# Patient Record
Sex: FEMALE | Race: WHITE | Employment: OTHER | URBAN - METROPOLITAN AREA
[De-identification: names, ages, dates, MRNs, and addresses within clinical notes are randomized per-mention and may not be internally consistent; named-entity substitution may affect disease eponyms.]

---

## 2017-01-20 ENCOUNTER — OFFICE VISIT (OUTPATIENT)
Dept: CARDIOLOGY CLINIC | Age: 82
End: 2017-01-20

## 2017-01-20 VITALS
WEIGHT: 134.7 LBS | SYSTOLIC BLOOD PRESSURE: 126 MMHG | DIASTOLIC BLOOD PRESSURE: 60 MMHG | HEIGHT: 63 IN | BODY MASS INDEX: 23.87 KG/M2 | HEART RATE: 52 BPM

## 2017-01-20 DIAGNOSIS — Z95.1 S/P CABG X 3: ICD-10-CM

## 2017-01-20 DIAGNOSIS — I73.9 PERIPHERAL VASCULAR DISEASE (HCC): ICD-10-CM

## 2017-01-20 DIAGNOSIS — I25.810 CORONARY ARTERY DISEASE INVOLVING CORONARY BYPASS GRAFT OF NATIVE HEART WITHOUT ANGINA PECTORIS: Primary | ICD-10-CM

## 2017-01-20 DIAGNOSIS — I34.1 MVP (MITRAL VALVE PROLAPSE): ICD-10-CM

## 2017-01-20 DIAGNOSIS — R79.89 ELEVATED TSH: ICD-10-CM

## 2017-01-20 DIAGNOSIS — I42.9 CARDIOMYOPATHY (HCC): ICD-10-CM

## 2017-01-20 DIAGNOSIS — I47.1 SUPRAVENTRICULAR TACHYCARDIA (HCC): ICD-10-CM

## 2017-01-20 DIAGNOSIS — G50.0 TIC DOULOUREUX: ICD-10-CM

## 2017-01-20 DIAGNOSIS — I10 ESSENTIAL HYPERTENSION: ICD-10-CM

## 2017-01-20 DIAGNOSIS — D63.8 ANEMIA DUE TO CHRONIC ILLNESS: ICD-10-CM

## 2017-01-20 DIAGNOSIS — E78.2 MIXED HYPERLIPIDEMIA: ICD-10-CM

## 2017-01-20 PROCEDURE — 1123F ACP DISCUSS/DSCN MKR DOCD: CPT | Performed by: INTERNAL MEDICINE

## 2017-01-20 PROCEDURE — 4040F PNEUMOC VAC/ADMIN/RCVD: CPT | Performed by: INTERNAL MEDICINE

## 2017-01-20 PROCEDURE — 1090F PRES/ABSN URINE INCON ASSESS: CPT | Performed by: INTERNAL MEDICINE

## 2017-01-20 PROCEDURE — G8484 FLU IMMUNIZE NO ADMIN: HCPCS | Performed by: INTERNAL MEDICINE

## 2017-01-20 PROCEDURE — 1036F TOBACCO NON-USER: CPT | Performed by: INTERNAL MEDICINE

## 2017-01-20 PROCEDURE — 99214 OFFICE O/P EST MOD 30 MIN: CPT | Performed by: INTERNAL MEDICINE

## 2017-01-20 PROCEDURE — G8598 ASA/ANTIPLAT THER USED: HCPCS | Performed by: INTERNAL MEDICINE

## 2017-01-20 PROCEDURE — G8399 PT W/DXA RESULTS DOCUMENT: HCPCS | Performed by: INTERNAL MEDICINE

## 2017-01-20 PROCEDURE — G8427 DOCREV CUR MEDS BY ELIG CLIN: HCPCS | Performed by: INTERNAL MEDICINE

## 2017-01-20 PROCEDURE — G8420 CALC BMI NORM PARAMETERS: HCPCS | Performed by: INTERNAL MEDICINE

## 2017-01-26 ENCOUNTER — PROCEDURE VISIT (OUTPATIENT)
Dept: CARDIOLOGY CLINIC | Age: 82
End: 2017-01-26

## 2017-01-26 ENCOUNTER — TELEPHONE (OUTPATIENT)
Dept: CARDIOLOGY CLINIC | Age: 82
End: 2017-01-26

## 2017-01-26 DIAGNOSIS — R09.89 BILATERAL CAROTID BRUITS: Primary | ICD-10-CM

## 2017-01-26 DIAGNOSIS — I38 VHD (VALVULAR HEART DISEASE): Primary | ICD-10-CM

## 2017-01-26 DIAGNOSIS — R93.89 ABNORMAL CAROTID ULTRASOUND: ICD-10-CM

## 2017-01-26 LAB
LV EF: 55 %
LVEF MODALITY: NORMAL

## 2017-01-26 PROCEDURE — 93880 EXTRACRANIAL BILAT STUDY: CPT | Performed by: INTERNAL MEDICINE

## 2017-01-26 PROCEDURE — 93306 TTE W/DOPPLER COMPLETE: CPT | Performed by: INTERNAL MEDICINE

## 2017-01-27 DIAGNOSIS — I25.10 CORONARY ARTERY DISEASE INVOLVING NATIVE CORONARY ARTERY OF NATIVE HEART WITHOUT ANGINA PECTORIS: ICD-10-CM

## 2017-01-27 DIAGNOSIS — I47.1 SUPRAVENTRICULAR TACHYCARDIA (HCC): ICD-10-CM

## 2017-01-27 DIAGNOSIS — R09.89 CAROTID BRUIT, UNSPECIFIED LATERALITY: Primary | ICD-10-CM

## 2017-01-27 DIAGNOSIS — I73.9 PERIPHERAL VASCULAR DISEASE (HCC): ICD-10-CM

## 2017-01-27 DIAGNOSIS — Z95.1 S/P CABG X 3: ICD-10-CM

## 2017-01-27 DIAGNOSIS — I25.810 CORONARY ARTERY DISEASE INVOLVING CORONARY BYPASS GRAFT OF NATIVE HEART WITHOUT ANGINA PECTORIS: ICD-10-CM

## 2017-02-02 PROBLEM — I65.23 BILATERAL CAROTID ARTERY STENOSIS: Status: ACTIVE | Noted: 2017-02-02

## 2017-02-02 PROBLEM — I77.1 SUBCLAVIAN ARTERIAL STENOSIS (HCC): Status: ACTIVE | Noted: 2017-02-02

## 2017-02-15 ENCOUNTER — TELEPHONE (OUTPATIENT)
Dept: CARDIOLOGY CLINIC | Age: 82
End: 2017-02-15

## 2017-02-27 ENCOUNTER — TELEPHONE (OUTPATIENT)
Dept: INTERNAL MEDICINE CLINIC | Age: 82
End: 2017-02-27

## 2017-02-27 ENCOUNTER — OFFICE VISIT (OUTPATIENT)
Dept: INTERNAL MEDICINE CLINIC | Age: 82
End: 2017-02-27

## 2017-02-27 VITALS
SYSTOLIC BLOOD PRESSURE: 130 MMHG | DIASTOLIC BLOOD PRESSURE: 56 MMHG | RESPIRATION RATE: 16 BRPM | WEIGHT: 138 LBS | BODY MASS INDEX: 24.45 KG/M2 | TEMPERATURE: 97.7 F | HEART RATE: 68 BPM

## 2017-02-27 DIAGNOSIS — R19.7 DIARRHEA, UNSPECIFIED TYPE: Primary | ICD-10-CM

## 2017-02-27 DIAGNOSIS — R10.31 RIGHT LOWER QUADRANT ABDOMINAL PAIN: ICD-10-CM

## 2017-02-27 PROCEDURE — 1036F TOBACCO NON-USER: CPT | Performed by: INTERNAL MEDICINE

## 2017-02-27 PROCEDURE — G8598 ASA/ANTIPLAT THER USED: HCPCS | Performed by: INTERNAL MEDICINE

## 2017-02-27 PROCEDURE — G8399 PT W/DXA RESULTS DOCUMENT: HCPCS | Performed by: INTERNAL MEDICINE

## 2017-02-27 PROCEDURE — 99215 OFFICE O/P EST HI 40 MIN: CPT | Performed by: INTERNAL MEDICINE

## 2017-02-27 PROCEDURE — G8484 FLU IMMUNIZE NO ADMIN: HCPCS | Performed by: INTERNAL MEDICINE

## 2017-02-27 PROCEDURE — G8427 DOCREV CUR MEDS BY ELIG CLIN: HCPCS | Performed by: INTERNAL MEDICINE

## 2017-02-27 PROCEDURE — 1090F PRES/ABSN URINE INCON ASSESS: CPT | Performed by: INTERNAL MEDICINE

## 2017-02-27 PROCEDURE — G8420 CALC BMI NORM PARAMETERS: HCPCS | Performed by: INTERNAL MEDICINE

## 2017-02-27 PROCEDURE — 4040F PNEUMOC VAC/ADMIN/RCVD: CPT | Performed by: INTERNAL MEDICINE

## 2017-02-27 PROCEDURE — 1123F ACP DISCUSS/DSCN MKR DOCD: CPT | Performed by: INTERNAL MEDICINE

## 2017-02-27 RX ORDER — METRONIDAZOLE 500 MG/1
500 TABLET ORAL 3 TIMES DAILY
Qty: 21 TABLET | Refills: 0 | Status: SHIPPED | OUTPATIENT
Start: 2017-02-27 | End: 2017-03-06

## 2017-02-27 RX ORDER — CIPROFLOXACIN 500 MG/1
500 TABLET, FILM COATED ORAL 2 TIMES DAILY
Qty: 20 TABLET | Refills: 0 | Status: SHIPPED | OUTPATIENT
Start: 2017-02-27 | End: 2017-03-07 | Stop reason: ALTCHOICE

## 2017-02-27 RX ORDER — MAGNESIUM HYDROXIDE/ALUMINUM HYDROXICE/SIMETHICONE 120; 1200; 1200 MG/30ML; MG/30ML; MG/30ML
5 SUSPENSION ORAL EVERY 6 HOURS PRN
COMMUNITY
End: 2017-02-27 | Stop reason: ALTCHOICE

## 2017-02-27 ASSESSMENT — ENCOUNTER SYMPTOMS
VOMITING: 0
ABDOMINAL PAIN: 1
SHORTNESS OF BREATH: 0
NAUSEA: 0
BLOOD IN STOOL: 0
EYE PAIN: 0
WHEEZING: 0
DIARRHEA: 1
DOUBLE VISION: 0
SPUTUM PRODUCTION: 0
COUGH: 0
HEARTBURN: 0
BACK PAIN: 1
SORE THROAT: 0
BLURRED VISION: 0

## 2017-02-28 ENCOUNTER — HOSPITAL ENCOUNTER (OUTPATIENT)
Dept: CT IMAGING | Age: 82
Discharge: HOME | End: 2017-02-28
Attending: INTERNAL MEDICINE | Admitting: INTERNAL MEDICINE

## 2017-02-28 ENCOUNTER — HOSPITAL ENCOUNTER (OUTPATIENT)
Dept: GENERAL RADIOLOGY | Age: 82
Discharge: HOME | End: 2017-02-28
Attending: INTERNAL MEDICINE | Admitting: INTERNAL MEDICINE

## 2017-02-28 DIAGNOSIS — R10.31 ABDOMINAL PAIN, RIGHT LOWER QUADRANT: ICD-10-CM

## 2017-02-28 DIAGNOSIS — R19.7 DIARRHEA: ICD-10-CM

## 2017-02-28 DIAGNOSIS — R10.31 RIGHT LOWER QUADRANT ABDOMINAL PAIN: ICD-10-CM

## 2017-02-28 DIAGNOSIS — R19.7 DIARRHEA, UNSPECIFIED TYPE: ICD-10-CM

## 2017-02-28 LAB
ALBUMIN SERPL-MCNC: 4.8 GM/DL (ref 3.4–5)
ALP BLD-CCNC: 40 IU/L (ref 40–128)
ALT SERPL-CCNC: 10 U/L (ref 10–40)
ANION GAP SERPL CALCULATED.3IONS-SCNC: 15 MMOL/L (ref 4–16)
AST SERPL-CCNC: 19 IU/L (ref 15–37)
BACTERIA: NEGATIVE /HPF
BASOPHILS ABSOLUTE: 0 K/CU MM
BASOPHILS RELATIVE PERCENT: 0.2 % (ref 0–1)
BILIRUB SERPL-MCNC: 0.4 MG/DL (ref 0–1)
BILIRUBIN URINE: NEGATIVE MG/DL
BLOOD, URINE: NEGATIVE
BUN BLDV-MCNC: 18 MG/DL (ref 6–23)
CALCIUM SERPL-MCNC: 9.7 MG/DL (ref 8.3–10.6)
CHLORIDE BLD-SCNC: 94 MMOL/L (ref 99–110)
CLARITY: CLEAR
CO2: 28 MMOL/L (ref 21–32)
COLOR: YELLOW
CREAT SERPL-MCNC: 1.1 MG/DL (ref 0.6–1.1)
DIFFERENTIAL TYPE: ABNORMAL
EOSINOPHILS ABSOLUTE: 0.1 K/CU MM
EOSINOPHILS RELATIVE PERCENT: 1.2 % (ref 0–3)
GFR AFRICAN AMERICAN: 58 ML/MIN/1.73M2
GFR NON-AFRICAN AMERICAN: 48 ML/MIN/1.73M2
GLUCOSE FASTING: 97 MG/DL (ref 70–99)
GLUCOSE, URINE: NEGATIVE MG/DL
HCT VFR BLD CALC: 40.7 % (ref 37–47)
HEMOGLOBIN: 13.1 GM/DL (ref 12.5–16)
IMMATURE NEUTROPHIL %: 0.4 % (ref 0–0.43)
KETONES, URINE: NEGATIVE MG/DL
LEUKOCYTE ESTERASE, URINE: ABNORMAL
LYMPHOCYTES ABSOLUTE: 2.1 K/CU MM
LYMPHOCYTES RELATIVE PERCENT: 40.6 % (ref 24–44)
MCH RBC QN AUTO: 33.4 PG (ref 27–31)
MCHC RBC AUTO-ENTMCNC: 32.2 % (ref 32–36)
MCV RBC AUTO: 103.8 FL (ref 78–100)
MONOCYTES ABSOLUTE: 0.5 K/CU MM
MONOCYTES RELATIVE PERCENT: 10.7 % (ref 0–4)
NITRITE URINE, QUANTITATIVE: NEGATIVE
NUCLEATED RBC %: 0 %
PDW BLD-RTO: 13.9 % (ref 11.7–14.9)
PH, URINE: 6 (ref 5–8)
PLATELET # BLD: 156 K/CU MM (ref 140–440)
PMV BLD AUTO: 10 FL (ref 7.5–11.1)
POTASSIUM SERPL-SCNC: 4.3 MMOL/L (ref 3.5–5.1)
PROTEIN UA: NEGATIVE MG/DL
RBC # BLD: 3.92 M/CU MM (ref 4.2–5.4)
RBC URINE: <1 /HPF (ref 0–6)
SEGMENTED NEUTROPHILS ABSOLUTE COUNT: 2.4 K/CU MM
SEGMENTED NEUTROPHILS RELATIVE PERCENT: 46.9 % (ref 36–66)
SODIUM BLD-SCNC: 137 MMOL/L (ref 135–145)
SPECIFIC GRAVITY UA: 1.01 (ref 1–1.03)
SQUAMOUS EPITHELIAL: <1 /HPF
TOTAL IMMATURE NEUTOROPHIL: 0.02 K/CU MM
TOTAL NUCLEATED RBC: 0 K/CU MM
TOTAL PROTEIN: 8.2 GM/DL (ref 6.4–8.2)
UROBILINOGEN, URINE: NORMAL EU/DL (ref 0.2–1)
WBC # BLD: 5.1 K/CU MM (ref 4–10.5)
WBC UA: <1 /HPF (ref 0–5)

## 2017-03-01 LAB
CULTURE: NORMAL
REPORT STATUS: NORMAL
REQUEST PROBLEM: NORMAL
SPECIMEN: NORMAL

## 2017-03-02 ENCOUNTER — TELEPHONE (OUTPATIENT)
Dept: INTERNAL MEDICINE CLINIC | Age: 82
End: 2017-03-02

## 2017-03-06 ENCOUNTER — TELEPHONE (OUTPATIENT)
Dept: INTERNAL MEDICINE CLINIC | Age: 82
End: 2017-03-06

## 2017-03-07 ENCOUNTER — OFFICE VISIT (OUTPATIENT)
Dept: INTERNAL MEDICINE CLINIC | Age: 82
End: 2017-03-07

## 2017-03-07 VITALS
BODY MASS INDEX: 23.91 KG/M2 | RESPIRATION RATE: 16 BRPM | DIASTOLIC BLOOD PRESSURE: 70 MMHG | WEIGHT: 135 LBS | HEART RATE: 80 BPM | SYSTOLIC BLOOD PRESSURE: 138 MMHG

## 2017-03-07 DIAGNOSIS — K57.32 DIVERTICULITIS OF LARGE INTESTINE WITHOUT PERFORATION OR ABSCESS WITHOUT BLEEDING: Primary | ICD-10-CM

## 2017-03-07 PROBLEM — K43.9 SPIGELIAN HERNIA: Status: ACTIVE | Noted: 2017-03-01

## 2017-03-07 PROCEDURE — 1123F ACP DISCUSS/DSCN MKR DOCD: CPT | Performed by: INTERNAL MEDICINE

## 2017-03-07 PROCEDURE — G8427 DOCREV CUR MEDS BY ELIG CLIN: HCPCS | Performed by: INTERNAL MEDICINE

## 2017-03-07 PROCEDURE — G8598 ASA/ANTIPLAT THER USED: HCPCS | Performed by: INTERNAL MEDICINE

## 2017-03-07 PROCEDURE — 99213 OFFICE O/P EST LOW 20 MIN: CPT | Performed by: INTERNAL MEDICINE

## 2017-03-07 PROCEDURE — G8420 CALC BMI NORM PARAMETERS: HCPCS | Performed by: INTERNAL MEDICINE

## 2017-03-07 PROCEDURE — G8484 FLU IMMUNIZE NO ADMIN: HCPCS | Performed by: INTERNAL MEDICINE

## 2017-03-07 PROCEDURE — 1090F PRES/ABSN URINE INCON ASSESS: CPT | Performed by: INTERNAL MEDICINE

## 2017-03-07 PROCEDURE — G8399 PT W/DXA RESULTS DOCUMENT: HCPCS | Performed by: INTERNAL MEDICINE

## 2017-03-07 PROCEDURE — 1036F TOBACCO NON-USER: CPT | Performed by: INTERNAL MEDICINE

## 2017-03-07 PROCEDURE — 4040F PNEUMOC VAC/ADMIN/RCVD: CPT | Performed by: INTERNAL MEDICINE

## 2017-03-20 RX ORDER — ATORVASTATIN CALCIUM 20 MG/1
20 TABLET, FILM COATED ORAL DAILY
Qty: 30 TABLET | Refills: 5 | Status: SHIPPED | OUTPATIENT
Start: 2017-03-20 | End: 2017-09-11 | Stop reason: SDUPTHER

## 2017-03-28 RX ORDER — CIPROFLOXACIN 250 MG/1
250 TABLET, FILM COATED ORAL 2 TIMES DAILY
Qty: 6 TABLET | Refills: 0 | Status: SHIPPED | OUTPATIENT
Start: 2017-03-28 | End: 2017-03-31

## 2017-04-03 ENCOUNTER — NURSE ONLY (OUTPATIENT)
Dept: INTERNAL MEDICINE CLINIC | Age: 82
End: 2017-04-03

## 2017-04-03 ENCOUNTER — TELEPHONE (OUTPATIENT)
Dept: INTERNAL MEDICINE CLINIC | Age: 82
End: 2017-04-03

## 2017-04-03 DIAGNOSIS — Z12.31 ENCOUNTER FOR SCREENING MAMMOGRAM FOR BREAST CANCER: ICD-10-CM

## 2017-04-03 DIAGNOSIS — M81.0 OSTEOPOROSIS: Primary | ICD-10-CM

## 2017-04-03 PROCEDURE — 96372 THER/PROPH/DIAG INJ SC/IM: CPT | Performed by: INTERNAL MEDICINE

## 2017-04-17 RX ORDER — NIACIN 500 MG
500 TABLET ORAL NIGHTLY
Qty: 30 TABLET | Refills: 6 | Status: SHIPPED | OUTPATIENT
Start: 2017-04-17 | End: 2017-11-10 | Stop reason: SDUPTHER

## 2017-04-30 RX ORDER — PANTOPRAZOLE SODIUM 40 MG/1
40 TABLET, DELAYED RELEASE ORAL DAILY
Qty: 90 TABLET | Refills: 1 | Status: SHIPPED | OUTPATIENT
Start: 2017-04-30 | End: 2017-11-01 | Stop reason: SDUPTHER

## 2017-05-30 RX ORDER — GABAPENTIN 300 MG/1
CAPSULE ORAL
Qty: 270 CAPSULE | Refills: 1 | Status: SHIPPED | OUTPATIENT
Start: 2017-05-30 | End: 2017-06-13 | Stop reason: SDUPTHER

## 2017-06-12 ENCOUNTER — TELEPHONE (OUTPATIENT)
Dept: INTERNAL MEDICINE CLINIC | Age: 82
End: 2017-06-12

## 2017-06-13 ENCOUNTER — OFFICE VISIT (OUTPATIENT)
Dept: INTERNAL MEDICINE CLINIC | Age: 82
End: 2017-06-13

## 2017-06-13 VITALS
RESPIRATION RATE: 16 BRPM | BODY MASS INDEX: 25.15 KG/M2 | DIASTOLIC BLOOD PRESSURE: 70 MMHG | WEIGHT: 142 LBS | HEART RATE: 84 BPM | SYSTOLIC BLOOD PRESSURE: 118 MMHG

## 2017-06-13 DIAGNOSIS — I25.10 CORONARY ARTERY DISEASE INVOLVING NATIVE CORONARY ARTERY OF NATIVE HEART WITHOUT ANGINA PECTORIS: ICD-10-CM

## 2017-06-13 DIAGNOSIS — M81.0 OSTEOPOROSIS: Primary | ICD-10-CM

## 2017-06-13 DIAGNOSIS — E78.2 MIXED HYPERLIPIDEMIA: ICD-10-CM

## 2017-06-13 PROCEDURE — 4040F PNEUMOC VAC/ADMIN/RCVD: CPT | Performed by: INTERNAL MEDICINE

## 2017-06-13 PROCEDURE — G8598 ASA/ANTIPLAT THER USED: HCPCS | Performed by: INTERNAL MEDICINE

## 2017-06-13 PROCEDURE — G8419 CALC BMI OUT NRM PARAM NOF/U: HCPCS | Performed by: INTERNAL MEDICINE

## 2017-06-13 PROCEDURE — 4005F PHARM THX FOR OP RXD: CPT | Performed by: INTERNAL MEDICINE

## 2017-06-13 PROCEDURE — 1036F TOBACCO NON-USER: CPT | Performed by: INTERNAL MEDICINE

## 2017-06-13 PROCEDURE — 1123F ACP DISCUSS/DSCN MKR DOCD: CPT | Performed by: INTERNAL MEDICINE

## 2017-06-13 PROCEDURE — G8427 DOCREV CUR MEDS BY ELIG CLIN: HCPCS | Performed by: INTERNAL MEDICINE

## 2017-06-13 PROCEDURE — 1090F PRES/ABSN URINE INCON ASSESS: CPT | Performed by: INTERNAL MEDICINE

## 2017-06-13 PROCEDURE — 99213 OFFICE O/P EST LOW 20 MIN: CPT | Performed by: INTERNAL MEDICINE

## 2017-06-13 PROCEDURE — G8399 PT W/DXA RESULTS DOCUMENT: HCPCS | Performed by: INTERNAL MEDICINE

## 2017-06-13 RX ORDER — FLUTICASONE PROPIONATE 50 MCG
1 SPRAY, SUSPENSION (ML) NASAL DAILY
Qty: 3 BOTTLE | Refills: 1 | Status: SHIPPED | OUTPATIENT
Start: 2017-06-13 | End: 2017-12-11 | Stop reason: SDUPTHER

## 2017-06-13 RX ORDER — AMLODIPINE BESYLATE 5 MG/1
5 TABLET ORAL DAILY
Qty: 90 TABLET | Refills: 1 | Status: SHIPPED | OUTPATIENT
Start: 2017-06-13 | End: 2017-12-11 | Stop reason: SDUPTHER

## 2017-06-13 RX ORDER — GABAPENTIN 300 MG/1
CAPSULE ORAL
Qty: 270 CAPSULE | Refills: 1 | Status: SHIPPED | OUTPATIENT
Start: 2017-06-13 | End: 2018-05-16 | Stop reason: SDUPTHER

## 2017-07-03 ENCOUNTER — TELEPHONE (OUTPATIENT)
Dept: INTERNAL MEDICINE CLINIC | Age: 82
End: 2017-07-03

## 2017-07-03 RX ORDER — DIVALPROEX SODIUM 250 MG/1
250 TABLET, DELAYED RELEASE ORAL
COMMUNITY
End: 2020-10-20

## 2017-07-14 ENCOUNTER — TELEPHONE (OUTPATIENT)
Dept: CARDIOLOGY CLINIC | Age: 82
End: 2017-07-14

## 2017-08-08 ENCOUNTER — OFFICE VISIT (OUTPATIENT)
Dept: CARDIOLOGY CLINIC | Age: 82
End: 2017-08-08

## 2017-08-08 VITALS
HEIGHT: 63 IN | HEART RATE: 72 BPM | DIASTOLIC BLOOD PRESSURE: 62 MMHG | SYSTOLIC BLOOD PRESSURE: 138 MMHG | WEIGHT: 141 LBS | BODY MASS INDEX: 24.98 KG/M2

## 2017-08-08 DIAGNOSIS — G31.84 MILD COGNITIVE IMPAIRMENT: ICD-10-CM

## 2017-08-08 DIAGNOSIS — I34.1 MVP (MITRAL VALVE PROLAPSE): ICD-10-CM

## 2017-08-08 DIAGNOSIS — I25.810 CORONARY ARTERY DISEASE INVOLVING CORONARY BYPASS GRAFT OF NATIVE HEART WITHOUT ANGINA PECTORIS: Primary | ICD-10-CM

## 2017-08-08 DIAGNOSIS — E78.2 MIXED HYPERLIPIDEMIA: ICD-10-CM

## 2017-08-08 DIAGNOSIS — I77.1 SUBCLAVIAN ARTERIAL STENOSIS (HCC): ICD-10-CM

## 2017-08-08 DIAGNOSIS — I73.9 PERIPHERAL VASCULAR DISEASE (HCC): ICD-10-CM

## 2017-08-08 DIAGNOSIS — Z95.1 S/P CABG X 3: ICD-10-CM

## 2017-08-08 DIAGNOSIS — I25.5 ISCHEMIC CARDIOMYOPATHY: ICD-10-CM

## 2017-08-08 DIAGNOSIS — I10 ESSENTIAL HYPERTENSION: ICD-10-CM

## 2017-08-08 DIAGNOSIS — G50.0 TIC DOULOUREUX: ICD-10-CM

## 2017-08-08 DIAGNOSIS — D63.8 ANEMIA DUE TO CHRONIC ILLNESS: ICD-10-CM

## 2017-08-08 DIAGNOSIS — I65.23 BILATERAL CAROTID ARTERY STENOSIS: ICD-10-CM

## 2017-08-08 DIAGNOSIS — I47.1 SUPRAVENTRICULAR TACHYCARDIA (HCC): ICD-10-CM

## 2017-08-08 PROCEDURE — 99214 OFFICE O/P EST MOD 30 MIN: CPT | Performed by: INTERNAL MEDICINE

## 2017-08-08 PROCEDURE — 4040F PNEUMOC VAC/ADMIN/RCVD: CPT | Performed by: INTERNAL MEDICINE

## 2017-08-08 PROCEDURE — G8598 ASA/ANTIPLAT THER USED: HCPCS | Performed by: INTERNAL MEDICINE

## 2017-08-08 PROCEDURE — G8399 PT W/DXA RESULTS DOCUMENT: HCPCS | Performed by: INTERNAL MEDICINE

## 2017-08-08 PROCEDURE — 1090F PRES/ABSN URINE INCON ASSESS: CPT | Performed by: INTERNAL MEDICINE

## 2017-08-08 PROCEDURE — 1123F ACP DISCUSS/DSCN MKR DOCD: CPT | Performed by: INTERNAL MEDICINE

## 2017-08-08 PROCEDURE — G8420 CALC BMI NORM PARAMETERS: HCPCS | Performed by: INTERNAL MEDICINE

## 2017-08-08 PROCEDURE — G8427 DOCREV CUR MEDS BY ELIG CLIN: HCPCS | Performed by: INTERNAL MEDICINE

## 2017-08-08 PROCEDURE — 1036F TOBACCO NON-USER: CPT | Performed by: INTERNAL MEDICINE

## 2017-08-22 ENCOUNTER — HOSPITAL ENCOUNTER (OUTPATIENT)
Dept: WOMENS IMAGING | Age: 82
Discharge: HOME | End: 2017-08-22
Attending: INTERNAL MEDICINE | Admitting: INTERNAL MEDICINE

## 2017-08-22 DIAGNOSIS — M81.0 OSTEOPOROSIS, UNSPECIFIED OSTEOPOROSIS TYPE, UNSPECIFIED PATHOLOGICAL FRACTURE PRESENCE: ICD-10-CM

## 2017-08-22 DIAGNOSIS — Z12.31 VISIT FOR SCREENING MAMMOGRAM: ICD-10-CM

## 2017-08-22 DIAGNOSIS — M81.0 AGE-RELATED OSTEOPOROSIS WITHOUT CURRENT PATHOLOGICAL FRACTURE: ICD-10-CM

## 2017-09-11 RX ORDER — ATORVASTATIN CALCIUM 20 MG/1
TABLET, FILM COATED ORAL
Qty: 30 TABLET | Refills: 5 | Status: SHIPPED | OUTPATIENT
Start: 2017-09-11 | End: 2018-03-01 | Stop reason: SDUPTHER

## 2017-10-09 ENCOUNTER — TELEPHONE (OUTPATIENT)
Dept: INTERNAL MEDICINE CLINIC | Age: 82
End: 2017-10-09

## 2017-10-09 ENCOUNTER — OFFICE VISIT (OUTPATIENT)
Dept: INTERNAL MEDICINE CLINIC | Age: 82
End: 2017-10-09

## 2017-10-09 VITALS
BODY MASS INDEX: 24.84 KG/M2 | DIASTOLIC BLOOD PRESSURE: 68 MMHG | HEART RATE: 78 BPM | WEIGHT: 140.2 LBS | RESPIRATION RATE: 16 BRPM | TEMPERATURE: 98.2 F | SYSTOLIC BLOOD PRESSURE: 132 MMHG

## 2017-10-09 DIAGNOSIS — F33.42 RECURRENT MAJOR DEPRESSIVE EPISODES, IN FULL REMISSION (HCC): ICD-10-CM

## 2017-10-09 DIAGNOSIS — J04.10 TRACHEITIS: Primary | ICD-10-CM

## 2017-10-09 PROCEDURE — 99212 OFFICE O/P EST SF 10 MIN: CPT | Performed by: INTERNAL MEDICINE

## 2017-10-09 PROCEDURE — G8427 DOCREV CUR MEDS BY ELIG CLIN: HCPCS | Performed by: INTERNAL MEDICINE

## 2017-10-09 PROCEDURE — G8598 ASA/ANTIPLAT THER USED: HCPCS | Performed by: INTERNAL MEDICINE

## 2017-10-09 PROCEDURE — 1036F TOBACCO NON-USER: CPT | Performed by: INTERNAL MEDICINE

## 2017-10-09 PROCEDURE — G8484 FLU IMMUNIZE NO ADMIN: HCPCS | Performed by: INTERNAL MEDICINE

## 2017-10-09 PROCEDURE — G8399 PT W/DXA RESULTS DOCUMENT: HCPCS | Performed by: INTERNAL MEDICINE

## 2017-10-09 PROCEDURE — 4040F PNEUMOC VAC/ADMIN/RCVD: CPT | Performed by: INTERNAL MEDICINE

## 2017-10-09 PROCEDURE — G8420 CALC BMI NORM PARAMETERS: HCPCS | Performed by: INTERNAL MEDICINE

## 2017-10-09 PROCEDURE — 1090F PRES/ABSN URINE INCON ASSESS: CPT | Performed by: INTERNAL MEDICINE

## 2017-10-09 PROCEDURE — 1123F ACP DISCUSS/DSCN MKR DOCD: CPT | Performed by: INTERNAL MEDICINE

## 2017-10-09 RX ORDER — AZITHROMYCIN 250 MG/1
TABLET, FILM COATED ORAL
Qty: 1 PACKET | Refills: 0 | Status: SHIPPED | OUTPATIENT
Start: 2017-10-09 | End: 2017-10-19

## 2017-10-09 NOTE — PROGRESS NOTES
Subjective:      Anne Mann is a [de-identified] y.o. female who presents today for follow up on her chronic medical conditions as noted below.     Patient Active Problem List:     Chronic depression     Hyperlipidemia     Tic douloureux     Anemia due to chronic illness     Colon cancer screening     Breast cancer screening     Osteoporosis     Supraventricular tachycardia (HCC)     Recurrent ventral incisional hernia     Mild cognitive impairment     CAD (coronary artery disease)     Elevated TSH     Peripheral vascular disease (HCC)     Sciatica of left side without back pain     Coronary artery disease involving coronary bypass graft of native heart without angina pectoris     S/P CABG x 3     Essential hypertension     MVP (mitral valve prolapse)     Cardiomyopathy ([address]     Bilateral carotid artery stenosis     Subclavian arterial stenosis (HCC)     Spigelian hernia     She complains of cough    It began 4 d ago with \"hacking\" cough, dry  Hoarseness  Didn't feel bad    Last night she awakened with night sweat   Like she had a fever    Still s/w hoarse and coughing ; not bringin up much  No sore throat  No wheeze    Has been seeing johan every 3 mo for depression and it is in complete remission woth good mood    Current Outpatient Prescriptions   Medication Sig Dispense Refill    azithromycin (ZITHROMAX) 250 MG tablet Take as directed over 5 days 1 packet 0    atorvastatin (LIPITOR) 20 MG tablet take 1 tablet by mouth once daily 30 tablet 5    divalproex (DEPAKOTE) 250 MG DR tablet Take 250 mg by mouth 2 tabs at 6 pm      amLODIPine (NORVASC) 5 MG tablet Take 1 tablet by mouth daily 90 tablet 1    gabapentin (NEURONTIN) 300 MG capsule take 1 capsule by mouth three times a day 270 capsule 1    fluticasone (FLONASE) 50 MCG/ACT nasal spray 1 spray by Nasal route daily 3 Bottle 1    pantoprazole (PROTONIX) 40 MG tablet Take 1 tablet by mouth daily 90 tablet 1    niacin (RA NO FLUSH NIACIN) 500 MG tablet Take 1 tablet 0.25     Years: 10.00     Quit date: 11/29/1985    Smokeless tobacco: Never Used      Comment: 7/8/2016    Alcohol use No        ROS: The patient has had no headache, sore throat, fever or chills, dyspnea, chest pain, nausea, vomiting or diarrhea, or edema. Objective:      /68   Pulse 78   Temp 98.2 °F (36.8 °C)   Resp 16   Wt 140 lb 3.2 oz (63.6 kg)   BMI 24.84 kg/m²    General: in no apparent distress   The patient's neck is free of nodes. Lungs are clear. Heart is normal in rate and regular in rhythm. Legs are free of edema. No rash or erythema. Assessment / Plan:      1. Tracheitis    2. Recurrent major depressive episodes, in full remission ([facility]            Plan   zpak  RTC 2 o  F/  Quality & Risk Score Accuracy - MEDICARE ADVANTAGE    Visit Dx:  Recurrent major depressive episodes, in full remission ([address]  Stable/controlled based upon review of recent symptoms. Continue current treatment plan and follow up at least yearly.   Additional documentation:  mood fine ; seeing johan every 3 mo; no psych med change; happy ; getting out and about; sleeping fine  Last edited 10/09/17 14:27 EDT by Leann Carlos MD

## 2017-10-25 ENCOUNTER — NURSE ONLY (OUTPATIENT)
Dept: INTERNAL MEDICINE CLINIC | Age: 82
End: 2017-10-25

## 2017-10-25 DIAGNOSIS — M81.0 OSTEOPOROSIS WITHOUT CURRENT PATHOLOGICAL FRACTURE, UNSPECIFIED OSTEOPOROSIS TYPE: Primary | ICD-10-CM

## 2017-10-25 PROCEDURE — 96372 THER/PROPH/DIAG INJ SC/IM: CPT | Performed by: INTERNAL MEDICINE

## 2017-11-01 RX ORDER — PANTOPRAZOLE SODIUM 40 MG/1
TABLET, DELAYED RELEASE ORAL
Qty: 90 TABLET | Refills: 1 | Status: SHIPPED | OUTPATIENT
Start: 2017-11-01 | End: 2017-11-02 | Stop reason: SDUPTHER

## 2017-11-02 RX ORDER — PANTOPRAZOLE SODIUM 40 MG/1
TABLET, DELAYED RELEASE ORAL
Qty: 90 TABLET | Refills: 1 | Status: SHIPPED | OUTPATIENT
Start: 2017-11-02 | End: 2018-02-12 | Stop reason: SDUPTHER

## 2017-11-10 RX ORDER — PSYLLIUM HUSK 3.4 G/7G
POWDER ORAL
Qty: 30 TABLET | Refills: 6 | Status: SHIPPED | OUTPATIENT
Start: 2017-11-10 | End: 2018-05-16 | Stop reason: SDUPTHER

## 2017-12-11 RX ORDER — FLUTICASONE PROPIONATE 50 MCG
SPRAY, SUSPENSION (ML) NASAL
Qty: 3 BOTTLE | Refills: 1 | Status: SHIPPED | OUTPATIENT
Start: 2017-12-11 | End: 2018-11-27 | Stop reason: SDUPTHER

## 2017-12-11 RX ORDER — AMLODIPINE BESYLATE 5 MG/1
TABLET ORAL
Qty: 90 TABLET | Refills: 1 | Status: SHIPPED | OUTPATIENT
Start: 2017-12-11 | End: 2018-05-16 | Stop reason: SDUPTHER

## 2018-01-04 ENCOUNTER — TELEPHONE (OUTPATIENT)
Dept: INTERNAL MEDICINE CLINIC | Age: 83
End: 2018-01-04

## 2018-01-05 ENCOUNTER — OFFICE VISIT (OUTPATIENT)
Dept: INTERNAL MEDICINE CLINIC | Age: 83
End: 2018-01-05

## 2018-01-05 VITALS
TEMPERATURE: 98.9 F | SYSTOLIC BLOOD PRESSURE: 122 MMHG | HEART RATE: 80 BPM | WEIGHT: 140 LBS | BODY MASS INDEX: 24.8 KG/M2 | RESPIRATION RATE: 16 BRPM | DIASTOLIC BLOOD PRESSURE: 70 MMHG

## 2018-01-05 DIAGNOSIS — R79.89 ELEVATED TSH: ICD-10-CM

## 2018-01-05 DIAGNOSIS — Z12.39 BREAST CANCER SCREENING: ICD-10-CM

## 2018-01-05 DIAGNOSIS — E78.2 MIXED HYPERLIPIDEMIA: ICD-10-CM

## 2018-01-05 DIAGNOSIS — J01.00 ACUTE NON-RECURRENT MAXILLARY SINUSITIS: Primary | ICD-10-CM

## 2018-01-05 DIAGNOSIS — I25.10 CORONARY ARTERY DISEASE INVOLVING NATIVE CORONARY ARTERY OF NATIVE HEART WITHOUT ANGINA PECTORIS: ICD-10-CM

## 2018-01-05 PROCEDURE — G8427 DOCREV CUR MEDS BY ELIG CLIN: HCPCS | Performed by: INTERNAL MEDICINE

## 2018-01-05 PROCEDURE — G8482 FLU IMMUNIZE ORDER/ADMIN: HCPCS | Performed by: INTERNAL MEDICINE

## 2018-01-05 PROCEDURE — 1090F PRES/ABSN URINE INCON ASSESS: CPT | Performed by: INTERNAL MEDICINE

## 2018-01-05 PROCEDURE — G8598 ASA/ANTIPLAT THER USED: HCPCS | Performed by: INTERNAL MEDICINE

## 2018-01-05 PROCEDURE — G8399 PT W/DXA RESULTS DOCUMENT: HCPCS | Performed by: INTERNAL MEDICINE

## 2018-01-05 PROCEDURE — G8420 CALC BMI NORM PARAMETERS: HCPCS | Performed by: INTERNAL MEDICINE

## 2018-01-05 PROCEDURE — 1123F ACP DISCUSS/DSCN MKR DOCD: CPT | Performed by: INTERNAL MEDICINE

## 2018-01-05 PROCEDURE — 1036F TOBACCO NON-USER: CPT | Performed by: INTERNAL MEDICINE

## 2018-01-05 PROCEDURE — 4040F PNEUMOC VAC/ADMIN/RCVD: CPT | Performed by: INTERNAL MEDICINE

## 2018-01-05 PROCEDURE — 99213 OFFICE O/P EST LOW 20 MIN: CPT | Performed by: INTERNAL MEDICINE

## 2018-01-05 RX ORDER — LEVOFLOXACIN 500 MG/1
500 TABLET, FILM COATED ORAL DAILY
Qty: 10 TABLET | Refills: 0 | Status: SHIPPED | OUTPATIENT
Start: 2018-01-05 | End: 2018-01-15

## 2018-01-18 ENCOUNTER — TELEPHONE (OUTPATIENT)
Dept: INTERNAL MEDICINE CLINIC | Age: 83
End: 2018-01-18

## 2018-01-18 RX ORDER — AZITHROMYCIN 250 MG/1
TABLET, FILM COATED ORAL
Qty: 1 PACKET | Refills: 0 | Status: SHIPPED | OUTPATIENT
Start: 2018-01-18 | End: 2018-01-28

## 2018-02-12 ENCOUNTER — TELEPHONE (OUTPATIENT)
Dept: INTERNAL MEDICINE CLINIC | Age: 83
End: 2018-02-12

## 2018-02-12 RX ORDER — PANTOPRAZOLE SODIUM 40 MG/1
TABLET, DELAYED RELEASE ORAL
Qty: 90 TABLET | Refills: 1 | Status: SHIPPED | OUTPATIENT
Start: 2018-02-12 | End: 2018-05-16 | Stop reason: SDUPTHER

## 2018-02-12 NOTE — TELEPHONE ENCOUNTER
Patient called stating that the pharmacy has been calling for protonix refill. Patient only has 3 tablets left and needs this medication. Can this be called in for 90 days. To [facility] in [facility].

## 2018-02-15 ENCOUNTER — OFFICE VISIT (OUTPATIENT)
Dept: CARDIOLOGY CLINIC | Age: 83
End: 2018-02-15

## 2018-02-15 VITALS
BODY MASS INDEX: 25.16 KG/M2 | SYSTOLIC BLOOD PRESSURE: 138 MMHG | DIASTOLIC BLOOD PRESSURE: 66 MMHG | HEART RATE: 83 BPM | WEIGHT: 142 LBS | HEIGHT: 63 IN

## 2018-02-15 DIAGNOSIS — I10 ESSENTIAL HYPERTENSION: ICD-10-CM

## 2018-02-15 DIAGNOSIS — I77.1 SUBCLAVIAN ARTERIAL STENOSIS (HCC): ICD-10-CM

## 2018-02-15 DIAGNOSIS — I34.1 MVP (MITRAL VALVE PROLAPSE): ICD-10-CM

## 2018-02-15 DIAGNOSIS — I47.1 SUPRAVENTRICULAR TACHYCARDIA (HCC): ICD-10-CM

## 2018-02-15 DIAGNOSIS — Z95.1 S/P CABG X 3: ICD-10-CM

## 2018-02-15 DIAGNOSIS — E78.2 MIXED HYPERLIPIDEMIA: ICD-10-CM

## 2018-02-15 DIAGNOSIS — I73.9 PERIPHERAL VASCULAR DISEASE (HCC): ICD-10-CM

## 2018-02-15 DIAGNOSIS — I25.810 CORONARY ARTERY DISEASE INVOLVING CORONARY BYPASS GRAFT OF NATIVE HEART WITHOUT ANGINA PECTORIS: Primary | ICD-10-CM

## 2018-02-15 DIAGNOSIS — I25.5 ISCHEMIC CARDIOMYOPATHY: ICD-10-CM

## 2018-02-15 DIAGNOSIS — G50.0 TIC DOULOUREUX: ICD-10-CM

## 2018-02-15 PROCEDURE — 99214 OFFICE O/P EST MOD 30 MIN: CPT | Performed by: INTERNAL MEDICINE

## 2018-02-15 PROCEDURE — 1090F PRES/ABSN URINE INCON ASSESS: CPT | Performed by: INTERNAL MEDICINE

## 2018-02-15 PROCEDURE — G8399 PT W/DXA RESULTS DOCUMENT: HCPCS | Performed by: INTERNAL MEDICINE

## 2018-02-15 PROCEDURE — G8598 ASA/ANTIPLAT THER USED: HCPCS | Performed by: INTERNAL MEDICINE

## 2018-02-15 PROCEDURE — G8482 FLU IMMUNIZE ORDER/ADMIN: HCPCS | Performed by: INTERNAL MEDICINE

## 2018-02-15 PROCEDURE — 1036F TOBACCO NON-USER: CPT | Performed by: INTERNAL MEDICINE

## 2018-02-15 PROCEDURE — G8419 CALC BMI OUT NRM PARAM NOF/U: HCPCS | Performed by: INTERNAL MEDICINE

## 2018-02-15 PROCEDURE — 4040F PNEUMOC VAC/ADMIN/RCVD: CPT | Performed by: INTERNAL MEDICINE

## 2018-02-15 PROCEDURE — 1123F ACP DISCUSS/DSCN MKR DOCD: CPT | Performed by: INTERNAL MEDICINE

## 2018-02-15 PROCEDURE — G8427 DOCREV CUR MEDS BY ELIG CLIN: HCPCS | Performed by: INTERNAL MEDICINE

## 2018-02-15 RX ORDER — FENOFIBRATE 145 MG/1
145 TABLET, COATED ORAL DAILY
Qty: 30 TABLET | Refills: 5 | Status: SHIPPED | OUTPATIENT
Start: 2018-02-15 | End: 2018-04-24 | Stop reason: ALTCHOICE

## 2018-02-15 NOTE — PROGRESS NOTES
Ayan Mathew is a 82 y.o. female who has    CHIEF COMPLAINT AS FOLLOWS:  CHEST PAIN: Patient denies any C/O chest pains at this time. SOB: No C/O SOB at this time. LEG EDEMA: C/O leg edema   PALPITATIONS: Denies any C/O Palpitations                               DIZZINESS: No C/O Dizziness                          SYNCOPE: None   OTHER: Fatigue                                    HPI: Patient is here for F/U on his CAD, HTN & Dyslipidemia problems. He does not have any complaints at this time.     Rowan Ackerman has the following history recorded in care path:  Patient Active Problem List    Diagnosis Date Noted    Spigelian hernia 03/01/2017     Priority: Low    Bilateral carotid artery stenosis 02/02/2017     Priority: Low    Subclavian arterial stenosis ([address] 02/02/2017     Priority: Low    Coronary artery disease involving coronary bypass graft of native heart without angina pectoris 01/20/2017     Priority: Low    S/P CABG x 3 01/20/2017     Priority: Low    Essential hypertension 01/20/2017     Priority: Low    MVP (mitral valve prolapse) 01/20/2017     Priority: Low    Cardiomyopathy ([address] 01/20/2017     Priority: Low    Sciatica of left side without back pain 08/17/2016     Priority: Low    Elevated TSH 02/01/2016     Priority: Low    Peripheral vascular disease ([address] 01/01/2016     Priority: Low    CAD (coronary artery disease) 05/23/2014     Priority: Low    Recurrent ventral incisional hernia      Priority: Low    Supraventricular tachycardia ([address]      Priority: Low    Osteoporosis 05/29/2012     Priority: Low    Mild cognitive impairment 02/01/2012     Priority: Low    Colon cancer screening 03/18/2011     Priority: Low    Breast cancer screening 03/18/2011     Priority: Low    Anemia due to chronic illness 10/29/2010     Priority: Low    Chronic depression      Priority: Low    venous thrombosis) ([facility] 1970    left leg    Elevated TSH 2/2016    5.02    Former smoker     History of Doppler ultrasound 01/27/2017    Carotid- Bilateral ICA stenosis of 50-69%, Mobile Plaque refer to Dr Kenton Salcedo Hyperlipidemia     Hypertension     Macular degeneration     right    Mild cognitive impairment 2/2012    MMSE 26/30    MVP (mitral valve prolapse)     trace on echo 2014    Osteoporosis 5/2012    Pancytopenia 5/2011    mild with ; Dr Nicole hawk 2010    Peptic ulcer disease     Peripheral vascular disease ([facility] [address] 1/2016    angio; dis below ankles; pletal    Prediabetes 2006    Recurrent ventral incisional hernia 2013    medial apex of GB scar    S/P CABG x 3 2003    3-vessel; Dr Amparo Tenorio Spigelian hernia 03/2017    right ant lateral wall; seen on CT abd    Supraventricular tachycardia ([facility] [address] 1998    Freq ventriular ectopy    Tic douloureux 2008    Dr. Darci Clements; Right side     Past Surgical History:   Procedure Laterality Date    CATARACT REMOVAL Right 2010    poor result ; blind right eye; [address] CORONARY ARTERY BYPASS GRAFT  8/2009    3 vessel    SHOULDER ARTHROSCOPY Right 2003    TUBAL LIGATION      URETER SURGERY      Right ureteral repair      As reviewed   Family History   Problem Relation Age of Onset    Cancer Sister 79     Breast cancer 1/2017     Social History   Substance Use Topics    Smoking status: Former Smoker     Packs/day: 0.25     Years: 10.00     Quit date: 11/29/1985    Smokeless tobacco: Never Used      Comment: 7/8/2016    Alcohol use No      Review of Systems:    Constitutional: Negative for diaphoresis and fatigue  Psychological:Negative for anxiety or depression  HENT: Negative for headaches, nasal congestion, sinus pain or vertigo  Eyes: Negative for visual disturbance.    Endocrine: Negative for polydipsia/polyuria  Respiratory: Negative for shortness of breath  Cardiovascular: Negative for chest pain, dyspnea on pectoris I25.810    S/P CABG x 3 Z95.1    Essential hypertension I10    MVP (mitral valve prolapse) I34.1    Cardiomyopathy (HCC) I42.9    Bilateral carotid artery stenosis I65.23    Subclavian arterial stenosis (HCC) I77.1    Spigelian hernia K43.9       Assessment & Plan:    CAD:Yes   clinically stable. Patient is on optimal medical regimen ( see medication list above )  -     CORONARY ARTERY DISEASE: Patient is currently  asymptomatic from CAD. - changes in  treatment:   no           - Testing ordered:  no  [facility] classification: 1  [place] RISK SCORE:  LOAN RISK SCORE:  HTN:well controlled on current medical regimen, see list above. - changes in  treatment:   no   CARDIOMYOPATHY:  known   CONGESTIVE HEART FAILURE: NO KNOWN HISTORY.     VHD: No significant VHD noted  DYSLIPIDEMIA: Patient's profile is at / near Goal.no                                HDL is low                                Tolerating current medical regimen wellyes,                              CAROTID ARTERY DISEASE:.yes,                No symptoms described pertaining to Carotid artery disease               Up to date on non invasive testing .yes,                Patient is on Guidelines recommended therapy. yes,   OTHER RELEVANT DIAGNOSIS:as noted in patient's active problem list:  TESTS ORDERED: Carotid US. All previously ordered tests reviewed. ARRHYTHMIAS:  known                                Patient has H/O SVT   MEDICATIONS: CPM + add fenofibrate & F/U LABS   Office f/u in six months. Primary/secondary prevention is the goal by aggressive risk modification, healthy and therapeutic life style changes for cardiovascular risk reduction. Various goals are discussed and multiple questions answered.

## 2018-02-15 NOTE — PATIENT INSTRUCTIONS
CAD:Yes   clinically stable. Patient is on optimal medical regimen ( see medication list above )  -     CORONARY ARTERY DISEASE: Patient is currently  asymptomatic from CAD. - changes in  treatment:   no           - Testing ordered:  no  [facility] classification: 1  [place] RISK SCORE:  LOAN RISK SCORE:  HTN:well controlled on current medical regimen, see list above. - changes in  treatment:   no   CARDIOMYOPATHY:  known   CONGESTIVE HEART FAILURE: NO KNOWN HISTORY.     VHD: No significant VHD noted  DYSLIPIDEMIA: Patient's profile is at / near Goal.no                                HDL is low                                Tolerating current medical regimen judies,                              CAROTID ARTERY DISEASE:.yes,                No symptoms described pertaining to Carotid artery disease               Up to date on non invasive testing .yes,                Patient is on Guidelines recommended therapy. yes,   OTHER RELEVANT DIAGNOSIS:as noted in patient's active problem list:  TESTS ORDERED: Carotid US. All previously ordered tests reviewed. ARRHYTHMIAS:  known                                Patient has H/O SVT   MEDICATIONS: CPM + add fenofibrate & F/U LABS   Office f/u in six months. Primary/secondary prevention is the goal by aggressive risk modification, healthy and therapeutic life style changes for cardiovascular risk reduction. Various goals are discussed and multiple questions answered.

## 2018-03-01 ENCOUNTER — TELEPHONE (OUTPATIENT)
Dept: CARDIOLOGY CLINIC | Age: 83
End: 2018-03-01

## 2018-03-01 ENCOUNTER — PROCEDURE VISIT (OUTPATIENT)
Dept: CARDIOLOGY CLINIC | Age: 83
End: 2018-03-01

## 2018-03-01 DIAGNOSIS — I47.1 SUPRAVENTRICULAR TACHYCARDIA (HCC): ICD-10-CM

## 2018-03-01 DIAGNOSIS — I73.9 PERIPHERAL VASCULAR DISEASE (HCC): ICD-10-CM

## 2018-03-01 DIAGNOSIS — G50.0 TIC DOULOUREUX: ICD-10-CM

## 2018-03-01 DIAGNOSIS — Z95.1 S/P CABG X 3: ICD-10-CM

## 2018-03-01 DIAGNOSIS — I25.5 ISCHEMIC CARDIOMYOPATHY: ICD-10-CM

## 2018-03-01 DIAGNOSIS — I77.1 SUBCLAVIAN ARTERIAL STENOSIS (HCC): Primary | ICD-10-CM

## 2018-03-01 DIAGNOSIS — E78.2 MIXED HYPERLIPIDEMIA: ICD-10-CM

## 2018-03-01 DIAGNOSIS — I34.1 MVP (MITRAL VALVE PROLAPSE): ICD-10-CM

## 2018-03-01 DIAGNOSIS — I10 ESSENTIAL HYPERTENSION: ICD-10-CM

## 2018-03-01 DIAGNOSIS — I25.810 CORONARY ARTERY DISEASE INVOLVING CORONARY BYPASS GRAFT OF NATIVE HEART WITHOUT ANGINA PECTORIS: ICD-10-CM

## 2018-03-01 PROCEDURE — 93880 EXTRACRANIAL BILAT STUDY: CPT | Performed by: INTERNAL MEDICINE

## 2018-03-01 RX ORDER — ATORVASTATIN CALCIUM 20 MG/1
TABLET, FILM COATED ORAL
Qty: 30 TABLET | Refills: 5 | Status: SHIPPED | OUTPATIENT
Start: 2018-03-01 | End: 2018-03-01 | Stop reason: SINTOL

## 2018-03-01 NOTE — TELEPHONE ENCOUNTER
Left message on voicemail to discontinue atorvastatin and start Crestor 10 mg.   Rx to [facility]

## 2018-03-02 ENCOUNTER — TELEPHONE (OUTPATIENT)
Dept: CARDIOLOGY CLINIC | Age: 83
End: 2018-03-02

## 2018-03-02 RX ORDER — ROSUVASTATIN CALCIUM 10 MG/1
10 TABLET, COATED ORAL DAILY
Qty: 30 TABLET | Refills: 6 | Status: SHIPPED | OUTPATIENT
Start: 2018-03-02 | End: 2018-08-28 | Stop reason: SDUPTHER

## 2018-03-02 NOTE — TELEPHONE ENCOUNTER
Patient called were to have called in a script for Crestor and she was to stop atorvastatin   She called [facility] they are telling her that Lipitor was called in , she is confused   Please call

## 2018-04-17 ENCOUNTER — OFFICE VISIT (OUTPATIENT)
Dept: INTERNAL MEDICINE CLINIC | Age: 83
End: 2018-04-17

## 2018-04-17 VITALS
DIASTOLIC BLOOD PRESSURE: 70 MMHG | HEART RATE: 60 BPM | RESPIRATION RATE: 16 BRPM | HEIGHT: 63 IN | SYSTOLIC BLOOD PRESSURE: 132 MMHG | WEIGHT: 138.2 LBS | BODY MASS INDEX: 24.49 KG/M2

## 2018-04-17 DIAGNOSIS — I10 ESSENTIAL HYPERTENSION: ICD-10-CM

## 2018-04-17 DIAGNOSIS — I25.5 ISCHEMIC CARDIOMYOPATHY: ICD-10-CM

## 2018-04-17 DIAGNOSIS — E78.2 MIXED HYPERLIPIDEMIA: ICD-10-CM

## 2018-04-17 DIAGNOSIS — R79.89 ELEVATED TSH: Primary | ICD-10-CM

## 2018-04-17 PROCEDURE — 99213 OFFICE O/P EST LOW 20 MIN: CPT | Performed by: INTERNAL MEDICINE

## 2018-04-17 PROCEDURE — G8427 DOCREV CUR MEDS BY ELIG CLIN: HCPCS | Performed by: INTERNAL MEDICINE

## 2018-04-17 PROCEDURE — 3288F FALL RISK ASSESSMENT DOCD: CPT | Performed by: INTERNAL MEDICINE

## 2018-04-17 PROCEDURE — 1123F ACP DISCUSS/DSCN MKR DOCD: CPT | Performed by: INTERNAL MEDICINE

## 2018-04-17 PROCEDURE — 1090F PRES/ABSN URINE INCON ASSESS: CPT | Performed by: INTERNAL MEDICINE

## 2018-04-17 PROCEDURE — 4040F PNEUMOC VAC/ADMIN/RCVD: CPT | Performed by: INTERNAL MEDICINE

## 2018-04-17 PROCEDURE — G8598 ASA/ANTIPLAT THER USED: HCPCS | Performed by: INTERNAL MEDICINE

## 2018-04-17 PROCEDURE — G8510 SCR DEP NEG, NO PLAN REQD: HCPCS | Performed by: INTERNAL MEDICINE

## 2018-04-17 PROCEDURE — G8399 PT W/DXA RESULTS DOCUMENT: HCPCS | Performed by: INTERNAL MEDICINE

## 2018-04-17 PROCEDURE — 1036F TOBACCO NON-USER: CPT | Performed by: INTERNAL MEDICINE

## 2018-04-17 PROCEDURE — G8420 CALC BMI NORM PARAMETERS: HCPCS | Performed by: INTERNAL MEDICINE

## 2018-04-17 ASSESSMENT — PATIENT HEALTH QUESTIONNAIRE - PHQ9
1. LITTLE INTEREST OR PLEASURE IN DOING THINGS: 0
2. FEELING DOWN, DEPRESSED OR HOPELESS: 0
SUM OF ALL RESPONSES TO PHQ9 QUESTIONS 1 & 2: 0
SUM OF ALL RESPONSES TO PHQ QUESTIONS 1-9: 0

## 2018-04-23 ENCOUNTER — TELEPHONE (OUTPATIENT)
Dept: INTERNAL MEDICINE CLINIC | Age: 83
End: 2018-04-23

## 2018-04-24 ENCOUNTER — OFFICE VISIT (OUTPATIENT)
Dept: CARDIOLOGY CLINIC | Age: 83
End: 2018-04-24

## 2018-04-24 ENCOUNTER — TELEPHONE (OUTPATIENT)
Dept: INTERNAL MEDICINE CLINIC | Age: 83
End: 2018-04-24

## 2018-04-24 VITALS
SYSTOLIC BLOOD PRESSURE: 122 MMHG | DIASTOLIC BLOOD PRESSURE: 74 MMHG | HEART RATE: 56 BPM | BODY MASS INDEX: 24.63 KG/M2 | WEIGHT: 139 LBS | HEIGHT: 63 IN

## 2018-04-24 DIAGNOSIS — R79.89 ELEVATED TSH: ICD-10-CM

## 2018-04-24 DIAGNOSIS — I25.810 CORONARY ARTERY DISEASE INVOLVING CORONARY BYPASS GRAFT OF NATIVE HEART WITHOUT ANGINA PECTORIS: ICD-10-CM

## 2018-04-24 DIAGNOSIS — I25.5 ISCHEMIC CARDIOMYOPATHY: ICD-10-CM

## 2018-04-24 DIAGNOSIS — I10 ESSENTIAL HYPERTENSION: ICD-10-CM

## 2018-04-24 DIAGNOSIS — I73.9 PERIPHERAL VASCULAR DISEASE (HCC): ICD-10-CM

## 2018-04-24 DIAGNOSIS — I47.1 SUPRAVENTRICULAR TACHYCARDIA (HCC): ICD-10-CM

## 2018-04-24 DIAGNOSIS — I25.10 CORONARY ARTERY DISEASE INVOLVING NATIVE CORONARY ARTERY OF NATIVE HEART WITHOUT ANGINA PECTORIS: Primary | ICD-10-CM

## 2018-04-24 DIAGNOSIS — E78.2 MIXED HYPERLIPIDEMIA: ICD-10-CM

## 2018-04-24 DIAGNOSIS — G50.0 TIC DOULOUREUX: ICD-10-CM

## 2018-04-24 DIAGNOSIS — I77.1 SUBCLAVIAN ARTERIAL STENOSIS (HCC): ICD-10-CM

## 2018-04-24 DIAGNOSIS — Z95.1 S/P CABG X 3: ICD-10-CM

## 2018-04-24 DIAGNOSIS — I34.1 MVP (MITRAL VALVE PROLAPSE): ICD-10-CM

## 2018-04-24 DIAGNOSIS — I65.23 BILATERAL CAROTID ARTERY STENOSIS: ICD-10-CM

## 2018-04-24 PROCEDURE — G8427 DOCREV CUR MEDS BY ELIG CLIN: HCPCS | Performed by: INTERNAL MEDICINE

## 2018-04-24 PROCEDURE — 1123F ACP DISCUSS/DSCN MKR DOCD: CPT | Performed by: INTERNAL MEDICINE

## 2018-04-24 PROCEDURE — 99214 OFFICE O/P EST MOD 30 MIN: CPT | Performed by: INTERNAL MEDICINE

## 2018-04-24 PROCEDURE — G8598 ASA/ANTIPLAT THER USED: HCPCS | Performed by: INTERNAL MEDICINE

## 2018-04-24 PROCEDURE — 1090F PRES/ABSN URINE INCON ASSESS: CPT | Performed by: INTERNAL MEDICINE

## 2018-04-24 PROCEDURE — 4040F PNEUMOC VAC/ADMIN/RCVD: CPT | Performed by: INTERNAL MEDICINE

## 2018-04-24 PROCEDURE — G8420 CALC BMI NORM PARAMETERS: HCPCS | Performed by: INTERNAL MEDICINE

## 2018-04-24 PROCEDURE — 1036F TOBACCO NON-USER: CPT | Performed by: INTERNAL MEDICINE

## 2018-04-24 PROCEDURE — G8399 PT W/DXA RESULTS DOCUMENT: HCPCS | Performed by: INTERNAL MEDICINE

## 2018-05-07 ENCOUNTER — TELEPHONE (OUTPATIENT)
Dept: INTERNAL MEDICINE CLINIC | Age: 83
End: 2018-05-07

## 2018-05-16 ENCOUNTER — OFFICE VISIT (OUTPATIENT)
Dept: INTERNAL MEDICINE CLINIC | Age: 83
End: 2018-05-16

## 2018-05-16 VITALS
RESPIRATION RATE: 16 BRPM | HEART RATE: 68 BPM | WEIGHT: 140.2 LBS | BODY MASS INDEX: 24.84 KG/M2 | DIASTOLIC BLOOD PRESSURE: 62 MMHG | SYSTOLIC BLOOD PRESSURE: 112 MMHG

## 2018-05-16 DIAGNOSIS — F33.42 MAJOR DEPRESSIVE DISORDER, RECURRENT, IN FULL REMISSION (HCC): ICD-10-CM

## 2018-05-16 DIAGNOSIS — M81.0 OSTEOPOROSIS WITHOUT CURRENT PATHOLOGICAL FRACTURE, UNSPECIFIED OSTEOPOROSIS TYPE: ICD-10-CM

## 2018-05-16 DIAGNOSIS — F43.9 STRESS AT HOME: ICD-10-CM

## 2018-05-16 DIAGNOSIS — I25.5 ISCHEMIC CARDIOMYOPATHY: ICD-10-CM

## 2018-05-16 DIAGNOSIS — I10 ESSENTIAL HYPERTENSION: Primary | ICD-10-CM

## 2018-05-16 PROCEDURE — 1123F ACP DISCUSS/DSCN MKR DOCD: CPT | Performed by: INTERNAL MEDICINE

## 2018-05-16 PROCEDURE — 4040F PNEUMOC VAC/ADMIN/RCVD: CPT | Performed by: INTERNAL MEDICINE

## 2018-05-16 PROCEDURE — G8420 CALC BMI NORM PARAMETERS: HCPCS | Performed by: INTERNAL MEDICINE

## 2018-05-16 PROCEDURE — 96372 THER/PROPH/DIAG INJ SC/IM: CPT | Performed by: INTERNAL MEDICINE

## 2018-05-16 PROCEDURE — 1090F PRES/ABSN URINE INCON ASSESS: CPT | Performed by: INTERNAL MEDICINE

## 2018-05-16 PROCEDURE — G8427 DOCREV CUR MEDS BY ELIG CLIN: HCPCS | Performed by: INTERNAL MEDICINE

## 2018-05-16 PROCEDURE — 93000 ELECTROCARDIOGRAM COMPLETE: CPT | Performed by: INTERNAL MEDICINE

## 2018-05-16 PROCEDURE — 99213 OFFICE O/P EST LOW 20 MIN: CPT | Performed by: INTERNAL MEDICINE

## 2018-05-16 PROCEDURE — G8598 ASA/ANTIPLAT THER USED: HCPCS | Performed by: INTERNAL MEDICINE

## 2018-05-16 PROCEDURE — G8399 PT W/DXA RESULTS DOCUMENT: HCPCS | Performed by: INTERNAL MEDICINE

## 2018-05-16 PROCEDURE — 1036F TOBACCO NON-USER: CPT | Performed by: INTERNAL MEDICINE

## 2018-05-16 RX ORDER — AMLODIPINE BESYLATE 5 MG/1
TABLET ORAL
Qty: 90 TABLET | Refills: 1 | Status: SHIPPED | OUTPATIENT
Start: 2018-05-16 | End: 2018-08-28 | Stop reason: SDUPTHER

## 2018-05-16 RX ORDER — GABAPENTIN 300 MG/1
CAPSULE ORAL
Qty: 270 CAPSULE | Refills: 0 | Status: SHIPPED | OUTPATIENT
Start: 2018-05-16 | End: 2018-08-28 | Stop reason: SDUPTHER

## 2018-05-16 RX ORDER — PANTOPRAZOLE SODIUM 40 MG/1
TABLET, DELAYED RELEASE ORAL
Qty: 90 TABLET | Refills: 1 | Status: SHIPPED | OUTPATIENT
Start: 2018-05-16 | End: 2019-05-07 | Stop reason: SDUPTHER

## 2018-05-16 RX ORDER — NIACIN 500 MG
TABLET ORAL
Qty: 90 TABLET | Refills: 1 | Status: SHIPPED | OUTPATIENT
Start: 2018-05-16 | End: 2018-08-28 | Stop reason: SDUPTHER

## 2018-08-25 ENCOUNTER — HOSPITAL ENCOUNTER (OUTPATIENT)
Dept: OTHER | Age: 83
Discharge: HOME | End: 2018-08-25

## 2018-08-27 LAB
CULTURE: NORMAL
Lab: NORMAL
REPORT STATUS: NORMAL
SPECIMEN: NORMAL

## 2018-08-28 ENCOUNTER — OFFICE VISIT (OUTPATIENT)
Dept: INTERNAL MEDICINE CLINIC | Age: 83
End: 2018-08-28

## 2018-08-28 VITALS
OXYGEN SATURATION: 96 % | WEIGHT: 136.4 LBS | RESPIRATION RATE: 16 BRPM | HEART RATE: 80 BPM | DIASTOLIC BLOOD PRESSURE: 60 MMHG | SYSTOLIC BLOOD PRESSURE: 126 MMHG | BODY MASS INDEX: 24.16 KG/M2

## 2018-08-28 DIAGNOSIS — M81.0 OSTEOPOROSIS, UNSPECIFIED OSTEOPOROSIS TYPE, UNSPECIFIED PATHOLOGICAL FRACTURE PRESENCE: Primary | ICD-10-CM

## 2018-08-28 DIAGNOSIS — I25.5 ISCHEMIC CARDIOMYOPATHY: ICD-10-CM

## 2018-08-28 DIAGNOSIS — E78.2 MIXED HYPERLIPIDEMIA: ICD-10-CM

## 2018-08-28 DIAGNOSIS — I10 ESSENTIAL HYPERTENSION: ICD-10-CM

## 2018-08-28 PROCEDURE — G8399 PT W/DXA RESULTS DOCUMENT: HCPCS | Performed by: INTERNAL MEDICINE

## 2018-08-28 PROCEDURE — G8420 CALC BMI NORM PARAMETERS: HCPCS | Performed by: INTERNAL MEDICINE

## 2018-08-28 PROCEDURE — 99213 OFFICE O/P EST LOW 20 MIN: CPT | Performed by: INTERNAL MEDICINE

## 2018-08-28 PROCEDURE — 1123F ACP DISCUSS/DSCN MKR DOCD: CPT | Performed by: INTERNAL MEDICINE

## 2018-08-28 PROCEDURE — 1036F TOBACCO NON-USER: CPT | Performed by: INTERNAL MEDICINE

## 2018-08-28 PROCEDURE — G8427 DOCREV CUR MEDS BY ELIG CLIN: HCPCS | Performed by: INTERNAL MEDICINE

## 2018-08-28 PROCEDURE — G8598 ASA/ANTIPLAT THER USED: HCPCS | Performed by: INTERNAL MEDICINE

## 2018-08-28 PROCEDURE — 4040F PNEUMOC VAC/ADMIN/RCVD: CPT | Performed by: INTERNAL MEDICINE

## 2018-08-28 PROCEDURE — 1090F PRES/ABSN URINE INCON ASSESS: CPT | Performed by: INTERNAL MEDICINE

## 2018-08-28 PROCEDURE — 1101F PT FALLS ASSESS-DOCD LE1/YR: CPT | Performed by: INTERNAL MEDICINE

## 2018-08-28 RX ORDER — GABAPENTIN 300 MG/1
CAPSULE ORAL
Qty: 270 CAPSULE | Refills: 0 | Status: SHIPPED | OUTPATIENT
Start: 2018-08-28 | End: 2018-12-20 | Stop reason: SDUPTHER

## 2018-08-28 RX ORDER — AMLODIPINE BESYLATE 5 MG/1
TABLET ORAL
Qty: 90 TABLET | Refills: 1 | Status: SHIPPED | OUTPATIENT
Start: 2018-08-28 | End: 2019-04-19 | Stop reason: SDUPTHER

## 2018-08-28 RX ORDER — ROSUVASTATIN CALCIUM 10 MG/1
10 TABLET, COATED ORAL DAILY
Qty: 90 TABLET | Refills: 1 | Status: SHIPPED | OUTPATIENT
Start: 2018-08-28 | End: 2019-02-23 | Stop reason: SDUPTHER

## 2018-08-28 RX ORDER — NIACIN 500 MG
TABLET ORAL
Qty: 90 TABLET | Refills: 1 | Status: SHIPPED | OUTPATIENT
Start: 2018-08-28 | End: 2019-02-26 | Stop reason: SDUPTHER

## 2018-10-18 ENCOUNTER — TELEPHONE (OUTPATIENT)
Dept: INTERNAL MEDICINE CLINIC | Age: 83
End: 2018-10-18

## 2018-10-18 RX ORDER — NITROFURANTOIN 25; 75 MG/1; MG/1
100 CAPSULE ORAL 2 TIMES DAILY
Qty: 10 CAPSULE | Refills: 0 | Status: SHIPPED | OUTPATIENT
Start: 2018-10-18 | End: 2018-10-23

## 2018-10-31 ENCOUNTER — OFFICE VISIT (OUTPATIENT)
Dept: CARDIOLOGY CLINIC | Age: 83
End: 2018-10-31
Payer: MEDICARE

## 2018-10-31 VITALS
HEART RATE: 60 BPM | DIASTOLIC BLOOD PRESSURE: 70 MMHG | WEIGHT: 138.2 LBS | BODY MASS INDEX: 24.49 KG/M2 | HEIGHT: 63 IN | SYSTOLIC BLOOD PRESSURE: 118 MMHG

## 2018-10-31 DIAGNOSIS — I10 ESSENTIAL HYPERTENSION: ICD-10-CM

## 2018-10-31 DIAGNOSIS — I25.10 CORONARY ARTERY DISEASE INVOLVING NATIVE CORONARY ARTERY OF NATIVE HEART WITHOUT ANGINA PECTORIS: Primary | ICD-10-CM

## 2018-10-31 DIAGNOSIS — E78.2 MIXED HYPERLIPIDEMIA: ICD-10-CM

## 2018-10-31 DIAGNOSIS — I65.23 BILATERAL CAROTID ARTERY STENOSIS: ICD-10-CM

## 2018-10-31 DIAGNOSIS — I25.5 ISCHEMIC CARDIOMYOPATHY: ICD-10-CM

## 2018-10-31 PROCEDURE — 99214 OFFICE O/P EST MOD 30 MIN: CPT | Performed by: NURSE PRACTITIONER

## 2018-10-31 NOTE — PROGRESS NOTES
current facility-administered medications for this visit. Allergies: Alendronate sodium; Boniva [ibandronate sodium]; Lisinopril; Neggram [nalidixic acid]; Pce [erythromycin]; Penicillins; Welchol [colesevelam hcl];  Carbamazepine; and Lovaza [omega-3-acid ethyl esters]  Past Medical History:   Diagnosis Date    Arthritis of shoulder region, right 9/2014    Lexis Myles Blind right eye     following right cataract surg    Chronic depression 2009    Dr. Lidia Tee    DVT (deep venous thrombosis) ([facility] [address] 1970    left leg    Former smoker     Hx of Doppler ultrasound 03/01/2018    Carotid-mild 0-49% bilateral carotid,50% stenosis right subclavian    Hyperlipidemia     Hypertension     Macular degeneration     right    Mild cognitive impairment 2/2012    MMSE 26/30    MVP (mitral valve prolapse)     trace on echo 2014    Osteoporosis 5/2012    Pancytopenia 5/2011    mild with ; Dr Rachel hawk 2010    Peptic ulcer disease     Peripheral vascular disease ([facility] [address] 1/2016    angio; dis below ankles; pletal    Prediabetes 2006    Recurrent ventral incisional hernia 2013    medial apex of GB scar    S/P CABG x 3 2003    3-vessel; Dr Amadou Hart 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 Bessie Muro; Right side     Past Surgical History:   Procedure Laterality Date    CATARACT REMOVAL Right 2010    poor result ; blind right eye; [facility] [address] CORONARY ARTERY BYPASS GRAFT  8/2009    3 vessel    SHOULDER ARTHROSCOPY Right 2003    TUBAL LIGATION      URETER SURGERY      Right ureteral repair     Family History   Problem Relation Age of Onset    Cancer Sister 79        Breast cancer 1/2017     Social History   Substance Use Topics    Smoking status: Former Smoker     Packs/day: 0.25     Years: 10.00     Quit date: 11/29/1985    Smokeless tobacco: Never Used are no gross focal neurologic abnormalities. Skin-  No rash: No echymosis   Affect- normal mood and pleasant     DATA:  No results found for: CKTOTAL, CKMB, CKMBINDEX, TROPONINI  BNP:  No results found for: BNP  PT/INR:  No results found for: PTINR  No results found for: LABA1C  Lab Results   Component Value Date    CHOL 128 04/20/2018    TRIG 236 (H) 04/20/2018    HDL 32 (L) 04/20/2018    LDLCALC 49 04/20/2018    LDLDIRECT 93 08/22/2016     Lab Results   Component Value Date    ALT 9 04/20/2018    AST 23 04/20/2018     TSH:    Lab Results   Component Value Date    TSH 4.270 04/20/2018         Assessment/ Plan:    Patient seen, interviewed and examined. Testing was reviewed. CAD    H/o CABG  Stable   Current medicatons include: asa Crestor   She is to continue with current medications     HTN    Controlled  Current medications include: Norvasc   She is to continue current medications   advised low salt diet     Hyperlipidemia  At or near goal Yes  HDL is slow at 32  Triglycerides ar high 236   Current medications include: Crestor   She is to continue current medications  Discussed low carb diet     Bilateral carotid stenosis   Also with right subclavian stenosis  Carotid doppler shows mobile echogenic plaque- unchanged from previous   Follow with Dr. Trell Solares   Yearly carotid doppler  Will have it done prior to next ov    Ischemic cardiomyopathy   EF is now 50-60%   Previus echo showed EF 45%     Patient is encouraged to exercise even a brisk walk for 30 minutes at least 3 to 4 times a week. Lifestyle and risk factor modificatons discussed. Various goals are discussed and questions answered. Continue current medications. Appropriate prescriptions are addressed. Questions answered and patient verbalizes understanding.    Call for any problems, questions, or concerns.   OV 6 mo with carotid doppler prior to visit

## 2018-11-07 ENCOUNTER — TELEPHONE (OUTPATIENT)
Dept: INTERNAL MEDICINE CLINIC | Age: 83
End: 2018-11-07

## 2018-11-16 RX ORDER — PSYLLIUM HUSK 3.4 G/7G
POWDER ORAL
Qty: 90 TABLET | Refills: 1 | Status: SHIPPED | OUTPATIENT
Start: 2018-11-16 | End: 2019-02-26 | Stop reason: DRUGHIGH

## 2018-11-27 RX ORDER — FLUTICASONE PROPIONATE 50 MCG
SPRAY, SUSPENSION (ML) NASAL
Qty: 48 G | Refills: 1 | Status: SHIPPED | OUTPATIENT
Start: 2018-11-27 | End: 2019-05-25 | Stop reason: SDUPTHER

## 2018-11-30 ENCOUNTER — NURSE ONLY (OUTPATIENT)
Dept: INTERNAL MEDICINE CLINIC | Age: 83
End: 2018-11-30
Payer: MEDICARE

## 2018-11-30 ENCOUNTER — HOSPITAL ENCOUNTER (OUTPATIENT)
Dept: WOMENS IMAGING | Age: 83
Discharge: HOME | End: 2018-11-30
Payer: MEDICARE

## 2018-11-30 VITALS
SYSTOLIC BLOOD PRESSURE: 134 MMHG | DIASTOLIC BLOOD PRESSURE: 60 MMHG | HEART RATE: 79 BPM | RESPIRATION RATE: 16 BRPM | OXYGEN SATURATION: 97 %

## 2018-11-30 DIAGNOSIS — Z12.31 SCREENING MAMMOGRAM, ENCOUNTER FOR: ICD-10-CM

## 2018-11-30 DIAGNOSIS — M81.0 OSTEOPOROSIS, UNSPECIFIED OSTEOPOROSIS TYPE, UNSPECIFIED PATHOLOGICAL FRACTURE PRESENCE: Primary | ICD-10-CM

## 2018-11-30 PROCEDURE — 77067 SCR MAMMO BI INCL CAD: CPT

## 2018-11-30 PROCEDURE — 96372 THER/PROPH/DIAG INJ SC/IM: CPT | Performed by: INTERNAL MEDICINE

## 2018-12-21 RX ORDER — GABAPENTIN 300 MG/1
CAPSULE ORAL
Qty: 270 CAPSULE | Refills: 1 | Status: SHIPPED | OUTPATIENT
Start: 2018-12-21 | End: 2019-06-14 | Stop reason: SDUPTHER

## 2019-02-19 ENCOUNTER — TELEPHONE (OUTPATIENT)
Dept: INTERNAL MEDICINE CLINIC | Age: 84
End: 2019-02-19

## 2019-02-25 RX ORDER — ROSUVASTATIN CALCIUM 10 MG/1
TABLET, COATED ORAL
Qty: 90 TABLET | Refills: 1 | Status: SHIPPED | OUTPATIENT
Start: 2019-02-25 | End: 2019-08-20 | Stop reason: SDUPTHER

## 2019-02-26 ENCOUNTER — OFFICE VISIT (OUTPATIENT)
Dept: INTERNAL MEDICINE CLINIC | Age: 84
End: 2019-02-26
Payer: MEDICARE

## 2019-02-26 VITALS
DIASTOLIC BLOOD PRESSURE: 64 MMHG | WEIGHT: 137.8 LBS | RESPIRATION RATE: 20 BRPM | BODY MASS INDEX: 24.41 KG/M2 | SYSTOLIC BLOOD PRESSURE: 128 MMHG | HEART RATE: 69 BPM | OXYGEN SATURATION: 96 %

## 2019-02-26 DIAGNOSIS — I25.5 ISCHEMIC CARDIOMYOPATHY: ICD-10-CM

## 2019-02-26 DIAGNOSIS — G31.84 MILD COGNITIVE IMPAIRMENT: ICD-10-CM

## 2019-02-26 DIAGNOSIS — R53.83 FATIGUE, UNSPECIFIED TYPE: ICD-10-CM

## 2019-02-26 DIAGNOSIS — I77.1 SUBCLAVIAN ARTERIAL STENOSIS (HCC): ICD-10-CM

## 2019-02-26 DIAGNOSIS — I10 ESSENTIAL HYPERTENSION: Primary | ICD-10-CM

## 2019-02-26 DIAGNOSIS — M81.0 OSTEOPOROSIS, UNSPECIFIED OSTEOPOROSIS TYPE, UNSPECIFIED PATHOLOGICAL FRACTURE PRESENCE: ICD-10-CM

## 2019-02-26 DIAGNOSIS — E78.2 MIXED HYPERLIPIDEMIA: ICD-10-CM

## 2019-02-26 DIAGNOSIS — Z95.1 S/P CABG X 3: ICD-10-CM

## 2019-02-26 DIAGNOSIS — I47.1 SUPRAVENTRICULAR TACHYCARDIA (HCC): ICD-10-CM

## 2019-02-26 DIAGNOSIS — E53.8 VITAMIN B12 DEFICIENCY: ICD-10-CM

## 2019-02-26 DIAGNOSIS — I73.9 PERIPHERAL VASCULAR DISEASE (HCC): ICD-10-CM

## 2019-02-26 DIAGNOSIS — F33.42 MAJOR DEPRESSIVE DISORDER, RECURRENT, IN FULL REMISSION (HCC): ICD-10-CM

## 2019-02-26 PROCEDURE — G8427 DOCREV CUR MEDS BY ELIG CLIN: HCPCS | Performed by: INTERNAL MEDICINE

## 2019-02-26 PROCEDURE — 4040F PNEUMOC VAC/ADMIN/RCVD: CPT | Performed by: INTERNAL MEDICINE

## 2019-02-26 PROCEDURE — G8598 ASA/ANTIPLAT THER USED: HCPCS | Performed by: INTERNAL MEDICINE

## 2019-02-26 PROCEDURE — 1036F TOBACCO NON-USER: CPT | Performed by: INTERNAL MEDICINE

## 2019-02-26 PROCEDURE — 99213 OFFICE O/P EST LOW 20 MIN: CPT | Performed by: INTERNAL MEDICINE

## 2019-02-26 PROCEDURE — 1123F ACP DISCUSS/DSCN MKR DOCD: CPT | Performed by: INTERNAL MEDICINE

## 2019-02-26 PROCEDURE — G8399 PT W/DXA RESULTS DOCUMENT: HCPCS | Performed by: INTERNAL MEDICINE

## 2019-02-26 PROCEDURE — 1090F PRES/ABSN URINE INCON ASSESS: CPT | Performed by: INTERNAL MEDICINE

## 2019-02-26 PROCEDURE — G8484 FLU IMMUNIZE NO ADMIN: HCPCS | Performed by: INTERNAL MEDICINE

## 2019-02-26 PROCEDURE — 1101F PT FALLS ASSESS-DOCD LE1/YR: CPT | Performed by: INTERNAL MEDICINE

## 2019-02-26 PROCEDURE — G8420 CALC BMI NORM PARAMETERS: HCPCS | Performed by: INTERNAL MEDICINE

## 2019-02-26 RX ORDER — ACETAMINOPHEN 500 MG
500 TABLET ORAL EVERY 6 HOURS PRN
COMMUNITY
End: 2019-02-26 | Stop reason: ALTCHOICE

## 2019-02-26 RX ORDER — LANOLIN ALCOHOL/MO/W.PET/CERES
500 CREAM (GRAM) TOPICAL NIGHTLY
COMMUNITY
End: 2020-01-02 | Stop reason: SDUPTHER

## 2019-03-14 ENCOUNTER — TELEPHONE (OUTPATIENT)
Dept: INTERNAL MEDICINE CLINIC | Age: 84
End: 2019-03-14

## 2019-03-26 RX ORDER — PSYLLIUM HUSK 3.4 G/7G
POWDER ORAL
Qty: 90 TABLET | Refills: 1 | Status: SHIPPED | OUTPATIENT
Start: 2019-03-26 | End: 2019-05-16 | Stop reason: SDUPTHER

## 2019-04-19 ENCOUNTER — TELEPHONE (OUTPATIENT)
Dept: INTERNAL MEDICINE CLINIC | Age: 84
End: 2019-04-19

## 2019-04-19 DIAGNOSIS — R35.0 URINARY FREQUENCY: Primary | ICD-10-CM

## 2019-04-19 DIAGNOSIS — M54.5 ACUTE LOW BACK PAIN, UNSPECIFIED BACK PAIN LATERALITY, WITH SCIATICA PRESENCE UNSPECIFIED: ICD-10-CM

## 2019-04-19 RX ORDER — AMLODIPINE BESYLATE 5 MG/1
TABLET ORAL
Qty: 90 TABLET | Refills: 1 | Status: SHIPPED | OUTPATIENT
Start: 2019-04-19 | End: 2019-10-09 | Stop reason: SDUPTHER

## 2019-04-19 RX ORDER — CIPROFLOXACIN 250 MG/1
250 TABLET, FILM COATED ORAL 2 TIMES DAILY
Qty: 6 TABLET | Refills: 0 | Status: SHIPPED | OUTPATIENT
Start: 2019-04-19 | End: 2019-04-22

## 2019-05-01 ENCOUNTER — PROCEDURE VISIT (OUTPATIENT)
Dept: CARDIOLOGY CLINIC | Age: 84
End: 2019-05-01
Payer: MEDICARE

## 2019-05-01 DIAGNOSIS — I65.23 BILATERAL CAROTID ARTERY STENOSIS: Primary | ICD-10-CM

## 2019-05-01 DIAGNOSIS — I77.1 SUBCLAVIAN ARTERIAL STENOSIS (HCC): ICD-10-CM

## 2019-05-01 PROCEDURE — 93880 EXTRACRANIAL BILAT STUDY: CPT | Performed by: INTERNAL MEDICINE

## 2019-05-06 ENCOUNTER — TELEPHONE (OUTPATIENT)
Dept: CARDIOLOGY CLINIC | Age: 84
End: 2019-05-06

## 2019-05-06 NOTE — TELEPHONE ENCOUNTER
Advised patient of carotid results. Patient voiced understanding. Mild (0-49%) disease of the Bilateral proximal Internal carotid artery.    Mobile, echogenic plaque seen in Right subclavian artery. Unchanged from    previous testing. 50-99% stenosis of right subclavian artery.    Normal vertebral flow bilaterally.

## 2019-05-07 RX ORDER — PANTOPRAZOLE SODIUM 40 MG/1
TABLET, DELAYED RELEASE ORAL
Qty: 90 TABLET | Refills: 1 | Status: SHIPPED | OUTPATIENT
Start: 2019-05-07 | End: 2019-11-07 | Stop reason: SDUPTHER

## 2019-05-16 ENCOUNTER — APPOINTMENT (OUTPATIENT)
Dept: CT IMAGING | Age: 84
End: 2019-05-16
Payer: MEDICARE

## 2019-05-16 ENCOUNTER — APPOINTMENT (OUTPATIENT)
Dept: GENERAL RADIOLOGY | Age: 84
End: 2019-05-16
Payer: MEDICARE

## 2019-05-16 ENCOUNTER — HOSPITAL ENCOUNTER (EMERGENCY)
Age: 84
Discharge: HOME | End: 2019-05-16
Attending: EMERGENCY MEDICINE
Payer: MEDICARE

## 2019-05-16 VITALS
TEMPERATURE: 98.3 F | HEART RATE: 94 BPM | SYSTOLIC BLOOD PRESSURE: 143 MMHG | WEIGHT: 133 LBS | BODY MASS INDEX: 23.57 KG/M2 | OXYGEN SATURATION: 94 % | RESPIRATION RATE: 18 BRPM | DIASTOLIC BLOOD PRESSURE: 92 MMHG | HEIGHT: 63 IN

## 2019-05-16 DIAGNOSIS — R11.2 NON-INTRACTABLE VOMITING WITH NAUSEA, UNSPECIFIED VOMITING TYPE: Primary | ICD-10-CM

## 2019-05-16 LAB
ALBUMIN SERPL-MCNC: 4.1 GM/DL (ref 3.4–5)
ALP BLD-CCNC: 36 IU/L (ref 40–128)
ALT SERPL-CCNC: 9 U/L (ref 10–40)
ANION GAP SERPL CALCULATED.3IONS-SCNC: 18 MMOL/L (ref 4–16)
AST SERPL-CCNC: 17 IU/L (ref 15–37)
BACTERIA: NEGATIVE /HPF
BASOPHILS ABSOLUTE: 0 K/CU MM
BASOPHILS RELATIVE PERCENT: 0.5 % (ref 0–1)
BILIRUB SERPL-MCNC: 0.5 MG/DL (ref 0–1)
BILIRUBIN URINE: NEGATIVE MG/DL
BLOOD, URINE: NEGATIVE
BUN BLDV-MCNC: 15 MG/DL (ref 6–23)
CALCIUM SERPL-MCNC: 9.2 MG/DL (ref 8.3–10.6)
CHLORIDE BLD-SCNC: 94 MMOL/L (ref 99–110)
CLARITY: CLEAR
CO2: 23 MMOL/L (ref 21–32)
COLOR: ABNORMAL
CREAT SERPL-MCNC: 0.8 MG/DL (ref 0.6–1.1)
DIFFERENTIAL TYPE: ABNORMAL
EOSINOPHILS ABSOLUTE: 0 K/CU MM
EOSINOPHILS RELATIVE PERCENT: 0 % (ref 0–3)
GFR AFRICAN AMERICAN: >60 ML/MIN/1.73M2
GFR NON-AFRICAN AMERICAN: >60 ML/MIN/1.73M2
GLUCOSE BLD-MCNC: 153 MG/DL (ref 70–99)
GLUCOSE, URINE: NEGATIVE MG/DL
HCT VFR BLD CALC: 38.1 % (ref 37–47)
HEMOGLOBIN: 12.2 GM/DL (ref 12.5–16)
IMMATURE NEUTROPHIL %: 0.7 % (ref 0–0.43)
KETONES, URINE: ABNORMAL MG/DL
LEUKOCYTE ESTERASE, URINE: NEGATIVE
LIPASE: 7 IU/L (ref 13–60)
LYMPHOCYTES ABSOLUTE: 1.1 K/CU MM
LYMPHOCYTES RELATIVE PERCENT: 25.1 % (ref 24–44)
MCH RBC QN AUTO: 33 PG (ref 27–31)
MCHC RBC AUTO-ENTMCNC: 32 % (ref 32–36)
MCV RBC AUTO: 103 FL (ref 78–100)
MONOCYTES ABSOLUTE: 0.5 K/CU MM
MONOCYTES RELATIVE PERCENT: 12.1 % (ref 0–4)
NITRITE URINE, QUANTITATIVE: NEGATIVE
NUCLEATED RBC %: 0 %
PDW BLD-RTO: 13.2 % (ref 11.7–14.9)
PH, URINE: 7 (ref 5–8)
PLATELET # BLD: 126 K/CU MM (ref 140–440)
PMV BLD AUTO: 9.8 FL (ref 7.5–11.1)
POTASSIUM SERPL-SCNC: 4 MMOL/L (ref 3.5–5.1)
PROTEIN UA: NEGATIVE MG/DL
RBC # BLD: 3.7 M/CU MM (ref 4.2–5.4)
RBC URINE: 1 /HPF (ref 0–6)
SEGMENTED NEUTROPHILS ABSOLUTE COUNT: 2.7 K/CU MM
SEGMENTED NEUTROPHILS RELATIVE PERCENT: 61.6 % (ref 36–66)
SODIUM BLD-SCNC: 135 MMOL/L (ref 135–145)
SPECIFIC GRAVITY UA: 1.01 (ref 1–1.03)
SQUAMOUS EPITHELIAL: <1 /HPF
TOTAL IMMATURE NEUTOROPHIL: 0.03 K/CU MM
TOTAL NUCLEATED RBC: 0 K/CU MM
TOTAL PROTEIN: 7.2 GM/DL (ref 6.4–8.2)
TRICHOMONAS: ABNORMAL /HPF
TROPONIN T: <0.01 NG/ML
TROPONIN T: <0.01 NG/ML
UROBILINOGEN, URINE: NORMAL MG/DL (ref 0.2–1)
WBC # BLD: 4.3 K/CU MM (ref 4–10.5)
WBC UA: 1 /HPF (ref 0–5)

## 2019-05-16 PROCEDURE — 96374 THER/PROPH/DIAG INJ IV PUSH: CPT

## 2019-05-16 PROCEDURE — 6360000002 HC RX W HCPCS: Performed by: EMERGENCY MEDICINE

## 2019-05-16 PROCEDURE — 85025 COMPLETE CBC W/AUTO DIFF WBC: CPT

## 2019-05-16 PROCEDURE — 84484 ASSAY OF TROPONIN QUANT: CPT

## 2019-05-16 PROCEDURE — 6360000004 HC RX CONTRAST MEDICATION: Performed by: EMERGENCY MEDICINE

## 2019-05-16 PROCEDURE — 70450 CT HEAD/BRAIN W/O DYE: CPT

## 2019-05-16 PROCEDURE — 99284 EMERGENCY DEPT VISIT MOD MDM: CPT

## 2019-05-16 PROCEDURE — 96375 TX/PRO/DX INJ NEW DRUG ADDON: CPT

## 2019-05-16 PROCEDURE — 83690 ASSAY OF LIPASE: CPT

## 2019-05-16 PROCEDURE — 74177 CT ABD & PELVIS W/CONTRAST: CPT

## 2019-05-16 PROCEDURE — 2580000003 HC RX 258: Performed by: EMERGENCY MEDICINE

## 2019-05-16 PROCEDURE — 93005 ELECTROCARDIOGRAM TRACING: CPT | Performed by: EMERGENCY MEDICINE

## 2019-05-16 PROCEDURE — 6370000000 HC RX 637 (ALT 250 FOR IP): Performed by: EMERGENCY MEDICINE

## 2019-05-16 PROCEDURE — 81001 URINALYSIS AUTO W/SCOPE: CPT

## 2019-05-16 PROCEDURE — 71045 X-RAY EXAM CHEST 1 VIEW: CPT

## 2019-05-16 PROCEDURE — 80053 COMPREHEN METABOLIC PANEL: CPT

## 2019-05-16 RX ORDER — ALBUTEROL SULFATE 90 UG/1
2 AEROSOL, METERED RESPIRATORY (INHALATION) 3 TIMES DAILY PRN
Refills: 0 | COMMUNITY
Start: 2019-05-15 | End: 2019-10-02

## 2019-05-16 RX ORDER — ONDANSETRON 2 MG/ML
4 INJECTION INTRAMUSCULAR; INTRAVENOUS EVERY 30 MIN PRN
Status: DISCONTINUED | OUTPATIENT
Start: 2019-05-16 | End: 2019-05-17 | Stop reason: HOSPADM

## 2019-05-16 RX ORDER — SODIUM CHLORIDE 0.9 % (FLUSH) 0.9 %
10 SYRINGE (ML) INJECTION 2 TIMES DAILY
Status: DISCONTINUED | OUTPATIENT
Start: 2019-05-16 | End: 2019-05-17 | Stop reason: HOSPADM

## 2019-05-16 RX ORDER — ONDANSETRON 2 MG/ML
4 INJECTION INTRAMUSCULAR; INTRAVENOUS ONCE
Status: COMPLETED | OUTPATIENT
Start: 2019-05-16 | End: 2019-05-16

## 2019-05-16 RX ORDER — 0.9 % SODIUM CHLORIDE 0.9 %
1000 INTRAVENOUS SOLUTION INTRAVENOUS ONCE
Status: COMPLETED | OUTPATIENT
Start: 2019-05-16 | End: 2019-05-16

## 2019-05-16 RX ORDER — METOCLOPRAMIDE HYDROCHLORIDE 5 MG/ML
10 INJECTION INTRAMUSCULAR; INTRAVENOUS ONCE
Status: COMPLETED | OUTPATIENT
Start: 2019-05-16 | End: 2019-05-16

## 2019-05-16 RX ORDER — DIPHENHYDRAMINE HYDROCHLORIDE 50 MG/ML
25 INJECTION INTRAMUSCULAR; INTRAVENOUS ONCE
Status: COMPLETED | OUTPATIENT
Start: 2019-05-16 | End: 2019-05-16

## 2019-05-16 RX ORDER — AZITHROMYCIN 250 MG/1
250 TABLET, FILM COATED ORAL DAILY
Refills: 0 | COMMUNITY
Start: 2019-05-15 | End: 2019-05-20 | Stop reason: ALTCHOICE

## 2019-05-16 RX ORDER — BENZONATATE 100 MG/1
200 CAPSULE ORAL ONCE
Status: COMPLETED | OUTPATIENT
Start: 2019-05-16 | End: 2019-05-16

## 2019-05-16 RX ORDER — ONDANSETRON 2 MG/ML
4 INJECTION INTRAMUSCULAR; INTRAVENOUS EVERY 30 MIN PRN
Status: DISCONTINUED | OUTPATIENT
Start: 2019-05-16 | End: 2019-05-16

## 2019-05-16 RX ADMIN — IOPAMIDOL 80 ML: 755 INJECTION, SOLUTION INTRAVENOUS at 19:26

## 2019-05-16 RX ADMIN — METOCLOPRAMIDE 10 MG: 5 INJECTION, SOLUTION INTRAMUSCULAR; INTRAVENOUS at 21:28

## 2019-05-16 RX ADMIN — BENZONATATE 200 MG: 100 CAPSULE ORAL at 18:07

## 2019-05-16 RX ADMIN — ONDANSETRON 4 MG: 2 INJECTION INTRAMUSCULAR; INTRAVENOUS at 18:48

## 2019-05-16 RX ADMIN — DIPHENHYDRAMINE HYDROCHLORIDE 25 MG: 50 INJECTION, SOLUTION INTRAMUSCULAR; INTRAVENOUS at 21:28

## 2019-05-16 RX ADMIN — Medication 10 ML: at 19:26

## 2019-05-16 RX ADMIN — ONDANSETRON 4 MG: 2 INJECTION INTRAMUSCULAR; INTRAVENOUS at 17:43

## 2019-05-16 RX ADMIN — SODIUM CHLORIDE 1000 ML: 9 INJECTION, SOLUTION INTRAVENOUS at 18:48

## 2019-05-16 NOTE — PROGRESS NOTES
Medication History  [facility]    Patient Name: Darylene Contes 1935     Medication history has been completed by: Melissa Guzman CPhT    Source(s) of information: patient, medications brought in from home and insurance claims    Primary Care Physician: Martha Dillon MD     Pharmacy: [facility]    Allergies as of 05/16/2019 - Review Complete 02/26/2019   Allergen Reaction Noted    Alendronate sodium Other (See Comments) 11/09/2012    Boniva [ibandronate sodium] Other (See Comments) 11/09/2012    Lisinopril Other (See Comments) 06/29/2010    Neggram [nalidixic acid]  10/26/2010    Pce [erythromycin]  10/26/2010    Penicillins  10/26/2010    Welchol [colesevelam hcl] Other (See Comments) 02/28/2014    Carbamazepine Nausea And Vomiting 10/26/2010    Lovaza [omega-3-acid ethyl esters] Nausea And Vomiting 02/28/2014        Prior to Admission medications    Medication Sig Start Date End Date Taking?  Authorizing Provider   azithromycin (ZITHROMAX) 250 MG tablet Take 250 mg by mouth daily 5/15/19  Yes Historical Provider, MD   albuterol sulfate  (90 Base) MCG/ACT inhaler Inhale 2 puffs into the lungs 3 times daily as needed 5/15/19  Yes Historical Provider, MD   pantoprazole (PROTONIX) 40 MG tablet take 1 tablet by mouth once daily 5/7/19  Yes Martha Dillon MD   amLODIPine (NORVASC) 5 MG tablet take 1 tablet by mouth once daily 4/19/19  Yes Martha Dillon MD   niacin 500 MG extended release capsule Take 500 mg by mouth nightly   Yes Historical Provider, MD   rosuvastatin (CRESTOR) 10 MG tablet take 1 tablet by mouth once daily 2/25/19  Yes Martha Dillon MD   gabapentin (NEURONTIN) 300 MG capsule take 1 capsule by mouth three times a day 12/21/18 12/21/19 Yes Martha Dillon MD   fluticasone [facility]) 50 MCG/ACT nasal spray instill 1 spray into each nostril once daily 11/27/18  Yes Martha Dillon MD   cyanocobalamin 1000 MCG tablet Take 1,000 mcg by mouth daily   Yes Historical

## 2019-05-17 LAB
EKG ATRIAL RATE: 92 BPM
EKG ATRIAL RATE: 94 BPM
EKG DIAGNOSIS: NORMAL
EKG DIAGNOSIS: NORMAL
EKG P AXIS: 40 DEGREES
EKG P AXIS: 59 DEGREES
EKG P-R INTERVAL: 170 MS
EKG P-R INTERVAL: 170 MS
EKG Q-T INTERVAL: 354 MS
EKG Q-T INTERVAL: 356 MS
EKG QRS DURATION: 96 MS
EKG QRS DURATION: 98 MS
EKG QTC CALCULATION (BAZETT): 440 MS
EKG QTC CALCULATION (BAZETT): 442 MS
EKG R AXIS: 3 DEGREES
EKG R AXIS: 5 DEGREES
EKG T AXIS: -19 DEGREES
EKG T AXIS: -7 DEGREES
EKG VENTRICULAR RATE: 92 BPM
EKG VENTRICULAR RATE: 94 BPM

## 2019-05-17 PROCEDURE — 93010 ELECTROCARDIOGRAM REPORT: CPT | Performed by: INTERNAL MEDICINE

## 2019-05-17 NOTE — ED NOTES
Dr. Clemens Goodpasture in room talking to pt and family at this time     Belén Aguirre, [facility]  05/16/19 [id number]

## 2019-05-20 ENCOUNTER — OFFICE VISIT (OUTPATIENT)
Dept: INTERNAL MEDICINE CLINIC | Age: 84
End: 2019-05-20
Payer: MEDICARE

## 2019-05-20 VITALS
DIASTOLIC BLOOD PRESSURE: 66 MMHG | WEIGHT: 137.2 LBS | BODY MASS INDEX: 24.3 KG/M2 | HEART RATE: 82 BPM | RESPIRATION RATE: 14 BRPM | TEMPERATURE: 97.1 F | SYSTOLIC BLOOD PRESSURE: 126 MMHG | OXYGEN SATURATION: 95 %

## 2019-05-20 DIAGNOSIS — I25.5 ISCHEMIC CARDIOMYOPATHY: ICD-10-CM

## 2019-05-20 DIAGNOSIS — R91.1 PULMONARY NODULE, RIGHT: Primary | ICD-10-CM

## 2019-05-20 DIAGNOSIS — J45.31 MILD PERSISTENT ASTHMATIC BRONCHITIS WITH ACUTE EXACERBATION: ICD-10-CM

## 2019-05-20 PROCEDURE — 1090F PRES/ABSN URINE INCON ASSESS: CPT | Performed by: INTERNAL MEDICINE

## 2019-05-20 PROCEDURE — G8399 PT W/DXA RESULTS DOCUMENT: HCPCS | Performed by: INTERNAL MEDICINE

## 2019-05-20 PROCEDURE — 4040F PNEUMOC VAC/ADMIN/RCVD: CPT | Performed by: INTERNAL MEDICINE

## 2019-05-20 PROCEDURE — G8427 DOCREV CUR MEDS BY ELIG CLIN: HCPCS | Performed by: INTERNAL MEDICINE

## 2019-05-20 PROCEDURE — G8420 CALC BMI NORM PARAMETERS: HCPCS | Performed by: INTERNAL MEDICINE

## 2019-05-20 PROCEDURE — 1123F ACP DISCUSS/DSCN MKR DOCD: CPT | Performed by: INTERNAL MEDICINE

## 2019-05-20 PROCEDURE — 99213 OFFICE O/P EST LOW 20 MIN: CPT | Performed by: INTERNAL MEDICINE

## 2019-05-20 PROCEDURE — 1036F TOBACCO NON-USER: CPT | Performed by: INTERNAL MEDICINE

## 2019-05-20 PROCEDURE — G8598 ASA/ANTIPLAT THER USED: HCPCS | Performed by: INTERNAL MEDICINE

## 2019-05-20 RX ORDER — LEVOFLOXACIN 500 MG/1
500 TABLET, FILM COATED ORAL DAILY
Qty: 7 TABLET | Refills: 0 | Status: SHIPPED | OUTPATIENT
Start: 2019-05-20 | End: 2019-05-27

## 2019-05-20 NOTE — PROGRESS NOTES
Subjective:      Ena Harris is a 82 y.o. female who presents today for follow up on her chronic medical conditions as noted below. Patient Active Problem List:     Chronic depression     Hyperlipidemia     Tic douloureux     Anemia due to chronic illness     Colon cancer screening     Osteoporosis     Supraventricular tachycardia (HCC)     Recurrent ventral incisional hernia     Mild cognitive impairment     CAD (coronary artery disease)     Elevated TSH     Peripheral vascular disease (HCC)     Sciatica of left side without back pain     Coronary artery disease involving coronary bypass graft of native heart without angina pectoris     S/P CABG x 3     Essential hypertension     MVP (mitral valve prolapse)     Cardiomyopathy ([address]     Bilateral carotid artery stenosis     Subclavian arterial stenosis ([address]     Spigelian hernia     She was lasst seen in Feb     In early May she had US carodit and subclavian     Impressions   Right Impression   The Right Proximal internal carotid artery exhibits 0-49% stenosis. The Right Proximal internal carotid artery exhibits mild calcific plaque . Normal vertebral flow. Mobile, echogenic plaque seen in Right subclavian artery. Unchanged from   previous testing. 50-99% stenosis of right subclavian artery. Left Impression   The Left Proximal internal carotid artery exhibits 0-49% stenosis. The Left Proximal internal carotid artery exhibits mild calcific plaque . Normal vertebral flow.    The left subclavian artery appears normal.         She went to ER on May 16 with Cough of one week duration assco with emesis  Had already started zpak, but had only taken one dose and stopped it wiwth N and V    CT abd and head and CXR done      CXR was abnormal with RUL nodule:  Impression   No acute cardiopulmonary disease.       A 1 cm right upper lobe pulmonary nodule is suspected.  Superimposed shadows   not entirely excluded.  When the patient is able, a follow-up PA and lateral examination of the chest is recommended.         Still has some cough and wheeze  Was given proventil, but hasn't been using it    Patient denies any exertional chest pain, dyspnea, palpitations, syncope, orthopnea, edema or paroxysmal nocturnal dyspnea. Current Outpatient Medications   Medication Sig Dispense Refill    levofloxacin (LEVAQUIN) 500 MG tablet Take 1 tablet by mouth daily for 7 days 7 tablet 0    albuterol sulfate  (90 Base) MCG/ACT inhaler Inhale 2 puffs into the lungs 3 times daily as needed  0    pantoprazole (PROTONIX) 40 MG tablet take 1 tablet by mouth once daily 90 tablet 1    amLODIPine (NORVASC) 5 MG tablet take 1 tablet by mouth once daily 90 tablet 1    niacin 500 MG extended release capsule Take 500 mg by mouth nightly      rosuvastatin (CRESTOR) 10 MG tablet take 1 tablet by mouth once daily 90 tablet 1    gabapentin (NEURONTIN) 300 MG capsule take 1 capsule by mouth three times a day 270 capsule 1    fluticasone (FLONASE) 50 MCG/ACT nasal spray instill 1 spray into each nostril once daily 48 g 1    cyanocobalamin 1000 MCG tablet Take 1,000 mcg by mouth daily      divalproex (DEPAKOTE) 250 MG DR tablet Take 500 mg by mouth Daily with supper       Multiple Vitamins-Minerals (OCUVITE-LUTEIN PO) Take by mouth      Cholecalciferol (VITAMIN D3) 68376 UNITS CAPS Take by mouth      epinastine (ELESTAT) 0.05 % SOLN Place 1 drop into both eyes daily       calcium-vitamin D (OSCAL 500/200 D-3) 500-200 MG-UNIT per tablet Take 1 tablet by mouth 2 times daily.  aspirin 81 MG EC tablet Take 81 mg by mouth daily.  denosumab (PROLIA) 60 MG/ML SOLN SC injection Inject 1 mL into the skin once for 1 dose 1 mL 0     No current facility-administered medications for this visit.           Past Medical History:   Diagnosis Date    Arthritis of shoulder region, right 9/2014    Meghan Hancock Blind right eye     following right cataract surg    Chronic depression 2009

## 2019-05-28 RX ORDER — FLUTICASONE PROPIONATE 50 MCG
SPRAY, SUSPENSION (ML) NASAL
Qty: 48 G | Refills: 1 | Status: SHIPPED | OUTPATIENT
Start: 2019-05-28 | End: 2020-06-29

## 2019-05-29 ENCOUNTER — OFFICE VISIT (OUTPATIENT)
Dept: FAMILY MEDICINE CLINIC | Age: 84
End: 2019-05-29
Payer: MEDICARE

## 2019-05-29 VITALS
DIASTOLIC BLOOD PRESSURE: 64 MMHG | WEIGHT: 136.8 LBS | HEART RATE: 85 BPM | OXYGEN SATURATION: 100 % | BODY MASS INDEX: 24.23 KG/M2 | TEMPERATURE: 98.3 F | SYSTOLIC BLOOD PRESSURE: 130 MMHG

## 2019-05-29 DIAGNOSIS — J40 BRONCHITIS: ICD-10-CM

## 2019-05-29 DIAGNOSIS — R06.02 SOB (SHORTNESS OF BREATH): Primary | ICD-10-CM

## 2019-05-29 PROCEDURE — G8399 PT W/DXA RESULTS DOCUMENT: HCPCS | Performed by: NURSE PRACTITIONER

## 2019-05-29 PROCEDURE — 1090F PRES/ABSN URINE INCON ASSESS: CPT | Performed by: NURSE PRACTITIONER

## 2019-05-29 PROCEDURE — 94640 AIRWAY INHALATION TREATMENT: CPT | Performed by: NURSE PRACTITIONER

## 2019-05-29 PROCEDURE — 4040F PNEUMOC VAC/ADMIN/RCVD: CPT | Performed by: NURSE PRACTITIONER

## 2019-05-29 PROCEDURE — G8598 ASA/ANTIPLAT THER USED: HCPCS | Performed by: NURSE PRACTITIONER

## 2019-05-29 PROCEDURE — G8420 CALC BMI NORM PARAMETERS: HCPCS | Performed by: NURSE PRACTITIONER

## 2019-05-29 PROCEDURE — 1123F ACP DISCUSS/DSCN MKR DOCD: CPT | Performed by: NURSE PRACTITIONER

## 2019-05-29 PROCEDURE — 1036F TOBACCO NON-USER: CPT | Performed by: NURSE PRACTITIONER

## 2019-05-29 PROCEDURE — 99213 OFFICE O/P EST LOW 20 MIN: CPT | Performed by: NURSE PRACTITIONER

## 2019-05-29 PROCEDURE — G8427 DOCREV CUR MEDS BY ELIG CLIN: HCPCS | Performed by: NURSE PRACTITIONER

## 2019-05-29 RX ORDER — IPRATROPIUM BROMIDE AND ALBUTEROL SULFATE 2.5; .5 MG/3ML; MG/3ML
1 SOLUTION RESPIRATORY (INHALATION) ONCE
Status: COMPLETED | OUTPATIENT
Start: 2019-05-29 | End: 2019-05-29

## 2019-05-29 RX ORDER — PREDNISONE 20 MG/1
TABLET ORAL
Qty: 18 TABLET | Refills: 0 | Status: SHIPPED | OUTPATIENT
Start: 2019-05-29 | End: 2019-06-03

## 2019-05-29 RX ORDER — LEVOFLOXACIN 500 MG/1
500 TABLET, FILM COATED ORAL DAILY
Qty: 3 TABLET | Refills: 0 | Status: SHIPPED | OUTPATIENT
Start: 2019-05-29 | End: 2019-06-01

## 2019-05-29 RX ADMIN — IPRATROPIUM BROMIDE AND ALBUTEROL SULFATE 1 AMPULE: 2.5; .5 SOLUTION RESPIRATORY (INHALATION) at 16:45

## 2019-05-29 NOTE — PROGRESS NOTES
Caprice Mcardle is a 82 y.o. female who presents today for hermedical conditions/complaints as noted below. Caprice Mcardle is c/o of Congestion (chest congestion - recently had bronchitis x 1 week)      Chief Complaint   Patient presents with    Congestion     chest congestion - recently had bronchitis x 1 week       HPI:     Caprice Mcardle is a 82 y.o. female who presents with chest congestion, heaviness in chest and weakness. Subjective:     Review of Systems   Constitutional: Positive for fatigue. Negative for chills and fever. HENT: Positive for rhinorrhea. Negative for sinus pressure, sinus pain, sore throat and tinnitus. Respiratory: Positive for cough, chest tightness, shortness of breath and wheezing. Cardiovascular: Negative for chest pain and palpitations. Musculoskeletal: Negative. Skin: Negative for rash. Neurological: Negative for dizziness, light-headedness and headaches. Objective:     Vitals:    05/29/19 1544   BP: 130/64   Site: Left Upper Arm   Position: Sitting   Cuff Size: Medium Adult   Pulse: 85   Temp: 98.3 °F (36.8 °C)   SpO2: 100%   Weight: 136 lb 12.8 oz (62.1 kg)       Physical Exam   Constitutional: She is oriented to person, place, and time. She appears well-developed and well-nourished. HENT:   Right Ear: Tympanic membrane normal.   Left Ear: Tympanic membrane normal.   Nose: Mucosal edema, rhinorrhea and sinus tenderness present. Neck: Normal range of motion. Cardiovascular: Normal rate, regular rhythm and normal heart sounds. Pulmonary/Chest: No respiratory distress. She has wheezes in the right upper field, the right middle field, the left upper field and the left middle field. Musculoskeletal: Normal range of motion. Neurological: She is alert and oriented to person, place, and time. Skin: Skin is warm and dry. There is pallor. Assessment & Plan: The following diagnoses and conditions are stable with no further orders unlessindicated:    1.  SOB (shortness of breath)    2. Bronchitis        Ada was seen today for congestion. Diagnoses and all orders for this visit:    SOB (shortness of breath)  -     ipratropium-albuterol (DUONEB) nebulizer solution 1 ampule    Bronchitis  - predniSONE (DELTASONE) 20 MG tablet; 3 PO daily x 3 days; then 2 PO daily x 3 days; then 1 PO daily x 3 days  -  levofloxacin (LEVAQUIN) 500 MG tablet; Take 1 tablet by mouth daily for 3 days    XR chest 5/16/19  Impression   No acute cardiopulmonary disease.       A 1 cm right upper lobe pulmonary nodule is suspected.  Superimposed shadows   not entirely excluded.  When the patient is able, a follow-up PA and lateral   examination of the chest is recommended.         -Dr. Armin Mancia has an order for future XR chest to revaluate in late June  - Encourage clear fluids without caffeine to ensure hydration.  - Smaller, more frequent meals    - Saline nasal spray, cool mist humidifier, prop head at night for congestion.   - May use spoonfuls of honey to coat throat. - Tylenol or ibuprofen as needed for fever, pain. - Counseled on signs of increased work of breathing.   - RTO if sxs increase or no improvement. Return if symptoms worsen or fail to improve. Patient should call the office immediately with new or ongoing signs or symptoms or worsening, or proceed to the emergency room. If you are onmedications which could impair your senses, you are at risk of weakness, falls, dizziness, or drowsiness. You should be careful during activities which could place you at risk of harm, such as climbing, using stairs,operating machinery, or driving vehicles. If you feel you cannot safely do these activities, you should request others to help you, or avoid the activities altogether. If you are drowsy for any other reason, you should usethe same precautions as listed above. Call if pattern of symptoms change or persists for an extended time.     JOSÉ Wilkerson

## 2019-05-29 NOTE — PATIENT INSTRUCTIONS
We are committed to providing you the best care possible. If you receive a survey after visiting one of our offices, please take time to share your experience concerning your physician office visit. These surveys are confidential and no health information about you is shared. We are eager to improve for you and we are counting on your feedback to help make that happen. Patient Education        Bronchitis: Care Instructions  Your Care Instructions    Bronchitis is inflammation of the bronchial tubes, which carry air to the lungs. The tubes swell and produce mucus, or phlegm. The mucus and inflamed bronchial tubes make you cough. You may have trouble breathing. Most cases of bronchitis are caused by viruses like those that cause colds. Antibiotics usually do not help and they may be harmful. Bronchitis usually develops rapidly and lasts about 2 to 3 weeks in otherwise healthy people. Follow-up care is a key part of your treatment and safety. Be sure to make and go to all appointments, and call your doctor if you are having problems. It's also a good idea to know your test results and keep a list of the medicines you take. How can you care for yourself at home? · Take all medicines exactly as prescribed. Call your doctor if you think you are having a problem with your medicine. · Get some extra rest.  · Take an over-the-counter pain medicine, such as acetaminophen (Tylenol), ibuprofen (Advil, Motrin), or naproxen (Aleve) to reduce fever and relieve body aches. Read and follow all instructions on the label. · Do not take two or more pain medicines at the same time unless the doctor told you to. Many pain medicines have acetaminophen, which is Tylenol. Too much acetaminophen (Tylenol) can be harmful. · Take an over-the-counter cough medicine that contains dextromethorphan to help quiet a dry, hacking cough so that you can sleep. Avoid cough medicines that have more than one active ingredient.  Read and follow all instructions on the label. · Breathe moist air from a humidifier, hot shower, or sink filled with hot water. The heat and moisture will thin mucus so you can cough it out. · Do not smoke. Smoking can make bronchitis worse. If you need help quitting, talk to your doctor about stop-smoking programs and medicines. These can increase your chances of quitting for good. When should you call for help? Call 911 anytime you think you may need emergency care. For example, call if:    · You have severe trouble breathing.    Call your doctor now or seek immediate medical care if:    · You have new or worse trouble breathing.     · You cough up dark brown or bloody mucus (sputum).     · You have a new or higher fever.     · You have a new rash.    Watch closely for changes in your health, and be sure to contact your doctor if:    · You cough more deeply or more often, especially if you notice more mucus or a change in the color of your mucus.     · You are not getting better as expected. Where can you learn more? Go to https://[employer].Play2Shop.com. org and sign in to your [employer] account. Enter H333 in the [employer] box to learn more about \"Bronchitis: Care Instructions. \"     If you do not have an account, please click on the \"Sign Up Now\" link. Current as of: September 5, 2018  Content Version: 12.0  © 8035-3333 Healthwise, Incorporated. Care instructions adapted under license by [facility] ([facility]). If you have questions about a medical condition or this instruction, always ask your healthcare professional. [place] [address] any warranty or liability for your use of this information.

## 2019-06-03 ENCOUNTER — OFFICE VISIT (OUTPATIENT)
Dept: INTERNAL MEDICINE CLINIC | Age: 84
End: 2019-06-03
Payer: MEDICARE

## 2019-06-03 ENCOUNTER — TELEPHONE (OUTPATIENT)
Dept: INTERNAL MEDICINE CLINIC | Age: 84
End: 2019-06-03

## 2019-06-03 VITALS
RESPIRATION RATE: 16 BRPM | BODY MASS INDEX: 24.3 KG/M2 | HEART RATE: 55 BPM | SYSTOLIC BLOOD PRESSURE: 134 MMHG | DIASTOLIC BLOOD PRESSURE: 60 MMHG | WEIGHT: 137.2 LBS | OXYGEN SATURATION: 95 %

## 2019-06-03 DIAGNOSIS — R53.83 FATIGUE, UNSPECIFIED TYPE: Primary | ICD-10-CM

## 2019-06-03 DIAGNOSIS — R45.0 JITTERY FEELING: ICD-10-CM

## 2019-06-03 DIAGNOSIS — R05.9 COUGH: ICD-10-CM

## 2019-06-03 PROCEDURE — G8598 ASA/ANTIPLAT THER USED: HCPCS | Performed by: INTERNAL MEDICINE

## 2019-06-03 PROCEDURE — 1123F ACP DISCUSS/DSCN MKR DOCD: CPT | Performed by: INTERNAL MEDICINE

## 2019-06-03 PROCEDURE — 99214 OFFICE O/P EST MOD 30 MIN: CPT | Performed by: INTERNAL MEDICINE

## 2019-06-03 PROCEDURE — G8420 CALC BMI NORM PARAMETERS: HCPCS | Performed by: INTERNAL MEDICINE

## 2019-06-03 PROCEDURE — G8399 PT W/DXA RESULTS DOCUMENT: HCPCS | Performed by: INTERNAL MEDICINE

## 2019-06-03 PROCEDURE — 4040F PNEUMOC VAC/ADMIN/RCVD: CPT | Performed by: INTERNAL MEDICINE

## 2019-06-03 PROCEDURE — G8427 DOCREV CUR MEDS BY ELIG CLIN: HCPCS | Performed by: INTERNAL MEDICINE

## 2019-06-03 PROCEDURE — 1090F PRES/ABSN URINE INCON ASSESS: CPT | Performed by: INTERNAL MEDICINE

## 2019-06-03 PROCEDURE — 1036F TOBACCO NON-USER: CPT | Performed by: INTERNAL MEDICINE

## 2019-06-03 RX ORDER — PREDNISONE 1 MG/1
TABLET ORAL
Refills: 0 | COMMUNITY
Start: 2019-05-29 | End: 2019-06-03 | Stop reason: DRUGHIGH

## 2019-06-03 RX ORDER — PREDNISONE 1 MG/1
TABLET ORAL
COMMUNITY
End: 2019-06-13 | Stop reason: ALTCHOICE

## 2019-06-03 NOTE — PROGRESS NOTES
Subjective:      Tierra Paul is a 82 y.o. female who presents today for follow up on her chronic medical conditions as noted below.     Patient Active Problem List:     Chronic depression     Hyperlipidemia     Tic douloureux     Anemia due to chronic illness     Colon cancer screening     Osteoporosis     Supraventricular tachycardia (HCC)     Recurrent ventral incisional hernia     Mild cognitive impairment     CAD (coronary artery disease)     Elevated TSH     Peripheral vascular disease (HCC)     Sciatica of left side without back pain     Coronary artery disease involving coronary bypass graft of native heart without angina pectoris     S/P CABG x 3     Essential hypertension     MVP (mitral valve prolapse)     Cardiomyopathy ([address]     Bilateral carotid artery stenosis     Subclavian arterial stenosis ([address]     Spigelian hernia     She was lasst seen May 20   She had been to ER on May 16 with cough  She had taken one dose of zpak and stopped due to N and V  One May 20 I gave her levaqin and proventil    She had had abn CXR with possible nodule and was ordered to repeat CXR in late June    She was seen in walkin clinic 5/29 for chest congestion  They added very high dose prednisione taper to levaquin 500 that she completed after 10d therapy  She took 60 mg for a couple days, then 40 mg yestersdday    She feels nervous and has notable tremor and feels jittery inside  Cough and dyspnea and wheeze gone  No fever, sore throat , swalloing problem, N, V , diarrhea or abd pain or  Urinary sx    Current Outpatient Medications   Medication Sig Dispense Refill    predniSONE (DELTASONE) 5 MG tablet Prednisone: 3 daily x 2 d, then 2 daily x 1 d , then 1 daily x 1 d , then stop prednisone      fluticasone (FLONASE) 50 MCG/ACT nasal spray instill 1 spray into each nostril once daily 48 g 1    albuterol sulfate  (90 Base) MCG/ACT inhaler Inhale 2 puffs into the lungs 3 times daily as needed  0    pantoprazole a normal mood and affect. Her behavior is normal.   Vitals reviewed. walking in clinic records and my prior records rev'd       Assessment / Plan:      1. Fatigue, unspecified type    2. Jittery feeling    3.  Cough            Plan   Cut prednisone now to 3 tabs, 2, then one over 4 d  Get labs   Get repeat CXR for nodule as ordereed last visit  RTC as scheduled in July  Orders Placed This Encounter   Procedures    CBC Auto Differential    Comprehensive Metabolic Panel

## 2019-06-04 ENCOUNTER — OFFICE VISIT (OUTPATIENT)
Dept: CARDIOLOGY CLINIC | Age: 84
End: 2019-06-04
Payer: MEDICARE

## 2019-06-04 VITALS
HEART RATE: 88 BPM | HEIGHT: 63 IN | WEIGHT: 137.8 LBS | SYSTOLIC BLOOD PRESSURE: 120 MMHG | BODY MASS INDEX: 24.41 KG/M2 | DIASTOLIC BLOOD PRESSURE: 60 MMHG

## 2019-06-04 DIAGNOSIS — I25.10 CORONARY ARTERY DISEASE INVOLVING NATIVE CORONARY ARTERY OF NATIVE HEART WITHOUT ANGINA PECTORIS: Primary | ICD-10-CM

## 2019-06-04 DIAGNOSIS — R53.83 FATIGUE, UNSPECIFIED TYPE: ICD-10-CM

## 2019-06-04 DIAGNOSIS — I25.5 ISCHEMIC CARDIOMYOPATHY: ICD-10-CM

## 2019-06-04 DIAGNOSIS — I10 ESSENTIAL HYPERTENSION: ICD-10-CM

## 2019-06-04 DIAGNOSIS — R45.0 JITTERY FEELING: ICD-10-CM

## 2019-06-04 DIAGNOSIS — I47.1 SUPRAVENTRICULAR TACHYCARDIA (HCC): ICD-10-CM

## 2019-06-04 DIAGNOSIS — R05.9 COUGH: ICD-10-CM

## 2019-06-04 DIAGNOSIS — E78.2 MIXED HYPERLIPIDEMIA: ICD-10-CM

## 2019-06-04 DIAGNOSIS — Z95.1 S/P CABG X 3: ICD-10-CM

## 2019-06-04 DIAGNOSIS — I77.1 SUBCLAVIAN ARTERIAL STENOSIS (HCC): ICD-10-CM

## 2019-06-04 PROCEDURE — 1090F PRES/ABSN URINE INCON ASSESS: CPT | Performed by: NURSE PRACTITIONER

## 2019-06-04 PROCEDURE — G8427 DOCREV CUR MEDS BY ELIG CLIN: HCPCS | Performed by: NURSE PRACTITIONER

## 2019-06-04 PROCEDURE — G8399 PT W/DXA RESULTS DOCUMENT: HCPCS | Performed by: NURSE PRACTITIONER

## 2019-06-04 PROCEDURE — 1036F TOBACCO NON-USER: CPT | Performed by: NURSE PRACTITIONER

## 2019-06-04 PROCEDURE — G8420 CALC BMI NORM PARAMETERS: HCPCS | Performed by: NURSE PRACTITIONER

## 2019-06-04 PROCEDURE — 99214 OFFICE O/P EST MOD 30 MIN: CPT | Performed by: NURSE PRACTITIONER

## 2019-06-04 PROCEDURE — G8598 ASA/ANTIPLAT THER USED: HCPCS | Performed by: NURSE PRACTITIONER

## 2019-06-04 PROCEDURE — 4040F PNEUMOC VAC/ADMIN/RCVD: CPT | Performed by: NURSE PRACTITIONER

## 2019-06-04 PROCEDURE — 1123F ACP DISCUSS/DSCN MKR DOCD: CPT | Performed by: NURSE PRACTITIONER

## 2019-06-04 ASSESSMENT — ENCOUNTER SYMPTOMS
SORE THROAT: 0
COUGH: 1
SINUS PAIN: 0
SHORTNESS OF BREATH: 1
WHEEZING: 1
SINUS PRESSURE: 0
CHEST TIGHTNESS: 1
RHINORRHEA: 1

## 2019-06-04 NOTE — PATIENT INSTRUCTIONS
Please hold on to these instructions the  will call you within 1-9 business days when we receive authorization from your insurance. Nuclear Stress Test    WHAT TO EXPECT:   ? You will need to confirm the test or it could be cancelled. ? This test will take approximately 2 hours: 1 hour in the AM &    1 hour in the PM. You will be given a time by the [facility] after the first  part is completed to come back. ? You will be given a medication, through an IV in the hand, this will safely simulate exercise. This IV is also needed to inject the radioactive isotope. ? You will receive an injection in the AM & PM before the pictures. ? Using a special camera, you will have one set of pictures of your heart taken in the AM and a set of pictures in the PM.     PREPARATION FOR TEST:  ? Eat a light breakfast such as juice and toast.  ? If you are DIABETIC: Eat a normal breakfast with NO CAFFEINE and take your insulin as normal.   ? AVOID ALL FOODS & DRINKS containing CAFFEINE 24 HOURS PRIOR TO THE TEST: Including coffee, Tea, Codie and other soft drinks even those labeled  caffeine free or decaffeinated.  ? HOLD THESE MEDICATIONS Persantine & Theophylline (Theodur)  24 hours prior & bring your inhaler with you. ?  The physician will specify if the following Beta blockers need to be held for 24 hours prior to test:

## 2019-06-04 NOTE — PROGRESS NOTES
[employer]  [facility] [address]  Phone: [phone]    Fax [phone]                  Sheba Daniel MD, Traci Villalobos MD, [address] Darian Underwood MD, MD Jose Gutierres MD Ellison Bowling, MD Chuck Buff, APRN               [facility] APRSHERRY Servin, APRSHERRY      6/4/2019    RE: Ema Coleman  (1935)                               TO:  Dr. Paz Bush MD  The primary cardiologist is Dr. Agapito Hanson    CC: CAD-HTN HLD- SVT- subclavian artery stenosis     HPI:    Thank you for involving me in taking care of your patient Ema Coleman. She is a 82y.o. year old female with a history of CAD-HTN HLD- SVT subclavian artery stenosis - and is being seen in the office today. She reports that is feeling fair. There is no chest pain or SOB. There are no reported palpitations or dizziness. There are no reported TIA or stroke like symptoms. She reports that she is unusually fatigued. She notes she has recently been treated for bronchitis. She is compliant with medications. She is active with walking for exercise.        Vitals:    06/04/19 1401   BP: 120/60   Pulse: 88       Current Outpatient Medications   Medication Sig Dispense Refill    predniSONE (DELTASONE) 5 MG tablet Prednisone: 3 daily x 2 d, then 2 daily x 1 d , then 1 daily x 1 d , then stop prednisone      fluticasone (FLONASE) 50 MCG/ACT nasal spray instill 1 spray into each nostril once daily 48 g 1    albuterol sulfate  (90 Base) MCG/ACT inhaler Inhale 2 puffs into the lungs 3 times daily as needed  0    pantoprazole (PROTONIX) 40 MG tablet take 1 tablet by mouth once daily 90 tablet 1    amLODIPine (NORVASC) 5 MG tablet take 1 tablet by mouth once daily 90 tablet 1    niacin 500 MG extended release capsule Take 500 mg by mouth nightly      denosumab (PROLIA) 60 MG/ML SOLN SC injection Inject 1 mL into the skin once for 1 dose 1 mL 0  rosuvastatin (CRESTOR) 10 MG tablet take 1 tablet by mouth once daily 90 tablet 1    gabapentin (NEURONTIN) 300 MG capsule take 1 capsule by mouth three times a day 270 capsule 1    cyanocobalamin 1000 MCG tablet Take 1,000 mcg by mouth daily      divalproex (DEPAKOTE) 250 MG DR tablet Take 500 mg by mouth Daily with supper       Multiple Vitamins-Minerals (OCUVITE-LUTEIN PO) Take by mouth      Cholecalciferol (VITAMIN D3) 60438 UNITS CAPS Take by mouth      epinastine (ELESTAT) 0.05 % SOLN Place 1 drop into both eyes daily       calcium-vitamin D (OSCAL 500/200 D-3) 500-200 MG-UNIT per tablet Take 1 tablet by mouth 2 times daily.  aspirin 81 MG EC tablet Take 81 mg by mouth daily. No current facility-administered medications for this visit. Allergies: Alendronate sodium; Boniva [ibandronate sodium]; Lisinopril; Neggram [nalidixic acid]; Pce [erythromycin]; Penicillins; Welchol [colesevelam hcl];  Carbamazepine; and Lovaza [omega-3-acid ethyl esters]  Past Medical History:   Diagnosis Date    Arthritis of shoulder region, right 9/2014    Monypardeep Bear Blind right eye     following right cataract surg    Chronic depression 2009    Dr. Cora Correa DVT (deep venous thrombosis) ([address] 1970    left leg    Former smoker     H/O Doppler carotid ultrasound 05/01/2019    Mild 0-49% disease of the bilateral proximal internal carotid artery, mobile, echogenic plaque in right sublavian artery that is unchanged from previous testing 50-99% stenosis of right subclavian artery, normal vertebral flow bilaterally    Hx of Doppler ultrasound 03/01/2018    Carotid-mild 0-49% bilateral carotid,50% stenosis right subclavian    Hyperlipidemia     Hypertension     Macular degeneration     right    Mild cognitive impairment 2/2012    MMSE 26/30    MVP (mitral valve prolapse)     trace on echo 2014    Osteoporosis 5/2012    Pancytopenia 5/2011    mild with ; Dr Marc hawk 2010    Peptic ulcer disease     Peripheral vascular disease ([facility] 2016    angio; dis below ankles; pletal    Prediabetes     Recurrent ventral incisional hernia     medial apex of GB scar    S/P CABG x 3     3-vessel; Dr Montoya Other Spigelian hernia 2017    right ant lateral wall; seen on CT abd    Supraventricular tachycardia ([facility]     Freq ventriular ectopy    Tic douloureux     Dr. Khalida Claire; Right side     Past Surgical History:   Procedure Laterality Date    CATARACT REMOVAL Right     poor result ; blind right eye; [address] CORONARY ARTERY BYPASS GRAFT  2009    3 vessel    SHOULDER ARTHROSCOPY Right     TUBAL LIGATION      URETER SURGERY      Right ureteral repair     Family History   Problem Relation Age of Onset    Cancer Sister 79        Breast cancer 2017     Social History     Tobacco Use    Smoking status: Former Smoker     Packs/day: 0.25     Years: 9.00     Pack years: 2.25     Types: Cigarettes     Start date:      Last attempt to quit: 1985     Years since quittin.5    Smokeless tobacco: Never Used    Tobacco comment: 2016   Substance Use Topics    Alcohol use: No     Alcohol/week: 0.0 oz        Review of Systems:   · Constitutional: No Fever,no unintentional weight Loss   · Eyes: No change in Vision:     · ENT: No Headaches, Hearing Loss or Vertigo. No tinnitus   · Cardiovascular: as per note above   · Respiratory: No cough or wheezing and as per note above.    · Gastrointestinal: no abdominal pain, no appetite loss, no blood in stools, constipation, or diarrhea, No heartburn  · Genitourinary: No dysuria, trouble voiding, or hematuria  · Musculoskeletal: back pain- No: myalgia No,  Arthralgia- No  · Integumentary: No rash or pruritis  · Neurological: No TIA or stroke symptoms  · Psychiatric: Anxiety- No: depression-  Yes   · Endocrine: No malaise, Yes fatigue - no temperature intolerance  · Hematologic/Lymphatic: No bleeding 05/16/2019    PROT 7.2 05/16/2019    PROT 7.5 01/02/2013    BILITOT 0.5 05/16/2019    BILIDIR 0.19 01/03/2012    IBILI 0.4 01/03/2012    LABALBU 4.1 05/16/2019     Magnesium:  No results found for: MG  PT/INR:    Lab Results   Component Value Date    PROTIME 11.0 01/25/2016    INR 0.96 01/25/2016     Lipids:    Lab Results   Component Value Date    TRIG 333 03/06/2019    HDL 31 03/06/2019    HDL 20 01/03/2012    LDLCALC 62 03/06/2019    LDLDIRECT 93 08/22/2016    LABVLDL 67 03/06/2019     No results found for: LABA1C  TSH:    Lab Results   Component Value Date    TSH 3.250 03/06/2019       Assessment/ Plan:    Patient seen, interviewed and examined. Testing was reviewed. CAD    H/o: CABG x 3   She now notes she is unusually fatigued-notes it is similar  to prior to CABG ? If anginal equivalent   She is to continue with current medications   Testing ordered:  yes  Last testing: NM stress test 2010: Will check inga     HTN    Controlled  She is to continue current medications   advised low salt diet   Testing ordered:  no   Last testing: Echo 2017:     Hyperlipidemia  At or near goal No   Triglycerides are high at 333  HDL is low at 31   She is to continue current medications        Ischemic Cardiomyopathy    01/26/2017 Echo     Normal left ventricle structure and function.   Ejection fraction is visually estimated at 50-60%.  Mild left atrial enlargement.   Grade I diastolic dysfunction.   Mild tricuspid regurgitation    SVT  No palpitations or dizziness. Stable      subclavian artery stenosis   stable  Carotid doppler 05/01/2019  Mild (0-49%) disease of the Bilateral proximal Internal carotid artery.    Mobile, echogenic plaque seen in Right subclavian artery. Unchanged from    previous testing. 50-99% stenosis of right subclavian artery.    Normal vertebral flow bilaterally. Lifestyle and risk factor modificatons discussed. Various goals are discussed and questions answered.  Continue current

## 2019-06-05 LAB
A/G RATIO: 1.6 (ref 1.1–2.2)
ALBUMIN SERPL-MCNC: 4.5 G/DL (ref 3.4–5)
ALP BLD-CCNC: 45 U/L (ref 40–129)
ALT SERPL-CCNC: 16 U/L (ref 10–40)
ANION GAP SERPL CALCULATED.3IONS-SCNC: 21 MMOL/L (ref 3–16)
AST SERPL-CCNC: 21 U/L (ref 15–37)
BASOPHILS ABSOLUTE: 0 K/UL (ref 0–0.2)
BASOPHILS RELATIVE PERCENT: 0 %
BILIRUB SERPL-MCNC: 0.5 MG/DL (ref 0–1)
BUN BLDV-MCNC: 25 MG/DL (ref 7–20)
CALCIUM SERPL-MCNC: 9.5 MG/DL (ref 8.3–10.6)
CHLORIDE BLD-SCNC: 92 MMOL/L (ref 99–110)
CO2: 23 MMOL/L (ref 21–32)
CREAT SERPL-MCNC: 1.2 MG/DL (ref 0.6–1.2)
EOSINOPHILS ABSOLUTE: 0 K/UL (ref 0–0.6)
EOSINOPHILS RELATIVE PERCENT: 0 %
GFR AFRICAN AMERICAN: 52
GFR NON-AFRICAN AMERICAN: 43
GLOBULIN: 2.8 G/DL
GLUCOSE BLD-MCNC: 167 MG/DL (ref 70–99)
HCT VFR BLD CALC: 38.6 % (ref 36–48)
HEMOGLOBIN: 13 G/DL (ref 12–16)
LYMPHOCYTES ABSOLUTE: 1.1 K/UL (ref 1–5.1)
LYMPHOCYTES RELATIVE PERCENT: 19 %
MACROCYTES: ABNORMAL
MCH RBC QN AUTO: 34.3 PG (ref 26–34)
MCHC RBC AUTO-ENTMCNC: 33.7 G/DL (ref 31–36)
MCV RBC AUTO: 101.7 FL (ref 80–100)
MONOCYTES ABSOLUTE: 0.4 K/UL (ref 0–1.3)
MONOCYTES RELATIVE PERCENT: 7 %
NEUTROPHILS ABSOLUTE: 4.4 K/UL (ref 1.7–7.7)
NEUTROPHILS RELATIVE PERCENT: 74 %
PDW BLD-RTO: 14.7 % (ref 12.4–15.4)
PLATELET # BLD: 168 K/UL (ref 135–450)
PLATELET SLIDE REVIEW: ADEQUATE
PMV BLD AUTO: 8.2 FL (ref 5–10.5)
POTASSIUM SERPL-SCNC: 4.2 MMOL/L (ref 3.5–5.1)
RBC # BLD: 3.79 M/UL (ref 4–5.2)
SLIDE REVIEW: ABNORMAL
SODIUM BLD-SCNC: 136 MMOL/L (ref 136–145)
TOTAL PROTEIN: 7.3 G/DL (ref 6.4–8.2)
WBC # BLD: 6 K/UL (ref 4–11)

## 2019-06-13 ENCOUNTER — HOSPITAL ENCOUNTER (OUTPATIENT)
Dept: INFUSION THERAPY | Age: 84
Setting detail: INFUSION SERIES
Discharge: HOME | End: 2019-06-13
Payer: MEDICARE

## 2019-06-13 VITALS
OXYGEN SATURATION: 97 % | DIASTOLIC BLOOD PRESSURE: 67 MMHG | RESPIRATION RATE: 16 BRPM | HEART RATE: 81 BPM | SYSTOLIC BLOOD PRESSURE: 123 MMHG

## 2019-06-13 PROCEDURE — 96372 THER/PROPH/DIAG INJ SC/IM: CPT

## 2019-06-13 PROCEDURE — 6360000002 HC RX W HCPCS: Performed by: INTERNAL MEDICINE

## 2019-06-13 PROCEDURE — 99211 OFF/OP EST MAY X REQ PHY/QHP: CPT

## 2019-06-13 RX ADMIN — DENOSUMAB 60 MG: 60 INJECTION SUBCUTANEOUS at 12:38

## 2019-06-13 NOTE — PROGRESS NOTES
Reviewed discharge instructions, voiced understanding, and copies of AVS given to take home. Pt tolerated Prolia injection well, with no s/s of an allergic reaction. Pt to private auto via ambulation per self.

## 2019-06-14 RX ORDER — GABAPENTIN 300 MG/1
CAPSULE ORAL
Qty: 270 CAPSULE | Refills: 1 | Status: SHIPPED | OUTPATIENT
Start: 2019-06-14 | End: 2019-07-10 | Stop reason: SDUPTHER

## 2019-06-17 ENCOUNTER — PROCEDURE VISIT (OUTPATIENT)
Dept: CARDIOLOGY CLINIC | Age: 84
End: 2019-06-17
Payer: MEDICARE

## 2019-06-17 DIAGNOSIS — I10 ESSENTIAL HYPERTENSION: ICD-10-CM

## 2019-06-17 DIAGNOSIS — Z95.1 S/P CABG X 3: ICD-10-CM

## 2019-06-17 DIAGNOSIS — R53.83 FATIGUE, UNSPECIFIED TYPE: Primary | ICD-10-CM

## 2019-06-17 DIAGNOSIS — I25.10 CORONARY ARTERY DISEASE INVOLVING NATIVE CORONARY ARTERY OF NATIVE HEART WITHOUT ANGINA PECTORIS: ICD-10-CM

## 2019-06-17 LAB
LV EF: 45 %
LVEF MODALITY: NORMAL

## 2019-06-17 PROCEDURE — 93016 CV STRESS TEST SUPVJ ONLY: CPT | Performed by: INTERNAL MEDICINE

## 2019-06-17 PROCEDURE — 93018 CV STRESS TEST I&R ONLY: CPT | Performed by: INTERNAL MEDICINE

## 2019-06-17 PROCEDURE — 93017 CV STRESS TEST TRACING ONLY: CPT | Performed by: INTERNAL MEDICINE

## 2019-06-17 PROCEDURE — A9500 TC99M SESTAMIBI: HCPCS | Performed by: INTERNAL MEDICINE

## 2019-06-17 PROCEDURE — 78452 HT MUSCLE IMAGE SPECT MULT: CPT | Performed by: INTERNAL MEDICINE

## 2019-06-19 ENCOUNTER — TELEPHONE (OUTPATIENT)
Dept: CARDIOLOGY CLINIC | Age: 84
End: 2019-06-19

## 2019-06-19 RX ORDER — GABAPENTIN 300 MG/1
CAPSULE ORAL
Qty: 270 CAPSULE | Refills: 1 | Status: SHIPPED | OUTPATIENT
Start: 2019-06-19 | End: 2019-12-20

## 2019-06-19 NOTE — TELEPHONE ENCOUNTER
Advised patient of stress test results. Patient voiced understanding. Normal perfusion study with normal distribution in all coronal, short, and    horizontal axis.    The observed defect is consistent with diaphragmatic attenuation.  LVEF calculated at 45 % is probably falsely low due to PVCs.    Supervising physician Dr. Alexa Aquino .        Recommendation    Recommendation: Routine follow-up.

## 2019-06-25 ENCOUNTER — HOSPITAL ENCOUNTER (OUTPATIENT)
Dept: GENERAL RADIOLOGY | Age: 84
Discharge: HOME | End: 2019-06-25
Payer: MEDICARE

## 2019-06-25 ENCOUNTER — HOSPITAL ENCOUNTER (OUTPATIENT)
Age: 84
Discharge: HOME | End: 2019-06-25
Payer: MEDICARE

## 2019-06-25 DIAGNOSIS — R91.1 PULMONARY NODULE, RIGHT: ICD-10-CM

## 2019-06-25 PROCEDURE — 71046 X-RAY EXAM CHEST 2 VIEWS: CPT

## 2019-07-10 ENCOUNTER — OFFICE VISIT (OUTPATIENT)
Dept: INTERNAL MEDICINE CLINIC | Age: 84
End: 2019-07-10
Payer: MEDICARE

## 2019-07-10 VITALS
BODY MASS INDEX: 24.41 KG/M2 | HEART RATE: 76 BPM | OXYGEN SATURATION: 99 % | SYSTOLIC BLOOD PRESSURE: 118 MMHG | DIASTOLIC BLOOD PRESSURE: 72 MMHG | WEIGHT: 137.8 LBS

## 2019-07-10 DIAGNOSIS — I10 ESSENTIAL HYPERTENSION: ICD-10-CM

## 2019-07-10 DIAGNOSIS — M81.0 OSTEOPOROSIS, UNSPECIFIED OSTEOPOROSIS TYPE, UNSPECIFIED PATHOLOGICAL FRACTURE PRESENCE: ICD-10-CM

## 2019-07-10 DIAGNOSIS — R13.10 DYSPHAGIA, UNSPECIFIED TYPE: Primary | ICD-10-CM

## 2019-07-10 DIAGNOSIS — I25.5 ISCHEMIC CARDIOMYOPATHY: ICD-10-CM

## 2019-07-10 DIAGNOSIS — R93.89 ABNORMAL CHEST X-RAY: ICD-10-CM

## 2019-07-10 DIAGNOSIS — F41.9 ANXIETY: ICD-10-CM

## 2019-07-10 PROCEDURE — G8427 DOCREV CUR MEDS BY ELIG CLIN: HCPCS | Performed by: INTERNAL MEDICINE

## 2019-07-10 PROCEDURE — 99214 OFFICE O/P EST MOD 30 MIN: CPT | Performed by: INTERNAL MEDICINE

## 2019-07-10 PROCEDURE — G8399 PT W/DXA RESULTS DOCUMENT: HCPCS | Performed by: INTERNAL MEDICINE

## 2019-07-10 PROCEDURE — 1090F PRES/ABSN URINE INCON ASSESS: CPT | Performed by: INTERNAL MEDICINE

## 2019-07-10 PROCEDURE — 4040F PNEUMOC VAC/ADMIN/RCVD: CPT | Performed by: INTERNAL MEDICINE

## 2019-07-10 PROCEDURE — 1036F TOBACCO NON-USER: CPT | Performed by: INTERNAL MEDICINE

## 2019-07-10 PROCEDURE — G8420 CALC BMI NORM PARAMETERS: HCPCS | Performed by: INTERNAL MEDICINE

## 2019-07-10 PROCEDURE — G8598 ASA/ANTIPLAT THER USED: HCPCS | Performed by: INTERNAL MEDICINE

## 2019-07-10 PROCEDURE — 1123F ACP DISCUSS/DSCN MKR DOCD: CPT | Performed by: INTERNAL MEDICINE

## 2019-07-10 NOTE — PROGRESS NOTES
Subjective:      Janessa Betancourt is a 82 y.o. female who presents today for follow up on her chronic medical conditions as noted below. Patient Active Problem List:     Chronic depression     Hyperlipidemia     Tic douloureux     Anemia due to chronic illness     Colon cancer screening     Osteoporosis     Supraventricular tachycardia (HCC)     Recurrent ventral incisional hernia     Mild cognitive impairment     CAD (coronary artery disease)     Elevated TSH     Peripheral vascular disease (HCC)     Sciatica of left side without back pain     Coronary artery disease involving coronary bypass graft of native heart without angina pectoris     S/P CABG x 3     Essential hypertension     MVP (mitral valve prolapse)     Cardiomyopathy ([address]     Bilateral carotid artery stenosis     Subclavian arterial stenosis ([address]     Spigelian hernia     She was last seen in early June with resp infection  There was RUL nodule seen and repeat f/u CXR was done in late June and it was resolve:     FINDINGS:   Status post coronary bypass.  Heart size and pulmonary vessels stable.  Right   upper lobe density seen on the prior study has resolved.  No new pulmonary   abnormality.  Costophrenic angles sharp           Impression   Interval resolution of nodular right lung density     She has seen NP at cardiology in June c/o fatigue like what she had prior to CABG  She ordered cardiolyte:  She had stress test that showed normal perfusion,but lower EF of 45%    EF by echo 2 yr ago 50-60%  Not an issue at this time  Patient denies any exertional chest pain, dyspnea, palpitations, syncope, orthopnea, edema or paroxysmal nocturnal dyspnea.          Summary    Normal perfusion study with normal distribution in all coronal, short, and    horizontal axis.    The observed defect is consistent with diaphragmatic attenuation.  LVEF calculated at 45 % is probably falsely low due to PVCs.    Supervising physician Dr. Luna December .      She c/o swalloing issue

## 2019-08-05 ENCOUNTER — OFFICE VISIT (OUTPATIENT)
Dept: INTERNAL MEDICINE CLINIC | Age: 84
End: 2019-08-05
Payer: MEDICARE

## 2019-08-05 VITALS
OXYGEN SATURATION: 98 % | RESPIRATION RATE: 14 BRPM | HEART RATE: 82 BPM | DIASTOLIC BLOOD PRESSURE: 64 MMHG | WEIGHT: 139.8 LBS | SYSTOLIC BLOOD PRESSURE: 124 MMHG | BODY MASS INDEX: 24.76 KG/M2

## 2019-08-05 DIAGNOSIS — M25.511 CHRONIC RIGHT SHOULDER PAIN: ICD-10-CM

## 2019-08-05 DIAGNOSIS — I25.5 ISCHEMIC CARDIOMYOPATHY: ICD-10-CM

## 2019-08-05 DIAGNOSIS — G89.29 CHRONIC RIGHT SHOULDER PAIN: ICD-10-CM

## 2019-08-05 DIAGNOSIS — N30.90 CYSTITIS: Primary | ICD-10-CM

## 2019-08-05 PROBLEM — I42.9 CARDIOMYOPATHY (HCC): Status: RESOLVED | Noted: 2017-01-20 | Resolved: 2019-08-05

## 2019-08-05 PROCEDURE — G8427 DOCREV CUR MEDS BY ELIG CLIN: HCPCS | Performed by: INTERNAL MEDICINE

## 2019-08-05 PROCEDURE — G8420 CALC BMI NORM PARAMETERS: HCPCS | Performed by: INTERNAL MEDICINE

## 2019-08-05 PROCEDURE — 81003 URINALYSIS AUTO W/O SCOPE: CPT | Performed by: INTERNAL MEDICINE

## 2019-08-05 PROCEDURE — G8399 PT W/DXA RESULTS DOCUMENT: HCPCS | Performed by: INTERNAL MEDICINE

## 2019-08-05 PROCEDURE — 1036F TOBACCO NON-USER: CPT | Performed by: INTERNAL MEDICINE

## 2019-08-05 PROCEDURE — 1123F ACP DISCUSS/DSCN MKR DOCD: CPT | Performed by: INTERNAL MEDICINE

## 2019-08-05 PROCEDURE — 4040F PNEUMOC VAC/ADMIN/RCVD: CPT | Performed by: INTERNAL MEDICINE

## 2019-08-05 PROCEDURE — 1090F PRES/ABSN URINE INCON ASSESS: CPT | Performed by: INTERNAL MEDICINE

## 2019-08-05 PROCEDURE — G8598 ASA/ANTIPLAT THER USED: HCPCS | Performed by: INTERNAL MEDICINE

## 2019-08-05 PROCEDURE — 99213 OFFICE O/P EST LOW 20 MIN: CPT | Performed by: INTERNAL MEDICINE

## 2019-08-05 RX ORDER — SULFAMETHOXAZOLE AND TRIMETHOPRIM 800; 160 MG/1; MG/1
1 TABLET ORAL 2 TIMES DAILY
Qty: 6 TABLET | Refills: 0 | Status: SHIPPED | OUTPATIENT
Start: 2019-08-05 | End: 2019-08-08

## 2019-08-05 NOTE — PROGRESS NOTES
Subjective:      Rosy Magana is a 82 y.o. female who presents today for follow up on her chronic medical conditions as noted below.     Patient Active Problem List:     Chronic depression     Hyperlipidemia     Tic douloureux     Anemia due to chronic illness     Colon cancer screening     Breast cancer screening     Osteoporosis     Supraventricular tachycardia (HCC)     Recurrent ventral incisional hernia     Mild cognitive impairment     CAD (coronary artery disease)     Elevated TSH     Peripheral vascular disease (HCC)     Sciatica of left side without back pain     Coronary artery disease involving coronary bypass graft of native heart without angina pectoris     S/P CABG x 3     Essential hypertension     MVP (mitral valve prolapse)     Cardiomyopathy ([address]     Bilateral carotid artery stenosis     Subclavian arterial stenosis (HCC)     Spigelian hernia     She was last seen July 10    She c/o UTI sx of 18 hr duration  Burning and freq  No hematuria or back pain or systemic sx  Will rx with bactirim and got specimen to sned    Has also long standing probkem with OA right shoulder and pain and inability to ellevate   Has crepitation  No injury   Limited int rotation    Current Outpatient Medications   Medication Sig Dispense Refill    sulfamethoxazole-trimethoprim (BACTRIM DS;SEPTRA DS) 800-160 MG per tablet Take 1 tablet by mouth 2 times daily for 3 days 6 tablet 0    gabapentin (NEURONTIN) 300 MG capsule take 1 capsule by mouth three times a day 270 capsule 1    fluticasone (FLONASE) 50 MCG/ACT nasal spray instill 1 spray into each nostril once daily 48 g 1    albuterol sulfate  (90 Base) MCG/ACT inhaler Inhale 2 puffs into the lungs 3 times daily as needed  0    pantoprazole (PROTONIX) 40 MG tablet take 1 tablet by mouth once daily 90 tablet 1    amLODIPine (NORVASC) 5 MG tablet take 1 tablet by mouth once daily 90 tablet 1    niacin 500 MG extended release capsule Take 500 mg by mouth nightly 3-vessel; Dr Ashley Garrison Spigelian hernia 2017    right ant lateral wall; seen on CT abd    Supraventricular tachycardia ([address]     Freq ventriular ectopy    Tic douloureux     Dr. Rayo Bustamante; Right side        Social History     Tobacco Use    Smoking status: Former Smoker     Packs/day: 0.25     Years: 9.00     Pack years: 2.25     Types: Cigarettes     Start date:      Last attempt to quit: 1985     Years since quittin.7    Smokeless tobacco: Never Used    Tobacco comment: 2016   Substance Use Topics    Alcohol use: No     Alcohol/week: 0.0 standard drinks        ROS: The patient has had no headache, sore throat, fever or chills, cough, dyspnea, chest pain, nausea, vomiting or diarrhea, or edema. Objective:      /64   Pulse 82   Resp 14   Wt 139 lb 12.8 oz (63.4 kg)   SpO2 98%   BMI 24.76 kg/m²      Physical Exam   Constitutional: She is oriented to person, place, and time. She appears well-developed and well-nourished. No distress. HENT:   Head: Normocephalic and atraumatic. Mouth/Throat: Oropharynx is clear and moist.   Eyes: Conjunctivae are normal. No scleral icterus. Neck: Neck supple. Cardiovascular: Normal rate and regular rhythm. No murmur heard. Pulmonary/Chest: Effort normal. No respiratory distress. She has no wheezes. She has no rales. Abdominal: Soft. She exhibits no distension. There is no tenderness. Musculoskeletal:   Limited ROM right shoulder as above   Neurological: She is alert and oriented to person, place, and time. Coordination normal.   Skin: Skin is warm and dry. Psychiatric: She has a normal mood and affect. Her behavior is normal.   Vitals reviewed. Assessment / Plan:      1. Cystitis    2.  Chronic right shoulder pain            Plan   Bactrim DS bid x 3 d  Urine specimen obtained  refre to [facility] for shoulder  RTC 3 mo   Orders Placed This Encounter   Procedures    Urinalysis Reflex to [address] -

## 2019-08-06 LAB
BACTERIA: ABNORMAL /HPF
BILIRUBIN URINE: NEGATIVE
BLOOD, URINE: ABNORMAL
CLARITY: ABNORMAL
COLOR: ABNORMAL
EPITHELIAL CELLS, UA: 0 /HPF (ref 0–5)
GLUCOSE URINE: NEGATIVE MG/DL
HYALINE CASTS: 2 /LPF (ref 0–8)
KETONES, URINE: ABNORMAL MG/DL
LEUKOCYTE ESTERASE, URINE: ABNORMAL
MICROSCOPIC EXAMINATION: YES
NITRITE, URINE: NEGATIVE
PH UA: 5 (ref 5–8)
PROTEIN UA: 30 MG/DL
RBC UA: 6 /HPF (ref 0–4)
SPECIFIC GRAVITY UA: 1.03 (ref 1–1.03)
URINE REFLEX TO CULTURE: YES
URINE TYPE: ABNORMAL
UROBILINOGEN, URINE: 0.2 E.U./DL
WBC UA: 394 /HPF (ref 0–5)
YEAST: PRESENT /HPF

## 2019-08-07 ENCOUNTER — TELEPHONE (OUTPATIENT)
Dept: INTERNAL MEDICINE CLINIC | Age: 84
End: 2019-08-07

## 2019-08-07 RX ORDER — NITROFURANTOIN 25; 75 MG/1; MG/1
100 CAPSULE ORAL 2 TIMES DAILY
Qty: 20 CAPSULE | Refills: 0 | Status: SHIPPED | OUTPATIENT
Start: 2019-08-07 | End: 2019-08-17

## 2019-08-07 NOTE — TELEPHONE ENCOUNTER
Pt called and stated that PCP put her on Bactrim on Monday and ever since then she is having pain in spine that she said she could cry and pharmacist advised her to call PCP to see if he can send something else for her.  Please advise

## 2019-08-08 LAB
ORGANISM: ABNORMAL
URINE CULTURE, ROUTINE: ABNORMAL
URINE CULTURE, ROUTINE: ABNORMAL

## 2019-08-20 RX ORDER — ROSUVASTATIN CALCIUM 10 MG/1
TABLET, COATED ORAL
Qty: 90 TABLET | Refills: 1 | Status: SHIPPED | OUTPATIENT
Start: 2019-08-20 | End: 2020-03-31

## 2019-10-02 ENCOUNTER — OFFICE VISIT (OUTPATIENT)
Dept: ORTHOPEDIC SURGERY | Age: 84
End: 2019-10-02
Payer: MEDICARE

## 2019-10-02 VITALS
HEIGHT: 63 IN | HEART RATE: 93 BPM | OXYGEN SATURATION: 98 % | BODY MASS INDEX: 24.27 KG/M2 | RESPIRATION RATE: 14 BRPM | WEIGHT: 137 LBS

## 2019-10-02 DIAGNOSIS — M19.011 GLENOHUMERAL ARTHRITIS, RIGHT: Primary | ICD-10-CM

## 2019-10-02 PROCEDURE — G8484 FLU IMMUNIZE NO ADMIN: HCPCS | Performed by: PHYSICIAN ASSISTANT

## 2019-10-02 PROCEDURE — G8427 DOCREV CUR MEDS BY ELIG CLIN: HCPCS | Performed by: PHYSICIAN ASSISTANT

## 2019-10-02 PROCEDURE — 99202 OFFICE O/P NEW SF 15 MIN: CPT | Performed by: PHYSICIAN ASSISTANT

## 2019-10-02 PROCEDURE — G8420 CALC BMI NORM PARAMETERS: HCPCS | Performed by: PHYSICIAN ASSISTANT

## 2019-10-02 PROCEDURE — 20610 DRAIN/INJ JOINT/BURSA W/O US: CPT | Performed by: PHYSICIAN ASSISTANT

## 2019-10-02 PROCEDURE — 1090F PRES/ABSN URINE INCON ASSESS: CPT | Performed by: PHYSICIAN ASSISTANT

## 2019-10-02 ASSESSMENT — ENCOUNTER SYMPTOMS
RESPIRATORY NEGATIVE: 1
GASTROINTESTINAL NEGATIVE: 1
EYES NEGATIVE: 1

## 2019-10-07 ENCOUNTER — TELEPHONE (OUTPATIENT)
Dept: INTERNAL MEDICINE CLINIC | Age: 84
End: 2019-10-07

## 2019-10-07 RX ORDER — CIPROFLOXACIN 250 MG/1
250 TABLET, FILM COATED ORAL 2 TIMES DAILY
Qty: 6 TABLET | Refills: 0 | Status: SHIPPED | OUTPATIENT
Start: 2019-10-07 | End: 2019-10-10

## 2019-10-09 RX ORDER — AMLODIPINE BESYLATE 5 MG/1
TABLET ORAL
Qty: 90 TABLET | Refills: 1 | Status: SHIPPED | OUTPATIENT
Start: 2019-10-09 | End: 2020-01-31 | Stop reason: SDUPTHER

## 2019-10-29 ENCOUNTER — HOSPITAL ENCOUNTER (OUTPATIENT)
Dept: PHYSICAL THERAPY | Age: 84
Setting detail: THERAPIES SERIES
Discharge: HOME | End: 2019-10-29
Payer: MEDICARE

## 2019-10-29 ENCOUNTER — OFFICE VISIT (OUTPATIENT)
Dept: INTERNAL MEDICINE CLINIC | Age: 84
End: 2019-10-29
Payer: MEDICARE

## 2019-10-29 VITALS
OXYGEN SATURATION: 93 % | WEIGHT: 141.4 LBS | HEART RATE: 80 BPM | DIASTOLIC BLOOD PRESSURE: 82 MMHG | BODY MASS INDEX: 25.05 KG/M2 | HEIGHT: 63 IN | SYSTOLIC BLOOD PRESSURE: 118 MMHG

## 2019-10-29 DIAGNOSIS — Z12.39 BREAST CANCER SCREENING: ICD-10-CM

## 2019-10-29 DIAGNOSIS — E78.2 MIXED HYPERLIPIDEMIA: ICD-10-CM

## 2019-10-29 DIAGNOSIS — M81.0 OSTEOPOROSIS, UNSPECIFIED OSTEOPOROSIS TYPE, UNSPECIFIED PATHOLOGICAL FRACTURE PRESENCE: Primary | ICD-10-CM

## 2019-10-29 DIAGNOSIS — R26.89 BALANCE DISORDER: ICD-10-CM

## 2019-10-29 DIAGNOSIS — I25.810 CORONARY ARTERY DISEASE INVOLVING CORONARY BYPASS GRAFT OF NATIVE HEART WITHOUT ANGINA PECTORIS: ICD-10-CM

## 2019-10-29 PROCEDURE — 97140 MANUAL THERAPY 1/> REGIONS: CPT | Performed by: PHYSICAL THERAPIST

## 2019-10-29 PROCEDURE — 97110 THERAPEUTIC EXERCISES: CPT | Performed by: PHYSICAL THERAPIST

## 2019-10-29 PROCEDURE — 1123F ACP DISCUSS/DSCN MKR DOCD: CPT | Performed by: INTERNAL MEDICINE

## 2019-10-29 PROCEDURE — 1036F TOBACCO NON-USER: CPT | Performed by: INTERNAL MEDICINE

## 2019-10-29 PROCEDURE — G8417 CALC BMI ABV UP PARAM F/U: HCPCS | Performed by: INTERNAL MEDICINE

## 2019-10-29 PROCEDURE — 99214 OFFICE O/P EST MOD 30 MIN: CPT | Performed by: INTERNAL MEDICINE

## 2019-10-29 PROCEDURE — 4040F PNEUMOC VAC/ADMIN/RCVD: CPT | Performed by: INTERNAL MEDICINE

## 2019-10-29 PROCEDURE — G8484 FLU IMMUNIZE NO ADMIN: HCPCS | Performed by: INTERNAL MEDICINE

## 2019-10-29 PROCEDURE — G8598 ASA/ANTIPLAT THER USED: HCPCS | Performed by: INTERNAL MEDICINE

## 2019-10-29 PROCEDURE — G8427 DOCREV CUR MEDS BY ELIG CLIN: HCPCS | Performed by: INTERNAL MEDICINE

## 2019-10-29 PROCEDURE — G8399 PT W/DXA RESULTS DOCUMENT: HCPCS | Performed by: INTERNAL MEDICINE

## 2019-10-29 PROCEDURE — 97161 PT EVAL LOW COMPLEX 20 MIN: CPT | Performed by: PHYSICAL THERAPIST

## 2019-10-29 PROCEDURE — 1090F PRES/ABSN URINE INCON ASSESS: CPT | Performed by: INTERNAL MEDICINE

## 2019-10-29 PROCEDURE — 97530 THERAPEUTIC ACTIVITIES: CPT | Performed by: PHYSICAL THERAPIST

## 2019-10-29 ASSESSMENT — PAIN SCALES - GENERAL: PAINLEVEL_OUTOF10: 5

## 2019-10-29 ASSESSMENT — PAIN DESCRIPTION - ORIENTATION: ORIENTATION: RIGHT

## 2019-10-29 ASSESSMENT — PAIN DESCRIPTION - DESCRIPTORS: DESCRIPTORS: CONSTANT

## 2019-10-29 ASSESSMENT — PAIN DESCRIPTION - PAIN TYPE: TYPE: CHRONIC PAIN

## 2019-10-29 ASSESSMENT — PAIN DESCRIPTION - PROGRESSION: CLINICAL_PROGRESSION: GRADUALLY IMPROVING

## 2019-10-29 ASSESSMENT — PAIN DESCRIPTION - LOCATION: LOCATION: SHOULDER

## 2019-10-29 ASSESSMENT — PAIN DESCRIPTION - FREQUENCY: FREQUENCY: CONTINUOUS

## 2019-10-29 ASSESSMENT — PAIN - FUNCTIONAL ASSESSMENT: PAIN_FUNCTIONAL_ASSESSMENT: PREVENTS OR INTERFERES WITH ALL ACTIVE AND SOME PASSIVE ACTIVITIES

## 2019-11-04 ENCOUNTER — HOSPITAL ENCOUNTER (OUTPATIENT)
Dept: PHYSICAL THERAPY | Age: 84
Setting detail: THERAPIES SERIES
Discharge: HOME | End: 2019-11-04
Payer: MEDICARE

## 2019-11-04 PROCEDURE — 97016 VASOPNEUMATIC DEVICE THERAPY: CPT

## 2019-11-04 PROCEDURE — 97140 MANUAL THERAPY 1/> REGIONS: CPT

## 2019-11-04 PROCEDURE — 97110 THERAPEUTIC EXERCISES: CPT

## 2019-11-07 ENCOUNTER — HOSPITAL ENCOUNTER (OUTPATIENT)
Dept: PHYSICAL THERAPY | Age: 84
Setting detail: THERAPIES SERIES
Discharge: HOME | End: 2019-11-07
Payer: MEDICARE

## 2019-11-07 PROCEDURE — 97140 MANUAL THERAPY 1/> REGIONS: CPT

## 2019-11-07 PROCEDURE — 97110 THERAPEUTIC EXERCISES: CPT

## 2019-11-07 PROCEDURE — 97016 VASOPNEUMATIC DEVICE THERAPY: CPT

## 2019-11-07 RX ORDER — PANTOPRAZOLE SODIUM 40 MG/1
TABLET, DELAYED RELEASE ORAL
Qty: 90 TABLET | Refills: 1 | Status: SHIPPED | OUTPATIENT
Start: 2019-11-07 | End: 2020-01-31 | Stop reason: SDUPTHER

## 2019-11-11 ENCOUNTER — HOSPITAL ENCOUNTER (OUTPATIENT)
Dept: PHYSICAL THERAPY | Age: 84
Setting detail: THERAPIES SERIES
Discharge: HOME | End: 2019-11-11
Payer: MEDICARE

## 2019-11-11 PROCEDURE — 97110 THERAPEUTIC EXERCISES: CPT

## 2019-11-11 PROCEDURE — 97140 MANUAL THERAPY 1/> REGIONS: CPT

## 2019-11-11 PROCEDURE — 97016 VASOPNEUMATIC DEVICE THERAPY: CPT

## 2019-11-14 ENCOUNTER — HOSPITAL ENCOUNTER (OUTPATIENT)
Dept: PHYSICAL THERAPY | Age: 84
Setting detail: THERAPIES SERIES
Discharge: HOME | End: 2019-11-14
Payer: MEDICARE

## 2019-11-14 LAB
ALBUMIN SERPL-MCNC: 4.5 G/DL
ALP BLD-CCNC: 34 U/L
ALT SERPL-CCNC: 8 U/L
ANION GAP SERPL CALCULATED.3IONS-SCNC: 1.7 MMOL/L
AST SERPL-CCNC: 22 U/L
BASOPHILS ABSOLUTE: 0 /ΜL
BASOPHILS RELATIVE PERCENT: 1 %
BILIRUB SERPL-MCNC: 0.4 MG/DL (ref 0.1–1.4)
BUN BLDV-MCNC: 15 MG/DL
CALCIUM SERPL-MCNC: 9.5 MG/DL
CHLORIDE BLD-SCNC: 101 MMOL/L
CHOLESTEROL, TOTAL: 177 MG/DL
CHOLESTEROL/HDL RATIO: ABNORMAL
CO2: 23 MMOL/L
CREAT SERPL-MCNC: 1.03 MG/DL
EOSINOPHILS ABSOLUTE: 0.1 /ΜL
EOSINOPHILS RELATIVE PERCENT: 3 %
GFR CALCULATED: 58
GLUCOSE BLD-MCNC: 114 MG/DL
HCT VFR BLD CALC: 37.8 % (ref 36–46)
HDLC SERPL-MCNC: 27 MG/DL (ref 35–70)
HEMOGLOBIN: 12.1 G/DL (ref 12–16)
LDL CHOLESTEROL CALCULATED: ABNORMAL
LYMPHOCYTES ABSOLUTE: 2 /ΜL
LYMPHOCYTES RELATIVE PERCENT: 45 %
MCH RBC QN AUTO: 32.5 PG
MCHC RBC AUTO-ENTMCNC: 32 G/DL
MCV RBC AUTO: 102 FL
MONOCYTES ABSOLUTE: 0.4 /ΜL
MONOCYTES RELATIVE PERCENT: 9 %
NEUTROPHILS ABSOLUTE: 1.8 /ΜL
NEUTROPHILS RELATIVE PERCENT: 42 %
PLATELET # BLD: 145 K/ΜL
PMV BLD AUTO: NORMAL FL
POTASSIUM SERPL-SCNC: 4.5 MMOL/L
RBC # BLD: 3.72 10^6/ΜL
SODIUM BLD-SCNC: 141 MMOL/L
TOTAL PROTEIN: 7.2
TRIGL SERPL-MCNC: 469 MG/DL
VLDLC SERPL CALC-MCNC: ABNORMAL MG/DL
WBC # BLD: 4.4 10^3/ML

## 2019-11-14 PROCEDURE — 97110 THERAPEUTIC EXERCISES: CPT

## 2019-11-18 ENCOUNTER — HOSPITAL ENCOUNTER (OUTPATIENT)
Dept: PHYSICAL THERAPY | Age: 84
Setting detail: THERAPIES SERIES
Discharge: HOME | End: 2019-11-18
Payer: MEDICARE

## 2019-11-18 PROCEDURE — 97110 THERAPEUTIC EXERCISES: CPT

## 2019-11-18 PROCEDURE — 97016 VASOPNEUMATIC DEVICE THERAPY: CPT

## 2019-11-18 PROCEDURE — 97140 MANUAL THERAPY 1/> REGIONS: CPT

## 2019-11-21 ENCOUNTER — HOSPITAL ENCOUNTER (OUTPATIENT)
Dept: PHYSICAL THERAPY | Age: 84
Setting detail: THERAPIES SERIES
Discharge: HOME | End: 2019-11-21
Payer: MEDICARE

## 2019-11-21 PROCEDURE — 97016 VASOPNEUMATIC DEVICE THERAPY: CPT

## 2019-11-21 PROCEDURE — 97140 MANUAL THERAPY 1/> REGIONS: CPT

## 2019-11-21 PROCEDURE — 97110 THERAPEUTIC EXERCISES: CPT

## 2019-11-25 ENCOUNTER — HOSPITAL ENCOUNTER (OUTPATIENT)
Dept: PHYSICAL THERAPY | Age: 84
Setting detail: THERAPIES SERIES
Discharge: HOME | End: 2019-11-25
Payer: MEDICARE

## 2019-11-25 PROCEDURE — 97110 THERAPEUTIC EXERCISES: CPT

## 2019-11-25 PROCEDURE — 97016 VASOPNEUMATIC DEVICE THERAPY: CPT

## 2019-11-25 PROCEDURE — 97140 MANUAL THERAPY 1/> REGIONS: CPT

## 2019-12-02 ENCOUNTER — HOSPITAL ENCOUNTER (OUTPATIENT)
Dept: PHYSICAL THERAPY | Age: 84
Setting detail: THERAPIES SERIES
Discharge: HOME | End: 2019-12-02
Payer: MEDICARE

## 2019-12-02 PROCEDURE — 97016 VASOPNEUMATIC DEVICE THERAPY: CPT

## 2019-12-02 PROCEDURE — 97110 THERAPEUTIC EXERCISES: CPT

## 2019-12-02 PROCEDURE — 97140 MANUAL THERAPY 1/> REGIONS: CPT

## 2019-12-03 ENCOUNTER — HOSPITAL ENCOUNTER (OUTPATIENT)
Dept: WOMENS IMAGING | Age: 84
Discharge: HOME | End: 2019-12-03
Payer: MEDICARE

## 2019-12-03 DIAGNOSIS — Z12.31 VISIT FOR SCREENING MAMMOGRAM: ICD-10-CM

## 2019-12-03 PROCEDURE — 77067 SCR MAMMO BI INCL CAD: CPT

## 2019-12-05 ENCOUNTER — HOSPITAL ENCOUNTER (OUTPATIENT)
Dept: PHYSICAL THERAPY | Age: 84
Setting detail: THERAPIES SERIES
Discharge: HOME | End: 2019-12-05
Payer: MEDICARE

## 2019-12-05 PROCEDURE — 97016 VASOPNEUMATIC DEVICE THERAPY: CPT

## 2019-12-05 PROCEDURE — 97110 THERAPEUTIC EXERCISES: CPT

## 2019-12-05 PROCEDURE — 97140 MANUAL THERAPY 1/> REGIONS: CPT

## 2019-12-16 ENCOUNTER — HOSPITAL ENCOUNTER (OUTPATIENT)
Dept: INFUSION THERAPY | Age: 84
Setting detail: INFUSION SERIES
Discharge: HOME | End: 2019-12-16
Payer: MEDICARE

## 2019-12-16 ENCOUNTER — HOSPITAL ENCOUNTER (OUTPATIENT)
Dept: PHYSICAL THERAPY | Age: 84
Setting detail: INFUSION SERIES
Discharge: HOME | End: 2019-12-16
Payer: MEDICARE

## 2019-12-16 VITALS
HEART RATE: 76 BPM | OXYGEN SATURATION: 98 % | RESPIRATION RATE: 14 BRPM | SYSTOLIC BLOOD PRESSURE: 133 MMHG | DIASTOLIC BLOOD PRESSURE: 67 MMHG | TEMPERATURE: 98 F

## 2019-12-16 PROCEDURE — 97110 THERAPEUTIC EXERCISES: CPT

## 2019-12-16 PROCEDURE — 97140 MANUAL THERAPY 1/> REGIONS: CPT

## 2019-12-16 PROCEDURE — 99211 OFF/OP EST MAY X REQ PHY/QHP: CPT

## 2019-12-16 PROCEDURE — 6360000002 HC RX W HCPCS: Performed by: INTERNAL MEDICINE

## 2019-12-16 PROCEDURE — 97016 VASOPNEUMATIC DEVICE THERAPY: CPT

## 2019-12-16 PROCEDURE — 96372 THER/PROPH/DIAG INJ SC/IM: CPT

## 2019-12-16 RX ADMIN — DENOSUMAB 60 MG: 60 INJECTION SUBCUTANEOUS at 13:13

## 2019-12-16 ASSESSMENT — PAIN DESCRIPTION - PAIN TYPE: TYPE: CHRONIC PAIN

## 2019-12-16 ASSESSMENT — PAIN DESCRIPTION - ORIENTATION: ORIENTATION: RIGHT

## 2019-12-16 ASSESSMENT — PAIN SCALES - GENERAL: PAINLEVEL_OUTOF10: 0

## 2019-12-16 ASSESSMENT — PAIN DESCRIPTION - LOCATION: LOCATION: SHOULDER

## 2019-12-16 NOTE — PROGRESS NOTES
Tolerated injection well. Reviewed discharge instructions, voiced understanding, and copies of AVS given to take home. Pt to exit via steady gait.     Orders Placed This Encounter   Medications    denosumab (PROLIA) SC injection 60 mg

## 2019-12-19 ENCOUNTER — HOSPITAL ENCOUNTER (OUTPATIENT)
Dept: PHYSICAL THERAPY | Age: 84
Setting detail: THERAPIES SERIES
Discharge: HOME | End: 2019-12-19
Payer: MEDICARE

## 2019-12-19 PROCEDURE — 97140 MANUAL THERAPY 1/> REGIONS: CPT

## 2019-12-19 PROCEDURE — 97016 VASOPNEUMATIC DEVICE THERAPY: CPT

## 2019-12-19 PROCEDURE — 97110 THERAPEUTIC EXERCISES: CPT

## 2019-12-20 RX ORDER — GABAPENTIN 300 MG/1
CAPSULE ORAL
Qty: 270 CAPSULE | Refills: 1 | Status: SHIPPED | OUTPATIENT
Start: 2019-12-20 | End: 2020-01-31 | Stop reason: SDUPTHER

## 2020-01-02 ENCOUNTER — HOSPITAL ENCOUNTER (OUTPATIENT)
Dept: PHYSICAL THERAPY | Age: 85
Setting detail: THERAPIES SERIES
Discharge: HOME | End: 2020-01-02
Payer: MEDICARE

## 2020-01-02 PROCEDURE — 97016 VASOPNEUMATIC DEVICE THERAPY: CPT

## 2020-01-02 PROCEDURE — 97112 NEUROMUSCULAR REEDUCATION: CPT

## 2020-01-02 PROCEDURE — 97110 THERAPEUTIC EXERCISES: CPT

## 2020-01-02 PROCEDURE — 97140 MANUAL THERAPY 1/> REGIONS: CPT

## 2020-01-02 RX ORDER — LANOLIN ALCOHOL/MO/W.PET/CERES
500 CREAM (GRAM) TOPICAL NIGHTLY
Qty: 90 CAPSULE | Refills: 1 | Status: SHIPPED | OUTPATIENT
Start: 2020-01-02 | End: 2020-01-17

## 2020-01-02 NOTE — FLOWSHEET NOTE
Outpatient Physical Therapy  [place]           [x] Phone: [phone]   Fax: [phone]  [place]           [] Phone: [phone]   Fax: [phone]        Physical Therapy Daily Treatment Note  Date:  2020    Patient Name:  Juliet Cueto    :  1935  MRN: [id number]  Restrictions/Precautions: FALL RISK  Diagnosis:   Diagnosis: GH arthritis R, R shoulder pain   Date of Injury/Surgery:    Treatment Diagnosis: Treatment Diagnosis: GH arthritis, R    Insurance/Certification information: PT Insurance Information: Medicare   Referring Physician:  Referring Practitioner: Josie Leo PA-C; followup none scheduled  Next Doctor Visit:    Plan of care signed (Y/N):    Outcome Measure: Quick DASH  Visit# / total visits:    POC ends 20  Pain level: 210   R shldr                    Goals:       Short term goals  Time Frame for Short term goals: 10 visits  Short term goal 1: Pt to be independent with HEP with knowledge and understanding of progression   Short term goal 2: Pt will demonstrate R shoulder active range of motion WFL in order to improve functional mobility  Short term goal 3: Pt will demonstrate GOOD postural control in sitting and standing in order to increase functional independence  Long term goals  Time Frame for Long term goals : 6 weeks  Long term goal 1: Pt goal: Pt to return to prior level of function without pain   Long term goal 2: Pt will demonstrate R shoulder strength 5/5 grossly in order to return to prior level of function without pain   Long term goal 3: Pt will report Quick DASH </=11/55    Summary of Evaluation Assessment: Pt is a 82year old female with R shoulder pain for years. Initial incident was while readjusting exercise equipment. 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 D(x) glenohumeral arthritis, R. PMH rotator cuff repair R, years ago. Recent injection in R shoulder on 10/2/19.  Pt is now able to sleep on R shoulder at night. Pt is R handed. 5/10 R shoulder pain currently, 8-9/10 at highest. Difficulty with reaching up/overhead, to side, reaching behind, carrying load. Relief with Tylenol. Pt now demonstrates decreased strength, ROM/flexibility, functional mobility, increased pain, functional independence. Pt to benefit from skilled PT in order to return to prior level of function without pain. Subjective:  Patient reports she is able to use her arm more. Any changes in Ambulatory Summary Sheet?   no        Objective:   PROM Flex 135°, °  ER 72°    Exercises:  Exercise/Equipment 12/5/19  #10 12/16/19  #11 12/19/19 #12 1/2/20  #13         Quick Dash   WARM UP         Arm bike 3/3 F/B 3/3 F/B 3/3 F/B 3/3 F/B          TABLE       Standing protraction/retraction       Backward shoulder circles 20x  20x X 10   Shoulder shrugs 20x  20x X 10   Scapular retraction       Wand flexion       Wand abduction  Sidelying ABD 10x2 w/slight assist Sidelying ABD 10x2 w/slight assist Sidelying ABD 10x2   Wand ER/IR  Sidelying ER 2x10 R Sidelying ER 2x10 R Sidelying ER 2x10 R   pulleys 30x B 30x B 30x B X 30   Supine punch 2# bar 2x10 B 2# bar 3x10   2# bar 3x10   2# bar 3x10    Supine FLEX AROM 2# bar 2x10 B 2# bar 2x10 2# bar 2x10 2# bar 2x10      manual therapy       STANDING       Doorway ER stretch       Corner stretch              Iso w/ pillow flex/ER/abd/ ext       Wall lsides                          PROPRIOCEPTION                     MANUAL    See below                 MODALITIES    See below                       Other Therapeutic Activities/Education:   Patient received education on their current pathology and how their condition effects them with their functional activities. Patient understood discussion and questions were answered. Patient understands their activity limitations and understands rational for treatment progression.  Pt educated that we can not direct activities related to other body

## 2020-01-06 ENCOUNTER — HOSPITAL ENCOUNTER (OUTPATIENT)
Dept: PHYSICAL THERAPY | Age: 85
Setting detail: THERAPIES SERIES
Discharge: HOME | End: 2020-01-06
Payer: MEDICARE

## 2020-01-06 PROCEDURE — 97110 THERAPEUTIC EXERCISES: CPT

## 2020-01-06 PROCEDURE — 97016 VASOPNEUMATIC DEVICE THERAPY: CPT

## 2020-01-06 PROCEDURE — 97140 MANUAL THERAPY 1/> REGIONS: CPT

## 2020-01-06 NOTE — FLOWSHEET NOTE
Outpatient Physical Therapy  [place]           [x] Phone: [phone]   Fax: [phone]  [facility]           [] Phone: [phone]   Fax: [phone]        Physical Therapy Daily Treatment Note  Date:  2020    Patient Name:  Xenia Cueto    :  1935  MRN: [id number]  Restrictions/Precautions: FALL RISK  Diagnosis:   Diagnosis: GH arthritis R, R shoulder pain   Date of Injury/Surgery:    Treatment Diagnosis: Treatment Diagnosis: GH arthritis, R    Insurance/Certification information: PT Insurance Information: Medicare   Referring Physician:  Referring Practitioner: Jonathan Huerta PA-C; followup none scheduled  Next Doctor Visit:    Plan of care signed (Y/N):    Outcome Measure: Quick DASH  Visit# / total visits:    POC ends 20  Pain level: 0/10   R shldr                    Goals:       Short term goals  Time Frame for Short term goals: 10 visits  Short term goal 1: Pt to be independent with HEP with knowledge and understanding of progression   Short term goal 2: Pt will demonstrate R shoulder active range of motion WFL in order to improve functional mobility  Short term goal 3: Pt will demonstrate GOOD postural control in sitting and standing in order to increase functional independence  Long term goals  Time Frame for Long term goals : 6 weeks  Long term goal 1: Pt goal: Pt to return to prior level of function without pain   Long term goal 2: Pt will demonstrate R shoulder strength 5/5 grossly in order to return to prior level of function without pain   Long term goal 3: Pt will report Quick DASH </=11/55    Summary of Evaluation Assessment: Pt is a 82year old female with R shoulder pain for years. Initial incident was while readjusting exercise equipment. 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 D(x) glenohumeral arthritis, R. PMH rotator cuff repair R, years ago. Recent injection in R shoulder on 10/2/19.  Pt is now able to sleep on R shoulder at night. Pt is R handed. 5/10 R shoulder pain currently, 8-9/10 at highest. Difficulty with reaching up/overhead, to side, reaching behind, carrying load. Relief with Tylenol. Pt now demonstrates decreased strength, ROM/flexibility, functional mobility, increased pain, functional independence. Pt to benefit from skilled PT in order to return to prior level of function without pain. Subjective:  Patient denies pain upon arrival and reports it as just stiffness in the shld. She reports she was cleaning over the weekend and noticed a catch in the shld and down into the bicep area. Any changes in Ambulatory Summary Sheet?   no        Objective:   PROM   ER 75°    Exercises:  Exercise/Equipment 12/19/19 #12 1/2/20  #13 1/6/2020  #14       Quick Dash    WARM UP        Arm bike 3/3 F/B 3/3 F/B 3/3 F/B         TABLE      Standing protraction/retraction      Backward shoulder circles 20x X 10 10x   Shoulder shrugs 20x X 10 10x   Scapular retraction      Wand flexion      Wand abduction Sidelying ABD 10x2 w/slight assist Sidelying ABD 10x2 Sidelying ABD 10x2   Wand ER/IR Sidelying ER 2x10 R Sidelying ER 2x10 R Sidelying ER 2x10 R   pulleys 30x B X 30 30x   Supine punch 2# bar 3x10   2# bar 3x10  2# bar 3x10   Supine FLEX AROM 2# bar 2x10 2# bar 2x10 2# bar 2x10      manual therapy      STANDING      Doorway ER stretch      Corner stretch            Iso w/ pillow flex/ER/abd/ ext      Wall lsides                       PROPRIOCEPTION                  MANUAL  See below See below               MODALITIES  See below See below                     Other Therapeutic Activities/Education:   Patient received education on their current pathology and how their condition effects them with their functional activities. Patient understood discussion and questions were answered. Patient understands their activity limitations and understands rational for treatment progression.  Pt educated that we can not direct activities related to other body structures or diagnosis as we only have orders/goals for shoulder. Did educate pt to hold on the written shoulder ex program that she brought in for review as it was band resistance training and pt has not progressed to this yet here. Extensive education for posture corrections and role for AROM with shoulder and pain reduction. Home Exercise Program:  10/29/19: Initiated HEP as noted in grid above and provided with written handout. Pt required correction for shoulder shrugs, was pushing herself off her bed vs just shoulder shrug. Required frequent cues for execution of program trying to reproduce from sheet. 11/14/19 isometric as above HO given      Manual Treatments:  Passive R shoulder flexion, abduction, ER to end range. Modalities:   VASO to R shld 15'      Communication with other providers:        Assessment:   Pain after exs and manual 8-9/10 and 0/10 after vaso      Plan for Next Session: Progress therapeutic exercises as tolerated. Continue manual to R shoulder all planes as tolerated.        Time In / Time Out:  1301/1401      Timed Code/Total Treatment Minutes:   60 15vaso 15man 30te     Next Progress Note due:        Plan of Care Interventions:  [x] Therapeutic Exercise  [] Modalities:  [x] Therapeutic Activity     [x] Ultrasound  [x] Estim  [x] Gait Training      [] Cervical Traction [] Lumbar Traction  [x] Neuromuscular Re-education    [x] Cold/hotpack [] Iontophoresis   [x] Instruction in HEP      [x] Vasopneumatic   [] Dry Needling    [x] Manual Therapy               [] Aquatic Therapy              Electronically signed by:  Letty Rubin PTA  1/6/2020, 1:01 PM

## 2020-01-09 ENCOUNTER — HOSPITAL ENCOUNTER (OUTPATIENT)
Dept: PHYSICAL THERAPY | Age: 85
Setting detail: THERAPIES SERIES
Discharge: HOME | End: 2020-01-09
Payer: MEDICARE

## 2020-01-09 PROCEDURE — 97110 THERAPEUTIC EXERCISES: CPT

## 2020-01-09 PROCEDURE — 97016 VASOPNEUMATIC DEVICE THERAPY: CPT

## 2020-01-09 PROCEDURE — 97140 MANUAL THERAPY 1/> REGIONS: CPT

## 2020-01-09 NOTE — FLOWSHEET NOTE
Patient understands their activity limitations and understands rational for treatment progression. Pt educated that we can not direct activities related to other body structures or diagnosis as we only have orders/goals for shoulder. Did educate pt to hold on the written shoulder ex program that she brought in for review as it was band resistance training and pt has not progressed to this yet here. Extensive education for posture corrections and role for AROM with shoulder and pain reduction. Home Exercise Program:  10/29/19: Initiated HEP as noted in grid above and provided with written handout. Pt required correction for shoulder shrugs, was pushing herself off her bed vs just shoulder shrug. Required frequent cues for execution of program trying to reproduce from sheet. 11/14/19 isometric as above HO given      Manual Treatments:  Passive R shoulder flexion, abduction, ER to end range. Modalities:   VASO to R shld 15'      Communication with other providers:        Assessment:   Pain after exs and manual 2/10 and 0/10 after vaso      Plan for Next Session: Progress therapeutic exercises as tolerated. Continue manual to R shoulder all planes as tolerated.        Time In / Time Out:  1345/1500      Timed Code/Total Treatment Minutes:   75 15man 15vaso 45te    Next Progress Note due:        Plan of Care Interventions:  [x] Therapeutic Exercise  [] Modalities:  [x] Therapeutic Activity     [x] Ultrasound  [x] Estim  [x] Gait Training      [] Cervical Traction [] Lumbar Traction  [x] Neuromuscular Re-education    [x] Cold/hotpack [] Iontophoresis   [x] Instruction in HEP      [x] Vasopneumatic   [] Dry Needling    [x] Manual Therapy               [] Aquatic Therapy              Electronically signed by:  Lenka Diez PTA  1/9/2020, 1:45 PM

## 2020-01-13 ENCOUNTER — HOSPITAL ENCOUNTER (OUTPATIENT)
Dept: PHYSICAL THERAPY | Age: 85
Discharge: HOME | End: 2020-01-13

## 2020-01-16 ENCOUNTER — HOSPITAL ENCOUNTER (OUTPATIENT)
Dept: PHYSICAL THERAPY | Age: 85
Setting detail: THERAPIES SERIES
Discharge: HOME | End: 2020-01-16
Payer: MEDICARE

## 2020-01-16 PROCEDURE — 97110 THERAPEUTIC EXERCISES: CPT

## 2020-01-16 PROCEDURE — 97016 VASOPNEUMATIC DEVICE THERAPY: CPT

## 2020-01-16 NOTE — FLOWSHEET NOTE
Outpatient Physical Therapy  [place]           [x] Phone: [phone]   Fax: [phone]  [place]           [] Phone: [phone]   Fax: [phone]        Physical Therapy Daily Treatment Note  Date:  2020    Patient Name:  Adia Dawson    :  1935  MRN: [id number]  Restrictions/Precautions: FALL RISK  Diagnosis:   Diagnosis: GH arthritis R, R shoulder pain   Date of Injury/Surgery:    Treatment Diagnosis: Treatment Diagnosis: GH arthritis, R    Insurance/Certification information: PT Insurance Information: Medicare   Referring Physician:  Referring Practitioner: Barb Ríos PA-C; followup none scheduled  Next Doctor Visit:    Plan of care signed (Y/N):    Outcome Measure: Quick DASH  Visit# / total visits:    POC ends 20  Pain level: 310   R shldr                    Goals:       Short term goals  Time Frame for Short term goals: 10 visits  Short term goal 1: Pt to be independent with HEP with knowledge and understanding of progression   Short term goal 2: Pt will demonstrate R shoulder active range of motion WFL in order to improve functional mobility  Short term goal 3: Pt will demonstrate GOOD postural control in sitting and standing in order to increase functional independence  Long term goals  Time Frame for Long term goals : 6 weeks  Long term goal 1: Pt goal: Pt to return to prior level of function without pain   Long term goal 2: Pt will demonstrate R shoulder strength 5/5 grossly in order to return to prior level of function without pain   Long term goal 3: Pt will report Quick DASH </=55    Summary of Evaluation Assessment: Pt is a 82year old female with R shoulder pain for years. Initial incident was while readjusting exercise equipment. 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 D(x) glenohumeral arthritis, R. PMH rotator cuff repair R, years ago. Recent injection in R shoulder on 10/2/19.  Pt is now able to sleep on R Other Therapeutic Activities/Education:   Patient received education on their current pathology and how their condition effects them with their functional activities. Patient understood discussion and questions were answered. Patient understands their activity limitations and understands rational for treatment progression. Pt educated that we can not direct activities related to other body structures or diagnosis as we only have orders/goals for shoulder. Did educate pt to hold on the written shoulder ex program that she brought in for review as it was band resistance training and pt has not progressed to this yet here. Extensive education for posture corrections and role for AROM with shoulder and pain reduction. Home Exercise Program:  10/29/19: Initiated HEP as noted in grid above and provided with written handout. Pt required correction for shoulder shrugs, was pushing herself off her bed vs just shoulder shrug. Required frequent cues for execution of program trying to reproduce from sheet. 11/14/19 isometric as above HO given      Manual Treatments:  Passive R shoulder flexion, abduction, ER to end range. Modalities:   VASO to R shld 15'      Communication with other providers:        Assessment:  Pt demonstrated GOOD tolerance to tx today and decreased pain after tx 0/10 and 0/10 after vaso      Plan for Next Session: Progress therapeutic exercises as tolerated. Continue manual to R shoulder all planes as tolerated.        Time In / Time Out:  1340/1435      Timed Code/Total Treatment Minutes:   55  15 vaso 40te    Next Progress Note due:        Plan of Care Interventions:  [x] Therapeutic Exercise  [] Modalities:  [x] Therapeutic Activity     [x] Ultrasound  [x] Estim  [x] Gait Training      [] Cervical Traction [] Lumbar Traction  [x] Neuromuscular Re-education    [x] Cold/hotpack [] Iontophoresis   [x] Instruction in HEP      [x] Vasopneumatic   [] Dry Needling    [x] Manual Therapy               [] Aquatic Therapy              Electronically signed by:  Argelia Lopez PTA  1/16/2020, 12:52 PM

## 2020-01-17 ENCOUNTER — OFFICE VISIT (OUTPATIENT)
Dept: CARDIOLOGY CLINIC | Age: 85
End: 2020-01-17
Payer: MEDICARE

## 2020-01-17 ENCOUNTER — TELEPHONE (OUTPATIENT)
Dept: INTERNAL MEDICINE CLINIC | Age: 85
End: 2020-01-17

## 2020-01-17 VITALS
WEIGHT: 139.8 LBS | BODY MASS INDEX: 24.77 KG/M2 | SYSTOLIC BLOOD PRESSURE: 134 MMHG | DIASTOLIC BLOOD PRESSURE: 62 MMHG | HEART RATE: 88 BPM | HEIGHT: 63 IN

## 2020-01-17 PROCEDURE — 99213 OFFICE O/P EST LOW 20 MIN: CPT | Performed by: INTERNAL MEDICINE

## 2020-01-17 PROCEDURE — G8484 FLU IMMUNIZE NO ADMIN: HCPCS | Performed by: INTERNAL MEDICINE

## 2020-01-17 PROCEDURE — 1123F ACP DISCUSS/DSCN MKR DOCD: CPT | Performed by: INTERNAL MEDICINE

## 2020-01-17 PROCEDURE — 4040F PNEUMOC VAC/ADMIN/RCVD: CPT | Performed by: INTERNAL MEDICINE

## 2020-01-17 PROCEDURE — 1036F TOBACCO NON-USER: CPT | Performed by: INTERNAL MEDICINE

## 2020-01-17 PROCEDURE — 1090F PRES/ABSN URINE INCON ASSESS: CPT | Performed by: INTERNAL MEDICINE

## 2020-01-17 PROCEDURE — G8399 PT W/DXA RESULTS DOCUMENT: HCPCS | Performed by: INTERNAL MEDICINE

## 2020-01-17 PROCEDURE — G8420 CALC BMI NORM PARAMETERS: HCPCS | Performed by: INTERNAL MEDICINE

## 2020-01-17 PROCEDURE — 93000 ELECTROCARDIOGRAM COMPLETE: CPT | Performed by: INTERNAL MEDICINE

## 2020-01-17 PROCEDURE — G8427 DOCREV CUR MEDS BY ELIG CLIN: HCPCS | Performed by: INTERNAL MEDICINE

## 2020-01-17 NOTE — TELEPHONE ENCOUNTER
Patient called in stating that she is having a flare up of her tic douloureux with jaw and head pain. She was wanting to know if she could double up her medication. Per Dr. Hue Kim instruction he stated that she was able to take 6 of the Neurontin or 2 3 times a day or 4 of the neurontin what she needs to help with the pain. She states that she didn't think she would need to take 2 three times a day but would increase 1 of her doses per day and see if that would help but would not go over 6 tabs.

## 2020-01-17 NOTE — LETTER
[facility]  [address] [address]  Phone: [phone]  Fax: [phone]    Ayla Esquivel MD        January 17, 2020     Daniela Cabrera MD  [place] [id number]    Patient: Macrina George  MR Number: [id number]  YOB: 1935  Date of Visit: 1/17/2020    Dear Dr. Daniela Cabrera:    Thank you for the request for consultation for Macrina George to me for the evaluation of CAD. Below are the relevant portions of my assessment and plan of care. If you have questions, please do not hesitate to call me. I look forward to following Ada along with you.     Sincerely,        Ayla Esquivel MD

## 2020-01-17 NOTE — LETTER
CLINICAL STAFF DOCUMENTATION    Heraclio Gooden  1935  [id number]    Have you had any Chest Pain - No    Have you had any Shortness of Breath - No    Have you had any dizziness - No    Have you had any palpitations - No     Do you have any edema - swelling in ankles    How long have they been having edema - Years  If Yes - Have they worn compression stockings No    Check Venous \"LEG PROBLEM Questionnaire\"    Do you have a surgery or procedure scheduled in the near future - No    Ask patient if [facility] if [facility] are not already [facility]    Check to see if we have an E-MAIL on file for the patient    Check medication list thoroughly!!!  BE SURE TO ASK PATIENT IF THEY NEED MEDICATION REFILLS

## 2020-01-17 NOTE — PROGRESS NOTES
venous thrombosis) ([address] 1970    left leg    Former smoker     H/O Doppler carotid ultrasound 05/01/2019    Mild 0-49% disease of the bilateral proximal internal carotid artery, mobile, echogenic plaque in right sublavian artery that is unchanged from previous testing 50-99% stenosis of right subclavian artery, normal vertebral flow bilaterally    Hx of cardiovascular stress test 06/17/2019    Normal perfusion study with normal distribution in all coronal, short, and horizontal axis. The observed defect is consistent with diaphragmatic attenuation. LVEF calculated at 45% is probably falsely low due to PVCs.  Hx of Doppler echocardiogram 02/10/2017    Normal left ventricle structure and function. EF 50-60%. Mild left atrial enlargement. Grade I diastolic dysfunction.  Mild TR.     Hx of Doppler ultrasound 03/01/2018    Carotid-mild 0-49% bilateral carotid,50% stenosis right subclavian    Hyperlipidemia     Hypertension     Macular degeneration     right    Mild cognitive impairment 2/2012    MMSE 26/30    MVP (mitral valve prolapse)     trace on echo 2014    Osteoporosis 5/2012    Pancytopenia 5/2011    mild with ; Dr Urvashi hawk 2010    Peptic ulcer disease     Peripheral vascular disease ([address] 1/2016    angio; dis below ankles; pletal    Prediabetes 2006    Recurrent ventral incisional hernia 2013    medial apex of GB scar    S/P CABG x 3 2003    3-vessel; Dr Mccollum [place] 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 Chito Pierson; Right side     Past Surgical History:   Procedure Laterality Date    CATARACT REMOVAL Right 2010    poor result ; blind right eye; [place]    CHOLECYSTECTOMY      CORONARY ARTERY BYPASS GRAFT  8/2009    3 vessel    SHOULDER ARTHROSCOPY Right 2003    TUBAL LIGATION      URETER SURGERY      Right ureteral repair      As reviewed   Family History murmur or gallop. Extremities - No cyanosis, clubbing, or significant edema. Pulmonary - No respiratory distress. No wheezes or rales. Abdomen - No masses, tenderness, or organomegaly. Musculoskeletal - No significant edema. No joint deformities. No muscle wasting. Neurologic - Cranial nerves II through XII are grossly intact. There were no gross focal neurologic abnormalities.     Lab Review   Lab Results   Component Value Date    TROPONINT <0.010 05/16/2019    TROPONINT <0.010 05/16/2019     BNP:  No results found for: BNP  PT/INR:    Lab Results   Component Value Date    INR 0.96 01/25/2016     No results found for: LABA1C  Lab Results   Component Value Date    WBC 4.4 11/13/2019    WBC 4.4 11/13/2019    HCT 37.8 11/13/2019    HCT 37.8 11/13/2019     (H) 11/13/2019     11/13/2019     (L) 11/13/2019     11/13/2019     Lab Results   Component Value Date    CHOL 177 11/13/2019    CHOL 177 11/13/2019    TRIG 469 (H) 11/13/2019    TRIG 469 11/13/2019    HDL 27 (L) 11/13/2019    HDL 27 (A) 11/13/2019    LDLCALC Comment 11/13/2019    LDLCALC 62 03/06/2019    LDLDIRECT 93 08/22/2016    LDLDIRECT 79 07/06/2015     Lab Results   Component Value Date    ALT 8 11/13/2019    ALT 8 11/13/2019    AST 22 11/13/2019    AST 22 11/13/2019     BMP:    Lab Results   Component Value Date     11/13/2019     11/13/2019    K 4.5 11/13/2019    K 4.5 11/13/2019     11/13/2019     11/13/2019    CO2 23 11/13/2019    CO2 23 11/13/2019    BUN 15 11/13/2019    BUN 15 11/13/2019    CREATININE 1.03 11/13/2019    CREATININE 1.03 11/13/2019     CMP:   Lab Results   Component Value Date     11/13/2019     11/13/2019    K 4.5 11/13/2019    K 4.5 11/13/2019     11/13/2019     11/13/2019    CO2 23 11/13/2019    CO2 23 11/13/2019    BUN 15 11/13/2019    BUN 15 11/13/2019    PROT 7.2 11/13/2019    PROT 7.3 06/04/2019    PROT 7.5 01/02/2013    PROT 7.4 10/13/2012 medical regimen ( see medication list above )  -     CORONARY ARTERY DISEASE: Patient is currently  asymptomatic from CAD. - changes in  treatment:   no           - Testing ordered:  no  [facility] classification: 1  [place] RISK SCORE:  LOAN RISK SCORE:  HTN:well controlled on current medical regimen, see list above. - changes in  treatment:   no   CARDIOMYOPATHY:  known   CONGESTIVE HEART FAILURE: NO KNOWN HISTORY.      VHD: No significant VHD noted  DYSLIPIDEMIA: Patient's profile is at / near Goal.no, Triglycerides are very high. HDL is low                                Tolerating current medical regimen wellyes,                                                            See most recent Lab values in Labs section above. CAROTID ARTERY DISEASE:.Right Subclavian stenosis  OTHER RELEVANT DIAGNOSIS:as noted in patient's active problem list:  TESTS ORDERED: Lipid profile                                       All previously ordered tests reviewed. ARRHYTHMIAS:  Known H/O SVT. No C/O   MEDICATIONS: CPM + add Vascepa to the regimen & D/C Niacin   Office f/u in six months. Primary/secondary prevention is the goal by aggressive risk modification, healthy and therapeutic life style changes for cardiovascular risk reduction. Various goals are discussed and multiple questions answered.

## 2020-01-17 NOTE — PATIENT INSTRUCTIONS
CAD:Yes   clinically stable. Patient is on optimal medical regimen ( see medication list above )  -     CORONARY ARTERY DISEASE: Patient is currently  asymptomatic from CAD. - changes in  treatment:   no           - Testing ordered:  no  [facility] classification: 1  [place] RISK SCORE:  LOAN RISK SCORE:  HTN:well controlled on current medical regimen, see list above. - changes in  treatment:   no   CARDIOMYOPATHY:  known   CONGESTIVE HEART FAILURE: NO KNOWN HISTORY.      VHD: No significant VHD noted  DYSLIPIDEMIA: Patient's profile is at / near Goal.no, Triglycerides are very high. HDL is low                                Tolerating current medical regimen wellyes,                                                            See most recent Lab values in Labs section above. CAROTID ARTERY DISEASE:.Right Subclavian stenosis  OTHER RELEVANT DIAGNOSIS:as noted in patient's active problem list:  TESTS ORDERED: Lipid profile                                       All previously ordered tests reviewed. ARRHYTHMIAS:  Known H/O SVT. No C/O   MEDICATIONS: CPM + add Vascepa to the regimen & D/C Niacin   Office f/u in six months. Primary/secondary prevention is the goal by aggressive risk modification, healthy and therapeutic life style changes for cardiovascular risk reduction. Various goals are discussed and multiple questions answered.

## 2020-01-20 ENCOUNTER — HOSPITAL ENCOUNTER (OUTPATIENT)
Dept: PHYSICAL THERAPY | Age: 85
Discharge: HOME | End: 2020-01-20

## 2020-01-27 ENCOUNTER — HOSPITAL ENCOUNTER (OUTPATIENT)
Dept: PHYSICAL THERAPY | Age: 85
Setting detail: THERAPIES SERIES
Discharge: HOME | End: 2020-01-27
Payer: MEDICARE

## 2020-01-27 PROCEDURE — 97110 THERAPEUTIC EXERCISES: CPT

## 2020-01-27 PROCEDURE — 97140 MANUAL THERAPY 1/> REGIONS: CPT

## 2020-01-27 PROCEDURE — 97016 VASOPNEUMATIC DEVICE THERAPY: CPT

## 2020-01-30 ENCOUNTER — HOSPITAL ENCOUNTER (OUTPATIENT)
Dept: PHYSICAL THERAPY | Age: 85
Setting detail: THERAPIES SERIES
Discharge: HOME | End: 2020-01-30
Payer: MEDICARE

## 2020-01-30 PROCEDURE — 97140 MANUAL THERAPY 1/> REGIONS: CPT

## 2020-01-30 PROCEDURE — 97110 THERAPEUTIC EXERCISES: CPT

## 2020-01-30 PROCEDURE — 97016 VASOPNEUMATIC DEVICE THERAPY: CPT

## 2020-01-30 NOTE — FLOWSHEET NOTE
Outpatient Physical Therapy  [place]           [x] Phone: [phone]   Fax: [phone]  [place]           [] Phone: [phone]   Fax: [phone]        Physical Therapy Daily Treatment Note  Date:  2020    Patient Name:  Srinivasan Stovall    :  1935  MRN: [id number]  Restrictions/Precautions: FALL RISK  Diagnosis:   Diagnosis: GH arthritis R, R shoulder pain   Date of Injury/Surgery:    Treatment Diagnosis: Treatment Diagnosis: GH arthritis, R    Insurance/Certification information: PT Insurance Information: Medicare   Referring Physician:  Referring Practitioner: Ana María Mcclendon PA-C; followup none scheduled  Next Doctor Visit:    Plan of care signed (Y/N):    Outcome Measure: Quick DASH  Visit# / total visits:    POC ends 20  Pain level: 1/10   R shldr                    Goals:       Short term goals  Time Frame for Short term goals: 10 visits  Short term goal 1: Pt to be independent with HEP with knowledge and understanding of progression   Short term goal 2: Pt will demonstrate R shoulder active range of motion WFL in order to improve functional mobility  Short term goal 3: Pt will demonstrate GOOD postural control in sitting and standing in order to increase functional independence  Long term goals  Time Frame for Long term goals : 6 weeks  Long term goal 1: Pt goal: Pt to return to prior level of function without pain   Long term goal 2: Pt will demonstrate R shoulder strength 5/5 grossly in order to return to prior level of function without pain   Long term goal 3: Pt will report Quick DASH </=11/55    Summary of Evaluation Assessment: Pt is a 82year old female with R shoulder pain for years. Initial incident was while readjusting exercise equipment. 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 D(x) glenohumeral arthritis, R. PMH rotator cuff repair R, years ago. Recent injection in R shoulder on 10/2/19.  Pt is now able to sleep on R shoulder at night. Pt is R handed. 5/10 R shoulder pain currently, 8-9/10 at highest. Difficulty with reaching up/overhead, to side, reaching behind, carrying load. Relief with Tylenol. Pt now demonstrates decreased strength, ROM/flexibility, functional mobility, increased pain, functional independence. Pt to benefit from skilled PT in order to return to prior level of function without pain. Subjective:  Patient states she feels the shldr has gotten as good as it is doing to be. Feels she has plateau. States she gets intermittent feelings of something popping/ sliding in the shldr.    Any changes in Ambulatory Summary Sheet? Icosapent Ethyl 0.5 g        Objective:  PROM ER 64*,  PROM flexion 130°    Exercises:  Exercise/Equipment 1/9/2020 #15 1/16/20 #16 1/27/2020 #17 1/30/2020 #18            WARM UP         Arm bike 3/3 F/B 3/3 f/b 3/3 F/B 3/3 F/B          TABLE       Standing protraction/retraction       Backward shoulder circles 20x 20 x 20x HEP   Shoulder shrugs 20x 20 x 20x HEP   Scapular retraction 20x3\" 20x3\" 20x3\" HEP   Sleeper stretch 3x15\" 3 x 15\" 3x15\" HEP   Wand abduction Sidelying ABD 10x2 Sidelying ABD 10x2 Sidelying ABD 10x2    Wand ER/IR Sidelying ER 2x10 R Sidelying ER 2x10 R Sidelying ER 2x10 R    pulleys 30x  30x X 30   Supine punch 2# bar 3x10 2# bar 3x10 2# bar 3x10 2# hand wt x 10   Supine FLEX AROM 2# bar 2x10 2# bar 2x10 2# bar 2x10       manual therapy       STANDING       Doorway ER stretch       Corner stretch              Iso w/ pillow flex/ER/abd/ ext   5x5\"     Wall lsides   W/roller 15x Flex W/roller x 20 Flex   Seated bicep curl    2 x 10 2#               PROPRIOCEPTION                     MANUAL See below   See below                 MODALITIES See below See below  See below                       Other Therapeutic Activities/Education:          Home Exercise Program:  10/29/19: Initiated HEP as noted in grid above and provided with written handout.   Pt required correction for shoulder shrugs, was pushing herself off her bed vs just shoulder shrug. Required frequent cues for execution of program trying to reproduce from sheet. 11/14/19 isometric as above HO given      Manual Treatments:  Passive R shoulder flexion, abduction, ER to end range. Joint mobs and light distraction. Modalities:   VASO to R shld 15'      Communication with other providers:        Assessment:  Pt attempted to do some shldr arcs and it was too painful. A lot of popping of the long head of the bicep. No reported pain after vaso      Plan for Next Session: Progress therapeutic exercises as tolerated. Continue manual to R shoulder all planes as tolerated.        Time In / Time Out:   155-145      Timed Code/Total Treatment Minutes:   20' TE, 15' manual, 15' vaso/ 48'    Next Progress Note due:        Plan of Care Interventions:  [x] Therapeutic Exercise  [] Modalities:  [x] Therapeutic Activity     [x] Ultrasound  [x] Estim  [x] Gait Training      [] Cervical Traction [] Lumbar Traction  [x] Neuromuscular Re-education    [x] Cold/hotpack [] Iontophoresis   [x] Instruction in HEP      [x] Vasopneumatic   [] Dry Needling    [x] Manual Therapy               [] Aquatic Therapy              Electronically signed by:  Travis Arguello PTA  1/30/2020, 1:56 PM

## 2020-01-31 ENCOUNTER — OFFICE VISIT (OUTPATIENT)
Dept: INTERNAL MEDICINE CLINIC | Age: 85
End: 2020-01-31
Payer: MEDICARE

## 2020-01-31 VITALS
DIASTOLIC BLOOD PRESSURE: 62 MMHG | BODY MASS INDEX: 24.41 KG/M2 | SYSTOLIC BLOOD PRESSURE: 138 MMHG | WEIGHT: 137.8 LBS | HEART RATE: 82 BPM | RESPIRATION RATE: 14 BRPM | OXYGEN SATURATION: 98 %

## 2020-01-31 PROCEDURE — 99214 OFFICE O/P EST MOD 30 MIN: CPT | Performed by: INTERNAL MEDICINE

## 2020-01-31 PROCEDURE — 1123F ACP DISCUSS/DSCN MKR DOCD: CPT | Performed by: INTERNAL MEDICINE

## 2020-01-31 PROCEDURE — G8427 DOCREV CUR MEDS BY ELIG CLIN: HCPCS | Performed by: INTERNAL MEDICINE

## 2020-01-31 PROCEDURE — 1036F TOBACCO NON-USER: CPT | Performed by: INTERNAL MEDICINE

## 2020-01-31 PROCEDURE — 1090F PRES/ABSN URINE INCON ASSESS: CPT | Performed by: INTERNAL MEDICINE

## 2020-01-31 PROCEDURE — 4040F PNEUMOC VAC/ADMIN/RCVD: CPT | Performed by: INTERNAL MEDICINE

## 2020-01-31 PROCEDURE — G8484 FLU IMMUNIZE NO ADMIN: HCPCS | Performed by: INTERNAL MEDICINE

## 2020-01-31 PROCEDURE — G8399 PT W/DXA RESULTS DOCUMENT: HCPCS | Performed by: INTERNAL MEDICINE

## 2020-01-31 PROCEDURE — G8420 CALC BMI NORM PARAMETERS: HCPCS | Performed by: INTERNAL MEDICINE

## 2020-01-31 RX ORDER — PANTOPRAZOLE SODIUM 40 MG/1
TABLET, DELAYED RELEASE ORAL
Qty: 90 TABLET | Refills: 1 | Status: SHIPPED | OUTPATIENT
Start: 2020-01-31 | End: 2020-05-04

## 2020-01-31 RX ORDER — AMLODIPINE BESYLATE 5 MG/1
TABLET ORAL
Qty: 90 TABLET | Refills: 1 | Status: SHIPPED | OUTPATIENT
Start: 2020-01-31 | End: 2020-08-25

## 2020-01-31 RX ORDER — GABAPENTIN 300 MG/1
CAPSULE ORAL
Qty: 180 CAPSULE | Refills: 2 | Status: SHIPPED | OUTPATIENT
Start: 2020-01-31 | End: 2020-02-06 | Stop reason: DRUGHIGH

## 2020-01-31 NOTE — PROGRESS NOTES
Subjective:      Macrina George is a 82 y.o. female who presents today for follow up on her chronic medical conditions as noted below. Patient Active Problem List:     Chronic depression     Hyperlipidemia     Tic douloureux     Anemia due to chronic illness     Colon cancer screening     Osteoporosis     Supraventricular tachycardia (HCC)     Recurrent ventral incisional hernia     Mild cognitive impairment     CAD (coronary artery disease)     Elevated TSH     Peripheral vascular disease (HCC)     Sciatica of left side without back pain     Coronary artery disease involving coronary bypass graft of native heart without angina pectoris     S/P CABG x 3     Essential hypertension     MVP (mitral valve prolapse)     Bilateral carotid artery stenosis     Subclavian arterial stenosis (HCC)     Spigelian hernia     Ischemic cardiomyopathy     Glenohumeral arthritis, right     She was last seen in Oct    For past weeek more facial pain, right side head  She called 1/17 with tic doloreax ; pain shooting in head   I told her to double neurontin to 600 mg tid  It helped a little  It's located right side head and temple and center eyeball  Typical lancinating pain    She saw Kelly Pugh for this in past  He tried only medications    Will refer to [facility] at [address]    Dr Collette Lewis dropped depakote dose  Emotionally fine    The patient is following low fat diet and taking statin without myalgia  Results for Eloy Parnell (MRN [id number]) as of 1/31/2020 13:44   Ref.  Range 1/23/2020 08:13   Cholesterol, Total Latest Ref Range: 100 - 199 mg/dL 150   HDL Cholesterol Latest Ref Range: >39 mg/dL 31 (L)   LDL Calculated Latest Ref Range: 0 - 99 mg/dL 44   Triglycerides Latest Ref Range: 0 - 149 mg/dL 376 (H)   VLDL Cholesterol Calculated Latest Ref Range: 5 - 40 mg/dL 75 (H)     She had prolia in Dec and is due in may      Current Outpatient Medications   Medication Sig Dispense Refill    pantoprazole (PROTONIX) 40 MG tablet take 1 tablet by mouth once daily 90 tablet 1    amLODIPine (NORVASC) 5 MG tablet take 1 tablet by mouth once daily 90 tablet 1    gabapentin (NEURONTIN) 300 MG capsule take 1 capsule by mouth three times a day 180 capsule 2    Icosapent Ethyl (VASCEPA) 0.5 g CAPS Take 500 mg by mouth 2 times daily 240 capsule 5    rosuvastatin (CRESTOR) 10 MG tablet take 1 tablet by mouth once daily 90 tablet 1    fluticasone (FLONASE) 50 MCG/ACT nasal spray instill 1 spray into each nostril once daily 48 g 1    cyanocobalamin 1000 MCG tablet Take 1,000 mcg by mouth daily      divalproex (DEPAKOTE) 250 MG DR tablet Take 250 mg by mouth Daily with supper       Multiple Vitamins-Minerals (OCUVITE-LUTEIN PO) Take by mouth      Cholecalciferol (VITAMIN D3) 16999 UNITS CAPS Take by mouth      epinastine (ELESTAT) 0.05 % SOLN Place 1 drop into both eyes daily       calcium-vitamin D (OSCAL 500/200 D-3) 500-200 MG-UNIT per tablet Take 1 tablet by mouth 2 times daily.  aspirin 81 MG EC tablet Take 81 mg by mouth daily.  denosumab (PROLIA) 60 MG/ML SOSY SC injection Inject 1 mL into the skin once for 1 dose 1 mL 0    denosumab (PROLIA) 60 MG/ML SOLN SC injection Inject 1 mL into the skin once for 1 dose 1 mL 0     No current facility-administered medications for this visit.         Past Medical History:   Diagnosis Date    Arthritis of shoulder region, right 9/2014    Gloriajean Mall Blind right eye     following right cataract surg    Chronic depression 2009    Dr. Genesis Perez    DVT (deep venous thrombosis) ([facility] [address] 1970    left leg    Former smoker     Hx of Doppler ultrasound 03/01/2018    Carotid-mild 0-49% bilateral carotid,50% stenosis right subclavian    Hyperlipidemia     Hypertension     Macular degeneration     right    Mild cognitive impairment 2/2012    MMSE 26/30    MVP (mitral valve prolapse)     trace on echo 2014    Osteoporosis 5/2012    Pancytopenia 5/2011    mild with ; Dr Marisol Kelly eval 2010    Peptic ulcer disease     Peripheral vascular disease [address] [address] [address] 2016    angio; dis below ankles; pletal    Prediabetes     Recurrent ventral incisional hernia     medial apex of GB scar    S/P CABG x 3     3-vessel; Dr Brunilda Reese Spigelian hernia 2017    right ant lateral wall; seen on CT abd    Supraventricular tachycardia [address] [address]     Freq ventriular ectopy    Tic douloureux     Dr. Juan Santos; Right side        Social History     Tobacco Use    Smoking status: Former Smoker     Packs/day: 0.25     Years: 9.00     Pack years: 2.25     Types: Cigarettes     Start date:      Last attempt to quit: 1985     Years since quittin.1    Smokeless tobacco: Never Used    Tobacco comment: 2016   Substance Use Topics    Alcohol use: No     Alcohol/week: 0.0 standard drinks        ROS: The patient has had no headache, sore throat, fever or chills, cough, dyspnea, chest pain, nausea, vomiting or diarrhea, or edema. Objective:      /62   Pulse 82   Resp 14   Wt 137 lb 12.8 oz (62.5 kg)   SpO2 98%   BMI 24.41 kg/m²      Physical Exam  Vitals signs reviewed. Constitutional:       General: She is not in acute distress. Appearance: She is well-developed. HENT:      Head: Normocephalic and atraumatic. Mouth/Throat:      Mouth: Mucous membranes are moist.      Pharynx: Oropharynx is clear. Eyes:      General: No scleral icterus. Conjunctiva/sclera: Conjunctivae normal.   Neck:      Musculoskeletal: Neck supple. No muscular tenderness. Cardiovascular:      Rate and Rhythm: Normal rate and regular rhythm. Heart sounds: No murmur. Pulmonary:      Effort: Pulmonary effort is normal. No respiratory distress. Breath sounds: No wheezing or rales. Abdominal:      General: There is no distension. Palpations: Abdomen is soft. Tenderness: There is no abdominal tenderness. Musculoskeletal: Normal range of motion. Right lower leg: No edema.       Left lower leg: No edema. Lymphadenopathy:      Cervical: No cervical adenopathy. Skin:     General: Skin is warm and dry. Findings: No rash. Neurological:      General: No focal deficit present. Mental Status: She is alert and oriented to person, place, and time. Coordination: Coordination normal.   Psychiatric:         Behavior: Behavior normal.       Cliff lipid on chart and rev'd     Assessment / Plan:      1. Trigeminal neuralgia of right side of face    2.  Osteoporosis, unspecified osteoporosis type, unspecified pathological fracture presence            Plan   prolia may  [place] [facility] [place]  RTC 3 mo  Orders Placed This Encounter   Procedures    External Referral To Neurosurgery

## 2020-02-04 ENCOUNTER — HOSPITAL ENCOUNTER (EMERGENCY)
Age: 85
Discharge: HOME | End: 2020-02-04
Attending: EMERGENCY MEDICINE
Payer: MEDICARE

## 2020-02-04 ENCOUNTER — APPOINTMENT (OUTPATIENT)
Dept: CT IMAGING | Age: 85
End: 2020-02-04
Payer: MEDICARE

## 2020-02-04 VITALS
HEIGHT: 63 IN | WEIGHT: 137 LBS | RESPIRATION RATE: 18 BRPM | OXYGEN SATURATION: 97 % | DIASTOLIC BLOOD PRESSURE: 66 MMHG | BODY MASS INDEX: 24.27 KG/M2 | SYSTOLIC BLOOD PRESSURE: 152 MMHG | HEART RATE: 92 BPM | TEMPERATURE: 98.1 F

## 2020-02-04 LAB
ALBUMIN SERPL-MCNC: 4.4 GM/DL (ref 3.4–5)
ALP BLD-CCNC: 38 IU/L (ref 40–129)
ALT SERPL-CCNC: 14 U/L (ref 10–40)
ANION GAP SERPL CALCULATED.3IONS-SCNC: 15 MMOL/L (ref 4–16)
AST SERPL-CCNC: 22 IU/L (ref 15–37)
BASOPHILS ABSOLUTE: 0 K/CU MM
BASOPHILS RELATIVE PERCENT: 0.4 % (ref 0–1)
BILIRUB SERPL-MCNC: 0.4 MG/DL (ref 0–1)
BUN BLDV-MCNC: 15 MG/DL (ref 6–23)
CALCIUM SERPL-MCNC: 9.9 MG/DL (ref 8.3–10.6)
CHLORIDE BLD-SCNC: 94 MMOL/L (ref 99–110)
CO2: 25 MMOL/L (ref 21–32)
CREAT SERPL-MCNC: 1 MG/DL (ref 0.6–1.1)
DIFFERENTIAL TYPE: ABNORMAL
EOSINOPHILS ABSOLUTE: 0.1 K/CU MM
EOSINOPHILS RELATIVE PERCENT: 1.5 % (ref 0–3)
ERYTHROCYTE SEDIMENTATION RATE: 15 MM/HR (ref 0–30)
GFR AFRICAN AMERICAN: >60 ML/MIN/1.73M2
GFR NON-AFRICAN AMERICAN: 53 ML/MIN/1.73M2
GLUCOSE BLD-MCNC: 161 MG/DL (ref 70–99)
HCT VFR BLD CALC: 39.2 % (ref 37–47)
HEMOGLOBIN: 12.8 GM/DL (ref 12.5–16)
IMMATURE NEUTROPHIL %: 0.4 % (ref 0–0.43)
LYMPHOCYTES ABSOLUTE: 1.8 K/CU MM
LYMPHOCYTES RELATIVE PERCENT: 36.5 % (ref 24–44)
MCH RBC QN AUTO: 33.2 PG (ref 27–31)
MCHC RBC AUTO-ENTMCNC: 32.7 % (ref 32–36)
MCV RBC AUTO: 101.8 FL (ref 78–100)
MONOCYTES ABSOLUTE: 0.4 K/CU MM
MONOCYTES RELATIVE PERCENT: 8.1 % (ref 0–4)
NUCLEATED RBC %: 0 %
PDW BLD-RTO: 13.5 % (ref 11.7–14.9)
PLATELET # BLD: 163 K/CU MM (ref 140–440)
PMV BLD AUTO: 9.6 FL (ref 7.5–11.1)
POTASSIUM SERPL-SCNC: 4.8 MMOL/L (ref 3.5–5.1)
RBC # BLD: 3.85 M/CU MM (ref 4.2–5.4)
SEGMENTED NEUTROPHILS ABSOLUTE COUNT: 2.5 K/CU MM
SEGMENTED NEUTROPHILS RELATIVE PERCENT: 53.1 % (ref 36–66)
SODIUM BLD-SCNC: 134 MMOL/L (ref 135–145)
TOTAL IMMATURE NEUTOROPHIL: 0.02 K/CU MM
TOTAL NUCLEATED RBC: 0 K/CU MM
TOTAL PROTEIN: 7.6 GM/DL (ref 6.4–8.2)
WBC # BLD: 4.8 K/CU MM (ref 4–10.5)

## 2020-02-04 PROCEDURE — 6360000002 HC RX W HCPCS: Performed by: EMERGENCY MEDICINE

## 2020-02-04 PROCEDURE — 70450 CT HEAD/BRAIN W/O DYE: CPT

## 2020-02-04 PROCEDURE — 80053 COMPREHEN METABOLIC PANEL: CPT

## 2020-02-04 PROCEDURE — 99284 EMERGENCY DEPT VISIT MOD MDM: CPT

## 2020-02-04 PROCEDURE — 85025 COMPLETE CBC W/AUTO DIFF WBC: CPT

## 2020-02-04 PROCEDURE — 96374 THER/PROPH/DIAG INJ IV PUSH: CPT

## 2020-02-04 PROCEDURE — 96375 TX/PRO/DX INJ NEW DRUG ADDON: CPT

## 2020-02-04 PROCEDURE — 85652 RBC SED RATE AUTOMATED: CPT

## 2020-02-04 PROCEDURE — 36415 COLL VENOUS BLD VENIPUNCTURE: CPT

## 2020-02-04 RX ORDER — KETOROLAC TROMETHAMINE 30 MG/ML
15 INJECTION, SOLUTION INTRAMUSCULAR; INTRAVENOUS ONCE
Status: COMPLETED | OUTPATIENT
Start: 2020-02-04 | End: 2020-02-04

## 2020-02-04 RX ORDER — DEXAMETHASONE SODIUM PHOSPHATE 10 MG/ML
10 INJECTION, SOLUTION INTRAMUSCULAR; INTRAVENOUS ONCE
Status: COMPLETED | OUTPATIENT
Start: 2020-02-04 | End: 2020-02-04

## 2020-02-04 RX ORDER — DIPHENHYDRAMINE HYDROCHLORIDE 50 MG/ML
25 INJECTION INTRAMUSCULAR; INTRAVENOUS ONCE
Status: COMPLETED | OUTPATIENT
Start: 2020-02-04 | End: 2020-02-04

## 2020-02-04 RX ORDER — METOCLOPRAMIDE HYDROCHLORIDE 5 MG/ML
10 INJECTION INTRAMUSCULAR; INTRAVENOUS ONCE
Status: COMPLETED | OUTPATIENT
Start: 2020-02-04 | End: 2020-02-04

## 2020-02-04 RX ORDER — GABAPENTIN 300 MG/1
300 CAPSULE ORAL 3 TIMES DAILY
Status: DISCONTINUED | OUTPATIENT
Start: 2020-02-04 | End: 2020-02-04

## 2020-02-04 RX ADMIN — DIPHENHYDRAMINE HYDROCHLORIDE 25 MG: 50 INJECTION, SOLUTION INTRAMUSCULAR; INTRAVENOUS at 18:02

## 2020-02-04 RX ADMIN — METOCLOPRAMIDE 10 MG: 5 INJECTION, SOLUTION INTRAMUSCULAR; INTRAVENOUS at 18:02

## 2020-02-04 RX ADMIN — KETOROLAC TROMETHAMINE 15 MG: 30 INJECTION, SOLUTION INTRAMUSCULAR at 18:02

## 2020-02-04 RX ADMIN — DEXAMETHASONE SODIUM PHOSPHATE 10 MG: 10 INJECTION INTRAMUSCULAR; INTRAVENOUS at 18:02

## 2020-02-04 ASSESSMENT — ENCOUNTER SYMPTOMS
CONSTIPATION: 0
ABDOMINAL PAIN: 0
COUGH: 0
VOMITING: 0
WHEEZING: 0
NAUSEA: 0
SHORTNESS OF BREATH: 0
DIARRHEA: 0
SORE THROAT: 0
RHINORRHEA: 0
SINUS PRESSURE: 0

## 2020-02-04 ASSESSMENT — PAIN DESCRIPTION - LOCATION: LOCATION: HEAD

## 2020-02-04 ASSESSMENT — PAIN SCALES - GENERAL
PAINLEVEL_OUTOF10: 5
PAINLEVEL_OUTOF10: 9

## 2020-02-04 ASSESSMENT — PAIN DESCRIPTION - PAIN TYPE: TYPE: ACUTE PAIN

## 2020-02-04 NOTE — PROGRESS NOTES
Medication History  [facility]    Patient Name: Paula Angelucci 1935     Medication history has been completed by: Naomi Maravilla CPhT    Source(s) of information: patient and insurance claims     Primary Care Physician: Brittany Quinonez MD     Pharmacy: [facility]    Allergies as of 02/04/2020 - Review Complete 02/04/2020   Allergen Reaction Noted    Alendronate sodium Other (See Comments) 11/09/2012    Boniva [ibandronate sodium] Other (See Comments) 11/09/2012    Lisinopril Other (See Comments) 06/29/2010    Neggram [nalidixic acid]  10/26/2010    Pce [erythromycin]  10/26/2010    Penicillins  10/26/2010    Welchol [colesevelam hcl] Other (See Comments) 02/28/2014    Carbamazepine Nausea And Vomiting 10/26/2010    Lovaza [omega-3-acid ethyl esters] Nausea And Vomiting 02/28/2014        Prior to Admission medications    Medication Sig Start Date End Date Taking?  Authorizing Provider   Phenylephrine-Acetaminophen (RA SINUS CONGESTION/PAIN DAY PO) Take 1 tablet by mouth daily as needed   Yes Historical Provider, MD   pantoprazole (PROTONIX) 40 MG tablet take 1 tablet by mouth once daily 1/31/20  Yes Brittany Quinonez MD   amLODIPine (NORVASC) 5 MG tablet take 1 tablet by mouth once daily 1/31/20  Yes Brittany Quinonez MD   gabapentin (NEURONTIN) 300 MG capsule take 1 capsule by mouth three times a day 1/31/20 4/30/20 Yes Brittany Quinonez MD   Icosapent Ethyl (VASCEPA) 0.5 g CAPS Take 500 mg by mouth 2 times daily 1/17/20  Yes Brenda Lay MD   rosuvastatin (CRESTOR) 10 MG tablet take 1 tablet by mouth once daily  Patient taking differently: Take 10 mg by mouth Daily with supper  8/20/19  Yes Katiana Moore MD   cyanocobalamin 1000 MCG tablet Take 1,000 mcg by mouth daily   Yes Historical Provider, MD   divalproex (DEPAKOTE) 250 MG DR tablet Take 250 mg by mouth Daily with supper    Yes Historical Provider, MD   Multiple Vitamins-Minerals (OCUVITE-LUTEIN PO) Take by mouth   Yes Historical Provider, MD   calcium-vitamin D (OSCAL 500/200 D-3) 500-200 MG-UNIT per tablet Take 1 tablet by mouth 2 times daily. Yes Historical Provider, MD   aspirin 81 MG EC tablet Take 81 mg by mouth daily. Yes Historical Provider, MD   fluticasone [facility]) 50 MCG/ACT nasal spray instill 1 spray into each nostril once daily 5/28/19   Janessa Fernández MD   denosumab (PROLIA) 60 MG/ML SOLN SC injection Inject 1 mL into the skin once for 1 dose 2/26/19 1/17/20  Janessa Fernández MD   epinastine (ELESTAT) 0.05 % SOLN Place 1 drop into both eyes daily  7/4/16   Historical Provider, MD     Medications added or changed (ex. new medication, dosage change, interval change, formulation change):  [facility] sinus congestion OTC (added)    Medications removed from list (include reason, ex. noncompliance, medication cost, therapy complete etc.):   Cholecalciferol 49605 units patient states she's no longer taking this    Comments:  Medication list reviewed with patient and insurance claims verified. Patient states she has taken first doses of medications today  Patient states she has been taking [facility] sinus pressure and congestion  tablets when her headaches get severe. States she has taken 2 today.     To my knowledge the above medication history is accurate as of 2/4/2020 6:19 PM.   Abimbola Roberts CPhT   2/4/2020 6:19 PM

## 2020-02-04 NOTE — ED PROVIDER NOTES
Emergency Department Encounter    Patient: Dallin Segovia  MRN: [id number]  : 1935  Date of Evaluation: 2020  ED Provider:  [id number] [facility]    Triage Chief Complaint:   Headache (started 2-3 weeks ago and has progressed; sent by her pcp; bhargav; states that she has been treated for this in the past (trigeminal neuralgia) and has an appointment at the end of February)    HPI:  Dallin Segovia is a 82 y.o. female with past medical history significant for trigeminal neuralgia presents with a headache. Patient states that headache started about 2 weeks ago, and has been intermittent since. It is waxing and waning in intensity. She went saw her PCP on , and she is concerned that he changed her Neurontin prescription, she typically takes 6 pills daily, and he decreased it to 3 pills daily. Patient states that since she has been taking 3 pills daily her headache has gotten worse. She states this is similar to previous headaches she has had with a \"flare\" of her trigeminal neuralgia. Patient has an appointment with a neurosurgeon on  for follow-up for her trigeminal neuralgia. Patient states that headache is currently a 6 out of 10, right-sided, starts behind her eye and wraps to the back of her head. This is typically how her headache is. Denies any vision changes with this. Denies F/C, CP, SOB, palpitations, N/V, abdominal pain, dysuria, diarrhea and constipatin. ROS:  Review of Systems   Constitutional: Negative for chills and fever. HENT: Negative for congestion, rhinorrhea, sinus pressure and sore throat. Eyes: Negative for visual disturbance. Respiratory: Negative for cough, shortness of breath and wheezing. Cardiovascular: Negative for chest pain and palpitations. Gastrointestinal: Negative for abdominal pain, constipation, diarrhea, nausea and vomiting. Genitourinary: Negative for dysuria, frequency and urgency.    Musculoskeletal: Negative for arthralgias and myalgias. Skin: Negative for rash. Neurological: Positive for headaches. Negative for dizziness and syncope. Psychiatric/Behavioral: Negative for agitation, behavioral problems and confusion. Past Medical History:   Diagnosis Date    Arthritis of shoulder region, right 9/2014    [facility]ire[facility] Bad Blind right eye     following right cataract surg    Chronic depression 2009    Dr. Cullen Lundberg    DVT (deep venous thrombosis) ([facility] [address] 1970    left leg    Former smoker     Hx of Doppler ultrasound 03/01/2018    Carotid-mild 0-49% bilateral carotid,50% stenosis right subclavian    Hyperlipidemia     Hypertension     Macular degeneration     right    Mild cognitive impairment 2/2012    MMSE 26/30    MVP (mitral valve prolapse)     trace on echo 2014    Osteoporosis 5/2012    Pancytopenia 5/2011    mild with ; Dr Urvashi hawk 2010    Peptic ulcer disease     Peripheral vascular disease ([facility] [address] 1/2016    angio; dis below ankles; pletal    Prediabetes 2006    Recurrent ventral incisional hernia 2013    medial apex of GB scar    S/P CABG x 3 2003    3-vessel; Dr Chun Galarza Spigelian hernia 03/2017    right ant lateral wall; seen on CT abd    Supraventricular tachycardia ([facility] [address] 1998    Freq ventriular ectopy    Tic douloureux 2008    Dr. Chito Pierson; Right side     Past Surgical History:   Procedure Laterality Date    CATARACT REMOVAL Right 2010    poor result ; blind right eye; [address] CORONARY ARTERY BYPASS GRAFT  8/2009    3 vessel    SHOULDER ARTHROSCOPY Right 2003    TUBAL LIGATION      URETER SURGERY      Right ureteral repair     Family History   Problem Relation Age of Onset    Cancer Sister 79        Breast cancer 1/2017     Social History     Socioeconomic History    Marital status:       Spouse name: Dionisio Marie Number of children: 2    Years of education: 15    Highest education level: Not on file   Occupational History    Occupation: retired   Social Needs 1 tablet by mouth once daily 90 tablet 1    fluticasone (FLONASE) 50 MCG/ACT nasal spray instill 1 spray into each nostril once daily 48 g 1    denosumab (PROLIA) 60 MG/ML SOLN SC injection Inject 1 mL into the skin once for 1 dose 1 mL 0    cyanocobalamin 1000 MCG tablet Take 1,000 mcg by mouth daily      divalproex (DEPAKOTE) 250 MG DR tablet Take 250 mg by mouth Daily with supper       Multiple Vitamins-Minerals (OCUVITE-LUTEIN PO) Take by mouth      Cholecalciferol (VITAMIN D3) 89119 UNITS CAPS Take by mouth      epinastine (ELESTAT) 0.05 % SOLN Place 1 drop into both eyes daily       calcium-vitamin D (OSCAL 500/200 D-3) 500-200 MG-UNIT per tablet Take 1 tablet by mouth 2 times daily.  aspirin 81 MG EC tablet Take 81 mg by mouth daily. Allergies   Allergen Reactions    Alendronate Sodium Other (See Comments)     GI upset    Boniva [Ibandronate Sodium] Other (See Comments)     Gi upset and declined egd; also to fosamax    Lisinopril Other (See Comments)     Severe hyperkalemia (6) with bun >56      Neggram [Nalidixic Acid]     Pce [Erythromycin]     Penicillins     Welchol [Colesevelam Hcl] Other (See Comments)     constipation    Carbamazepine Nausea And Vomiting    Lovaza [Omega-3-Acid Ethyl Esters] Nausea And Vomiting       Nursing Notes Reviewed    Physical Exam:  Triage VS:    ED Triage Vitals   Enc Vitals Group      BP 02/04/20 1540 (!) 174/89      Pulse 02/04/20 1540 110      Resp 02/04/20 1540 16      Temp 02/04/20 1543 98.1 °F (36.7 °C)      Temp Source 02/04/20 1543 Oral      SpO2 02/04/20 1540 98 %      Weight 02/04/20 1540 137 lb (62.1 kg)      Height 02/04/20 1540 5' 3\" (1.6 m)      Head Circumference --       Peak Flow --       Pain Score --       Pain Loc --       Pain Edu? --       Excl. in [address]? --      Physical Exam  Vitals signs and nursing note reviewed. Constitutional:       Appearance: Normal appearance. HENT:      Head: Normocephalic and atraumatic. K/CU MM    MPV 9.6 7.5 - 11.1 FL    Differential Type AUTOMATED DIFFERENTIAL     Segs Relative 53.1 36 - 66 %    Lymphocytes % 36.5 24 - 44 %    Monocytes % 8.1 (H) 0 - 4 %    Eosinophils % 1.5 0 - 3 %    Basophils % 0.4 0 - 1 %    Segs Absolute 2.5 K/CU MM    Lymphocytes Absolute 1.8 K/CU MM    Monocytes Absolute 0.4 K/CU MM    Eosinophils Absolute 0.1 K/CU MM    Basophils Absolute 0.0 K/CU MM    Nucleated RBC % 0.0 %    Total Nucleated RBC 0.0 K/CU MM    Total Immature Neutrophil 0.02 K/CU MM    Immature Neutrophil % 0.4 0 - 0.43 %   Comprehensive Metabolic Panel   Result Value Ref Range    Sodium 134 (L) 135 - 145 MMOL/L    Potassium 4.8 3.5 - 5.1 MMOL/L    Chloride 94 (L) 99 - 110 mMol/L    CO2 25 21 - 32 MMOL/L    BUN 15 6 - 23 MG/DL    CREATININE 1.0 0.6 - 1.1 MG/DL    Glucose 161 (H) 70 - 99 MG/DL    Calcium 9.9 8.3 - 10.6 MG/DL    Alb 4.4 3.4 - 5.0 GM/DL    Total Protein 7.6 6.4 - 8.2 GM/DL    Total Bilirubin 0.4 0.0 - 1.0 MG/DL    ALT 14 10 - 40 U/L    AST 22 15 - 37 IU/L    Alkaline Phosphatase 38 (L) 40 - 129 IU/L    GFR Non- 53 (L) >60 mL/min/1.73m2    GFR African American >60 >60 mL/min/1.73m2    Anion Gap 15 4 - 16   Sedimentation Rate   Result Value Ref Range    Sed Rate 15 0 - 30 MM/HR      Radiographs (if obtained):    [] Radiologist's Report Reviewed:  CT Head WO Contrast   Final Result   1. No acute intracranial abnormality. 2. Chronic small vessel ischemic disease. Chart review shows recent radiographs:  Ct Head Wo Contrast    Result Date: 2/4/2020  EXAMINATION: CT OF THE HEAD WITHOUT CONTRAST  2/4/2020 4:04 pm TECHNIQUE: CT of the head was performed without the administration of intravenous contrast. Dose modulation, iterative reconstruction, and/or weight based adjustment of the mA/kV was utilized to reduce the radiation dose to as low as reasonably achievable. COMPARISON: 05/16/2019.  HISTORY: ORDERING SYSTEM PROVIDED HISTORY: headache TECHNOLOGIST PROVIDED

## 2020-02-04 NOTE — ED PROVIDER NOTES
As physician assistant-in-triage, I performed a medical screening history and physical exam on this patient. HISTORY OF PRESENT ILLNESS  Isis Canchola is a 82 y.o. female with right-sided headache. Onset 2 to 3 weeks ago. Patient states she had had similar headache approximately 8 years ago and was treated for trigeminal neuralgia. Patient states this is been under control up until last month. Patient states she was scheduled to see a specialist however is not able to get in with them until later this month. Patient denies any vision changes, extremity weakness or numbness. Patient denies chest pain, shortness of breath, abdominal pain. PHYSICAL EXAM  BP (!) 174/89   Pulse 110   Temp 98.1 °F (36.7 °C) (Oral)   Resp 16   Ht 5' 3\" (1.6 m)   Wt 137 lb (62.1 kg)   SpO2 98%   BMI 24.27 kg/m²     On exam, the patient appears well-hydrated, well-nourished, and in no acute distress. Mucous membranes are moist. Speech is clear. Breathing is unlabored. Skin is dry. Mental status is normal. Moves all extremities, and is without facial droop. See future provider note for medical decision making and disposition. Comment: Please note this report has been produced using speech recognition software and may contain errors related to that system including errors in grammar, punctuation, and spelling, as well as words and phrases that may be inappropriate. If there are any questions or concerns please feel free to contact the dictating provider for clarification.         Liam Ervin PA-C  02/04/20 [id number]

## 2020-02-05 ENCOUNTER — TELEPHONE (OUTPATIENT)
Dept: INTERNAL MEDICINE CLINIC | Age: 85
End: 2020-02-05

## 2020-02-06 RX ORDER — GABAPENTIN 300 MG/1
CAPSULE ORAL
Qty: 180 CAPSULE | Refills: 5 | Status: SHIPPED | OUTPATIENT
Start: 2020-02-06 | End: 2020-02-11 | Stop reason: SDUPTHER

## 2020-02-11 ENCOUNTER — OFFICE VISIT (OUTPATIENT)
Dept: INTERNAL MEDICINE CLINIC | Age: 85
End: 2020-02-11
Payer: MEDICARE

## 2020-02-11 VITALS
SYSTOLIC BLOOD PRESSURE: 136 MMHG | WEIGHT: 135 LBS | DIASTOLIC BLOOD PRESSURE: 64 MMHG | HEART RATE: 80 BPM | RESPIRATION RATE: 15 BRPM | BODY MASS INDEX: 23.91 KG/M2

## 2020-02-11 PROCEDURE — G8399 PT W/DXA RESULTS DOCUMENT: HCPCS | Performed by: FAMILY MEDICINE

## 2020-02-11 PROCEDURE — 1090F PRES/ABSN URINE INCON ASSESS: CPT | Performed by: FAMILY MEDICINE

## 2020-02-11 PROCEDURE — G8420 CALC BMI NORM PARAMETERS: HCPCS | Performed by: FAMILY MEDICINE

## 2020-02-11 PROCEDURE — 99213 OFFICE O/P EST LOW 20 MIN: CPT | Performed by: FAMILY MEDICINE

## 2020-02-11 PROCEDURE — 1123F ACP DISCUSS/DSCN MKR DOCD: CPT | Performed by: FAMILY MEDICINE

## 2020-02-11 PROCEDURE — 4040F PNEUMOC VAC/ADMIN/RCVD: CPT | Performed by: FAMILY MEDICINE

## 2020-02-11 PROCEDURE — 1036F TOBACCO NON-USER: CPT | Performed by: FAMILY MEDICINE

## 2020-02-11 PROCEDURE — G8482 FLU IMMUNIZE ORDER/ADMIN: HCPCS | Performed by: FAMILY MEDICINE

## 2020-02-11 PROCEDURE — G8427 DOCREV CUR MEDS BY ELIG CLIN: HCPCS | Performed by: FAMILY MEDICINE

## 2020-02-11 RX ORDER — GABAPENTIN 300 MG/1
CAPSULE ORAL
Qty: 180 CAPSULE | Refills: 5 | Status: SHIPPED | OUTPATIENT
Start: 2020-02-11 | End: 2021-03-16 | Stop reason: SDUPTHER

## 2020-02-11 ASSESSMENT — ENCOUNTER SYMPTOMS
DIARRHEA: 0
COLOR CHANGE: 0
SHORTNESS OF BREATH: 0
CHEST TIGHTNESS: 0
ABDOMINAL PAIN: 0
SORE THROAT: 0
VOMITING: 0
CONSTIPATION: 0

## 2020-02-11 NOTE — PROGRESS NOTES
Subjective:      Chief Complaint   Patient presents with   Henry Payne Doctor     former Dr Nikky Hearn patient    ED Follow-up     headache-right side all the time for the past 2 months       HPI:  Linh Huang is a 82 y.o. female who presents today to establish care with new provider. Has history of (right sided) trigeminal neuralgia ever since having surgery on her R eye. Started having nerve pain about 5-6 years ago, a few years after her eye surgery. Was started on neurontin after initial presentation which seemed to resolve symptoms but has now been recurring for the past few months. Symptoms are worse than last time and neurontin has not been helping this time. Has an appointment with a specialist in [place] for this issue later this week. Is also following with Dr. Matheus Mccauley (since her son passed away in an MVA in 1987), who is prescribing depakote.           Past Medical History:   Diagnosis Date    Arthritis of shoulder region, right 9/2014    [address] Blind right eye     following right cataract surg    Chronic depression 2009    Dr. Matheus Mccauley    DVT (deep venous thrombosis) ([address] 1970    left leg    Former smoker     Hx of Doppler ultrasound 03/01/2018    Carotid-mild 0-49% bilateral carotid,50% stenosis right subclavian    Hyperlipidemia     Hypertension     Macular degeneration     right    Mild cognitive impairment 2/2012    MMSE 26/30    MVP (mitral valve prolapse)     trace on echo 2014    Osteoporosis 5/2012    Pancytopenia 5/2011    mild with ; Dr Anson hawk 2010    Peptic ulcer disease     Peripheral vascular disease ([address] 1/2016    angio; dis below ankles; pletal    Prediabetes 2006    Recurrent ventral incisional hernia 2013    medial apex of GB scar    S/P CABG x 3 2003    3-vessel; Dr Brunilda Reese 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 Ryan Chavira; Right side        Past Surgical History:   Procedure Laterality Date    CATARACT REMOVAL Right     poor result ; blind right eye; [place]    CHOLECYSTECTOMY      CORONARY ARTERY BYPASS GRAFT  2009    3 vessel    SHOULDER ARTHROSCOPY Right     TUBAL LIGATION      URETER SURGERY      Right ureteral repair       Social History     Tobacco Use    Smoking status: Former Smoker     Packs/day: 0.25     Years: 9.00     Pack years: 2.25     Types: Cigarettes     Start date:      Last attempt to quit: 1985     Years since quittin.2    Smokeless tobacco: Never Used    Tobacco comment: 2016   Substance Use Topics    Alcohol use: No     Alcohol/week: 0.0 standard drinks        Review of Systems   Constitutional: Negative for activity change, appetite change, chills, fever and unexpected weight change. HENT: Negative for congestion and sore throat. Respiratory: Negative for chest tightness and shortness of breath. Cardiovascular: Negative for chest pain and palpitations. Gastrointestinal: Negative for abdominal pain, constipation, diarrhea and vomiting. Genitourinary: Negative for dysuria. Skin: Negative for color change. Neurological: Positive for tremors and headaches. Negative for dizziness, seizures and light-headedness. Psychiatric/Behavioral: Negative for dysphoric mood. The patient is not nervous/anxious. Prior to Visit Medications    Medication Sig Taking? Authorizing Provider   gabapentin (NEURONTIN) 300 MG capsule 2 tablets 3 times daily for trigeminal neuralgia Yes Jose Banks MD   Phenylephrine-Acetaminophen (RA SINUS CONGESTION/PAIN DAY PO) Take 1 tablet by mouth daily as needed 2020 Patient states she takes this when her headaches are severe.  Yes Historical Provider, MD   pantoprazole (PROTONIX) 40 MG tablet take 1 tablet by mouth once daily Yes Jose Banks MD   amLODIPine (NORVASC) 5 MG tablet take 1 tablet by mouth once daily Yes Emerald Anders MD   Icosapent Ethyl (VASCEPA) 0.5 g CAPS Take 500 mg by mouth 2 times daily Yes Michelle Bolaños MD   rosuvastatin (CRESTOR) 10 MG tablet take 1 tablet by mouth once daily  Patient taking differently: Take 10 mg by mouth Daily with supper  Yes Francois Benites MD   fluticasone (FLONASE) 50 MCG/ACT nasal spray instill 1 spray into each nostril once daily Yes Emerald Anders MD   denosumab (PROLIA) 60 MG/ML SOLN SC injection Inject 1 mL into the skin once for 1 dose Yes Emerald Anders MD   cyanocobalamin 1000 MCG tablet Take 1,000 mcg by mouth daily Yes Historical Provider, MD   divalproex (DEPAKOTE) 250 MG DR tablet Take 250 mg by mouth Daily with supper  Yes Historical Provider, MD   Multiple Vitamins-Minerals (OCUVITE-LUTEIN PO) Take by mouth Yes Historical Provider, MD   epinastine (ELESTAT) 0.05 % SOLN Place 1 drop into both eyes daily  Yes Historical Provider, MD   calcium-vitamin D (OSCAL 500/200 D-3) 500-200 MG-UNIT per tablet Take 1 tablet by mouth 2 times daily. Yes Historical Provider, MD   aspirin 81 MG EC tablet Take 81 mg by mouth daily. Yes Historical Provider, MD          Objective:      /64 (Site: Left Upper Arm, Position: Sitting, Cuff Size: Large Adult)   Pulse 80   Resp 15   Wt 135 lb (61.2 kg)   BMI 23.91 kg/m²      Physical Exam  Vitals signs and nursing note reviewed. Constitutional:       General: She is not in acute distress. Appearance: Normal appearance. She is not ill-appearing or toxic-appearing. HENT:      Head: Normocephalic and atraumatic. Right Ear: External ear normal.      Left Ear: External ear normal.      Nose: Nose normal.      Mouth/Throat:      Pharynx: Oropharynx is clear. Eyes:      General: No scleral icterus. Right eye: No discharge. Left eye: No discharge. Extraocular Movements: Extraocular movements intact.       Conjunctiva/sclera: Conjunctivae normal.   Neck:      Musculoskeletal: Normal

## 2020-02-17 ENCOUNTER — TELEPHONE (OUTPATIENT)
Dept: INTERNAL MEDICINE CLINIC | Age: 85
End: 2020-02-17

## 2020-02-17 ENCOUNTER — OFFICE VISIT (OUTPATIENT)
Dept: INTERNAL MEDICINE CLINIC | Age: 85
End: 2020-02-17
Payer: MEDICARE

## 2020-02-17 VITALS — SYSTOLIC BLOOD PRESSURE: 130 MMHG | HEART RATE: 68 BPM | RESPIRATION RATE: 15 BRPM | DIASTOLIC BLOOD PRESSURE: 64 MMHG

## 2020-02-17 LAB
BACTERIA: ABNORMAL /HPF
BILIRUBIN URINE: NEGATIVE
BILIRUBIN, POC: ABNORMAL
BLOOD URINE, POC: ABNORMAL
BLOOD, URINE: NEGATIVE
CLARITY, POC: ABNORMAL
CLARITY: ABNORMAL
COLOR, POC: YELLOW
COLOR: ABNORMAL
EPITHELIAL CELLS, UA: 0 /HPF (ref 0–5)
GLUCOSE URINE, POC: ABNORMAL
GLUCOSE URINE: NEGATIVE MG/DL
HYALINE CASTS: 0 /LPF (ref 0–8)
KETONES, POC: ABNORMAL
KETONES, URINE: ABNORMAL MG/DL
LEUKOCYTE EST, POC: ABNORMAL
LEUKOCYTE ESTERASE, URINE: ABNORMAL
MICROSCOPIC EXAMINATION: YES
NITRITE, POC: ABNORMAL
NITRITE, URINE: POSITIVE
PH UA: 5.5 (ref 5–8)
PH, POC: 5.5
PROTEIN UA: ABNORMAL MG/DL
PROTEIN, POC: 30
RBC UA: 6 /HPF (ref 0–4)
SPECIFIC GRAVITY UA: 1.02 (ref 1–1.03)
SPECIFIC GRAVITY, POC: 1.02
URINE REFLEX TO CULTURE: YES
URINE TYPE: ABNORMAL
UROBILINOGEN, POC: 0.2
UROBILINOGEN, URINE: 0.2 E.U./DL
WBC UA: 181 /HPF (ref 0–5)

## 2020-02-17 PROCEDURE — 81002 URINALYSIS NONAUTO W/O SCOPE: CPT | Performed by: FAMILY MEDICINE

## 2020-02-17 PROCEDURE — 1123F ACP DISCUSS/DSCN MKR DOCD: CPT | Performed by: FAMILY MEDICINE

## 2020-02-17 PROCEDURE — 99213 OFFICE O/P EST LOW 20 MIN: CPT | Performed by: FAMILY MEDICINE

## 2020-02-17 PROCEDURE — 1090F PRES/ABSN URINE INCON ASSESS: CPT | Performed by: FAMILY MEDICINE

## 2020-02-17 PROCEDURE — 4040F PNEUMOC VAC/ADMIN/RCVD: CPT | Performed by: FAMILY MEDICINE

## 2020-02-17 PROCEDURE — 1036F TOBACCO NON-USER: CPT | Performed by: FAMILY MEDICINE

## 2020-02-17 PROCEDURE — G8399 PT W/DXA RESULTS DOCUMENT: HCPCS | Performed by: FAMILY MEDICINE

## 2020-02-17 PROCEDURE — G8420 CALC BMI NORM PARAMETERS: HCPCS | Performed by: FAMILY MEDICINE

## 2020-02-17 PROCEDURE — G8427 DOCREV CUR MEDS BY ELIG CLIN: HCPCS | Performed by: FAMILY MEDICINE

## 2020-02-17 PROCEDURE — G8482 FLU IMMUNIZE ORDER/ADMIN: HCPCS | Performed by: FAMILY MEDICINE

## 2020-02-17 RX ORDER — CIPROFLOXACIN 500 MG/1
500 TABLET, FILM COATED ORAL 2 TIMES DAILY
Qty: 14 TABLET | Refills: 0 | Status: SHIPPED | OUTPATIENT
Start: 2020-02-17 | End: 2020-02-24

## 2020-02-17 ASSESSMENT — ENCOUNTER SYMPTOMS
VOMITING: 0
BACK PAIN: 1
CONSTIPATION: 0
CHEST TIGHTNESS: 0
COLOR CHANGE: 0
DIARRHEA: 0
SHORTNESS OF BREATH: 0
SORE THROAT: 0

## 2020-02-17 NOTE — PROGRESS NOTES
Post-Op Assessment Note    CV Status:  Stable  Pain Score: 0    Pain management: adequate     Mental Status:  Alert and awake   Hydration Status:  Euvolemic   PONV Controlled:  Controlled   Airway Patency:  Patent   Post Op Vitals Reviewed: Yes      Staff: Anesthesiologist           BP      Temp      Pulse     Resp      SpO2 Years: 9.00     Pack years: 2.25     Types: Cigarettes     Start date: 14     Last attempt to quit: 1985     Years since quittin.2    Smokeless tobacco: Never Used    Tobacco comment: 2016   Substance Use Topics    Alcohol use: No     Alcohol/week: 0.0 standard drinks        Review of Systems   Constitutional: Negative for activity change, appetite change, chills, fever and unexpected weight change. HENT: Negative for congestion and sore throat. Respiratory: Negative for chest tightness and shortness of breath. Cardiovascular: Negative for chest pain and palpitations. Gastrointestinal: Negative for constipation, diarrhea and vomiting. Genitourinary: Positive for dysuria, flank pain, frequency and urgency. Negative for hematuria. Musculoskeletal: Positive for back pain. Skin: Negative for color change. Neurological: Negative for dizziness and light-headedness. Psychiatric/Behavioral: Negative for dysphoric mood. The patient is not nervous/anxious. Prior to Visit Medications    Medication Sig Taking? Authorizing Provider   gabapentin (NEURONTIN) 300 MG capsule 2 tablets 3 times daily for trigeminal neuralgia Yes Shagufta Cash MD   Phenylephrine-Acetaminophen (RA SINUS CONGESTION/PAIN DAY PO) Take 1 tablet by mouth daily as needed 2020 Patient states she takes this when her headaches are severe.  Yes Historical Provider, MD   pantoprazole (PROTONIX) 40 MG tablet take 1 tablet by mouth once daily Yes Sunny Villalta MD   amLODIPine (NORVASC) 5 MG tablet take 1 tablet by mouth once daily Yes Sunny Villalta MD   Icosapent Ethyl (VASCEPA) 0.5 g CAPS Take 500 mg by mouth 2 times daily Yes Shari Rosario MD   rosuvastatin (CRESTOR) 10 MG tablet take 1 tablet by mouth once daily  Patient taking differently: Take 10 mg by mouth Daily with supper  Yes Lesia Ayala MD   fluticasone (FLONASE) 50 MCG/ACT nasal spray instill 1 spray into each nostril once daily Musculoskeletal:         General: No deformity. Skin:     General: Skin is warm and dry. Findings: No rash. Neurological:      General: No focal deficit present. Mental Status: She is alert. Mental status is at baseline. Motor: No weakness. Psychiatric:         Mood and Affect: Mood normal.         Behavior: Behavior normal.            Assessment / Plan:      1. Urinary tract infection without hematuria, site unspecified  Likely pyelonephritis given CVA tenderness, but no constitutional symptoms yet. Will treat with 7 day course of ciprofloxacin (has taken in the past for similar symptoms and tolerated well). Will still obtain UA (was not able to provide sample in clinic) for culture/susceptibilities- instructed to bring back to clinic. Return to clinic if symptoms not improving in 48 hours, earlier for worsening symptoms. - ciprofloxacin (CIPRO) 500 MG tablet; Take 1 tablet by mouth 2 times daily for 7 days  Dispense: 14 tablet; Refill: 0          Patient voiced understanding and agreement with plan. All questions/concerns were addressed, risks/side effects of medications were reviewed. Return precautions and after visit summary were provided. Return if symptoms worsen or fail to improve. or earlier as needed.       Sydnee Dubin, MD

## 2020-02-18 ENCOUNTER — TELEPHONE (OUTPATIENT)
Dept: INTERNAL MEDICINE CLINIC | Age: 85
End: 2020-02-18

## 2020-02-18 RX ORDER — NITROFURANTOIN 25; 75 MG/1; MG/1
100 CAPSULE ORAL 2 TIMES DAILY
Qty: 10 CAPSULE | Refills: 0 | Status: SHIPPED | OUTPATIENT
Start: 2020-02-18 | End: 2020-02-23

## 2020-02-18 NOTE — TELEPHONE ENCOUNTER
Called and said ever since she started taking the medication you put her on yesterday she is having bad pains in her head. It has subsided but her next dose is due at 2:00, not sure if she should keep taking it.  Please call

## 2020-02-18 NOTE — TELEPHONE ENCOUNTER
Would advise trying another dose as the pain in her head may be due to her preexisting neuralgia (and may be happening coincidentally). She has already been on this antibiotic in the past and did well with it. If she gets the same symptoms after her next dose, she can call back and I can switch antibiotics for her. Thank you!

## 2020-02-18 NOTE — TELEPHONE ENCOUNTER
Spoke with patient. States that she started having the headache before starting the antibiotic but once she took the antibiotic it seemed to make it worse. Patient says she is afraid to to keep taking the medication and would like to switch. States urinary symptoms are improving. Will switch to macrobid as she has tolerated that well in the past. Discussed that she may still have neuralgia with this medication as well (since it may have been coincidence that she is having the symptoms). Contact clinic if urinary symptoms have not resolved after completing medication.

## 2020-02-18 NOTE — TELEPHONE ENCOUNTER
Spoke with patient, she argued about continuing medication. She stated that she is terrified and does not want to continue. She stated PCP should  and discuss her issues. I advised patient I would discuss with PCP and call her back.

## 2020-02-19 ENCOUNTER — TELEPHONE (OUTPATIENT)
Dept: CARDIOLOGY CLINIC | Age: 85
End: 2020-02-19

## 2020-02-19 LAB
ORGANISM: ABNORMAL
ORGANISM: ABNORMAL
URINE CULTURE, ROUTINE: ABNORMAL
URINE CULTURE, ROUTINE: ABNORMAL

## 2020-02-19 RX ORDER — ICOSAPENT ETHYL 1000 MG/1
1 CAPSULE ORAL 2 TIMES DAILY
Qty: 60 CAPSULE | Refills: 3 | Status: SHIPPED | OUTPATIENT
Start: 2020-02-19 | End: 2020-08-25

## 2020-02-19 NOTE — TELEPHONE ENCOUNTER
Reviewed lipid panel, per Dr Zaki Fair increase Vascepa to 1gm BID. Patient notified.  Will recheck lipid panel in one month

## 2020-02-21 ENCOUNTER — TELEPHONE (OUTPATIENT)
Dept: CARDIOLOGY CLINIC | Age: 85
End: 2020-02-21

## 2020-02-27 ENCOUNTER — HOSPITAL ENCOUNTER (OUTPATIENT)
Dept: MRI IMAGING | Age: 85
Discharge: HOME | End: 2020-02-27
Payer: MEDICARE

## 2020-02-27 PROCEDURE — 70553 MRI BRAIN STEM W/O & W/DYE: CPT

## 2020-02-27 PROCEDURE — 6360000004 HC RX CONTRAST MEDICATION: Performed by: NEUROLOGICAL SURGERY

## 2020-02-27 PROCEDURE — A9577 INJ MULTIHANCE: HCPCS | Performed by: NEUROLOGICAL SURGERY

## 2020-02-27 RX ADMIN — GADOBENATE DIMEGLUMINE 7 ML: 529 INJECTION, SOLUTION INTRAVENOUS at 10:57

## 2020-03-18 ENCOUNTER — OFFICE VISIT (OUTPATIENT)
Dept: FAMILY MEDICINE CLINIC | Age: 85
End: 2020-03-18
Payer: MEDICARE

## 2020-03-18 VITALS
HEART RATE: 67 BPM | BODY MASS INDEX: 24.63 KG/M2 | OXYGEN SATURATION: 98 % | HEIGHT: 63 IN | WEIGHT: 139 LBS | TEMPERATURE: 97.4 F | SYSTOLIC BLOOD PRESSURE: 126 MMHG | DIASTOLIC BLOOD PRESSURE: 60 MMHG

## 2020-03-18 LAB
BILIRUBIN, POC: ABNORMAL
BLOOD URINE, POC: ABNORMAL
CLARITY, POC: ABNORMAL
COLOR, POC: ABNORMAL
GLUCOSE URINE, POC: ABNORMAL
KETONES, POC: ABNORMAL
LEUKOCYTE EST, POC: ABNORMAL
NITRITE, POC: ABNORMAL
PH, POC: 5.5
PROTEIN, POC: ABNORMAL
SPECIFIC GRAVITY, POC: 1.02
UROBILINOGEN, POC: ABNORMAL

## 2020-03-18 PROCEDURE — G8427 DOCREV CUR MEDS BY ELIG CLIN: HCPCS | Performed by: NURSE PRACTITIONER

## 2020-03-18 PROCEDURE — 99213 OFFICE O/P EST LOW 20 MIN: CPT | Performed by: NURSE PRACTITIONER

## 2020-03-18 PROCEDURE — 4040F PNEUMOC VAC/ADMIN/RCVD: CPT | Performed by: NURSE PRACTITIONER

## 2020-03-18 PROCEDURE — G8399 PT W/DXA RESULTS DOCUMENT: HCPCS | Performed by: NURSE PRACTITIONER

## 2020-03-18 PROCEDURE — G8482 FLU IMMUNIZE ORDER/ADMIN: HCPCS | Performed by: NURSE PRACTITIONER

## 2020-03-18 PROCEDURE — 1036F TOBACCO NON-USER: CPT | Performed by: NURSE PRACTITIONER

## 2020-03-18 PROCEDURE — 1090F PRES/ABSN URINE INCON ASSESS: CPT | Performed by: NURSE PRACTITIONER

## 2020-03-18 PROCEDURE — 1123F ACP DISCUSS/DSCN MKR DOCD: CPT | Performed by: NURSE PRACTITIONER

## 2020-03-18 PROCEDURE — 81002 URINALYSIS NONAUTO W/O SCOPE: CPT | Performed by: NURSE PRACTITIONER

## 2020-03-18 PROCEDURE — G8420 CALC BMI NORM PARAMETERS: HCPCS | Performed by: NURSE PRACTITIONER

## 2020-03-18 RX ORDER — PHENAZOPYRIDINE HYDROCHLORIDE 100 MG/1
100 TABLET, FILM COATED ORAL 3 TIMES DAILY PRN
COMMUNITY
End: 2020-08-25

## 2020-03-18 RX ORDER — NITROFURANTOIN 25; 75 MG/1; MG/1
100 CAPSULE ORAL 2 TIMES DAILY
COMMUNITY
End: 2020-07-27

## 2020-03-18 RX ORDER — SULFAMETHOXAZOLE AND TRIMETHOPRIM 800; 160 MG/1; MG/1
1 TABLET ORAL 2 TIMES DAILY
Qty: 6 TABLET | Refills: 0 | Status: SHIPPED | OUTPATIENT
Start: 2020-03-18 | End: 2020-03-21

## 2020-03-18 ASSESSMENT — ENCOUNTER SYMPTOMS
RESPIRATORY NEGATIVE: 1
VOMITING: 0
BACK PAIN: 1
NAUSEA: 0
DIARRHEA: 0

## 2020-03-18 NOTE — PROGRESS NOTES
Margo Christensen   82 y.o.  female  [id number]      Chief Complaint   Patient presents with    Lower Back Pain     c/o lower back pain with urinary urgency and frequency onset Sunday        Subjective:  82 y. o.female is here for a follow up. She has the following chronic/acute medical problems:  Patient Active Problem List   Diagnosis    Chronic depression    Hyperlipidemia    Tic douloureux    Anemia due to chronic illness    Colon cancer screening    Osteoporosis    Supraventricular tachycardia ([address]    Recurrent ventral incisional hernia    Mild cognitive impairment    CAD (coronary artery disease)    Elevated TSH    Peripheral vascular disease (HCC)    Sciatica of left side without back pain    Coronary artery disease involving coronary bypass graft of native heart without angina pectoris    S/P CABG x 3    Essential hypertension    MVP (mitral valve prolapse)    Bilateral carotid artery stenosis    Subclavian arterial stenosis (HCC)    Spigelian hernia    Ischemic cardiomyopathy    Glenohumeral arthritis, right       Urinary Tract Infection    This is a new problem. The current episode started in the past 7 days (Sunday). The problem occurs every urination. The quality of the pain is described as burning. The pain is mild. There has been no fever. She is not sexually active. There is no history of pyelonephritis. Associated symptoms include flank pain, frequency and urgency. Pertinent negatives include no chills, discharge, hematuria, hesitancy, nausea, possible pregnancy, sweats or vomiting. Treatments tried: Patient was seen at Urgent Care on Sunday - started on Macrobid and Pyridium has had no improvement. The treatment provided no relief. Review of Systems   Constitutional: Negative for appetite change, chills, fatigue and fever. HENT: Negative. Respiratory: Negative. Cardiovascular: Negative for chest pain and palpitations.    Gastrointestinal: Negative for diarrhea, nausea and vomiting. Genitourinary: Positive for dysuria, flank pain, frequency and urgency. Negative for hematuria and hesitancy. Musculoskeletal: Positive for back pain. Skin: Negative for rash. Neurological: Negative for dizziness, light-headedness and headaches. Current Outpatient Medications   Medication Sig Dispense Refill    phenazopyridine (PYRIDIUM) 100 MG tablet Take 100 mg by mouth 3 times daily as needed for Pain      nitrofurantoin, macrocrystal-monohydrate, (MACROBID) 100 MG capsule Take 100 mg by mouth 2 times daily      sulfamethoxazole-trimethoprim (BACTRIM DS;SEPTRA DS) 800-160 MG per tablet Take 1 tablet by mouth 2 times daily for 3 days 6 tablet 0    Icosapent Ethyl (VASCEPA) 0.5 g CAPS Take 1 g by mouth 2 times daily 360 capsule 2    Icosapent Ethyl (VASCEPA) 1 g CAPS capsule Take 1 capsule by mouth 2 times daily 60 capsule 3    gabapentin (NEURONTIN) 300 MG capsule 2 tablets 3 times daily for trigeminal neuralgia 180 capsule 5    Phenylephrine-Acetaminophen (RA SINUS CONGESTION/PAIN DAY PO) Take 1 tablet by mouth daily as needed 02/04/2020 Patient states she takes this when her headaches are severe.       pantoprazole (PROTONIX) 40 MG tablet take 1 tablet by mouth once daily 90 tablet 1    amLODIPine (NORVASC) 5 MG tablet take 1 tablet by mouth once daily 90 tablet 1    rosuvastatin (CRESTOR) 10 MG tablet take 1 tablet by mouth once daily (Patient taking differently: Take 10 mg by mouth Daily with supper ) 90 tablet 1    fluticasone (FLONASE) 50 MCG/ACT nasal spray instill 1 spray into each nostril once daily 48 g 1    cyanocobalamin 1000 MCG tablet Take 1,000 mcg by mouth daily      divalproex (DEPAKOTE) 250 MG DR tablet Take 250 mg by mouth Daily with supper       Multiple Vitamins-Minerals (OCUVITE-LUTEIN PO) Take by mouth      epinastine (ELESTAT) 0.05 % SOLN Place 1 drop into both eyes daily       calcium-vitamin D (OSCAL 500/200 D-3) 500-200 MG-UNIT per tablet Take 1

## 2020-03-19 LAB — URINE CULTURE, ROUTINE: NORMAL

## 2020-03-31 RX ORDER — ROSUVASTATIN CALCIUM 10 MG/1
TABLET, COATED ORAL
Qty: 90 TABLET | Refills: 1 | Status: SHIPPED | OUTPATIENT
Start: 2020-03-31 | End: 2020-09-24

## 2020-05-04 RX ORDER — PANTOPRAZOLE SODIUM 40 MG/1
TABLET, DELAYED RELEASE ORAL
Qty: 90 TABLET | Refills: 1 | Status: SHIPPED | OUTPATIENT
Start: 2020-05-04 | End: 2020-07-23

## 2020-06-11 ENCOUNTER — TELEPHONE (OUTPATIENT)
Dept: INTERNAL MEDICINE CLINIC | Age: 85
End: 2020-06-11

## 2020-06-15 ENCOUNTER — TELEPHONE (OUTPATIENT)
Dept: INTERNAL MEDICINE CLINIC | Age: 85
End: 2020-06-15

## 2020-06-25 RX ORDER — SODIUM CHLORIDE 9 MG/ML
INJECTION, SOLUTION INTRAVENOUS CONTINUOUS
Status: CANCELLED | OUTPATIENT
Start: 2020-07-02

## 2020-06-25 RX ORDER — EPINEPHRINE 1 MG/ML
0.3 INJECTION, SOLUTION, CONCENTRATE INTRAVENOUS PRN
Status: CANCELLED | OUTPATIENT
Start: 2020-07-02

## 2020-06-25 RX ORDER — METHYLPREDNISOLONE SODIUM SUCCINATE 125 MG/2ML
125 INJECTION, POWDER, LYOPHILIZED, FOR SOLUTION INTRAMUSCULAR; INTRAVENOUS ONCE
Status: CANCELLED | OUTPATIENT
Start: 2020-07-02

## 2020-06-25 RX ORDER — DIPHENHYDRAMINE HYDROCHLORIDE 50 MG/ML
50 INJECTION INTRAMUSCULAR; INTRAVENOUS ONCE
Status: CANCELLED | OUTPATIENT
Start: 2020-07-02

## 2020-06-29 RX ORDER — FLUTICASONE PROPIONATE 50 MCG
SPRAY, SUSPENSION (ML) NASAL
Qty: 48 G | Refills: 1 | Status: SHIPPED | OUTPATIENT
Start: 2020-06-29 | End: 2021-01-26 | Stop reason: SDUPTHER

## 2020-07-03 ENCOUNTER — HOSPITAL ENCOUNTER (OUTPATIENT)
Dept: INFUSION THERAPY | Age: 85
Setting detail: INFUSION SERIES
Discharge: HOME | End: 2020-07-03
Payer: MEDICARE

## 2020-07-03 VITALS
RESPIRATION RATE: 16 BRPM | TEMPERATURE: 98.1 F | HEART RATE: 76 BPM | DIASTOLIC BLOOD PRESSURE: 65 MMHG | SYSTOLIC BLOOD PRESSURE: 133 MMHG

## 2020-07-03 PROCEDURE — 6360000002 HC RX W HCPCS: Performed by: FAMILY MEDICINE

## 2020-07-03 PROCEDURE — 99211 OFF/OP EST MAY X REQ PHY/QHP: CPT

## 2020-07-03 PROCEDURE — 96372 THER/PROPH/DIAG INJ SC/IM: CPT

## 2020-07-03 RX ADMIN — DENOSUMAB 60 MG: 60 INJECTION SUBCUTANEOUS at 11:20

## 2020-07-03 NOTE — PROGRESS NOTES
Pt taken to room 00 FOR PORLIA INJECTION. Pt oriented to room, call light, bed/chair controls, TV, pt voiced understanding. Plan of care explained to pt, pt voiced understanding.

## 2020-07-09 ENCOUNTER — VIRTUAL VISIT (OUTPATIENT)
Dept: CARDIOLOGY CLINIC | Age: 85
End: 2020-07-09
Payer: MEDICARE

## 2020-07-09 PROCEDURE — 99442 PR PHYS/QHP TELEPHONE EVALUATION 11-20 MIN: CPT | Performed by: NURSE PRACTITIONER

## 2020-07-09 RX ORDER — FUROSEMIDE 20 MG/1
20 TABLET ORAL DAILY
Qty: 30 TABLET | Refills: 3 | Status: SHIPPED | OUTPATIENT
Start: 2020-07-09 | End: 2020-07-27 | Stop reason: SINTOL

## 2020-07-09 ASSESSMENT — ENCOUNTER SYMPTOMS
ABDOMINAL DISTENTION: 0
COUGH: 0
ABDOMINAL PAIN: 0
PHOTOPHOBIA: 0
SINUS PRESSURE: 0
SHORTNESS OF BREATH: 0
APNEA: 0
EYE PAIN: 0
BLOOD IN STOOL: 0

## 2020-07-09 NOTE — PROGRESS NOTES
2020    TELEHEALTH EVALUATION -- Audio/Visual (During GVWNT-53 public health emergency)    This is a telephone visit     HPI:    Apryl Moran (:  1935) has requested an audio/video evaluation for the following concern(s):  CAD  HTN  Criss Andre states that she has been feeling well. She is slightly depressed over this COVID pandemic. She is denying any chest pain or shortness of breath. She denies any dizziness or palpitations. 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 Atul Mcnally. She recently had her gabapentin adjusted he was having difficulty with sleep. She does complain of some lower leg edema and swelling to her hands. States she is having a difficult time with her rings. Review of Systems   Constitutional: Negative for chills, diaphoresis and fatigue. HENT: Negative for congestion and sinus pressure. Eyes: Negative for photophobia and pain. Respiratory: Negative for apnea, cough and shortness of breath. Cardiovascular: Positive for leg swelling. Negative for chest pain and palpitations. Gastrointestinal: Negative for abdominal distention, abdominal pain and blood in stool. Genitourinary: Negative for dysuria, frequency and hematuria. Musculoskeletal: Positive for arthralgias. Neurological: Negative for dizziness, syncope and light-headedness. Prior to Visit Medications    Medication Sig Taking?  Authorizing Provider   furosemide (LASIX) 20 MG tablet Take 1 tablet by mouth daily Yes ABELARDO Dominguez - CNP   fluticasone (FLONASE) 50 MCG/ACT nasal spray instill 1 spray into each nostril once daily Yes Agatha Donahue MD   pantoprazole (PROTONIX) 40 MG tablet take 1 tablet by mouth once daily Yes Agatha Donahue MD   rosuvastatin (CRESTOR) 10 MG tablet take 1 tablet by mouth once daily Yes Agatha Donahue MD   phenazopyridine (PYRIDIUM) 100 MG tablet Take 100 mg by mouth 3 times daily as needed for Pain Yes Historical Provider, MD   nitrofurantoin, macrocrystal-monohydrate, (MACROBID) 100 MG capsule Take 100 mg by mouth 2 times daily Yes Historical Provider, MD   Icosapent Ethyl (VASCEPA) 1 g CAPS capsule Take 1 capsule by mouth 2 times daily  Patient taking differently: Take 2 capsules by mouth 2 times daily  Yes Ata Correa MD   gabapentin (NEURONTIN) 300 MG capsule 2 tablets 3 times daily for trigeminal neuralgia  Patient taking differently: 300 mg. 1tablets 2 times daily  Then 2 tablets at hs  for trigeminal neuralgia Yes Pritesh Freeman MD   Phenylephrine-Acetaminophen (RA SINUS CONGESTION/PAIN DAY PO) Take 1 tablet by mouth daily as needed 2020 Patient states she takes this when her headaches are severe. Yes Historical Provider, MD   amLODIPine (NORVASC) 5 MG tablet take 1 tablet by mouth once daily Yes Mary Rodrigues MD   denosumab (PROLIA) 60 MG/ML SOLN SC injection Inject 1 mL into the skin once for 1 dose Yes Mary Rodrigues MD   cyanocobalamin 1000 MCG tablet Take 1,000 mcg by mouth daily Yes Historical Provider, MD   divalproex (DEPAKOTE) 250 MG DR tablet Take 250 mg by mouth Daily with supper  Yes Historical Provider, MD   Multiple Vitamins-Minerals (OCUVITE-LUTEIN PO) Take by mouth Yes Historical Provider, MD   epinastine (ELESTAT) 0.05 % SOLN Place 1 drop into both eyes daily  Yes Historical Provider, MD   calcium-vitamin D (OSCAL 500/200 D-3) 500-200 MG-UNIT per tablet Take 1 tablet by mouth 2 times daily. Yes Historical Provider, MD   aspirin 81 MG EC tablet Take 81 mg by mouth daily.    Yes Historical Provider, MD   Icosapent Ethyl (VASCEPA) 0.5 g CAPS Take 1 g by mouth 2 times daily  Patient not taking: Reported on 2020  Ata Correa MD       Social History     Tobacco Use    Smoking status: Former Smoker     Packs/day: 0.25     Years: 9.00     Pack years: 2.25     Types: Cigarettes     Start date:      Last attempt to quit: 1985     Years since quittin.6    Smokeless tobacco: Never Used    Tobacco comment: 7/8/2016   Substance Use Topics    Alcohol use: No     Alcohol/week: 0.0 standard drinks    Drug use: No        Allergies   Allergen Reactions    Alendronate Sodium Other (See Comments)     GI upset    Boniva [Ibandronate Sodium] Other (See Comments)     Gi upset and declined egd; also to fosamax    Lisinopril Other (See Comments)     Severe hyperkalemia (6) with bun >56      Neggram [Nalidixic Acid]     Pce [Erythromycin]     Penicillins     Welchol [Colesevelam Hcl] Other (See Comments)     constipation    Carbamazepine Nausea And Vomiting    Lovaza [Omega-3-Acid Ethyl Esters] Nausea And Vomiting   ,   Past Medical History:   Diagnosis Date    Arthritis of shoulder region, right 9/2014    Jovana Jt Blind right eye     following right cataract surg    Chronic depression 2009    Dr. Leonardo Osei    DVT (deep venous thrombosis) ([address] 1970    left leg    Former smoker     Hx of Doppler ultrasound 03/01/2018    Carotid-mild 0-49% bilateral carotid,50% stenosis right subclavian    Hyperlipidemia     Hypertension     Macular degeneration     right    Mild cognitive impairment 2/2012    MMSE 26/30    MVP (mitral valve prolapse)     trace on echo 2014    Osteoporosis 5/2012    Pancytopenia 5/2011    mild with ; Dr Emerson hawk 2010    Peptic ulcer disease     Peripheral vascular disease ([address] 1/2016    angio; dis below ankles; pletal    Prediabetes 2006    Recurrent ventral incisional hernia 2013    medial apex of GB scar    S/P CABG x 3 2003    3-vessel; Dr Tyrese Magdaleno 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 Daniel Munoz; Right side   ,   Past Surgical History:   Procedure Laterality Date    CATARACT REMOVAL Right 2010    poor result ; blind right eye; [facility]    CHOLECYSTECTOMY      CORONARY ARTERY BYPASS GRAFT  8/2009 3 vessel    SHOULDER ARTHROSCOPY Right 2003    TUBAL LIGATION      URETER SURGERY      Right ureteral repair     Patient-Reported Vitals 7/9/2020   Patient-Reported Weight 139 lbs   Patient-Reported Height 5 3   Patient-Reported Systolic 295   Patient-Reported Diastolic 59   Patient-Reported Pulse 82               ASSESSMENT/PLAN:  1. Coronary artery disease involving native coronary artery of native heart without angina pectoris   clinically she is stable there is no chest pain or shortness of breath we will continue with present medications at this time-we will continue with Ativan and aspirin   2. Essential hypertension reported blood pressure is stable-we will continue with amlodipine   3. Edema-to lower legs and 2 fingers-we will start low-dose Lasix daily-lab slip to check BMP in 1 week   4. Hyperlipidemia-labs reviewed with patient she does have elevated triglycerides however they are improving she does have multiple allergies, at this time we will continue the Cipro and Crestor. Urged to monitor diet with low carbohydrates and exercise as able        An  electronic signature was used to authenticate this note. --ABELARDO Carranza - CNP on 7/9/2020 at 4:35 PM        I confirm that this visit was completed in a telehealth setting ,using synchronous audiovisual technology for real time patient interaction . The patient identity with name and date of birth was confirmed . This evaluation of patient was done by telehealth in the setting of [address] emergency , which precluded assurance of safe in person visit at the time of service. The patient consented to and accepts potential risks associated with telemedical evaluation and care was taken to assess hussein presence of any medical issues that would be more  appropriate for expedited in -person care.     Pursuant to the emergency declaration under the [facility]5 waiver authority and the Coronavirus Preparedness and Response Supplemental Appropriations Act, this Virtual  Visit was conducted, with patient's consent, to reduce the patient's risk of exposure to COVID-19 and provide continuity of care for an established patient. Services were provided through a video synchronous discussion virtually to substitute for in-person clinic visit. I Affirm this is a Patient Initiated Episode with an Established Patient who has not had a related appointment within my department in the past 7 days or scheduled within the next 24 hours.     Total Time: minutes: 11-20 minutes

## 2020-07-09 NOTE — LETTER
CLINICAL STAFF DOCUMENTATION      Kymberly Queen  1935  [id number]    Have you had any Chest Pain - No      Have you had any Shortness of Breath - No      Have you had any dizziness - No      Have you had any palpitations - No      Do you have any edema - swelling in ankles    If Yes - CHECK TO SEE IF THE EDEMA IS PITTING  How long have they been having edema - Months  If Yes - Have they worn compression stockings No        Do you have a surgery or procedure scheduled in the near future - No

## 2020-07-23 RX ORDER — PANTOPRAZOLE SODIUM 40 MG/1
TABLET, DELAYED RELEASE ORAL
Qty: 90 TABLET | Refills: 1 | Status: SHIPPED | OUTPATIENT
Start: 2020-07-23 | End: 2020-08-07 | Stop reason: SDUPTHER

## 2020-07-24 ENCOUNTER — TELEPHONE (OUTPATIENT)
Dept: CARDIOLOGY CLINIC | Age: 85
End: 2020-07-24

## 2020-07-24 NOTE — TELEPHONE ENCOUNTER
Patient called and reported that she   Is very light headed , having trouble walking   She thinks her blood pressure is low

## 2020-07-27 ENCOUNTER — OFFICE VISIT (OUTPATIENT)
Dept: INTERNAL MEDICINE CLINIC | Age: 85
End: 2020-07-27
Payer: MEDICARE

## 2020-07-27 VITALS
RESPIRATION RATE: 16 BRPM | TEMPERATURE: 97.3 F | DIASTOLIC BLOOD PRESSURE: 70 MMHG | SYSTOLIC BLOOD PRESSURE: 130 MMHG | OXYGEN SATURATION: 98 % | HEART RATE: 72 BPM | HEIGHT: 63 IN | WEIGHT: 137 LBS | BODY MASS INDEX: 24.27 KG/M2

## 2020-07-27 VITALS
DIASTOLIC BLOOD PRESSURE: 70 MMHG | HEART RATE: 72 BPM | HEIGHT: 63 IN | SYSTOLIC BLOOD PRESSURE: 130 MMHG | BODY MASS INDEX: 24.27 KG/M2 | TEMPERATURE: 97.3 F | WEIGHT: 137 LBS

## 2020-07-27 PROCEDURE — G8399 PT W/DXA RESULTS DOCUMENT: HCPCS | Performed by: FAMILY MEDICINE

## 2020-07-27 PROCEDURE — 1090F PRES/ABSN URINE INCON ASSESS: CPT | Performed by: FAMILY MEDICINE

## 2020-07-27 PROCEDURE — 99214 OFFICE O/P EST MOD 30 MIN: CPT | Performed by: FAMILY MEDICINE

## 2020-07-27 PROCEDURE — 4040F PNEUMOC VAC/ADMIN/RCVD: CPT | Performed by: FAMILY MEDICINE

## 2020-07-27 PROCEDURE — 1036F TOBACCO NON-USER: CPT | Performed by: FAMILY MEDICINE

## 2020-07-27 PROCEDURE — G8420 CALC BMI NORM PARAMETERS: HCPCS | Performed by: FAMILY MEDICINE

## 2020-07-27 PROCEDURE — G8428 CUR MEDS NOT DOCUMENT: HCPCS | Performed by: FAMILY MEDICINE

## 2020-07-27 PROCEDURE — 1123F ACP DISCUSS/DSCN MKR DOCD: CPT | Performed by: FAMILY MEDICINE

## 2020-07-27 PROCEDURE — G0438 PPPS, INITIAL VISIT: HCPCS | Performed by: FAMILY MEDICINE

## 2020-07-27 ASSESSMENT — ENCOUNTER SYMPTOMS
DIARRHEA: 0
CHEST TIGHTNESS: 0
CONSTIPATION: 0
VOMITING: 0
ABDOMINAL PAIN: 0
SORE THROAT: 0
SHORTNESS OF BREATH: 0
COLOR CHANGE: 0

## 2020-07-27 ASSESSMENT — PATIENT HEALTH QUESTIONNAIRE - PHQ9
SUM OF ALL RESPONSES TO PHQ QUESTIONS 1-9: 2
SUM OF ALL RESPONSES TO PHQ QUESTIONS 1-9: 2

## 2020-07-27 ASSESSMENT — LIFESTYLE VARIABLES: HOW OFTEN DO YOU HAVE A DRINK CONTAINING ALCOHOL: 0

## 2020-07-27 NOTE — PROGRESS NOTES
Subjective:      Chief Complaint   Patient presents with    3 Month Follow-Up       HPI:  Harvey Marrero is a 82 y.o. female who presents today for follow up chronic conditions as listed below. HTN:  Was started on lasix a couple weeks ago per cardiology for some mild swelling in her ankles. Started having lightheadedness when she would stand and was discontinued from medication (per cardiology), states symptoms have been improving since that time. Does have a BP cuff at home, but states she does not know how to use it. Otherwise feeling well today. Labs reviewed.        Past Medical History:   Diagnosis Date    Arthritis of shoulder region, right 9/2014    Marine Loser Blind right eye     following right cataract surg    Chronic depression 2009    Dr. Maddi Stratton    DVT (deep venous thrombosis) ([address] 1970    left leg    Former smoker     Hx of Doppler ultrasound 03/01/2018    Carotid-mild 0-49% bilateral carotid,50% stenosis right subclavian    Hyperlipidemia     Hypertension     Macular degeneration     right    Mild cognitive impairment 2/2012    MMSE 26/30    MVP (mitral valve prolapse)     trace on echo 2014    Osteoporosis 5/2012    Pancytopenia 5/2011    mild with ; Dr Lakesha hendricksal 2010    Peptic ulcer disease     Peripheral vascular disease ([address] 1/2016    angio; dis below ankles; pletal    Prediabetes 2006    Recurrent ventral incisional hernia 2013    medial apex of GB scar    S/P CABG x 3 2003    3-vessel; Dr Lanette Bauer 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 Yoselin Wang; Right side        Past Surgical History:   Procedure Laterality Date    CATARACT REMOVAL Right 2010    poor result ; blind right eye; [facility]    CHOLECYSTECTOMY      CORONARY ARTERY BYPASS GRAFT  8/2009    3 vessel    SHOULDER ARTHROSCOPY Right 2003    TUBAL LIGATION      URETER SURGERY mouth 2 times daily   Janette Ybarra MD   gabapentin (NEURONTIN) 300 MG capsule 2 tablets 3 times daily for trigeminal neuralgia  Patient taking differently: 300 mg. 1tablets 2 times daily  Then 2 tablets at hs  for trigeminal neuralgia  Miguel Navarro MD   Phenylephrine-Acetaminophen (RA SINUS CONGESTION/PAIN DAY PO) Take 1 tablet by mouth daily as needed 02/04/2020 Patient states she takes this when her headaches are severe. Historical Provider, MD   amLODIPine (NORVASC) 5 MG tablet take 1 tablet by mouth once daily  Stefanie Carrington MD   denosumab (PROLIA) 60 MG/ML SOLN SC injection Inject 1 mL into the skin once for 1 dose  Stefanie Carrington MD   cyanocobalamin 1000 MCG tablet Take 1,000 mcg by mouth daily  Historical Provider, MD   divalproex (DEPAKOTE) 250 MG DR tablet Take 250 mg by mouth Daily with supper   Historical Provider, MD   Multiple Vitamins-Minerals (OCUVITE-LUTEIN PO) Take by mouth  Historical Provider, MD   epinastine (ELESTAT) 0.05 % SOLN Place 1 drop into both eyes daily   Historical Provider, MD   calcium-vitamin D (OSCAL 500/200 D-3) 500-200 MG-UNIT per tablet Take 1 tablet by mouth 2 times daily. Historical Provider, MD   aspirin 81 MG EC tablet Take 81 mg by mouth daily. Historical Provider, MD          Objective:      /70   Pulse 72   Temp 97.3 °F (36.3 °C)   Ht 5' 3\" (1.6 m)   Wt 137 lb (62.1 kg)   BMI 24.27 kg/m²      Physical Exam  Vitals signs and nursing note reviewed. Constitutional:       General: She is not in acute distress. Appearance: Normal appearance. She is not ill-appearing or toxic-appearing. HENT:      Head: Normocephalic and atraumatic. Right Ear: External ear normal.      Left Ear: External ear normal.      Nose: Nose normal.      Mouth/Throat:      Pharynx: Oropharynx is clear. Eyes:      General: No scleral icterus. Right eye: No discharge. Left eye: No discharge.       Extraocular Movements: Extraocular movements intact. Conjunctiva/sclera: Conjunctivae normal.   Neck:      Musculoskeletal: Normal range of motion and neck supple. No neck rigidity. Cardiovascular:      Rate and Rhythm: Normal rate and regular rhythm. Heart sounds: Normal heart sounds. Pulmonary:      Effort: Pulmonary effort is normal.      Breath sounds: Normal breath sounds. No wheezing or rales. Musculoskeletal:         General: No deformity. Skin:     General: Skin is warm and dry. Findings: No rash. Neurological:      General: No focal deficit present. Mental Status: She is alert. Mental status is at baseline. Motor: No weakness. Psychiatric:         Mood and Affect: Mood normal.         Behavior: Behavior normal.            Assessment / Plan:      1. Essential hypertension  Stable, well controlled. If patient has any further issues with ankle swelling or hypotension, will consider discontinuing amlodipine (or switching to low dose beta blocker given history of CABG). Advised to bring in BP cuff to next appointment as patient states she does not know how to use it. Continue following with cardiology. - Comprehensive Metabolic Panel; Future  - CBC Auto Differential; Future    2. Mixed hyperlipidemia  Recently started on vascepa. Continue crestor. Patient voiced understanding and agreement with plan. All questions/concerns were addressed, risks/side effects of medications were reviewed. Return precautions and after visit summary were provided. Return in about 3 months (around 10/27/2020). or earlier as needed.       Tony Hernandez MD

## 2020-07-27 NOTE — PROGRESS NOTES
Medicare Annual Wellness Visit  Name: Shannon Scott Date: 2020   MRN: [id number] Sex: Female   Age: 82 y.o. Ethnicity: Non-/Non    : 1935 Race: Beltr[place] Shoemaker is here for Medicare AWV    Screenings for behavioral, psychosocial and functional/safety risks, and cognitive dysfunction are all negative except as indicated below. These results, as well as other patient data from the [address] [facility] [address] form, are documented in Flowsheets linked to this Encounter. Allergies   Allergen Reactions    Alendronate Sodium Other (See Comments)     GI upset    Boniva [Ibandronate Sodium] Other (See Comments)     Gi upset and declined egd; also to fosamax    Lisinopril Other (See Comments)     Severe hyperkalemia (6) with bun >56      Neggram [Nalidixic Acid]     Pce [Erythromycin]     Penicillins     Welchol [Colesevelam Hcl] Other (See Comments)     constipation    Carbamazepine Nausea And Vomiting    Lovaza [Omega-3-Acid Ethyl Esters] Nausea And Vomiting       Prior to Visit Medications    Medication Sig Taking?  Authorizing Provider   pantoprazole (PROTONIX) 40 MG tablet take 1 tablet by mouth once daily Yes Vivian Edouard MD   fluticasone (FLONASE) 50 MCG/ACT nasal spray instill 1 spray into each nostril once daily Yes Vivian Edouard MD   rosuvastatin (CRESTOR) 10 MG tablet take 1 tablet by mouth once daily Yes Vivian Edouard MD   phenazopyridine (PYRIDIUM) 100 MG tablet Take 100 mg by mouth 3 times daily as needed for Pain Yes Historical Provider, MD   Icosapent Ethyl (VASCEPA) 1 g CAPS capsule Take 1 capsule by mouth 2 times daily  Patient taking differently: Take 2 capsules by mouth 2 times daily  Yes Tiago Rosales MD   gabapentin (NEURONTIN) 300 MG capsule 2 tablets 3 times daily for trigeminal neuralgia  Patient taking differently: 300 mg. 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 Payton Bulls; Right side       Past Surgical History:   Procedure Laterality Date    CATARACT REMOVAL Right 2010    poor result ; blind right eye; [address] CORONARY ARTERY BYPASS GRAFT  8/2009    3 vessel    SHOULDER ARTHROSCOPY Right 2003    TUBAL LIGATION      URETER SURGERY      Right ureteral repair       Family History   Problem Relation Age of Onset    Cancer Sister 79        Breast cancer 1/2017       CareTeam (Including outside providers/suppliers regularly involved in providing care):   Patient Care Team:  Prudence Flores MD as PCP - General (Family Medicine)  Prudence Flores MD as PCP - [facility] Empaneled Provider  Jemal Ring MD as Consulting Physician (Cardiology)  Dimitrios Lopez MD as Consulting Physician (Cardiology)    Wt Readings from Last 3 Encounters:   07/27/20 137 lb (62.1 kg)   07/27/20 137 lb (62.1 kg)   07/27/20 137 lb 9.6 oz (62.4 kg)     Vitals:    07/27/20 1425   BP: 130/70   Pulse: 72   Resp: 16   Temp: 97.3 °F (36.3 °C)   TempSrc: Temporal   SpO2: 98%   Weight: 137 lb (62.1 kg)   Height: 5' 3\" (1.6 m)     Body mass index is 24.27 kg/m². Based upon direct observation of the patient, evaluation of cognition reveals recent and remote memory intact. Patient's complete Health Risk Assessment and screening values have been reviewed and are found in Flowsheets. The following problems were reviewed today and where indicated follow up appointments were made and/or referrals ordered. Positive Risk Factor Screenings with Interventions:     Fall Risk:  Timed Up and Go Test > 12 seconds?  (Complete if either Fall Risk answers are Yes): no  2 or more falls in past year?: no  Fall with injury in past year?: (!) yes  Fall Risk Interventions:    · Home safety tips provided    General Health:  General  In general, how would you say your health is?: Very Good  In the past 7 days, have you experienced any of the following? New or Increased Pain, New or Increased Fatigue, Loneliness, Social Isolation, Stress or Anger?: (!) Loneliness  Do you get the social and emotional support that you need?: (!) No  Do you have a Living Will?: Yes  General Health Risk Interventions:  · Loneliness: patient declines any further intervention for this issue  · Inadequate social/emotional support: patient declines any further intervention for this issue    Health Habits/Nutrition:  Health Habits/Nutrition  Do you exercise for at least 20 minutes 2-3 times per week?: (!) No  Have you lost any weight without trying in the past 3 months?: No  Do you eat fewer than 2 meals per day?: No  Have you seen a dentist within the past year?: Yes  Body mass index is 24.27 kg/m².   Health Habits/Nutrition Interventions:  · Inadequate physical activity:  educational materials provided to promote increased physical activity    Personalized Preventive Plan   Current Health Maintenance Status  Immunization History   Administered Date(s) Administered    Influenza Vaccine, unspecified formulation 10/22/2015, 10/18/2017    Influenza Virus Vaccine 10/18/2017, 12/20/2018, 09/10/2019    Influenza Whole 12/01/2006    Influenza, High Dose (Fluzone 65 yrs and older) 10/22/2015, 10/18/2016    Influenza, Triv, inactivated, subunit, adjuvanted, IM (Fluad 65 yrs and older) 11/09/2017, 12/20/2018, 09/10/2019    Pneumococcal Conjugate 13-valent (Ffpopcf89) 02/04/2016    Pneumococcal Polysaccharide ([id number]) 08/05/2009    Td, unspecified formulation 05/01/1999    Zoster Live (Zostavax) 03/05/2011        Health Maintenance   Topic Date Due    DTaP/Tdap/Td vaccine (1 - Tdap) 01/21/1954    Shingles Vaccine (2 of 3) 04/30/2011    Annual Wellness Visit (AWV)  05/29/2019    Flu vaccine (1) 09/01/2020    Potassium monitoring  02/04/2021    Creatinine monitoring  02/04/2021    Lipid screen  06/22/2021    DEXA (modify frequency per FRAX score)  Completed    Pneumococcal 65+ years Vaccine  Completed    Hepatitis A vaccine  Aged Out    Hepatitis B vaccine  Aged Out    Hib vaccine  Aged Out    Meningococcal (ACWY) vaccine  Aged Out     Recommendations for [facility] Due: see orders and patient instructions/AVS.  . Recommended screening schedule for the next 5-10 years is provided to the patient in written form: see Patient Instructions/AVS.    Gareth Vick LPN, 3/15/7393, performed the documented evaluation under the direct supervision of the attending physician.

## 2020-07-27 NOTE — PATIENT INSTRUCTIONS
Personalized Preventive Plan for Anika Prajapati - 7/27/2020  Medicare offers a range of preventive health benefits. Some of the tests and screenings are paid in full while other may be subject to a deductible, co-insurance, and/or copay. Some of these benefits include a comprehensive review of your medical history including lifestyle, illnesses that may run in your family, and various assessments and screenings as appropriate. After reviewing your medical record and screening and assessments performed today your provider may have ordered immunizations, labs, imaging, and/or referrals for you. A list of these orders (if applicable) as well as your Preventive Care list are included within your After Visit Summary for your review. Other Preventive Recommendations:    · A preventive eye exam performed by an eye specialist is recommended every 1-2 years to screen for glaucoma; cataracts, macular degeneration, and other eye disorders. · A preventive dental visit is recommended every 6 months. · Try to get at least 150 minutes of exercise per week or 10,000 steps per day on a pedometer . · Order or download the FREE \"Exercise & Physical Activity: Your Everyday Guide\" from [employer]. Call 7-948.572.5325 or search [employer] online. · You need 4366-7050 mg of calcium and 0182-9136 IU of vitamin D per day. It is possible to meet your calcium requirement with diet alone, but a vitamin D supplement is usually necessary to meet this goal.  · When exposed to the sun, use a sunscreen that protects against both UVA and UVB radiation with an SPF of 30 or greater. Reapply every 2 to 3 hours or after sweating, drying off with a towel, or swimming. · Always wear a seat belt when traveling in a car. Always wear a helmet when riding a bicycle or motorcycle. Heart-Healthy Diet   Sodium, Fat, and Cholesterol Controlled Diet       What Is a Heart Healthy Diet?    A heart-healthy diet seeds (unsalted, dry-roasted), low-sodium peanut butter Dried peas, beans, and lentils   Any smoked, cured, salted, or canned meat, fish, or poultry (including davis, chipped beef, cold cuts, hot dogs, sausages, sardines, and anchovies) Poultry skins Breaded and/or fried fish or meats Canned peas, beans, and lentils Salted nuts   Fats and Oils   Olive oil and canola oil Low-sodium, low-fat salad dressings and mayonnaise   Butter, margarine, coconut and palm oils, davis fat   Snacks, Sweets, and Condiments   Low-sodium or unsalted versions of broths, soups, soy sauce, and condiments Pepper, herbs, and spices; vinegar, lemon, or lime juice Low-fat frozen desserts (yogurt, sherbet, fruit bars) Sugar, cocoa powder, honey, syrup, jam, and preserves Low-fat, trans-fat free cookies, cakes, and pies Giorgio and animal crackers, fig bars, marlo snaps   High-fat desserts Broth, soups, gravies, and sauces, made from instant mixes or other high-sodium ingredients Salted snack foods Canned olives Meat tenderizers, seasoning salt, and most flavored vinegars   Beverages   Low-sodium carbonated beverages Tea and coffee in moderation Soy milk   Commercially softened water   Suggestions   Make whole grains, fruits, and vegetables the base of your diet. Choose heart-healthy fats such as canola, olive, and flaxseed oil, and foods high in heart-healthy fats, such as nuts, seeds, soybeans, tofu, and fish. Eat fish at least twice per week; the fish highest in omega-3 fatty acids and lowest in mercury include salmon, herring, mackerel, sardines, and canned chunk light tuna. If you eat fish less than twice per week or have high triglycerides, talk to your doctor about taking fish oil supplements. Read food labels.    For products low in fat and cholesterol, look for fat free, low-fat, cholesterol free, saturated fat free, and trans fat freeAlso scan the Nutrition Facts Label, which lists saturated fat, trans fat, and cholesterol amounts. For products low in sodium, look for sodium free, very low sodium, low sodium, no added salt, and unsalted   Skip the salt when cooking or at the table; if food needs more flavor, get creative and try out different herbs and spices. Garlic and onion also add substantial flavor to foods. Trim any visible fat off meat and poultry before cooking, and drain the fat off after jackson. Use cooking methods that require little or no added fat, such as grilling, boiling, baking, poaching, broiling, roasting, steaming, stir-frying, and sauting. Avoid fast food and convenience food. They tend to be high in saturated and trans fat and have a lot of added salt. Talk to a registered dietitian for individualized diet advice. Last Reviewed: March 2011 Vasquez Leo MS, MPH, RD   Updated: 3/29/2011   ·     Keep Your Memory Merlinedaisy Hanna       Many factors can affect your ability to remembera hectic lifestyle, aging, stress, chronic disease, and certain medicines. But, there are steps you can take to sharpen your mind and help preserve your memory. Challenge Your Brain   Regularly challenging your mind may help keeps it in top shape. Good mental exercises include:   Crossword puzzlesUse a dictionary if you need it; you will learn more that way. Brainteasers Try some! Crafts, such as wood working and sewing   Hobbies, such as gardening and building model airplanes   SocializingVisit old friends or join groups to meet new ones. Reading   Learning a new language   Taking a class, whether it be art history or gonzález chi   TravelingExperience the food, history, and culture of your destination   Learning to use a computer   Going to museums, the theater, or thought-provoking movies   Changing things in your daily life, such as reversing your pattern in the grocery store or brushing your teeth using your nondominant hand   Use Memory Aids   There is no need to remember every detail on your own.  These memory aids can activities that calm you down, and make it routine. Manage Chronic Conditions    Side effects of high blood pressure , diabetes, and heart disease can interfere with mental function. Many of the lifestyle steps discussed here can help manage these conditions. Strive to eat a healthy diet, exercise regularly, get stress under control, and follow your doctor's advice for your condition. Minimize Medications    Talk to your doctor about the medicines that you take. Some may be unnecessary. Also, healthy lifestyle habits may lower the need for certain drugs. Last Reviewed: April 2010 Lane Delarosa MD   Updated: 4/13/2010   ·     [address]       As we get older, changes in balance, gait, strength, vision, hearing, and cognition make even the most youthful senior more prone to accidents. Falls are one of the leading health risks for older people. This increased risk of falling is related to:   Aging process (eg, decreased muscle strength, slowed reflexes)   Higher incidence of chronic health problems (eg, arthritis, diabetes) that may limit mobility, agility or sensory awareness   Side effects of medicine (eg, dizziness, blurred vision)especially medicines like prescription pain medicines and drugs used to treat mental health conditions   Depending on the brittleness of your bones, the consequences of a fall can be serious and long lasting. Home Life   Research by the Association of Aging [facility]) shows that some home accidents among older adults can be prevented by making simple lifestyle changes and basic modifications and repairs to the home environment. Here are some lifestyle changes that experts recommend:   Have your hearing and vision checked regularly. Be sure to wear prescription glasses that are right for you. Speak to your doctor or pharmacist about the possible side effects of your medicines. A number of medicines can cause dizziness.    If you have problems with sleep, talk to your doctor. Limit your intake of alcohol. If necessary, use a cane or walker to help maintain your balance. Wear supportive, rubber-soled shoes, even at home. If you live in a region that gets wintry weather, you may want to put special cleats on your shoes to prevent you from slipping on the snow and ice. Exercise regularly to help maintain muscle tone, agility, and balance. Always hold the banister when going up or down stairs. Also, use  bars when getting in or out of the bath or shower, or using the toilet. To avoid dizziness, get up slowly from a lying down position. Sit up first, dangling your legs for a minute or two before rising to a standing position. Overall Home Safety Check   According to the Consumer Product Safety Commision's \"Older Consumer Home Safety Checklist,\" it is important to check for potential hazards in each room. And remember, proper lighting is an essential factor in home safety. If you cannot see clearly, you are more likely to fall. Important questions to ask yourself include:   Are lamp, electric, extension, and telephone cords placed out of the flow of traffic and maintained in good condition? Have frayed cords been replaced? Are all small rugs and runners slip resistant? If not, you can secure them to the floor with a special double-sided carpet tape. Are smoke detectors properly locatedone on every floor of your home and one outside of every sleeping area? Are they in good working order? Are batteries replaced at least once a year? Do you have a well-maintained carbon monoxide detector outside every sleeping are in your home? Does your furniture layout leave plenty of space to maneuver between and around chairs, tables, beds, and sofas? Are hallways, stairs and passages between rooms well lit? Can you reach a lamp without getting out of bed? Are floor surfaces well maintained?  Shag rugs, high-pile carpeting, tile floors, and polished wood floors can be transfer benches allow you to slide safely into the tub. Raised toilet seats and toilet safety rails are helpful for those with knee or hip problems. In the [facility]    Make sure you use a nightlight and that the area around your bed is clear of potential obstacles. Be careful with electric blankets and never go to sleep with a heating pad, which can cause serious burns even if on a low setting. Use fire-resistant mattress covers and pillows, and NEVER smoke in bed. Keep a phone next to the bed that is programmed to dial 911 at the push of a button. If you have a chronic condition, you may want to sign on with an automatic call-in service. Typically the system includes a small pendant that connects directly to an emergency medical voice-response system. You should also make arrangements to stay in contact with someonefriend, neighbor, family memberon a regular schedule. Fire Prevention   According to the [employer]. (Smoke Alarms for Every) [address] [place] [address], senior citizens are one of the two highest risk groups for death and serious injuries due to residential fires. When cooking, wear short-sleeved items, never a bulky long-sleeved robe. The [facility]'s Safety Checklist for Older Consumers emphasizes the importance of checking basements, garages, workshops and storage areas for fire hazards, such as volatile liquids, piles of old rags or clothing and overloaded circuits. Never smoke in bed or when lying down on a couch or recliner chair. Small portable electric or kerosene heaters are responsible for many home fires and should be used cautiously if at all. If you do use one, be sure to keep them away from flammable materials. In case of fire, make sure you have a pre-established emergency exit plan. Have a professional check your fireplace and other fuel-burning appliances yearly.     Helping Hands   Baby boomers entering the pierce years will continue to see the development of new products to help older adults live safely and independently in spite of age-related changes. Making Life More Livable  , by [employer], lists over 1,000 products for \"living well in the mature years,\" such as bathing and mobility aids, household security devices, ergonomically designed knives and peelers, and faucet valves and knobs for temperature control. Medical supply stores and organizations are good sources of information about products that improve your quality of life and insure your safety. Last Reviewed: November 2009 Govind Garcia MD   Updated: 3/7/2011     ·        Learning About Living [place]  What is a living will? A living will, also called a declaration, is a legal form. It tells your family and your doctor your wishes when you can't speak for yourself. It's used by the health professionals who will treat you as you near the end of your life or if you get seriously hurt or ill. If you put your wishes in writing, your loved ones and others will know what kind of care you want. They won't need to guess. This can ease your mind and be helpful to others. And you can change or cancel your living will at any time. A living will is not the same as an estate or property will. An estate will explains what you want to happen with your money and property after you die. How do you use it? A living will is used to describe the kinds of treatment or life support you want as you near the end of your life or if you get seriously hurt or ill. Keep these facts in mind about living [place]. Your living will is used only if you can't speak or make decisions for yourself. Most often, one or more doctors must certify that you can't speak or decide for yourself before your living will takes effect. If you get better and can speak for yourself again, you can accept or refuse any treatment. It doesn't matter what you said in your living will.   Some states may limit your right to refuse treatment in certain cases. For example, you may need to clearly state in your living will that you don't want artificial hydration and nutrition, such as being fed through a tube. Is a living will a legal document? A living will is a legal document. Each state has its own laws about living [place]. And a living will may be called something else in your state. Here are some things to know about living [place]. You don't need an  to complete a living will. But legal advice can be helpful if your state's laws are unclear. It can also help if your health history is complicated or your family can't agree on what should be in your living will. You can change your living will at any time. Some people find that their wishes about end-of-life care change as their health changes. If you make big changes to your living will, complete a new form. If you move to another state, make sure that your living will is legal in the state where you now live. In most cases, doctors will respect your wishes even if you have a form from a different state. You might use a universal form that has been approved by many states. This kind of form can sometimes be filled out and stored online. Your digital copy will then be available wherever you have a connection to the internet. The doctors and nurses who need to treat you can find it right away. Your state may offer an online registry. This is another place where you can store your living will online. It's a good idea to get your living will notarized. This means using a person called a  to watch two people sign, or witness, your living will. What should you know when you create a living will? Here are some questions to ask yourself as you make your living will:  Do you know enough about life support methods that might be used? If not, talk to your doctor so you know what might be done if you can't breathe on your own, your heart stops, or you can't swallow.   What things would

## 2020-08-10 RX ORDER — PANTOPRAZOLE SODIUM 40 MG/1
TABLET, DELAYED RELEASE ORAL
Qty: 90 TABLET | Refills: 1 | Status: SHIPPED | OUTPATIENT
Start: 2020-08-10 | End: 2020-10-27

## 2020-08-25 ENCOUNTER — OFFICE VISIT (OUTPATIENT)
Dept: CARDIOLOGY CLINIC | Age: 85
End: 2020-08-25
Payer: MEDICARE

## 2020-08-25 VITALS
DIASTOLIC BLOOD PRESSURE: 60 MMHG | SYSTOLIC BLOOD PRESSURE: 126 MMHG | HEART RATE: 72 BPM | WEIGHT: 137.4 LBS | BODY MASS INDEX: 24.34 KG/M2 | HEIGHT: 63 IN

## 2020-08-25 PROCEDURE — 99214 OFFICE O/P EST MOD 30 MIN: CPT | Performed by: INTERNAL MEDICINE

## 2020-08-25 PROCEDURE — 1036F TOBACCO NON-USER: CPT | Performed by: INTERNAL MEDICINE

## 2020-08-25 PROCEDURE — G8399 PT W/DXA RESULTS DOCUMENT: HCPCS | Performed by: INTERNAL MEDICINE

## 2020-08-25 PROCEDURE — 1090F PRES/ABSN URINE INCON ASSESS: CPT | Performed by: INTERNAL MEDICINE

## 2020-08-25 PROCEDURE — 1123F ACP DISCUSS/DSCN MKR DOCD: CPT | Performed by: INTERNAL MEDICINE

## 2020-08-25 PROCEDURE — 4040F PNEUMOC VAC/ADMIN/RCVD: CPT | Performed by: INTERNAL MEDICINE

## 2020-08-25 PROCEDURE — G8427 DOCREV CUR MEDS BY ELIG CLIN: HCPCS | Performed by: INTERNAL MEDICINE

## 2020-08-25 PROCEDURE — G8420 CALC BMI NORM PARAMETERS: HCPCS | Performed by: INTERNAL MEDICINE

## 2020-08-25 RX ORDER — ICOSAPENT ETHYL 1000 MG/1
2 CAPSULE ORAL 2 TIMES DAILY
COMMUNITY
End: 2021-01-28

## 2020-08-25 RX ORDER — AMLODIPINE BESYLATE 5 MG/1
TABLET ORAL
Qty: 90 TABLET | Refills: 1 | Status: SHIPPED | OUTPATIENT
Start: 2020-08-25 | End: 2020-12-03

## 2020-08-25 NOTE — PATIENT INSTRUCTIONS
CAD:Yes   clinically stable. Patient is on optimal medical regimen ( see medication list above )  -     CORONARY ARTERY DISEASE: Patient is currently  asymptomatic from CAD. - changes in  treatment:   no           - Testing ordered:  no  [facility] classification: 1  [place] RISK SCORE:  LOAN RISK SCORE:  HTN:well controlled on current medical regimen, see list above. - changes in  treatment:   no   CARDIOMYOPATHY:  known   CONGESTIVE HEART FAILURE: NO KNOWN HISTORY.      VHD: No significant VHD noted  DYSLIPIDEMIA: Patient's profile is at / near Goal.no, Triglycerides are very high. HDL is low                                Tolerating current medical regimen wellyes,                                                            See most recent Lab values in Labs section above. CAROTID ARTERY DISEASE:.Right Subclavian stenosis  OTHER RELEVANT DIAGNOSIS:as noted in patient's active problem list:  Fatigue: with STEPHENS  TESTS ORDERED:Echo                                       All previously ordered tests reviewed. ARRHYTHMIAS:  Known H/O SVT. No C/O   MEDICATIONS: CPM    Office f/u in six months if Echo is OK. Primary/secondary prevention is the goal by aggressive risk modification, healthy and therapeutic life style changes for cardiovascular risk reduction. Various goals are discussed and multiple questions answered.

## 2020-08-25 NOTE — PROGRESS NOTES
Shi Hernandez is a 82 y.o. female who has    CHIEF COMPLAINT AS FOLLOWS:  CHEST PAIN: Patient denies any C/O chest pains at this time. SOB:  C/O SOB with exertion but same as before. LEG EDEMA: No C/O.   PALPITATIONS: Denies any C/O Palpitations   DIZZINESS: No C/O Dizziness   SYNCOPE: None   OTHER: Fatigue                                    HPI: Patient is here for F/U on her CAD, HTN & Dyslipidemia problems. He does not have any new complaints at this time.     Nguyen Rees has the following history recorded in care path:  Patient Active Problem List    Diagnosis Date Noted    Spigelian hernia 03/01/2017     Priority: Low    Bilateral carotid artery stenosis 02/02/2017     Priority: Low    Subclavian arterial stenosis (HCC) 02/02/2017     Priority: Low    Coronary artery disease involving coronary bypass graft of native heart without angina pectoris 01/20/2017     Priority: Low    S/P CABG x 3 01/20/2017     Priority: Low    Essential hypertension 01/20/2017     Priority: Low    MVP (mitral valve prolapse) 01/20/2017     Priority: Low    Sciatica of left side without back pain 08/17/2016     Priority: Low    Elevated TSH 02/01/2016     Priority: Low    Peripheral vascular disease ([facility] [address] 01/01/2016     Priority: Low    CAD (coronary artery disease) 05/23/2014     Priority: Low    Recurrent ventral incisional hernia      Priority: Low    Supraventricular tachycardia ([facility] [address]      Priority: Low    Osteoporosis 05/29/2012     Priority: Low    Mild cognitive impairment 02/01/2012     Priority: Low    Colon cancer screening 03/18/2011     Priority: Low    Anemia due to chronic illness 10/29/2010     Priority: Low    Chronic depression      Priority: Low    Hyperlipidemia      Priority: Low    Tic douloureux      Priority: Low    Glenohumeral arthritis, right 10/02/2019    Ischemic cardiomyopathy 08/05/2019 Current Outpatient Medications   Medication Sig Dispense Refill    Icosapent Ethyl (VASCEPA) 1 g CAPS capsule Take 2 capsules by mouth 2 times daily      pantoprazole (PROTONIX) 40 MG tablet take 1 tablet by mouth once daily 90 tablet 1    fluticasone (FLONASE) 50 MCG/ACT nasal spray instill 1 spray into each nostril once daily 48 g 1    rosuvastatin (CRESTOR) 10 MG tablet take 1 tablet by mouth once daily 90 tablet 1    gabapentin (NEURONTIN) 300 MG capsule 2 tablets 3 times daily for trigeminal neuralgia (Patient taking differently: Take 300 mg by mouth 3 times daily. ) 180 capsule 5    Phenylephrine-Acetaminophen (RA SINUS CONGESTION/PAIN DAY PO) Take 1 tablet by mouth daily as needed 02/04/2020 Patient states she takes this when her headaches are severe.  amLODIPine (NORVASC) 5 MG tablet take 1 tablet by mouth once daily 90 tablet 1    denosumab (PROLIA) 60 MG/ML SOLN SC injection Inject 1 mL into the skin once for 1 dose 1 mL 0    cyanocobalamin 1000 MCG tablet Take 1,000 mcg by mouth daily      divalproex (DEPAKOTE) 250 MG DR tablet Take 250 mg by mouth Daily with supper       Multiple Vitamins-Minerals (OCUVITE-LUTEIN PO) Take by mouth      epinastine (ELESTAT) 0.05 % SOLN Place 1 drop into both eyes daily       calcium-vitamin D (OSCAL 500/200 D-3) 500-200 MG-UNIT per tablet Take 1 tablet by mouth 2 times daily.  aspirin 81 MG EC tablet Take 81 mg by mouth daily. No current facility-administered medications for this visit. Allergies: Alendronate sodium; Boniva [ibandronate sodium]; Lisinopril; Neggram [nalidixic acid]; Pce [erythromycin]; Penicillins; Welchol [colesevelam hcl];  Carbamazepine; and Lovaza [omega-3-acid ethyl esters]  Past Medical History:   Diagnosis Date    Arthritis of shoulder region, right 9/2014    Theodora Tee Blind right eye     following right cataract surg    Chronic depression 2009    Dr. Giuliana Adan    DVT (deep venous thrombosis) ([facility] [address] 1970 left leg    Former smoker     Hx of Doppler ultrasound 2018    Carotid-mild 0-49% bilateral carotid,50% stenosis right subclavian    Hyperlipidemia     Hypertension     Macular degeneration     right    Mild cognitive impairment 2012    MMSE 26/30    MVP (mitral valve prolapse)     trace on echo     Osteoporosis 2012    Pancytopenia 2011    mild with ; Dr Lakesha hawk     Peptic ulcer disease     Peripheral vascular disease ([address] 2016    angio; dis below ankles; pletal    Prediabetes     Recurrent ventral incisional hernia     medial apex of GB scar    S/P CABG x 3     3-vessel; Dr Lanette Bauer Spigelian hernia 2017    right ant lateral wall; seen on CT abd    Supraventricular tachycardia ([address]     Freq ventriular ectopy    Tic douloureux     Dr. Yoselin Wang; Right side     Past Surgical History:   Procedure Laterality Date    CATARACT REMOVAL Right     poor result ; blind right eye; [address] CORONARY ARTERY BYPASS GRAFT  2009    3 vessel    SHOULDER ARTHROSCOPY Right     TUBAL LIGATION      URETER SURGERY      Right ureteral repair      As reviewed   Family History   Problem Relation Age of Onset    Cancer Sister 79        Breast cancer 2017     Social History     Tobacco Use    Smoking status: Former Smoker     Packs/day: 0.25     Years: 9.00     Pack years: 2.25     Types: Cigarettes     Start date:      Last attempt to quit: 1985     Years since quittin.7    Smokeless tobacco: Never Used    Tobacco comment: 2016   Substance Use Topics    Alcohol use: No     Alcohol/week: 0.0 standard drinks      Review of Systems:    Constitutional: Negative for diaphoresis and fatigue  Psychological:Negative for anxiety or depression  HENT: Negative for headaches, nasal congestion, sinus pain or vertigo  Eyes: Negative for visual disturbance.    Endocrine: Negative for polydipsia/polyuria  Respiratory: Negative for shortness of breath  Cardiovascular: Negative for chest pain, dyspnea on exertion, claudication, edema, irregular heartbeat, murmur, palpitations or shortness of breath  Gastrointestinal: Negative for abdominal pain or heartburn  Genito-Urinary: Negative for urinary frequency/urgency  Musculoskeletal: Negative for muscle pain, muscular weakness, negative for pain in arm and leg or swelling in foot and leg  Neurological: Negative for dizziness, headaches, memory loss, numbness/tingling, visual changes, syncope  Dermatological: Negative for rash    Objective:  /60   Pulse 72   Ht 5' 3\" (1.6 m)   Wt 137 lb 6.4 oz (62.3 kg)   BMI 24.34 kg/m²   Wt Readings from Last 3 Encounters:   08/25/20 137 lb 6.4 oz (62.3 kg)   07/27/20 137 lb (62.1 kg)   07/27/20 137 lb (62.1 kg)     Body mass index is 24.34 kg/m². Patient-Reported Vitals 7/9/2020   Patient-Reported Weight 139 lbs   Patient-Reported Height 5 3   Patient-Reported Systolic 299   Patient-Reported Diastolic 59   Patient-Reported Pulse 82       Vitals:    08/25/20 1127   BP: 126/60   Pulse: 72   Weight: 137 lb 6.4 oz (62.3 kg)   Height: 5' 3\" (1.6 m)      GENERAL - Alert, oriented, pleasant, in no apparent distress. EYES: No jaundice, no conjunctival pallor. SKIN: It is warm & dry. No rashes. No Echhymosis    HEENT - No clinically significant abnormalities seen. Neck - Supple. No jugular venous distention noted. No carotid bruits. Cardiovascular - Normal S1 and S2 without obvious murmur or gallop. Extremities - No cyanosis, clubbing, or significant edema. Pulmonary - No respiratory distress. No wheezes or rales. Abdomen - No masses, tenderness, or organomegaly. Musculoskeletal - No significant edema. No joint deformities. No muscle wasting. Neurologic - Cranial nerves II through XII are grossly intact. There were no gross focal neurologic abnormalities.     Lab Review   Lab Results   Component Value Date    TROPONINT <0.010 05/16/2019    TROPONINT <0.010 05/16/2019     BNP:  No results found for: BNP  PT/INR:    Lab Results   Component Value Date    INR 0.96 01/25/2016     No results found for: LABA1C  Lab Results   Component Value Date    WBC 4.8 02/04/2020    WBC 4.4 11/13/2019    HCT 39.2 02/04/2020    HCT 37.8 11/13/2019    .8 (H) 02/04/2020     (H) 11/13/2019     02/04/2020     (L) 11/13/2019     Lab Results   Component Value Date    CHOL 129 06/22/2020    CHOL 150 01/23/2020    TRIG 273 (H) 06/22/2020    TRIG 376 (H) 01/23/2020    HDL 30 (L) 06/22/2020    HDL 31 (L) 01/23/2020    LDLCALC 44 06/22/2020    LDLCALC 44 01/23/2020    LDLDIRECT 93 08/22/2016    LDLDIRECT 79 07/06/2015     Lab Results   Component Value Date    ALT 14 02/04/2020    ALT 8 11/13/2019    AST 22 02/04/2020    AST 22 11/13/2019     BMP:    Lab Results   Component Value Date     02/04/2020     11/13/2019    K 4.8 02/04/2020    K 4.5 11/13/2019    CL 94 02/04/2020     11/13/2019    CO2 25 02/04/2020    CO2 23 11/13/2019    BUN 15 02/04/2020    BUN 15 11/13/2019    CREATININE 1.0 02/04/2020    CREATININE 1.03 11/13/2019     CMP:   Lab Results   Component Value Date     02/04/2020     11/13/2019    K 4.8 02/04/2020    K 4.5 11/13/2019    CL 94 02/04/2020     11/13/2019    CO2 25 02/04/2020    CO2 23 11/13/2019    BUN 15 02/04/2020    BUN 15 11/13/2019    PROT 7.6 02/04/2020    PROT 7.2 11/13/2019    PROT 7.5 01/02/2013    PROT 7.4 10/13/2012     TSH:    Lab Results   Component Value Date    TSH 3.250 03/06/2019    TSH 4.270 04/20/2018    TSHHS 5.080 02/03/2016       QUALITY MEASURES REVIEWED:  1.CAD:Patient is taking anti platelet agent:Yes  2. DYSLIPIDEMIA: Patient is on cholesterol lowering medication:Yes  3. Beta-Blocker therapy for CAD, if prior Myocardial Infarction:No  4. Atrial fibrillation & anticoagulation therapy No      Impression:    1.  Coronary artery disease involving native coronary artery of native heart without angina pectoris    2. Mixed hyperlipidemia    3. Supraventricular tachycardia ([address]    4. Peripheral vascular disease ([address]    5. Coronary artery disease involving coronary bypass graft of native heart without angina pectoris    6. S/P CABG x 3    7. Essential hypertension    8. Bilateral carotid artery stenosis    9. Subclavian arterial stenosis (HCC)    10. Ischemic cardiomyopathy       Patient Active Problem List   Diagnosis Code    Chronic depression F32.9    Hyperlipidemia E78.5    Tic douloureux G50.0    Anemia due to chronic illness D63.8    Colon cancer screening Z12.11    Osteoporosis M81.0    Supraventricular tachycardia (HCC) I47.1    Recurrent ventral incisional hernia K43.2    Mild cognitive impairment G31.84    CAD (coronary artery disease) I25.10    Elevated TSH R79.89    Peripheral vascular disease (HCC) I73.9    Sciatica of left side without back pain M54.32    Coronary artery disease involving coronary bypass graft of native heart without angina pectoris I25.810    S/P CABG x 3 Z95.1    Essential hypertension I10    MVP (mitral valve prolapse) I34.1    Bilateral carotid artery stenosis I65.23    Subclavian arterial stenosis (HCC) I77.1    Spigelian hernia K43.9    Ischemic cardiomyopathy I25.5    Glenohumeral arthritis, right M19.011       Assessment & Plan:       CAD:Yes   clinically stable. Patient is on optimal medical regimen ( see medication list above )  -     CORONARY ARTERY DISEASE: Patient is currently  asymptomatic from CAD. - changes in  treatment:   no           - Testing ordered:  no  [facility] classification: 1  [place] RISK SCORE:  LOAN RISK SCORE:  HTN:well controlled on current medical regimen, see list above.               - changes in  treatment:   no   CARDIOMYOPATHY:  known   CONGESTIVE HEART FAILURE: NO KNOWN HISTORY.      VHD: No significant VHD noted  DYSLIPIDEMIA: Patient's profile is at / near [facility],

## 2020-09-09 ENCOUNTER — HOSPITAL ENCOUNTER (OUTPATIENT)
Age: 85
Discharge: HOME | End: 2020-09-09
Payer: MEDICARE

## 2020-09-09 ENCOUNTER — OFFICE VISIT (OUTPATIENT)
Dept: FAMILY MEDICINE CLINIC | Age: 85
End: 2020-09-09
Payer: MEDICARE

## 2020-09-09 VITALS
WEIGHT: 135 LBS | SYSTOLIC BLOOD PRESSURE: 128 MMHG | BODY MASS INDEX: 23.92 KG/M2 | HEIGHT: 63 IN | DIASTOLIC BLOOD PRESSURE: 68 MMHG | HEART RATE: 86 BPM | OXYGEN SATURATION: 97 % | TEMPERATURE: 97.1 F

## 2020-09-09 LAB
ALBUMIN SERPL-MCNC: 4.6 GM/DL (ref 3.4–5)
ALP BLD-CCNC: 34 IU/L (ref 40–128)
ALT SERPL-CCNC: 10 U/L (ref 10–40)
ANION GAP SERPL CALCULATED.3IONS-SCNC: 13 MMOL/L (ref 4–16)
AST SERPL-CCNC: 19 IU/L (ref 15–37)
BASOPHILS ABSOLUTE: 0 K/CU MM
BASOPHILS RELATIVE PERCENT: 0.5 % (ref 0–1)
BILIRUB SERPL-MCNC: 0.4 MG/DL (ref 0–1)
BILIRUBIN, POC: NORMAL
BLOOD URINE, POC: NORMAL
BUN BLDV-MCNC: 17 MG/DL (ref 6–23)
CALCIUM SERPL-MCNC: 9.6 MG/DL (ref 8.3–10.6)
CHLORIDE BLD-SCNC: 99 MMOL/L (ref 99–110)
CLARITY, POC: CLEAR
CO2: 25 MMOL/L (ref 21–32)
COLOR, POC: NORMAL
CREAT SERPL-MCNC: 1 MG/DL (ref 0.6–1.1)
DIFFERENTIAL TYPE: ABNORMAL
EOSINOPHILS ABSOLUTE: 0.1 K/CU MM
EOSINOPHILS RELATIVE PERCENT: 2 % (ref 0–3)
GFR AFRICAN AMERICAN: >60 ML/MIN/1.73M2
GFR NON-AFRICAN AMERICAN: 53 ML/MIN/1.73M2
GLUCOSE BLD-MCNC: 87 MG/DL (ref 70–99)
GLUCOSE URINE, POC: NORMAL
HCT VFR BLD CALC: 37.9 % (ref 37–47)
HEMOGLOBIN: 12 GM/DL (ref 12.5–16)
IMMATURE NEUTROPHIL %: 0.4 % (ref 0–0.43)
KETONES, POC: NORMAL
LEUKOCYTE EST, POC: NORMAL
LYMPHOCYTES ABSOLUTE: 2.2 K/CU MM
LYMPHOCYTES RELATIVE PERCENT: 39 % (ref 24–44)
MCH RBC QN AUTO: 32.8 PG (ref 27–31)
MCHC RBC AUTO-ENTMCNC: 31.7 % (ref 32–36)
MCV RBC AUTO: 103.6 FL (ref 78–100)
MONOCYTES ABSOLUTE: 0.5 K/CU MM
MONOCYTES RELATIVE PERCENT: 8.3 % (ref 0–4)
NITRITE, POC: NORMAL
NUCLEATED RBC %: 0 %
PDW BLD-RTO: 13.7 % (ref 11.7–14.9)
PH, POC: 5.5
PLATELET # BLD: 143 K/CU MM (ref 140–440)
PMV BLD AUTO: 10 FL (ref 7.5–11.1)
POTASSIUM SERPL-SCNC: 4.4 MMOL/L (ref 3.5–5.1)
PROTEIN, POC: NORMAL
RBC # BLD: 3.66 M/CU MM (ref 4.2–5.4)
SEGMENTED NEUTROPHILS ABSOLUTE COUNT: 2.8 K/CU MM
SEGMENTED NEUTROPHILS RELATIVE PERCENT: 49.8 % (ref 36–66)
SODIUM BLD-SCNC: 137 MMOL/L (ref 135–145)
SPECIFIC GRAVITY, POC: 1.01
TOTAL IMMATURE NEUTOROPHIL: 0.02 K/CU MM
TOTAL NUCLEATED RBC: 0 K/CU MM
TOTAL PROTEIN: 7.1 GM/DL (ref 6.4–8.2)
UROBILINOGEN, POC: NORMAL
WBC # BLD: 5.6 K/CU MM (ref 4–10.5)

## 2020-09-09 PROCEDURE — 1123F ACP DISCUSS/DSCN MKR DOCD: CPT | Performed by: NURSE PRACTITIONER

## 2020-09-09 PROCEDURE — G8420 CALC BMI NORM PARAMETERS: HCPCS | Performed by: NURSE PRACTITIONER

## 2020-09-09 PROCEDURE — 81002 URINALYSIS NONAUTO W/O SCOPE: CPT | Performed by: NURSE PRACTITIONER

## 2020-09-09 PROCEDURE — 1090F PRES/ABSN URINE INCON ASSESS: CPT | Performed by: NURSE PRACTITIONER

## 2020-09-09 PROCEDURE — 4040F PNEUMOC VAC/ADMIN/RCVD: CPT | Performed by: NURSE PRACTITIONER

## 2020-09-09 PROCEDURE — 85025 COMPLETE CBC W/AUTO DIFF WBC: CPT

## 2020-09-09 PROCEDURE — 99213 OFFICE O/P EST LOW 20 MIN: CPT | Performed by: NURSE PRACTITIONER

## 2020-09-09 PROCEDURE — 80053 COMPREHEN METABOLIC PANEL: CPT

## 2020-09-09 PROCEDURE — G8399 PT W/DXA RESULTS DOCUMENT: HCPCS | Performed by: NURSE PRACTITIONER

## 2020-09-09 PROCEDURE — 1036F TOBACCO NON-USER: CPT | Performed by: NURSE PRACTITIONER

## 2020-09-09 PROCEDURE — 36415 COLL VENOUS BLD VENIPUNCTURE: CPT

## 2020-09-09 PROCEDURE — G8427 DOCREV CUR MEDS BY ELIG CLIN: HCPCS | Performed by: NURSE PRACTITIONER

## 2020-09-09 RX ORDER — SULFAMETHOXAZOLE AND TRIMETHOPRIM 800; 160 MG/1; MG/1
1 TABLET ORAL 2 TIMES DAILY
Qty: 6 TABLET | Refills: 0 | Status: SHIPPED | OUTPATIENT
Start: 2020-09-09 | End: 2020-09-12

## 2020-09-09 ASSESSMENT — ENCOUNTER SYMPTOMS
NAUSEA: 1
WHEEZING: 0
COUGH: 0
VOMITING: 0
CHEST TIGHTNESS: 0
SHORTNESS OF BREATH: 0

## 2020-09-09 NOTE — PROGRESS NOTES
Harvey Marrero   82 y.o.  female  [id number]      Chief Complaint   Patient presents with    Urinary Tract Infection     c/o urinary urgency/burning and lower back pain onset yesterday morning        Subjective:  82 y. o.female is here for a follow up. She has the following chronic/acute medical problems:  Patient Active Problem List   Diagnosis    Chronic depression    Hyperlipidemia    Tic douloureux    Anemia due to chronic illness    Colon cancer screening    Osteoporosis    Supraventricular tachycardia ([address]    Recurrent ventral incisional hernia    Mild cognitive impairment    CAD (coronary artery disease)    Elevated TSH    Peripheral vascular disease (HCC)    Sciatica of left side without back pain    Coronary artery disease involving coronary bypass graft of native heart without angina pectoris    S/P CABG x 3    Essential hypertension    MVP (mitral valve prolapse)    Bilateral carotid artery stenosis    Subclavian arterial stenosis (HCC)    Spigelian hernia    Ischemic cardiomyopathy    Glenohumeral arthritis, right       Urinary Tract Infection    This is a new problem. The current episode started yesterday. The problem occurs every urination. The problem has been unchanged. The quality of the pain is described as burning. The patient is experiencing no pain. There has been no fever. She is not sexually active. There is no history of pyelonephritis. Associated symptoms include frequency, nausea (thinks it is from medication) and urgency. Pertinent negatives include no chills, discharge, flank pain, hematuria, hesitancy, possible pregnancy, sweats or vomiting. Treatments tried: Macrobid for one day - burning gone. The treatment provided moderate relief. Review of Systems   Constitutional: Negative for appetite change, chills, fatigue and fever. HENT: Negative. Respiratory: Negative for cough, chest tightness, shortness of breath and wheezing.     Cardiovascular: Negative for chest pain and palpitations. Gastrointestinal: Positive for nausea (thinks it is from medication). Negative for vomiting. Genitourinary: Positive for dysuria, frequency and urgency. Negative for flank pain, hematuria and hesitancy. Skin: Negative for rash. Neurological: Negative for dizziness, light-headedness and headaches. Current Outpatient Medications   Medication Sig Dispense Refill    sulfamethoxazole-trimethoprim (BACTRIM DS;SEPTRA DS) 800-160 MG per tablet Take 1 tablet by mouth 2 times daily for 3 days 6 tablet 0    amLODIPine (NORVASC) 5 MG tablet take 1 tablet by mouth once daily 90 tablet 1    Icosapent Ethyl (VASCEPA) 1 g CAPS capsule Take 2 capsules by mouth 2 times daily      pantoprazole (PROTONIX) 40 MG tablet take 1 tablet by mouth once daily 90 tablet 1    fluticasone (FLONASE) 50 MCG/ACT nasal spray instill 1 spray into each nostril once daily 48 g 1    rosuvastatin (CRESTOR) 10 MG tablet take 1 tablet by mouth once daily 90 tablet 1    Phenylephrine-Acetaminophen (RA SINUS CONGESTION/PAIN DAY PO) Take 1 tablet by mouth daily as needed 02/04/2020 Patient states she takes this when her headaches are severe.  cyanocobalamin 1000 MCG tablet Take 1,000 mcg by mouth daily      divalproex (DEPAKOTE) 250 MG DR tablet Take 250 mg by mouth Daily with supper       Multiple Vitamins-Minerals (OCUVITE-LUTEIN PO) Take by mouth      epinastine (ELESTAT) 0.05 % SOLN Place 1 drop into both eyes daily       calcium-vitamin D (OSCAL 500/200 D-3) 500-200 MG-UNIT per tablet Take 1 tablet by mouth 2 times daily.  aspirin 81 MG EC tablet Take 81 mg by mouth daily.  gabapentin (NEURONTIN) 300 MG capsule 2 tablets 3 times daily for trigeminal neuralgia (Patient taking differently: Take 300 mg by mouth 3 times daily.  ) 180 capsule 5    denosumab (PROLIA) 60 MG/ML SOLN SC injection Inject 1 mL into the skin once for 1 dose 1 mL 0     No current facility-administered medications for this visit. Past medical, family,surgical history reviewed today. Objective:  /68   Pulse 86   Temp 97.1 °F (36.2 °C)   Ht 5' 3\" (1.6 m)   Wt 135 lb (61.2 kg)   SpO2 97%   BMI 23.91 kg/m²   BP Readings from Last 3 Encounters:   09/09/20 128/68   08/25/20 126/60   07/27/20 130/70     Wt Readings from Last 3 Encounters:   09/09/20 135 lb (61.2 kg)   08/25/20 137 lb 6.4 oz (62.3 kg)   07/27/20 137 lb (62.1 kg)         Physical Exam  Constitutional:       Appearance: Normal appearance. HENT:      Head: Normocephalic. Neck:      Musculoskeletal: Neck supple. Cardiovascular:      Rate and Rhythm: Normal rate and regular rhythm. Heart sounds: Normal heart sounds. Pulmonary:      Breath sounds: Normal breath sounds. Skin:     General: Skin is warm and dry. Neurological:      Mental Status: She is alert and oriented to person, place, and time.    Psychiatric:         Mood and Affect: Mood normal.         Behavior: Behavior normal.         Lab Results   Component Value Date    WBC 4.8 02/04/2020    HGB 12.8 02/04/2020    HCT 39.2 02/04/2020    .8 (H) 02/04/2020     02/04/2020     Lab Results   Component Value Date     (L) 02/04/2020    K 4.8 02/04/2020    CL 94 (L) 02/04/2020    CO2 25 02/04/2020    BUN 15 02/04/2020    CREATININE 1.0 02/04/2020    GLUCOSE 161 (H) 02/04/2020    CALCIUM 9.9 02/04/2020    PROT 7.6 02/04/2020    LABALBU 4.4 02/04/2020    BILITOT 0.4 02/04/2020    ALKPHOS 38 (L) 02/04/2020    AST 22 02/04/2020    ALT 14 02/04/2020    LABGLOM 53 (L) 02/04/2020    GFRAA >60 02/04/2020    AGRATIO 1.7 11/13/2019    GLOB 2.7 11/13/2019     Lab Results   Component Value Date    CHOL 129 06/22/2020    CHOL 150 01/23/2020    CHOL 177 11/13/2019     Lab Results   Component Value Date    TRIG 273 (H) 06/22/2020    TRIG 376 (H) 01/23/2020    TRIG 469 (H) 11/13/2019     Lab Results   Component Value Date    HDL 30 (L) 06/22/2020    HDL 31 (L) 01/23/2020    HDL 27 (L) 11/13/2019     Lab Results   Component Value Date    LDLCALC 44 06/22/2020    LDLCALC 44 01/23/2020    [address] Comment 11/13/2019     No results found for: LABA1C  Lab Results   Component Value Date    TSH 3.250 03/06/2019    TSHHS 5.080 (H) 02/03/2016     Results for orders placed or performed in visit on 09/09/20   POCT Urinalysis no Micro   Result Value Ref Range    Color, UA DANG     Clarity, UA CLEAR     Glucose, UA POC NEG     Bilirubin, UA NEG     Ketones, UA 15mg     Spec Grav, UA 1.015     Blood, UA POC NEG     pH, UA 5.5     Protein, UA POC NEG     Urobilinogen, UA 0.2 E. U.     Leukocytes, UA NEG     Nitrite, UA NEG        ASSESSMENT/PLAN:      1. Dysuria  Urinalysis normal. Since patient improved on one full day of Macrobid but causing nausea - will switch patient to Bactrim. D/C Macrobid. Patient advised to take antibiotics as prescribed, push fluids and may take OTC azo for urinary pain if needed. Follow up if no improvement or symptoms worsen. F/U immediately if you develop fevers, chills, nausea, vomiting, body aches, or flank pain. - sulfamethoxazole-trimethoprim (BACTRIM DS;SEPTRA DS) 800-160 MG per tablet; Take 1 tablet by mouth 2 times daily for 3 days  Dispense: 6 tablet; Refill: 0    2. UTI symptoms  - POCT Urinalysis no Micro  - Culture, Urine          There are no discontinued medications. Care discussed with patient. Questions answered. Patient verbalizes understanding and agrees with plan. After visit summary provided. Advised to call for any problems, questions, or concerns. Return if symptoms worsen or fail to improve.                                               Signed:  ABELARDO Lechuga CNP  09/09/20  3:22 PM

## 2020-09-10 ENCOUNTER — TELEPHONE (OUTPATIENT)
Dept: FAMILY MEDICINE CLINIC | Age: 85
End: 2020-09-10

## 2020-09-10 LAB — URINE CULTURE, ROUTINE: NORMAL

## 2020-09-10 RX ORDER — ONDANSETRON 8 MG/1
8 TABLET, ORALLY DISINTEGRATING ORAL EVERY 8 HOURS PRN
Qty: 10 TABLET | Refills: 0 | Status: SHIPPED | OUTPATIENT
Start: 2020-09-10 | End: 2020-09-16 | Stop reason: SDUPTHER

## 2020-09-10 NOTE — TELEPHONE ENCOUNTER
Pt seen 09/09/2020 called today requesting Zofran due to both ATBs causing her nausea.    Please review/advise

## 2020-09-15 ENCOUNTER — TELEPHONE (OUTPATIENT)
Dept: INTERNAL MEDICINE CLINIC | Age: 85
End: 2020-09-15

## 2020-09-15 NOTE — TELEPHONE ENCOUNTER
Patient left message on voicemail still having problems. went to the walk in clinic. The patient was put on Bactrim for possible UTI. Patient stopped the bactrim x 4 days had nausea. Patient talked to the pharmacist advised probiotic. Urine culture came back negative. Patient had a hernia surgery and put a wrap around thing around abdomen. Gallbladder taken out over 30 years ago  Kidney surgery in  The 70's     Symptoms; Severe stomach pain  Nausea, urinary frequency.

## 2020-09-16 ENCOUNTER — OFFICE VISIT (OUTPATIENT)
Dept: INTERNAL MEDICINE CLINIC | Age: 85
End: 2020-09-16
Payer: MEDICARE

## 2020-09-16 VITALS
TEMPERATURE: 97.2 F | OXYGEN SATURATION: 97 % | WEIGHT: 137.4 LBS | HEART RATE: 87 BPM | BODY MASS INDEX: 24.34 KG/M2 | DIASTOLIC BLOOD PRESSURE: 72 MMHG | SYSTOLIC BLOOD PRESSURE: 118 MMHG

## 2020-09-16 PROCEDURE — 1123F ACP DISCUSS/DSCN MKR DOCD: CPT | Performed by: FAMILY MEDICINE

## 2020-09-16 PROCEDURE — 4040F PNEUMOC VAC/ADMIN/RCVD: CPT | Performed by: FAMILY MEDICINE

## 2020-09-16 PROCEDURE — 96372 THER/PROPH/DIAG INJ SC/IM: CPT | Performed by: FAMILY MEDICINE

## 2020-09-16 PROCEDURE — G8427 DOCREV CUR MEDS BY ELIG CLIN: HCPCS | Performed by: FAMILY MEDICINE

## 2020-09-16 PROCEDURE — G8399 PT W/DXA RESULTS DOCUMENT: HCPCS | Performed by: FAMILY MEDICINE

## 2020-09-16 PROCEDURE — G8420 CALC BMI NORM PARAMETERS: HCPCS | Performed by: FAMILY MEDICINE

## 2020-09-16 PROCEDURE — 1090F PRES/ABSN URINE INCON ASSESS: CPT | Performed by: FAMILY MEDICINE

## 2020-09-16 PROCEDURE — 1036F TOBACCO NON-USER: CPT | Performed by: FAMILY MEDICINE

## 2020-09-16 PROCEDURE — 98926 OSTEOPATH MANJ 3-4 REGIONS: CPT | Performed by: FAMILY MEDICINE

## 2020-09-16 PROCEDURE — 99213 OFFICE O/P EST LOW 20 MIN: CPT | Performed by: FAMILY MEDICINE

## 2020-09-16 RX ORDER — CYANOCOBALAMIN 1000 UG/ML
1000 INJECTION INTRAMUSCULAR; SUBCUTANEOUS ONCE
Status: COMPLETED | OUTPATIENT
Start: 2020-09-16 | End: 2020-09-16

## 2020-09-16 RX ORDER — DICYCLOMINE HYDROCHLORIDE 10 MG/1
10 CAPSULE ORAL 3 TIMES DAILY PRN
Qty: 30 CAPSULE | Refills: 0 | Status: SHIPPED | OUTPATIENT
Start: 2020-09-16 | End: 2021-01-28

## 2020-09-16 RX ORDER — ONDANSETRON 4 MG/1
4 TABLET, ORALLY DISINTEGRATING ORAL EVERY 8 HOURS PRN
Qty: 15 TABLET | Refills: 1 | Status: SHIPPED | OUTPATIENT
Start: 2020-09-16 | End: 2020-12-15 | Stop reason: SDUPTHER

## 2020-09-16 RX ADMIN — CYANOCOBALAMIN 1000 MCG: 1000 INJECTION INTRAMUSCULAR; SUBCUTANEOUS at 11:58

## 2020-09-16 NOTE — PROGRESS NOTES
Subjective:      Chief Complaint: Abdominal pain    HPI:  Tommy King is a 82 y.o. female who presents today for below complaint:    Periumbilical pain: On Labor Day weekend, patient woke up with burning pain in lower pelvic area. She took leftover Macrobid at home and her symptoms got worsened. Then, she went to urgent care center where she was placed on Bactrim and her burning sensation resolved. Her urinalysis test on 9/9/2020 was negative for any bacterial growth. Afterward, patient developed periumbilical pain with nausea. She again went to urgent care center where she was given Zofran. Patient pain is located around the periumbilical area, continuous, nonradiating,. Patient denies having any fever chills or change in stool color or consistency. Also denies having any diarrhea or constipation. Patient has a history of cholecystectomy.       Patient Active Problem List   Diagnosis    Chronic depression    Hyperlipidemia    Tic douloureux    Anemia due to chronic illness    Colon cancer screening    Osteoporosis    Supraventricular tachycardia ([address]    Recurrent ventral incisional hernia    Mild cognitive impairment    CAD (coronary artery disease)    Elevated TSH    Peripheral vascular disease (HCC)    Sciatica of left side without back pain    Coronary artery disease involving coronary bypass graft of native heart without angina pectoris    S/P CABG x 3    Essential hypertension    MVP (mitral valve prolapse)    Bilateral carotid artery stenosis    Subclavian arterial stenosis (HCC)    Spigelian hernia    Ischemic cardiomyopathy    Glenohumeral arthritis, right    Normocytic anemia       Past Medical History:   Diagnosis Date    Arthritis of shoulder region, right 9/2014    Constantino Betancourt Blind right eye     following right cataract surg    Chronic depression 2009    Dr. Diaz Devlin    DVT (deep venous thrombosis) ([address] 1970    left leg    Former smoker     Hx of Doppler ultrasound 2018    Carotid-mild 0-49% bilateral carotid,50% stenosis right subclavian    Hyperlipidemia     Hypertension     Macular degeneration     right    Mild cognitive impairment 2012    MMSE 26/30    MVP (mitral valve prolapse)     trace on echo     Osteoporosis 2012    Pancytopenia 2011    mild with ; Dr Tonia hawk     Peptic ulcer disease     Peripheral vascular disease ([address] 2016    angio; dis below ankles; pletal    Prediabetes     Recurrent ventral incisional hernia     medial apex of GB scar    S/P CABG x 3     3-vessel; Dr Valerie Piper Spigelian hernia 2017    right ant lateral wall; seen on CT abd    Supraventricular tachycardia ([address]     Freq ventriular ectopy    Tic douloureux     Dr. Simpson Apgar; Right side        Social History     Tobacco Use    Smoking status: Former Smoker     Packs/day: 0.25     Years: 9.00     Pack years: 2.25     Types: Cigarettes     Start date:      Last attempt to quit: 1985     Years since quittin.8    Smokeless tobacco: Never Used    Tobacco comment: 2016   Substance Use Topics    Alcohol use: No     Alcohol/week: 0.0 standard drinks        Family History   Problem Relation Age of Onset    Cancer Sister 79        Breast cancer 2017       Review of Systems   Constitutional: Negative for appetite change, chills, fatigue, fever and unexpected weight change. HENT: Negative for congestion, ear pain, sinus pain, sore throat and trouble swallowing. Eyes: Negative for redness and itching. Respiratory: Negative for cough, chest tightness, shortness of breath and wheezing. Cardiovascular: Negative for chest pain and palpitations. Gastrointestinal: Positive for abdominal pain and nausea. Negative for abdominal distention, constipation, diarrhea and vomiting. Endocrine: Negative for polyuria. Genitourinary: Negative for difficulty urinating, dysuria, frequency and urgency.    Musculoskeletal: Positive for arthralgias. Negative for back pain and myalgias. Skin: Negative for rash. Neurological: Negative for dizziness and headaches. Psychiatric/Behavioral: Negative for behavioral problems, confusion and suicidal ideas. Objective:      /72   Pulse 87   Temp 97.2 °F (36.2 °C)   Wt 137 lb 6.4 oz (62.3 kg)   SpO2 97%   BMI 24.34 kg/m²      Physical Exam  Vitals signs reviewed. Constitutional:       Appearance: Normal appearance. She is well-developed. HENT:      Head: Normocephalic and atraumatic. Right Ear: External ear normal.      Left Ear: External ear normal.      Mouth/Throat:      Mouth: Mucous membranes are moist.      Pharynx: Oropharynx is clear. Eyes:      Extraocular Movements: Extraocular movements intact. Conjunctiva/sclera: Conjunctivae normal.      Pupils: Pupils are equal, round, and reactive to light. Neck:      Musculoskeletal: Normal range of motion and neck supple. Thyroid: No thyromegaly or thyroid tenderness. Vascular: No carotid bruit. Cardiovascular:      Rate and Rhythm: Normal rate and regular rhythm. Pulses: Normal pulses. Heart sounds: Normal heart sounds, S1 normal and S2 normal. No murmur. Pulmonary:      Effort: Pulmonary effort is normal.      Breath sounds: Normal breath sounds. Abdominal:      General: Bowel sounds are normal. There is no distension. Palpations: Abdomen is soft. Tenderness: There is no abdominal tenderness. There is no guarding. Hernia: No hernia is present. Comments: Soft, no hepatosplenomegaly  Mild periumbilical pain   Musculoskeletal: Normal range of motion. Right lower leg: No edema. Left lower leg: No edema. Comments: 5-5 muscular strength throughout and bilateral.   Lymphadenopathy:      Cervical: No cervical adenopathy. Skin:     General: Skin is warm and dry. Findings: No rash. Neurological:      General: No focal deficit present. region  - Peritoneal lift and OA decompression is used to normalize the parasympathetic and sympathetic activity. - Reassessment: Improved gut peristalses and cramping  - HI OSTEOPATHIC MANIP,3-4 BODY REGN    4. Somatic dysfunction of pelvis region  - Respiratory assisted muscle energy was applied to the lumbosacral region. Improvement in nutation and counter-nutation was noted afterward. - Myofacial to the lumbo-sacral area to decrease facial/macular tension.   - Need f/u OMT. Drink plenty of fluid and home stretching exercises. - Reassessment: Improved nutation and counter nutation of pelvis. - HI OSTEOPATHIC MANIP,3-4 BODY REGN    5. Normocytic anemia  - cyanocobalamin injection 1,000 mcg    6. At high risk for falls  - Patient advised medication techniques for risk for fall          Note:   Patient understand the assessment and agreed to the plan. Medication side effects was discussed during the encounter. Before discharging the patient, all questions and concern were addressed. After visit summary was provided. Advice to call clinic or me for any further questions or concern. Ray Becerra DO    On the basis of positive falls risk screening, assessment and plan is as follows: home safety tips provided. On the basis of positive falls risk screening, assessment and plan is as follows: home safety tips provided.

## 2020-09-17 PROBLEM — D64.9 NORMOCYTIC ANEMIA: Status: ACTIVE | Noted: 2020-09-17

## 2020-09-17 ASSESSMENT — ENCOUNTER SYMPTOMS
VOMITING: 0
SINUS PAIN: 0
CHEST TIGHTNESS: 0
BACK PAIN: 0
EYE ITCHING: 0
TROUBLE SWALLOWING: 0
ABDOMINAL PAIN: 1
SHORTNESS OF BREATH: 0
EYE REDNESS: 0
COUGH: 0
CONSTIPATION: 0
ABDOMINAL DISTENTION: 0
SORE THROAT: 0
DIARRHEA: 0
NAUSEA: 1
WHEEZING: 0

## 2020-09-18 ENCOUNTER — TELEPHONE (OUTPATIENT)
Dept: INTERNAL MEDICINE CLINIC | Age: 85
End: 2020-09-18

## 2020-09-18 RX ORDER — PREDNISONE 10 MG/1
10 TABLET ORAL DAILY
Qty: 10 TABLET | Refills: 0 | Status: SHIPPED | OUTPATIENT
Start: 2020-09-18 | End: 2020-09-28

## 2020-09-18 NOTE — TELEPHONE ENCOUNTER
Patient called in stating that her stomach is burning and she is very nauseated wants advice on what is the next step.

## 2020-09-21 ENCOUNTER — PROCEDURE VISIT (OUTPATIENT)
Dept: CARDIOLOGY CLINIC | Age: 85
End: 2020-09-21
Payer: MEDICARE

## 2020-09-21 ENCOUNTER — TELEPHONE (OUTPATIENT)
Dept: INTERNAL MEDICINE CLINIC | Age: 85
End: 2020-09-21

## 2020-09-21 ENCOUNTER — PATIENT MESSAGE (OUTPATIENT)
Dept: CARDIOLOGY CLINIC | Age: 85
End: 2020-09-21

## 2020-09-21 LAB
LV EF: 58 %
LVEF MODALITY: NORMAL

## 2020-09-21 PROCEDURE — 93306 TTE W/DOPPLER COMPLETE: CPT | Performed by: INTERNAL MEDICINE

## 2020-09-21 NOTE — TELEPHONE ENCOUNTER
TRA message sent to patient with normal results. Left ventricular function is normal, EF is estimated at 55-60%. Mild left ventricular hypertrophy. Normal diastolic filling pattern for age. No significant valvular disease noted. No evidence of pericardial effusion.

## 2020-09-22 ENCOUNTER — HOSPITAL ENCOUNTER (OUTPATIENT)
Dept: ULTRASOUND IMAGING | Age: 85
Discharge: HOME | End: 2020-09-22
Payer: MEDICARE

## 2020-09-22 PROCEDURE — 76700 US EXAM ABDOM COMPLETE: CPT

## 2020-09-22 PROCEDURE — 76705 ECHO EXAM OF ABDOMEN: CPT

## 2020-09-24 RX ORDER — ROSUVASTATIN CALCIUM 10 MG/1
TABLET, COATED ORAL
Qty: 90 TABLET | Refills: 1 | Status: SHIPPED | OUTPATIENT
Start: 2020-09-24 | End: 2021-03-29

## 2020-10-20 ENCOUNTER — TELEPHONE (OUTPATIENT)
Dept: INTERNAL MEDICINE CLINIC | Age: 85
End: 2020-10-20

## 2020-10-20 ENCOUNTER — OFFICE VISIT (OUTPATIENT)
Dept: INTERNAL MEDICINE CLINIC | Age: 85
End: 2020-10-20
Payer: MEDICARE

## 2020-10-20 VITALS
HEART RATE: 82 BPM | TEMPERATURE: 97.2 F | SYSTOLIC BLOOD PRESSURE: 138 MMHG | DIASTOLIC BLOOD PRESSURE: 72 MMHG | OXYGEN SATURATION: 96 % | BODY MASS INDEX: 24.98 KG/M2 | WEIGHT: 141 LBS

## 2020-10-20 PROCEDURE — 1090F PRES/ABSN URINE INCON ASSESS: CPT | Performed by: FAMILY MEDICINE

## 2020-10-20 PROCEDURE — G8427 DOCREV CUR MEDS BY ELIG CLIN: HCPCS | Performed by: FAMILY MEDICINE

## 2020-10-20 PROCEDURE — G8420 CALC BMI NORM PARAMETERS: HCPCS | Performed by: FAMILY MEDICINE

## 2020-10-20 PROCEDURE — G8399 PT W/DXA RESULTS DOCUMENT: HCPCS | Performed by: FAMILY MEDICINE

## 2020-10-20 PROCEDURE — 1123F ACP DISCUSS/DSCN MKR DOCD: CPT | Performed by: FAMILY MEDICINE

## 2020-10-20 PROCEDURE — 1036F TOBACCO NON-USER: CPT | Performed by: FAMILY MEDICINE

## 2020-10-20 PROCEDURE — 99214 OFFICE O/P EST MOD 30 MIN: CPT | Performed by: FAMILY MEDICINE

## 2020-10-20 PROCEDURE — G8484 FLU IMMUNIZE NO ADMIN: HCPCS | Performed by: FAMILY MEDICINE

## 2020-10-20 PROCEDURE — 4040F PNEUMOC VAC/ADMIN/RCVD: CPT | Performed by: FAMILY MEDICINE

## 2020-10-20 PROCEDURE — 96372 THER/PROPH/DIAG INJ SC/IM: CPT | Performed by: FAMILY MEDICINE

## 2020-10-20 RX ORDER — DIVALPROEX SODIUM 250 MG
1 TABLET, EXTENDED RELEASE 24 HR ORAL EVERY EVENING
COMMUNITY
Start: 2020-10-13 | End: 2021-01-26

## 2020-10-20 RX ORDER — CYANOCOBALAMIN 1000 UG/ML
1000 INJECTION INTRAMUSCULAR; SUBCUTANEOUS
Status: DISCONTINUED | OUTPATIENT
Start: 2020-10-20 | End: 2022-05-11 | Stop reason: HOSPADM

## 2020-10-20 RX ORDER — OXYBUTYNIN CHLORIDE 10 MG/1
10 TABLET, EXTENDED RELEASE ORAL DAILY
Qty: 30 TABLET | Refills: 0 | Status: SHIPPED | OUTPATIENT
Start: 2020-10-20 | End: 2020-11-16

## 2020-10-20 RX ADMIN — CYANOCOBALAMIN 1000 MCG: 1000 INJECTION INTRAMUSCULAR; SUBCUTANEOUS at 16:25

## 2020-10-20 NOTE — PROGRESS NOTES
Subjective:      Chief Complaint   Patient presents with    3 Month Follow-Up    Incontinence     urinary incontinence during the day, saw Dr. Belkis Fair.  2 years ago     Other     tested for antibotic that is not so hard on stomach for UTI infection    Lower Back Pain     cramp in left leg       HPI:  Salazar Chavira is a 82 y.o. female who presents today for follow up of chronic conditions as listed below. Was seen at walk in clinic last month for UTI and started on Bactrim. Had GI symptoms due to antibiotics but have now resolved. States she has had issues with urinary urgency/incontinence for 4-5 years. Has already seen urology and was told to try exercises but has not been tried on any medications. Otherwise no concerns today.          Past Medical History:   Diagnosis Date    Arthritis of shoulder region, right 9/2014    [place] Blind right eye     following right cataract surg    Chronic depression 2009    Dr. Alfredo Shaver DVT (deep venous thrombosis) ([address] 1970    left leg    Former smoker     History of echocardiogram 09/21/2020    EF 55-60%, mild left ventricular hypertrophy, normal diastolic filling pattern for age, no signficiant valvular disease, no pericardial effusion     Hx of Doppler ultrasound 03/01/2018    Carotid-mild 0-49% bilateral carotid,50% stenosis right subclavian    Hyperlipidemia     Hypertension     Macular degeneration     right    Mild cognitive impairment 2/2012    MMSE 26/30    MVP (mitral valve prolapse)     trace on echo 2014    Osteoporosis 5/2012    Pancytopenia 5/2011    mild with ; Dr Chris hawk 2010    Peptic ulcer disease     Peripheral vascular disease ([address] 1/2016    angio; dis below ankles; pletal    Prediabetes 2006    Recurrent ventral incisional hernia 2013    medial apex of GB scar    S/P CABG x 3 2003    3-vessel; Dr Gorge Gordillo 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 Luis Burk; Right side        Past Surgical History:   Procedure Laterality Date    CATARACT REMOVAL Right 2010    poor result ; blind right eye; [place]    CHOLECYSTECTOMY      CORONARY ARTERY BYPASS GRAFT  2009    3 vessel    SHOULDER ARTHROSCOPY Right     TUBAL LIGATION      URETER SURGERY      Right ureteral repair       Social History     Tobacco Use    Smoking status: Former Smoker     Packs/day: 0.25     Years: 9.00     Pack years: 2.25     Types: Cigarettes     Start date:      Last attempt to quit: 1985     Years since quittin.9    Smokeless tobacco: Never Used    Tobacco comment: 2016   Substance Use Topics    Alcohol use: No     Alcohol/week: 0.0 standard drinks        Review of Systems   Constitutional: Negative for activity change, appetite change, chills, fever and unexpected weight change. HENT: Negative for congestion and sore throat. Respiratory: Negative for chest tightness and shortness of breath. Cardiovascular: Negative for chest pain and palpitations. Gastrointestinal: Negative for abdominal pain, constipation, diarrhea and vomiting. Genitourinary: Negative for dysuria. Skin: Negative for color change. Neurological: Negative for dizziness and light-headedness. Psychiatric/Behavioral: Negative for dysphoric mood. The patient is not nervous/anxious. Prior to Visit Medications    Medication Sig Taking?  Authorizing Provider   DEPAKOTE  MG extended release tablet Take 1 tablet by mouth every evening Yes Historical Provider, MD   Probiotic Product (PROBIOTIC DAILY PO) Take 1 capsule by mouth daily Yes Historical Provider, MD   rosuvastatin (CRESTOR) 10 MG tablet take 1 tablet by mouth once daily Yes Karly Whalen MD   dicyclomine (BENTYL) 10 MG capsule Take 1 capsule by mouth 3 times daily as needed (Abdominal cramping) Yes Ray Earl DO   ondansetron (ZOFRAN-ODT) 4 MG disintegrating tablet Place 1 tablet under the tongue every 8 hours as needed for Nausea or Vomiting Yes Ray Carcamo DO   amLODIPine (NORVASC) 5 MG tablet take 1 tablet by mouth once daily Yes John Valentin MD   Icosapent Ethyl (VASCEPA) 1 g CAPS capsule Take 2 capsules by mouth 2 times daily Yes Historical Provider, MD   pantoprazole (PROTONIX) 40 MG tablet take 1 tablet by mouth once daily Yes John Valentin MD   fluticasone (FLONASE) 50 MCG/ACT nasal spray instill 1 spray into each nostril once daily Yes John Valentin MD   cyanocobalamin 1000 MCG tablet Take 1,000 mcg by mouth daily Yes Historical Provider, MD   Multiple Vitamins-Minerals (OCUVITE-LUTEIN PO) Take by mouth Yes Historical Provider, MD   epinastine (ELESTAT) 0.05 % SOLN Place 1 drop into both eyes daily  Yes Historical Provider, MD   calcium-vitamin D (OSCAL 500/200 D-3) 500-200 MG-UNIT per tablet Take 1 tablet by mouth 2 times daily. Yes Historical Provider, MD   aspirin 81 MG EC tablet Take 81 mg by mouth daily. Yes Historical Provider, MD   gabapentin (NEURONTIN) 300 MG capsule 2 tablets 3 times daily for trigeminal neuralgia  Patient taking differently: Take 300 mg by mouth 3 times daily. John Valentin MD   denosumab (PROLIA) 60 MG/ML SOLN SC injection Inject 1 mL into the skin once for 1 dose  Yolanda Burgos MD          Objective:      /72   Pulse 82   Temp 97.2 °F (36.2 °C) (Temporal)   Wt 141 lb (64 kg)   SpO2 96%   Breastfeeding No   BMI 24.98 kg/m²      Physical Exam  Vitals signs and nursing note reviewed. Constitutional:       General: She is not in acute distress. Appearance: Normal appearance. She is not ill-appearing or toxic-appearing. HENT:      Head: Normocephalic and atraumatic. Right Ear: External ear normal.      Left Ear: External ear normal.      Nose: Nose normal.      Mouth/Throat:      Pharynx: Oropharynx is clear.    Eyes:      General: No scleral icterus. Right eye: No discharge. Left eye: No discharge. Extraocular Movements: Extraocular movements intact. Conjunctiva/sclera: Conjunctivae normal.   Neck:      Musculoskeletal: Normal range of motion and neck supple. No neck rigidity. Cardiovascular:      Rate and Rhythm: Normal rate and regular rhythm. Heart sounds: Normal heart sounds. Pulmonary:      Effort: Pulmonary effort is normal.      Breath sounds: Normal breath sounds. No wheezing or rales. Musculoskeletal:         General: No deformity. Skin:     General: Skin is warm and dry. Findings: No rash. Neurological:      General: No focal deficit present. Mental Status: She is alert. Mental status is at baseline. Motor: No weakness. Psychiatric:         Mood and Affect: Mood normal.         Behavior: Behavior normal.            Assessment / Plan:      1. B12 deficiency  Continue IM supplementation. Will monitor labs. - cyanocobalamin injection 1,000 mcg    2. Overactive bladder  Reports she has already been evaluated by urology but would like to try medication. Will try ditropan, contact clinic if no improvement in symptoms. - oxybutynin (DITROPAN XL) 10 MG extended release tablet; Take 1 tablet by mouth daily  Dispense: 30 tablet; Refill: 0    3. Essential hypertension  Stable, well controlled. Continue current medications. Patient voiced understanding and agreement with plan. All questions/concerns were addressed, risks/side effects of medications were reviewed. Return precautions and after visit summary were provided. Return in about 3 months (around 1/20/2021). or earlier as needed.       Abiodun Carbajal MD

## 2020-10-20 NOTE — TELEPHONE ENCOUNTER
Can patient get monthly b12 injections scheduled?  verbal order patient ok to leave message on machine afternoons are better to schedule.

## 2020-10-21 PROBLEM — F33.42 MAJOR DEPRESSIVE DISORDER, RECURRENT, IN FULL REMISSION (HCC): Status: ACTIVE | Noted: 2020-10-21

## 2020-10-21 PROBLEM — J45.909 ASTHMATIC BRONCHITIS: Status: ACTIVE | Noted: 2020-10-21

## 2020-10-21 ASSESSMENT — ENCOUNTER SYMPTOMS
CONSTIPATION: 0
ABDOMINAL PAIN: 0
DIARRHEA: 0
VOMITING: 0
COLOR CHANGE: 0
SORE THROAT: 0
SHORTNESS OF BREATH: 0
CHEST TIGHTNESS: 0

## 2020-10-26 ENCOUNTER — OFFICE VISIT (OUTPATIENT)
Dept: CARDIOLOGY CLINIC | Age: 85
End: 2020-10-26
Payer: MEDICARE

## 2020-10-26 VITALS
SYSTOLIC BLOOD PRESSURE: 120 MMHG | HEIGHT: 63 IN | HEART RATE: 78 BPM | DIASTOLIC BLOOD PRESSURE: 70 MMHG | WEIGHT: 138 LBS | BODY MASS INDEX: 24.45 KG/M2

## 2020-10-26 PROCEDURE — 1036F TOBACCO NON-USER: CPT | Performed by: INTERNAL MEDICINE

## 2020-10-26 PROCEDURE — 1123F ACP DISCUSS/DSCN MKR DOCD: CPT | Performed by: INTERNAL MEDICINE

## 2020-10-26 PROCEDURE — G8420 CALC BMI NORM PARAMETERS: HCPCS | Performed by: INTERNAL MEDICINE

## 2020-10-26 PROCEDURE — 4040F PNEUMOC VAC/ADMIN/RCVD: CPT | Performed by: INTERNAL MEDICINE

## 2020-10-26 PROCEDURE — G8484 FLU IMMUNIZE NO ADMIN: HCPCS | Performed by: INTERNAL MEDICINE

## 2020-10-26 PROCEDURE — 1090F PRES/ABSN URINE INCON ASSESS: CPT | Performed by: INTERNAL MEDICINE

## 2020-10-26 PROCEDURE — 99214 OFFICE O/P EST MOD 30 MIN: CPT | Performed by: INTERNAL MEDICINE

## 2020-10-26 PROCEDURE — G8427 DOCREV CUR MEDS BY ELIG CLIN: HCPCS | Performed by: INTERNAL MEDICINE

## 2020-10-26 PROCEDURE — G8399 PT W/DXA RESULTS DOCUMENT: HCPCS | Performed by: INTERNAL MEDICINE

## 2020-10-26 NOTE — PROGRESS NOTES
OFFICE PROGRESS NOTES      Steven Vazquez is a 82 y.o. female who has    CHIEF COMPLAINT AS FOLLOWS:  CHEST PAIN: Patient denies any C/O chest pains at this time.      SOB:  C/O SOB with exertion but same as before.                 LEG EDEMA: No C/O.   PALPITATIONS: Denies any C/O Palpitations   DIZZINESS: No C/O Dizziness   SYNCOPE: None   OTHER: Fatigue                                    HPI: Patient is here for F/U on her CAD, HTN & Dyslipidemia problems. She does not have any complaints at this time.     Mirian Acosta has the following history recorded in care path:  Patient Active Problem List    Diagnosis Date Noted    Spigelian hernia 03/01/2017     Priority: Low    Bilateral carotid artery stenosis 02/02/2017     Priority: Low    Subclavian arterial stenosis (HCC) 02/02/2017     Priority: Low    Coronary artery disease involving coronary bypass graft of native heart without angina pectoris 01/20/2017     Priority: Low    S/P CABG x 3 01/20/2017     Priority: Low    Hypertension 01/20/2017     Priority: Low    MVP (mitral valve prolapse) 01/20/2017     Priority: Low    Sciatica of left side without back pain 08/17/2016     Priority: Low    Elevated TSH 02/01/2016     Priority: Low    Peripheral vascular disease ([facility] [address] 01/01/2016     Priority: Low    CAD (coronary artery disease) 05/23/2014     Priority: Low    Recurrent ventral incisional hernia      Priority: Low    Supraventricular tachycardia ([facility] [address]      Priority: Low    Osteoporosis 05/29/2012     Priority: Low    Mild cognitive impairment 02/01/2012     Priority: Low    Colon cancer screening 03/18/2011     Priority: Low    Anemia due to chronic illness 10/29/2010     Priority: Low    Chronic depression      Priority: Low    Hyperlipidemia      Priority: Low    Tic douloureux      Priority: Low    Major depressive disorder, recurrent, in full remission ([facility] [address] 10/21/2020    Asthmatic Rate Last Dose    cyanocobalamin injection 1,000 mcg  1,000 mcg Subcutaneous Q30 Days Ula Lanes, MD   1,000 mcg at 10/20/20 1625     Allergies: Alendronate sodium; Boniva [ibandronate sodium]; Colesevelam; Lisinopril; Neggram [nalidixic acid]; Pce [erythromycin]; Penicillins; Welchol [colesevelam hcl];  Carbamazepine; and Lovaza [omega-3-acid ethyl esters]  Past Medical History:   Diagnosis Date    Arthritis of shoulder region, right 9/2014    Clydene Keepers Blind right eye     following right cataract surg    Chronic depression 2009    Dr. Ryan Dunne DVT (deep venous thrombosis) ([address] 1970    left leg    Former smoker     History of echocardiogram 09/21/2020    EF 55-60%, mild left ventricular hypertrophy, normal diastolic filling pattern for age, no signficiant valvular disease, no pericardial effusion     Hx of Doppler ultrasound 03/01/2018    Carotid-mild 0-49% bilateral carotid,50% stenosis right subclavian    Hyperlipidemia     Hypertension     Macular degeneration     right    Mild cognitive impairment 2/2012    MMSE 26/30    MVP (mitral valve prolapse)     trace on echo 2014    Osteoporosis 5/2012    Pancytopenia 5/2011    mild with ; Dr Yaneth hawk 2010    Peptic ulcer disease     Peripheral vascular disease ([address] 1/2016    angio; dis below ankles; pletal    Prediabetes 2006    Recurrent ventral incisional hernia 2013    medial apex of GB scar    S/P CABG x 3 2003    3-vessel; Dr Mary Gruber 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 Darice Carrel; Right side     Past Surgical History:   Procedure Laterality Date    CATARACT REMOVAL Right 2010    poor result ; blind right eye; [address] GRAFT  8/2009    3 vessel    SHOULDER ARTHROSCOPY Right 2003    TUBAL LIGATION      URETER SURGERY      Right ureteral repair As reviewed   Family History   Problem Relation Age of Onset    Cancer Sister 79        Breast cancer 2017     Social History     Tobacco Use    Smoking status: Former Smoker     Packs/day: 0.25     Years: 9.00     Pack years: 2.25     Types: Cigarettes     Start date:      Last attempt to quit: 1985     Years since quittin.9    Smokeless tobacco: Never Used    Tobacco comment: 2016   Substance Use Topics    Alcohol use: No     Alcohol/week: 0.0 standard drinks      Review of Systems:    Constitutional: Negative for diaphoresis and fatigue  Psychological:Negative for anxiety or depression  HENT: Negative for headaches, nasal congestion, sinus pain or vertigo  Eyes: Negative for visual disturbance. Endocrine: Negative for polydipsia/polyuria  Respiratory: Negative for shortness of breath  Cardiovascular: Negative for chest pain, dyspnea on exertion, claudication, edema, irregular heartbeat, murmur, palpitations or shortness of breath  Gastrointestinal: Negative for abdominal pain or heartburn  Genito-Urinary: Negative for urinary frequency/urgency  Musculoskeletal: Negative for muscle pain, muscular weakness, negative for pain in arm and leg or swelling in foot and leg  Neurological: Negative for dizziness, headaches, memory loss, numbness/tingling, visual changes, syncope  Dermatological: Negative for rash    Objective:  /70   Pulse 78   Ht 5' 3\" (1.6 m)   Wt 138 lb (62.6 kg)   BMI 24.45 kg/m²   Wt Readings from Last 3 Encounters:   10/26/20 138 lb (62.6 kg)   10/20/20 141 lb (64 kg)   20 137 lb 6.4 oz (62.3 kg)     Body mass index is 24.45 kg/m².   Patient-Reported Vitals 2020   Patient-Reported Weight 139 lbs   Patient-Reported Height 5 3   Patient-Reported Systolic 509   Patient-Reported Diastolic 59   Patient-Reported Pulse 82       Vitals:    10/26/20 1209   BP: 120/70   Pulse: 78   Weight: 138 lb (62.6 kg)   Height: 5' 3\" (1.6 m)      GENERAL - Alert, oriented, pleasant, in no apparent distress. EYES: No jaundice, no conjunctival pallor. SKIN: It is warm & dry. No rashes. No Echhymosis    HEENT - No clinically significant abnormalities seen. Neck - Supple. No jugular venous distention noted. No carotid bruits. Cardiovascular - Normal S1 and S2 without obvious murmur or gallop. Extremities - No cyanosis, clubbing, or significant edema. Pulmonary - No respiratory distress. No wheezes or rales. Abdomen - No masses, tenderness, or organomegaly. Musculoskeletal - No significant edema. No joint deformities. No muscle wasting. Neurologic - Cranial nerves II through XII are grossly intact. There were no gross focal neurologic abnormalities.     Lab Review   Lab Results   Component Value Date    TROPONINT <0.010 05/16/2019    TROPONINT <0.010 05/16/2019     BNP:  No results found for: BNP, PROBNP  PT/INR:    Lab Results   Component Value Date    INR 0.96 01/25/2016     No results found for: LABA1C  Lab Results   Component Value Date    WBC 5.6 09/09/2020    WBC 4.8 02/04/2020    HCT 37.9 09/09/2020    HCT 39.2 02/04/2020    .6 (H) 09/09/2020    .8 (H) 02/04/2020     09/09/2020     02/04/2020     Lab Results   Component Value Date    CHOL 129 06/22/2020    CHOL 150 01/23/2020    TRIG 273 (H) 06/22/2020    TRIG 376 (H) 01/23/2020    HDL 30 (L) 06/22/2020    HDL 31 (L) 01/23/2020    LDLCALC 44 06/22/2020    LDLCALC 44 01/23/2020    LDLDIRECT 93 08/22/2016    LDLDIRECT 79 07/06/2015     Lab Results   Component Value Date    ALT 10 09/09/2020    ALT 14 02/04/2020    AST 19 09/09/2020    AST 22 02/04/2020     BMP:    Lab Results   Component Value Date     09/09/2020     02/04/2020    K 4.4 09/09/2020    K 4.8 02/04/2020    CL 99 09/09/2020    CL 94 02/04/2020    CO2 25 09/09/2020    CO2 25 02/04/2020    BUN 17 09/09/2020    BUN 15 02/04/2020    CREATININE 1.0 09/09/2020    CREATININE 1.0 02/04/2020     CMP:   Lab Results Component Value Date     09/09/2020     02/04/2020    K 4.4 09/09/2020    K 4.8 02/04/2020    CL 99 09/09/2020    CL 94 02/04/2020    CO2 25 09/09/2020    CO2 25 02/04/2020    BUN 17 09/09/2020    BUN 15 02/04/2020    CREATININE 1.0 09/09/2020    CREATININE 1.0 02/04/2020    PROT 7.1 09/09/2020    PROT 7.6 02/04/2020    PROT 7.5 01/02/2013    PROT 7.4 10/13/2012     TSH:    Lab Results   Component Value Date    TSH 3.250 03/06/2019    TSH 4.270 04/20/2018    TSHHS 5.080 02/03/2016       QUALITY MEASURES REVIEWED:  1.CAD:Patient is taking anti platelet agent:Yes  2. DYSLIPIDEMIA: Patient is on cholesterol lowering medication:Yes  3. Beta-Blocker therapy for CAD, if prior Myocardial Infarction:No  4. Atrial fibrillation & anticoagulation therapy No    Impression:    No diagnosis found. Patient Active Problem List   Diagnosis Code    Chronic depression F32.9    Hyperlipidemia E78.5    Tic douloureux G50.0    Anemia due to chronic illness D63.8    Colon cancer screening Z12.11    Osteoporosis M81.0    Supraventricular tachycardia (HCC) I47.1    Recurrent ventral incisional hernia K43.2    Mild cognitive impairment G31.84    CAD (coronary artery disease) I25.10    Elevated TSH R79.89    Peripheral vascular disease (HCC) I73.9    Sciatica of left side without back pain M54.32    Coronary artery disease involving coronary bypass graft of native heart without angina pectoris I25.810    S/P CABG x 3 Z95.1    Hypertension I10    MVP (mitral valve prolapse) I34.1    Bilateral carotid artery stenosis I65.23    Subclavian arterial stenosis (HCC) I77.1    Spigelian hernia K43.9    Ischemic cardiomyopathy I25.5    Glenohumeral arthritis, right M19.011    Normocytic anemia D64.9    Overactive bladder N32.81    Major depressive disorder, recurrent, in full remission (HCC) F33.42    Asthmatic bronchitis J45.909       Assessment & Plan:    CAD:Yes   clinically stable.  Patient is on optimal medical regimen ( see medication list above )  - Leandra Hoskins is currently  asymptomatic from CAD.          - changes in  treatment:   no           - Testing ordered:  no  Louise classification: 1  [place] RISK SCORE:  LOAN RISK SCORE:  HTN:well controlled on current medical regimen, see list above.              - changes in  treatment:   no   CARDIOMYOPATHY:  known   CONGESTIVE HEART FAILURE: NO KNOWN HISTORY.      VHD: No significant VHD noted  DYSLIPIDEMIA: Patient's profile is at / near Goal.no, Triglycerides are still high.                                HDL is low                                Tolerating current medical regimen wellyes,                                                            See most recent Lab values in Labs section above. CAROTID ARTERY DISEASE:.Right Subclavian stenosis  OTHER RELEVANT DIAGNOSIS:as noted in patient's active problem list:  Fatigue: with STEPHENS  TESTS ORDERED:none this visit                                       All previously ordered tests reviewed.   ARRHYTHMIAS:  Known H/O SVT.                                No C/O   MEDICATIONS: CPM    Office f/u in six months . Primary/secondary prevention is the goal by aggressive risk modification, healthy and therapeutic life style changes for cardiovascular risk reduction.  Various goals are discussed and multiple questions answered.

## 2020-10-26 NOTE — LETTER
[facility]  [address] [address]  Phone: [phone]  Fax: [phone]    Zuleyma Alba MD        October 26, 2020     MD Brett Baig [facility] [address]    Patient: Romi Bains  MR Number: [id number]  YOB: 1935  Date of Visit: 10/26/2020    Dear Dr. Andreas Alfonso: Thank you for the request for consultation for Romi Bains to me for the evaluation of CAD. Below are the relevant portions of my assessment and plan of care. If you have questions, please do not hesitate to call me. I look forward to following Ada along with you.     Sincerely,        Zuleyma Alba MD

## 2020-10-26 NOTE — LETTER
Zoey Farrell  1935  [id number]    Have you had any Chest Pain - No    Have you had any Shortness of Breath - No      Have you had any dizziness - No      Have you had any palpitations - No      Is the patient on any of the following medications -   If Yes DO EKG    Do you have any edema - no    Do you have a surgery or procedure scheduled in the near future - No      Ask patient if they [facility] if they are not already signed [facility]    Check to see if we have an E-MAIL on file for the patient    Check medication list thoroughly!!!  BE SURE TO ASK PATIENT IF THEY NEED MEDICATION REFILLS    At check out add to every patient's \"wrap up\" the following dot phrase AFTERHOURSEDUCATION and ensure we explain this to our patients

## 2020-10-27 RX ORDER — PANTOPRAZOLE SODIUM 40 MG/1
80 TABLET, DELAYED RELEASE ORAL DAILY
COMMUNITY
End: 2020-10-27 | Stop reason: SDUPTHER

## 2020-10-27 RX ORDER — PANTOPRAZOLE SODIUM 40 MG/1
40 TABLET, DELAYED RELEASE ORAL DAILY
Qty: 60 TABLET | Refills: 5 | Status: SHIPPED | OUTPATIENT
Start: 2020-10-27 | End: 2021-07-15

## 2020-11-05 ENCOUNTER — HOSPITAL ENCOUNTER (OUTPATIENT)
Dept: ULTRASOUND IMAGING | Age: 85
Discharge: HOME | End: 2020-11-05
Payer: MEDICARE

## 2020-11-05 PROCEDURE — 76770 US EXAM ABDO BACK WALL COMP: CPT

## 2020-11-09 ENCOUNTER — TELEPHONE (OUTPATIENT)
Dept: INTERNAL MEDICINE CLINIC | Age: 85
End: 2020-11-09

## 2020-11-23 ENCOUNTER — NURSE ONLY (OUTPATIENT)
Dept: INTERNAL MEDICINE CLINIC | Age: 85
End: 2020-11-23
Payer: MEDICARE

## 2020-11-23 ENCOUNTER — TELEPHONE (OUTPATIENT)
Dept: CARDIOLOGY CLINIC | Age: 85
End: 2020-11-23

## 2020-11-23 ENCOUNTER — TELEPHONE (OUTPATIENT)
Dept: INTERNAL MEDICINE CLINIC | Age: 85
End: 2020-11-23

## 2020-11-23 VITALS — TEMPERATURE: 97.2 F

## 2020-11-23 PROCEDURE — 96372 THER/PROPH/DIAG INJ SC/IM: CPT | Performed by: FAMILY MEDICINE

## 2020-11-23 RX ORDER — CYANOCOBALAMIN 1000 UG/ML
1000 INJECTION INTRAMUSCULAR; SUBCUTANEOUS ONCE
Status: COMPLETED | OUTPATIENT
Start: 2020-11-23 | End: 2020-11-23

## 2020-11-23 RX ADMIN — CYANOCOBALAMIN 1000 MCG: 1000 INJECTION INTRAMUSCULAR; SUBCUTANEOUS at 15:50

## 2020-11-23 NOTE — TELEPHONE ENCOUNTER
Patient stated she is having palpation for the 1st time. It woke her up at 4am. And patient stated she lay down. patient stated she got up at 7am and ate breakfast at 8am. Patient stated her BP is 150/83 96  Patient stated it feels like she is scare/racing. No SOB or dizzy.  Patient had an echo done and it was overall normal. Patient EKg showed PVCS she had on 1/17/2020

## 2020-11-23 NOTE — TELEPHONE ENCOUNTER
Patient can take iron and B12 together (if that's what she is asking- not sure what the question is).

## 2020-11-23 NOTE — TELEPHONE ENCOUNTER
Ricardo STAUFFER said she needs to come in tomorrow for EKG.     Patient made the appointment for tomorrow with Umang Kyle at [facility]

## 2020-11-24 ENCOUNTER — NURSE ONLY (OUTPATIENT)
Dept: CARDIOLOGY CLINIC | Age: 85
End: 2020-11-24
Payer: MEDICARE

## 2020-11-24 VITALS
HEART RATE: 85 BPM | HEIGHT: 63 IN | DIASTOLIC BLOOD PRESSURE: 84 MMHG | BODY MASS INDEX: 24.88 KG/M2 | WEIGHT: 140.4 LBS | SYSTOLIC BLOOD PRESSURE: 136 MMHG

## 2020-11-24 PROCEDURE — 93228 REMOTE 30 DAY ECG REV/REPORT: CPT | Performed by: INTERNAL MEDICINE

## 2020-11-24 PROCEDURE — 93000 ELECTROCARDIOGRAM COMPLETE: CPT | Performed by: NURSE PRACTITIONER

## 2020-11-24 NOTE — PATIENT INSTRUCTIONS
Please be informed that if you contact our office outside of normal business hours the physician on call cannot help with any scheduling or rescheduling issues, procedure instruction questions or any type of medication issue. We advise you for any urgent/emergency that you go to the nearest emergency room!     PLEASE CALL OUR OFFICE DURING NORMAL BUSINESS HOURS    Monday - Friday   8 am to 5 pm    [place] [address]: [phone]    [place]:  [phone]

## 2020-11-24 NOTE — PROGRESS NOTES
48 hour holter monitor applied 11/24/2020 @ 9:30 for palpitations Serial # [id number] . Instructed patient on monitor and proper use. Instructed on diary. When to remove and bring it back. Must leave the holter monitor on  without removing for the duration of time ordered. Answered all questions the patient had. Instructed patient to call [facility] at [phone] with any questions or concerns with the monitor.

## 2020-11-24 NOTE — PROGRESS NOTES
Patient here for nurse visit  Reports she is having palpitations  She states palpitations are better today.   EKG obtained sinus rhythm noted heart rate 85  Will get 48-hour Holter monitor  Patient does have a history of SVT  Patient to follow-up with Dr. Sigifredo Rhodes to discuss results

## 2020-12-03 ENCOUNTER — OFFICE VISIT (OUTPATIENT)
Dept: CARDIOLOGY CLINIC | Age: 85
End: 2020-12-03
Payer: MEDICARE

## 2020-12-03 ENCOUNTER — TELEPHONE (OUTPATIENT)
Dept: CARDIOLOGY CLINIC | Age: 85
End: 2020-12-03

## 2020-12-03 VITALS
HEART RATE: 84 BPM | BODY MASS INDEX: 25.34 KG/M2 | DIASTOLIC BLOOD PRESSURE: 82 MMHG | HEIGHT: 63 IN | SYSTOLIC BLOOD PRESSURE: 128 MMHG | WEIGHT: 143 LBS

## 2020-12-03 PROCEDURE — 1090F PRES/ABSN URINE INCON ASSESS: CPT | Performed by: INTERNAL MEDICINE

## 2020-12-03 PROCEDURE — 99214 OFFICE O/P EST MOD 30 MIN: CPT | Performed by: INTERNAL MEDICINE

## 2020-12-03 PROCEDURE — G8417 CALC BMI ABV UP PARAM F/U: HCPCS | Performed by: INTERNAL MEDICINE

## 2020-12-03 PROCEDURE — 4040F PNEUMOC VAC/ADMIN/RCVD: CPT | Performed by: INTERNAL MEDICINE

## 2020-12-03 PROCEDURE — 1036F TOBACCO NON-USER: CPT | Performed by: INTERNAL MEDICINE

## 2020-12-03 PROCEDURE — G8484 FLU IMMUNIZE NO ADMIN: HCPCS | Performed by: INTERNAL MEDICINE

## 2020-12-03 PROCEDURE — G8427 DOCREV CUR MEDS BY ELIG CLIN: HCPCS | Performed by: INTERNAL MEDICINE

## 2020-12-03 PROCEDURE — G8399 PT W/DXA RESULTS DOCUMENT: HCPCS | Performed by: INTERNAL MEDICINE

## 2020-12-03 PROCEDURE — 1123F ACP DISCUSS/DSCN MKR DOCD: CPT | Performed by: INTERNAL MEDICINE

## 2020-12-03 RX ORDER — METOPROLOL TARTRATE 37.5 MG/1
37.5 TABLET, FILM COATED ORAL 2 TIMES DAILY
Qty: 60 TABLET | Refills: 5 | Status: SHIPPED | OUTPATIENT
Start: 2020-12-03 | End: 2020-12-22

## 2020-12-03 NOTE — PROGRESS NOTES
OFFICE PROGRESS NOTES      Alejandrina is a 82 y.o. female who has    CHIEF COMPLAINT AS FOLLOWS:   CHEST PAIN: Patient denies any C/O chest pains at this time.      SOB:  C/O SOB with exertion but same as before.                 LEG EDEMA: No C/O.   PALPITATIONS: Denies any C/O Palpitations   DIZZINESS: No C/O Dizziness   SYNCOPE: None   OTHER: Fatigue                                    HPI: Patient is here for F/U on her CAD, HTN & Dyslipidemia problems. She does not have any complaints at this time.     Geo Hartman has the following history recorded in care path:  Patient Active Problem List    Diagnosis Date Noted    Spigelian hernia 03/01/2017     Priority: Low    Bilateral carotid artery stenosis 02/02/2017     Priority: Low    Subclavian arterial stenosis (HCC) 02/02/2017     Priority: Low    Coronary artery disease involving coronary bypass graft of native heart without angina pectoris 01/20/2017     Priority: Low    S/P CABG x 3 01/20/2017     Priority: Low    Hypertension 01/20/2017     Priority: Low    MVP (mitral valve prolapse) 01/20/2017     Priority: Low    Sciatica of left side without back pain 08/17/2016     Priority: Low    Elevated TSH 02/01/2016     Priority: Low    Peripheral vascular disease ([facility] 01/01/2016     Priority: Low    CAD (coronary artery disease) 05/23/2014     Priority: Low    Recurrent ventral incisional hernia      Priority: Low    Supraventricular tachycardia ([facility]      Priority: Low    Osteoporosis 05/29/2012     Priority: Low    Mild cognitive impairment 02/01/2012     Priority: Low    Colon cancer screening 03/18/2011     Priority: Low    Anemia due to chronic illness 10/29/2010     Priority: Low    Chronic depression      Priority: Low    Hyperlipidemia      Priority: Low    Tic douloureux      Priority: Low    Major depressive disorder, recurrent, in full remission ([facility] 10/21/2020    Asthmatic bronchitis 10/21/2020    Overactive bladder     Normocytic anemia 09/17/2020    Glenohumeral arthritis, right 10/02/2019    Ischemic cardiomyopathy 08/05/2019     Current Outpatient Medications   Medication Sig Dispense Refill    oxybutynin (DITROPAN-XL) 10 MG extended release tablet take 1 tablet by mouth once daily 30 tablet 5    pantoprazole (PROTONIX) 40 MG tablet Take 1 tablet by mouth daily 60 tablet 5    DEPAKOTE  MG extended release tablet Take 1 tablet by mouth every evening      Probiotic Product (PROBIOTIC DAILY PO) Take 1 capsule by mouth daily      rosuvastatin (CRESTOR) 10 MG tablet take 1 tablet by mouth once daily 90 tablet 1    dicyclomine (BENTYL) 10 MG capsule Take 1 capsule by mouth 3 times daily as needed (Abdominal cramping) 30 capsule 0    ondansetron (ZOFRAN-ODT) 4 MG disintegrating tablet Place 1 tablet under the tongue every 8 hours as needed for Nausea or Vomiting 15 tablet 1    amLODIPine (NORVASC) 5 MG tablet take 1 tablet by mouth once daily 90 tablet 1    Icosapent Ethyl (VASCEPA) 1 g CAPS capsule Take 2 capsules by mouth 2 times daily      fluticasone (FLONASE) 50 MCG/ACT nasal spray instill 1 spray into each nostril once daily 48 g 1    gabapentin (NEURONTIN) 300 MG capsule 2 tablets 3 times daily for trigeminal neuralgia (Patient taking differently: Take 300 mg by mouth 3 times daily. ) 180 capsule 5    denosumab (PROLIA) 60 MG/ML SOLN SC injection Inject 1 mL into the skin once for 1 dose 1 mL 0    cyanocobalamin 1000 MCG tablet Take 1,000 mcg by mouth daily      Multiple Vitamins-Minerals (OCUVITE-LUTEIN PO) Take by mouth      epinastine (ELESTAT) 0.05 % SOLN Place 1 drop into both eyes daily       calcium-vitamin D (OSCAL 500/200 D-3) 500-200 MG-UNIT per tablet Take 1 tablet by mouth 2 times daily.  aspirin 81 MG EC tablet Take 81 mg by mouth daily.          Current Facility-Administered Medications   Medication Dose Route Frequency Provider Last Rate Last Dose    cyanocobalamin injection 1,000 mcg  1,000 mcg Subcutaneous Q30 Days Andreas Alfonso MD   1,000 mcg at 10/20/20 1625     Allergies: Alendronate sodium; Boniva [ibandronate sodium]; Colesevelam; Lisinopril; Neggram [nalidixic acid]; Pce [erythromycin]; Penicillins; Welchol [colesevelam hcl];  Carbamazepine; and Lovaza [omega-3-acid ethyl esters]  Past Medical History:   Diagnosis Date    Arthritis of shoulder region, right 9/2014    Lora Matheus Blind right eye     following right cataract surg    Chronic depression 2009    Dr. Georgi Butt DVT (deep venous thrombosis) ([address] 1970    left leg    Former smoker     History of echocardiogram 09/21/2020    EF 55-60%, mild left ventricular hypertrophy, normal diastolic filling pattern for age, no signficiant valvular disease, no pericardial effusion     Hx of Doppler ultrasound 03/01/2018    Carotid-mild 0-49% bilateral carotid,50% stenosis right subclavian    Hyperlipidemia     Hypertension     Macular degeneration     right    Mild cognitive impairment 2/2012    MMSE 26/30    MVP (mitral valve prolapse)     trace on echo 2014    Osteoporosis 5/2012    Pancytopenia 5/2011    mild with ; Dr Karan hawk 2010    Peptic ulcer disease     Peripheral vascular disease ([address] 1/2016    angio; dis below ankles; pletal    Prediabetes 2006    Recurrent ventral incisional hernia 2013    medial apex of GB scar    S/P CABG x 3 2003    3-vessel; Dr Savanna Walker 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 Temple Rankin; Right side     Past Surgical History:   Procedure Laterality Date    CATARACT REMOVAL Right 2010    poor result ; blind right eye; [address] GRAFT  8/2009    3 vessel    SHOULDER ARTHROSCOPY Right 2003    TUBAL LIGATION      URETER SURGERY      Right ureteral repair As reviewed   Family History   Problem Relation Age of Onset    Cancer Sister 79        Breast cancer 2017     Social History     Tobacco Use    Smoking status: Former Smoker     Packs/day: 0.25     Years: 9.00     Pack years: 2.25     Types: Cigarettes     Start date:      Last attempt to quit: 1985     Years since quittin.0    Smokeless tobacco: Never Used    Tobacco comment: 2016   Substance Use Topics    Alcohol use: No     Alcohol/week: 0.0 standard drinks      Review of Systems:    Constitutional: Negative for diaphoresis and fatigue  Psychological:Negative for anxiety or depression  HENT: Negative for headaches, nasal congestion, sinus pain or vertigo  Eyes: Negative for visual disturbance. Endocrine: Negative for polydipsia/polyuria  Respiratory: Negative for shortness of breath  Cardiovascular: Negative for chest pain, dyspnea on exertion, claudication, edema, irregular heartbeat, murmur, palpitations or shortness of breath  Gastrointestinal: Negative for abdominal pain or heartburn  Genito-Urinary: Negative for urinary frequency/urgency  Musculoskeletal: Negative for muscle pain, muscular weakness, negative for pain in arm and leg or swelling in foot and leg  Neurological: Negative for dizziness, headaches, memory loss, numbness/tingling, visual changes, syncope  Dermatological: Negative for rash    Objective:  /82   Pulse 84   Ht 5' 3\" (1.6 m)   Wt 143 lb (64.9 kg)   BMI 25.33 kg/m²   Wt Readings from Last 3 Encounters:   20 143 lb (64.9 kg)   20 140 lb 6.4 oz (63.7 kg)   10/26/20 138 lb (62.6 kg)     Body mass index is 25.33 kg/m².   Patient-Reported Vitals 2020   Patient-Reported Weight 139 lbs   Patient-Reported Height 5 3   Patient-Reported Systolic 652   Patient-Reported Diastolic 59   Patient-Reported Pulse 82       Vitals:    20 1424   BP: 128/82   Pulse: 84   Weight: 143 lb (64.9 kg)   Height: 5' 3\" (1.6 m)      GENERAL - Alert, oriented, pleasant, in no apparent distress. EYES: No jaundice, no conjunctival pallor. SKIN: It is warm & dry. No rashes. No Echhymosis    HEENT - No clinically significant abnormalities seen. Neck - Supple. No jugular venous distention noted. Right carotid bruit +. Cardiovascular - Normal S1 and S2 without obvious murmur or gallop. Extremities - No cyanosis, clubbing, or significant edema. Pulmonary - No respiratory distress. No wheezes or rales. Abdomen - No masses, tenderness, or organomegaly. Musculoskeletal - No significant edema. No joint deformities. No muscle wasting. Neurologic - Cranial nerves II through XII are grossly intact. There were no gross focal neurologic abnormalities.     Lab Review   Lab Results   Component Value Date    TROPONINT <0.010 05/16/2019    TROPONINT <0.010 05/16/2019     BNP:  No results found for: BNP, PROBNP  PT/INR:    Lab Results   Component Value Date    INR 0.96 01/25/2016     No results found for: LABA1C  Lab Results   Component Value Date    WBC 5.6 09/09/2020    WBC 4.8 02/04/2020    HCT 37.9 09/09/2020    HCT 39.2 02/04/2020    .6 (H) 09/09/2020    .8 (H) 02/04/2020     09/09/2020     02/04/2020     Lab Results   Component Value Date    CHOL 129 06/22/2020    CHOL 150 01/23/2020    TRIG 273 (H) 06/22/2020    TRIG 376 (H) 01/23/2020    HDL 30 (L) 06/22/2020    HDL 31 (L) 01/23/2020    LDLCALC 44 06/22/2020    LDLCALC 44 01/23/2020    LDLDIRECT 93 08/22/2016    LDLDIRECT 79 07/06/2015     Lab Results   Component Value Date    ALT 10 09/09/2020    ALT 14 02/04/2020    AST 19 09/09/2020    AST 22 02/04/2020     BMP:    Lab Results   Component Value Date     09/09/2020     02/04/2020    K 4.4 09/09/2020    K 4.8 02/04/2020    CL 99 09/09/2020    CL 94 02/04/2020    CO2 25 09/09/2020    CO2 25 02/04/2020    BUN 17 09/09/2020    BUN 15 02/04/2020    CREATININE 1.0 09/09/2020    CREATININE 1.0 02/04/2020     CMP:   Lab

## 2020-12-03 NOTE — LETTER
[address] [facility]  [address] [place] [address]  Phone: [phone]  Fax: [phone]    Ricky Hung MD        December 3, 2020     MD [place] [id number]    Patient: Jhony Dillon  MR Number: [id number]  YOB: 1935  Date of Visit: 12/3/2020    Dear Dr. Elsie Darby: Thank you for the request for consultation for Jhony Dillon to me for the evaluation of CAD / Arrhythmias. Below are the relevant portions of my assessment and plan of care. If you have questions, please do not hesitate to call me. I look forward to following Ada along with you.     Sincerely,        Ricky Hung MD

## 2020-12-03 NOTE — PATIENT INSTRUCTIONS
CAD:Yes   clinically stable. Patient is on optimal medical regimen ( see medication list above )  - Silvino Connelly is currently  asymptomatic from CAD.          - changes in  treatment:   no           - Testing ordered:  no  [place] classification: 1  [place] RISK SCORE:  LOAN RISK SCORE:  HTN:well controlled on current medical regimen, see list above.              - changes in  treatment:   no   CARDIOMYOPATHY:  known   CONGESTIVE HEART FAILURE: NO KNOWN HISTORY.      VHD: No significant VHD noted  DYSLIPIDEMIA: Patient's profile is at / near Goal.no, Triglycerides are still high.                                HDL is low                                Tolerating current medical regimen wellyes,                                                            See most recent Lab values in Labs section above. CAROTID ARTERY DISEASE:.Right Subclavian stenosis  OTHER RELEVANT DIAGNOSIS:as noted in patient's active problem list:  Fatigue: with STEPHENS  TESTS ORDERED:none this visit                                       All previously ordered tests reviewed.   ARRHYTHMIAS:  Known H/O SVT. Frequent PVCs                                 No C/O   MEDICATIONS: Stop Amlodipine. Start Metoprolol 37.5 mg BID. BP check with nurse in 2 weeks   Office f/u in six months .  Primary/secondary prevention is the goal by aggressive risk modification, healthy and therapeutic life style changes for cardiovascular risk reduction.  Various goals are discussed and multiple questions answered

## 2020-12-04 NOTE — TELEPHONE ENCOUNTER
Patient was advised to take Protonix (2) in the morning when she saw DR. Bernhard Oppenheim now she is almost out of Protonix and cannot get filled early. Patient didn't notice a difference and now is taking one a day. She has 10 pills left. Can you fill a 30 day supply at 2 a day so patient doesn't run out?
Protonix prior authorized and approved. spoke with pharmacist and will notify patient and will be able to refill so patient will not run out.
The protonix needs to be prior authorized due insurance not wanting to cover the PPI more than 90 days in a one year period.
Will check with pharmacy if the protonix # 60 can be filled. The  patient has 10 pills left Dr. Armen Isaac had her double up and she will be be out Nov 7,8,9,10,11. Called pharmacy and is closed.
protonix issues does she stay on the higher dose that Almond Siemens put her on or does she go back to what she was on, please call pt and go over
1 pair

## 2020-12-08 RX ORDER — ICOSAPENT ETHYL 500 MG/1
CAPSULE ORAL
Qty: 360 CAPSULE | Refills: 2 | Status: SHIPPED | OUTPATIENT
Start: 2020-12-08 | End: 2021-08-23 | Stop reason: SDUPTHER

## 2020-12-09 ENCOUNTER — TELEPHONE (OUTPATIENT)
Dept: CARDIOLOGY CLINIC | Age: 85
End: 2020-12-09

## 2020-12-09 NOTE — TELEPHONE ENCOUNTER
Pt states ever since she started metoprolol  37.5 mg she has had diarrhea and sick to he stomach please advise.  Call cell number [phone]

## 2020-12-11 ENCOUNTER — TELEPHONE (OUTPATIENT)
Dept: CARDIOLOGY CLINIC | Age: 85
End: 2020-12-11

## 2020-12-11 NOTE — TELEPHONE ENCOUNTER
Patient states she continues to have nausea and diarrhea. B/P yesterday was 102/70 ,however she has difficulty taking B/P herself.  Today's B/P 154/80

## 2020-12-12 DIAGNOSIS — M81.0 AGE-RELATED OSTEOPOROSIS WITHOUT CURRENT PATHOLOGICAL FRACTURE: ICD-10-CM

## 2020-12-12 RX ORDER — EPINEPHRINE 1 MG/ML
0.3 INJECTION, SOLUTION, CONCENTRATE INTRAVENOUS PRN
Status: CANCELLED | OUTPATIENT
Start: 2021-01-14

## 2020-12-12 RX ORDER — DIPHENHYDRAMINE HYDROCHLORIDE 50 MG/ML
50 INJECTION INTRAMUSCULAR; INTRAVENOUS ONCE
Status: CANCELLED | OUTPATIENT
Start: 2021-01-14 | End: 2021-01-14

## 2020-12-12 RX ORDER — SODIUM CHLORIDE 9 MG/ML
INJECTION, SOLUTION INTRAVENOUS CONTINUOUS
Status: CANCELLED | OUTPATIENT
Start: 2021-01-14

## 2020-12-12 RX ORDER — METHYLPREDNISOLONE SODIUM SUCCINATE 125 MG/2ML
125 INJECTION, POWDER, LYOPHILIZED, FOR SOLUTION INTRAMUSCULAR; INTRAVENOUS ONCE
Status: CANCELLED | OUTPATIENT
Start: 2021-01-14 | End: 2021-01-14

## 2020-12-14 ENCOUNTER — TELEPHONE (OUTPATIENT)
Dept: CARDIOLOGY CLINIC | Age: 85
End: 2020-12-14

## 2020-12-15 RX ORDER — ONDANSETRON 4 MG/1
4 TABLET, ORALLY DISINTEGRATING ORAL EVERY 8 HOURS PRN
Qty: 15 TABLET | Refills: 1 | Status: SHIPPED | OUTPATIENT
Start: 2020-12-15 | End: 2021-01-12 | Stop reason: SDUPTHER

## 2020-12-15 NOTE — TELEPHONE ENCOUNTER
Patients heart medication was changed by Dr. Selvin Bess and she is experiencing nausea.   She states she notified his office of her nausea and they informed her to call here for the request.

## 2020-12-16 ENCOUNTER — NURSE ONLY (OUTPATIENT)
Dept: INTERNAL MEDICINE CLINIC | Age: 85
End: 2020-12-16
Payer: MEDICARE

## 2020-12-16 VITALS — TEMPERATURE: 96.6 F

## 2020-12-16 PROCEDURE — 99211 OFF/OP EST MAY X REQ PHY/QHP: CPT | Performed by: FAMILY MEDICINE

## 2020-12-16 PROCEDURE — 96372 THER/PROPH/DIAG INJ SC/IM: CPT | Performed by: FAMILY MEDICINE

## 2020-12-16 RX ORDER — CYANOCOBALAMIN 1000 UG/ML
1000 INJECTION INTRAMUSCULAR; SUBCUTANEOUS ONCE
Status: COMPLETED | OUTPATIENT
Start: 2020-12-16 | End: 2020-12-16

## 2020-12-16 RX ADMIN — CYANOCOBALAMIN 1000 MCG: 1000 INJECTION INTRAMUSCULAR; SUBCUTANEOUS at 14:24

## 2020-12-21 NOTE — PROGRESS NOTES
[address] [facility],Third Floor  Phone: [phone]    Fax [phone]                  Keo Mcginnis MD, Josesito Hill MD, Jana Arredondo MD, MD Denae Ram MD Duffy Ivanoff, MD Nickolas Lamprey, ABELARDO Myers, ABELARDO Wan, ABELARDO Benz    BP and Weight Check Visit    Pt is here for a 2 week BP and weight check. Previously amlodipine was stopped and patient was started on metoprolol 37.5 mg twice daily then developed SOB with anxiety. She was then started on verapamil 180 mg daily. Patient reports some fatigue but much improved. Denies any shortness of breath, anxiety, dizziness, or palpitations. The emotional due to multiple deaths in family and holiday. Vitals:    12/22/20 1103   BP: 126/74   Pulse: 73       Wt Readings from Last 3 Encounters:   12/22/20 137 lb 12.8 oz (62.5 kg)   12/03/20 143 lb (64.9 kg)   11/24/20 140 lb 6.4 oz (63.7 kg)        Plan for pt is to follow-up in 1 month continue verapamil 180 mg daily        Pt is to report any dizziness or syncope to the office        Electronically signed by Yumiko Dickerson.  Lucio Golden, ABELARDO - CNP on 12/22/2020 at 11:35 AM

## 2020-12-22 ENCOUNTER — NURSE ONLY (OUTPATIENT)
Dept: CARDIOLOGY CLINIC | Age: 85
End: 2020-12-22

## 2020-12-22 VITALS
HEART RATE: 73 BPM | SYSTOLIC BLOOD PRESSURE: 126 MMHG | BODY MASS INDEX: 24.41 KG/M2 | HEIGHT: 63 IN | WEIGHT: 137.8 LBS | DIASTOLIC BLOOD PRESSURE: 74 MMHG

## 2020-12-22 PROCEDURE — 99999 PR OFFICE/OUTPT VISIT,PROCEDURE ONLY: CPT | Performed by: NURSE PRACTITIONER

## 2020-12-29 ENCOUNTER — HOSPITAL ENCOUNTER (OUTPATIENT)
Dept: WOMENS IMAGING | Age: 85
Discharge: HOME | End: 2020-12-29
Payer: MEDICARE

## 2020-12-29 PROCEDURE — 77067 SCR MAMMO BI INCL CAD: CPT

## 2021-01-11 NOTE — TELEPHONE ENCOUNTER
Spoke with patient, having varied symptoms. Patient had recent UTI with similar symptoms.  Advised patient to call urology

## 2021-01-11 NOTE — TELEPHONE ENCOUNTER
Patient called would like to speak to someone about her medications. Seem to make her nauseous and she can't function well.

## 2021-01-12 ENCOUNTER — HOSPITAL ENCOUNTER (OUTPATIENT)
Dept: INFUSION THERAPY | Age: 86
Setting detail: INFUSION SERIES
Discharge: HOME | End: 2021-01-12
Payer: MEDICARE

## 2021-01-12 VITALS
RESPIRATION RATE: 14 BRPM | DIASTOLIC BLOOD PRESSURE: 66 MMHG | OXYGEN SATURATION: 99 % | SYSTOLIC BLOOD PRESSURE: 149 MMHG | HEART RATE: 72 BPM | TEMPERATURE: 97.3 F

## 2021-01-12 DIAGNOSIS — M81.0 OSTEOPOROSIS, UNSPECIFIED OSTEOPOROSIS TYPE, UNSPECIFIED PATHOLOGICAL FRACTURE PRESENCE: ICD-10-CM

## 2021-01-12 DIAGNOSIS — M81.0 AGE-RELATED OSTEOPOROSIS WITHOUT CURRENT PATHOLOGICAL FRACTURE: Primary | ICD-10-CM

## 2021-01-12 DIAGNOSIS — R10.33 UMBILICAL PAIN: ICD-10-CM

## 2021-01-12 PROCEDURE — 99211 OFF/OP EST MAY X REQ PHY/QHP: CPT

## 2021-01-12 PROCEDURE — 6360000002 HC RX W HCPCS: Performed by: FAMILY MEDICINE

## 2021-01-12 PROCEDURE — 96372 THER/PROPH/DIAG INJ SC/IM: CPT

## 2021-01-12 RX ORDER — EPINEPHRINE 1 MG/ML
0.3 INJECTION, SOLUTION, CONCENTRATE INTRAVENOUS PRN
Status: CANCELLED | OUTPATIENT
Start: 2021-07-13

## 2021-01-12 RX ORDER — METHYLPREDNISOLONE SODIUM SUCCINATE 125 MG/2ML
125 INJECTION, POWDER, LYOPHILIZED, FOR SOLUTION INTRAMUSCULAR; INTRAVENOUS ONCE
Status: CANCELLED | OUTPATIENT
Start: 2021-07-13 | End: 2021-07-13

## 2021-01-12 RX ORDER — SODIUM CHLORIDE 9 MG/ML
INJECTION, SOLUTION INTRAVENOUS CONTINUOUS
Status: CANCELLED | OUTPATIENT
Start: 2021-07-13

## 2021-01-12 RX ORDER — DIPHENHYDRAMINE HYDROCHLORIDE 50 MG/ML
50 INJECTION INTRAMUSCULAR; INTRAVENOUS ONCE
Status: CANCELLED | OUTPATIENT
Start: 2021-07-13 | End: 2021-07-13

## 2021-01-12 RX ORDER — ONDANSETRON 4 MG/1
4 TABLET, ORALLY DISINTEGRATING ORAL EVERY 8 HOURS PRN
Qty: 15 TABLET | Refills: 1 | Status: SHIPPED | OUTPATIENT
Start: 2021-01-12 | End: 2021-01-28

## 2021-01-12 RX ADMIN — DENOSUMAB 60 MG: 60 INJECTION SUBCUTANEOUS at 10:57

## 2021-01-12 NOTE — PROGRESS NOTES
Tolerated injection well. Reviewed discharge instruction, voiced understanding. Copies of AVS given. Pt discharged home. Pt to exit via ambulation.     Orders Placed This Encounter   Medications    denosumab (PROLIA) SC injection 60 mg

## 2021-01-14 ENCOUNTER — NURSE ONLY (OUTPATIENT)
Dept: INTERNAL MEDICINE CLINIC | Age: 86
End: 2021-01-14

## 2021-01-14 VITALS — TEMPERATURE: 97.2 F

## 2021-01-14 DIAGNOSIS — E53.8 B12 DEFICIENCY: Primary | ICD-10-CM

## 2021-01-14 RX ORDER — CYANOCOBALAMIN 1000 UG/ML
1000 INJECTION INTRAMUSCULAR; SUBCUTANEOUS ONCE
Status: DISCONTINUED | OUTPATIENT
Start: 2021-01-14 | End: 2021-11-16 | Stop reason: HOSPADM

## 2021-01-26 ENCOUNTER — OFFICE VISIT (OUTPATIENT)
Dept: INTERNAL MEDICINE CLINIC | Age: 86
End: 2021-01-26
Payer: MEDICARE

## 2021-01-26 VITALS
TEMPERATURE: 97 F | SYSTOLIC BLOOD PRESSURE: 128 MMHG | BODY MASS INDEX: 24.48 KG/M2 | HEART RATE: 67 BPM | WEIGHT: 138.2 LBS | DIASTOLIC BLOOD PRESSURE: 70 MMHG | OXYGEN SATURATION: 97 %

## 2021-01-26 DIAGNOSIS — Z00.00 GENERAL MEDICAL EXAM: ICD-10-CM

## 2021-01-26 DIAGNOSIS — I10 ESSENTIAL HYPERTENSION: Primary | ICD-10-CM

## 2021-01-26 PROCEDURE — 4040F PNEUMOC VAC/ADMIN/RCVD: CPT | Performed by: FAMILY MEDICINE

## 2021-01-26 PROCEDURE — G8399 PT W/DXA RESULTS DOCUMENT: HCPCS | Performed by: FAMILY MEDICINE

## 2021-01-26 PROCEDURE — 1036F TOBACCO NON-USER: CPT | Performed by: FAMILY MEDICINE

## 2021-01-26 PROCEDURE — G8427 DOCREV CUR MEDS BY ELIG CLIN: HCPCS | Performed by: FAMILY MEDICINE

## 2021-01-26 PROCEDURE — 1090F PRES/ABSN URINE INCON ASSESS: CPT | Performed by: FAMILY MEDICINE

## 2021-01-26 PROCEDURE — 1123F ACP DISCUSS/DSCN MKR DOCD: CPT | Performed by: FAMILY MEDICINE

## 2021-01-26 PROCEDURE — G8484 FLU IMMUNIZE NO ADMIN: HCPCS | Performed by: FAMILY MEDICINE

## 2021-01-26 PROCEDURE — 99214 OFFICE O/P EST MOD 30 MIN: CPT | Performed by: FAMILY MEDICINE

## 2021-01-26 PROCEDURE — G8420 CALC BMI NORM PARAMETERS: HCPCS | Performed by: FAMILY MEDICINE

## 2021-01-26 RX ORDER — NITROFURANTOIN 25; 75 MG/1; MG/1
CAPSULE ORAL
COMMUNITY
Start: 2021-01-12 | End: 2021-01-26 | Stop reason: ALTCHOICE

## 2021-01-26 RX ORDER — EPINASTINE HCL 0.05 %
1 DROPS OPHTHALMIC (EYE) DAILY
Qty: 1 BOTTLE | Refills: 1 | Status: SHIPPED | OUTPATIENT
Start: 2021-01-26 | End: 2021-04-29 | Stop reason: SDUPTHER

## 2021-01-26 RX ORDER — METOPROLOL TARTRATE 37.5 MG/1
TABLET, FILM COATED ORAL
COMMUNITY
Start: 2020-12-29 | End: 2021-04-28 | Stop reason: SINTOL

## 2021-01-26 RX ORDER — AMLODIPINE BESYLATE 5 MG/1
TABLET ORAL
COMMUNITY
Start: 2020-12-27 | End: 2021-01-28

## 2021-01-26 RX ORDER — PHENAZOPYRIDINE HYDROCHLORIDE 100 MG/1
TABLET, FILM COATED ORAL
COMMUNITY
Start: 2021-01-12 | End: 2021-01-26

## 2021-01-26 RX ORDER — FLUTICASONE PROPIONATE 50 MCG
SPRAY, SUSPENSION (ML) NASAL
Qty: 1 BOTTLE | Refills: 3 | Status: SHIPPED | OUTPATIENT
Start: 2021-01-26 | End: 2021-04-29 | Stop reason: SDUPTHER

## 2021-01-26 ASSESSMENT — ENCOUNTER SYMPTOMS
ABDOMINAL PAIN: 0
COLOR CHANGE: 0
CONSTIPATION: 0
DIARRHEA: 0
CHEST TIGHTNESS: 0
VOMITING: 0
SHORTNESS OF BREATH: 0
SORE THROAT: 0

## 2021-01-26 NOTE — PROGRESS NOTES
Subjective:      Chief Complaint   Patient presents with    Follow-up       HPI:  Lucie Rojo is a 82 y.o. female who presents today for follow up of chronic conditions as listed below. Was started on verapamil per cardiology since her last appointment- has felt improved with medication change. States she is also being weaned off of depakote per psychiatry. Reports mood has been well controlled. Feeling well today, no acute concerns. Labs reviewed.       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 Bailee Zuleta DVT (deep venous thrombosis) ([facility] 1970    left leg    Former smoker     History of echocardiogram 09/21/2020    EF 55-60%, mild left ventricular hypertrophy, normal diastolic filling pattern for age, no signficiant valvular disease, no pericardial effusion     Hx of Doppler ultrasound 03/01/2018    Carotid-mild 0-49% bilateral carotid,50% stenosis right subclavian    Hyperlipidemia     Hypertension     Macular degeneration     right    Mild cognitive impairment 2/2012    MMSE 26/30    MVP (mitral valve prolapse)     trace on echo 2014    Osteoporosis 5/2012    Pancytopenia 5/2011    mild with ; Dr Paige hawk 2010    Peptic ulcer disease     Peripheral vascular disease ([address] 1/2016    angio; dis below ankles; pletal    Prediabetes 2006    Recurrent ventral incisional hernia 2013    medial apex of GB scar    S/P CABG x 3 2003    3-vessel; Dr Zeina Felipe 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 Millicent White; Right side        Past Surgical History:   Procedure Laterality Date    CATARACT REMOVAL Right 2010    poor result ; blind right eye; [place]    CHOLECYSTECTOMY      CORONARY ARTERY BYPASS GRAFT  8/2009    3 vessel  SHOULDER ARTHROSCOPY Right     TUBAL LIGATION      URETER SURGERY      Right ureteral repair       Social History     Tobacco Use    Smoking status: Former Smoker     Packs/day: 0.25     Years: 9.00     Pack years: 2.25     Types: Cigarettes     Start date:      Quit date: 1985     Years since quittin.1    Smokeless tobacco: Never Used    Tobacco comment: 2016   Substance Use Topics    Alcohol use: No     Alcohol/week: 0.0 standard drinks        Review of Systems   Constitutional: Negative for activity change, appetite change, chills, fever and unexpected weight change. HENT: Negative for congestion and sore throat. Respiratory: Negative for chest tightness and shortness of breath. Cardiovascular: Negative for chest pain and palpitations. Gastrointestinal: Negative for abdominal pain, constipation, diarrhea and vomiting. Genitourinary: Negative for dysuria. Skin: Negative for color change. Neurological: Negative for dizziness and light-headedness. Psychiatric/Behavioral: Negative for dysphoric mood. The patient is not nervous/anxious. Prior to Visit Medications    Medication Sig Taking?  Authorizing Provider   amLODIPine (NORVASC) 5 MG tablet  Yes Historical Provider, MD   Metoprolol Tartrate 37.5 MG TABS  Yes Historical Provider, MD   nitrofurantoin, macrocrystal-monohydrate, (MACROBID) 100 MG capsule take 1 capsule by mouth every 12 hours with food Yes Historical Provider, MD   ondansetron (ZOFRAN-ODT) 4 MG disintegrating tablet Place 1 tablet under the tongue every 8 hours as needed for Nausea or Vomiting Yes Marlo Garza MD   verapamil (CALAN SR) 180 MG extended release tablet Take 0.5 tablets by mouth 2 times daily Yes Nghia Pichardo MD   VASCEPA 0.5 g CAPS take 2 capsules by mouth twice a day Yes Nghia Pichardo MD   Mirabegron (MYRBETRIQ PO) Take by mouth Yes Historical Provider, MD   oxybutynin (DITROPAN-XL) 10 MG extended release tablet take 1 tablet by mouth once daily Yes Govind Hall MD   pantoprazole (PROTONIX) 40 MG tablet Take 1 tablet by mouth daily Yes Govind Hall MD   Probiotic Product (PROBIOTIC DAILY PO) Take 1 capsule by mouth daily Yes Historical Provider, MD   rosuvastatin (CRESTOR) 10 MG tablet take 1 tablet by mouth once daily Yes Govind Hall MD   dicyclomine (BENTYL) 10 MG capsule Take 1 capsule by mouth 3 times daily as needed (Abdominal cramping) Yes Ray Knight DO   Icosapent Ethyl (VASCEPA) 1 g CAPS capsule Take 2 capsules by mouth 2 times daily Yes Historical Provider, MD   fluticasone (FLONASE) 50 MCG/ACT nasal spray instill 1 spray into each nostril once daily Yes Govind Hall MD   cyanocobalamin 1000 MCG tablet Take 1,000 mcg by mouth daily Yes Historical Provider, MD   Multiple Vitamins-Minerals (OCUVITE-LUTEIN PO) Take by mouth Yes Historical Provider, MD   epinastine (ELESTAT) 0.05 % SOLN Place 1 drop into both eyes daily  Yes Historical Provider, MD   calcium-vitamin D (OSCAL 500/200 D-3) 500-200 MG-UNIT per tablet Take 1 tablet by mouth 2 times daily. Yes Historical Provider, MD   aspirin 81 MG EC tablet Take 81 mg by mouth daily. Yes Historical Provider, MD   gabapentin (NEURONTIN) 300 MG capsule 2 tablets 3 times daily for trigeminal neuralgia  Patient taking differently: Take 300 mg by mouth 3 times daily. Govind Hall MD   denosumab (PROLIA) 60 MG/ML SOLN SC injection Inject 1 mL into the skin once for 1 dose  Luisana Pedroza MD          Objective:      /70 (Site: Left Upper Arm, Position: Sitting, Cuff Size: Medium Adult)   Pulse 67   Temp 97 °F (36.1 °C)   Wt 138 lb 3.2 oz (62.7 kg)   SpO2 97%   BMI 24.48 kg/m²      Physical Exam  Vitals signs and nursing note reviewed. Constitutional:       General: She is not in acute distress. Appearance: Normal appearance. She is not ill-appearing or toxic-appearing.    HENT:      Head: Normocephalic and

## 2021-01-28 ENCOUNTER — OFFICE VISIT (OUTPATIENT)
Dept: CARDIOLOGY CLINIC | Age: 86
End: 2021-01-28
Payer: MEDICARE

## 2021-01-28 ENCOUNTER — TELEPHONE (OUTPATIENT)
Dept: CARDIOLOGY CLINIC | Age: 86
End: 2021-01-28

## 2021-01-28 VITALS
DIASTOLIC BLOOD PRESSURE: 70 MMHG | SYSTOLIC BLOOD PRESSURE: 126 MMHG | HEART RATE: 72 BPM | WEIGHT: 139 LBS | BODY MASS INDEX: 24.63 KG/M2 | HEIGHT: 63 IN

## 2021-01-28 DIAGNOSIS — I25.10 CORONARY ARTERY DISEASE INVOLVING NATIVE CORONARY ARTERY OF NATIVE HEART WITHOUT ANGINA PECTORIS: ICD-10-CM

## 2021-01-28 DIAGNOSIS — I34.1 MVP (MITRAL VALVE PROLAPSE): ICD-10-CM

## 2021-01-28 DIAGNOSIS — I65.23 BILATERAL CAROTID ARTERY STENOSIS: ICD-10-CM

## 2021-01-28 DIAGNOSIS — I25.5 ISCHEMIC CARDIOMYOPATHY: ICD-10-CM

## 2021-01-28 DIAGNOSIS — I47.1 SUPRAVENTRICULAR TACHYCARDIA (HCC): ICD-10-CM

## 2021-01-28 DIAGNOSIS — Z95.1 S/P CABG X 3: ICD-10-CM

## 2021-01-28 DIAGNOSIS — E78.2 MIXED HYPERLIPIDEMIA: ICD-10-CM

## 2021-01-28 DIAGNOSIS — I77.1 SUBCLAVIAN ARTERIAL STENOSIS (HCC): ICD-10-CM

## 2021-01-28 DIAGNOSIS — I25.810 CORONARY ARTERY DISEASE INVOLVING CORONARY BYPASS GRAFT OF NATIVE HEART WITHOUT ANGINA PECTORIS: Primary | ICD-10-CM

## 2021-01-28 DIAGNOSIS — I10 ESSENTIAL HYPERTENSION: ICD-10-CM

## 2021-01-28 DIAGNOSIS — I73.9 PERIPHERAL VASCULAR DISEASE (HCC): ICD-10-CM

## 2021-01-28 PROCEDURE — G8399 PT W/DXA RESULTS DOCUMENT: HCPCS | Performed by: INTERNAL MEDICINE

## 2021-01-28 PROCEDURE — 99214 OFFICE O/P EST MOD 30 MIN: CPT | Performed by: INTERNAL MEDICINE

## 2021-01-28 PROCEDURE — 1036F TOBACCO NON-USER: CPT | Performed by: INTERNAL MEDICINE

## 2021-01-28 PROCEDURE — G8484 FLU IMMUNIZE NO ADMIN: HCPCS | Performed by: INTERNAL MEDICINE

## 2021-01-28 PROCEDURE — G8427 DOCREV CUR MEDS BY ELIG CLIN: HCPCS | Performed by: INTERNAL MEDICINE

## 2021-01-28 PROCEDURE — 4040F PNEUMOC VAC/ADMIN/RCVD: CPT | Performed by: INTERNAL MEDICINE

## 2021-01-28 PROCEDURE — G8420 CALC BMI NORM PARAMETERS: HCPCS | Performed by: INTERNAL MEDICINE

## 2021-01-28 PROCEDURE — 1090F PRES/ABSN URINE INCON ASSESS: CPT | Performed by: INTERNAL MEDICINE

## 2021-01-28 PROCEDURE — 1123F ACP DISCUSS/DSCN MKR DOCD: CPT | Performed by: INTERNAL MEDICINE

## 2021-01-28 RX ORDER — DIVALPROEX SODIUM 250 MG/1
250 TABLET, EXTENDED RELEASE ORAL NIGHTLY
COMMUNITY

## 2021-01-28 NOTE — PATIENT INSTRUCTIONS
Primary / Secondary prevention is the goal by aggressive risk modification, healthy and therapeutic life style changes for cardiovascular risk reduction. Various goals are discussed and multiple questions answered. CAD:Yes   clinically stable. Patient is on optimal medical regimen ( see medication list above )  - Reno Henderson is currently  asymptomatic from CAD.          - changes in  treatment:   no           - Testing ordered:  no  [place] classification: 1  [place] RISK SCORE:  LOAN RISK SCORE:  HTN:well controlled on current medical regimen, see list above.              - changes in  treatment:   no   CARDIOMYOPATHY:  known   CONGESTIVE HEART FAILURE: NO KNOWN HISTORY.      VHD: No significant VHD noted  DYSLIPIDEMIA: Patient's profile is at / near Goal.no, Triglycerides are still high.                                HDL is low                                Tolerating current medical regimen wellyes,                                                            See most recent Lab values in Labs section above. CAROTID ARTERY DISEASE:.Right Subclavian stenosis  OTHER RELEVANT DIAGNOSIS:as noted in patient's active problem list:  Fatigue: with STEPHENS  TESTS ORDERED:none this visit                                       All previously ordered tests reviewed.   ARRHYTHMIAS:  Known H/O SVT. Frequent PVCs                                 No C/O   MEDICATIONS:  CPM. Patient is not sure if she is taking metoprolol. Patient to bring all the pill boxes for verification.   Office f/u in six months otherwise.

## 2021-01-28 NOTE — PROGRESS NOTES
Ave Stephens is a 82 y.o. female who has    CHIEF COMPLAINT AS FOLLOWS:  CHEST PAIN: Patient denies any C/O chest pains at this time.      SOB:  C/O SOB with exertion but same as before.                 LEG EDEMA: No C/O.   PALPITATIONS: Denies any C/O Palpitations   DIZZINESS: No C/O Dizziness   SYNCOPE: None   OTHER: Fatigue                                    HPI: Patient is here for F/U on her CAD, HTN & Dyslipidemia problems. CAD: Patient has known Hx of  CAD. Had CABG in the past.  HTN: Patient has known Hx of essential HTN. Has been treated with guideline recommended medical / physical/ diet therapy as stated below. Dyslipidemia: Patient has known Hx of mixed dyslipidemia. Has been treated with guideline recommended medical / physical/ diet therapy as stated below. She does not have any new complaints at this time. Current Outpatient Medications   Medication Sig Dispense Refill    Metoprolol Tartrate 37.5 MG TABS       fluticasone (FLONASE) 50 MCG/ACT nasal spray instill 1 spray into each nostril once daily 1 Bottle 3    epinastine (ELESTAT) 0.05 % SOLN Place 1 drop into both eyes daily 1 Bottle 1    verapamil (CALAN SR) 180 MG extended release tablet Take 0.5 tablets by mouth 2 times daily 30 tablet 5    VASCEPA 0.5 g CAPS take 2 capsules by mouth twice a day 360 capsule 2    pantoprazole (PROTONIX) 40 MG tablet Take 1 tablet by mouth daily 60 tablet 5    Probiotic Product (PROBIOTIC DAILY PO) Take 1 capsule by mouth daily      rosuvastatin (CRESTOR) 10 MG tablet take 1 tablet by mouth once daily 90 tablet 1    Icosapent Ethyl (VASCEPA) 1 g CAPS capsule Take 2 capsules by mouth 2 times daily      gabapentin (NEURONTIN) 300 MG capsule 2 tablets 3 times daily for trigeminal neuralgia (Patient taking differently: Take 300 mg by mouth 3 times daily.  ) 180 capsule 5    denosumab (PROLIA) 60 MG/ML SOLN SC injection Inject 1 mL into the skin once for 1 dose 1 mL 0  cyanocobalamin 1000 MCG tablet Take 1,000 mcg by mouth daily      Multiple Vitamins-Minerals (OCUVITE-LUTEIN PO) Take by mouth      calcium-vitamin D (OSCAL 500/200 D-3) 500-200 MG-UNIT per tablet Take 1 tablet by mouth 2 times daily.  aspirin 81 MG EC tablet Take 81 mg by mouth daily. Current Facility-Administered Medications   Medication Dose Route Frequency Provider Last Rate Last Admin    cyanocobalamin injection 1,000 mcg  1,000 mcg Intramuscular Once Ada Monaco MD        cyanocobalamin injection 1,000 mcg  1,000 mcg Subcutaneous Q30 Days Ada Monaco MD   1,000 mcg at 10/20/20 1625     Allergies: Alendronate sodium, Bactrim [sulfamethoxazole-trimethoprim], Boniva [ibandronate sodium], Colesevelam, Lisinopril, Neggram [nalidixic acid], Pce [erythromycin], Penicillins, Welchol [colesevelam hcl], Carbamazepine, Lovaza [omega-3-acid ethyl esters], and Pyridium [phenazopyridine]  Review of Systems:    Constitutional: Negative for diaphoresis and fatigue  Respiratory: Negative for shortness of breath  Cardiovascular: Negative for chest pain, dyspnea on exertion, claudication, edema, irregular heartbeat, murmur, palpitations or shortness of breath  Musculoskeletal: Negative for muscle pain, muscular weakness, negative for pain in arm and leg or swelling in foot and leg    Objective:  /70   Pulse 72   Ht 5' 3\" (1.6 m)   Wt 139 lb (63 kg)   BMI 24.62 kg/m²   Wt Readings from Last 3 Encounters:   01/28/21 139 lb (63 kg)   01/26/21 138 lb 3.2 oz (62.7 kg)   12/22/20 137 lb 12.8 oz (62.5 kg)     Body mass index is 24.62 kg/m². GENERAL - Alert, oriented, pleasant, in no apparent distress. EYES: No jaundice, no conjunctival pallor. Neck - Supple. No jugular venous distention noted. No carotid bruits. Cardiovascular - Normal S1 and S2 without obvious murmur or gallop. Extremities - No cyanosis, clubbing, or significant edema.     Pulmonary - No respiratory distress. No wheezes or rales.       Lab Review   Lab Results   Component Value Date    TROPONINT <0.010 2019    TROPONINT <0.010 2019     No results found for: BNP, PROBNP  Lab Results   Component Value Date    INR 0.96 2016     No results found for: LABA1C  Lab Results   Component Value Date    WBC 5.6 2020    WBC 4.8 2020    HCT 37.9 2020    HCT 39.2 2020    .6 (H) 2020    .8 (H) 2020     2020     2020     Lab Results   Component Value Date    CHOL 129 2020    CHOL 150 2020    TRIG 273 (H) 2020    TRIG 376 (H) 2020    HDL 30 (L) 2020    HDL 31 (L) 2020    LDLCALC 44 2020    LDLCALC 44 2020    LDLDIRECT 93 2016    LDLDIRECT 79 2015     Lab Results   Component Value Date    ALT 10 2020    ALT 14 2020    AST 19 2020    AST 22 2020     BMP:    Lab Results   Component Value Date     2020     2020    K 4.4 2020    K 4.8 2020    CL 99 2020    CL 94 2020    CO2 25 2020    CO2 25 2020    BUN 17 2020    BUN 15 2020    CREATININE 1.0 2020    CREATININE 1.0 2020     CMP:   Lab Results   Component Value Date     2020     2020    K 4.4 2020    K 4.8 2020    CL 99 2020    CL 94 2020    CO2 25 2020    CO2 25 2020    BUN 17 2020    BUN 15 2020    CREATININE 1.0 2020    CREATININE 1.0 2020    PROT 7.1 2020    PROT 7.6 2020    PROT 7.5 2013    PROT 7.4 10/13/2012     Lab Results   Component Value Date    TSH 3.250 2019    TSH 4.270 2018    TSHHS 5.080 2016     Cardiolite perfusion imagin2019   Normal perfusion study with normal distribution in all coronal, short, and    horizontal axis.    The observed defect is consistent with diaphragmatic ORDERED:none this visit                                       All previously ordered tests reviewed.   ARRHYTHMIAS:  Known H/O SVT. Frequent PVCs                                 No C/O   MEDICATIONS:  CPM. Patient is not sure if she is taking metoprolol. Patient to bring all the pill boxes for verification.   Office f/u in six months otherwise.

## 2021-01-29 ENCOUNTER — TELEPHONE (OUTPATIENT)
Dept: CARDIOLOGY CLINIC | Age: 86
End: 2021-01-29

## 2021-01-29 NOTE — TELEPHONE ENCOUNTER
Patient restarted metoprolol last night. Sick to stomach since. Went thru her notes and that is why she stopped in the first place.  Reports B/P 126/82  HR 84

## 2021-01-29 NOTE — TELEPHONE ENCOUNTER
Patient called stated she took Metoprolol and Verapamil   Last night and she became very nauseas all night   She wants to discuss her medications

## 2021-02-22 ENCOUNTER — NURSE ONLY (OUTPATIENT)
Dept: INTERNAL MEDICINE CLINIC | Age: 86
End: 2021-02-22
Payer: MEDICARE

## 2021-02-22 VITALS — TEMPERATURE: 97.4 F

## 2021-02-22 DIAGNOSIS — E53.8 B12 DEFICIENCY: Primary | ICD-10-CM

## 2021-02-22 PROCEDURE — 96372 THER/PROPH/DIAG INJ SC/IM: CPT | Performed by: FAMILY MEDICINE

## 2021-02-22 RX ORDER — CYANOCOBALAMIN 1000 UG/ML
1000 INJECTION INTRAMUSCULAR; SUBCUTANEOUS ONCE
Status: COMPLETED | OUTPATIENT
Start: 2021-02-22 | End: 2021-02-22

## 2021-02-22 RX ADMIN — CYANOCOBALAMIN 1000 MCG: 1000 INJECTION INTRAMUSCULAR; SUBCUTANEOUS at 12:48

## 2021-03-16 DIAGNOSIS — G50.0 TIC DOULOUREUX: ICD-10-CM

## 2021-03-16 RX ORDER — GABAPENTIN 300 MG/1
CAPSULE ORAL
Qty: 180 CAPSULE | Refills: 5 | Status: SHIPPED | OUTPATIENT
Start: 2021-03-16 | End: 2021-09-07

## 2021-03-18 ENCOUNTER — OFFICE VISIT (OUTPATIENT)
Dept: CARDIOLOGY CLINIC | Age: 86
End: 2021-03-18
Payer: MEDICARE

## 2021-03-18 ENCOUNTER — TELEPHONE (OUTPATIENT)
Dept: CARDIOLOGY CLINIC | Age: 86
End: 2021-03-18

## 2021-03-18 VITALS
SYSTOLIC BLOOD PRESSURE: 164 MMHG | DIASTOLIC BLOOD PRESSURE: 52 MMHG | BODY MASS INDEX: 24.27 KG/M2 | WEIGHT: 137 LBS | HEART RATE: 79 BPM | HEIGHT: 63 IN

## 2021-03-18 DIAGNOSIS — R00.2 PALPITATIONS: ICD-10-CM

## 2021-03-18 DIAGNOSIS — I10 ESSENTIAL HYPERTENSION: ICD-10-CM

## 2021-03-18 DIAGNOSIS — R06.02 SOB (SHORTNESS OF BREATH): ICD-10-CM

## 2021-03-18 DIAGNOSIS — I25.10 CORONARY ARTERY DISEASE INVOLVING NATIVE CORONARY ARTERY OF NATIVE HEART WITHOUT ANGINA PECTORIS: Primary | ICD-10-CM

## 2021-03-18 PROCEDURE — G8427 DOCREV CUR MEDS BY ELIG CLIN: HCPCS | Performed by: NURSE PRACTITIONER

## 2021-03-18 PROCEDURE — G8484 FLU IMMUNIZE NO ADMIN: HCPCS | Performed by: NURSE PRACTITIONER

## 2021-03-18 PROCEDURE — 1090F PRES/ABSN URINE INCON ASSESS: CPT | Performed by: NURSE PRACTITIONER

## 2021-03-18 PROCEDURE — 4040F PNEUMOC VAC/ADMIN/RCVD: CPT | Performed by: NURSE PRACTITIONER

## 2021-03-18 PROCEDURE — G8399 PT W/DXA RESULTS DOCUMENT: HCPCS | Performed by: NURSE PRACTITIONER

## 2021-03-18 PROCEDURE — 1036F TOBACCO NON-USER: CPT | Performed by: NURSE PRACTITIONER

## 2021-03-18 PROCEDURE — 99214 OFFICE O/P EST MOD 30 MIN: CPT | Performed by: NURSE PRACTITIONER

## 2021-03-18 PROCEDURE — 1123F ACP DISCUSS/DSCN MKR DOCD: CPT | Performed by: NURSE PRACTITIONER

## 2021-03-18 PROCEDURE — 93000 ELECTROCARDIOGRAM COMPLETE: CPT | Performed by: NURSE PRACTITIONER

## 2021-03-18 PROCEDURE — G8420 CALC BMI NORM PARAMETERS: HCPCS | Performed by: NURSE PRACTITIONER

## 2021-03-18 ASSESSMENT — ENCOUNTER SYMPTOMS: SHORTNESS OF BREATH: 1

## 2021-03-18 NOTE — PROGRESS NOTES
[facility] ([facility] PHYSICAL REHABILITATION [facility] [address] [facility],[address]  Phone: [phone]    Fax [phone]                  Keith Sethi MD, Malinda James MD, Royal Damaso MD, MD Pennie Capone MD Janece Marseille, MD Hewitt Monas, ABELARDO Raymond Isaura, APRSHERRY  [facility], [facility], [facility]        Cardiology Progress Note      3/19/2021    RE: Tigre Peñaloza  (1935)                             Primary cardiologist: Dr. Pennie Grissom       Subjective:  CC:   1. Coronary artery disease involving native coronary artery of native heart without angina pectoris    2. Palpitations    3. SOB (shortness of breath)    4. Essential hypertension        HPI: Tigre Peñaloza, who is a  82y.o. year old female with a past medical history as listed below. Patient complains of palpitations, shortness of breath, and fatigue is been ongoing since December. On last office visit approximately a month ago patient reports symptoms have resolved but continues to say she is worried about the ongoing fatigue and shortness of breath she has had for the last 6 months. Patient has had medication changed multiple times due to nausea.      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 Elvis Wang DVT (deep venous thrombosis) ([facility] [address] 1970    left leg    Former smoker     History of echocardiogram 09/21/2020    EF 55-60%, mild left ventricular hypertrophy, normal diastolic filling pattern for age, no signficiant valvular disease, no pericardial effusion     Hx of Doppler ultrasound 03/01/2018    Carotid-mild 0-49% bilateral carotid,50% stenosis right subclavian    Hyperlipidemia     Hypertension     Macular degeneration     right    Mild cognitive impairment 2/2012    MMSE 26/30    MVP (mitral valve prolapse)     trace on echo 2014    Osteoporosis 5/2012    Pancytopenia 5/2011    mild with ; Dr Brendan hawk 2010    Peptic ulcer disease     Peripheral vascular disease ([address] 1/2016    angio; dis below ankles; pletal    Prediabetes 2006    Recurrent ventral incisional hernia 2013    medial apex of GB scar    S/P CABG x 3 2003    3-vessel; Dr Hernandez Other 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 Jolynn Lofton; Right side       Current Outpatient Medications   Medication Sig Dispense Refill    dilTIAZem (CARDIZEM) 30 MG tablet Take 1 tablet by mouth 3 times daily 120 tablet 1    gabapentin (NEURONTIN) 300 MG capsule 2 tablets 3 times daily for trigeminal neuralgia 180 capsule 5    divalproex (DEPAKOTE ER) 250 MG extended release tablet Take 250 mg by mouth nightly       verapamil (CALAN SR) 180 MG extended release tablet Take 0.5 tablets by mouth 2 times daily 30 tablet 5    VASCEPA 0.5 g CAPS take 2 capsules by mouth twice a day 360 capsule 2    Probiotic Product (PROBIOTIC DAILY PO) Take 1 capsule by mouth daily      rosuvastatin (CRESTOR) 10 MG tablet take 1 tablet by mouth once daily (Patient taking differently: 10 mg nightly ) 90 tablet 1    Multiple Vitamins-Minerals (OCUVITE-LUTEIN PO) Take by mouth      aspirin 81 MG EC tablet Take 81 mg by mouth daily.  Metoprolol Tartrate 37.5 MG TABS       fluticasone (FLONASE) 50 MCG/ACT nasal spray instill 1 spray into each nostril once daily (Patient not taking: Reported on 3/18/2021) 1 Bottle 3    epinastine (ELESTAT) 0.05 % SOLN Place 1 drop into both eyes daily (Patient not taking: Reported on 3/18/2021) 1 Bottle 1    pantoprazole (PROTONIX) 40 MG tablet Take 1 tablet by mouth daily (Patient not taking: Reported on 3/18/2021) 60 tablet 5    calcium-vitamin D (OSCAL 500/200 D-3) 500-200 MG-UNIT per tablet Take 1 tablet by mouth 2 times daily. Current Facility-Administered Medications   Medication Dose Route Frequency Provider Last Rate Last Admin    cyanocobalamin injection 1,000 mcg  1,000 mcg Intramuscular Once Mallika Hutton MD        cyanocobalamin injection 1,000 mcg  1,000 mcg Subcutaneous Q30 Days Mallika Hutton MD   1,000 mcg at 10/20/20 1625       Review of Systems:  Review of Systems   Respiratory: Positive for shortness of breath. Cardiovascular: Positive for palpitations and leg swelling. Psychiatric/Behavioral: The patient is nervous/anxious. All other systems reviewed and are negative. Objective:      Physical Exam:  BP (!) 164/52   Pulse 79   Ht 5' 3\" (1.6 m)   Wt 137 lb (62.1 kg)   BMI 24.27 kg/m²   Wt Readings from Last 3 Encounters:   03/18/21 137 lb (62.1 kg)   01/28/21 139 lb (63 kg)   01/26/21 138 lb 3.2 oz (62.7 kg)     Body mass index is 24.27 kg/m². Physical exam:  Physical Exam   Constitutional: She is oriented to person, place, and time. She appears well-developed and well-nourished. Cardiovascular: Normal rate. Frequent extrasystoles are present. Pulses:       Dorsalis pedis pulses are 2+ on the right side and 2+ on the left side. Posterior tibial pulses are 2+ on the right side and 2+ on the left side. Pulmonary/Chest: Effort normal and breath sounds normal.   Abdominal: Soft. Bowel sounds are normal.   Musculoskeletal: Normal range of motion. General: No edema. Neurological: She is alert and oriented to person, place, and time.         DATA:  No results found for: CKTOTAL, CKMB, CKMBINDEX, TROPONINI  BNP:  No results found for: BNP  PT/INR:  No results found for: PTINR  No results found for: LABA1C  Lab Results   Component Value Date    CHOL 129 06/22/2020    TRIG 273 (H) 06/22/2020    HDL 30 (L) 06/22/2020    LDLCALC 44 06/22/2020    LDLDIRECT 93 08/22/2016     Lab Results   Component Value Date    ALT 10 09/09/2020    AST 19 09/09/2020     TSH:    Lab Results Component Value Date    TSH 3.250 03/06/2019       Vitals:    03/18/21 1448   BP: (!) 164/52   Pulse: 79       Echo:9/21/20  Left ventricular function is normal, EF is estimated at 55-60%. Mild left ventricular hypertrophy. Normal diastolic filling pattern for age. No significant valvular disease noted. No evidence of pericardial effusion. Stress Test:2019  No ischemia       The ASCVD Risk score (Claven ., et al., 2013) failed to calculate for the following reasons: The 2013 ASCVD risk score is only valid for ages 36 to 78      Assessment/ Plan:     CAD (coronary artery disease)  CABG x in 2003    -Stress test in 2019 showed no ischemia, echocardiogram in 2020 showed preserved EF. Given patient's history of coronary artery disease will get stress test.    SOB (shortness of breath)  -Patient has new shortness of breath and now having frequent palpitations. Today blood pressure is elevated we will get stress test to rule out myocardial ischemia involvement. Palpitations  -Patient has known history of SVT and bigeminy PVCs. Patient is very anxious about palpitations that have been ongoing. Previously did not tolerate metoprolol due to nausea. EKG obtained today patient having frequent PVCs. -Will start patient on diltiazem 30 mg 3 times daily. Stress test orderd. Labs- magnesium and TSH level. Hypertension  -Blood pressure is elevated today which could be stress-induced. Patient recently had multiple blood pressure checks for titration of medication and was in controlled range. Recommend patient also follow-up with PCP for better management of anxiety and depression. Will start patient on diltiazem 30 mg 3 times a day in hopes of improving palpitations and lowering BP. Patient is to follow-up in 2 weeks for further cardiac assessment. Patient seen, interviewed and examined. Testing was reviewed.       Patient is encouraged to exercise even a brisk walk for 30 minutes at least 3 to 4 times a week. Lifestyle and risk factor modificatons discussed. Various goals are discussed and questions answered. Continue current medications. Appropriate prescriptions are addressed. Questions answered and patient verbalizes understanding. Call for any problems, questions, or concerns. Pt is to follow up in 2 weeks for Cardiac management    Electronically signed by Skye Alaniz.  ABELARDO Pandey CNP on 3/19/2021 at 12:03 PM

## 2021-03-18 NOTE — ASSESSMENT & PLAN NOTE
-Patient has new shortness of breath and now having frequent palpitations. Today blood pressure is elevated we will get stress test to rule out myocardial ischemia involvement.

## 2021-03-18 NOTE — ASSESSMENT & PLAN NOTE
CABG x in 2003    -Stress test in 2019 showed no ischemia, echocardiogram in 2020 showed preserved EF.   Given patient's history of coronary artery disease will get stress test.

## 2021-03-18 NOTE — LETTER
Slim Miller [place]  1935  [id number]    Have you had any Chest Pain that is not new? - No    Have you had any Shortness of Breath - Yes  If Yes - When on exertion    Have you had any dizziness - No    Have you had any palpitations that are not new? - Yes  If Yes DO EKG - Do you feel your heart racing  How long does it last - .5-10  minutes     Is the patient on any of the following medications - no    Do you have any edema - swelling in legs    If Yes - Have they worn compression stockings No    Vein \"LEG PROBLEM Questionnaire\"  1. Do you have prominent leg veins? Yes   2. Do you have any skin discoloration? No  3. Do you have any healed or active sores? No  4. Do you have swelling of the legs? yes  5. Do you have a family history of varicose veins? No  6. Does your profession involve pro-longed        standing or heavy lifting? No  7. Have you been fighting overweight problems? No  8. Do you have restless legs? No  9. Do you have any night time cramps? No  10.  Do you have any of the following in your legs:        no     Do you have a surgery or procedure scheduled in the near future - No

## 2021-03-18 NOTE — ASSESSMENT & PLAN NOTE
-Patient has known history of SVT and bigeminy PVCs. Patient is very anxious about palpitations that have been ongoing. Previously did not tolerate metoprolol due to nausea. EKG obtained today patient having frequent PVCs. -Will start patient on diltiazem 30 mg 3 times daily. Stress test orderd. Labs- magnesium and TSH level.

## 2021-03-19 NOTE — ASSESSMENT & PLAN NOTE
-Blood pressure is elevated today which could be stress-induced. Patient recently had multiple blood pressure checks for titration of medication and was in controlled range. Recommend patient also follow-up with PCP for better management of anxiety and depression. Will start patient on diltiazem 30 mg 3 times a day in hopes of improving palpitations and lowering BP. Patient is to follow-up in 2 weeks for further cardiac assessment.

## 2021-03-22 ENCOUNTER — HOSPITAL ENCOUNTER (OUTPATIENT)
Age: 86
Discharge: HOME | End: 2021-03-22
Payer: MEDICARE

## 2021-03-22 LAB
ALBUMIN SERPL-MCNC: 4.4 GM/DL (ref 3.4–5)
ALP BLD-CCNC: 55 IU/L (ref 40–128)
ALT SERPL-CCNC: 60 U/L (ref 10–40)
ANION GAP SERPL CALCULATED.3IONS-SCNC: 13 MMOL/L (ref 4–16)
AST SERPL-CCNC: 57 IU/L (ref 15–37)
BASOPHILS ABSOLUTE: 0 K/CU MM
BASOPHILS RELATIVE PERCENT: 0.5 % (ref 0–1)
BILIRUB SERPL-MCNC: 0.3 MG/DL (ref 0–1)
BUN BLDV-MCNC: 13 MG/DL (ref 6–23)
CALCIUM SERPL-MCNC: 9.9 MG/DL (ref 8.3–10.6)
CHLORIDE BLD-SCNC: 99 MMOL/L (ref 99–110)
CHOLESTEROL: 129 MG/DL
CO2: 27 MMOL/L (ref 21–32)
CREAT SERPL-MCNC: 1 MG/DL (ref 0.6–1.1)
DIFFERENTIAL TYPE: ABNORMAL
EOSINOPHILS ABSOLUTE: 0.2 K/CU MM
EOSINOPHILS RELATIVE PERCENT: 2.7 % (ref 0–3)
ESTIMATED AVERAGE GLUCOSE: 128 MG/DL
GFR AFRICAN AMERICAN: >60 ML/MIN/1.73M2
GFR NON-AFRICAN AMERICAN: 53 ML/MIN/1.73M2
GLUCOSE BLD-MCNC: 127 MG/DL (ref 70–99)
HBA1C MFR BLD: 6.1 % (ref 4.2–6.3)
HCT VFR BLD CALC: 40.8 % (ref 37–47)
HDLC SERPL-MCNC: 33 MG/DL
HEMOGLOBIN: 13.2 GM/DL (ref 12.5–16)
IMMATURE NEUTROPHIL %: 0.5 % (ref 0–0.43)
LDL CHOLESTEROL DIRECT: 70 MG/DL
LYMPHOCYTES ABSOLUTE: 1.6 K/CU MM
LYMPHOCYTES RELATIVE PERCENT: 25.7 % (ref 24–44)
MAGNESIUM: 2 MG/DL (ref 1.8–2.4)
MCH RBC QN AUTO: 33.1 PG (ref 27–31)
MCHC RBC AUTO-ENTMCNC: 32.4 % (ref 32–36)
MCV RBC AUTO: 102.3 FL (ref 78–100)
MONOCYTES ABSOLUTE: 0.5 K/CU MM
MONOCYTES RELATIVE PERCENT: 7.8 % (ref 0–4)
NUCLEATED RBC %: 0 %
PDW BLD-RTO: 14 % (ref 11.7–14.9)
PLATELET # BLD: 187 K/CU MM (ref 140–440)
PMV BLD AUTO: 10.1 FL (ref 7.5–11.1)
POTASSIUM SERPL-SCNC: 5.4 MMOL/L (ref 3.5–5.1)
RBC # BLD: 3.99 M/CU MM (ref 4.2–5.4)
SEGMENTED NEUTROPHILS ABSOLUTE COUNT: 4 K/CU MM
SEGMENTED NEUTROPHILS RELATIVE PERCENT: 62.8 % (ref 36–66)
SODIUM BLD-SCNC: 139 MMOL/L (ref 135–145)
TOTAL IMMATURE NEUTOROPHIL: 0.03 K/CU MM
TOTAL NUCLEATED RBC: 0 K/CU MM
TOTAL PROTEIN: 7.7 GM/DL (ref 6.4–8.2)
TRIGL SERPL-MCNC: 194 MG/DL
WBC # BLD: 6.3 K/CU MM (ref 4–10.5)

## 2021-03-22 PROCEDURE — 85025 COMPLETE CBC W/AUTO DIFF WBC: CPT

## 2021-03-22 PROCEDURE — 83735 ASSAY OF MAGNESIUM: CPT

## 2021-03-22 PROCEDURE — 80061 LIPID PANEL: CPT

## 2021-03-22 PROCEDURE — 83036 HEMOGLOBIN GLYCOSYLATED A1C: CPT

## 2021-03-22 PROCEDURE — 36415 COLL VENOUS BLD VENIPUNCTURE: CPT

## 2021-03-22 PROCEDURE — 83721 ASSAY OF BLOOD LIPOPROTEIN: CPT

## 2021-03-22 PROCEDURE — 80053 COMPREHEN METABOLIC PANEL: CPT

## 2021-03-23 ENCOUNTER — HOSPITAL ENCOUNTER (EMERGENCY)
Age: 86
Discharge: HOME | End: 2021-03-23
Payer: MEDICARE

## 2021-03-23 VITALS
HEART RATE: 82 BPM | DIASTOLIC BLOOD PRESSURE: 49 MMHG | RESPIRATION RATE: 24 BRPM | HEIGHT: 63 IN | SYSTOLIC BLOOD PRESSURE: 166 MMHG | BODY MASS INDEX: 24.27 KG/M2 | OXYGEN SATURATION: 96 % | TEMPERATURE: 98.3 F | WEIGHT: 137 LBS

## 2021-03-23 DIAGNOSIS — N39.0 URINARY TRACT INFECTION WITHOUT HEMATURIA, SITE UNSPECIFIED: ICD-10-CM

## 2021-03-23 DIAGNOSIS — R11.0 NAUSEA: Primary | ICD-10-CM

## 2021-03-23 DIAGNOSIS — R79.89 ELEVATED LACTIC ACID LEVEL: ICD-10-CM

## 2021-03-23 LAB
ALBUMIN SERPL-MCNC: 3.8 GM/DL (ref 3.4–5)
ALP BLD-CCNC: 48 IU/L (ref 40–129)
ALT SERPL-CCNC: 40 U/L (ref 10–40)
ANION GAP SERPL CALCULATED.3IONS-SCNC: 11 MMOL/L (ref 4–16)
AST SERPL-CCNC: 35 IU/L (ref 15–37)
BACTERIA: ABNORMAL /HPF
BASOPHILS ABSOLUTE: 0 K/CU MM
BASOPHILS RELATIVE PERCENT: 0.3 % (ref 0–1)
BILIRUB SERPL-MCNC: 0.4 MG/DL (ref 0–1)
BILIRUBIN URINE: NEGATIVE MG/DL
BLOOD, URINE: NEGATIVE
BUN BLDV-MCNC: 13 MG/DL (ref 6–23)
CALCIUM SERPL-MCNC: 9.2 MG/DL (ref 8.3–10.6)
CHLORIDE BLD-SCNC: 92 MMOL/L (ref 99–110)
CLARITY: CLEAR
CO2: 25 MMOL/L (ref 21–32)
COLOR: COLORLESS
CREAT SERPL-MCNC: 0.8 MG/DL (ref 0.6–1.1)
DIFFERENTIAL TYPE: ABNORMAL
EOSINOPHILS ABSOLUTE: 0.2 K/CU MM
EOSINOPHILS RELATIVE PERCENT: 2.9 % (ref 0–3)
GFR AFRICAN AMERICAN: >60 ML/MIN/1.73M2
GFR NON-AFRICAN AMERICAN: >60 ML/MIN/1.73M2
GLUCOSE BLD-MCNC: 135 MG/DL (ref 70–99)
GLUCOSE, URINE: NEGATIVE MG/DL
HCT VFR BLD CALC: 37.6 % (ref 37–47)
HEMOGLOBIN: 12.4 GM/DL (ref 12.5–16)
IMMATURE NEUTROPHIL %: 0.4 % (ref 0–0.43)
KETONES, URINE: NEGATIVE MG/DL
LACTATE: 2.7 MMOL/L (ref 0.4–2)
LACTATE: 3.4 MMOL/L (ref 0.4–2)
LEUKOCYTE ESTERASE, URINE: ABNORMAL
LIPASE: 17 IU/L (ref 13–60)
LYMPHOCYTES ABSOLUTE: 1.7 K/CU MM
LYMPHOCYTES RELATIVE PERCENT: 23.3 % (ref 24–44)
MCH RBC QN AUTO: 33.3 PG (ref 27–31)
MCHC RBC AUTO-ENTMCNC: 33 % (ref 32–36)
MCV RBC AUTO: 101.1 FL (ref 78–100)
MONOCYTES ABSOLUTE: 0.6 K/CU MM
MONOCYTES RELATIVE PERCENT: 8.7 % (ref 0–4)
NITRITE URINE, QUANTITATIVE: NEGATIVE
NUCLEATED RBC %: 0 %
PDW BLD-RTO: 13.9 % (ref 11.7–14.9)
PH, URINE: 8 (ref 5–8)
PLATELET # BLD: 151 K/CU MM (ref 140–440)
PMV BLD AUTO: 9.4 FL (ref 7.5–11.1)
POTASSIUM SERPL-SCNC: 4.3 MMOL/L (ref 3.5–5.1)
PROTEIN UA: NEGATIVE MG/DL
RBC # BLD: 3.72 M/CU MM (ref 4.2–5.4)
RBC URINE: 1 /HPF (ref 0–6)
SEGMENTED NEUTROPHILS ABSOLUTE COUNT: 4.7 K/CU MM
SEGMENTED NEUTROPHILS RELATIVE PERCENT: 64.4 % (ref 36–66)
SODIUM BLD-SCNC: 128 MMOL/L (ref 135–145)
SPECIFIC GRAVITY UA: 1 (ref 1–1.03)
SQUAMOUS EPITHELIAL: <1 /HPF
TOTAL IMMATURE NEUTOROPHIL: 0.03 K/CU MM
TOTAL NUCLEATED RBC: 0 K/CU MM
TOTAL PROTEIN: 7 GM/DL (ref 6.4–8.2)
TRANSITIONAL EPITHELIAL: <1 /HPF
TRICHOMONAS: ABNORMAL /HPF
TROPONIN T: <0.01 NG/ML
UROBILINOGEN, URINE: NEGATIVE MG/DL (ref 0.2–1)
WBC # BLD: 7.3 K/CU MM (ref 4–10.5)
WBC UA: 16 /HPF (ref 0–5)

## 2021-03-23 PROCEDURE — 81001 URINALYSIS AUTO W/SCOPE: CPT

## 2021-03-23 PROCEDURE — 99285 EMERGENCY DEPT VISIT HI MDM: CPT

## 2021-03-23 PROCEDURE — 80053 COMPREHEN METABOLIC PANEL: CPT

## 2021-03-23 PROCEDURE — 93010 ELECTROCARDIOGRAM REPORT: CPT | Performed by: INTERNAL MEDICINE

## 2021-03-23 PROCEDURE — 93005 ELECTROCARDIOGRAM TRACING: CPT | Performed by: PHYSICIAN ASSISTANT

## 2021-03-23 PROCEDURE — 83690 ASSAY OF LIPASE: CPT

## 2021-03-23 PROCEDURE — 96361 HYDRATE IV INFUSION ADD-ON: CPT

## 2021-03-23 PROCEDURE — 83605 ASSAY OF LACTIC ACID: CPT

## 2021-03-23 PROCEDURE — 2580000003 HC RX 258: Performed by: PHYSICIAN ASSISTANT

## 2021-03-23 PROCEDURE — 85025 COMPLETE CBC W/AUTO DIFF WBC: CPT

## 2021-03-23 PROCEDURE — 96376 TX/PRO/DX INJ SAME DRUG ADON: CPT

## 2021-03-23 PROCEDURE — 84484 ASSAY OF TROPONIN QUANT: CPT

## 2021-03-23 PROCEDURE — 6360000002 HC RX W HCPCS: Performed by: PHYSICIAN ASSISTANT

## 2021-03-23 PROCEDURE — 96374 THER/PROPH/DIAG INJ IV PUSH: CPT

## 2021-03-23 RX ORDER — ONDANSETRON 2 MG/ML
4 INJECTION INTRAMUSCULAR; INTRAVENOUS EVERY 30 MIN PRN
Status: DISCONTINUED | OUTPATIENT
Start: 2021-03-23 | End: 2021-03-23 | Stop reason: HOSPADM

## 2021-03-23 RX ORDER — ONDANSETRON 4 MG/1
4 TABLET, ORALLY DISINTEGRATING ORAL EVERY 8 HOURS PRN
Qty: 15 TABLET | Refills: 0 | Status: SHIPPED | OUTPATIENT
Start: 2021-03-23 | End: 2021-04-29 | Stop reason: SDUPTHER

## 2021-03-23 RX ORDER — SODIUM CHLORIDE 9 MG/ML
INJECTION, SOLUTION INTRAVENOUS CONTINUOUS
Status: DISCONTINUED | OUTPATIENT
Start: 2021-03-23 | End: 2021-03-23 | Stop reason: HOSPADM

## 2021-03-23 RX ORDER — 0.9 % SODIUM CHLORIDE 0.9 %
1000 INTRAVENOUS SOLUTION INTRAVENOUS ONCE
Status: COMPLETED | OUTPATIENT
Start: 2021-03-23 | End: 2021-03-23

## 2021-03-23 RX ORDER — NITROFURANTOIN 25; 75 MG/1; MG/1
100 CAPSULE ORAL 2 TIMES DAILY
Qty: 14 CAPSULE | Refills: 0 | Status: SHIPPED | OUTPATIENT
Start: 2021-03-23 | End: 2021-03-30

## 2021-03-23 RX ADMIN — ONDANSETRON 4 MG: 2 INJECTION INTRAMUSCULAR; INTRAVENOUS at 07:54

## 2021-03-23 RX ADMIN — SODIUM CHLORIDE 1000 ML: 9 INJECTION, SOLUTION INTRAVENOUS at 09:43

## 2021-03-23 RX ADMIN — SODIUM CHLORIDE: 9 INJECTION, SOLUTION INTRAVENOUS at 07:53

## 2021-03-23 RX ADMIN — ONDANSETRON 4 MG: 2 INJECTION INTRAMUSCULAR; INTRAVENOUS at 09:43

## 2021-03-23 NOTE — ED NOTES
Bed: ED-29  Expected date:   Expected time:   Means of arrival:   Comments:  [address]  03/23/21 5099

## 2021-03-23 NOTE — ED PROVIDER NOTES
EKG:   Normal sinus rhythm with a rate of 70. VT interval 174, , QTc 421. No ST elevations or depressions. Nonspecific T waves. Questionable left atrial enlargement. Impression: Abnormal EKG. When compared to previous EKG from 5/16/2019, the previously noted PVCs are resolved, otherwise no significant changes.      Ferman Lesches, MD  03/23/21 5777

## 2021-03-23 NOTE — ED PROVIDER NOTES
EMERGENCY DEPARTMENT ENCOUNTER    [facility] EMERGENCY DEPARTMENT        TRIAGE CHIEF COMPLAINT:   Nausea (since 9pm)      [place]:  Suzan Caruso is a 82 y.o. female that presents for nausea. Onset was at 9 PM last evening. Context is patient states that she was at her baseline state of health, taking her nighttime meds at around 8 PM with a bowl of cereal like she usually does. She began feeling very nauseated without abdominal pain vomiting or diarrhea. States \"feels like my stomach is rolling over. \"  No associated chest pain shortness of breath headache dizziness lightheadedness or extremity numbness tingling weakness. States that the nausea was persistent through the evening and then she woke up thinking that maybe the nausea was secondary to low potassium as she has had these issues. She tried eating a banana but no relief of symptoms and EMS were called. Continuing to have significant nausea. No history of similar. No other new foods medications, antibiotic use, sick contacts or recent travel. No fever or chills. Past medical history includes coronary artery disease status post CABG, hyperlipidemia, hypertension, mitral valve prolapse, peptic ulcer disease. Has had previous cholecystectomy and tubal ligation and ureteral repair.     ROS:  General:  No fevers, no chills   Cardiovascular:  No chest pain, no palpitations  Respiratory:  No shortness of breath, no cough, no wheezing  Gastrointestinal:  See HPI  Musculoskeletal:  No muscle pain, no joint pain  Skin:  No rash, no pruritis, no easy bruising  Neurologic:  No speech problems, no headache, no extremity numbness, no extremity tingling, no extremity weakness  Psychiatric:  No anxiety  Genitourinary:  No dysuria, no hematuria  Endocrine:  No unexpected weight gain, no unexpected weight loss  Extremities:  No edema    Past Medical History:   Diagnosis Date    Arthritis of shoulder region, right 9/2014    Doroteo Mishra  Blind right eye     following right cataract surg    Chronic depression 2009    Dr. Claude Fanny DVT (deep venous thrombosis) ([facility] [address] 1970    left leg    Former smoker     History of echocardiogram 09/21/2020    EF 55-60%, mild left ventricular hypertrophy, normal diastolic filling pattern for age, no signficiant valvular disease, no pericardial effusion     Hx of Doppler ultrasound 03/01/2018    Carotid-mild 0-49% bilateral carotid,50% stenosis right subclavian    Hyperlipidemia     Hypertension     Macular degeneration     right    Mild cognitive impairment 2/2012    MMSE 26/30    MVP (mitral valve prolapse)     trace on echo 2014    Osteoporosis 5/2012    Pancytopenia 5/2011    mild with ; Dr Torres Labrum eval 2010    Peptic ulcer disease     Peripheral vascular disease ([facility] [address] 1/2016    angio; dis below ankles; pletal    Prediabetes 2006    Recurrent ventral incisional hernia 2013    medial apex of GB scar    S/P CABG x 3 2003    3-vessel; Dr Vivas Rotundperi 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 Antelmo Ye; Right side     Past Surgical History:   Procedure Laterality Date    CATARACT REMOVAL Right 2010    poor result ; blind right eye; [address] CORONARY ARTERY BYPASS GRAFT  8/2009    3 vessel    SHOULDER ARTHROSCOPY Right 2003    TUBAL LIGATION      URETER SURGERY      Right ureteral repair     Family History   Problem Relation Age of Onset    Cancer Sister 79        Breast cancer 1/2017     Social History     Socioeconomic History    Marital status:       Spouse name: Jeramy Loja Number of children: 2    Years of education: 15    Highest education level: Not on file   Occupational History    Occupation: retired   Social Needs    Financial resource strain: Not on file    Food insecurity     Worry: Not on file     Inability: Not on file    Transportation needs     Medical: Not on file     Non-medical: Not on file   Tobacco Use    Smoking status: Former Smoker     Packs/day: 0.25     Years: 9.00     Pack years: 2.25     Types: Cigarettes     Start date: 14     Quit date: 1985     Years since quittin.3    Smokeless tobacco: Never Used    Tobacco comment: 2016   Substance and Sexual Activity    Alcohol use: No     Alcohol/week: 0.0 standard drinks    Drug use: No    Sexual activity: Not Currently     Partners: Male   Lifestyle    Physical activity     Days per week: Not on file     Minutes per session: Not on file    Stress: Not on file   Relationships    Social connections     Talks on phone: Not on file     Gets together: Not on file     Attends Caoda[employer] service: Not on file     Active member of club or organization: Not on file     Attends meetings of clubs or organizations: Not on file     Relationship status: Not on file    Intimate partner violence     Fear of current or ex partner: Not on file     Emotionally abused: Not on file     Physically abused: Not on file     Forced sexual activity: Not on file   Other Topics Concern    Not on file   Social History Narrative    Not on file     Current Facility-Administered Medications   Medication Dose Route Frequency Provider Last Rate Last Admin    ondansetron (ZOFRAN) injection 4 mg  4 mg Intravenous Q30 Min PRN Elaine Shields PA-C   4 mg at 21 7271    0.9 % sodium chloride infusion   Intravenous Continuous Korinne Lusterio, PA-C   Stopped at 21 7510    cyanocobalamin injection 1,000 mcg  1,000 mcg Intramuscular Once Blessing Calles MD        cyanocobalamin injection 1,000 mcg  1,000 mcg Subcutaneous Q30 Days Blessing Calles MD   1,000 mcg at 10/20/20 1625     Current Outpatient Medications   Medication Sig Dispense Refill    ondansetron (ZOFRAN ODT) 4 MG disintegrating tablet Take 1 tablet by mouth every 8 hours as needed for Nausea 15 tablet 0    nitrofurantoin, macrocrystal-monohydrate, (MACROBID) 100 MG capsule Take 1 capsule by mouth 2 times daily for 7 days 14 capsule 0    dilTIAZem (CARDIZEM) 30 MG tablet Take 1 tablet by mouth 3 times daily 120 tablet 1    gabapentin (NEURONTIN) 300 MG capsule 2 tablets 3 times daily for trigeminal neuralgia 180 capsule 5    divalproex (DEPAKOTE ER) 250 MG extended release tablet Take 250 mg by mouth nightly       Metoprolol Tartrate 37.5 MG TABS       fluticasone (FLONASE) 50 MCG/ACT nasal spray instill 1 spray into each nostril once daily (Patient not taking: Reported on 3/18/2021) 1 Bottle 3    epinastine (ELESTAT) 0.05 % SOLN Place 1 drop into both eyes daily (Patient not taking: Reported on 3/18/2021) 1 Bottle 1    verapamil (CALAN SR) 180 MG extended release tablet Take 0.5 tablets by mouth 2 times daily 30 tablet 5    VASCEPA 0.5 g CAPS take 2 capsules by mouth twice a day 360 capsule 2    pantoprazole (PROTONIX) 40 MG tablet Take 1 tablet by mouth daily (Patient not taking: Reported on 3/18/2021) 60 tablet 5    Probiotic Product (PROBIOTIC DAILY PO) Take 1 capsule by mouth daily      rosuvastatin (CRESTOR) 10 MG tablet take 1 tablet by mouth once daily (Patient taking differently: 10 mg nightly ) 90 tablet 1    Multiple Vitamins-Minerals (OCUVITE-LUTEIN PO) Take by mouth      calcium-vitamin D (OSCAL 500/200 D-3) 500-200 MG-UNIT per tablet Take 1 tablet by mouth 2 times daily.  aspirin 81 MG EC tablet Take 81 mg by mouth daily.          Allergies   Allergen Reactions    Alendronate Sodium Other (See Comments)     GI upset    Bactrim [Sulfamethoxazole-Trimethoprim] Anaphylaxis    Boniva [Ibandronate Sodium] Other (See Comments)     Gi upset and declined egd; also to fosamax    Colesevelam     Lisinopril Other (See Comments)     Severe hyperkalemia (6) with bun >56      Neggram [Nalidixic Acid]     Pce [Erythromycin]     Penicillins     Welchol [Colesevelam Hcl] Other (See Comments)     constipation    Carbamazepine Nausea And Vomiting    Lovaza [Omega-3-Acid Ethyl Esters] Nausea And Vomiting    Metoprolol Nausea And Vomiting    Pyridium [Phenazopyridine] Palpitations       Nursing Notes Reviewed  PHYSICAL EXAM    VITAL SIGNS: BP (!) 166/49   Pulse 82   Temp 98.3 °F (36.8 °C) (Oral)   Resp 24   Ht 5' 3\" (1.6 m)   Wt 137 lb (62.1 kg)   SpO2 96%   BMI 24.27 kg/m²    Constitutional:  Well developed, Well nourished, In no acute distress  Head:  Normocephalic, Atraumatic  Eyes:  EOMI. Sclera clear. Conjunctiva normal, No discharge. Neck/Lymphatics: Supple, no JVD, No lymphadenopathy  Cardiovascular:  RRR, Normal S1 & S2   Peripheral Vascular: Distal pulses 2+, Capillary refill <2seconds  Respiratory:  Respirations nonnlabored, Clear to auscultation bilaterally, No retractions  GI:   No gross discoloration. Bowel sounds present in all quadrants, No audible bruits. Soft,  Nondistended. No localized abdominal tenderness and without rebound tenderness or guarding, No palpable pulsatile masses or obvious hernias. Back:  No CVA tenderness to percussion bilaterally  Musculoskeletal: BUE/BLE symmetrical without atrophy or deformities  Integument:  Warm, Dry, Intact, Skin turgor and texture normal  Neurologic:  Alert & oriented x3 , No focal deficits noted. Cranial nerves II through XII grossly intact. No slurred speech. No facial droop. Normal gross motor coordination & motor strength bilateral upper and lower extremities.  No tremors    Psychiatric:  Affect appropriate      I have reviewed and interpreted all of the currently available lab results from this visit (if applicable):  Results for orders placed or performed during the hospital encounter of 03/23/21   Troponin   Result Value Ref Range    Troponin T <0.010 <0.01 NG/ML   CBC auto diff   Result Value Ref Range    WBC 7.3 4.0 - 10.5 K/CU MM    RBC 3.72 (L) 4.2 - 5.4 M/CU MM    Hemoglobin 12.4 (L) 12.5 - 16.0 GM/DL Hematocrit 37.6 37 - 47 %    .1 (H) 78 - 100 FL    MCH 33.3 (H) 27 - 31 PG    MCHC 33.0 32.0 - 36.0 %    RDW 13.9 11.7 - 14.9 %    Platelets 041 723 - 166 K/CU MM    MPV 9.4 7.5 - 11.1 FL    Differential Type AUTOMATED DIFFERENTIAL     Segs Relative 64.4 36 - 66 %    Lymphocytes % 23.3 (L) 24 - 44 %    Monocytes % 8.7 (H) 0 - 4 %    Eosinophils % 2.9 0 - 3 %    Basophils % 0.3 0 - 1 %    Segs Absolute 4.7 K/CU MM    Lymphocytes Absolute 1.7 K/CU MM    Monocytes Absolute 0.6 K/CU MM    Eosinophils Absolute 0.2 K/CU MM    Basophils Absolute 0.0 K/CU MM    Nucleated RBC % 0.0 %    Total Nucleated RBC 0.0 K/CU MM    Total Immature Neutrophil 0.03 K/CU MM    Immature Neutrophil % 0.4 0 - 0.43 %   CMP   Result Value Ref Range    Sodium 128 (L) 135 - 145 MMOL/L    Potassium 4.3 3.5 - 5.1 MMOL/L    Chloride 92 (L) 99 - 110 mMol/L    CO2 25 21 - 32 MMOL/L    BUN 13 6 - 23 MG/DL    CREATININE 0.8 0.6 - 1.1 MG/DL    Glucose 135 (H) 70 - 99 MG/DL    Calcium 9.2 8.3 - 10.6 MG/DL    Albumin 3.8 3.4 - 5.0 GM/DL    Total Protein 7.0 6.4 - 8.2 GM/DL    Total Bilirubin 0.4 0.0 - 1.0 MG/DL    ALT 40 10 - 40 U/L    AST 35 15 - 37 IU/L    Alkaline Phosphatase 48 40 - 129 IU/L    GFR Non-African American >60 >60 mL/min/1.73m2    GFR African American >60 >60 mL/min/1.73m2    Anion Gap 11 4 - 16   Lipase   Result Value Ref Range    Lipase 17 13 - 60 IU/L   Urinalysis   Result Value Ref Range    Color, UA COLORLESS (A) YELLOW    Clarity, UA CLEAR CLEAR    Glucose, Urine NEGATIVE NEGATIVE MG/DL    Bilirubin Urine NEGATIVE NEGATIVE MG/DL    Ketones, Urine NEGATIVE NEGATIVE MG/DL    Specific Gravity, UA 1.005 1.001 - 1.035    Blood, Urine NEGATIVE NEGATIVE    pH, Urine 8.0 5.0 - 8.0    Protein, UA NEGATIVE NEGATIVE MG/DL    Urobilinogen, Urine NEGATIVE 0.2 - 1.0 MG/DL    Nitrite Urine, Quantitative NEGATIVE NEGATIVE    Leukocyte Esterase, Urine TRACE (A) NEGATIVE    RBC, UA 1 0 - 6 /HPF    WBC, UA 16 (H) 0 - 5 /HPF    Bacteria, UA RARE (A) NEGATIVE /HPF    Squam Epithel, UA <1 /HPF    Trans Epithel, UA <1 /HPF    Trichomonas, UA NONE SEEN NONE SEEN /HPF   Lactic Acid, Plasma   Result Value Ref Range    Lactate 3.4 (HH) 0.4 - 2.0 mMOL/L   Lactic Acid, Plasma   Result Value Ref Range    Lactate 2.7 (HH) 0.4 - 2.0 mMOL/L   EKG 12 Lead   Result Value Ref Range    Ventricular Rate 70 BPM    Atrial Rate 70 BPM    P-R Interval 174 ms    QRS Duration 100 ms    Q-T Interval 390 ms    QTc Calculation (Bazett) 421 ms    P Axis 55 degrees    R Axis 9 degrees    T Axis -6 degrees    Diagnosis       Normal sinus rhythm  Possible Left atrial enlargement  Nonspecific T wave abnormality  Abnormal ECG  When compared with ECG of 16-MAY-2019 22:17,  premature ventricular complexes are no longer present          Radiographs (if obtained):  [] The following radiograph was interpreted by myself in the absence of a radiologist:   [] Radiologist's Report Reviewed:  No orders to display         EKG Interpretation  Please see ED physician's note - Dr. Clare Ocasio - for EKG interpretation        Chart review shows recent radiographs:  No results found. ED COURSE & MEDICAL DECISION MAKING       Vital signs and nursing notes reviewed during ED course. I have independently evaluated this patient . Supervising physician - Dr. Clare Ocasio - was present in ED and available for consultation throughout entirety of patient's care. All pertinent Lab data and radiographic results reviewed with patient at bedside. The patient and/or the family were informed of the results of any tests/labs/imaging, the treatment plan, and time was allotted to answer questions. Clinical Impression:  1. Nausea    2. Urinary tract infection without hematuria, site unspecified    3. Elevated lactic acid level        Patient presents via EMS from home for nausea. On exam, well-appearing nontoxic 68-year-old female, no acute distress.   Unremarkable cardiopulmonary exam.  Abdominal exam is obstruction, cholecystitis, ruptured diverticulitis, incarcerated hernia, hemorrhagic pancreatitis, or perforated bowel/ulcer, ectopic pregnancy, ovarian torsion or tubo-ovarian ovarian abscess thus I consider the discharge disposition reasonable. Patient is discharged stable condition with Zofran as well as oral Macrobid. Encourage rest, pushing clear fluids, bland/brat diet. Should have close follow-up with family doctor and/or urology in the next 1 to 2 days. Did send for urine culture. I discussed the unclear etiology of patient's symptoms today and the need for return to emergency department in 8-12 hours for repeat evaluation, sooner if symptoms worsen or any new symptoms develop. Patient agrees to return emergency department if symptoms worsen or any new symptoms develop. Attending physician - Dr. Chhaya Bashir - was consulted on this case, but did not independently evaluate the patient        In light of current events, I did utilize appropriate PPE (including N95 face mask, safety glasses, gloves as recommended by the health facility/national standard best practice, during my bedside interactions with the patient. Patient was masked throughout ED course. Full droplet precaution as well as full PPE was followed throughout patient's ED course and evaluation. Diagnosis and plan discussed in detail with patient who understands and agrees. Patient agrees to return emergency department if symptoms worsen or any new symptoms develop.       Disposition referral (if applicable):  MD Brett Rose  [place] [phone]    Schedule an appointment as soon as possible for a visit in 2 days      [facility] Emergency Department  [address] [phone]  [phone]  Go to   As needed, If symptoms worsen      Disposition medications (if applicable):  New Prescriptions    NITROFURANTOIN, MACROCRYSTAL-MONOHYDRATE, (MACROBID) 100 MG CAPSULE    Take 1 capsule by mouth 2 times daily for 7 days    ONDANSETRON (ZOFRAN ODT) 4 MG DISINTEGRATING TABLET    Take 1 tablet by mouth every 8 hours as needed for Nausea         (Please note that portions of this note may have been completed with a voice recognition program. Efforts were made to edit the dictations but occasionally words are mis-transcribed.)          Manny Mack PA-C  03/23/21 6723

## 2021-03-23 NOTE — ED NOTES
Reviewed discharge instructions with patient and family. All voice understanding/deny questions. Removed from floor in wheelchair. Condition stable.        Saeed Penn RN  03/23/21 [id number]

## 2021-03-24 ENCOUNTER — CARE COORDINATION (OUTPATIENT)
Dept: CARE COORDINATION | Age: 86
End: 2021-03-24

## 2021-03-24 LAB
EKG ATRIAL RATE: 70 BPM
EKG DIAGNOSIS: NORMAL
EKG P AXIS: 55 DEGREES
EKG P-R INTERVAL: 174 MS
EKG Q-T INTERVAL: 390 MS
EKG QRS DURATION: 100 MS
EKG QTC CALCULATION (BAZETT): 421 MS
EKG R AXIS: 9 DEGREES
EKG T AXIS: -6 DEGREES
EKG VENTRICULAR RATE: 70 BPM

## 2021-03-24 NOTE — CARE COORDINATION
and your underlying condition or if you are sick. For more information on steps you can take to protect yourself, see CDC's How to [place] for follow-up call in 7-14 days based on severity of symptoms and risk factors.

## 2021-03-25 ENCOUNTER — PROCEDURE VISIT (OUTPATIENT)
Dept: CARDIOLOGY CLINIC | Age: 86
End: 2021-03-25
Payer: MEDICARE

## 2021-03-25 DIAGNOSIS — I25.10 CORONARY ARTERY DISEASE INVOLVING NATIVE CORONARY ARTERY OF NATIVE HEART WITHOUT ANGINA PECTORIS: ICD-10-CM

## 2021-03-25 DIAGNOSIS — R06.02 SOB (SHORTNESS OF BREATH): ICD-10-CM

## 2021-03-25 DIAGNOSIS — R00.2 PALPITATIONS: ICD-10-CM

## 2021-03-25 LAB
LV EF: 40 %
LVEF MODALITY: NORMAL

## 2021-03-25 PROCEDURE — 78452 HT MUSCLE IMAGE SPECT MULT: CPT | Performed by: INTERNAL MEDICINE

## 2021-03-25 PROCEDURE — A9500 TC99M SESTAMIBI: HCPCS | Performed by: INTERNAL MEDICINE

## 2021-03-25 PROCEDURE — 93015 CV STRESS TEST SUPVJ I&R: CPT | Performed by: INTERNAL MEDICINE

## 2021-03-26 ENCOUNTER — TELEPHONE (OUTPATIENT)
Dept: CARDIOLOGY CLINIC | Age: 86
End: 2021-03-26

## 2021-03-26 ENCOUNTER — NURSE ONLY (OUTPATIENT)
Dept: INTERNAL MEDICINE CLINIC | Age: 86
End: 2021-03-26
Payer: MEDICARE

## 2021-03-26 VITALS — TEMPERATURE: 97.6 F

## 2021-03-26 RX ORDER — CYANOCOBALAMIN 1000 UG/ML
1000 INJECTION INTRAMUSCULAR; SUBCUTANEOUS ONCE
Status: COMPLETED | OUTPATIENT
Start: 2021-03-26 | End: 2021-03-26

## 2021-03-26 RX ADMIN — CYANOCOBALAMIN 1000 MCG: 1000 INJECTION INTRAMUSCULAR; SUBCUTANEOUS at 11:29

## 2021-03-26 NOTE — TELEPHONE ENCOUNTER
Left message    nm  Normal study    Normal perfusion study with normal distribution in all coronal, short, and    horizontal axis.    The observed defect is consistent with diaphragmatic attenuation.  LVEF calculated by the computer is probably falsely low.  LVEF is 40 %

## 2021-03-29 ENCOUNTER — TELEPHONE (OUTPATIENT)
Dept: INTERNAL MEDICINE CLINIC | Age: 86
End: 2021-03-29

## 2021-03-29 DIAGNOSIS — R10.84 GENERALIZED ABDOMINAL PAIN: Primary | ICD-10-CM

## 2021-03-29 RX ORDER — ROSUVASTATIN CALCIUM 10 MG/1
TABLET, COATED ORAL
Qty: 90 TABLET | Refills: 1 | Status: SHIPPED | OUTPATIENT
Start: 2021-03-29 | End: 2021-05-25 | Stop reason: SDUPTHER

## 2021-03-29 NOTE — TELEPHONE ENCOUNTER
Was discharged from hospital last Tues and is still sick, nausea, diarrhea and [place] [facility] [place] [facility] [place] [address] Went to Urgent Care Sat and they said they could not do anything until she completed her medication. She said the more she took it the worse she felt.  Has appt for Thursday but wants to know if she should go back to the hospital? Please call

## 2021-03-30 ENCOUNTER — HOSPITAL ENCOUNTER (OUTPATIENT)
Age: 86
Discharge: HOME | End: 2021-03-30
Payer: MEDICARE

## 2021-03-30 ENCOUNTER — TELEPHONE (OUTPATIENT)
Dept: INTERNAL MEDICINE CLINIC | Age: 86
End: 2021-03-30

## 2021-03-30 LAB
ALBUMIN SERPL-MCNC: 3.9 GM/DL (ref 3.4–5)
ALP BLD-CCNC: 41 IU/L (ref 40–129)
ALT SERPL-CCNC: 13 U/L (ref 10–40)
AMYLASE: 40 U/L (ref 25–115)
ANION GAP SERPL CALCULATED.3IONS-SCNC: 12 MMOL/L (ref 4–16)
AST SERPL-CCNC: 20 IU/L (ref 15–37)
BASOPHILS ABSOLUTE: 0 K/CU MM
BASOPHILS RELATIVE PERCENT: 0.4 % (ref 0–1)
BILIRUB SERPL-MCNC: 0.4 MG/DL (ref 0–1)
BILIRUBIN URINE: NORMAL
BLOOD, URINE: NORMAL
BUN BLDV-MCNC: 13 MG/DL (ref 6–23)
CALCIUM SERPL-MCNC: 9.2 MG/DL (ref 8.3–10.6)
CHLORIDE BLD-SCNC: 94 MMOL/L (ref 99–110)
CLARITY: NORMAL
CO2: 26 MMOL/L (ref 21–32)
COLOR: NORMAL
CREAT SERPL-MCNC: 1 MG/DL (ref 0.6–1.1)
DIFFERENTIAL TYPE: ABNORMAL
EOSINOPHILS ABSOLUTE: 0.1 K/CU MM
EOSINOPHILS RELATIVE PERCENT: 2.1 % (ref 0–3)
GFR AFRICAN AMERICAN: >60 ML/MIN/1.73M2
GFR NON-AFRICAN AMERICAN: 53 ML/MIN/1.73M2
GLUCOSE BLD-MCNC: 121 MG/DL (ref 70–99)
GLUCOSE, URINE: NORMAL MG/DL
HCT VFR BLD CALC: 36.8 % (ref 37–47)
HEMOGLOBIN: 12 GM/DL (ref 12.5–16)
IMMATURE NEUTROPHIL %: 0.2 % (ref 0–0.43)
KETONES, URINE: NORMAL MG/DL
LACTATE: 2.2 MMOL/L (ref 0.4–2)
LEUKOCYTE ESTERASE, URINE: NORMAL
LIPASE: 17 IU/L (ref 13–60)
LYMPHOCYTES ABSOLUTE: 1.5 K/CU MM
LYMPHOCYTES RELATIVE PERCENT: 30.3 % (ref 24–44)
MCH RBC QN AUTO: 33.1 PG (ref 27–31)
MCHC RBC AUTO-ENTMCNC: 32.6 % (ref 32–36)
MCV RBC AUTO: 101.7 FL (ref 78–100)
MONOCYTES ABSOLUTE: 0.4 K/CU MM
MONOCYTES RELATIVE PERCENT: 9.1 % (ref 0–4)
NITRITE URINE, QUANTITATIVE: NORMAL
NUCLEATED RBC %: 0 %
PDW BLD-RTO: 14.1 % (ref 11.7–14.9)
PH, URINE: NORMAL
PLATELET # BLD: 149 K/CU MM (ref 140–440)
PMV BLD AUTO: 10.3 FL (ref 7.5–11.1)
POTASSIUM SERPL-SCNC: 4.7 MMOL/L (ref 3.5–5.1)
PROTEIN UA: NORMAL MG/DL
RBC # BLD: 3.62 M/CU MM (ref 4.2–5.4)
SEGMENTED NEUTROPHILS ABSOLUTE COUNT: 2.8 K/CU MM
SEGMENTED NEUTROPHILS RELATIVE PERCENT: 57.9 % (ref 36–66)
SODIUM BLD-SCNC: 132 MMOL/L (ref 135–145)
SPECIFIC GRAVITY UA: NORMAL
TOTAL IMMATURE NEUTOROPHIL: 0.01 K/CU MM
TOTAL NUCLEATED RBC: 0 K/CU MM
TOTAL PROTEIN: 6.9 GM/DL (ref 6.4–8.2)
UROBILINOGEN, URINE: NORMAL MG/DL
WBC # BLD: 4.9 K/CU MM (ref 4–10.5)

## 2021-03-30 PROCEDURE — 83690 ASSAY OF LIPASE: CPT

## 2021-03-30 PROCEDURE — 82150 ASSAY OF AMYLASE: CPT

## 2021-03-30 PROCEDURE — 83605 ASSAY OF LACTIC ACID: CPT

## 2021-03-30 PROCEDURE — 81001 URINALYSIS AUTO W/SCOPE: CPT

## 2021-03-30 PROCEDURE — 80053 COMPREHEN METABOLIC PANEL: CPT

## 2021-03-30 PROCEDURE — 85025 COMPLETE CBC W/AUTO DIFF WBC: CPT

## 2021-03-30 PROCEDURE — 36415 COLL VENOUS BLD VENIPUNCTURE: CPT

## 2021-03-30 NOTE — TELEPHONE ENCOUNTER
Received a call from [place] [address] [address] [facility] advised that pt lactic acid result was of a critical level of 2.2

## 2021-04-01 ENCOUNTER — OFFICE VISIT (OUTPATIENT)
Dept: INTERNAL MEDICINE CLINIC | Age: 86
End: 2021-04-01
Payer: MEDICARE

## 2021-04-01 ENCOUNTER — OFFICE VISIT (OUTPATIENT)
Dept: CARDIOLOGY CLINIC | Age: 86
End: 2021-04-01
Payer: MEDICARE

## 2021-04-01 ENCOUNTER — HOSPITAL ENCOUNTER (OUTPATIENT)
Age: 86
Discharge: HOME | End: 2021-04-01
Payer: MEDICARE

## 2021-04-01 VITALS
BODY MASS INDEX: 24.45 KG/M2 | DIASTOLIC BLOOD PRESSURE: 60 MMHG | OXYGEN SATURATION: 98 % | HEART RATE: 70 BPM | SYSTOLIC BLOOD PRESSURE: 122 MMHG | WEIGHT: 138 LBS | TEMPERATURE: 97.2 F

## 2021-04-01 VITALS
BODY MASS INDEX: 24.59 KG/M2 | SYSTOLIC BLOOD PRESSURE: 122 MMHG | WEIGHT: 138.8 LBS | HEIGHT: 63 IN | DIASTOLIC BLOOD PRESSURE: 60 MMHG | HEART RATE: 70 BPM

## 2021-04-01 DIAGNOSIS — I10 ESSENTIAL HYPERTENSION: Primary | ICD-10-CM

## 2021-04-01 DIAGNOSIS — N39.0 URINARY TRACT INFECTION WITHOUT HEMATURIA, SITE UNSPECIFIED: ICD-10-CM

## 2021-04-01 DIAGNOSIS — M81.0 OSTEOPOROSIS, UNSPECIFIED OSTEOPOROSIS TYPE, UNSPECIFIED PATHOLOGICAL FRACTURE PRESENCE: Primary | ICD-10-CM

## 2021-04-01 DIAGNOSIS — R00.2 PALPITATIONS: ICD-10-CM

## 2021-04-01 DIAGNOSIS — D63.8 ANEMIA DUE TO CHRONIC ILLNESS: ICD-10-CM

## 2021-04-01 DIAGNOSIS — R11.0 NAUSEA: ICD-10-CM

## 2021-04-01 DIAGNOSIS — R79.89 ELEVATED LACTIC ACID LEVEL: ICD-10-CM

## 2021-04-01 LAB
ALBUMIN SERPL-MCNC: 4.1 GM/DL (ref 3.4–5)
ALP BLD-CCNC: 43 IU/L (ref 40–129)
ALT SERPL-CCNC: 12 U/L (ref 10–40)
ANION GAP SERPL CALCULATED.3IONS-SCNC: 10 MMOL/L (ref 4–16)
AST SERPL-CCNC: 20 IU/L (ref 15–37)
BACTERIA: NEGATIVE /HPF
BASOPHILS ABSOLUTE: 0 K/CU MM
BASOPHILS RELATIVE PERCENT: 0.4 % (ref 0–1)
BILIRUB SERPL-MCNC: 0.3 MG/DL (ref 0–1)
BILIRUBIN URINE: NEGATIVE MG/DL
BLOOD, URINE: NEGATIVE
BUN BLDV-MCNC: 16 MG/DL (ref 6–23)
CALCIUM SERPL-MCNC: 9.1 MG/DL (ref 8.3–10.6)
CHLORIDE BLD-SCNC: 99 MMOL/L (ref 99–110)
CLARITY: CLEAR
CO2: 25 MMOL/L (ref 21–32)
COLOR: ABNORMAL
CREAT SERPL-MCNC: 1.1 MG/DL (ref 0.6–1.1)
DIFFERENTIAL TYPE: ABNORMAL
EOSINOPHILS ABSOLUTE: 0.1 K/CU MM
EOSINOPHILS RELATIVE PERCENT: 1.9 % (ref 0–3)
GFR AFRICAN AMERICAN: 57 ML/MIN/1.73M2
GFR NON-AFRICAN AMERICAN: 47 ML/MIN/1.73M2
GLUCOSE BLD-MCNC: 115 MG/DL (ref 70–99)
GLUCOSE, URINE: NEGATIVE MG/DL
HCT VFR BLD CALC: 37.5 % (ref 37–47)
HEMOGLOBIN: 12 GM/DL (ref 12.5–16)
IMMATURE NEUTROPHIL %: 0.2 % (ref 0–0.43)
KETONES, URINE: NEGATIVE MG/DL
LACTATE: 2.3 MMOL/L (ref 0.4–2)
LEUKOCYTE ESTERASE, URINE: NEGATIVE
LYMPHOCYTES ABSOLUTE: 1.7 K/CU MM
LYMPHOCYTES RELATIVE PERCENT: 33.3 % (ref 24–44)
MCH RBC QN AUTO: 32.8 PG (ref 27–31)
MCHC RBC AUTO-ENTMCNC: 32 % (ref 32–36)
MCV RBC AUTO: 102.5 FL (ref 78–100)
MONOCYTES ABSOLUTE: 0.6 K/CU MM
MONOCYTES RELATIVE PERCENT: 10.5 % (ref 0–4)
MUCUS: ABNORMAL HPF
NITRITE URINE, QUANTITATIVE: NEGATIVE
NUCLEATED RBC %: 0 %
PDW BLD-RTO: 13.7 % (ref 11.7–14.9)
PH, URINE: 6 (ref 5–8)
PLATELET # BLD: 147 K/CU MM (ref 140–440)
PMV BLD AUTO: 9.9 FL (ref 7.5–11.1)
POTASSIUM SERPL-SCNC: 5.2 MMOL/L (ref 3.5–5.1)
PROTEIN UA: NEGATIVE MG/DL
RBC # BLD: 3.66 M/CU MM (ref 4.2–5.4)
RBC URINE: 1 /HPF (ref 0–6)
SEGMENTED NEUTROPHILS ABSOLUTE COUNT: 2.8 K/CU MM
SEGMENTED NEUTROPHILS RELATIVE PERCENT: 53.7 % (ref 36–66)
SODIUM BLD-SCNC: 134 MMOL/L (ref 135–145)
SPECIFIC GRAVITY UA: 1 (ref 1–1.03)
SQUAMOUS EPITHELIAL: <1 /HPF
TOTAL IMMATURE NEUTOROPHIL: 0.01 K/CU MM
TOTAL NUCLEATED RBC: 0 K/CU MM
TOTAL PROTEIN: 7 GM/DL (ref 6.4–8.2)
TRICHOMONAS: ABNORMAL /HPF
UROBILINOGEN, URINE: NEGATIVE MG/DL (ref 0.2–1)
WBC # BLD: 5.2 K/CU MM (ref 4–10.5)
WBC UA: 2 /HPF (ref 0–5)

## 2021-04-01 PROCEDURE — G8427 DOCREV CUR MEDS BY ELIG CLIN: HCPCS | Performed by: NURSE PRACTITIONER

## 2021-04-01 PROCEDURE — 1090F PRES/ABSN URINE INCON ASSESS: CPT | Performed by: FAMILY MEDICINE

## 2021-04-01 PROCEDURE — 4040F PNEUMOC VAC/ADMIN/RCVD: CPT | Performed by: FAMILY MEDICINE

## 2021-04-01 PROCEDURE — 1036F TOBACCO NON-USER: CPT | Performed by: NURSE PRACTITIONER

## 2021-04-01 PROCEDURE — 36415 COLL VENOUS BLD VENIPUNCTURE: CPT

## 2021-04-01 PROCEDURE — G8399 PT W/DXA RESULTS DOCUMENT: HCPCS | Performed by: NURSE PRACTITIONER

## 2021-04-01 PROCEDURE — 81001 URINALYSIS AUTO W/SCOPE: CPT

## 2021-04-01 PROCEDURE — G8399 PT W/DXA RESULTS DOCUMENT: HCPCS | Performed by: FAMILY MEDICINE

## 2021-04-01 PROCEDURE — 1123F ACP DISCUSS/DSCN MKR DOCD: CPT | Performed by: FAMILY MEDICINE

## 2021-04-01 PROCEDURE — 1090F PRES/ABSN URINE INCON ASSESS: CPT | Performed by: NURSE PRACTITIONER

## 2021-04-01 PROCEDURE — 80053 COMPREHEN METABOLIC PANEL: CPT

## 2021-04-01 PROCEDURE — 1123F ACP DISCUSS/DSCN MKR DOCD: CPT | Performed by: NURSE PRACTITIONER

## 2021-04-01 PROCEDURE — 83605 ASSAY OF LACTIC ACID: CPT

## 2021-04-01 PROCEDURE — 99214 OFFICE O/P EST MOD 30 MIN: CPT | Performed by: FAMILY MEDICINE

## 2021-04-01 PROCEDURE — 4040F PNEUMOC VAC/ADMIN/RCVD: CPT | Performed by: NURSE PRACTITIONER

## 2021-04-01 PROCEDURE — 1036F TOBACCO NON-USER: CPT | Performed by: FAMILY MEDICINE

## 2021-04-01 PROCEDURE — G8420 CALC BMI NORM PARAMETERS: HCPCS | Performed by: FAMILY MEDICINE

## 2021-04-01 PROCEDURE — 85025 COMPLETE CBC W/AUTO DIFF WBC: CPT

## 2021-04-01 PROCEDURE — 99214 OFFICE O/P EST MOD 30 MIN: CPT | Performed by: NURSE PRACTITIONER

## 2021-04-01 PROCEDURE — G8420 CALC BMI NORM PARAMETERS: HCPCS | Performed by: NURSE PRACTITIONER

## 2021-04-01 PROCEDURE — G8427 DOCREV CUR MEDS BY ELIG CLIN: HCPCS | Performed by: FAMILY MEDICINE

## 2021-04-01 ASSESSMENT — ENCOUNTER SYMPTOMS
CONSTIPATION: 0
ABDOMINAL PAIN: 0
SHORTNESS OF BREATH: 1
COLOR CHANGE: 0
NAUSEA: 1
SORE THROAT: 0
DIARRHEA: 0
CHEST TIGHTNESS: 0
SHORTNESS OF BREATH: 0
VOMITING: 0

## 2021-04-01 NOTE — PROGRESS NOTES
[facility] [address] [facility],Third [address]  Phone: [phone]    Fax [phone]                  Trini Alex MD, Susan Solitario MD, Quoc Hamilton MD, Jodi Pallas, MD Rosilyn Kiang, MD Bettie Saltness, MD Verónica Adams, APRSHERRY Castano, APRN  Maxx Graves, APRN     Maeve Dickens, APRN        Cardiology Progress Note      4/1/2021    RE: Bonnie Comer  (1935)                             Primary cardiologist:        Subjective:  CC:   1. Essential hypertension    2. Palpitations        HPI: Bonnie Comer, who is a  82y.o. year old female with a past medical history as listed below. Patient complains of palpitations, shortness of breath, and fatigue is been ongoing since December. On last office visit approximately a month ago patient reports symptoms have resolved but continues to say she is worried about the ongoing fatigue and shortness of breath she has had for the last 6 months. Patient has had medication changed multiple times due to nausea. Patient had recent emergency room visit with complaints of abdominal pain nausea diarrhea. Patient was found to have elevated lactic acid level. Patient was treated with IV fluids, amylase within normal limits, trace leukocytes noted in urine and patient was treated for suspected UTI. All other labs were unremarkable. Patient has been following closely with PCP.     Past Medical History:   Diagnosis Date    Arthritis of shoulder region, right 09/2014    [place] Fus Blind right eye     following right cataract surg    Chronic depression 2009    Dr. Eusebia Briggs DVT (deep venous thrombosis) ([facility] [address] 1970    left leg    Former smoker     History of echocardiogram 09/21/2020    EF 55-60%, mild left ventricular hypertrophy, normal diastolic filling pattern for age, no signficiant valvular disease, no pericardial effusion     History of stress test 03/25/2021    normal LVEF calculated by the computer is probably falsely low.  LVEF is 40 %    Hx of Doppler ultrasound 03/01/2018    Carotid-mild 0-49% bilateral carotid,50% stenosis right subclavian    Hyperlipidemia     Hypertension     Macular degeneration     right    Mild cognitive impairment 02/2012    MMSE 26/30    MVP (mitral valve prolapse)     trace on echo 2014    Osteoporosis 05/2012    Pancytopenia 05/2011    mild with ; Dr Joelle hawk 2010    Peptic ulcer disease     Peripheral vascular disease ([facility] [address] 01/2016    angio; dis below ankles; pletal    Prediabetes 2006    Recurrent ventral incisional hernia 2013    medial apex of GB scar    S/P CABG x 3 2003    3-vessel; Dr Sathish Garcia 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 Irina Chin; Right side       Current Outpatient Medications   Medication Sig Dispense Refill    rosuvastatin (CRESTOR) 10 MG tablet take 1 tablet by mouth once daily 90 tablet 1    ondansetron (ZOFRAN ODT) 4 MG disintegrating tablet Take 1 tablet by mouth every 8 hours as needed for Nausea 15 tablet 0    gabapentin (NEURONTIN) 300 MG capsule 2 tablets 3 times daily for trigeminal neuralgia 180 capsule 5    divalproex (DEPAKOTE ER) 250 MG extended release tablet Take 250 mg by mouth nightly       fluticasone (FLONASE) 50 MCG/ACT nasal spray instill 1 spray into each nostril once daily 1 Bottle 3    epinastine (ELESTAT) 0.05 % SOLN Place 1 drop into both eyes daily 1 Bottle 1    verapamil (CALAN SR) 180 MG extended release tablet Take 0.5 tablets by mouth 2 times daily 30 tablet 5    VASCEPA 0.5 g CAPS take 2 capsules by mouth twice a day 360 capsule 2    pantoprazole (PROTONIX) 40 MG tablet Take 1 tablet by mouth daily 60 tablet 5    Probiotic Product (PROBIOTIC DAILY PO) Take 1 capsule by mouth daily      Multiple Vitamins-Minerals (OCUVITE-LUTEIN PO) Take by mouth      calcium-vitamin D (OSCAL 500/200 D-3) 500-200 MG-UNIT per tablet Take 1 tablet by mouth 2 times daily.  aspirin 81 MG EC tablet Take 81 mg by mouth daily.  dilTIAZem (CARDIZEM) 30 MG tablet Take 1 tablet by mouth 3 times daily (Patient not taking: Reported on 4/1/2021) 120 tablet 1    Metoprolol Tartrate 37.5 MG TABS        Current Facility-Administered Medications   Medication Dose Route Frequency Provider Last Rate Last Admin    cyanocobalamin injection 1,000 mcg  1,000 mcg Intramuscular Once Janell Martinez MD        cyanocobalamin injection 1,000 mcg  1,000 mcg Subcutaneous Q30 Days Janell Martinez MD   1,000 mcg at 10/20/20 1625       Review of Systems:  Review of Systems   Respiratory: Positive for shortness of breath. Cardiovascular: Positive for palpitations and leg swelling. Psychiatric/Behavioral: The patient is nervous/anxious. All other systems reviewed and are negative. Objective:      Physical Exam:  /60   Pulse 70   Ht 5' 3\" (1.6 m)   Wt 138 lb 12.8 oz (63 kg)   BMI 24.59 kg/m²   Wt Readings from Last 3 Encounters:   04/01/21 138 lb 12.8 oz (63 kg)   04/01/21 138 lb (62.6 kg)   03/23/21 137 lb (62.1 kg)     Body mass index is 24.59 kg/m². Physical exam:  Physical Exam   Constitutional: She is oriented to person, place, and time. She appears well-developed and well-nourished. Cardiovascular: Normal rate. Frequent extrasystoles are present. Pulses:       Dorsalis pedis pulses are 2+ on the right side and 2+ on the left side. Posterior tibial pulses are 2+ on the right side and 2+ on the left side. Pulmonary/Chest: Effort normal and breath sounds normal.   Abdominal: Soft. Bowel sounds are normal.   Musculoskeletal: Normal range of motion. General: No edema. Neurological: She is alert and oriented to person, place, and time.         DATA:  No results found for: CKTOTAL, CKMB, CKMBINDEX, TROPONINI  BNP:  No results found for: BNP  PT/INR:  No results found for: [facility]  Lab Results   Component Value Date    LABA1C 6.1 03/22/2021     Lab Results   Component Value Date    CHOL 129 03/22/2021    TRIG 194 (H) 03/22/2021    HDL 33 (L) 03/22/2021    LDLCALC 44 06/22/2020    LDLDIRECT 70 03/22/2021     Lab Results   Component Value Date    ALT 13 03/30/2021    AST 20 03/30/2021     TSH:    Lab Results   Component Value Date    TSH 3.250 03/06/2019       Vitals:    04/01/21 1521   BP: 122/60   Pulse: 70       Echo:9/21/20  Left ventricular function is normal, EF is estimated at 55-60%. Mild left ventricular hypertrophy. Normal diastolic filling pattern for age. No significant valvular disease noted. No evidence of pericardial effusion. Stress Test:3/25/21  No ischemia       The ASCVD Risk score (Vicente Hayes et al., 2013) failed to calculate for the following reasons: The 2013 ASCVD risk score is only valid for ages 36 to 78      Assessment/ Plan:     Hypertension  -Patient has been on multiple medications to control blood pressure. Patient reports overwhelming fatigue and palpitations. Previously had elevated blood pressure given current symptoms recommended patient follow-up with PCP for better management of anxiety and depression. Recently started on Cardizem 30 mg 3 times daily,  patient reports not starting medication due to GI complications that resulted in emergency room visit. Patient is planning on following up with gastroenterology.     -Will hold off on Cardizem at this time. Palpitations  -Known history of SVT and bigeminy PVCs but previously did not want beta-blocker due to previous nausea associated with metoprolol. Magnesium and TSH are within normal limits. Stress test was negative for ischemia. Patient was to start Cardizem 30 mg TID but has not started medication due to GI issues.     -Patient reports palpitations have been mild. Will not restart Cardizem at this time.       Patient seen, interviewed and examined. Testing was reviewed. Patient is encouraged to exercise even a brisk walk for 30 minutes at least 3 to 4 times a week. Lifestyle and risk factor modificatons discussed. Various goals are discussed and questions answered. Continue current medications. Appropriate prescriptions are addressed. Questions answered and patient verbalizes understanding. Call for any problems, questions, or concerns. Pt is to follow up in 3 month for Cardiac management    Electronically signed by Raiza Esquivel.  ABELARDO Cardenas CNP on 4/1/2021 at 3:50 PM

## 2021-04-01 NOTE — PROGRESS NOTES
Subjective:      Chief Complaint   Patient presents with    Follow-Up from Hospital       HPI:  Jenny Krause is a 82 y.o. female who presents today for follow up of chronic conditions as listed below. Was seen in ER about a week ago for nausea and was treated for UTI. States she is still having some residual nausea but is improving. Is tolerating po without much issue, is gradually feeling stronger. States diarrhea resolved after discontinuing macrobid a few days ago. Denying any abdominal pain or urinary symptoms. Past Medical History:   Diagnosis Date    Arthritis of shoulder region, right 09/2014    Morena Hutchins Blind right eye     following right cataract surg    Chronic depression 2009    Dr. Shannon Tyson DVT (deep venous thrombosis) ([address] 1970    left leg    Former smoker     History of echocardiogram 09/21/2020    EF 55-60%, mild left ventricular hypertrophy, normal diastolic filling pattern for age, no signficiant valvular disease, no pericardial effusion     History of stress test 03/25/2021    normal LVEF calculated by the computer is probably falsely low.  LVEF is 40 %    Hx of Doppler ultrasound 03/01/2018    Carotid-mild 0-49% bilateral carotid,50% stenosis right subclavian    Hyperlipidemia     Hypertension     Macular degeneration     right    Mild cognitive impairment 02/2012    MMSE 26/30    MVP (mitral valve prolapse)     trace on echo 2014    Osteoporosis 05/2012    Pancytopenia 05/2011    mild with ; Dr Emile hawk 2010    Peptic ulcer disease     Peripheral vascular disease ([address] 01/2016    angio; dis below ankles; pletal    Prediabetes 2006    Recurrent ventral incisional hernia 2013    medial apex of GB scar    S/P CABG x 3 2003    3-vessel; Dr Oliver Ashley 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 Shira Zambrano; Right side        Past Surgical History:   Procedure Laterality Date    CATARACT REMOVAL Right     poor result ; blind right eye; [place]    CHOLECYSTECTOMY      CORONARY ARTERY BYPASS GRAFT  2009    3 vessel    SHOULDER ARTHROSCOPY Right     TUBAL LIGATION      URETER SURGERY      Right ureteral repair       Social History     Tobacco Use    Smoking status: Former Smoker     Packs/day: 0.25     Years: 9.00     Pack years: 2.25     Types: Cigarettes     Start date:      Quit date: 1985     Years since quittin.3    Smokeless tobacco: Never Used    Tobacco comment: 2016   Substance Use Topics    Alcohol use: No     Alcohol/week: 0.0 standard drinks        Review of Systems   Constitutional: Negative for activity change, appetite change, chills, fever and unexpected weight change. HENT: Negative for congestion and sore throat. Respiratory: Negative for chest tightness and shortness of breath. Cardiovascular: Negative for chest pain and palpitations. Gastrointestinal: Positive for nausea. Negative for abdominal pain, constipation, diarrhea and vomiting. Genitourinary: Negative for dysuria. Skin: Negative for color change. Neurological: Negative for dizziness and light-headedness. Psychiatric/Behavioral: Negative for dysphoric mood. The patient is not nervous/anxious. Prior to Visit Medications    Medication Sig Taking? Authorizing Provider   rosuvastatin (CRESTOR) 10 MG tablet take 1 tablet by mouth once daily Yes Louisa Sethi MD   ondansetron (ZOFRAN ODT) 4 MG disintegrating tablet Take 1 tablet by mouth every 8 hours as needed for Nausea Yes Korinne Lusterio, PA-C   dilTIAZem (CARDIZEM) 30 MG tablet Take 1 tablet by mouth 3 times daily Yes Ricardo Diggs, APRN - CNP   gabapentin (NEURONTIN) 300 MG capsule 2 tablets 3 times daily for trigeminal neuralgia Yes Louisa Sethi MD   divalproex (DEPAKOTE ER) 250 MG extended release tablet Take 250 mg by mouth nightly  Yes Historical Provider, MD   Metoprolol Tartrate 37.5 MG TABS  Yes Historical Provider, MD   fluticasone (FLONASE) 50 MCG/ACT nasal spray instill 1 spray into each nostril once daily Yes Lily Harris MD   epinastine (ELESTAT) 0.05 % SOLN Place 1 drop into both eyes daily Yes Lily Harris MD   verapamil (CALAN SR) 180 MG extended release tablet Take 0.5 tablets by mouth 2 times daily Yes Jose Preciado MD   VASCEPA 0.5 g CAPS take 2 capsules by mouth twice a day Yes Jose Preciado MD   pantoprazole (PROTONIX) 40 MG tablet Take 1 tablet by mouth daily Yes Lily Harris MD   Probiotic Product (PROBIOTIC DAILY PO) Take 1 capsule by mouth daily Yes Historical Provider, MD   Multiple Vitamins-Minerals (OCUVITE-LUTEIN PO) Take by mouth Yes Historical Provider, MD   calcium-vitamin D (OSCAL 500/200 D-3) 500-200 MG-UNIT per tablet Take 1 tablet by mouth 2 times daily. Yes Historical Provider, MD   aspirin 81 MG EC tablet Take 81 mg by mouth daily. Yes Historical Provider, MD          Objective:      /60   Pulse 70   Temp 97.2 °F (36.2 °C)   Wt 138 lb (62.6 kg)   SpO2 98%   BMI 24.45 kg/m²      Physical Exam  Vitals signs and nursing note reviewed. Constitutional:       General: She is not in acute distress. Appearance: Normal appearance. She is not ill-appearing or toxic-appearing. HENT:      Head: Normocephalic and atraumatic. Right Ear: External ear normal.      Left Ear: External ear normal.      Nose: Nose normal.      Mouth/Throat:      Pharynx: Oropharynx is clear. Eyes:      General: No scleral icterus. Right eye: No discharge. Left eye: No discharge. Extraocular Movements: Extraocular movements intact. Conjunctiva/sclera: Conjunctivae normal.   Neck:      Musculoskeletal: Normal range of motion and neck supple. No neck rigidity. Cardiovascular:      Rate and Rhythm: Normal rate and regular rhythm. Heart sounds: Normal heart sounds. Pulmonary:      Effort: Pulmonary effort is normal.      Breath sounds: Normal breath sounds. No wheezing or rales. Musculoskeletal:         General: No deformity. Skin:     General: Skin is warm and dry. Findings: No rash. Neurological:      General: No focal deficit present. Mental Status: She is alert. Mental status is at baseline. Motor: No weakness. Psychiatric:         Mood and Affect: Mood normal.         Behavior: Behavior normal.            Assessment / Plan:      1. Nausea  Improving, but patient requesting further evaluation for ongoing GI symptoms.    - [facility]    2. Elevated lactic acid level  Lactic acid improving, but still slightly elevated. Etiology unclear- patient clinically improving with unremarkable exam today, WBC has remained wnl. Encouraged increasing water intake, will repeat in 3-4 weeks to ensure normalization.   [facility]  - LACTIC ACID, PLASMA; Future    3. Urinary tract infection without hematuria, site unspecified  UA now normalized. - Urinalysis with Microscopic; Future    4. Anemia due to chronic illness  Chronic, mild- will monitor.   - CBC Auto Differential; Future  - Comprehensive Metabolic Panel; Future          Patient voiced understanding and agreement with plan. All questions/concerns were addressed, risks/side effects of medications were reviewed. Return precautions and after visit summary were provided. Return in about 4 weeks (around 4/29/2021). or earlier as needed.       Iraida Lynch MD

## 2021-04-01 NOTE — LETTER
Rashad Harris [facility]  1935  [id number]    Have you had any Chest Pain that is not new? - No  If Yes DO EKG - How does it feel - NONE    How long does the pain last -  {Blank single:19197::\"seconds\",\"minutes\",\"hours\",\"All Day\"}    How long have you been having the pain - {Blank single:19197::\"Day's\",\"Weeks\",\"Months\",\"Years\"}   Did you take a {Blank single:19197::\"Nitro\",\"Motrin\",\"Tums\",\"Pain Medications\"}   And did it relieve the pain - {Blank single:19197::\"Yes\",\"No\"}     DO EKG IF: Patient has a Heart Rate above 100 or below 40     CAD (Coronary Artery Disease) patient should have one on file every 6 months        Have you had any Shortness of Breath - No  If Yes - When {Blank single:19197::\"on exertion\",\"at rest\",\"at rest and on exertion\",\"carrying something\"}    Have you had any dizziness - No  If Yes DO ORTHOSTATIC BP - when do you feel dizzy {Blank single:19197::\"with position change\",\"walking\",\"laying down\",\"does the room spin\"}   How long does it last .{Blank single:19197::\"1\",\"2\",\"3\",\"4\",\"5\",\"6\",\"7\",\"8\",\"9\",\"10\",\"15\",\"20\",\"30\",\"45\"}  {Blank single:19197::\"seconds\",\"minutes\",\"hours\",\"All Day\"}      Sitting wait 5 minutes do supine (laying down) wait 5 minutes then do standing - log each in \"vitals\" area in Epic   Be sure to ask what symptoms they are having if they get dizzy while completing ortho stats such as: room spinning, nausea, etc    Have you had any palpitations that are not new? - No  If Yes DO EKG - Do you feel your heart {Blank single:19197::\"racing\",\"pounding\",\"skipping\",\"fluttering\"}  How long does it last - . {Blank single:19197::\"1\",\"2\",\"3\",\"4\",\"5\",\"6\",\"7\",\"8\",\"9\",\"10\",\"15\",\"20\",\"30\",\"45\"}  {Blank single:19197::\"seconds\",\"minutes\",\"hours\",\"All Day\"}     Do you have any edema - swelling in No    If Yes - CHECK TO SEE IF THE EDEMA IS PITTING  How long have they been having edema - {Blank single:19197::\"Day's\",\"Weeks\",\"Months\",\"Years\"}  If Yes -

## 2021-04-01 NOTE — ASSESSMENT & PLAN NOTE
-Patient has been on multiple medications to control blood pressure. Patient reports overwhelming fatigue and palpitations. Previously had elevated blood pressure given current symptoms recommended patient follow-up with PCP for better management of anxiety and depression. Recently started on Cardizem 30 mg 3 times daily,  patient reports not starting medication due to GI complications that resulted in emergency room visit. Patient is planning on following up with gastroenterology.     -Will hold off on Cardizem at this time.

## 2021-04-01 NOTE — ASSESSMENT & PLAN NOTE
-Known history of SVT and bigeminy PVCs but previously did not want beta-blocker due to previous nausea associated with metoprolol. Magnesium and TSH are within normal limits. Stress test was negative for ischemia. Patient was to start Cardizem 30 mg TID but has not started medication due to GI issues.     -Patient reports palpitations have been mild. Will not restart Cardizem at this time.

## 2021-04-07 ENCOUNTER — CARE COORDINATION (OUTPATIENT)
Dept: CARE COORDINATION | Age: 86
End: 2021-04-07

## 2021-04-07 ENCOUNTER — TELEPHONE (OUTPATIENT)
Dept: INTERNAL MEDICINE CLINIC | Age: 86
End: 2021-04-07

## 2021-04-07 NOTE — TELEPHONE ENCOUNTER
FYI - Just wants you to know she is feeling better so she is not going to schedule with the gastr doctor.

## 2021-04-07 NOTE — CARE COORDINATION
You Patient resolved from the Care Transitions episode on 3/24/21  Discussed COVID-19 related testing which was not done at this time. Test results were not done. Patient informed of results, if available? No    Patient/family has been provided the following resources and education related to COVID-19:                         Signs, symptoms and red flags related to COVID-19            [facility] exposure and quarantine guidelines            [facility] exposure contact - [phone]            Contact for their local Department of Health                 Patient currently reports that the following symptoms have improved:  no new/worsening symptoms     No further outreach scheduled with this CTN/ACM. Episode of Care resolved. Patient has this CTN/ACM contact information if future needs arise. [place] Was too long a drive for her. Called chiropractor this amillie In regard to this. Reports she Called and left message at pcp office as she spoke with [facility] and referral was sent to [place] location and would need to be refaxed to [facility] location. Advised pt if would need any further assistance with this to contact acm and contact info provided. Pt denies further needs at this time. ACM available if needs arise in future.

## 2021-04-08 ENCOUNTER — TELEPHONE (OUTPATIENT)
Dept: INTERNAL MEDICINE CLINIC | Age: 86
End: 2021-04-08

## 2021-04-12 ENCOUNTER — OFFICE VISIT (OUTPATIENT)
Dept: INTERNAL MEDICINE CLINIC | Age: 86
End: 2021-04-12
Payer: MEDICARE

## 2021-04-12 VITALS
BODY MASS INDEX: 24.27 KG/M2 | WEIGHT: 137 LBS | OXYGEN SATURATION: 98 % | DIASTOLIC BLOOD PRESSURE: 70 MMHG | TEMPERATURE: 96.7 F | HEART RATE: 85 BPM | SYSTOLIC BLOOD PRESSURE: 120 MMHG

## 2021-04-12 DIAGNOSIS — M54.50 ACUTE LEFT-SIDED LOW BACK PAIN, UNSPECIFIED WHETHER SCIATICA PRESENT: Primary | ICD-10-CM

## 2021-04-12 PROCEDURE — G8399 PT W/DXA RESULTS DOCUMENT: HCPCS | Performed by: FAMILY MEDICINE

## 2021-04-12 PROCEDURE — 99213 OFFICE O/P EST LOW 20 MIN: CPT | Performed by: FAMILY MEDICINE

## 2021-04-12 PROCEDURE — G8420 CALC BMI NORM PARAMETERS: HCPCS | Performed by: FAMILY MEDICINE

## 2021-04-12 PROCEDURE — 1036F TOBACCO NON-USER: CPT | Performed by: FAMILY MEDICINE

## 2021-04-12 PROCEDURE — 1123F ACP DISCUSS/DSCN MKR DOCD: CPT | Performed by: FAMILY MEDICINE

## 2021-04-12 PROCEDURE — 1090F PRES/ABSN URINE INCON ASSESS: CPT | Performed by: FAMILY MEDICINE

## 2021-04-12 PROCEDURE — 4040F PNEUMOC VAC/ADMIN/RCVD: CPT | Performed by: FAMILY MEDICINE

## 2021-04-12 PROCEDURE — G8427 DOCREV CUR MEDS BY ELIG CLIN: HCPCS | Performed by: FAMILY MEDICINE

## 2021-04-12 RX ORDER — TIZANIDINE 2 MG/1
2 TABLET ORAL NIGHTLY PRN
Qty: 10 TABLET | Refills: 0 | Status: SHIPPED | OUTPATIENT
Start: 2021-04-12 | End: 2021-04-20 | Stop reason: SDUPTHER

## 2021-04-12 RX ORDER — LIDOCAINE 4 G/G
PATCH TOPICAL
Qty: 15 PATCH | Refills: 0 | Status: ON HOLD | OUTPATIENT
Start: 2021-04-12 | End: 2021-11-30 | Stop reason: HOSPADM

## 2021-04-12 ASSESSMENT — ENCOUNTER SYMPTOMS
COLOR CHANGE: 0
DIARRHEA: 0
VOMITING: 0
ABDOMINAL PAIN: 0
SHORTNESS OF BREATH: 0
SORE THROAT: 0
BACK PAIN: 1
CONSTIPATION: 0
CHEST TIGHTNESS: 0

## 2021-04-12 NOTE — PROGRESS NOTES
Subjective:      Chief Complaint   Patient presents with    Back Pain     Experiencing back pain since 4/6. HPI:  Bam Ray is a 82 y.o. female who presents today for low back pain. Went shopping all day with a friend last week and did not hurt herself, but had not been out in several months so it was the most activity she has had in a long time. States the following day, she started having low back pain radiating into L buttock/thigh. Does not radiate further down the leg, no weakness or numbness. Went to her chiropractor and had XR completed which showed degenerative changes and likely muscle spasm. Has been using tylenol, ice/heat. States symptoms are manageable during the day, but are worse at night and are keeping her from sleeping. Otherwise states she has been feeling well- her GI symptoms from a few weeks ago have completely resolved, feels back to normal.         Past Medical History:   Diagnosis Date    Arthritis of shoulder region, right 09/2014    [place] [facility] right eye     following right cataract surg    Chronic depression 2009    Dr. Shahnaz Moreira DVT (deep venous thrombosis) ([facility] [address] 1970    left leg    Former smoker     History of echocardiogram 09/21/2020    EF 55-60%, mild left ventricular hypertrophy, normal diastolic filling pattern for age, no signficiant valvular disease, no pericardial effusion     History of stress test 03/25/2021    normal LVEF calculated by the computer is probably falsely low.  LVEF is 40 %    Hx of Doppler ultrasound 03/01/2018    Carotid-mild 0-49% bilateral carotid,50% stenosis right subclavian    Hyperlipidemia     Hypertension     Macular degeneration     right    Mild cognitive impairment 02/2012    MMSE 26/30    MVP (mitral valve prolapse)     trace on echo 2014    Osteoporosis 05/2012    Pancytopenia 05/2011    mild with ; Dr Nika hawk 2010    Peptic ulcer disease     Peripheral vascular disease ([facility] [address] 01/2016    angio; dis below ankles; pletal    Prediabetes 2006    Recurrent ventral incisional hernia     medial apex of GB scar    S/P CABG x 3     3-vessel; Dr Pao Sanchez Spigelian hernia 2017    right ant lateral wall; seen on CT abd    Supraventricular tachycardia ([address]     Freq ventriular ectopy    Tic douloureux 2008    Dr. Connie Kang; Right side        Past Surgical History:   Procedure Laterality Date    CATARACT REMOVAL Right     poor result ; blind right eye; [facility] [address] CORONARY ARTERY BYPASS GRAFT  2009    3 vessel    SHOULDER ARTHROSCOPY Right     TUBAL LIGATION      URETER SURGERY      Right ureteral repair       Social History     Tobacco Use    Smoking status: Former Smoker     Packs/day: 0.25     Years: 9.00     Pack years: 2.25     Types: Cigarettes     Start date:      Quit date: 1985     Years since quittin.3    Smokeless tobacco: Never Used    Tobacco comment: 2016   Substance Use Topics    Alcohol use: No     Alcohol/week: 0.0 standard drinks        Review of Systems   Constitutional: Negative for activity change, appetite change, chills, fever and unexpected weight change. HENT: Negative for congestion and sore throat. Respiratory: Negative for chest tightness and shortness of breath. Cardiovascular: Negative for chest pain and palpitations. Gastrointestinal: Negative for abdominal pain, constipation, diarrhea and vomiting. Genitourinary: Negative for dysuria. Musculoskeletal: Positive for back pain. Skin: Negative for color change. Neurological: Negative for dizziness and light-headedness. Psychiatric/Behavioral: Negative for dysphoric mood. The patient is not nervous/anxious. Prior to Visit Medications    Medication Sig Taking?  Authorizing Provider   rosuvastatin (CRESTOR) 10 MG tablet take 1 tablet by mouth once daily Yes Nelda Winchester MD   ondansetron (ZOFRAN ODT) 4 MG disintegrating tablet Take 1 tablet by mouth every 8 hours as needed for Nausea Yes Korinne Lusterio, PA-C   dilTIAZem (CARDIZEM) 30 MG tablet Take 1 tablet by mouth 3 times daily Yes Ricardo Diggs APRN - CNP   gabapentin (NEURONTIN) 300 MG capsule 2 tablets 3 times daily for trigeminal neuralgia Yes Radha Thomas MD   divalproex (DEPAKOTE ER) 250 MG extended release tablet Take 250 mg by mouth nightly  Yes Historical Provider, MD   Metoprolol Tartrate 37.5 MG TABS  Yes Historical Provider, MD   fluticasone (FLONASE) 50 MCG/ACT nasal spray instill 1 spray into each nostril once daily Yes Radha Thomas MD   epinastine (ELESTAT) 0.05 % SOLN Place 1 drop into both eyes daily Yes Radha Thomas MD   VASCEPA 0.5 g CAPS take 2 capsules by mouth twice a day Yes Debra Stroud MD   pantoprazole (PROTONIX) 40 MG tablet Take 1 tablet by mouth daily Yes Rdaha Thomas MD   Probiotic Product (PROBIOTIC DAILY PO) Take 1 capsule by mouth daily Yes Historical Provider, MD   Multiple Vitamins-Minerals (OCUVITE-LUTEIN PO) Take by mouth Yes Historical Provider, MD   calcium-vitamin D (OSCAL 500/200 D-3) 500-200 MG-UNIT per tablet Take 1 tablet by mouth 2 times daily. Yes Historical Provider, MD   aspirin 81 MG EC tablet Take 81 mg by mouth daily. Yes Historical Provider, MD   verapamil (CALAN SR) 180 MG extended release tablet Take 0.5 tablets by mouth 2 times daily  Patient not taking: Reported on 4/12/2021  Debra Stroud MD          Objective:      /70 (Site: Left Upper Arm, Position: Sitting, Cuff Size: Medium Adult)   Pulse 85   Temp 96.7 °F (35.9 °C)   Wt 137 lb (62.1 kg)   SpO2 98%   BMI 24.27 kg/m²      Physical Exam  Vitals signs and nursing note reviewed. Constitutional:       General: She is not in acute distress. Appearance: Normal appearance. She is not ill-appearing or toxic-appearing. HENT:      Head: Normocephalic and atraumatic.       Right Ear: External ear normal.      Left Ear: External ear normal.      Nose: Nose normal.      Mouth/Throat:      Pharynx: Oropharynx is clear. Eyes:      General: No scleral icterus. Right eye: No discharge. Left eye: No discharge. Extraocular Movements: Extraocular movements intact. Conjunctiva/sclera: Conjunctivae normal.   Neck:      Musculoskeletal: Normal range of motion and neck supple. No neck rigidity. Cardiovascular:      Rate and Rhythm: Normal rate and regular rhythm. Heart sounds: Normal heart sounds. Pulmonary:      Effort: Pulmonary effort is normal.      Breath sounds: Normal breath sounds. No wheezing or rales. Musculoskeletal:         General: Tenderness (Lumbar L sided paraspinal muscles) present. No deformity. Skin:     General: Skin is warm and dry. Findings: No rash. Neurological:      General: No focal deficit present. Mental Status: She is alert. Mental status is at baseline. Motor: No weakness. Psychiatric:         Mood and Affect: Mood normal.         Behavior: Behavior normal.            Assessment / Plan:      1. Acute left-sided low back pain, unspecified whether sciatica present  Acute, atraumatic low back pain after increased activity. Suspect strain, XR negative for acute findings. Advised continued tylenol, heat/ice, stretching. Will provide low dose muscle relaxant for evening and lidocaine patch during the day. Has follow up in 2 weeks- if symptoms still persisting at that time, can refer to PT.  - tiZANidine (ZANAFLEX) 2 MG tablet; Take 1 tablet by mouth nightly as needed (low back pain)  Dispense: 10 tablet; Refill: 0  - lidocaine 4 % external patch; On for 12 hours, off for 12 hours  Dispense: 15 patch; Refill: 0          Patient voiced understanding and agreement with plan. All questions/concerns were addressed, risks/side effects of medications were reviewed. Return precautions and after visit summary were provided. Return if symptoms worsen or fail to improve.  or earlier as needed.       Janell Martinez MD

## 2021-04-12 NOTE — PATIENT INSTRUCTIONS
You can take tylenol 1000mg (2 tabs) up to every 6-8 hours as needed. Use the lidocaine patch during the day (on for 12 hours, off for 12 hours). Take the muscle relaxant (tizanidine) at night.

## 2021-04-13 ENCOUNTER — TELEPHONE (OUTPATIENT)
Dept: INTERNAL MEDICINE CLINIC | Age: 86
End: 2021-04-13

## 2021-04-13 NOTE — TELEPHONE ENCOUNTER
Called and spoke with patient she just started the Zanaflex 2 mg. She is unable to take the whole tablet she will 1/2 the tablet. Please advise further. Patient is not going to the chiropractor until the doctor approves her to go. See the previous note.

## 2021-04-13 NOTE — TELEPHONE ENCOUNTER
Said she feels the pain meds are to strong and wants to know if she can cut it in half? Said she had to get up to go the bathroom and had to hold on to the wall, saw her lamps were still on and couldn't even turn them off.  Please call

## 2021-04-14 NOTE — TELEPHONE ENCOUNTER
Patient called in stating that she had a horrible night and wants to continue to take the entire pill.

## 2021-04-20 DIAGNOSIS — M54.50 ACUTE LEFT-SIDED LOW BACK PAIN, UNSPECIFIED WHETHER SCIATICA PRESENT: ICD-10-CM

## 2021-04-20 RX ORDER — TIZANIDINE 2 MG/1
2 TABLET ORAL NIGHTLY PRN
Qty: 10 TABLET | Refills: 0 | Status: SHIPPED | OUTPATIENT
Start: 2021-04-20 | End: 2021-05-18 | Stop reason: SDUPTHER

## 2021-04-20 NOTE — TELEPHONE ENCOUNTER
Patient called in stating that she has two muscle relaxer's left. She states that she had a really bad night last night and wanted more called in.

## 2021-04-28 ENCOUNTER — TELEPHONE (OUTPATIENT)
Dept: CARDIOLOGY CLINIC | Age: 86
End: 2021-04-28

## 2021-04-28 ENCOUNTER — OFFICE VISIT (OUTPATIENT)
Dept: FAMILY MEDICINE CLINIC | Age: 86
End: 2021-04-28
Payer: MEDICARE

## 2021-04-28 ENCOUNTER — TELEPHONE (OUTPATIENT)
Dept: INTERNAL MEDICINE CLINIC | Age: 86
End: 2021-04-28

## 2021-04-28 VITALS
HEART RATE: 84 BPM | WEIGHT: 137 LBS | BODY MASS INDEX: 24.27 KG/M2 | HEIGHT: 63 IN | TEMPERATURE: 97 F | OXYGEN SATURATION: 98 % | SYSTOLIC BLOOD PRESSURE: 110 MMHG | DIASTOLIC BLOOD PRESSURE: 72 MMHG

## 2021-04-28 DIAGNOSIS — R11.0 NAUSEA: Primary | ICD-10-CM

## 2021-04-28 LAB
BILIRUBIN, POC: ABNORMAL
BLOOD URINE, POC: ABNORMAL
CLARITY, POC: CLEAR
COLOR, POC: YELLOW
GLUCOSE URINE, POC: NEGATIVE
KETONES, POC: NEGATIVE
LEUKOCYTE EST, POC: NEGATIVE
NITRITE, POC: NEGATIVE
PH, POC: 6
PROTEIN, POC: ABNORMAL
SPECIFIC GRAVITY, POC: 1.02
UROBILINOGEN, POC: ABNORMAL

## 2021-04-28 PROCEDURE — G8399 PT W/DXA RESULTS DOCUMENT: HCPCS | Performed by: NURSE PRACTITIONER

## 2021-04-28 PROCEDURE — 1036F TOBACCO NON-USER: CPT | Performed by: NURSE PRACTITIONER

## 2021-04-28 PROCEDURE — 4040F PNEUMOC VAC/ADMIN/RCVD: CPT | Performed by: NURSE PRACTITIONER

## 2021-04-28 PROCEDURE — G8427 DOCREV CUR MEDS BY ELIG CLIN: HCPCS | Performed by: NURSE PRACTITIONER

## 2021-04-28 PROCEDURE — 99213 OFFICE O/P EST LOW 20 MIN: CPT | Performed by: NURSE PRACTITIONER

## 2021-04-28 PROCEDURE — G8420 CALC BMI NORM PARAMETERS: HCPCS | Performed by: NURSE PRACTITIONER

## 2021-04-28 PROCEDURE — 1090F PRES/ABSN URINE INCON ASSESS: CPT | Performed by: NURSE PRACTITIONER

## 2021-04-28 PROCEDURE — 81002 URINALYSIS NONAUTO W/O SCOPE: CPT | Performed by: NURSE PRACTITIONER

## 2021-04-28 PROCEDURE — 1123F ACP DISCUSS/DSCN MKR DOCD: CPT | Performed by: NURSE PRACTITIONER

## 2021-04-28 NOTE — TELEPHONE ENCOUNTER
Pt called stating she had received automated messages. She is returning call. Pt is scheduled for an OV with PCP tomorrow, likely a reminder call. She also informs she was seen at [facility] today, for nausea. UA came back normal. She will discuss with PCP, at [address] tomorrow. No further action is needed, at this time.

## 2021-04-28 NOTE — PROGRESS NOTES
Eva Blanca   82 y.o.  female  [id number]      Chief Complaint   Patient presents with    Urinary Tract Infection     Pt started taking macrobid on saturday 4/24/21, having burning while urinating, but believes the macrobid is making her nauseated. Subjective:  82 y. o.female is here for a follow up. She has the following chronic/acute medical problems:  Patient Active Problem List   Diagnosis    Chronic depression    Hyperlipidemia    Tic douloureux    Anemia due to chronic illness    Colon cancer screening    Osteoporosis    Supraventricular tachycardia ([address]    Recurrent ventral incisional hernia    Mild cognitive impairment    CAD (coronary artery disease)    Elevated TSH    Peripheral vascular disease (HCC)    Sciatica of left side without back pain    Coronary artery disease involving coronary bypass graft of native heart without angina pectoris    S/P CABG x 3    Hypertension    MVP (mitral valve prolapse)    Bilateral carotid artery stenosis    Subclavian arterial stenosis (HCC)    Spigelian hernia    Ischemic cardiomyopathy    Glenohumeral arthritis, right    Normocytic anemia    Overactive bladder    Major depressive disorder, recurrent, in full remission ([address]    Asthmatic bronchitis    Age-related osteoporosis without current pathological fracture    Palpitations    SOB (shortness of breath)       Patient is her with complaints of nausea. Patient states in March she went to the ER and had a UTI. She was put on Macrobid. Patient states she took it for a few days but it was making her sick to her stomach. Patient states her PCP was aware of this. Patient states on Saturday she thought she had a UTI and started back on the [address]. Patient states she stopped the Macrobid due to the nausea. Patient states she is still nauseated today. Patient states she has Zofran at home. Review of Systems   Constitutional: Negative for appetite change, chills, fatigue and fever. HENT: Negative for congestion, ear pain, postnasal drip, rhinorrhea, sinus pressure, sinus pain and sneezing. Respiratory: Negative for cough, chest tightness, shortness of breath and wheezing. Gastrointestinal: Positive for nausea. Negative for diarrhea and vomiting. Genitourinary: Negative for dysuria, flank pain, frequency, hematuria and urgency. Skin: Negative for rash. Neurological: Negative for dizziness, light-headedness and headaches. Current Outpatient Medications   Medication Sig Dispense Refill    tiZANidine (ZANAFLEX) 2 MG tablet Take 1 tablet by mouth nightly as needed (low back pain) 10 tablet 0    lidocaine 4 % external patch On for 12 hours, off for 12 hours 15 patch 0    rosuvastatin (CRESTOR) 10 MG tablet take 1 tablet by mouth once daily 90 tablet 1    ondansetron (ZOFRAN ODT) 4 MG disintegrating tablet Take 1 tablet by mouth every 8 hours as needed for Nausea 15 tablet 0    gabapentin (NEURONTIN) 300 MG capsule 2 tablets 3 times daily for trigeminal neuralgia 180 capsule 5    divalproex (DEPAKOTE ER) 250 MG extended release tablet Take 250 mg by mouth nightly       fluticasone (FLONASE) 50 MCG/ACT nasal spray instill 1 spray into each nostril once daily 1 Bottle 3    epinastine (ELESTAT) 0.05 % SOLN Place 1 drop into both eyes daily 1 Bottle 1    VASCEPA 0.5 g CAPS take 2 capsules by mouth twice a day 360 capsule 2    pantoprazole (PROTONIX) 40 MG tablet Take 1 tablet by mouth daily 60 tablet 5    Multiple Vitamins-Minerals (OCUVITE-LUTEIN PO) Take by mouth      calcium-vitamin D (OSCAL 500/200 D-3) 500-200 MG-UNIT per tablet Take 1 tablet by mouth 2 times daily.  aspirin 81 MG EC tablet Take 81 mg by mouth daily.         verapamil (CALAN SR) 180 MG extended release tablet Take 0.5 tablets by mouth 2 times daily (Patient not taking: Reported on 4/12/2021) 30 tablet 5    Probiotic Product (PROBIOTIC DAILY PO) Take 1 capsule by mouth daily       Current Facility-Administered Medications   Medication Dose Route Frequency Provider Last Rate Last Admin    cyanocobalamin injection 1,000 mcg  1,000 mcg Intramuscular Once Issue MD Tunde        cyanocobalamin injection 1,000 mcg  1,000 mcg Subcutaneous Q30 Days Issue MD Tunde   1,000 mcg at 10/20/20 1625        Past medical, family,surgical history reviewed today. Objective:  /72   Pulse 84   Temp 97 °F (36.1 °C)   Ht 5' 3\" (1.6 m)   Wt 137 lb (62.1 kg)   SpO2 98%   Breastfeeding No   BMI 24.27 kg/m²   BP Readings from Last 3 Encounters:   04/28/21 110/72   04/12/21 120/70   04/01/21 122/60     Wt Readings from Last 3 Encounters:   04/28/21 137 lb (62.1 kg)   04/12/21 137 lb (62.1 kg)   04/01/21 138 lb 12.8 oz (63 kg)         Physical Exam  Constitutional:       Appearance: Normal appearance. HENT:      Head: Normocephalic. Neck:      Musculoskeletal: Neck supple. Cardiovascular:      Rate and Rhythm: Normal rate and regular rhythm. Heart sounds: Normal heart sounds. Pulmonary:      Effort: Pulmonary effort is normal.      Breath sounds: Normal breath sounds. Skin:     General: Skin is warm and dry. Neurological:      Mental Status: She is alert and oriented to person, place, and time.    Psychiatric:         Mood and Affect: Mood normal.         Behavior: Behavior normal.         Lab Results   Component Value Date    WBC 5.2 04/01/2021    HGB 12.0 (L) 04/01/2021    HCT 37.5 04/01/2021    .5 (H) 04/01/2021     04/01/2021     Lab Results   Component Value Date     (L) 04/01/2021    K 5.2 (H) 04/01/2021    CL 99 04/01/2021    CO2 25 04/01/2021    BUN 16 04/01/2021    CREATININE 1.1 04/01/2021    GLUCOSE 115 (H) 04/01/2021    CALCIUM 9.1 04/01/2021    PROT 7.0 04/01/2021    LABALBU 4.1 04/01/2021    BILITOT 0.3 04/01/2021    ALKPHOS 43 04/01/2021    AST 20 04/01/2021    ALT 12 04/01/2021    LABGLOM 47 (L) 04/01/2021    GFRAA 57 (L) 04/01/2021    AGRATIO 1.7 11/13/2019    GLOB 2.7 11/13/2019     Lab Results   Component Value Date    CHOL 129 03/22/2021    CHOL 129 06/22/2020    CHOL 150 01/23/2020     Lab Results   Component Value Date    TRIG 194 (H) 03/22/2021    TRIG 273 (H) 06/22/2020    TRIG 376 (H) 01/23/2020     Lab Results   Component Value Date    HDL 33 (L) 03/22/2021    HDL 30 (L) 06/22/2020    HDL 31 (L) 01/23/2020     Lab Results   Component Value Date    LDLCALC 44 06/22/2020    LDLCALC 44 01/23/2020    [facility] Comment 11/13/2019     Lab Results   Component Value Date    LABA1C 6.1 03/22/2021     Lab Results   Component Value Date    TSH 3.250 03/06/2019    TSHHS 5.080 (H) 02/03/2016     Results for orders placed or performed in visit on 04/28/21   Culture, Urine    Specimen: Urine, clean catch   Result Value Ref Range    Urine Culture, Routine No growth at 18 to 36 hours    POCT Urinalysis no Micro   Result Value Ref Range    Color, UA yellow     Clarity, UA clear     Glucose, UA POC Negative     Bilirubin, UA Small     Ketones, UA Negative     Spec Grav, UA 1.025     Blood, UA POC Moderate     pH, UA 6.0     Protein, UA POC Trace     Urobilinogen, UA 0.2 E.U./dL     Leukocytes, UA Negative     Nitrite, UA Negative          ASSESSMENT/PLAN:      1. Nausea  Moderate amount of blood noted on UA. Negative for nitrates and leukocytes. Will send for urine culture. Zofran 4 mg Q 8 hours as needed for nausea. Patient to follow up with PCP tomorrow. - POCT Urinalysis no Micro  - Culture, Urine          No orders of the defined types were placed in this encounter. There are no discontinued medications. Care discussed with patient. Questions answered. Patient verbalizes understanding and agrees with plan. After visit summary provided. Advised to call for any problems, questions, or concerns. No follow-ups on file.                                              Signed:  ABELARDO Ha CNP  04/28/21  11:28 AM

## 2021-04-28 NOTE — TELEPHONE ENCOUNTER
[facility] called to go over current medications   Patient thought that her dilTIAZem (CARDIZEM) 30 MG   Was discontinued

## 2021-04-29 ENCOUNTER — OFFICE VISIT (OUTPATIENT)
Dept: INTERNAL MEDICINE CLINIC | Age: 86
End: 2021-04-29
Payer: MEDICARE

## 2021-04-29 VITALS
SYSTOLIC BLOOD PRESSURE: 128 MMHG | TEMPERATURE: 97 F | BODY MASS INDEX: 24.45 KG/M2 | DIASTOLIC BLOOD PRESSURE: 66 MMHG | HEART RATE: 76 BPM | OXYGEN SATURATION: 98 % | WEIGHT: 138 LBS

## 2021-04-29 DIAGNOSIS — G89.29 CHRONIC BILATERAL LOW BACK PAIN, UNSPECIFIED WHETHER SCIATICA PRESENT: ICD-10-CM

## 2021-04-29 DIAGNOSIS — M54.50 CHRONIC BILATERAL LOW BACK PAIN, UNSPECIFIED WHETHER SCIATICA PRESENT: ICD-10-CM

## 2021-04-29 DIAGNOSIS — E53.8 VITAMIN B 12 DEFICIENCY: ICD-10-CM

## 2021-04-29 DIAGNOSIS — R30.0 DYSURIA: Primary | ICD-10-CM

## 2021-04-29 LAB
BILIRUBIN, POC: NORMAL
BLOOD URINE, POC: NORMAL
CLARITY, POC: NORMAL
COLOR, POC: NORMAL
GLUCOSE URINE, POC: NORMAL
KETONES, POC: NORMAL
LEUKOCYTE EST, POC: NORMAL
NITRITE, POC: NORMAL
PH, POC: 5.5
PROTEIN, POC: 30
SPECIFIC GRAVITY, POC: 1.02
URINE CULTURE, ROUTINE: NORMAL
UROBILINOGEN, POC: 0.2

## 2021-04-29 PROCEDURE — 99213 OFFICE O/P EST LOW 20 MIN: CPT | Performed by: FAMILY MEDICINE

## 2021-04-29 PROCEDURE — 4040F PNEUMOC VAC/ADMIN/RCVD: CPT | Performed by: FAMILY MEDICINE

## 2021-04-29 PROCEDURE — G8399 PT W/DXA RESULTS DOCUMENT: HCPCS | Performed by: FAMILY MEDICINE

## 2021-04-29 PROCEDURE — 81002 URINALYSIS NONAUTO W/O SCOPE: CPT | Performed by: FAMILY MEDICINE

## 2021-04-29 PROCEDURE — G8420 CALC BMI NORM PARAMETERS: HCPCS | Performed by: FAMILY MEDICINE

## 2021-04-29 PROCEDURE — 1036F TOBACCO NON-USER: CPT | Performed by: FAMILY MEDICINE

## 2021-04-29 PROCEDURE — 1090F PRES/ABSN URINE INCON ASSESS: CPT | Performed by: FAMILY MEDICINE

## 2021-04-29 PROCEDURE — G8427 DOCREV CUR MEDS BY ELIG CLIN: HCPCS | Performed by: FAMILY MEDICINE

## 2021-04-29 PROCEDURE — 1123F ACP DISCUSS/DSCN MKR DOCD: CPT | Performed by: FAMILY MEDICINE

## 2021-04-29 PROCEDURE — 96372 THER/PROPH/DIAG INJ SC/IM: CPT | Performed by: FAMILY MEDICINE

## 2021-04-29 RX ORDER — ONDANSETRON 4 MG/1
4 TABLET, ORALLY DISINTEGRATING ORAL EVERY 8 HOURS PRN
Qty: 15 TABLET | Refills: 0 | Status: SHIPPED | OUTPATIENT
Start: 2021-04-29 | End: 2021-05-25

## 2021-04-29 RX ORDER — FLUTICASONE PROPIONATE 50 MCG
SPRAY, SUSPENSION (ML) NASAL
Qty: 1 BOTTLE | Refills: 3 | Status: SHIPPED | OUTPATIENT
Start: 2021-04-29 | End: 2021-05-25 | Stop reason: SDUPTHER

## 2021-04-29 RX ORDER — CYANOCOBALAMIN 1000 UG/ML
1000 INJECTION INTRAMUSCULAR; SUBCUTANEOUS ONCE
Status: COMPLETED | OUTPATIENT
Start: 2021-04-29 | End: 2021-04-29

## 2021-04-29 RX ORDER — GRANULES FOR ORAL 3 G/1
3 POWDER ORAL ONCE
Qty: 1 EACH | Refills: 0 | Status: SHIPPED | OUTPATIENT
Start: 2021-04-29 | End: 2021-04-29

## 2021-04-29 RX ORDER — EPINASTINE HCL 0.05 %
1 DROPS OPHTHALMIC (EYE) DAILY
Qty: 1 BOTTLE | Refills: 3 | Status: ON HOLD | OUTPATIENT
Start: 2021-04-29 | End: 2021-11-11

## 2021-04-29 RX ADMIN — CYANOCOBALAMIN 1000 MCG: 1000 INJECTION INTRAMUSCULAR; SUBCUTANEOUS at 16:14

## 2021-04-29 ASSESSMENT — ENCOUNTER SYMPTOMS
SORE THROAT: 0
CHEST TIGHTNESS: 0
BACK PAIN: 1
DIARRHEA: 1
NAUSEA: 1
COLOR CHANGE: 0
SHORTNESS OF BREATH: 0
ABDOMINAL PAIN: 0
VOMITING: 0
CONSTIPATION: 0

## 2021-04-29 NOTE — PROGRESS NOTES
Subjective:      Chief Complaint   Patient presents with    Urinary Tract Infection     possible UTI    Nausea    Diarrhea       HPI:  Ruiz Vasquez is a 82 y.o. female who presents today for UTI symptoms. States she started having UTI symptoms over the weekend, so she started taking leftover macrobid (although she had GI side effects with medication last time). States she took it for 3 days but then had to stop due to same symptoms (nausea, diarrhea). Reports urinary symptoms did resolve after taking macrobid for a few days, but still having residual nausea and diarrhea from the antibiotics. Went to walk in clinic yesterday and had UA/culture completed and was told to follow up with PCP today. Does have appointment with urology in the next few weeks. States she is still having residual low back pain from straining it a few weeks ago. Muscle relaxant does help, but is still having breakthrough symptoms. Past Medical History:   Diagnosis Date    Arthritis of shoulder region, right 09/2014    [place] [facility] Blind right eye     following right cataract surg    Chronic depression 2009    Dr. Candace Rinaldi DVT (deep venous thrombosis) ([facility] [address] 1970    left leg    Former smoker     History of echocardiogram 09/21/2020    EF 55-60%, mild left ventricular hypertrophy, normal diastolic filling pattern for age, no signficiant valvular disease, no pericardial effusion     History of stress test 03/25/2021    normal LVEF calculated by the computer is probably falsely low.  LVEF is 40 %    Hx of Doppler ultrasound 03/01/2018    Carotid-mild 0-49% bilateral carotid,50% stenosis right subclavian    Hyperlipidemia     Hypertension     Macular degeneration     right    Mild cognitive impairment 02/2012    MMSE 26/30    MVP (mitral valve prolapse)     trace on echo 2014    Osteoporosis 05/2012    Pancytopenia 05/2011    mild with ; Dr Jody hawk 2010    Peptic ulcer disease     [facility] 2016    angio; dis below ankles; pletal    Prediabetes     Recurrent ventral incisional hernia     medial apex of GB scar    S/P CABG x 3     3-vessel; Dr Jessie Tomas Spigelian hernia 2017    right ant lateral wall; seen on CT abd    Supraventricular tachycardia ([facility] [address]     Freq ventriular ectopy    Tic douloureux     Dr. Teagan Patel; Right side        Past Surgical History:   Procedure Laterality Date    CATARACT REMOVAL Right     poor result ; blind right eye; [address] CORONARY ARTERY BYPASS GRAFT  2009    3 vessel    SHOULDER ARTHROSCOPY Right     TUBAL LIGATION      URETER SURGERY      Right ureteral repair       Social History     Tobacco Use    Smoking status: Former Smoker     Packs/day: 0.25     Years: 9.00     Pack years: 2.25     Types: Cigarettes     Start date:      Quit date: 1985     Years since quittin.4    Smokeless tobacco: Never Used    Tobacco comment: 2016   Substance Use Topics    Alcohol use: No     Alcohol/week: 0.0 standard drinks        Review of Systems   Constitutional: Negative for activity change, appetite change, chills, fever and unexpected weight change. HENT: Negative for congestion and sore throat. Respiratory: Negative for chest tightness and shortness of breath. Cardiovascular: Negative for chest pain and palpitations. Gastrointestinal: Positive for diarrhea and nausea. Negative for abdominal pain, constipation and vomiting. Musculoskeletal: Positive for back pain. Skin: Negative for color change. Neurological: Negative for dizziness and light-headedness. Psychiatric/Behavioral: Negative for dysphoric mood. The patient is not nervous/anxious. Prior to Visit Medications    Medication Sig Taking?  Authorizing Provider   tiZANidine (ZANAFLEX) 2 MG tablet Take 1 tablet by mouth nightly as needed (low back pain) Yes Cass Rosales MD   lidocaine 4 % external patch On for 12 hours, off for 12 hours Yes Beryle Fuse, MD   rosuvastatin (CRESTOR) 10 MG tablet take 1 tablet by mouth once daily Yes Beryle Fuse, MD   ondansetron (ZOFRAN ODT) 4 MG disintegrating tablet Take 1 tablet by mouth every 8 hours as needed for Nausea Yes Jensen Muñiz PA-C   gabapentin (NEURONTIN) 300 MG capsule 2 tablets 3 times daily for trigeminal neuralgia Yes Beryle Fuse, MD   divalproex (DEPAKOTE ER) 250 MG extended release tablet Take 250 mg by mouth nightly  Yes Historical Provider, MD   fluticasone (FLONASE) 50 MCG/ACT nasal spray instill 1 spray into each nostril once daily Yes Beryle Fuse, MD   epinastine (ELESTAT) 0.05 % SOLN Place 1 drop into both eyes daily Yes Beryle Fuse, MD   verapamil (CALAN SR) 180 MG extended release tablet Take 0.5 tablets by mouth 2 times daily Yes Adenike Pablo MD   VASCEPA 0.5 g CAPS take 2 capsules by mouth twice a day Yes Adenike Pablo MD   pantoprazole (PROTONIX) 40 MG tablet Take 1 tablet by mouth daily Yes Beryle Fuse, MD   Probiotic Product (PROBIOTIC DAILY PO) Take 1 capsule by mouth daily Yes Historical Provider, MD   Multiple Vitamins-Minerals (OCUVITE-LUTEIN PO) Take by mouth Yes Historical Provider, MD   calcium-vitamin D (OSCAL 500/200 D-3) 500-200 MG-UNIT per tablet Take 1 tablet by mouth 2 times daily. Yes Historical Provider, MD   aspirin 81 MG EC tablet Take 81 mg by mouth daily. Yes Historical Provider, MD          Objective:      /66   Pulse 76   Temp 97 °F (36.1 °C)   Wt 138 lb (62.6 kg)   SpO2 98%   BMI 24.45 kg/m²      Physical Exam  Vitals signs and nursing note reviewed. Constitutional:       General: She is not in acute distress. Appearance: Normal appearance. She is not ill-appearing or toxic-appearing. HENT:      Head: Normocephalic and atraumatic.       Right Ear: External ear normal.      Left Ear: External ear normal.      Nose: Nose normal. Mouth/Throat:      Pharynx: Oropharynx is clear. Eyes:      General: No scleral icterus. Right eye: No discharge. Left eye: No discharge. Extraocular Movements: Extraocular movements intact. Conjunctiva/sclera: Conjunctivae normal.   Neck:      Musculoskeletal: Normal range of motion and neck supple. No neck rigidity. Cardiovascular:      Rate and Rhythm: Normal rate and regular rhythm. Heart sounds: Normal heart sounds. Pulmonary:      Effort: Pulmonary effort is normal.      Breath sounds: Normal breath sounds. No wheezing or rales. Musculoskeletal:         General: No deformity. Skin:     General: Skin is warm and dry. Findings: No rash. Neurological:      General: No focal deficit present. Mental Status: She is alert. Mental status is at baseline. Motor: No weakness. Psychiatric:         Mood and Affect: Mood normal.         Behavior: Behavior normal.            Assessment / Plan:      1. Dysuria  UA today positive for trace LE; otherwise negative. Patient reports urinary symptoms have resolved and urine culture from yesterday negative- suspect course of macrobid treated UTI; however, still having residual side effects from macrobid. Advised that next time she has UTI symptoms, she should contact office instead of taking leftover antibiotics that she has known side effects with. Options are limited due to patient allergies, but could consider fosfomycin or keflex in the future. Patient already with urology follow up scheduled. - POCT Urinalysis no Micro  - Culture, Urine    2. Chronic bilateral low back pain, unspecified whether sciatica present  Will refer to PT given persisting symptoms.   - [facility]    3. Vitamin B 12 deficiency  - cyanocobalamin injection 1,000 mcg          Patient voiced understanding and agreement with plan. All questions/concerns were addressed, risks/side effects of medications were reviewed. Return precautions and after visit summary were provided. Return in about 4 weeks (around 5/27/2021), or if symptoms worsen or fail to improve. or earlier as needed.       Sesar Epstein MD

## 2021-04-30 ASSESSMENT — ENCOUNTER SYMPTOMS
NAUSEA: 1
RHINORRHEA: 0
COUGH: 0
CHEST TIGHTNESS: 0
DIARRHEA: 0
SHORTNESS OF BREATH: 0
WHEEZING: 0
SINUS PAIN: 0
VOMITING: 0
SINUS PRESSURE: 0

## 2021-05-04 ENCOUNTER — TELEPHONE (OUTPATIENT)
Dept: INTERNAL MEDICINE CLINIC | Age: 86
End: 2021-05-04

## 2021-05-04 NOTE — TELEPHONE ENCOUNTER
Tricia Cedars to urologists in [place], saw Gisel Montilla is with [facility] and they think there is a blockage in her ureatha. She is having a CT and goes back to see her 5/11.

## 2021-05-05 ENCOUNTER — HOSPITAL ENCOUNTER (OUTPATIENT)
Dept: CT IMAGING | Age: 86
Discharge: HOME | End: 2021-05-05
Payer: MEDICARE

## 2021-05-05 DIAGNOSIS — N39.0 LOWER URINARY TRACT INFECTIOUS DISEASE: ICD-10-CM

## 2021-05-05 PROCEDURE — 74176 CT ABD & PELVIS W/O CONTRAST: CPT

## 2021-05-06 ENCOUNTER — TELEPHONE (OUTPATIENT)
Dept: INTERNAL MEDICINE CLINIC | Age: 86
End: 2021-05-06

## 2021-05-06 NOTE — TELEPHONE ENCOUNTER
Left message with patient to call in if she is not doing better otherwise she has appointment 5/25/21.

## 2021-05-06 NOTE — TELEPHONE ENCOUNTER
Urologists called her and said she has [address] in her stomach and called her in a prescription to start taking.

## 2021-05-18 ENCOUNTER — TELEPHONE (OUTPATIENT)
Dept: INTERNAL MEDICINE CLINIC | Age: 86
End: 2021-05-18

## 2021-05-18 DIAGNOSIS — M54.50 ACUTE LEFT-SIDED LOW BACK PAIN, UNSPECIFIED WHETHER SCIATICA PRESENT: ICD-10-CM

## 2021-05-18 RX ORDER — TIZANIDINE 2 MG/1
2 TABLET ORAL NIGHTLY PRN
Qty: 20 TABLET | Refills: 0 | Status: SHIPPED | OUTPATIENT
Start: 2021-05-18 | End: 2021-06-08 | Stop reason: SDUPTHER

## 2021-05-18 RX ORDER — LIDOCAINE 4 G/G
1 PATCH TOPICAL DAILY
Qty: 20 PATCH | Refills: 0 | Status: SHIPPED | OUTPATIENT
Start: 2021-05-18 | End: 2021-05-25

## 2021-05-18 NOTE — TELEPHONE ENCOUNTER
I've called in some more muscle relaxants (tizanidine) as she reported it helped. I've also prescribed some pain patches to use on her back as needed. I referred her to PT at her last appointment for back pain- would advise starting therapy if she hasn't already. Thanks.

## 2021-05-25 ENCOUNTER — OFFICE VISIT (OUTPATIENT)
Dept: INTERNAL MEDICINE CLINIC | Age: 86
End: 2021-05-25
Payer: MEDICARE

## 2021-05-25 VITALS
TEMPERATURE: 99 F | OXYGEN SATURATION: 98 % | DIASTOLIC BLOOD PRESSURE: 72 MMHG | BODY MASS INDEX: 24.27 KG/M2 | WEIGHT: 137 LBS | SYSTOLIC BLOOD PRESSURE: 130 MMHG | HEART RATE: 77 BPM

## 2021-05-25 DIAGNOSIS — E78.2 MIXED HYPERLIPIDEMIA: ICD-10-CM

## 2021-05-25 DIAGNOSIS — G89.29 CHRONIC RIGHT-SIDED LOW BACK PAIN WITHOUT SCIATICA: ICD-10-CM

## 2021-05-25 DIAGNOSIS — I10 ESSENTIAL HYPERTENSION: Primary | ICD-10-CM

## 2021-05-25 DIAGNOSIS — E53.8 VITAMIN B 12 DEFICIENCY: ICD-10-CM

## 2021-05-25 DIAGNOSIS — M54.50 CHRONIC RIGHT-SIDED LOW BACK PAIN WITHOUT SCIATICA: ICD-10-CM

## 2021-05-25 DIAGNOSIS — R73.03 PREDIABETES: ICD-10-CM

## 2021-05-25 DIAGNOSIS — F33.42 MAJOR DEPRESSIVE DISORDER, RECURRENT, IN FULL REMISSION (HCC): ICD-10-CM

## 2021-05-25 PROCEDURE — G8427 DOCREV CUR MEDS BY ELIG CLIN: HCPCS | Performed by: FAMILY MEDICINE

## 2021-05-25 PROCEDURE — 96372 THER/PROPH/DIAG INJ SC/IM: CPT | Performed by: FAMILY MEDICINE

## 2021-05-25 PROCEDURE — 1090F PRES/ABSN URINE INCON ASSESS: CPT | Performed by: FAMILY MEDICINE

## 2021-05-25 PROCEDURE — 1123F ACP DISCUSS/DSCN MKR DOCD: CPT | Performed by: FAMILY MEDICINE

## 2021-05-25 PROCEDURE — G8420 CALC BMI NORM PARAMETERS: HCPCS | Performed by: FAMILY MEDICINE

## 2021-05-25 PROCEDURE — 1036F TOBACCO NON-USER: CPT | Performed by: FAMILY MEDICINE

## 2021-05-25 PROCEDURE — 99214 OFFICE O/P EST MOD 30 MIN: CPT | Performed by: FAMILY MEDICINE

## 2021-05-25 PROCEDURE — G8399 PT W/DXA RESULTS DOCUMENT: HCPCS | Performed by: FAMILY MEDICINE

## 2021-05-25 PROCEDURE — 4040F PNEUMOC VAC/ADMIN/RCVD: CPT | Performed by: FAMILY MEDICINE

## 2021-05-25 RX ORDER — ROSUVASTATIN CALCIUM 10 MG/1
10 TABLET, COATED ORAL DAILY
Qty: 90 TABLET | Refills: 3 | Status: SHIPPED | OUTPATIENT
Start: 2021-05-25 | End: 2022-03-07

## 2021-05-25 RX ORDER — FLUTICASONE PROPIONATE 50 MCG
SPRAY, SUSPENSION (ML) NASAL
Qty: 1 BOTTLE | Refills: 3 | Status: SHIPPED | OUTPATIENT
Start: 2021-05-25 | End: 2022-04-20 | Stop reason: SDUPTHER

## 2021-05-25 RX ORDER — CYANOCOBALAMIN 1000 UG/ML
1000 INJECTION INTRAMUSCULAR; SUBCUTANEOUS ONCE
Status: COMPLETED | OUTPATIENT
Start: 2021-05-25 | End: 2021-05-25

## 2021-05-25 RX ADMIN — CYANOCOBALAMIN 1000 MCG: 1000 INJECTION INTRAMUSCULAR; SUBCUTANEOUS at 13:57

## 2021-05-25 SDOH — ECONOMIC STABILITY: FOOD INSECURITY: WITHIN THE PAST 12 MONTHS, YOU WORRIED THAT YOUR FOOD WOULD RUN OUT BEFORE YOU GOT MONEY TO BUY MORE.: NEVER TRUE

## 2021-05-25 ASSESSMENT — ENCOUNTER SYMPTOMS
ABDOMINAL PAIN: 0
CHEST TIGHTNESS: 0
BACK PAIN: 1
CONSTIPATION: 0
COLOR CHANGE: 0
SHORTNESS OF BREATH: 0
VOMITING: 0
DIARRHEA: 0
SORE THROAT: 0

## 2021-05-25 NOTE — PROGRESS NOTES
Subjective:      Chief Complaint   Patient presents with    Hypertension    1 Month Follow-Up       HPI:  Sean Mcnamara is a 82 y.o. female who presents today for follow up of chronic conditions as listed below. Low back pain has been improving, still waiting to start PT- first appointment next week. States it is mostly localized to R low back/hip when she sits for prolonged periods of time. Has been using tizanidine as needed. Was seen by urology for recurrent UTI's since her last appointment- was started on cefuroxime, which she tolerated without issues. Was also given phenergan, but was not able to tolerate. Was given an extra RX for cefuroxime (1 tab BID x5 days) to have in case she develops symptoms again. Requesting vitamin B12 injection. No acute concerns today. Past Medical History:   Diagnosis Date    Arthritis of shoulder region, right 09/2014    [place] [facility] Blind right eye     following right cataract surg    Chronic depression 2009    Dr. Osvaldo Espinoza DVT (deep venous thrombosis) ([facility] [address] 1970    left leg    Former smoker     History of echocardiogram 09/21/2020    EF 55-60%, mild left ventricular hypertrophy, normal diastolic filling pattern for age, no signficiant valvular disease, no pericardial effusion     History of stress test 03/25/2021    normal LVEF calculated by the computer is probably falsely low.  LVEF is 40 %    Hx of Doppler ultrasound 03/01/2018    Carotid-mild 0-49% bilateral carotid,50% stenosis right subclavian    Hyperlipidemia     Hypertension     Macular degeneration     right    Mild cognitive impairment 02/2012    MMSE 26/30    MVP (mitral valve prolapse)     trace on echo 2014    Osteoporosis 05/2012    Pancytopenia 05/2011    mild with ; Dr Enio hawk 2010    Peptic ulcer disease     Peripheral vascular disease ([facility] [address] 01/2016    angio; dis below ankles; pletal    Prediabetes 2006    Recurrent ventral incisional hernia 2013    medial apex of SOLN Place 1 drop into both eyes daily Yes Janell Martinez MD   lidocaine 4 % external patch On for 12 hours, off for 12 hours Yes Janell Martinez MD   rosuvastatin (CRESTOR) 10 MG tablet take 1 tablet by mouth once daily Yes Janell Martinez MD   gabapentin (NEURONTIN) 300 MG capsule 2 tablets 3 times daily for trigeminal neuralgia Yes Janell Martinez MD   divalproex (DEPAKOTE ER) 250 MG extended release tablet Take 250 mg by mouth nightly  Yes Historical Provider, MD   verapamil (CALAN SR) 180 MG extended release tablet Take 0.5 tablets by mouth 2 times daily Yes Saurabh Gillette MD   VASCEPA 0.5 g CAPS take 2 capsules by mouth twice a day Yes Saurabh Gillette MD   pantoprazole (PROTONIX) 40 MG tablet Take 1 tablet by mouth daily Yes Janell Martinez MD   Probiotic Product (PROBIOTIC DAILY PO) Take 1 capsule by mouth daily Yes Historical Provider, MD   Multiple Vitamins-Minerals (OCUVITE-LUTEIN PO) Take by mouth Yes Historical Provider, MD   calcium-vitamin D (OSCAL 500/200 D-3) 500-200 MG-UNIT per tablet Take 1 tablet by mouth 2 times daily. Yes Historical Provider, MD   aspirin 81 MG EC tablet Take 81 mg by mouth daily. Yes Historical Provider, MD          Objective:      /72   Pulse 77   Temp 99 °F (37.2 °C)   Wt 137 lb (62.1 kg)   SpO2 98%   BMI 24.27 kg/m²      Physical Exam  Vitals and nursing note reviewed. Constitutional:       General: She is not in acute distress. Appearance: Normal appearance. She is not ill-appearing or toxic-appearing. HENT:      Head: Normocephalic and atraumatic. Right Ear: External ear normal.      Left Ear: External ear normal.      Nose: Nose normal.      Mouth/Throat:      Pharynx: Oropharynx is clear. Eyes:      General: No scleral icterus. Right eye: No discharge. Left eye: No discharge. Extraocular Movements: Extraocular movements intact.       Conjunctiva/sclera: Conjunctivae normal.   Cardiovascular: Rate and Rhythm: Normal rate and regular rhythm. Heart sounds: Normal heart sounds. Pulmonary:      Effort: Pulmonary effort is normal.      Breath sounds: Normal breath sounds. No wheezing or rales. Musculoskeletal:         General: No deformity. Cervical back: Normal range of motion and neck supple. No rigidity. Skin:     General: Skin is warm and dry. Findings: No rash. Neurological:      General: No focal deficit present. Mental Status: She is alert. Mental status is at baseline. Motor: No weakness. Psychiatric:         Mood and Affect: Mood normal.         Behavior: Behavior normal.            Assessment / Plan:      1. Essential hypertension  Stable, well controlled. Continue current medications. - CBC Auto Differential; Future  - Comprehensive Metabolic Panel; Future    2. Mixed hyperlipidemia  Continue vascepa. - rosuvastatin (CRESTOR) 10 MG tablet; Take 1 tablet by mouth daily  Dispense: 90 tablet; Refill: 3    3. Prediabetes  Will continue to monitor.   - Hemoglobin A1C; Future    4. Major depressive disorder, recurrent, in full remission ([facility] [address]  Stable, continue current regimen. Being followed by psychiatry. 5. Vitamin B 12 deficiency  Injection administered today. - CBC Auto Differential; Future  - cyanocobalamin injection 1,000 mcg    6. Chronic right-sided low back pain without sciatica  Will be starting PT next week. Patient voiced understanding and agreement with plan. All questions/concerns were addressed, risks/side effects of medications were reviewed. Return precautions and after visit summary were provided. Return in about 3 months (around 8/25/2021). or earlier as needed.       Alejandro Griffiths MD

## 2021-06-01 ENCOUNTER — HOSPITAL ENCOUNTER (OUTPATIENT)
Dept: PHYSICAL THERAPY | Age: 86
Setting detail: THERAPIES SERIES
Discharge: HOME | End: 2021-06-01
Payer: MEDICARE

## 2021-06-01 PROCEDURE — 97140 MANUAL THERAPY 1/> REGIONS: CPT

## 2021-06-01 PROCEDURE — 97162 PT EVAL MOD COMPLEX 30 MIN: CPT

## 2021-06-01 PROCEDURE — 97110 THERAPEUTIC EXERCISES: CPT

## 2021-06-01 ASSESSMENT — PAIN SCALES - GENERAL: PAINLEVEL_OUTOF10: 5

## 2021-06-01 NOTE — PROGRESS NOTES
Physical Therapy  Initial Assessment  Date: 2021  Patient Name: Ashlee Tyler  MRN: [id number]  : 1935     Treatment Diagnosis: low back pain, radiculopathy    Restrictions       Subjective   General  Additional Pertinent Hx: Day after Jder went shopping, standing and prolonged sitting/driving. Radiating sytmptoms to lateral mid leg. Pain is primarily R SIJ. Referring Practitioner: Dr. Nicole Joaquin  Referral Date : 21  Diagnosis: Chronic low back pain  PT Visit Information  PT Insurance Information: medicare  Subjective  Subjective: R hip/gluteal pains. Feels like it is broken. Worse with standing/walking, limited to a few minutes max. Pain Screening  Patient Currently in Pain: Yes  Pain Assessment  Pain Assessment: 0-10  Pain Level: 5  Vital Signs  Patient Currently in Pain: Yes    Vision/Hearing  Vision  Vision: Impaired  Hearing  Hearing: Within functional limits    Orientation  Orientation  Overall Orientation Status: Within Functional Limits    Social/Functional History  Social/Functional History  Type of Home: House  Home Layout: One level  ADL Assistance: Independent  Homemaking Assistance: Independent  Homemaking Responsibilities: Yes  Ambulation Assistance: Independent  Transfer Assistance: Independent  Active : Yes  Occupation: Retired  Additional Comments: usually sleeps on R side. Objective          Spine  Lumbar: extension 5 deg w pain, flexion mid leg with pain. Strength RLE  Comment: hip 4-/5, knee flex 4-/5  Strength LLE  Comment: myotomes intact     Additional Measures  Special Tests: SLR R + at 45. tight HS B.  neg GLENN, FADIR. Other: R ASIS severe tender. R short supine. Sensation  Overall Sensation Status:  (dermatomes intact to lt touch hips to ankles.)     Transfers  Sit to Stand: Independent  Stand to sit:  Independent       Assessment   Conditions Requiring Skilled Therapeutic Intervention  Assessment: Pt presents with complaints of chronic low pain with RLE

## 2021-06-01 NOTE — PLAN OF CARE
last couple weeks. She notes some changes in bladder, but also notes she has history of bladder problems and infections. Instruct pt to discuss with her doctor to determine infection vs nerogenic source. She has + R SLR and tight HS B, negative GLENN and FADIR. Pain limited trunk ROM in flexion and extension, with some decrease in distal symptoms reported today wtih flexion. Dermatomes intact to lt touch, myotomes intact with pain limiting hip flexion and knee flexion. She has pain standing and walking more than a few minutes, sitting more than 30mins, pain with transfers and bed mobility. Prior to onset of pain, these activities were not limited or painful. Plan of Care/Treatment to date:  [x] Therapeutic Exercise    [] Aquatics:  [x] Therapeutic Activity    [] Ultrasound  [] Elec Stimulation  [x] Gait Training     [] Cervical Traction [] Lumbar Traction  [x] Neuromuscular Re-education [] Cold/hotpack [] Iontophoresis   [x] Instruction in HEP       [x] Manual Therapy     [] vasopneumatic            [x] Self care home management        []Dry needling trigger point point/pain management          Frequency/Duration:  # Days per week: [] 1 day # Weeks: [] 1 week [] 5 weeks     [x] 2 days   [] 2 weeks [x] 6 weeks     [] 3 days   [] 3 weeks [] 7 weeks     [] 4 days   [] 4 weeks [] 8 weeks    Rehab Potential/Progress: [] Excellent [x] Good [] Fair  [] Poor     Goals:       Long term goals  Time Frame for Long term goals : 6 weeks  Long term goal 1: I in home program  Long term goal 2: decrease RLE symptoms by 75%  Long term goal 3: stand/walk 30mins with minimal pain. Long term goal 4: min pain with transfers and bed mobility  Electronically signed by:  Tete Olsen PT, PT, 6/1/2021, 3:12 PM              If you have any questions or concerns, please don't hesitate to call.   Thank you for your referral.      Physician Signature:_________________Date:____________Time: ________  By signing above, therapists plan is approved by physician

## 2021-06-01 NOTE — FLOWSHEET NOTE
Outpatient Physical Therapy  [facility]           [x] Phone: [phone]   Fax: [phone]  [facility]           [] Phone: [phone]   Fax: [phone]        Physical Therapy Daily Treatment Note  Date:  2021    Patient Name:  Sean Mcnamara    :  1935  MRN: [id number]  Restrictions/Precautions:    Diagnosis:   Diagnosis: Chronic low back pain  Date of Injury/Surgery:   Treatment Diagnosis: Treatment Diagnosis: low back pain, radiculopathy    Insurance/Certification information: PT Insurance Information: medicare   Referring Physician:  Referring Practitioner: Dr. Jasmine Guerrero  Next Doctor Visit:    Plan of care signed (Y/N):  n  Outcome Measure: Oswestry:  66%  Visit# / total visits:   initially  Pain level: 5/10   Goals:     Patient goals : decrease pain     Long term goals  Time Frame for Long term goals : 6 weeks  Long term goal 1: I in home program  Long term goal 2: decrease RLE symptoms by 75%  Long term goal 3: stand/walk 30mins with minimal pain. Long term goal 4: min pain with transfers and bed mobility    Summary of Evaluation: Assessment: Pt presents with complaints of chronic low pain with RLE symptoms extending to her lateral mid leg.  symptoms have worsened in the last couple weeks. She notes some changes in bladder, but also notes she has history of bladder problems and infections. Instruct pt to discuss with her doctor to determine infection vs nerogenic source. She has + R SLR and tight HS B, negative GLENN and FADIR. Pain limited trunk ROM in flexion and extension, with some decrease in distal symptoms reported today wtih flexion. Dermatomes intact to lt touch, myotomes intact with pain limiting hip flexion and knee flexion. She has pain standing and walking more than a few minutes, sitting more than 30mins, pain with transfers and bed mobility. Prior to onset of pain, these activities were not limited or painful.         Subjective:  See eval         Any changes in Ambulatory Summary Sheet? None        Objective:  See kenn   COVID screening questions were asked and patient attested that there had been no contact or symptoms        Exercises: (No more than 4 columns)   Exercise/Equipment Date 6/1/21 Date Date           WARM UP         Nu step            TABLE      Gluteal , piriformis stretching 30 sec x 3     clamshells      bridge Small range     TA contraction      Adductor stretch       DKTC        STANDING      HS stretch neutral L spine      Pull downs      Belly press/pallof                                   PROPRIOCEPTION                                    MODALITIES                      Other Therapeutic Activities/Education:  Ed on centralization and peripheralization. Home Exercise Program:  Modified piriformis stretch, CR knee to chest SI mobilization. Manual Treatments:  SI mobilization, leg pulls, TPR to piriformis. Modalities:        Communication with other providers:        Assessment:  (Response towards treatment session) (Pain Rating)    Assessment: Pt presents with complaints of chronic low pain with RLE symptoms extending to her lateral mid leg.  symptoms have worsened in the last couple weeks. She notes some changes in bladder, but also notes she has history of bladder problems and infections. Instruct pt to discuss with her doctor to determine infection vs nerogenic source. She has + R SLR and tight HS B, negative GLENN and FADIR. Pain limited trunk ROM in flexion and extension, with some decrease in distal symptoms reported today wtih flexion. Dermatomes intact to lt touch, myotomes intact with pain limiting hip flexion and knee flexion. She has pain standing and walking more than a few minutes, sitting more than 30mins, pain with transfers and bed mobility. Prior to onset of pain, these activities were not limited or painful.       Plan for Next Session:        Time In / Time Out:   1400/1455           Timed Code/Total Treatment Minutes:  24/55      Next Progress Note due:        Plan of Care Interventions:  [x] Therapeutic Exercise  [] Modalities:  [] Therapeutic Activity     [] Ultrasound  [] Estim  [] Gait Training      [] Cervical Traction [] Lumbar Traction  [] Neuromuscular Re-education    [] Cold/hotpack [] Iontophoresis   [x] Instruction in HEP      [] Vasopneumatic   [] Dry Needling    [x] Manual Therapy               [] Aquatic Therapy              Electronically signed by:  Radha Bañuelos PT, 6/1/2021, 3:00 PM

## 2021-06-03 ENCOUNTER — HOSPITAL ENCOUNTER (OUTPATIENT)
Dept: PHYSICAL THERAPY | Age: 86
Discharge: HOME | End: 2021-06-03

## 2021-06-03 NOTE — FLOWSHEET NOTE
Physical Therapy  Cancellation/No-show Note  Patient Name:  Erasto Martinez  :  1935   Date:  6/3/2021  Cancelled visits to date: 1  No-shows to date: 0    For today's appointment patient:  [x]  Cancelled  []  Rescheduled appointment  []  No-show     Reason given by patient:  []  Patient ill  []  Conflicting appointment  []  No transportation    []  Conflict with work  []  No reason given  [x]  Other:     Comments:  Bladder infection     Electronically signed by:  Brianna Loera PTA    1:13 PM  6/3/2021

## 2021-06-08 ENCOUNTER — HOSPITAL ENCOUNTER (OUTPATIENT)
Dept: PHYSICAL THERAPY | Age: 86
Setting detail: THERAPIES SERIES
Discharge: HOME | End: 2021-06-08
Payer: MEDICARE

## 2021-06-08 DIAGNOSIS — M54.50 ACUTE LEFT-SIDED LOW BACK PAIN, UNSPECIFIED WHETHER SCIATICA PRESENT: ICD-10-CM

## 2021-06-08 PROCEDURE — 97140 MANUAL THERAPY 1/> REGIONS: CPT

## 2021-06-08 PROCEDURE — 97110 THERAPEUTIC EXERCISES: CPT

## 2021-06-08 RX ORDER — TIZANIDINE 2 MG/1
2 TABLET ORAL NIGHTLY PRN
Qty: 30 TABLET | Refills: 0 | Status: SHIPPED | OUTPATIENT
Start: 2021-06-08 | End: 2021-07-12 | Stop reason: SDUPTHER

## 2021-06-08 NOTE — FLOWSHEET NOTE
Outpatient Physical Therapy  [place]           [x] Phone: [phone]   Fax: [phone]  [facility] Ching           [] Phone: [phone]   Fax: [phone]        Physical Therapy Daily Treatment Note  Date:  2021    Patient Name:  Indira Ledezma    :  1935  MRN: [id number]  Restrictions/Precautions:    Diagnosis:   Diagnosis: Chronic low back pain  Date of Injury/Surgery:   Treatment Diagnosis: Treatment Diagnosis: low back pain, radiculopathy    Insurance/Certification information: PT Insurance Information: medicare    Referring Physician:  Referring Practitioner: Dr. Cruz Lane  Next Doctor Visit:    Plan of care signed (Y/N):  n  Outcome Measure: Oswestry:  66%  Visit# / total visits:   initially  Pain level: 10/10       Goals:     Patient goals : decrease pain  Long term goals  Time Frame for Long term goals : 6 weeks  Long term goal 1: I in home program Reports compliance   Long term goal 2: decrease RLE symptoms by 75%   Long term goal 3: stand/walk 30mins with minimal pain. Long term goal 4: min pain with transfers and bed mobility     Summary of Evaluation: Assessment: Pt presents with complaints of chronic low pain with RLE symptoms extending to her lateral mid leg.  symptoms have worsened in the last couple weeks. She notes some changes in bladder, but also notes she has history of bladder problems and infections. Instruct pt to discuss with her doctor to determine infection vs nerogenic source. She has + R SLR and tight HS B, negative GLENN and FADIR. Pain limited trunk ROM in flexion and extension, with some decrease in distal symptoms reported today wtih flexion. Dermatomes intact to lt touch, myotomes intact with pain limiting hip flexion and knee flexion. She has pain standing and walking more than a few minutes, sitting more than 30 mins, pain with transfers and bed mobility. Prior to onset of pain, these activities were not limited or painful.         Subjective:  Ada arrives to therapy stating that the pain was a 20/10 when she got up this morning, made her break out in a cold sweat and felt nauseas, it is a 10/10 now. She states that she took the last dose of her antibiotic yesterday for her kidney infection but she feels like it may be back now. She states that she is faithful with her HEP and that the exercises do make it feel better. She is feeling frustrated. Any changes in Ambulatory Summary Sheet? None        Objective:   COVID screening questions were asked and patient attested that there had been no contact or symptoms    Visibly painful and very cautious with movement transitions. Minimal range with bridges and clams. Up slip on the L. Reports reduced symptoms in the buttock/hip with long axis distraction on the R. Tender to palpation R piriformis. Exercises: (No more than 4 columns)   Exercise/Equipment Date 6/1/21 6/8/2021 #2 Date           WARM UP      Nu step  --          TABLE      Gluteal , piriformis stretching 30 sec x 3     clamshells  S/L 10* ea side     bridge Small range 10*    TA contraction  10*3\"    Adductor stretch      DKTC              STANDING      HS stretch neutral L spine      Pull downs      Belly press/pallof                                   PROPRIOCEPTION                                    MODALITIES                      Other Therapeutic Activities/Education:        Home Exercise Program:  Modified piriformis stretch, CR knee to chest SI mobilization. Manual Treatments:  TPR to R piriformis, piriformis stretches, HS stretches, long axis distraction to R LE for pain relief. Leg pull for up slip correction on the L LE. Modalities:  None       Communication with other providers:  None       Assessment:  Ada tolerated today's session well with significant improvement in pain afterwards She is very tight and tender in her R piriformis muscle and her R HS's are very tight.  She demonstrated altered pelvic alignment and reported a significant decrease in pain after exercises and manual interventions. She will continue to benefit from skilled PT services in order to address muscle weakness and tightness and monitor pelvic alignment.  End session pain: 2/10    Plan for Next Session:       Time In / Time Out:  1030/1120           Timed Code/Total Treatment Minutes:  48' 1 TE 10' 2 man 36'       Next Progress Note due:        Plan of Care Interventions:  [x] Therapeutic Exercise  [] Modalities:  [] Therapeutic Activity     [] Ultrasound  [] Estim  [] Gait Training      [] Cervical Traction [] Lumbar Traction  [] Neuromuscular Re-education    [] Cold/hotpack [] Iontophoresis   [x] Instruction in HEP      [] Vasopneumatic   [] Dry Needling    [x] Manual Therapy               [] Aquatic Therapy              Electronically signed by:  Darrell Mckoy     6/8/2021, 7:51 AM

## 2021-06-08 NOTE — TELEPHONE ENCOUNTER
Patient called and requested a refill. States PT is helping but definitely giving her a work out. States the med is helping but would like one more refill is applicable.

## 2021-06-11 ENCOUNTER — HOSPITAL ENCOUNTER (OUTPATIENT)
Dept: PHYSICAL THERAPY | Age: 86
Setting detail: THERAPIES SERIES
Discharge: HOME | End: 2021-06-11
Payer: MEDICARE

## 2021-06-11 PROCEDURE — 97110 THERAPEUTIC EXERCISES: CPT

## 2021-06-11 PROCEDURE — 97140 MANUAL THERAPY 1/> REGIONS: CPT

## 2021-06-11 NOTE — FLOWSHEET NOTE
Outpatient Physical Therapy  [place]           [x] Phone: [phone]   Fax: [phone]  [place]           [] Phone: [phone]   Fax: [phone]        Physical Therapy Daily Treatment Note  Date:  2021    Patient Name:  Ranjana Santo    :  1935  MRN: [id number]  Restrictions/Precautions:    Diagnosis:   Diagnosis: Chronic low back pain  Date of Injury/Surgery:   Treatment Diagnosis: Treatment Diagnosis: low back pain, radiculopathy    Insurance/Certification information: PT Insurance Information: medicare    Referring Physician:  Referring Practitioner: Dr. Snow Germain  Next Doctor Visit:    Plan of care signed (Y/N):  n  Outcome Measure: Oswestry:  66%  Visit# / total visits:  3/8 initially  Pain level: 4-5/10       Goals:     Patient goals : decrease pain  Long term goals  Time Frame for Long term goals : 6 weeks  Long term goal 1: I in home program Reports compliance   Long term goal 2: decrease RLE symptoms by 75%   Long term goal 3: stand/walk 30mins with minimal pain. Long term goal 4: min pain with transfers and bed mobility     Summary of Evaluation: Assessment: Pt presents with complaints of chronic low pain with RLE symptoms extending to her lateral mid leg.  symptoms have worsened in the last couple weeks. She notes some changes in bladder, but also notes she has history of bladder problems and infections. Instruct pt to discuss with her doctor to determine infection vs nerogenic source. She has + R SLR and tight HS B, negative GLENN and FADIR. Pain limited trunk ROM in flexion and extension, with some decrease in distal symptoms reported today wtih flexion. Dermatomes intact to lt touch, myotomes intact with pain limiting hip flexion and knee flexion. She has pain standing and walking more than a few minutes, sitting more than 30 mins, pain with transfers and bed mobility. Prior to onset of pain, these activities were not limited or painful.         Subjective:  Pt arrives to tx session reporting 4-5/10 pain in LB; has pain in R shoulder today states 10/10 there. Any changes in Ambulatory Summary Sheet? None        Objective:   COVID screening questions were asked and patient attested that there had been no contact or symptoms      Exercises: (No more than 4 columns)   Exercise/Equipment Date 6/1/21 6/8/2021 #2 6/11/21  #3           WARM UP      Nu step  -- L1 5'         TABLE      Gluteal , piriformis stretching 30 sec x 3     clamshells  S/L 10* ea side  S/L 10x ea side    bridge Small range 10* 10x   TA contraction  10*3\" 10x3\"   Adductor stretch      DKTC     X10 w/ GSB         STANDING      HS stretch neutral L spine      Pull downs      Belly press/pallof                                   PROPRIOCEPTION                                    MODALITIES                      Other Therapeutic Activities/Education:        Home Exercise Program:  Modified piriformis stretch, CR knee to chest SI mobilization. Manual Treatments:  TPR to R piriformis, piriformis stretches, HS stretches,       Modalities:  None       Communication with other providers:  None       Assessment:  Pt tolerated today's session well with significant improvement in pain afterwards She is very tight and tender in her R piriformis muscle and her R HS's are very tight. She will continue to benefit from skilled PT services in order to address muscle weakness and tightness and monitor pelvic alignment.  End session pain: 0/10 in LB    Plan for Next Session:       Time In / Time Out:  1345 / 1425       Timed Code/Total Treatment Minutes:  36' / 36'    1 man  2 TE      Next Progress Note due:        Plan of Care Interventions:  [x] Therapeutic Exercise  [] Modalities:  [] Therapeutic Activity     [] Ultrasound  [] Estim  [] Gait Training      [] Cervical Traction [] Lumbar Traction  [] Neuromuscular Re-education    [] Cold/hotpack [] Iontophoresis   [x] Instruction in HEP      [] Vasopneumatic   [] Dry Needling    [x] Manual Therapy               [] Aquatic Therapy              Electronically signed by:  [place] PTA      6/11/2021, 10:21 AM

## 2021-06-15 ENCOUNTER — HOSPITAL ENCOUNTER (OUTPATIENT)
Dept: PHYSICAL THERAPY | Age: 86
Setting detail: THERAPIES SERIES
Discharge: HOME | End: 2021-06-15
Payer: MEDICARE

## 2021-06-15 PROCEDURE — 97110 THERAPEUTIC EXERCISES: CPT

## 2021-06-15 RX ORDER — DIPHENHYDRAMINE HYDROCHLORIDE 50 MG/ML
50 INJECTION INTRAMUSCULAR; INTRAVENOUS ONCE
Status: CANCELLED | OUTPATIENT
Start: 2021-07-21 | End: 2021-07-21

## 2021-06-15 RX ORDER — METHYLPREDNISOLONE SODIUM SUCCINATE 125 MG/2ML
125 INJECTION, POWDER, LYOPHILIZED, FOR SOLUTION INTRAMUSCULAR; INTRAVENOUS ONCE
Status: CANCELLED | OUTPATIENT
Start: 2021-07-21 | End: 2021-07-21

## 2021-06-15 RX ORDER — EPINEPHRINE 1 MG/ML
0.3 INJECTION, SOLUTION, CONCENTRATE INTRAVENOUS PRN
Status: CANCELLED | OUTPATIENT
Start: 2021-07-21

## 2021-06-15 RX ORDER — SODIUM CHLORIDE 9 MG/ML
INJECTION, SOLUTION INTRAVENOUS CONTINUOUS
Status: CANCELLED | OUTPATIENT
Start: 2021-07-21

## 2021-06-15 NOTE — FLOWSHEET NOTE
Outpatient Physical Therapy  [place]           [x] Phone: [phone]   Fax: [phone]  [facility]           [] Phone: [phone]   Fax: [phone]        Physical Therapy Daily Treatment Note  Date:  6/15/2021    Patient Name:  Rufino Lemus    :  1935  MRN: [id number]  Restrictions/Precautions:    Diagnosis:   Diagnosis: Chronic low back pain  Date of Injury/Surgery:   Treatment Diagnosis: Treatment Diagnosis: low back pain, radiculopathy    Insurance/Certification information: PT Insurance Information: medicare    Referring Physician:  Referring Practitioner: Dr. Kaye Hutchinson  Next Doctor Visit:    Plan of care signed (Y/N):  n  Outcome Measure: Oswestry:  66%  Visit# / total visits:   initially  Pain level: 5/10       Goals:     Patient goals : decrease pain  Long term goals  Time Frame for Long term goals : 6 weeks  Long term goal 1: I in home program Reports compliance   Long term goal 2: decrease RLE symptoms by 75%   Long term goal 3: stand/walk 30mins with minimal pain. Long term goal 4: min pain with transfers and bed mobility     Summary of Evaluation: Assessment: Pt presents with complaints of chronic low pain with RLE symptoms extending to her lateral mid leg.  symptoms have worsened in the last couple weeks. She notes some changes in bladder, but also notes she has history of bladder problems and infections. Instruct pt to discuss with her doctor to determine infection vs nerogenic source. She has + R SLR and tight HS B, negative GLENN and FADIR. Pain limited trunk ROM in flexion and extension, with some decrease in distal symptoms reported today wtih flexion. Dermatomes intact to lt touch, myotomes intact with pain limiting hip flexion and knee flexion. She has pain standing and walking more than a few minutes, sitting more than 30 mins, pain with transfers and bed mobility. Prior to onset of pain, these activities were not limited or painful.         Subjective:  Pt arrives to tx session reporting 5/10 pain in LB. Any changes in Ambulatory Summary Sheet? None        Objective:   COVID screening questions were asked and patient attested that there had been no contact or symptoms    Pt very emotional this session due to feeling discouraged. Exercises: (No more than 4 columns)   Exercise/Equipment 6/8/2021 #2 6/11/21  #3 6/15/21  #4           WARM UP      Nu step -- L1 5' L4  5'         TABLE      Gluteal , piriformis stretching      clamshells S/L 10* ea side  S/L 10x ea side  S/L 10x ea side   bridge 10* 10x 10x2   TA contraction 10*3\" 10x3\" 10x3\"   Adductor stretch      DKTC    X10 w/ GSB 2X10 w/ GSB   LTR   2x10               STANDING      HS stretch neutral L spine      Pull downs      Belly press/pallof   RTB x10                                PROPRIOCEPTION                                    MODALITIES                      Other Therapeutic Activities/Education:        Home Exercise Program:  Modified piriformis stretch, CR knee to chest SI mobilization. Manual Treatments:  TPR to R piriformis, piriformis stretches, HS stretches,       Modalities:  None       Communication with other providers:  None       Assessment:  Pt tolerated today's session well with significant improvement in pain afterwards She is very tight and tender in her R piriformis muscle and her R HS's are very tight. She will continue to benefit from skilled PT services in order to address muscle weakness and tightness and monitor pelvic alignment.  End session pain: 0/10 in LB    Plan for Next Session:       Time In / Time Out:  1300 / 1340       Timed Code/Total Treatment Minutes: 36' / 40'     3 TE      Next Progress Note due:        Plan of Care Interventions:  [x] Therapeutic Exercise  [] Modalities:  [] Therapeutic Activity     [] Ultrasound  [] Estim  [] Gait Training      [] Cervical Traction [] Lumbar Traction  [] Neuromuscular Re-education    [] Cold/hotpack [] Iontophoresis   [x] Instruction in HEP      [] Vasopneumatic   [] Dry Needling    [x] Manual Therapy               [] Aquatic Therapy              Electronically signed by:  Rachael Alegre PTA      6/15/2021, 1:02 PM

## 2021-06-17 ENCOUNTER — TELEPHONE (OUTPATIENT)
Dept: INTERNAL MEDICINE CLINIC | Age: 86
End: 2021-06-17

## 2021-06-17 NOTE — TELEPHONE ENCOUNTER
Patient was given muscle relaxant a few weeks ago- could you see if she is still taking it? If not, please advise her to restart. If she has been taking it, she can increase to 2 tabs every 12 hours as needed. She can contact clinic if the higher dose is still not helping with the pain, and I can call in something stronger for her- thanks!

## 2021-06-18 NOTE — TELEPHONE ENCOUNTER
Pt called and reported extreme weakness, after taking 2-2mg Tizanidine, around 9:30am.  She reports almost falling, when she tried to get up, to go to restroom. She felt very \"off\", x 2+ hrs. Pt states she has PHX of low tolerance to pain meds. She reports some improvement, after she was able to eat. Pt's Grandson, will be bringing her groceries and checking on her. Advised pt, to see if grandson will stay with her, until her weakness is resolved. Do not take more that 1 Tizanidine Q12h PRN. Do not take next dose, until absolutely necessary. Rest as much as possible, until resolved. Carry phone, if moving around house, in case of fall. ER if SX do not resolve . Pt voiced understanding/thanks. Please advise.

## 2021-06-21 NOTE — TELEPHONE ENCOUNTER
Tried calling patient x2, no answer. Left message that if she is still having issues with muscle spasm/pain, to call us back so we can discuss other treatment. Already has ortho appointment in 2 days, will look for note.

## 2021-06-22 ENCOUNTER — HOSPITAL ENCOUNTER (OUTPATIENT)
Dept: PHYSICAL THERAPY | Age: 86
Setting detail: THERAPIES SERIES
Discharge: HOME | End: 2021-06-22
Payer: MEDICARE

## 2021-06-22 PROCEDURE — 97110 THERAPEUTIC EXERCISES: CPT

## 2021-06-22 NOTE — FLOWSHEET NOTE
to tx session reporting 4/10 pain in LB. Any changes in Ambulatory Summary Sheet? None        Objective:   COVID screening questions were asked and patient attested that there had been no contact or symptoms      Exercises: (No more than 4 columns)   Exercise/Equipment 6/11/21  #3 6/15/21  #4 6/22/21 #5           WARM UP      Nu step L1 5' L4  5' L4  6'         TABLE      Gluteal , piriformis stretching      clamshells S/L 10x ea side  S/L 10x ea side S/L 10x2 ea side   bridge 10x 10x2 10x2   TA contraction 10x3\" 10x3\" 10x3\"   Adductor stretch      DKTC   X10 w/ GSB 2X10 w/ GSB 2X10 w/ GSB   LTR  2x10 2x10   TA hold marches   2x10         STANDING      HS stretch neutral L spine      Pull downs      Belly press/pallof  RTB x10 RTB x10                                PROPRIOCEPTION                                    MODALITIES                      Other Therapeutic Activities/Education:        Home Exercise Program:  Modified piriformis stretch, CR knee to chest SI mobilization. Manual Treatments:         Modalities:  None       Communication with other providers:  None       Assessment:  Pt tolerated today's session well; continues with R HS's are very tight. She will continue to benefit from skilled PT services in order to address muscle weakness and tightness and monitor pelvic alignment.  End session pain: 0/10 in LB    Plan for Next Session:       Time In / Time Out:  1045 / 1115       Timed Code/Total Treatment Minutes:  2 TE      Next Progress Note due:        Plan of Care Interventions:  [x] Therapeutic Exercise  [] Modalities:  [] Therapeutic Activity     [] Ultrasound  [] Estim  [] Gait Training      [] Cervical Traction [] Lumbar Traction  [] Neuromuscular Re-education    [] Cold/hotpack [] Iontophoresis   [x] Instruction in HEP      [] Vasopneumatic   [] Dry Needling    [x] Manual Therapy               [] Aquatic Therapy              Electronically signed by:  Reta Narvaez PTA

## 2021-06-23 ENCOUNTER — OFFICE VISIT (OUTPATIENT)
Dept: ORTHOPEDIC SURGERY | Age: 86
End: 2021-06-23
Payer: MEDICARE

## 2021-06-23 VITALS — HEART RATE: 71 BPM | HEIGHT: 63 IN | BODY MASS INDEX: 24.27 KG/M2 | WEIGHT: 137 LBS | OXYGEN SATURATION: 98 %

## 2021-06-23 DIAGNOSIS — M19.011 PRIMARY OSTEOARTHRITIS OF RIGHT SHOULDER: Primary | ICD-10-CM

## 2021-06-23 PROCEDURE — 1036F TOBACCO NON-USER: CPT | Performed by: PHYSICIAN ASSISTANT

## 2021-06-23 PROCEDURE — 1090F PRES/ABSN URINE INCON ASSESS: CPT | Performed by: PHYSICIAN ASSISTANT

## 2021-06-23 PROCEDURE — G8420 CALC BMI NORM PARAMETERS: HCPCS | Performed by: PHYSICIAN ASSISTANT

## 2021-06-23 PROCEDURE — 20610 DRAIN/INJ JOINT/BURSA W/O US: CPT | Performed by: PHYSICIAN ASSISTANT

## 2021-06-23 PROCEDURE — G8399 PT W/DXA RESULTS DOCUMENT: HCPCS | Performed by: PHYSICIAN ASSISTANT

## 2021-06-23 PROCEDURE — G8427 DOCREV CUR MEDS BY ELIG CLIN: HCPCS | Performed by: PHYSICIAN ASSISTANT

## 2021-06-23 PROCEDURE — 1123F ACP DISCUSS/DSCN MKR DOCD: CPT | Performed by: PHYSICIAN ASSISTANT

## 2021-06-23 PROCEDURE — 99213 OFFICE O/P EST LOW 20 MIN: CPT | Performed by: PHYSICIAN ASSISTANT

## 2021-06-23 PROCEDURE — 4040F PNEUMOC VAC/ADMIN/RCVD: CPT | Performed by: PHYSICIAN ASSISTANT

## 2021-06-23 ASSESSMENT — ENCOUNTER SYMPTOMS
GASTROINTESTINAL NEGATIVE: 1
RESPIRATORY NEGATIVE: 1
EYES NEGATIVE: 1
ALLERGIC/IMMUNOLOGIC NEGATIVE: 1

## 2021-06-23 NOTE — PROGRESS NOTES
Patient presents for evaluation of right neck/shoukder pain. She reports about 2 months ago she was doing physical therapy for her lower back and moved wrong. She reports the pain starts in her cervical neck and radiates down into her shoulder, elbow and hand. She sates it wakes her up at night. Also reports she has had previous surgery on her right shoulder, but is unsure what exactly was done.

## 2021-06-23 NOTE — PATIENT INSTRUCTIONS
Continue range of motion as tolerated  Take Tylenol as needed for pain  Ice or elevate as needed   Steroid injection given today. Rest for the next 24-48 hours as needed. Physical therapy ordered  Follow-up in 6 weeks Patient Education        Shoulder Arthritis: Exercises  Introduction  Here are some examples of exercises for you to try. The exercises may be suggested for a condition or for rehabilitation. Start each exercise slowly. Ease off the exercises if you start to have pain. You will be told when to start these exercises and which ones will work best for you. How to do the exercises  Shoulder flexion (lying down)   To make a wand for this exercise, use a piece of PVC pipe or a broom handle with the broom removed. Make the wand about a foot wider than your shoulders. 1. Lie on your back, holding a wand with both hands. Your palms should face down as you hold the wand. 2. Keeping your elbows straight, slowly raise your arms over your head. Raise them until you feel a stretch in your shoulders, upper back, and chest.  3. Hold for 15 to 30 seconds. 4. Repeat 2 to 4 times. Shoulder rotation (lying down)   To make a wand for this exercise, use a piece of PVC pipe or a broom handle with the broom removed. Make the wand about a foot wider than your shoulders. 1. Lie on your back. Hold a wand with both hands with your elbows bent and palms up. 2. Keep your elbows close to your body, and move the wand across your body toward the sore arm. 3. Hold for 8 to 12 seconds. 4. Repeat 2 to 4 times. Shoulder internal rotation with towel   1. Hold a towel above and behind your head with the arm that is not sore. 2. With your sore arm, reach behind your back and grasp the towel. 3. With the arm above your head, pull the towel upward. Do this until you feel a stretch on the front and outside of your sore shoulder. 4. Hold 15 to 30 seconds. 5. Repeat 2 to 4 times. Shoulder blade squeeze   1.  Stand with your body until it touches your belly. Slowly move it back to where you started. 5. Keep your elbow and upper arm firmly tucked against the towel roll or at your side. 6. Repeat 8 to 12 times. Pendulum swing   If you have pain in your back, do not do this exercise. 1. Hold on to a table or the back of a chair with your good arm. Then bend forward a little and let your sore arm hang straight down. This exercise does not use the arm muscles. Rather, use your legs and your hips to create movement that makes your arm swing freely. 2. Use the movement from your hips and legs to guide the slightly swinging arm back and forth like a pendulum (or elephant trunk). Then guide it in circles that start small (about the size of a dinner plate). Make the circles a bit larger each day, as your pain allows. 3. Do this exercise for 5 minutes, 5 to 7 times each day. 4. As you have less pain, try bending over a little farther to do this exercise. This will increase the amount of movement at your shoulder. Follow-up care is a key part of your treatment and safety. Be sure to make and go to all appointments, and call your doctor if you are having problems. It's also a good idea to know your test results and keep a list of the medicines you take. Where can you learn more? Go to https://[employer] and sign in to your [employer] account. Enter H562 in the [employer] box to learn more about \"Shoulder Arthritis: Exercises. \"     If you do not have an account, please click on the \"Sign Up Now\" link. Current as of: November 16, 2020               Content Version: 12.9  © 9371-7587 [employer], [employer]. Care instructions adapted under license by [facility] ([facility]). If you have questions about a medical condition or this instruction, always ask your healthcare professional. [address] any warranty or liability for your use of this information.

## 2021-06-23 NOTE — PROGRESS NOTES
[facility]    HPI:  Naun Tellez is a 82 y.o. female who presents for evaluation of her right shoulder pain. Patient states her right shoulder pain has been worsening since April, states her pain began after going to physical therapy for her back and she strained her right shoulder during the first session. She states her pain has been improving some and states she did see her primary care physician. She states she states she does note her pain begins in her neck and radiates to her shoulder and down her arm to the wrist.  She states she does have occasional numbness and tingling, denies any numbness or tingling at this time. She states her pain is 5/10 describes an ache in her right shoulder. She states she tried Tylenol and Zanaflex. She states moving her shoulder helps with her pain. She states her pain is constant nothing makes her pain worse. She denies any new injury to the right shoulder. She states she did previously have a rotator cuff repair completed about 10-15 years ago. Past Medical History:   Diagnosis Date    Arthritis of shoulder region, right 09/2014    Osmar Trevinokay Blind right eye     following right cataract surg    Chronic depression 2009    Dr. Delvis Pagan DVT (deep venous thrombosis) ([facility] [address] 1970    left leg    Former smoker     History of echocardiogram 09/21/2020    EF 55-60%, mild left ventricular hypertrophy, normal diastolic filling pattern for age, no signficiant valvular disease, no pericardial effusion     History of stress test 03/25/2021    normal LVEF calculated by the computer is probably falsely low.  LVEF is 40 %    Hx of Doppler ultrasound 03/01/2018    Carotid-mild 0-49% bilateral carotid,50% stenosis right subclavian    Hyperlipidemia     Hypertension     Macular degeneration     right    Mild cognitive impairment 02/2012    MMSE 26/30    MVP (mitral valve prolapse)     trace on echo 2014    Osteoporosis 05/2012    Pancytopenia 2011    mild with ; Dr Sheri hawk     Peptic ulcer disease     Peripheral vascular disease ([address] 2016    angio; dis below ankles; pletal    Prediabetes     Recurrent ventral incisional hernia 2013    medial apex of GB scar    S/P CABG x 3     3-vessel; Dr Jessie Tomas Spigelian hernia 2017    right ant lateral wall; seen on CT abd    Supraventricular tachycardia ([address]     Freq ventriular ectopy    Tic douloureux     Dr. Teagan Patel; Right side       Past Surgical History:   Procedure Laterality Date    CATARACT REMOVAL Right 2010    poor result ; blind right eye; [address] CORONARY ARTERY BYPASS GRAFT  2009    3 vessel    SHOULDER ARTHROSCOPY Right     TUBAL LIGATION      URETER SURGERY      Right ureteral repair       Family History   Problem Relation Age of Onset    Cancer Sister 79        Breast cancer 2017       Social History     Socioeconomic History    Marital status:       Spouse name: Malinda Farooq Number of children: 2    Years of education: 15    Highest education level: Not on file   Occupational History    Occupation: retired   Tobacco Use    Smoking status: Former Smoker     Packs/day: 0.25     Years: 9.00     Pack years: 2.25     Types: Cigarettes     Start date:      Quit date: 1985     Years since quittin.5    Smokeless tobacco: Never Used    Tobacco comment: 2016   Vaping Use    Vaping Use: Never used   Substance and Sexual Activity    Alcohol use: No     Alcohol/week: 0.0 standard drinks    Drug use: No    Sexual activity: Not Currently     Partners: Male   Other Topics Concern    Not on file   Social History Narrative    Not on file     Social Determinants of Health     Financial Resource Strain: Low Risk     Difficulty of Paying Living Expenses: Not hard at all   Food Insecurity: No Food Insecurity    Worried About [address] in the Last Year: Never true    Katie [employer] in the Last Year: Never true   Transportation Needs:     Lack of Transportation (Medical):  Lack of Transportation (Non-Medical):    Physical Activity:     Days of Exercise per Week:     Minutes of Exercise per Session:    Stress:     Feeling of Stress :    Social Connections:     Frequency of Communication with Friends and Family:     Frequency of Social Gatherings with Friends and Family:     Attends [employer] Services:     Active Member of Clubs or Organizations:     Attends Club or Organization Meetings:     Marital Status:    Intimate Partner Violence:     Fear of Current or Ex-Partner:     Emotionally Abused:     Physically Abused:     Sexually Abused:        Current Outpatient Medications   Medication Sig Dispense Refill    tiZANidine (ZANAFLEX) 2 MG tablet Take 1 tablet by mouth nightly as needed (low back pain) 30 tablet 0    verapamil (CALAN SR) 180 MG extended release tablet take 1/2 tablet by mouth twice a day 30 tablet 6    rosuvastatin (CRESTOR) 10 MG tablet Take 1 tablet by mouth daily 90 tablet 3    fluticasone (FLONASE) 50 MCG/ACT nasal spray instill 1 spray into each nostril once daily 1 Bottle 3    epinastine (ELESTAT) 0.05 % SOLN Place 1 drop into both eyes daily 1 Bottle 3    lidocaine 4 % external patch On for 12 hours, off for 12 hours 15 patch 0    gabapentin (NEURONTIN) 300 MG capsule 2 tablets 3 times daily for trigeminal neuralgia 180 capsule 5    divalproex (DEPAKOTE ER) 250 MG extended release tablet Take 250 mg by mouth nightly       VASCEPA 0.5 g CAPS take 2 capsules by mouth twice a day 360 capsule 2    pantoprazole (PROTONIX) 40 MG tablet Take 1 tablet by mouth daily 60 tablet 5    Probiotic Product (PROBIOTIC DAILY PO) Take 1 capsule by mouth daily      Multiple Vitamins-Minerals (OCUVITE-LUTEIN PO) Take by mouth      calcium-vitamin D (OSCAL 500/200 D-3) 500-200 MG-UNIT per tablet Take 1 tablet by mouth 2 times daily.         aspirin 81 MG EC tablet Take 81 mg by mouth daily. Current Facility-Administered Medications   Medication Dose Route Frequency Provider Last Rate Last Admin    cyanocobalamin injection 1,000 mcg  1,000 mcg Intramuscular Once [place] MD [place]        cyanocobalamin injection 1,000 mcg  1,000 mcg Subcutaneous Q30 Days [place] MD [place]   1,000 mcg at 10/20/20 1625       Allergies   Allergen Reactions    Alendronate Sodium Other (See Comments)     GI upset    Bactrim [Sulfamethoxazole-Trimethoprim] Anaphylaxis    Boniva [Ibandronate Sodium] Other (See Comments)     Gi upset and declined egd; also to fosamax    Ciprofloxacin     Colesevelam     Lisinopril Other (See Comments)     Severe hyperkalemia (6) with bun >56      Macrobid [Nitrofurantoin]     Neggram [Nalidixic Acid]     Pce [Erythromycin]     Penicillins     Risperidone And Related     Welchol [Colesevelam Hcl] Other (See Comments)     constipation    Carbamazepine Nausea And Vomiting    Lovaza [Omega-3-Acid Ethyl Esters] Nausea And Vomiting    Metoprolol Nausea And Vomiting    Pyridium [Phenazopyridine] Palpitations       Vitals:    06/23/21 1307   Pulse: 71   SpO2: 98%   Weight: 137 lb (62.1 kg)   Height: 5' 3\" (1.6 m)     Review of Systems:   Review of Systems   Constitutional: Negative. HENT: Negative. Eyes: Negative. Respiratory: Negative. Cardiovascular: Negative. Gastrointestinal: Negative. Endocrine: Negative. Genitourinary: Negative. Musculoskeletal: Positive for arthralgias and myalgias. Skin: Negative. Allergic/Immunologic: Negative. Neurological: Negative. Hematological: Negative. Psychiatric/Behavioral: Negative. Physical Exam:   Gen/Psych:Examination reveals a pleasant individual in no acute distress. The patient is oriented to time, place and person. The patient's mood and affect are appropriate. Patient appears well nourished.      HEENT: Head is atraumatic normocephalic, ears are symmetric, eyes show equal pupils bilaterally, extraocular muscles intact. Hearing is intact. Lymph: No obvious lymphedema in right upper extremity     Skin: Intact in right upper extremity with no ulcerations, lesions, rash, erythema. Vascular: There are no obvious varicosities in right upper extremity, sensation intact to light touch over right upper extremity. Capillary refill less than 3. Radial pulses equal and intact bilaterally. Musculoskeletal:  Right shoulder exam (limited due to decreased rom):  Skin:  Clear with no erythema, there is no significant joint effusion. Deformity: None  Atrophy: None  Tenderness: Tenderness palpation over the mid trapezius and anterior AC joint region of the right shoulder. Soft compartments. Active ROM:   FE: 45 degrees   Ad/Abduction: 45 degrees  IR side:  SI joint   ER side: 25 degrees   Passive ROM:   F/E: 90 degrees   IR side: 80 degrees   ER side: 45 degrees   Ad/Abduction: 45 degrees  Strength:   F/E:4/5         [place]: Negative  [place]: Positive  [place]: Negative  [place]: Negative  Cross over test: Positive  The patient can perform an OK sign, perform thumbs up, touch each finger to thumb, abduct and adduct the fingers, perform . Patient can flex and extend the elbow with no difficulty.  strength: 5/5  Midline bony cervical Spine tenderness: no    Imaging: The official read and interpretation of these x-rays will be done by the the [facility]. Please see their impression below. Impression   Severe arthritic changes affecting the glenohumeral joint have progressed   when compared to the prior study.  There is marked flattening of the medial   aspect of humeral head, which is new from the prior study and can conceivably   represent underlying avascular necrosis.       2.1 cm subacromial spur.         Assessment:   1.  Primary osteoarthritis of the right shoulder    Plan:   The patient was seen in clinic for evaluation of her right shoulder pain. Patient states her right shoulder pain began after completing a full session of physical therapy for her back. She states her pain has been slowly improving. She states her pain begins from her neck down into her shoulder and down her arm at times. X-ray imaging of the patient's right shoulder was obtained which showed significant osteoarthritis of the right shoulder. On physical exam, patient was tender palpation over the and anterior AC joint region. Patient was noted to have decreased range of motion of the right shoulder. Radial pulse equal intact bilaterally. Sensation intact to light touch. Treatment options regarding the patient's primary osteoarthritis of the right shoulder were discussed including tylenol use, ice use, physical therapy, home exercises, steroid injection, and surgery. Patient was agreeable physical therapy was ordered for her. Patient was also given home exercises. Patient elected to proceed with steroid injection, please see procedure note below. The patient will follow up in 6 weeks. Continue range of motion as tolerated  Take Tylenol as needed for pain  Ice or elevate as needed   Steroid injection given today. Rest for the next 24-48 hours as needed. Physical therapy ordered  Home exercises given  Follow-up in 6 weeks    Right Subacromial Injection Procedure:  Pre-procedure diagnosis: Osteoarthritis of the right shoulder  Post-procedure diagnosis: Same as above    Multiple treatment options were discussed. This injection was recommended as a part of the overall treatment plan. Details of the procedure, potential risks, and potential benefits were discussed. Patient's questions were answered. Patient elected to proceed with procedure. Medication: Kenalog (40 MG/ML) 1ml, 1% plain lidocaine 1ml, 1% 0.5% bupivacaine  Procedure:  Sterile technique was used as the skin over the injection site was prepped with alcohol.   The right subacromial bursa was then injected with the above listed medication. A sterile bandage was placed over the injection site. The patient tolerated the procedure well without complication. Please note this report has been partially produced using speech recognition Dragon software and may contain errors related to that system including errors in grammar, punctuation, and spelling, as well as words and phrases that may be inappropriate. If there are any questions or concerns please feel free to contact the dictating provider for clarification.

## 2021-06-24 ENCOUNTER — HOSPITAL ENCOUNTER (OUTPATIENT)
Dept: PHYSICAL THERAPY | Age: 86
Setting detail: THERAPIES SERIES
Discharge: HOME | End: 2021-06-24
Payer: MEDICARE

## 2021-06-24 PROCEDURE — 97110 THERAPEUTIC EXERCISES: CPT

## 2021-06-24 PROCEDURE — 97530 THERAPEUTIC ACTIVITIES: CPT

## 2021-06-24 PROCEDURE — 97112 NEUROMUSCULAR REEDUCATION: CPT

## 2021-06-24 NOTE — FLOWSHEET NOTE
Outpatient Physical Therapy  [place]           [x] Phone: [phone]   Fax: [phone]  [place]           [] Phone: [phone]   Fax: [phone]        Physical Therapy Daily Treatment Note  Date:  2021    Patient Name:  Ford Aase    :  1935  MRN: [id number]  Restrictions/Precautions:    Diagnosis:   Diagnosis: Chronic low back pain  Date of Injury/Surgery:   Treatment Diagnosis: Treatment Diagnosis: low back pain, radiculopathy    Insurance/Certification information: PT Insurance Information: medicare    Referring Physician:  Referring Practitioner: Dr. Shelbie Tyler  Next Doctor Visit:    Plan of care signed (Y/N):  n  Outcome Measure: Oswestry:  66%  Visit# / total visits:   initially  Pain level: 6-7/10       Goals:     Patient goals : decrease pain  Long term goals  Time Frame for Long term goals : 6 weeks  Long term goal 1: I in home program Reports compliance   Long term goal 2: decrease RLE symptoms by 75%   Long term goal 3: stand/walk 30mins with minimal pain. Long term goal 4: min pain with transfers and bed mobility     Summary of Evaluation: Assessment: Pt presents with complaints of chronic low pain with RLE symptoms extending to her lateral mid leg.  symptoms have worsened in the last couple weeks. She notes some changes in bladder, but also notes she has history of bladder problems and infections. Instruct pt to discuss with her doctor to determine infection vs nerogenic source. She has + R SLR and tight HS B, negative GLENN and FADIR. Pain limited trunk ROM in flexion and extension, with some decrease in distal symptoms reported today wtih flexion. Dermatomes intact to lt touch, myotomes intact with pain limiting hip flexion and knee flexion. She has pain standing and walking more than a few minutes, sitting more than 30 mins, pain with transfers and bed mobility. Prior to onset of pain, these activities were not limited or painful.         Subjective:  Pt stated that her back pain was 6-7/10 today. Pt stated that she felt better before she got into the car. Pt stated that the pain so bad as she walked into the clinic that it made her feel nauseated. Pt stated that she did do a lot of walking yesterday. Pt also reported that she is having incontinence problems when she first stands up. Pt also described that her back felt like it was \"breaking\" at times. Pt stated that she is also having reoccurring bladder infections. Pt stated that she has been on multiple antibiotics,but that the infections keep coming back. Pt stated that she spoke to her urologist about it and the dosage was doubled. Pt stated that it made her really sick so she just stopped taking it. Any changes in Ambulatory Summary Sheet? None        Objective:   COVID screening questions were asked and patient attested that there had been no contact or symptoms    Needed tactile and visual cues to perform bridge/ PPT    Exercises: (No more than 4 columns)   Exercise/Equipment 6/11/21  #3 6/15/21  #4 6/22/21 #5 6/24/2021 #6            WARM UP       Nu step L1 5' L4  5' L4  6' L-4 x 5'           TABLE       Gluteal , piriformis stretching       clamshells S/L 10x ea side  S/L 10x ea side S/L 10x2 ea side S/L 10x2 ea side   bridge 10x 10x2 10x2 10x2 with manual cues   TA contraction 10x3\" 10x3\" 10x3\" 10x 2 3\"    Adductor stretch       DKTC   X10 w/ GSB 2X10 w/ GSB 2X10 w/ GSB GSB 10x2   LTR  2x10 2x10 --   TA hold marches   2x10 10x2 ea LE          STANDING       HS stretch neutral L spine       Pull downs       Belly press/pallof  RTB x10 RTB x10 --                                    PROPRIOCEPTION                                          MODALITIES                         Other Therapeutic Activities/Education:  Educated on difference between anterior tilt and posterior tilt. Educated on anatomy and discussed importance of getting abdominals, glutes, and quads.  Pt also discussed that she was depressed and needed something to do outside of the house that would give her purpose again. Discussed contacting [email or web address], [employer] or [employer]. Instructed patient to contact urologist and/or PCP regarding incontinence/ infection. Home Exercise Program:  Modified piriformis stretch, CR knee to chest SI mobilization. Manual Treatments:         Modalities:  None       Communication with other providers:  None       Assessment:  Pt tolerated treatment fair today. Pt required visual and tactile cues for PPT and march w/ TA and bridge. Pt tended to perform anterior tilt instead of bridge which may have caused the increased back pain. Pt rated pain at 3/10 after treatment.      Plan for Next Session:       Time In / Time Out:  1030/ 1135       Timed Code/Total Treatment Minutes:  65'/ 65' 1 TA (15') 2 TE (30') 1 neuro (20')       Next Progress Note due:        Plan of Care Interventions:  [x] Therapeutic Exercise  [] Modalities:  [] Therapeutic Activity     [] Ultrasound  [] Estim  [] Gait Training      [] Cervical Traction [] Lumbar Traction  [] Neuromuscular Re-education    [] Cold/hotpack [] Iontophoresis   [x] Instruction in HEP      [] Vasopneumatic   [] Dry Needling    [x] Manual Therapy               [] Aquatic Therapy              Electronically signed by:  Anshul Ignacio PTA      6/24/2021, 10:29 AM       9/57/3110,7:61 PM

## 2021-06-29 ENCOUNTER — HOSPITAL ENCOUNTER (OUTPATIENT)
Dept: PHYSICAL THERAPY | Age: 86
Setting detail: THERAPIES SERIES
Discharge: HOME | End: 2021-06-29
Payer: MEDICARE

## 2021-06-29 PROCEDURE — 97110 THERAPEUTIC EXERCISES: CPT

## 2021-06-29 PROCEDURE — 97164 PT RE-EVAL EST PLAN CARE: CPT

## 2021-06-29 NOTE — PROGRESS NOTES
Outpatient Physical Therapy        [x] Phone: [phone]   Fax: [phone]  Physician:   Thierry Suh, PAC       From: Cindy Snell PT, PT     Patient: Oneal Alvarez                    : 1935   dx: R shoulder pain  Rxdx:  Shoulder DJD, weakness. Date: 2021    [x]  Progress Note                []  Discharge Note    Total Visits to date:  7     Cancels/No-shows to date:        Subjective:  Pt arrives with new orders for R shoulder pain  Had increase in pain when trying to stretch her low back/hips. Able to sleep on R now. Minimal pain now following injection. 2-3/10 pain at worst in the last week    Prominent Objective Findings:    Relates R shoulder surgery ~15yrs ago    R hand dominant. Posture fwd head, kyphotic. AROM: 85 flexion,abd w some scap sub.  (pt notes this is about her norm)  PROMpain limited 95deg flexion. 30 ER@ loose packed. Severe crepitus in R shoulder. Pain and weakness with resistive testing. Gross strength in avail AROM 3/5 flexion, abd, 3+ER, Bicep 4/5, tricep 4-/5 all with pain reported. + impingement  Neg tenderness at biceps, supraspinatus, infraspinatus, AC. Goal Status:     4 weeks   1. I in home program.  2.  Prepare a meal and clean up after with minimal pain. 3.  Bathe/dress self with minimal pain. 4.  Lift/reach groceries from shelves with minimal pian. Planned Services:  [x] Therapeutic Exercise    [] Modalities:  [x] Therapeutic Activity     [] Ultrasound  [] Electric Stimulation  [] Gait Training      [] Cervical Traction    [] Lumbar Traction  [x] Neuromuscular Re-education  [x] Cold/hotpack [] Iontophoresis  [x] Instruction in HEP      Other:  [x] Manual Therapy       [x]  Vasopneumatic  [] Self care management                           [] Dry needling trigger point/pain management                ? Frequency/Duration:  # Days per week: [] 1 day # Weeks: [] 1 week [x] 4 weeks      [x] 2 days?    [] 2 weeks [] 5 weeks      [] 3 days   [] 3 weeks [] 6 weeks     Rehab Potential: [] Excellent [x] Good [] Fair  [] Poor     Patient Status: [x] Continue per initial Plan of Care     [] Patient now discharged     [] Additional visits requested, Please re-certify for additional visits:        Electronically signed by:  Jennifer Briseno PT, , 6/29/2021, 1:01 PM    If you have any questions or concerns, please don't hesitate to call.   Thank you for your referral.    Physician Signature:______________________ Date:______ Time: ________  By signing above, therapists plan is approved by physician

## 2021-06-29 NOTE — FLOWSHEET NOTE
Outpatient Physical Therapy  [place]           [x] Phone: [phone]   Fax: [phone]  Nicole Sultana           [] Phone: [phone]   Fax: [phone]        Physical Therapy Daily Treatment Note  Date:  2021    Patient Name:  Erasto Martinez    :  1935  MRN: [id number]  Restrictions/Precautions:    Diagnosis:   Diagnosis: Chronic low back pain, R shoulder OA  Date of Injury/Surgery:   Treatment Diagnosis: Treatment Diagnosis: low back pain, radiculopathy    Insurance/Certification information: PT Insurance Information: medicare    Referring Physician:  Referring Practitioner: Dr. Geeta Alvarez  Next Doctor Visit:    Plan of care signed (Y/N):  n  Outcome Measure: Oswestry:  66%  Visit# / total visits:    Pain level: 6/10       Goals:     Patient goals : decrease pain  Long term goals  Time Frame for Long term goals : 6 weeks  Long term goal 1: I in home program Reports compliance   Long term goal 2: decrease RLE symptoms by 75%   Long term goal 3: stand/walk 30mins with minimal pain. Long term goal 4: min pain with transfers and bed mobility     Summary of Evaluation: Assessment: Pt presents with complaints of chronic low pain with RLE symptoms extending to her lateral mid leg.  symptoms have worsened in the last couple weeks. She notes some changes in bladder, but also notes she has history of bladder problems and infections. Instruct pt to discuss with her doctor to determine infection vs nerogenic source. She has + R SLR and tight HS B, negative GLENN and FADIR. Pain limited trunk ROM in flexion and extension, with some decrease in distal symptoms reported today wtih flexion. Dermatomes intact to lt touch, myotomes intact with pain limiting hip flexion and knee flexion. She has pain standing and walking more than a few minutes, sitting more than 30 mins, pain with transfers and bed mobility.   Prior to onset of pain, these activities were not limited or painful. Subjective:  Sleeping better since steroid injection and using a larger pillow between legs. Shoulder is feeling better too. Any changes in Ambulatory Summary Sheet? None        Objective:   COVID screening questions were asked and patient attested that there had been no contact or symptoms    See PN for shoulder assessment. Exercises: (No more than 4 columns)   Exercise/Equipment 6/11/21  #3 6/15/21  #4 6/22/21 #5 6/24/2021 #6 6/29/21 #7             WARM UP        Nu step L1 5' L4  5' L4  6' L-4 x 5'             TABLE        Gluteal , piriformis stretching        clamshells S/L 10x ea side  S/L 10x ea side S/L 10x2 ea side S/L 10x2 ea side    bridge 10x 10x2 10x2 10x2 with manual cues    TA contraction 10x3\" 10x3\" 10x3\" 10x 2 3\"     Adductor stretch        DKTC   X10 w/ GSB 2X10 w/ GSB 2X10 w/ GSB GSB 10x2    LTR  2x10 2x10 --    TA hold marches   2x10 10x2 ea LE    scap retraction     2ct 2x10   STANDING        HS stretch neutral L spine        Pull downs     Seated green 2x10   Belly press/pallof  RTB x10 RTB x10 -- Seated    Bicep curl     2# 2x10   Row      Green x10 standing  2x10 seated   G/s stretch     Black wedge 30 sec x 2                PROPRIOCEPTION                                                MODALITIES                            Other Therapeutic Activities/Education:    Answered questions about some old exercises: wedge g/s stretch, ankle 4 way w band,     Home Exercise Program:  Modified piriformis stretch, CR knee to chest SI mobilization. Green tubing rows, pallof press, 2-3# bicep curls, scap retraction/depression    Manual Treatments:         Modalities:  None       Communication with other providers:  None       Assessment:  Pt tolerated treatment well today. Requires visual cues for proper form with exercises today. Standing tolerances limited due to fatigue and increasing low back pain. Pt rated pain at 3/10 after treatment.      Plan for Next Session: Time In / Time Out:  1300/1345       Timed Code/Total Treatment Minutes:  45/45      Next Progress Note due:        Plan of Care Interventions:  [x] Therapeutic Exercise  [] Modalities:  [] Therapeutic Activity     [] Ultrasound  [] Estim  [] Gait Training      [] Cervical Traction [] Lumbar Traction  [] Neuromuscular Re-education    [] Cold/hotpack [] Iontophoresis   [x] Instruction in HEP      [] Vasopneumatic   [] Dry Needling    [x] Manual Therapy               [] Aquatic Therapy              Electronically signed by:  Carlos Villela PT,       6/29/2021, 1:25 PM       6/29/2021,1:25 PM

## 2021-07-01 ENCOUNTER — NURSE ONLY (OUTPATIENT)
Dept: INTERNAL MEDICINE CLINIC | Age: 86
End: 2021-07-01
Payer: MEDICARE

## 2021-07-01 ENCOUNTER — HOSPITAL ENCOUNTER (OUTPATIENT)
Dept: PHYSICAL THERAPY | Age: 86
Setting detail: THERAPIES SERIES
Discharge: HOME | End: 2021-07-01
Payer: MEDICARE

## 2021-07-01 DIAGNOSIS — E53.8 VITAMIN B 12 DEFICIENCY: Primary | ICD-10-CM

## 2021-07-01 PROCEDURE — 97112 NEUROMUSCULAR REEDUCATION: CPT

## 2021-07-01 PROCEDURE — 97110 THERAPEUTIC EXERCISES: CPT

## 2021-07-01 PROCEDURE — 96372 THER/PROPH/DIAG INJ SC/IM: CPT | Performed by: FAMILY MEDICINE

## 2021-07-01 RX ORDER — CYANOCOBALAMIN 1000 UG/ML
1000 INJECTION INTRAMUSCULAR; SUBCUTANEOUS ONCE
Status: COMPLETED | OUTPATIENT
Start: 2021-07-01 | End: 2021-07-01

## 2021-07-01 RX ADMIN — CYANOCOBALAMIN 1000 MCG: 1000 INJECTION INTRAMUSCULAR; SUBCUTANEOUS at 14:06

## 2021-07-01 NOTE — FLOWSHEET NOTE
Outpatient Physical Therapy  [place]           [x] Phone: [phone]   Fax: [phone]  Martín Almeida           [] Phone: [phone]   Fax: [phone]        Physical Therapy Daily Treatment Note  Date:  2021    Patient Name:  Vicki Snider    :  1935  MRN: [id number]  Restrictions/Precautions:    Diagnosis:   Diagnosis: Chronic low back pain, R shoulder OA  Date of Injury/Surgery:   Treatment Diagnosis: Treatment Diagnosis: low back pain, radiculopathy    Insurance/Certification information: PT Insurance Information: medicare    Referring Physician:  Referring Practitioner: Dr. Bj Sarmiento  Next Doctor Visit:    Plan of care signed (Y/N):  n  Outcome Measure: Oswestry:  66%  Visit# / total visits:    Pain level: 0/10       Goals:     Patient goals : decrease pain  Long term goals  Time Frame for Long term goals : 6 weeks  Long term goal 1: I in home program Reports compliance   Long term goal 2: decrease RLE symptoms by 75%   Long term goal 3: stand/walk 30mins with minimal pain. Long term goal 4: min pain with transfers and bed mobility     Shoulder:    1. I in home program.  2.  Prepare a meal and clean up after with minimal pain. 3.  Bathe/dress self with minimal pain. 4.  Lift/reach groceries from shelves with minimal pian. Summary of Evaluation: Assessment: Pt presents with complaints of chronic low pain with RLE symptoms extending to her lateral mid leg.  symptoms have worsened in the last couple weeks. She notes some changes in bladder, but also notes she has history of bladder problems and infections. Instruct pt to discuss with her doctor to determine infection vs nerogenic source. She has + R SLR and tight HS B, negative GLENN and FADIR. Pain limited trunk ROM in flexion and extension, with some decrease in distal symptoms reported today wtih flexion. Dermatomes intact to lt touch, myotomes intact with pain limiting hip flexion and knee flexion.  She has pain standing and walking more than a few minutes, sitting more than 30 mins, pain with transfers and bed mobility. Prior to onset of pain, these activities were not limited or painful. Relates R shoulder surgery ~15yrs ago    R hand dominant. Posture fwd head, kyphotic. AROM: 85 flexion,abd w some scap sub.  (pt notes this is about her norm)  PROMpain limited 95deg flexion. 30 ER@ loose packed. Severe crepitus in R shoulder. Pain and weakness with resistive testing. Gross strength in avail AROM 3/5 flexion, abd, 3+ER, Bicep 4/5, tricep 4-/5 all with pain reported. + impingement  Neg tenderness at biceps, supraspinatus, infraspinatus, AC.         Subjective:  Been sleeping pretty well the last couple nights. Been doing the housework without too much pain. Feels a little worn down and realized she's overdue for B12. Any changes in Ambulatory Summary Sheet? None        Objective:   COVID screening questions were asked and patient attested that there had been no contact or symptoms    See PN for shoulder assessment. Exercises: (No more than 4 columns)   Exercise/Equipment 6/22/21 #5 6/24/2021 #6 6/29/21 #7 #8 7/1/21            WARM UP       Nu step L4  6' L-4 x 5'             TABLE       Gluteal , piriformis stretching    30 sec x 3 each B   clamshells S/L 10x2 ea side S/L 10x2 ea side     bridge 10x2 10x2 with manual cues     TA contraction 10x3\" 10x 2 3\"      Adductor stretch       DKTC   2X10 w/ GSB GSB 10x2  Greenball, 2x10   LTR 2x10 --  5ct x 10   TA hold marches 2x10 10x2 ea LE     scap retraction   2ct 2x10    STANDING       HS stretch neutral L spine       Pull downs   Seated green 2x10    Belly press/pallof RTB x10 -- Seated  Red 2x10 each way. Seated    Bicep curl   2# 2x10    Row    Green x10 standing  2x10 seated Green 3x10 seated.      G/s stretch   Black wedge 30 sec x 2       Side stepping      15ft x 2 each way 2 minor LOB, self corrected   PROPRIOCEPTION       SB    Sitting narrow base hand prn 1 min   SB     Seated march w 1 UE for balance. 3x10                        MODALITIES                         Other Therapeutic Activities/Education:    Answered questions about some old exercises: wedge g/s stretch, ankle 4 way w band,     Home Exercise Program:  Modified piriformis stretch, CR knee to chest SI mobilization. Green tubing rows, pallof press, 2-3# bicep curls, scap retraction/depression    Manual Treatments:         Modalities:  None       Communication with other providers:  None       Assessment:  Pt tolerated treatment well today. 0/10 pain at end, but feels fatigued. Requires visual and verbal cues for proper form with exercises today. Standing tolerances limited due to fatigue and increasing low back pain.       Plan for Next Session:       Time In / Time Out:  1300/1345       Timed Code/Total Treatment Minutes:  45/45      Next Progress Note due:        Plan of Care Interventions:  [x] Therapeutic Exercise  [] Modalities:  [] Therapeutic Activity     [] Ultrasound  [] Estim  [] Gait Training      [] Cervical Traction [] Lumbar Traction  [] Neuromuscular Re-education    [] Cold/hotpack [] Iontophoresis   [x] Instruction in HEP      [] Vasopneumatic   [] Dry Needling    [x] Manual Therapy               [] Aquatic Therapy              Electronically signed by:  Maulik Estrella PT,       7/1/2021, 12:59 PM       7/1/2021,12:59 PM

## 2021-07-06 ENCOUNTER — HOSPITAL ENCOUNTER (OUTPATIENT)
Dept: PHYSICAL THERAPY | Age: 86
Setting detail: THERAPIES SERIES
Discharge: HOME | End: 2021-07-06
Payer: MEDICARE

## 2021-07-06 PROCEDURE — 97112 NEUROMUSCULAR REEDUCATION: CPT

## 2021-07-06 PROCEDURE — 97110 THERAPEUTIC EXERCISES: CPT

## 2021-07-06 NOTE — FLOWSHEET NOTE
Outpatient Physical Therapy  [place]           [x] Phone: [phone]   Fax: [phone]  Angelica Saritha           [] Phone: [phone]   Fax: [phone]        Physical Therapy Daily Treatment Note  Date:  2021    Patient Name:  Ahmet Elizondo    :  1935  MRN: [id number]  Restrictions/Precautions:    Diagnosis:   Diagnosis: Chronic low back pain, R shoulder OA  Date of Injury/Surgery:   Treatment Diagnosis: Treatment Diagnosis: low back pain, radiculopathy    Insurance/Certification information: PT Insurance Information: medicare    Referring Physician:  Referring Practitioner: Dr. Saumya Kwong  Next Doctor Visit:    Plan of care signed (Y/N):  n  Outcome Measure: Oswestry:  66%  Visit# / total visits:    Pain level: 0/10 \"somewhat achy\"        Goals:     Patient goals : decrease pain  Long term goals  Time Frame for Long term goals : 6 weeks  Long term goal 1: I in home program Reports compliance   Long term goal 2: decrease RLE symptoms by 75%   Long term goal 3: stand/walk 30mins with minimal pain. Long term goal 4: min pain with transfers and bed mobility     Shoulder:    1. I in home program.  2.  Prepare a meal and clean up after with minimal pain. 3.  Bathe/dress self with minimal pain. 4.  Lift/reach groceries from shelves with minimal pian. Summary of Evaluation: Assessment: Pt presents with complaints of chronic low pain with RLE symptoms extending to her lateral mid leg.  symptoms have worsened in the last couple weeks. She notes some changes in bladder, but also notes she has history of bladder problems and infections. Instruct pt to discuss with her doctor to determine infection vs nerogenic source. She has + R SLR and tight HS B, negative GLENN and FADIR. Pain limited trunk ROM in flexion and extension, with some decrease in distal symptoms reported today wtih flexion.   Dermatomes intact to lt touch, myotomes intact with pain limiting hip flexion and knee flexion. She has pain standing and walking more than a few minutes, sitting more than 30 mins, pain with transfers and bed mobility. Prior to onset of pain, these activities were not limited or painful. Relates R shoulder surgery ~15yrs ago    R hand dominant. Posture fwd head, kyphotic. AROM: 85 flexion,abd w some scap sub.  (pt notes this is about her norm)  PROMpain limited 95deg flexion. 30 ER@ loose packed. Severe crepitus in R shoulder. Pain and weakness with resistive testing. Gross strength in avail AROM 3/5 flexion, abd, 3+ER, Bicep 4/5, tricep 4-/5 all with pain reported. + impingement  Neg tenderness at biceps, supraspinatus, infraspinatus, AC.         Subjective:    Pt arrives to tx session reporting 0/10 pain does report feeling \"somewhat achy from having a big weekend\". Any changes in Ambulatory Summary Sheet? None        Objective:   COVID screening questions were asked and patient attested that there had been no contact or symptoms    See PN for shoulder assessment. Exercises: (No more than 4 columns)   Exercise/Equipment 6/29/21 #7 #8 7/1/21 7/6/21 #9           WARM UP      Nu step   L4 5'         TABLE      Gluteal , piriformis stretching  30 sec x 3 each B 30 sec x 3 each B   clamshells      bridge      TA contraction      Adductor stretch      DKTC    Greenball, 2x10 Greenball, 2x10   LTR  5ct x 10 5ct x 10   TA hold marches      scap retraction 2ct 2x10     STANDING      HS stretch neutral L spine      Pull downs Seated green 2x10     Belly press/pallof Seated  Red 2x10 each way. Seated  Red 2x10 each way. Bicep curl 2# 2x10     Row  Green x10 standing  2x10 seated Green 3x10 seated. Green 3x10   G/s stretch Black wedge 30 sec x 2        Side stepping    15ft x 2 each way 2 minor LOB, self corrected 15ft x 2 each way   PROPRIOCEPTION      SB  Sitting narrow base hand prn 1 min Sitting narrow base hand prn 1 min   SB   Seated march w 1 UE for balance.  3x10 Seated march w 1 UE for balance. 3x10                     MODALITIES                      Other Therapeutic Activities/Education:    Answered questions about some old exercises: wedge g/s stretch, ankle 4 way w band,     Home Exercise Program:  Modified piriformis stretch, CR knee to chest SI mobilization. Green tubing rows, pallof press, 2-3# bicep curls, scap retraction/depression    Manual Treatments:         Modalities:  None       Communication with other providers:  None       Assessment:  Pt tolerated treatment well today. 0/10 pain at end, but feels fatigued. Requires visual and verbal cues for proper form with exercises today. Standing tolerances limited due to fatigue and increasing low back pain.       Plan for Next Session:       Time In / Time Out:   8617 / 1116       Timed Code/Total Treatment Minutes:  37' / 37'    2 TE     1 NR      Next Progress Note due:        Plan of Care Interventions:  [x] Therapeutic Exercise  [] Modalities:  [] Therapeutic Activity     [] Ultrasound  [] Estim  [] Gait Training      [] Cervical Traction [] Lumbar Traction  [] Neuromuscular Re-education    [] Cold/hotpack [] Iontophoresis   [x] Instruction in HEP      [] Vasopneumatic   [] Dry Needling    [x] Manual Therapy               [] Aquatic Therapy              Electronically signed by:  Kleber Montana PTA,       7/6/2021, 9:01 AM       7/6/2021,9:01 AM

## 2021-07-08 ENCOUNTER — HOSPITAL ENCOUNTER (OUTPATIENT)
Dept: PHYSICAL THERAPY | Age: 86
Setting detail: THERAPIES SERIES
Discharge: HOME | End: 2021-07-08
Payer: MEDICARE

## 2021-07-08 ENCOUNTER — HOSPITAL ENCOUNTER (EMERGENCY)
Age: 86
Discharge: HOME | End: 2021-07-08
Attending: EMERGENCY MEDICINE
Payer: MEDICARE

## 2021-07-08 VITALS
HEART RATE: 70 BPM | HEIGHT: 63 IN | RESPIRATION RATE: 12 BRPM | BODY MASS INDEX: 23.74 KG/M2 | WEIGHT: 134 LBS | DIASTOLIC BLOOD PRESSURE: 72 MMHG | SYSTOLIC BLOOD PRESSURE: 169 MMHG | TEMPERATURE: 97.1 F | OXYGEN SATURATION: 95 %

## 2021-07-08 DIAGNOSIS — R03.0 ELEVATED BLOOD PRESSURE READING: ICD-10-CM

## 2021-07-08 DIAGNOSIS — R04.0 EPISTAXIS: Primary | ICD-10-CM

## 2021-07-08 PROCEDURE — 6370000000 HC RX 637 (ALT 250 FOR IP): Performed by: EMERGENCY MEDICINE

## 2021-07-08 PROCEDURE — 30901 CONTROL OF NOSEBLEED: CPT

## 2021-07-08 PROCEDURE — 99284 EMERGENCY DEPT VISIT MOD MDM: CPT

## 2021-07-08 PROCEDURE — 97110 THERAPEUTIC EXERCISES: CPT

## 2021-07-08 RX ORDER — OXYMETAZOLINE HYDROCHLORIDE 0.05 G/100ML
2 SPRAY NASAL ONCE
Status: DISCONTINUED | OUTPATIENT
Start: 2021-07-08 | End: 2021-07-08 | Stop reason: HOSPADM

## 2021-07-08 RX ORDER — ONDANSETRON 4 MG/1
4 TABLET, ORALLY DISINTEGRATING ORAL ONCE
Status: COMPLETED | OUTPATIENT
Start: 2021-07-08 | End: 2021-07-08

## 2021-07-08 RX ORDER — ONDANSETRON 4 MG/1
4 TABLET, ORALLY DISINTEGRATING ORAL 3 TIMES DAILY PRN
Qty: 21 TABLET | Refills: 0 | Status: SHIPPED | OUTPATIENT
Start: 2021-07-08 | End: 2021-09-07 | Stop reason: ALTCHOICE

## 2021-07-08 RX ORDER — CEPHALEXIN 500 MG/1
500 CAPSULE ORAL 4 TIMES DAILY
Qty: 12 CAPSULE | Refills: 0 | Status: SHIPPED | OUTPATIENT
Start: 2021-07-08 | End: 2021-07-11

## 2021-07-08 RX ADMIN — ONDANSETRON 4 MG: 4 TABLET, ORALLY DISINTEGRATING ORAL at 17:50

## 2021-07-08 NOTE — ED NOTES
Bed: E06  Expected date:   Expected time:   Means of arrival:   Comments:   [address] twp  jarroded      Nancy España RN  07/08/21 9104

## 2021-07-08 NOTE — ED NOTES
Discharge instruction given.  She expressed understanding of information and follow up care     Argentina De La Rosa RN  07/08/21 2034

## 2021-07-08 NOTE — PROGRESS NOTES
Outpatient Physical Therapy        [x] Phone: [phone]   Fax: [phone]  Physician:   Dr. Kristina Akers, PAC       From: Taylor Woodsno, PT,     Patient: Radha Bull                    : 1935    R shoulder pain, chronic low back pain  Rshoulder DJD, weakness, low back pain, radiculopathy     Date: 2021    []  Progress Note                []  Discharge Note    Total Visits to date:  10     Cancels/No-shows to date: 2      Subjective:  Shoulder is pretty much back to her normal. Low back and LE symptoms are improved, but still painful. Yesterday was a great day, no pain, but today is sore across low back and L buttock. Prominent Objective Findings:    Radicular symptoms to L buttock (was to leg)  Time Frame for Long term goals : 6 weeks  Long term goal 1: I in home program Reports compliance   Long term goal 2: decrease RLE symptoms by 75%                          pain to hip (met for distance)  Long term goal 3: stand/walk 30mins with minimal pain. Limited to 5-10mins  Long term goal 4: min pain with transfers and bed mobility              minimal      Oswestry improved 60% from 66%. Shoulder:    1.  I in home program.                                                             Met doing doctor and PT issued exercises. 2.  Prepare a meal and clean up after with minimal pain. met  3.  Bathe/dress self with minimal pain. MET  4.  Lift/reach groceries from shelves with minimal pain. Some things are too heavy, but that is not uncommon      Shoulder AROM is still limited, significant crepitus with ROM.      Relates she has been performing exercises issued from ortho consult and from PT,         Goal Status:  [] Achieved [x] Partially Achieved  [] Not Achieved         Changes to goals:    Planned Services:  [x] Therapeutic Exercise    [] Modalities:  [x] Therapeutic Activity     [] Ultrasound  [] Electric Stimulation  [] Gait Training      [] Cervical Traction    [] Lumbar Traction  [x] Neuromuscular Re-education  [] Cold/hotpack [] Iontophoresis  [x] Instruction in HEP      Other:  [x] Manual Therapy       []  Vasopneumatic  [] Self care management                           [] Dry needling trigger point/pain management                ? Plan of Care Date Range:     Frequency/Duration:  # Days per week: [] 1 day # Weeks: [] 1 week [] 4 weeks      [x] 2 days? [] 2 weeks [] 5 weeks      [] 3 days   [x] 3 weeks [] 6 weeks     Rehab Potential: [] Excellent [x] Good [] Fair  [] Poor     Patient Status: [x] Continue per initial Plan of Care     [] Patient now discharged     [x] Additional visits requested, Please re-certify for additional visits:        Electronically signed by:  Eden Kelsey PT, PT, 7/8/2021, 1:34 PM    If you have any questions or concerns, please don't hesitate to call.   Thank you for your referral.    Physician Signature:______________________ Date:______ Time: ________  By signing above, therapists plan is approved by physician

## 2021-07-08 NOTE — FLOWSHEET NOTE
Outpatient Physical Therapy  [place]           [x] Phone: [phone]   Fax: [phone]  Ashely flores           [] Phone: [phone]   Fax: [phone]        Physical Therapy Daily Treatment Note  Date:  2021    Patient Name:  Jones Homans    :  1935  MRN: [id number]  Restrictions/Precautions:    Diagnosis:   Diagnosis: Chronic low back pain, R shoulder OA  Date of Injury/Surgery:   Treatment Diagnosis: Treatment Diagnosis: low back pain, radiculopathy    Insurance/Certification information: PT Insurance Information: medicare    Referring Physician:  Referring Practitioner: Dr. Baylee Palumbo  Next Doctor Visit:    Plan of care signed (Y/N):  n  Outcome Measure: Oswestry:  66%  21 60%  Visit# / total visits:  10/12  Pain level: 0/10   At rest today      Goals:     Patient goals : decrease pain  Long term goals  Time Frame for Long term goals : 6 weeks  Long term goal 1: I in home program Reports compliance   Long term goal 2: decrease RLE symptoms by 75%    pain to hip (met for distance)  Long term goal 3: stand/walk 30mins with minimal pain. Limited to 5-10mins  Long term goal 4: min pain with transfers and bed mobility              minimal       Shoulder:    1. I in home program.      Met doing doctor and PT issued exercises. 2.  Prepare a meal and clean up after with minimal pain. met  3. Bathe/dress self with minimal pain. MET  4. Lift/reach groceries from shelves with minimal pain. Some things are too heavy       Summary of Evaluation: Assessment: Pt presents with complaints of chronic low pain with RLE symptoms extending to her lateral mid leg.  symptoms have worsened in the last couple weeks. She notes some changes in bladder, but also notes she has history of bladder problems and infections. Instruct pt to discuss with her doctor to determine infection vs nerogenic source. She has + R SLR and tight HS B, negative GLENN and FADIR.   Pain limited trunk ROM in flexion and extension, with some decrease in distal symptoms reported today wtih flexion. Dermatomes intact to lt touch, myotomes intact with pain limiting hip flexion and knee flexion. She has pain standing and walking more than a few minutes, sitting more than 30 mins, pain with transfers and bed mobility. Prior to onset of pain, these activities were not limited or painful. Relates R shoulder surgery ~15yrs ago    R hand dominant. Posture fwd head, kyphotic. AROM: 85 flexion,abd w some scap sub.  (pt notes this is about her norm)  PROMpain limited 95deg flexion. 30 ER@ loose packed. Severe crepitus in R shoulder. Pain and weakness with resistive testing. Gross strength in avail AROM 3/5 flexion, abd, 3+ER, Bicep 4/5, tricep 4-/5 all with pain reported. + impingement  Neg tenderness at biceps, supraspinatus, infraspinatus, AC.         Subjective: Whole body is achy and sore today. Yesterday felt great though. Been off mm relaxors for about a week. Noticed she's not sleeping as well since. Any changes in Ambulatory Summary Sheet? None        Objective:   COVID screening questions were asked and patient attested that there had been no contact or symptoms    R shoulder AROM:  95flexion    Exercises: (No more than 4 columns)   Exercise/Equipment 6/29/21 #7 #8 7/1/21 7/6/21 #9 7/8/21 #10            WARM UP       Nu step   L4 5'           TABLE       Gluteal , piriformis stretching  30 sec x 3 each B 30 sec x 3 each B    clamshells       bridge    Small range 2x10   TA contraction       Adductor stretch       DKTC    Greenball, 2x10 Greenball, 2x10 Green ball 2x10   LTR  5ct x 10 5ct x 10 Painfree   TA hold marches       scap retraction 2ct 2x10      STANDING       HS stretch neutral L spine       Pull downs Seated green 2x10      Belly press/pallof Seated  Red 2x10 each way. Seated  Red 2x10 each way. Bicep curl 2# 2x10      Row  Green x10 standing  2x10 seated Green 3x10 seated. Green 3x10 Green 3x10   G/s stretch Black wedge 30 sec x 2         Side stepping    15ft x 2 each way 2 minor LOB, self corrected 15ft x 2 each way 15ft x 2 each way   PROPRIOCEPTION       SB  Sitting narrow base hand prn 1 min Sitting narrow base hand prn 1 min Sitting narrow base no hands used 1 min x 2   SB   Seated march w 1 UE for balance. 3x10 Seated march w 1 UE for balance. 3x10 Seated march w 1 UE for balance. 3x10                        MODALITIES                         Other Therapeutic Activities/Education:  . Pt also discussed that she was depressed and needed something to do outside of the house that would give her purpose again. Discussed contacting [facility], [facility], \[facility]", [facility] Exercise Program:  Modified piriformis stretch, CR knee to chest SI mobilization. Green tubing rows, pallof press, 2-3# bicep curls, scap retraction/depression    Manual Treatments:         Modalities:  None       Communication with other providers:  None       Assessment:  Pt tolerated treatment well today. 0/10 pain at end, but feels fatigued. Requires visual and verbal cues for proper form with exercises today. Standing tolerances limited due to fatigue and increasing low back pain. Overall decreased radicular. Relates shoulder feels back to baseline.       Plan for Next Session:       Time In / Time Out:   1301/1350       Timed Code/Total Treatment Minutes: 49/49      Next Progress Note due:        Plan of Care Interventions:  [x] Therapeutic Exercise  [] Modalities:  [] Therapeutic Activity     [] Ultrasound  [] Estim  [] Gait Training      [] Cervical Traction [] Lumbar Traction  [] Neuromuscular Re-education    [] Cold/hotpack [] Iontophoresis   [x] Instruction in HEP      [] Vasopneumatic   [] Dry Needling    [x] Manual Therapy               [] Aquatic Therapy              Electronically signed by:  Tiny Barboza PT,       7/8/2021, 1:04 PM       7/8/2021,1:04 PM

## 2021-07-08 NOTE — ED NOTES
Pt continues to have blood trickling down the back of her throat.  She is suctioning out her mouth of blood and is able to handle secretions with spitting and suction, she is alert, verbally responsive, she was given an ice pack to place on the back of her neck,      Janet Anthony, KRISTIE  07/08/21 0534

## 2021-07-09 ENCOUNTER — CARE COORDINATION (OUTPATIENT)
Dept: CARE COORDINATION | Age: 86
End: 2021-07-09

## 2021-07-09 ENCOUNTER — TELEPHONE (OUTPATIENT)
Dept: INTERNAL MEDICINE CLINIC | Age: 86
End: 2021-07-09

## 2021-07-09 ENCOUNTER — TELEPHONE (OUTPATIENT)
Dept: CARDIOLOGY CLINIC | Age: 86
End: 2021-07-09

## 2021-07-09 DIAGNOSIS — M25.551 PAIN IN RIGHT HIP: Primary | ICD-10-CM

## 2021-07-09 NOTE — CARE COORDINATION
Ambulatory Care Coordination Note  7/9/2021  CM Risk Score: 2  Charlson 10 Year Mortality Risk Score: 100%     ACC: Federica Hackett RN    Summary Note: Call to pt for acm ed f/u. Pt agreeable to ac outreach. Reports she has obtained meds prescribed by ER and AVS reviewed with pt. Has apptmt with ENT next Wednesday and has contacted pcp and cardiology office. Took baby aspirin today but aware she is not to take it again until directed to by md. Reports she has been up moving around, feels like she has new blood dropping so she waited to go to grocery store. Reports packing in place and denies concern. Reports she was a little nauseated earlier but has zofran on hand and has been drinking water, eating applesauce. Provided info on walk in care clinic if needed. Pt does drive. Alireza helps when needed. picing up groceries. [facility] contacted [facility] for pt to be set up with if emergency , was working on [employer]. With [employer]. Provided phone number for [employer] and walk in clinic and advised pt to call pcp office for any needs/conerns. Pt aware [facility] Rik Petit to be contacting pt on future outreaches and agreeable. Denies further needs/concerns at this time. Ambulatory Care Coordination Assessment    Care Coordination Protocol  Program Enrollment: Complex Care  Referral from Primary Care Provider: No  Week 1 - Initial Assessment     Do you have all of your prescriptions and are they filled?: Yes  Barriers to medication adherence: None  Are you able to afford your medications?: Yes  How often do you have trouble taking your medications the way you have been told to take them?: I always take them as prescribed. Do you have Home O2 Therapy?: No      Ability to seek help/take action for Emergent Urgent situations i.e. fire, crime, inclement weather or health crisis. : Independent  Ability to ambulate to restroom: Independent  Ability handle personal hygeine needs (bathing/dressing/grooming):  Independent  Ability to manage Medications: Independent  Ability to prepare Food Preparation: Independent  Ability to maintain home (clean home, laundry): Independent  Ability to drive and/or has transportation: Independent  Ability to do shopping: Independent  Ability to manage finances: Independent  Is patient able to live independently?: Yes     Current Housing: Private Residence                 Suggested Interventions and Community Resources                  Prior to Admission medications    Medication Sig Start Date End Date Taking? Authorizing Provider   ondansetron (ZOFRAN-ODT) 4 MG disintegrating tablet Take 1 tablet by mouth 3 times daily as needed for Nausea or Vomiting 7/8/21   Yisel Beaver MD   cephALEXin (KEFLEX) 500 MG capsule Take 1 capsule by mouth 4 times daily for 3 days While the nasal packing is in place.  7/8/21 7/11/21  Yisel Beaver MD   tiZANidine (ZANAFLEX) 2 MG tablet Take 1 tablet by mouth nightly as needed (low back pain) 6/8/21   John Valentin MD   verapamil (CALAN SR) 180 MG extended release tablet take 1/2 tablet by mouth twice a day 6/4/21   Abby Pickens MD   rosuvastatin (CRESTOR) 10 MG tablet Take 1 tablet by mouth daily 5/25/21   John Valentin MD   fluticasone [facility]) 50 MCG/ACT nasal spray instill 1 spray into each nostril once daily 5/25/21   John Valentin MD   epinastine (ELESTAT) 0.05 % SOLN Place 1 drop into both eyes daily 4/29/21   John Valentin MD   lidocaine 4 % external patch On for 12 hours, off for 12 hours 4/12/21   John Valentin MD   gabapentin (NEURONTIN) 300 MG capsule 2 tablets 3 times daily for trigeminal neuralgia 3/16/21 3/3/22  John Valentin MD   divalproex (DEPAKOTE ER) 250 MG extended release tablet Take 250 mg by mouth nightly     Historical Provider, MD   VASCEPA 0.5 g CAPS take 2 capsules by mouth twice a day 12/8/20   Abby Pickens MD   pantoprazole (PROTONIX) 40 MG tablet Take 1 tablet by mouth daily 10/27/20   John Valentin MD   Probiotic Product (PROBIOTIC DAILY PO) Take 1 capsule by mouth daily    Historical Provider, MD   Multiple Vitamins-Minerals (OCUVITE-LUTEIN PO) Take by mouth    Historical Provider, MD   calcium-vitamin D (OSCAL 500/200 D-3) 500-200 MG-UNIT per tablet Take 1 tablet by mouth 2 times daily. Historical Provider, MD   aspirin 81 MG EC tablet Take 81 mg by mouth daily.       Historical Provider, MD       Future Appointments   Date Time Provider Darvin Morales   7/12/2021 11:30 AM INFUSION ROOM CHAIR 1 - [address] [facility]   7/16/2021  3:15 PM Garfield Gonsalez PTA [facility]   7/20/2021  3:15 PM Ford Grater [address] [facility]   7/22/2021 11:15 AM Ford Grater [address] [facility]   7/27/2021 11:15 AM Shirmoriah Christineus, PT [address] [facility]   7/27/2021  1:30 PM Jimi Blum MD [facility]   7/29/2021 11:15 AM Shirmoriah Christineus, PT [address] [facility]   8/2/2021  1:30 PM SCHEDULE, [facility] LPN [facility]   8/3/2021 11:15 AM Shirmoriah Christineus, PT [facility]   8/4/2021  1:15 PM Prince Curtis Allen PA-C [facility]   9/7/2021  1:15 PM Tayla Strong MD [facility]      and   General Assessment    Do you have any symptoms that are causing concern?: No

## 2021-07-09 NOTE — ED PROVIDER NOTES
Emergency Department Encounter    Patient: Elmer Gee  MRN: [id number]  : 1935  Date of Evaluation: 2021  ED Provider:  Tiffanie Juarez MD      Triage Chief Complaint:   Epistaxis      Citizen [place]:  Elmer Gee is a 82 y.o. female that presents to the emergency department with persistent nosebleed. The right nostril began to bleed around 4 PM, about an hour prior to presentation. She thinks she might have blown her nose too hard. Otherwise there has been no injury. She is not on home oxygen. She denies any recent upper respiratory symptoms. She does take aspirin, but she denies other anticoagulants. Bleeding has been brisk with only minimal control by pressure provided by EMS. She denies any headache, dizziness, lightheadedness, chest pain or discomfort, or other subjective symptoms. She has not had a history of nosebleeds either. Patient reports no other particular provocative or alleviating factors. ROS - see HPI, below listed is current ROS at time of my eval:  CONSTITUTIONAL: No fevers. EYES: No vision change, redness, drainage, or discharge. HENT: As above. Otherwise, no sore throat, runny nose, or earache. No dental pain. No painful swallowing. RESPIRATORY: No shortness of breath. CARDIOVASCULAR: No chest pain. GASTROINTESTINAL: No nausea, vomiting, or abdominal pain. GENITOURINARY: No frequency, urgency, or dysuria. No hematuria. MUSCULOSKELETAL: No recent injury. No neck, back, or extremity pain. NEUROLOGICAL: No focal weakness, numbness, or tingling. SKIN: No rashes or other lesions reported. No yellowing of the skin.       Past Medical History:   Diagnosis Date    Arthritis of shoulder region, right 2014    Michael Suhail Blind right eye     following right cataract surg    Chronic depression 2009    Dr. Pastora Perez DVT (deep venous thrombosis) ([facility] 1970    left leg    Former smoker     History of echocardiogram 2020    EF 55-60%, mild left ventricular hypertrophy, normal diastolic filling pattern for age, no signficiant valvular disease, no pericardial effusion     History of stress test 2021    normal LVEF calculated by the computer is probably falsely low. LVEF is 40 %    Hx of Doppler ultrasound 2018    Carotid-mild 0-49% bilateral carotid,50% stenosis right subclavian    Hyperlipidemia     Hypertension     Macular degeneration     right    Mild cognitive impairment 2012    MMSE 26/30    MVP (mitral valve prolapse)     trace on echo     Osteoporosis 2012    Pancytopenia 2011    mild with ; Dr Swati hawk     Peptic ulcer disease     Peripheral vascular disease ([address] 2016    angio; dis below ankles; pletal    Prediabetes     Recurrent ventral incisional hernia 2013    medial apex of GB scar    S/P CABG x 3     3-vessel; Dr Hung Chris Spigelian hernia 2017    right ant lateral wall; seen on CT abd    Supraventricular tachycardia ([address]     Freq ventriular ectopy    Tic douloureux     Dr. Rodriguez Flatness; Right side     Past Surgical History:   Procedure Laterality Date    CATARACT REMOVAL Right     poor result ; blind right eye; [address] CORONARY ARTERY BYPASS GRAFT  2009    3 vessel    SHOULDER ARTHROSCOPY Right     TUBAL LIGATION      URETER SURGERY      Right ureteral repair     Family History   Problem Relation Age of Onset    Cancer Sister 79        Breast cancer 2017     Social History     Socioeconomic History    Marital status:       Spouse name: Shon Pak Number of children: 2    Years of education: 15    Highest education level: Not on file   Occupational History    Occupation: retired   Tobacco Use    Smoking status: Former Smoker     Packs/day: 0.25     Years: 9.00     Pack years: 2.25     Types: Cigarettes     Start date:      Quit date: 1985     Years since quittin.6    Smokeless tobacco: Never Used    Tobacco comment: 2016 Vaping Use    Vaping Use: Never used   Substance and Sexual Activity    Alcohol use: No     Alcohol/week: 0.0 standard drinks    Drug use: No    Sexual activity: Not Currently     Partners: Male   Other Topics Concern    Not on file   Social History Narrative    Not on file     Social Determinants of Health     Financial Resource Strain: Low Risk     Difficulty of Paying Living Expenses: Not hard at all   Food Insecurity: No Food Insecurity    Worried About [address] in the Last Year: Never true    [address] [employer] in the Last Year: Never true   Transportation Needs:     Lack of Transportation (Medical):  Lack of Transportation (Non-Medical):    Physical Activity:     Days of Exercise per Week:     Minutes of Exercise per Session:    Stress:     Feeling of Stress :    Social Connections:     Frequency of Communication with Friends and Family:     Frequency of Social Gatherings with Friends and Family:     Attends Temple Services:     Active Member of Clubs or Organizations:     Attends Club or Organization Meetings:     Marital Status:    Intimate Partner Violence:     Fear of Current or Ex-Partner:     Emotionally Abused:     Physically Abused:     Sexually Abused:      Current Facility-Administered Medications   Medication Dose Route Frequency Provider Last Rate Last Admin    cyanocobalamin injection 1,000 mcg  1,000 mcg Intramuscular Once Negra Whittington MD        cyanocobalamin injection 1,000 mcg  1,000 mcg Subcutaneous Q30 Days Negra Whittington MD   1,000 mcg at 10/20/20 1625     Current Outpatient Medications   Medication Sig Dispense Refill    ondansetron (ZOFRAN-ODT) 4 MG disintegrating tablet Take 1 tablet by mouth 3 times daily as needed for Nausea or Vomiting 21 tablet 0    cephALEXin (KEFLEX) 500 MG capsule Take 1 capsule by mouth 4 times daily for 3 days While the nasal packing is in place.  12 capsule 0    tiZANidine (ZANAFLEX) 2 MG tablet Take 1 tablet by mouth nightly as needed (low back pain) 30 tablet 0    verapamil (CALAN SR) 180 MG extended release tablet take 1/2 tablet by mouth twice a day 30 tablet 6    rosuvastatin (CRESTOR) 10 MG tablet Take 1 tablet by mouth daily 90 tablet 3    fluticasone (FLONASE) 50 MCG/ACT nasal spray instill 1 spray into each nostril once daily 1 Bottle 3    epinastine (ELESTAT) 0.05 % SOLN Place 1 drop into both eyes daily 1 Bottle 3    lidocaine 4 % external patch On for 12 hours, off for 12 hours 15 patch 0    gabapentin (NEURONTIN) 300 MG capsule 2 tablets 3 times daily for trigeminal neuralgia 180 capsule 5    divalproex (DEPAKOTE ER) 250 MG extended release tablet Take 250 mg by mouth nightly       VASCEPA 0.5 g CAPS take 2 capsules by mouth twice a day 360 capsule 2    pantoprazole (PROTONIX) 40 MG tablet Take 1 tablet by mouth daily 60 tablet 5    Probiotic Product (PROBIOTIC DAILY PO) Take 1 capsule by mouth daily      Multiple Vitamins-Minerals (OCUVITE-LUTEIN PO) Take by mouth      calcium-vitamin D (OSCAL 500/200 D-3) 500-200 MG-UNIT per tablet Take 1 tablet by mouth 2 times daily.  aspirin 81 MG EC tablet Take 81 mg by mouth daily.          Allergies   Allergen Reactions    Alendronate Sodium Other (See Comments)     GI upset    Bactrim [Sulfamethoxazole-Trimethoprim] Anaphylaxis    Boniva [Ibandronate Sodium] Other (See Comments)     Gi upset and declined egd; also to fosamax    Ciprofloxacin     Colesevelam     Lisinopril Other (See Comments)     Severe hyperkalemia (6) with bun >56      Macrobid [Nitrofurantoin]     Neggram [Nalidixic Acid]     Pce [Erythromycin]     Penicillins     Risperidone And Related     Welchol [Colesevelam Hcl] Other (See Comments)     constipation    Carbamazepine Nausea And Vomiting    Lovaza [Omega-3-Acid Ethyl Esters] Nausea And Vomiting    Metoprolol Nausea And Vomiting    Pyridium [Phenazopyridine] Palpitations       Nursing Notes Reviewed    Physical Exam:  Triage VS:    ED Triage Vitals [07/08/21 1716]   Enc Vitals Group      BP (!) 182/84      Pulse 84      Resp 16      Temp 97.1 °F (36.2 °C)      Temp src       SpO2 98 %      Weight 134 lb (60.8 kg)      Height 5' 3\" (1.6 m)      Head Circumference       Peak Flow       Pain Score       Pain Loc       Pain Edu? Excl. in [address]? My pulse ox interpretation is -normal on room air    GENERAL: Patient is awake, alert, and oriented appropriately. Patient is resting comfortably in a still position on the exam table. Patient speaking in full and complete sentences. Well-nourished and well-developed. Patient appears distressed, frequently dabbing her nose and occasionally expectorating blood. HEENT: Normocephalic and atraumatic. No midface, zygomatic, maxillary, or mandibular tenderness. No dental malocclusion. Pupils equal, round, and reactive to light. No redness or matting. Bilateral external ears are unremarkable. Tympanic membranes are pearly and gray without visible effusion or retraction. No blood behind the TM. Brisk bleeding noted from the right nares. Significant mount of blood precludes easy evaluation for source of bleeding. Left nostril appears mildly congested. No visible septal hematoma on either side. There is no significant tonsillar enlargement or exudate. Uvula midline. There is no elevation of the tongue or pooling of secretions. NECK: Supple with normal range of motion. RESPIRATORY: Normal respirations. No wheeze or stridor. CARDIOVASCULAR: Regular rate and rhythm. No central or peripheral cyanosis. GASTROINTESTINAL: Soft, nontender, and nondistended. NEUROLOGICAL: Awake, alert and oriented x 3. Cranial nerves III through XII are grossly intact as tested without facial droop or dermatomal paresthesias. Of note, forehead wrinkles are symmetric and intact. Conjugate gaze without entrapment.   No asymmetry of the corners of the mouth or nasolabial folds. No gross motor or cerebellar deficits. MUSCULOSKELETAL: No asymmetric edema, Homans' sign, or cords. No tenderness or limitation range of motion to the bilateral shoulders, elbows, wrists, hips, knees, or ankles. No accompanying long bone tenderness or deformity. SKIN: Normal tone for ethnicity. Normal turgor and brisk capillary refill peripherally. No petechiae, purpura, vesicles, bullae, or other lesions. No icterus. Emergency department course. Patient is brought to bed 6 and assessed and reassessed by me. After initial evaluation, plan and medical decision making are discussed with the patient. We have discussed initial trial of direct pressure. After 15 to 30 minutes, however, this did not have significant improvement. She continued to have a very slow but persistent bleeding from the right nostril. We have discussed packing. Risks and benefits are discussed including potential for discomfort and sinus infection. A 7.5 cmAnterior-posterior Rhino Rocket is presoaked in 2 to 3 drops of oxymetazoline. Rhino Rocket is then inserted along the floor and the septum of the right nares. There was some discomfort, but there was no resistance met. A large blood clot was dislodged posteriorly and removed without complication. Patient is agreeable to continuing plan. Patient observed for a time, and she has not had significant rebleeding. Patient will be kept on cephalexin while the packing is in place to help prevent infection. We have discussed the importance of check in about 2 to 3 days for packing removal and reevaluation. Patient appears generally well hydrated and nontoxic. Clinical exam does not suggest significant anemia. She denies any dizziness, lightheadedness, chest pain or discomfort, or other systemic complaints. Blood pressure is elevated, but I suspect this is more a stress response. Symptoms do not suggest endorgan injury currently.  We have discussed all available results. Patient is satisfied with evaluation and agreeable to recommendations. Patient has had the opportunity to ask questions, and they have been answered to the best of my ability. Instructions are given to follow-up with primary care provider for reevaluation and further testing. Very strict return and follow-up instructions are provided. Patient seen during [facility], I did don appropriate PPE during my encounters with the patient, including n95 (when appropriate) mask and eye protection as appropriate. I have reviewed and interpreted all of the currently available lab results from this visit (if applicable):  None indicated. Radiographs (if obtained):  Radiologist's Report Reviewed:  None indicated. Medical decision making:  As discussed. Patient appears generally well hydrated and nontoxic. There is no respiratory distress, hypoxia, or cyanosis. Continuing outpatient management is appropriate. Procedures: Rhino Rocket placement performed by me. Consultations: None. Clinical Impression:  1. Epistaxis    2.  Elevated blood pressure reading      Disposition referral (if applicable):  Roman Cogan, MD  [address] [facility]  [phone]    Schedule an appointment as soon as possible for a visit   For ENT follow-up including packing removal    Marisol Trevino MD  [facility]  [address] [address] [phone]    Schedule an appointment as soon as possible for a visit   For reevaluation and possible referral on to alternative ENT specialist.    [address]  [phone]  Go to   As needed, If symptoms worsen    Disposition medications (if applicable):  Discharge Medication List as of 7/8/2021  6:48 PM      START taking these medications    Details   ondansetron (ZOFRAN-ODT) 4 MG disintegrating tablet Take 1 tablet by mouth 3 times daily as needed for Nausea or Vomiting, Disp-21 tablet, R-0Normal      cephALEXin (KEFLEX) 500 MG capsule Take 1 capsule by mouth 4 times daily for 3 days While the nasal packing is in place. , Disp-12 capsule, R-0Normal           ED Provider Disposition Time  DISPOSITION Decision To Discharge 07/08/2021 06:41:27 PM      Comment: Please note this report has been produced using speech recognition software and may contain errors related to that system including errors in grammar, punctuation, and spelling, as well as words and phrases that may be inappropriate. Efforts were made to edit the dictations.         Alyssa Goncalves MD  07/09/21 5883

## 2021-07-09 NOTE — TELEPHONE ENCOUNTER
molly called she went to the ed for a bad nose   Bleed , she was advised to discontinue her   Baby Asprin and is set to see Dr Elena Yarbrough ENT [facility]

## 2021-07-12 ENCOUNTER — HOSPITAL ENCOUNTER (OUTPATIENT)
Dept: INFUSION THERAPY | Age: 86
Setting detail: INFUSION SERIES
Discharge: HOME | End: 2021-07-12
Payer: MEDICARE

## 2021-07-12 VITALS
OXYGEN SATURATION: 98 % | SYSTOLIC BLOOD PRESSURE: 124 MMHG | DIASTOLIC BLOOD PRESSURE: 62 MMHG | RESPIRATION RATE: 16 BRPM | HEART RATE: 72 BPM | TEMPERATURE: 97.3 F

## 2021-07-12 DIAGNOSIS — M54.50 ACUTE LEFT-SIDED LOW BACK PAIN, UNSPECIFIED WHETHER SCIATICA PRESENT: ICD-10-CM

## 2021-07-12 DIAGNOSIS — M81.0 AGE-RELATED OSTEOPOROSIS WITHOUT CURRENT PATHOLOGICAL FRACTURE: Primary | ICD-10-CM

## 2021-07-12 DIAGNOSIS — M81.0 OSTEOPOROSIS, UNSPECIFIED OSTEOPOROSIS TYPE, UNSPECIFIED PATHOLOGICAL FRACTURE PRESENCE: ICD-10-CM

## 2021-07-12 PROCEDURE — 96372 THER/PROPH/DIAG INJ SC/IM: CPT

## 2021-07-12 PROCEDURE — 6360000002 HC RX W HCPCS: Performed by: FAMILY MEDICINE

## 2021-07-12 PROCEDURE — 99211 OFF/OP EST MAY X REQ PHY/QHP: CPT

## 2021-07-12 RX ORDER — SODIUM CHLORIDE 9 MG/ML
INJECTION, SOLUTION INTRAVENOUS CONTINUOUS
Status: CANCELLED | OUTPATIENT
Start: 2021-07-12

## 2021-07-12 RX ORDER — TIZANIDINE 2 MG/1
2 TABLET ORAL NIGHTLY PRN
Qty: 30 TABLET | Refills: 0 | Status: SHIPPED | OUTPATIENT
Start: 2021-07-12 | End: 2021-09-07 | Stop reason: ALTCHOICE

## 2021-07-12 RX ORDER — EPINEPHRINE 1 MG/ML
0.3 INJECTION, SOLUTION, CONCENTRATE INTRAVENOUS PRN
Status: CANCELLED | OUTPATIENT
Start: 2021-07-12

## 2021-07-12 RX ORDER — METHYLPREDNISOLONE SODIUM SUCCINATE 125 MG/2ML
125 INJECTION, POWDER, LYOPHILIZED, FOR SOLUTION INTRAMUSCULAR; INTRAVENOUS ONCE
Status: CANCELLED | OUTPATIENT
Start: 2021-07-12 | End: 2021-07-12

## 2021-07-12 RX ORDER — DIPHENHYDRAMINE HYDROCHLORIDE 50 MG/ML
50 INJECTION INTRAMUSCULAR; INTRAVENOUS ONCE
Status: CANCELLED | OUTPATIENT
Start: 2021-07-12 | End: 2021-07-12

## 2021-07-12 RX ADMIN — DENOSUMAB 60 MG: 60 INJECTION SUBCUTANEOUS at 11:31

## 2021-07-16 ENCOUNTER — HOSPITAL ENCOUNTER (OUTPATIENT)
Dept: PHYSICAL THERAPY | Age: 86
Setting detail: THERAPIES SERIES
Discharge: HOME | End: 2021-07-16
Payer: MEDICARE

## 2021-07-16 PROCEDURE — 97110 THERAPEUTIC EXERCISES: CPT

## 2021-07-16 NOTE — FLOWSHEET NOTE
Outpatient Physical Therapy  [place]           [x] Phone: [phone]   Fax: [phone]  [facility] [place]           [] Phone: [phone]   Fax: [phone]        Physical Therapy Daily Treatment Note  Date:  2021    Patient Name:  Sandie Kaiser    :  1935  MRN: [id number]  Restrictions/Precautions:    Diagnosis:   Diagnosis: Chronic low back pain, R shoulder OA  Date of Injury/Surgery:   Treatment Diagnosis: Treatment Diagnosis: low back pain, radiculopathy    Insurance/Certification information: PT Insurance Information: medicare    Referring Physician:  Referring Practitioner: Dr. Cain Handley  Next Doctor Visit:    Plan of care signed (Y/N):  n  Outcome Measure: Oswestry:  66%  21 60%  Visit# / total visits:    Pain level: 5/10   At rest today      Goals:     Patient goals : decrease pain  Long term goals  Time Frame for Long term goals : 6 weeks  Long term goal 1: I in home program Reports compliance   Long term goal 2: decrease RLE symptoms by 75%    pain to hip (met for distance)  Long term goal 3: stand/walk 30mins with minimal pain. Limited to 5-10mins  Long term goal 4: min pain with transfers and bed mobility              minimal       Shoulder:    1. I in home program.      Met doing doctor and PT issued exercises. 2.  Prepare a meal and clean up after with minimal pain. met  3. Bathe/dress self with minimal pain. MET  4. Lift/reach groceries from shelves with minimal pain. Some things are too heavy       Summary of Evaluation: Assessment: Pt presents with complaints of chronic low pain with RLE symptoms extending to her lateral mid leg.  symptoms have worsened in the last couple weeks. She notes some changes in bladder, but also notes she has history of bladder problems and infections. Instruct pt to discuss with her doctor to determine infection vs nerogenic source. She has + R SLR and tight HS B, negative GLENN and FADIR.   Pain limited trunk ROM in flexion and extension, with some decrease in distal symptoms reported today wtih flexion. Dermatomes intact to lt touch, myotomes intact with pain limiting hip flexion and knee flexion. She has pain standing and walking more than a few minutes, sitting more than 30 mins, pain with transfers and bed mobility. Prior to onset of pain, these activities were not limited or painful. Relates R shoulder surgery ~15yrs ago    R hand dominant. Posture fwd head, kyphotic. AROM: 85 flexion,abd w some scap sub.  (pt notes this is about her norm)  PROMpain limited 95deg flexion. 30 ER@ loose packed. Severe crepitus in R shoulder. Pain and weakness with resistive testing. Gross strength in avail AROM 3/5 flexion, abd, 3+ER, Bicep 4/5, tricep 4-/5 all with pain reported. + impingement  Neg tenderness at biceps, supraspinatus, infraspinatus, AC.         Subjective:    Pt arrives to tx session reporting 5/10 pain today. Pt states that she just isn't feeling up to much today. Any changes in Ambulatory Summary Sheet? None        Objective:   COVID screening questions were asked and patient attested that there had been no contact or symptoms    Exercises: (No more than 4 columns)   Exercise/Equipment 7/6/21 #9 7/8/21 #10 7/16/21 #11            WARM UP      Nu step L4 5'  L4 5'          TABLE      Gluteal , piriformis stretching 30 sec x 3 each B     clamshells      bridge  Small range 2x10 Small range 2x10   TA contraction      Adductor stretch      DKTC   Greenball, 2x10 Green ball 2x10 Green ball 2x10   LTR 5ct x 10 Painfree 5ct x 10   TA hold marches      scap retraction      STANDING      HS stretch neutral L spine      Pull downs      Belly press/pallof Red 2x10 each way.      Bicep curl      Row  Green 3x10 Green 3x10 Green 3x10   G/s stretch         Side stepping   15ft x 2 each way 15ft x 2 each way 15ft x 2 each way   PROPRIOCEPTION      SB Sitting narrow base hand prn 1 min Sitting narrow base no hands used 1 min x 2 Sitting narrow base no hands used 1 min x 2   SB  Seated march w 1 UE for balance. 3x10 Seated march w 1 UE for balance. 3x10 Seated march w 1 UE for balance. 3x10                     MODALITIES                      Other Therapeutic Activities/Education:  . Pt also discussed that she was depressed and needed something to do outside of the house that would give her purpose again. Discussed contacting [facility], [facility], [facility], [facility] Exercise [facility]:  Modified piriformis stretch, CR knee to chest SI mobilization. Green tubing rows, pallof press, 2-3# bicep curls, scap retraction/depression    Manual Treatments:         Modalities:  None       Communication with other providers:  None       Assessment:  Pt tolerated treatment well today. 0/10 pain at end, but feels fatigued. Requires visual and verbal cues for proper form with exercises today.        Plan for Next Session:       Time In / Time Out:   4198 / 1540       Timed Code/Total Treatment Minutes:   30' /30'   2 TE      Next Progress Note due:        Plan of Care Interventions:  [x] Therapeutic Exercise  [] Modalities:  [] Therapeutic Activity     [] Ultrasound  [] Estim  [] Gait Training      [] Cervical Traction [] Lumbar Traction  [] Neuromuscular Re-education    [] Cold/hotpack [] Iontophoresis   [x] Instruction in HEP      [] Vasopneumatic   [] Dry Needling    [x] Manual Therapy               [] Aquatic Therapy              Electronically signed by:  Josesito Adams PTA,       7/16/2021, 12:51 PM       7/16/2021,12:51 PM

## 2021-07-20 ENCOUNTER — HOSPITAL ENCOUNTER (OUTPATIENT)
Dept: PHYSICAL THERAPY | Age: 86
Setting detail: THERAPIES SERIES
Discharge: HOME | End: 2021-07-20
Payer: MEDICARE

## 2021-07-20 PROCEDURE — 97110 THERAPEUTIC EXERCISES: CPT

## 2021-07-20 PROCEDURE — 97112 NEUROMUSCULAR REEDUCATION: CPT

## 2021-07-20 NOTE — FLOWSHEET NOTE
flexion and extension, with some decrease in distal symptoms reported today wtih flexion. Dermatomes intact to lt touch, myotomes intact with pain limiting hip flexion and knee flexion. She has pain standing and walking more than a few minutes, sitting more than 30 mins, pain with transfers and bed mobility. Prior to onset of pain, these activities were not limited or painful. Relates R shoulder surgery ~15yrs ago    R hand dominant. Posture fwd head, kyphotic. AROM: 85 flexion,abd w some scap sub.  (pt notes this is about her norm)  PROMpain limited 95deg flexion. 30 ER@ loose packed. Severe crepitus in R shoulder. Pain and weakness with resistive testing. Gross strength in avail AROM 3/5 flexion, abd, 3+ER, Bicep 4/5, tricep 4-/5 all with pain reported. + impingement  Neg tenderness at biceps, supraspinatus, infraspinatus, AC.          Subjective:    Ada arrives to therapy stating that the back is okay. Had a nosebleed 2 weeks ago and had to have a plug put in. Some days I feel depressed, and sometimes I don't. She said that she was doing better before her nose bleed incident, since then her leg is hurting a little more than it was. The doc said that the pain in her leg may be related to the nosebleed. Was told not to bend over for a while to avoid getting another nose bleed. Any changes in Ambulatory Summary Sheet? None        Objective:   COVID screening questions were asked and patient attested that there had been no contact or symptoms    Required cueing to actually lift the buttocks off of the table during bridges. Required CGA for balance on stability ball during sitting with narrow AMA.     Exercises: (No more than 4 columns)   Exercise/Equipment 7/8/21 #10 7/16/21 #11  7/20/2021 #12           WARM UP      Nu step  L4 5'  L4 5'         TABLE      bridge Small range 2x10 Small range 2x10 Small range 2*10   DKTC   Green ball 2x10 Green ball 2x10 Green ball 2*10   LTR Painfree 5ct x 10 10*5\" ea          STANDING      Row  Green 3x10 Green 3x10 Green 2*10   Side stepping   15ft x 2 each way 15ft x 2 each way 2*15 ea way          PROPRIOCEPTION      SB Sitting narrow base no hands used 1 min x 2 Sitting narrow base no hands used 1 min x 2 Sitting narrow AMA no hands 2*1'   SB  Seated march w 1 UE for balance. 3x10 Seated march w 1 UE for balance. 3x10 Seated march with 1 UE for balance 3*10                     MODALITIES                      Other Therapeutic Activities/Education:  . Pt also discussed that she was depressed and needed something to do outside of the house that would give her purpose again. Discussed contacting [facility], [facility] Exercise Program:  Modified piriformis stretch, CR knee to chest SI mobilization. Green tubing rows, pallof press, 2-3# bicep curls, scap retraction/depression    Manual Treatments:         Modalities:  None       Communication with other providers:  None       Assessment:  Ada tolerated session well. She had no increase in pain during exercises. She demonstrates poor core control and glut weakness.  End session pain 1-2/10    Plan for Next Session:       Time In / Time Out:   1515/1546     Timed Code/Total Treatment Minutes:   32' 1 NR 10' 1 TE 21'      Next Progress Note due:        Plan of Care Interventions:  [x] Therapeutic Exercise  [] Modalities:  [] Therapeutic Activity     [] Ultrasound  [] Estim  [] Gait Training      [] Cervical Traction [] Lumbar Traction  [] Neuromuscular Re-education    [] Cold/hotpack [] Iontophoresis   [x] Instruction in HEP      [] Vasopneumatic   [] Dry Needling    [x] Manual Therapy               [] Aquatic Therapy              Electronically signed by:  Lisa Grajeda PTA             7/20/2021,11:30 AM

## 2021-07-22 ENCOUNTER — HOSPITAL ENCOUNTER (OUTPATIENT)
Dept: PHYSICAL THERAPY | Age: 86
Setting detail: THERAPIES SERIES
Discharge: HOME | End: 2021-07-22
Payer: MEDICARE

## 2021-07-22 PROCEDURE — 97140 MANUAL THERAPY 1/> REGIONS: CPT

## 2021-07-22 NOTE — FLOWSHEET NOTE
Outpatient Physical Therapy  [facility]           [x] Phone: [phone]   Fax: [phone]  Goldie Shila           [] Phone: [phone]   Fax: [phone]        Physical Therapy Daily Treatment Note  Date:  2021    Patient Name:  Sandie Kaiser    :  1935  MRN: [id number]  Restrictions/Precautions:    Diagnosis:   Diagnosis: Chronic low back pain, R shoulder OA  Date of Injury/Surgery:   Treatment Diagnosis: Treatment Diagnosis: low back pain, radiculopathy    Insurance/Certification information: PT Insurance Information: medicare    Referring Physician:  Referring Practitioner: Dr. Cain Handley  Next Doctor Visit:    Plan of care signed (Y/N):  n  Outcome Measure: Oswestry:  66%  21 60%  Visit# / total visits:    Pain level: 2-3/10 lateral leg at rest, 10/10 with ambulation       Goals:     Patient goals : decrease pain  Long term goals  Time Frame for Long term goals : 6 weeks  Long term goal 1: I in home program Reports compliance   Long term goal 2: decrease RLE symptoms by 75%    pain to hip (met for distance)  Long term goal 3: stand/walk 30mins with minimal pain. Limited to 5-10mins  Long term goal 4: min pain with transfers and bed mobility              minimal       Shoulder:    1. I in home program.      Met doing doctor and PT issued exercises. 2.  Prepare a meal and clean up after with minimal pain. met  3. Bathe/dress self with minimal pain. MET  4. Lift/reach groceries from shelves with minimal pain. Some things are too heavy       Summary of Evaluation: Assessment: Pt presents with complaints of chronic low pain with RLE symptoms extending to her lateral mid leg.  symptoms have worsened in the last couple weeks. She notes some changes in bladder, but also notes she has history of bladder problems and infections. Instruct pt to discuss with her doctor to determine infection vs nerogenic source. She has + R SLR and tight HS B, negative GLENN and FADIR. Pain limited trunk ROM in flexion and extension, with some decrease in distal symptoms reported today wtih flexion. Dermatomes intact to lt touch, myotomes intact with pain limiting hip flexion and knee flexion. She has pain standing and walking more than a few minutes, sitting more than 30 mins, pain with transfers and bed mobility. Prior to onset of pain, these activities were not limited or painful. Relates R shoulder surgery ~15yrs ago    R hand dominant. Posture fwd head, kyphotic. AROM: 85 flexion,abd w some scap sub.  (pt notes this is about her norm)  PROMpain limited 95deg flexion. 30 ER@ loose packed. Severe crepitus in R shoulder. Pain and weakness with resistive testing. Gross strength in avail AROM 3/5 flexion, abd, 3+ER, Bicep 4/5, tricep 4-/5 all with pain reported. + impingement  Neg tenderness at biceps, supraspinatus, infraspinatus, AC.          Subjective:    Ada arrives to therapy stating that the back is feeling okay, back pain/L lateral leg pain around 2-3/10. Sore too from increased ROM with LTR last session. Any changes in Ambulatory Summary Sheet? None        Objective:   COVID screening questions were asked and patient attested that there had been no contact or symptoms    TTP L greater trochanter and ITB. No tenderness noted in quads or HS. Ambulates with moderate limp. Exercises: (No more than 4 columns)   Exercise/Equipment 7/16/21 #11  7/20/2021 #12 7/22/2021 #13           WARM UP      Nu step L4 5'  L4 5' 5' S7/A6/L4         TABLE      bridge Small range 2x10 Small range 2*10 -   DKTC   Green ball 2x10 Green ball 2*10 -   LTR 5ct x 10 10*5\" ea  -         STANDING      Row  Green 3x10 Green 2*10 -   Side stepping   15ft x 2 each way 2*15 ea way  -         PROPRIOCEPTION      SB Sitting narrow base no hands used 1 min x 2 Sitting narrow AMA no hands 2*1' -   SB  Seated march w 1 UE for balance.  3x10 Seated march with 1 UE for balance 3*10 - MODALITIES                      Other Therapeutic Activities/Education:  . Home Exercise Program:  Modified piriformis stretch, CR knee to chest SI mobilization. Green tubing rows, pallof press, 2-3# bicep curls, scap retraction/depression    Manual Treatments: MFR/CFM/TPR to L ITB, patient in R S/L with pillow between her knees. ITB stretches 3*15\"       Modalities:  None       Communication with other providers:  None       Assessment:  Ada was in a lot of pain today in the ITB secondary to tightness and inflammation. We avoided exercise and focused on manual techniques and ice to decrease pain. She reported a significant decrease in pain after manual interventions.  End session pain: 0/10     Plan for Next Session:       Time In / Time Out:   1115/1151     Timed Code/Total Treatment Minutes:   39' 2 man        Next Progress Note due:        Plan of Care Interventions:  [x] Therapeutic Exercise  [] Modalities:  [] Therapeutic Activity     [] Ultrasound  [] Estim  [] Gait Training      [] Cervical Traction [] Lumbar Traction  [] Neuromuscular Re-education    [] Cold/hotpack [] Iontophoresis   [x] Instruction in HEP      [] Vasopneumatic   [] Dry Needling    [x] Manual Therapy               [] Aquatic Therapy              Electronically signed by:  Jessica Stratton PTA             7/22/2021,7:58 AM

## 2021-07-27 ENCOUNTER — OFFICE VISIT (OUTPATIENT)
Dept: CARDIOLOGY CLINIC | Age: 86
End: 2021-07-27
Payer: MEDICARE

## 2021-07-27 ENCOUNTER — HOSPITAL ENCOUNTER (OUTPATIENT)
Dept: PHYSICAL THERAPY | Age: 86
Setting detail: THERAPIES SERIES
Discharge: HOME | End: 2021-07-27
Payer: MEDICARE

## 2021-07-27 VITALS
BODY MASS INDEX: 24.27 KG/M2 | HEIGHT: 63 IN | DIASTOLIC BLOOD PRESSURE: 64 MMHG | WEIGHT: 137 LBS | SYSTOLIC BLOOD PRESSURE: 126 MMHG | HEART RATE: 72 BPM

## 2021-07-27 DIAGNOSIS — I25.5 ISCHEMIC CARDIOMYOPATHY: ICD-10-CM

## 2021-07-27 DIAGNOSIS — I25.10 CORONARY ARTERY DISEASE INVOLVING NATIVE CORONARY ARTERY OF NATIVE HEART WITHOUT ANGINA PECTORIS: ICD-10-CM

## 2021-07-27 DIAGNOSIS — I77.1 SUBCLAVIAN ARTERIAL STENOSIS (HCC): ICD-10-CM

## 2021-07-27 DIAGNOSIS — E78.2 MIXED HYPERLIPIDEMIA: ICD-10-CM

## 2021-07-27 DIAGNOSIS — I25.810 CORONARY ARTERY DISEASE INVOLVING CORONARY BYPASS GRAFT OF NATIVE HEART WITHOUT ANGINA PECTORIS: Primary | ICD-10-CM

## 2021-07-27 DIAGNOSIS — I10 ESSENTIAL HYPERTENSION: ICD-10-CM

## 2021-07-27 DIAGNOSIS — Z95.1 S/P CABG X 3: ICD-10-CM

## 2021-07-27 DIAGNOSIS — I65.23 BILATERAL CAROTID ARTERY STENOSIS: ICD-10-CM

## 2021-07-27 DIAGNOSIS — I34.1 MVP (MITRAL VALVE PROLAPSE): ICD-10-CM

## 2021-07-27 DIAGNOSIS — I47.1 SUPRAVENTRICULAR TACHYCARDIA (HCC): ICD-10-CM

## 2021-07-27 PROCEDURE — 4040F PNEUMOC VAC/ADMIN/RCVD: CPT | Performed by: INTERNAL MEDICINE

## 2021-07-27 PROCEDURE — 97140 MANUAL THERAPY 1/> REGIONS: CPT

## 2021-07-27 PROCEDURE — G8399 PT W/DXA RESULTS DOCUMENT: HCPCS | Performed by: INTERNAL MEDICINE

## 2021-07-27 PROCEDURE — G8427 DOCREV CUR MEDS BY ELIG CLIN: HCPCS | Performed by: INTERNAL MEDICINE

## 2021-07-27 PROCEDURE — 99214 OFFICE O/P EST MOD 30 MIN: CPT | Performed by: INTERNAL MEDICINE

## 2021-07-27 PROCEDURE — 1036F TOBACCO NON-USER: CPT | Performed by: INTERNAL MEDICINE

## 2021-07-27 PROCEDURE — G8420 CALC BMI NORM PARAMETERS: HCPCS | Performed by: INTERNAL MEDICINE

## 2021-07-27 PROCEDURE — 1123F ACP DISCUSS/DSCN MKR DOCD: CPT | Performed by: INTERNAL MEDICINE

## 2021-07-27 PROCEDURE — 97110 THERAPEUTIC EXERCISES: CPT

## 2021-07-27 PROCEDURE — 1090F PRES/ABSN URINE INCON ASSESS: CPT | Performed by: INTERNAL MEDICINE

## 2021-07-27 NOTE — PROGRESS NOTES
OFFICE PROGRESS NOTES      Quinn Esquivel is a 82 y.o. female who has    CHIEF COMPLAINT AS FOLLOWS:  CHEST PAIN: Patient denies any C/O chest pains at this time.      SOB:  C/O SOB with exertion but same as before.                 LEG EDEMA: No C/O.   PALPITATIONS: Denies any C/O Palpitations   DIZZINESS: No C/O Dizziness   SYNCOPE: None   OTHER:                                    HPI: Patient is here for F/U on her CAD, HTN & Dyslipidemia problems. CAD: Patient has known Hx of  CAD. Had CABG in the past.  HTN: Patient has known Hx of essential HTN. Has been treated with guideline recommended medical / physical/ diet therapy as stated below. Dyslipidemia: Patient has known Hx of mixed dyslipidemia. Has been treated with guideline recommended medical / physical/ diet therapy as stated below. She does not have any new complaints at this time.     Current Outpatient Medications   Medication Sig Dispense Refill    pantoprazole (PROTONIX) 40 MG tablet take 1 tablet by mouth once daily 90 tablet 1    Handicap Placard MISC by Does not apply route Good for 3 years 1 each 0    tiZANidine (ZANAFLEX) 2 MG tablet Take 1 tablet by mouth nightly as needed (low back pain) 30 tablet 0    ondansetron (ZOFRAN-ODT) 4 MG disintegrating tablet Take 1 tablet by mouth 3 times daily as needed for Nausea or Vomiting 21 tablet 0    verapamil (CALAN SR) 180 MG extended release tablet take 1/2 tablet by mouth twice a day 30 tablet 6    rosuvastatin (CRESTOR) 10 MG tablet Take 1 tablet by mouth daily 90 tablet 3    fluticasone (FLONASE) 50 MCG/ACT nasal spray instill 1 spray into each nostril once daily 1 Bottle 3    epinastine (ELESTAT) 0.05 % SOLN Place 1 drop into both eyes daily 1 Bottle 3    lidocaine 4 % external patch On for 12 hours, off for 12 hours 15 patch 0    gabapentin (NEURONTIN) 300 MG capsule 2 tablets 3 times daily for trigeminal neuralgia 180 capsule 5    divalproex (DEPAKOTE ER) 250 MG extended noted. No carotid bruits. Cardiovascular - Normal S1 and S2 without obvious murmur or gallop. Extremities - No cyanosis, clubbing, or significant edema. Pulmonary - No respiratory distress. No wheezes or rales.       Lab Review   Lab Results   Component Value Date    TROPONINT <0.010 03/23/2021    TROPONINT <0.010 05/16/2019     No results found for: BNP, PROBNP  Lab Results   Component Value Date    INR 0.96 01/25/2016     Lab Results   Component Value Date    LABA1C 6.1 03/22/2021     Lab Results   Component Value Date    WBC 5.2 04/01/2021    WBC 4.9 03/30/2021    HCT 37.5 04/01/2021    HCT 36.8 (L) 03/30/2021    .5 (H) 04/01/2021    .7 (H) 03/30/2021     04/01/2021     03/30/2021     Lab Results   Component Value Date    CHOL 129 03/22/2021    CHOL 129 06/22/2020    TRIG 194 (H) 03/22/2021    TRIG 273 (H) 06/22/2020    HDL 33 (L) 03/22/2021    HDL 30 (L) 06/22/2020    LDLCALC 44 06/22/2020    LDLCALC 44 01/23/2020    LDLDIRECT 70 03/22/2021    LDLDIRECT 93 08/22/2016     Lab Results   Component Value Date    ALT 12 04/01/2021    ALT 13 03/30/2021    AST 20 04/01/2021    AST 20 03/30/2021     BMP:    Lab Results   Component Value Date     04/01/2021     03/30/2021    K 5.2 04/01/2021    K 4.7 03/30/2021    CL 99 04/01/2021    CL 94 03/30/2021    CO2 25 04/01/2021    CO2 26 03/30/2021    BUN 16 04/01/2021    BUN 13 03/30/2021    CREATININE 1.1 04/01/2021    CREATININE 1.0 03/30/2021     CMP:   Lab Results   Component Value Date     04/01/2021     03/30/2021    K 5.2 04/01/2021    K 4.7 03/30/2021    CL 99 04/01/2021    CL 94 03/30/2021    CO2 25 04/01/2021    CO2 26 03/30/2021    BUN 16 04/01/2021    BUN 13 03/30/2021    CREATININE 1.1 04/01/2021    CREATININE 1.0 03/30/2021    PROT 7.0 04/01/2021    PROT 6.9 03/30/2021    PROT 7.5 01/02/2013    PROT 7.4 10/13/2012     Lab Results   Component Value Date    TSH 3.250 03/06/2019    TSH 4.270 04/20/2018 [facility] 5.080 2016      Cardiolite perfusion imagin2019   Normal perfusion study with normal distribution in all coronal, short, and    horizontal axis.    The observed defect is consistent with diaphragmatic attenuation.  LVEF calculated at 45 % is probably falsely low due to PVCs.      ECHO:2020   Left ventricular function is normal, EF is estimated at 55-60%.  Mild left ventricular hypertrophy.   Normal diastolic filling pattern for age.  Sin Bump significant valvular disease noted.   No evidence of pericardial effusion. QUALITY MEASURES REVIEWED:  1.CAD:Patient is taking anti platelet agent:Yes  2. DYSLIPIDEMIA: Patient is on cholesterol lowering medication:Yes   3. Beta-Blocker therapy for CAD, if prior Myocardial Infarction:Yes   4. Counselled regarding smoking cessation. No   Patient does not Smoke. 5.Anticoagulation therapy (for A.Fib) No   Does Not have A.Fib.  6.Discussed weight management strategies. Assessment & Plan:    Primary / Secondary prevention is the goal by aggressive risk modification, healthy and therapeutic life style changes for cardiovascular risk reduction. Various goals are discussed and multiple questions answered. CAD:Yes   clinically stable. Patient is on optimal medical regimen ( see medication list above )  -  Patient is currently  asymptomatic from CAD.             - changes in  treatment:   no           - Testing ordered:  no  [place] classification: 1  [place] RISK SCORE:  LOAN RISK SCORE:  HTN:well controlled on current medical regimen, see list above.              - changes in  treatment:   no   CARDIOMYOPATHY:  known   CONGESTIVE HEART FAILURE: NO KNOWN HISTORY.      VHD: No significant VHD noted  DYSLIPIDEMIA: Patient's profile is at / near Goal.no, Triglycerides are still high but better than before.                                HDL is low                                Tolerating current medical regimen lisa,                                                            See most recent Lab values in Labs section above. CAROTID ARTERY DISEASE:.Right Subclavian stenosis.  No C/O  OTHER RELEVANT DIAGNOSIS:as noted in patient's active problem list:  TESTS ORDERED:none this visit                                       All previously ordered tests reviewed.   ARRHYTHMIAS:  Known H/O SVT. Frequent PVCs                                 No C/O   MEDICATIONS:  CPM.    Office f/u in six months

## 2021-07-27 NOTE — FLOWSHEET NOTE
FADIR. Pain limited trunk ROM in flexion and extension, with some decrease in distal symptoms reported today wtih flexion. Dermatomes intact to lt touch, myotomes intact with pain limiting hip flexion and knee flexion. She has pain standing and walking more than a few minutes, sitting more than 30 mins, pain with transfers and bed mobility. Prior to onset of pain, these activities were not limited or painful. Relates R shoulder surgery ~15yrs ago    R hand dominant. Posture fwd head, kyphotic. AROM: 85 flexion,abd w some scap sub.  (pt notes this is about her norm)  PROMpain limited 95deg flexion. 30 ER@ loose packed. Severe crepitus in R shoulder. Pain and weakness with resistive testing. Gross strength in avail AROM 3/5 flexion, abd, 3+ER, Bicep 4/5, tricep 4-/5 all with pain reported. + impingement  Neg tenderness at biceps, supraspinatus, infraspinatus, AC.          Subjective:    Some pain at L lat leg  Not too bad today. No pain with transfers and for a few steps, then pain in L LE lateral side. Manual work last session was very helpful. Feeling better and able to do more. Standing/walking is still limited to a few minutes. Would like to work on increasing walking tolerances. R shoulder still doing pretty well, minor pain. Any changes in Ambulatory Summary Sheet?   None        Objective:   COVID screening questions were asked and patient attested that there had been no contact or symptoms        Exercises: (No more than 4 columns)   Exercise/Equipment 7/16/21 #11  7/20/2021 #12 7/22/2021 #13 #14 7/27/21             WARM UP       Nu step L4 5'  L4 5' 5' S7/A6/L4           TABLE       bridge Small range 2x10 Small range 2*10 - x10   DKTC   Green ball 2x10 Green ball 2*10 -    LTR 5ct x 10 10*5\" ea  -    Gluteal stretch    30 sec x 4   STANDING       Row  Green 3x10 Green 2*10 -    Side stepping   15ft x 2 each way 2*15 ea way  -           PROPRIOCEPTION       SB Sitting narrow base no hands used 1 min x 2 Sitting narrow AMA no hands 2*1' -    SB  Seated march w 1 UE for balance. 3x10 Seated march with 1 UE for balance 3*10 -                         MODALITIES                         Other Therapeutic Activities/Education:  . Home Exercise Program:  Modified piriformis stretch, CR knee to chest SI mobilization. Green tubing rows, pallof press, 2-3# bicep curls, scap retraction/depression    Manual Treatments: MFR/TPR to L ITB, quads, gluteals, patient in R S/L with pillow between her knees. ITB stretches 3*15\"   Gentle leg pulls. Modalities:  None       Communication with other providers:  None       Assessment:   She reported a significant decrease in pain after manual interventions. End session pain: 0/10 Pt would benefit from some additional therapy for additional strengthening, pain management and increased activity tolerance.        Plan for Next Session:       Time In / Time Out:   1117/1158     Timed Code/Total Treatment Minutes: 41/41          Next Progress Note due:        Plan of Care Interventions:  [x] Therapeutic Exercise  [] Modalities:  [] Therapeutic Activity     [] Ultrasound  [] Estim  [] Gait Training      [] Cervical Traction [] Lumbar Traction  [] Neuromuscular Re-education    [] Cold/hotpack [] Iontophoresis   [x] Instruction in HEP      [] Vasopneumatic   [] Dry Needling    [x] Manual Therapy               [] Aquatic Therapy              Electronically signed by:  Darius Sellers, PT,         7/27/2021,11:24 AM

## 2021-07-27 NOTE — LETTER
[address]  [address] [address]  Phone: [phone]  Fax: [phone]    Jimi Blum MD    July 27, 2021     Tayla Strong MD  [facility] [address]    Patient: Olga Lidia Ortiz   MR Number: [id number]   YOB: 1935   Date of Visit: 7/27/2021       Dear Tayla Strong: Thank you for referring Olga Lidia Ortiz to me for evaluation/treatment. Below are the relevant portions of my assessment and plan of care. If you have questions, please do not hesitate to call me. I look forward to following Ada along with you.     Sincerely,        Jimi Blum MD

## 2021-07-27 NOTE — LETTER
2021    1:30    Patient Name: Jagruti Shrestha  : 1935  MRN# [id number]    REASON FOR VISIT:   Cardiology Problems  Hyperlipidemia  Supraventricular tachycardia (HCC)  CAD (coronary artery disease)  Peripheral vascular disease ([facility] [address]  Coronary artery disease involving coronary bypass graft of native heart without angina pectoris  Hypertension  MVP (mitral valve prolapse)  Bilateral carotid artery stenosis  Subclavian arterial stenosis (HCC)  Ischemic cardiomyopathy     Other  Chronic depression  [place]  Anemia due to chronic illness  Colon cancer screening  Osteoporosis  Recurrent ventral incisional hernia  Mild cognitive impairment  Elevated TSH  Sciatica of left side without back pain  S/P CABG x 3  Spigelian hernia  Glenohumeral arthritis, right  Normocytic anemia  Overactive bladder  Major depressive disorder, recurrent, in full remission (HCC)  Asthmatic bronchitis  Age-related osteoporosis without current pathological fracture  Palpitations  SOB (shortness of breath)    Current Outpatient Medications   Medication Sig Dispense Refill    pantoprazole (PROTONIX) 40 MG tablet take 1 tablet by mouth once daily 90 tablet 1    Handicap Placard MISC by Does not apply route Good for 3 years 1 each 0    tiZANidine (ZANAFLEX) 2 MG tablet Take 1 tablet by mouth nightly as needed (low back pain) 30 tablet 0    ondansetron (ZOFRAN-ODT) 4 MG disintegrating tablet Take 1 tablet by mouth 3 times daily as needed for Nausea or Vomiting 21 tablet 0    verapamil (CALAN SR) 180 MG extended release tablet take 1/2 tablet by mouth twice a day 30 tablet 6    rosuvastatin (CRESTOR) 10 MG tablet Take 1 tablet by mouth daily 90 tablet 3    fluticasone (FLONASE) 50 MCG/ACT nasal spray instill 1 spray into each nostril once daily 1 Bottle 3    epinastine (ELESTAT) 0.05 % SOLN Place 1 drop into both eyes daily 1 Bottle 3    lidocaine 4 % external patch On for 12 hours, off for 12 hours 15 patch 0    gabapentin (NEURONTIN) 300 MG capsule 2 tablets 3 times daily for trigeminal neuralgia 180 capsule 5    divalproex (DEPAKOTE ER) 250 MG extended release tablet Take 250 mg by mouth nightly       VASCEPA 0.5 g CAPS take 2 capsules by mouth twice a day 360 capsule 2    Probiotic Product (PROBIOTIC DAILY PO) Take 1 capsule by mouth daily      Multiple Vitamins-Minerals (OCUVITE-LUTEIN PO) Take by mouth      calcium-vitamin D (OSCAL 500/200 D-3) 500-200 MG-UNIT per tablet Take 1 tablet by mouth 2 times daily.  aspirin 81 MG EC tablet Take 81 mg by mouth daily. Current Facility-Administered Medications   Medication Dose Route Frequency Provider Last Rate Last Admin    cyanocobalamin injection 1,000 mcg  1,000 mcg Intramuscular Once Beatrice Weinstein MD        cyanocobalamin injection 1,000 mcg  1,000 mcg Subcutaneous Q30 Days Beatrice Weinstein MD   1,000 mcg at 10/20/20 1625     Smoke: What:                           How much:    Alcohol: How Much:     Caffeine: Pop:         Tea:            Coffee:                Chocolate:    Exercise:    Labs: Lipids:2021   CBC:2021       BMP/CMP:2021          [id number]              A1C:2021    Last Visit:1/2021  Complaints: C/O SOB with exertion but same as before, Fatigue  Changes:six months     Last EKG:3/23/2021      STRESS TEST:  3/2021  Normal study    Normal perfusion study with normal distribution in all coronal, short, and    horizontal axis.    The observed defect is consistent with diaphragmatic attenuation.  LVEF calculated by the computer is probably falsely low. LVEF is 40 %       ECHO: 9/2020   Left ventricular function is normal, EF is estimated at 55-60%. Mild left ventricular hypertrophy. Normal diastolic filling pattern for age. No significant valvular disease noted. No evidence of pericardial effusion.     CAROTID: 3/2018   Mild (0-49%) disease of the Bilateral Internal carotid artery.    Normal vertebral flow.    > 50 % stenosis of right subclavian artery.    Mobile, echogenic plaque seen in Right subclavian artery. Unchanged from    previous testing       MUGA: NONE    LAST PACER CHECK: NONE    CARDIAC CATH: NONE    Amio Protocol:    CHADS:[id number] Score for Atrial Fibrillation Stroke Risk   Risk   Factors  Component Value   C CHF No 0   H HTN Yes 1   A2 Age >= 76 Yes,  (82 y.o.) 2   D DM No 0   S2 Prior Stroke/TIA No 0   V Vascular Disease Yes 1   A Age 74-69 No,  (82 y.o.) 0   Sc Sex female 1    [id number]  Score  5   Score last updated 7/27/21 6:15 AM EDT    Click here for a link to the UpToDate guideline \"Atrial Fibrillation: Anticoagulation therapy to prevent embolization    Disclaimer: Risk Score calculation is dependent on accuracy of patient problem list and past encounter diagnosis.

## 2021-07-27 NOTE — PATIENT INSTRUCTIONS
CAD:Yes   clinically stable. Patient is on optimal medical regimen ( see medication list above )  -  Patient is currently  asymptomatic from CAD.          - changes in  treatment:   no           - Testing ordered:  no  [place] classification: 1  [place] RISK SCORE:  LOAN RISK SCORE:  HTN:well controlled on current medical regimen, see list above.              - changes in  treatment:   no   CARDIOMYOPATHY:  known   CONGESTIVE HEART FAILURE: NO KNOWN HISTORY.      VHD: No significant VHD noted  DYSLIPIDEMIA: Patient's profile is at / near Goal.no, Triglycerides are still high but better than before.                                HDL is low                                Tolerating current medical regimen wellyes,                                                            See most recent Lab values in Labs section above. CAROTID ARTERY DISEASE:.Right Subclavian stenosis.  No C/O  OTHER RELEVANT DIAGNOSIS:as noted in patient's active problem list:  TESTS ORDERED:none this visit                                       All previously ordered tests reviewed.   ARRHYTHMIAS:  Known H/O SVT. Frequent PVCs                                 No C/O   MEDICATIONS:  CPM.    Office f/u in six months

## 2021-07-29 ENCOUNTER — HOSPITAL ENCOUNTER (OUTPATIENT)
Dept: PHYSICAL THERAPY | Age: 86
Setting detail: THERAPIES SERIES
Discharge: HOME | End: 2021-07-29
Payer: MEDICARE

## 2021-07-29 PROCEDURE — 97110 THERAPEUTIC EXERCISES: CPT

## 2021-07-29 PROCEDURE — 97140 MANUAL THERAPY 1/> REGIONS: CPT

## 2021-07-29 NOTE — PROGRESS NOTES
Outpatient Physical Therapy        [] Phone: [phone]   Fax: [phone]   Pediatric Therapy          [] Phone: [phone]   Fax: [phone]  [facility] [place]          [] Phone: [phone]   Fax: [phone]      To:   Dr. Princeton Callander PAC    From: Danielle Herman, PT, PT     Patient: Loy Campbell       : 1935    Diagnosis: Chronic low back pain, R shoulder OA     Treatment Diagnosis: low back pain, radiculopathy  , shoulder pain weakness  Date: 2021    Physical Therapy Certification/Re-Certification Form    The following patient has been evaluated for physical therapy services and for therapy to continue, Please review the attached evaluation and/or summary of the patient's plan of care, and verify that you agree therapy should continue by signing the attached document and sending it back to our office. Subjective: Work for about 10mins on her feet, then need to sit and stretch LLE so it doesn't cramp up. Feeling mentally much better, feels like she is finding a purpose again. pain 4/10 at LLE. RLE is feeling much much better. No pain with transfers. Shoulder feels about baseline levels. Planned Services:  [x] Therapeutic Exercise    [] Aquatics:  [x] Therapeutic Activity    [] Ultrasound  [] Elec Stimulation  [] Gait Training     [] Cervical Traction [] Lumbar Traction  [] Neuromuscular Re-education [] Cold/hotpack [] Iontophoresis   [x] Instruction in HEP       [x] Manual Therapy     [] vasopneumatic            [] Self care home management        []Dry needling trigger point point/pain management      Plan of Care Date Range:      ? Frequency/Duration:  # Days per week: [] 1 day # Weeks: [] 1 week [] 5 weeks     [x] 2 days?    [] 2 weeks [] 6 weeks     [] 3 days   [] 3 weeks [] 7 weeks     [] 4 days   [] 4 weeks [] 8 weeks    Rehab Potential: [] Excellent [x] Good [] Fair  [] Poor     Goals:    Time Frame for Long term goals : 6 weeks  Long term goal 1: I in home program Reports compliance   Long term goal 2: decrease RLE symptoms by 75%                          no RLE symptoms, but has some in LLE  Long term goal 3: stand/walk 30mins with minimal pain. Limited to 5-10mins  Long term goal 4: min pain with transfers and bed mobility              minimal pain      Shoulder:    1.  I in home program.                                                             Met doing doctor and PT issued exercises. 2.  Prepare a meal and clean up after with minimal pain. met  3.  Bathe/dress self with minimal pain. MET  4.  Lift/reach groceries from shelves with minimal pain. Some things are too heavy            Electronically signed by:  Saniya Cosme, PT, PT, 7/29/2021, 12:24 PM          If you have any questions or concerns, please don't hesitate to call.   Thank you for your referral.    Physician Signature:_________________Date:____________Time: ________  By signing above, therapists plan is approved by physician

## 2021-07-29 NOTE — FLOWSHEET NOTE
Outpatient Physical Therapy  [facility]           [x] Phone: [phone]   Fax: [phone]  [place]           [] Phone: [phone]   Fax: [phone]        Physical Therapy Daily Treatment Note  Date:  2021    Patient Name:  Jose Monteiro    :  1935  MRN: [id number]  Restrictions/Precautions:    Diagnosis:   Diagnosis: Chronic low back pain, R shoulder OA  Date of Injury/Surgery:   Treatment Diagnosis: Treatment Diagnosis: low back pain, radiculopathy    Insurance/Certification information: PT Insurance Information: medicare    Referring Physician:  Referring Practitioner: Dr. Jane Chung  Next Doctor Visit:    Plan of care signed (Y/N):  n  Outcome Measure: Oswestry:  66%  21 60%  Visit# / total visits:  15/18  Pain level: 0/10 lateral leg at rest, 7-8/10 with ambulation       Goals:     Patient goals : decrease pain  Long term goals  Time Frame for Long term goals : 6 weeks  Long term goal 1: I in home program Reports compliance   Long term goal 2: decrease RLE symptoms by 75%    no RLE symptoms, but has some in LLE  Long term goal 3: stand/walk 30mins with minimal pain. Limited to 5-10mins  Long term goal 4: min pain with transfers and bed mobility              minimal pain     Shoulder:    1. I in home program.      Met doing doctor and PT issued exercises. 2.  Prepare a meal and clean up after with minimal pain. met  3. Bathe/dress self with minimal pain. MET  4. Lift/reach groceries from shelves with minimal pain. Some things are too heavy       Summary of Evaluation: Assessment: Pt presents with complaints of chronic low pain with RLE symptoms extending to her lateral mid leg.  symptoms have worsened in the last couple weeks. She notes some changes in bladder, but also notes she has history of bladder problems and infections. Instruct pt to discuss with her doctor to determine infection vs nerogenic source.   She has + R SLR and tight HS B, negative GLENN and FADIR. Pain limited trunk ROM in flexion and extension, with some decrease in distal symptoms reported today wtih flexion. Dermatomes intact to lt touch, myotomes intact with pain limiting hip flexion and knee flexion. She has pain standing and walking more than a few minutes, sitting more than 30 mins, pain with transfers and bed mobility. Prior to onset of pain, these activities were not limited or painful. Relates R shoulder surgery ~15yrs ago    R hand dominant. Posture fwd head, kyphotic. AROM: 85 flexion,abd w some scap sub.  (pt notes this is about her norm)  PROMpain limited 95deg flexion. 30 ER@ loose packed. Severe crepitus in R shoulder. Pain and weakness with resistive testing. Gross strength in avail AROM 3/5 flexion, abd, 3+ER, Bicep 4/5, tricep 4-/5 all with pain reported. + impingement  Neg tenderness at biceps, supraspinatus, infraspinatus, AC.          Subjective: Work for about 10mins on her feet, then need to sit and stretch LLE so it doesn't cramp up. Feeling mentally much better, feels like she is finding a purpose again. Any changes in Ambulatory Summary Sheet? None        Objective:   COVID screening questions were asked and patient attested that there had been no contact or symptoms        Exercises: (No more than 4 columns)   Exercise/Equipment 7/20/2021 #12 7/22/2021 #13 #14 7/27/21 7/29/21 #15            WARM UP       Nu step L4 5' 5' S7/A6/L4            TABLE       bridge Small range 2*10 - x10 Small range  2x10   DKTC   Green ball 2*10 -  SKTC    Supine clamshells    Red band 2x15   LTR 10*5\" ea  -     Gluteal stretch   30 sec x 4 And piriformis stretching 30 sec x 3 each, B.     STANDING       Row  Green 2*10 -  Green 2x10   Side stepping   2*15 ea way  -     Heel raise    2x10 B          Standing hip abd    2x10 B   PROPRIOCEPTION       SB Sitting narrow AMA no hands 2*1' -     SB  Seated march with 1 UE for balance 3*10 - MODALITIES                         Other Therapeutic Activities/Education:  . Home Exercise Program:  Modified piriformis stretch, CR knee to chest SI mobilization. Green tubing rows, pallof press, 2-3# bicep curls, scap retraction/depression  Home hip abd, heel raises, abdominal bracing with with housework. Manual Treatments: MFR/TPR toB ITB, quads, gluteals,  ITB stretches       Modalities:  None       Communication with other providers:  None       Assessment:   She reported a significant decrease in pain after manual interventions. End session pain: 0/10 Pt would benefit from some additional therapy for additional strengthening, pain management and increased activity tolerance.        Plan for Next Session:       Time In / Time Out:   1112/1158     Timed Code/Total Treatment Minutes: 46/46      Next Progress Note due:        Plan of Care Interventions:  [x] Therapeutic Exercise  [] Modalities:  [] Therapeutic Activity     [] Ultrasound  [] Estim  [] Gait Training      [] Cervical Traction [] Lumbar Traction  [] Neuromuscular Re-education    [] Cold/hotpack [] Iontophoresis   [x] Instruction in HEP      [] Vasopneumatic   [] Dry Needling    [x] Manual Therapy               [] Aquatic Therapy              Electronically signed by:  Deisy Champagne, PT,         7/29/2021,11:14 AM

## 2021-08-02 ENCOUNTER — VIRTUAL VISIT (OUTPATIENT)
Dept: INTERNAL MEDICINE CLINIC | Age: 86
End: 2021-08-02
Payer: MEDICARE

## 2021-08-02 DIAGNOSIS — Z00.00 ROUTINE GENERAL MEDICAL EXAMINATION AT A HEALTH CARE FACILITY: Primary | ICD-10-CM

## 2021-08-02 PROCEDURE — G0439 PPPS, SUBSEQ VISIT: HCPCS | Performed by: FAMILY MEDICINE

## 2021-08-02 PROCEDURE — 1123F ACP DISCUSS/DSCN MKR DOCD: CPT | Performed by: FAMILY MEDICINE

## 2021-08-02 PROCEDURE — 4040F PNEUMOC VAC/ADMIN/RCVD: CPT | Performed by: FAMILY MEDICINE

## 2021-08-02 ASSESSMENT — PATIENT HEALTH QUESTIONNAIRE - PHQ9
SUM OF ALL RESPONSES TO PHQ QUESTIONS 1-9: 0
2. FEELING DOWN, DEPRESSED OR HOPELESS: 0
SUM OF ALL RESPONSES TO PHQ QUESTIONS 1-9: 0
1. LITTLE INTEREST OR PLEASURE IN DOING THINGS: 0
SUM OF ALL RESPONSES TO PHQ QUESTIONS 1-9: 0
SUM OF ALL RESPONSES TO PHQ9 QUESTIONS 1 & 2: 0

## 2021-08-02 ASSESSMENT — LIFESTYLE VARIABLES: HOW OFTEN DO YOU HAVE A DRINK CONTAINING ALCOHOL: 0

## 2021-08-02 NOTE — PROGRESS NOTES
Medicare Annual Wellness Visit  Name: Samreen Holliday Date: 2021   MRN: [id number] Sex: Female   Age: 82 y.o. Ethnicity: Non- / Non    : 1935 Race: White (non-)      Caroline Gauthier is here for Medicare AWV    Screenings for behavioral, psychosocial and functional/safety risks, and cognitive dysfunction are all negative except as indicated below. These results, as well as other patient data from the [address] form, are documented in Flowsheets linked to this Encounter. Allergies   Allergen Reactions    Alendronate Sodium Other (See Comments)     GI upset    Bactrim [Sulfamethoxazole-Trimethoprim] Anaphylaxis    Boniva [Ibandronate Sodium] Other (See Comments)     Gi upset and declined egd; also to fosamax    Ciprofloxacin     Colesevelam     Lisinopril Other (See Comments)     Severe hyperkalemia (6) with bun >56      Macrobid [Nitrofurantoin]     Neggram [Nalidixic Acid]     Pce [Erythromycin]     Penicillins     Risperidone And Related     Welchol [Colesevelam Hcl] Other (See Comments)     constipation    Carbamazepine Nausea And Vomiting    Lovaza [Omega-3-Acid Ethyl Esters] Nausea And Vomiting    Metoprolol Nausea And Vomiting    Pyridium [Phenazopyridine] Palpitations       Prior to Visit Medications    Medication Sig Taking?  Authorizing Provider   pantoprazole (PROTONIX) 40 MG tablet take 1 tablet by mouth once daily Yes Yelena Miller MD   Handicap Placard MISC by Does not apply route Good for 3 years Yes Yelena Miller MD   tiZANidine (ZANAFLEX) 2 MG tablet Take 1 tablet by mouth nightly as needed (low back pain) Yes Yelena Miller MD   ondansetron (ZOFRAN-ODT) 4 MG disintegrating tablet Take 1 tablet by mouth 3 times daily as needed for Nausea or Vomiting Yes Raquel Dockery MD   verapamil (CALAN SR) 180 MG extended release tablet take 1/2 tablet by mouth twice a day Yes Nader Fernandes MD   rosuvastatin (CRESTOR) 10 MG tablet Take 1 tablet by mouth daily Yes Lauren Bateman MD   fluticasone (FLONASE) 50 MCG/ACT nasal spray instill 1 spray into each nostril once daily Yes Lauren Bateman MD   epinastine (ELESTAT) 0.05 % SOLN Place 1 drop into both eyes daily Yes Lauren Bateman MD   lidocaine 4 % external patch On for 12 hours, off for 12 hours Yes Lauren Bateman MD   gabapentin (NEURONTIN) 300 MG capsule 2 tablets 3 times daily for trigeminal neuralgia Yes Lauren Bateman MD   divalproex (DEPAKOTE ER) 250 MG extended release tablet Take 250 mg by mouth nightly  Yes Historical Provider, MD   VASCEPA 0.5 g CAPS take 2 capsules by mouth twice a day Yes Jayla Carreno MD   Probiotic Product (PROBIOTIC DAILY PO) Take 1 capsule by mouth daily Yes Historical Provider, MD   Multiple Vitamins-Minerals (OCUVITE-LUTEIN PO) Take by mouth Yes Historical Provider, MD   calcium-vitamin D (OSCAL 500/200 D-3) 500-200 MG-UNIT per tablet Take 1 tablet by mouth 2 times daily. Yes Historical Provider, MD   aspirin 81 MG EC tablet Take 81 mg by mouth daily. Yes Historical Provider, MD       Past Medical History:   Diagnosis Date    Arthritis of shoulder region, right 09/2014    [place] Blind right eye     following right cataract surg    Chronic depression 2009    Dr. Anuel Lawton DVT (deep venous thrombosis) ([facility] [address] 1970    left leg    Former smoker     History of echocardiogram 09/21/2020    EF 55-60%, mild left ventricular hypertrophy, normal diastolic filling pattern for age, no signficiant valvular disease, no pericardial effusion     History of stress test 03/25/2021    normal LVEF calculated by the computer is probably falsely low.  LVEF is 40 %    Hx of Doppler ultrasound 03/01/2018    Carotid-mild 0-49% bilateral carotid,50% stenosis right subclavian    Hyperlipidemia     Hypertension     Macular degeneration     right    Mild cognitive impairment 02/2012    MMSE 26/30    MVP (mitral valve prolapse)     trace on echo 2014    Osteoporosis 05/2012    Pancytopenia 05/2011    mild with ; Dr John hawk 2010    Peptic ulcer disease     Peripheral vascular disease ([address] 01/2016    angio; dis below ankles; pletal    Prediabetes 2006    Recurrent ventral incisional hernia 2013    medial apex of GB scar    S/P CABG x 3 2003    3-vessel; Dr Eloise Truong 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 Nino Matos; Right side       Past Surgical History:   Procedure Laterality Date    CATARACT REMOVAL Right 2010    poor result ; blind right eye; [address] CORONARY ARTERY BYPASS GRAFT  8/2009    3 vessel    SHOULDER ARTHROSCOPY Right 2003    TUBAL LIGATION      URETER SURGERY      Right ureteral repair       Family History   Problem Relation Age of Onset    High Blood Pressure Mother     Heart Attack Mother     Other Mother         unsteady gait    Other Father         silcosis    Cancer Sister 79        Breast cancer 1/2017    Stroke Sister        CareTeam (Including outside providers/suppliers regularly involved in providing care):   Patient Care Team:  Genesis Eason MD as PCP - General (Family Medicine)  Genesis Eason MD as PCP - [facility] Empaneled Provider  Leandra Pedersen MD as Consulting Physician (Cardiology)  Mattie Torres MD as Consulting Physician (Cardiology)  Haim Goncalves, RN as Ambulatory Care Manager    Wt Readings from Last 3 Encounters:   07/27/21 137 lb (62.1 kg)   07/08/21 134 lb (60.8 kg)   06/23/21 137 lb (62.1 kg)     There were no vitals filed for this visit. There is no height or weight on file to calculate BMI. Based upon direct observation of the patient, evaluation of cognition reveals recent and remote memory intact. Patient's complete Health Risk Assessment and screening values have been reviewed and are found in Flowsheets. The following problems were reviewed today and where indicated follow up appointments were made and/or referrals ordered. Positive Risk Factor Screenings with Interventions:          General Health and ACP:  General  In general, how would you say your health is?: Very Good  In the past 7 days, have you experienced any of the following?  New or Increased Pain, New or Increased Fatigue, Loneliness, Social Isolation, Stress or Anger?: (!) New or Increased Pain, New or Increased Fatigue, Loneliness, Stress (left leg pain due to cramping, sees PT for this, states pain makes her tired, lives alone, stress due to physically not able to do like she used to)  Do you get the social and emotional support that you need?: Yes  Do you have a Living Will?: Yes  Advance Directives     Power of  Living Will ACP-Advance Directive ACP-Power of     Not on File Filed on 04/07/20 Filed Not on File      General Health Risk Interventions:  · Pain issues: patient declines any further evaluation/treatment for this issue  · Fatigue: Patient states she was having fatigue when her pain was so bad, but is feeling much better now since doing PT  · Loneliness: patient's comments regarding inadequate social support: Juan Antonio states she does get lonely due to living alone, but does have family and friends she can talk to  · Stress: patient's comments regarding reasons for stress and/or anger: Patient states stress is due to her not being able to do things like she used to   · Patient states no pain today at all     Hearing/Vision:  No exam data present  Hearing/Vision  Do you or your family notice any trouble with your hearing that hasn't been managed with hearing aids?: (!) Yes  Do you have difficulty driving, watching TV, or doing any of your daily activities because of your eyesight?: No  Have you had an eye exam within the past year?: Appointment is scheduled  Hearing/Vision Interventions:  · Hearing concerns:  patient declines any further evaluation/treatment for hearing issues      Personalized Preventive Plan   Current Health Maintenance Status  Immunization History   Administered Date(s) Administered    COVID-19, Moderna, PF, 100mcg/0.5mL 01/19/2021, 02/16/2021    Influenza Vaccine, unspecified formulation 10/22/2015, 10/18/2017    Influenza Virus Vaccine 10/18/2017, 12/20/2018, 09/10/2019    Influenza Whole 12/01/2006    Influenza, High Dose (Fluzone 65 yrs and older) 10/22/2015, 10/18/2016    Influenza, Quadv, adjuvanted, 65 yrs +, IM, PF (Fluad) 09/25/2020    Influenza, Triv, inactivated, subunit, adjuvanted, IM (Fluad 65 yrs and older) 11/09/2017, 12/20/2018, 09/10/2019    Pneumococcal Conjugate 13-valent ([id number]) 02/04/2016    Pneumococcal Polysaccharide ([id number]) 08/05/2009    Td, unspecified formulation 05/01/1999    Zoster Live (Zostavax) 03/05/2011        Health Maintenance   Topic Date Due    DTaP/Tdap/Td vaccine (1 - Tdap) 05/02/1999    Shingles Vaccine (2 of 3) 04/30/2011    Annual Wellness Visit (AWV)  Never done    Flu vaccine (1) 09/01/2021    Lipid screen  03/22/2022    DEXA (modify frequency per FRAX score)  Completed    Pneumococcal 65+ years Vaccine  Completed    COVID-19 Vaccine  Completed    Hepatitis A vaccine  Aged Out    Hepatitis B vaccine  Aged Out    Hib vaccine  Aged Out    Meningococcal (ACWY) vaccine  Aged Out     Recommendations for [place] Due: see orders and patient instructions/AVS. Discussed need for tetanus and shingles vaccinations. Unable to obtain 3 vital signs due to patient not having equipment to take blood pressure/temperature. Recommended screening schedule for the next 5-10 years is provided to the patient in written form: see Patient Instructions/AVS.    Reba SINGH LPN, 2/2/6754, performed the documented evaluation under the direct supervision of the attending physician.       Abran Duverney, was evaluated through a synchronous (real-time) audio-video encounter. The patient (or guardian if applicable) is aware that this is a billable service. Verbal consent to proceed has been obtained within the past 12 months. The visit was conducted pursuant to the emergency declaration under the [facility], [address] [place] [address] authority and the [employer] and [employer] General Act. Patient identification was verified, and a caregiver was present when appropriate. The patient was located in the state of [place], where the provider was credentialed to provide care. Total time spent for this encounter: Not billed by time    --Yunior Chester LPN on 8/2/3893 at 23:73 PM    An electronic signature was used to authenticate this note.

## 2021-08-02 NOTE — PATIENT INSTRUCTIONS
Personalized Preventive Plan for [place] - 8/2/2021  Medicare offers a range of preventive health benefits. Some of the tests and screenings are paid in full while other may be subject to a deductible, co-insurance, and/or copay. Some of these benefits include a comprehensive review of your medical history including lifestyle, illnesses that may run in your family, and various assessments and screenings as appropriate. After reviewing your medical record and screening and assessments performed today your provider may have ordered immunizations, labs, imaging, and/or referrals for you. A list of these orders (if applicable) as well as your Preventive Care list are included within your After Visit Summary for your review. Other Preventive Recommendations:    · A preventive eye exam performed by an eye specialist is recommended every 1-2 years to screen for glaucoma; cataracts, macular degeneration, and other eye disorders. · A preventive dental visit is recommended every 6 months. · Try to get at least 150 minutes of exercise per week or 10,000 steps per day on a pedometer . · Order or download the FREE \"Exercise & Physical Activity: Your Everyday Guide\" from [employer]. Call 0-757.516.9819 or search [employer] online. · You need 7111-4344 mg of calcium and 1871-5984 IU of vitamin D per day. It is possible to meet your calcium requirement with diet alone, but a vitamin D supplement is usually necessary to meet this goal.  · When exposed to the sun, use a sunscreen that protects against both UVA and UVB radiation with an SPF of 30 or greater. Reapply every 2 to 3 hours or after sweating, drying off with a towel, or swimming. · Always wear a seat belt when traveling in a car. Always wear a helmet when riding a bicycle or motorcycle.

## 2021-08-03 ENCOUNTER — CARE COORDINATION (OUTPATIENT)
Dept: CARE COORDINATION | Age: 86
End: 2021-08-03

## 2021-08-03 ENCOUNTER — HOSPITAL ENCOUNTER (OUTPATIENT)
Dept: PHYSICAL THERAPY | Age: 86
Setting detail: THERAPIES SERIES
Discharge: HOME | End: 2021-08-03
Payer: MEDICARE

## 2021-08-03 PROCEDURE — 97140 MANUAL THERAPY 1/> REGIONS: CPT

## 2021-08-03 PROCEDURE — 97110 THERAPEUTIC EXERCISES: CPT

## 2021-08-03 NOTE — FLOWSHEET NOTE
Outpatient Physical Therapy  [facility]           [x] Phone: [phone]   Fax: [phone]  [place]           [] Phone: [phone]   Fax: [phone]        Physical Therapy Daily Treatment Note  Date:  8/3/2021    Patient Name:  Arnell Ormond    :  1935  MRN: [id number]  Restrictions/Precautions:    Diagnosis:   Diagnosis: Chronic low back pain, R shoulder OA  Date of Injury/Surgery:   Treatment Diagnosis: Treatment Diagnosis: low back pain, radiculopathy    Insurance/Certification information: PT Insurance Information: medicare    Referring Physician:  Referring Practitioner: Dr. Ivy Fuelmartell  Next Doctor Visit:    Plan of care signed (Y/N):  n  Outcome Measure: Oswestry:  66%  21 60%    8/3/21 6min walk 907ft  Visit# / total visits:    Pain level: 0/10 lateral leg at rest, 7-8/10 with ambulation       Goals:     Patient goals : decrease pain  Long term goals  Time Frame for Long term goals : 6 weeks  Long term goal 1: I in home program Reports compliance   Long term goal 2: decrease RLE symptoms by 75%    no RLE symptoms, but has some in LLE  Long term goal 3: stand/walk 30mins with minimal pain. Limited to 5-10mins  Long term goal 4: min pain with transfers and bed mobility              minimal pain     Shoulder:    1. I in home program.      Met doing doctor and PT issued exercises. 2.  Prepare a meal and clean up after with minimal pain. met  3. Bathe/dress self with minimal pain. MET  4. Lift/reach groceries from shelves with minimal pain. Some things are too heavy       Summary of Evaluation: Assessment: Pt presents with complaints of chronic low pain with RLE symptoms extending to her lateral mid leg.  symptoms have worsened in the last couple weeks. She notes some changes in bladder, but also notes she has history of bladder problems and infections. Instruct pt to discuss with her doctor to determine infection vs nerogenic source.   She has + R SLR and tight

## 2021-08-03 NOTE — FLOWSHEET NOTE
Patients Plan of Care was received and signed. Signed POC was scanned and placed in the patients chart.     Quirino Maria Esther

## 2021-08-03 NOTE — CARE COORDINATION
Attempted to reach patient for ACM outreach. No answer to phone. Message left with ACM contact information and request for call back.

## 2021-08-04 ENCOUNTER — OFFICE VISIT (OUTPATIENT)
Dept: ORTHOPEDIC SURGERY | Age: 86
End: 2021-08-04
Payer: MEDICARE

## 2021-08-04 VITALS
OXYGEN SATURATION: 95 % | WEIGHT: 137 LBS | RESPIRATION RATE: 16 BRPM | HEART RATE: 76 BPM | BODY MASS INDEX: 24.27 KG/M2 | HEIGHT: 63 IN

## 2021-08-04 DIAGNOSIS — M19.011 PRIMARY OSTEOARTHRITIS OF RIGHT SHOULDER: Primary | ICD-10-CM

## 2021-08-04 PROCEDURE — G8420 CALC BMI NORM PARAMETERS: HCPCS | Performed by: PHYSICIAN ASSISTANT

## 2021-08-04 PROCEDURE — G8399 PT W/DXA RESULTS DOCUMENT: HCPCS | Performed by: PHYSICIAN ASSISTANT

## 2021-08-04 PROCEDURE — 4040F PNEUMOC VAC/ADMIN/RCVD: CPT | Performed by: PHYSICIAN ASSISTANT

## 2021-08-04 PROCEDURE — 99212 OFFICE O/P EST SF 10 MIN: CPT | Performed by: PHYSICIAN ASSISTANT

## 2021-08-04 PROCEDURE — 1090F PRES/ABSN URINE INCON ASSESS: CPT | Performed by: PHYSICIAN ASSISTANT

## 2021-08-04 PROCEDURE — 1036F TOBACCO NON-USER: CPT | Performed by: PHYSICIAN ASSISTANT

## 2021-08-04 PROCEDURE — G8428 CUR MEDS NOT DOCUMENT: HCPCS | Performed by: PHYSICIAN ASSISTANT

## 2021-08-04 PROCEDURE — 1123F ACP DISCUSS/DSCN MKR DOCD: CPT | Performed by: PHYSICIAN ASSISTANT

## 2021-08-04 ASSESSMENT — ENCOUNTER SYMPTOMS
RESPIRATORY NEGATIVE: 1
GASTROINTESTINAL NEGATIVE: 1
EYES NEGATIVE: 1

## 2021-08-04 NOTE — PATIENT INSTRUCTIONS
Continue weight-bearing as tolerated. Continue range of motion exercises as instructed. Ice and elevate as needed. Tylenol or Motrin for pain.   contuine with physical therapy   Follow up in 6 weeks for another injection

## 2021-08-04 NOTE — PROGRESS NOTES
[facility]  I reviewed and agree with the portions of the HPI, review of systems, vital documentation and plan performed by my staff and have added/addended where appropriate. HPI:  Elmer Gee is a 82 y.o. Patient returns to the office today for FU of the right shoulder injection given on 6/23/21 by Ashkan COOPER. Pt states the injection has helped with her pain in the right shoulder. Pain today is a 3/10 she is currently working with physical therapy and it is helping with her ROM but she is still having a difficult time getting her arm above her chest level. Past Medical History:   Diagnosis Date    Arthritis of shoulder region, right 09/2014    Michael Jang Blind right eye     following right cataract surg    Chronic depression 2009    Dr. Pastora Perez DVT (deep venous thrombosis) ([address] 1970    left leg    Former smoker     History of echocardiogram 09/21/2020    EF 55-60%, mild left ventricular hypertrophy, normal diastolic filling pattern for age, no signficiant valvular disease, no pericardial effusion     History of stress test 03/25/2021    normal LVEF calculated by the computer is probably falsely low.  LVEF is 40 %    Hx of Doppler ultrasound 03/01/2018    Carotid-mild 0-49% bilateral carotid,50% stenosis right subclavian    Hyperlipidemia     Hypertension     Macular degeneration     right    Mild cognitive impairment 02/2012    MMSE 26/30    MVP (mitral valve prolapse)     trace on echo 2014    Osteoporosis 05/2012    Pancytopenia 05/2011    mild with ; Dr Johnson Heart eval 2010    Peptic ulcer disease     Peripheral vascular disease ([address] 01/2016    angio; dis below ankles; pletal    Prediabetes 2006    Recurrent ventral incisional hernia 2013    medial apex of GB scar    S/P CABG x 3 2003    3-vessel; Dr Remberto Bryant 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 Darice Carrel; Right side       Past Surgical History:   Procedure Laterality Date    CATARACT REMOVAL Right 2010    poor result ; blind right eye; [address] CORONARY ARTERY BYPASS GRAFT  2009    3 vessel    SHOULDER ARTHROSCOPY Right     TUBAL LIGATION      URETER SURGERY      Right ureteral repair       Family History   Problem Relation Age of Onset    High Blood Pressure Mother     Heart Attack Mother     Other Mother         unsteady gait    Other Father         silcosis    Cancer Sister 79        Breast cancer 2017    Stroke Sister        Social History     Socioeconomic History    Marital status:      Spouse name: Jalil Del Valle Number of children: 2    Years of education: 15    Highest education level: None   Occupational History    Occupation: retired   Tobacco Use    Smoking status: Former Smoker     Packs/day: 0.25     Years: 9.00     Pack years: 2.25     Types: Cigarettes     Start date:      Quit date: 1985     Years since quittin.7    Smokeless tobacco: Never Used   Vaping Use    Vaping Use: Never used   Substance and Sexual Activity    Alcohol use: No     Alcohol/week: 0.0 standard drinks    Drug use: No    Sexual activity: Not Currently     Partners: Male   Other Topics Concern    None   Social History Narrative    None     Social Determinants of Health     Financial Resource Strain: Low Risk     Difficulty of Paying Living Expenses: Not hard at all   Food Insecurity: No Food Insecurity    Worried About Running Out of Food in the Last Year: Never true    [address] in the Last Year: Never true   Transportation Needs:     Lack of Transportation (Medical):      Lack of Transportation (Non-Medical):    Physical Activity:     Days of Exercise per Week:     Minutes of Exercise per Session:    Stress:     Feeling of Stress :    Social Connections:     Frequency of Communication with Friends and Family:     Frequency of Social Gatherings with Friends and Family:     Attends Christianity Services:     Active Member of Clubs or Organizations:     Attends Club or Organization Meetings:     Marital Status:    Intimate Partner Violence:     Fear of Current or Ex-Partner:     Emotionally Abused:     Physically Abused:     Sexually Abused:        Current Outpatient Medications   Medication Sig Dispense Refill    pantoprazole (PROTONIX) 40 MG tablet take 1 tablet by mouth once daily 90 tablet 1    Handicap Placard MISC by Does not apply route Good for 3 years 1 each 0    tiZANidine (ZANAFLEX) 2 MG tablet Take 1 tablet by mouth nightly as needed (low back pain) 30 tablet 0    ondansetron (ZOFRAN-ODT) 4 MG disintegrating tablet Take 1 tablet by mouth 3 times daily as needed for Nausea or Vomiting 21 tablet 0    verapamil (CALAN SR) 180 MG extended release tablet take 1/2 tablet by mouth twice a day 30 tablet 6    rosuvastatin (CRESTOR) 10 MG tablet Take 1 tablet by mouth daily 90 tablet 3    fluticasone (FLONASE) 50 MCG/ACT nasal spray instill 1 spray into each nostril once daily 1 Bottle 3    epinastine (ELESTAT) 0.05 % SOLN Place 1 drop into both eyes daily 1 Bottle 3    lidocaine 4 % external patch On for 12 hours, off for 12 hours 15 patch 0    gabapentin (NEURONTIN) 300 MG capsule 2 tablets 3 times daily for trigeminal neuralgia 180 capsule 5    divalproex (DEPAKOTE ER) 250 MG extended release tablet Take 250 mg by mouth nightly       VASCEPA 0.5 g CAPS take 2 capsules by mouth twice a day 360 capsule 2    Probiotic Product (PROBIOTIC DAILY PO) Take 1 capsule by mouth daily      Multiple Vitamins-Minerals (OCUVITE-LUTEIN PO) Take by mouth      calcium-vitamin D (OSCAL 500/200 D-3) 500-200 MG-UNIT per tablet Take 1 tablet by mouth 2 times daily.  aspirin 81 MG EC tablet Take 81 mg by mouth daily.          Current Facility-Administered Medications   Medication Dose Route Frequency Provider Last Rate Last Admin    cyanocobalamin injection 1,000 mcg  1,000 mcg Intramuscular Once Wilberto Arechiga MD        cyanocobalamin injection 1,000 mcg  1,000 mcg Subcutaneous Q30 Days Wilberto Arechiga MD   1,000 mcg at 10/20/20 1625       Allergies   Allergen Reactions    Alendronate Sodium Other (See Comments)     GI upset    Bactrim [Sulfamethoxazole-Trimethoprim] Anaphylaxis    Boniva [Ibandronate Sodium] Other (See Comments)     Gi upset and declined egd; also to fosamax    Ciprofloxacin     Colesevelam     Lisinopril Other (See Comments)     Severe hyperkalemia (6) with bun >56      Macrobid [Nitrofurantoin]     Neggram [Nalidixic Acid]     Pce [Erythromycin]     Penicillins     Risperidone And Related     Welchol [Colesevelam Hcl] Other (See Comments)     constipation    Carbamazepine Nausea And Vomiting    Lovaza [Omega-3-Acid Ethyl Esters] Nausea And Vomiting    Metoprolol Nausea And Vomiting    Pyridium [Phenazopyridine] Palpitations       Vitals:    08/04/21 1304   Pulse: 76   Resp: 16   SpO2: 95%   Weight: 137 lb (62.1 kg)   Height: 5' 3\" (1.6 m)         Review of Systems:   Review of Systems   Constitutional: Negative. HENT: Negative. Eyes: Negative. Respiratory: Negative. Cardiovascular: Negative. Gastrointestinal: Negative. Genitourinary: Negative. Musculoskeletal: Positive for arthralgias. Skin: Negative. Neurological: Negative. Psychiatric/Behavioral: Negative. Physical Exam:   Gen/Psych:Examination reveals a pleasant individual in no acute distress. The patient is oriented to time, place and person. The patient's mood and affect are appropriate. Patient appears well nourished. Body habitus is normal    HEENT: Head is atraumatic normocephalic, ears are symmetric, eyes show equal pupils bilaterally, extraocular muscles intact. Hearing is intact to normal voice at 5 feet.  Nares are patent bilaterally, no epistaxis, no rhinorrhea. Lymph:  No obvious lymphedema in bilateral upper extremities     Skin: Intact in bilateral upper extremities with no ulcerations, lesions, rash, erythema. Vascular: There are no varicosities in bilateral upper  extremities, sensation intact to light touch over bilateral upper extremities. Musculoskeletal:  Right shoulder exam:  Skin:  Clear with no erythema, there is no significant joint effusion  Deformity:  none  Atrophy:  none  Tenderness:  mild globally  Active ROM:   FE:70    IR side: sacrum           ER side: 30   Ad/Abduction: 61          Outside Record review: Office notes and x-rays    Imaging:   X-rays of the right shoulder reviewed which showed severe degenerative change in the glenohumeral joint with flattening of the humeral head and subchondral sclerosis. Assessment:    Diagnosis Orders   1. Primary osteoarthritis of right shoulder           Plan:   Patient Instructions   Continue weight-bearing as tolerated. Continue range of motion exercises as instructed. Ice and elevate as needed. Tylenol or Motrin for pain.   contuine with physical therapy   Follow up in 6 weeks for another injection

## 2021-08-06 ENCOUNTER — HOSPITAL ENCOUNTER (OUTPATIENT)
Dept: PHYSICAL THERAPY | Age: 86
Setting detail: THERAPIES SERIES
Discharge: HOME | End: 2021-08-06
Payer: MEDICARE

## 2021-08-06 PROCEDURE — 97112 NEUROMUSCULAR REEDUCATION: CPT

## 2021-08-06 PROCEDURE — 97110 THERAPEUTIC EXERCISES: CPT

## 2021-08-06 PROCEDURE — 97530 THERAPEUTIC ACTIVITIES: CPT

## 2021-08-06 NOTE — FLOWSHEET NOTE
Outpatient Physical Therapy  [facility]           [x] Phone: [phone]   Fax: [phone]  [facility]           [] Phone: [phone]   Fax: [phone]        Physical Therapy Daily Treatment Note  Date:  2021    Patient Name:  Sandie Kaiser    :  1935  MRN: [id number]  Restrictions/Precautions:    Diagnosis:   Diagnosis: Chronic low back pain, R shoulder OA  Date of Injury/Surgery:   Treatment Diagnosis: Treatment Diagnosis: low back pain, radiculopathy    Insurance/Certification information: PT Insurance Information: medicare    Referring Physician:  Referring Practitioner: Dr. Cain Handley  Next Doctor Visit:    Plan of care signed (Y/N): YES  Outcome Measure: Oswestry:  66%  21 60%    8/3/21 6min walk 907ft  Visit# / total visits:    Pain level: 0/10 lateral leg at rest, 7-8/10 with ambulation       Goals:     Patient goals : decrease pain  Long term goals  Time Frame for Long term goals : 6 weeks  Long term goal 1: I in home program Reports compliance   Long term goal 2: decrease RLE symptoms by 75%    no RLE symptoms, but has some in LLE  Long term goal 3: stand/walk 30mins with minimal pain. Limited to 5-10mins  Long term goal 4: min pain with transfers and bed mobility              minimal pain     Shoulder:    1. I in home program.      Met doing doctor and PT issued exercises. 2.  Prepare a meal and clean up after with minimal pain. met  3. Bathe/dress self with minimal pain. MET  4. Lift/reach groceries from shelves with minimal pain. Some things are too heavy       Summary of Evaluation: Assessment: Pt presents with complaints of chronic low pain with RLE symptoms extending to her lateral mid leg.  symptoms have worsened in the last couple weeks. She notes some changes in bladder, but also notes she has history of bladder problems and infections. Instruct pt to discuss with her doctor to determine infection vs nerogenic source.   She has + R SLR and SKTC   SKTC 10x2 ea   Supine clamshells  Red band 2x15  RTB 10x 2   LTR       Gluteal stretch 30 sec x 4 And piriformis stretching 30 sec x 3 each, B.   + piriformis stretching 30 sec x 3 each, B. Man  + piriformis stretching 30 sec x 3 each, B. STANDING       Row   Green 2x10 Green 2x10 Held per pt request due to sore R shoulder   Side stepping         Heel raise  2x10 B 2x10 10x2   Pallof       ER                     Standing hip abd  2x10 B 2x10 10x2 ea LE   PROPRIOCEPTION       SB       SB                             MODALITIES                         Other Therapeutic Activities/Education:  Discussed gym option and took patient down to see it. Tanja mentioned [facility] and gave patient information about it. Removed many duplicate papers from her collection of exercises over the last 10+ years of therapy. Home Exercise Program:  Modified piriformis stretch, CR knee to chest SI mobilization. Green tubing rows, pallof press, 2-3# bicep curls, scap retraction/depression  Home hip abd, heel raises, abdominal bracing with with housework. Manual Treatments: MFR/TPR toB ITB, quads, gluteals,  ITB stretches       Modalities:  None       Communication with other providers:  None gym downstairs. [facility]      Assessment:  Pt tolerated treatment fairly well. Pt was able to understand TA contraction better after treatment. Pt rated pain at 0/10 after treatment.       Plan for Next Session:       Time In / Time Out:   1415/ 1505    Timed Code/Total Treatment Minutes:  50'/ 50' 1 neuro (15') 1 TE ( 25') 1 TA (10')       Next Progress Note due:        Plan of Care Interventions:  [x] Therapeutic Exercise  [] Modalities:  [] Therapeutic Activity     [] Ultrasound  [] Estim  [] Gait Training      [] Cervical Traction [] Lumbar Traction  [] Neuromuscular Re-education    [] Cold/hotpack [] Iontophoresis   [x] Instruction in HEP      [] Vasopneumatic   [] Dry Needling    [x] Manual Therapy               [] Aquatic Therapy              Electronically signed by:  Emmett Vargas PTA        8/6/2021,8:51 AM       8/6/2021,4:15 PM

## 2021-08-11 ENCOUNTER — HOSPITAL ENCOUNTER (OUTPATIENT)
Dept: PHYSICAL THERAPY | Age: 86
Setting detail: THERAPIES SERIES
Discharge: HOME | End: 2021-08-11

## 2021-08-11 ENCOUNTER — HOSPITAL ENCOUNTER (OUTPATIENT)
Dept: PHYSICAL THERAPY | Age: 86
Setting detail: THERAPIES SERIES
Discharge: HOME | End: 2021-08-11
Payer: MEDICARE

## 2021-08-11 PROCEDURE — 9990000042 HC WELLNESS CENTER INDV PER MONTH

## 2021-08-11 PROCEDURE — 97110 THERAPEUTIC EXERCISES: CPT

## 2021-08-11 NOTE — PROGRESS NOTES
Outpatient Physical Therapy        [x] Phone: [phone]   Fax: [phone]   Physician: Dr. Alec Yung PAC      From: Tiny Barboza, PT, PT     Patient: Romi Bains                  : 1935  Diagnosis:  Chronic low back pain, R shoulder OA     Date: 2021  Treatment Diagnosis:  low back pain, radiculopathy      []  Progress Note                [x]  Discharge Note    Total Visits to date:  25 Cancels/No-shows to date:  0  Subjective:   Pt notes her shoulder feels about like baseline. Low back 0/10 at rest, but can get up to 7-8 if she pushes to hard/long. No LE symptoms. Plan of Care/Treatment to date:  [x] Therapeutic Exercise    [] Modalities:  [x] Therapeutic Activity     [] Ultrasound  [] Electric Stimulation  [] Gait Training      [] Cervical Traction    [] Lumbar Traction  [x] Neuromuscular Re-education  [] Cold/hotpack [] Iontophoresis  [x] Instruction in HEP      Other:  [x] Manual Therapy       []  Vasopneumatic  [] Self care management                           [] Dry needling trigger point/pain management                ? Objective/Significant Findings At Last Visit/Comments:    Oswestry 52%  (was 66% at eval)   Long term goal 1: I in home program Reports compliance   Long term goal 2: decrease RLE symptoms by 75%                          MET. Long term goal 3: stand/walk 30mins with minimal pain. Limited to 5-10mins  Long term goal 4: min pain with transfers and bed mobility              minimal pain      Shoulder:    1.  I in home program.                                                             Met doing doctor and PT issued exercises. 2.  Prepare a meal and clean up after with minimal pain. met  3.  Bathe/dress self with minimal pain. MET  4.  Lift/reach groceries from shelves with minimal pain.                      Some things are too heavy      Pt is to continue with her home program, look in to [facility] fitness center/classes, [employer] program or other similar programs    Patient Status: [] Continue per initial plan of Care     [x] Patient now discharged     [] Additional visits requested, Please re-certify for additional visits:    Electronically signed by:  Shelby Mcgregor PT, 8/11/2021, 11:01 AM    If you have any questions or concerns, please don't hesitate to call.   Thank you for your referral.

## 2021-08-11 NOTE — FLOWSHEET NOTE
Outpatient Physical Therapy  [facility]           [x] Phone: [phone]   Fax: [phone]  [place] [facility]           [] Phone: [phone]   Fax: [phone]        Physical Therapy Daily Treatment Note  Date:  2021    Patient Name:  Jovany Prakash    :  1935  MRN: [id number]  Restrictions/Precautions:    Diagnosis:   Diagnosis: Chronic low back pain, R shoulder OA  Date of Injury/Surgery:   Treatment Diagnosis: Treatment Diagnosis: low back pain, radiculopathy    Insurance/Certification information: PT Insurance Information: medicare    Referring Physician:  Referring Practitioner: Dr. Dorothy Loera  Next Doctor Visit:    Plan of care signed (Y/N): YES  Outcome Measure: Oswestry:  66%  21 60%    8/3/21 6min walk 907ft  21 Oswestry 52%  Visit# / total visits:    Pain level: 0/10 lateral leg at rest, 7-8/10 with ambulation       Goals:     Patient goals : decrease pain  Long term goals  Time Frame for Long term goals : 6 weeks  Long term goal 1: I in home program Reports compliance   Long term goal 2: decrease RLE symptoms by 75%    MET. Long term goal 3: stand/walk 30mins with minimal pain. Limited to 5-10mins  Long term goal 4: min pain with transfers and bed mobility              minimal pain     Shoulder:    1. I in home program.      Met doing doctor and PT issued exercises. 2.  Prepare a meal and clean up after with minimal pain. met  3. Bathe/dress self with minimal pain. MET  4. Lift/reach groceries from shelves with minimal pain. Some things are too heavy       Summary of Evaluation: Assessment: Pt presents with complaints of chronic low pain with RLE symptoms extending to her lateral mid leg.  symptoms have worsened in the last couple weeks. She notes some changes in bladder, but also notes she has history of bladder problems and infections. Instruct pt to discuss with her doctor to determine infection vs nerogenic source.   She has + R SLR and tight HS B, negative GLENN and FADIR. Pain limited trunk ROM in flexion and extension, with some decrease in distal symptoms reported today wtih flexion. Dermatomes intact to lt touch, myotomes intact with pain limiting hip flexion and knee flexion. She has pain standing and walking more than a few minutes, sitting more than 30 mins, pain with transfers and bed mobility. Prior to onset of pain, these activities were not limited or painful. Relates R shoulder surgery ~15yrs ago    R hand dominant. Posture fwd head, kyphotic. AROM: 85 flexion,abd w some scap sub.  (pt notes this is about her norm)  PROMpain limited 95deg flexion. 30 ER@ loose packed. Severe crepitus in R shoulder. Pain and weakness with resistive testing. Gross strength in avail AROM 3/5 flexion, abd, 3+ER, Bicep 4/5, tricep 4-/5 all with pain reported. + impingement  Neg tenderness at biceps, supraspinatus, infraspinatus, AC.          Subjective:    Tired and a little stiff this morning. Was really busy yesterday working around the house. Surprised and pleased with how much she was able to do and how much she got done. Any changes in Ambulatory Summary Sheet? None        Objective:   COVID screening questions were asked and patient attested that there had been no contact or symptoms        Exercises: (No more than 4 columns)   Exercise/Equipment 8/3/21 #16 8/6/2021 #17 8/11/21 #18           WARM UP      Nu step  X 5' started at 3 and moved to 5 X 5' started at 4 and moved to 5         TABLE      bridge PPT 2x10 Push ball or pillow   10x2 3\"  Small range x15     DKTC    SKTC 10x2 ea    Supine clamshells  RTB 10x 2 Red 2x15   LTR      Gluteal stretch  + piriformis stretching 30 sec x 3 each, B. Man  + piriformis stretching 30 sec x 3 each, B. Man  + piriformis stretching 30 sec x 3 each, B.     STANDING      Row  Green 2x10 Held per pt request due to sore R shoulder    Side stepping     HIp abd 2x10   Heel raise 2x10 10x2 2x10   Pallof ER                  Standing hip abd 2x10 10x2 ea LE x10 each   PROPRIOCEPTION      SB      SB                         MODALITIES                      Other Therapeutic Activities/Education:  Discussed gym option and took patient down to see it. Tanja mentioned [facility] and gave patient information about it. Removed many duplicate papers from her collection of exercises over the last 10+ years of therapy. Home Exercise Program:  Modified piriformis stretch, CR knee to chest SI mobilization. Green tubing rows, pallof press, 2-3# bicep curls, scap retraction/depression  Home hip abd, heel raises, abdominal bracing with with housework. Manual Treatments: MFR/TPR toB ITB, quads, gluteals,  ITB stretches       Modalities:  None       Communication with other providers:        Assessment:  Pt tolerated treatment well. Pt was able to understand TA contraction. Encouraged to check into [facility] for exercise, classes and other activities. Pt rated pain at 0/10 after treatment.       Plan for Next Session:       Time In / Time Out:   1015/1100    Timed Code/Total Treatment Minutes:  45/45    Next Progress Note due:        Plan of Care Interventions:  [x] Therapeutic Exercise  [] Modalities:  [] Therapeutic Activity     [] Ultrasound  [] Estim  [] Gait Training      [] Cervical Traction [] Lumbar Traction  [] Neuromuscular Re-education    [] Cold/hotpack [] Iontophoresis   [x] Instruction in HEP      [] Vasopneumatic   [] Dry Needling    [x] Manual Therapy               [] Aquatic Therapy              Electronically signed by:  Deana Payne PT,        8/11/2021,10:18 AM       8/11/2021,10:18 AM

## 2021-08-18 ENCOUNTER — NURSE ONLY (OUTPATIENT)
Dept: INTERNAL MEDICINE CLINIC | Age: 86
End: 2021-08-18
Payer: MEDICARE

## 2021-08-18 VITALS — TEMPERATURE: 97.8 F

## 2021-08-18 DIAGNOSIS — E53.8 VITAMIN B 12 DEFICIENCY: Primary | ICD-10-CM

## 2021-08-18 PROCEDURE — 96372 THER/PROPH/DIAG INJ SC/IM: CPT | Performed by: FAMILY MEDICINE

## 2021-08-18 RX ORDER — CYANOCOBALAMIN 1000 UG/ML
1000 INJECTION INTRAMUSCULAR; SUBCUTANEOUS ONCE
Status: COMPLETED | OUTPATIENT
Start: 2021-08-18 | End: 2021-08-18

## 2021-08-18 RX ADMIN — CYANOCOBALAMIN 1000 MCG: 1000 INJECTION INTRAMUSCULAR; SUBCUTANEOUS at 15:32

## 2021-08-19 ENCOUNTER — CARE COORDINATION (OUTPATIENT)
Dept: CARE COORDINATION | Age: 86
End: 2021-08-19

## 2021-08-24 RX ORDER — ICOSAPENT ETHYL 500 MG/1
CAPSULE ORAL
Qty: 120 CAPSULE | Refills: 5 | Status: SHIPPED | OUTPATIENT
Start: 2021-08-24 | End: 2022-03-18

## 2021-08-26 ENCOUNTER — CARE COORDINATION (OUTPATIENT)
Dept: CARE COORDINATION | Age: 86
End: 2021-08-26

## 2021-08-27 NOTE — CARE COORDINATION
Pt returned call. States she is feeling better, is starting to have no pain with physical therapy and would like to not be in care coordination any longer. Will reach out in the future if she has needs.

## 2021-08-31 LAB
ALBUMIN SERPL-MCNC: 4.5 G/DL
ALP BLD-CCNC: 44 U/L
ALT SERPL-CCNC: 12 U/L
ANION GAP SERPL CALCULATED.3IONS-SCNC: 1.5 MMOL/L
AST SERPL-CCNC: 23 U/L
AVERAGE GLUCOSE: NORMAL
BASOPHILS ABSOLUTE: 0 /ΜL
BASOPHILS RELATIVE PERCENT: 0 %
BILIRUB SERPL-MCNC: 0.4 MG/DL (ref 0.1–1.4)
BUN BLDV-MCNC: 13 MG/DL
CALCIUM SERPL-MCNC: 9.9 MG/DL
CHLORIDE BLD-SCNC: 95 MMOL/L
CO2: 23 MMOL/L
CREAT SERPL-MCNC: 1.01 MG/DL
EOSINOPHILS ABSOLUTE: 0.1 /ΜL
EOSINOPHILS RELATIVE PERCENT: 2 %
GFR CALCULATED: 51
GLUCOSE BLD-MCNC: 131 MG/DL
HBA1C MFR BLD: 5.8 %
HCT VFR BLD CALC: 37.4 % (ref 36–46)
HEMOGLOBIN: 12.4 G/DL (ref 12–16)
LYMPHOCYTES ABSOLUTE: 2.2 /ΜL
LYMPHOCYTES RELATIVE PERCENT: 41 %
MCH RBC QN AUTO: 33.7 PG
MCHC RBC AUTO-ENTMCNC: 33.2 G/DL
MCV RBC AUTO: 102 FL
MONOCYTES ABSOLUTE: 0.4 /ΜL
MONOCYTES RELATIVE PERCENT: 8 %
NEUTROPHILS ABSOLUTE: 2.6 /ΜL
NEUTROPHILS RELATIVE PERCENT: 49 %
PDW BLD-RTO: 12.7 %
PLATELET # BLD: 166 K/ΜL
PMV BLD AUTO: NORMAL FL
POTASSIUM SERPL-SCNC: 5.3 MMOL/L
RBC # BLD: 3.68 10^6/ΜL
SODIUM BLD-SCNC: 133 MMOL/L
TOTAL PROTEIN: 7.5
WBC # BLD: 5.3 10^3/ML

## 2021-09-02 ENCOUNTER — TELEPHONE (OUTPATIENT)
Dept: INTERNAL MEDICINE CLINIC | Age: 86
End: 2021-09-02

## 2021-09-02 ENCOUNTER — TELEPHONE (OUTPATIENT)
Dept: CARDIOLOGY CLINIC | Age: 86
End: 2021-09-02

## 2021-09-02 NOTE — TELEPHONE ENCOUNTER
----- Message from [address] [place] [address] sent at 9/2/2021 10:25 AM EDT -----  Subject: Results Request    QUESTIONS  Which lab or imaging result is the patient calling about? Lab Work   Which provider ordered the test? Gene Souza   At what location was the test performed? Date the test was performed? Additional Information for Provider? Patient said she got blood drawn at   [facility] and is requesting to know results, and wants it to be sent over   to another doctor as well. Requested call back [phone] messages okay   ---------------------------------------------------------------------------  --------------  CALL BACK INFO  What is the best way for the office to contact you? OK to leave message on   voicemail  Preferred Call Back Phone Number?  [phone]

## 2021-09-07 ENCOUNTER — OFFICE VISIT (OUTPATIENT)
Dept: INTERNAL MEDICINE CLINIC | Age: 86
End: 2021-09-07
Payer: MEDICARE

## 2021-09-07 VITALS
BODY MASS INDEX: 24.45 KG/M2 | SYSTOLIC BLOOD PRESSURE: 128 MMHG | HEIGHT: 63 IN | TEMPERATURE: 97.9 F | HEART RATE: 71 BPM | DIASTOLIC BLOOD PRESSURE: 72 MMHG | WEIGHT: 138 LBS | OXYGEN SATURATION: 98 %

## 2021-09-07 DIAGNOSIS — N39.0 RECURRENT UTI: Primary | ICD-10-CM

## 2021-09-07 DIAGNOSIS — G50.0 TIC DOULOUREUX: ICD-10-CM

## 2021-09-07 DIAGNOSIS — R73.03 PREDIABETES: ICD-10-CM

## 2021-09-07 PROCEDURE — G8399 PT W/DXA RESULTS DOCUMENT: HCPCS | Performed by: FAMILY MEDICINE

## 2021-09-07 PROCEDURE — 1036F TOBACCO NON-USER: CPT | Performed by: FAMILY MEDICINE

## 2021-09-07 PROCEDURE — G8420 CALC BMI NORM PARAMETERS: HCPCS | Performed by: FAMILY MEDICINE

## 2021-09-07 PROCEDURE — 99213 OFFICE O/P EST LOW 20 MIN: CPT | Performed by: FAMILY MEDICINE

## 2021-09-07 PROCEDURE — G8427 DOCREV CUR MEDS BY ELIG CLIN: HCPCS | Performed by: FAMILY MEDICINE

## 2021-09-07 PROCEDURE — 1090F PRES/ABSN URINE INCON ASSESS: CPT | Performed by: FAMILY MEDICINE

## 2021-09-07 PROCEDURE — 4040F PNEUMOC VAC/ADMIN/RCVD: CPT | Performed by: FAMILY MEDICINE

## 2021-09-07 PROCEDURE — 1123F ACP DISCUSS/DSCN MKR DOCD: CPT | Performed by: FAMILY MEDICINE

## 2021-09-07 RX ORDER — PANTOPRAZOLE SODIUM 40 MG/1
40 TABLET, DELAYED RELEASE ORAL DAILY
Qty: 90 TABLET | Refills: 3 | Status: SHIPPED | OUTPATIENT
Start: 2021-09-07 | End: 2022-01-25 | Stop reason: DRUGHIGH

## 2021-09-07 RX ORDER — GABAPENTIN 300 MG/1
CAPSULE ORAL
Qty: 180 CAPSULE | Refills: 1 | Status: SHIPPED
Start: 2021-09-07 | End: 2021-11-01 | Stop reason: DRUGHIGH

## 2021-09-07 RX ORDER — GABAPENTIN 300 MG/1
CAPSULE ORAL
Qty: 180 CAPSULE | Refills: 5 | Status: SHIPPED | OUTPATIENT
Start: 2021-09-07 | End: 2021-09-07 | Stop reason: DRUGHIGH

## 2021-09-07 ASSESSMENT — ENCOUNTER SYMPTOMS
DIARRHEA: 0
ABDOMINAL PAIN: 0
COLOR CHANGE: 0
CONSTIPATION: 0
SORE THROAT: 0
CHEST TIGHTNESS: 0
VOMITING: 0
SHORTNESS OF BREATH: 0

## 2021-09-07 NOTE — PROGRESS NOTES
Subjective:      Chief Complaint   Patient presents with    3 Month [address]     brought in a urine sample, wants to have it sent to be checked out. She is not having any pain or anything but wants to check for infection to be safe. HPI:  Radha Bull is a 82 y.o. female who presents today for follow up of chronic conditions as listed below. Recurrent UTI's:  Was given a RX for cefuroxime by urology to have on hand in case urinary symptoms recurred- states she started taking medication for about 5 days, but then had abnormal UA at urologist's office, so was switched to macrobid. Has been taking macrobid for about 4 days, her urinary symptoms are resolved. Did have some mild nausea this morning, but states she always has same side effects with macrobid. Has 1 more day of medication left. Brought in urine sample today to make sure she did not still have an infection. Has follow up with urology in a few weeks. Prediabetes:  HbA1C 5.8 on most recent labs. Has been taking gabapentin 300mg TID for trigeminal neuralgia (not 2 tabs TID as listed). Symptoms well controlled. No other concerns today. Labs reviewed. Past Medical History:   Diagnosis Date    Arthritis of shoulder region, right 09/2014    [place] right eye     following right cataract surg    Chronic depression 2009    Dr. Leo Umanzor DVT (deep venous thrombosis) ([facility] [address] 1970    left leg    Former smoker     History of echocardiogram 09/21/2020    EF 55-60%, mild left ventricular hypertrophy, normal diastolic filling pattern for age, no signficiant valvular disease, no pericardial effusion     History of stress test 03/25/2021    normal LVEF calculated by the computer is probably falsely low.  LVEF is 40 %    Hx of Doppler ultrasound 03/01/2018    Carotid-mild 0-49% bilateral carotid,50% stenosis right subclavian    Hyperlipidemia     Hypertension     Macular degeneration     right    Mild cognitive impairment 2012    MMSE 26/30    MVP (mitral valve prolapse)     trace on echo 2014    Osteoporosis 2012    Pancytopenia 2011    mild with ; Dr Devora hawk     Peptic ulcer disease     Peripheral vascular disease ([facility] [address] 2016    angio; dis below ankles; pletal    Prediabetes     Recurrent ventral incisional hernia     medial apex of GB scar    S/P CABG x 3     3-vessel; Dr Landon Cueto Spigelian hernia 2017    right ant lateral wall; seen on CT abd    Supraventricular tachycardia ([address]     Freq ventriular ectopy    Tic [place]     Dr. Wilson Peer; Right side        Past Surgical History:   Procedure Laterality Date    CATARACT REMOVAL Right     poor result ; blind right eye; [address] CORONARY ARTERY BYPASS GRAFT  2009    3 vessel    SHOULDER ARTHROSCOPY Right     TUBAL LIGATION      URETER SURGERY      Right ureteral repair       Social History     Tobacco Use    Smoking status: Former Smoker     Packs/day: 0.25     Years: 9.00     Pack years: 2.25     Types: Cigarettes     Start date:      Quit date: 1985     Years since quittin.8    Smokeless tobacco: Never Used   Substance Use Topics    Alcohol use: No     Alcohol/week: 0.0 standard drinks        Review of Systems   Constitutional: Negative for activity change, appetite change, chills, fever and unexpected weight change. HENT: Negative for congestion and sore throat. Respiratory: Negative for chest tightness and shortness of breath. Cardiovascular: Negative for chest pain and palpitations. Gastrointestinal: Positive for nausea. Negative for abdominal pain, constipation, diarrhea and vomiting. Genitourinary: Negative for dysuria. Skin: Negative for color change. Neurological: Negative for dizziness and light-headedness. Psychiatric/Behavioral: Negative for dysphoric mood. The patient is not nervous/anxious.          Prior to 128/72 (Site: Right Upper Arm, Position: Sitting, Cuff Size: Medium Adult)   Pulse 71   Temp 97.9 °F (36.6 °C)   Ht 5' 3\" (1.6 m)   Wt 138 lb (62.6 kg)   SpO2 98%   BMI 24.45 kg/m²      Physical Exam  Vitals and nursing note reviewed. Constitutional:       General: She is not in acute distress. Appearance: Normal appearance. She is not ill-appearing or toxic-appearing. HENT:      Head: Normocephalic and atraumatic. Right Ear: External ear normal.      Left Ear: External ear normal.      Nose: Nose normal.      Mouth/Throat:      Pharynx: Oropharynx is clear. Eyes:      General: No scleral icterus. Right eye: No discharge. Left eye: No discharge. Extraocular Movements: Extraocular movements intact. Conjunctiva/sclera: Conjunctivae normal.   Cardiovascular:      Rate and Rhythm: Normal rate and regular rhythm. Heart sounds: Normal heart sounds. Pulmonary:      Effort: Pulmonary effort is normal.      Breath sounds: Normal breath sounds. No wheezing or rales. Musculoskeletal:         General: No deformity. Cervical back: Normal range of motion and neck supple. No rigidity. Skin:     General: Skin is warm and dry. Findings: No rash. Neurological:      General: No focal deficit present. Mental Status: She is alert. Mental status is at baseline. Motor: No weakness. Psychiatric:         Mood and Affect: Mood normal.         Behavior: Behavior normal.            Assessment / Plan:      1. Recurrent UTI  UA negative today and patient with resolution of urinary symptoms. Advised to complete course of macrobid (1 more day) and follow up with urology as scheduled. - URINE RT REFLEX TO CULTURE    2. Prediabetes  Improving, most recent HbA1c 5.8. Will continue to monitor. 3. Tic [place]  Stable, well controlled. Continue current medications.    - gabapentin (NEURONTIN) 300 MG capsule; take 1 tablet by mouth three times a day for TRIGEMINAL NEURALGIA  Dispense: 180 capsule; Refill: 1          I have spent 30 minutes on this patient encounter. Patient voiced understanding and agreement with plan. All questions/concerns were addressed, risks/side effects of medications were reviewed. Return precautions and after visit summary were provided. Return in about 3 months (around 12/7/2021). or earlier as needed.       David Domingo MD

## 2021-09-08 ASSESSMENT — ENCOUNTER SYMPTOMS: NAUSEA: 1

## 2021-09-10 DIAGNOSIS — I10 ESSENTIAL HYPERTENSION: ICD-10-CM

## 2021-09-10 DIAGNOSIS — R73.03 PREDIABETES: ICD-10-CM

## 2021-09-10 DIAGNOSIS — E53.8 VITAMIN B 12 DEFICIENCY: ICD-10-CM

## 2021-09-13 ENCOUNTER — TELEPHONE (OUTPATIENT)
Dept: INTERNAL MEDICINE CLINIC | Age: 86
End: 2021-09-13

## 2021-09-13 DIAGNOSIS — M25.551 PAIN IN RIGHT HIP: Primary | ICD-10-CM

## 2021-09-13 NOTE — TELEPHONE ENCOUNTER
----- Message from Devonte Elder sent at 9/10/2021  2:19 PM EDT -----  Subject: Referral Request    QUESTIONS   Reason for referral request? PT would like to be referred to a different   Physical Therapist - she is unhappy with the one she is currently at, she   feels like she is not getting the support she needs at her current   Physical Therapist. PT would like to go to [facility] on [address] across from [facility]. Has the physician seen you for this condition before? No   Preferred Specialist (if applicable)? Do you already have an appointment scheduled? No  Additional Information for Provider?   ---------------------------------------------------------------------------  --------------  CALL BACK INFO  What is the best way for the office to contact you? OK to leave message on   voicemail  Preferred Call Back Phone Number?  [phone]

## 2021-09-23 ENCOUNTER — NURSE ONLY (OUTPATIENT)
Dept: INTERNAL MEDICINE CLINIC | Age: 86
End: 2021-09-23
Payer: MEDICARE

## 2021-09-23 VITALS — TEMPERATURE: 97.4 F

## 2021-09-23 DIAGNOSIS — E53.8 VITAMIN B 12 DEFICIENCY: Primary | ICD-10-CM

## 2021-09-23 PROCEDURE — 99211 OFF/OP EST MAY X REQ PHY/QHP: CPT | Performed by: FAMILY MEDICINE

## 2021-09-23 RX ORDER — CYANOCOBALAMIN 1000 UG/ML
1000 INJECTION INTRAMUSCULAR; SUBCUTANEOUS ONCE
Status: DISCONTINUED | OUTPATIENT
Start: 2021-09-23 | End: 2021-11-16 | Stop reason: HOSPADM

## 2021-09-27 ENCOUNTER — OFFICE VISIT (OUTPATIENT)
Dept: ORTHOPEDIC SURGERY | Age: 86
End: 2021-09-27
Payer: MEDICARE

## 2021-09-27 VITALS
HEIGHT: 63 IN | WEIGHT: 138 LBS | OXYGEN SATURATION: 98 % | RESPIRATION RATE: 16 BRPM | BODY MASS INDEX: 24.45 KG/M2 | HEART RATE: 67 BPM

## 2021-09-27 DIAGNOSIS — M75.41 IMPINGEMENT SYNDROME OF RIGHT SHOULDER: ICD-10-CM

## 2021-09-27 DIAGNOSIS — M19.011 PRIMARY OSTEOARTHRITIS OF RIGHT SHOULDER: Primary | ICD-10-CM

## 2021-09-27 PROCEDURE — 99213 OFFICE O/P EST LOW 20 MIN: CPT | Performed by: PHYSICIAN ASSISTANT

## 2021-09-27 PROCEDURE — G8399 PT W/DXA RESULTS DOCUMENT: HCPCS | Performed by: PHYSICIAN ASSISTANT

## 2021-09-27 PROCEDURE — 20610 DRAIN/INJ JOINT/BURSA W/O US: CPT | Performed by: PHYSICIAN ASSISTANT

## 2021-09-27 PROCEDURE — G8420 CALC BMI NORM PARAMETERS: HCPCS | Performed by: PHYSICIAN ASSISTANT

## 2021-09-27 PROCEDURE — 1090F PRES/ABSN URINE INCON ASSESS: CPT | Performed by: PHYSICIAN ASSISTANT

## 2021-09-27 PROCEDURE — 4040F PNEUMOC VAC/ADMIN/RCVD: CPT | Performed by: PHYSICIAN ASSISTANT

## 2021-09-27 PROCEDURE — 1123F ACP DISCUSS/DSCN MKR DOCD: CPT | Performed by: PHYSICIAN ASSISTANT

## 2021-09-27 PROCEDURE — G8427 DOCREV CUR MEDS BY ELIG CLIN: HCPCS | Performed by: PHYSICIAN ASSISTANT

## 2021-09-27 PROCEDURE — 1036F TOBACCO NON-USER: CPT | Performed by: PHYSICIAN ASSISTANT

## 2021-09-27 NOTE — PROGRESS NOTES
[address] and [facility]      HPI:  Goran Oconnor is a 82 y.o. female that is in the office today with right shoulder pain. Patient states that her last steroid injection was on 06/23/2021 and patient states that the injection for about 2-3 months. Patient states that she is still having globally shoulder pain. Patient denies any new falls or injuries to the shoulder. Past Medical History:   Diagnosis Date    Arthritis of shoulder region, right 09/2014    [place] right eye     following right cataract surg    Chronic depression 2009    Dr. Edi Beal DVT (deep venous thrombosis) ([address] 1970    left leg    Former smoker     History of echocardiogram 09/21/2020    EF 55-60%, mild left ventricular hypertrophy, normal diastolic filling pattern for age, no signficiant valvular disease, no pericardial effusion     History of stress test 03/25/2021    normal LVEF calculated by the computer is probably falsely low.  LVEF is 40 %    Hx of Doppler ultrasound 03/01/2018    Carotid-mild 0-49% bilateral carotid,50% stenosis right subclavian    Hyperlipidemia     Hypertension     Macular degeneration     right    Mild cognitive impairment 02/2012    MMSE 26/30    MVP (mitral valve prolapse)     trace on echo 2014    Osteoporosis 05/2012    Pancytopenia 05/2011    mild with ; Dr Caden hawk 2010    Peptic ulcer disease     Peripheral vascular disease ([address] 01/2016    angio; dis below ankles; pletal    Prediabetes 2006    Recurrent ventral incisional hernia 2013    medial apex of GB scar    S/P CABG x 3 2003    3-vessel; Dr Daniel Curiel 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 Aranza Rod; Right side       Past Surgical History:   Procedure Laterality Date    CATARACT REMOVAL Right 2010    poor result ; blind right eye; [place]    CHOLECYSTECTOMY      CORONARY ARTERY BYPASS GRAFT  2009    3 vessel    SHOULDER ARTHROSCOPY Right 2003    TUBAL LIGATION      URETER SURGERY      Right ureteral repair       Family History   Problem Relation Age of Onset    High Blood Pressure Mother     Heart Attack Mother     Other Mother         unsteady gait    Other Father         silcosis    Cancer Sister 79        Breast cancer 2017    Stroke Sister        Social History     Socioeconomic History    Marital status:      Spouse name: Hieu Betancourt Number of children: 2    Years of education: 15    Highest education level: None   Occupational History    Occupation: retired   Tobacco Use    Smoking status: Former Smoker     Packs/day: 0.25     Years: 9.00     Pack years: 2.25     Types: Cigarettes     Start date:      Quit date: 1985     Years since quittin.8    Smokeless tobacco: Never Used   Vaping Use    Vaping Use: Never used   Substance and Sexual Activity    Alcohol use: No     Alcohol/week: 0.0 standard drinks    Drug use: No    Sexual activity: Not Currently     Partners: Male   Other Topics Concern    None   Social History Narrative    None     Social Determinants of Health     Financial Resource Strain: Low Risk     Difficulty of Paying Living Expenses: Not hard at all   Food Insecurity: No Food Insecurity    Worried About Running Out of Food in the Last Year: Never true    Katie of Food in the Last Year: Never true   Transportation Needs:     Lack of Transportation (Medical):      Lack of Transportation (Non-Medical):    Physical Activity:     Days of Exercise per Week:     Minutes of Exercise per Session:    Stress:     Feeling of Stress :    Social Connections:     Frequency of Communication with Friends and Family:     Frequency of Social Gatherings with Friends and Family:     Attends [employer] Services:     Active Member of Clubs or Organizations:     Attends Club or Organization Meetings:     Marital Status: Intimate Partner Violence:     Fear of Current or Ex-Partner:     Emotionally Abused:     Physically Abused:     Sexually Abused:        Current Outpatient Medications   Medication Sig Dispense Refill    pantoprazole (PROTONIX) 40 MG tablet Take 1 tablet by mouth daily 90 tablet 3    gabapentin (NEURONTIN) 300 MG capsule take 1 tablet by mouth three times a day for TRIGEMINAL NEURALGIA 180 capsule 1    Icosapent Ethyl (VASCEPA) 0.5 g CAPS take 2 capsules by mouth twice a day 120 capsule 5    Handicap Placard MISC by Does not apply route Good for 3 years 1 each 0    verapamil (CALAN SR) 180 MG extended release tablet take 1/2 tablet by mouth twice a day 30 tablet 6    rosuvastatin (CRESTOR) 10 MG tablet Take 1 tablet by mouth daily 90 tablet 3    fluticasone (FLONASE) 50 MCG/ACT nasal spray instill 1 spray into each nostril once daily 1 Bottle 3    epinastine (ELESTAT) 0.05 % SOLN Place 1 drop into both eyes daily 1 Bottle 3    lidocaine 4 % external patch On for 12 hours, off for 12 hours 15 patch 0    divalproex (DEPAKOTE ER) 250 MG extended release tablet Take 250 mg by mouth nightly       Multiple Vitamins-Minerals (OCUVITE-LUTEIN PO) Take by mouth      calcium-vitamin D (OSCAL 500/200 D-3) 500-200 MG-UNIT per tablet Take 1 tablet by mouth 2 times daily.  aspirin 81 MG EC tablet Take 81 mg by mouth daily.          Current Facility-Administered Medications   Medication Dose Route Frequency Provider Last Rate Last Admin    cyanocobalamin injection 1,000 mcg  1,000 mcg IntraMUSCular Once Corinne Mcadams MD        cyanocobalamin injection 1,000 mcg  1,000 mcg IntraMUSCular Once Corinne Mcadams MD        cyanocobalamin injection 1,000 mcg  1,000 mcg SubCUTAneous Q30 Days Corinne Mcadams MD   1,000 mcg at 10/20/20 1625       Allergies   Allergen Reactions    Alendronate Sodium Other (See Comments)     GI upset    Bactrim [Sulfamethoxazole-Trimethoprim] Anaphylaxis    Boniva [Ibandronate Sodium] Other (See Comments)     Gi upset and declined egd; also to fosamax    Ciprofloxacin     Colesevelam     Lisinopril Other (See Comments)     Severe hyperkalemia (6) with bun >56      Macrobid [Nitrofurantoin]     Neggram [Nalidixic Acid]     Pce [Erythromycin]     Penicillins     Risperidone And Related     Welchol [Colesevelam Hcl] Other (See Comments)     constipation    Carbamazepine Nausea And Vomiting    Lovaza [Omega-3-Acid Ethyl Esters] Nausea And Vomiting    Metoprolol Nausea And Vomiting    Pyridium [Phenazopyridine] Palpitations       Vitals:    09/27/21 1343   Pulse: 67   Resp: 16   SpO2: 98%   Weight: 138 lb (62.6 kg)   Height: 5' 3\" (1.6 m)         Review of Systems:   Review of Systems       Physical Exam:   Gen/Psych:Examination reveals a pleasant individual in no acute distress. The patient is oriented to time, place and person. The patient's mood and affect are appropriate. Patient appears well nourished. Body habitus is normal    HEENT: Head is atraumatic normocephalic, ears are symmetric, eyes show equal pupils bilaterally, extraocular muscles intact. Hearing is intact to normal voice at 5 feet. Nares are patent bilaterally, no epistaxis, no rhinorrhea. Lymph:  No obvious lymphedema in bilateral upper extremities     Skin: Intact in bilateral upper extremities with no ulcerations, lesions, rash, erythema. Vascular: There are no varicosities in bilateral upper  extremities, sensation intact to light touch over bilateral upper extremities.     Musculoskeletal:  Right shoulder exam:  Skin:  Clear with no erythema, there is no significant joint effusion  Deformity:  none  Atrophy:  none  Tenderness:  mild globally  Active ROM:   FE:150    IR side: L4           ER side: 40   Ad/Abduction: 160        Strength: FE:5-/5     ER:5/5   Neer:  moderately positive  Ferrari:  moderately positive    Imaging studies:  X-ray of the right shoulder reviewed which showed flattening of the humeral head with subchondral sclerosis within the glenoid and humeral head and osteophytes throughout the glenohumeral joint. There is also impingement of the acromion on the superior aspect of the humeral head. Assessment:    Diagnosis Orders   1. Primary osteoarthritis of right shoulder     2. Impingement syndrome of right shoulder  NH ARTHROCENTESIS ASPIR&/INJ MAJOR JT/BURSA W/O US         Plan:   Patient Instructions   Continue to weight bear as tolerated  Continue range of motion  Ice and elevate as needed  Tylenol or Motrin for pain  Injection given today in the office   Rest for 24-48 hours  Follow up as needed      right Subacromial Bursa Aspiration / Injection Procedure:  Multiple treatment options were discussed. This injection was recommended as a part of the overall treatment plan. Details of the procedure, potential risks, and potential benefits were discussed. Patient's questions were answered. Patient elected to proceed with procedure. Medication: 1 mL Kenalog 40 mg/ML, 1 mL 0.5% bupivacaine, 1 mL 1% lidocaine  Procedure:  Sterile technique was used as the skin over the injection site was prepped with alcohol. The right subacromial bursa was then injected with the above listed medication. A sterile bandage was placed over the injection site. The patient tolerated the procedure well without complication. *Please note this report has been partially produced using speech recognition Dragon software and may contain errors related to that system including errors in grammar, punctuation, and spelling, as well as words and phrases that may be inappropriate.  If there are any questions or concerns please feel free to contact the dictating provider for clarification

## 2021-09-30 ENCOUNTER — OFFICE VISIT (OUTPATIENT)
Dept: FAMILY MEDICINE CLINIC | Age: 86
End: 2021-09-30
Payer: MEDICARE

## 2021-09-30 ENCOUNTER — HOSPITAL ENCOUNTER (OUTPATIENT)
Age: 86
Setting detail: SPECIMEN
Discharge: HOME | End: 2021-09-30
Payer: MEDICARE

## 2021-09-30 ENCOUNTER — HOSPITAL ENCOUNTER (OUTPATIENT)
Dept: CT IMAGING | Age: 86
Discharge: HOME | End: 2021-09-30
Payer: MEDICARE

## 2021-09-30 DIAGNOSIS — R68.89 GENERAL ILL FEELING: ICD-10-CM

## 2021-09-30 DIAGNOSIS — K57.92 DIVERTICULITIS: ICD-10-CM

## 2021-09-30 DIAGNOSIS — R10.31 RLQ ABDOMINAL PAIN: ICD-10-CM

## 2021-09-30 DIAGNOSIS — R68.89 GENERAL ILL FEELING: Primary | ICD-10-CM

## 2021-09-30 PROCEDURE — U0003 INFECTIOUS AGENT DETECTION BY NUCLEIC ACID (DNA OR RNA); SEVERE ACUTE RESPIRATORY SYNDROME CORONAVIRUS 2 (SARS-COV-2) (CORONAVIRUS DISEASE [COVID-19]), AMPLIFIED PROBE TECHNIQUE, MAKING USE OF HIGH THROUGHPUT TECHNOLOGIES AS DESCRIBED BY CMS-2020-01-R: HCPCS

## 2021-09-30 PROCEDURE — 1036F TOBACCO NON-USER: CPT | Performed by: PHYSICIAN ASSISTANT

## 2021-09-30 PROCEDURE — 4040F PNEUMOC VAC/ADMIN/RCVD: CPT | Performed by: PHYSICIAN ASSISTANT

## 2021-09-30 PROCEDURE — U0005 INFEC AGEN DETEC AMPLI PROBE: HCPCS

## 2021-09-30 PROCEDURE — 1090F PRES/ABSN URINE INCON ASSESS: CPT | Performed by: PHYSICIAN ASSISTANT

## 2021-09-30 PROCEDURE — 1123F ACP DISCUSS/DSCN MKR DOCD: CPT | Performed by: PHYSICIAN ASSISTANT

## 2021-09-30 PROCEDURE — G8399 PT W/DXA RESULTS DOCUMENT: HCPCS | Performed by: PHYSICIAN ASSISTANT

## 2021-09-30 PROCEDURE — 99214 OFFICE O/P EST MOD 30 MIN: CPT | Performed by: PHYSICIAN ASSISTANT

## 2021-09-30 PROCEDURE — 74176 CT ABD & PELVIS W/O CONTRAST: CPT

## 2021-09-30 PROCEDURE — G8420 CALC BMI NORM PARAMETERS: HCPCS | Performed by: PHYSICIAN ASSISTANT

## 2021-09-30 PROCEDURE — G8427 DOCREV CUR MEDS BY ELIG CLIN: HCPCS | Performed by: PHYSICIAN ASSISTANT

## 2021-09-30 RX ORDER — METRONIDAZOLE 500 MG/1
500 TABLET ORAL 3 TIMES DAILY
Qty: 21 TABLET | Refills: 0 | Status: SHIPPED | OUTPATIENT
Start: 2021-09-30 | End: 2021-10-07

## 2021-09-30 RX ORDER — CIPROFLOXACIN 500 MG/1
500 TABLET, FILM COATED ORAL 2 TIMES DAILY
Qty: 14 TABLET | Refills: 0 | Status: SHIPPED | OUTPATIENT
Start: 2021-09-30 | End: 2021-10-07

## 2021-09-30 NOTE — PROGRESS NOTES
9/30/2021    Savanna Min    Chief Complaint   Patient presents with    Hernia    Nausea       HPI  History was obtained from patient. Denney Habermann is a 82 y.o. female who presents today with complaints of generally just feeling ill over the last 48 hours. She states that she has had intermittent nausea, generalized abdominal discomfort, and yellow-colored loose stools. She states her stools are soft and liquid at the same time. She mentions that she ate meat loaf and corn 2 days ago and continues to see corn in her feces. She denies vomiting, melena, hematochezia. She denies urinary frequency, urgency, dysuria, hematuria, unusual vaginal discharge or bleeding. She denies cold-like symptoms such as cough, chest or nasal congestion, sore throat, postnasal drip. She denies fever, chills. She states that on Friday, she was \"very nervous \"because she was waiting on a phone call that she did not receive. She states that she has intermittent anxiousness secondary to grieving the loss of her  and daughter. She admits a well-established history of GERD which she states is well controlled on pantoprazole 40 mg daily. She has not noticed any increased GERD symptoms such as heartburn, throat clearing, throat bulge, or tasting acid. She states that she was recently told by her primary care provider that she likely has an umbilical hernia. She does still have her appendix. Surgical history can be seen below but does include cholecystectomy. No known history of diverticulitis. Last colonoscopy was April 2013 and will be repeated as needed. REVIEW OF SYMPTOMS    Constitutional:  Denies fever, chills, weight loss or weakness  Eyes:  Denies photophobia or discharge  ENT:  Denies cold-like symptoms  Cardiovascular:  Denies chest pain, palpitations or swelling  Respiratory:  Denies cough or shortness of breath  GI: Admits abdominal discomfort, diarrhea, and nausea.   See HPI  : Denies urinary frequency, urgency, dysuria, hematuria, unusual discharge or bleeding  Musculoskeletal: Admits chronic low back pain without baseline change. Receives physical therapy for chronic discomfort. Skin: Denies rashes  Neurologic:  Denies headache, focal weakness, or sensory changes  Lymphatic:  Denies swollen glands  Psychiatric: Admits intermittent anxiousness and grief. Admits chronic depression, well controlled aside from recent grief reaction. Denies suicidal ideation or homicidal ideation    PAST MEDICAL HISTORY  Past Medical History:   Diagnosis Date    Arthritis of shoulder region, right 09/2014    Judah Rhea Blind right eye     following right cataract surg    Chronic depression 2009    Dr. Estevan Lane DVT (deep venous thrombosis) ([facility] [address] 1970    left leg    Former smoker     History of echocardiogram 09/21/2020    EF 55-60%, mild left ventricular hypertrophy, normal diastolic filling pattern for age, no signficiant valvular disease, no pericardial effusion     History of stress test 03/25/2021    normal LVEF calculated by the computer is probably falsely low.  LVEF is 40 %    Hx of Doppler ultrasound 03/01/2018    Carotid-mild 0-49% bilateral carotid,50% stenosis right subclavian    Hyperlipidemia     Hypertension     Macular degeneration     right    Mild cognitive impairment 02/2012    MMSE 26/30    MVP (mitral valve prolapse)     trace on echo 2014    Osteoporosis 05/2012    Pancytopenia 05/2011    mild with ; Dr Brintey hawk 2010    Peptic ulcer disease     Peripheral vascular disease ([facility] [address] 01/2016    angio; dis below ankles; pletal    Prediabetes 2006    Recurrent ventral incisional hernia 2013    medial apex of GB scar    S/P CABG x 3 2003    3-vessel; Dr Lindsay Knott 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 Maria Isabel Salazar; Right side       FAMILY HISTORY  Family History   Problem Relation Age of Onset    High Blood Pressure Mother     Heart Attack Mother     Other Mother         unsteady gait    Other Father         silcosis    Cancer Sister 79        Breast cancer 2017    Stroke Sister        SOCIAL HISTORY  Social History     Socioeconomic History    Marital status:      Spouse name: Daniel Pyle Number of children: 2    Years of education: 15    Highest education level: None   Occupational History    Occupation: retired   Tobacco Use    Smoking status: Former Smoker     Packs/day: 0.25     Years: 9.00     Pack years: 2.25     Types: Cigarettes     Start date:      Quit date: 1985     Years since quittin.8    Smokeless tobacco: Never Used   Vaping Use    Vaping Use: Never used   Substance and Sexual Activity    Alcohol use: No     Alcohol/week: 0.0 standard drinks    Drug use: No    Sexual activity: Not Currently     Partners: Male   Other Topics Concern    None   Social History Narrative    None     Social Determinants of Health     Financial Resource Strain: Low Risk     Difficulty of Paying Living Expenses: Not hard at all   Food Insecurity: No Food Insecurity    Worried About Running Out of Food in the Last Year: Never true    Katie of Food in the Last Year: Never true   Transportation Needs:     Lack of Transportation (Medical):      Lack of Transportation (Non-Medical):    Physical Activity:     Days of Exercise per Week:     Minutes of Exercise per Session:    Stress:     Feeling of Stress :    Social Connections:     Frequency of Communication with Friends and Family:     Frequency of Social Gatherings with Friends and Family:     Attends Taoist Services:     Active Member of Clubs or Organizations:     Attends Club or Organization Meetings:     Marital Status:    Intimate Partner Violence:     Fear of Current or Ex-Partner:     Emotionally Abused:     Physically Abused:     Sexually Abused:         SURGICAL HISTORY  Past Surgical History:   Procedure Laterality Date    CATARACT REMOVAL Right 2010    poor result ; blind right eye; [place]    CHOLECYSTECTOMY      CORONARY ARTERY BYPASS GRAFT  8/2009    3 vessel    SHOULDER ARTHROSCOPY Right 2003    TUBAL LIGATION      URETER SURGERY      Right ureteral repair       CURRENT MEDICATIONS  Current Outpatient Medications   Medication Sig Dispense Refill    pantoprazole (PROTONIX) 40 MG tablet Take 1 tablet by mouth daily 90 tablet 3    gabapentin (NEURONTIN) 300 MG capsule take 1 tablet by mouth three times a day for TRIGEMINAL NEURALGIA 180 capsule 1    Icosapent Ethyl (VASCEPA) 0.5 g CAPS take 2 capsules by mouth twice a day 120 capsule 5    Handicap Placard MISC by Does not apply route Good for 3 years 1 each 0    verapamil (CALAN SR) 180 MG extended release tablet take 1/2 tablet by mouth twice a day 30 tablet 6    rosuvastatin (CRESTOR) 10 MG tablet Take 1 tablet by mouth daily 90 tablet 3    fluticasone (FLONASE) 50 MCG/ACT nasal spray instill 1 spray into each nostril once daily 1 Bottle 3    epinastine (ELESTAT) 0.05 % SOLN Place 1 drop into both eyes daily 1 Bottle 3    lidocaine 4 % external patch On for 12 hours, off for 12 hours 15 patch 0    divalproex (DEPAKOTE ER) 250 MG extended release tablet Take 250 mg by mouth nightly       Multiple Vitamins-Minerals (OCUVITE-LUTEIN PO) Take by mouth      calcium-vitamin D (OSCAL 500/200 D-3) 500-200 MG-UNIT per tablet Take 1 tablet by mouth 2 times daily.  aspirin 81 MG EC tablet Take 81 mg by mouth daily.          Current Facility-Administered Medications   Medication Dose Route Frequency Provider Last Rate Last Admin    cyanocobalamin injection 1,000 mcg  1,000 mcg IntraMUSCular Once Andreas Alfonso MD        cyanocobalamin injection 1,000 mcg  1,000 mcg IntraMUSCular Once Andreas Alfonso MD        cyanocobalamin injection 1,000 mcg  1,000 mcg SubCUTAneous Q30 Days Shashi Jha normal, mood normal    ASSESSMENT & PLAN    Ada was seen today for hernia and nausea. Diagnoses and all orders for this visit:    General ill feeling  -     CT ABDOMEN PELVIS WO CONTRAST Additional Contrast? Radiologist Recommendation; Future  -     Covid-19 Ambulatory    RLQ abdominal pain  -     CT ABDOMEN PELVIS WO CONTRAST Additional Contrast? Radiologist Recommendation; Future       Stat CT abdomen and pelvis to rule out appendicitis and diverticulitis      Addendum 09/30/2021 3:05 pm  Patient was informed of CT results which showed chronic diverticulosis with a minimal amount of stranding seen in the left lower quadrant and a small reactive lymph node which may represent mild diverticulitis. No perforation or abscess collection. She was tender to palpation of left lower quadrant. She was informed that we should start antibiotics to treat. However, patient has extensive allergy list.  Rohan Landin MA, did call patient to discuss this allergy list in lengthy detail, specifically in regards to ciprofloxacin and penicillin. Patient called the pharmacist to see if she could get any further clarification. She called back and spoke with me directly. Patient recalls history of hives with penicillin many years ago. She does not ever recall taking amoxicillin or Augmentin. She reports a history of nausea with Cipro. After further discussion,  patient was given 2 options to treat her diverticulitis which include monotherapy with Augmentin or dual therapy with ciprofloxacin and Flagyl. We discussed risks and benefits. Patient voiced understanding and wishes to continue with Cipro + Flagyl. She has Zofran at home and will use as needed. She understands she should go to the emergency room for any sudden worsening. There are no discontinued medications. No follow-ups on file. Plan of care reviewed with patient who verbalizes understanding and wishes to continue.   Patient verbalizes understanding with the above plan and is in agreement. Patient will call with  worsening of symptoms, questions or concerns. Please note that this chart was generated using dragon dictation software. Although every effort was made to ensure the accuracy of this automated transcription, some errors in transcription may have occurred.     Electronically signed by Randy Blount PA-C on 9/30/2021

## 2021-10-01 LAB
SARS-COV-2: NOT DETECTED
SOURCE: NORMAL

## 2021-10-04 ENCOUNTER — TELEPHONE (OUTPATIENT)
Dept: INTERNAL MEDICINE CLINIC | Age: 86
End: 2021-10-04

## 2021-10-04 ENCOUNTER — TELEPHONE (OUTPATIENT)
Dept: FAMILY MEDICINE CLINIC | Age: 86
End: 2021-10-04

## 2021-10-04 NOTE — TELEPHONE ENCOUNTER
Patients referral to physical therapy would not fax through. All pertinent information mailed to [facility].

## 2021-10-04 NOTE — TELEPHONE ENCOUNTER
Pt called stating that she has not had a complete bm in 4 days, pt states that she took metamucil yesterday and dulcolax suppository last night at 5pm, had some formed stool, no abdominal pain, no ill feeling, just abdominal fullness. Pt stattes that she normally has a bm every morning. Pt states she has been drinking plenty of water. Pt is not sure what else to do. Please advise.

## 2021-10-04 NOTE — TELEPHONE ENCOUNTER
Please advise her to start taking metamucil daily. If she is still having difficulty after a few days, she can try adding colace (OTC or I can call in RX if she wants). Thanks.

## 2021-10-05 RX ORDER — DOCUSATE SODIUM 100 MG/1
100 CAPSULE, LIQUID FILLED ORAL 2 TIMES DAILY
Qty: 60 CAPSULE | Refills: 3 | Status: SHIPPED | OUTPATIENT
Start: 2021-10-05 | End: 2021-11-04

## 2021-10-07 ENCOUNTER — TELEPHONE (OUTPATIENT)
Dept: INTERNAL MEDICINE CLINIC | Age: 86
End: 2021-10-07

## 2021-10-07 RX ORDER — ONDANSETRON 4 MG/1
4 TABLET, ORALLY DISINTEGRATING ORAL 3 TIMES DAILY PRN
Qty: 21 TABLET | Refills: 1 | Status: SHIPPED | OUTPATIENT
Start: 2021-10-07 | End: 2021-12-09 | Stop reason: SDUPTHER

## 2021-10-07 NOTE — TELEPHONE ENCOUNTER
----- Message from Sebastian Ruffin sent at 10/7/2021  8:03 AM EDT -----  Subject: Message to Provider    QUESTIONS  Information for Provider? Patient wanting a print out of what foods to   avoid due to the diverticulitis and also has a question about if should be   taking ciprofloxacin ( almost out of ) w/ the metroNIDAZOLE (FLAGYL) due   to possible reaction.   ---------------------------------------------------------------------------  --------------  CALL BACK INFO  What is the best way for the office to contact you? OK to leave message on   voicemail  Preferred Call Back Phone Number? [phone]  ---------------------------------------------------------------------------  --------------  SCRIPT ANSWERS  Relationship to Patient?  Self

## 2021-10-07 NOTE — TELEPHONE ENCOUNTER
Spoke with patient. States she was seen in walk in clinic, was diagnosed with diverticulitis and prescribed cipro and flagyl. Did not realize she was supposed to take both at once so she only started cipro. Completed cipro yesterday- states she started feeling improved within a few days of starting antibiotics, but started feeling nauseous again this morning. Called the pharmacist, who clarified and advised her to start flagyl so she just took her first dose about an hour ago. Advised patient to complete course of flagyl as long as she is tolerating it. Patient states she was also told they would call in zofran, but was not called in. Will order. Contact clinic if symptoms have not improved after completing course of flagyl. Patient voiced understanding.

## 2021-10-11 ENCOUNTER — TELEPHONE (OUTPATIENT)
Dept: INTERNAL MEDICINE CLINIC | Age: 86
End: 2021-10-11

## 2021-10-11 ENCOUNTER — TELEPHONE (OUTPATIENT)
Dept: FAMILY MEDICINE CLINIC | Age: 86
End: 2021-10-11

## 2021-10-11 NOTE — TELEPHONE ENCOUNTER
Spoke with patient. States she noticed 2 red spots on her neck- started over the weekend, has not grown/spread. States she spoke with pharmacist today, who advised her to continue her antibiotics. Patient states the spots are not bothersome- not itchy or painful, just knows it is there. Advised to finish off antibiotic course (as she has 2 days left). Contact clinic if rash does not resolve after stopping antibiotics. Patient voiced understanding.

## 2021-10-11 NOTE — TELEPHONE ENCOUNTER
Says medication she started a few days ago and now she has welts on the back of her neck.  Please advise

## 2021-10-11 NOTE — TELEPHONE ENCOUNTER
Patient called and stated she was having a side effect from the flagel. She is getting red spots/ welts on the back of her neck. She has 3 more days left. Please advise.

## 2021-10-20 ENCOUNTER — OFFICE VISIT (OUTPATIENT)
Dept: FAMILY MEDICINE CLINIC | Age: 86
End: 2021-10-20
Payer: MEDICARE

## 2021-10-20 ENCOUNTER — TELEPHONE (OUTPATIENT)
Dept: INTERNAL MEDICINE CLINIC | Age: 86
End: 2021-10-20

## 2021-10-20 ENCOUNTER — NURSE TRIAGE (OUTPATIENT)
Dept: OTHER | Facility: CLINIC | Age: 86
End: 2021-10-20

## 2021-10-20 VITALS
HEIGHT: 63 IN | WEIGHT: 138 LBS | BODY MASS INDEX: 24.45 KG/M2 | SYSTOLIC BLOOD PRESSURE: 118 MMHG | DIASTOLIC BLOOD PRESSURE: 76 MMHG | HEART RATE: 68 BPM | OXYGEN SATURATION: 97 %

## 2021-10-20 DIAGNOSIS — R11.0 NAUSEA: Primary | ICD-10-CM

## 2021-10-20 PROCEDURE — G8427 DOCREV CUR MEDS BY ELIG CLIN: HCPCS | Performed by: NURSE PRACTITIONER

## 2021-10-20 PROCEDURE — G8482 FLU IMMUNIZE ORDER/ADMIN: HCPCS | Performed by: NURSE PRACTITIONER

## 2021-10-20 PROCEDURE — 1036F TOBACCO NON-USER: CPT | Performed by: NURSE PRACTITIONER

## 2021-10-20 PROCEDURE — G8399 PT W/DXA RESULTS DOCUMENT: HCPCS | Performed by: NURSE PRACTITIONER

## 2021-10-20 PROCEDURE — 99213 OFFICE O/P EST LOW 20 MIN: CPT | Performed by: NURSE PRACTITIONER

## 2021-10-20 PROCEDURE — 1123F ACP DISCUSS/DSCN MKR DOCD: CPT | Performed by: NURSE PRACTITIONER

## 2021-10-20 PROCEDURE — 1090F PRES/ABSN URINE INCON ASSESS: CPT | Performed by: NURSE PRACTITIONER

## 2021-10-20 PROCEDURE — 4040F PNEUMOC VAC/ADMIN/RCVD: CPT | Performed by: NURSE PRACTITIONER

## 2021-10-20 PROCEDURE — G8420 CALC BMI NORM PARAMETERS: HCPCS | Performed by: NURSE PRACTITIONER

## 2021-10-20 NOTE — PROGRESS NOTES
Leyla Wolfe   82 y.o.  female  [id number]      Chief Complaint   Patient presents with    Nausea     pt. here today with complaints of nausea, she states she believes it is diverticulitis issues again. Pt. states diarrhea started over the weekend and today feels very nauseas. Subjective:  82 y. o.female is here for a follow up. She has the following chronic/acute medical problems:  Patient Active Problem List   Diagnosis    Chronic depression    Hyperlipidemia    Tic douloureux    Anemia due to chronic illness    Colon cancer screening    Osteoporosis    Supraventricular tachycardia ([address]    Recurrent ventral incisional hernia    Mild cognitive impairment    CAD (coronary artery disease)    Elevated TSH    Peripheral vascular disease (HCC)    Sciatica of left side without back pain    Coronary artery disease involving coronary bypass graft of native heart without angina pectoris    S/P CABG x 3    Hypertension    MVP (mitral valve prolapse)    Bilateral carotid artery stenosis    Subclavian arterial stenosis (HCC)    Spigelian hernia    Ischemic cardiomyopathy    Glenohumeral arthritis, right    Normocytic anemia    Overactive bladder    Major depressive disorder, recurrent, in full remission ([address]    Asthmatic bronchitis    Age-related osteoporosis without current pathological fracture    Palpitations    SOB (shortness of breath)    Prediabetes       Patient is here with thinking she has diverticulitis. Patient states she just finished Cipro and Flagyl. Patient states she started with diarrhea on Saturday. Patient states she had a normal bowel movement  Patient states she was started on colace for constipation. Patient states she has been nauseated since Saturday. Patient states it is bad she cannot even do her house work. Patient denies any abdominal pain. Patient states she took a Zofran last night before she went to bed and did help.        Review of Systems   Constitutional: Negative for appetite change, chills, fatigue and fever. HENT: Negative for congestion, ear pain, postnasal drip, rhinorrhea, sinus pressure, sinus pain, sneezing and sore throat. Respiratory: Negative for cough, chest tightness, shortness of breath and wheezing. Cardiovascular: Negative for chest pain and palpitations. Gastrointestinal: Positive for nausea. Negative for abdominal pain, constipation, diarrhea and vomiting. Skin: Negative for rash. Neurological: Negative for dizziness, light-headedness and headaches. Current Outpatient Medications   Medication Sig Dispense Refill    ondansetron (ZOFRAN-ODT) 4 MG disintegrating tablet Take 1 tablet by mouth 3 times daily as needed for Nausea or Vomiting 21 tablet 1    docusate sodium (COLACE) 100 MG capsule Take 1 capsule by mouth 2 times daily 60 capsule 3    pantoprazole (PROTONIX) 40 MG tablet Take 1 tablet by mouth daily 90 tablet 3    Icosapent Ethyl (VASCEPA) 0.5 g CAPS take 2 capsules by mouth twice a day 120 capsule 5    Handicap Placard MISC by Does not apply route Good for 3 years 1 each 0    verapamil (CALAN SR) 180 MG extended release tablet take 1/2 tablet by mouth twice a day 30 tablet 6    rosuvastatin (CRESTOR) 10 MG tablet Take 1 tablet by mouth daily 90 tablet 3    fluticasone (FLONASE) 50 MCG/ACT nasal spray instill 1 spray into each nostril once daily 1 Bottle 3    epinastine (ELESTAT) 0.05 % SOLN Place 1 drop into both eyes daily 1 Bottle 3    lidocaine 4 % external patch On for 12 hours, off for 12 hours 15 patch 0    divalproex (DEPAKOTE ER) 250 MG extended release tablet Take 250 mg by mouth nightly       Multiple Vitamins-Minerals (OCUVITE-LUTEIN PO) Take by mouth      calcium-vitamin D (OSCAL 500/200 D-3) 500-200 MG-UNIT per tablet Take 1 tablet by mouth 2 times daily.  aspirin 81 MG EC tablet Take 81 mg by mouth daily.         gabapentin (NEURONTIN) 300 MG capsule take 1 tablet by mouth three times a day for TRIGEMINAL NEURALGIA 90 capsule 1     Current Facility-Administered Medications   Medication Dose Route Frequency Provider Last Rate Last Admin    cyanocobalamin injection 1,000 mcg  1,000 mcg IntraMUSCular Once Apple Mc MD        cyanocobalamin injection 1,000 mcg  1,000 mcg IntraMUSCular Once Apple Mc MD        cyanocobalamin injection 1,000 mcg  1,000 mcg SubCUTAneous Q30 Days Apple Mc MD   1,000 mcg at 10/20/20 1625        Past medical, family,surgical history reviewed today. Objective:  /76   Pulse 68   Ht 5' 3\" (1.6 m)   Wt 138 lb (62.6 kg)   SpO2 97%   BMI 24.45 kg/m²   BP Readings from Last 3 Encounters:   10/21/21 122/68   10/20/21 118/76   09/07/21 128/72     Wt Readings from Last 3 Encounters:   10/21/21 138 lb (62.6 kg)   10/20/21 138 lb (62.6 kg)   09/27/21 138 lb (62.6 kg)         Physical Exam  Constitutional:       Appearance: Normal appearance. HENT:      Head: Normocephalic. Cardiovascular:      Rate and Rhythm: Normal rate and regular rhythm. Heart sounds: Normal heart sounds. Pulmonary:      Effort: Pulmonary effort is normal.      Breath sounds: Normal breath sounds. Abdominal:      General: Bowel sounds are normal.      Palpations: Abdomen is soft. Tenderness: There is no abdominal tenderness. Musculoskeletal:      Cervical back: Neck supple. Skin:     General: Skin is warm and dry. Neurological:      Mental Status: She is alert and oriented to person, place, and time.    Psychiatric:         Mood and Affect: Mood normal.         Behavior: Behavior normal.         Lab Results   Component Value Date    WBC 5.3 08/31/2021    HGB 12.4 08/31/2021    HCT 37.4 08/31/2021     08/31/2021     08/31/2021     Lab Results   Component Value Date     08/31/2021    K 5.3 08/31/2021    CL 95 08/31/2021    CO2 23 08/31/2021    BUN 13 08/31/2021    CREATININE 1.01 08/31/2021    GLUCOSE 131 08/31/2021 CALCIUM 9.9 08/31/2021    PROT 7.0 04/01/2021    LABALBU 4.5 08/31/2021    BILITOT 0.4 08/31/2021    ALKPHOS 44 08/31/2021    AST 23 08/31/2021    ALT 12 08/31/2021    LABGLOM 51 08/31/2021    GFRAA 57 (L) 04/01/2021    AGRATIO 1.7 11/13/2019    GLOB 2.7 11/13/2019     Lab Results   Component Value Date    CHOL 129 03/22/2021    CHOL 129 06/22/2020    CHOL 150 01/23/2020     Lab Results   Component Value Date    TRIG 194 (H) 03/22/2021    TRIG 273 (H) 06/22/2020    TRIG 376 (H) 01/23/2020     Lab Results   Component Value Date    HDL 33 (L) 03/22/2021    HDL 30 (L) 06/22/2020    HDL 31 (L) 01/23/2020     Lab Results   Component Value Date    LDLCALC 44 06/22/2020    LDLCALC 44 01/23/2020    [address] Comment 11/13/2019     Lab Results   Component Value Date    LABA1C 5.8 08/31/2021     Lab Results   Component Value Date    TSH 3.250 03/06/2019    TSHHS 5.080 (H) 02/03/2016         ASSESSMENT/PLAN:      1. Nausea  Continue Zofran as prescribed by PCP. Explained to patient that since she just finished treatment for diverticulitis that I am not comfortable to treat her again. Patient scheduled to see PCP in the AM.         No orders of the defined types were placed in this encounter. There are no discontinued medications. No orders of the defined types were placed in this encounter. Care discussed with patient. Questions answered. Patient verbalizes understanding and agrees with plan. After visit summary provided. Advised to call for any problems, questions, or concerns. Return if symptoms worsen or fail to improve.                                              Signed:  ABELARDO Kumar CNP  11/03/21  9:46 AM

## 2021-10-20 NOTE — TELEPHONE ENCOUNTER
Pt called in today stating she has been having awful nausea sine Saturday along with diarrhea. States today is the worst its been. She is worried she is having a flare up of her diverticulitis. I referred her to the walk in clinic as PCP is not in today. I advised pt that I would send a message to PCP as well. Please advise.

## 2021-10-20 NOTE — PATIENT INSTRUCTIONS
We are committed to providing you the best care possible. If you receive a survey after visiting one of our offices, please take time to share your experience concerning your physician office visit. These surveys are confidential and no health information about you is shared. We are eager to improve for you and we are counting on your feedback to help make that happen. labs

## 2021-10-20 NOTE — TELEPHONE ENCOUNTER
Reason for Disposition   Constant abdominal pain lasting > 2 hours    Answer Assessment - Initial Assessment Questions  1. LOCATION: \"Where does it hurt? \"       Entire lower stomach, worse in the center of lower stomach. 2. RADIATION: \"Does the pain shoot anywhere else? \" (e.g., chest, back)      No     3. ONSET: \"When did the pain begin? \" (e.g., minutes, hours or days ago)       Sunday    4. SUDDEN: \"Gradual or sudden onset?\"          5. PATTERN \"Does the pain come and go, or is it constant? \"     - If constant: \"Is it getting better, staying the same, or worsening? \"       (Note: Constant means the pain never goes away completely; most serious pain is constant and it progresses)      - If intermittent: \"How long does it last?\" \"Do you have pain now? \"      (Note: Intermittent means the pain goes away completely between bouts)      Constant    6. SEVERITY: \"How bad is the pain? \"  (e.g., Scale 1-10; mild, moderate, or severe)    - MILD (1-3): doesn't interfere with normal activities, abdomen soft and not tender to touch     - MODERATE (4-7): interferes with normal activities or awakens from sleep, tender to touch     - SEVERE (8-10): excruciating pain, doubled over, unable to do any normal activities       6/10    7. RECURRENT SYMPTOM: \"Have you ever had this type of abdominal pain before? \" If so, ask: \"When was the last time? \" and \"What happened that time? \"       Yes, she went to the [facility] in [place] in the past, got x-rays and then 2 antibiotics. 8. CAUSE: \"What do you think is causing the abdominal pain? \"      Diverticulitis     9. RELIEVING/AGGRAVATING FACTORS: \"What makes it better or worse? \" (e.g., movement, antacids, bowel movement)      No     10. OTHER SYMPTOMS: \"Has there been any vomiting, diarrhea, constipation, or urine problems? \"        Constipation, no vomiting. Feels nauseated. 11. PREGNANCY: \"Is there any chance you are pregnant? \" \"When was your last menstrual period? \" N/a    Protocols used: ABDOMINAL PAIN - FEMALE-ADULT-[place]    Reach out to office Irene at the office and she is referring her to the ED. Received call from [facility] with [employer]. Brief description of triage: constant abdominal pain. Triage indicates for patient to [address] Care advice provided, patient verbalizes understanding; denies any other questions or concerns; instructed to call back for any new or worsening symptoms. Attention Provider: Thank you for allowing me to participate in the care of your patient. The patient was connected to triage in response to information provided to the ECC/PSC. Please do not respond through this encounter as the response is not directed to a shared pool.

## 2021-10-21 ENCOUNTER — OFFICE VISIT (OUTPATIENT)
Dept: INTERNAL MEDICINE CLINIC | Age: 86
End: 2021-10-21
Payer: MEDICARE

## 2021-10-21 VITALS
TEMPERATURE: 97.2 F | DIASTOLIC BLOOD PRESSURE: 68 MMHG | BODY MASS INDEX: 24.45 KG/M2 | HEIGHT: 63 IN | OXYGEN SATURATION: 97 % | SYSTOLIC BLOOD PRESSURE: 122 MMHG | HEART RATE: 72 BPM | WEIGHT: 138 LBS

## 2021-10-21 DIAGNOSIS — R11.0 NAUSEA: Primary | ICD-10-CM

## 2021-10-21 DIAGNOSIS — E78.2 MIXED HYPERLIPIDEMIA: ICD-10-CM

## 2021-10-21 DIAGNOSIS — I10 PRIMARY HYPERTENSION: ICD-10-CM

## 2021-10-21 DIAGNOSIS — E53.8 VITAMIN B 12 DEFICIENCY: ICD-10-CM

## 2021-10-21 PROCEDURE — 1123F ACP DISCUSS/DSCN MKR DOCD: CPT | Performed by: FAMILY MEDICINE

## 2021-10-21 PROCEDURE — G8482 FLU IMMUNIZE ORDER/ADMIN: HCPCS | Performed by: FAMILY MEDICINE

## 2021-10-21 PROCEDURE — 1036F TOBACCO NON-USER: CPT | Performed by: FAMILY MEDICINE

## 2021-10-21 PROCEDURE — 4040F PNEUMOC VAC/ADMIN/RCVD: CPT | Performed by: FAMILY MEDICINE

## 2021-10-21 PROCEDURE — 99213 OFFICE O/P EST LOW 20 MIN: CPT | Performed by: FAMILY MEDICINE

## 2021-10-21 PROCEDURE — G8399 PT W/DXA RESULTS DOCUMENT: HCPCS | Performed by: FAMILY MEDICINE

## 2021-10-21 PROCEDURE — G8427 DOCREV CUR MEDS BY ELIG CLIN: HCPCS | Performed by: FAMILY MEDICINE

## 2021-10-21 PROCEDURE — G8420 CALC BMI NORM PARAMETERS: HCPCS | Performed by: FAMILY MEDICINE

## 2021-10-21 PROCEDURE — 1090F PRES/ABSN URINE INCON ASSESS: CPT | Performed by: FAMILY MEDICINE

## 2021-10-21 RX ORDER — CYANOCOBALAMIN 1000 UG/ML
1000 INJECTION INTRAMUSCULAR; SUBCUTANEOUS ONCE
Status: DISCONTINUED | OUTPATIENT
Start: 2021-10-21 | End: 2021-10-21

## 2021-10-21 ASSESSMENT — ENCOUNTER SYMPTOMS
VOMITING: 0
SORE THROAT: 0
DIARRHEA: 0
CONSTIPATION: 0
CHEST TIGHTNESS: 0
COLOR CHANGE: 0
ABDOMINAL PAIN: 0
SHORTNESS OF BREATH: 0

## 2021-10-21 NOTE — PROGRESS NOTES
Subjective:      Chief Complaint   Patient presents with    Nausea       HPI:  Leyla Wolfe is a 82 y.o. female who presents today for follow up of nausea. Was seen in walk in clinic yesterday for nausea and diarrhea. Just completed a course of flagyl and ciprofloxacin for diverticulitis a few days ago. States she was feeling great last week- felt like \"her normal self. \"  Started having nausea and diarrhea again yesterday and was concerned she had diverticulitis again. Denies any fevers, severe abdominal pain, blood in stool, weight loss, etc.  Was advised to follow up with PCP, was not started on any treatment. States she feels much better today and thinks symptoms may be from her nerves/anxiety, as she is struggling with isolation. States she has always been a very nervous person, but the pandemic has made it worse.  also passed away 2 years ago and she is still trying to adjust to the loss. Is already being followed by Dr. Amy Rosas. Denies any symptoms today. States she ate dinner last night and breakfast this morning without any issues. Past Medical History:   Diagnosis Date    Arthritis of shoulder region, right 09/2014    [facility] Blind right eye     following right cataract surg    Chronic depression 2009    Dr. Mitch Ferrer DVT (deep venous thrombosis) ([facility] [address] 1970    left leg    Former smoker     History of echocardiogram 09/21/2020    EF 55-60%, mild left ventricular hypertrophy, normal diastolic filling pattern for age, no signficiant valvular disease, no pericardial effusion     History of stress test 03/25/2021    normal LVEF calculated by the computer is probably falsely low.  LVEF is 40 %    Hx of Doppler ultrasound 03/01/2018    Carotid-mild 0-49% bilateral carotid,50% stenosis right subclavian    Hyperlipidemia     Hypertension     Macular degeneration     right    Mild cognitive impairment 02/2012    MMSE 26/30    MVP (mitral valve prolapse)     trace on echo 2014    Osteoporosis 2012    Pancytopenia 2011    mild with ; Dr Christi Denney eval     Peptic ulcer disease     Peripheral vascular disease ([address] 2016    angio; dis below ankles; pletal    Prediabetes     Recurrent ventral incisional hernia 2013    medial apex of GB scar    S/P CABG x 3     3-vessel; Dr Parris Chang Spigelian hernia 2017    right ant lateral wall; seen on CT abd    Supraventricular tachycardia ([address]     Freq ventriular ectopy    Tic douloureux     Dr. Bisi Moore; Right side        Past Surgical History:   Procedure Laterality Date    CATARACT REMOVAL Right     poor result ; blind right eye; [address] CORONARY ARTERY BYPASS GRAFT  2009    3 vessel    SHOULDER ARTHROSCOPY Right     TUBAL LIGATION      URETER SURGERY      Right ureteral repair       Social History     Tobacco Use    Smoking status: Former Smoker     Packs/day: 0.25     Years: 9.00     Pack years: 2.25     Types: Cigarettes     Start date:      Quit date: 1985     Years since quittin.9    Smokeless tobacco: Never Used   Substance Use Topics    Alcohol use: No     Alcohol/week: 0.0 standard drinks        Review of Systems   Constitutional: Negative for activity change, appetite change, chills, fever and unexpected weight change. HENT: Negative for congestion and sore throat. Respiratory: Negative for chest tightness and shortness of breath. Cardiovascular: Negative for chest pain and palpitations. Gastrointestinal: Negative for abdominal pain, constipation, diarrhea and vomiting. Genitourinary: Negative for dysuria. Skin: Negative for color change. Neurological: Negative for dizziness and light-headedness. Psychiatric/Behavioral: Negative for dysphoric mood. The patient is not nervous/anxious. Prior to Visit Medications    Medication Sig Taking?  Authorizing Provider   ondansetron (ZOFRAN-ODT) 4 MG disintegrating tablet Take 1 tablet by mouth 3 times daily as needed for Nausea or Vomiting Yes Apple Mc MD   docusate sodium (COLACE) 100 MG capsule Take 1 capsule by mouth 2 times daily Yes Apple Mc MD   pantoprazole (PROTONIX) 40 MG tablet Take 1 tablet by mouth daily Yes Apple Mc MD   gabapentin (NEURONTIN) 300 MG capsule take 1 tablet by mouth three times a day for TRIGEMINAL NEURALGIA Yes Apple Mc MD   Icosapent Ethyl (VASCEPA) 0.5 g CAPS take 2 capsules by mouth twice a day Yes Mello Pedersen MD   Handicap Placard MISC by Does not apply route Good for 3 years Yes Apple Mc MD   verapamil (CALAN SR) 180 MG extended release tablet take 1/2 tablet by mouth twice a day Yes Mello Pedersen MD   rosuvastatin (CRESTOR) 10 MG tablet Take 1 tablet by mouth daily Yes Apple Mc MD   fluticasone (FLONASE) 50 MCG/ACT nasal spray instill 1 spray into each nostril once daily Yes Apple Mc MD   epinastine (ELESTAT) 0.05 % SOLN Place 1 drop into both eyes daily Yes Apple Mc MD   lidocaine 4 % external patch On for 12 hours, off for 12 hours Yes Apple Mc MD   divalproex (DEPAKOTE ER) 250 MG extended release tablet Take 250 mg by mouth nightly  Yes Historical Provider, MD   Multiple Vitamins-Minerals (OCUVITE-LUTEIN PO) Take by mouth Yes Historical Provider, MD   calcium-vitamin D (OSCAL 500/200 D-3) 500-200 MG-UNIT per tablet Take 1 tablet by mouth 2 times daily. Yes Historical Provider, MD   aspirin 81 MG EC tablet Take 81 mg by mouth daily. Yes Historical Provider, MD          Objective:      /68 (Site: Right Upper Arm, Position: Sitting, Cuff Size: Medium Adult)   Pulse 72   Temp 97.2 °F (36.2 °C)   Ht 5' 3\" (1.6 m)   Wt 138 lb (62.6 kg)   SpO2 97%   BMI 24.45 kg/m²      Physical Exam  Vitals and nursing note reviewed. Constitutional:       General: She is not in acute distress. Appearance: Normal appearance.  She is not ill-appearing or toxic-appearing. HENT:      Head: Normocephalic and atraumatic. Right Ear: External ear normal.      Left Ear: External ear normal.      Nose: Nose normal.      Mouth/Throat:      Pharynx: Oropharynx is clear. Eyes:      General: No scleral icterus. Right eye: No discharge. Left eye: No discharge. Extraocular Movements: Extraocular movements intact. Conjunctiva/sclera: Conjunctivae normal.   Cardiovascular:      Rate and Rhythm: Normal rate and regular rhythm. Heart sounds: Normal heart sounds. Pulmonary:      Effort: Pulmonary effort is normal.      Breath sounds: Normal breath sounds. No wheezing or rales. Musculoskeletal:         General: No deformity. Cervical back: Normal range of motion and neck supple. No rigidity. Skin:     General: Skin is warm and dry. Findings: No rash. Neurological:      General: No focal deficit present. Mental Status: She is alert. Mental status is at baseline. Motor: No weakness. Psychiatric:         Mood and Affect: Mood normal.         Behavior: Behavior normal.            Assessment / Plan:      1. Nausea  Recently completed antibiotic course for diverticulitis with resolution of symptoms. Had nausea/diarrhea yesterday, but now improved. Suspect symptoms may be 2/2 IBS given poorly controlled anxiety. Is already following with psychiatry- advised discussing possible medication changes if mood symptoms are not well controlled. 3. Mixed hyperlipidemia  Continue crestor and vascepa. - Lipid Panel; Future    4. Primary hypertension  Stable, well controlled. Continue current medications. - CBC Auto Differential; Future  - Comprehensive Metabolic Panel; Future          I have spent 30 minutes on this patient encounter. Patient voiced understanding and agreement with plan. All questions/concerns were addressed, risks/side effects of medications were reviewed.   Return

## 2021-10-26 ENCOUNTER — TELEPHONE (OUTPATIENT)
Dept: INTERNAL MEDICINE CLINIC | Age: 86
End: 2021-10-26

## 2021-10-27 NOTE — TELEPHONE ENCOUNTER
Pt called back this AM, c/o nausea and diarrhea. Asking if she should drink water and if it is okay to take Pepto Bismol. She has taken Zofran this AM and nausea has decreased. Advised pt to drink plenty of water, to avoid dehydration. Okay to take Pepto. Get plenty of rest, call if diarrhea does not improve. Pt reports [facility] put her on Pyridium, which is on her allergy list, as causing palpitations. Please review.

## 2021-10-27 NOTE — TELEPHONE ENCOUNTER
I don't see a recent nephrology note, but pyridium is for symptom control (dysuria)- it does not treat the actual infection so she does not need to take it if she has an allergy/reaction to it. She can also contact the prescribing provider for any questions/concerns about the medication or an alternative  Thanks.

## 2021-10-27 NOTE — TELEPHONE ENCOUNTER
Pt called again. Relayed PCP's message/instruction. She voiced understanding. She states diarrhea has not improved at all with Pepto. She cannot say how many BM's she had so far today. Pharmacy has delivered Pedialyte. She does not have anyone to take her to , she does not feel that she can drive anywhere without having BM on the way. By end of conversation pt states she feels she can drive to pharmacy drive through and buy some Imodium. She will call office, if SX do not improve.

## 2021-10-29 ENCOUNTER — TELEPHONE (OUTPATIENT)
Dept: INTERNAL MEDICINE CLINIC | Age: 86
End: 2021-10-29

## 2021-10-29 DIAGNOSIS — G50.0 TIC DOULOUREUX: ICD-10-CM

## 2021-11-01 RX ORDER — GABAPENTIN 300 MG/1
CAPSULE ORAL
Qty: 90 CAPSULE | Refills: 1 | Status: SHIPPED
Start: 2021-11-01 | End: 2022-09-13 | Stop reason: SDUPTHER

## 2021-11-03 ASSESSMENT — ENCOUNTER SYMPTOMS
VOMITING: 0
COUGH: 0
WHEEZING: 0
CONSTIPATION: 0
SINUS PAIN: 0
ABDOMINAL PAIN: 0
NAUSEA: 1
SHORTNESS OF BREATH: 0
DIARRHEA: 0
SORE THROAT: 0
RHINORRHEA: 0
SINUS PRESSURE: 0
CHEST TIGHTNESS: 0

## 2021-11-05 RX ORDER — FERROUS SULFATE 325(65) MG
325 TABLET ORAL
COMMUNITY
End: 2021-11-05 | Stop reason: SDUPTHER

## 2021-11-08 RX ORDER — FERROUS SULFATE 325(65) MG
325 TABLET ORAL
Qty: 90 TABLET | Refills: 1 | Status: ON HOLD | OUTPATIENT
Start: 2021-11-08 | End: 2021-11-11

## 2021-11-09 DIAGNOSIS — E53.8 VITAMIN B 12 DEFICIENCY: Primary | ICD-10-CM

## 2021-11-09 RX ORDER — CYANOCOBALAMIN 1000 UG/ML
1000 INJECTION INTRAMUSCULAR; SUBCUTANEOUS ONCE
Qty: 1 ML | Refills: 1 | Status: ON HOLD | OUTPATIENT
Start: 2021-11-09 | End: 2021-11-16 | Stop reason: HOSPADM

## 2021-11-10 ENCOUNTER — APPOINTMENT (OUTPATIENT)
Dept: GENERAL RADIOLOGY | Age: 86
DRG: 641 | End: 2021-11-10
Payer: MEDICARE

## 2021-11-10 ENCOUNTER — HOSPITAL ENCOUNTER (INPATIENT)
Age: 86
LOS: 7 days | Discharge: NURSING, REHAB OR LONG-TERM CARE | DRG: 641 | End: 2021-11-17
Attending: EMERGENCY MEDICINE | Admitting: INTERNAL MEDICINE
Payer: MEDICARE

## 2021-11-10 ENCOUNTER — APPOINTMENT (OUTPATIENT)
Dept: CT IMAGING | Age: 86
DRG: 641 | End: 2021-11-10
Payer: MEDICARE

## 2021-11-10 DIAGNOSIS — K57.90 DIVERTICULOSIS: ICD-10-CM

## 2021-11-10 DIAGNOSIS — R79.89 ELEVATED LACTIC ACID LEVEL: ICD-10-CM

## 2021-11-10 DIAGNOSIS — R53.83 MALAISE AND FATIGUE: ICD-10-CM

## 2021-11-10 DIAGNOSIS — R53.1 GENERALIZED WEAKNESS: Primary | ICD-10-CM

## 2021-11-10 DIAGNOSIS — R11.0 NAUSEA: ICD-10-CM

## 2021-11-10 DIAGNOSIS — E53.8 B12 DEFICIENCY: ICD-10-CM

## 2021-11-10 DIAGNOSIS — R53.81 MALAISE AND FATIGUE: ICD-10-CM

## 2021-11-10 LAB
ALBUMIN SERPL-MCNC: 4.1 GM/DL (ref 3.4–5)
ALP BLD-CCNC: 38 IU/L (ref 40–129)
ALT SERPL-CCNC: 9 U/L (ref 10–40)
ANION GAP SERPL CALCULATED.3IONS-SCNC: 13 MMOL/L (ref 4–16)
AST SERPL-CCNC: 20 IU/L (ref 15–37)
BACTERIA: NEGATIVE /HPF
BASOPHILS ABSOLUTE: 0 K/CU MM
BASOPHILS RELATIVE PERCENT: 0.4 % (ref 0–1)
BILIRUB SERPL-MCNC: 0.4 MG/DL (ref 0–1)
BILIRUBIN URINE: NEGATIVE MG/DL
BLOOD, URINE: NEGATIVE
BUN BLDV-MCNC: 12 MG/DL (ref 6–23)
CALCIUM SERPL-MCNC: 9.2 MG/DL (ref 8.3–10.6)
CAST TYPE: ABNORMAL /HPF
CHLORIDE BLD-SCNC: 90 MMOL/L (ref 99–110)
CLARITY: CLEAR
CO2: 24 MMOL/L (ref 21–32)
COLOR: ABNORMAL
CREAT SERPL-MCNC: 0.7 MG/DL (ref 0.6–1.1)
CRYSTAL TYPE: NEGATIVE /HPF
DIFFERENTIAL TYPE: ABNORMAL
EKG ATRIAL RATE: 77 BPM
EKG DIAGNOSIS: NORMAL
EKG P AXIS: 62 DEGREES
EKG P-R INTERVAL: 168 MS
EKG Q-T INTERVAL: 408 MS
EKG QRS DURATION: 104 MS
EKG QTC CALCULATION (BAZETT): 461 MS
EKG R AXIS: 4 DEGREES
EKG T AXIS: -8 DEGREES
EKG VENTRICULAR RATE: 77 BPM
EOSINOPHILS ABSOLUTE: 0 K/CU MM
EOSINOPHILS RELATIVE PERCENT: 0.6 % (ref 0–3)
EPITHELIAL CELLS, UA: 0 /HPF
GFR AFRICAN AMERICAN: >60 ML/MIN/1.73M2
GFR NON-AFRICAN AMERICAN: >60 ML/MIN/1.73M2
GLUCOSE BLD-MCNC: 127 MG/DL (ref 70–99)
GLUCOSE, URINE: NEGATIVE MG/DL
HCT VFR BLD CALC: 34.4 % (ref 37–47)
HEMOCCULT SP1 STL QL: NEGATIVE
HEMOGLOBIN: 11.6 GM/DL (ref 12.5–16)
IMMATURE NEUTROPHIL %: 0.8 % (ref 0–0.43)
KETONES, URINE: NEGATIVE MG/DL
LACTATE: 2.9 MMOL/L (ref 0.4–2)
LACTATE: 4.6 MMOL/L (ref 0.4–2)
LACTIC ACID, SEPSIS: 2 MMOL/L (ref 0.5–1.9)
LACTIC ACID, SEPSIS: 2.3 MMOL/L (ref 0.5–1.9)
LEUKOCYTE ESTERASE, URINE: NEGATIVE
LYMPHOCYTES ABSOLUTE: 1.3 K/CU MM
LYMPHOCYTES RELATIVE PERCENT: 27.5 % (ref 24–44)
MCH RBC QN AUTO: 32.6 PG (ref 27–31)
MCHC RBC AUTO-ENTMCNC: 33.7 % (ref 32–36)
MCV RBC AUTO: 96.6 FL (ref 78–100)
MONOCYTES ABSOLUTE: 0.5 K/CU MM
MONOCYTES RELATIVE PERCENT: 10 % (ref 0–4)
NITRITE URINE, QUANTITATIVE: NEGATIVE
PDW BLD-RTO: 13.7 % (ref 11.7–14.9)
PH, URINE: 8 (ref 5–8)
PLATELET # BLD: 158 K/CU MM (ref 140–440)
PMV BLD AUTO: 9 FL (ref 7.5–11.1)
POTASSIUM SERPL-SCNC: 4.2 MMOL/L (ref 3.5–5.1)
PROTEIN UA: NEGATIVE MG/DL
RBC # BLD: 3.56 M/CU MM (ref 4.2–5.4)
RBC URINE: 0 /HPF (ref 0–6)
SARS-COV-2, NAAT: NOT DETECTED
SEGMENTED NEUTROPHILS ABSOLUTE COUNT: 3 K/CU MM
SEGMENTED NEUTROPHILS RELATIVE PERCENT: 60.7 % (ref 36–66)
SODIUM BLD-SCNC: 127 MMOL/L (ref 135–145)
SOURCE: NORMAL
SPECIFIC GRAVITY UA: 1.01 (ref 1–1.03)
TOTAL IMMATURE NEUTOROPHIL: 0.04 K/CU MM
TOTAL PROTEIN: 7 GM/DL (ref 6.4–8.2)
TROPONIN T: <0.01 NG/ML
UROBILINOGEN, URINE: 0.2 MG/DL (ref 0.2–1)
WBC # BLD: 4.9 K/CU MM (ref 4–10.5)
WBC UA: 1 /HPF (ref 0–5)

## 2021-11-10 PROCEDURE — 82270 OCCULT BLOOD FECES: CPT

## 2021-11-10 PROCEDURE — 96365 THER/PROPH/DIAG IV INF INIT: CPT

## 2021-11-10 PROCEDURE — 6370000000 HC RX 637 (ALT 250 FOR IP): Performed by: EMERGENCY MEDICINE

## 2021-11-10 PROCEDURE — 87635 SARS-COV-2 COVID-19 AMP PRB: CPT

## 2021-11-10 PROCEDURE — 83605 ASSAY OF LACTIC ACID: CPT

## 2021-11-10 PROCEDURE — 84145 PROCALCITONIN (PCT): CPT

## 2021-11-10 PROCEDURE — 87040 BLOOD CULTURE FOR BACTERIA: CPT

## 2021-11-10 PROCEDURE — 84260 ASSAY OF SEROTONIN: CPT

## 2021-11-10 PROCEDURE — 2580000003 HC RX 258: Performed by: EMERGENCY MEDICINE

## 2021-11-10 PROCEDURE — 81001 URINALYSIS AUTO W/SCOPE: CPT

## 2021-11-10 PROCEDURE — 93005 ELECTROCARDIOGRAM TRACING: CPT | Performed by: EMERGENCY MEDICINE

## 2021-11-10 PROCEDURE — 80053 COMPREHEN METABOLIC PANEL: CPT

## 2021-11-10 PROCEDURE — G0378 HOSPITAL OBSERVATION PER HR: HCPCS

## 2021-11-10 PROCEDURE — 84484 ASSAY OF TROPONIN QUANT: CPT

## 2021-11-10 PROCEDURE — 6360000004 HC RX CONTRAST MEDICATION: Performed by: EMERGENCY MEDICINE

## 2021-11-10 PROCEDURE — 99285 EMERGENCY DEPT VISIT HI MDM: CPT

## 2021-11-10 PROCEDURE — 93010 ELECTROCARDIOGRAM REPORT: CPT | Performed by: INTERNAL MEDICINE

## 2021-11-10 PROCEDURE — 74177 CT ABD & PELVIS W/CONTRAST: CPT

## 2021-11-10 PROCEDURE — 6360000002 HC RX W HCPCS: Performed by: EMERGENCY MEDICINE

## 2021-11-10 PROCEDURE — 85025 COMPLETE CBC W/AUTO DIFF WBC: CPT

## 2021-11-10 PROCEDURE — 1200000000 HC SEMI PRIVATE

## 2021-11-10 PROCEDURE — 71045 X-RAY EXAM CHEST 1 VIEW: CPT

## 2021-11-10 RX ORDER — ONDANSETRON 2 MG/ML
4 INJECTION INTRAMUSCULAR; INTRAVENOUS EVERY 6 HOURS PRN
Status: DISCONTINUED | OUTPATIENT
Start: 2021-11-10 | End: 2021-11-17 | Stop reason: HOSPADM

## 2021-11-10 RX ORDER — 0.9 % SODIUM CHLORIDE 0.9 %
30 INTRAVENOUS SOLUTION INTRAVENOUS ONCE
Status: COMPLETED | OUTPATIENT
Start: 2021-11-10 | End: 2021-11-10

## 2021-11-10 RX ORDER — 0.9 % SODIUM CHLORIDE 0.9 %
500 INTRAVENOUS SOLUTION INTRAVENOUS ONCE
Status: COMPLETED | OUTPATIENT
Start: 2021-11-10 | End: 2021-11-10

## 2021-11-10 RX ORDER — SODIUM CHLORIDE 0.9 % (FLUSH) 0.9 %
5-40 SYRINGE (ML) INJECTION PRN
Status: DISCONTINUED | OUTPATIENT
Start: 2021-11-10 | End: 2021-11-17 | Stop reason: HOSPADM

## 2021-11-10 RX ORDER — SERTRALINE HYDROCHLORIDE 25 MG/1
25 TABLET, FILM COATED ORAL DAILY
COMMUNITY
Start: 2021-11-04

## 2021-11-10 RX ORDER — SODIUM CHLORIDE 9 MG/ML
25 INJECTION, SOLUTION INTRAVENOUS PRN
Status: DISCONTINUED | OUTPATIENT
Start: 2021-11-10 | End: 2021-11-17 | Stop reason: HOSPADM

## 2021-11-10 RX ORDER — ACETAMINOPHEN 650 MG/1
650 SUPPOSITORY RECTAL EVERY 6 HOURS PRN
Status: DISCONTINUED | OUTPATIENT
Start: 2021-11-10 | End: 2021-11-17 | Stop reason: HOSPADM

## 2021-11-10 RX ORDER — SODIUM CHLORIDE 0.9 % (FLUSH) 0.9 %
5-40 SYRINGE (ML) INJECTION EVERY 12 HOURS SCHEDULED
Status: DISCONTINUED | OUTPATIENT
Start: 2021-11-11 | End: 2021-11-17 | Stop reason: HOSPADM

## 2021-11-10 RX ORDER — POLYETHYLENE GLYCOL 3350 17 G/17G
17 POWDER, FOR SOLUTION ORAL DAILY PRN
Status: DISCONTINUED | OUTPATIENT
Start: 2021-11-10 | End: 2021-11-17 | Stop reason: HOSPADM

## 2021-11-10 RX ORDER — ONDANSETRON 4 MG/1
4 TABLET, ORALLY DISINTEGRATING ORAL EVERY 8 HOURS PRN
Status: DISCONTINUED | OUTPATIENT
Start: 2021-11-10 | End: 2021-11-17 | Stop reason: HOSPADM

## 2021-11-10 RX ORDER — GABAPENTIN 300 MG/1
300 CAPSULE ORAL ONCE
Status: COMPLETED | OUTPATIENT
Start: 2021-11-10 | End: 2021-11-10

## 2021-11-10 RX ORDER — SODIUM CHLORIDE 0.9 % (FLUSH) 0.9 %
20 SYRINGE (ML) INJECTION
Status: COMPLETED | OUTPATIENT
Start: 2021-11-10 | End: 2021-11-10

## 2021-11-10 RX ORDER — SODIUM CHLORIDE 9 MG/ML
INJECTION, SOLUTION INTRAVENOUS CONTINUOUS
Status: DISCONTINUED | OUTPATIENT
Start: 2021-11-11 | End: 2021-11-16

## 2021-11-10 RX ORDER — ONDANSETRON 4 MG/1
4 TABLET, ORALLY DISINTEGRATING ORAL ONCE
Status: COMPLETED | OUTPATIENT
Start: 2021-11-10 | End: 2021-11-10

## 2021-11-10 RX ORDER — ACETAMINOPHEN 325 MG/1
650 TABLET ORAL EVERY 6 HOURS PRN
Status: DISCONTINUED | OUTPATIENT
Start: 2021-11-10 | End: 2021-11-17 | Stop reason: HOSPADM

## 2021-11-10 RX ORDER — LEVOFLOXACIN 5 MG/ML
500 INJECTION, SOLUTION INTRAVENOUS ONCE
Status: COMPLETED | OUTPATIENT
Start: 2021-11-10 | End: 2021-11-10

## 2021-11-10 RX ORDER — NITROFURANTOIN 25; 75 MG/1; MG/1
CAPSULE ORAL
COMMUNITY
Start: 2021-10-27 | End: 2021-11-10

## 2021-11-10 RX ADMIN — SODIUM CHLORIDE, PRESERVATIVE FREE 20 ML: 5 INJECTION INTRAVENOUS at 10:24

## 2021-11-10 RX ADMIN — GABAPENTIN 300 MG: 300 CAPSULE ORAL at 09:30

## 2021-11-10 RX ADMIN — IOPAMIDOL 75 ML: 755 INJECTION, SOLUTION INTRAVENOUS at 10:23

## 2021-11-10 RX ADMIN — ONDANSETRON 4 MG: 4 TABLET, ORALLY DISINTEGRATING ORAL at 09:11

## 2021-11-10 RX ADMIN — SODIUM CHLORIDE 500 ML: 9 INJECTION, SOLUTION INTRAVENOUS at 09:10

## 2021-11-10 RX ADMIN — SODIUM CHLORIDE 1878 ML: 9 INJECTION, SOLUTION INTRAVENOUS at 09:51

## 2021-11-10 RX ADMIN — LEVOFLOXACIN 500 MG: 5 INJECTION, SOLUTION INTRAVENOUS at 11:06

## 2021-11-10 SDOH — ECONOMIC STABILITY: INCOME INSECURITY: IN THE LAST 12 MONTHS, WAS THERE A TIME WHEN YOU WERE NOT ABLE TO PAY THE MORTGAGE OR RENT ON TIME?: NO

## 2021-11-10 SDOH — ECONOMIC STABILITY: TRANSPORTATION INSECURITY
IN THE PAST 12 MONTHS, HAS THE LACK OF TRANSPORTATION KEPT YOU FROM MEDICAL APPOINTMENTS OR FROM GETTING MEDICATIONS?: NO

## 2021-11-10 SDOH — ECONOMIC STABILITY: HOUSING INSECURITY: IN THE LAST 12 MONTHS, HOW MANY PLACES HAVE YOU LIVED?: 1

## 2021-11-10 SDOH — ECONOMIC STABILITY: FOOD INSECURITY: WITHIN THE PAST 12 MONTHS, YOU WORRIED THAT YOUR FOOD WOULD RUN OUT BEFORE YOU GOT MONEY TO BUY MORE.: NEVER TRUE

## 2021-11-10 SDOH — ECONOMIC STABILITY: HOUSING INSECURITY
IN THE LAST 12 MONTHS, WAS THERE A TIME WHEN YOU DID NOT HAVE A STEADY PLACE TO SLEEP OR SLEPT IN A SHELTER (INCLUDING NOW)?: NO

## 2021-11-10 SDOH — ECONOMIC STABILITY: FOOD INSECURITY: WITHIN THE PAST 12 MONTHS, THE FOOD YOU BOUGHT JUST DIDN'T LAST AND YOU DIDN'T HAVE MONEY TO GET MORE.: NEVER TRUE

## 2021-11-10 SDOH — HEALTH STABILITY: PHYSICAL HEALTH: ON AVERAGE, HOW MANY DAYS PER WEEK DO YOU ENGAGE IN MODERATE TO STRENUOUS EXERCISE (LIKE A BRISK WALK)?: 0 DAYS

## 2021-11-10 SDOH — ECONOMIC STABILITY: TRANSPORTATION INSECURITY
IN THE PAST 12 MONTHS, HAS LACK OF TRANSPORTATION KEPT YOU FROM MEETINGS, WORK, OR FROM GETTING THINGS NEEDED FOR DAILY LIVING?: NO

## 2021-11-10 SDOH — HEALTH STABILITY: PHYSICAL HEALTH: ON AVERAGE, HOW MANY MINUTES DO YOU ENGAGE IN EXERCISE AT THIS LEVEL?: 0 MIN

## 2021-11-10 ASSESSMENT — SOCIAL DETERMINANTS OF HEALTH (SDOH)
HOW OFTEN DO YOU ATTENT MEETINGS OF THE CLUB OR ORGANIZATION YOU BELONG TO?: MORE THAN 4 TIMES PER YEAR
WITHIN THE LAST YEAR, HAVE YOU BEEN KICKED, HIT, SLAPPED, OR OTHERWISE PHYSICALLY HURT BY YOUR PARTNER OR EX-PARTNER?: NO
HOW OFTEN DO YOU GET TOGETHER WITH FRIENDS OR RELATIVES?: ONCE A WEEK
WITHIN THE LAST YEAR, HAVE YOU BEEN AFRAID OF YOUR PARTNER OR EX-PARTNER?: NO
WITHIN THE LAST YEAR, HAVE YOU BEEN HUMILIATED OR EMOTIONALLY ABUSED IN OTHER WAYS BY YOUR PARTNER OR EX-PARTNER?: NO
IN A TYPICAL WEEK, HOW MANY TIMES DO YOU TALK ON THE PHONE WITH FAMILY, FRIENDS, OR NEIGHBORS?: TWICE A WEEK
HOW HARD IS IT FOR YOU TO PAY FOR THE VERY BASICS LIKE FOOD, HOUSING, MEDICAL CARE, AND HEATING?: NOT HARD AT ALL
HOW OFTEN DO YOU ATTEND CHURCH OR RELIGIOUS SERVICES?: MORE THAN 4 TIMES PER YEAR
DO YOU BELONG TO ANY CLUBS OR ORGANIZATIONS SUCH AS CHURCH GROUPS UNIONS, FRATERNAL OR ATHLETIC GROUPS, OR SCHOOL GROUPS?: YES
WITHIN THE LAST YEAR, HAVE TO BEEN RAPED OR FORCED TO HAVE ANY KIND OF SEXUAL ACTIVITY BY YOUR PARTNER OR EX-PARTNER?: NO

## 2021-11-10 ASSESSMENT — PAIN SCALES - GENERAL: PAINLEVEL_OUTOF10: 0

## 2021-11-10 ASSESSMENT — PATIENT HEALTH QUESTIONNAIRE - PHQ9
2. FEELING DOWN, DEPRESSED OR HOPELESS: NOT AT ALL
1. LITTLE INTEREST OR PLEASURE IN DOING THINGS: NOT AT ALL
SUM OF ALL RESPONSES TO PHQ9 QUESTIONS 1 & 2: 0
DEPRESSION UNABLE TO ASSESS: YES

## 2021-11-10 ASSESSMENT — LIFESTYLE VARIABLES: HOW OFTEN DO YOU HAVE A DRINK CONTAINING ALCOHOL: NEVER

## 2021-11-10 NOTE — ED NOTES
The patient presents to the er today by medics. She lives independently at home by herself. She reports that she has been \" sick since . \"  She reports general weakness and shortness of breath. The patient is alert and oriented. She denies any pain, she is just too weak to care for herself and doesn't really have anyone to care for her. She reports that her  and both of her adult children are . Neida Sharp RN  11/10/21 1007

## 2021-11-10 NOTE — ED NOTES
Contacted the [facility] to begin the transfer process.           Sky Peters RN  11/10/21 3721

## 2021-11-10 NOTE — ED NOTES
Bed: E03  Expected date:   Expected time:   Means of arrival:   Comments:   [address]      Sienna Jacobs, [address] [facility]  11/10/21 [id number]

## 2021-11-10 NOTE — ED NOTES
Per Dr Edwin Timmons pt took her own zoloft medication as prescribed at home      [facility]  11/10/21 [id number]

## 2021-11-10 NOTE — ED TRIAGE NOTES
Pt brought by EMS from home   Pt got her covid booster 11/5 and was having some diarrhea afterwards   Pt started feeling congested and fatigued since Sunday

## 2021-11-10 NOTE — ED NOTES
Assisted to [facility] and back to bed. Updates on plan of care. No further needs at this time. Call light in reach.      Page Mcdonald RN  11/10/21 [id number]

## 2021-11-10 NOTE — ED NOTES
Lab called with a lactic acid result of 2.8. Dr. Shepherd Rater was notified.                Annalee Henry RN  11/10/21 [id number]

## 2021-11-10 NOTE — ED NOTES
The patient was given a healthy choice meal for dinner. Call light is within reach.                       Nahomi Moore RN  11/10/21 0858

## 2021-11-10 NOTE — ED PROVIDER NOTES
Emergency Department Encounter  [address]    Patient: Keo Alexander  MRN: [id number]  : 1935  Date of Evaluation: 11/10/2021  ED Provider: Jesús Gonzalez MD    Chief Complaint       Chief Complaint   Patient presents with    Congestion    Fatigue     NUNU Alexander is a 82 y.o. female who presents to the emergency department via EMS with report that she has not been feeling well since . The patient reports congestion and generalized malaise and fatigue. The patient also reports increasing weakness. She has been afebrile. She denies any increasing shortness of breath or chest pain. She has had nausea without vomiting. She had COVID-19 booster on the first of this month. The next day she had diarrhea ended on the following Thursday, . She has not had abdominal pain. She denies any dysuria, hematuria, urinary frequency or urgency. She has been eating well as well as making bowel movements well. The patient was started on Zoloft this past Thursday,2021. ROS:     At least 10 systems reviewed and otherwise acutely negative except as in the [address] Past History     Past Medical History:   Diagnosis Date    Arthritis of shoulder region, right 2014    Gi Gala Blind right eye     following right cataract surg    Chronic depression     Dr. Roc Callahan DVT (deep venous thrombosis) ([facility] [address] 1970    left leg    Former smoker     History of echocardiogram 2020    EF 55-60%, mild left ventricular hypertrophy, normal diastolic filling pattern for age, no signficiant valvular disease, no pericardial effusion     History of stress test 2021    normal LVEF calculated by the computer is probably falsely low.  LVEF is 40 %    Hx of Doppler ultrasound 2018    Carotid-mild 0-49% bilateral carotid,50% stenosis right subclavian    Hyperlipidemia     Hypertension     Macular degeneration     right    Mild cognitive impairment 2012 MMSE 26/30    MVP (mitral valve prolapse)     trace on echo 2014    Osteoporosis 2012    Pancytopenia 2011    mild with ; Dr Ariadna hawk     Peptic ulcer disease     Peripheral vascular disease ([facility] [address] 2016    angio; dis below ankles; pletal    Prediabetes     Recurrent ventral incisional hernia     medial apex of GB scar    S/P CABG x 3     3-vessel; Dr Saeed Saxena Spigelian hernia 2017    right ant lateral wall; seen on CT abd    Supraventricular tachycardia ([address]     Freq ventriular ectopy    Tic douloureux     Dr. Aracelis Lentz; Right side     Past Surgical History:   Procedure Laterality Date    CATARACT REMOVAL Right     poor result ; blind right eye; [address] CORONARY ARTERY BYPASS GRAFT  2009    3 vessel    SHOULDER ARTHROSCOPY Right     TUBAL LIGATION      URETER SURGERY      Right ureteral repair     Social History     Socioeconomic History    Marital status:       Spouse name: Yeimi Angel Number of children: 2    Years of education: 15    Highest education level: None   Occupational History    Occupation: retired   Tobacco Use    Smoking status: Former Smoker     Packs/day: 0.25     Years: 9.00     Pack years: 2.25     Types: Cigarettes     Start date:      Quit date: 1985     Years since quittin.9    Smokeless tobacco: Never Used   Vaping Use    Vaping Use: Never used   Substance and Sexual Activity    Alcohol use: No     Alcohol/week: 0.0 standard drinks    Drug use: No    Sexual activity: Not Currently     Partners: Male   Other Topics Concern    None   Social History Narrative    None     Social Determinants of Health     Financial Resource Strain: Low Risk     Difficulty of Paying Living Expenses: Not hard at all   Food Insecurity: No Food Insecurity    Worried About Running Out of Food in the Last Year: Never true    Katie of Food in the Last Year: Never true   Transportation Needs:     Lack of Transportation (Medical): Not on file    Lack of Transportation (Non-Medical): Not on file   Physical Activity:     Days of Exercise per Week: Not on file    Minutes of Exercise per Session: Not on file   Stress:     Feeling of Stress : Not on file   Social Connections:     Frequency of Communication with Friends and Family: Not on file    Frequency of Social Gatherings with Friends and Family: Not on file    Attends Jewish Services: Not on file    Active Member of [address] [place] [address] or Organizations: Not on file    Attends Club or Organization Meetings: Not on file    Marital Status: Not on file   Intimate Partner Violence:     Fear of Current or Ex-Partner: Not on file    Emotionally Abused: Not on file    Physically Abused: Not on file    Sexually Abused: Not on file   Housing Stability:     Unable to Pay for Housing in the Last Year: Not on file    Number of [place] in the Last Year: Not on file    Unstable Housing in the Last Year: Not on file       Medications/Allergies     Previous Medications    ASPIRIN 81 MG EC TABLET    Take 81 mg by mouth daily. CALCIUM-VITAMIN D (OSCAL 500/200 D-3) 500-200 MG-UNIT PER TABLET    Take 1 tablet by mouth 2 times daily.       CYANOCOBALAMIN 1000 MCG/ML INJECTION    Inject 1 mL into the muscle once for 1 dose    DIVALPROEX (DEPAKOTE ER) 250 MG EXTENDED RELEASE TABLET    Take 250 mg by mouth nightly     EPINASTINE (ELESTAT) 0.05 % SOLN    Place 1 drop into both eyes daily    FERROUS SULFATE (IRON 325) 325 (65 FE) MG TABLET    Take 1 tablet by mouth daily (with breakfast)    FLUTICASONE (FLONASE) 50 MCG/ACT NASAL SPRAY    instill 1 spray into each nostril once daily    GABAPENTIN (NEURONTIN) 300 MG CAPSULE    take 1 tablet by mouth three times a day for TRIGEMINAL NEURALGIA    HANDICAP PLACARD MISC    by Does not apply route Good for 3 years    ICOSAPENT ETHYL (VASCEPA) 0.5 G CAPS    take 2 capsules by mouth twice a day    LIDOCAINE 4 % EXTERNAL PATCH    On for 12 hours, off for 12 hours    MULTIPLE VITAMINS-MINERALS (OCUVITE-LUTEIN PO)    Take by mouth    ONDANSETRON (ZOFRAN-ODT) 4 MG DISINTEGRATING TABLET    Take 1 tablet by mouth 3 times daily as needed for Nausea or Vomiting    PANTOPRAZOLE (PROTONIX) 40 MG TABLET    Take 1 tablet by mouth daily    ROSUVASTATIN (CRESTOR) 10 MG TABLET    Take 1 tablet by mouth daily    SERTRALINE (ZOLOFT) 25 MG TABLET    take 1/2 tablet by mouth once daily for 2 days then take 1 tablet by mouth once daily THEREAFTER    VERAPAMIL (CALAN SR) 180 MG EXTENDED RELEASE TABLET    take 1/2 tablet by mouth twice a day     Allergies   Allergen Reactions    Alendronate Sodium Other (See Comments)     GI upset    Bactrim [Sulfamethoxazole-Trimethoprim] Anaphylaxis    Boniva [Ibandronate Sodium] Other (See Comments)     Gi upset and declined egd; also to fosamax    Ciprofloxacin     Colesevelam     Lisinopril Other (See Comments)     Severe hyperkalemia (6) with bun >56      Macrobid [Nitrofurantoin]     Neggram [Nalidixic Acid]     Pce [Erythromycin]     Penicillins     Risperidone And Related     Welchol [Colesevelam Hcl] Other (See Comments)     constipation    Carbamazepine Nausea And Vomiting    Lovaza [Omega-3-Acid Ethyl Esters] Nausea And Vomiting    Metoprolol Nausea And Vomiting    Pyridium [Phenazopyridine] Palpitations        Physical Exam       ED Triage Vitals   BP Temp Temp src Pulse Resp SpO2 Height Weight   11/10/21 0833 11/10/21 0835 -- 11/10/21 0833 11/10/21 0833 11/10/21 0833 11/10/21 0833 11/10/21 0833   (!) 199/86 98.3 °F (36.8 °C)  84 18 98 % 5' 3\" (1.6 m) 138 lb (62.6 kg)     GENERAL APPEARANCE: Awake and alert. Cooperative. No acute distress. No obvious discomfort. HEAD: Normocephalic. Atraumatic. EYES: Sclera anicteric. PERRL. EOMI.  ENT: Tolerates saliva. No trismus. NECK: Supple. Trachea midline. CARDIO: RRR. Radial pulse 2+. No audible murmur.   LUNGS: Respirations unlabored. CTAB and symmetrical with mildly diminished bibasilar breath sounds. .  ABDOMEN: Soft. Non-distended. Non-tender. Normoactive bowel sounds. EXTREMITIES: No acute deformities. No clubbing, edema, cyanosis. Distal pulses are intact and symmetrical.  Capillary bed refill is brisk. SKIN: Warm and dry. Pallor otherwise no obvious lesions or discolorations. NEUROLOGICAL: No gross facial drooping. Moves all 4 extremities spontaneously. Awake and alert. The speech is clear. Cranial nerves III to XII grossly intact. Generalized weakness without a focal motor or sensory deficits. PSYCHIATRIC: Normal mood. Calm and cooperative.     Diagnostics   Labs:  Results for orders placed or performed during the hospital encounter of 11/10/21   COVID-19, Rapid    Specimen: Nasopharyngeal   Result Value Ref Range    Source UNKNOWN     SARS-CoV-2, NAAT NOT DETECTED NOT DETECTED   CBC Auto Differential   Result Value Ref Range    WBC 4.9 4.0 - 10.5 K/CU MM    RBC 3.56 (L) 4.2 - 5.4 M/CU MM    Hemoglobin 11.6 (L) 12.5 - 16.0 GM/DL    Hematocrit 34.4 (L) 37 - 47 %    MCV 96.6 78 - 100 FL    MCH 32.6 (H) 27 - 31 PG    MCHC 33.7 32.0 - 36.0 %    RDW 13.7 11.7 - 14.9 %    Platelets 845 780 - 394 K/CU MM    MPV 9.0 7.5 - 11.1 FL    Differential Type AUTOMATED DIFFERENTIAL     Segs Relative 60.7 36 - 66 %    Lymphocytes % 27.5 24 - 44 %    Monocytes % 10.0 (H) 0 - 4 %    Eosinophils % 0.6 0 - 3 %    Basophils % 0.4 0 - 1 %    Segs Absolute 3.0 K/CU MM    Lymphocytes Absolute 1.3 K/CU MM    Monocytes Absolute 0.5 K/CU MM    Eosinophils Absolute 0.0 K/CU MM    Basophils Absolute 0.0 K/CU MM    Immature Neutrophil % 0.8 (H) 0 - 0.43 %    Total Immature Neutrophil 0.04 K/CU MM   Comprehensive Metabolic Panel w/ Reflex to MG   Result Value Ref Range    Sodium 127 (L) 135 - 145 MMOL/L    Potassium 4.2 3.5 - 5.1 MMOL/L    Chloride 90 (L) 99 - 110 mMol/L    CO2 24 21 - 32 MMOL/L    BUN 12 6 - 23 MG/DL    CREATININE 0.7 0.6 - 1.1 MG/DL    Glucose 127 (H) 70 - 99 MG/DL    Calcium 9.2 8.3 - 10.6 MG/DL    Albumin 4.1 3.4 - 5.0 GM/DL    Total Protein 7.0 6.4 - 8.2 GM/DL    Total Bilirubin 0.4 0.0 - 1.0 MG/DL    ALT 9 (L) 10 - 40 U/L    AST 20 15 - 37 IU/L    Alkaline Phosphatase 38 (L) 40 - 129 IU/L    GFR Non-African American >60 >60 mL/min/1.73m2    GFR African American >60 >60 mL/min/1.73m2    Anion Gap 13 4 - 16   Troponin   Result Value Ref Range    Troponin T <0.010 <0.01 NG/ML   Urinalysis Reflex to Culture    Specimen: Urine   Result Value Ref Range    Color, UA PALE YELLOW (A) YELLOW    Clarity, UA CLEAR CLEAR    Glucose, Urine NEGATIVE NEGATIVE MG/DL    Bilirubin Urine NEGATIVE NEGATIVE MG/DL    Ketones, Urine NEGATIVE NEGATIVE MG/DL    Specific Gravity, UA 1.010 1.001 - 1.035    Blood, Urine NEGATIVE NEGATIVE    pH, Urine 8.0 5.0 - 8.0    Protein, UA NEGATIVE NEGATIVE MG/DL    Urobilinogen, Urine 0.2 0.2 - 1.0 MG/DL    Nitrite Urine, Quantitative NEGATIVE NEGATIVE    Leukocyte Esterase, Urine NEGATIVE NEGATIVE    RBC, UA 0 0 - 6 /HPF    WBC, UA 1 0 - 5 /HPF    Epithelial Cells, UA 0 /HPF    Cast Type NO CAST FORMS SEEN NO CAST FORMS SEEN /HPF    Bacteria, UA NEGATIVE NEGATIVE /HPF    Crystal Type NEGATIVE NEGATIVE /HPF   Lactate, Sepsis   Result Value Ref Range    Lactic Acid, Sepsis 2.3 (HH) 0.5 - 1.9 mMOL/L   Lactate, Sepsis   Result Value Ref Range    Lactic Acid, Sepsis 2.0 (HH) 0.5 - 1.9 mMOL/L   Blood occult stool screen #1   Result Value Ref Range    Occult Blood, Stool #1 NEGATIVE NEGATIVE   EKG 12 Lead   Result Value Ref Range    Ventricular Rate 77 BPM    Atrial Rate 77 BPM    P-R Interval 168 ms    QRS Duration 104 ms    Q-T Interval 408 ms    QTc Calculation (Bazett) 461 ms    P Axis 62 degrees    R Axis 4 degrees    T Axis -8 degrees    Diagnosis       Sinus rhythm with occasional premature ventricular complexes  Possible Left atrial enlargement  Borderline ECG  When compared with ECG of 23-MAR-2021 07:04,  premature ventricular complexes are now present  Nonspecific T wave abnormality, improved in Anterolateral leads       Radiographs:  CT ABDOMEN PELVIS W IV CONTRAST Additional Contrast? None    Result Date: 11/10/2021  EXAMINATION: CT OF THE ABDOMEN AND PELVIS WITH CONTRAST 11/10/2021 9:57 am TECHNIQUE: CT of the abdomen and pelvis was performed with the administration of intravenous contrast. Multiplanar reformatted images are provided for review. Dose modulation, iterative reconstruction, and/or weight based adjustment of the mA/kV was utilized to reduce the radiation dose to as low as reasonably achievable. COMPARISON: 09/30/2021 HISTORY: ORDERING SYSTEM PROVIDED HISTORY: Diarrhea, lactic acidosis TECHNOLOGIST PROVIDED HISTORY: Reason for exam:->Diarrhea, lactic acidosis Additional Contrast?->None Decision Support Exception - unselect if not a suspected or confirmed emergency medical condition->Emergency Medical Condition (MA) Reason for Exam: Diarrhea, lactic acidosis Acuity: Acute Type of Exam: Initial Additional signs and symptoms: fatigue, weakness Relevant Medical/Surgical History: Hx Judy, Tubal, Rt Ureter repair / 75cc [id number] FINDINGS: Lower Chest: No parenchymal consolidation or pleural effusion is identified. No pericardial effusion. No distal esophageal thickening is identified. Small hiatal hernia. Median sternotomy wires are identified. Distal aortic atherosclerotic disease. Organs: No focal mass identified in the liver or spleen. Surgical clips are seen the gallbladder fossa. No adrenal mass is identified. No pancreatic mass is identified. No peripancreatic inflammatory process is identified. No enhancing renal mass is identified. No obstructive urinary tract calculi are identified. Mild hydroureter seen bilaterally, which is a new finding. GI/Bowel: Colonic diverticulosis is identified. No acute diverticulitis is seen. No ileus or obstruction is identified.   No definite acute diverticulitis is identified. Pelvis: No pelvic mass is identified. The bladder appears unremarkable. Uterus and adnexal structures appear grossly unremarkable. No free fluid is identified in the pelvis. Peritoneum/Retroperitoneum: No abdominal aortic aneurysm is identified. No aortic dissection. Heavy ostial and proximal calcification within the renal arteries limits detection for significant stenosis. The ARIANA is patent. Iliac branches are patent without 50% or greater degree of stenosis. No bulky lymphadenopathy is identified in the abdomen or pelvis. There is a right spigelian hernia. Bones/Soft Tissues: No acute subcutaneous soft tissue abnormality is identified. No acute osseous abnormality is identified. Mild levo scoliotic deformity noted. Vertebral alignment appear similar to the previous examination. Greatest degree of degenerative change seen at the level of L3-L4 through L5-S1. Facet arthropathy and osseous foraminal narrowing noted. Multilevel minimal spondylolisthesis seen throughout the lumbar spine, stable from the previous examination. Again identified is chronic diverticulosis, though without acute inflammatory change. The stranding seen on the previous examination appears improved. No ileus or obstruction. Interval development of mild hydroureter bilaterally, though without an obstructive calculus. No significant bladder distention is seen however to suggest bladder outlet obstruction. Cholecystectomy, spigelian hernia on the right, and other similar findings as above. XR CHEST PORTABLE    Result Date: 11/10/2021  EXAMINATION: ONE XRAY VIEW OF THE CHEST 11/10/2021 8:46 am COMPARISON: 2019 HISTORY: ORDERING SYSTEM PROVIDED HISTORY: Congestion, weakness TECHNOLOGIST PROVIDED HISTORY: Reason for exam:->Congestion, weakness Reason for Exam: Congestion, weakness Acuity: Acute Type of Exam: Initial Additional signs and symptoms: fatigue FINDINGS: Top-normal cardiac size.   Median sternotomy changes identified. Chronic atelectasis left lung base similar to prior study. No new infiltrate. No pneumothorax. Significant osteopenic changes and degenerative changes noted in the bony structures. Chronic atelectasis left lung base. No new finding, no change from priors. Procedures/EKG:   EKG Interpretation    Interpreted by emergency department physician    Rhythm: Sinus rhythm  Rate: 77 bpm  Axis: normal  Ectopy: Occasional PVCs  Conduction: normal  ST Segments: Nonspecific minimal ST segment depression in leads I, II, and V6  T Waves: non specific changes  Q Waves: none    Clinical Impression: Sinus rhythm with no acute changes when compared to previous twelve-lead EKG from 3/23/2021    Carlo Lama MD      ED Course and MDM   In brief, Rajiv Reid is a 82 y.o. female who presented to the emergency department with report that she lives alone. The patient has not been feeling well since Sunday. She has had nausea without vomiting or diarrhea. She denies abdominal pain. The patient states she has generalized weakness, malaise, and fatigue. She stated she has been so weak that she has not been fully able to care for herself. She just had her last meal at home. The patient CBC and CMP were unremarkable. The patient had levator lactic acid 2.4. She had IV fluid hydration at 30 mL/kg. The repeat lactic acid was 2. The urinalysis chest x-ray and abdominal CT did not demonstrate etiology for the patient's symptoms or elevated lactic acid. The patient is pending blood cultures. She was discussed with the hospitalist on-call,  Dr. Abdirashid Garcia, who sent the patient to his service with the plan for PT/OT elvaluation and possible rehab placement if needed. The patient is in agreement the plan of care. She is stable and in no acute distress.     ED Medication Orders (From admission, onward)    Start Ordered     Status Ordering Provider    11/10/21 6158 11/10/21 0953  sodium chloride flush 0.9 % injection 20 mL  IMG ONCE PRN         Last MAR action: Given - by Scott Olson on 11/10/21 at [address], [place]    11/10/21 5039 11/10/21 0953  iopamidol (ISOVUE-370) 76 % injection 75 mL  IMG ONCE PRN         Last MAR action: Given - by Scott Olson on 11/10/21 at [address], [place]     11/10/21 0945 11/10/21 0936  levoFLOXacin (LEVAQUIN) 500 MG/100ML infusion 500 mg  ONCE        Question:  Antimicrobial Indications  Answer:  Sepsis of Unknown Etiology    Last MAR action: Stopped - by Beula Locket on 11/10/21 at [facility]    11/10/21 0930 11/10/21 0916  gabapentin (NEURONTIN) capsule 300 mg  ONCE         Last MAR action: Given - by Adrienne LAWS on 11/10/21 at [facility]    11/10/21 0930 11/10/21 0928  0.9 % sodium chloride IV bolus 1,878 mL  ONCE         Last MAR action: New Bag - by Beula Locket on 11/10/21 at [address]     11/10/21 0845 11/10/21 0842  0.9 % sodium chloride bolus  ONCE         Last MAR action: Stopped - by ABA Watson on 11/10/21 at [address] [facility]    11/10/21 0845 11/10/21 0842  ondansetron (ZOFRAN-ODT) disintegrating tablet 4 mg  ONCE         Last MAR action: Given - by Beula Locket on 11/10/21 at [address]          Final Impression      1. Generalized weakness    2. Elevated lactic acid level    3. Nausea    4. Malaise and fatigue    5.  Diverticulosis      DISPOSITION transfer to [facility]     (Please note that portions of this note may have been completed with a voice recognition program. Efforts were made to edit the dictations but occasionally words are mis-transcribed.)    Claudette Loth, MD  [address], [place]  11/10/21 2241

## 2021-11-10 NOTE — ED NOTES
The patent was given lunch. The patient took her own Verapamil.                Amparo Gentile RN  11/10/21 2820

## 2021-11-11 LAB
ANION GAP SERPL CALCULATED.3IONS-SCNC: 12 MMOL/L (ref 4–16)
BASOPHILS ABSOLUTE: 0 K/CU MM
BASOPHILS RELATIVE PERCENT: 0.3 % (ref 0–1)
BUN BLDV-MCNC: 10 MG/DL (ref 6–23)
CALCIUM SERPL-MCNC: 8.5 MG/DL (ref 8.3–10.6)
CHLORIDE BLD-SCNC: 98 MMOL/L (ref 99–110)
CO2: 21 MMOL/L (ref 21–32)
CREAT SERPL-MCNC: 0.7 MG/DL (ref 0.6–1.1)
DIFFERENTIAL TYPE: ABNORMAL
EOSINOPHILS ABSOLUTE: 0.1 K/CU MM
EOSINOPHILS RELATIVE PERCENT: 1.1 % (ref 0–3)
GFR AFRICAN AMERICAN: >60 ML/MIN/1.73M2
GFR NON-AFRICAN AMERICAN: >60 ML/MIN/1.73M2
GLUCOSE BLD-MCNC: 104 MG/DL (ref 70–99)
HCT VFR BLD CALC: 35 % (ref 37–47)
HEMOGLOBIN: 11.6 GM/DL (ref 12.5–16)
IMMATURE NEUTROPHIL %: 0.5 % (ref 0–0.43)
LACTATE: 1.8 MMOL/L (ref 0.4–2)
LYMPHOCYTES ABSOLUTE: 1.9 K/CU MM
LYMPHOCYTES RELATIVE PERCENT: 28.4 % (ref 24–44)
MCH RBC QN AUTO: 33 PG (ref 27–31)
MCHC RBC AUTO-ENTMCNC: 33.1 % (ref 32–36)
MCV RBC AUTO: 99.4 FL (ref 78–100)
MONOCYTES ABSOLUTE: 0.7 K/CU MM
MONOCYTES RELATIVE PERCENT: 11.1 % (ref 0–4)
NUCLEATED RBC %: 0 %
PDW BLD-RTO: 14 % (ref 11.7–14.9)
PLATELET # BLD: 171 K/CU MM (ref 140–440)
PMV BLD AUTO: 9.2 FL (ref 7.5–11.1)
POTASSIUM SERPL-SCNC: 4.1 MMOL/L (ref 3.5–5.1)
PROCALCITONIN: 0.05
RBC # BLD: 3.52 M/CU MM (ref 4.2–5.4)
SEGMENTED NEUTROPHILS ABSOLUTE COUNT: 3.9 K/CU MM
SEGMENTED NEUTROPHILS RELATIVE PERCENT: 58.6 % (ref 36–66)
SODIUM BLD-SCNC: 131 MMOL/L (ref 135–145)
TOTAL IMMATURE NEUTOROPHIL: 0.03 K/CU MM
TOTAL NUCLEATED RBC: 0 K/CU MM
WBC # BLD: 6.6 K/CU MM (ref 4–10.5)

## 2021-11-11 PROCEDURE — 6370000000 HC RX 637 (ALT 250 FOR IP): Performed by: INTERNAL MEDICINE

## 2021-11-11 PROCEDURE — G0378 HOSPITAL OBSERVATION PER HR: HCPCS

## 2021-11-11 PROCEDURE — 80048 BASIC METABOLIC PNL TOTAL CA: CPT

## 2021-11-11 PROCEDURE — 85025 COMPLETE CBC W/AUTO DIFF WBC: CPT

## 2021-11-11 PROCEDURE — 6360000002 HC RX W HCPCS: Performed by: INTERNAL MEDICINE

## 2021-11-11 PROCEDURE — 1200000000 HC SEMI PRIVATE

## 2021-11-11 PROCEDURE — 97116 GAIT TRAINING THERAPY: CPT

## 2021-11-11 PROCEDURE — 83605 ASSAY OF LACTIC ACID: CPT

## 2021-11-11 PROCEDURE — 2580000003 HC RX 258: Performed by: INTERNAL MEDICINE

## 2021-11-11 PROCEDURE — 97162 PT EVAL MOD COMPLEX 30 MIN: CPT

## 2021-11-11 PROCEDURE — 84145 PROCALCITONIN (PCT): CPT

## 2021-11-11 PROCEDURE — 36591 DRAW BLOOD OFF VENOUS DEVICE: CPT

## 2021-11-11 PROCEDURE — 97166 OT EVAL MOD COMPLEX 45 MIN: CPT

## 2021-11-11 PROCEDURE — 94761 N-INVAS EAR/PLS OXIMETRY MLT: CPT

## 2021-11-11 PROCEDURE — 36415 COLL VENOUS BLD VENIPUNCTURE: CPT

## 2021-11-11 PROCEDURE — 97535 SELF CARE MNGMENT TRAINING: CPT

## 2021-11-11 PROCEDURE — 76937 US GUIDE VASCULAR ACCESS: CPT

## 2021-11-11 RX ORDER — SERTRALINE HYDROCHLORIDE 25 MG/1
25 TABLET, FILM COATED ORAL
Status: DISCONTINUED | OUTPATIENT
Start: 2021-11-11 | End: 2021-11-17 | Stop reason: HOSPADM

## 2021-11-11 RX ORDER — DIVALPROEX SODIUM 250 MG/1
250 TABLET, EXTENDED RELEASE ORAL NIGHTLY
Status: DISCONTINUED | OUTPATIENT
Start: 2021-11-11 | End: 2021-11-17 | Stop reason: HOSPADM

## 2021-11-11 RX ORDER — KETOTIFEN FUMARATE 0.35 MG/ML
1 SOLUTION/ DROPS OPHTHALMIC 2 TIMES DAILY
COMMUNITY

## 2021-11-11 RX ORDER — PANTOPRAZOLE SODIUM 40 MG/1
40 TABLET, DELAYED RELEASE ORAL
Status: DISCONTINUED | OUTPATIENT
Start: 2021-11-11 | End: 2021-11-17 | Stop reason: HOSPADM

## 2021-11-11 RX ORDER — GABAPENTIN 300 MG/1
300 CAPSULE ORAL 3 TIMES DAILY
Status: DISCONTINUED | OUTPATIENT
Start: 2021-11-11 | End: 2021-11-17 | Stop reason: HOSPADM

## 2021-11-11 RX ORDER — OMEGA-3-ACID ETHYL ESTERS 1 G/1
1 CAPSULE, LIQUID FILLED ORAL 2 TIMES DAILY
Status: DISCONTINUED | OUTPATIENT
Start: 2021-11-11 | End: 2021-11-11

## 2021-11-11 RX ORDER — ROSUVASTATIN CALCIUM 5 MG/1
10 TABLET, COATED ORAL DAILY
Status: DISCONTINUED | OUTPATIENT
Start: 2021-11-11 | End: 2021-11-17 | Stop reason: HOSPADM

## 2021-11-11 RX ORDER — ASPIRIN 81 MG/1
81 TABLET ORAL DAILY
Status: DISCONTINUED | OUTPATIENT
Start: 2021-11-11 | End: 2021-11-17 | Stop reason: HOSPADM

## 2021-11-11 RX ORDER — KETOTIFEN FUMARATE 0.35 MG/ML
1 SOLUTION/ DROPS OPHTHALMIC 2 TIMES DAILY
Status: DISCONTINUED | OUTPATIENT
Start: 2021-11-11 | End: 2021-11-17 | Stop reason: HOSPADM

## 2021-11-11 RX ADMIN — SODIUM CHLORIDE, PRESERVATIVE FREE 10 ML: 5 INJECTION INTRAVENOUS at 08:41

## 2021-11-11 RX ADMIN — ONDANSETRON 4 MG: 2 INJECTION INTRAMUSCULAR; INTRAVENOUS at 10:10

## 2021-11-11 RX ADMIN — VERAPAMIL HYDROCHLORIDE 90 MG: 180 TABLET, FILM COATED, EXTENDED RELEASE ORAL at 21:42

## 2021-11-11 RX ADMIN — GABAPENTIN 300 MG: 300 CAPSULE ORAL at 21:42

## 2021-11-11 RX ADMIN — PANTOPRAZOLE SODIUM 40 MG: 40 TABLET, DELAYED RELEASE ORAL at 08:19

## 2021-11-11 RX ADMIN — GABAPENTIN 300 MG: 300 CAPSULE ORAL at 14:42

## 2021-11-11 RX ADMIN — KETOTIFEN FUMARATE 1 DROP: 0.35 SOLUTION/ DROPS OPHTHALMIC at 08:40

## 2021-11-11 RX ADMIN — VERAPAMIL HYDROCHLORIDE 90 MG: 180 TABLET, FILM COATED, EXTENDED RELEASE ORAL at 08:39

## 2021-11-11 RX ADMIN — ROSUVASTATIN CALCIUM 10 MG: 5 TABLET, COATED ORAL at 08:38

## 2021-11-11 RX ADMIN — SODIUM CHLORIDE, PRESERVATIVE FREE 10 ML: 5 INJECTION INTRAVENOUS at 23:39

## 2021-11-11 RX ADMIN — ONDANSETRON 4 MG: 2 INJECTION INTRAMUSCULAR; INTRAVENOUS at 15:37

## 2021-11-11 RX ADMIN — KETOTIFEN FUMARATE 1 DROP: 0.35 SOLUTION/ DROPS OPHTHALMIC at 21:42

## 2021-11-11 RX ADMIN — ASPIRIN 81 MG: 81 TABLET, COATED ORAL at 08:38

## 2021-11-11 RX ADMIN — GABAPENTIN 300 MG: 300 CAPSULE ORAL at 08:39

## 2021-11-11 RX ADMIN — ENOXAPARIN SODIUM 40 MG: 100 INJECTION SUBCUTANEOUS at 08:39

## 2021-11-11 RX ADMIN — SERTRALINE 25 MG: 25 TABLET, FILM COATED ORAL at 14:49

## 2021-11-11 RX ADMIN — DIVALPROEX SODIUM 250 MG: 250 TABLET, FILM COATED, EXTENDED RELEASE ORAL at 21:42

## 2021-11-11 RX ADMIN — SODIUM CHLORIDE: 9 INJECTION, SOLUTION INTRAVENOUS at 01:04

## 2021-11-11 ASSESSMENT — PAIN SCALES - GENERAL: PAINLEVEL_OUTOF10: 0

## 2021-11-11 NOTE — CONSULTS
[facility] MaetsuyckDr. Dan C[facility] Hospitalat [facility], [address]   Consult Note  [facility] 1 2 3 4 5    Date: 2021   Patient: Brenda Mujica   : 1935   DOA: 11/10/2021   MRN: [id number]   ROOM#: 1105/1105-A     Reason for Consult: Bilateral hydroureter  Requesting Physician:  Dr. Lucio Sweeney  Collaborating Urologist on Call at time of admission: Dr. Humberto Freeman: Weakness    History Obtained From:  patient, electronic medical record    HISTORY OF PRESENT ILLNESS:                The patient is a 82 y.o. female with significant past medical history of CAD s/p CABG, PAD, SVT, HTN, HLD, depression, osteoporosis, recurrent UTI's, and recently treated for acute diverticulitis (2021) who presented with generalized weakness and fatigue x3 days. CT a/p revealed mild bilateral hydroureteronephrosis with normal kidney function and no evidence of UTI. Pt denies flank/abd pain, but endorses nocturia x6 and feelings of incomplete bladder emptying during the day. ED Provider's HPI 11/10/21: Brenda Mujica is a 82 y.o. female who presents to the emergency department via EMS with report that she has not been feeling well since . The patient reports congestion and generalized malaise and fatigue. The patient also reports increasing weakness. She has been afebrile. She denies any increasing shortness of breath or chest pain. She has had nausea without vomiting. She had COVID-19 booster on the first of this month. The next day she had diarrhea ended on the following Thursday, . She has not had abdominal pain. She denies any dysuria, hematuria, urinary frequency or urgency. She has been eating well as well as making bowel movements well. The patient was started on Zoloft this past Thursday,2021.     Past Medical History:        Diagnosis Date    Arthritis of shoulder region, right 2014    Edgarselene Garnica Blind right eye     following right cataract surg    Chronic depression     Dr. Bishnu Aquino    DVT (deep Daily  sertraline (ZOLOFT) tablet 25 mg, 25 mg, Oral, Daily before lunch  verapamil (CALAN SR) extended release tablet 90 mg, 90 mg, Oral, BID  Icosapent Ethyl CAPS 1 g   ++NON FORMULARY / PATIENT SUPPLIED++, 1 g, Oral, BID  sodium chloride flush 0.9 % injection 5-40 mL, 5-40 mL, IntraVENous, 2 times per day  sodium chloride flush 0.9 % injection 5-40 mL, 5-40 mL, IntraVENous, PRN  0.9 % sodium chloride infusion, 25 mL, IntraVENous, PRN  enoxaparin (LOVENOX) injection 40 mg, 40 mg, SubCUTAneous, Daily  ondansetron (ZOFRAN-ODT) disintegrating tablet 4 mg, 4 mg, Oral, Q8H PRN **OR** ondansetron (ZOFRAN) injection 4 mg, 4 mg, IntraVENous, Q6H PRN  polyethylene glycol (GLYCOLAX) packet 17 g, 17 g, Oral, Daily PRN  acetaminophen (TYLENOL) tablet 650 mg, 650 mg, Oral, Q6H PRN **OR** acetaminophen (TYLENOL) suppository 650 mg, 650 mg, Rectal, Q6H PRN  0.9 % sodium chloride infusion, , IntraVENous, Continuous    Allergies:  Alendronate sodium, Bactrim [sulfamethoxazole-trimethoprim], Boniva [ibandronate sodium], Ciprofloxacin, Colesevelam, Lisinopril, Macrobid [nitrofurantoin], Neggram [nalidixic acid], Pce [erythromycin], Penicillins, Risperidone and related, Welchol [colesevelam hcl], Carbamazepine, Lovaza [omega-3-acid ethyl esters], Metoprolol, and Pyridium [phenazopyridine]    Social History:   TOBACCO:   reports that she quit smoking about 35 years ago. Her smoking use included cigarettes. She started smoking about 45 years ago. She has a 2.25 pack-year smoking history. She has never used smokeless tobacco.  ETOH:   reports no history of alcohol use. DRUGS:   reports no history of drug use.     Family History:       Problem Relation Age of Onset    High Blood Pressure Mother     Heart Attack Mother     Other Mother         unsteady gait    Other Father         silcosis    Cancer Sister 79        Breast cancer 1/2017    Stroke Sister        REVIEW OF SYSTEMS:     CONSTITUTIONAL:  positive for fatigue  RESPIRATORY:  negative  CARDIOVASCULAR:  negative  GASTROINTESTINAL:  negative  GENITOURINARY:  positive for nocturia    PHYSICAL EXAM:      VITALS:  BP (!) 149/51   Pulse 70   Temp 98.1 °F (36.7 °C) (Oral)   Resp 16   Ht 5' 3\" (1.6 m)   Wt 132 lb 14.4 oz (60.3 kg)   SpO2 95%   BMI 23.54 kg/m²      TEMPERATURE:  Current - Temp: 98.1 °F (36.7 °C); Max - Temp  Av.2 °F (36.8 °C)  Min: 98.1 °F (36.7 °C)  Max: 98.3 °F (36.8 °C)  24HR BLOOD PRESSURE RANGE:  Systolic (64MTY), XAD:388 , Min:134 , NZA:893   ; Diastolic (70QWE), YUH:29, Min:51, Max:86    General appearance: alert, appears stated age, cooperative, fatigued and no distress  Head: Normocephalic, without obvious abnormality, atraumatic  Back: No CVA tenderness  Abdomen: Soft, non-tender, non-distended    DATA:    WBC:    Lab Results   Component Value Date    WBC 6.6 2021     Hemoglobin/Hematocrit:    Lab Results   Component Value Date    HGB 11.6 2021    HCT 35.0 2021     BMP:    Lab Results   Component Value Date     2021    K 4.1 2021    CL 98 2021    CO2 21 2021    BUN 10 2021    LABALBU 4.1 11/10/2021    CREATININE 0.7 2021    CALCIUM 8.5 2021    GFRAA >60 2021    GFRAA 51 2013    LABGLOM >60 2021     PT/INR:    Lab Results   Component Value Date    PROTIME 11.0 2016    INR 0.96 2016     Imaging:  CT ABDOMEN PELVIS W IV CONTRAST Additional Contrast? None    Result Date: 11/10/2021  EXAMINATION: CT OF THE ABDOMEN AND PELVIS WITH CONTRAST 11/10/2021 9:57 am TECHNIQUE: CT of the abdomen and pelvis was performed with the administration of intravenous contrast. Multiplanar reformatted images are provided for review. Dose modulation, iterative reconstruction, and/or weight based adjustment of the mA/kV was utilized to reduce the radiation dose to as low as reasonably achievable.  COMPARISON: 2021 HISTORY: ORDERING SYSTEM PROVIDED HISTORY: Diarrhea, lactic acidosis TECHNOLOGIST PROVIDED HISTORY: Reason for exam:->Diarrhea, lactic acidosis Additional Contrast?->None Decision Support Exception - unselect if not a suspected or confirmed emergency medical condition->Emergency Medical Condition (MA) Reason for Exam: Diarrhea, lactic acidosis Acuity: Acute Type of Exam: Initial Additional signs and symptoms: fatigue, weakness Relevant Medical/Surgical History: Hx Judy, Tubal, Rt Ureter repair / 75cc [id number] FINDINGS: Lower Chest: No parenchymal consolidation or pleural effusion is identified. No pericardial effusion. No distal esophageal thickening is identified. Small hiatal hernia. Median sternotomy wires are identified. Distal aortic atherosclerotic disease. Organs: No focal mass identified in the liver or spleen. Surgical clips are seen the gallbladder fossa. No adrenal mass is identified. No pancreatic mass is identified. No peripancreatic inflammatory process is identified. No enhancing renal mass is identified. No obstructive urinary tract calculi are identified. Mild hydroureter seen bilaterally, which is a new finding. GI/Bowel: Colonic diverticulosis is identified. No acute diverticulitis is seen. No ileus or obstruction is identified. No definite acute diverticulitis is identified. Pelvis: No pelvic mass is identified. The bladder appears unremarkable. Uterus and adnexal structures appear grossly unremarkable. No free fluid is identified in the pelvis. Peritoneum/Retroperitoneum: No abdominal aortic aneurysm is identified. No aortic dissection. Heavy ostial and proximal calcification within the renal arteries limits detection for significant stenosis. The ARIANA is patent. Iliac branches are patent without 50% or greater degree of stenosis. No bulky lymphadenopathy is identified in the abdomen or pelvis. There is a right spigelian hernia. Bones/Soft Tissues: No acute subcutaneous soft tissue abnormality is identified.   No acute osseous abnormality is identified. Mild levo scoliotic deformity noted. Vertebral alignment appear similar to the previous examination. Greatest degree of degenerative change seen at the level of L3-L4 through L5-S1. Facet arthropathy and osseous foraminal narrowing noted. Multilevel minimal spondylolisthesis seen throughout the lumbar spine, stable from the previous examination. Again identified is chronic diverticulosis, though without acute inflammatory change. The stranding seen on the previous examination appears improved. No ileus or obstruction. Interval development of mild hydroureter bilaterally, though without an obstructive calculus. No significant bladder distention is seen however to suggest bladder outlet obstruction. Cholecystectomy, spigelian hernia on the right, and other similar findings as above. XR CHEST PORTABLE    Result Date: 11/10/2021  EXAMINATION: ONE XRAY VIEW OF THE CHEST 11/10/2021 8:46 am COMPARISON: 2019 HISTORY: ORDERING SYSTEM PROVIDED HISTORY: Congestion, weakness TECHNOLOGIST PROVIDED HISTORY: Reason for exam:->Congestion, weakness Reason for Exam: Congestion, weakness Acuity: Acute Type of Exam: Initial Additional signs and symptoms: fatigue FINDINGS: Top-normal cardiac size. Median sternotomy changes identified. Chronic atelectasis left lung base similar to prior study. No new infiltrate. No pneumothorax. Significant osteopenic changes and degenerative changes noted in the bony structures. Chronic atelectasis left lung base. No new finding, no change from priors. Assessment & Plan:      Charlotte Walker is a 82y.o. year old female admitted 11/10/2021 for generalized weakness. 1) Mild Bilateral Hydroureteronephrosis: CT a/p 11/10/21: Interval development of mild hydroureter bilaterally, though without an obstructive calculus. No significant bladder distention is seen however to suggest bladder outlet obstruction.     Cr 0.7; UA negative   No flank/abd pain.   Obtain PVR   No indication for intervention at this time. Will follow up as outpatient for Urodynamics testing to assess for bladder dysfunction  2) Nocturia: c/o 6x per night, affecting amount of sleep. Has failed multiple medications, but did improve with pelvic floor therapy in 2017. May arrange for PFT again depending on Urodynamics testing results. In the meantime, recommend fluid restriction 4hrs before bed. Pt stable from a  standpoint. Will sign off, please call with any questions. Pt to follow up in our office for Urodynamics testing in 2 weeks. Patient seen and examined, chart reviewed.      Electronically signed by Roby Fields PA-C on 11/11/2021 at 8:17 AM

## 2021-11-11 NOTE — PROGRESS NOTES
Physical Therapy  [facility] ACUTE CARE PHYSICAL THERAPY EVALUATION  Marty Hobson, 1935, 1105/1105-A, 11/11/2021    History  [place]:  The primary encounter diagnosis was Generalized weakness. Diagnoses of Elevated lactic acid level, Nausea, Malaise and fatigue, and Diverticulosis were also pertinent to this visit. Patient  has a past medical history of Arthritis of shoulder region, right, Blind right eye, Chronic depression, DVT (deep venous thrombosis) ([address] Former smoker, History of echocardiogram, History of stress test, Hx of Doppler ultrasound, Hyperlipidemia, Hypertension, Macular degeneration, Mild cognitive impairment, MVP (mitral valve prolapse), Osteoporosis, Pancytopenia, Peptic ulcer disease, Peripheral vascular disease ([address] Prediabetes, Recurrent ventral incisional hernia, S/P CABG x 3, Spigelian hernia, Supraventricular tachycardia ([address] and Tic douloureux. Patient  has a past surgical history that includes Cholecystectomy; Coronary artery bypass graft (8/2009); Ureter surgery; Shoulder arthroscopy (Right, 2003); Tubal ligation; and Cataract removal (Right, 2010).     Subjective:  Patient states:  \"I was doing so well before coming in here\"   Pain:  denies  Communication with other providers: co-eval with Rachel SOTELO  Restrictions: general precautions, falls    Home Setup/Prior level of function  Social/Functional History  Lives With: Alone  Type of Home: [address] [place] [address],[address]: One level (Family room in the basement, however pt does not go downstairs.)  Home Access: Stairs to enter with rails  Entrance Stairs - Number of Steps: 3-4 steps  Bathroom Shower/Tub: Tub/Shower unit, Shower chair with back  [employer]: Standard  Bathroom Equipment: Grab bars in shower, Grab bars around toilet  Receives Help From:  (Sons live close by, however they work full time.)  ADL Assistance: Independent  Homemaking Assistance: Independent  Homemaking Responsibilities: Yes  Ambulation Assistance: Independent  Transfer Assistance: Independent  Active : Yes  Mode of Transportation: Car    Examination of body systems (includes body structures/functions, activity/participation limitations):  · Observation:  In the bathroom with OT upon arrival. Pleasant and cooperative with therapy. Limited with nausea this morning. Concerned about getting her medication with food. Attempted to notify pts RN but unable to get a hold of her. Notified charge nurse of pts request.   · Vision:  glasses  · [facility]  [facility] [employer] [facility]  · Cardiopulmonary:  Stable vitals on room air     Musculoskeletal  · ROM R/L:  WFL BLEs. Hx of right shoulder surgery 2* rotator cuff impairment w/ dec ROM/functional use. · Strength R/L:  BLEs grossly 4+/5, in function and endurance. Mobility/treatment:   · Rolling L/R:   NT   · Supine to sit:  NT. Per OT, pt required Trina prior to PT arriving. · Transfers:   · Sit to stand: modA from toilet, CGA from EOB. Cues for hand sequencing to avoid pulling to stand from RW   · Stand to sit: CGA for safety and EOB and recliner. VCs for reaching back to seat surface to control sit   · Step pivot:CGA for safety with RW. Cues needed for sequencing full turn. Slightly impulsive to sit due to feeling nauseated and did not follow cues   · Sitting balance:  SBA at EOB static and light dynamic without UE support   · Standing balance:   CGA for safety at RW and while at sink performing hand hygiene without UE support   · Gait: ~10ft +25ft with RW CGA for safety. Cues needed for safe positioning of RW and uprighting trunk. Tendency to walk behind walker vs with it. Slower pace with short steps. Limited with nauseated   · Educated pt on POC, role of PT, DME use. Cues provided for sequencing to inc safety and indep with mobility     [facility] 6 Clicks Inpatient Mobility:  AM-PAC Inpatient Mobility Raw Score : 18    Safety: patient left in chair, call light within reach, RN notified, gait belt used.     Assessment:  Pt is an 82

## 2021-11-11 NOTE — H&P
HISTORY AND PHYSICAL  (Hospitalist, Internal Medicine)  IDENTIFYING INFORMATION   PATIENT:  Kye Burrell  MRN:  [id number]  ADMIT DATE: 11/10/2021      CHIEF COMPLAINT   Transferred from [facility] for generalized weakness and hyponatremia    HISTORY OF PRESENT ILLNESS   Kye Burrell is a 82 y.o. female with coronary artery disease s/p CABG, PAD, supraventricular tachycardia, mitral valve prolapse, hypertension, hyperlipidemia, depression, macular degeneration, osteoporosis history of recurrent UTIs, history of trigeminal neuralgia, recently treated for acute diverticulitis (9/2021) who initially presented to [facility] ED with complaints of not feeling well. Patient reported that since Sunday she has been feeling extremely fatigued, describing as a flulike symptoms, denied any fever, chills. Patient lives alone and reports that she was not able to fix her meals, at times was confused. Denied any nausea, vomiting. Patient also reported being intolerant to most of the antibiotics. Denied any urinary symptoms, denied any constipation or diarrhea. Patient reports that she has a good appetite, keeps herself well-hydrated. Patient reports that she does not usually have typical symptoms for UTI, has back pain, feels that she is not completely emptying her bladder. Is following with urology for recurrent UTIs. At presentation patient was noted to have /86, HR 84, RR 18, temp 98.3, saturating 98% on room air. Lab work was significant for sodium 127, lactic acid 2.3, troponin <0.010, hemoglobin 11.6. Stool occult negative. UA not suggestive of infection. Rapid Covid negative. Chest x-ray-chronic atelectasis left lung base. CT abdomen/pelvis-chronic diverticulosis, no diverticulitis, interval development of mild hydroureter bilaterally without an obstructive calculus. Patient initially received 400 cc bolus.   Later subclavian    Hyperlipidemia     Hypertension     Macular degeneration     right    Mild cognitive impairment 2012    MMSE 26/30    MVP (mitral valve prolapse)     trace on echo     Osteoporosis 2012    Pancytopenia 2011    mild with ; Dr Georges hawk     Peptic ulcer disease     Peripheral vascular disease ([facility] [address] 2016    angio; dis below ankles; pletal    Prediabetes     Recurrent ventral incisional hernia     medial apex of GB scar    S/P CABG x 3     3-vessel; Dr Cheng [place] Spigelian hernia 2017    right ant lateral wall; seen on CT abd    Supraventricular tachycardia ([facility] [address]     Freq ventriular ectopy    Tic douloureux     Dr. Cass Damon; Right side     Past Surgical History:   Procedure Laterality Date    CATARACT REMOVAL Right     poor result ; blind right eye; [facility] [address] [address] CORONARY ARTERY BYPASS GRAFT  2009    3 vessel    SHOULDER ARTHROSCOPY Right     TUBAL LIGATION      URETER SURGERY      Right ureteral repair     Family History   Problem Relation Age of Onset    High Blood Pressure Mother     Heart Attack Mother     Other Mother         unsteady gait    Other Father         silcosis    Cancer Sister 79        Breast cancer 2017    Stroke Sister      Family Hx of HTN  Family Hx as reviewed above, otherwise non-contributory  Social History     Socioeconomic History    Marital status:       Spouse name: Andre Maxwell Number of children: 2    Years of education: 15    Highest education level: None   Occupational History    Occupation: retired   Tobacco Use    Smoking status: Former Smoker     Packs/day: 0.25     Years: 9.00     Pack years: 2.25     Types: Cigarettes     Start date:      Quit date: 1985     Years since quittin.9    Smokeless tobacco: Never Used   Vaping Use    Vaping Use: Never used   Substance and Sexual Activity    Alcohol use: No     Alcohol/week: 0.0 standard drinks    Drug use: No    Sexual activity: Not Currently     Partners: Male   Other Topics Concern    None   Social History Narrative    None     Social Determinants of Health     Financial Resource Strain: Low Risk     Difficulty of Paying Living Expenses: Not hard at all   Food Insecurity: No Food Insecurity    Worried About Running Out of Food in the Last Year: Never true    Katie of Food in the Last Year: Never true   Transportation Needs: No Transportation Needs    Lack of Transportation (Medical): No    Lack of Transportation (Non-Medical): No   Physical Activity: Inactive    Days of Exercise per Week: 0 days    Minutes of Exercise per Session: 0 min   Stress: No Stress Concern Present    Feeling of Stress : Not at all   Social Connections: Moderately Integrated    Frequency of Communication with Friends and Family: Twice a week    Frequency of Social Gatherings with Friends and Family: Once a week    Attends [employer]ist Services: More than 4 times per year    Active Member of [address] [place] [address] or Organizations: Yes    Attends Club or Organization Meetings: More than 4 times per year    Marital Status:     Intimate Partner Violence: Not At Risk    Fear of Current or Ex-Partner: No    Emotionally Abused: No    Physically Abused: No    Sexually Abused: No   Housing Stability: Low Risk     Unable to Pay for Housing in the Last Year: No    Number of Places Lived in the Last Year: 1    Unstable Housing in the Last Year: No       MEDICATIONS   Medications Prior to Admission  Medications Prior to Admission: sertraline (ZOLOFT) 25 MG tablet, take 1/2 tablet by mouth once daily for 2 days then take 1 tablet by mouth once daily THEREAFTER  ferrous sulfate (IRON 325) 325 (65 Fe) MG tablet, Take 1 tablet by mouth daily (with breakfast)  gabapentin (NEURONTIN) 300 MG capsule, take 1 tablet by mouth three times a day for TRIGEMINAL NEURALGIA  ondansetron (ZOFRAN-ODT) 4 MG disintegrating tablet, Take 1 tablet by mouth 3 times daily as needed for Nausea or Vomiting  pantoprazole (PROTONIX) 40 MG tablet, Take 1 tablet by mouth daily  Icosapent Ethyl (VASCEPA) 0.5 g CAPS, take 2 capsules by mouth twice a day  Handicap Placard MISC, by Does not apply route Good for 3 years  verapamil (CALAN SR) 180 MG extended release tablet, take 1/2 tablet by mouth twice a day  rosuvastatin (CRESTOR) 10 MG tablet, Take 1 tablet by mouth daily  fluticasone (FLONASE) 50 MCG/ACT nasal spray, instill 1 spray into each nostril once daily  epinastine (ELESTAT) 0.05 % SOLN, Place 1 drop into both eyes daily  divalproex (DEPAKOTE ER) 250 MG extended release tablet, Take 250 mg by mouth nightly   Multiple Vitamins-Minerals (OCUVITE-LUTEIN PO), Take by mouth  calcium-vitamin D (OSCAL 500/200 D-3) 500-200 MG-UNIT per tablet, Take 1 tablet by mouth 2 times daily. aspirin 81 MG EC tablet, Take 81 mg by mouth daily.     cyanocobalamin 1000 MCG/ML injection, Inject 1 mL into the muscle once for 1 dose  lidocaine 4 % external patch, On for 12 hours, off for 12 hours    Current Medications  Current Facility-Administered Medications   Medication Dose Route Frequency Provider Last Rate Last Admin    [START ON 11/11/2021] sodium chloride flush 0.9 % injection 5-40 mL  5-40 mL IntraVENous 2 times per day Filomena Macedo MD        sodium chloride flush 0.9 % injection 5-40 mL  5-40 mL IntraVENous PRN Filomena Macedo MD        0.9 % sodium chloride infusion  25 mL IntraVENous PRN Filomena Macedo MD       [facility] [START ON 11/11/2021] enoxaparin (LOVENOX) injection 40 mg  40 mg SubCUTAneous Daily Filomena Macedo MD        ondansetron (ZOFRAN-ODT) disintegrating tablet 4 mg  4 mg Oral Q8H PRN Filomena Macedo MD        Or    ondansetron (ZOFRAN) injection 4 mg  4 mg IntraVENous Q6H PRN Filomena Macedo MD        polyethylene glycol (GLYCOLAX) packet 17 g  17 g Oral Daily PRN Filomena Macedo MD        acetaminophen (TYLENOL) tablet 650 mg  650 mg Oral Q6H PRN Glenna Orr MD        Or    acetaminophen (TYLENOL) suppository 650 mg  650 mg Rectal Q6H PRN Glenna Orr MD       Siva Villafuerte [START ON 11/11/2021] 0.9 % sodium chloride infusion   IntraVENous Continuous Glenna Orr MD             Allergies  Allergies   Allergen Reactions    Alendronate Sodium Other (See Comments)     GI upset    Bactrim [Sulfamethoxazole-Trimethoprim] Anaphylaxis    Boniva [Ibandronate Sodium] Other (See Comments)     Gi upset and declined egd; also to fosamax    Ciprofloxacin     Colesevelam     Lisinopril Other (See Comments)     Severe hyperkalemia (6) with bun >56      Macrobid [Nitrofurantoin]     Neggram [Nalidixic Acid]     Pce [Erythromycin]     Penicillins     Risperidone And Related     Welchol [Colesevelam Hcl] Other (See Comments)     constipation    Carbamazepine Nausea And Vomiting    Lovaza [Omega-3-Acid Ethyl Esters] Nausea And Vomiting    Metoprolol Nausea And Vomiting    Pyridium [Phenazopyridine] Palpitations       REVIEW OF SYSTEMS   10 point review of systems conducted and pertinent positives and negatives as per HPI. PHYSICAL EXAM     Wt Readings from Last 3 Encounters:   11/10/21 138 lb (62.6 kg)   10/21/21 138 lb (62.6 kg)   10/20/21 138 lb (62.6 kg)       Blood pressure (!) 146/51, pulse 75, temperature 98.2 °F (36.8 °C), resp. rate 19, height 5' 3\" (1.6 m), weight 138 lb (62.6 kg), SpO2 93 %, not currently breastfeeding. GEN  -Awake, alert, appears tired, sick appearing. EYES   -PERRL. HENT  -MM are dry. RESP  -LS CTA equal bilat, no wheezes, rales or rhonchi. Symmetric chest movement. No respiratory distress noted. C/V  -S1/S2 auscultated. RRR without appreciable M/R/G. No JVD or carotid bruits. Peripheral pulses equal bilaterally and palpable. No peripheral edema. No reproducible chest wall tenderness. GI  -Abdomen is soft, non-distended, tenderness in bilateral lower quadrants. No masses or guarding. + BS in all quadrants. Rectal exam deferred.   -No CVA tenderness. Reese catheter is not present. MS  -B/L extremities strong muscles strength. Full movements. No gross joint deformities. No swelling, intact sensation symmetrical.   SKIN  -Normal coloration, warm, dry. NEURO  - Awake, alert, oriented x 3, no focal deficits. PSYC  - Appropriate affect. LABS AND IMAGING     Results for Amado Gleason (MRN [id number]) as of 11/11/2021 03:46   Ref.  Range 11/10/2021 08:50   Sodium Latest Ref Range: 135 - 145 MMOL/L 127 (L)   Potassium Latest Ref Range: 3.5 - 5.1 MMOL/L 4.2   Chloride Latest Ref Range: 99 - 110 mMol/L 90 (L)   CO2 Latest Ref Range: 21 - 32 MMOL/L 24   BUN Latest Ref Range: 6 - 23 MG/DL 12   Creatinine Latest Ref Range: 0.6 - 1.1 MG/DL 0.7   Anion Gap Latest Ref Range: 4 - 16  13   GFR Non- Latest Ref Range: >60 mL/min/1.73m2 >60   GFR African American Latest Ref Range: >60 mL/min/1.73m2 >60   Lactic Acid, Sepsis Latest Ref Range: 0.5 - 1.9 mMOL/L 2.3 (HH)   Glucose Latest Ref Range: 70 - 99 MG/ (H)   Calcium Latest Ref Range: 8.3 - 10.6 MG/DL 9.2   Total Protein Latest Ref Range: 6.4 - 8.2 GM/DL 7.0   Troponin T Latest Ref Range: <0.01 NG/ML <0.010   Albumin Latest Ref Range: 3.4 - 5.0 GM/DL 4.1   Alk Phos Latest Ref Range: 40 - 129 IU/L 38 (L)   ALT Latest Ref Range: 10 - 40 U/L 9 (L)   AST Latest Ref Range: 15 - 37 IU/L 20   Bilirubin Latest Ref Range: 0.0 - 1.0 MG/DL 0.4   WBC Latest Ref Range: 4.0 - 10.5 K/CU MM 4.9   RBC Latest Ref Range: 4.2 - 5.4 M/CU MM 3.56 (L)   Hemoglobin Quant Latest Ref Range: 12.5 - 16.0 GM/DL 11.6 (L)   Hematocrit Latest Ref Range: 37 - 47 % 34.4 (L)   MCV Latest Ref Range: 78 - 100 FL 96.6   MCH Latest Ref Range: 27 - 31 PG 32.6 (H)   MCHC Latest Ref Range: 32.0 - 36.0 % 33.7   MPV Latest Ref Range: 7.5 - 11.1 FL 9.0   RDW Latest Ref Range: 11.7 - 14.9 % 13.7   Platelet Count Latest Ref Range: 140 - 440 K/CU  Lymphocyte % Latest Ref Range: 24 - 44 % 27.5   Monocytes % Latest Ref Range: 0 - 4 % 10.0 (H)   Eosinophils % Latest Ref Range: 0 - 3 % 0.6   Basophils % Latest Ref Range: 0 - 1 % 0.4   Lymphocytes Absolute Latest Units: K/CU MM 1.3   Monocytes Absolute Latest Units: K/CU MM 0.5   Eosinophils Absolute Latest Units: K/CU MM 0.0   Basophils Absolute Latest Units: K/CU MM 0.0   Differential Type Unknown AUTOMATED DIFFERENTIAL   Segs Relative Latest Ref Range: 36 - 66 % 60.7   Segs Absolute Latest Units: K/CU MM 3.0   Immature Neutrophil % Latest Ref Range: 0 - 0.43 % 0.8 (H)   Total Immature Neutrophil Latest Units: K/CU MM 0.04   Color, UA Latest Ref Range: YELLOW  PALE YELLOW (A)   Clarity, UA Latest Ref Range: CLEAR  CLEAR   Bilirubin, Urine Latest Ref Range: NEGATIVE MG/DL NEGATIVE   Ketones, Urine Latest Ref Range: NEGATIVE MG/DL NEGATIVE   Specific Gravity, UA Latest Ref Range: 1.001 - 1.035  1.010   Blood, Urine Latest Ref Range: NEGATIVE  NEGATIVE   Protein, UA Latest Ref Range: NEGATIVE MG/DL NEGATIVE   Urobilinogen, Urine Latest Ref Range: 0.2 - 1.0 MG/DL 0.2   Leukocyte Esterase, Urine Latest Ref Range: NEGATIVE  NEGATIVE   Glucose, Urine Latest Ref Range: NEGATIVE MG/DL NEGATIVE   Nitrite Urine, Quantitative Latest Ref Range: NEGATIVE  NEGATIVE   pH, Urine Latest Ref Range: 5.0 - 8.0  8.0   Cast Type Latest Ref Range: NO CAST FORMS SEEN /HPF NO CAST FORMS SEEN   WBC, UA Latest Ref Range: 0 - 5 /HPF 1   RBC, UA Latest Ref Range: 0 - 6 /HPF 0   Epithelial Cells, UA Latest Units: /HPF 0   Bacteria, UA Latest Ref Range: NEGATIVE /HPF NEGATIVE   Crystal Type Latest Ref Range: NEGATIVE /HPF NEGATIVE   URINE RT REFLEX TO CULTURE Unknown Rpt (A)     Results for Ge Kelley (MRN [id number]) as of 11/11/2021 03:46   Ref.  Range 11/10/2021 10:33   SARS-CoV-2, NAAT Latest Ref Range: NOT DETECTED  NOT DETECTED     Recent Imaging     CT ABDOMEN PELVIS W IV CONTRAST Additional Contrast? None [[id number]] Collected: 11/10/21 1055     Order Status: Completed Updated: 11/10/21 1104     Narrative:       EXAMINATION:   CT OF THE ABDOMEN AND PELVIS WITH CONTRAST 11/10/2021 9:57 am     TECHNIQUE:   CT of the abdomen and pelvis was performed with the administration of   intravenous contrast. Multiplanar reformatted images are provided for review. Dose modulation, iterative reconstruction, and/or weight based adjustment of   the mA/kV was utilized to reduce the radiation dose to as low as reasonably   achievable. COMPARISON:   09/30/2021     HISTORY:   ORDERING SYSTEM PROVIDED HISTORY: Diarrhea, lactic acidosis   TECHNOLOGIST PROVIDED HISTORY:   Reason for exam:->Diarrhea, lactic acidosis   Additional Contrast?->None   Decision Support Exception - unselect if not a suspected or confirmed   emergency medical condition->Emergency Medical Condition (MA)   Reason for Exam: Diarrhea, lactic acidosis   Acuity: Acute   Type of Exam: Initial   Additional signs and symptoms: fatigue, weakness   Relevant Medical/Surgical History: Hx Judy, Tubal, Rt Ureter repair / 75cc   [id number]     FINDINGS:   Lower Chest: No parenchymal consolidation or pleural effusion is identified. No pericardial effusion.  No distal esophageal thickening is identified. Small hiatal hernia.  Median sternotomy wires are identified.  Distal aortic   atherosclerotic disease. Organs: No focal mass identified in the liver or spleen.  Surgical clips are   seen the gallbladder fossa.  No adrenal mass is identified.  No pancreatic   mass is identified.  No peripancreatic inflammatory process is identified. No enhancing renal mass is identified.  No obstructive urinary tract calculi   are identified.  Mild hydroureter seen bilaterally, which is a new finding. GI/Bowel: Colonic diverticulosis is identified.  No acute diverticulitis is   seen.  No ileus or obstruction is identified.  No definite acute   diverticulitis is identified.      Pelvis: No pelvic mass is identified.  The bladder appears unremarkable. Uterus and adnexal structures appear grossly unremarkable.  No free fluid is   identified in the pelvis. Peritoneum/Retroperitoneum: No abdominal aortic aneurysm is identified.  No   aortic dissection.  Heavy ostial and proximal calcification within the renal   arteries limits detection for significant stenosis.  The ARIANA is patent. Iliac branches are patent without 50% or greater degree of stenosis.  No   bulky lymphadenopathy is identified in the abdomen or pelvis. Elgie Dowdy is a   right spigelian hernia. Bones/Soft Tissues: No acute subcutaneous soft tissue abnormality is   identified.  No acute osseous abnormality is identified.  Mild levo scoliotic   deformity noted.  Vertebral alignment appear similar to the previous   examination.  Greatest degree of degenerative change seen at the level of   L3-L4 through L5-S1.  Facet arthropathy and osseous foraminal narrowing   noted.  Multilevel minimal spondylolisthesis seen throughout the lumbar   spine, stable from the previous examination.      Impression:       Again identified is chronic diverticulosis, though without acute inflammatory   change.  The stranding seen on the previous examination appears improved. No ileus or obstruction. Interval development of mild hydroureter bilaterally, though without an   obstructive calculus.  No significant bladder distention is seen however to   suggest bladder outlet obstruction.      Cholecystectomy, spigelian hernia on the right, and other similar findings as   above.      XR CHEST PORTABLE [[id number]] Collected: 11/10/21 0928     Order Status: Completed Updated: 11/10/21 0932     Narrative:       EXAMINATION:   ONE XRAY VIEW OF THE CHEST     11/10/2021 8:46 am     COMPARISON:   2019     HISTORY:   ORDERING SYSTEM PROVIDED HISTORY: Congestion, weakness   TECHNOLOGIST PROVIDED HISTORY:   Reason for exam:->Congestion, weakness   Reason for Exam: Congestion,

## 2021-11-11 NOTE — CONSULTS
[facility] THERAPY EVALUATION    Elizabeth Pate, 1935, 1105/1105-A, 11/11/2021    Discharge Recommendation: Jd Salcedo      History:  [place]:  The primary encounter diagnosis was Generalized weakness. Diagnoses of Elevated lactic acid level, Nausea, Malaise and fatigue, and Diverticulosis were also pertinent to this visit. Subjective:  Patient states: Agreeable to therapy. Pain: Pt denied pain this date (0/10). Communication with other providers: PT, RN  Restrictions: General Precautions, Fall Risk    Home Setup/Prior level of function:  Social/Functional History  Lives With: Alone  Type of Home: House  Home Layout: One level (Family room in the basement, however pt does not go downstairs.)  Home Access: Stairs to enter with rails  Entrance Stairs - Number of Steps: 3-4 steps  Bathroom Shower/Tub: Tub/Shower unit, Shower chair with back  Bathroom Toilet: Standard  Bathroom Equipment: Grab bars in shower, Grab bars around toilet  Receives Help From:  (Sons live close by, however they work full time.)  ADL Assistance: Independent  Homemaking Assistance: Independent  Homemaking Responsibilities: Yes  Ambulation Assistance: Independent  Transfer Assistance: Independent  Active : Yes  Mode of Transportation: Car    Examination:  · Observation: Supine in bed upon arrival  · Vision: [employer] [facility]  · Hearing: WFL  · Vitals: Stable vitals throughout session    Body Systems and functions:  · ROM:  · Pt reports decreased AROM of R shoulder related to previous rotator cuff repair and arthritis. · LUE: WFL  · Strength: WFL  · Sensation: WFL  · Tone: Normal  · Coordination: Pt noted to have BUE tremors, which pt reports is a medication side effect. · Perception: WNL    Activities of Daily Living (ADLs):  · Feeding: Yifan  setup and increased time. · Grooming: CGA pt performed hand hygiene in standing at the sink with CGA for safety with dynamic standing balance.    · UB bathing: SBA in seated with setup, increased time, VC.   · LB bathing: CGA in seated with setup, increased time, VC and intermittent assist for balance when distal reaching. · UB dressing: SBA recommend pt complete in seated with setup, increased time, VC.    · LB dressing: Kacie in seated with increased time and intermittent assistance for distal reaching, assist for dynamic balance when managing in standing. · Toileting: ModA pt performed toileting this date with assistance for dynamic standing balance when doffing/donning depend, assist to fully doff depend and assist for commode transfers. Pt demo good ability to complete ronald care in seated this date. Cognitive and Psychosocial Functioning:  · Overall cognitive status: Oriented to time, date, person, place, city  · Affect: Normal     Balance:   · Sitting: SBA (pt sat EOB demo fair+ dynamic sitting balance). · Standing: Kacie - CGA (pt stood without use of AD. Demo fair dynamic standing balance with increased tremors when upright. Pt demo reaching for items to assist in steadying self when upright. Pt given RW and balance improved). Functional Mobility:   · Bed Mobility: Kacie (pt performed supine to seated bed mobility with HOB slightly elevated, assist to pull self up utilizing UE, increased time, and VC for sequencing throughout). · Transfers:   · Kacie (pt performed STS from EOB with RW. Required VC throughout for sequencing and increased time). · ModA (STS from commode with RW)  · CGA (stand to sit to chair). · Ambulation:  · Kacie - CGA (pt ambulated approx 10ft without use of AD. Demo slow pace throughout and appeared unsteady when upright. Pt demo furniture/wall reaching when ambulating to the bathroom. Provided RW and balance improved. Pt required VC to maintain close proximity to RW. Pt ambulated approx 15ft before reporting nausea and requesting a seated rest break).        AM-PAC 6 click short form for inpatient daily activity:   How much help from another person does the patient currently need. .. Unable  Dep A Lot  Max A A Lot   Mod A A Little  Min A A Little   CGA  SBA None   Mod I  Indep  Sup   1. Putting on and taking off regular lower body clothing? [] 1    [] 2   [] 2   [x] 3   [] 3   [] 4      2. Bathing (including washing, rinsing, drying)? [] 1   [] 2   [] 2 [] 3 [x] 3 [] 4   3. Toileting, which includes using toilet, bedpan, or urinal? [] 1    [] 2   [x] 2   [] 3   [] 3   [] 4     4. Putting on and taking off regular upper body clothing? [] 1   [] 2   [] 2   [] 3   [x] 3    [] 4      5. Taking care of personal grooming such as brushing teeth? [] 1   [] 2    [] 2 [] 3    [x] 3   [] 4      6. Eating meals? [] 1   [] 2   [] 2   [] 3   [] 3   [x] 4      Raw Score:  18     [24=0% impaired(CH), 23=1-19%(CI), 20-22=20-39%(CJ), 15-19=40-59%(CK), 10-14=60-79%(CL), 7-9=80-99%(CM), 6=100%(CN)]    Treatment:  Self Care Training:   Cues were given for safety, sequence, UE/LE placement, visual cues, and balance. Activities performed today included dressing, toileting, hand hygiene. Safety Measures: Gait belt used, Left in chair, Alarm in place    Assessment:  Assessment  Performance deficits / Impairments: Decreased functional mobility , Decreased ADL status, Decreased strength, Decreased balance, Decreased posture, Decreased high-level IADLs, Decreased ROM  Treatment Diagnosis: generalized weakness  Prognosis: Good  Decision Making: Medium Complexity  REQUIRES OT FOLLOW UP: Yes  Discharge Recommendations: [address]    Pt is an 82year old F admitted for generalized weakness. Pt reports that prior to admission she was IND in ADLs, IADLs, and functional mobility. This date, pt demo decreased balance, strength, activity tolerance, and overall functional mobility impacting ADL status. Pt is currently functioning below baseline and would benefit from skilled OT services at SNF.     Plan:  Plan  Times per week: 2+  Times per day: Daily      Goals:  1. Pt will complete all aspects of bed mobility for EOB/OOB ADLs SBA. 2. Pt will complete LB bathing with SBA and AE as needed. 3. Pt will complete all aspects of LB dressing with SBA and AE as needed. 4. Pt will complete all functional transfers to and from bed, chair, toilet, shower chair with SBA and AD. 5. Pt will ambulate HH distance to bathroom for toileting with SBA and AD. 6. Pt will complete all aspects of toileting task with Kacie. 7. Pt will complete oral hygiene/grooming routine in standing at sink with SBA demo good dynamic standing balance for approx 8 minutes. 8. Pt will complete ther ex/ther act with focus on UB strengthening. Time:   Time in: 746  Time out: 810  Timed treatment minutes: 14  Total time: 24      Electronically signed by:       BRITTANY Perez/L, [facility], .[id number]

## 2021-11-11 NOTE — PROGRESS NOTES
Hospitalist Progress Note      Name:  Viktoriya Michelle /Age/Sex: 1935  (82 y.o. female)   MRN & CSN:  [id number] & [id number] Admission Date/Time: 11/10/2021  8:32 AM   Location:  [address] [place] [address] PCP: Roger Mitchell MD         Hospital Day: 2      Assessment and Plan:   Patient was primary admitted for generalized weakness and physical deconditioning. Anticipate discharge to rehab once medically ready in 1 to 2 days. 1. Generalized weakness and physical deconditioning  2. Hyponatremia likely hypovolemic: Improving  3. Lactic acidosis likely due to hypovolemia: Resolved  4. Bilateral mild hydroureter: Urology consulted  5. CAD s/p CABG  6. History of supraventricular tachycardia  7. History mitral valve prolapse  8. History of trigeminal neuralgia    Plan:  Suspect generalized weakness  due to combination of hyponatremia, physical deconditioning, dehydration  Lactic acidosis has resolved with IV fluids  Hyponatremia improving with IV fluids  Doubt infection: Blood cultures/urine cultures obtained in the ER  Continue to hold antibiotics for now  Urology following for mild hydronephrosis  Continue other medication for chronic medical problems  Continue IV fluid  Consult PT/OT  Patient may benefit from rehab    DVT prophylaxis: Lovenox  CODE STATUS: Total support        Subjective. :     Was seen and examined today. She states she feels the same. No acute events overnight. Patient remains afebrile with stable vital signs. Objective:   Vitals:   Vitals:    21 0251   BP: (!) 149/51   Pulse: 70   Resp: 16   Temp: 98.1 °F (36.7 °C)   SpO2: 95%         Physical Exam:   GEN: Awake, alert, in no apparent distress awake female, sitting upright in bed in no apparent distress. Appears given age. HEENT: Atraumatic, normocephalic, PERRLA, EOMI  NECK: Supple, no apparent thyromegaly or masses.   RESP: Clear lungs to auscultation  CARDIO/VASC: Regular rate and rhythm, normal S1, S2, no murmurs  GI: Abdomen is soft, nontender, no significant organomegaly noted, bowel sounds present  MSK: No gross joint deformities.   Skin: No significant rashes, skin lesions noted  Neuro: AOx3, cranial nerves grossly intact, no focal motor or sensory deficits   PSYCH: Affect appropriate    Medications:   Medications:    aspirin  81 mg Oral Daily    divalproex  250 mg Oral Nightly    gabapentin  300 mg Oral TID    ketotifen  1 drop Both Eyes BID    pantoprazole  40 mg Oral QAM AC    rosuvastatin  10 mg Oral Daily    sertraline  25 mg Oral Daily before lunch    verapamil  90 mg Oral BID    Icosapent Ethyl  1 g Oral BID    sodium chloride flush  5-40 mL IntraVENous 2 times per day    enoxaparin  40 mg SubCUTAneous Daily      Infusions:    sodium chloride      sodium chloride 75 mL/hr at 11/11/21 0104     PRN Meds: sodium chloride flush, 5-40 mL, PRN  sodium chloride, 25 mL, PRN  ondansetron, 4 mg, Q8H PRN   Or  ondansetron, 4 mg, Q6H PRN  polyethylene glycol, 17 g, Daily PRN  acetaminophen, 650 mg, Q6H PRN   Or  acetaminophen, 650 mg, Q6H PRN          Electronically signed by Deirdre Wilson MD on 11/11/2021 at 7:41 AM

## 2021-11-11 NOTE — ED NOTES
Report called to [employer], questions answered.      Mahesh Packer, RN  11/10/21 3249

## 2021-11-11 NOTE — CONSULTS
Consult completed. Nexiva 20g 1.00 inch catheter inserted via ultrasound in patient's RFA. Routine blood work obtained and given to . Brisk blood return noted and catheter flushes with ease. Patient tolerated well. Consult IV/PICC team if patient's needs change.

## 2021-11-11 NOTE — ED NOTES
The patients grandson came to the lobby and he was updated on the bed assignment and that she should get moved tonight.         Fidel Zurita RN  11/10/21 1908

## 2021-11-12 LAB
ANION GAP SERPL CALCULATED.3IONS-SCNC: 10 MMOL/L (ref 4–16)
BUN BLDV-MCNC: 7 MG/DL (ref 6–23)
CALCIUM SERPL-MCNC: 8.3 MG/DL (ref 8.3–10.6)
CHLORIDE BLD-SCNC: 99 MMOL/L (ref 99–110)
CO2: 23 MMOL/L (ref 21–32)
CREAT SERPL-MCNC: 0.7 MG/DL (ref 0.6–1.1)
GFR AFRICAN AMERICAN: >60 ML/MIN/1.73M2
GFR NON-AFRICAN AMERICAN: >60 ML/MIN/1.73M2
GLUCOSE BLD-MCNC: 98 MG/DL (ref 70–99)
MAGNESIUM: 1.9 MG/DL (ref 1.8–2.4)
PHOSPHORUS: 2 MG/DL (ref 2.5–4.9)
POTASSIUM SERPL-SCNC: 3.9 MMOL/L (ref 3.5–5.1)
SODIUM BLD-SCNC: 132 MMOL/L (ref 135–145)

## 2021-11-12 PROCEDURE — 83735 ASSAY OF MAGNESIUM: CPT

## 2021-11-12 PROCEDURE — 6370000000 HC RX 637 (ALT 250 FOR IP): Performed by: INTERNAL MEDICINE

## 2021-11-12 PROCEDURE — 2580000003 HC RX 258: Performed by: INTERNAL MEDICINE

## 2021-11-12 PROCEDURE — G0378 HOSPITAL OBSERVATION PER HR: HCPCS

## 2021-11-12 PROCEDURE — 80048 BASIC METABOLIC PNL TOTAL CA: CPT

## 2021-11-12 PROCEDURE — 84100 ASSAY OF PHOSPHORUS: CPT

## 2021-11-12 PROCEDURE — 1200000000 HC SEMI PRIVATE

## 2021-11-12 PROCEDURE — 94761 N-INVAS EAR/PLS OXIMETRY MLT: CPT

## 2021-11-12 PROCEDURE — 6360000002 HC RX W HCPCS: Performed by: INTERNAL MEDICINE

## 2021-11-12 PROCEDURE — 36415 COLL VENOUS BLD VENIPUNCTURE: CPT

## 2021-11-12 RX ADMIN — SODIUM CHLORIDE, PRESERVATIVE FREE 10 ML: 5 INJECTION INTRAVENOUS at 23:21

## 2021-11-12 RX ADMIN — ASPIRIN 81 MG: 81 TABLET, COATED ORAL at 08:38

## 2021-11-12 RX ADMIN — SODIUM CHLORIDE: 9 INJECTION, SOLUTION INTRAVENOUS at 08:46

## 2021-11-12 RX ADMIN — PANTOPRAZOLE SODIUM 40 MG: 40 TABLET, DELAYED RELEASE ORAL at 07:08

## 2021-11-12 RX ADMIN — ROSUVASTATIN CALCIUM 10 MG: 5 TABLET, COATED ORAL at 17:22

## 2021-11-12 RX ADMIN — SERTRALINE 25 MG: 25 TABLET, FILM COATED ORAL at 12:19

## 2021-11-12 RX ADMIN — GABAPENTIN 300 MG: 300 CAPSULE ORAL at 21:06

## 2021-11-12 RX ADMIN — KETOTIFEN FUMARATE 1 DROP: 0.35 SOLUTION/ DROPS OPHTHALMIC at 08:38

## 2021-11-12 RX ADMIN — DIVALPROEX SODIUM 250 MG: 250 TABLET, FILM COATED, EXTENDED RELEASE ORAL at 21:06

## 2021-11-12 RX ADMIN — ENOXAPARIN SODIUM 40 MG: 100 INJECTION SUBCUTANEOUS at 08:37

## 2021-11-12 RX ADMIN — VERAPAMIL HYDROCHLORIDE 90 MG: 180 TABLET, FILM COATED, EXTENDED RELEASE ORAL at 21:06

## 2021-11-12 RX ADMIN — ACETAMINOPHEN 650 MG: 325 TABLET ORAL at 15:52

## 2021-11-12 RX ADMIN — GABAPENTIN 300 MG: 300 CAPSULE ORAL at 08:36

## 2021-11-12 RX ADMIN — VERAPAMIL HYDROCHLORIDE 90 MG: 180 TABLET, FILM COATED, EXTENDED RELEASE ORAL at 08:35

## 2021-11-12 RX ADMIN — GABAPENTIN 300 MG: 300 CAPSULE ORAL at 15:52

## 2021-11-12 RX ADMIN — KETOTIFEN FUMARATE 1 DROP: 0.35 SOLUTION/ DROPS OPHTHALMIC at 21:08

## 2021-11-12 ASSESSMENT — PAIN DESCRIPTION - DESCRIPTORS: DESCRIPTORS: ACHING

## 2021-11-12 ASSESSMENT — PAIN SCALES - GENERAL
PAINLEVEL_OUTOF10: 0
PAINLEVEL_OUTOF10: 1
PAINLEVEL_OUTOF10: 0
PAINLEVEL_OUTOF10: 0
PAINLEVEL_OUTOF10: 3

## 2021-11-12 ASSESSMENT — PAIN DESCRIPTION - LOCATION: LOCATION: BACK

## 2021-11-12 ASSESSMENT — PAIN DESCRIPTION - ORIENTATION: ORIENTATION: LOWER

## 2021-11-12 ASSESSMENT — PAIN DESCRIPTION - PAIN TYPE: TYPE: ACUTE PAIN

## 2021-11-12 NOTE — PROGRESS NOTES
Hospitalist Progress Note      Name:  Christi Stevenson /Age/Sex: 1935  (82 y.o. female)   MRN & CSN:  [id number] & [id number] Admission Date/Time: 11/10/2021  8:32 AM   Location:  [address] [place] [address] PCP: Radha Mendes MD         Hospital Day: 3      Assessment and Plan:     Patient was primary admitted for generalized weakness and physical deconditioning. Patient has improved. Sodium level improving. Patient now remains medically ready for discharge pending rehab placement. 1. Generalized weakness and physical deconditioning  2. Hyponatremia likely hypovolemic: Improving  3. Lactic acidosis likely due to hypovolemia: Resolved  4. Bilateral mild hydroureter: Urology consulted  5. CAD s/p CABG  6. History of supraventricular tachycardia  7. History mitral valve prolapse  8. History of trigeminal neuralgia     Plan:  Suspect generalized weakness  due to combination of hyponatremia, physical deconditioning, dehydration  Lactic acidosis has resolved with IV fluids  Hyponatremia improving with IV fluids  Doubt infection: Blood cultures/urine cultures obtained in the ER  Continue to hold antibiotics for now  Urology following for mild hydronephrosis  Continue other medication for chronic medical problems  Continue IV fluid  Consult PT/OT  Patient may benefit from rehab     DVT prophylaxis: Lovenox  CODE STATUS: Total support      Subjective. :     Patient was seen and examined today. No acute events overnight. Patient states she feels better. Objective:   Vitals:   Vitals:    21 0615   BP: (!) 162/72   Pulse: 84   Resp: 16   Temp: 97.5 °F (36.4 °C)   SpO2: 98%         Physical Exam:   GEN: Awake, alert, in no apparent distress awake female, sitting upright in bed in no apparent distress. Appears given age. HEENT: Atraumatic, normocephalic, PERRLA, EOMI  NECK: Supple, no apparent thyromegaly or masses.   RESP: Clear lungs to auscultation  CARDIO/VASC: Regular rate and rhythm, normal S1, S2, no murmurs  GI: Abdomen is soft, nontender, no significant organomegaly noted, bowel sounds present  MSK: No gross joint deformities.   Skin: No significant rashes, skin lesions noted  Neuro: AOx3, cranial nerves grossly intact, no focal motor or sensory deficits   PSYCH: Affect appropriate    Medications:   Medications:    aspirin  81 mg Oral Daily    divalproex  250 mg Oral Nightly    gabapentin  300 mg Oral TID    ketotifen  1 drop Both Eyes BID    pantoprazole  40 mg Oral QAM AC    rosuvastatin  10 mg Oral Daily    sertraline  25 mg Oral Daily before lunch    verapamil  90 mg Oral BID    Icosapent Ethyl  1 g Oral BID    sodium chloride flush  5-40 mL IntraVENous 2 times per day    enoxaparin  40 mg SubCUTAneous Daily      Infusions:    sodium chloride      sodium chloride 75 mL/hr at 11/12/21 0846     PRN Meds: sodium chloride flush, 5-40 mL, PRN  sodium chloride, 25 mL, PRN  ondansetron, 4 mg, Q8H PRN   Or  ondansetron, 4 mg, Q6H PRN  polyethylene glycol, 17 g, Daily PRN  acetaminophen, 650 mg, Q6H PRN   Or  acetaminophen, 650 mg, Q6H PRN          Electronically signed by New Brooks MD on 11/12/2021 at 9:46 AM

## 2021-11-13 PROCEDURE — 76937 US GUIDE VASCULAR ACCESS: CPT

## 2021-11-13 PROCEDURE — 2580000003 HC RX 258: Performed by: INTERNAL MEDICINE

## 2021-11-13 PROCEDURE — 6360000002 HC RX W HCPCS: Performed by: INTERNAL MEDICINE

## 2021-11-13 PROCEDURE — G0378 HOSPITAL OBSERVATION PER HR: HCPCS

## 2021-11-13 PROCEDURE — 2580000003 HC RX 258: Performed by: HOSPITALIST

## 2021-11-13 PROCEDURE — 6370000000 HC RX 637 (ALT 250 FOR IP): Performed by: INTERNAL MEDICINE

## 2021-11-13 PROCEDURE — 2500000003 HC RX 250 WO HCPCS: Performed by: HOSPITALIST

## 2021-11-13 PROCEDURE — 1200000000 HC SEMI PRIVATE

## 2021-11-13 PROCEDURE — 94761 N-INVAS EAR/PLS OXIMETRY MLT: CPT

## 2021-11-13 RX ADMIN — DIVALPROEX SODIUM 250 MG: 250 TABLET, FILM COATED, EXTENDED RELEASE ORAL at 21:40

## 2021-11-13 RX ADMIN — POTASSIUM PHOSPHATE, MONOBASIC AND POTASSIUM PHOSPHATE, DIBASIC 30 MMOL: 224; 236 INJECTION, SOLUTION, CONCENTRATE INTRAVENOUS at 09:39

## 2021-11-13 RX ADMIN — PANTOPRAZOLE SODIUM 40 MG: 40 TABLET, DELAYED RELEASE ORAL at 06:51

## 2021-11-13 RX ADMIN — SODIUM CHLORIDE: 9 INJECTION, SOLUTION INTRAVENOUS at 09:33

## 2021-11-13 RX ADMIN — KETOTIFEN FUMARATE 1 DROP: 0.35 SOLUTION/ DROPS OPHTHALMIC at 21:56

## 2021-11-13 RX ADMIN — VERAPAMIL HYDROCHLORIDE 90 MG: 180 TABLET, FILM COATED, EXTENDED RELEASE ORAL at 21:55

## 2021-11-13 RX ADMIN — VERAPAMIL HYDROCHLORIDE 90 MG: 180 TABLET, FILM COATED, EXTENDED RELEASE ORAL at 09:26

## 2021-11-13 RX ADMIN — SERTRALINE 25 MG: 25 TABLET, FILM COATED ORAL at 11:38

## 2021-11-13 RX ADMIN — KETOTIFEN FUMARATE 1 DROP: 0.35 SOLUTION/ DROPS OPHTHALMIC at 09:36

## 2021-11-13 RX ADMIN — ASPIRIN 81 MG: 81 TABLET, COATED ORAL at 09:25

## 2021-11-13 RX ADMIN — ROSUVASTATIN CALCIUM 10 MG: 5 TABLET, COATED ORAL at 23:10

## 2021-11-13 RX ADMIN — GABAPENTIN 300 MG: 300 CAPSULE ORAL at 09:25

## 2021-11-13 RX ADMIN — GABAPENTIN 300 MG: 300 CAPSULE ORAL at 23:09

## 2021-11-13 RX ADMIN — GABAPENTIN 300 MG: 300 CAPSULE ORAL at 15:52

## 2021-11-13 RX ADMIN — ENOXAPARIN SODIUM 40 MG: 100 INJECTION SUBCUTANEOUS at 09:26

## 2021-11-13 ASSESSMENT — PAIN SCALES - GENERAL: PAINLEVEL_OUTOF10: 0

## 2021-11-13 NOTE — CONSULTS
Consult completed. Nexiva 20g 1.00 inch catheter inserted via ultrasound in patient's LFA. Brisk blood return noted and catheter flushes with ease. Patient tolerated well. Consult IV/PICC team if patient's needs change.

## 2021-11-13 NOTE — PROGRESS NOTES
Hospitalist Progress Note      Name:  Viktoriya Michelle /Age/Sex: 1935  (82 y.o. female)   MRN & CSN:  [id number] & [id number] Admission Date/Time: 11/10/2021  8:32 AM   Location:  [address] [place] [address] PCP: Roger Mitchell MD         Hospital Day: 4      Assessment and Plan:     Patient was primary admitted for generalized weakness and physical deconditioning. Patient has improved. Sodium level improving. Patient now remains medically ready for discharge pending rehab placement. 1.         Generalized weakness and physical deconditioning  2. Hyponatremia likely hypovolemic: Improving  3. Lactic acidosis likely due to hypovolemia: Resolved  4. Bilateral mild hydroureter: Urology consulted  5. CAD s/p CABG  6. History of supraventricular tachycardia  7. History mitral valve prolapse  8. History of trigeminal neuralgia     Plan:  Suspect generalized weakness  due to combination of hyponatremia, physical deconditioning, dehydration  Lactic acidosis has resolved with IV fluids  Hyponatremia improving with IV fluids  Doubt infection: Blood cultures/urine cultures obtained in the ER  Continue to hold antibiotics for now  Urology following for mild hydronephrosis  Continue other medication for chronic medical problems  Continue IV fluid  Consult PT/OT  Patient may benefit from rehab     DVT prophylaxis: Lovenox  CODE STATUS: Total support    Subjective. :     Patient was seen and examined today. No acute events overnight. Patient is now medically ready for discharge. Objective:   Vitals:   Vitals:    21 0900   BP: (!) 168/68   Pulse: 82   Resp: 16   Temp: 98.4 °F (36.9 °C)   SpO2: 96%         Physical Exam:   GEN: Awake, alert, in no apparent distress awake female, sitting upright in bed in no apparent distress. Appears given age. HEENT: Atraumatic, normocephalic, PERRLA, EOMI  NECK: Supple, no apparent thyromegaly or masses.   RESP: Clear lungs to auscultation  CARDIO/VASC: Regular rate and rhythm, normal S1, S2, no murmurs  GI: Abdomen is soft, nontender, no significant organomegaly noted, bowel sounds present  MSK: No gross joint deformities.   Skin: No significant rashes, skin lesions noted  Neuro: AOx3, cranial nerves grossly intact, no focal motor or sensory deficits   PSYCH: Affect appropriate    Medications:   Medications:    potassium phosphate IVPB  30 mmol IntraVENous Once    aspirin  81 mg Oral Daily    divalproex  250 mg Oral Nightly    gabapentin  300 mg Oral TID    ketotifen  1 drop Both Eyes BID    pantoprazole  40 mg Oral QAM AC    rosuvastatin  10 mg Oral Daily    sertraline  25 mg Oral Daily before lunch    verapamil  90 mg Oral BID    Icosapent Ethyl  1 g Oral BID    sodium chloride flush  5-40 mL IntraVENous 2 times per day    enoxaparin  40 mg SubCUTAneous Daily      Infusions:    sodium chloride      sodium chloride 75 mL/hr at 11/13/21 0933     PRN Meds: sodium chloride flush, 5-40 mL, PRN  sodium chloride, 25 mL, PRN  ondansetron, 4 mg, Q8H PRN   Or  ondansetron, 4 mg, Q6H PRN  polyethylene glycol, 17 g, Daily PRN  acetaminophen, 650 mg, Q6H PRN   Or  acetaminophen, 650 mg, Q6H PRN          Electronically signed by Zenon Anderson MD on 11/13/2021 at 10:36 AM

## 2021-11-14 LAB — SEROTONIN, SERUM: <10 NG/ML (ref 50–220)

## 2021-11-14 PROCEDURE — 76937 US GUIDE VASCULAR ACCESS: CPT

## 2021-11-14 PROCEDURE — 94761 N-INVAS EAR/PLS OXIMETRY MLT: CPT

## 2021-11-14 PROCEDURE — 1200000000 HC SEMI PRIVATE

## 2021-11-14 PROCEDURE — 2580000003 HC RX 258: Performed by: INTERNAL MEDICINE

## 2021-11-14 PROCEDURE — 6360000002 HC RX W HCPCS: Performed by: INTERNAL MEDICINE

## 2021-11-14 PROCEDURE — 6370000000 HC RX 637 (ALT 250 FOR IP): Performed by: INTERNAL MEDICINE

## 2021-11-14 PROCEDURE — G0378 HOSPITAL OBSERVATION PER HR: HCPCS

## 2021-11-14 RX ADMIN — ROSUVASTATIN CALCIUM 10 MG: 5 TABLET, COATED ORAL at 16:56

## 2021-11-14 RX ADMIN — SODIUM CHLORIDE, PRESERVATIVE FREE 10 ML: 5 INJECTION INTRAVENOUS at 11:30

## 2021-11-14 RX ADMIN — VERAPAMIL HYDROCHLORIDE 90 MG: 180 TABLET, FILM COATED, EXTENDED RELEASE ORAL at 20:13

## 2021-11-14 RX ADMIN — ASPIRIN 81 MG: 81 TABLET, COATED ORAL at 11:20

## 2021-11-14 RX ADMIN — KETOTIFEN FUMARATE 1 DROP: 0.35 SOLUTION/ DROPS OPHTHALMIC at 20:12

## 2021-11-14 RX ADMIN — KETOTIFEN FUMARATE 1 DROP: 0.35 SOLUTION/ DROPS OPHTHALMIC at 11:29

## 2021-11-14 RX ADMIN — GABAPENTIN 300 MG: 300 CAPSULE ORAL at 11:21

## 2021-11-14 RX ADMIN — PANTOPRAZOLE SODIUM 40 MG: 40 TABLET, DELAYED RELEASE ORAL at 06:35

## 2021-11-14 RX ADMIN — SODIUM CHLORIDE: 9 INJECTION, SOLUTION INTRAVENOUS at 03:16

## 2021-11-14 RX ADMIN — GABAPENTIN 300 MG: 300 CAPSULE ORAL at 14:53

## 2021-11-14 RX ADMIN — VERAPAMIL HYDROCHLORIDE 90 MG: 180 TABLET, FILM COATED, EXTENDED RELEASE ORAL at 11:23

## 2021-11-14 RX ADMIN — ENOXAPARIN SODIUM 40 MG: 100 INJECTION SUBCUTANEOUS at 11:20

## 2021-11-14 RX ADMIN — SERTRALINE 25 MG: 25 TABLET, FILM COATED ORAL at 11:22

## 2021-11-14 RX ADMIN — GABAPENTIN 300 MG: 300 CAPSULE ORAL at 20:12

## 2021-11-14 RX ADMIN — DIVALPROEX SODIUM 250 MG: 250 TABLET, FILM COATED, EXTENDED RELEASE ORAL at 20:13

## 2021-11-14 RX ADMIN — SODIUM CHLORIDE: 9 INJECTION, SOLUTION INTRAVENOUS at 16:53

## 2021-11-14 ASSESSMENT — PAIN SCALES - GENERAL
PAINLEVEL_OUTOF10: 0
PAINLEVEL_OUTOF10: 0

## 2021-11-14 NOTE — PROGRESS NOTES
Hospitalist Progress Note       11/14/2021 11:15 AM  Admit Date: 11/10/2021    PCP: José Iniguez MD     Assessment and Plan:   1. Generalized weakness: This is due to hyponatremia, dehydration and physical deconditioning. Continue PT/OT. Blood culture taken in the ER-no growth yet. 2.  Hyponatremia: Due to dehydration. Improving with IV fluid hydration. BMP in a.m.    3.  Mild bilateral hydroureter: Evaluated by urology-no definite obstruction. Recommend an outpatient urodynamic testing. Chronic problems include CAD (CABG), history of SVT, mitral valve prolapse, trigeminal neuralgia. DVT prophylaxis: Lovenox  Disposition: Awaiting placement for inpatient rehab. Medically stable to be discharged in a.m. She lives alone. Patient Active Problem List:     Chronic depression     Hyperlipidemia     [place]     Anemia due to chronic illness     Colon cancer screening     Osteoporosis     Supraventricular tachycardia (HCC)     Recurrent ventral incisional hernia     Mild cognitive impairment     CAD (coronary artery disease)     Elevated TSH     Peripheral vascular disease (HCC)     Sciatica of left side without back pain     Coronary artery disease involving coronary bypass graft of native heart without angina pectoris     S/P CABG x 3     Hypertension     MVP (mitral valve prolapse)     Bilateral carotid artery stenosis     Subclavian arterial stenosis (HCC)     Spigelian hernia     Ischemic cardiomyopathy     Glenohumeral arthritis, right     Normocytic anemia     Overactive bladder     Major depressive disorder, recurrent, in full remission ([facility] [address]     Asthmatic bronchitis     Age-related osteoporosis without current pathological fracture     Palpitations     SOB (shortness of breath)     Prediabetes     Generalized weakness      Subjective:     Chief Complaint   Patient presents with    Congestion    Fatigue       F/U:  Interval History: Nurse at bedside. Patient feels stronger. No new complaints. Objective: Intake/Output Summary (Last 24 hours) at 11/14/2021 1115  Last data filed at 11/14/2021 0530  Gross per 24 hour   Intake 2892 ml   Output 2175 ml   Net 717 ml      Vitals:   Vitals:    11/14/21 0215   BP: 134/60   Pulse: 74   Resp: 16   Temp: 98.2 °F (36.8 °C)   SpO2: 96%     Physical Exam:  General: No apparent respiratory distress. Generally weak. Eyes: Not pale. Anicteric. Neck: No neck mass or thyromegaly  Lymph node: No cervical or supraclavicular lymphadenopathy  Neurology: Awake and alert. Oriented x3. No gross focal weakness. Cardiovascular: Regular. No murmur no S3  Respiratory: No wheezes or crepitation  Abdomen: Soft. No tenderness. : No Reese catheter  Extremities: No pedal edema. The feet are warm.     Electronically signed by Janey Portillo MD on 11/14/2021 at 11:15 AM  Rounding Hospitalist

## 2021-11-15 PROBLEM — E87.1 HYPONATREMIA: Status: ACTIVE | Noted: 2021-11-15

## 2021-11-15 LAB
ANION GAP SERPL CALCULATED.3IONS-SCNC: 11 MMOL/L (ref 4–16)
BUN BLDV-MCNC: 11 MG/DL (ref 6–23)
CALCIUM SERPL-MCNC: 8.8 MG/DL (ref 8.3–10.6)
CHLORIDE BLD-SCNC: 95 MMOL/L (ref 99–110)
CO2: 23 MMOL/L (ref 21–32)
CREAT SERPL-MCNC: 0.6 MG/DL (ref 0.6–1.1)
CULTURE: NORMAL
CULTURE: NORMAL
GFR AFRICAN AMERICAN: >60 ML/MIN/1.73M2
GFR NON-AFRICAN AMERICAN: >60 ML/MIN/1.73M2
GLUCOSE BLD-MCNC: 137 MG/DL (ref 70–99)
Lab: NORMAL
Lab: NORMAL
POTASSIUM SERPL-SCNC: 4 MMOL/L (ref 3.5–5.1)
SODIUM BLD-SCNC: 129 MMOL/L (ref 135–145)
SPECIMEN: NORMAL
SPECIMEN: NORMAL

## 2021-11-15 PROCEDURE — 80048 BASIC METABOLIC PNL TOTAL CA: CPT

## 2021-11-15 PROCEDURE — 97530 THERAPEUTIC ACTIVITIES: CPT

## 2021-11-15 PROCEDURE — 6370000000 HC RX 637 (ALT 250 FOR IP): Performed by: INTERNAL MEDICINE

## 2021-11-15 PROCEDURE — 1200000000 HC SEMI PRIVATE

## 2021-11-15 PROCEDURE — 2580000003 HC RX 258: Performed by: INTERNAL MEDICINE

## 2021-11-15 PROCEDURE — 6360000002 HC RX W HCPCS: Performed by: INTERNAL MEDICINE

## 2021-11-15 PROCEDURE — 36415 COLL VENOUS BLD VENIPUNCTURE: CPT

## 2021-11-15 PROCEDURE — 97110 THERAPEUTIC EXERCISES: CPT

## 2021-11-15 PROCEDURE — 94761 N-INVAS EAR/PLS OXIMETRY MLT: CPT

## 2021-11-15 RX ADMIN — ENOXAPARIN SODIUM 40 MG: 100 INJECTION SUBCUTANEOUS at 11:00

## 2021-11-15 RX ADMIN — ACETAMINOPHEN 650 MG: 325 TABLET ORAL at 17:18

## 2021-11-15 RX ADMIN — SODIUM CHLORIDE 75 ML/HR: 9 INJECTION, SOLUTION INTRAVENOUS at 11:08

## 2021-11-15 RX ADMIN — GABAPENTIN 300 MG: 300 CAPSULE ORAL at 15:47

## 2021-11-15 RX ADMIN — SERTRALINE 25 MG: 25 TABLET, FILM COATED ORAL at 11:07

## 2021-11-15 RX ADMIN — VERAPAMIL HYDROCHLORIDE 90 MG: 180 TABLET, FILM COATED, EXTENDED RELEASE ORAL at 21:03

## 2021-11-15 RX ADMIN — KETOTIFEN FUMARATE 1 DROP: 0.35 SOLUTION/ DROPS OPHTHALMIC at 10:53

## 2021-11-15 RX ADMIN — GABAPENTIN 300 MG: 300 CAPSULE ORAL at 21:03

## 2021-11-15 RX ADMIN — ASPIRIN 81 MG: 81 TABLET, COATED ORAL at 10:47

## 2021-11-15 RX ADMIN — VERAPAMIL HYDROCHLORIDE 90 MG: 180 TABLET, FILM COATED, EXTENDED RELEASE ORAL at 10:58

## 2021-11-15 RX ADMIN — ROSUVASTATIN CALCIUM 10 MG: 5 TABLET, COATED ORAL at 17:17

## 2021-11-15 RX ADMIN — SODIUM CHLORIDE, PRESERVATIVE FREE 10 ML: 5 INJECTION INTRAVENOUS at 21:09

## 2021-11-15 RX ADMIN — PANTOPRAZOLE SODIUM 40 MG: 40 TABLET, DELAYED RELEASE ORAL at 05:59

## 2021-11-15 RX ADMIN — DIVALPROEX SODIUM 250 MG: 250 TABLET, FILM COATED, EXTENDED RELEASE ORAL at 21:03

## 2021-11-15 RX ADMIN — GABAPENTIN 300 MG: 300 CAPSULE ORAL at 10:47

## 2021-11-15 ASSESSMENT — PAIN SCALES - GENERAL
PAINLEVEL_OUTOF10: 0
PAINLEVEL_OUTOF10: 0
PAINLEVEL_OUTOF10: 6

## 2021-11-15 NOTE — CONSULTS
Consult completed. PIV initiated and dressing applied. Pt tolerated well. Abhilash Ibarra, primary nurse notified.

## 2021-11-15 NOTE — CARE COORDINATION
RANJAN Potter & jimenez entered pt room to discuss d/c. Pt is from home, no needs at this time. Pt lives home alone in a house with a main floor and basement but stays on the main floor. Pt cannot cook and is interested in non-skilled aide. Pt had meals on wheels in the past but not anymore. Pt grandson helps w/ getting groceries and cleaning. Pt insurance covers meds. Pt has not had home health care before and does not want it as of now. Pt would like to d/c home. CM will continue to follow for further needs.

## 2021-11-15 NOTE — PROGRESS NOTES
Occupational Therapy  . Occupational Therapy Treatment Note      Name: Kirsten Goyal MRN: [id number] :   1935   Date:  11/15/2021   Admission Date: 11/10/2021 Room:  [address] [place] [address]     Primary Problem:      Restrictions/Precautions:    General precautions, fall risk        Communication with other providers:  Per chart review, patient ok for tx. Subjective:  Patient states:  I tried so hard to prevent this. Pain: no numerical rating. back (location, type, intensity)    Objective:    Observation:  Patient side lying. Patient stating ADLs have already been completed and already been in chair. Ok to ambulate. Patient upset d/t being in hospital, provided therapeutic conversation and support. Objective Measures:  iv    Treatment, including education:    ADL activity training was instructed today. Cues were given for safety, sequence, UE/LE placement, visual cues, and balance. Activities performed today included dressing,  and grooming. Facial hygiene- SBA while EOB  LB dressing- Don socks using figure 4 while supine. Therapeutic activity training was instructed today. Cues were given for safety, sequence, UE/LE placement, awareness, and balance. Activities performed today included bed mobility training, sup-sit, sit-stand, SPT. Supine with Hob raised to EOB- Min A for trunk. patient sat EOB x10 min with good light dynamic sitting balance with BUE support. Stand to FWW- CGA . While in stand patient demo marching, ankle pumps CGA. Functional mobility x100 feet with x2 standing rest breaks. CGA for safety. Slow gait. Cues for upright posture. Sit to EOB- CGA  Return supine- CGA plus increased time and effort.    While supine pateint demo  All BLE patient learned during outpatient rehab including- LAQs, ankle pumps, ankle circles, calve stretches, gluteal sets, heel slides, supine march, hip add/ab, piriformis stretch, lower trunk rotation, shoulder flexion,     Patient educated on role of OT , benefits of OT and rationale for therapeutic intervention. What to expect at rehab, benefit of stretch and ex, sitting upright,     All therapeutic intervention performed c emphasis on dynamic balance / standing tolerance to increase strength, endurance and activity tolerance for increased Central c ADL tasks and func transfers / mobility. Patient left safely in bed at end of session, with call light in reach, alarm on and nursing aware. Gait belt was used for func transfers / mobility.     Assessment / Impression:    Patient's tolerance of treatment: fair  Adverse Reaction: none  Significant change in status and impact:  none  Barriers to improvement: weakness, fatigue,       Plan for Next Session:    Continue with OT POC      Time in:  935  Time out:  1023  Timed treatment minutes:  48  Total treatment time:  48      Electronically signed by:    MAGALIS Rizzo COTA/L 0900  11/15/2021, 10:16 AM

## 2021-11-15 NOTE — PROGRESS NOTES
Hospitalist Progress Note       11/15/2021 11:30 AM  Admit Date: 11/10/2021    PCP: Danni Lino MD     Assessment and Plan:   1. Generalized weakness: This is due to hyponatremia, dehydration and physical deconditioning. Continue PT/OT. Blood culture taken in the ER-no growth. 2.  Hyponatremia: Due to dehydration. Improving with IV fluid hydration. BMP is pending-we will discontinue IV fluid if sodium is normal.    3.  Mild bilateral hydroureter: Evaluated by urology-no definite obstruction. Recommend an outpatient urodynamic testing. Chronic problems include CAD (CABG), history of SVT, mitral valve prolapse, trigeminal neuralgia. DVT prophylaxis: Lovenox  Disposition: Awaiting placement for inpatient rehab. Medically stable to be discharged. She lives alone.     Patient Active Problem List:     Chronic depression     Hyperlipidemia     Tic douloure[place]     Anemia due to chronic illness     Colon cancer screening     Osteoporosis     Supraventricular tachycardia (HCC)     Recurrent ventral incisional hernia     Mild cognitive impairment     CAD (coronary artery disease)     Elevated TSH     Peripheral vascular disease (HCC)     Sciatica of left side without back pain     Coronary artery disease involving coronary bypass graft of native heart without angina pectoris     S/P CABG x 3     Hypertension     MVP (mitral valve prolapse)     Bilateral carotid artery stenosis     Subclavian arterial stenosis (HCC)     Spigelian hernia     Ischemic cardiomyopathy     Glenohumeral arthritis, right     Normocytic anemia     Overactive bladder     Major depressive disorder, recurrent, in full remission ([facility] [address]     Asthmatic bronchitis     Age-related osteoporosis without current pathological fracture     Palpitations     SOB (shortness of breath)     Prediabetes     Generalized weakness      Subjective:     Chief Complaint   Patient presents with    Congestion    Fatigue       F/U:  Interval History: Discussed with her nurse. Patient has no complaints. No reported incident. Objective: Intake/Output Summary (Last 24 hours) at 11/15/2021 1130  Last data filed at 11/15/2021 1033  Gross per 24 hour   Intake 1040 ml   Output 1200 ml   Net -160 ml      Vitals:   Vitals:    11/15/21 0800   BP: (!) 166/65   Pulse: 70   Resp: 16   Temp:    SpO2: 98%     Physical Exam:  General: No apparent respiratory distress. Generally weak. Eyes: Not pale. Anicteric. Neck: No neck mass or thyromegaly  Lymph node: No cervical or supraclavicular lymphadenopathy  Neurology: Awake and alert. Oriented x3. No gross focal weakness. Cardiovascular: Regular. No murmur no S3  Respiratory: No wheezes or crepitation  Abdomen: Soft. No tenderness. : No Reese catheter  Extremities: No pedal edema. The feet are warm.     Electronically signed by Dallin Hadley MD on 11/15/2021 at 11:30 AM  Rounding Hospitalist

## 2021-11-16 LAB
ANION GAP SERPL CALCULATED.3IONS-SCNC: 12 MMOL/L (ref 4–16)
BUN BLDV-MCNC: 13 MG/DL (ref 6–23)
CALCIUM SERPL-MCNC: 9 MG/DL (ref 8.3–10.6)
CHLORIDE BLD-SCNC: 102 MMOL/L (ref 99–110)
CO2: 23 MMOL/L (ref 21–32)
CREAT SERPL-MCNC: 0.8 MG/DL (ref 0.6–1.1)
GFR AFRICAN AMERICAN: >60 ML/MIN/1.73M2
GFR NON-AFRICAN AMERICAN: >60 ML/MIN/1.73M2
GLUCOSE BLD-MCNC: 150 MG/DL (ref 70–99)
POTASSIUM SERPL-SCNC: 4.4 MMOL/L (ref 3.5–5.1)
SODIUM BLD-SCNC: 137 MMOL/L (ref 135–145)

## 2021-11-16 PROCEDURE — 94761 N-INVAS EAR/PLS OXIMETRY MLT: CPT

## 2021-11-16 PROCEDURE — 80048 BASIC METABOLIC PNL TOTAL CA: CPT

## 2021-11-16 PROCEDURE — 6370000000 HC RX 637 (ALT 250 FOR IP): Performed by: INTERNAL MEDICINE

## 2021-11-16 PROCEDURE — 6360000002 HC RX W HCPCS: Performed by: INTERNAL MEDICINE

## 2021-11-16 PROCEDURE — 36415 COLL VENOUS BLD VENIPUNCTURE: CPT

## 2021-11-16 PROCEDURE — 1200000000 HC SEMI PRIVATE

## 2021-11-16 PROCEDURE — 2580000003 HC RX 258: Performed by: INTERNAL MEDICINE

## 2021-11-16 RX ADMIN — SODIUM CHLORIDE: 9 INJECTION, SOLUTION INTRAVENOUS at 14:29

## 2021-11-16 RX ADMIN — PANTOPRAZOLE SODIUM 40 MG: 40 TABLET, DELAYED RELEASE ORAL at 06:29

## 2021-11-16 RX ADMIN — KETOTIFEN FUMARATE 1 DROP: 0.35 SOLUTION/ DROPS OPHTHALMIC at 20:57

## 2021-11-16 RX ADMIN — GABAPENTIN 300 MG: 300 CAPSULE ORAL at 14:28

## 2021-11-16 RX ADMIN — KETOTIFEN FUMARATE 1 DROP: 0.35 SOLUTION/ DROPS OPHTHALMIC at 00:00

## 2021-11-16 RX ADMIN — ROSUVASTATIN CALCIUM 10 MG: 5 TABLET, COATED ORAL at 18:12

## 2021-11-16 RX ADMIN — VERAPAMIL HYDROCHLORIDE 90 MG: 180 TABLET, FILM COATED, EXTENDED RELEASE ORAL at 08:10

## 2021-11-16 RX ADMIN — SODIUM CHLORIDE: 9 INJECTION, SOLUTION INTRAVENOUS at 00:52

## 2021-11-16 RX ADMIN — VERAPAMIL HYDROCHLORIDE 90 MG: 180 TABLET, FILM COATED, EXTENDED RELEASE ORAL at 20:57

## 2021-11-16 RX ADMIN — GABAPENTIN 300 MG: 300 CAPSULE ORAL at 08:11

## 2021-11-16 RX ADMIN — ENOXAPARIN SODIUM 40 MG: 100 INJECTION SUBCUTANEOUS at 08:11

## 2021-11-16 RX ADMIN — DIVALPROEX SODIUM 250 MG: 250 TABLET, FILM COATED, EXTENDED RELEASE ORAL at 20:57

## 2021-11-16 RX ADMIN — KETOTIFEN FUMARATE 1 DROP: 0.35 SOLUTION/ DROPS OPHTHALMIC at 08:16

## 2021-11-16 RX ADMIN — ASPIRIN 81 MG: 81 TABLET, COATED ORAL at 08:11

## 2021-11-16 RX ADMIN — SODIUM CHLORIDE, PRESERVATIVE FREE 10 ML: 5 INJECTION INTRAVENOUS at 20:57

## 2021-11-16 RX ADMIN — SERTRALINE 25 MG: 25 TABLET, FILM COATED ORAL at 12:04

## 2021-11-16 RX ADMIN — GABAPENTIN 300 MG: 300 CAPSULE ORAL at 20:57

## 2021-11-16 ASSESSMENT — PAIN SCALES - GENERAL
PAINLEVEL_OUTOF10: 0

## 2021-11-16 NOTE — PROGRESS NOTES
Hospitalist Progress Note       11/16/2021 1:23 PM  Admit Date: 11/10/2021    PCP: Alma Barboza MD     Assessment and Plan:   1. Generalized weakness: This is due to hyponatremia, dehydration and physical deconditioning. Continue PT/OT. Blood culture taken in the ER-no growth. 2.  Hyponatremia: Due to dehydration. Improving with IV fluid hydration. Repeat BMP. 3.  Mild bilateral hydroureter: Evaluated by urology-no definite obstruction. Recommend an outpatient urodynamic testing. Chronic problems include CAD (CABG), history of SVT, mitral valve prolapse, trigeminal neuralgia. DVT prophylaxis: Lovenox  Disposition: She lives alone. We will follow  recommendation. She will be okay for discharge once sodium is 132 and above.     Patient Active Problem List:     Chronic depression      Hyperlipidemia     Tic doulou[place]     Anemia due to chronic illness     Colon cancer screening     Osteoporosis     Supraventricular tachycardia (HCC)     Recurrent ventral incisional hernia     Mild cognitive impairment     CAD (coronary artery disease)     Elevated TSH     Peripheral vascular disease (HCC)     Sciatica of left side without back pain     Coronary artery disease involving coronary bypass graft of native heart without angina pectoris     S/P CABG x 3     Hypertension     MVP (mitral valve prolapse)     Bilateral carotid artery stenosis     Subclavian arterial stenosis (HCC)     Spigelian hernia     Ischemic cardiomyopathy     Glenohumeral arthritis, right     Normocytic anemia     Overactive bladder     Major depressive disorder, recurrent, in full remission ([facility] [address]     Asthmatic bronchitis     Age-related osteoporosis without current pathological fracture     Palpitations     SOB (shortness of breath)     Prediabetes     Generalized weakness      Subjective:     Chief Complaint   Patient presents with    Congestion    Fatigue       F/U:  Interval History: Discussed with her nurse and . Patient says that she does not want to go home but to inpatient rehab. Objective: Intake/Output Summary (Last 24 hours) at 11/16/2021 1323  Last data filed at 11/15/2021 2335  Gross per 24 hour   Intake 305 ml   Output --   Net 305 ml      Vitals:   Vitals:    11/16/21 1212   BP:    Pulse:    Resp:    Temp:    SpO2: 94%     Physical Exam:  General: No apparent respiratory distress. Generally weak. Eyes: Not pale. Anicteric. Neck: No neck mass or thyromegaly  Lymph node: No cervical or supraclavicular lymphadenopathy  Neurology: Awake and alert. Oriented x3. No gross focal weakness. Cardiovascular: Regular. No murmur no S3  Respiratory: No wheezes or crepitation  Abdomen: Soft. No tenderness. : No Reese catheter  Extremities: No pedal edema. The feet are warm.     Electronically signed by Jude Jha MD on 11/16/2021 at 1:23 PM  Rounding Hospitalist

## 2021-11-16 NOTE — DISCHARGE SUMMARY
Kirsten Goyal 1935 [id number]  PCP:  Haily Lopez MD    Admit date: 11/10/2021  Admitting Physician: Abimbola Weinstein MD    Discharge date: 11/16/2021 Discharge Physician: Iliana Hines MD      Reason for admission:   Chief Complaint   Patient presents with    Congestion    Fatigue     Present on Admission:   Generalized weakness   Hyponatremia       Discharge Diagnoses Include:  1. Generalized weakness  2. Hyponatremia  3. Mild bilateral hydroureter    Hospital Course:  Kirsten Goyal is a 82 y.o. female with coronary artery disease s/p CABG, PAD, supraventricular tachycardia, mitral valve prolapse, hypertension, hyperlipidemia, depression, macular degeneration, osteoporosis history of recurrent UTIs, history of trigeminal neuralgia, recently treated for acute diverticulitis (9/2021) who initially presented to [facility] ED with complaints of not feeling well. Patient reported that since Sunday she has been feeling extremely fatigued, describing as a flulike symptoms, denied any fever, chills. Patient lives alone and reports that she was not able to fix her meals, at times was confused. Denied any nausea, vomiting. Patient also reported being intolerant to most of the antibiotics. Denied any urinary symptoms, denied any constipation or diarrhea. Patient reports that she has a good appetite, keeps herself well-hydrated. Patient reports that she does not usually have typical symptoms for UTI, has back pain, feels that she is not completely emptying her bladder. Is following with urology for recurrent UTIs. At presentation patient was noted to have /86, HR 84, RR 18, temp 98.3, saturating 98% on room air. Lab work was significant for sodium 127, lactic acid 2.3, troponin <0.010, hemoglobin 11.6. Stool occult negative. UA not suggestive of infection. Rapid Covid negative. Chest x-ray-chronic atelectasis left lung base.   CT abdomen/pelvis-chronic diverticulosis, no diverticulitis, interval development of mild hydroureter bilaterally without an obstructive calculus. Patient initially received 400 cc bolus. Later patient was noted to have trending up lactic acid. Patient was then given 30 ml/kg IV fluids. Patient was also given levofloxacin for suspected UTI. Patient then transferred to [facility]. Admitted as:  1. Generalized weakness: This is due to hyponatremia, dehydration and physical deconditioning. Received PT/OT. Blood culture taken in the ER-no growth. She improved and was discharged with home health care services.     2. Hyponatremia: Due to dehydration. Resolved with IV fluid hydration.     3.  Mild bilateral hydroureter: Evaluated by urology-no definite obstruction. Recommend an outpatient urodynamic testing.     Chronic problems include CAD (CABG), history of SVT, mitral valve prolapse, trigeminal neuralgia.     DVT prophylaxis: Lovenox    Pt was personally examined by me on the day of discharge with the following findings: Please, see my progress note on the day of discharge.     Procedures: None    Significant Diagnostic Studies at discharge:   CBC:   Lab Results   Component Value Date    WBC 6.6 11/11/2021    RBC 3.52 11/11/2021    HGB 11.6 11/11/2021    HCT 35.0 11/11/2021    MCV 99.4 11/11/2021    MCH 33.0 11/11/2021    MCHC 33.1 11/11/2021    RDW 14.0 11/11/2021     11/11/2021    MPV 9.2 11/11/2021     BMP:    Lab Results   Component Value Date     11/16/2021    K 4.4 11/16/2021     11/16/2021    CO2 23 11/16/2021    BUN 13 11/16/2021    LABALBU 4.1 11/10/2021    CREATININE 0.8 11/16/2021    CALCIUM 9.0 11/16/2021    GFRAA >60 11/16/2021    GFRAA 51 01/02/2013    LABGLOM >60 11/16/2021    GLUCOSE 150 11/16/2021       Patient Instructions:  @DISCHARGEMEDSLIST(<NOROUTINE> error)@     Code Status: Full Code     Consults:   IP CONSULT TO IV TEAM  IP CONSULT TO UROLOGY  IP CONSULT TO IV TEAM  IP CONSULT TO IV TEAM    Diet: cardiac diet    Activity: activity as tolerated with fall precautions. Work:    Discharged Condition: stable    Prognosis: Guarded    Disposition: home with home health care services. Follow-up with   1. PCP within   5-7    Days  2. Urology clinic    Follow up labs: Repeat BMP in 1 to 2 weeks. Discharge Physician Signed: Electronically signed by Teresita Arita MD on 11/16/2021 at 3:19 PM    The patient was seen and examined on day of discharge and this discharge summary is in conjunction with any daily progress note from day of discharge.   Time spent on discharge in the examination, evaluation, counseling and review of medications and discharge plan: >30 minutes

## 2021-11-17 ENCOUNTER — CARE COORDINATION (OUTPATIENT)
Dept: CASE MANAGEMENT | Age: 86
End: 2021-11-17

## 2021-11-17 ENCOUNTER — HOSPITAL ENCOUNTER (INPATIENT)
Age: 86
LOS: 13 days | Discharge: HOME HEALTH | DRG: 948 | End: 2021-11-30
Attending: PHYSICAL MEDICINE & REHABILITATION | Admitting: PHYSICAL MEDICINE & REHABILITATION
Payer: MEDICARE

## 2021-11-17 VITALS
BODY MASS INDEX: 23.55 KG/M2 | SYSTOLIC BLOOD PRESSURE: 158 MMHG | DIASTOLIC BLOOD PRESSURE: 62 MMHG | HEIGHT: 63 IN | WEIGHT: 132.9 LBS | RESPIRATION RATE: 18 BRPM | TEMPERATURE: 98.5 F | OXYGEN SATURATION: 93 % | HEART RATE: 80 BPM

## 2021-11-17 LAB
ANION GAP SERPL CALCULATED.3IONS-SCNC: 13 MMOL/L (ref 4–16)
BUN BLDV-MCNC: 15 MG/DL (ref 6–23)
CALCIUM SERPL-MCNC: 9.2 MG/DL (ref 8.3–10.6)
CHLORIDE BLD-SCNC: 98 MMOL/L (ref 99–110)
CO2: 24 MMOL/L (ref 21–32)
CREAT SERPL-MCNC: 1 MG/DL (ref 0.6–1.1)
GFR AFRICAN AMERICAN: >60 ML/MIN/1.73M2
GFR NON-AFRICAN AMERICAN: 53 ML/MIN/1.73M2
GLUCOSE BLD-MCNC: 137 MG/DL (ref 70–99)
POTASSIUM SERPL-SCNC: 4.7 MMOL/L (ref 3.5–5.1)
SODIUM BLD-SCNC: 135 MMOL/L (ref 135–145)

## 2021-11-17 PROCEDURE — 36415 COLL VENOUS BLD VENIPUNCTURE: CPT

## 2021-11-17 PROCEDURE — 94761 N-INVAS EAR/PLS OXIMETRY MLT: CPT

## 2021-11-17 PROCEDURE — 99223 1ST HOSP IP/OBS HIGH 75: CPT | Performed by: PHYSICAL MEDICINE & REHABILITATION

## 2021-11-17 PROCEDURE — 2580000003 HC RX 258: Performed by: INTERNAL MEDICINE

## 2021-11-17 PROCEDURE — 6370000000 HC RX 637 (ALT 250 FOR IP): Performed by: INTERNAL MEDICINE

## 2021-11-17 PROCEDURE — 6370000000 HC RX 637 (ALT 250 FOR IP): Performed by: PHYSICAL MEDICINE & REHABILITATION

## 2021-11-17 PROCEDURE — 6360000002 HC RX W HCPCS: Performed by: INTERNAL MEDICINE

## 2021-11-17 PROCEDURE — 80048 BASIC METABOLIC PNL TOTAL CA: CPT

## 2021-11-17 PROCEDURE — 1280000000 HC REHAB R&B

## 2021-11-17 RX ORDER — POLYETHYLENE GLYCOL 3350 17 G/17G
17 POWDER, FOR SOLUTION ORAL DAILY PRN
Status: DISCONTINUED | OUTPATIENT
Start: 2021-11-17 | End: 2021-11-17

## 2021-11-17 RX ORDER — ASPIRIN 81 MG/1
81 TABLET ORAL DAILY
Status: DISCONTINUED | OUTPATIENT
Start: 2021-11-17 | End: 2021-11-30 | Stop reason: HOSPADM

## 2021-11-17 RX ORDER — DIVALPROEX SODIUM 250 MG/1
250 TABLET, EXTENDED RELEASE ORAL NIGHTLY
Status: DISCONTINUED | OUTPATIENT
Start: 2021-11-17 | End: 2021-11-30 | Stop reason: HOSPADM

## 2021-11-17 RX ORDER — ROSUVASTATIN CALCIUM 5 MG/1
10 TABLET, COATED ORAL DAILY
Status: CANCELLED | OUTPATIENT
Start: 2021-11-17

## 2021-11-17 RX ORDER — OMEGA-3-ACID ETHYL ESTERS 1 G/1
1 CAPSULE, LIQUID FILLED ORAL 2 TIMES DAILY
Status: CANCELLED | OUTPATIENT
Start: 2021-11-17

## 2021-11-17 RX ORDER — ONDANSETRON 2 MG/ML
4 INJECTION INTRAMUSCULAR; INTRAVENOUS EVERY 6 HOURS PRN
Status: DISCONTINUED | OUTPATIENT
Start: 2021-11-17 | End: 2021-11-30 | Stop reason: HOSPADM

## 2021-11-17 RX ORDER — ONDANSETRON 4 MG/1
4 TABLET, ORALLY DISINTEGRATING ORAL EVERY 8 HOURS PRN
Status: CANCELLED | OUTPATIENT
Start: 2021-11-17

## 2021-11-17 RX ORDER — ONDANSETRON 4 MG/1
4 TABLET, ORALLY DISINTEGRATING ORAL EVERY 8 HOURS PRN
Status: DISCONTINUED | OUTPATIENT
Start: 2021-11-17 | End: 2021-11-30 | Stop reason: HOSPADM

## 2021-11-17 RX ORDER — POLYETHYLENE GLYCOL 3350 17 G/17G
17 POWDER, FOR SOLUTION ORAL DAILY PRN
Status: CANCELLED | OUTPATIENT
Start: 2021-11-17

## 2021-11-17 RX ORDER — GABAPENTIN 300 MG/1
300 CAPSULE ORAL 3 TIMES DAILY
Status: CANCELLED | OUTPATIENT
Start: 2021-11-17

## 2021-11-17 RX ORDER — POLYETHYLENE GLYCOL 3350 17 G/17G
17 POWDER, FOR SOLUTION ORAL DAILY PRN
Status: DISCONTINUED | OUTPATIENT
Start: 2021-11-17 | End: 2021-11-30 | Stop reason: HOSPADM

## 2021-11-17 RX ORDER — ACETAMINOPHEN 325 MG/1
650 TABLET ORAL EVERY 6 HOURS PRN
Status: CANCELLED | OUTPATIENT
Start: 2021-11-17

## 2021-11-17 RX ORDER — PANTOPRAZOLE SODIUM 40 MG/1
40 TABLET, DELAYED RELEASE ORAL
Status: DISCONTINUED | OUTPATIENT
Start: 2021-11-18 | End: 2021-11-24

## 2021-11-17 RX ORDER — ICOSAPENT ETHYL 1000 MG/1
1000 CAPSULE ORAL 2 TIMES DAILY
Status: DISCONTINUED | OUTPATIENT
Start: 2021-11-17 | End: 2021-11-24 | Stop reason: CLARIF

## 2021-11-17 RX ORDER — KETOTIFEN FUMARATE 0.35 MG/ML
1 SOLUTION/ DROPS OPHTHALMIC 2 TIMES DAILY
Status: DISCONTINUED | OUTPATIENT
Start: 2021-11-17 | End: 2021-11-30 | Stop reason: HOSPADM

## 2021-11-17 RX ORDER — DIVALPROEX SODIUM 250 MG/1
250 TABLET, EXTENDED RELEASE ORAL NIGHTLY
Status: CANCELLED | OUTPATIENT
Start: 2021-11-17

## 2021-11-17 RX ORDER — ONDANSETRON 2 MG/ML
4 INJECTION INTRAMUSCULAR; INTRAVENOUS EVERY 6 HOURS PRN
Status: CANCELLED | OUTPATIENT
Start: 2021-11-17

## 2021-11-17 RX ORDER — ROSUVASTATIN CALCIUM 5 MG/1
10 TABLET, COATED ORAL DAILY
Status: DISCONTINUED | OUTPATIENT
Start: 2021-11-18 | End: 2021-11-17

## 2021-11-17 RX ORDER — GABAPENTIN 300 MG/1
300 CAPSULE ORAL 3 TIMES DAILY
Status: DISCONTINUED | OUTPATIENT
Start: 2021-11-17 | End: 2021-11-30 | Stop reason: HOSPADM

## 2021-11-17 RX ORDER — ACETAMINOPHEN 325 MG/1
650 TABLET ORAL EVERY 6 HOURS PRN
Status: DISCONTINUED | OUTPATIENT
Start: 2021-11-17 | End: 2021-11-17 | Stop reason: SDUPTHER

## 2021-11-17 RX ORDER — ASPIRIN 81 MG/1
81 TABLET ORAL DAILY
Status: CANCELLED | OUTPATIENT
Start: 2021-11-17

## 2021-11-17 RX ORDER — KETOTIFEN FUMARATE 0.35 MG/ML
1 SOLUTION/ DROPS OPHTHALMIC 2 TIMES DAILY
Status: CANCELLED | OUTPATIENT
Start: 2021-11-17

## 2021-11-17 RX ORDER — ROSUVASTATIN CALCIUM 5 MG/1
10 TABLET, COATED ORAL NIGHTLY
Status: DISCONTINUED | OUTPATIENT
Start: 2021-11-17 | End: 2021-11-30 | Stop reason: HOSPADM

## 2021-11-17 RX ORDER — ACETAMINOPHEN 650 MG/1
650 SUPPOSITORY RECTAL EVERY 6 HOURS PRN
Status: DISCONTINUED | OUTPATIENT
Start: 2021-11-17 | End: 2021-11-17

## 2021-11-17 RX ORDER — PANTOPRAZOLE SODIUM 40 MG/1
40 TABLET, DELAYED RELEASE ORAL
Status: CANCELLED | OUTPATIENT
Start: 2021-11-18

## 2021-11-17 RX ORDER — ACETAMINOPHEN 325 MG/1
650 TABLET ORAL EVERY 4 HOURS PRN
Status: DISCONTINUED | OUTPATIENT
Start: 2021-11-17 | End: 2021-11-30 | Stop reason: HOSPADM

## 2021-11-17 RX ORDER — ACETAMINOPHEN 650 MG/1
650 SUPPOSITORY RECTAL EVERY 6 HOURS PRN
Status: CANCELLED | OUTPATIENT
Start: 2021-11-17

## 2021-11-17 RX ADMIN — ASPIRIN 81 MG: 81 TABLET, COATED ORAL at 08:48

## 2021-11-17 RX ADMIN — PANTOPRAZOLE SODIUM 40 MG: 40 TABLET, DELAYED RELEASE ORAL at 05:41

## 2021-11-17 RX ADMIN — ENOXAPARIN SODIUM 40 MG: 100 INJECTION SUBCUTANEOUS at 08:48

## 2021-11-17 RX ADMIN — VERAPAMIL HYDROCHLORIDE 90 MG: 180 TABLET, FILM COATED, EXTENDED RELEASE ORAL at 21:10

## 2021-11-17 RX ADMIN — ICOSAPENT ETHYL 1000 MG: 1000 CAPSULE ORAL at 21:20

## 2021-11-17 RX ADMIN — DIVALPROEX SODIUM 250 MG: 250 TABLET, FILM COATED, EXTENDED RELEASE ORAL at 21:10

## 2021-11-17 RX ADMIN — KETOTIFEN FUMARATE 1 DROP: 0.35 SOLUTION/ DROPS OPHTHALMIC at 21:14

## 2021-11-17 RX ADMIN — GABAPENTIN 300 MG: 300 CAPSULE ORAL at 08:48

## 2021-11-17 RX ADMIN — SODIUM CHLORIDE, PRESERVATIVE FREE 10 ML: 5 INJECTION INTRAVENOUS at 08:49

## 2021-11-17 RX ADMIN — SERTRALINE 25 MG: 25 TABLET, FILM COATED ORAL at 11:45

## 2021-11-17 RX ADMIN — ROSUVASTATIN CALCIUM 10 MG: 5 TABLET, COATED ORAL at 16:05

## 2021-11-17 RX ADMIN — KETOTIFEN FUMARATE 1 DROP: 0.35 SOLUTION/ DROPS OPHTHALMIC at 08:49

## 2021-11-17 RX ADMIN — ROSUVASTATIN CALCIUM 10 MG: 5 TABLET, COATED ORAL at 21:10

## 2021-11-17 RX ADMIN — GABAPENTIN 300 MG: 300 CAPSULE ORAL at 21:10

## 2021-11-17 RX ADMIN — VERAPAMIL HYDROCHLORIDE 90 MG: 180 TABLET, FILM COATED, EXTENDED RELEASE ORAL at 08:48

## 2021-11-17 RX ADMIN — GABAPENTIN 300 MG: 300 CAPSULE ORAL at 16:02

## 2021-11-17 ASSESSMENT — PAIN SCALES - GENERAL
PAINLEVEL_OUTOF10: 0

## 2021-11-17 NOTE — PROGRESS NOTES
ARU Interdisciplinary Plan of Care [facility] Dr. Charles VASQUEZ [facility], [address]  [phone]  Fax: [phone]        Jeff Griffith    : 1935  Acct #: [de-identified]  MRN: [id number]   PHYSICIAN:  Riya Layton MD  Primary Active Problems: There are no active hospital problems to display for this patient. Rehabilitation Diagnosis:     Hypo-osmolality and hyponatremia [E87.1]       ADMIT DATE:2021   CARE PLAN     NURSING:  Jeff Griffith while on this unit will: Bowel and Bladder   [] Be continent of bowel and bladder      [] Have an adequate number of bowel movements   [] Urinate with no urinary retention >300ml in bladder   [] Bladder Scan: (details)   [] Complete bladder protocol with walker removal   [] Initiate Bladder Program to toilet every ___ hours   [] Initiate Bowel Program to toilet every ___hours   [] Bladder training    [] Bowel training  Pulmonary   [] Maintain O2 SATs at 92% or greater  Pain Management   [] Have pain managed while on ARU        [] Be pain free by discharge    [] Medication Management and Education  Maintenance of Skin Integrity/Wound Management   [] Have no skin breakdown while on ARU   [] Have improved skin integrity via wound measurements   [] Have no signs/symptoms of infection via infection protection and monitoring at the          wound site  Fall Prevention   [] Be free from injury during hospitalization via fall prevention measures     [] Disease management and Education  Precautions   [] Weight Bearing Precautions   [] Swallowing Precautions   [] Monitoring of Risks of Complications   [] DVT Prophylaxis    [] Fluid/electrolyte/Nutrition Management    [] Complete education with patient/family with understanding demonstrated for          in-room safety with transfers to bed, toilet, wheelchair, shower as well as                bathroom activities and hygiene.   [] Adjustment   [] Other:   Nursing interventions may include bowel/bladder training, education for medical assistive devices, medication education, O2 saturation management, energy conservation, stress management techniques, fall prevention, alarms protocol, seating and positioning, skin/wound care, pressure relief instruction,dressing changes,  infection protection, DVT prophylaxis, and/or assistance with in room safety with transfers to bed, toilet, wheelchair, shower as well as bathroom activities and hygiene. Patient/caregiver education for:   [] Disease/sustained injury/management      [] Medication Use   [] Surgical intervention   [] Safety/Precautions   [] Body mechanics and or joint protection   [] Health maintenance         PHYSICAL THERAPY:  Goals: These goals were reviewed with this patient at the time of assessment and Keo Alexander is in agreement. Plan of Care: Pt to be seen 5 days per week for a minimum of 60 minutes for *** days. community reintegration,animal assisted therapy, and concurrent/group therapy. PT IRF-TAMARA scores and goals for initial assessment:   Bed Mobility:              Transfers:                  Ambulation:                                                   Gait Deviations: []None []Slow josé miguel  [] Increased AMA  [] Staggers []Deviated Path  [] Decreased step length  [] Decreased step height  []Decreased arm swing  [] Shuffles  [] Decreased head and trunk rotation  []other:        Wheelchair:  w/c Ability:                  Balance:        Object:    OCCUPATIONAL THERAPY:  Goals:               :    :    These goals were reviewed with this patient at the time of assessment and Keo Alexander is in agreement    Plan of Care:  Pt to be seen 5 days per week for a minimum of 60 minutes for *** days.                     cognitive training, home management, energy conservation training, community reintegration, splint fabrication, patient/caregiver education and training, animal assisted therapy, and concurrent and/or group therapy. OT IRF-TAMARA scores and goals for initial assessment:    ADLs:    Eating:         Oral Hygiene:      UB/LB Bathing:      UB Dressing:           LB Dressing:      Donning and [place] Footwear: Toileting: Toilet Transfers:          SPEECH THERAPY: (If ordered)  Plan of Care and Goals:   LTG                                                         LTG:                           Treatments may include speech/language/communication therapy, cognitive training, animal assisted therapy, group therapy, education, and/or dysphagia therapy based on the above goals. Co-treats where appropriate with PT or OT to facilitate patient goals in functional tasks. These goals were reviewed with this patient at the time of assessment and Leyla Wolfe is in agreement. CASE MANAGEMENT:  Goals:   Assist patient/family with discharge planning, patient/family counseling, assistance in obtaining recommended equipment and other services, and coordination with insurance during ARU stay. Patient Goals: ***    Activities Prior to Admit:                         Intensity of Therapy  Leyla Wolfe will be seen a minimum of 3 hours of therapy per day/a minimum of 5 out of 7 days per week. [] In this rare instance due to the nature of this patient's medical involvement, this patient will be seen 15 hours per week (900 minutes within a 7 day period). Treatments may include therapeutic exercises, gait training, neuromuscular re-ed, transfer training, community reintegration, bed mobility, w/c mobility and training, self care, home mgmt, cognitive training, energy conservation,dysphagia tx, speech/language/communication therapy, group therapy, and patient/family education. In addition, dietician/nutritionist may monitor calorie count as well as intake and collaboratively work with SLP on dietary upgrades.   Neuropsychology/Psychology may evaluate and provide necessary support. Group therapy as appropriate to facilitate improved endurance, STR, COORD, function, safety, transfers, awareness and insight into deficits, problem solving, memory, and social interaction and engagement.     Medical issues being managed closely and that require 24 hour availability of a physician:   [] Swallowing Precautions                                     [] Weight bearing precautions   [] Wound Care                             [] Infection Prevention   [] DVT Prophylaxis/assessment              [] Monitoring for complications    [] Fall Precautions/Prevention                         [] Fluid/Electrolyte/Nutrition Balance   [] Voice Protection                           [] Medication Management   [] Respiratory                   [] Pain Mgmt   [] Bowel/Bladder Fx    Medical Prognosis: [] Good  [] Fair    [] Guarded   Total expected IRF days ***                                            Physician anticipated functional outcomes:  ***  Rehab Goals:   [] Return to premorbid function of_______________________________.    [] Independent   [] Mod I  [] Supervision  [] CGA   [] Min A   [] Mod A  Level for ambulation []without assistive device  [] with assistive device        [] Independent   [] Mod I  [] Supervision [] CGA   [] Min A   [] Mod A  Level for transfers []without assistive device  [] with assistive device         [] Independent   [] Mod I  [] Supervision [] CGA   [] Min A   [] Mod A Level with ADL's []without assistive device   [] with assistive device     ___________________     Level with cognitive skills requiring [] No assist [] Supervision  [] Active Assist/Cues     [] Maximize level of mobility and ADL's to decrease burden on caregiver    IPOC brief synthesis of Preadmission Screen, Post-Admission Evaluation, and Therapy Evaluations: ***  Anticipated discharge destination:    [] Home Independently   [] Home with supervision    []SNF     [] Other       This plan has been reviewed with me in a language I understand.  I have had the opportunity to include my input with my therapy team.    ________________________________________________   ______________________  Patient/Significant Other      Date    I have reviewed this initial plan of care and agree with its contents:    Title   Name    Date    Time    Physician:     Case Mgmt:    OT:     PT:    RN:     ST:    Dietician:

## 2021-11-17 NOTE — CARE COORDINATION
Received referral for ARU. Reviewed patients clinicals and PT/OT notes. Met with patient and discussed ARU. Explained to patient the required 3 hours of therapy a day. Also explained the average length of stay is 11 days, could be longer or shorter depending on recommendations of therapy and Dr. Nima Meadows. Patient expresses her understanding and states she's agreeable to admit to ARU. Per patient she lives alone and receives assistance from her grandson/DELVIN Weldon). Patient states her goal is to return home at discharge from ARU. Patient meets criteria and is approved to come to ARU. Patient able to admit once medically stable and after ARU Medical Director and  sign the pre-admission screen (PAS).

## 2021-11-17 NOTE — DISCHARGE SUMMARY
Saulo Love 1935 [id number]  PCP:  Wen Hannah MD    Admit date: 11/10/2021  Admitting Physician: Ari Fish MD    Discharge date: 11/17/2021 Discharge Physician: Jude Jha MD      Reason for admission:   Chief Complaint   Patient presents with    Congestion    Fatigue     Present on Admission:   Generalized weakness   Hyponatremia    Discharge Diagnoses Include:  1. Generalized weakness  2. Hyponatremia  3. Mild bilateral hydroureter     Hospital Course:  Senait Contreras a 82 y. o. female with coronary artery disease s/p CABG, PAD, supraventricular tachycardia, mitral valve prolapse, hypertension, hyperlipidemia, depression, macular degeneration, osteoporosis history of recurrent UTIs, history of trigeminal neuralgia, recently treated for acute diverticulitis (9/2021) who initially presented to [facility] ED with complaints of not feeling well.  Patient reported that since Sunday she has been feeling extremely fatigued, describing as a flulike symptoms, denied any fever, chills.  Patient lives alone and reports that she was not able to fix her meals, at times was confused.  Denied any nausea, vomiting.  Patient also reported being intolerant to most of the antibiotics.  Denied any urinary symptoms, denied any constipation or diarrhea.  Patient reports that she has a good appetite, keeps herself well-hydrated.  Patient reports that she does not usually have typical symptoms for UTI, has back pain, feels that she is not completely emptying her bladder.  Is following with urology for recurrent UTIs.   At presentation patient was noted to have /86, HR 84, RR 18, temp 98.3, saturating 98% on room air.  Lab work was significant for sodium 127, lactic acid 2.3, troponin <0.010, hemoglobin 11.6.  Stool occult negative.  UA not suggestive of infection.  Rapid Covid negative.  Chest x-ray-chronic atelectasis left lung base.  CT abdomen/pelvis-chronic diverticulosis, no diverticulitis, interval development of mild hydroureter bilaterally without an obstructive calculus.  Patient initially received 400 cc bolus.  Later patient was noted to have trending up lactic acid.  Patient was then given 30 ml/kg IV fluids.  Patient was also given levofloxacin for suspected UTI.  Patient then transferred to [address] [place] [address].     Admitted as:  1.  Generalized weakness: This is due to hyponatremia, dehydration and physical deconditioning.    Received PT/OT.  Blood culture taken in the ER-no growth. She improved and was discharged with home health care services.     2.  Hyponatremia: Due to dehydration. Resolved with IV fluid hydration.     3.  Mild bilateral hydroureter: Evaluated by urology-no definite obstruction.  Recommend an outpatient urodynamic testing.     Chronic problems include CAD (CABG), history of SVT, mitral valve prolapse, trigeminal neuralgia.     DVT prophylaxis: Lovenox     Pt was personally examined by me on the day of discharge with the following findings: Please, see my progress note on the day of discharge.       Procedures: None    Significant Diagnostic Studies at discharge:   CBC:   Lab Results   Component Value Date    WBC 6.6 11/11/2021    RBC 3.52 11/11/2021    HGB 11.6 11/11/2021    HCT 35.0 11/11/2021    MCV 99.4 11/11/2021    MCH 33.0 11/11/2021    MCHC 33.1 11/11/2021    RDW 14.0 11/11/2021     11/11/2021    MPV 9.2 11/11/2021     BMP:    Lab Results   Component Value Date     11/17/2021    K 4.7 11/17/2021    CL 98 11/17/2021    CO2 24 11/17/2021    BUN 15 11/17/2021    LABALBU 4.1 11/10/2021    CREATININE 1.0 11/17/2021    CALCIUM 9.2 11/17/2021    GFRAA >60 11/17/2021    GFRAA 51 01/02/2013    LABGLOM 53 11/17/2021    GLUCOSE 137 11/17/2021       Patient Instructions:     Medication List      CONTINUE taking these medications    aspirin 81 MG EC tablet     divalproex 250 MG extended release tablet  Commonly known as: DEPAKOTE ER     fluticasone 50 MCG/ACT nasal spray  Commonly known as: FLONASE  instill 1 spray into each nostril once daily     gabapentin 300 MG capsule  Commonly known as: NEURONTIN  take 1 tablet by mouth three times a day for TRIGEMINAL NEURALGIA     Handicap Placard Misc  by Does not apply route Good for 3 years     lidocaine 4 % external patch  On for 12 hours, off for 12 hours     OCUVITE-LUTEIN PO     ondansetron 4 MG disintegrating tablet  Commonly known as: ZOFRAN-ODT  Take 1 tablet by mouth 3 times daily as needed for Nausea or Vomiting     Oscal 500/200 D-3 500-200 MG-UNIT per tablet  Generic drug: calcium-vitamin D     pantoprazole 40 MG tablet  Commonly known as: PROTONIX  Take 1 tablet by mouth daily     rosuvastatin 10 MG tablet  Commonly known as: CRESTOR  Take 1 tablet by mouth daily     sertraline 25 MG tablet  Commonly known as: ZOLOFT     Vascepa 0.5 g Caps  Generic drug: Icosapent Ethyl  take 2 capsules by mouth twice a day     verapamil 180 MG extended release tablet  Commonly known as: CALAN SR  take 1/2 tablet by mouth twice a day     Zaditor 0.025 % ophthalmic solution  Generic drug: ketotifen        STOP taking these medications    cyanocobalamin 1000 MCG/ML injection             Code Status: Full Code     Consults:   IP CONSULT TO IV TEAM  IP CONSULT TO UROLOGY  IP CONSULT TO IV TEAM  IP CONSULT TO IV TEAM    Diet: cardiac diet    Activity: activity as tolerated with fall precautions. Work:    Discharged Condition: stable    Prognosis: Guarded    Disposition: ARU      Follow-up with   1. PCP within   5-7    Days  2. Urology clinic    Follow up labs: Repeat BMP in 1 to 2 weeks       Discharge Physician Signed: Electronically signed by Teresita Arita MD on 11/17/2021 at 4:06 PM    The patient was seen and examined on day of discharge and this discharge summary is in conjunction with any daily progress note from day of discharge.   Time spent on discharge in the examination, evaluation, counseling and review of medications and discharge plan: >30 minutes

## 2021-11-17 NOTE — PROGRESS NOTES
Hospitalist Progress Note       11/17/2021 1:26 PM  Admit Date: 11/10/2021    PCP: Giulia Trent MD     Assessment and Plan:   1. Generalized weakness: This is due to hyponatremia, dehydration and physical deconditioning. Continue PT/OT. Blood culture taken in the ER-no growth. 2.  Hyponatremia: Due to dehydration. Improving with IV fluid hydration. Resolved. 3.  Mild bilateral hydroureter: Evaluated by urology-no definite obstruction. Recommend an outpatient urodynamic testing. Chronic problems include CAD (CABG), history of SVT, mitral valve prolapse, trigeminal neuralgia. DVT prophylaxis: Lovenox  Disposition: She lives alone. She wants to go to ARU or ECF. Medically stable for discharge. Patient Active Problem List:     Chronic depression      Hyperlipidemia     [place]     Anemia due to chronic illness     Colon cancer screening     Osteoporosis     Supraventricular tachycardia (HCC)     Recurrent ventral incisional hernia     Mild cognitive impairment     CAD (coronary artery disease)     Elevated TSH     Peripheral vascular disease (HCC)     Sciatica of left side without back pain     Coronary artery disease involving coronary bypass graft of native heart without angina pectoris     S/P CABG x 3     Hypertension     MVP (mitral valve prolapse)     Bilateral carotid artery stenosis     Subclavian arterial stenosis (HCC)     Spigelian hernia     Ischemic cardiomyopathy     Glenohumeral arthritis, right     Normocytic anemia     Overactive bladder     Major depressive disorder, recurrent, in full remission ([facility] [address]     Asthmatic bronchitis     Age-related osteoporosis without current pathological fracture     Palpitations     SOB (shortness of breath)     Prediabetes     Generalized weakness      Subjective:     Chief Complaint   Patient presents with    Congestion    Fatigue       F/U:  Interval History: Discussed with her nurse and .     Patient frequently changes her decision about disposition. She was discharged home yesterday but she changed her option to ARU, later to ECF. Today, no complaints. Objective:     No intake or output data in the 24 hours ending 11/17/21 1326   Vitals:   Vitals:    11/17/21 0846   BP: (!) 159/65   Pulse: 75   Resp: 16   Temp: 98.2 °F (36.8 °C)   SpO2: 95%     Physical Exam:  General: No apparent respiratory distress. Generally weak. Eyes: Not pale. Anicteric. Neck: No neck mass or thyromegaly  Lymph node: No cervical or supraclavicular lymphadenopathy  Neurology: Awake and alert. Oriented x3. No gross focal weakness. Cardiovascular: Regular. No murmur no S3  Respiratory: No wheezes or crepitation  Abdomen: Soft. No tenderness. : No Reese catheter  Extremities: No pedal edema. The feet are warm.     Electronically signed by Dougie Teixeira MD on 11/17/2021 at 1:26 PM  Rounding Hospitalist

## 2021-11-17 NOTE — CARE COORDINATION
Both NH Predict Tool and Care Coordination Notes Agree on where the patient is to be discharged.  nH Predict recommends: Greater gains with HHC vs. SNF @ 14 days    Hospitlist at [facility]. He spoke with the Pt and she would like to go to a SNF.      LSW into room pt told LSW that she would like to go to ARU. LSW talked to Pt about the criteria for ARU. Pt said taht she would not be able to do 3 hours of therapy everyday. Pt would like to go home with home care.      LSW informed that Pt now wants to go to ARU. LSW back in the room and pt is now wanting to go to sNF. Pt agreed that she would like to go to [facility].  LSW called [facility] and made a referral.     Molly Hewitt, [address] [facility] [employer] [facility] [address] Coordinator

## 2021-11-17 NOTE — PROGRESS NOTES
A Complete drug regimen review was completed for this patient this date. []  No clinically significant medication issue was identified   []  Yes, a clinically significant medication issue was identified     []  Adverse Drug Event:    [x]  Allergy: Lovasa, Rxn N&V, tolerating now    []  Side Effect:    []  Ineffective Therapy:    []  Drug interaction:     []  Duplicate Therapy:    []  Untreated Indication:    []  Non-adherence:    []  Other:  Nursing contacted the physician: Jaida Talbot RN  Date: 11/17/21 Time: 5600    Actions recommended by physician were completed:   Date:                 Time:  Action(s) Taken:             []  New Physician Order Received    [x]  Issue Noted by Physician;  However No Action Required    []  Other:

## 2021-11-17 NOTE — PLAN OF CARE
ARU Interdisciplinary Plan of Care (IPOC)  [facility], [address]  [phone]  Fax: [phone]        Elizabeth Pate    : 1935  Acct #: [de-identified]  MRN: [id number]   PHYSICIAN:  Jamaal Potter MD  Primary Active Problems:   Active Hospital Problems    Diagnosis Date Noted    Hyponatremia [E87.1] 11/15/2021       Rehabilitation Diagnosis:     Hypo-osmolality and hyponatremia [E87.1]  Hyponatremia [E87.1]       ADMIT DATE:2021   CARE PLAN     NURSING:  Elizabeth Pate while on this unit will:      Bowel and Bladder   [x] Be continent of bowel and bladder      [x] Have an adequate number of bowel movements   [] Urinate with no urinary retention >300ml in bladder   [] Bladder Scan: (details)   [] Complete bladder protocol with walker removal   [] Initiate Bladder Program to toilet every ___ hours   [] Initiate Bowel Program to toilet every ___hours   [] Bladder training    [] Bowel training  Pulmonary   [x] Maintain O2 SATs at 92% or greater  Pain Management   [x] Have pain managed while on ARU        [] Be pain free by discharge    [] Medication Management and Education  Maintenance of Skin Integrity/Wound Management   [x] Have no skin breakdown while on ARU   [] Have improved skin integrity via wound measurements   [] Have no signs/symptoms of infection via infection protection and monitoring at the          wound site  Fall Prevention   [x] Be free from injury during hospitalization via fall prevention measures     [] Disease management and Education  Precautions   [] Weight Bearing Precautions   [] Swallowing Precautions   [] Monitoring of Risks of Complications   [] DVT Prophylaxis    [] Fluid/electrolyte/Nutrition Management    [] Complete education with patient/family with understanding demonstrated for          in-room safety with transfers to bed, toilet, wheelchair, shower as well as                bathroom activities and hygiene. [] Adjustment   [] Other:   Nursing interventions may include bowel/bladder training, education for medical assistive devices, medication education, O2 saturation management, energy conservation, stress management techniques, fall prevention, alarms protocol, seating and positioning, skin/wound care, pressure relief instruction,dressing changes,  infection protection, DVT prophylaxis, and/or assistance with in room safety with transfers to bed, toilet, wheelchair, shower as well as bathroom activities and hygiene. Patient/caregiver education for:   [x] Disease/sustained injury/management      [x] Medication Use   [] Surgical intervention   [x] Safety/Precautions   [] Body mechanics and or joint protection   [x] Health maintenance         PHYSICAL THERAPY:  Goals:                  Short term goals  Time Frame for Short term goals: 7 days STG=LTG  Short term goal 1: Pt will perform all bed mobility with mod I  Short term goal 2: Pt will perform sit to stand, pivot and car transfers  with mod I  Short term goal 3: Pt will ambulate  150  feet on level surfaces and 25' on unlevel surface with mod I   assist  using straight cane  Short term goal 4: Pt will ascend/descend curb step with   cane   and up to   12  steps with  rail(s) with   mod I  Short term goal 5: Pt will retrieve light item from floor with mod I   assist  using device as needed               These goals were reviewed with this patient at the time of assessment and Leyla Wolfe is in agreement. Plan of Care: Pt to be seen 5 days per week for a minimum of 60 minutes for 7 days.                 Current Treatment Recommendations: Functional Mobility Training, IADL Training, Neuromuscular Re-education, Home Exercise Program, Equipment Evaluation, Education, & procurement, Transfer Training, Gait Training, Balance Training, Stair training, Patient/Caregiver Education & Training, Safety Education & Training community reintegration,animal assisted therapy, and concurrent/group therapy.     PT IRF-TAMARA scores and goals for initial assessment:   Bed Mobility:   Sit to Lying  Assistance Needed: Independent  CARE Score: 6  Discharge Goal: Independent  Roll Left and Right  Assistance Needed: Independent  CARE Score: 6  Discharge Goal: Independent  Lying to Sitting on Side of Bed  Assistance Needed: Independent  CARE Score: 6  Discharge Goal: Independent    Transfers:    Sit to Stand  Assistance Needed: Supervision or touching assistance  Comment: SBA with one instance of posterior LOB where pt had to fully sit back down  CARE Score: 4  Discharge Goal: Independent  Chair/Bed-to-Chair Transfer  Assistance Needed: Supervision or touching assistance  Comment: SBA  CARE Score: 4  Discharge Goal: Independent     Car Transfer  Assistance Needed: Supervision or touching assistance  CARE Score: 4  Discharge Goal: Independent    Ambulation:    Walking Ability  Does the Patient Walk?: Yes     Walk 10 Feet  Assistance Needed: Supervision or touching assistance  Comment: CGA without device  CARE Score: 4  Discharge Goal: Independent     Walk 50 Feet with Two Turns  Assistance Needed: Supervision or touching assistance  Comment: CG withnarrow AMA at times R nearly crossing, varialbe step length and width with moderate path deviation with pt having no awareness, keeping a normal gait speed though deviations would call for going slower for safety  CARE Score: 4  Discharge Goal: Independent     Walk 150 Feet  Assistance Needed: Supervision or touching assistance  Comment: CG  CARE Score: 4  Discharge Goal: Independent     Walking 10 Feet on Uneven Surfaces  Assistance Needed: Partial/moderate assistance  Comment: pt with no awareness of surface changes requiring mod assist to recover balance x 2 despite training mid task  CARE Score: 3  Discharge Goal: Independent     1 Step (Curb)  Comment: CG  Discharge Goal: Independent     4 Steps  Assistance Needed: Supervision or touching assistance  Comment: SBA with B rails, reciprocating gait, decreased eccentric control at times with RLE  CARE Score: 4  Discharge Goal: Independent     12 Steps  Assistance Needed: Supervision or touching assistance  CARE Score: 4  Discharge Goal: Independent    Gait Deviations: []None []Slow josé miguel  [] Increased AMA  [] Staggers []Deviated Path  [] Decreased step length  [] Decreased step height  []Decreased arm swing  [] Shuffles  [] Decreased head and trunk rotation  []other:        Wheelchair:  w/c Ability: Wheelchair Ability  Uses a Wheelchair and/or Scooter?: No                Balance:        Object: Picking Up Object  Assistance Needed: Supervision or touching assistance  Comment: CG with hesitancy  CARE Score: 4  Discharge Goal: Independent  OCCUPATIONAL THERAPY:  Goals:             Short term goals  Time Frame for Short term goals: LTGs= STGs :  Long term goals  Time Frame for Long term goals : 3- 5 day or until d/c  Long term goal 1: Pt will complete total body bathing c Mod I and use of AE prn by d/c  Long term goal 2: Pt will complete total body dressing (UB/LB/Footwear) c SUP by d/c  Long term goal 3: Pt will complete toileting c MOD I by d/c  Long term goal 4: Pt will complete functional transfer (toilet, tub, shower) c MOD I by d/c. Long term goal 5: Pt will complete grooming/oral care task c IND by d/c  Long term goals 6: Pt will perform therex/therax to facilitate increased strength/endurance/ax tolerance (c emphasis on dynamic standing balance/tolerance >8 mins ) c MOD I in order to complete ADLs. :    These goals were reviewed with this patient at the time of assessment and Gay Good is in agreement    Plan of Care:  Pt to be seen 5 days per week for a minimum of 60 minutes for 12 days.   Patient Education: Pt education on walker managment and safety training when completnig ADLs   Times per week: 5-6x   Plan  Times per week: 5-6x  Times per day: Daily  Current Treatment Recommendations: Strengthening, Balance Training, Endurance Training, Pain Management, Other (comment), Home Management Training, Positioning, Safety Education & Training, Equipment Evaluation, Education, & procurement, Functional Mobility Training, ROM, Patient/Caregiver Education & Training         cognitive training, home management, energy conservation training, community reintegration, splint fabrication, patient/caregiver education and training, animal assisted therapy, and concurrent and/or group therapy. OT IRF-TAMARA scores and goals for initial assessment:    ADLs:    Eating: Eating  Assistance Needed: Independent  Comment: Pt sitting EOB eating on arrival  CARE Score: 6  Discharge Goal: Independent       Oral Hygiene: Oral Hygiene  Assistance Needed: Supervision or touching assistance  Comment: Completed standing at sink for 3 minutes S for safety  CARE Score: 4  Discharge Goal: Independent    UB/LB Bathing: Shower/Bathe Self  Assistance Needed: Partial/moderate assistance  Comment: Assist to clean L side of Abd. due to lmited ROM of RUE  CARE Score: 3  Discharge Goal: Independent    UB Dressing: Upper Body Dressing  Assistance Needed: Setup or clean-up assistance  Comment: Set-up A  CARE Score: 5  Discharge Goal: Independent         LB Dressing: Lower Body Dressing  Assistance Needed: Supervision or touching assistance  Comment: Set-up A and S for clothing managment  CARE Score: 4  Discharge Goal: Independent    Donning and [place] Footwear: Putting On/Taking Off Footwear  Assistance Needed: Setup or clean-up assistance  Comment: Set-up A  CARE Score: 5  Discharge Goal: Independent      Toileting: Toileting Hygiene  Assistance Needed: Supervision or touching assistance  Comment: S when completing toileting hygiene  CARE Score: 4  Discharge Goal: Independent      Toilet Transfers:    Toilet Transfer  Assistance Needed: Supervision or touching assistance  Comment: SBA when completing transfer with RW; no use of grab bars  CARE Score: 4  Discharge Goal: Independent      SPEECH THERAPY: (If ordered)  Plan of Care and Goals:   LTG                                                         LTG:                           Treatments may include speech/language/communication therapy, cognitive training, animal assisted therapy, group therapy, education, and/or dysphagia therapy based on the above goals. Co-treats where appropriate with PT or OT to facilitate patient goals in functional tasks. These goals were reviewed with this patient at the time of assessment and Elizabeth Pate is in agreement. CASE MANAGEMENT:  Goals:   Assist patient/family with discharge planning, patient/family counseling, assistance in obtaining recommended equipment and other services, and coordination with insurance during ARU stay. Patient Goals: Return to maximum level of independent function. Nutrition goal:Patient will continue to consume at least 50-75% at meals during stay        Activities Prior to Admit:   Homemaking Responsibilities: Yes  Active : Yes  Mode of Transportation: Car  Occupation: Retired  Leisure & Hobbies: Gets hair done 1x week, cooking, running errors. Intensity of Therapy  Elizabeth Pate will be seen a minimum of 3 hours of therapy per day/a minimum of 5 out of 7 days per week. [] In this rare instance due to the nature of this patient's medical involvement, this patient will be seen 15 hours per week (900 minutes within a 7 day period). Treatments may include therapeutic exercises, gait training, neuromuscular re-ed, transfer training, community reintegration, bed mobility, w/c mobility and training, self care, home mgmt, cognitive training, energy conservation,dysphagia tx, speech/language/communication therapy, group therapy, and patient/family education.  In addition, dietician/nutritionist may monitor calorie count as well as intake and collaboratively work with SLP on dietary upgrades. Neuropsychology/Psychology may evaluate and provide necessary support. Group therapy as appropriate to facilitate improved endurance, STR, COORD, function, safety, transfers, awareness and insight into deficits, problem solving, memory, and social interaction and engagement. Medical issues being managed closely and that require 24 hour availability of a physician:   [] Swallowing Precautions                                     [] Weight bearing precautions   [] Wound Care                             [x] Infection Prevention   [x] DVT Prophylaxis/assessment              [x] Monitoring for complications    [x] Fall Precautions/Prevention                         [x] Fluid/Electrolyte/Nutrition Balance   [] Voice Protection                           [x] Medication Management   [x] Respiratory                   [x] Pain Mgmt   [x] Bowel/Bladder Fx    Medical Prognosis: [] Good  [x] Fair    [] Guarded   Total expected IRF days 12                                            Physician anticipated functional outcomes:  FWW and HHC OT/PT and supervision.   Rehab Goals:   [] Return to premorbid function of_______________________________.    [] Independent   [] Mod I  [x] Supervision  [] CGA   [] Min A   [] Mod A  Level for ambulation []without assistive device  [x] with assistive device        [] Independent   [] Mod I  [x] Supervision [] CGA   [] Min A   [] Mod A  Level for transfers []without assistive device  [x] with assistive device         [] Independent   [] Mod I  [x] Supervision [] CGA   [] Min A   [] Mod A Level with ADL's []without assistive device   [x] with assistive device     ___________________     Level with cognitive skills requiring [] No assist [x] Supervision  [] Active Assist/Cues     [] Maximize level of mobility and ADL's to decrease burden on caregiver    IPOC brief synthesis of Preadmission Screen, Post-Admission Evaluation, and Therapy Evaluations: Acute inpatient rehabilitation with occupational and physical therapy 180 minutes 5 out of every 7 days. Will address basic and  advancing mobility with self-care instruction and adaptive equipment training. Caregiver education will be offered. Expected length of stay  prior to a supervised level of function for discharge home with a walker and C OT/PT is 10 to 14 days.     Additional recommendation:     1. Hyponatremia with generalized weakness: The patient requires daily occupational and physical therapy. She requires adaptive equipment training, caregiver education and bowel and bladder retraining. We must provide aggressive pulmonary hygiene measures, cautious pain management and nutritional support. Monitoring her fluid intake. Periodic monitoring of her chemistries. Cautious use of any diuretics. 2. DVT prophylaxis: Lovenox 40 mg subcu daily. I must monitor her hemoglobin and platelet count while on this medication periodically. GI prophylaxis offered. Weightbearing activities pursued daily. 3. Bilateral hydroureter: Monitoring her for symptoms of obstruction. Outpatient follow-up with urology. 4. Hypertension: Recently the patient has been maintained off antihypertensives. Target systolic blood pressure is 120-140. Monitoring vital signs at rest and with activity. 5. Trigeminal neuralgia: Vascepa, Neurontin and Depakote. Acetaminophen and other oral analgesics as needed. Zoloft to help with sleep.     Anticipated discharge destination:    [] Home Independently   [x] Home with supervision    []SNF     [] Other       This plan has been reviewed with me in a language I understand.  I have had the opportunity to include my input with my therapy team.    ________________________________________________   ______________________  Patient/Significant Other      Date    I have reviewed this initial plan of care and agree with its contents:    Title   Name    Date    Time    Physician: Castro Murguia MD 11/19/2021 7:34 PM    Case Mgmt: Shashi Alvarezjohan, MSW, LSW 11/18/21 1700    OT: Shanti East, OTR/L, [facility], [id number]     11/18/21                             Metsa 21, PT 11/18/21 1600    RN:  Aura Chilel, RN 11/17/21    ST:    Dietician: [address] [place] [id number],  11/18/2021

## 2021-11-18 PROCEDURE — 97535 SELF CARE MNGMENT TRAINING: CPT

## 2021-11-18 PROCEDURE — 6370000000 HC RX 637 (ALT 250 FOR IP): Performed by: INTERNAL MEDICINE

## 2021-11-18 PROCEDURE — 99232 SBSQ HOSP IP/OBS MODERATE 35: CPT | Performed by: PHYSICAL MEDICINE & REHABILITATION

## 2021-11-18 PROCEDURE — 94150 VITAL CAPACITY TEST: CPT

## 2021-11-18 PROCEDURE — 94761 N-INVAS EAR/PLS OXIMETRY MLT: CPT

## 2021-11-18 PROCEDURE — 97112 NEUROMUSCULAR REEDUCATION: CPT

## 2021-11-18 PROCEDURE — 97116 GAIT TRAINING THERAPY: CPT

## 2021-11-18 PROCEDURE — 6360000002 HC RX W HCPCS: Performed by: PHYSICAL MEDICINE & REHABILITATION

## 2021-11-18 PROCEDURE — 97530 THERAPEUTIC ACTIVITIES: CPT

## 2021-11-18 PROCEDURE — 1280000000 HC REHAB R&B

## 2021-11-18 PROCEDURE — 94664 DEMO&/EVAL PT USE INHALER: CPT

## 2021-11-18 PROCEDURE — 97162 PT EVAL MOD COMPLEX 30 MIN: CPT

## 2021-11-18 PROCEDURE — 6370000000 HC RX 637 (ALT 250 FOR IP): Performed by: PHYSICAL MEDICINE & REHABILITATION

## 2021-11-18 PROCEDURE — 97165 OT EVAL LOW COMPLEX 30 MIN: CPT

## 2021-11-18 RX ORDER — FLUTICASONE PROPIONATE 50 MCG
1 SPRAY, SUSPENSION (ML) NASAL DAILY
Status: DISCONTINUED | OUTPATIENT
Start: 2021-11-18 | End: 2021-11-30 | Stop reason: HOSPADM

## 2021-11-18 RX ADMIN — GABAPENTIN 300 MG: 300 CAPSULE ORAL at 13:25

## 2021-11-18 RX ADMIN — ICOSAPENT ETHYL 1000 MG: 1000 CAPSULE ORAL at 21:07

## 2021-11-18 RX ADMIN — ICOSAPENT ETHYL 1000 MG: 1000 CAPSULE ORAL at 08:18

## 2021-11-18 RX ADMIN — ENOXAPARIN SODIUM 40 MG: 100 INJECTION SUBCUTANEOUS at 08:16

## 2021-11-18 RX ADMIN — GABAPENTIN 300 MG: 300 CAPSULE ORAL at 08:16

## 2021-11-18 RX ADMIN — VERAPAMIL HYDROCHLORIDE 90 MG: 180 TABLET, FILM COATED, EXTENDED RELEASE ORAL at 21:09

## 2021-11-18 RX ADMIN — PANTOPRAZOLE SODIUM 40 MG: 40 TABLET, DELAYED RELEASE ORAL at 08:05

## 2021-11-18 RX ADMIN — VERAPAMIL HYDROCHLORIDE 90 MG: 180 TABLET, FILM COATED, EXTENDED RELEASE ORAL at 08:15

## 2021-11-18 RX ADMIN — ROSUVASTATIN CALCIUM 10 MG: 5 TABLET, COATED ORAL at 21:05

## 2021-11-18 RX ADMIN — FLUTICASONE PROPIONATE 1 SPRAY: 50 SPRAY, METERED NASAL at 10:45

## 2021-11-18 RX ADMIN — SERTRALINE HYDROCHLORIDE 25 MG: 50 TABLET ORAL at 11:17

## 2021-11-18 RX ADMIN — KETOTIFEN FUMARATE 1 DROP: 0.35 SOLUTION/ DROPS OPHTHALMIC at 21:09

## 2021-11-18 RX ADMIN — KETOTIFEN FUMARATE 1 DROP: 0.35 SOLUTION/ DROPS OPHTHALMIC at 08:21

## 2021-11-18 RX ADMIN — ASPIRIN 81 MG: 81 TABLET, COATED ORAL at 08:15

## 2021-11-18 RX ADMIN — GABAPENTIN 300 MG: 300 CAPSULE ORAL at 21:05

## 2021-11-18 RX ADMIN — DIVALPROEX SODIUM 250 MG: 250 TABLET, FILM COATED, EXTENDED RELEASE ORAL at 21:05

## 2021-11-18 ASSESSMENT — PAIN SCALES - GENERAL
PAINLEVEL_OUTOF10: 0
PAINLEVEL_OUTOF10: 0

## 2021-11-18 NOTE — PROGRESS NOTES
Physical Therapy  . 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 Faheem Bolanos DVT (deep venous thrombosis) ([facility] [address] 1970    left leg    Former smoker     History of echocardiogram 09/21/2020    EF 55-60%, mild left ventricular hypertrophy, normal diastolic filling pattern for age, no signficiant valvular disease, no pericardial effusion     History of stress test 03/25/2021    normal LVEF calculated by the computer is probably falsely low. LVEF is 40 %    Hx of Doppler ultrasound 03/01/2018    Carotid-mild 0-49% bilateral carotid,50% stenosis right subclavian    Hyperlipidemia     Hypertension     Macular degeneration     right    Mild cognitive impairment 02/2012    MMSE 26/30    MVP (mitral valve prolapse)     trace on echo 2014    Osteoporosis 05/2012    Pancytopenia 05/2011    mild with ; Dr Quirino hawk 2010    Peptic ulcer disease     Peripheral vascular disease ([facility] [address] 01/2016    angio; dis below ankles; pletal    Prediabetes 2006    Recurrent ventral incisional hernia 2013    medial apex of GB scar    S/P CABG x 3 2003    3-vessel; Dr Jennyfer Thakkar 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 César Benitez; Right side     Past Surgical History:   Procedure Laterality Date    CATARACT REMOVAL Right 2010    poor result ; blind right eye; [place]    CHOLECYSTECTOMY      CORONARY ARTERY BYPASS GRAFT  8/2009    3 vessel    SHOULDER ARTHROSCOPY Right 2003    TUBAL LIGATION      URETER SURGERY      Right ureteral repair     Fall History: at least 2 in past year with pt reporting hitting head both times, but \"no injury\".  Pt   Social History:  Social/Functional History  Lives With: Alone  Type of Home: House  Home Layout: One level (Pt has a basement with a chair lift; family room is in the basement)  Home Access: Stairs to enter with rails  Entrance Stairs - Number of Steps: 4 steps  Entrance Stairs - Rails: Both  Bathroom Shower/Tub: Tub/Shower unit, Shower chair with back  [employer]: Standard  Bathroom Equipment: Grab bars in shower, Grab bars around toilet (has grab bars all the way around the bathroom (by the sink, by tub, and door))  Bathroom Accessibility: Not accessible  Home Equipment: Grab bars, Lift chair, Cane (recliner that lifts all the way up)  Receives Help From:  (sons live nearby but work full time. does not feel anyone would be able to assist her at home)  ADL Assistance: [address] [place] [address] Avenue: Needs assistance  Meal Prep:  (IND)  Laundry:  (IND)  Vacuuming: Total  Cleaning: Maximal  Gardening: Total  Yard Work: Total  Driving:  (IND)  Shopping: Moderate  Homemaking Responsibilities: Yes  Meal Prep Responsibility: Primary  Laundry Responsibility: Primary  Cleaning Responsibility: Secondary (grandson comes every other week to clean)  Bill Paying/Finance Responsibility: Primary  Shopping Responsibility: No (grandson does once a week)  Health Care Management: Primary  Ambulation Assistance: Independent (pt did not use assistive device)  Transfer Assistance: Independent  Active : Yes  Mode of Transportation: Car  Occupation: Retired  Type of occupation: Retired   Leisure & Hobbies: Gets hair done 1x week, cooking, running errors. Additional Comments: Pt sleeps both in low flat bed and tall 4 poster bed depending on how she feels. Has had one fall where she bumped her head and she had stayed in the lower bed since then. Pt has had at least 2 falls in past year.  no inury    Restrictions:  Restrictions/Precautions  Restrictions/Precautions: General Precautions, Fall Risk            Pain Level: 0       Objective:       Decreased cognition with pt showing decreased insight into deficits and strategies to correct, perseveration on past events with continuous talking during evaluation    Vision  Vision: Impaired  Vision Exceptions: Cataracts, Wears glasses for reading (Cataracts surgery done bilaterally; blind in R eye)with slight vision in R eye, tends to look to L often even when therapist standing on her R side  Hearing  Hearing: Within functional limits    ROM:   PROM RLE (degrees)  RLE PROM: WFL     PROM LLE (degrees)  LLE PROM: WFL                 RLE PROM: WFL  Strength:    Strength RLE  Comment: 4+ to 5/5  Strength LLE  Comment: 4+to 5/5              Bed Mobility:   Lying to Sitting on Side of Bed  Assistance Needed: Independent  CARE Score: 6  Discharge Goal: Independent  Roll Left and Right  Assistance Needed: Independent  CARE Score: 6  Discharge Goal: Independent  Sit to Lying  Assistance Needed: Independent  CARE Score: 6  Discharge Goal: Independent    Transfers:    Sit to Stand  Assistance Needed: Supervision or touching assistance  Comment: SBA with one instance of posterior LOB where pt had to fully sit back down  CARE Score: 4  Discharge Goal: Independent  Chair/Bed-to-Chair Transfer  Assistance Needed: Supervision or touching assistance  Comment: SBA  CARE Score: 4  Discharge Goal: Independent     Car Transfer  Assistance Needed: Supervision or touching assistance  CARE Score: 4  Discharge Goal: Independent    Ambulation:   Device used PTA: none   Walking Ability  Does the Patient Walk?: Yes     Walk 10 Feet  Assistance Needed: Supervision or touching assistance  Comment: CGA without device  CARE Score: 4  Discharge Goal: Independent     Walk 50 Feet with Two Turns  Assistance Needed: Supervision or touching assistance  Comment: CG withnarrow AMA at times R nearly crossing, varialbe step length and width with moderate path deviation with pt having no awareness, keeping a normal gait speed though deviations would call for going slower for safety  CARE Score: 4  Discharge Goal: Independent     Walk 150 Feet  Assistance Needed: Supervision or touching assistance  Comment: CG  CARE Score: 4  Discharge Goal: Independent     Walking 10 Feet on Uneven Surfaces  Assistance Needed: Partial/moderate assistance  Comment: pt with no awareness of surface changes requiring mod assist to recover balance x 2 despite training mid task  CARE Score: 3  Discharge Goal: Independent     1 Step (Curb)  Comment: CG  Discharge Goal: Independent     4 Steps  Assistance Needed: Supervision or touching assistance  Comment: SBA with B rails, reciprocating gait, decreased eccentric control at times with RLE  CARE Score: 4  Discharge Goal: Independent     12 Steps  Assistance Needed: Supervision or touching assistance  CARE Score: 4  Discharge Goal: Independent         Wheelchair:  w/c Ability: Wheelchair Ability  Uses a Wheelchair and/or Scooter?: No                Balance:        Object: Picking Up Object  Assistance Needed: Supervision or touching assistance  Comment: CG with hesitancy  CARE Score: 4  Discharge Goal: Independent     HIDALGO BALANCE SCALE     ITEM DESCRIPTION               SCORE (0-4)    1. Sitting to standing      3  2. Standing unsupported      3  3. Sitting unsupported      4  4. Standing to sitting Transfers               3      5. Standing with eyes closed     3  6. Standing with feet together     1  7. Reaching forward with outstretched arm              2  8. Retrieving object from floor     3  9. Turning to look behind                  2  10. Turning 360 degrees      2  11. Placing alternate foot on stool                           2  12. Standing with one foot in front                           3  13.  Standing on one foot      1          Total 40/56                            Interpretation: 41-56 = independent/low fall risk     21-40 = walking with assistance/medium fall risk     0 -20 = wheelchair bound/high fall risk    *change of 8 pts is 5: Pt will retrieve light item from floor with mod I   assist  using device as needed     Plan:    REQUIRES PT FOLLOW UP: Yes  Pt will be seen at least 60 minutes per day for a minimum of 5 days per week, plus group therapy as appropriate  Plan  Current Treatment Recommendations: Functional Mobility Training, IADL Training, Neuromuscular Re-education, Home Exercise Program, Equipment Evaluation, Education, & procurement, Transfer Training, Gait Training, Balance Training, Stair training, Patient/Caregiver Education & Training, Safety Education & Training    PT Individual Minutes  Time In: 3899  Time Out: 1436  Minutes: 90                 PT Evaluation 15   Gait Training 30   Therapeutic Exercise    Neuro Re-Ed 15   Therapeutic Activity 30   Wheelchair Propulsion    Group    Other:    TOTAL 90       Electronically signed by:    Wu Davenport, PT  11/18/2021,

## 2021-11-18 NOTE — PLAN OF CARE
Problem: Infection:  Goal: Will remain free from infection  Description: Will remain free from infection  Outcome: Ongoing     Problem: Safety:  Goal: Free from accidental physical injury  Description: Free from accidental physical injury  Outcome: Ongoing  Goal: Free from intentional harm  Description: Free from intentional harm  Outcome: Ongoing     Problem: Infection:  Goal: Will remain free from infection  Description: Will remain free from infection  Outcome: Ongoing     Problem: Safety:  Goal: Free from accidental physical injury  Description: Free from accidental physical injury  Outcome: Ongoing     Problem: Safety:  Goal: Free from intentional harm  Description: Free from intentional harm  Outcome: Ongoing     Problem: Daily Care:  Goal: Daily care needs are met  Description: Daily care needs are met  Outcome: Ongoing     Problem: Daily Care:  Goal: Daily care needs are met  Description: Daily care needs are met  Outcome: Ongoing     Problem: Pain:  Goal: Patient's pain/discomfort is manageable  Description: Patient's pain/discomfort is manageable  Outcome: Ongoing     Problem: Pain:  Goal: Patient's pain/discomfort is manageable  Description: Patient's pain/discomfort is manageable  Outcome: Ongoing     Problem: Skin Integrity:  Goal: Skin integrity will stabilize  Description: Skin integrity will stabilize  Outcome: Ongoing     Problem: Skin Integrity:  Goal: Skin integrity will stabilize  Description: Skin integrity will stabilize  Outcome: Ongoing     Problem: Discharge Planning:  Goal: Patients continuum of care needs are met  Description: Patients continuum of care needs are met  Outcome: Ongoing     Problem: Discharge Planning:  Goal: Patients continuum of care needs are met  Description: Patients continuum of care needs are met  Outcome: Ongoing

## 2021-11-18 NOTE — CARE COORDINATION
Other (See Comments)     GI upset    Bactrim [Sulfamethoxazole-Trimethoprim] Anaphylaxis    Boniva [Ibandronate Sodium] Other (See Comments)     Gi upset and declined egd; also to fosamax    Ciprofloxacin     Colesevelam     Lisinopril Other (See Comments)     Severe hyperkalemia (6) with bun >56      Macrobid [Nitrofurantoin]     Neggram [Nalidixic Acid]     Pce [Erythromycin]     Penicillins     Risperidone And Related     Welchol [Colesevelam Hcl] Other (See Comments)     constipation    Carbamazepine Nausea And Vomiting    Lovaza [Omega-3-Acid Ethyl Esters] Nausea And Vomiting    Metoprolol Nausea And Vomiting    Pyridium [Phenazopyridine] Palpitations     Precautions: falls    Date of Admit: 2021  Room #: 7130/5259-V      Current functional status at time of admit:        Home Living/DME Available:      Type of Home: House  Home Access: Stairs to enter with rails  Bathroom Shower/Tub: Tub/Shower unit, Shower chair with back  [employer]: Standard  Bathroom Equipment: Grab bars in shower, Grab bars around toilet (has grab bars all the way around the bathroom (by the sink, by tub, and door))  Home Equipment: Grab bars, Lift chair, Cane (recliner that lifts all the way up)       IADL Hx:   Homemaking Responsibilities: Yes  Active : Yes  Mode of Transportation: Car  Occupation: Retired  Leisure & Hobbies: Gets hair done 1x week, cooking, running errors. Spouse: None. Family: [id number].  and 2 children - . Comments: LSW met with patient for admission assessment. Patient lives alone in a 1-level home in [place]. There are 3-4 steps to enter with rail and steps inside. Patient reports tub/shower for bathing and bedroom and bathroom are on the main level. Patient has PCP, was independent in ADLs PTA, and uses [facility] in [place] for prescriptions.   Patient states she has a shower chair with back and grab bars (toilet and shower) at home and no pre-existing HHC. Patient reports access to food, utilities, and shelter and feels safe in her environment. BIMS completed in initial assessment. Plan is to D/C home alone, with HHC and DME as recommended by therapy staff. F/U to be set with Dr. Jovanny Tyson ([phone]) and family will transport home.     REBECCA Viveros, PETEW, 11/18/2021, 5:21 PM

## 2021-11-18 NOTE — PROGRESS NOTES
Comprehensive Nutrition Assessment    Type and Reason for Visit:  Initial, Consult (oral nutrition supplement)    Nutrition Recommendations/Plan:   Continue regular diet with snacks as desires   Encourage consistent meal intake   Will continue to follow up during stay     Nutrition Assessment:  Rehab admit with hx weakness, hyponatremia. Appetite good at this time, tolerating regular diet. Meal intake %. Avoids nuts and milk for GI issues, diverticulitis. No recent weight loss, current weight reasonable. Will follow at low nutrition risk at this time. Malnutrition Assessment:  Malnutrition Status:  No malnutrition    Context:  Acute Illness       Estimated Daily Nutrient Needs:  Energy (kcal):  7956-7194 (25-30 hans/kg); Weight Used for Energy Requirements:  Current     Protein (g):  62-74 (1-1.2 g/kg); Weight Used for Protein Requirements:  Current        Fluid (ml/day):  6364-3326; Method Used for Fluid Requirements:  1 ml/kcal      Nutrition Related Findings:  sitting up in chair waiting on lunch, reports hungry and ready to eat, hx CAD      Wounds:  None       Current Nutrition Therapies:    ADULT DIET;  Regular    Anthropometric Measures:  · Height: 5' 3\" (160 cm)  · Current Body Weight: 136 lb 14.5 oz (62.1 kg)   · Usual Body Weight: 137 lb (62.1 kg) (MD office in August)     · Ideal Body Weight: 115 lbs; % Ideal Body Weight 119 %   · BMI: 24.3  · Adjusted Body Weight:  ; No Adjustment   · BMI Categories: Normal Weight (BMI 22.0 to 24.9) age over 72       Nutrition Diagnosis:   No nutrition diagnosis at this time     Nutrition Interventions:   Food and/or Nutrient Delivery:  Continue Current Diet, Snacks (Comment)  Nutrition Education/Counseling:  No recommendation at this time   Coordination of Nutrition Care:  Continue to monitor while inpatient    Goals:  Patient will continue to consume at least 50-75% at meals durign stay       Nutrition Monitoring and Evaluation:   Behavioral-Environmental Outcomes:  None Identified   Food/Nutrient Intake Outcomes:  Food and Nutrient Intake  Physical Signs/Symptoms Outcomes:  Biochemical Data, Meal Time Behavior, Skin, Weight     Discharge Planning:    Continue current diet     Electronically signed by Efra Meza RD, LD on 11/18/21 at 1:34 PM EST    Contact: [phone]

## 2021-11-18 NOTE — PROGRESS NOTES
Occupational Therapy                                [facility] ARU OCCUPATIONAL THERAPY EVALUATION    Chart Review:  Past Medical History:   Diagnosis Date    Arthritis of shoulder region, right 09/2014    Kevin Szymanski Blind right eye     following right cataract surg    Chronic depression 2009    Dr. Eliot Lundborg DVT (deep venous thrombosis) ([facility] [address] 1970    left leg    Former smoker     History of echocardiogram 09/21/2020    EF 55-60%, mild left ventricular hypertrophy, normal diastolic filling pattern for age, no signficiant valvular disease, no pericardial effusion     History of stress test 03/25/2021    normal LVEF calculated by the computer is probably falsely low.  LVEF is 40 %    Hx of Doppler ultrasound 03/01/2018    Carotid-mild 0-49% bilateral carotid,50% stenosis right subclavian    Hyperlipidemia     Hypertension     Macular degeneration     right    Mild cognitive impairment 02/2012    MMSE 26/30    MVP (mitral valve prolapse)     trace on echo 2014    Osteoporosis 05/2012    Pancytopenia 05/2011    mild with ; Dr Bob hawk 2010    Peptic ulcer disease     Peripheral vascular disease ([facility] [address] 01/2016    angio; dis below ankles; pletal    Prediabetes 2006    Recurrent ventral incisional hernia 2013    medial apex of GB scar    S/P CABG x 3 2003    3-vessel; Dr Kaleigh Stout 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 Alicia Snowden; Right side     Past Surgical History:   Procedure Laterality Date    CATARACT REMOVAL Right 2010    poor result ; blind right eye; [address] CORONARY ARTERY BYPASS GRAFT  8/2009    3 vessel    SHOULDER ARTHROSCOPY Right 2003    TUBAL LIGATION      URETER SURGERY      Right ureteral repair     Social History:  Social/Functional History  Lives With: Alone  Type of Home: House  Home Layout: One level (Pt has a basement with a chair lift; family room is in the basement)  Home Access: Stairs to enter with rails  Entrance Stairs - Number of Steps: 4 steps  Entrance Stairs - Rails: Both  Bathroom Shower/Tub: Tub/Shower unit, Shower chair with back  [employer]: Standard  Bathroom Equipment: Grab bars in shower, Grab bars around toilet (has grab bars all the way around the bathroom (by the sink, by tub, and door))  Bathroom Accessibility: Not accessible  Home Equipment: Grab bars, Lift chair, Cane (recliner that lifts all the way up)  ADL Assistance: [facility]: Needs assistance  Meal Prep:  (IND)  Laundry:  (IND)  Vacuuming: Total  Cleaning: Maximal  Gardening: Total  Yard Work: Total  Driving:  (IND)  Shopping: Moderate  Homemaking Responsibilities: Yes  Meal Prep Responsibility: Primary  Laundry Responsibility: Primary  Cleaning Responsibility: Secondary (grandson comes every other week to clean)  Bill Paying/Finance Responsibility: Primary  Health Care Management: Primary  Ambulation Assistance: Independent  Transfer Assistance: Independent  Active : Yes  Mode of Transportation: Car  Occupation: Retired  Type of occupation: Retired   Leisure & Hobbies: Gets hair done 1x week, cooking, running errors.     Restrictions:  Restrictions/Precautions  Restrictions/Precautions: General Precautions                  Pain Level: 0       Objective:  Observation/Palpation  Posture: Good  Body Mechanics: Pt was able to stand up straight and take good strides    Orientation  Overall Orientation Status: Within Normal Limits        Vision  Vision: Impaired  Vision Exceptions: Cataracts, Wears glasses for reading (Cataracts surgery done bilaterally; blind in R eye)  Vision - Basic Assessment  Prior Vision: Wears glasses only for reading (Blind in R eye)  Visual History: Cataracts (cataract surgery completed bilaterally)  Hearing  Hearing: Within functional limits    ROM:      LUE AROM (degrees)  LUE AROM : [facility] Left Hand AROM (degrees)  Left Hand AROM: WFL     RUE AROM (degrees)  RUE AROM : Exceptions  R Shoulder Flexion 0-180:   R Shoulder ABduction 0-180: 0-100     Right Hand AROM (degrees)  Right Hand AROM: WFL    Strength:    LUE Strength  L Hand General: 4/5  LUE Strength Comment: tested flexion  RUE Strength  R Hand General: 4-/5  RUE Strength Comment: teted flexion    Quality of Movement: Tone RUE  RUE Tone: Normotonic  Tone LUE  LUE Tone: Normotonic  Coordination  Movements Are Fluid And Coordinated: No  Coordination and Movement description: Tremors       Sensation:    Sensation  Overall Sensation Status: WNL     ADLs:  Eating: Eating  Assistance Needed: Independent  Comment: Pt sitting EOB eating on arrival  CARE Score: 6  Discharge Goal: Independent       Oral Hygiene: Oral Hygiene  Assistance Needed: Supervision or touching assistance  Comment: Completed standing at sink for 3 minutes S for safety  CARE Score: 4  Discharge Goal: Independent    UB/LB Bathing: Shower/Bathe Self  Assistance Needed: Partial/moderate assistance  Comment: Assist to clean L side of Abd. due to lmited ROM of RUE  CARE Score: 3  Discharge Goal: Independent    UB Dressing: Upper Body Dressing  Assistance Needed: Setup or clean-up assistance  Comment: Set-up A  CARE Score: 5  Discharge Goal: Independent         LB Dressing:   Lower Body Dressing  Assistance Needed: Supervision or touching assistance  Comment: Set-up A and S for clothing managment  CARE Score: 4  Discharge Goal: Independent    Donning and [place] Footwear: Putting On/Taking Off Footwear  Assistance Needed: Setup or clean-up assistance  Comment: Set-up A  CARE Score: 5  Discharge Goal: Independent      Toileting:  Toileting Hygiene  Assistance Needed: Supervision or touching assistance  Comment: S when completing toileting hygiene  CARE Score: 4  Discharge Goal: Independent      Bed Mobility:    Bed mobility  Scooting: Independent  Comment: Recived sitting Patient would benefit from OT services to improve strength, endurance, and performance with ADLs. Patient would benefit from a LH sponge to improve bathing performance. Treatment Diagnosis: generalized weakness  Prognosis: Good     Clinical Presentation:  Changing with evolving characteristics   Patient education:   ARU procotol, Role of O.T., O.T. plan of care, transfer training   [x]   Patient goal was established and reviewed in Rehabtracker with patient and/or family this date. REQUIRES OT FOLLOW UP: Yes  Discharge Recommendations:  Home with PRN assist  Equipment Recommendations:  TBD    Goals:  Patient Goals   Patient goals : To return home  Short term goals  Time Frame for Short term goals: LTGs= STGs  Long term goals  Time Frame for Long term goals : 3- 5 day or until d/c  Long term goal 1: Pt will complete total body bathing c Mod I and use of AE prn by d/c  Long term goal 2: Pt will complete total body dressing (UB/LB/Footwear) c SUP by d/c  Long term goal 3: Pt will complete toileting c MOD I by d/c  Long term goal 4: Pt will complete functional transfer (toilet, tub, shower) c MOD I by d/c. Long term goal 5: Pt will complete grooming/oral care task c IND by d/c  Long term goals 6: Pt will perform therex/therax to facilitate increased strength/endurance/ax tolerance (c emphasis on dynamic standing balance/tolerance >8 mins ) c MOD I in order to complete ADLs.     Plan:    Pt will be seen at least 60 minutes per day for a minimum of 5 days per week, plus group therapy as appropriate  Current Treatment Recommendations: Strengthening, Balance Training, Endurance Training, Pain Management, Other (comment), Home Management Training, Positioning, Safety Education & Training, Equipment Evaluation, Education, & procurement, Functional Mobility Training, ROM, Patient/Caregiver Education & Training    OT Individual Minutes  Time In: 0830  Time Out: 9800  Minutes: 80                Number of Minutes/Billable Intervention      OT Evaluation 30   Therapeutic Exercise    ADL Self-care 62   Neuro Re-Ed    Therapeutic Activity    Group    Other:    TOTAL 92     Electronically signed by:    Lissett Rivera OT, MOT, OTR/L [id number]    11/18/2021, 12:54 PM

## 2021-11-18 NOTE — H&P
Keo Alexander    : 1935  Acct #: [de-identified]  MRN: [id number]              History and physical      Admitting diagnosis: Hyponatremia ([address])    Comorbid diagnoses impacting rehabilitation: Generalized weakness, gait disturbance, essential hypertension, PAD, HLD, macular degeneration, trigeminal neuralgia, history of DVT, bilateral hydroureter    Chief complaint: Positional dizziness and general fatigue. History of present illness: Patient is an 81-year-old right-hand-dominant female who presented to our ED on 11/10/2021 with generalized weakness and difficulty walking. She described several weeks of progressive fatigue and weakness. Her family noted her thinking was not as sharp. In our ED she was found to be significantly hyponatremic and dehydrated. She had a cardiac and neurologic survey which did not yield any significant abnormalities. Her evaluation identified bilateral hydroureters without clear obstructive stones or extrinsic compression on the ureters or bladder. She was treated with IV fluids and occupational and physical therapy. Became apparent she was not safe to return directly home because she could not do her own transfers and toileting. She was considered a good inpatient rehabilitation candidate to address these issues. Review of systems: Positional dizziness, poor appetite and poor sleep. No chills, cough or change in swallowing. Less limb cramping in the recent days. Occasional bowel movement. Some urinary incontinence. The remainder of their review of systems was negative except as mentioned in the history of present illness.     Social History: Lives With: Alone  Type of Home: House  Home Layout: One level (Pt has a basement with a chair lift; family room is in the basement)  Home Access: Stairs to enter with rails  Entrance Stairs - Number of Steps: 4 steps  Entrance Stairs - Rails: Both  Bathroom Shower/Tub: Tub/Shower unit, Shower chair with back  [employer]: Standard  Bathroom Equipment: Grab bars in shower, Grab bars around toilet (has grab bars all the way around the bathroom (by the sink, by tub, and door))  Bathroom Accessibility: Not accessible  Home Equipment: Grab bars, Lift chair, Cane (recliner that lifts all the way up)  Receives Help From:  (sons live nearby but work full time. does not feel anyone would be able to assist her at home)  ADL Assistance: [address]: Needs assistance  Meal Prep:  (IND)  Laundry:  (IND)  Vacuuming: Total  Cleaning: Maximal  Gardening: Total  Yard Work: Total  Driving:  (IND)  Shopping: Moderate  Homemaking Responsibilities: Yes  Meal Prep Responsibility: Primary  Laundry Responsibility: Primary  Cleaning Responsibility: Secondary (grandson comes every other week to clean)  Bill Paying/Finance Responsibility: Primary  Shopping Responsibility: No (grandson does once a week)  Health Care Management: Primary  Ambulation Assistance: Independent (pt did not use assistive device)  Transfer Assistance: Independent  Active : Yes  Mode of Transportation: Car  Occupation: Retired  Type of occupation: Retired   Leisure & Hobbies: Gets hair done 1x week, cooking, running errors. Additional Comments: Pt sleeps both in low flat bed and tall 4 poster bed depending on how she feels. Has had one fall where she bumped her head and she had stayed in the lower bed since then. Pt has had at least 2 falls in past year. no inury     She reports that she quit smoking about 35 years ago. Her smoking use included cigarettes. She started smoking about 45 years ago. She has a 2.25 pack-year smoking history. She has never used smokeless tobacco. She reports that she does not drink alcohol and does not use drugs. Prior (baseline) level of function: Independent with mobility and self-care. Current level of function: 25% physical assistance needed for mobility and self-care.     Allergies:  Alendronate sodium, Other Mother         unsteady gait    Other Father         silcosis    Cancer Sister 79        Breast cancer 1/2017    Stroke Sister        Exam:    Blood pressure (!) 154/80, pulse 65, temperature 96.2 °F (35.7 °C), temperature source Oral, resp. rate 18, height 5' 3\" (1.6 m), weight 138 lb 14.4 oz (63 kg), SpO2 94 %, not currently breastfeeding. General: Supine in bed. Alert. Slightly hard of hearing. HEENT: Mucous membranes are little dry. No JVD or adenopathy. No signs of trauma to her head or neck. Pulmonary: Shallow with symmetric air exchange. Cardiac: Regular rate and rhythm. Soft systolic murmur. Abdomen: Patient's abdomen was soft and nondistended. Bowel sounds were present throughout. There was no rebound, guarding or masses noted. Spinal exam: Prominent bony landmarks without breakdown. Upper extremities: She was able to bring her hands up to meet mine. She fatigues quickly with MMT but had normal tone and no clonus. Fair dexterity. 4/5 strength throughout. Lower extremities: 4 -/5 strength throughout. Quick fatigue. [address] coordination. No signs of DVT. Sitting balance was good. Standing balance was poor. Lab Results   Component Value Date    WBC 6.6 11/11/2021    HGB 11.6 (L) 11/11/2021    HCT 35.0 (L) 11/11/2021    MCV 99.4 11/11/2021     11/11/2021     Lab Results   Component Value Date    INR 0.96 01/25/2016    PROTIME 11.0 01/25/2016     Lab Results   Component Value Date    CREATININE 1.0 11/17/2021    BUN 15 11/17/2021     11/17/2021    K 4.7 11/17/2021    CL 98 (L) 11/17/2021    CO2 24 11/17/2021     Lab Results   Component Value Date    ALT 9 (L) 11/10/2021    AST 20 11/10/2021    ALKPHOS 38 (L) 11/10/2021    BILITOT 0.4 11/10/2021         Impression: 51-year-old female with a history of trigeminal neuralgia, PAD, hypertension and DVT. She was found to have bilateral hydroureter and hyponatremia with this admission.   She has had generalized weakness and mildly uncontrolled pain. Strengths for the patient: Independent habits prior to admission, supportive family and fairly accessible home. Limitations/barriers for the patient: Her age, she lives alone and her lack of experience with adaptive equipment. Recommendation: Acute inpatient rehabilitation with occupational and physical therapy 180 minutes 5 out of every 7 days. Will address basic and  advancing mobility with self-care instruction and adaptive equipment training. Caregiver education will be offered. Expected length of stay  prior to a supervised level of function for discharge home with a walker and C OT/PT is 10 to 14 days. Additional recommendation:    1. Hyponatremia with generalized weakness: The patient requires daily occupational and physical therapy. She requires adaptive equipment training, caregiver education and bowel and bladder retraining. We must provide aggressive pulmonary hygiene measures, cautious pain management and nutritional support. Monitoring her fluid intake. Periodic monitoring of her chemistries. Cautious use of any diuretics. 2. DVT prophylaxis: Lovenox 40 mg subcu daily. I must monitor her hemoglobin and platelet count while on this medication periodically. GI prophylaxis offered. Weightbearing activities pursued daily. 3. Bilateral hydroureter: Monitoring her for symptoms of obstruction. Outpatient follow-up with urology. 4. Hypertension: Recently the patient has been maintained off antihypertensives. Target systolic blood pressure is 120-140. Monitoring vital signs at rest and with activity. 5. Trigeminal neuralgia: Vascepa, Neurontin and Depakote. Acetaminophen and other oral analgesics as needed. Zoloft to help with sleep. I personally performed a history and physical on this patient within 24 hours of admission to the rehab unit. I have reviewed the preadmission screening and concur with its findings without change. A detailed plan of care will be established by hospital day 4 and I attest the patient is appropriate for inpatient rehabilitation at this time. I have compared the patient's current functional status noted during my history and physical with that of the preadmission screen and I have found no significant differences.

## 2021-11-19 PROCEDURE — 97535 SELF CARE MNGMENT TRAINING: CPT

## 2021-11-19 PROCEDURE — 6370000000 HC RX 637 (ALT 250 FOR IP): Performed by: PHYSICAL MEDICINE & REHABILITATION

## 2021-11-19 PROCEDURE — 97530 THERAPEUTIC ACTIVITIES: CPT

## 2021-11-19 PROCEDURE — 1280000000 HC REHAB R&B

## 2021-11-19 PROCEDURE — 97110 THERAPEUTIC EXERCISES: CPT

## 2021-11-19 PROCEDURE — 94761 N-INVAS EAR/PLS OXIMETRY MLT: CPT

## 2021-11-19 PROCEDURE — 97116 GAIT TRAINING THERAPY: CPT

## 2021-11-19 PROCEDURE — 6370000000 HC RX 637 (ALT 250 FOR IP): Performed by: INTERNAL MEDICINE

## 2021-11-19 PROCEDURE — 99232 SBSQ HOSP IP/OBS MODERATE 35: CPT | Performed by: PHYSICAL MEDICINE & REHABILITATION

## 2021-11-19 PROCEDURE — 94150 VITAL CAPACITY TEST: CPT

## 2021-11-19 PROCEDURE — 6360000002 HC RX W HCPCS: Performed by: PHYSICAL MEDICINE & REHABILITATION

## 2021-11-19 RX ADMIN — PANTOPRAZOLE SODIUM 40 MG: 40 TABLET, DELAYED RELEASE ORAL at 06:36

## 2021-11-19 RX ADMIN — GABAPENTIN 300 MG: 300 CAPSULE ORAL at 20:28

## 2021-11-19 RX ADMIN — KETOTIFEN FUMARATE 1 DROP: 0.35 SOLUTION/ DROPS OPHTHALMIC at 08:34

## 2021-11-19 RX ADMIN — GABAPENTIN 300 MG: 300 CAPSULE ORAL at 13:34

## 2021-11-19 RX ADMIN — ENOXAPARIN SODIUM 40 MG: 100 INJECTION SUBCUTANEOUS at 08:33

## 2021-11-19 RX ADMIN — KETOTIFEN FUMARATE 1 DROP: 0.35 SOLUTION/ DROPS OPHTHALMIC at 20:31

## 2021-11-19 RX ADMIN — ICOSAPENT ETHYL 1000 MG: 1000 CAPSULE ORAL at 20:30

## 2021-11-19 RX ADMIN — ASPIRIN 81 MG: 81 TABLET, COATED ORAL at 08:33

## 2021-11-19 RX ADMIN — VERAPAMIL HYDROCHLORIDE 90 MG: 180 TABLET, FILM COATED, EXTENDED RELEASE ORAL at 08:34

## 2021-11-19 RX ADMIN — ACETAMINOPHEN 650 MG: 325 TABLET ORAL at 22:37

## 2021-11-19 RX ADMIN — FLUTICASONE PROPIONATE 1 SPRAY: 50 SPRAY, METERED NASAL at 08:36

## 2021-11-19 RX ADMIN — ICOSAPENT ETHYL 1000 MG: 1000 CAPSULE ORAL at 12:41

## 2021-11-19 RX ADMIN — DIVALPROEX SODIUM 250 MG: 250 TABLET, FILM COATED, EXTENDED RELEASE ORAL at 20:28

## 2021-11-19 RX ADMIN — GABAPENTIN 300 MG: 300 CAPSULE ORAL at 08:33

## 2021-11-19 RX ADMIN — VERAPAMIL HYDROCHLORIDE 90 MG: 180 TABLET, FILM COATED, EXTENDED RELEASE ORAL at 20:29

## 2021-11-19 RX ADMIN — ROSUVASTATIN CALCIUM 10 MG: 5 TABLET, COATED ORAL at 20:28

## 2021-11-19 ASSESSMENT — PAIN SCALES - GENERAL
PAINLEVEL_OUTOF10: 0
PAINLEVEL_OUTOF10: 3
PAINLEVEL_OUTOF10: 0

## 2021-11-19 ASSESSMENT — PAIN DESCRIPTION - LOCATION: LOCATION: HEAD

## 2021-11-19 ASSESSMENT — PAIN DESCRIPTION - PAIN TYPE: TYPE: ACUTE PAIN

## 2021-11-19 ASSESSMENT — PAIN DESCRIPTION - DESCRIPTORS: DESCRIPTORS: ACHING

## 2021-11-19 NOTE — PROGRESS NOTES
Physical Rehabilitation: OCCUPATIONAL THERAPY     [x] daily progress note       [] discharge       Patient Name:  Conner Massey   :  1935 MRN: [id number]  Room:  [address] [place], [place] [address] Date of Admission: 2021  Rehabilitation Diagnosis:   Hypo-osmolality and hyponatremia [E87.1]  Hyponatremia [E87.1]       Date 2021       Day of ARU Week:  3   Time IN/OUT 1330/1430   Individual Tx Minutes 60   Group Tx Minutes    Co-Treat Minutes    Concurrent Tx Minutes    TOTAL Tx Time Mins 60   Variance Time    Variance Time []   Refusal due to:     []   Medical hold/reason:    []   Illness   []   Off Unit for test/procedure  []   Extra time needed to complete task  []   Therapeutic need  []   Other (specify):   Restrictions Restrictions/Precautions: General Precautions, Fall Risk         Communication with other providers: [x]   OK to see per nursing:     []   Spoke with team member regarding:      Subjective observations and cognitive status:  patient sitting EOB with nursing, handed off from nursing      Pain level/location:    10       Location: no pain noted per patient    Discharge recommendations  Anticipated discharge date:  TBD  Destination: [x]home alone   []home alone w assist prn   [] home w/ family    [] Continuous supervision       []SNF    [] Assisted living     [] Other:   Continued therapy: [x]HHC OT  []OUTPATIENT  OT   [] No Further OT  Equipment needs: TBD     Toileting:   Sup   Hygiene: stands to wash hands sinkside Sup        Toilet Transfers:   SBA 2 to impulsivity, poor attention to task   Device Used:    [x]   Standard Toilet         [x]   Grab Bars           []  Bedside Commode       []   Elevated Toilet          []   Other:        Bed Mobility:           []   Pt received out of bed   Rolling R/L:    Scooting:    Supine --> Sit:    Sit --> Supine:      Transfers:    Sit--> Stand:  SUp  Stand --> Sit:   Sup  Stand-Pivot:   Sup   Other:    Assistive device required for transfer:  Automatic Data Mobility:  throughout ARU, cues for attention to task throughout mobility tasks   Assistance:  SB/CGA  Device:   [x]   Rolling Walker     []   Standard Precilla Gordon []   Wheelchair        []   [employer]       []   Thurnell Tunde         []   Cardiac Precilla Gordon       []   Other:        Homemaking Tasks:   SB/CGA throughout  [employer] with patient reaching outside of AMA and crossing midline with picking up and mobing objects of varying weight with focus on Safety awareness carrying items, safety c cane management, patient requires redirection several times throughout tx  session     Patient requires verbal cues throughout task for focus and problem solving more than one task at a time       Additional Therapeutic activities/exercises completed this date:     [x]   ADL Training   [x]   Balance/Postural training Patient instructed in dynamic standing tolerance/balance task at card matching tasks to increase balance and attention to task for facilitation of ADL patient stands for 12 minutes requires verbal cues for scanning L to R and top to bottom     [x]   Bed/Transfer Training   []   Endurance Training   []   Neuromuscular Re-ed   []   Nu-step:  Time:        Level:         #Steps:       []   Rebounder:    []  Seated     []  Standing        []   Supine Ther Ex (reps/sets):     []   Seated Ther Ex (reps/sets):     []   Standing Ther Ex (reps/sets):     []   Other:      Comments:      Patient/Caregiver Education and Training:   [x]   [employer] Equipment Use  [x]   Bed Mobility/Transfer Technique/Safety  [x]   Energy Conservation Tips  []   Family training  [x]   Postural Awareness  [x]   Safety During Functional Activities  []   Reinforced Patient's Precautions   []   Progress was updated and reviewed in Rehabtracker with patient and/or family this         date.     Treatment Plan for Next Session: POC to continue as tolerated         Treatment/Activity Tolerance:   [x] Tolerated treatment with no adverse effects    [] Transportation: Car  Occupation: Retired  Type of occupation: Retired   Leisure & Hobbies: Gets hair done 1x week, cooking, running errors. Additional Comments: Pt sleeps both in low flat bed and tall 4 poster bed depending on how she feels. Has had one fall where she bumped her head and she had stayed in the lower bed since then. Pt has had at least 2 falls in past year. no inury    Objective                                                                                    Goals:  (Update in navigator)  Short term goals  Time Frame for Short term goals: LTGs= STGs:  Long term goals  Time Frame for Long term goals : 3- 5 day or until d/c  Long term goal 1: Pt will complete total body bathing c Mod I and use of AE prn by d/c  Long term goal 2: Pt will complete total body dressing (UB/LB/Footwear) c SUP by d/c  Long term goal 3: Pt will complete toileting c MOD I by d/c  Long term goal 4: Pt will complete functional transfer (toilet, tub, shower) c MOD I by d/c. Long term goal 5: Pt will complete grooming/oral care task c IND by d/c  Long term goals 6: Pt will perform therex/therax to facilitate increased strength/endurance/ax tolerance (c emphasis on dynamic standing balance/tolerance >8 mins ) c MOD I in order to complete ADLs. :        Plan of Care                                                                              Times per week: 5 days per week for a minimum of 60 minutes/day plus group as appropriate for 60 minutes.   Treatment to include Plan  Times per week: 5-6x  Times per day: Daily  Current Treatment Recommendations: Strengthening, Balance Training, Endurance Training, Pain Management, Other (comment), Home Management Training, Positioning, Safety Education & Training, Equipment Evaluation, Education, & procurement, Functional Mobility Training, ROM, Patient/Caregiver Education & Training    Electronically signed by   MAGALIS Nielsen,  11/19/2021, 1:03 PM

## 2021-11-19 NOTE — PROGRESS NOTES
Charlotte Walker    : 1935  Acct #: [de-identified]  MRN: [id number]              PM&R Progress Note      Admitting diagnosis: Hyponatremia ([address])     Comorbid diagnoses impacting rehabilitation: Generalized weakness, gait disturbance, essential hypertension, PAD, HLD, macular degeneration, trigeminal neuralgia, history of DVT, bilateral hydroureter     Chief complaint: Leg weakness and some positional dizziness. Slept fair. Prior (baseline) level of function: Independent. Current level of function:         Current  [facility] and Goals:   Occupational Therapy:    Short term goals  Time Frame for Short term goals: LTGs= STGs :   Long term goals  Time Frame for Long term goals : 3- 5 day or until d/c  Long term goal 1: Pt will complete total body bathing c Mod I and use of AE prn by d/c  Long term goal 2: Pt will complete total body dressing (UB/LB/Footwear) c SUP by d/c  Long term goal 3: Pt will complete toileting c MOD I by d/c  Long term goal 4: Pt will complete functional transfer (toilet, tub, shower) c MOD I by d/c. Long term goal 5: Pt will complete grooming/oral care task c IND by d/c  Long term goals 6: Pt will perform therex/therax to facilitate increased strength/endurance/ax tolerance (c emphasis on dynamic standing balance/tolerance >8 mins ) c MOD I in order to complete ADLs.  :                                       Eating: Eating  Assistance Needed: Independent  Comment: Pt sitting EOB eating on arrival  CARE Score: 6  Discharge Goal: Independent       Oral Hygiene: Oral Hygiene  Assistance Needed: Supervision or touching assistance  Comment: Completed standing at sink for 3 minutes S for safety  CARE Score: 4  Discharge Goal: Independent    UB/LB Bathing: Shower/Bathe Self  Assistance Needed: Partial/moderate assistance  Comment: Assist to clean L side of Abd. due to lmited ROM of RUE  CARE Score: 3  Discharge Goal: Independent    UB Dressing: Upper Body Dressing  Assistance Needed: Setup or clean-up assistance  Comment: Set-up A  CARE Score: 5  Discharge Goal: Independent         LB Dressing: Lower Body Dressing  Assistance Needed: Supervision or touching assistance  Comment: Set-up A and S for clothing managment  CARE Score: 4  Discharge Goal: Independent    Donning and Stamps Footwear: Putting On/Taking Off Footwear  Assistance Needed: Setup or clean-up assistance  Comment: Set-up A  CARE Score: 5  Discharge Goal: Independent      Toileting: Toileting Hygiene  Assistance Needed: Supervision or touching assistance  Comment: S when completing toileting hygiene  CARE Score: 4  Discharge Goal: Independent      Toilet Transfers:   Toilet Transfer  Assistance Needed: Supervision or touching assistance  Comment: SBA when completing transfer with RW; no use of grab bars  CARE Score: 4  Discharge Goal: Independent    Physical Therapy:   Short term goals  Time Frame for Short term goals: 7 days STG=LTG  Short term goal 1: Pt will perform all bed mobility with mod I  Short term goal 2: Pt will perform sit to stand, pivot and car transfers  with mod I  Short term goal 3: Pt will ambulate  150  feet on level surfaces and 25' on unlevel surface with mod I   assist  using straight cane  Short term goal 4: Pt will ascend/descend curb step with   cane   and up to   12  steps with  rail(s) with   mod I  Short term goal 5: Pt will retrieve light item from floor with mod I   assist  using device as needed            Bed Mobility:   Sit to Lying  Assistance Needed: Independent  CARE Score: 6  Discharge Goal: Independent  Roll Left and Right  Assistance Needed: Independent  CARE Score: 6  Discharge Goal: Independent  Lying to Sitting on Side of Bed  Assistance Needed: Independent  CARE Score: 6  Discharge Goal: Independent    Transfers:    Sit to Stand  Assistance Needed: Supervision or touching assistance  Comment: SBA with one instance of posterior LOB where pt had to fully sit back down  CARE Score: 4  Discharge Goal: Independent  Chair/Bed-to-Chair Transfer  Assistance Needed: Supervision or touching assistance  Comment: SBA  CARE Score: 4  Discharge Goal: Independent     Car Transfer  Assistance Needed: Supervision or touching assistance  CARE Score: 4  Discharge Goal: Independent    Ambulation:    Walking Ability  Does the Patient Walk?: Yes     Walk 10 Feet  Assistance Needed: Supervision or touching assistance  Comment: CGA without device  CARE Score: 4  Discharge Goal: Independent     Walk 50 Feet with Two Turns  Assistance Needed: Supervision or touching assistance  Comment: CG withnarrow AMA at times R nearly crossing, varialbe step length and width with moderate path deviation with pt having no awareness, keeping a normal gait speed though deviations would call for going slower for safety  CARE Score: 4  Discharge Goal: Independent     Walk 150 Feet  Assistance Needed: Supervision or touching assistance  Comment: CG  CARE Score: 4  Discharge Goal: Independent     Walking 10 Feet on Uneven Surfaces  Assistance Needed: Partial/moderate assistance  Comment: pt with no awareness of surface changes requiring mod assist to recover balance x 2 despite training mid task  CARE Score: 3  Discharge Goal: Independent     1 Step (Curb)  Comment: CG  Discharge Goal: Independent     4 Steps  Assistance Needed: Supervision or touching assistance  Comment: SBA with B rails, reciprocating gait, decreased eccentric control at times with RLE  CARE Score: 4  Discharge Goal: Independent     12 Steps  Assistance Needed: Supervision or touching assistance  CARE Score: 4  Discharge Goal: Independent       Wheelchair:  w/c Ability: Wheelchair Ability  Uses a Wheelchair and/or Scooter?: No                Balance:        Object: Picking Up Object  Assistance Needed: Supervision or touching assistance  Comment: CG with hesitancy  CARE Score: 4  Discharge Goal: Independent    I      Exam:    Blood pressure (!) 110/57, pulse 71, temperature 98.9 °F (37.2 °C), temperature source Oral, resp. rate 17, height 5' 3\" (1.6 m), weight 137 lb (62.1 kg), SpO2 98 %, not currently breastfeeding. General: Sitting up in a bedside chair. Legs elevated. Talkative. Oriented x2-3. HEENT: MMM. No JVD or adenopathy. Pulmonary: Shallow but clear. Cardiac: Regular rate and rhythm. Abdomen: Patient's abdomen is soft and nondistended. Bowel sounds were present throughout. There was no rebound, guarding or masses noted. Upper extremities: Fair  strength. Fair dexterity. No tremor or clonus. Lower extremities: Calves soft. No venous dilation or erythema. Give way with MMT. No signs of DVT. Sitting balance was good. Standing balance was poor. Lab Results   Component Value Date    WBC 6.6 11/11/2021    HGB 11.6 (L) 11/11/2021    HCT 35.0 (L) 11/11/2021    MCV 99.4 11/11/2021     11/11/2021     Lab Results   Component Value Date    INR 0.96 01/25/2016    PROTIME 11.0 01/25/2016     Lab Results   Component Value Date    CREATININE 1.0 11/17/2021    BUN 15 11/17/2021     11/17/2021    K 4.7 11/17/2021    CL 98 (L) 11/17/2021    CO2 24 11/17/2021     Lab Results   Component Value Date    ALT 9 (L) 11/10/2021    AST 20 11/10/2021    ALKPHOS 38 (L) 11/10/2021    BILITOT 0.4 11/10/2021       Expected length of stay  prior to a supervised level of function for discharge home with a walker and C OT/PT is 10 to 14 days. Recommendations:    1. Hyponatremia with generalized weakness: Developing the routine for her daily occupational and physical therapy. Ongoing adaptive equipment training, caregiver education and bowel and bladder retraining. Providing her aggressive pulmonary hygiene measures, cautious pain management and nutritional support. Monitoring her fluid intake. Periodic monitoring of her chemistries. Cautious use of any diuretics. Verbal cues and minimum physical assist for transfers.   2. DVT prophylaxis: Lovenox 40 mg subcu daily. I must monitor her hemoglobin and platelet count while on this medication periodically. GI prophylaxis offered. Weightbearing activities will be pursued daily. No signs of acute blood loss. 3. Bilateral hydroureter: No significant pelvic pain or symptoms of retention. Outpatient follow-up with urology. 4. Hypertension: Recently the patient has been maintained off antihypertensives. Target systolic blood pressure is 120-140. Monitoring vital signs at rest and with activity. Systolic blood pressure just below target range. 5. Trigeminal neuralgia: Seems to tolerate the Vascepa, Neurontin and Depakote. Acetaminophen and other oral analgesics as needed. Zoloft helps some with sleep.

## 2021-11-19 NOTE — PROGRESS NOTES
Occupational Therapy  Physical Rehabilitation: OCCUPATIONAL THERAPY     [x] daily progress note       [] discharge       Patient Name:  Malinda Ding   :  1935 MRN: [id number]  Room:  [address] [place] [address] Date of Admission: 2021  Rehabilitation Diagnosis:   Hypo-osmolality and hyponatremia [E87.1]  Hyponatremia [E87.1]       Date 2021       Day of ARU Week:  3   Time IN/OUT 1500/1600   Individual Tx Minutes 60   Group Tx Minutes    Co-Treat Minutes    Concurrent Tx Minutes    TOTAL Tx Time Mins 60   Variance Time    Variance Time []   Refusal due to:     []   Medical hold/reason:    []   Illness   []   Off Unit for test/procedure  []   Extra time needed to complete task  []   Therapeutic need  []   Other (specify):   Restrictions Restrictions/Precautions: General Precautions, Fall Risk         Communication with other providers: [x]   OK to see per nursing:     [x]   Spoke with Mark Walters RN regarding: request for SLP eval     Subjective observations and cognitive status: Pt in recliner on approach, pleasant and agreeable to tx session. Is quickly self defeating, stating \"I can't do that\" either before trying task or even while completing task.      Pain level/location:    /10       Location: Denied   Discharge recommendations  Anticipated discharge date: TBD  Destination: []home alone   []home alone w assist prn   [] home w/ family    [] Continuous supervision       []SNF    [] Assisted living     [] Other:   Continued therapy: []C OT  []OUTPATIENT  OT   [] No Further OT  Equipment needs: TBD     Toileting:   Denied need       Bed Mobility:           [x]   Pt received out of bed   Sit --> Supine:  Supervision    Transfers:    Sit--> Stand:  SBA  Stand --> Sit:   SBA  Stand-Pivot:   CGA; pt impulsive and standing before therapist was ready requiring education  Other:    Assistive device required for transfer:   [facility]      Functional Mobility:    Assistance:  CGA-close SBA ~100 ft  Device:   []   Yair Montero []   Standard Edwige Rota []   Wheelchair        [x]   Lexine Rhea       []   4-Wheeled Edwige Rota         []   Cardiac Walker       []   Other:           Additional Therapeutic activities/exercises completed this date:     []   ADL Training   [x]   Balance/Postural training: Pt stood x 7 min at counter with CGA-min A while completing dynamic stand task while reaching outside base of support to facilitate increased balance for ADL tasks and transfers. Pt stood on blue stability  for increased balance challenge; experienced 2 LOB and did demo automatic righting reaction, only required slight min A to correct once. Pt demo'ed decreased cognition during novel TTA, requiring repeated VCs for rules of game, strategy, and turn taking. [x]   Bed/Transfer Training   [x]   Endurance Training   []   Neuromuscular Re-ed   []   Nu-step:  Time:        Level:         #Steps:       []   Rebounder:    []  Seated     []  Standing        []   Supine Ther Ex (reps/sets):     [x]   Seated Ther Ex (reps/sets): To increase BUE endurance for ADLs and transfers pt completed arm ergometer on min-mod resistance x10 mins, average 35 RPM    To increase BUE endurance for ADLs and transfers, pt completed 1 set x10 reps of the following 5 exercises using purple theraband: alternating shoulder presses, chest presses, shoulder horizontal abduction, biceps curls; and simultaneous shoulder horizontal abduction/adduction. Educated pt on HEP with modifications PRN 2* R rotator cuff injury at d/c to increase/maintain BUE endurance, pt verbalized understanding. Comments: All intervention performed to increase pt's endurance, ax tolerance, balance, and I c ADLs/IADLs and functional transfers/mobility.         Patient/Caregiver Education and Training:   []   [employer] Equipment Use  [x]   Bed Mobility/Transfer Technique/Safety  []   Energy Conservation Tips  []   Family training  [x]   Postural Awareness  [x]   Safety During Functional Activities  []   Reinforced Patient's Precautions   []   Progress was updated and reviewed in Rehabtracker with patient and/or family this         date. Treatment Plan for Next Session: Continue OT POC, balance and cognitive retraining    Assessment:  Discussed with RN benefits of SLP evaluation as pt is demonstrating decreased cognition including problem solving and executive functioning      Treatment/Activity Tolerance:   [x] Tolerated treatment with no adverse effects    [] Patient limited by fatigue  [] Patient limited by pain   [] Patient limited by medical complications:    [] Adverse reaction to Tx:   [] Significant change in status    Safety:       []  bed alarm set    [x]  chair alarm set    []  Pt refused alarms                []  Telesitter activated      [x]  Gait belt used during tx session      []other:       Number of Minutes/Billable Intervention  Therapeutic Exercise 10   ADL Self-care    Neuro Re-Ed    Therapeutic Activity 50   Group    Other:    TOTAL 60       Social History  Social/Functional History  Lives With: Alone  Type of Home: House  Home Layout: One level (Pt has a basement with a chair lift; family room is in the basement)  Home Access: Stairs to enter with rails  Entrance Stairs - Number of Steps: 4 steps  Entrance Stairs - Rails: Both  Bathroom Shower/Tub: Tub/Shower unit, Shower chair with back  [employer]: Standard  Bathroom Equipment: Grab bars in shower, Grab bars around toilet (has grab bars all the way around the bathroom (by the sink, by tub, and door))  Bathroom Accessibility: Not accessible  Home Equipment: Grab bars, Lift chair, Cane (recliner that lifts all the way up)  Receives Help From:  (sons live nearby but work full time. does not feel anyone would be able to assist her at home)  ADL Assistance: [address] [place] [address]: Needs assistance  Meal Prep:  (IND)  Laundry:  (IND)  Vacuuming: Total  Cleaning: Maximal  Gardening:  Total  Yard Work: Total  Driving:  (IND)  Shopping: Moderate  Homemaking Responsibilities: Yes  Meal Prep Responsibility: Primary  Laundry Responsibility: Primary  Cleaning Responsibility: Secondary (grandson comes every other week to clean)  Bill Paying/Finance Responsibility: Primary  Shopping Responsibility: No (grandson does once a week)  Health Care Management: Primary  Ambulation Assistance: Independent (pt did not use assistive device)  Transfer Assistance: Independent  Active : Yes  Mode of Transportation: Car  Occupation: Retired  Type of occupation: Retired   Leisure & Hobbies: Gets hair done 1x week, cooking, running errors. Additional Comments: Pt sleeps both in low flat bed and tall 4 poster bed depending on how she feels. Has had one fall where she bumped her head and she had stayed in the lower bed since then. Pt has had at least 2 falls in past year. no inury    Objective                                                                                    Goals:  (Update in navigator)  Short term goals  Time Frame for Short term goals: LTGs= STGs:  Long term goals  Time Frame for Long term goals : 3- 5 day or until d/c  Long term goal 1: Pt will complete total body bathing c Mod I and use of AE prn by d/c  Long term goal 2: Pt will complete total body dressing (UB/LB/Footwear) c SUP by d/c  Long term goal 3: Pt will complete toileting c MOD I by d/c  Long term goal 4: Pt will complete functional transfer (toilet, tub, shower) c MOD I by d/c. Long term goal 5: Pt will complete grooming/oral care task c IND by d/c  Long term goals 6: Pt will perform therex/therax to facilitate increased strength/endurance/ax tolerance (c emphasis on dynamic standing balance/tolerance >8 mins ) c MOD I in order to complete ADLs. :        Plan of Care                                                                              Times per week: 5 days per week for a minimum of 60 minutes/day plus group as appropriate for 60 minutes. Treatment to include Plan  Times per week: 5-6x  Times per day: Daily  Current Treatment Recommendations: Strengthening, Balance Training, Endurance Training, Pain Management, Other (comment), Home Management Training, Positioning, Safety Education & Training, Equipment Evaluation, Education, & procurement, Functional Mobility Training, ROM, Patient/Caregiver Education & Training    Electronically signed by   Tadeo Lemos MS, OTR/L  License #[id number]  11/19/2021, 4:05 PM

## 2021-11-19 NOTE — PROGRESS NOTES
Therapy states pt noted with some cognitive concerns and requested speech order. Dr. Louise Olivia notified by [employer].

## 2021-11-19 NOTE — PROGRESS NOTES
stance times and speed, tendency to speed up demonstrating mild unsteadiness req safety cues. Min cues to decreased pace around obstacles. Additional Therapeutic activities/exercises completed this date:     []   Nu-step:  Time:        Level:         #Steps:       []   Rebounder:    []  Seated     []  Standing        [x]   Balance training: side stepping, bkwd walking, half braiding with B UE support of counter with CG to occasional min and visual /vcs req for task. []   Postural training    []   Supine ther ex (reps/sets):     []   Seated ther ex (reps/sets):     [x]   Standing ther ex (reps/sets): B LE with B UE support x 20 reps for heel raises, marching, mini squats, hip abd, and hip extension with SB-CGA for balance. []   Picked up object from floor                       []   Reacher used   []   Other:   []   Other:   []   Other:      Patient/Caregiver Education and Training:   [x]   Bed Mobility/Transfer technique/safety  [x]   Gait technique/sequencing  [x]   Proper use of assistive device  []   Advanced mobility safety and technique  []   Reinforced patient's precautions with mobility/functional tasks  []   Postural awareness  []   Family training  [x]   Other: Balance strategies with turning, functional tasks, ambulation, and transfers.      Treatment Plan for Next Session: dynamic balance, gait progression, LE strengthening        Treatment/Activity Tolerance:   [x] Tolerated treatment with no adverse effects    [] Patient limited by fatigue  [] Patient limited by pain   [] Patient limited by medical complications:    [] Adverse reaction to Tx:   [] Significant change in status    Safety:       []  bed alarm set    [x]  chair alarm set    []  Pt refused alarms                []  Telesitter activated      [x]  Gait belt used during tx session      []other:         Number of Minutes/Billable Intervention  Gait Training 23   Therapeutic Exercise 17   Neuro Re-Ed    Therapeutic Activity 20 Goals:  (Update in navigator)  Short term goals  Time Frame for Short term goals: 7 days STG=LTG  Short term goal 1: Pt will perform all bed mobility with mod I  Short term goal 2: Pt will perform sit to stand, pivot and car transfers  with mod I  Short term goal 3: Pt will ambulate  150  feet on level surfaces and 25' on unlevel surface with mod I   assist  using straight cane  Short term goal 4: Pt will ascend/descend curb step with   cane   and up to   12  steps with  rail(s) with   mod I  Short term goal 5: Pt will retrieve light item from floor with mod I   assist  using device as needed:   :        Plan of Care                                                                              Times per week: 5 days per week for a minimum of 60 minutes/day plus group as appropriate for 60 minutes.   Treatment to include Current Treatment Recommendations: Functional Mobility Training, IADL Training, Neuromuscular Re-education, Home Exercise Program, Equipment Evaluation, Education, & procurement, Transfer Training, Gait Training, Balance Training, Stair training, Patient/Caregiver Education & Training, Safety Education & Training    Electronically signed by   Nereyda Draper, PTA #[id number]  11/19/2021, 8:55 AM

## 2021-11-19 NOTE — PLAN OF CARE
Problem: Infection:  Goal: Will remain free from infection  Description: Will remain free from infection  11/19/2021 1028 by Lorraine Odell RN  Outcome: Ongoing  11/18/2021 2111 by Philipp Garrido LPN  Outcome: Ongoing     Problem: Safety:  Goal: Free from accidental physical injury  Description: Free from accidental physical injury  11/19/2021 1028 by Lorraine Odell RN  Outcome: Ongoing  11/18/2021 2111 by Philipp Garrido LPN  Outcome: Ongoing  Goal: Free from intentional harm  Description: Free from intentional harm  11/19/2021 1028 by Lorraine Odell RN  Outcome: Ongoing  11/18/2021 2111 by Philipp Garrido LPN  Outcome: Ongoing     Problem: Daily Care:  Goal: Daily care needs are met  Description: Daily care needs are met  11/19/2021 1028 by Lorraine Odell RN  Outcome: Ongoing  11/18/2021 2111 by Philipp Garrido LPN  Outcome: Ongoing     Problem: Pain:  Goal: Patient's pain/discomfort is manageable  Description: Patient's pain/discomfort is manageable  11/19/2021 1028 by Lorraine Odell RN  Outcome: Ongoing  11/18/2021 2111 by Philipp Garrido LPN  Outcome: Ongoing     Problem: Skin Integrity:  Goal: Skin integrity will stabilize  Description: Skin integrity will stabilize  11/19/2021 1028 by Lorraine Odell RN  Outcome: Ongoing  11/18/2021 2111 by Philipp Garrido LPN  Outcome: Ongoing     Problem: Discharge Planning:  Goal: Patients continuum of care needs are met  Description: Patients continuum of care needs are met  11/19/2021 1028 by Lorraine Odell RN  Outcome: Ongoing  11/18/2021 2111 by Philipp Garrido LPN  Outcome: Ongoing     Problem: Skin Integrity:  Goal: Will show no infection signs and symptoms  Description: Will show no infection signs and symptoms  Outcome: Ongoing  Goal: Absence of new skin breakdown  Description: Absence of new skin breakdown  Outcome: Ongoing

## 2021-11-20 PROCEDURE — 97530 THERAPEUTIC ACTIVITIES: CPT

## 2021-11-20 PROCEDURE — 1280000000 HC REHAB R&B

## 2021-11-20 PROCEDURE — 6370000000 HC RX 637 (ALT 250 FOR IP): Performed by: INTERNAL MEDICINE

## 2021-11-20 PROCEDURE — 97535 SELF CARE MNGMENT TRAINING: CPT

## 2021-11-20 PROCEDURE — 6360000002 HC RX W HCPCS: Performed by: PHYSICAL MEDICINE & REHABILITATION

## 2021-11-20 PROCEDURE — 94761 N-INVAS EAR/PLS OXIMETRY MLT: CPT

## 2021-11-20 PROCEDURE — 97116 GAIT TRAINING THERAPY: CPT

## 2021-11-20 PROCEDURE — 97110 THERAPEUTIC EXERCISES: CPT

## 2021-11-20 PROCEDURE — 6370000000 HC RX 637 (ALT 250 FOR IP): Performed by: PHYSICAL MEDICINE & REHABILITATION

## 2021-11-20 RX ADMIN — ICOSAPENT ETHYL 1000 MG: 1000 CAPSULE ORAL at 21:16

## 2021-11-20 RX ADMIN — ASPIRIN 81 MG: 81 TABLET, COATED ORAL at 11:33

## 2021-11-20 RX ADMIN — ROSUVASTATIN CALCIUM 10 MG: 5 TABLET, COATED ORAL at 21:16

## 2021-11-20 RX ADMIN — VERAPAMIL HYDROCHLORIDE 90 MG: 180 TABLET, FILM COATED, EXTENDED RELEASE ORAL at 11:36

## 2021-11-20 RX ADMIN — PANTOPRAZOLE SODIUM 40 MG: 40 TABLET, DELAYED RELEASE ORAL at 06:06

## 2021-11-20 RX ADMIN — FLUTICASONE PROPIONATE 1 SPRAY: 50 SPRAY, METERED NASAL at 11:36

## 2021-11-20 RX ADMIN — GABAPENTIN 300 MG: 300 CAPSULE ORAL at 21:19

## 2021-11-20 RX ADMIN — ICOSAPENT ETHYL 1000 MG: 1000 CAPSULE ORAL at 11:37

## 2021-11-20 RX ADMIN — SERTRALINE HYDROCHLORIDE 25 MG: 50 TABLET ORAL at 21:16

## 2021-11-20 RX ADMIN — KETOTIFEN FUMARATE 1 DROP: 0.35 SOLUTION/ DROPS OPHTHALMIC at 11:38

## 2021-11-20 RX ADMIN — GABAPENTIN 300 MG: 300 CAPSULE ORAL at 11:33

## 2021-11-20 RX ADMIN — GABAPENTIN 300 MG: 300 CAPSULE ORAL at 16:44

## 2021-11-20 RX ADMIN — KETOTIFEN FUMARATE 1 DROP: 0.35 SOLUTION/ DROPS OPHTHALMIC at 21:16

## 2021-11-20 RX ADMIN — ENOXAPARIN SODIUM 40 MG: 100 INJECTION SUBCUTANEOUS at 11:33

## 2021-11-20 RX ADMIN — DIVALPROEX SODIUM 250 MG: 250 TABLET, FILM COATED, EXTENDED RELEASE ORAL at 21:17

## 2021-11-20 RX ADMIN — VERAPAMIL HYDROCHLORIDE 90 MG: 180 TABLET, FILM COATED, EXTENDED RELEASE ORAL at 21:18

## 2021-11-20 ASSESSMENT — PAIN SCALES - GENERAL
PAINLEVEL_OUTOF10: 0

## 2021-11-20 NOTE — PROGRESS NOTES
Occupational Therapy    Physical Rehabilitation: OCCUPATIONAL THERAPY     [x] daily progress note       [] discharge       Patient Name:  Lukas Robbins   :  1935 MRN: [id number]  Room:  [address] [place] [address] Date of Admission: 2021  Rehabilitation Diagnosis:   Hypo-osmolality and hyponatremia [E87.1]  Hyponatremia [E87.1]       Date 2021       Day of ARU Week:  4   Time IN/OUT 1351-2351  [id number]   Individual Tx Minutes 60+65   Group Tx Minutes    Co-Treat Minutes    Concurrent Tx Minutes    TOTAL Tx Time Mins 125   Variance Time    Variance Time []   Refusal due to:     []   Medical hold/reason:    []   Illness   []   Off Unit for test/procedure  []   Extra time needed to complete task  []   Therapeutic need  []   Other (specify):   Restrictions Restrictions/Precautions: General Precautions, Fall Risk         Communication with other providers: [x]   OK to see per nursing:     []   Spoke with team member regarding:      Subjective observations and cognitive status: Pt sitting up in chair on approach finishing breakfast. Pt pleasant and agreeable to therapy. PM: Pt sitting in chair; finished with lunch. Pleasant and agreeable to therapy session.     Pain level/location:    /10       Location: none    Discharge recommendations  Anticipated discharge date:  TBD   Destination: []home alone   []home alone w assist prn   [] home w/ family    [] Continuous supervision       []SNF    [] Assisted living     [] Other:   Continued therapy: []HHC OT  []OUTPATIENT  OT   [] No Further OT  Equipment needs: TBD        ADLs:    Eating: Eating  Assistance Needed: Independent  Comment: Pt sitting EOB eating on arrival  CARE Score: 6  Discharge Goal: Independent       Oral Hygiene: Oral Hygiene  Assistance Needed: Supervision or touching assistance  Comment: supervision standing at sink for ~2.5 mins  CARE Score: 4  Discharge Goal: Independent    UB/LB Bathing: Shower/Bathe Self  Assistance Needed: Supervision or touching assistance  Comment: Supervision while in stance  CARE Score: 4  Discharge Goal: Independent    UB Dressing: Upper Body Dressing  Assistance Needed: Setup or clean-up assistance  Comment: to doff/don pullover shirt  CARE Score: 5  Discharge Goal: Independent         LB Dressing: Lower Body Dressing  Assistance Needed: Supervision or touching assistance  Comment: Pt able doff/don pants c Supervision  CARE Score: 4  Discharge Goal: Independent    Donning and [employer] Footwear: Putting On/Taking Off Footwear  Assistance Needed: Setup or clean-up assistance  Comment: to don socks and shoes c figure 4 positition  CARE Score: 5  Discharge Goal: Independent      Toileting: Toileting Hygiene  Assistance Needed: Supervision or touching assistance  Comment: Sup for CM and toileting hygiene  CARE Score: 4  Discharge Goal: Independent      Toilet Transfers:   AM and PM: SUP Toilet Transfer  Assistance Needed: Supervision or touching assistance  Comment: Supervision c grab bars  CARE Score: 4  Discharge Goal: Independent  Device Used:    [x]   Standard Toilet         [x]   Grab Bars           []  Bedside Commode       []   Elevated Toilet          []   Other:        Bed Mobility:           [x]   Pt received out of bed    Sit --> Supine:  SBA    Transfers:    Sit--> Stand:  Supervision   Stand --> Sit:  Supervision   Stand-Pivot:   SBA   Other:    Assistive device required for transfer:   RW       Functional Mobility:  AM:To<>from bathroom      PM: 143ft c CGA and 1 slight LOB, pt able to right self.    Assistance:  CGA   Device:   []   Rolling Walker     []   Standard Walker []   Wheelchair        [x]   [employer]       []   4-Wheeled [employer]         []   Cardiac Walker       []   Other:        Additional Therapeutic activities/exercises completed this date:     [x]   ADL Training   [x]   Balance/Postural training: Pt stood x 3:16 min + 6.5 mins at counter with SBA c 1 UE on table 50% of the time while completing dynamic stand task while reaching outside base of support to facilitate increased balance for ADL tasks and transfers. []   Bed/Transfer Training   [x]   Endurance Training: patient instructed in bilateral reciprocal patterned movement with min resistance while seated for 10 minutes with 0 rest breaks to increase endurance/activity tolerance for facilitation of both ADL and mobility tasks   []   Neuromuscular Re-ed   []   Nu-step:  Time:        Level:         #Steps:       []   Rebounder:    []  Seated     []  Standing        []   Supine Ther Ex (reps/sets):     []   Seated Ther Ex (reps/sets):     []   Standing Ther Ex (reps/sets):     []   Other:      Comments:      Patient/Caregiver Education and Training:   []   [employer] Equipment Use  []   Bed Mobility/Transfer Technique/Safety  []   Energy Conservation Tips  []   Family training  []   Postural Awareness  []   Safety During Functional Activities  []   Reinforced Patient's Precautions   []   Progress was updated and reviewed in Rehabtracker with patient and/or family this         date. Treatment Plan for Next Session: Continue OT POC       Assessment: This pt demonstrated a positive response to today's treatment as evidenced by actively engaged in therapy session. The patient is making progress toward established goals as evidenced by QI scores. Ongoing deficits are observed in the areas of balance and strength and continued focus on this is recommended.        Treatment/Activity Tolerance:   [x] Tolerated treatment with no adverse effects    [] Patient limited by fatigue  [] Patient limited by pain   [] Patient limited by medical complications:    [] Adverse reaction to Tx:   [] Significant change in status    Safety:       []  bed alarm set    [x]  chair alarm set    []  Pt refused alarms                []  Telesitter activated      [x]  Gait belt used during tx session      []other:       Number of Minutes/Billable Intervention  Therapeutic Exercise 10   ADL Self-care 60+15 Neuro Re-Ed    Therapeutic Activity 40   Group    Other:    TOTAL 125       Social History  Social/Functional History  Lives With: Alone  Type of Home: House  Home Layout: One level (Pt has a basement with a chair lift; family room is in the basement)  Home Access: Stairs to enter with rails  Entrance Stairs - Number of Steps: 4 steps  Entrance Stairs - Rails: Both  Bathroom Shower/Tub: Tub/Shower unit, Shower chair with back  [employer]: Standard  Bathroom Equipment: Grab bars in shower, Grab bars around toilet (has grab bars all the way around the bathroom (by the sink, by tub, and door))  Bathroom Accessibility: Not accessible  Home Equipment: Grab bars, Lift chair, Cane (recliner that lifts all the way up)  Receives Help From:  (sons live nearby but work full time. does not feel anyone would be able to assist her at home)  ADL Assistance: [address] [place] [address] Avenue: Needs assistance  Meal Prep:  (IND)  Laundry:  (IND)  Vacuuming: Total  Cleaning: Maximal  Gardening: Total  Yard Work: Total  Driving:  (IND)  Shopping: Moderate  Homemaking Responsibilities: Yes  Meal Prep Responsibility: Primary  Laundry Responsibility: Primary  Cleaning Responsibility: Secondary (grandson comes every other week to clean)  Bill Paying/Finance Responsibility: Primary  Shopping Responsibility: No (grandson does once a week)  Health Care Management: Primary  Ambulation Assistance: Independent (pt did not use assistive device)  Transfer Assistance: Independent  Active : Yes  Mode of Transportation: Car  Occupation: Retired  Type of occupation: Retired   Leisure & Hobbies: Gets hair done 1x week, cooking, running errors. Additional Comments: Pt sleeps both in low flat bed and tall 4 poster bed depending on how she feels. Has had one fall where she bumped her head and she had stayed in the lower bed since then. Pt has had at least 2 falls in past year.  no inury    Objective Goals:  (Update in navigator)  Short term goals  Time Frame for Short term goals: LTGs= STGs:  Long term goals  Time Frame for Long term goals : 3- 5 day or until d/c  Long term goal 1: Pt will complete total body bathing c Mod I and use of AE prn by d/c  Long term goal 2: Pt will complete total body dressing (UB/LB/Footwear) c SUP by d/c  Long term goal 3: Pt will complete toileting c MOD I by d/c  Long term goal 4: Pt will complete functional transfer (toilet, tub, shower) c MOD I by d/c. Long term goal 5: Pt will complete grooming/oral care task c IND by d/c  Long term goals 6: Pt will perform therex/therax to facilitate increased strength/endurance/ax tolerance (c emphasis on dynamic standing balance/tolerance >8 mins ) c MOD I in order to complete ADLs. :        Plan of Care                                                                              Times per week: 5 days per week for a minimum of 60 minutes/day plus group as appropriate for 60 minutes.   Treatment to include Plan  Times per week: 5-6x  Times per day: Daily  Current Treatment Recommendations: Strengthening, Balance Training, Endurance Training, Pain Management, Other (comment), Home Management Training, Positioning, Safety Education & Training, Equipment Evaluation, Education, & procurement, Functional Mobility Training, ROM, Patient/Caregiver Education & Training    Electronically signed by   MAGALIS Bunch,  11/20/2021, 9:12 AM

## 2021-11-20 NOTE — PROGRESS NOTES
Kobe Dickens    : 1935  Acct #: [de-identified]  MRN: [id number]              PM&R Progress Note      Admitting diagnosis: Hyponatremia ([address])     Comorbid diagnoses impacting rehabilitation: Generalized weakness, gait disturbance, essential hypertension, PAD, HLD, macular degeneration, trigeminal neuralgia, history of DVT, bilateral hydroureter     Chief complaint: Some nausea and bowel cramping. Fair sleep. Prior (baseline) level of function: Independent. Current level of function:         Current  IRF-TAMARA and Goals:   Occupational Therapy:    Short term goals  Time Frame for Short term goals: LTGs= STGs :   Long term goals  Time Frame for Long term goals : 3- 5 day or until d/c  Long term goal 1: Pt will complete total body bathing c Mod I and use of AE prn by d/c  Long term goal 2: Pt will complete total body dressing (UB/LB/Footwear) c SUP by d/c  Long term goal 3: Pt will complete toileting c MOD I by d/c  Long term goal 4: Pt will complete functional transfer (toilet, tub, shower) c MOD I by d/c. Long term goal 5: Pt will complete grooming/oral care task c IND by d/c  Long term goals 6: Pt will perform therex/therax to facilitate increased strength/endurance/ax tolerance (c emphasis on dynamic standing balance/tolerance >8 mins ) c MOD I in order to complete ADLs.  :                                       Eating: Eating  Assistance Needed: Independent  Comment: Pt sitting EOB eating on arrival  CARE Score: 6  Discharge Goal: Independent       Oral Hygiene: Oral Hygiene  Assistance Needed: Supervision or touching assistance  Comment: Completed standing at sink for 3 minutes S for safety  CARE Score: 4  Discharge Goal: Independent    UB/LB Bathing: Shower/Bathe Self  Assistance Needed: Partial/moderate assistance  Comment: Assist to clean L side of Abd. due to lmited ROM of RUE  CARE Score: 3  Discharge Goal: Independent    UB Dressing: Upper Body Dressing  Assistance Needed: Setup or clean-up assistance  Comment: Set-up A  CARE Score: 5  Discharge Goal: Independent         LB Dressing: Lower Body Dressing  Assistance Needed: Supervision or touching assistance  Comment: Set-up A and S for clothing managment  CARE Score: 4  Discharge Goal: Independent    Donning and Trout Footwear: Putting On/Taking Off Footwear  Assistance Needed: Setup or clean-up assistance  Comment: Set-up A  CARE Score: 5  Discharge Goal: Independent      Toileting: Toileting Hygiene  Assistance Needed: Supervision or touching assistance  Comment: S when completing toileting hygiene  CARE Score: 4  Discharge Goal: Independent      Toilet Transfers:   Toilet Transfer  Assistance Needed: Supervision or touching assistance  Comment: SBA when completing transfer with RW; no use of grab bars  CARE Score: 4  Discharge Goal: Independent    Physical Therapy:   Short term goals  Time Frame for Short term goals: 7 days STG=LTG  Short term goal 1: Pt will perform all bed mobility with mod I  Short term goal 2: Pt will perform sit to stand, pivot and car transfers  with mod I  Short term goal 3: Pt will ambulate  150  feet on level surfaces and 25' on unlevel surface with mod I   assist  using straight cane  Short term goal 4: Pt will ascend/descend curb step with   cane   and up to   12  steps with  rail(s) with   mod I  Short term goal 5: Pt will retrieve light item from floor with mod I   assist  using device as needed            Bed Mobility:   Sit to Lying  Assistance Needed: Independent  CARE Score: 6  Discharge Goal: Independent  Roll Left and Right  Assistance Needed: Independent  CARE Score: 6  Discharge Goal: Independent  Lying to Sitting on Side of Bed  Assistance Needed: Independent  CARE Score: 6  Discharge Goal: Independent    Transfers:    Sit to Stand  Assistance Needed: Supervision or touching assistance  Comment: SBA with one instance of posterior LOB where pt had to fully sit back down  CARE Score: 4  Discharge Goal: Independent  Chair/Bed-to-Chair Transfer  Assistance Needed: Supervision or touching assistance  Comment: SBA  CARE Score: 4  Discharge Goal: Independent     Car Transfer  Assistance Needed: Supervision or touching assistance  CARE Score: 4  Discharge Goal: Independent    Ambulation:    Walking Ability  Does the Patient Walk?: Yes     Walk 10 Feet  Assistance Needed: Supervision or touching assistance  Comment: CGA without device  CARE Score: 4  Discharge Goal: Independent     Walk 50 Feet with Two Turns  Assistance Needed: Supervision or touching assistance  Comment: CG withnarrow AMA at times R nearly crossing, varialbe step length and width with moderate path deviation with pt having no awareness, keeping a normal gait speed though deviations would call for going slower for safety  CARE Score: 4  Discharge Goal: Independent     Walk 150 Feet  Assistance Needed: Supervision or touching assistance  Comment: CG  CARE Score: 4  Discharge Goal: Independent     Walking 10 Feet on Uneven Surfaces  Assistance Needed: Partial/moderate assistance  Comment: pt with no awareness of surface changes requiring mod assist to recover balance x 2 despite training mid task  CARE Score: 3  Discharge Goal: Independent     1 Step (Curb)  Comment: CG  Discharge Goal: Independent     4 Steps  Assistance Needed: Supervision or touching assistance  Comment: SBA with B rails, reciprocating gait, decreased eccentric control at times with RLE  CARE Score: 4  Discharge Goal: Independent     12 Steps  Assistance Needed: Supervision or touching assistance  CARE Score: 4  Discharge Goal: Independent       Wheelchair:  w/c Ability: Wheelchair Ability  Uses a Wheelchair and/or Scooter?: No                Balance:        Object: Picking Up Object  Assistance Needed: Supervision or touching assistance  Comment: CG with hesitancy  CARE Score: 4  Discharge Goal: Independent    I      Exam:    Blood pressure (!) 120/53, pulse 70, temperature 98 °F (36.7 °C), temperature source Oral, resp. rate 18, height 5' 3\" (1.6 m), weight 136 lb 6.4 oz (61.9 kg), SpO2 95 %, not currently breastfeeding. General: Semiupright in bed. Passive in conversation. Follows one-step commands. HEENT: No JVD or adenopathy. Symmetric facial expression and full visual field. Pulmonary: Unlabored and clear. Cardiac: RRR. Abdomen: Patient's abdomen is soft and nondistended. Bowel sounds were present throughout. There was no rebound, guarding or masses noted. Upper extremities: No bruising or swelling. Lower extremities: No signs of DVT. Sitting balance was good. Standing balance was poor. Lab Results   Component Value Date    WBC 6.6 11/11/2021    HGB 11.6 (L) 11/11/2021    HCT 35.0 (L) 11/11/2021    MCV 99.4 11/11/2021     11/11/2021     Lab Results   Component Value Date    INR 0.96 01/25/2016    PROTIME 11.0 01/25/2016     Lab Results   Component Value Date    CREATININE 1.0 11/17/2021    BUN 15 11/17/2021     11/17/2021    K 4.7 11/17/2021    CL 98 (L) 11/17/2021    CO2 24 11/17/2021     Lab Results   Component Value Date    ALT 9 (L) 11/10/2021    AST 20 11/10/2021    ALKPHOS 38 (L) 11/10/2021    BILITOT 0.4 11/10/2021       Expected length of stay  prior to a supervised level of function for discharge home with a walker and [facility] OT/PT is 10 to 14 days. Recommendations:    1. Hyponatremia with generalized weakness:  She needs significant cueing to stay engaged in each session of her therapy. Showing some benefit from participating in the daily occupational and physical therapy.    Ongoing adaptive equipment training, caregiver education and bowel and bladder retraining.    Providing her aggressive pulmonary hygiene measures, cautious pain management and nutritional support.  Monitoring her fluid intake.  Periodic monitoring of her chemistries.  Cautious use of any diuretics.   Verbal cues and CGA-minimum physical assist for transfers. 2. DVT prophylaxis: Lovenox 40 mg subcu daily.  I must monitor her hemoglobin and platelet count while on this medication periodically.  GI prophylaxis offered.  Weightbearing activities  R pursued daily. No new bruising or swelling. 3. Bilateral hydroureter: No significant pelvic pain or symptoms of retention. Outpatient follow-up with urology. 4. Hypertension: Recently the patient has been maintained off antihypertensives.  Target systolic blood pressure is 120-140.  Monitoring vital signs at rest and with activity. Systolic blood pressure just below target range.   5. Trigeminal neuralgia: Seems to tolerate the Vascepa, Neurontin and Depakote.  Acetaminophen and other oral analgesics as needed.  Zoloft helps some with sleep.

## 2021-11-20 NOTE — FLOWSHEET NOTE
[x] daily progress note       [] discharge       Patient Name:  Kye Burrell   :  1935 MRN: [id number]  Room:  [address] [place], [place] [address] Date of Admission: 2021  Rehabilitation Diagnosis:   Hypo-osmolality and hyponatremia [E87.1]  Hyponatremia [E87.1]       Date 2021       Day of ARU Week:  4   Time IN/-1030   Individual Tx Minutes 60   TOTAL Tx Time Mins 60   Restrictions Restrictions/Precautions  Restrictions/Precautions: General Precautions, Fall Risk      Communication with other providers: [x]   OK to see per nursing:     []   Spoke with team member regarding:      Subjective observations and cognitive status:  Pt stated she is feeling real good this morning. Pt was alert and agreeable to treatment session. Pt was sitting up in recliner at end of treatment session. Pt while walking stated \"I feel a little woozy and my legs feel like jello\". Pt took a seated rest break and proceeded to feel okay. Took pt BP: 124/56 and O2: 95-97%. Pt denied having a headache but stated she had not had much to drink all day. Pain level/location:   0 /10       Location:    Discharge recommendations  Anticipated discharge date:  TBD  Destination: []home alone   []home alone with assist PRN     [] home w/ family      [] Continuous supervision  []SNF    [] Assisted living     [] Other:   Continued therapy: []C PT  []OUTPATIENT  PT   [] No Further PT  Equipment needs: none likely, pt has SC       Transfers:    Sit--> Stand:  SBA/CGA  Stand --> Sit:   SBA/CGA  Toilet Transfer (if applicable): SBA/CGA  Assistive device required for transfer:   SPC    Gait:    Distance:  182'+170'+215'(2 standing rest breaks)   Assistance:  CGA  Device:  SPC  Gait Quality:  Slow reciprocal gait pattern. When pt fatigue her steps became staggered and deviated w/ cuing to remain at a steady pace.       Additional Therapeutic activities/exercises completed this date:     []   Nu-step:  Time:        Level:         #Steps:       [x] Rebounder:    []  Seated     [x]  Standing w/ wide and narrow AMA using 2# ball and required CGA to perform x 15 throws ea for dynamic balance. Pt required cuing for improved upright posture through out activity. []   Balance training:           []   Postural training    []   Supine ther ex (reps/sets):     [x]   Seated ther ex (reps/sets): x 15 ea ankle pumps, heel raises, marches in place, LAQs, hip add isometrics w/ 5 sec hold, hip abduction isometric w/ 3 sec hold      []   Standing ther ex (reps/sets):     []   Picking up object from floor (standing):                   []   Reacher used   []   Other:   [x]   Other: Balancing w/ CGA to pull pants up/down and SBA for hand hygiene     Comments: education to drink more water through out the day d/t pt stating she had not had anything to drink all day. Patient/Caregiver Education and Training:   [x]   Bed Mobility/Transfer technique/safety  [x]   Gait technique/sequencing  [x]   Proper use of assistive device  [x]   Advanced mobility safety and technique  []   Reinforced patient's precautions/mobility while maintaining precautions  []   Postural awareness  []   Family training  []   Progress was updated and reviewed in Rehabtracker with patient and/or family this date. Treatment Plan for Next Session: advanced gait, balance, LE exercises       Assessment: This pt demonstrated a positive response to today's treatment as evidenced by improved dynamic balance. The patient is making fair progress toward established goals as evidenced by QI scores. Ongoing deficits are observed in the areas of gait and continued focus on advanced gait and endurance is recommended.      Treatment/Activity Tolerance:   [x] Tolerated treatment with no adverse effects    [x] Patient limited by feeling woozy during gait  [] Patient limited by pain   [] Patient limited by medical complications:    [] Adverse reaction to Tx:   [] Significant change in status    Safety:       [] bed alarm set    [x]  chair alarm set    []  Pt refused alarms                []  Telesitter activated      [x]  Gait belt used during tx session      [x]other: pt left sitting up in recliner w/ call light at end of treatment session. Pt stated she felt better at the end of the treatment session after drinking some water and performing seated exercises. Number of Minutes/Billable Intervention  Gait Training 30   Therapeutic Exercise 15   Neuro Re-Ed    Therapeutic Activity 15   Wheelchair Propulsion    Group    Other:    TOTAL 60         Social History  Social/Functional History  Lives With: Alone  Type of Home: House  Home Layout: One level (Pt has a basement with a chair lift; family room is in the basement)  Home Access: Stairs to enter with rails  Entrance Stairs - Number of Steps: 4 steps  Entrance Stairs - Rails: Both  Bathroom Shower/Tub: Tub/Shower unit, Shower chair with back  [employer]: Standard  Bathroom Equipment: Grab bars in shower, Grab bars around toilet (has grab bars all the way around the bathroom (by the sink, by tub, and door))  Bathroom Accessibility: Not accessible  Home Equipment: Grab bars, Lift chair, Cane (recliner that lifts all the way up)  Receives Help From:  (sons live nearby but work full time. does not feel anyone would be able to assist her at home)  ADL Assistance: [facility]: Needs assistance  Meal Prep:  (IND)  Laundry:  (IND)  Vacuuming: Total  Cleaning: Maximal  Gardening: Total  Yard Work: Total  Driving:  (IND)  Shopping:  Moderate  Homemaking Responsibilities: Yes  Meal Prep Responsibility: Primary  Laundry Responsibility: Primary  Cleaning Responsibility: Secondary (grandson comes every other week to clean)  Bill Paying/Finance Responsibility: Primary  Shopping Responsibility: No (grandson does once a week)  Health Care Management: Primary  Ambulation Assistance: Independent (pt did not use assistive device)  Transfer Assistance: Independent  Active : Yes  Mode of Transportation: Car  Occupation: Retired  Type of occupation: Retired   Leisure & Hobbies: Gets hair done 1x week, cooking, running errors. Additional Comments: Pt sleeps both in low flat bed and tall 4 poster bed depending on how she feels. Has had one fall where she bumped her head and she had stayed in the lower bed since then. Pt has had at least 2 falls in past year. no inury    Objective                                                                                    Goals:  (Update in navigator)  Short term goals  Time Frame for Short term goals: 7 days STG=LTG  Short term goal 1: Pt will perform all bed mobility with mod I  Short term goal 2: Pt will perform sit to stand, pivot and car transfers  with mod I  Short term goal 3: Pt will ambulate  150  feet on level surfaces and 25' on unlevel surface with mod I   assist  using straight cane  Short term goal 4: Pt will ascend/descend curb step with   cane   and up to   12  steps with  rail(s) with   mod I  Short term goal 5: Pt will retrieve light item from floor with mod I   assist  using device as needed:   :        Plan of Care                                                                              Times per week: 5 days per week for a minimum of 60 minutes/day plus group as appropriate for 60 minutes.   Treatment to include Current Treatment Recommendations: Functional Mobility Training, IADL Training, Neuromuscular Re-education, Home Exercise Program, Equipment Evaluation, Education, & procurement, Transfer Training, Gait Training, Balance Training, Stair training, Patient/Caregiver Education & Training, Safety Education & Training    Electronically signed by   Reilly Moreno PTA, [id number]    11/20/2021, 8:12 AM

## 2021-11-21 PROCEDURE — 6370000000 HC RX 637 (ALT 250 FOR IP): Performed by: INTERNAL MEDICINE

## 2021-11-21 PROCEDURE — 6370000000 HC RX 637 (ALT 250 FOR IP): Performed by: PHYSICAL MEDICINE & REHABILITATION

## 2021-11-21 PROCEDURE — 1280000000 HC REHAB R&B

## 2021-11-21 PROCEDURE — 94150 VITAL CAPACITY TEST: CPT

## 2021-11-21 PROCEDURE — 6360000002 HC RX W HCPCS: Performed by: PHYSICAL MEDICINE & REHABILITATION

## 2021-11-21 PROCEDURE — 94761 N-INVAS EAR/PLS OXIMETRY MLT: CPT

## 2021-11-21 RX ORDER — LOPERAMIDE HYDROCHLORIDE 2 MG/1
2 CAPSULE ORAL 4 TIMES DAILY PRN
Status: DISCONTINUED | OUTPATIENT
Start: 2021-11-21 | End: 2021-11-30 | Stop reason: HOSPADM

## 2021-11-21 RX ADMIN — DIVALPROEX SODIUM 250 MG: 250 TABLET, FILM COATED, EXTENDED RELEASE ORAL at 21:15

## 2021-11-21 RX ADMIN — VERAPAMIL HYDROCHLORIDE 90 MG: 180 TABLET, FILM COATED, EXTENDED RELEASE ORAL at 21:15

## 2021-11-21 RX ADMIN — GABAPENTIN 300 MG: 300 CAPSULE ORAL at 14:00

## 2021-11-21 RX ADMIN — LOPERAMIDE HYDROCHLORIDE 2 MG: 2 CAPSULE ORAL at 17:23

## 2021-11-21 RX ADMIN — ENOXAPARIN SODIUM 40 MG: 100 INJECTION SUBCUTANEOUS at 09:30

## 2021-11-21 RX ADMIN — FLUTICASONE PROPIONATE 1 SPRAY: 50 SPRAY, METERED NASAL at 09:39

## 2021-11-21 RX ADMIN — ICOSAPENT ETHYL 1000 MG: 1000 CAPSULE ORAL at 09:39

## 2021-11-21 RX ADMIN — GABAPENTIN 300 MG: 300 CAPSULE ORAL at 21:15

## 2021-11-21 RX ADMIN — PANTOPRAZOLE SODIUM 40 MG: 40 TABLET, DELAYED RELEASE ORAL at 06:03

## 2021-11-21 RX ADMIN — VERAPAMIL HYDROCHLORIDE 90 MG: 180 TABLET, FILM COATED, EXTENDED RELEASE ORAL at 09:40

## 2021-11-21 RX ADMIN — KETOTIFEN FUMARATE 1 DROP: 0.35 SOLUTION/ DROPS OPHTHALMIC at 09:41

## 2021-11-21 RX ADMIN — ROSUVASTATIN CALCIUM 10 MG: 5 TABLET, COATED ORAL at 21:15

## 2021-11-21 RX ADMIN — KETOTIFEN FUMARATE 1 DROP: 0.35 SOLUTION/ DROPS OPHTHALMIC at 21:16

## 2021-11-21 RX ADMIN — SERTRALINE HYDROCHLORIDE 25 MG: 50 TABLET ORAL at 21:15

## 2021-11-21 RX ADMIN — ICOSAPENT ETHYL 1000 MG: 1000 CAPSULE ORAL at 21:15

## 2021-11-21 RX ADMIN — GABAPENTIN 300 MG: 300 CAPSULE ORAL at 09:30

## 2021-11-21 RX ADMIN — ASPIRIN 81 MG: 81 TABLET, COATED ORAL at 09:40

## 2021-11-21 ASSESSMENT — PAIN SCALES - GENERAL
PAINLEVEL_OUTOF10: 0

## 2021-11-21 ASSESSMENT — PAIN DESCRIPTION - PAIN TYPE: TYPE: ACUTE PAIN

## 2021-11-22 LAB
ANION GAP SERPL CALCULATED.3IONS-SCNC: 10 MMOL/L (ref 4–16)
BUN BLDV-MCNC: 15 MG/DL (ref 6–23)
CALCIUM SERPL-MCNC: 9.1 MG/DL (ref 8.3–10.6)
CHLORIDE BLD-SCNC: 101 MMOL/L (ref 99–110)
CO2: 26 MMOL/L (ref 21–32)
CREAT SERPL-MCNC: 0.9 MG/DL (ref 0.6–1.1)
GFR AFRICAN AMERICAN: >60 ML/MIN/1.73M2
GFR NON-AFRICAN AMERICAN: 60 ML/MIN/1.73M2
GLUCOSE BLD-MCNC: 92 MG/DL (ref 70–99)
HCT VFR BLD CALC: 31.6 % (ref 37–47)
HEMOGLOBIN: 10.2 GM/DL (ref 12.5–16)
MCH RBC QN AUTO: 33.3 PG (ref 27–31)
MCHC RBC AUTO-ENTMCNC: 32.3 % (ref 32–36)
MCV RBC AUTO: 103.3 FL (ref 78–100)
PDW BLD-RTO: 14.8 % (ref 11.7–14.9)
PLATELET # BLD: 133 K/CU MM (ref 140–440)
PMV BLD AUTO: 9.4 FL (ref 7.5–11.1)
POTASSIUM SERPL-SCNC: 5 MMOL/L (ref 3.5–5.1)
RBC # BLD: 3.06 M/CU MM (ref 4.2–5.4)
SODIUM BLD-SCNC: 137 MMOL/L (ref 135–145)
WBC # BLD: 5.3 K/CU MM (ref 4–10.5)

## 2021-11-22 PROCEDURE — 6370000000 HC RX 637 (ALT 250 FOR IP): Performed by: INTERNAL MEDICINE

## 2021-11-22 PROCEDURE — 97530 THERAPEUTIC ACTIVITIES: CPT

## 2021-11-22 PROCEDURE — 36415 COLL VENOUS BLD VENIPUNCTURE: CPT

## 2021-11-22 PROCEDURE — 85027 COMPLETE CBC AUTOMATED: CPT

## 2021-11-22 PROCEDURE — 6360000002 HC RX W HCPCS: Performed by: PHYSICAL MEDICINE & REHABILITATION

## 2021-11-22 PROCEDURE — 94150 VITAL CAPACITY TEST: CPT

## 2021-11-22 PROCEDURE — 97535 SELF CARE MNGMENT TRAINING: CPT

## 2021-11-22 PROCEDURE — 2580000003 HC RX 258: Performed by: PHYSICAL MEDICINE & REHABILITATION

## 2021-11-22 PROCEDURE — 97116 GAIT TRAINING THERAPY: CPT

## 2021-11-22 PROCEDURE — 97542 WHEELCHAIR MNGMENT TRAINING: CPT

## 2021-11-22 PROCEDURE — 97110 THERAPEUTIC EXERCISES: CPT

## 2021-11-22 PROCEDURE — 1280000000 HC REHAB R&B

## 2021-11-22 PROCEDURE — 99232 SBSQ HOSP IP/OBS MODERATE 35: CPT | Performed by: PHYSICAL MEDICINE & REHABILITATION

## 2021-11-22 PROCEDURE — 76937 US GUIDE VASCULAR ACCESS: CPT

## 2021-11-22 PROCEDURE — 6370000000 HC RX 637 (ALT 250 FOR IP): Performed by: PHYSICAL MEDICINE & REHABILITATION

## 2021-11-22 PROCEDURE — 80048 BASIC METABOLIC PNL TOTAL CA: CPT

## 2021-11-22 RX ORDER — 0.9 % SODIUM CHLORIDE 0.9 %
500 INTRAVENOUS SOLUTION INTRAVENOUS ONCE
Status: COMPLETED | OUTPATIENT
Start: 2021-11-22 | End: 2021-11-22

## 2021-11-22 RX ORDER — SODIUM CHLORIDE 0.9 % (FLUSH) 0.9 %
10 SYRINGE (ML) INJECTION EVERY 12 HOURS SCHEDULED
Status: DISCONTINUED | OUTPATIENT
Start: 2021-11-22 | End: 2021-11-28

## 2021-11-22 RX ORDER — SODIUM CHLORIDE 9 MG/ML
25 INJECTION, SOLUTION INTRAVENOUS PRN
Status: DISCONTINUED | OUTPATIENT
Start: 2021-11-22 | End: 2021-11-26

## 2021-11-22 RX ORDER — SODIUM CHLORIDE 0.9 % (FLUSH) 0.9 %
10 SYRINGE (ML) INJECTION PRN
Status: DISCONTINUED | OUTPATIENT
Start: 2021-11-22 | End: 2021-11-28

## 2021-11-22 RX ADMIN — ICOSAPENT ETHYL 1000 MG: 1000 CAPSULE ORAL at 20:57

## 2021-11-22 RX ADMIN — SODIUM CHLORIDE 500 ML: 9 INJECTION, SOLUTION INTRAVENOUS at 13:09

## 2021-11-22 RX ADMIN — GABAPENTIN 300 MG: 300 CAPSULE ORAL at 20:57

## 2021-11-22 RX ADMIN — KETOTIFEN FUMARATE 1 DROP: 0.35 SOLUTION/ DROPS OPHTHALMIC at 20:58

## 2021-11-22 RX ADMIN — VERAPAMIL HYDROCHLORIDE 90 MG: 180 TABLET, FILM COATED, EXTENDED RELEASE ORAL at 09:10

## 2021-11-22 RX ADMIN — VERAPAMIL HYDROCHLORIDE 90 MG: 180 TABLET, FILM COATED, EXTENDED RELEASE ORAL at 20:57

## 2021-11-22 RX ADMIN — GABAPENTIN 300 MG: 300 CAPSULE ORAL at 09:10

## 2021-11-22 RX ADMIN — DIVALPROEX SODIUM 250 MG: 250 TABLET, FILM COATED, EXTENDED RELEASE ORAL at 20:57

## 2021-11-22 RX ADMIN — FLUTICASONE PROPIONATE 1 SPRAY: 50 SPRAY, METERED NASAL at 09:10

## 2021-11-22 RX ADMIN — ASPIRIN 81 MG: 81 TABLET, COATED ORAL at 09:10

## 2021-11-22 RX ADMIN — ENOXAPARIN SODIUM 40 MG: 100 INJECTION SUBCUTANEOUS at 09:10

## 2021-11-22 RX ADMIN — ICOSAPENT ETHYL 1000 MG: 1000 CAPSULE ORAL at 09:11

## 2021-11-22 RX ADMIN — PANTOPRAZOLE SODIUM 40 MG: 40 TABLET, DELAYED RELEASE ORAL at 06:21

## 2021-11-22 RX ADMIN — ROSUVASTATIN CALCIUM 10 MG: 5 TABLET, COATED ORAL at 20:57

## 2021-11-22 RX ADMIN — SODIUM CHLORIDE, PRESERVATIVE FREE 10 ML: 5 INJECTION INTRAVENOUS at 20:58

## 2021-11-22 RX ADMIN — GABAPENTIN 300 MG: 300 CAPSULE ORAL at 14:38

## 2021-11-22 RX ADMIN — SERTRALINE HYDROCHLORIDE 25 MG: 50 TABLET ORAL at 20:57

## 2021-11-22 RX ADMIN — SODIUM CHLORIDE, PRESERVATIVE FREE 10 ML: 5 INJECTION INTRAVENOUS at 13:10

## 2021-11-22 RX ADMIN — KETOTIFEN FUMARATE 1 DROP: 0.35 SOLUTION/ DROPS OPHTHALMIC at 09:11

## 2021-11-22 ASSESSMENT — PAIN SCALES - GENERAL
PAINLEVEL_OUTOF10: 0
PAINLEVEL_OUTOF10: 0

## 2021-11-22 NOTE — PROGRESS NOTES
Occupational Therapy    Physical Rehabilitation: OCCUPATIONAL THERAPY     [x] daily progress note       [] discharge       Patient Name:  Kobe Dickens   :  1935 MRN: [id number]  Room:  [address] [place] [address] Date of Admission: 2021  Rehabilitation Diagnosis:   Hypo-osmolality and hyponatremia [E87.1]  Hyponatremia [E87.1]       Date 2021       Day of ARU Week:  6   Time IN/OUT 1100/1200   Individual Tx Minutes 60   Group Tx Minutes    Co-Treat Minutes    Concurrent Tx Minutes    TOTAL Tx Time Mins 60   Variance Time    Variance Time []   Refusal due to:     []   Medical hold/reason:    []   Illness   []   Off Unit for test/procedure  []   Extra time needed to complete task  []   Therapeutic need  []   Other (specify):   Restrictions Restrictions/Precautions: General Precautions, Fall Risk         Communication with other providers: [x]   OK to see per nursing:     []   Spoke with team member regarding:      Subjective observations and cognitive status: Pt in W/C on approach, agreeable to mostly seated ax 2* hypotension during PT session.   Encouraged water intake throughout     Pain level/location:    /10       Location: Denied   Discharge recommendations  Anticipated discharge date:  TBD  Destination: []home alone   []home alone w assist prn   [] home w/ family    [] Continuous supervision       []SNF    [] Assisted living     [] Other:   Continued therapy: []HHC OT  []OUTPATIENT  OT   [] No Further OT  Equipment needs: TBD       ADLs:       Oral Hygiene: Oral Hygiene  Assistance Needed: Independent  Comment: performed seated 2* hypotension  CARE Score: 6  Discharge Goal: Independent    UB/LB Bathing: Shower/Bathe Self  Assistance Needed: Supervision or touching assistance  Comment: majority performed seated, required repeated cues for shower controls 2* poor recall  CARE Score: 4  Discharge Goal: Independent    UB Dressing: Upper Body Dressing  Assistance Needed: Setup or clean-up assistance  Comment: to don pullover sweater  CARE Score: 5  Discharge Goal: Independent         LB Dressing: Lower Body Dressing  Assistance Needed: Supervision or touching assistance  Comment: to doff/don Depends, don pants  CARE Score: 4  Discharge Goal: Independent    Donning and [place] Footwear: Putting On/Taking Off Footwear  Assistance Needed: Partial/moderate assistance  Comment: MD ordered AFIA buenrostroe 2* hypotension, pt required assist.  able to doff and don regular socks and shoes  CARE Score: 3  Discharge Goal: Independent      Toileting: Denied need    Bed Mobility:           [x]   Pt received out of bed     Transfers:    Sit--> Stand:  SBA  Stand --> Sit:   SBA  Stand-Pivot:   SBA  Other:    Assistive device required for transfer:   W/C      Functional Mobility:    Assistance:  Therapist propelled W/C to conserve energy for ADLs  Device:   []   Rolling Walker     []   Standard Walker []   Wheelchair        []   [employer]       []   4-Wheeled Lora Magdy         []   Cardiac Walker       []   Other:         Additional Therapeutic activities/exercises completed this date:     [x]   ADL Training   []   Balance/Postural training     [x]   Bed/Transfer Training   [x]   Endurance Training   []   Neuromuscular Re-ed   []   Nu-step:  Time:        Level:         #Steps:       []   Rebounder:    []  Seated     []  Standing        []   Supine Ther Ex (reps/sets):     [x]   Seated Ther Ex (reps/sets): To increase BUE endurance for ADLs and transfers pt completed arm ergometer on min resistance x8 min, 0 rest breaks   []   Standing Ther Ex (reps/sets):     []   Other:      Comments: All intervention performed to increase pt's endurance, ax tolerance, balance, and I c ADLs/IADLs and functional transfers/mobility.        Patient/Caregiver Education and Training:   []   [employer] Equipment Use  [x]   Bed Mobility/Transfer Technique/Safety  []   Energy Conservation Tips  []   Family training  [x]   Postural Awareness  [x]   Safety During Functional Activities  []   Reinforced Patient's Precautions   []   Progress was updated and reviewed in Rehabtracker with patient and/or family this         date. Treatment Plan for Next Session: Continue OT POC     Assessment: This pt demonstrated a negative response to today's treatment as evidenced by hypotension. The patient is making progress toward established goals as evidenced by QI scores. Ongoing deficits are observed in the areas of endurance, cognition and continued focus on this is recommended. Treatment/Activity Tolerance:   [x] Tolerated treatment with no adverse effects    [] Patient limited by fatigue  [] Patient limited by pain   [] Patient limited by medical complications:    [] Adverse reaction to Tx:   [] Significant change in status    Safety:       []  bed alarm set    [x]  chair alarm set    []  Pt refused alarms                []  Telesitter activated      [x]  Gait belt used during tx session      []other:       Number of Minutes/Billable Intervention  Therapeutic Exercise 10   ADL Self-care 50   Neuro Re-Ed    Therapeutic Activity    Group    Other:    TOTAL 60       Social History  Social/Functional History  Lives With: Alone  Type of Home: House  Home Layout: One level (Pt has a basement with a chair lift; family room is in the basement)  Home Access: Stairs to enter with rails  Entrance Stairs - Number of Steps: 4 steps  Entrance Stairs - Rails: Both  Bathroom Shower/Tub: Tub/Shower unit, Shower chair with back  [employer]: Standard  Bathroom Equipment: Grab bars in shower, Grab bars around toilet (has grab bars all the way around the bathroom (by the sink, by tub, and door))  Bathroom Accessibility: Not accessible  Home Equipment: Grab bars, Lift chair, Cane (recliner that lifts all the way up)  Receives Help From:  (sons live nearby but work full time.  does not feel anyone would be able to assist her at home)  ADL Assistance: [address] [place] [address] Avenue: Needs assistance  Meal Prep:  (IND)  Laundry:  (IND)  Vacuuming: Total  Cleaning: Maximal  Gardening: Total  Yard Work: Total  Driving:  (IND)  Shopping: Moderate  Homemaking Responsibilities: Yes  Meal Prep Responsibility: Primary  Laundry Responsibility: Primary  Cleaning Responsibility: Secondary (chika comes every other week to clean)  Bill Paying/Finance Responsibility: Primary  Shopping Responsibility: No (grandem does once a week)  Health Care Management: Primary  Ambulation Assistance: Independent (pt did not use assistive device)  Transfer Assistance: Independent  Active : Yes  Mode of Transportation: Car  Occupation: Retired  Type of occupation: Retired   Leisure & Hobbies: Gets hair done 1x week, cooking, running errors. Additional Comments: Pt sleeps both in low flat bed and tall 4 poster bed depending on how she feels. Has had one fall where she bumped her head and she had stayed in the lower bed since then. Pt has had at least 2 falls in past year. no inury    Objective                                                                                    Goals:  (Update in navigator)  Short term goals  Time Frame for Short term goals: LTGs= STGs:  Long term goals  Time Frame for Long term goals : 3- 5 day or until d/c  Long term goal 1: Pt will complete total body bathing c Mod I and use of AE prn by d/c  Long term goal 2: Pt will complete total body dressing (UB/LB/Footwear) c SUP by d/c  Long term goal 3: Pt will complete toileting c MOD I by d/c  Long term goal 4: Pt will complete functional transfer (toilet, tub, shower) c MOD I by d/c. Long term goal 5: Pt will complete grooming/oral care task c IND by d/c  Long term goals 6: Pt will perform therex/therax to facilitate increased strength/endurance/ax tolerance (c emphasis on dynamic standing balance/tolerance >8 mins ) c MOD I in order to complete ADLs. :        Plan of Care Times per week: 5 days per week for a minimum of 60 minutes/day plus group as appropriate for 60 minutes. Treatment to include Plan  Times per week: 5-6x  Times per day: Daily  Current Treatment Recommendations: Strengthening, Balance Training, Endurance Training, Pain Management, Other (comment), Home Management Training, Positioning, Safety Education & Training, Equipment Evaluation, Education, & procurement, Functional Mobility Training, ROM, Patient/Caregiver Education & Training    Electronically signed by   Rocio Abraham MS, OTR/L  License #[id number]  11/22/2021, 12:09 PM

## 2021-11-22 NOTE — FLOWSHEET NOTE
[x] daily progress note       [] discharge       Patient Name:  Keo Alexander   :  1935 MRN: [id number]  Room:  [address] [place] [address] Date of Admission: 2021  Rehabilitation Diagnosis:   Hypo-osmolality and hyponatremia [E87.1]  Hyponatremia [E87.1]       Date 2021       Day of ARU Week:  6   Time IN/OUT 7766-0087   Individual Tx Minutes 69   TOTAL Tx Time Mins 69   Variance Time +9   Variance Time []   Refusal due to:     []   Medical hold/reason:    []   Illness   []   Off Unit for test/procedure  [x]   Extra time needed for patient to get to bathroom and transfer back into bed  []   Therapeutic need  []   Other (specify):   Restrictions Restrictions/Precautions  Restrictions/Precautions: General Precautions, Fall Risk      Communication with other providers: [x]   OK to see per nursing:     [x]   Spoke with team member regarding:   Spoke with RN regarding patient's IV complaint     Subjective observations and cognitive status: Pt received in recliner and was agreeable to therapy. BP at start of session 140/64. Pt stated that her IV was uncomfortable at her wrist and was curious if she could get it moved to another spot. Pain level/location:    0/10           Discharge recommendations  Anticipated discharge date:  TBD  Destination: []?home alone   []?home alone with assist PRN     []? home w/ family      []? Continuous supervision  []? SNF    []? Assisted living     []? Other:   Continued therapy: []?C PT  []? OUTPATIENT  PT   []?  No Further PT  Equipment needs: none likely, pt has SC     Bed Mobility:           [x]   Pt received out of bed   Sit --> lying:  SBA     Transfers:    Sit--> Stand:  SBA  Stand --> Sit:   SBA   Chair-->Bed/Bed --> Chair:   SBA  Toilet Transfer (if applicable): SBA   Assistive device required for transfer: [facility]  VC given throughout for hand placement and wheelchair safety    Gait:    Distance:  174' + 242' + 285'  Assistance:  SBA  Device:  SPC  Gait Quality:  Reciprocal pattern  Pt had a couple of instances of her R knee buckling during ambulation but was able to correct herself    Additional Therapeutic activities/exercises completed this date:          [x]   Balance training w/ SBA-CGA in // bars   Blue stability trainers (wide AMA) 1 trial x 30 sec  Blue stability trainers (small AMA) 2 trials x 30 sec   Blue stability trainers (tandem stance) 2 trials x 30 sec [L and R each]   Black stability trainers (wide AMA) 2 trials x 30 sec  Standing at toilet to pull pants up and down Mod I         [x]   Seated ther ex (reps/sets): To strengthen the LEs  (1 set x 10 rep)       [x]   Standing ther ex (reps/sets): To strengthen the LEs     Minisquats, hip extensions, hip abduction (1 set x 10 reps [each])    Patient/Caregiver Education and Training:   [x]   Bed Mobility/Transfer technique/safety  [x]   Gait technique/sequencing  [x]   Proper use of assistive device  []   Advanced mobility safety and technique  []   Reinforced patient's precautions/mobility while maintaining precautions  []   Postural awareness  []   Family training    Treatment Plan for Next Session: Continue to work on balance training, hip and knee strengthening exercises; and reaffirming transfer safety     Assessment:  Pt seems eager to improve and return home. Her actions can be impulsive at times, especially with transfers. This pt demonstrated a positive  response to today's treatment as evidenced by improved balance, endurance and strength. The patient is making progress toward established goals as evidenced by QI scores. Ongoing deficits are observed in the areas of balance, strength, and endurance and continued focus on balance, strength, and endurance is recommended.      Treatment/Activity Tolerance:   [x] Tolerated treatment with no adverse effects    [] Patient limited by fatigue  [] Patient limited by pain   [] Patient limited by medical complications:    [] Adverse reaction to Tx:   [] Significant change in status    Safety:       [x]  bed alarm set    []  chair alarm set    []  Pt refused alarms                []  Telesitter activated      [x]  Gait belt used during tx session      [x]other:  Pt left in semi fowlers position in bed with call light at end of treatment. Number of Minutes/Billable Intervention  Gait Training 30   Therapeutic Exercise 9   Neuro Re-Ed    Therapeutic Activity 30   Wheelchair Propulsion    Group    Other:    TOTAL 69         Social History  Social/Functional History  Lives With: Alone  Type of Home: House  Home Layout: One level (Pt has a basement with a chair lift; family room is in the basement)  Home Access: Stairs to enter with rails  Entrance Stairs - Number of Steps: 4 steps  Entrance Stairs - Rails: Both  Bathroom Shower/Tub: Tub/Shower unit, Shower chair with back  [employer]: Standard  Bathroom Equipment: Grab bars in shower, Grab bars around toilet (has grab bars all the way around the bathroom (by the sink, by tub, and door))  Bathroom Accessibility: Not accessible  Home Equipment: Grab bars, Lift chair, Cane (recliner that lifts all the way up)  Receives Help From:  (sons live nearby but work full time. does not feel anyone would be able to assist her at home)  ADL Assistance: [address]: Needs assistance  Meal Prep:  (IND)  Laundry:  (IND)  Vacuuming: Total  Cleaning: Maximal  Gardening: Total  Yard Work: Total  Driving:  (IND)  Shopping:  Moderate  Homemaking Responsibilities: Yes  Meal Prep Responsibility: Primary  Laundry Responsibility: Primary  Cleaning Responsibility: Secondary (grandson comes every other week to clean)  Bill Paying/Finance Responsibility: Primary  Shopping Responsibility: No (grandson does once a week)  Health Care Management: Primary  Ambulation Assistance: Independent (pt did not use assistive device)  Transfer Assistance: Independent  Active : Yes  Mode of Transportation: Car  Occupation: Retired  Type of occupation: Retired   Leisure & Hobbies: Gets hair done 1x week, cooking, running errors. Additional Comments: Pt sleeps both in low flat bed and tall 4 poster bed depending on how she feels. Has had one fall where she bumped her head and she had stayed in the lower bed since then. Pt has had at least 2 falls in past year. no inury    Objective                                                                                    Goals:  (Update in navigator)  Short term goals  Time Frame for Short term goals: 7 days STG=LTG  Short term goal 1: Pt will perform all bed mobility with mod I  Short term goal 2: Pt will perform sit to stand, pivot and car transfers  with mod I  Short term goal 3: Pt will ambulate  150  feet on level surfaces and 25' on unlevel surface with mod I   assist  using straight cane  Short term goal 4: Pt will ascend/descend curb step with   cane   and up to   12  steps with  rail(s) with   mod I  Short term goal 5: Pt will retrieve light item from floor with mod I   assist  using device as needed:   :        Plan of Care                                                                              Times per week: 5 days per week for a minimum of 60 minutes/day plus group as appropriate for 60 minutes.   Treatment to include Current Treatment Recommendations: Functional Mobility Training, IADL Training, Neuromuscular Re-education, Home Exercise Program, Equipment Evaluation, Education, & procurement, Transfer Training, Gait Training, Balance Training, Stair training, Patient/Caregiver Education & Training, Safety Education & Training    Electronically signed by   BECKY Ivey  11/22/2021, 3:46 PM  I have read and agree with the documentation above:  Irma Thornton, [id number]

## 2021-11-22 NOTE — PATIENT CARE CONFERENCE
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 Support includes grandson [address] and his family. Patient reports having a SC and GB at home. Plan is to discharge to home alone. Possible barrier is lack of support.     PHYSICAL THERAPY (Updated in QI)  Short term goals  Time Frame for Short term goals: 7 days STG=LTG  Short term goal 1: Pt will perform all bed mobility with mod I  Short term goal 2: Pt will perform sit to stand, pivot and car transfers  with mod I  Short term goal 3: Pt will ambulate  150  feet on level surfaces and 25' on unlevel surface with mod I   assist  using straight cane  Short term goal 4: Pt will ascend/descend curb step with   cane   and up to   12  steps with  rail(s) with   mod I  Short term goal 5: Pt will retrieve light item from floor with mod I   assist  using device as needed          Impairments/deficits, barriers: impulsivity, decreased understanding of actual deficits, safety   Body structures, Functions, Activity limitations: Decreased functional mobility , Decreased vision/visual deficit, Decreased safe awareness, Decreased high-level IADLs, Decreased balance, Decreased cognition     Prognosis: Good  Decision Making: High Complexity  Clinical Presentation: unpredicatable characteristics  Equipment needed at discharge:pt states she has straight cane      PT IRF-TAMARA scores since initial assessment  Bed Mobility:   Sit to Lying  Assistance Needed: Independent  CARE Score: 6  Discharge Goal: Independent    Roll Left and Right  Assistance Needed: Independent  CARE Score: 6  Discharge Goal: Independent    Lying to Sitting on Side of Bed  Assistance Needed: Independent  Comment: without bed features  CARE Score: 6  Discharge Goal: Independent    Transfers:    Sit to Stand  Assistance Needed: Supervision or touching assistance  Comment: SB-CGA, initial unsteadiness with SPC  CARE Score: 4  Discharge Goal: Independent    Chair/Bed-to-Chair Transfer  Assistance Needed: Supervision or touching assistance  Comment: SB-CGA due to initial unsteadiness  CARE Score: 4  Discharge Goal: Independent        Car Transfer  Assistance Needed: Supervision or touching assistance  CARE Score: 4  Discharge Goal: Independent    Ambulation:    Walking Ability  Does the Patient Walk?: Yes     Walk 10 Feet  Assistance Needed: Supervision or touching assistance  Comment: CG-Min A due to LOB. with SPC amb to BR. CARE Score: 4  Discharge Goal: Independent     Walk 50 Feet with Two Turns  Assistance Needed: Supervision or touching assistance  Comment: Attempted amb but unable this AM due to pt with decreased BP 86/53 in standing and symptomatic. /59 in sitting with sx's subsiding.  Pt able to perform in pm  CARE Score: 88  Discharge Goal: Independent     Walk 150 Feet  Assistance Needed: Supervision or touching assistance  Comment: CG  CARE Score: 4  Discharge Goal: Independent     Walking 10 Feet on Uneven Surfaces  Assistance Needed: Partial/moderate assistance  Comment: pt with no awareness of surface changes requiring mod assist to recover balance x 2 despite training mid task  CARE Score: 3  Discharge Goal: Independent     1 Step (Curb)  Comment: CG  Discharge Goal: Independent     4 Steps  Assistance Needed: Supervision or touching assistance  Comment: SBA with B rails, reciprocating gait, decreased eccentric control at times with RLE  CARE Score: 4  Discharge Goal: Independent     12 Steps  Assistance Needed: Supervision or touching assistance  CARE Score: 4  Discharge Goal: Independent           Wheelchair:  w/c Ability: Wheelchair Ability  Uses a Wheelchair and/or Scooter?: No         Wheel 150 Feet  Assistance Needed: Supervision or touching assistance  Comment: Supervision using B LEs only, propelled 100'+62'+237'  CARE Score: 4              Balance:        Object: Picking Up Object  Assistance Needed: Supervision or touching assistance  Comment: CG with hesitancy  CARE Score: 4  Discharge Goal: Independent    Fall Risk: [x]  Yes  []  No    OCCUPATIONAL THERAPY  (Updated in QI)  Short term goals  Time Frame for Short term goals: LTGs= STGs :   Long term goals  Time Frame for Long term goals : 3- 5 day or until d/c  Long term goal 1: Pt will complete total body bathing c Mod I and use of AE prn by d/c  Long term goal 2: Pt will complete total body dressing (UB/LB/Footwear) c SUP by d/c  Long term goal 3: Pt will complete toileting c MOD I by d/c  Long term goal 4: Pt will complete functional transfer (toilet, tub, shower) c MOD I by d/c. Long term goal 5: Pt will complete grooming/oral care task c IND by d/c  Long term goals 6: Pt will perform therex/therax to facilitate increased strength/endurance/ax tolerance (c emphasis on dynamic standing balance/tolerance >8 mins ) c MOD I in order to complete ADLs.  :                                       OT IRF-TAMARA scores and goals since initial assessment:    ADLs:    Eating: Eating  Assistance Needed: Independent  Comment: Pt sitting EOB eating on arrival  CARE Score: 6  Discharge Goal: Independent       Oral Hygiene: Oral Hygiene  Assistance Needed: Independent  Comment: performed seated 2* hypotension  CARE Score: 6  Discharge Goal: Independent    UB/LB Bathing: Shower/Bathe Self  Assistance Needed: Supervision or touching assistance  Comment: majority performed seated, required repeated cues for shower controls 2* poor recall  CARE Score: 4  Discharge Goal: Independent    UB Dressing: Upper Body Dressing  Assistance Needed: Setup or clean-up assistance  Comment: to don pullover sweater  CARE Score: 5  Discharge Goal: Independent         LB Dressing: Lower Body Dressing  Assistance Needed: Supervision or touching assistance  Comment: to doff/don Depends, don pants  CARE Score: 4  Discharge Goal: Independent    Donning and [place] Footwear: Putting On/Taking Off Footwear  Assistance Needed: Partial/moderate assistance  Comment: MD ordered AFIA hose 2* hypotension, pt required assist.  able to doff and don regular socks and shoes  CARE Score: 3  Discharge Goal: Independent      Toileting: Toileting Hygiene  Assistance Needed: Supervision or touching assistance  Comment: Sup for CM and toileting hygiene  CARE Score: 4  Discharge Goal: Independent      Toilet Transfers:    Toilet Transfer  Assistance Needed: Supervision or touching assistance  Comment: SB-CGA for balance with SPC and grab bar  CARE Score: 4  Discharge Goal: Independent      Impairments/deficits, barriers: Decreased balance, endurance, cognition!!! (memory, problem solving, safety judgement)  Assessment  Performance deficits / Impairments: Decreased functional mobility , Decreased ADL status, Decreased strength, Decreased balance, Decreased posture, Decreased high-level IADLs, Decreased ROM  Treatment Diagnosis: generalized weakness  Prognosis: Good  REQUIRES OT FOLLOW UP: Yes  Discharge Recommendations: Home with assist PRN  Equipment needed at discharge: Likely none      COGNITIVE FUNCTION/SPEECH THERAPY (AS INDICATED)  LTG                                           Nursing Current Medical Status:   [x] Is continent of bladder [x] Is occ incontinent of bowel    [x] Has had an adequate number of bowel movements   [] Urinates with no urinary retention >300ml in bladder   [] Targeting bladder protocol with walker removal   [x] Maintaining O2 SATs at 92% or greater   [x] Has pain managed while on ARU         [x] Has had no skin breakdown while on ARU   [] Has improved skin integrity via wound measurements   [] Has no signs/symptoms of infection at the wound site   [] Pressure wounds Stage/Location:    [] Arrived on unit with pressure wound  [x] Has been free from injury during hospitalization   [] Has experienced a fall during hospitalization  [x] Ongoing education with patient/family with understanding demonstrated for:  [x] Receives IV Fluids, bolus 11/22  [] Other:        NUTRITION  Weight: 132 lb 11 oz (60.2 kg) / Body mass index is 23.5 kg/m². Current diet: ADULT DIET; Regular  Intake: Meal intake %, content with most meals       Medical improvements/barriers: cognitive issues, ST consult, fatigues easily, BP issues, c/o nausea        Team goals for next treatment period/Intervention for current barriers:   [x] Pt will increase activity tolerance for daily tasks. [x] Pt will improve bed mobility with reduced assist.  [x] Pt will improve safety in fx tasks with reduced cues/assist  [x] Pt will improve transfers with reduced assist  [x] Pt will improve toileting with reduced assist  [x] Pt will improve ADL's with use of adaptive equipment with reduced assist  [] Pt will improve pain mgmt for maximum participation in tx program  [] Pt will improve communication to get basic needs met on unit  [] Pt will improve swallowing for safe diet advancement with use of strategies  []  Plan for discharge to home.      Patient Strengths: Cooperative and Pleasant    Justification for Continued Stay  Based on my medical assessment of the patient and review of information from the interdisciplinary team as part of this weekly team conference, the patient continues to meet the following criteria for IRF level of care:   The patient requires active and ongoing intervention of multiple therapy disciplines   The patient requires and intensive rehabilitation therapy program   The patient requires continued physician supervision by a rehabilitation physician   The patient requires 24 hours rehab nursing care   The patient requires an intensive and coordinated interdisciplinary team approach to the delivery of rehabilitative care. Assessment/Plan   [x]  The patient is making good progression towards their long term goals and is actively participating in and has a reasonable expectation to continue to benefit from the intensive rehabilitation therapy program   []  The estimated discharge date has been changed from initial team conference due to:   []  The estimated discharge destination has been changed from initial team conference due to:         Ongoing tx following discharge: [x]HHC  OT  PT  NSG  []OUTPATIENT     [] No Further Treatment     [] Family/Caregiver Training  []  Transitional Living Arrangement   [] Home Assessment (date  )     [] Family Conference   []  Therapeutic Pass       []  Other: (specify)    Estimated Discharge Date: 11/30/21    Estimated Discharge Destination: []home alone   [x]home alone with assist prn  []Continuous supervision []Return home with s/o/spouse/family   [] Assisted living    []SNF     Team members participating in today's conference.     [x] Alec Gottron, Medical Director  [x] Tessa Tsang,    [] Anna Serrano, Nurse Mgr [x] Donal Conde, Nurse Supervisor   []  Pino Haynes, PT   [] Bianca Quach, OT   [x]  Michelle Davis, PT  [x] Jana Andersen, OT      [x]  Muna Bonds, SLP    []  Virginia Lovett, SLP   [] Jena Gipson, TRESSA   []  Richa Yao RD     [x] Delphine Milton,     [x]Jeimy Freire,     [x] Geovany Larry RN[] Butch Carrasco RN     [] Darren Andrea Ailyn Robbins RN    [] Gaby Benton RN    [] Summer Teran RN  [] Dimitri Priec RN    []     I have led this Team Conference and agree with the plan, Iveth Porras MD, 11/23/2021, 1:11 PM  Goals have been updated to reflect recent status.     Team conference note transcribed this date by: Debbie Clancy MA, Phil Velasquez, Therapy Coordinator

## 2021-11-22 NOTE — PROGRESS NOTES
Gay Good    : 1935  Acct #: [de-identified]  MRN: [id number]              PM&R Progress Note      Admitting diagnosis: Hyponatremia ([address])     Comorbid diagnoses impacting rehabilitation: Generalized weakness, gait disturbance, essential hypertension, PAD, HLD, macular degeneration, trigeminal neuralgia, history of DVT, bilateral hydroureter     Chief complaint: No longer having diarrhea but she is very tired when her blood pressure drops. Prior (baseline) level of function: Independent. Current level of function:         Current  IRF-TAMARA and Goals:   Occupational Therapy:    Short term goals  Time Frame for Short term goals: LTGs= STGs :   Long term goals  Time Frame for Long term goals : 3- 5 day or until d/c  Long term goal 1: Pt will complete total body bathing c Mod I and use of AE prn by d/c  Long term goal 2: Pt will complete total body dressing (UB/LB/Footwear) c SUP by d/c  Long term goal 3: Pt will complete toileting c MOD I by d/c  Long term goal 4: Pt will complete functional transfer (toilet, tub, shower) c MOD I by d/c. Long term goal 5: Pt will complete grooming/oral care task c IND by d/c  Long term goals 6: Pt will perform therex/therax to facilitate increased strength/endurance/ax tolerance (c emphasis on dynamic standing balance/tolerance >8 mins ) c MOD I in order to complete ADLs.  :                                       Eating: Eating  Assistance Needed: Independent  Comment: Pt sitting EOB eating on arrival  CARE Score: 6  Discharge Goal: Independent       Oral Hygiene: Oral Hygiene  Assistance Needed: Independent  Comment: performed seated 2* hypotension  CARE Score: 6  Discharge Goal: Independent    UB/LB Bathing: Shower/Bathe Self  Assistance Needed: Supervision or touching assistance  Comment: majority performed seated, required repeated cues for shower controls 2* poor recall  CARE Score: 4  Discharge Goal: Independent    UB Dressing: Upper Body Dressing  Assistance Needed: Setup or clean-up assistance  Comment: to don pullover sweater  CARE Score: 5  Discharge Goal: Independent         LB Dressing: Lower Body Dressing  Assistance Needed: Supervision or touching assistance  Comment: to doff/don Depends, don pants  CARE Score: 4  Discharge Goal: Independent    Donning and [place] Footwear: Putting On/Taking Off Footwear  Assistance Needed: Partial/moderate assistance  Comment: MD ordered AFIA hose 2* hypotension, pt required assist.  able to doff and don regular socks and shoes  CARE Score: 3  Discharge Goal: Independent      Toileting: Toileting Hygiene  Assistance Needed: Supervision or touching assistance  Comment: Sup for CM and toileting hygiene  CARE Score: 4  Discharge Goal: Independent      Toilet Transfers:   Toilet Transfer  Assistance Needed: Supervision or touching assistance  Comment: SB-CGA for balance with SPC and grab bar  CARE Score: 4  Discharge Goal: Independent    Physical Therapy:   Short term goals  Time Frame for Short term goals: 7 days STG=LTG  Short term goal 1: Pt will perform all bed mobility with mod I  Short term goal 2: Pt will perform sit to stand, pivot and car transfers  with mod I  Short term goal 3: Pt will ambulate  150  feet on level surfaces and 25' on unlevel surface with mod I   assist  using straight cane  Short term goal 4: Pt will ascend/descend curb step with   cane   and up to   12  steps with  rail(s) with   mod I  Short term goal 5: Pt will retrieve light item from floor with mod I   assist  using device as needed            Bed Mobility:   Sit to Lying  Assistance Needed: Independent  CARE Score: 6  Discharge Goal: Independent  Roll Left and Right  Assistance Needed: Independent  CARE Score: 6  Discharge Goal: Independent  Lying to Sitting on Side of Bed  Assistance Needed: Independent  Comment: without bed features  CARE Score: 6  Discharge Goal: Independent    Transfers:    Sit to Stand  Assistance Needed: Supervision or touching assistance  Comment: SB-CGA, initial unsteadiness with SPC  CARE Score: 4  Discharge Goal: Independent  Chair/Bed-to-Chair Transfer  Assistance Needed: Supervision or touching assistance  Comment: SB-CGA due to initial unsteadiness  CARE Score: 4  Discharge Goal: Independent  Toilet Transfer  Assistance Needed: Supervision or touching assistance  Comment: SB-CGA for balance with SPC and grab bar  CARE Score: 4  Car Transfer  Assistance Needed: Supervision or touching assistance  CARE Score: 4  Discharge Goal: Independent    Ambulation:    Walking Ability  Does the Patient Walk?: Yes     Walk 10 Feet  Assistance Needed: Supervision or touching assistance  Comment: CG-Min A due to LOB. with SPC amb to BR. CARE Score: 4  Discharge Goal: Independent     Walk 50 Feet with Two Turns  Assistance Needed: Supervision or touching assistance  Comment: Attempted amb but unable this AM due to pt with decreased BP 86/53 in standing and symptomatic. /59 in sitting with sx's subsiding.    Reason if not Attempted: Not attempted due to medical condition or safety concerns  CARE Score: 88  Discharge Goal: Independent     Walk 150 Feet  Assistance Needed: Supervision or touching assistance  Comment: CG  CARE Score: 4  Discharge Goal: Independent     Walking 10 Feet on Uneven Surfaces  Assistance Needed: Partial/moderate assistance  Comment: pt with no awareness of surface changes requiring mod assist to recover balance x 2 despite training mid task  CARE Score: 3  Discharge Goal: Independent     1 Step (Curb)  Comment: CG  Discharge Goal: Independent     4 Steps  Assistance Needed: Supervision or touching assistance  Comment: SBA with B rails, reciprocating gait, decreased eccentric control at times with RLE  CARE Score: 4  Discharge Goal: Independent     12 Steps  Assistance Needed: Supervision or touching assistance  CARE Score: 4  Discharge Goal: Independent       Wheelchair:  w/c Ability: Wheelchair Ability  Uses a Wheelchair and/or Scooter?: No     Wheel 150 Feet  Assistance Needed: Supervision or touching assistance  Comment: Supervision using B LEs only, propelled 100'+62'+237'  CARE Score: 4          Balance:        Object: Picking Up Object  Assistance Needed: Supervision or touching assistance  Comment: CG with hesitancy  CARE Score: 4  Discharge Goal: Independent    I      Exam:    Blood pressure (!) 124/56, pulse 57, temperature 97.5 °F (36.4 °C), resp. rate 16, height 5' 3\" (1.6 m), weight 132 lb 11.2 oz (60.2 kg), SpO2 97 %, not currently breastfeeding. General: Sitting up in a wheelchair and then lying back in bed. Alert but soft-spoken. Neck supple. HEENT: MMM. Clear speech. Pulmonary: No wheezes or rales. Cardiac: Regular rate and rhythm. Abdomen: Patient's abdomen is soft and nondistended. Bowel sounds were present throughout. There was no rebound, guarding or masses noted. Upper extremities: Some tenting of the hands. No new bruising. Lower extremities: No signs of DVT. Heels clear. Give way with MMT. Sitting balance was good. Standing balance was poor. Lab Results   Component Value Date    WBC 5.3 11/22/2021    HGB 10.2 (L) 11/22/2021    HCT 31.6 (L) 11/22/2021    .3 (H) 11/22/2021     (L) 11/22/2021     Lab Results   Component Value Date    INR 0.96 01/25/2016    PROTIME 11.0 01/25/2016     Lab Results   Component Value Date    CREATININE 0.9 11/22/2021    BUN 15 11/22/2021     11/22/2021    K 5.0 11/22/2021     11/22/2021    CO2 26 11/22/2021     Lab Results   Component Value Date    ALT 9 (L) 11/10/2021    AST 20 11/10/2021    ALKPHOS 38 (L) 11/10/2021    BILITOT 0.4 11/10/2021       Expected length of stay  prior to a supervised level of function for discharge home with a walker and C OT/PT is 2 weeks. Recommendations:    1.  Hyponatremia with generalized weakness: She is symptomatic when her blood pressure drops during her daily occupational and physical therapy. Her chemistries are not alarming, but she does have some tenting and with her diarrhea yesterday she likely is behind at fluids. I will administer some saline cautiously. Continue with adaptive equipment training, caregiver education and bowel and bladder retraining. We must provide aggressive pulmonary hygiene measures, cautious pain management and nutritional support. Encouraging oral fluid intake. Periodic monitoring of her chemistries, sodium up to 137 today. Cautious use of any diuretics. Verbal cues and min physical assistance for transfers. 2. DVT prophylaxis: Lovenox 40 mg subcu daily. I must monitor her hemoglobin and platelet count while on this medication periodically. GI prophylaxis offered. Weightbearing activities are pursued daily. No signs of acute blood loss and her hemoglobin was steady at 10.2.  3. Bilateral hydroureter: No current symptoms of obstruction. Outpatient follow-up with urology. 4. Hypertension: Recently the patient has been maintained off antihypertensives. Target systolic blood pressure is 120-140. Monitoring vital signs at rest and with activity. Blood pressure has been low when she stands. I will administered fluids. 5. Trigeminal neuralgia: Vascepa, Neurontin and Depakote. Acetaminophen and other oral analgesics as needed. Zoloft to help with sleep.

## 2021-11-22 NOTE — PROGRESS NOTES
Physical Therapy      [x] daily progress note       [] discharge       Patient Name:  Ayanna Kelley   :  1935 MRN: [id number]  Room:  [address] [place], [place] [address] Date of Admission: 2021  Rehabilitation Diagnosis:   Hypo-osmolality and hyponatremia [E87.1]  Hyponatremia [E87.1]       Date 2021       Day of ARU Week:  6   Time IN/OUT 1754-9921   Individual Tx Minutes 60   TOTAL Tx Time Mins 60   Variance Time    Variance Time []   Refusal due to:     []   Medical hold/reason:    []   Illness   []   Off Unit for test/procedure  []   Extra time needed to complete task  []   Therapeutic need  []   Other (specify):   Restrictions Restrictions/Precautions  Restrictions/Precautions: General Precautions, Fall Risk      Communication with other providers: [x]   OK to see per nursing:     []   Spoke with team member regarding:      Subjective observations and cognitive status: Pt resting in bed, willing to participate, reports was \"out sorts yesterday\" regarding having diarrhea, but better today. Pt with increased unsteadiness and c/o lightheadedness when amb to BR. Pt's BP 86/53 in standing, sx's subside and BP improves to 118/59 in sitting. Discussed with nsg and OT. Pt unable to amb but able to tolerate ax's in sitting. Pain level/location: Denies pain   Discharge recommendations  Anticipated discharge date:  TBD  Destination: []?home alone   []?home alone with assist PRN     []? home w/ family      []? Continuous supervision  []? SNF    []? Assisted living     []? Other:   Continued therapy: []?[facility] PT  []? OUTPATIENT  PT   []?  No Further PT  Equipment needs: none likely, pt has SC       Bed Mobility:         []   Pt received out of bed     Roll Left and Right  Assistance Needed: Independent  CARE Score: 6  Discharge Goal: Independent    Lying to Sitting on Side of Bed  Assistance Needed: Independent  Comment: without bed features  CARE Score: 6  Discharge Goal: Independent    Transfers:    Sit to Stand  Assistance Needed: Supervision or touching assistance  Comment: SB-CGA, initial unsteadiness with SPC  CARE Score: 4  Discharge Goal: Independent    Chair/Bed-to-Chair Transfer  Assistance Needed: Supervision or touching assistance  Comment: SB-CGA due to initial unsteadiness  CARE Score: 4  Discharge Goal: Independent    Toilet Transfer  Assistance Needed: Supervision or touching assistance  Comment: SB-CGA for balance with SPC and grab bar  CARE Score: 4    Car Transfer  Assistance Needed: Supervision or touching assistance  CARE Score: 4  Discharge Goal: Independent     Ambulation:    Walking Ability  Does the Patient Walk?: Yes     Walk 10 Feet  Assistance Needed: Supervision or touching assistance  Comment: CG-Min A due to LOB. with SPC amb to BR. CARE Score: 4  Discharge Goal: Independent     Walk 50 Feet with Two Turns  Assistance Needed: Supervision or touching assistance  Comment: Attempted amb but unable this AM due to pt with decreased BP 86/53 in standing and symptomatic. /59 in sitting with sx's subsiding. Reason if not Attempted: Not attempted due to medical condition or safety concerns  CARE Score: 88  Discharge Goal: Independent      Wheelchair:  W/C Ability: Pt propelled 100'+62'+237' using B LEs only, req extra time, rest breaks required due to fatigue. Additional Therapeutic activities/exercises completed this date:     []   Nu-step:  Time:        Level:         #Steps:       []   Rebounder:    []  Seated     []  Standing        []   Balance training         []   Postural training    []   Supine ther ex (reps/sets):     [x]   Seated ther ex (reps/sets): B LEs in sitting 1# each for LAQ, hip flexion, hip abd/add, knee flexion x 20 reps.     []   Standing ther ex (reps/sets):     [x]   Other: Propelled WC bkwd for quad strengthening using B LEs only with 1#, pt with good tolerance prpelling 115' + 132'                []   Other:   []   Other:      Patient/Caregiver Education and Training:   [x] Bed Mobility/Transfer technique/safety  [x]  WC technique/sequencing  []   Proper use of assistive device  []   Advanced mobility safety and technique  []   Reinforced patient's precautions/mobility while maintaining precautions  []   Postural awareness  []   Family training    Specific instructions for next treatment: gait progression, community barriers, stair training, LE strengthening, dynamic balance     Treatment/Activity Tolerance:   [x] Tolerated treatment with no adverse effects    [] Patient limited by fatigue  [] Patient limited by pain   [] Patient limited by medical complications:    [] Adverse reaction to Tx:   [] Significant change in status    Safety:       []  bed alarm set    [x]  chair alarm set    []  Pt refused alarms                []  Telesitter activated      [x]  Gait belt used during tx session      []other:         Number of Minutes/Billable Intervention  Gait Training    Therapeutic Exercise 15   Neuro Re-Ed    Therapeutic Activity 15   Wheelchair Propulsion 30   Group    Other:    TOTAL 60         Social History  Social/Functional History  Lives With: Alone  Type of Home: House  Home Layout: One level (Pt has a basement with a chair lift; family room is in the basement)  Home Access: Stairs to enter with rails  Entrance Stairs - Number of Steps: 4 steps  Entrance Stairs - Rails: Both  Bathroom Shower/Tub: Tub/Shower unit, Shower chair with back  [employer]: Standard  Bathroom Equipment: Grab bars in shower, Grab bars around toilet (has grab bars all the way around the bathroom (by the sink, by tub, and door))  Bathroom Accessibility: Not accessible  Home Equipment: Grab bars, Lift chair, Cane (recliner that lifts all the way up)  Receives Help From:  (sons live nearby but work full time. does not feel anyone would be able to assist her at home)  ADL Assistance: [facility]: Needs assistance  Meal Prep:  (IND)  Laundry:  (IND)  Vacuuming:  Total  Cleaning: Maximal  Gardening: Total  Yard Work: Total  Driving:  (IND)  Shopping: Moderate  Homemaking Responsibilities: Yes  Meal Prep Responsibility: Primary  Laundry Responsibility: Primary  Cleaning Responsibility: Secondary (grandem comes every other week to clean)  Bill Paying/Finance Responsibility: Primary  Shopping Responsibility: No (grandson does once a week)  Health Care Management: Primary  Ambulation Assistance: Independent (pt did not use assistive device)  Transfer Assistance: Independent  Active : Yes  Mode of Transportation: Car  Occupation: Retired  Type of occupation: Retired   Leisure & Hobbies: Gets hair done 1x week, cooking, running errors. Additional Comments: Pt sleeps both in low flat bed and tall 4 poster bed depending on how she feels. Has had one fall where she bumped her head and she had stayed in the lower bed since then. Pt has had at least 2 falls in past year. no inury    Objective                                                                                    Goals:  (Update in navigator)  Short term goals  Time Frame for Short term goals: 7 days STG=LTG  Short term goal 1: Pt will perform all bed mobility with mod I  Short term goal 2: Pt will perform sit to stand, pivot and car transfers  with mod I  Short term goal 3: Pt will ambulate  150  feet on level surfaces and 25' on unlevel surface with mod I   assist  using straight cane  Short term goal 4: Pt will ascend/descend curb step with   cane   and up to   12  steps with  rail(s) with   mod I  Short term goal 5: Pt will retrieve light item from floor with mod I   assist  using device as needed:   :        Plan of Care                                                                              Times per week: 5 days per week for a minimum of 60 minutes/day plus group as appropriate for 60 minutes.   Treatment to include Current Treatment Recommendations: Functional Mobility Training, IADL Training, Neuromuscular Re-education, Home Exercise Program, Equipment Evaluation, Education, & procurement, Transfer Training, Gait Training, Balance Training, Stair training, Patient/Caregiver Education & Training, Safety Education & Training    Electronically signed by   Branden Tan, PTA #[id number]  11/22/2021, 4:42 PM

## 2021-11-23 PROCEDURE — 2580000003 HC RX 258: Performed by: PHYSICAL MEDICINE & REHABILITATION

## 2021-11-23 PROCEDURE — 97530 THERAPEUTIC ACTIVITIES: CPT

## 2021-11-23 PROCEDURE — 97110 THERAPEUTIC EXERCISES: CPT

## 2021-11-23 PROCEDURE — 97535 SELF CARE MNGMENT TRAINING: CPT

## 2021-11-23 PROCEDURE — 6360000002 HC RX W HCPCS: Performed by: PHYSICAL MEDICINE & REHABILITATION

## 2021-11-23 PROCEDURE — 6370000000 HC RX 637 (ALT 250 FOR IP): Performed by: INTERNAL MEDICINE

## 2021-11-23 PROCEDURE — 1280000000 HC REHAB R&B

## 2021-11-23 PROCEDURE — 6370000000 HC RX 637 (ALT 250 FOR IP): Performed by: PHYSICAL MEDICINE & REHABILITATION

## 2021-11-23 PROCEDURE — 99233 SBSQ HOSP IP/OBS HIGH 50: CPT | Performed by: PHYSICAL MEDICINE & REHABILITATION

## 2021-11-23 PROCEDURE — 97112 NEUROMUSCULAR REEDUCATION: CPT

## 2021-11-23 PROCEDURE — 97116 GAIT TRAINING THERAPY: CPT

## 2021-11-23 PROCEDURE — 94761 N-INVAS EAR/PLS OXIMETRY MLT: CPT

## 2021-11-23 PROCEDURE — 94150 VITAL CAPACITY TEST: CPT

## 2021-11-23 RX ADMIN — FLUTICASONE PROPIONATE 1 SPRAY: 50 SPRAY, METERED NASAL at 08:21

## 2021-11-23 RX ADMIN — ENOXAPARIN SODIUM 40 MG: 100 INJECTION SUBCUTANEOUS at 08:20

## 2021-11-23 RX ADMIN — SERTRALINE HYDROCHLORIDE 25 MG: 50 TABLET ORAL at 21:28

## 2021-11-23 RX ADMIN — ASPIRIN 81 MG: 81 TABLET, COATED ORAL at 08:20

## 2021-11-23 RX ADMIN — SODIUM CHLORIDE, PRESERVATIVE FREE 10 ML: 5 INJECTION INTRAVENOUS at 08:58

## 2021-11-23 RX ADMIN — SODIUM CHLORIDE, PRESERVATIVE FREE 10 ML: 5 INJECTION INTRAVENOUS at 21:30

## 2021-11-23 RX ADMIN — KETOTIFEN FUMARATE 1 DROP: 0.35 SOLUTION/ DROPS OPHTHALMIC at 08:23

## 2021-11-23 RX ADMIN — ICOSAPENT ETHYL 1000 MG: 1000 CAPSULE ORAL at 21:29

## 2021-11-23 RX ADMIN — VERAPAMIL HYDROCHLORIDE 90 MG: 180 TABLET, FILM COATED, EXTENDED RELEASE ORAL at 08:20

## 2021-11-23 RX ADMIN — GABAPENTIN 300 MG: 300 CAPSULE ORAL at 08:20

## 2021-11-23 RX ADMIN — GABAPENTIN 300 MG: 300 CAPSULE ORAL at 14:24

## 2021-11-23 RX ADMIN — GABAPENTIN 300 MG: 300 CAPSULE ORAL at 21:28

## 2021-11-23 RX ADMIN — DIVALPROEX SODIUM 250 MG: 250 TABLET, FILM COATED, EXTENDED RELEASE ORAL at 21:29

## 2021-11-23 RX ADMIN — KETOTIFEN FUMARATE 1 DROP: 0.35 SOLUTION/ DROPS OPHTHALMIC at 21:31

## 2021-11-23 RX ADMIN — ROSUVASTATIN CALCIUM 10 MG: 5 TABLET, COATED ORAL at 21:28

## 2021-11-23 RX ADMIN — ICOSAPENT ETHYL 1000 MG: 1000 CAPSULE ORAL at 08:21

## 2021-11-23 RX ADMIN — PANTOPRAZOLE SODIUM 40 MG: 40 TABLET, DELAYED RELEASE ORAL at 06:21

## 2021-11-23 RX ADMIN — VERAPAMIL HYDROCHLORIDE 90 MG: 180 TABLET, FILM COATED, EXTENDED RELEASE ORAL at 21:29

## 2021-11-23 ASSESSMENT — PAIN SCALES - GENERAL
PAINLEVEL_OUTOF10: 0

## 2021-11-23 NOTE — PROGRESS NOTES
Physical Therapy  . [x] daily progress note       [] discharge       Patient Name:  Rajiv Reid   :  1935 MRN: [id number]  Room:  [address] [place] [address] Date of Admission: 2021  Rehabilitation Diagnosis:   Hypo-osmolality and hyponatremia [E87.1]  Hyponatremia [E87.1]       Date 2021       Day of ARU Week:  7   Time IN/OUT 1440/1540   Individual Tx Minutes 60   Group Tx Minutes    Co-Treat Minutes    Concurrent Tx Minutes    TOTAL Tx Time Mins 60   Variance Time    Variance Time []   Refusal due to:     []   Medical hold/reason:    []   Illness   []   Off Unit for test/procedure  []   Extra time needed to complete task  []   Therapeutic need  []   Other (specify):   Restrictions Restrictions/Precautions  Restrictions/Precautions: General Precautions, Fall Risk      Interdisciplinary communication [x]   Cleared for therapy per nursing : BP better this pm    []   RN notified about issues during session  []   RN updated on pt performance  []   Spoke with   []   Spoke with OT  []   Spoke with MD  []   Other:    Subjective observations and cognitive status: Pt in recliner resting after OT session. Pt states she is tired and a little nauseous, but agreeable to therapy.       Pain level/location:   0 /10       Location:    Discharge recommendations  Anticipated discharge date:    Destination: []home alone   [x]home alone with assist PRN (at baseline was 1x per week by chika)    [] home w/ family      [] Continuous supervision  []SNF    [] Assisted living     [] Other:  Continued therapy: [x][facility] PT  []OUTPATIENT  PT   [] No Further PT  []SNF PT  Caregiver training recommended: []Yes  [] No   Equipment needs: straight cane     Bed Mobility:           [x]   Pt received out of bed       Transfers:    Sit--> Stand: SBA with Training today with proper hold on straight cane while using heel of hand to push up from arm rest with max assist and physical cueing intially, improving to mod verbal and visual cue with repeated reps, but after another task was completed, pt would return to needing max cues and be upset with herself that she forgot  Stand --> Sit:   As sit to stand    Gait:    Distance:  150'x3  Assistance:  SBA with no distractions, CG when distractions and when attempting to find her room number  Device:  Straight cane  Gait Quality:  Improved in fluidity with cane, but still with some instances of near crossover and sudden interruptions of josé miguel that are unexplained, possibly anxiety/fear      Uneven Surfaces:       Assistance:    CGA overall with one instance of min assist. Pt has very awkward step pattern while trying to avoid objects on floor that put her at greater risk for falling. 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  Device:   cane  Surfaces Completed:   [x]  Green mat with bean bags beneath      [x]  Throw rugs       []  Ramp       []  Outdoor pavements        []  Grass             []  Loose gravel        []  Other:         Additional Therapeutic activities/exercises completed this date:     [x]   Nu-step:  Time: 10 min continuous       Level: 4      At 50 rpm average josé miguel   []   Rebounder:    []  Seated     []  Standing        [x]   Balance training  4 square : CGA for forward and lateral movements while moving clockwise but initial 2x on backward step pt required min assist with great hesitancy,  next 2x CGA with smooth pattern. Changed to counter clockwise and pt again with need for min assist backward. Explained to pt that step pattern had not changed, but she stated she was fearful. Educated pt that fear of falling causes high risk of falling and that fear changed her step pattern.  Pt somewhat dismissive      []   Postural training    []   Supine ther ex (reps/sets):     []   Seated ther ex (reps/sets):     []   Standing ther ex (reps/sets):     []   Picking up object from floor (standing):                   []   Reacher used   []   Other:   [] []  Telesitter activated      [x]  Gait belt used during tx session      []other:         Number of Minutes/Billable Intervention  Gait Training 30   Therapeutic Exercise 15   Neuro Re-Ed 15   Therapeutic Activity    Wheelchair Propulsion    Group    Other:    TOTAL 60         Social History  Social/Functional History  Lives With: Alone  Type of Home: House  Home Layout: One level (Pt has a basement with a chair lift; family room is in the basement)  Home Access: Stairs to enter with rails  Entrance Stairs - Number of Steps: 4 steps  Entrance Stairs - Rails: Both  Bathroom Shower/Tub: Tub/Shower unit, Shower chair with back  [employer]: Standard  Bathroom Equipment: Grab bars in shower, Grab bars around toilet (has grab bars all the way around the bathroom (by the sink, by tub, and door))  Bathroom Accessibility: Not accessible  Home Equipment: Grab bars, Lift chair, Cane (recliner that lifts all the way up)  Receives Help From:  (sons live nearby but work full time. does not feel anyone would be able to assist her at home)  ADL Assistance: [address] [place] [address] Avenue: Needs assistance  Meal Prep:  (IND)  Laundry:  (IND)  Vacuuming: Total  Cleaning: Maximal  Gardening: Total  Yard Work: Total  Driving:  (IND)  Shopping: Moderate  Homemaking Responsibilities: Yes  Meal Prep Responsibility: Primary  Laundry Responsibility: Primary  Cleaning Responsibility: Secondary (grandson comes every other week to clean)  Bill Paying/Finance Responsibility: Primary  Shopping Responsibility: No (grandson does once a week)  Health Care Management: Primary  Ambulation Assistance: Independent (pt did not use assistive device)  Transfer Assistance: Independent  Active : Yes  Mode of Transportation: Car  Occupation: Retired  Type of occupation: Retired   Leisure & Hobbies: Gets hair done 1x week, cooking, running errors.   Additional Comments: Pt sleeps both in low flat bed and tall 4 poster bed depending on how she feels. Has had one fall where she bumped her head and she had stayed in the lower bed since then. Pt has had at least 2 falls in past year. no inury    Objective                                                                                    Goals:  (Update in navigator)  Short term goals  Time Frame for Short term goals: 7 days STG=LTG  Short term goal 1: Pt will perform all bed mobility with mod I  Short term goal 2: Pt will perform sit to stand, pivot and car transfers  with mod I  Short term goal 3: Pt will ambulate  150  feet on level surfaces and 25' on unlevel surface with mod I   assist  using straight cane  Short term goal 4: Pt will ascend/descend curb step with   cane   and up to   12  steps with  rail(s) with   mod I  Short term goal 5: Pt will retrieve light item from floor with mod I   assist  using device as needed:   :        Plan of Care                                                                              Times per week: 5 days per week for a minimum of 60 minutes/day plus group as appropriate for 60 minutes.   Treatment to include Current Treatment Recommendations: Functional Mobility Training, IADL Training, Neuromuscular Re-education, Home Exercise Program, Equipment Evaluation, Education, & procurement, Transfer Training, Gait Training, Balance Training, Stair training, Patient/Caregiver Education & Training, Safety Education & Training    Electronically signed by   Nicole Hayes PT,  11/23/2021, 2:44 PM

## 2021-11-23 NOTE — PROGRESS NOTES
Occupational Therapy  Physical Rehabilitation: OCCUPATIONAL THERAPY     [x] daily progress note       [] discharge       Patient Name:  Saulo Love   :  1935 MRN: [id number]  Room:  [address] [place] [address] Date of Admission: 2021  Rehabilitation Diagnosis:   Hypo-osmolality and hyponatremia [E87.1]  Hyponatremia [E87.1]       Date 2021       Day of ARU Week:  7   Time IN/OUT 6596-0813  [id number]   Individual Tx Minutes 60+60   Group Tx Minutes    Co-Treat Minutes    Concurrent Tx Minutes    TOTAL Tx Time Mins 120   Variance Time    Variance Time []   Refusal due to:     []   Medical hold/reason:    []   Illness   []   Off Unit for test/procedure  []   Extra time needed to complete task  []   Therapeutic need  []   Other (specify):   Restrictions Restrictions/Precautions: General Precautions, Fall Risk         Communication with other providers: [x]   OK to see per nursing:     []   Spoke with team member regarding:      Subjective observations and cognitive status: AM: Pt sitting EOB with nsg tech on approach. Pt was cleaned up and dressed on arrival. Pt pleasant and agreeable to therapy session. Pt stated she felt better this morning after having a BM. Pt reported feeling \"off\" during therapy session. Pt's BP taken in seated position- 117/51. In standing- 94/46. Pt stated, \"I don't feel right, I feel like I'm a hot flash. \"   During session, Pt appeared frustrated that she doesn't feel good and stated, \"This is the biggest waste of time. I feel frustrated. I want to go. I want to do. I want to learn. And I feel like I can't do anything right now. \"      PM: Pt sitting up in chair finishing lunch on approach. Pt stated she felt a little better. BP taken- 143/81 in sitting position. 132/54 in standing position. Pt agreeable to therapy session.     Pain level/location:    /10       Location: none    Discharge recommendations  Anticipated discharge date:  TBD  Destination: []?home alone   []?home alone w assist prn   []? home w/ family    []? Continuous supervision       []? SNF    []? Assisted living     []? Other:   Continued therapy: []?HHC OT  []? OUTPATIENT  OT   []? No Further OT  Equipment needs: TBD     Toileting:   Declined need       Toilet Transfers:   NA   Device Used:    []   Standard Toilet         []   Grab Bars           []  Bedside Commode       []   Elevated Toilet          []   Other:        Bed Mobility:           [x]   Pt received out of bed     Transfers:    Sit--> Stand:  Sup c cues to lock WC before getting up. Pt would state, \"I just don't know why I can't remember that. \"   Stand --> Sit:   SUp  Stand-Pivot:   SBA  Other:    Assistive device required for transfer:   RW       Functional Mobility:  AM: 100 ft     PM:  23ft + 185ft   Assistance:  SBA   Device:   [x]   Rolling Walker     []   Standard Walker []   Wheelchair        []   Eliazar Sorrow       []   4-Wheeled [place] La [place]ente Dessert         []   Cardiac Walker       []   Other:            Additional Therapeutic activities/exercises completed this date:     [x]   ADL Training: Pt able to doff/don socks and shoes with Mod I; Pt required assist to don [employer] hose. [x]   Balance/Postural training: AM: Pt stood x 2 min  with SBA while completing dynamic stand task while reaching outside base of support to facilitate increased balance for ADL tasks and transfers. Pt reported feeling light headed and having a \"hot flash\". BP taken. PM:  Patient instructed in dynamic standing tolerance/balance task at card matching tasks to increase balance and attention to task for facilitation of ADL patient stands for ~15 minutes c SBA, requiring min verbal cues for scanning L to R and top to bottom.        []   Bed/Transfer Training   [x]   Endurance Training: patient instructed in bilateral reciprocal patterned movement with min resistance while seated for 11 minutes with 2 rest breaks to increase endurance/activity tolerance for facilitation of both ADL and mobility tasks   [] Neuromuscular Re-ed   []   Nu-step:  Time:        Level:         #Steps:       []   Rebounder:    []  Seated     []  Standing        []   Supine Ther Ex (reps/sets):     [x]   Seated Ther Ex (reps/sets): To increase BUE endurance for ADLs and transfers, pt completed 2 set x10 reps of the following therex, c a 2.5# weighted bar:   Shoulder presses  Chest presses  Shoulder abduction/adduction  Shoulder horizontal abduction/adduction  Concentric arm circles (clockwise and counterclockwise)  Bicep curls     []   Standing Ther Ex (reps/sets):     []   Other:      Comments:      Patient/Caregiver Education and Training:   []   [employer] Equipment Use  []   Bed Mobility/Transfer Technique/Safety  []   Energy Conservation Tips  []   Family training  []   Postural Awareness  []   Safety During Functional Activities  []   Reinforced Patient's Precautions   []   Progress was updated and reviewed in Rehabtracker with patient and/or family this         date. Treatment Plan for Next Session: Continue OT POC       Assessment: This pt demonstrated a positive response to today's treatment as evidenced by blood pressure improving in PM session  The patient is making progress toward established goals as evidenced by QI scores. Ongoing deficits are observed in the areas of balance, endurance and strength and continued focus on this is recommended.        Treatment/Activity Tolerance:   [x] Tolerated treatment with no adverse effects    [] Patient limited by fatigue  [] Patient limited by pain   [] Patient limited by medical complications:    [] Adverse reaction to Tx:   [] Significant change in status    Safety:       []  bed alarm set    [x]  chair alarm set    []  Pt refused alarms                []  Telesitter activated      [x]  Gait belt used during tx session      []other:       Number of Minutes/Billable Intervention  Therapeutic Exercise 30+15   ADL Self-care 15   Neuro Re-Ed    Therapeutic Activity 60   Group    Other: TOTAL 120       Social History  Social/Functional History  Lives With: Alone  Type of Home: House  Home Layout: One level (Pt has a basement with a chair lift; family room is in the basement)  Home Access: Stairs to enter with rails  Entrance Stairs - Number of Steps: 4 steps  Entrance Stairs - Rails: Both  Bathroom Shower/Tub: Tub/Shower unit, Shower chair with back  [employer]: Standard  Bathroom Equipment: Grab bars in shower, Grab bars around toilet (has grab bars all the way around the bathroom (by the sink, by tub, and door))  Bathroom Accessibility: Not accessible  Home Equipment: Grab bars, Lift chair, Cane (recliner that lifts all the way up)  Receives Help From:  (sons live nearby but work full time. does not feel anyone would be able to assist her at home)  ADL Assistance: [address] [place] [address] Avenue: Needs assistance  Meal Prep:  (IND)  Laundry:  (IND)  Vacuuming: Total  Cleaning: Maximal  Gardening: Total  Yard Work: Total  Driving:  (IND)  Shopping: Moderate  Homemaking Responsibilities: Yes  Meal Prep Responsibility: Primary  Laundry Responsibility: Primary  Cleaning Responsibility: Secondary (grandson comes every other week to clean)  Bill Paying/Finance Responsibility: Primary  Shopping Responsibility: No (grandson does once a week)  Health Care Management: Primary  Ambulation Assistance: Independent (pt did not use assistive device)  Transfer Assistance: Independent  Active : Yes  Mode of Transportation: Car  Occupation: Retired  Type of occupation: Retired   Leisure & Hobbies: Gets hair done 1x week, cooking, running errors. Additional Comments: Pt sleeps both in low flat bed and tall 4 poster bed depending on how she feels. Has had one fall where she bumped her head and she had stayed in the lower bed since then. Pt has had at least 2 falls in past year.  no inury    Objective                                                                                    Goals: (Update in navigator)  Short term goals  Time Frame for Short term goals: LTGs= STGs:  Long term goals  Time Frame for Long term goals : 3- 5 day or until d/c  Long term goal 1: Pt will complete total body bathing c Mod I and use of AE prn by d/c  Long term goal 2: Pt will complete total body dressing (UB/LB/Footwear) c SUP by d/c  Long term goal 3: Pt will complete toileting c MOD I by d/c  Long term goal 4: Pt will complete functional transfer (toilet, tub, shower) c MOD I by d/c. Long term goal 5: Pt will complete grooming/oral care task c IND by d/c  Long term goals 6: Pt will perform therex/therax to facilitate increased strength/endurance/ax tolerance (c emphasis on dynamic standing balance/tolerance >8 mins ) c MOD I in order to complete ADLs. :        Plan of Care                                                                              Times per week: 5 days per week for a minimum of 60 minutes/day plus group as appropriate for 60 minutes.   Treatment to include Plan  Times per week: 5-6x  Times per day: Daily  Current Treatment Recommendations: Strengthening, Balance Training, Endurance Training, Pain Management, Other (comment), Home Management Training, Positioning, Safety Education & Training, Equipment Evaluation, Education, & procurement, Functional Mobility Training, ROM, Patient/Caregiver Education & Training    Electronically signed by   MAGALIS Zuñiga,  11/23/2021, 9:48 AM

## 2021-11-23 NOTE — CARE COORDINATION
LSW met with patient following Care Conference. LSW informed patient of recommendations for a C and possibly a RW. Patient agreeable to both. LSW then informed of recommendation for [facility], OT, and Nursing and patient is agreeable to all. Patient verbalized understanding and will choose an agency closer to discharge. D/C Plan:  Date:  Nov. 30th  DME:  C, RW? (Agency TBD)  HHC:  PT, OT, Nursing (Agency TBD)  To:   Home alone (family will transport)

## 2021-11-24 PROCEDURE — 97535 SELF CARE MNGMENT TRAINING: CPT

## 2021-11-24 PROCEDURE — 96125 COGNITIVE TEST BY HC PRO: CPT

## 2021-11-24 PROCEDURE — 94150 VITAL CAPACITY TEST: CPT

## 2021-11-24 PROCEDURE — 6370000000 HC RX 637 (ALT 250 FOR IP): Performed by: INTERNAL MEDICINE

## 2021-11-24 PROCEDURE — 94761 N-INVAS EAR/PLS OXIMETRY MLT: CPT

## 2021-11-24 PROCEDURE — 97530 THERAPEUTIC ACTIVITIES: CPT

## 2021-11-24 PROCEDURE — 99222 1ST HOSP IP/OBS MODERATE 55: CPT | Performed by: INTERNAL MEDICINE

## 2021-11-24 PROCEDURE — 97116 GAIT TRAINING THERAPY: CPT

## 2021-11-24 PROCEDURE — 1280000000 HC REHAB R&B

## 2021-11-24 PROCEDURE — 99232 SBSQ HOSP IP/OBS MODERATE 35: CPT | Performed by: PHYSICAL MEDICINE & REHABILITATION

## 2021-11-24 PROCEDURE — 6370000000 HC RX 637 (ALT 250 FOR IP): Performed by: PHYSICAL MEDICINE & REHABILITATION

## 2021-11-24 PROCEDURE — 6360000002 HC RX W HCPCS: Performed by: PHYSICAL MEDICINE & REHABILITATION

## 2021-11-24 PROCEDURE — 2580000003 HC RX 258: Performed by: PHYSICAL MEDICINE & REHABILITATION

## 2021-11-24 RX ORDER — PYRIDOSTIGMINE BROMIDE 60 MG/1
30 TABLET ORAL EVERY 8 HOURS SCHEDULED
Status: DISCONTINUED | OUTPATIENT
Start: 2021-11-24 | End: 2021-11-30 | Stop reason: HOSPADM

## 2021-11-24 RX ADMIN — DIVALPROEX SODIUM 250 MG: 250 TABLET, FILM COATED, EXTENDED RELEASE ORAL at 20:58

## 2021-11-24 RX ADMIN — ENOXAPARIN SODIUM 40 MG: 100 INJECTION SUBCUTANEOUS at 08:56

## 2021-11-24 RX ADMIN — PYRIDOSTIGMINE BROMIDE 30 MG: 60 TABLET ORAL at 14:31

## 2021-11-24 RX ADMIN — KETOTIFEN FUMARATE 1 DROP: 0.35 SOLUTION/ DROPS OPHTHALMIC at 20:56

## 2021-11-24 RX ADMIN — SERTRALINE HYDROCHLORIDE 25 MG: 50 TABLET ORAL at 20:56

## 2021-11-24 RX ADMIN — ONDANSETRON 4 MG: 4 TABLET, ORALLY DISINTEGRATING ORAL at 08:53

## 2021-11-24 RX ADMIN — PYRIDOSTIGMINE BROMIDE 30 MG: 60 TABLET ORAL at 20:56

## 2021-11-24 RX ADMIN — VERAPAMIL HYDROCHLORIDE 90 MG: 180 TABLET, FILM COATED, EXTENDED RELEASE ORAL at 08:53

## 2021-11-24 RX ADMIN — GABAPENTIN 300 MG: 300 CAPSULE ORAL at 14:31

## 2021-11-24 RX ADMIN — ASPIRIN 81 MG: 81 TABLET, COATED ORAL at 08:53

## 2021-11-24 RX ADMIN — VERAPAMIL HYDROCHLORIDE 90 MG: 180 TABLET, FILM COATED, EXTENDED RELEASE ORAL at 20:58

## 2021-11-24 RX ADMIN — SODIUM CHLORIDE, PRESERVATIVE FREE 10 ML: 5 INJECTION INTRAVENOUS at 10:02

## 2021-11-24 RX ADMIN — GABAPENTIN 300 MG: 300 CAPSULE ORAL at 20:56

## 2021-11-24 RX ADMIN — FLUTICASONE PROPIONATE 1 SPRAY: 50 SPRAY, METERED NASAL at 10:01

## 2021-11-24 RX ADMIN — GABAPENTIN 300 MG: 300 CAPSULE ORAL at 08:53

## 2021-11-24 RX ADMIN — ROSUVASTATIN CALCIUM 10 MG: 5 TABLET, COATED ORAL at 20:55

## 2021-11-24 RX ADMIN — KETOTIFEN FUMARATE 1 DROP: 0.35 SOLUTION/ DROPS OPHTHALMIC at 08:53

## 2021-11-24 RX ADMIN — PANTOPRAZOLE SODIUM 40 MG: 40 TABLET, DELAYED RELEASE ORAL at 07:28

## 2021-11-24 ASSESSMENT — PAIN SCALES - GENERAL: PAINLEVEL_OUTOF10: 0

## 2021-11-24 NOTE — PROGRESS NOTES
Physical Therapy      [x] daily progress note       [] discharge       Patient Name:  Viktoriya Michelle   :  1935 MRN: [id number]  Room:  [address] [place], [place] [address] Date of Admission: 2021  Rehabilitation Diagnosis:   Hypo-osmolality and hyponatremia [E87.1]  Hyponatremia [E87.1]       Date 2021       Day of ARU Week:  1   Time IN/OUT 3613-4096   Individual Tx Minutes 60   TOTAL Tx Time Mins 60   Variance Time    Variance Time []   Refusal due to:     []   Medical hold/reason:    []   Illness   []   Off Unit for test/procedure  []   Extra time needed to complete task  []   Therapeutic need  []   Other (specify):   Restrictions Restrictions/Precautions  Restrictions/Precautions: General Precautions, Fall Risk      Communication with other providers: [x]   OK to see per nursing:     []   Spoke with team member regarding:      Subjective observations and cognitive status: Pt up in recliner on phone, agreeable to session. BP in sitting 132/61. After amb /59; no c/o lightheadedness with standing/amb ax's this PM.   Pain level/location: Denies pain   Discharge recommendations  Anticipated discharge date:    Destination: []?home alone   [x]? home alone with assist PRN (at baseline was 1x per week by chika)    []? home w/ family      []? Continuous supervision  []? SNF    []? Assisted living     []? Other:  Continued therapy: [x]? [facility]  []? OUTPATIENT  PT   []? No Further PT  []? SNF PT  Caregiver training recommended: []? Yes  []?  No   Equipment needs: straight cane     Bed Mobility:           [x]   Pt received out of bed     Transfers:    Sit--> Stand:  SBA  Stand --> Sit:   SBA  Req min cues for hand placement with cane, slight improvement with repetition, however later in session req cueing  Stand-Pivot:   SBA  Assistive device required for transfer:   [facility]    Gait:    Distance:  257'+ 232'  Assistance:  SBA  Device:  SPC  Gait Quality:   recip pattern, occasional variance with stance times with mild unsteadiness but self corrects. Stairs   # Completed:   12   Assistance:    SBA-CGA when in recip pattern on heidi on descent; when amb in non-recip pattern pt SBA. Supportive Device:  SPC  Height:   4\"/6\"    Curb       Assistance:    CGA x 4 trials, mildly unsteady, min safety cues for SPC placement after initial cues  Supportive Device:  SPC  Height:   4\"    Uneven Surfaces:       Assistance:    CG-Min A, pt unsteady at times with overcorrection. Min cues for widening AMA and keeping cane closer when managing over obstacles.    Device:    SPC  Surfaces Completed:   [x] Green mat with bean bags beneath       [] Throw rugs          [] Outdoor pavements      [] Grass          [] Loose gravel   [] Carpet      []  Other:         Patient/Caregiver Education and Training:   [x]   Bed Mobility/Transfer technique/safety  [x]   Gait technique/sequencing  [x]   Proper use of assistive device  [x]   Advanced mobility safety and technique  []   Reinforced patient's precautions with mobility/functional tasks  []   Postural awareness  []   Family training  []   Other:    Treatment Plan for Next Session: Progress dynamic balance, amb and transfer safety with SPC, object retrieval, LE therx      Treatment/Activity Tolerance:   [x] Tolerated treatment with no adverse effects    [] Patient limited by fatigue  [] Patient limited by pain   [] Patient limited by medical complications:    [] Adverse reaction to Tx:   [] Significant change in status    Safety:       []  bed alarm set    [x]  chair alarm set    []  Pt refused alarms                []  Telesitter activated      [x]  Gait belt used during tx session      []other:         Number of Minutes/Billable Intervention  Gait Training 45   Therapeutic Exercise    Neuro Re-Ed    Therapeutic Activity 15   Wheelchair Propulsion    Group    Other:    TOTAL 60         Social History  Social/Functional History  Lives With: Alone  Type of Home: House  Home Layout: One level (Pt has a basement with a chair lift; family room is in the basement)  Home Access: Stairs to enter with rails  Entrance Stairs - Number of Steps: 4 steps  Entrance Stairs - Rails: Both  Bathroom Shower/Tub: Tub/Shower unit, Shower chair with back  [employer]: Standard  Bathroom Equipment: Grab bars in shower, Grab bars around toilet (has grab bars all the way around the bathroom (by the sink, by tub, and door))  Bathroom Accessibility: Not accessible  Home Equipment: Grab bars, Lift chair, Cane (recliner that lifts all the way up)  Receives Help From:  (sons live nearby but work full time. does not feel anyone would be able to assist her at home)  ADL Assistance: [facility] [facility] [address]: Needs assistance  Meal Prep:  (IND)  Laundry:  (IND)  Vacuuming: Total  Cleaning: Maximal  Gardening: Total  Yard Work: Total  Driving:  (IND)  Shopping: Moderate  Homemaking Responsibilities: Yes  Meal Prep Responsibility: Primary  Laundry Responsibility: Primary  Cleaning Responsibility: Secondary (grandson comes every other week to clean)  Bill Paying/Finance Responsibility: Primary  Shopping Responsibility: No (grandson does once a week)  Health Care Management: Primary  Ambulation Assistance: Independent (pt did not use assistive device)  Transfer Assistance: Independent  Active : Yes  Mode of Transportation: Car  Occupation: Retired  Type of occupation: Retired   Leisure & Hobbies: Gets hair done 1x week, cooking, running errors. Additional Comments: Pt sleeps both in low flat bed and tall 4 poster bed depending on how she feels. Has had one fall where she bumped her head and she had stayed in the lower bed since then. Pt has had at least 2 falls in past year.  no inury    Objective                                                                                    Goals:  (Update in navigator)  Short term goals  Time Frame for Short term goals: 7 days STG=LTG  Short term goal 1: Pt will perform all bed mobility with mod I  Short term goal 2: Pt will perform sit to stand, pivot and car transfers  with mod I  Short term goal 3: Pt will ambulate  150  feet on level surfaces and 25' on unlevel surface with mod I   assist  using straight cane  Short term goal 4: Pt will ascend/descend curb step with   cane   and up to   12  steps with  rail(s) with   mod I  Short term goal 5: Pt will retrieve light item from floor with mod I   assist  using device as needed:   :        Plan of Care                                                                              Times per week: 5 days per week for a minimum of 60 minutes/day plus group as appropriate for 60 minutes.   Treatment to include Current Treatment Recommendations: Functional Mobility Training, IADL Training, Neuromuscular Re-education, Home Exercise Program, Equipment Evaluation, Education, & procurement, Transfer Training, Gait Training, Balance Training, Stair training, Patient/Caregiver Education & Training, Safety Education & Training    Electronically signed by   Saúl Moreno, PTA #[id number]  11/24/2021, 8:53 AM

## 2021-11-24 NOTE — PROGRESS NOTES
Facility/Department: [facility] ARU  Initial Speech/Language/Cognitive Assessment    NAME: Brenda Mujica  : 1935   MRN: [id number]  ADMISSION DATE: 2021  ADMITTING DIAGNOSIS: has Chronic depression; Hyperlipidemia; Trigeminal neuralgia; Anemia due to chronic illness; Colon cancer screening; Osteoporosis; Supraventricular tachycardia ([address] Recurrent ventral incisional hernia; Mild cognitive impairment; CAD (coronary artery disease); Elevated TSH; PAD (peripheral artery disease) ([address] Sciatica of left side without back pain; Coronary artery disease involving coronary bypass graft of native heart without angina pectoris; S/P CABG x 3; Essential hypertension; MVP (mitral valve prolapse); Bilateral carotid artery stenosis; Subclavian arterial stenosis ([address] Spigelian hernia; Ischemic cardiomyopathy; Glenohumeral arthritis, right; Normocytic anemia; Overactive bladder; Major depressive disorder, recurrent, in full remission ([address] Asthmatic bronchitis; Age-related osteoporosis without current pathological fracture; Palpitations; SOB (shortness of breath); Prediabetes; Generalized weakness; Hyponatremia; Gait disturbance; Macular degeneration of both eyes; History of DVT (deep vein thrombosis); and 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  DATE ONSET: 11/10/21    Date of Eval: 2021   Evaluating Therapist: TRESSA Solano    RECENT RESULTS  CT OF HEAD/MRI:20      FINDINGS:   INTRACRANIAL STRUCTURES/VENTRICLES: Ml Lopez is no acute infarct. No mass   effect or midline shift. No evidence of an acute intracranial hemorrhage.    Areas of T2 FLAIR hyperintensity are seen in the periventricular and   subcortical white matter, which are nonspecific, but may represent chronic   microvascular ischemic change. Ml Saysuzi is prominence of the ventricles and   sulci due to global parenchymal volume loss.   The sellar/suprasellar regions   appear unremarkable.  The normal signal voids within the major intracranial   vessels appear maintained.  No abnormal focus of enhancement is seen within   the brain.       No mass or abnormal enhancement within the cerebellopontine angle cisterns or   internal auditory canals.  The left superior cerebellar artery appears to   contact the cisternal segment of the left trigeminal nerve.  No acute   abnormality identified of the cisternal segment of the right trigeminal nerve.       ORBITS: The visualized portion of the orbits demonstrate no acute abnormality.       SINUSES: The visualized paranasal sinuses and mastoid air cells are well   aerated.       BONES/SOFT TISSUES: The bone marrow signal intensity appears normal. The soft   tissues demonstrate no acute abnormality.           Impression   1. No acute intracranial abnormality.  No acute infarct. 2. Mild global parenchymal volume loss with mild chronic microvascular   ischemic changes. 3. The left superior cerebellar artery appears the contact the cisternal   segment of the left trigeminal nerve.  Suggest correlation with left-sided   trigeminal neuralgia.             Primary Complaint: PT/OT requested consult due to significant cognitive deficits observed during sessions. Pain:  Pain Assessment  Pain Assessment: 0-10  Pain Level: 0  Patient's Stated Pain Goal: No pain  Pain Type: Acute pain  Pain Location: Head  Pain Descriptors: Aching  Response to Pain Intervention: Asleep with RR greater than 10    Assessment:  Raúl Hancock is a 82 y.o. female with h/o CAD s/p CABG, PAD, HTN, HLD, macular degeneration, and trigeminal neuralgia who presented to [place] ED on 11/10 with complaints of not feeling well. Patient reported that since Sunday she has been feeling extremely fatigued, describing flulike symptoms, denied any fever, chills. Patient reports that she does not usually have typical symptoms for UTI, has back pain, feels that she is not completely emptying her bladder.  Is following with urology for recurrent UTIs. At presentation patient was noted to have /86. Patient was found to have sodium 127, UA not suggestive of infection. Chest x-ray-chronic atelectasis left lung base. CT abdomen/pelvis-chronic diverticulosis, no diverticulitis, interval development of mild hydroureter bilaterally without an obstructive calculus. Patient initially received 400 cc bolus. Later patient was noted to have trending up lactic acid. Patient was then given 30 ml/kg IV fluids. Patient was also given levofloxacin for suspected UTI. Patient then transferred to [facility]. Patient being medically managed for hyponatremia and uncontrolled HTN. Urology was consulted as recommended for patient to f/u as outpatient. Patient lives alone and is IND with ADL's and ambulation. Currently patient is sba-modA with ADL's and Trina with RW for transfers and ambulation. Per patient she just wants to get back home. Her grandson/POA helps with things around the house and assists with grocery shopping. COVID negative test on 11/10. Medical/Surgical History   Arthritis of shoulder region, right 09/2014   Alexa Diaz Blind right eye     following right cataract surg  Chronic depression 2009   Dr. Bentley Perez DVT (deep venous thrombosis) ([facility] 1970   left leg  Former smoker    History of echocardiogram 09/21/2020   EF 55-60%, mild left ventricular hypertrophy, normal diastolic filling pattern for age, no signficiant valvular disease, no pericardial effusion  History of stress test 03/25/2021   normal LVEF calculated by the computer is probably falsely low.  LVEF is 40 %  Hx of Doppler ultrasound 03/01/2018   Carotid-mild 0-49% bilateral carotid,50% stenosis right subclavian  Hyperlipidemia    Hypertension    Macular degeneration     right  Mild cognitive impairment 02/2012   MMSE 26/30  MVP (mitral valve prolapse)     trace on echo 2014  Osteoporosis 05/2012  Pancytopenia 05/2011   mild with ; Dr Reinier hawk 2010  Peptic ulcer disease    Peripheral vascular disease ([facility] 01/2016   angio; dis below ankles; pletal  Prediabetes 2006  Recurrent ventral incisional hernia 2013   medial apex of GB scar  S/P CABG x 3 2003   3-vessel; Dr Stacy Star Spigelian hernia 03/2017   right ant lateral wall; seen on CT abd  Supraventricular tachycardia ([address] 1998   Freq ventriular ectopy  Tic douloureux 2008   Dr. Oswaldo Diallo; Right side    Radiology Findings  Chest Xray: Impression Chronic atelectasis left lung base. No new finding, no change from priors. CT of abdomen: Impression Again identified is chronic diverticulosis, though without acute inflammatory change. The stranding seen on the previous examination appears improved. No ileus or obstruction. Interval development of mild hydroureter bilaterally, though without an obstructive calculus. No significant bladder distention is seen however to suggest bladder outlet obstruction. Cholecystectomy, spigelian hernia on the right, and other similar findings as above. Consults  Cognitive Diagnosis: Mild to moderate cognitive deficits characterized by STM deficits, slow problem solving, reduced sustained attn also impacted by some depression. Communication Diagnosis: WFL        Recommendations:  Requires SLP Intervention: Yes  Duration/Frequency of Treatment: 2x/week x 1 week 30 mins min  D/C Recommendations: To be determined       Plan:   Goals:  Short-term Goals  Timeframe for Short-term Goals: 2x/weekx x1 week 30 mins min. Pt will use strategies to facilitate memory at home. Goal 1: Pt will complete education on memory strategies to utilize at home with min  cues for recall of familiar tasks. Goal 2: Pt will complete med mgmt tasks to determine needs post discharge. Patient/family involved in developing goals and treatment plan: Pt in agreement    Subjective:   Previous level of function and limitations: pt reports changes in memory since spouse passed away. Social/Functional History  Active : Yes  Mode of Transportation: Car  Education: 12th grade and some business college. Type of occupation: Retired   Leisure & Hobbies: Gets hair done 1x week, cooking, running errors. [employer], OP therapy. Meds are managed via pill bottles lined up in a cabinet drawer. Pt does report a few times that she's gotten confused with them. Finances are managed via auto deposit. Daniel Batter assists as needed. Also comes every Saturday to assist with any projects needed. IADL Comments: Pt reports she likes structure; recently lost her dtr and spouse. Pt notes memory changes PTA. Pt tries to rearrange items and organize items to assist with memory. Voluteers at [facility] and Nearly [address] [place] [address]. Additional Comments: Pt sleeps both in low flat bed and tall 4 poster bed depending on how she feels. Has had one fall where she bumped her head and she had stayed in the lower bed since then. Pt has had at least 2 falls in past year. no inury  Vision  Vision: Impaired  Vision Exceptions: Cataracts; Wears glasses for reading  Hearing  Hearing: Within functional limits           Objective:     Oral/Motor  Oral Motor: Within functional limits    Auditory Comprehension  Comprehension: Within Functional Limits         Expression  Primary Mode of Expression: Verbal    Verbal Expression  Verbal Expression: Within functional limits    Written Expression  Dominant Hand: Right  Written Expression: Within Functional Limits    Motor Speech  Motor Speech:  Within Functional Limits    Pragmatics/Social Functioning  Pragmatics: Within functional limits    Cognition:      Orientation  Overall Orientation Status: Within Functional Limits  Attention  Attention: Exceptions to [facility]  Selective Attention: Mild  Sustained Attention: Mild  Memory  Memory: Exceptions to [facility]  Short-term Memory: Mild  Working Memory: Mild  Problem Solving  Problem Solving: Exceptions to [facility]  Verbal Reasoning Skills: Mild  Safety/Judgement  Safety/Judgement: Exceptions to [facility]  Unable to Self-monitor and Self-correct Consistently:  (good awareness)  Insight: Mild      Additional Assessments: The Brief Cognitive Assessment Tool (BCAT®) was administered 11/24/2021 to assess the following cognitive domains: orientation, verbal recall, visual recognition, visual recall, attention, abstraction, language, executive functions, and visuo-spatial processing. The emphasis of the tool is assessing contextual memory, executive functions, and attentional capacity. Cognitive Impairment Scale:  NORMAL:    46-50   NO FUNCTIONAL DEFICIT; INDEPENDENT LIVING; MAY BE  SUBJECTIVE MEMORY COMPLAINTS BUT LITTLE TO NO EVIDENCE  MILD COGNITIVE  IMPAIRMENT; 34-46   GENERALLY FUNCTIONAL WITH EARLY SPECIFIC FX DECLINE (IADL)   LOWER SCORES MORE SUGGESTIVE OF ADDITIONAL SUPPORT NEEDS   BOTTOM RANGE SCORES CONSIDER MED MGMT AND SUPPORT FOR COMMUNITY REINTEGRATION    MILD DEMENTIA 26-34   IADL DEFICITS; CLEARLY OBJECTIVE EVIDENCE OF MEMORY AND OTHER COGNITIVE DECLINE; MED MGMT AND COMMUNITY REINTEGRATION SUPPORT NEEDED    MODERATE TO   SEVERE DEMENTIA 0-25   MODERATE (UPPER END OF RANGE)--PERVASIVE FX DEFICITS (IADLS) BUT ADLS GENERALLY INTACT   MARKED DEFICITS IN MEMORY AND EXECUTIVE FX; BEHAVIORAL AND PSYCH SYMPTOMS ARE COMMON      Dementia Impairment Scale:  Mild Cognitive Impairment  38  Mild Dementia  28  Moderate Dementia  18    BCAT score for Saulo Achilles: 36/50 indicating mild to mod cognitive impairment. Strengths:  Ability to put names to objects  Ability to concentrate and focus  Determine how objects are similar to one another  Understand and express speech  \"Command and control\" cognitive activities  Awareness of self, time, place, and situation  Weaknesses:  Understand visual processes and relationships  Ability to immediately recall a word list of 4 items: banana/justice/Estefany/bridge.   Pt able to complete immediate recall 4/4 and delayed recall 1/4  Recall previously presented words over a time delay and recall elements of a story and previously presented pictures/story  Story specifics included:  Armando Aguirre / borrowed / $9 / from her brother / Jesus Alberto Membreno / last week. / She couldn't pay him back / because she bought /a delicious / ice cream cone / at the circus instead. Pts immediate response was 4/11 and delayed response: 6/11  Strategies that improved performance included:   [x] repetition   [x] practical application    [] spaced retrieval    [x] choice cues    Pt expressed that she is frustrated with memory changes and that she has to use strategies. \"All my life I worked to not have this happen. I've tried to stay active and keep my mind active. This is very frustrating. \"             Prognosis:  Speech Therapy Prognosis  Prognosis: Fair  Prognosis Considerations: Previous Level of Function  Additional Comments: cognitive deficits  Individuals consulted  Consulted and agree with results and recommendations: Patient    Education:  Patient Education: results and recommendations  Patient Education Response: Verbalizes understanding  Safety Devices in place: Yes  Type of devices:  All fall risk precautions in place    Therapy Time:   Individual Concurrent Group Co-treatment   Time In 0935         Time Out 1025         Minutes [address] [facility], [address] [facility] Garvin, CCC-SLP  11/24/2021 10:25 AM

## 2021-11-24 NOTE — PROGRESS NOTES
6  Discharge Goal: Independent    UB/LB Bathing: Shower/Bathe Self  Assistance Needed: Supervision or touching assistance  Comment: majority performed seated, continues to demo poor memory and problem solving for shower controls  CARE Score: 4  Discharge Goal: Independent    UB Dressing: Upper Body Dressing  Assistance Needed: Setup or clean-up assistance  Comment: to don pullover sweater  CARE Score: 5  Discharge Goal: Independent         LB Dressing: Lower Body Dressing  Assistance Needed: Supervision or touching assistance  Comment: to doff/don Depends, don pants  CARE Score: 4  Discharge Goal: Independent    Donning and [place] Footwear: Putting On/Taking Off Footwear  Assistance Needed: Partial/moderate assistance  Comment: required assist to doff and don AFIA hose, able to do hospital socks and don shoes  CARE Score: 3  Discharge Goal: Independent      Toileting: Denied need    Bed Mobility:           []   Pt received out of bed   Supine --> Sit:  Ind      Transfers:    Sit--> Stand:  Supervision  Stand --> Sit:   Supervision  Stand-Pivot:   Supervision-SBA when symptomatic at end of session  Other:    Assistive device required for transfer:   [facility]      Functional Mobility:    Assistance:  SBA c SPC ~130 ft  Device:   []   Rolling Walker     []   Standard Walker []   Wheelchair        [x]   [employer]       []   4-Wheeled Geneva Berger         []   Cardiac Walker       []   Other:        Homemaking Tasks:   Educated pt on recommendation to always use SPC while completing IADL tasks such as meal completion. Pt began basic kitchen accessibility task, retrieving item from low shelf of refrigerator and transporting through kitchen when she then became symptomatic and ax was stopped to assess BPs listed above.         Additional Therapeutic activities/exercises completed this date:     [x]   ADL Training   [x]   Balance/Postural training     [x]   Bed/Transfer Training   [x]   Endurance Training   []   Neuromuscular Re-ed   [] Nu-step:  Time:        Level:         #Steps:       []   Rebounder:    []  Seated     []  Standing        []   Supine Ther Ex (reps/sets):     []   Seated Ther Ex (reps/sets):     []   Standing Ther Ex (reps/sets):     []   Other:      Comments: All intervention performed to increase pt's endurance, ax tolerance, balance, safety, and I c ADLs/IADLs and functional transfers/mobility. Patient/Caregiver Education and Training:   []   [employer] Equipment Use  [x]   Bed Mobility/Transfer Technique/Safety  []   Energy Conservation Tips  []   Family training  [x]   Postural Awareness  [x]   Safety During Functional Activities  []   Reinforced Patient's Precautions   []   Progress was updated and reviewed in Rehabtracker with patient and/or family this         date. Treatment Plan for Next Session: Continue OT POC, more involved IADL task       Assessment: This pt demonstrated a negative response to today's treatment as evidenced by symptomatic hypotension at end of session, and also presenting safety concerns during this by not communicating symptoms or initiating seated rest break. Ongoing deficits are observed in the areas of balance, endurance, safety and continued focus on this is recommended.        Treatment/Activity Tolerance:   [x] Tolerated treatment with no adverse effects    [] Patient limited by fatigue  [] Patient limited by pain   [x] Patient limited by medical complications: at end of session   [] Adverse reaction to Tx:   [] Significant change in status    Safety:       []  bed alarm set    [x]  chair alarm set    []  Pt refused alarms                []  Telesitter activated      [x]  Gait belt used during tx session      []other:       Number of Minutes/Billable Intervention  Therapeutic Exercise    ADL Self-care 45   Neuro Re-Ed    Therapeutic Activity 30   Group    Other:    TOTAL 75       Social History  Social/Functional History  Lives With: Alone  Type of Home: House  Home Layout: One level (Pt has a basement with a chair lift; family room is in the basement)  Home Access: Stairs to enter with rails  Entrance Stairs - Number of Steps: 4 steps  Entrance Stairs - Rails: Both  Bathroom Shower/Tub: Tub/Shower unit, Shower chair with back  [employer]: Standard  Bathroom Equipment: Grab bars in shower, Grab bars around toilet (has grab bars all the way around the bathroom (by the sink, by tub, and door))  Bathroom Accessibility: Not accessible  Home Equipment: Grab bars, Lift chair, Cane (recliner that lifts all the way up)  Receives Help From:  (sons live nearby but work full time. does not feel anyone would be able to assist her at home)  ADL Assistance: [facility]: Needs assistance  Meal Prep:  (IND)  Laundry:  (IND)  Vacuuming: Total  Cleaning: Maximal  Gardening: Total  Yard Work: Total  Driving:  (IND)  Shopping: Moderate  Homemaking Responsibilities: Yes  Meal Prep Responsibility: Primary  Laundry Responsibility: Primary  Cleaning Responsibility: Secondary (grandson comes every other week to clean)  Bill Paying/Finance Responsibility: Primary  Shopping Responsibility: No (grandson does once a week)  Health Care Management: Primary  Ambulation Assistance: Independent (pt did not use assistive device)  Transfer Assistance: Independent  Active : Yes  Mode of Transportation: Car  Education: 12th grade and some business college. Occupation: Retired  Type of occupation: Retired   Leisure & Hobbies: Gets hair done 1x week, cooking, running errors. [employer], OP therapy. Meds are managed via pill bottles lined up in a cabinet drawer. Pt does report a few times that she's gotten confused with them. Finances are managed via auto deposit. Jayna Carmona assists as needed. Also comes every Saturday to assist with any projects needed. IADL Comments: Pt reports she likes structure; recently lost her dtr and spouse. Pt notes memory changes PTA.   Pt tries to rearrange items and organize items to assist with memory. Voluteers at [facility] and [facility] [address] [facility]. Additional Comments: Pt sleeps both in low flat bed and tall 4 poster bed depending on how she feels. Has had one fall where she bumped her head and she had stayed in the lower bed since then. Pt has had at least 2 falls in past year. no inury    Objective                                                                                    Goals:  (Update in navigator)  Short term goals  Time Frame for Short term goals: LTGs= STGs:  Long term goals  Time Frame for Long term goals : 3- 5 day or until d/c  Long term goal 1: Pt will complete total body bathing c Mod I and use of AE prn by d/c  Long term goal 2: Pt will complete total body dressing (UB/LB/Footwear) c SUP by d/c  Long term goal 3: Pt will complete toileting c MOD I by d/c  Long term goal 4: Pt will complete functional transfer (toilet, tub, shower) c MOD I by d/c. Long term goal 5: Pt will complete grooming/oral care task c IND by d/c  Long term goals 6: Pt will perform therex/therax to facilitate increased strength/endurance/ax tolerance (c emphasis on dynamic standing balance/tolerance >8 mins ) c MOD I in order to complete ADLs. :        Plan of Care                                                                              Times per week: 5 days per week for a minimum of 60 minutes/day plus group as appropriate for 60 minutes. Treatment to include Plan  Times per week: 5-6x  Times per day: Daily  Current Treatment Recommendations: Strengthening, Balance Training, Endurance Training, Pain Management, Other (comment), Home Management Training, Positioning, Safety Education & Training, Equipment Evaluation, Education, & procurement, Functional Mobility Training, ROM, Patient/Caregiver Education & Training    Electronically signed by   Silvano Odom MS, OTR/L  License #[id number]  11/24/2021, 11:30 AM

## 2021-11-24 NOTE — PROGRESS NOTES
Matt Orr    : 1935  Acct #: [de-identified]  MRN: [id number]              PM&R Progress Note      Admitting diagnosis: Hyponatremia ([address] [place] [address])     Comorbid diagnoses impacting rehabilitation: Generalized weakness, gait disturbance, essential hypertension, PAD, HLD, macular degeneration, trigeminal neuralgia, history of DVT, bilateral hydroureter    Chief complaint: Fatigue is a little dizziness when her blood pressure dropped with activity today. No palpitations, chills or cough. Prior (baseline) level of function: Independent. Current level of function:         Current  IRF-[facility] and Goals:   Occupational Therapy:    Short term goals  Time Frame for Short term goals: LTGs= STGs :   Long term goals  Time Frame for Long term goals : 3- 5 day or until d/c  Long term goal 1: Pt will complete total body bathing c Mod I and use of AE prn by d/c  Long term goal 2: Pt will complete total body dressing (UB/LB/Footwear) c SUP by d/c  Long term goal 3: Pt will complete toileting c MOD I by d/c  Long term goal 4: Pt will complete functional transfer (toilet, tub, shower) c MOD I by d/c. Long term goal 5: Pt will complete grooming/oral care task c IND by d/c  Long term goals 6: Pt will perform therex/therax to facilitate increased strength/endurance/ax tolerance (c emphasis on dynamic standing balance/tolerance >8 mins ) c MOD I in order to complete ADLs.  :                                       Eating: Eating  Assistance Needed: Independent  Comment: Pt sitting EOB eating on arrival  CARE Score: 6  Discharge Goal: Independent       Oral Hygiene: Oral Hygiene  Assistance Needed: Independent  Comment: performed seated 2* hypotension  CARE Score: 6  Discharge Goal: Independent    UB/LB Bathing: Shower/Bathe Self  Assistance Needed: Supervision or touching assistance  Comment: majority performed seated, required repeated cues for shower controls 2* poor recall  CARE Score: 4  Discharge Goal: Independent    UB Dressing: Upper Body Dressing  Assistance Needed: Setup or clean-up assistance  Comment: to don pullover sweater  CARE Score: 5  Discharge Goal: Independent         LB Dressing: Lower Body Dressing  Assistance Needed: Supervision or touching assistance  Comment: to doff/don Depends, don pants  CARE Score: 4  Discharge Goal: Independent    Donning and [place] Footwear: Putting On/Taking Off Footwear  Assistance Needed: Partial/moderate assistance  Comment: MD ordered AFIA hose 2* hypotension, pt required assist.  able to doff and don regular socks and shoes  CARE Score: 3  Discharge Goal: Independent      Toileting: Toileting Hygiene  Assistance Needed: Supervision or touching assistance  Comment: Sup for CM and toileting hygiene  CARE Score: 4  Discharge Goal: Independent      Toilet Transfers:   Toilet Transfer  Assistance Needed: Supervision or touching assistance  Comment: SB-CGA for balance with SPC and grab bar  CARE Score: 4  Discharge Goal: Independent    Physical Therapy:   Short term goals  Time Frame for Short term goals: 7 days STG=LTG  Short term goal 1: Pt will perform all bed mobility with mod I  Short term goal 2: Pt will perform sit to stand, pivot and car transfers  with mod I  Short term goal 3: Pt will ambulate  150  feet on level surfaces and 25' on unlevel surface with mod I   assist  using straight cane  Short term goal 4: Pt will ascend/descend curb step with   cane   and up to   12  steps with  rail(s) with   mod I  Short term goal 5: Pt will retrieve light item from floor with mod I   assist  using device as needed            Bed Mobility:   Sit to Lying  Assistance Needed: Independent  CARE Score: 6  Discharge Goal: Independent  Roll Left and Right  Assistance Needed: Independent  CARE Score: 6  Discharge Goal: Independent  Lying to Sitting on Side of Bed  Assistance Needed: Independent  Comment: without bed features  CARE Score: 6  Discharge Goal: Independent    Transfers:    Sit to Stand  Assistance Needed: Supervision or touching assistance  Comment: SB-CGA, initial unsteadiness with SPC  CARE Score: 4  Discharge Goal: Independent  Chair/Bed-to-Chair Transfer  Assistance Needed: Supervision or touching assistance  Comment: SB-CGA due to initial unsteadiness  CARE Score: 4  Discharge Goal: Independent  Toilet Transfer  Assistance Needed: Supervision or touching assistance  Comment: SB-CGA for balance with SPC and grab bar  CARE Score: 4  Car Transfer  Assistance Needed: Supervision or touching assistance  CARE Score: 4  Discharge Goal: Independent    Ambulation:    Walking Ability  Does the Patient Walk?: Yes     Walk 10 Feet  Assistance Needed: Supervision or touching assistance  Comment: CG-Min A due to LOB. with SPC amb to BR. CARE Score: 4  Discharge Goal: Independent     Walk 50 Feet with Two Turns  Assistance Needed: Supervision or touching assistance  Comment: Attempted amb but unable this AM due to pt with decreased BP 86/53 in standing and symptomatic. /59 in sitting with sx's subsiding.    Reason if not Attempted: Not attempted due to medical condition or safety concerns  CARE Score: 88  Discharge Goal: Independent     Walk 150 Feet  Assistance Needed: Supervision or touching assistance  Comment: CG  CARE Score: 4  Discharge Goal: Independent     Walking 10 Feet on Uneven Surfaces  Assistance Needed: Partial/moderate assistance  Comment: pt with no awareness of surface changes requiring mod assist to recover balance x 2 despite training mid task  CARE Score: 3  Discharge Goal: Independent     1 Step (Curb)  Comment: CG  Discharge Goal: Independent     4 Steps  Assistance Needed: Supervision or touching assistance  Comment: SBA with B rails, reciprocating gait, decreased eccentric control at times with RLE  CARE Score: 4  Discharge Goal: Independent     12 Steps  Assistance Needed: Supervision or touching assistance  CARE Score: 4  Discharge Goal: Independent       Wheelchair:  w/c Ability: Wheelchair Ability  Uses a Wheelchair and/or Scooter?: No     Wheel 150 Feet  Assistance Needed: Supervision or touching assistance  Comment: Supervision using B LEs only, propelled 100'+62'+237'  CARE Score: 4          Balance:        Object: Picking Up Object  Assistance Needed: Supervision or touching assistance  Comment: CG with hesitancy  CARE Score: 4  Discharge Goal: Independent    I      Exam:    Blood pressure 132/60, pulse 70, temperature 98 °F (36.7 °C), temperature source Oral, resp. rate 16, height 5' 3\" (1.6 m), weight 132 lb 11 oz (60.2 kg), SpO2 94 %, not currently breastfeeding. General: Sitting up in a bedside chair. Legs elevated. Seems a little anxious. Oriented x2. HEENT: MMM. No JVD. Pulmonary: Shallow respirations without wheezes or rales. Cardiac: Regular rate and rhythm. Abdomen: Patient's abdomen is soft and nondistended. Bowel sounds were present throughout. There was no rebound, guarding or masses noted. Upper extremities: Slow and deliberate with arm movements but functional  strength noted. Lower extremities: No new bruising or swelling. No venous dilation or excessive tenderness. Sitting balance was good. Standing balance was poor.     Lab Results   Component Value Date    WBC 5.3 11/22/2021    HGB 10.2 (L) 11/22/2021    HCT 31.6 (L) 11/22/2021    .3 (H) 11/22/2021     (L) 11/22/2021     Lab Results   Component Value Date    INR 0.96 01/25/2016    PROTIME 11.0 01/25/2016     Lab Results   Component Value Date    CREATININE 0.9 11/22/2021    BUN 15 11/22/2021     11/22/2021    K 5.0 11/22/2021     11/22/2021    CO2 26 11/22/2021     Lab Results   Component Value Date    ALT 9 (L) 11/10/2021    AST 20 11/10/2021    ALKPHOS 38 (L) 11/10/2021    BILITOT 0.4 11/10/2021       Expected length of stay  prior to a supervised level of function for discharge home with a walker and [facility] OT/PT is 11/30/2021. Recommendations:    1. Hyponatremia with generalized weakness:  Still struggling with symptomatic orthostasis. Her blood pressure drops during her daily occupational and physical therapy.    Her chemistries were unremarkable. Seem to tolerate the little bit of fluids I gave her IV. Today I will administer more saline cautiously. Continue with adaptive equipment training, caregiver education and bowel and bladder retraining.  We must provide aggressive pulmonary hygiene measures, cautious pain management and nutritional support. Encouraging oral fluid intake.  Periodic monitoring of her chemistries, sodium up to 137 recently.  Cautious use of any diuretics. Verbal cues and min physical assistance for transfers. 2. DVT prophylaxis: Lovenox 40 mg subcu daily.  I must monitor her hemoglobin and platelet count while on this medication periodically.  GI prophylaxis offered.  Weightbearing activities are pursued daily. No new bruising or swelling. Her hemoglobin was steady at 10.2.  3. Bilateral hydroureter: No current symptoms of obstruction.  Outpatient follow-up with urology. 4. Hypertension: Recently the patient has been maintained on minimal antihypertensives.  Target systolic blood pressure is 120-140.  Monitoring vital signs at rest and with activity. Blood pressure has been low when she stands. Additional IV fluids today for her orthostasis. I will consult cardiology for advice on any manipulation of her for optimal dosing. 5. Trigeminal neuralgia: Vascepa, Neurontin and Depakote.  Acetaminophen and other oral analgesics as needed.  Zoloft may be helping sleep.       Counseling and Coordination of Care: In care conference today I met with the patient's OT, PT, RN and . We discussed the patient's problems, progress and prognosis. Disposition issues were clarified and plans were established for ongoing rehabilitation efforts beyond the ARU stay.  I reviewed this information with the patient during a second distinct visit with the patient. More than half of the total time of 34 minutes spent with the patient involved counseling and coordination of care.

## 2021-11-24 NOTE — CONSULTS
mother; Other in her father and mother; Stroke in her sister.     Allergies   Allergen Reactions    Alendronate Sodium Other (See Comments)     GI upset    Bactrim [Sulfamethoxazole-Trimethoprim] Anaphylaxis    Boniva [Ibandronate Sodium] Other (See Comments)     Gi upset and declined egd; also to fosamax    Ciprofloxacin     Colesevelam     Lisinopril Other (See Comments)     Severe hyperkalemia (6) with bun >56      Macrobid [Nitrofurantoin]     Neggram [Nalidixic Acid]     Pce [Erythromycin]     Penicillins     Risperidone And Related     Welchol [Colesevelam Hcl] Other (See Comments)     constipation    Carbamazepine Nausea And Vomiting    Lovaza [Omega-3-Acid Ethyl Esters] Nausea And Vomiting    Metoprolol Nausea And Vomiting    Pyridium [Phenazopyridine] Palpitations       Icosapent Ethyl CAPS 1,000 mg, BID  sodium chloride flush 0.9 % injection 10 mL, 2 times per day  sodium chloride flush 0.9 % injection 10 mL, PRN  0.9 % sodium chloride infusion, PRN  loperamide (IMODIUM) capsule 2 mg, 4x Daily PRN  sertraline (ZOLOFT) tablet 25 mg, Nightly  fluticasone (FLONASE) 50 MCG/ACT nasal spray 1 spray, Daily  ondansetron (ZOFRAN-ODT) disintegrating tablet 4 mg, Q8H PRN   Or  ondansetron (ZOFRAN) injection 4 mg, Q6H PRN  aspirin EC tablet 81 mg, Daily  divalproex (DEPAKOTE ER) extended release tablet 250 mg, Nightly  gabapentin (NEURONTIN) capsule 300 mg, TID  ketotifen (ZADITOR) 0.025 % ophthalmic solution 1 drop, BID  pantoprazole (PROTONIX) tablet 40 mg, QAM AC  verapamil (CALAN SR) extended release tablet 90 mg, BID  rosuvastatin (CRESTOR) tablet 10 mg, Nightly  acetaminophen (TYLENOL) tablet 650 mg, Q4H PRN  enoxaparin (LOVENOX) injection 40 mg, Daily  polyethylene glycol (GLYCOLAX) packet 17 g, Daily PRN  magnesium hydroxide (MILK OF MAGNESIA) 400 MG/5ML suspension 30 mL, Daily PRN      Current Facility-Administered Medications   Medication Dose Route Frequency Provider Last Rate Last Admin    Icosapent Ethyl CAPS 1,000 mg  1,000 mg Oral BID C Darin Steiner MD   1,000 mg at 11/24/21 0854    sodium chloride flush 0.9 % injection 10 mL  10 mL IntraVENous 2 times per day Nathalie Alvarado MD   10 mL at 11/24/21 1002    sodium chloride flush 0.9 % injection 10 mL  10 mL IntraVENous PRN ANA Steiner MD        0.9 % sodium chloride infusion  25 mL IntraVENous PRN ANA Steiner MD        loperamide (IMODIUM) capsule 2 mg  2 mg Oral 4x Daily PRN ANA Steiner MD   2 mg at 11/21/21 1723    sertraline (ZOLOFT) tablet 25 mg  25 mg Oral Nightly ANA Steiner MD   25 mg at 11/23/21 2128    fluticasone (FLONASE) 50 MCG/ACT nasal spray 1 spray  1 spray Each Nostril Daily ANA Steiner MD   1 spray at 11/24/21 1001    ondansetron (ZOFRAN-ODT) disintegrating tablet 4 mg  4 mg Oral Q8H PRN Geno Murrell MD   4 mg at 11/24/21 0853    Or    ondansetron (ZOFRAN) injection 4 mg  4 mg IntraVENous Q6H PRN Geno Malik MD        aspirin EC tablet 81 mg  81 mg Oral Daily Geno Murrell MD   81 mg at 11/24/21 0853    divalproex (DEPAKOTE ER) extended release tablet 250 mg  250 mg Oral Nightly Eloisa Matta MD   250 mg at 11/23/21 2129    gabapentin (NEURONTIN) capsule 300 mg  300 mg Oral TID Eloisa Matta MD   300 mg at 11/24/21 0853    ketotifen (ZADITOR) 0.025 % ophthalmic solution 1 drop  1 drop Both Eyes BID Geno Murrell MD   1 drop at 11/24/21 0853    pantoprazole (PROTONIX) tablet 40 mg  40 mg Oral QAM AC Geno Murrell MD   40 mg at 11/24/21 0728    verapamil (CALAN SR) extended release tablet 90 mg  90 mg Oral BID Geno Murrell MD   90 mg at 11/24/21 0853    rosuvastatin (CRESTOR) tablet 10 mg  10 mg Oral Nightly ANA Steiner MD   10 mg at 11/23/21 2128    acetaminophen (TYLENOL) tablet 650 mg  650 mg Oral Q4H PRN ANA Steiner MD   650 mg at 11/19/21 2237    enoxaparin (LOVENOX) injection 40 mg  40 mg SubCUTAneous Daily ANA Steiner MD   40 mg at 11/24/21 0856    polyethylene glycol (GLYCOLAX) packet 17 g  17 g Oral Daily PRN ANA Howell MD        magnesium hydroxide (MILK OF MAGNESIA) 400 MG/5ML suspension 30 mL  30 mL Oral Daily PRN ANA Howell MD         Review of Systems:  All 14 systems reviewed, all negative except for  dizzy    Physical Examination:    BP (!) 89/45   Pulse 60   Temp 98.1 °F (36.7 °C) (Oral)   Resp 16   Ht 5' 3\" (1.6 m)   Wt 135 lb 12.8 oz (61.6 kg)   SpO2 95%   BMI 24.06 kg/m²      Wt Readings from Last 3 Encounters:   11/24/21 135 lb 12.8 oz (61.6 kg)   11/11/21 132 lb 14.4 oz (60.3 kg)   10/21/21 138 lb (62.6 kg)     Body mass index is 24.06 kg/m². General Appearance:  fair  Head: normocephalic     Eyes: normal, noninjected conjunctiva    ENT: normal mucosa, noninjected throat, normal     NECK: No JVP  No thyromegaly        Cardiovascular: No thrills palpated   Auscultation: Normal S1 and S2,  no murmur   carotid bruit no   Abdominal Aorta no bruit    Respiratory:    Breath sounds Clear = 0    Extremities:  Trace Edema clubbing ,   no cyanosis    SKIN: Warm and well perfused, no pallor or cyanosis    Vascular exam:  Pedal Pulses: palp  bilaterally        Abdomen:  No masses or tenderness. No organomegaly noted. Neurological:  Oriented to time, place, and person   No focal neurological deficit noted. Psychiatric:normal mood, no anxiety    Lab Review   Recent Labs     11/22/21  0539   WBC 5.3   HGB 10.2*   HCT 31.6*   *      Recent Labs     11/22/21  0539      K 5.0      CO2 26   BUN 15   CREATININE 0.9     No results for input(s): AST, ALT, ALB, BILIDIR, BILITOT, ALKPHOS in the last 72 hours. No results for input(s): TROPONINI in the last 72 hours.   No results found for: BNP  Lab Results   Component Value Date    INR 0.96 01/25/2016    PROTIME 11.0 01/25/2016           Assessment/Recommendations:      - orthostatic hypotension: add mestinon  - CAD stable  - risk factor modification Suzan Pack MD, 11/24/2021 1:27 PM

## 2021-11-25 LAB
FOLATE: 19.7 NG/ML (ref 3.1–17.5)
VITAMIN B-12: 739 PG/ML (ref 211–911)

## 2021-11-25 PROCEDURE — 36415 COLL VENOUS BLD VENIPUNCTURE: CPT

## 2021-11-25 PROCEDURE — 82607 VITAMIN B-12: CPT

## 2021-11-25 PROCEDURE — APPSS45 APP SPLIT SHARED TIME 31-45 MINUTES: Performed by: NURSE PRACTITIONER

## 2021-11-25 PROCEDURE — 1280000000 HC REHAB R&B

## 2021-11-25 PROCEDURE — 82746 ASSAY OF FOLIC ACID SERUM: CPT

## 2021-11-25 PROCEDURE — 94761 N-INVAS EAR/PLS OXIMETRY MLT: CPT

## 2021-11-25 PROCEDURE — 99232 SBSQ HOSP IP/OBS MODERATE 35: CPT | Performed by: INTERNAL MEDICINE

## 2021-11-25 PROCEDURE — 6360000002 HC RX W HCPCS: Performed by: PHYSICAL MEDICINE & REHABILITATION

## 2021-11-25 PROCEDURE — 6370000000 HC RX 637 (ALT 250 FOR IP): Performed by: INTERNAL MEDICINE

## 2021-11-25 PROCEDURE — 6370000000 HC RX 637 (ALT 250 FOR IP): Performed by: PHYSICAL MEDICINE & REHABILITATION

## 2021-11-25 PROCEDURE — 94150 VITAL CAPACITY TEST: CPT

## 2021-11-25 RX ORDER — PANTOPRAZOLE SODIUM 40 MG/1
40 TABLET, DELAYED RELEASE ORAL
Status: DISCONTINUED | OUTPATIENT
Start: 2021-11-26 | End: 2021-11-30 | Stop reason: HOSPADM

## 2021-11-25 RX ADMIN — FLUTICASONE PROPIONATE 1 SPRAY: 50 SPRAY, METERED NASAL at 10:12

## 2021-11-25 RX ADMIN — ENOXAPARIN SODIUM 40 MG: 100 INJECTION SUBCUTANEOUS at 10:11

## 2021-11-25 RX ADMIN — PYRIDOSTIGMINE BROMIDE 30 MG: 60 TABLET ORAL at 05:39

## 2021-11-25 RX ADMIN — ROSUVASTATIN CALCIUM 10 MG: 5 TABLET, COATED ORAL at 21:14

## 2021-11-25 RX ADMIN — GABAPENTIN 300 MG: 300 CAPSULE ORAL at 21:14

## 2021-11-25 RX ADMIN — VERAPAMIL HYDROCHLORIDE 90 MG: 180 TABLET, FILM COATED, EXTENDED RELEASE ORAL at 21:15

## 2021-11-25 RX ADMIN — DIVALPROEX SODIUM 250 MG: 250 TABLET, FILM COATED, EXTENDED RELEASE ORAL at 21:15

## 2021-11-25 RX ADMIN — KETOTIFEN FUMARATE 1 DROP: 0.35 SOLUTION/ DROPS OPHTHALMIC at 21:15

## 2021-11-25 RX ADMIN — SERTRALINE HYDROCHLORIDE 25 MG: 50 TABLET ORAL at 21:14

## 2021-11-25 RX ADMIN — LOPERAMIDE HYDROCHLORIDE 2 MG: 2 CAPSULE ORAL at 10:10

## 2021-11-25 RX ADMIN — ONDANSETRON 4 MG: 4 TABLET, ORALLY DISINTEGRATING ORAL at 15:53

## 2021-11-25 RX ADMIN — GABAPENTIN 300 MG: 300 CAPSULE ORAL at 10:10

## 2021-11-25 RX ADMIN — GABAPENTIN 300 MG: 300 CAPSULE ORAL at 15:48

## 2021-11-25 RX ADMIN — VERAPAMIL HYDROCHLORIDE 90 MG: 180 TABLET, FILM COATED, EXTENDED RELEASE ORAL at 10:15

## 2021-11-25 RX ADMIN — ASPIRIN 81 MG: 81 TABLET, COATED ORAL at 10:11

## 2021-11-25 ASSESSMENT — PAIN SCALES - GENERAL
PAINLEVEL_OUTOF10: 0
PAINLEVEL_OUTOF10: 0

## 2021-11-25 NOTE — PROGRESS NOTES
[facility]    : 1935  Acct #: [de-identified]  MRN: [id number]              PM&R Progress Note      Admitting diagnosis: Hyponatremia ([address] [place] [address])     Comorbid diagnoses impacting rehabilitation: Generalized weakness, gait disturbance, essential hypertension, PAD, HLD, macular degeneration, trigeminal neuralgia, history of DVT, bilateral hydroureter     Chief complaint: Insisted on stopping her PPI to feliciano her nausea and then switched to demanding a PPI to bowel or nausea. Prior (baseline) level of function: Independent. Current level of function:         Current  [facility]F-[facility] and Goals:   Occupational Therapy:    Short term goals  Time Frame for Short term goals: LTGs= STGs :   Long term goals  Time Frame for Long term goals : 3- 5 day or until d/c  Long term goal 1: Pt will complete total body bathing c Mod I and use of AE prn by d/c  Long term goal 2: Pt will complete total body dressing (UB/LB/Footwear) c SUP by d/c  Long term goal 3: Pt will complete toileting c MOD I by d/c  Long term goal 4: Pt will complete functional transfer (toilet, tub, shower) c MOD I by d/c. Long term goal 5: Pt will complete grooming/oral care task c IND by d/c  Long term goals 6: Pt will perform therex/therax to facilitate increased strength/endurance/ax tolerance (c emphasis on dynamic standing balance/tolerance >8 mins ) c MOD I in order to complete ADLs.  :                                       Eating: Eating  Assistance Needed: Independent  Comment: Pt sitting EOB eating on arrival  CARE Score: 6  Discharge Goal: Independent       Oral Hygiene: Oral Hygiene  Assistance Needed: Independent  Comment: performed seated 2* hypotension  CARE Score: 6  Discharge Goal: Independent    UB/LB Bathing: Shower/Bathe Self  Assistance Needed: Supervision or touching assistance  Comment: majority performed seated, continues to demo poor memory and problem solving for shower controls  CARE Score: 4  Discharge Goal: Independent    UB Dressing: Upper Body Dressing  Assistance Needed: Setup or clean-up assistance  Comment: to don pullover sweater  CARE Score: 5  Discharge Goal: Independent         LB Dressing: Lower Body Dressing  Assistance Needed: Supervision or touching assistance  Comment: to doff/don Depends, don pants  CARE Score: 4  Discharge Goal: Independent    Donning and [place] Footwear: Putting On/Taking Off Footwear  Assistance Needed: Partial/moderate assistance  Comment: required assist to doff and don AFIA hose, able to doff hospital socks and don shoes  CARE Score: 3  Discharge Goal: Independent      Toileting: Toileting Hygiene  Assistance Needed: Supervision or touching assistance  Comment: Sup for CM and toileting hygiene  CARE Score: 4  Discharge Goal: Independent      Toilet Transfers:   Toilet Transfer  Assistance Needed: Supervision or touching assistance  Comment: SB-CGA for balance with SPC and grab bar  CARE Score: 4  Discharge Goal: Independent    Physical Therapy:   Short term goals  Time Frame for Short term goals: 7 days STG=LTG  Short term goal 1: Pt will perform all bed mobility with mod I  Short term goal 2: Pt will perform sit to stand, pivot and car transfers  with mod I  Short term goal 3: Pt will ambulate  150  feet on level surfaces and 25' on unlevel surface with mod I   assist  using straight cane  Short term goal 4: Pt will ascend/descend curb step with   cane   and up to   12  steps with  rail(s) with   mod I  Short term goal 5: Pt will retrieve light item from floor with mod I   assist  using device as needed            Bed Mobility:   Sit to Lying  Assistance Needed: Independent  CARE Score: 6  Discharge Goal: Independent  Roll Left and Right  Assistance Needed: Independent  CARE Score: 6  Discharge Goal: Independent  Lying to Sitting on Side of Bed  Assistance Needed: Independent  Comment: without bed features  CARE Score: 6  Discharge Goal: Independent    Transfers:    Sit to Stand  Assistance Needed: Ability  Uses a Wheelchair and/or Scooter?: No     Wheel 150 Feet  Assistance Needed: Supervision or touching assistance  Comment: Supervision using B LEs only, propelled 100'+62'+237'  CARE Score: 4          Balance:        Object: Picking Up Object  Assistance Needed: Supervision or touching assistance  Comment: CG with hesitancy  CARE Score: 4  Discharge Goal: Independent    I      Exam:    Blood pressure (!) 157/70, pulse 65, temperature 97.3 °F (36.3 °C), resp. rate 18, height 5' 3\" (1.6 m), weight 135 lb 12.8 oz (61.6 kg), SpO2 96 %, not currently breastfeeding. General: Up in a bedside chair. Fair attention and poor reasoning. Poor insight. HEENT: Neck supple. Symmetric facial expression. No JVD. Pulmonary: Clear. Cardiac: RRR. Abdomen: Patient's abdomen is soft and nondistended. Bowel sounds were present throughout. There was no rebound, guarding or masses noted. Upper extremities: No tremor or clonus. Functional  strength. Lower extremities: No tremor or clonus. No signs of DVT. Sitting balance was good. Standing balance was fair-.    Lab Results   Component Value Date    WBC 5.3 11/22/2021    HGB 10.2 (L) 11/22/2021    HCT 31.6 (L) 11/22/2021    .3 (H) 11/22/2021     (L) 11/22/2021     Lab Results   Component Value Date    INR 0.96 01/25/2016    PROTIME 11.0 01/25/2016     Lab Results   Component Value Date    CREATININE 0.9 11/22/2021    BUN 15 11/22/2021     11/22/2021    K 5.0 11/22/2021     11/22/2021    CO2 26 11/22/2021     Lab Results   Component Value Date    ALT 9 (L) 11/10/2021    AST 20 11/10/2021    ALKPHOS 38 (L) 11/10/2021    BILITOT 0.4 11/10/2021       Expected length of stay  prior to a supervised level of function for discharge home with a walker and [facility] OT/PT is 11/30/2021. Recommendations:    1. Hyponatremia with generalized weakness:  Still struggling with symptomatic orthostasis.   Her blood pressure drops during her daily occupational and physical therapy.    Her chemistries were unremarkable. Seem to tolerate the little bit of fluids I gave her IV. Marii Willett will administer more saline cautiously.  Continue with adaptive equipment training, caregiver education and bowel and bladder retraining. Estella Matthew must provide aggressive pulmonary hygiene measures, cautious pain management and nutritional support.  Encouraging oral fluid intake.  Periodic monitoring of her chemistries, sodium up to 137 recently.  Cautious use of any diuretics.  Verbal cues and min physical assistance for transfers. 2. DVT prophylaxis: Lovenox 40 mg subcu daily.  I must monitor her hemoglobin and platelet count while on this medication periodically.  GI prophylaxis offered.  Weightbearing activities are pursued daily.  No new bruising or swelling. Her hemoglobin was steady at 10.2.  3. Bilateral hydroureter: No current symptoms of obstruction.  Outpatient follow-up with urology. 4. Hypertension: Recently the patient has been maintained on minimal antihypertensives.  Target systolic blood pressure is 120-140.  Monitoring vital signs at rest and with activity.  Blood pressure has been low when she stands.      She seemed to tolerate the IV fluids given for her orthostasis. Cardiology has started Mestinon. Patient is refused to take it some associating it with nausea. Her nausea has been present for many days before this medicine was started though.   5. Trigeminal neuralgia: Vascepa, Neurontin and Depakote.  Acetaminophen and other oral analgesics as needed.  Zoloft may be helping sleep.

## 2021-11-25 NOTE — PROGRESS NOTES
Cardiology Progress Note     Today's Plan check bp check q 12     Admit Date:  11/17/2021    Consult reason/ Seen today for: orthostatic hypotension     Subjective and  Overnight Events:  C/o diarrhea since yesterday - denies dizziness       Assessment / Plan / Recommendation:     1. Orthostatic hypotension-started mestinon - no ortho bp noted today - will order- notes dizziness has improved- appears to be dry - push po fluids- use compression socks   2. Check b 12   3. CAD-stable- denies chest pain-continue asa, verapamil  and statin         History of Presenting Illness:    Chief complain on admission : 82 y. o.year old who is admitted forNo chief complaint on file. Past medical history:    has a past medical history of Arthritis of shoulder region, right, Blind right eye, Chronic depression, DVT (deep venous thrombosis) ([address] Former smoker, History of echocardiogram, History of stress test, Hx of Doppler ultrasound, Hyperlipidemia, Hypertension, Macular degeneration, Mild cognitive impairment, MVP (mitral valve prolapse), Osteoporosis, Pancytopenia, Peptic ulcer disease, Peripheral vascular disease ([address] Prediabetes, Recurrent ventral incisional hernia, S/P CABG x 3, Spigelian hernia, Supraventricular tachycardia ([address] and Tic dou[place]oure[place]. Past surgical history:   has a past surgical history that includes Cholecystectomy; Coronary artery bypass graft (8/2009); Ureter surgery; Shoulder arthroscopy (Right, 2003); Tubal ligation; and Cataract removal (Right, 2010). Social History:   reports that she quit smoking about 36 years ago. Her smoking use included cigarettes. She started smoking about 45 years ago. She has a 2.25 pack-year smoking history. She has never used smokeless tobacco. She reports that she does not drink alcohol and does not use drugs.   Family history:  family history includes Cancer (age of onset: 79) in her sister; Heart Attack in her mother; High Blood Pressure in her mother; Other in her father and mother; Stroke in her sister. Allergies   Allergen Reactions    Alendronate Sodium Other (See Comments)     GI upset    Bactrim [Sulfamethoxazole-Trimethoprim] Anaphylaxis    Boniva [Ibandronate Sodium] Other (See Comments)     Gi upset and declined egd; also to fosamax    Ciprofloxacin     Colesevelam     Lisinopril Other (See Comments)     Severe hyperkalemia (6) with bun >56      Macrobid [Nitrofurantoin]     Neggram [Nalidixic Acid]     Pce [Erythromycin]     Penicillins     Risperidone And Related     Welchol [Colesevelam Hcl] Other (See Comments)     constipation    Carbamazepine Nausea And Vomiting    Lovaza [Omega-3-Acid Ethyl Esters] Nausea And Vomiting    Metoprolol Nausea And Vomiting    Pyridium [Phenazopyridine] Palpitations       Review of Systems:   All 14 systems were reviewed and are negative  Except for the positive findings which are documented     BP (!) 116/56   Pulse 77   Temp 98.1 °F (36.7 °C) (Oral)   Resp 16   Ht 5' 3\" (1.6 m)   Wt 134 lb (60.8 kg)   SpO2 98%   BMI 23.74 kg/m²       Intake/Output Summary (Last 24 hours) at 11/25/2021 1200  Last data filed at 11/25/2021 0536  Gross per 24 hour   Intake 358 ml   Output --   Net 358 ml       Physical Exam:  Physical Exam  Vitals reviewed. HENT:      Mouth/Throat:      Mouth: Mucous membranes are dry. Cardiovascular:      Rate and Rhythm: Normal rate. Pulses: Normal pulses. Heart sounds: Normal heart sounds. Pulmonary:      Breath sounds: Normal breath sounds. Musculoskeletal:      Right lower leg: No edema. Left lower leg: No edema. Neurological:      General: No focal deficit present. Mental Status: She is alert.           Medications:    Icosapent Ethyl  1,000 mg Oral BID    pyridostigmine  30 mg Oral 3 times per day    sodium chloride flush  10 mL IntraVENous 2 times per day    sertraline 25 mg Oral Nightly    fluticasone  1 spray Each Nostril Daily    aspirin  81 mg Oral Daily    divalproex  250 mg Oral Nightly    gabapentin  300 mg Oral TID    ketotifen  1 drop Both Eyes BID    verapamil  90 mg Oral BID    rosuvastatin  10 mg Oral Nightly    enoxaparin  40 mg SubCUTAneous Daily      sodium chloride       sodium chloride flush, sodium chloride, loperamide, ondansetron **OR** ondansetron, acetaminophen, polyethylene glycol, magnesium hydroxide    Lab Data:  CBC: No results for input(s): WBC, HGB, HCT, MCV, PLT in the last 72 hours. BMP: No results for input(s): NA, K, CL, CO2, PHOS, BUN, CREATININE in the last 72 hours. Invalid input(s): CA  PT/INR: No results for input(s): PROTIME, INR in the last 72 hours. BNP:  No results for input(s): PROBNP in the last 72 hours. TROPONIN: No results for input(s): TROPONINT in the last 72 hours. 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 *       All labs, medications and tests reviewed by myself, continue all other medications of all above medical condition listed as is except for changes mentioned above. Thank you   Please call with questions. Electronically signed by ABELARDO Acuna CNP on 11/25/2021 at 12:00 PM  I have seen ,spoken to  and examined this patient personally, independently of the nurse practitioner. I have reviewed the hospital care given to date and reviewed all pertinent labs and imaging. The plan was developed mutually at the time of the visit with the patient,  NP  and myself. I have spoken with patient, nursing staff and provided written and verbal instructions . The above note has been reviewed and I agree with the assessment, diagnosis, and treatment plan with changes made by me as follows     CARDIOLOGY ATTENDING ADDENDUM    HPI:  I have reviewed the above HPI  And agree with above   Alejandrina is a 82 y. o.year old who and presents with had no chief complaint listed for this encounter. No chief complaint on file. Interval history:  Mild dizzy    Physical Exam:  General:  Awake, alert, NAD  Head:normal  Eye:normal  Neck:  No JVD   Chest:  Clear to auscultation, respiration easy  Cardiovascular:  RRR S1S2  Abdomen:   nontender  Extremities:  no edema  Pulses; palpable  Neuro: grossly normal      MEDICAL DECISION MAKING;    I agree with the above plan, which was planned by myself and discussed with NP.   Orthostatics  Increase mestinon if needed        Irene Hinson MD [address] [facility]

## 2021-11-25 NOTE — PLAN OF CARE
Problem: Infection:  Goal: Will remain free from infection  Description: Will remain free from infection  Outcome: Ongoing     Problem: Safety:  Goal: Free from accidental physical injury  Description: Free from accidental physical injury  Outcome: Ongoing  Goal: Free from intentional harm  Description: Free from intentional harm  Outcome: Ongoing     Problem: Daily Care:  Goal: Daily care needs are met  Description: Daily care needs are met  Outcome: Ongoing     Problem: Pain:  Goal: Patient's pain/discomfort is manageable  Description: Patient's pain/discomfort is manageable  Outcome: Ongoing     Problem: Skin Integrity:  Goal: Skin integrity will stabilize  Description: Skin integrity will stabilize  Outcome: Ongoing     Problem: Skin Integrity:  Goal: Will show no infection signs and symptoms  Description: Will show no infection signs and symptoms  Outcome: Ongoing  Goal: Absence of new skin breakdown  Description: Absence of new skin breakdown  Outcome: Ongoing

## 2021-11-25 NOTE — PLAN OF CARE
Problem: Daily Care:  Goal: Daily care needs are met  Description: Daily care needs are met  Outcome: Ongoing     Problem: Pain:  Goal: Patient's pain/discomfort is manageable  Description: Patient's pain/discomfort is manageable  Outcome: Ongoing     Problem: Skin Integrity:  Goal: Skin integrity will stabilize  Description: Skin integrity will stabilize  Outcome: Ongoing     Problem: Discharge Planning:  Goal: Patients continuum of care needs are met  Description: Patients continuum of care needs are met  Outcome: Ongoing     Problem: Skin Integrity:  Goal: Will show no infection signs and symptoms  Description: Will show no infection signs and symptoms  Outcome: Ongoing  Goal: Absence of new skin breakdown  Description: Absence of new skin breakdown  Outcome: Ongoing

## 2021-11-26 PROCEDURE — 97535 SELF CARE MNGMENT TRAINING: CPT

## 2021-11-26 PROCEDURE — 6370000000 HC RX 637 (ALT 250 FOR IP): Performed by: INTERNAL MEDICINE

## 2021-11-26 PROCEDURE — 97129 THER IVNTJ 1ST 15 MIN: CPT

## 2021-11-26 PROCEDURE — 97150 GROUP THERAPEUTIC PROCEDURES: CPT

## 2021-11-26 PROCEDURE — 6360000002 HC RX W HCPCS: Performed by: PHYSICAL MEDICINE & REHABILITATION

## 2021-11-26 PROCEDURE — 94761 N-INVAS EAR/PLS OXIMETRY MLT: CPT

## 2021-11-26 PROCEDURE — 99231 SBSQ HOSP IP/OBS SF/LOW 25: CPT | Performed by: INTERNAL MEDICINE

## 2021-11-26 PROCEDURE — 6370000000 HC RX 637 (ALT 250 FOR IP): Performed by: PHYSICAL MEDICINE & REHABILITATION

## 2021-11-26 PROCEDURE — 97130 THER IVNTJ EA ADDL 15 MIN: CPT

## 2021-11-26 PROCEDURE — 97116 GAIT TRAINING THERAPY: CPT

## 2021-11-26 PROCEDURE — 99232 SBSQ HOSP IP/OBS MODERATE 35: CPT | Performed by: PHYSICAL MEDICINE & REHABILITATION

## 2021-11-26 PROCEDURE — 94150 VITAL CAPACITY TEST: CPT

## 2021-11-26 PROCEDURE — 1280000000 HC REHAB R&B

## 2021-11-26 PROCEDURE — 97110 THERAPEUTIC EXERCISES: CPT

## 2021-11-26 PROCEDURE — 97530 THERAPEUTIC ACTIVITIES: CPT

## 2021-11-26 RX ORDER — CYANOCOBALAMIN 1000 UG/ML
1000 INJECTION INTRAMUSCULAR; SUBCUTANEOUS ONCE
Status: COMPLETED | OUTPATIENT
Start: 2021-11-26 | End: 2021-11-26

## 2021-11-26 RX ADMIN — CYANOCOBALAMIN 1000 MCG: 1000 INJECTION, SOLUTION INTRAMUSCULAR at 17:46

## 2021-11-26 RX ADMIN — GABAPENTIN 300 MG: 300 CAPSULE ORAL at 21:30

## 2021-11-26 RX ADMIN — ACETAMINOPHEN 650 MG: 325 TABLET ORAL at 19:17

## 2021-11-26 RX ADMIN — VERAPAMIL HYDROCHLORIDE 90 MG: 180 TABLET, FILM COATED, EXTENDED RELEASE ORAL at 08:50

## 2021-11-26 RX ADMIN — PYRIDOSTIGMINE BROMIDE 30 MG: 60 TABLET ORAL at 21:30

## 2021-11-26 RX ADMIN — ASPIRIN 81 MG: 81 TABLET, COATED ORAL at 08:33

## 2021-11-26 RX ADMIN — GABAPENTIN 300 MG: 300 CAPSULE ORAL at 08:33

## 2021-11-26 RX ADMIN — ENOXAPARIN SODIUM 40 MG: 100 INJECTION SUBCUTANEOUS at 08:33

## 2021-11-26 RX ADMIN — GABAPENTIN 300 MG: 300 CAPSULE ORAL at 13:32

## 2021-11-26 RX ADMIN — PYRIDOSTIGMINE BROMIDE 30 MG: 60 TABLET ORAL at 05:44

## 2021-11-26 RX ADMIN — KETOTIFEN FUMARATE 1 DROP: 0.35 SOLUTION/ DROPS OPHTHALMIC at 21:31

## 2021-11-26 RX ADMIN — FLUTICASONE PROPIONATE 1 SPRAY: 50 SPRAY, METERED NASAL at 08:40

## 2021-11-26 RX ADMIN — ONDANSETRON 4 MG: 4 TABLET, ORALLY DISINTEGRATING ORAL at 08:33

## 2021-11-26 RX ADMIN — DIVALPROEX SODIUM 250 MG: 250 TABLET, FILM COATED, EXTENDED RELEASE ORAL at 21:30

## 2021-11-26 RX ADMIN — PANTOPRAZOLE SODIUM 40 MG: 40 TABLET, DELAYED RELEASE ORAL at 05:44

## 2021-11-26 RX ADMIN — SERTRALINE HYDROCHLORIDE 25 MG: 50 TABLET ORAL at 21:30

## 2021-11-26 RX ADMIN — ONDANSETRON 4 MG: 4 TABLET, ORALLY DISINTEGRATING ORAL at 19:13

## 2021-11-26 RX ADMIN — ROSUVASTATIN CALCIUM 10 MG: 5 TABLET, COATED ORAL at 21:36

## 2021-11-26 RX ADMIN — PYRIDOSTIGMINE BROMIDE 30 MG: 60 TABLET ORAL at 13:32

## 2021-11-26 RX ADMIN — KETOTIFEN FUMARATE 1 DROP: 0.35 SOLUTION/ DROPS OPHTHALMIC at 08:40

## 2021-11-26 RX ADMIN — VERAPAMIL HYDROCHLORIDE 90 MG: 180 TABLET, FILM COATED, EXTENDED RELEASE ORAL at 21:30

## 2021-11-26 ASSESSMENT — PAIN DESCRIPTION - LOCATION: LOCATION: HEAD

## 2021-11-26 ASSESSMENT — PAIN DESCRIPTION - DESCRIPTORS: DESCRIPTORS: ACHING

## 2021-11-26 ASSESSMENT — PAIN DESCRIPTION - PROGRESSION: CLINICAL_PROGRESSION: NOT CHANGED

## 2021-11-26 ASSESSMENT — PAIN SCALES - GENERAL
PAINLEVEL_OUTOF10: 4
PAINLEVEL_OUTOF10: 0

## 2021-11-26 ASSESSMENT — PAIN DESCRIPTION - ORIENTATION: ORIENTATION: UPPER

## 2021-11-26 ASSESSMENT — PAIN DESCRIPTION - ONSET: ONSET: GRADUAL

## 2021-11-26 ASSESSMENT — PAIN DESCRIPTION - PAIN TYPE: TYPE: ACUTE PAIN

## 2021-11-26 NOTE — PROGRESS NOTES
[facility]  ACUTE REHAB UNIT  SPEECH/LANGUAGE PATHOLOGY      [x] Daily           [] Discharge    Patient:Ada Driscilla Mortimer      :1935  CRE:[id number]  Rehab Dx/Hx: Hypo-osmolality and hyponatremia [E87.1]  Hyponatremia [E87.1]   Allergies   Allergen Reactions    Alendronate Sodium Other (See Comments)     GI upset    Bactrim [Sulfamethoxazole-Trimethoprim] Anaphylaxis    Boniva [Ibandronate Sodium] Other (See Comments)     Gi upset and declined egd; also to fosamax    Ciprofloxacin     Colesevelam     Lisinopril Other (See Comments)     Severe hyperkalemia (6) with bun >56      Macrobid [Nitrofurantoin]     Neggram [Nalidixic Acid]     Pce [Erythromycin]     Penicillins     Risperidone And Related     Welchol [Colesevelam Hcl] Other (See Comments)     constipation    Carbamazepine Nausea And Vomiting    Lovaza [Omega-3-Acid Ethyl Esters] Nausea And Vomiting    Metoprolol Nausea And Vomiting    Pyridium [Phenazopyridine] Palpitations     Precautions:  Restrictions/Precautions: General Precautions, Fall Risk          Home Situation/IADL:   Social/Functional History  Lives With: Alone  Type of Home: House  Home Layout: One level (Pt has a basement with a chair lift; family room is in the basement)  Home Access: Stairs to enter with rails  Entrance Stairs - Number of Steps: 4 steps  Entrance Stairs - Rails: Both  Bathroom Shower/Tub: Tub/Shower unit, Shower chair with back  [employer]: Standard  Bathroom Equipment: Grab bars in shower, Grab bars around toilet (has grab bars all the way around the bathroom (by the sink, by tub, and door))  Bathroom Accessibility: Not accessible  Home Equipment: Grab bars, Lift chair, Cane (recliner that lifts all the way up)  Receives Help From:  (sons live nearby but work full time.  does not feel anyone would be able to assist her at home)  ADL Assistance: [address] [place] [address]: Needs assistance  Meal Prep:  (IND)  Laundry: (IND)  Vacuuming: Total  Cleaning: Maximal  Gardening: Total  Yard Work: Total  Driving:  (IND)  Shopping: Moderate  Homemaking Responsibilities: Yes  Meal Prep Responsibility: Primary  Laundry Responsibility: Primary  Cleaning Responsibility: Secondary (grandson comes every other week to clean)  Bill Paying/Finance Responsibility: Primary  Shopping Responsibility: No (grandson does once a week)  Health Care Management: Primary  Ambulation Assistance: Independent (pt did not use assistive device)  Transfer Assistance: Independent  Active : Yes  Mode of Transportation: Car  Education: 12th grade and some business college. Occupation: Retired  Type of occupation: Retired   Leisure & Hobbies: Gets hair done 1x week, cooking, running errors. Sikh, OP therapy. Meds are managed via pill bottles lined up in a cabinet drawer. Pt does report a few times that she's gotten confused with them. Finances are managed via auto deposit. [employer] assists as needed. Also comes every Saturday to assist with any projects needed. IADL Comments: Pt reports she likes structure; recently lost her dtr and spouse. Pt notes memory changes PTA. Pt tries to rearrange items and organize items to assist with memory. [facility] at [facility] and Nearly [address] [place] [address]. Additional Comments: Pt sleeps both in low flat bed and tall 4 poster bed depending on how she feels. Has had one fall where she bumped her head and she had stayed in the lower bed since then. Pt has had at least 2 falls in past year.  no inury      Date of Admit: 11/17/2021  Room #: 1024/1024-A     ST Number of Minutes/Billable Intervention  Cog/Memory Deficits 30   Aphasia/Language     Dysarthria/Speech     Apraxia/Speech     Dysphagia/Swallowing     Group     Other    TOTAL Minutes Billed  30    Variance          Date: 11/26/2021  Day of ARU Week:  3       SLP Individual Minutes  Time In: 1410  Time Out: 1440  Minutes: 30         Variance/Reason:  [] Refusal due to   [] Medical hold/reason  [] Illness   [] Off Unit for test/procedure  [] Extra time needed to complete task  [] Other (specify)    Activity completed: Memory strategies    Pain: denies  Current Diet: ADULT DIET; Regular  Subjective: alert; perseverates on being frustrated that she has to use strategies for her memory but more accepting of info. Goals and POC: Co-treats where appropriate with PT or OT to facilitate patient goals in functional tasks. LTG                           Short-term Goals  Timeframe for Short-term Goals: 2x/weekx x1 week 30 mins min. Pt will use strategies to facilitate memory at home. Goal 1: Pt will complete education on memory strategies to utilize at home with min  cues for recall of familiar tasks. Sequence review with demonstration, explanation, and practical application of external and internal memory strategies via daily activity review. Pt given multifactoral memory questionnaire to review issues and areas pt is doing well. Pt likes organization and has good strategies in place however noticed changes with meds and mgmt, changes in memory with fatigue, and note writing and not putting in accessible location . Reviewed ways to compensate with handouts provided and direct examples discussed. Also discussed Life Alert button. Pt has support reportedly of chika. Pt would benefit from use of a med box either with chika or [facility] setting up and overseeing. Min cues. Goal 2: Pt will complete med mgmt tasks to determine needs post discharge. Will see for med mgmt tasks. Pt reported prior to admit struggling even with lining up meds and flipping over med bottles to show they had been taken. Chika got her a med box but she has not used it due to hospitalization.                                    The patient demonstrated a positive response to todays treatment and is making gradual progress toward established goals as evidenced by improved compensation. Ongoing deficits are observed in the areas of cognition and memory with perseverations and ongoing cues and continued focus on carryover is recommended. Alarm placed: []bed [x]chair   []other:   [] activated        Barriers to progress:   [] Fatigue        [x] Cognitive Deficits   [x] Memory Deficits   [] Reduced Attn   [] Self Limiting Behaviors    [] Reduced insight/awareness     [] Visual Deficits   [] Premorbid Conditions  [] Impulsivity     [] Other      Education/Interventions used this date: see above for memory recall    []   Progress was updated and reviewed in [facility] with patient and/or family this         date. [] Attending Care Conference for pt this date. See Team Patient Care Conference Note for updates.     Interventions used this date:  [] Speech/Language Treatment       [] Dysphagia Treatment     [x] Cognitive Skill Development  [] Instruction in HEP     [] Group Therapy  Other:         Assessment / Impression                                                          Treatment/Activity Tolerance:   [x] Tolerated Treatment well:     [] Patient limited by fatigue/pain:       [] Patient limited by medical complications:    [] Adverse Reaction to Tx:   [] Significant change in status:      Electronically Signed by  Muna Bonds, SLP, [address] [facility], CCC-SLP    11/26/2021  3:06 PM

## 2021-11-26 NOTE — PROGRESS NOTES
Occupational Therapy    Physical Rehabilitation: OCCUPATIONAL THERAPY     [x] daily progress note       [] discharge       Patient Name:  Paulette Geronimo   :  1935 MRN: [id number]  Room:  [address] [place] [address] Date of Admission: 2021  Rehabilitation Diagnosis:   Hypo-osmolality and hyponatremia [E87.1]  Hyponatremia [E87.1]       Date 2021       Day of ARU Week:  3   Time IN/OUT 4094-9494   Individual Tx Minutes 30   Group Tx Minutes    Co-Treat Minutes    Concurrent Tx Minutes    TOTAL Tx Time Mins 30   Variance Time    Variance Time []   Refusal due to:     []   Medical hold/reason:    []   Illness   []   Off Unit for test/procedure  []   Extra time needed to complete task  []   Therapeutic need  []   Other (specify):   Restrictions Restrictions/Precautions: General Precautions, Fall Risk         Communication with other providers: [x]   OK to see per nursing:     []   Spoke with team member regarding:      Subjective observations and cognitive status: Pt in semi-fowlers eating breakfast upon arrival. Pt states, \"I need to use the bathroom. \"    Pain level/location:    /10       Location:    Discharge recommendations  Anticipated discharge date:  TBD  Destination: []home alone   []home alone w assist prn   [] home w/ family    [] Continuous supervision       []SNF    [] Assisted living     [] Other:   Continued therapy: []HHC OT  []OUTPATIENT  OT   [] No Further OT  Equipment needs: TBD       ADLs:    Eating: Eating  Assistance Needed: Independent  Comment: Pt sitting EOB eating on arrival  CARE Score: 6  Discharge Goal: Independent       Oral Hygiene: Oral Hygiene  Assistance Needed: Independent  Comment: performed seated 2* hypotension  CARE Score: 6  Discharge Goal: Independent  Toileting: Toileting Hygiene  Assistance Needed: Independent  Comment: MOD I- pt demo good balance and use of grab bars  CARE Score: 6  Discharge Goal: Independent      Toilet Transfers:     Toilet Transfer  Assistance Needed: Supervision or touching assistance  Comment: SB-CGA for balance with SPC and grab bar  CARE Score: 4  Discharge Goal: Independent  Device Used:    []   Standard Toilet         [x]   Grab Bars           []  Bedside Commode       []   Elevated Toilet          []   Other:         Bed Mobility:           [x]   Pt received out of bed   Supine --> Sit:  MOD I  Sit --> Supine:  MOD I     Transfers:    Sit--> Stand:  SUP-MOD I; x1 CGA for unsteadiness from recliner chair. However from w/c pt demo SUP-MOD I Stand --> Sit:   MOD I  Stand-Pivot:   SBA  Other:    Assistive device required for transfer:   [facility]      Functional Mobility:    Assistance:  To/from bathroom and around room  Device:   []   Rolling Walker     []   Standard Waynetta Bears []   Wheelchair        []   [employer]       []   4-Wheeled Waynetta Bears         []   Cardiac Waynetta Bears       []   Other:        Homemaking Tasks:   Pt demo retrieving clothing items from closet for ADL session. Pt assisted to bathroom for ADL session however pt c/o dizziness and nausea. KRISTIE Joshi notified. Rehab tech notified to assist pt in finishing ADL session. Additional Therapeutic activities/exercises completed this date:     [x]   ADL Training   []   Balance/Postural training     []   Bed/Transfer Training   []   Endurance Training   []   Neuromuscular Re-ed   []   Nu-step:  Time:        Level:         #Steps:       []   Rebounder:    []  Seated     []  Standing        []   Supine Ther Ex (reps/sets):     []   Seated Ther Ex (reps/sets):     []   Standing Ther Ex (reps/sets):     []   Other:      Comments:      Patient/Caregiver Education and Training:   []   [employer] Equipment Use  []   Bed Mobility/Transfer Technique/Safety  [x]   Energy Conservation Tips  []   Family training  []   Postural Awareness  [x]   Safety During Functional Activities  []   Reinforced Patient's Precautions   []   Progress was updated and reviewed in Rehabtracker with patient and/or family this         date.     Treatment Plan for Next Session: cont OT POC      Assessment: This pt demonstrated a positive response to today's treatment as evidenced by completion of therapy session. The patient is making good progress toward established goals as evidenced by QI scores. Ongoing deficits are observed in the areas of decreased endurance, c/o dizziness and weakness as a result of decreased BP and continued focus on these areas to increase pt's independence c ADLs is recommended. Treatment/Activity Tolerance:   [x] Tolerated treatment with no adverse effects    [] Patient limited by fatigue  [] Patient limited by pain   [] Patient limited by medical complications:    [] Adverse reaction to Tx:   [] Significant change in status    Safety:       []  bed alarm set    []  chair alarm set    []  Pt refused alarms                []  Telesitter activated      [x]  Gait belt used during tx session      [x]other: Pt handoff to RN Yvonne       Number of Minutes/Billable Intervention  Therapeutic Exercise    ADL Self-care 30   Neuro Re-Ed    Therapeutic Activity    Group    Other:    TOTAL 30       Social History  Social/Functional History  Lives With: Alone  Type of Home: House  Home Layout: One level (Pt has a basement with a chair lift; family room is in the basement)  Home Access: Stairs to enter with rails  Entrance Stairs - Number of Steps: 4 steps  Entrance Stairs - Rails: Both  Bathroom Shower/Tub: Tub/Shower unit, Shower chair with back  [employer]: Standard  Bathroom Equipment: Grab bars in shower, Grab bars around toilet (has grab bars all the way around the bathroom (by the sink, by tub, and door))  Bathroom Accessibility: Not accessible  Home Equipment: Grab bars, Lift chair, Cane (recliner that lifts all the way up)  Receives Help From:  (sons live nearby but work full time.  does not feel anyone would be able to assist her at home)  ADL Assistance: [address]: Needs assistance  Meal Prep: (IND)  Laundry:  (IND)  Vacuuming: Total  Cleaning: Maximal  Gardening: Total  Yard Work: Total  Driving:  (IND)  Shopping: Moderate  Homemaking Responsibilities: Yes  Meal Prep Responsibility: Primary  Laundry Responsibility: Primary  Cleaning Responsibility: Secondary (grandson comes every other week to clean)  Bill Paying/Finance Responsibility: Primary  Shopping Responsibility: No (grandson does once a week)  Health Care Management: Primary  Ambulation Assistance: Independent (pt did not use assistive device)  Transfer Assistance: Independent  Active : Yes  Mode of Transportation: Car  Education: 12th grade and some business college. Occupation: Retired  Type of occupation: Retired   Leisure & Hobbies: Gets hair done 1x week, cooking, running errors. Jew, OP therapy. Meds are managed via pill bottles lined up in a cabinet drawer. Pt does report a few times that she's gotten confused with them. Finances are managed via auto deposit. Mendel Mohsen assists as needed. Also comes every Saturday to assist with any projects needed. IADL Comments: Pt reports she likes structure; recently lost her dtr and spouse. Pt notes memory changes PTA. Pt tries to rearrange items and organize items to assist with memory. [facility]uteers at [facility] and [facility]. Additional Comments: Pt sleeps both in low flat bed and tall 4 poster bed depending on how she feels. Has had one fall where she bumped her head and she had stayed in the lower bed since then. Pt has had at least 2 falls in past year.  no inury    Objective                                                                                    Goals:  (Update in navigator)  Short term goals  Time Frame for Short term goals: LTGs= STGs:  Long term goals  Time Frame for Long term goals : 3- 5 day or until d/c  Long term goal 1: Pt will complete total body bathing c Mod I and use of AE prn by d/c  Long term goal 2: Pt will complete total body dressing (UB/LB/Footwear) c SUP by d/c  Long term goal 3: Pt will complete toileting c MOD I by d/c  Long term goal 4: Pt will complete functional transfer (toilet, tub, shower) c MOD I by d/c. Long term goal 5: Pt will complete grooming/oral care task c IND by d/c  Long term goals 6: Pt will perform therex/therax to facilitate increased strength/endurance/ax tolerance (c emphasis on dynamic standing balance/tolerance >8 mins ) c MOD I in order to complete ADLs. :        Plan of Care                                                                              Times per week: 5 days per week for a minimum of 60 minutes/day plus group as appropriate for 60 minutes.   Treatment to include Plan  Times per week: 5-6x  Times per day: Daily  Current Treatment Recommendations: Strengthening, Balance Training, Endurance Training, Pain Management, Other (comment), Home Management Training, Positioning, Safety Education & Training, Equipment Evaluation, Education, & procurement, Functional Mobility Training, ROM, Patient/Caregiver Education & Training    Electronically signed by   Belkys Sánchez OT,  11/26/2021, 08:30 AM I will SWITCH the dose or number of times a day I take the medications listed below when I get home from the hospital:  None

## 2021-11-26 NOTE — PROGRESS NOTES
CARDIOLOGY  NOTE        Name:  Luis Armando Moses /Age/Sex: 1935  (82 y.o. female)   MRN & CSN:  [id number] & [id number] Admission Date/Time: 2021  5:15 PM   [facility]:  [facility] PCP: Rob Mijares MD       Hospital Day: 10        PLAN FROM CARDIOLOGY FOR TODAY:   ambulate      - cardiology consult is for:  Orthostatic hypotension    -  Interval history:  Feels better, no dizziness    · ASSESSMENT/ PLAN:  1. Orthostatic hypotension: better on mestinon CPM  2. HTN bp was elevated , cont to monitor  3. CAD stable  4. Risk factor modification  5. Recent cardiolite reviewed  6. Will fu as needed          Subjective: Todays complain: Patient  Feels better    HPI:  Merritt Freedman is a 82 y. o.year old who and presents with had no chief complaint listed for this encounter. No chief complaint on file. Objective: Temperature:  Current - Temp: 97.5 °F (36.4 °C);  Max - Temp  Av.9 °F (36.6 °C)  Min: 97.5 °F (36.4 °C)  Max: 98.1 °F (36.7 °C)    Respiratory Rate : Resp  Av  Min: 16  Max: 20    Pulse Range: Pulse  Av.3  Min: 73  Max: 77    Blood Presuure Range:  Systolic (06JRI), PZW:889 , Min:116 , JPU:951   ; Diastolic (04SVI), CLAUDIA:70, Min:44, Max:87      Pulse ox Range: SpO2  Av.7 %  Min: 96 %  Max: 98 %    24hr I & O:  No intake or output data in the 24 hours ending 21 0655      BP (!) 160/87   Pulse 76   Temp 97.5 °F (36.4 °C) (Oral)   Resp 20   Ht 5' 3\" (1.6 m)   Wt 135 lb 1.6 oz (61.3 kg)   SpO2 96%   BMI 23.93 kg/m²           Review of Systems:  All 14 systems reviewed, all negative       TELEMETRY: not on    has a past medical history of Arthritis of shoulder region, right, Blind right eye, Chronic depression, DVT (deep venous thrombosis) ([address] Former smoker, History of echocardiogram, History of stress test, Hx of Doppler ultrasound, Hyperlipidemia, Hypertension, Macular degeneration, Mild cognitive impairment, MVP (mitral valve prolapse), Osteoporosis, Pancytopenia, Peptic ulcer disease, Peripheral vascular disease ([address] Prediabetes, Recurrent ventral incisional hernia, S/P CABG x 3, Spigelian hernia, Supraventricular tachycardia ([address] and [place]. has a past surgical history that includes Cholecystectomy; Coronary artery bypass graft (8/2009); Ureter surgery; Shoulder arthroscopy (Right, 2003); Tubal ligation; and Cataract removal (Right, 2010). Physical Exam:  General:  Awake, alert, NAD  Head:normal  Eye:normal  Neck:  No JVD   Chest:  Clear to auscultation, respiration easy  Cardiovascular:  RRR S1S2  Abdomen:   nontender  Extremities:  no edema  Pulses; palpable  Neuro: grossly normal    Medications:    pantoprazole  40 mg Oral QAM AC    Icosapent Ethyl  1,000 mg Oral BID    pyridostigmine  30 mg Oral 3 times per day    sodium chloride flush  10 mL IntraVENous 2 times per day    sertraline  25 mg Oral Nightly    fluticasone  1 spray Each Nostril Daily    aspirin  81 mg Oral Daily    divalproex  250 mg Oral Nightly    gabapentin  300 mg Oral TID    ketotifen  1 drop Both Eyes BID    verapamil  90 mg Oral BID    rosuvastatin  10 mg Oral Nightly    enoxaparin  40 mg SubCUTAneous Daily      sodium chloride       sodium chloride flush, sodium chloride, loperamide, ondansetron **OR** ondansetron, acetaminophen, polyethylene glycol, magnesium hydroxide    Lab Data:  CBC: No results for input(s): WBC, HGB, HCT, MCV, PLT in the last 72 hours. BMP: No results for input(s): NA, K, CL, CO2, PHOS, BUN, CREATININE in the last 72 hours. Invalid input(s): CA  LIVER PROFILE: No results for input(s): AST, ALT, LIPASE, BILIDIR, BILITOT, ALKPHOS in the last 72 hours. Invalid input(s): AMYLASE,  ALB  PT/INR: No results for input(s): PROTIME, INR in the last 72 hours. APTT: No results for input(s): APTT in the last 72 hours. BNP:  No results for input(s): BNP in the last 72 hours.   TROPONIN: @TROPONINI:3@  Labs, consult, tests reviewed    Assessment/ PLAN:    As above                  Shandra Jha MD, MD 11/26/2021 6:55 AM

## 2021-11-26 NOTE — PROGRESS NOTES
Physical Therapy      [x] daily progress note       [] discharge       Patient Name:  Kye Burrell   :  1935 MRN: [id number]  Room:  [address] [place], [place] [address] Date of Admission: 2021  Rehabilitation Diagnosis:   Hypo-osmolality and hyponatremia [E87.1]  Hyponatremia [E87.1]       Date 2021       Day of ARU Week:  3   Time IN/OUT 5927-1544   Individual Tx Minutes 60   TOTAL Tx Time Mins 60   Variance Time    Variance Time []   Refusal due to:     []   Medical hold/reason:    []   Illness   []   Off Unit for test/procedure  []   Extra time needed to complete task  []   Therapeutic need  []   Other (specify):   Restrictions Restrictions/Precautions  Restrictions/Precautions: General Precautions, Fall Risk      Communication with other providers: [x]   OK to see per nursing:     []   Spoke with team member regarding:      Subjective observations and cognitive status: Pt up in recliner, agreeable to session, nsg in for VS and medication; HR 63, /83. During amb pt states to therapist \"I like going to the grocery store because I feel steadier pushing the cart. \" Pt also stated \"its a pride issue\" with using RW and states \"I know that's a problem\". Discussed with pt using correct AD to promote independence at discharge, and discussed pt's current balance deficits using SPC and ADITI required, discussing she is not yet indep using SPC and A. Pt did agree to a RW at discharge at needed. Reported to pt C PT can continue to progress pt with balance and strengthening. Pain level/location: 0/10       Location:    Discharge recommendations  Anticipated discharge date:    Destination: []??home alone   [x]? ?home alone with assist PRN (at baseline was 1x per week by chika)    []? ? home w/ family      []? ? Continuous supervision  []? ?SNF    []? ? Assisted living     []? ? Other:  Continued therapy: [x]? ?Billie Nelson PT  []??OUTPATIENT  PT   []? ? No Further PT  []? ?SNF PT  Caregiver training recommended: []? ?Yes  []? ? No Equipment needs: straight cane     Bed Mobility:           [x]   Pt received out of bed     Transfers:    Sit--> Stand:  SB-close supervision  Stand --> Sit:   SB-close supervision  Cues for safety with hand placement using AD with 50% carryover  Stand-Pivot:   SBA  Assistive device required for transfer:   [facility]    Gait:    Distance:  385'+ 220'/ 534'+62'  Assistance:  SBA-CGA until significant LOB requiring mod A to recover  Device:  SPC  Gait Quality:   mildly unsteady with first turn to L with pt crossing L LE across midline, once cues to widen AMA, improved balance with turns. Occasional varying stance times and speeds. On second amb trial, pt with increased unsteadiness req SB-cga and pt with LOB when turning head for conversation req mod A for recovery due to lean against wall to the R. Amb with RW, pt req Supervision with improved equal stance times, step width, and balance.    ow rugs          [] Outdoor pavements      [] Grass          [] Loose gravel   [] Carpet      []  Other:       Additional Therapeutic activities/exercises completed this date:     [x]   Nu-step:  Time:11-30\"        Level: 3        #Steps: 458; pt with gfood tolerance to ax with VS WNL      []   Rebounder:    []  Seated     []  Standing        []   Balance training         []   Postural training    []   Supine ther ex (reps/sets):     []   Seated ther ex (reps/sets):     []   Standing ther ex (reps/sets):     []   Picked up object from floor                       []   Reacher used   []   Other:   []   Other:   []   Other:      Patient/Caregiver Education and Training:   [x]   Bed Mobility/Transfer technique/safety  [x]   Gait technique/sequencing  [x]   Proper use of assistive device  []   Advanced mobility safety and technique  []   Reinforced patient's precautions with mobility/functional tasks  []   Postural awareness  []   Family training  []   Other:    Treatment Plan for Next Session: gait progression, dynamic balance    Treatment/Activity Tolerance:   [x] Tolerated treatment with no adverse effects    [] Patient limited by fatigue  [] Patient limited by pain   [] Patient limited by medical complications:    [] Adverse reaction to Tx:   [] Significant change in status    Safety:       []  bed alarm set    [x]  chair alarm set    []  Pt refused alarms                []  Telesitter activated      [x]  Gait belt used during tx session      []other:         Number of Minutes/Billable Intervention  Gait Training 37   Therapeutic Exercise 12   Neuro Re-Ed    Therapeutic Activity 11   Wheelchair Propulsion    Group    Other:    TOTAL 60         Social History  Social/Functional History  Lives With: Alone  Type of Home: House  Home Layout: One level (Pt has a basement with a chair lift; family room is in the basement)  Home Access: Stairs to enter with rails  Entrance Stairs - Number of Steps: 4 steps  Entrance Stairs - Rails: Both  Bathroom Shower/Tub: Tub/Shower unit, Shower chair with back  [address]: Standard  Bathroom Equipment: Grab bars in shower, Grab bars around toilet (has grab bars all the way around the bathroom (by the sink, by tub, and door))  Bathroom Accessibility: Not accessible  Home Equipment: Grab bars, Lift chair, Cane (recliner that lifts all the way up)  Receives Help From:  (sons live nearby but work full time. does not feel anyone would be able to assist her at home)  ADL Assistance: [address] [place] [address] Avenue: Needs assistance  Meal Prep:  (IND)  Laundry:  (IND)  Vacuuming: Total  Cleaning: Maximal  Gardening: Total  Yard Work: Total  Driving:  (IND)  Shopping:  Moderate  Homemaking Responsibilities: Yes  Meal Prep Responsibility: Primary  Laundry Responsibility: Primary  Cleaning Responsibility: Secondary (grandson comes every other week to clean)  Bill Paying/Finance Responsibility: Primary  Shopping Responsibility: No (grandson does once a week)  Health Care Management: Primary  Ambulation Assistance: Independent (pt did not use assistive device)  Transfer Assistance: Independent  Active : Yes  Mode of Transportation: Car  Education: 12th grade and some business college. Occupation: Retired  Type of occupation: Retired   Leisure & Hobbies: Gets hair done 1x week, cooking, running errors. [employer], OP therapy. Meds are managed via pill bottles lined up in a cabinet drawer. Pt does report a few times that she's gotten confused with them. Finances are managed via auto deposit. Kathya Luis assists as needed. Also comes every Saturday to assist with any projects needed. IADL Comments: Pt reports she likes structure; recently lost her dtr and spouse. Pt notes memory changes PTA. Pt tries to rearrange items and organize items to assist with memory. [facility]uteers at [facility] and [facility]. Additional Comments: Pt sleeps both in low flat bed and tall 4 poster bed depending on how she feels. Has had one fall where she bumped her head and she had stayed in the lower bed since then. Pt has had at least 2 falls in past year.  no inury    Objective                                                                                    Goals:  (Update in navigator)  Short term goals  Time Frame for Short term goals: 7 days STG=LTG  Short term goal 1: Pt will perform all bed mobility with mod I  Short term goal 2: Pt will perform sit to stand, pivot and car transfers  with mod I  Short term goal 3: Pt will ambulate  150  feet on level surfaces and 25' on unlevel surface with mod I   assist  using straight cane  Short term goal 4: Pt will ascend/descend curb step with   cane   and up to   12  steps with  rail(s) with   mod I  Short term goal 5: Pt will retrieve light item from floor with mod I   assist  using device as needed:   :        Plan of Care                                                                              Times per week: 5 days per week for a minimum of 60 minutes/day plus group as appropriate for 60 minutes.   Treatment to include Current Treatment Recommendations: Functional Mobility Training, IADL Training, Neuromuscular Re-education, Home Exercise Program, Equipment Evaluation, Education, & procurement, Transfer Training, Gait Training, Balance Training, Stair training, Patient/Caregiver Education & Training, Safety Education & Training    Electronically signed by   Dayanara Jha PTA #[id number]  11/26/2021, 8:57 AM

## 2021-11-26 NOTE — PROGRESS NOTES
Comprehensive Nutrition Assessment    Type and Reason for Visit:  Reassess    Nutrition Recommendations/Plan:   Continue regular diet with snacks as desires   Will continue to follow up during stay     Nutrition Assessment:  Meal intake remains % at most meals. Remains on regular diet with reasonable meal orders. Usually content with meals. Will continue to follow at low nutrition risk at this time. Malnutrition Assessment:  Malnutrition Status:  No malnutrition    Context:  Acute Illness       Estimated Daily Nutrient Needs:  Energy (kcal):  7979-1302 (25-30 hans/kg); Weight Used for Energy Requirements:  Current     Protein (g):  62-74 (1-1.2 g/kg); Weight Used for Protein Requirements:  Current        Fluid (ml/day):  1310-7047; Method Used for Fluid Requirements:  1 ml/kcal      Nutrition Related Findings:  not available on visit today      Wounds:  None       Current Nutrition Therapies:    ADULT DIET;  Regular    Anthropometric Measures:  · Height: 5' 3\" (160 cm)  · Current Body Weight: 135 lb 2.3 oz (61.3 kg)   · Usual Body Weight: 137 lb (62.1 kg) (MD office in August)     · Ideal Body Weight: 115 lbs; % Ideal Body Weight 119 %   · BMI: 23.9  · Adjusted Body Weight:  ; No Adjustment   · BMI Categories: Normal Weight (BMI 22.0 to 24.9) age over 72       Nutrition Diagnosis:   · No nutrition diagnosis at this time related to   as evidenced by        Nutrition Interventions:   Food and/or Nutrient Delivery:  Continue Current Diet, Snacks (Comment)  Nutrition Education/Counseling:  No recommendation at this time   Coordination of Nutrition Care:  Continue to monitor while inpatient    Goals:  Patient will continue to consume at least 50-75% at meals durign stay       Nutrition Monitoring and Evaluation:   Behavioral-Environmental Outcomes:  None Identified   Food/Nutrient Intake Outcomes:  Food and Nutrient Intake  Physical Signs/Symptoms Outcomes:  Biochemical Data, Meal Time Behavior, Skin, Weight Discharge Planning:    Continue current diet     Electronically signed by Balbir Price RD, LD on 11/26/21 at 2:43 PM EST    Contact: [phone]

## 2021-11-26 NOTE — PROGRESS NOTES
Physical Rehabilitation: OCCUPATIONAL THERAPY     [x] daily progress note       [] discharge       Patient Name:  Rosa Elena Reyes   :  1935 MRN: [id number]  [facility] Date of Admission: 2021    Rehabilitation Diagnosis:     Hypo-osmolality and hyponatremia [E87.1]  Hyponatremia [E87.1]    Social History  Social/Functional History  Lives With: Alone  Type of Home: House  Home Layout: One level (Pt has a basement with a chair lift; family room is in the basement)  Home Access: Stairs to enter with rails  Entrance Stairs - Number of Steps: 4 steps  Entrance Stairs - Rails: Both  Bathroom Shower/Tub: Tub/Shower unit, Shower chair with back  [employer]: Standard  Bathroom Equipment: Grab bars in shower, Grab bars around toilet (has grab bars all the way around the bathroom (by the sink, by tub, and door))  Bathroom Accessibility: Not accessible  Home Equipment: Grab bars, Lift chair, Cane (recliner that lifts all the way up)  Receives Help From:  (sons live nearby but work full time. does not feel anyone would be able to assist her at home)  ADL Assistance: [address]: Needs assistance  Meal Prep:  (IND)  Laundry:  (IND)  Vacuuming: Total  Cleaning: Maximal  Gardening: Total  Yard Work: Total  Driving:  (IND)  Shopping: Moderate  Homemaking Responsibilities: Yes  Meal Prep Responsibility: Primary  Laundry Responsibility: Primary  Cleaning Responsibility: Secondary (grandson comes every other week to clean)  Bill Paying/Finance Responsibility: Primary  Shopping Responsibility: No (grandson does once a week)  Health Care Management: Primary  Ambulation Assistance: Independent (pt did not use assistive device)  Transfer Assistance: Independent  Active : Yes  Mode of Transportation: Car  Education: 12th grade and some business college. Occupation: Retired  Type of occupation: Retired   Leisure & Hobbies: Gets hair done 1x week, cooking, running errors.  Baptism, OP therapy. Meds are managed via pill bottles lined up in a cabinet drawer. Pt does report a few times that she's gotten confused with them. Finances are managed via auto deposit. Adamchina Mascorro assists as needed. Also comes every Saturday to assist with any projects needed. IADL Comments: Pt reports she likes structure; recently lost her dtr and spouse. Pt notes memory changes PTA. Pt tries to rearrange items and organize items to assist with memory. [facility] at [facility] and [facility]. Additional Comments: Pt sleeps both in low flat bed and tall 4 poster bed depending on how she feels. Has had one fall where she bumped her head and she had stayed in the lower bed since then. Pt has had at least 2 falls in past year. no inury    Objective                                                                                    Goals:  (Update in navigator)    Short term goals  Time Frame for Short term goals: LTGs= STGs :  Long term goals  Time Frame for Long term goals : 3- 5 day or until d/c  Long term goal 1: Pt will complete total body bathing c Mod I and use of AE prn by d/c  Long term goal 2: Pt will complete total body dressing (UB/LB/Footwear) c SUP by d/c  Long term goal 3: Pt will complete toileting c MOD I by d/c  Long term goal 4: Pt will complete functional transfer (toilet, tub, shower) c MOD I by d/c. Long term goal 5: Pt will complete grooming/oral care task c IND by d/c  Long term goals 6: Pt will perform therex/therax to facilitate increased strength/endurance/ax tolerance (c emphasis on dynamic standing balance/tolerance >8 mins ) c MOD I in order to complete ADLs. :    Plan of Care:  Pt to be seen at least  5x per week for a minimum of 60 minutes as appropriate.               Patient Education: Pt education on walker managment and safety training when completnig ADLs           Date  11/26/2021   TIMES IN/OUT   1100/1200   Group Tx Minutes 60   TOTAL Tx time seen 60   Variance Time Variance Reason    [] Refusal due to   [] Medical hold/reason  [] Illness   [] Off Unit for test/procedure  [] Extra time needed to complete task  [] Other (specify)   Restrictions/Precautions Restrictions/Precautions  Restrictions/Precautions: General Precautions, Fall Risk         Current Diet/Swallowing Issues ADULT DIET; Regular   Communication with other providers: [x] Ok to see per nursing at morning huddle  [] Medical hold and reason  [] Spoke with (team    member) regarding   Subjective observations: Pt agreeable to group session. Pain level/location: No complaints of pain    Alarm placed/where? Fall Risk  [] Pt taken to DR for lunch with nsg present   [x] Pt taken back to room with chair/bed alarm in place         Group Activity:  Total time in group = 60min   Exercise / Riky Chi Group: Nicholasrenea Palmer participated in exercise and discussion on benefits and precautions during exercise. This provided the opportunity to have fun, build camaraderie, increase endurance, improve breathing techniques, balance, and strength. Manjula Lat performed a variety of seated and standing activities as able, with focus on technique and safety during exercise. Also focused on warm-up, warm-down, endurance monitoring, strengthening & strategies, function, safety, and general social & communication opportunities with other group members.       Functional areas targeted:   [] Communication [] Memory  [x] Coordination    [] Kitchen Safety [] Problem Solving  [x] Strengthening     [] Attention  [x] Endurance   [] Mobility    [] Awareness/Insight [] Balance  [] Transfers  [x] Social/Pragmatics [] Sequencing  [] Equipment Use    [x] Safety   [] Other (specify)     Participation:  [x] Actively participated              Assessment / Impression                                                          Treatment/Activity Tolerance:   [x] Tolerated Treatment well:     [] Patient limited by fatigue/pain:       [] Patient limited by medical complications:    [] Adverse Reaction to Tx:   [] Significant change in status    Number of Minutes/Billable Intervention  Therapeutic Exercise    ADL Self-care    Neuro Re-Ed    Therapeutic Activity    Group 60   Other:    TOTAL 60     Electronically signed by   MAGALIS Krause,    11/26/2021, 7:35 AM

## 2021-11-27 PROCEDURE — 6360000002 HC RX W HCPCS: Performed by: PHYSICAL MEDICINE & REHABILITATION

## 2021-11-27 PROCEDURE — 6370000000 HC RX 637 (ALT 250 FOR IP): Performed by: PHYSICAL MEDICINE & REHABILITATION

## 2021-11-27 PROCEDURE — 94761 N-INVAS EAR/PLS OXIMETRY MLT: CPT

## 2021-11-27 PROCEDURE — 6370000000 HC RX 637 (ALT 250 FOR IP): Performed by: INTERNAL MEDICINE

## 2021-11-27 PROCEDURE — 1280000000 HC REHAB R&B

## 2021-11-27 RX ADMIN — ASPIRIN 81 MG: 81 TABLET, COATED ORAL at 08:55

## 2021-11-27 RX ADMIN — KETOTIFEN FUMARATE 1 DROP: 0.35 SOLUTION/ DROPS OPHTHALMIC at 08:55

## 2021-11-27 RX ADMIN — SERTRALINE HYDROCHLORIDE 25 MG: 50 TABLET ORAL at 21:40

## 2021-11-27 RX ADMIN — FLUTICASONE PROPIONATE 1 SPRAY: 50 SPRAY, METERED NASAL at 08:56

## 2021-11-27 RX ADMIN — VERAPAMIL HYDROCHLORIDE 90 MG: 180 TABLET, FILM COATED, EXTENDED RELEASE ORAL at 08:56

## 2021-11-27 RX ADMIN — GABAPENTIN 300 MG: 300 CAPSULE ORAL at 21:40

## 2021-11-27 RX ADMIN — PYRIDOSTIGMINE BROMIDE 30 MG: 60 TABLET ORAL at 13:06

## 2021-11-27 RX ADMIN — VERAPAMIL HYDROCHLORIDE 90 MG: 180 TABLET, FILM COATED, EXTENDED RELEASE ORAL at 21:39

## 2021-11-27 RX ADMIN — ENOXAPARIN SODIUM 40 MG: 100 INJECTION SUBCUTANEOUS at 08:55

## 2021-11-27 RX ADMIN — PYRIDOSTIGMINE BROMIDE 30 MG: 60 TABLET ORAL at 06:08

## 2021-11-27 RX ADMIN — ROSUVASTATIN CALCIUM 10 MG: 5 TABLET, COATED ORAL at 21:40

## 2021-11-27 RX ADMIN — GABAPENTIN 300 MG: 300 CAPSULE ORAL at 13:06

## 2021-11-27 RX ADMIN — DIVALPROEX SODIUM 250 MG: 250 TABLET, FILM COATED, EXTENDED RELEASE ORAL at 21:40

## 2021-11-27 RX ADMIN — PYRIDOSTIGMINE BROMIDE 30 MG: 60 TABLET ORAL at 21:40

## 2021-11-27 RX ADMIN — GABAPENTIN 300 MG: 300 CAPSULE ORAL at 08:55

## 2021-11-27 RX ADMIN — PANTOPRAZOLE SODIUM 40 MG: 40 TABLET, DELAYED RELEASE ORAL at 06:08

## 2021-11-27 RX ADMIN — KETOTIFEN FUMARATE 1 DROP: 0.35 SOLUTION/ DROPS OPHTHALMIC at 21:41

## 2021-11-27 RX ADMIN — ONDANSETRON 4 MG: 4 TABLET, ORALLY DISINTEGRATING ORAL at 10:40

## 2021-11-27 NOTE — PROGRESS NOTES
Claudio Ganser    : 1935  Acct #: [de-identified]  MRN: [id number]              PM&R Progress Note      Admitting diagnosis: Hyponatremia ( [address])     Comorbid diagnoses impacting rehabilitation: Generalized weakness, gait disturbance, essential hypertension, PAD, HLD, macular degeneration, trigeminal neuralgia, history of DVT, bilateral hydroureter     Chief complaint: Demands a B12 shot as she thinks it will be the solution for the majority of her weakness now. Prior (baseline) level of function: Independent. Current level of function:         Current  IRF-TAMARA and Goals:   Occupational Therapy:    Short term goals  Time Frame for Short term goals: LTGs= STGs :   Long term goals  Time Frame for Long term goals : 3- 5 day or until d/c  Long term goal 1: Pt will complete total body bathing c Mod I and use of AE prn by d/c  Long term goal 2: Pt will complete total body dressing (UB/LB/Footwear) c SUP by d/c  Long term goal 3: Pt will complete toileting c MOD I by d/c  Long term goal 4: Pt will complete functional transfer (toilet, tub, shower) c MOD I by d/c. Long term goal 5: Pt will complete grooming/oral care task c IND by d/c  Long term goals 6: Pt will perform therex/therax to facilitate increased strength/endurance/ax tolerance (c emphasis on dynamic standing balance/tolerance >8 mins ) c MOD I in order to complete ADLs.  :                                       Eating: Eating  Assistance Needed: Independent  Comment: Pt sitting EOB eating on arrival  CARE Score: 6  Discharge Goal: Independent       Oral Hygiene: Oral Hygiene  Assistance Needed: Independent  Comment: performed seated 2* hypotension  CARE Score: 6  Discharge Goal: Independent    UB/LB Bathing: Shower/Bathe Self  Assistance Needed: Supervision or touching assistance  Comment: majority performed seated, continues to demo poor memory and problem solving for shower controls  CARE Score: 4  Discharge Goal: Independent    UB Dressing: Upper Body Sit to Stand  Assistance Needed: Supervision or touching assistance  Comment: SB-CGA, initial unsteadiness with SPC  CARE Score: 4  Discharge Goal: Independent  Chair/Bed-to-Chair Transfer  Assistance Needed: Supervision or touching assistance  Comment: SB-CGA due to initial unsteadiness  CARE Score: 4  Discharge Goal: Independent  Toilet Transfer  Assistance Needed: Supervision or touching assistance  Comment: SB-CGA for balance with SPC and grab bar  CARE Score: 4  Car Transfer  Assistance Needed: Supervision or touching assistance  CARE Score: 4  Discharge Goal: Independent    Ambulation:    Walking Ability  Does the Patient Walk?: Yes     Walk 10 Feet  Assistance Needed: Supervision or touching assistance  Comment: CG-Min A due to LOB. with SPC amb to BR. CARE Score: 4  Discharge Goal: Independent     Walk 50 Feet with Two Turns  Assistance Needed: Supervision or touching assistance  Comment: Attempted amb but unable this AM due to pt with decreased BP 86/53 in standing and symptomatic. /59 in sitting with sx's subsiding.    Reason if not Attempted: Not attempted due to medical condition or safety concerns  CARE Score: 88  Discharge Goal: Independent     Walk 150 Feet  Assistance Needed: Supervision or touching assistance  Comment: CG  CARE Score: 4  Discharge Goal: Independent     Walking 10 Feet on Uneven Surfaces  Assistance Needed: Partial/moderate assistance  Comment: pt with no awareness of surface changes requiring mod assist to recover balance x 2 despite training mid task  CARE Score: 3  Discharge Goal: Independent     1 Step (Curb)  Comment: CG  Discharge Goal: Independent     4 Steps  Assistance Needed: Supervision or touching assistance  Comment: SBA with B rails, reciprocating gait, decreased eccentric control at times with RLE  CARE Score: 4  Discharge Goal: Independent     12 Steps  Assistance Needed: Supervision or touching assistance  CARE Score: 4  Discharge Goal: with a walker and [facility] OT/PT is 11/30/2021. Recommendations:    1. Hyponatremia with generalized weakness: Inconsistent engagement in the daily occupational and physical therapy.  She has demonstrated self-limiting behaviors. Vital signs have stabilized with her Mestinon therapy. Continue with adaptive equipment training, caregiver education and bowel and bladder retraining. Gonzales Montero must provide aggressive pulmonary hygiene measures, cautious pain management and nutritional support.  Encouraging oral fluid intake.  Periodic monitoring of her chemistries, sodium up to 137 recently.  Cautious use of any diuretics.  Verbal cues and CGA for transfers. B12 shot ordered. 2. DVT prophylaxis: Lovenox 40 mg subcu daily.  I must monitor her hemoglobin and platelet count while on this medication periodically.  GI prophylaxis offered.  Weightbearing activities are pursued daily.  No clinical signs of blood loss.  Her hemoglobin was steady at 10.2.  3. Bilateral hydroureter: No current symptoms of obstruction.  Outpatient follow-up with urology. 4. Hypertension: Recently the patient has been maintained on minimal antihypertensives.  Target systolic blood pressure is 120-140.  Monitoring vital signs at rest and with activity.  Blood pressure has been low when she stands.      She seemed to tolerate the IV fluids given for her orthostasis. Cardiology started Mestinon. Patient has been convinced to be compliant with that prescription.    5. Trigeminal neuralgia: Vascepa, Neurontin and Depakote.  Acetaminophen and other oral analgesics as needed.  Zoloft may be helping sleep.

## 2021-11-28 PROCEDURE — 97110 THERAPEUTIC EXERCISES: CPT

## 2021-11-28 PROCEDURE — 6370000000 HC RX 637 (ALT 250 FOR IP): Performed by: PHYSICAL MEDICINE & REHABILITATION

## 2021-11-28 PROCEDURE — 6370000000 HC RX 637 (ALT 250 FOR IP): Performed by: INTERNAL MEDICINE

## 2021-11-28 PROCEDURE — 94150 VITAL CAPACITY TEST: CPT

## 2021-11-28 PROCEDURE — 94761 N-INVAS EAR/PLS OXIMETRY MLT: CPT

## 2021-11-28 PROCEDURE — 97530 THERAPEUTIC ACTIVITIES: CPT

## 2021-11-28 PROCEDURE — 97116 GAIT TRAINING THERAPY: CPT

## 2021-11-28 PROCEDURE — 1280000000 HC REHAB R&B

## 2021-11-28 PROCEDURE — 6360000002 HC RX W HCPCS: Performed by: PHYSICAL MEDICINE & REHABILITATION

## 2021-11-28 PROCEDURE — 97535 SELF CARE MNGMENT TRAINING: CPT

## 2021-11-28 RX ADMIN — GABAPENTIN 300 MG: 300 CAPSULE ORAL at 20:49

## 2021-11-28 RX ADMIN — PYRIDOSTIGMINE BROMIDE 30 MG: 60 TABLET ORAL at 06:04

## 2021-11-28 RX ADMIN — ROSUVASTATIN CALCIUM 10 MG: 5 TABLET, COATED ORAL at 20:49

## 2021-11-28 RX ADMIN — FLUTICASONE PROPIONATE 1 SPRAY: 50 SPRAY, METERED NASAL at 09:24

## 2021-11-28 RX ADMIN — ENOXAPARIN SODIUM 40 MG: 100 INJECTION SUBCUTANEOUS at 09:24

## 2021-11-28 RX ADMIN — KETOTIFEN FUMARATE 1 DROP: 0.35 SOLUTION/ DROPS OPHTHALMIC at 09:26

## 2021-11-28 RX ADMIN — PANTOPRAZOLE SODIUM 40 MG: 40 TABLET, DELAYED RELEASE ORAL at 06:04

## 2021-11-28 RX ADMIN — GABAPENTIN 300 MG: 300 CAPSULE ORAL at 13:19

## 2021-11-28 RX ADMIN — KETOTIFEN FUMARATE 1 DROP: 0.35 SOLUTION/ DROPS OPHTHALMIC at 20:49

## 2021-11-28 RX ADMIN — VERAPAMIL HYDROCHLORIDE 90 MG: 180 TABLET, FILM COATED, EXTENDED RELEASE ORAL at 09:30

## 2021-11-28 RX ADMIN — PYRIDOSTIGMINE BROMIDE 30 MG: 60 TABLET ORAL at 13:19

## 2021-11-28 RX ADMIN — DIVALPROEX SODIUM 250 MG: 250 TABLET, FILM COATED, EXTENDED RELEASE ORAL at 20:50

## 2021-11-28 RX ADMIN — PYRIDOSTIGMINE BROMIDE 30 MG: 60 TABLET ORAL at 20:49

## 2021-11-28 RX ADMIN — SERTRALINE HYDROCHLORIDE 25 MG: 50 TABLET ORAL at 20:49

## 2021-11-28 RX ADMIN — ASPIRIN 81 MG: 81 TABLET, COATED ORAL at 09:24

## 2021-11-28 RX ADMIN — ONDANSETRON 4 MG: 4 TABLET, ORALLY DISINTEGRATING ORAL at 13:22

## 2021-11-28 RX ADMIN — VERAPAMIL HYDROCHLORIDE 90 MG: 180 TABLET, FILM COATED, EXTENDED RELEASE ORAL at 20:51

## 2021-11-28 RX ADMIN — GABAPENTIN 300 MG: 300 CAPSULE ORAL at 09:24

## 2021-11-28 ASSESSMENT — PAIN SCALES - GENERAL
PAINLEVEL_OUTOF10: 0
PAINLEVEL_OUTOF10: 0

## 2021-11-28 NOTE — PROGRESS NOTES
Occupational Therapy  Physical Rehabilitation: OCCUPATIONAL THERAPY     [x] daily progress note       [] discharge       Patient Name:  Jeff Griffith   :  1935 MRN: [id number]  Room:  [address] [place], [place] [address] Date of Admission: 2021  Rehabilitation Diagnosis:   Hypo-osmolality and hyponatremia [E87.1]  Hyponatremia [E87.1]       Date 2021       Day of ARU Week:  5   Time IN/OUT 11:10- 1210   Individual Tx Minutes 60   Group Tx Minutes    Co-Treat Minutes    Concurrent Tx Minutes    TOTAL Tx Time Mins 60   Variance Time    Variance Time []   Refusal due to:     []   Medical hold/reason:    []   Illness   []   Off Unit for test/procedure  []   Extra time needed to complete task  []   Therapeutic need  []   Other (specify):   Restrictions Restrictions/Precautions  Restrictions/Precautions: General Precautions, Fall Risk      Communication with other providers: [x]   OK to see per nursing:     [x]   Spoke with team member regarding: Faith Hanna PTA updated this YU about Pt feeling \"woozy\" at end of PT session. Subjective observations and cognitive status: Pt resting in side lying upon COTAs arrival. Pt states \"honey, I'm sick. I don't think I can do this. \" Pt reports nausea and diarrhea. Pt is keeping a bucket near by in case of vomiting. With gentle encouragement, Pt agreeable to in room txtmnt. Declines bathing stating \"I can't even get out of this bed right now. \"      Pain level/location:    /10       Location: denies pain    Discharge recommendations  Anticipated discharge date:    Destination: []home alone   [x]home alone w assist prn [] home w/ family    [] Continuous supervision       []SNF            [] Assisted living     [] Other:   Continued therapy: [x]HHC OT  []OUTPATIENT  OT   [] No Further OT  Equipment needs: TBD    (HIT F2 to transition between stars)        Toileting:   Sup    Toilet Transfers:   CGA with cueing for safety r/t Pt's slight urgency   Device Used:    []   Standard Toilet [x]   Grab Bars           []  Bedside Commode       []   Elevated Toilet          []   Other:               Bed Mobility:           []   Pt received out of bed   Rolling R/L:  Indep  Scooting:  Indep  Supine --> Sit:  Indep  Sit --> Supine:  Indep     Transfers:    Sit--> Stand:  Sup  Stand --> Sit:   Sup  Stand-Pivot:   SBA-CGA  Other:    Assistive device required for transfer:   RW      Functional Mobility:    Assistance:  Close sup to/from bathroom  Device:   [x]   Rolling Walker     []   Standard Walker []   Wheelchair        []   [employer]       []   4-Wheeled Geneva Berger         []   Cardiac Walker       []   Other:            Additional Therapeutic activities/exercises completed this date:     [x]   ADL Training   [x]   Balance/Postural training Cueing r/t hand placement/safety sequencing during toileting task    [x]   Bed/Transfer Training   []   Endurance Training   []   Neuromuscular Re-ed   []   Nu-step:  Time:        Level:         #Steps:       []   Rebounder:    []  Seated     []  Standing        [x]   Supine Ther Ex (reps/sets): Therex to increase overall UB strength to support ADLs, func mob and overall ax dione. Use of 1# DBs to B maria eugenia 4 ways x10/2 (R to 85*), elbows x15/2 with intermittent resting to allow \"waves of nausea\" to pass. []   Seated Ther Ex (reps/sets):     []   Standing Ther Ex (reps/sets):     []   Other:      Comments:      Patient/Caregiver Education and Training:   []   Adaptive Equipment Use  [x]   Bed Mobility/Transfer Technique/Safety  [x]   Energy Conservation Tips  []   Family training  []   Postural Awareness  []   Safety During Functional Activities  []   Reinforced Patient's Precautions   []   Progress was updated and reviewed in Rehabtracker with patient and/or family this         date. Treatment Plan for Next Session: Cont POC per OTR. Assessment:   This pt demonstrated a fair response to today's treatment as evidenced by Pt feeling under the weather with nausea and diarrhea. Treatment/Activity Tolerance:   [] Tolerated treatment with no adverse effects    [] Patient limited by fatigue  [] Patient limited by pain   [x] Patient limited by medical complications: diarrhea and nausea    [] Adverse reaction to Tx:   [] Significant change in status    Safety:       [x]  bed alarm set    []  chair alarm set    []  Pt refused alarms                []  Telesitter activated      []  Gait belt used during tx session      []other:       Number of Minutes/Billable Intervention  Therapeutic Exercise 30   ADL Self-care 15   Neuro Re-Ed    Therapeutic Activity 15   Group    Other:    TOTAL 60       Social History  Social/Functional History  Lives With: Alone  Type of Home: House  Home Layout: One level (Pt has a basement with a chair lift; family room is in the basement)  Home Access: Stairs to enter with rails  Entrance Stairs - Number of Steps: 4 steps  Entrance Stairs - Rails: Both  Bathroom Shower/Tub: Tub/Shower unit, Shower chair with back  [employer]: Standard  Bathroom Equipment: Grab bars in shower, Grab bars around toilet (has grab bars all the way around the bathroom (by the sink, by tub, and door))  Bathroom Accessibility: Not accessible  Home Equipment: Grab bars, Lift chair, Cane (recliner that lifts all the way up)  Receives Help From:  (sons live nearby but work full time. does not feel anyone would be able to assist her at home)  ADL Assistance: [address] [place] [address] Avenue: Needs assistance  Meal Prep:  (IND)  Laundry:  (IND)  Vacuuming: Total  Cleaning: Maximal  Gardening: Total  Yard Work: Total  Driving:  (IND)  Shopping:  Moderate  Homemaking Responsibilities: Yes  Meal Prep Responsibility: Primary  Laundry Responsibility: Primary  Cleaning Responsibility: Secondary (grandson comes every other week to clean)  Bill Paying/Finance Responsibility: Primary  Shopping Responsibility: No (grandson does once a week)  Health Care Management: Primary  Ambulation Assistance: Independent (pt did not use assistive device)  Transfer Assistance: Independent  Active : Yes  Mode of Transportation: Car  Education: 12th grade and some business college. Occupation: Retired  Type of occupation: Retired   Leisure & Hobbies: Gets hair done 1x week, cooking, running errors. Zoroastrian, OP therapy. Meds are managed via pill bottles lined up in a cabinet drawer. Pt does report a few times that she's gotten confused with them. Finances are managed via auto deposit. Mendel Connolly assists as needed. Also comes every Saturday to assist with any projects needed. IADL Comments: Pt reports she likes structure; recently lost her dtr and spouse. Pt notes memory changes PTA. Pt tries to rearrange items and organize items to assist with memory. [facility] at [facility]. Additional Comments: Pt sleeps both in low flat bed and tall 4 poster bed depending on how she feels. Has had one fall where she bumped her head and she had stayed in the lower bed since then. Pt has had at least 2 falls in past year. no inury    Objective                                                                                    Goals:  (Update in navigator)  Short term goals  Time Frame for Short term goals: LTGs= STGs:  Long term goals  Time Frame for Long term goals : 3- 5 day or until d/c  Long term goal 1: Pt will complete total body bathing c Mod I and use of AE prn by d/c  Long term goal 2: Pt will complete total body dressing (UB/LB/Footwear) c SUP by d/c  Long term goal 3: Pt will complete toileting c MOD I by d/c  Long term goal 4: Pt will complete functional transfer (toilet, tub, shower) c MOD I by d/c.   Long term goal 5: Pt will complete grooming/oral care task c IND by d/c  Long term goals 6: Pt will perform therex/therax to facilitate increased strength/endurance/ax tolerance (c emphasis on dynamic standing balance/tolerance >8 mins ) c MOD I in order to complete ADLs.:        Plan of Care                                                                              Times per week: 5 days per week for a minimum of 60 minutes/day plus group as appropriate for 60 minutes.   Treatment to include Plan  Times per week: 5-6x  Times per day: Daily  Current Treatment Recommendations: Strengthening, Balance Training, Endurance Training, Pain Management, Other (comment), Home Management Training, Positioning, Safety Education & Training, Equipment Evaluation, Education, & procurement, Functional Mobility Training, ROM, Patient/Caregiver Education & Training    Electronically signed by   YOLANDA Calderón 48, [id number]  11/28/2021, 8:27 AM

## 2021-11-28 NOTE — PROGRESS NOTES
Physical Therapy      [x] daily progress note       [] discharge       Patient Name:  Saulo Love   :  1935 MRN: [id number]  Room:  [address] [place] [address] Date of Admission: 2021  Rehabilitation Diagnosis:   Hypo-osmolality and hyponatremia [E87.1]  Hyponatremia [E87.1]       Date 2021       Day of ARU Week:  5   Time IN/OUT 8175-8973   Individual Tx Minutes 60   TOTAL Tx Time Mins 60   Variance Time    Variance Time []   Refusal due to:     []   Medical hold/reason:    []   Illness   []   Off Unit for test/procedure  []   Extra time needed to complete task  []   Therapeutic need  []   Other (specify):   Restrictions Restrictions/Precautions  Restrictions/Precautions: General Precautions, Fall Risk      Communication with other providers: [x]   OK to see per nursing:     []   Spoke with team member regarding:      Subjective observations and cognitive status: Pt resting in bed, needing to go to the BR. Pt with urgency req safety cues. Pt wanting to don pants, socks and shoes before leaving room req time. Towards end of session, pt reports feeling \"woozy\". /59, also c/o nausea. Pt reports \"I think its my bowels. \"    Pain level/location: 0/10       Location:    Discharge recommendations  Anticipated discharge date:    Destination: []???home alone   [x]? ??home alone with assist PRN (at baseline was 1x per week by grandson)    []? ?? home w/ family      []? ?? Continuous supervision  []? ??SNF    []??? Assisted living     []? ?? Other:  Continued therapy: [x]? ? ? [facility] PT  []???OUTPATIENT  PT   []??? No Further PT  []???SNF PT  Caregiver training recommended: []? ? ? Yes  []??? No   Equipment needs: RW  Saulo Love requires the assistance of a wheeled walker to successfully ambulate from room to room at home to allow completion of daily living tasks such as: bathing, toileting, dressing and grooming.   A wheeled walker is necessary due to the patient's unsteady gait, upper body weakness, inability to  a standard walker. This patient can ambulate only by pushing a walker instead of using a lesser assistive device such as a cane or crutch. Bed Mobility:           []   Pt received out of bed   Rolling R/L:  Indep  Scooting:  Indep  Supine --> Sit:  Indep  Sit --> Supine:  Indep  Bed features used: [] Yes    [] HOB elevated      [] Bed rail                                    [x] No     Transfers:    Sit--> Stand:  Supervision   Stand --> Sit:   Supervision  Req mod-max cues for hand placement due to both hands on AD during transfer. Max safety cues due to urgency with bowel. Toilet transfer: Max safety cues due to urgency with bowel. Toileting: Supervision for balance with grab bar, indep for hygiene; Pt needing to toilet twice before leaving room req extra time.    Stand-Pivot:   SBA  Assistive device required for transfer:   RW    Gait:    Distance:  10'+ 292'  Assistance:  Supervision  Device:  RW  Gait Quality:   recip pattern, initial safety cues due to urgency to BR    Curb        Assistance:    SB-Supervision; min safety cues for AD management after demonstration x 4 trials  Supportive Device:  RW  Height:   4\"     Uneven Surfaces:       Assistance:    Supervision, able to demo safety with RW after demo provided  Device:    RW  Surfaces Completed:   [x] Carpet with bean bags beneath       [] Throw rugs          [] Outdoor pavements      [] Grass          [] Loose gravel   [] Carpet      []  Other:       Additional Therapeutic activities/exercises completed this date:     []   Nu-step:  Time:        Level:         #Steps:       []   Rebounder:    []  Seated     []  Standing        [x]   Balance training: functional standing task at sink for hand hygiene Supervision         []   Postural training    []   Supine ther ex (reps/sets):     []   Seated ther ex (reps/sets):     []   Standing ther ex (reps/sets):     []   Picked up object from floor                       []   Reacher used   []   Other:   [] Other:   []   Other:      Patient/Caregiver Education and Training:   [x]   Bed Mobility/Transfer technique/safety  [x]   Gait technique/sequencing  [x]   Proper use of assistive device  [x]   Advanced mobility safety and technique  []   Reinforced patient's precautions with mobility/functional tasks  []   Postural awareness  []   Family training  []   Other:    Treatment Plan for Next Session: QI next session       Treatment/Activity Tolerance:   [x] Tolerated treatment with no adverse effects    [] Patient limited by fatigue  [] Patient limited by pain   [] Patient limited by medical complications:    [] Adverse reaction to Tx:   [] Significant change in status    Safety:       []  bed alarm set    [x]  chair alarm set    []  Pt refused alarms                []  Telesitter activated      [x]  Gait belt used during tx session      []other:         Number of Minutes/Billable Intervention  Gait Training 30   Therapeutic Exercise    Neuro Re-Ed    Therapeutic Activity 30   Wheelchair Propulsion    Group    Other:    TOTAL 60         Social History  Social/Functional History  Lives With: Alone  Type of Home: House  Home Layout: One level (Pt has a basement with a chair lift; family room is in the basement)  Home Access: Stairs to enter with rails  Entrance Stairs - Number of Steps: 4 steps  Entrance Stairs - Rails: Both  Bathroom Shower/Tub: Tub/Shower unit, Shower chair with back  [employer]: Standard  Bathroom Equipment: Grab bars in shower, Grab bars around toilet (has grab bars all the way around the bathroom (by the sink, by tub, and door))  Bathroom Accessibility: Not accessible  Home Equipment: Grab bars, Lift chair, Cane (recliner that lifts all the way up)  Receives Help From:  (sons live nearby but work full time. does not feel anyone would be able to assist her at home)  ADL Assistance: [facility] [address]: Needs assistance  Meal Prep:  (IND)  Laundry:  (IND)  Vacuuming:  Total  Cleaning: Maximal  Gardening: Total  Yard Work: Total  Driving:  (IND)  Shopping: Moderate  Homemaking Responsibilities: Yes  Meal Prep Responsibility: Primary  Laundry Responsibility: Primary  Cleaning Responsibility: Secondary (grandson comes every other week to clean)  Bill Paying/Finance Responsibility: Primary  Shopping Responsibility: No (grandson does once a week)  Health Care Management: Primary  Ambulation Assistance: Independent (pt did not use assistive device)  Transfer Assistance: Independent  Active : Yes  Mode of Transportation: Car  Education: 12th grade and some business college. Occupation: Retired  Type of occupation: Retired   Leisure & Hobbies: Gets hair done 1x week, cooking, running errors. [employer], OP therapy. Meds are managed via pill bottles lined up in a cabinet drawer. Pt does report a few times that she's gotten confused with them. Finances are managed via auto deposit. [employer] assists as needed. Also comes every Saturday to assist with any projects needed. IADL Comments: Pt reports she likes structure; recently lost her dtr and spouse. Pt notes memory changes PTA. Pt tries to rearrange items and organize items to assist with memory. [facility]uteers at [facility] and [facility]. Additional Comments: Pt sleeps both in low flat bed and tall 4 poster bed depending on how she feels. Has had one fall where she bumped her head and she had stayed in the lower bed since then. Pt has had at least 2 falls in past year.  no inury    Objective                                                                                    Goals:  (Update in navigator)  Short term goals  Time Frame for Short term goals: 7 days STG=LTG  Short term goal 1: Pt will perform all bed mobility with mod I  Short term goal 2: Pt will perform sit to stand, pivot and car transfers  with mod I  Short term goal 3: Pt will ambulate  150  feet on level surfaces and 25' on unlevel surface with mod I assist  using straight cane  Short term goal 4: Pt will ascend/descend curb step with   cane   and up to   12  steps with  rail(s) with   mod I  Short term goal 5: Pt will retrieve light item from floor with mod I   assist  using device as needed:   :        Plan of Care                                                                              Times per week: 5 days per week for a minimum of 60 minutes/day plus group as appropriate for 60 minutes.   Treatment to include Current Treatment Recommendations: Functional Mobility Training, IADL Training, Neuromuscular Re-education, Home Exercise Program, Equipment Evaluation, Education, & procurement, Transfer Training, Gait Training, Balance Training, Stair training, Patient/Caregiver Education & Training, Safety Education & Training    Electronically signed by   Alissa Liao, PTA #[id number]  11/28/2021, 8:27 AM

## 2021-11-28 NOTE — PROGRESS NOTES
T/C to  Lab to check on stat H&H ordered by Dr. Gary Ramirez earlier in shift. Told by Jose Elias Lentz that we were supposed to draw as no phlebots in house until 0300. Not notified of this by lab; checked with nursing supervisor to be sure that we were to draw as we usually only do unit draws from [facility] , [facility], and accessed mediports. Advised to draw- will be drawn by Romana Crea .    Devaughn Escamilla RN

## 2021-11-29 ENCOUNTER — APPOINTMENT (OUTPATIENT)
Dept: GENERAL RADIOLOGY | Age: 86
DRG: 948 | End: 2021-11-29
Attending: PHYSICAL MEDICINE & REHABILITATION
Payer: MEDICARE

## 2021-11-29 ENCOUNTER — TELEPHONE (OUTPATIENT)
Dept: INTERNAL MEDICINE CLINIC | Age: 86
End: 2021-11-29

## 2021-11-29 PROCEDURE — 97535 SELF CARE MNGMENT TRAINING: CPT

## 2021-11-29 PROCEDURE — 6370000000 HC RX 637 (ALT 250 FOR IP): Performed by: INTERNAL MEDICINE

## 2021-11-29 PROCEDURE — 6370000000 HC RX 637 (ALT 250 FOR IP): Performed by: PHYSICAL MEDICINE & REHABILITATION

## 2021-11-29 PROCEDURE — 94761 N-INVAS EAR/PLS OXIMETRY MLT: CPT

## 2021-11-29 PROCEDURE — 99232 SBSQ HOSP IP/OBS MODERATE 35: CPT | Performed by: PHYSICAL MEDICINE & REHABILITATION

## 2021-11-29 PROCEDURE — 97129 THER IVNTJ 1ST 15 MIN: CPT

## 2021-11-29 PROCEDURE — 94150 VITAL CAPACITY TEST: CPT

## 2021-11-29 PROCEDURE — 6360000002 HC RX W HCPCS: Performed by: PHYSICAL MEDICINE & REHABILITATION

## 2021-11-29 PROCEDURE — 97530 THERAPEUTIC ACTIVITIES: CPT

## 2021-11-29 PROCEDURE — 97116 GAIT TRAINING THERAPY: CPT

## 2021-11-29 PROCEDURE — 1280000000 HC REHAB R&B

## 2021-11-29 PROCEDURE — 97130 THER IVNTJ EA ADDL 15 MIN: CPT

## 2021-11-29 PROCEDURE — 74018 RADEX ABDOMEN 1 VIEW: CPT

## 2021-11-29 RX ADMIN — GABAPENTIN 300 MG: 300 CAPSULE ORAL at 21:34

## 2021-11-29 RX ADMIN — KETOTIFEN FUMARATE 1 DROP: 0.35 SOLUTION/ DROPS OPHTHALMIC at 21:33

## 2021-11-29 RX ADMIN — VERAPAMIL HYDROCHLORIDE 90 MG: 180 TABLET, FILM COATED, EXTENDED RELEASE ORAL at 21:35

## 2021-11-29 RX ADMIN — LOPERAMIDE HYDROCHLORIDE 2 MG: 2 CAPSULE ORAL at 08:44

## 2021-11-29 RX ADMIN — ONDANSETRON 4 MG: 4 TABLET, ORALLY DISINTEGRATING ORAL at 10:23

## 2021-11-29 RX ADMIN — ENOXAPARIN SODIUM 40 MG: 100 INJECTION SUBCUTANEOUS at 08:11

## 2021-11-29 RX ADMIN — PYRIDOSTIGMINE BROMIDE 30 MG: 60 TABLET ORAL at 05:44

## 2021-11-29 RX ADMIN — GABAPENTIN 300 MG: 300 CAPSULE ORAL at 14:17

## 2021-11-29 RX ADMIN — ROSUVASTATIN CALCIUM 10 MG: 5 TABLET, COATED ORAL at 21:34

## 2021-11-29 RX ADMIN — ASPIRIN 81 MG: 81 TABLET, COATED ORAL at 08:11

## 2021-11-29 RX ADMIN — PANTOPRAZOLE SODIUM 40 MG: 40 TABLET, DELAYED RELEASE ORAL at 05:41

## 2021-11-29 RX ADMIN — FLUTICASONE PROPIONATE 1 SPRAY: 50 SPRAY, METERED NASAL at 08:11

## 2021-11-29 RX ADMIN — PYRIDOSTIGMINE BROMIDE 30 MG: 60 TABLET ORAL at 21:34

## 2021-11-29 RX ADMIN — VERAPAMIL HYDROCHLORIDE 90 MG: 180 TABLET, FILM COATED, EXTENDED RELEASE ORAL at 08:11

## 2021-11-29 RX ADMIN — DIVALPROEX SODIUM 250 MG: 250 TABLET, FILM COATED, EXTENDED RELEASE ORAL at 21:45

## 2021-11-29 RX ADMIN — KETOTIFEN FUMARATE 1 DROP: 0.35 SOLUTION/ DROPS OPHTHALMIC at 08:44

## 2021-11-29 RX ADMIN — SERTRALINE HYDROCHLORIDE 25 MG: 50 TABLET ORAL at 21:34

## 2021-11-29 RX ADMIN — GABAPENTIN 300 MG: 300 CAPSULE ORAL at 08:11

## 2021-11-29 RX ADMIN — PYRIDOSTIGMINE BROMIDE 30 MG: 60 TABLET ORAL at 14:16

## 2021-11-29 ASSESSMENT — PAIN SCALES - GENERAL
PAINLEVEL_OUTOF10: 0

## 2021-11-29 NOTE — TELEPHONE ENCOUNTER
The nurse from [facility] called . Patient is to be discharged tomorrow. Request verbal ok for Dr. Aye Kennedy to follow verbal ok given.

## 2021-11-29 NOTE — PROGRESS NOTES
Physical Therapy  . [x] daily progress note       [x] discharge       Patient Name:  Mesfin Marshall   :  1935 MRN: [id number]  Room:  [address] [place] [address] Date of Admission: 2021  Rehabilitation Diagnosis:   Hypo-osmolality and hyponatremia [E87.1]  Hyponatremia [E87.1]       Date 2021       Day of ARU Week:  6   Time IN/OUT 1100/1130, 1530-x   Individual Tx Minutes 30   Group Tx Minutes    Co-Treat Minutes    Concurrent Tx Minutes    TOTAL Tx Time Mins 30   Variance Time -30   Variance Time []   Refusal due to:     []   Medical hold/reason:    [x]   Illness: pt not able to tolerate due to fatigue from diarrhea and consistent need to use bathroom. Attempted in pm. Pt had just finished interrupted session with PT(pt went for abdominal scan) and was too fatigued to participate. []   Off Unit for test/procedure  []   Extra time needed to complete task  []   Therapeutic need  []   Other (specify):   Restrictions Restrictions/Precautions  Restrictions/Precautions: General Precautions, Fall Risk      Interdisciplinary communication [x]   Cleared for therapy per nursing     [x]   RN notified about issues during session: pt continuing with bout of diarrhea. Not able to tolerate full session  []   RN updated on pt performance  []   Spoke with   []   Spoke with OT  []   Spoke with MD  []   Other:    Subjective observations and cognitive status: Pt demonstrating poor safety due to focus on bowel needs.       Pain level/location:   0 /10       Location:    Discharge recommendations  Anticipated discharge date:    Destination: []home alone   [x]home alone with assist PRN     [] home w/ family      [] Continuous supervision  []SNF    [] Assisted living     [] Other:  Continued therapy: [x]HHC PT  []OUTPATIENT PT   [] No Further PT  []SNF PT  Caregiver training recommended: []Yes  [] No   Equipment needs: cane      PT IRF-TAMARA scores and goals for discharge assessment:   Roll Left and Right  Assistance Needed: Independent  CARE Score: 6  Discharge Goal: Independent    Sit to Lying  Assistance Needed: Independent  Comment: 6 (deemed usual performance per team huddle)  CARE Score: 6  Discharge Goal: Independent    Lying to Sitting on Side of Bed  Assistance Needed: Independent  Comment: without bed features  CARE Score: 6  Discharge Goal: Independent    Sit to Stand  Assistance Needed: Supervision or touching assistance  Comment: unsteady upon standing, pt focused on getting to bathroom due to incontinence  CARE Score: 4  Discharge Goal: Independent    Chair/Bed-to-Chair Transfer  Assistance Needed: Supervision or touching assistance  Comment: supervision with safety cues  CARE Score: 4  Discharge Goal: Independent                                               Walking Ability  Does the Patient Walk?: Yes    Walk 10 Feet  Assistance Needed: Supervision or touching assistance  Comment: CG with straight cane and unsteadiness with unsafe behavior  CARE Score: 4  Discharge Goal: Independent                              Additional Therapeutic activities/exercises completed this date:     []   Nu-step:  Time:        Level:         #Steps:       []   Rebounder:    []  Seated     []  Standing        []   Balance training         []   Postural training    []   Supine ther ex (reps/sets):     []   Seated ther ex (reps/sets):     []   Standing ther ex (reps/sets):     []   Other:  Toileting activityx2 completed with mod cues for safety   []   Other:    Comments:      Patient/Caregiver Education and Training:   []   Role of PT  [x]   Education about Dx  [x]   Use of call light for assist   []   HEP provided and explained   []   Treatment plan reviewed  []   Home safety  []   Wheelchair mobility/management   []   Body mechanics  [x]   Bed Mobility/Transfer technique  [x]   Gait technique/sequencing  []   Proper use of assistive device/adaptive equipment  []   Stair training/Advanced mobility safety and technique  []   Reinforced patient's precautions/mobility while maintaining precautions  []   Postural awareness  []   Family/caregiver training  []   Progress was updated and reviewed in Rehabtracker with patient and/or family this date. []   Other:    Treatment Plan for Next Session: complete QI if pt to go home      Assessment: This pt demonstrated a negative response to today's treatment as evidenced by illness with significant decrease in safety . The patient is making slow progress toward established goals as evidenced by QI scores. Treatment/Activity Tolerance:   [] Tolerated treatment with no adverse effects    [x] Patient limited by fatigue  [] Patient limited by pain   [] Patient limited by medical complications:    [] Adverse reaction to Tx:   [] Significant change in status    Safety:       [x]  bed alarm set    []  chair alarm set    []  Pt refused alarms                []  Telesitter activated      [x]  Gait belt used during tx session      []other:       Number of Minutes/Billable Intervention  Gait Training 15   Therapeutic Exercise    Neuro Re-Ed    Therapeutic Activity 15   Wheelchair Propulsion    Group    Other:    TOTAL 30     Social History  Social/Functional History  Lives With: Alone  Type of Home: House  Home Layout: One level (Pt has a basement with a chair lift; family room is in the basement)  Home Access: Stairs to enter with rails  Entrance Stairs - Number of Steps: 4 steps  Entrance Stairs - Rails: Both  Bathroom Shower/Tub: Tub/Shower unit, Shower chair with back  [employer]: Standard  Bathroom Equipment: Grab bars in shower, Grab bars around toilet (has grab bars all the way around the bathroom (by the sink, by tub, and door))  Bathroom Accessibility: Not accessible  Home Equipment: Grab bars, Lift chair, Cane (recliner that lifts all the way up)  Receives Help From:  (sons live nearby but work full time.  does not feel anyone would be able to assist her at home)  ADL Assistance: Independent  Homemaking Assistance: Needs assistance  Meal Prep:  (IND)  Laundry:  (IND)  Vacuuming: Total  Cleaning: Maximal  Gardening: Total  Yard Work: Total  Driving:  (IND)  Shopping: Moderate  Homemaking Responsibilities: Yes  Meal Prep Responsibility: Primary  Laundry Responsibility: Primary  Cleaning Responsibility: Secondary (grandson comes every other week to clean)  Bill Paying/Finance Responsibility: Primary  Shopping Responsibility: No (grandson does once a week)  Health Care Management: Primary  Ambulation Assistance: Independent (pt did not use assistive device)  Transfer Assistance: Independent  Active : Yes  Mode of Transportation: Car  Education: 12th grade and some business college. Occupation: Retired  Type of occupation: Retired   Leisure & Hobbies: Gets hair done 1x week, cooking, running errors. [employer], OP therapy. Meds are managed via pill bottles lined up in a cabinet drawer. Pt does report a few times that she's gotten confused with them. Finances are managed via auto deposit. [employer] assists as needed. Also comes every Saturday to assist with any projects needed. IADL Comments: Pt reports she likes structure; recently lost her dtr and spouse. Pt notes memory changes PTA. Pt tries to rearrange items and organize items to assist with memory. [facility]ers at [facility] and [facility]. Additional Comments: Pt sleeps both in low flat bed and tall 4 poster bed depending on how she feels. Has had one fall where she bumped her head and she had stayed in the lower bed since then. Pt has had at least 2 falls in past year.  no inury    Objective                                                                                    Goals:  (Update in navigator)  Short term goals  Time Frame for Short term goals: 7 days STG=LTG  Short term goal 1: Pt will perform all bed mobility with mod I  Short term goal 2: Pt will perform sit to stand, pivot and car transfers  with mod I  Short term goal 3: Pt will ambulate  150  feet on level surfaces and 25' on unlevel surface with mod I   assist  using straight cane  Short term goal 4: Pt will ascend/descend curb step with   cane   and up to   12  steps with  rail(s) with   mod I  Short term goal 5: Pt will retrieve light item from floor with mod I   assist  using device as needed:   :        Plan of Care                                                                              Times per week: 5 days per week for a minimum of 60 minutes/day plus group as appropriate for 60 minutes.   Treatment to include Current Treatment Recommendations: Functional Mobility Training, IADL Training, Neuromuscular Re-education, Home Exercise Program, Equipment Evaluation, Education, & procurement, Transfer Training, Gait Training, Balance Training, Stair training, Patient/Caregiver Education & Training, Safety Education & Training    Electronically signed by   Sahara Tobar PT,   11/29/2021, 11:36 AM

## 2021-11-29 NOTE — PROGRESS NOTES
Campbell Bolaños    : 1935  Acct #: [de-identified]  MRN: [id number]              PM&R Progress Note      Admitting diagnosis: Hyponatremia ([address])     Comorbid diagnoses impacting rehabilitation: Generalized weakness, gait disturbance, essential hypertension, PAD, HLD, macular degeneration, trigeminal neuralgia, history of DVT, bilateral hydroureter    Chief complaint: Abdominal pain, loose bowel movements and some nausea. Prior (baseline) level of function: Independent. Current level of function:         Current  IRF-TAMARA and Goals:   Occupational Therapy:    Short term goals  Time Frame for Short term goals: LTGs= STGs :   Long term goals  Time Frame for Long term goals : 3- 5 day or until d/c  Long term goal 1: Pt will complete total body bathing c Mod I and use of AE prn by d/c  Long term goal 2: Pt will complete total body dressing (UB/LB/Footwear) c SUP by d/c  Long term goal 3: Pt will complete toileting c MOD I by d/c  Long term goal 4: Pt will complete functional transfer (toilet, tub, shower) c MOD I by d/c. Long term goal 5: Pt will complete grooming/oral care task c IND by d/c  Long term goals 6: Pt will perform therex/therax to facilitate increased strength/endurance/ax tolerance (c emphasis on dynamic standing balance/tolerance >8 mins ) c MOD I in order to complete ADLs. :                                       Eating: Eating  Assistance Needed: Independent  Comment: X  CARE Score: 6  Discharge Goal: Independent       Oral Hygiene: Oral Hygiene  Assistance Needed: Independent  Comment: seated at sink d/t nausea  CARE Score: 6  Discharge Goal: Independent    UB/LB Bathing: Shower/Bathe Self  Assistance Needed: Independent  Comment: Mod I- majority performed seated; Mod I in stance to bathe posterior perineal area  CARE Score: 6  Discharge Goal: Independent    UB Dressing: Upper Body Dressing  Assistance Needed: Independent  Comment:  Mod I; Pt able to retreive clothing and don/doff pullover touching assistance  Comment: supervision with safety cues  CARE Score: 4  Discharge Goal: Independent  Toilet Transfer  Assistance Needed: Supervision or touching assistance  Comment: SB-CGA for balance with SPC and grab bar  CARE Score: 4  Car Transfer  Assistance Needed: Supervision or touching assistance  CARE Score: 4  Discharge Goal: Independent    Ambulation:    Walking Ability  Does the Patient Walk?: Yes     Walk 10 Feet  Assistance Needed: Supervision or touching assistance  Comment: CG with straight cane and unsteadiness with unsafe behavior  CARE Score: 4  Discharge Goal: Independent     Walk 50 Feet with Two Turns  Assistance Needed: Supervision or touching assistance  Comment: Attempted amb but unable this AM due to pt with decreased BP 86/53 in standing and symptomatic. /59 in sitting with sx's subsiding.    Reason if not Attempted: Not attempted due to medical condition or safety concerns  CARE Score: 88  Discharge Goal: Independent     Walk 150 Feet  Assistance Needed: Supervision or touching assistance  Comment: CG  CARE Score: 4  Discharge Goal: Independent     Walking 10 Feet on Uneven Surfaces  Assistance Needed: Partial/moderate assistance  Comment: pt with no awareness of surface changes requiring mod assist to recover balance x 2 despite training mid task  CARE Score: 3  Discharge Goal: Independent     1 Step (Curb)  Comment: CG  Discharge Goal: Independent     4 Steps  Assistance Needed: Supervision or touching assistance  Comment: SBA with B rails, reciprocating gait, decreased eccentric control at times with RLE  CARE Score: 4  Discharge Goal: Independent     12 Steps  Assistance Needed: Supervision or touching assistance  CARE Score: 4  Discharge Goal: Independent       Wheelchair:  w/c Ability: Wheelchair Ability  Uses a Wheelchair and/or Scooter?: No     Wheel 150 Feet  Assistance Needed: Supervision or touching assistance  Comment: Supervision using B LEs only, propelled 754'+25'+434'  CARE Score: 4          Balance:        Object: Picking Up Object  Assistance Needed: Supervision or touching assistance  Comment: CG with hesitancy  CARE Score: 4  Discharge Goal: Independent    I      Exam:    Blood pressure (!) 157/49, pulse 63, temperature 98.2 °F (36.8 °C), temperature source Oral, resp. rate 20, height 5' 3\" (1.6 m), weight 134 lb 11.2 oz (61.1 kg), SpO2 94 %, not currently breastfeeding. General: Observed lying in bed. Roll side to side with minimal effort. Alert and talkative. In no distress. HEENT: MMM. Clear speech. No JVD or adenopathy. Pulmonary: Clear and unlabored. Cardiac: Regular rate and rhythm. Abdomen: Patient's abdomen is soft and nondistended. Bowel sounds were present throughout. There was no rebound, guarding or masses noted. Upper extremities: Able to bring both hands up to meet mine. Fair  strength. No tremor or bruising. Lower extremities: Calves soft. No signs of DVT. Normal tone. Sitting balance was good. Standing balance was fair-.    Lab Results   Component Value Date    WBC 5.3 11/22/2021    HGB 10.2 (L) 11/22/2021    HCT 31.6 (L) 11/22/2021    .3 (H) 11/22/2021     (L) 11/22/2021     Lab Results   Component Value Date    INR 0.96 01/25/2016    PROTIME 11.0 01/25/2016     Lab Results   Component Value Date    CREATININE 0.9 11/22/2021    BUN 15 11/22/2021     11/22/2021    K 5.0 11/22/2021     11/22/2021    CO2 26 11/22/2021     Lab Results   Component Value Date    ALT 9 (L) 11/10/2021    AST 20 11/10/2021    ALKPHOS 38 (L) 11/10/2021    BILITOT 0.4 11/10/2021       Expected length of stay  prior to a supervised level of function for discharge home with a walker and [address] OT/PT is possibly 11/30/2021. Recommendations:    1. Hyponatremia with generalized weakness:   Blood pressures been more consistent on the Mestinon.   Her struggle now is with abdominal cramping, loose bowel movements and some nausea. Responds to verbal cues to engage in her treatment. We are trying to wrap up her inpatient rehab stay and discharge her home soon with home care therapies. I will have Dr Dee Bansal from GI evaluate her abdomen. I will get an abdominal x-ray today to assist with this. Ongoing pulmonary hygiene measures, cautious pain management and nutritional support.  Encouraging oral fluid intake.  Periodic monitoring of her chemistries, sodium up to 137 recently.  Cautious use of any diuretics.  Verbal cues and SBA for transfers. 2. DVT prophylaxis: Lovenox 40 mg subcu daily.  I must monitor her hemoglobin and platelet count while on this medication periodically.  GI prophylaxis offered.  Weightbearing activities are generally improving.  No signs of acute blood loss. Her hemoglobin was steady at 10.2.  3. Bilateral hydroureter: No current symptoms of obstruction.  Outpatient follow-up with urology. 4. Hypertension: Lower blood pressure has been the issue. Cardiology was consulted. They continued the verapamil and added Mestinon.   Target systolic blood pressure is 120-140.  Monitoring vital signs at rest and with activity.  Blood pressure has been essentially within target range recently.     5. Trigeminal neuralgia: Vascepa, Neurontin and Depakote.  Acetaminophen and other oral analgesics as needed.  Zoloft may be helping sleep.

## 2021-11-29 NOTE — PROGRESS NOTES
[facility] Liaison spoke w/pt and is aware of discharge on 11/30 & will initiate Billie Nelson.

## 2021-11-29 NOTE — PROGRESS NOTES
Physical Therapy      [x] daily progress note       [] discharge       Patient Name:  Keo Alexander   :  1935 MRN: [id number]  Room:  [address] [place], [place] [address] Date of Admission: 2021  Rehabilitation Diagnosis:   Hypo-osmolality and hyponatremia [E87.1]  Hyponatremia [E87.1]       Date 2021       Day of ARU Week:  6   Time IN/OUT [id number]   Individual Tx Minutes 32   TOTAL Tx Time Mins 32   Variance Time    Variance Time []   Refusal due to:     []   Medical hold/reason:    []   Illness   []   Off Unit for test/procedure  []   Extra time needed to complete task  []   Therapeutic need  []   Other (specify):   Restrictions Restrictions/Precautions  Restrictions/Precautions: General Precautions, Fall Risk      Communication with other providers: [x]   OK to see per nursing:     []   Spoke with team member regarding:      Subjective observations and cognitive status: Pt resting in bed, reports feeling \"a little better\" than this AM. Did need to toilet at beginning of session. When pt transferring OOB, transport in room for xray. A pt to toilet, then onto gurney; unable to finish tx at this time. Pain level/location: 0/10   Discharge recommendations  Anticipated discharge date:    Destination: []? ???home alone   [x]? ? ??home alone with assist PRN (at baseline was 1x per week by chika)    []???? home w/ family      []???? Continuous supervision  []????SNF    []???? Assisted living     []???? Other:  Continued therapy: [x]????[facility] PT  []????OUTPATIENT  PT   []???? No Further PT  []????SNF PT  Caregiver training recommended: []?? ??Yes  []???? No   Equipment needs: MATTHEW Alexander requires the assistance of a wheeled walker to successfully ambulate from room to room at home to allow completion of daily living tasks such as: bathing, toileting, dressing and grooming. A wheeled walker is necessary due to the patient's unsteady gait, upper body weakness, inability to  a standard walker.   This patient can ambulate only by pushing a walker instead of using a lesser      Bed Mobility:           []   Pt received out of bed   Rolling R/L:  Indep to L  Scooting:  Indep sit scoot to EOB  Supine --> Sit:  Indep  Sit --> Supine:  Indep  Bed features used: [] Yes    [] HOB elevated      [] Bed rail                                    [x] No     Transfers:    Sit--> Stand:  SB-CGA due to initial LOB from EOB; Later in session pt req mod A to recover from LOB due to getting up and turning quickly with safety cues required. Stand --> Sit:   SBA-Supervision  Stand-Pivot:   Supervision  Toilet transfer: Supervision; Toileting: Supervision   Car Transfers:  Supervision with [facility] for approach   Assistive device required for transfer:   RW    Gait:      Distance:  10'+5'   Assistance:  SB-Mod A  Device:  SPC  Gait Quality:  Required cues to gain balance after initial stand before amb ot BR. When pt amb to sink with SPC. Pt had LOB to R when turning head to talk before getting to sink req mod A to recover.       Additional Therapeutic activities/exercises completed this date:     []   Nu-step:  Time:        Level:         #Steps:       []   Rebounder:    []  Seated     []  Standing        [x]   Balance training: Functional standing task at sink for hand hygiene SBA         []   Postural training    []   Supine ther ex (reps/sets):     []   Seated ther ex (reps/sets):     []   Standing ther ex (reps/sets):     []   Picked up object from floor                       []   Reacher used   []   Other:   []   Other:   []   Other:      Patient/Caregiver Education and Training:   [x]   Bed Mobility/Transfer technique/safety  [x]   Gait technique/sequencing  [x]   Proper use of assistive device  []   Advanced mobility safety and technique  []   Reinforced patient's precautions with mobility/functional tasks  []   Postural awareness  []   Family training  [x]   Other: balance strategies with mobility    Treatment Plan for Next Session: If not discharged tomorrow, progress dynamic balance and gait. Treatment/Activity Tolerance:   [x] Tolerated treatment with no adverse effects    [] Patient limited by fatigue  [] Patient limited by pain   [] Patient limited by medical complications:    [] Adverse reaction to Tx:   [] Significant change in status    Safety:       []  bed alarm set    []  chair alarm set    []  Pt refused alarms                []  Telesitter activated      []  Gait belt used during tx session      []other:         Number of Minutes/Billable Intervention  Gait Training 13   Therapeutic Exercise    Neuro Re-Ed    Therapeutic Activity 17   Wheelchair Propulsion    Group    Other:    TOTAL 32         Social History  Social/Functional History  Lives With: Alone  Type of Home: House  Home Layout: One level (Pt has a basement with a chair lift; family room is in the basement)  Home Access: Stairs to enter with rails  Entrance Stairs - Number of Steps: 4 steps  Entrance Stairs - Rails: Both  Bathroom Shower/Tub: Tub/Shower unit, Shower chair with back  [employer]: Standard  Bathroom Equipment: Grab bars in shower, Grab bars around toilet (has grab bars all the way around the bathroom (by the sink, by tub, and door))  Bathroom Accessibility: Not accessible  Home Equipment: Grab bars, Lift chair, Cane (recliner that lifts all the way up)  Receives Help From:  (sons live nearby but work full time. does not feel anyone would be able to assist her at home)  ADL Assistance: [address]: Needs assistance  Meal Prep:  (IND)  Laundry:  (IND)  Vacuuming: Total  Cleaning: Maximal  Gardening: Total  Yard Work: Total  Driving:  (IND)  Shopping:  Moderate  Homemaking Responsibilities: Yes  Meal Prep Responsibility: Primary  Laundry Responsibility: Primary  Cleaning Responsibility: Secondary (grandson comes every other week to clean)  Bill Paying/Finance Responsibility: Primary  Shopping Responsibility: No (grandson does once a week)  Health Care Management: Primary  Ambulation Assistance: Independent (pt did not use assistive device)  Transfer Assistance: Independent  Active : Yes  Mode of Transportation: Car  Education: 12th grade and some business college. Occupation: Retired  Type of occupation: Retired   Leisure & Hobbies: Gets hair done 1x week, cooking, running errors. [place]ze, OP therapy. Meds are managed via pill bottles lined up in a cabinet drawer. Pt does report a few times that she's gotten confused with them. Finances are managed via auto deposit. Kathya Luis assists as needed. Also comes every Saturday to assist with any projects needed. IADL Comments: Pt reports she likes structure; recently lost her dtr and spouse. Pt notes memory changes PTA. Pt tries to rearrange items and organize items to assist with memory. [facility]uteers at [facility] and [facility]. Additional Comments: Pt sleeps both in low flat bed and tall 4 poster bed depending on how she feels. Has had one fall where she bumped her head and she had stayed in the lower bed since then. Pt has had at least 2 falls in past year.  no inury    Objective                                                                                    Goals:  (Update in navigator)  Short term goals  Time Frame for Short term goals: 7 days STG=LTG  Short term goal 1: Pt will perform all bed mobility with mod I  Short term goal 2: Pt will perform sit to stand, pivot and car transfers  with mod I  Short term goal 3: Pt will ambulate  150  feet on level surfaces and 25' on unlevel surface with mod I   assist  using straight cane  Short term goal 4: Pt will ascend/descend curb step with   cane   and up to   12  steps with  rail(s) with   mod I  Short term goal 5: Pt will retrieve light item from floor with mod I   assist  using device as needed:   :        Plan of Care Times per week: 5 days per week for a minimum of 60 minutes/day plus group as appropriate for 60 minutes.   Treatment to include Current Treatment Recommendations: Functional Mobility Training, IADL Training, Neuromuscular Re-education, Home Exercise Program, Equipment Evaluation, Education, & procurement, Transfer Training, Gait Training, Balance Training, Stair training, Patient/Caregiver Education & Training, Safety Education & Training    Electronically signed by   Kaylie Eddy PTA #[id number]  11/29/2021, 8:57 AM

## 2021-11-29 NOTE — PROGRESS NOTES
[facility]  ACUTE REHAB UNIT  SPEECH/LANGUAGE PATHOLOGY      [x] Daily           [x] Discharge    Patient:Jazmine Frost      :1935  SFN:[id number]  Rehab Dx/Hx: Hypo-osmolality and hyponatremia [E87.1]  Hyponatremia [E87.1]   Allergies   Allergen Reactions    Alendronate Sodium Other (See Comments)     GI upset    Bactrim [Sulfamethoxazole-Trimethoprim] Anaphylaxis    Boniva [Ibandronate Sodium] Other (See Comments)     Gi upset and declined egd; also to fosamax    Ciprofloxacin     Colesevelam     Lisinopril Other (See Comments)     Severe hyperkalemia (6) with bun >56      Macrobid [Nitrofurantoin]     Neggram [Nalidixic Acid]     Pce [Erythromycin]     Penicillins     Risperidone And Related     Welchol [Colesevelam Hcl] Other (See Comments)     constipation    Carbamazepine Nausea And Vomiting    Lovaza [Omega-3-Acid Ethyl Esters] Nausea And Vomiting    Metoprolol Nausea And Vomiting    Pyridium [Phenazopyridine] Palpitations     Precautions: ; Restrictions/Precautions: General Precautions, Fall Risk          Home Situation/IADL:   Social/Functional History  Lives With: Alone  Type of Home: House  Home Layout: One level (Pt has a basement with a chair lift; family room is in the basement)  Home Access: Stairs to enter with rails  Entrance Stairs - Number of Steps: 4 steps  Entrance Stairs - Rails: Both  Bathroom Shower/Tub: Tub/Shower unit, Shower chair with back  [employer]: Standard  Bathroom Equipment: Grab bars in shower, Grab bars around toilet (has grab bars all the way around the bathroom (by the sink, by tub, and door))  Bathroom Accessibility: Not accessible  Home Equipment: Grab bars, Lift chair, Cane (recliner that lifts all the way up)  Receives Help From:  (sons live nearby but work full time.  does not feel anyone would be able to assist her at home)  ADL Assistance: [address] [place] [address] Avenue: Needs assistance  Meal Prep:  (IND)  Laundry: (IND)  Vacuuming: Total  Cleaning: Maximal  Gardening: Total  Yard Work: Total  Driving:  (IND)  Shopping: Moderate  Homemaking Responsibilities: Yes  Meal Prep Responsibility: Primary  Laundry Responsibility: Primary  Cleaning Responsibility: Secondary (grandson comes every other week to clean)  Bill Paying/Finance Responsibility: Primary  Shopping Responsibility: No (grandson does once a week)  Health Care Management: Primary  Ambulation Assistance: Independent (pt did not use assistive device)  Transfer Assistance: Independent  Active : Yes  Mode of Transportation: Car  Education: 12th grade and some business college. Occupation: Retired  Type of occupation: Retired   Leisure & Hobbies: Gets hair done 1x week, cooking, running errors. Zoroastrian, OP therapy. Meds are managed via pill bottles lined up in a cabinet drawer. Pt does report a few times that she's gotten confused with them. Finances are managed via auto deposit. Luly Bodily assists as needed. Also comes every Saturday to assist with any projects needed. IADL Comments: Pt reports she likes structure; recently lost her dtr and spouse. Pt notes memory changes PTA. Pt tries to rearrange items and organize items to assist with memory. [facility]ers at [facility] and Nearly [address] [place] [address]. Additional Comments: Pt sleeps both in low flat bed and tall 4 poster bed depending on how she feels. Has had one fall where she bumped her head and she had stayed in the lower bed since then. Pt has had at least 2 falls in past year.  no inury      Date of Admit: 11/17/2021  Room #: 1024/1024-A     ST Number of Minutes/Billable Intervention  Cog/Memory Deficits  30   Aphasia/Language     Dysarthria/Speech     Apraxia/Speech     Dysphagia/Swallowing     Group     Other    TOTAL Minutes Billed  30    Variance          Date: 11/29/2021  Day of ARU Week:  6       SLP Individual Minutes  Time In: 1010  Time Out: 2444  Minutes: 30         Variance/Reason:  [] Refusal due to   [] Medical hold/reason  [] Illness   [] Off Unit for test/procedure  [] Extra time needed to complete task  [] Other (specify)    Activity completed: Pt seen for med mgmt simulation with pill bottles to interpret and memory tasks for recall of names of people on pill bottles. Pain: denies pain c/o nausea  Current Diet: ADULT DIET; Regular  Subjective: alert and cooperative; perseverates on not feeling well and being annoyed with memory issues. Encouraged to use strategies. Goals and POC: Co-treats where appropriate with PT or OT to facilitate patient goals in functional tasks. LTG                           Short-term Goals  Timeframe for Short-term Goals: 2x/week x1 week 30 mins min. Pt will use strategies to facilitate memory at home. Goal 1: Pt will complete education on memory strategies to utilize at home with min  cues for recall of familiar tasks. Completed education;pt expresses frustration with needing to utilize strategies. Continued cues needed as pt tries to do it without memory aides until she recognizes she needs them. 60% recall of famous people on pill bottles after delay. Goal 2: Pt will complete med mgmt tasks to determine needs post discharge. Medication management tasks were completed with pt asked to read and interpret simulated pill bottles. Pt was asked to assign times for meds and give rationale. 7/8 completed with extra time needed. Pt would benefit from [facility] follow up for med assist at home and setting up med box. When pt distracted by not feeling well processing is slower and accuracy is reduced overall. Pt discharging to home tomorrow with [facility] recommended. No further ST. Pt has memory strategies that she is capable of using but chooses not to at times due to \"not liking that she is aging and needing cues\"    Mild memory deficits are present. Prognosis for improvement is good if pt utilizes strategies.   Fair due to resistance in using and accepting the need. The patient demonstrated a positive response to todays treatment and is making gradual progress toward established goals as evidenced by improved med mgmt. Ongoing deficits are observed in the areas of memory and continued focus on use of strategies is recommended. Alarm placed: []bed [x]chair   []other:   []KRISTEN activated        Barriers to progress:   [] Fatigue        [] Cognitive Deficits   [x] Memory Deficits   [] Reduced Attn   [] Self Limiting Behaviors    [] Reduced insight/awareness     [] Visual Deficits   [] Premorbid Conditions  [] Impulsivity     [] Other      Education/Interventions used this date: see above    []   Progress was updated and reviewed in Rehabtracker with patient and/or family this         date. [] Attending Care Conference for pt this date. See Team Patient Care Conference Note for updates.     Interventions used this date:  [] Speech/Language Treatment       [] Dysphagia Treatment     [x] Cognitive Skill Development  [] Instruction in HEP     [] Group Therapy  Other:         Assessment / Impression                                                          Treatment/Activity Tolerance:   [x] Tolerated Treatment well:     [] Patient limited by fatigue/pain:       [] Patient limited by medical complications:    [] Adverse Reaction to Tx:   [] Significant change in status:      Electronically Signed by  Carmen Caraballo, SLP, MA, CCC-SLP    11/29/2021  10:45 AM

## 2021-11-29 NOTE — PATIENT CARE CONFERENCE
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 [address] [I73.9] 2016    Trigeminal neuralgia [G50.0]      Date: 2021    CASE MANAGEMENT  Current issues/needs regarding patient and family discharge status:   Patient plans dc 1-level home alone. Local supportive melvina - other family members are .   Requesting Billie Nelson and interested in [facility] (Updated in QI)  Short term goals  Time Frame for Short term goals: 7 days STG=LTG  Short term goal 1: Pt will perform all bed mobility with mod I  Short term goal 2: Pt will perform sit to stand, pivot and car transfers  with mod I  Short term goal 3: Pt will ambulate  150  feet on level surfaces and 25' on unlevel surface with mod I   assist  using straight cane  Short term goal 4: Pt will ascend/descend curb step with   cane   and up to   12  steps with  rail(s) with   mod I  Short term goal 5: Pt will retrieve light item from floor with mod I   assist  using device as needed          Impairments/deficits, barriers: anxiety, cognition both contributing to safety, activity tolerance past 2 days    Body structures, Functions, Activity limitations: Decreased functional mobility , Decreased vision/visual deficit, Decreased safe awareness, Decreased high-level IADLs, Decreased balance, Decreased cognition     Prognosis: Good  Decision Making: High Complexity  Clinical Presentation: unpredicatable characteristics  Equipment needed at discharge:2ww, cane      PT IRF-TAMARA scores since initial assessment  Bed Mobility:   Sit to Lying  Assistance Needed: Independent  CARE Score: 6  Discharge Goal: Independent    Roll Left and Right  Assistance Needed: Independent  CARE Score: 6  Discharge Goal: Independent    Lying to Sitting on Side of Bed  Assistance Needed: Independent  Comment: without bed features  CARE Score: 6  Discharge Goal: Independent    Transfers:    Sit to Stand  Assistance Needed: Independent  Comment: demonstrated safe technique today  CARE Score: 6  Discharge Goal: Independent    Chair/Bed-to-Chair Transfer  Assistance Needed: Independent  Comment: safe performance today  CARE Score: 6  Discharge Goal: Independent        Car Transfer  Assistance Needed: Supervision or touching assistance  Comment: Supervision with SPC for approach  CARE Score: 4  Discharge Goal: Independent    Ambulation:    Walking Ability  Does the Patient Walk?: Yes     Walk 10 Feet  Assistance Needed: Independent  Comment: safe performance today  CARE Score: 6  Discharge Goal: Independent     Walk 50 Feet with Two Turns  Assistance Needed: Supervision or touching assistance  Comment: supervision with cane  CARE Score: 4  Discharge Goal: Independent     Walk 150 Feet  Assistance Needed: Supervision or touching assistance  Comment: SBA with SPC, mild path deviations, inconsistent stance times and step lengths.    CARE Score: 4  Discharge Goal: Independent     Walking 10 Feet on Uneven Surfaces  Assistance Needed: Supervision or touching assistance  Comment: CG with cane  CARE Score: 4  Discharge Goal: Independent     1 Step (Curb)  Assistance Needed: Supervision or touching assistance  Comment: SBA with cane  CARE Score: 4  Discharge Goal: Independent     4 Steps  Assistance Needed: Independent  Comment: mod I wit cane and rail  CARE Score: 6  Discharge Goal: Independent     12 Steps  Assistance Needed: Independent  Comment: mod I with cane and rail  CARE Score: 6  Discharge Goal: Independent           Wheelchair:  w/c Ability: Wheelchair Ability  Uses a Wheelchair and/or Scooter?: No                       Balance:        Object: Picking Up Object  Assistance Needed: Supervision or touching assistance  Comment: supervision with difficulty using reacher  CARE Score: 4  Discharge Goal: Independent    Fall Risk: [x]  Yes  []  No    OCCUPATIONAL THERAPY  (Updated in QI)  Short term goals  Time Frame for Short term goals: LTGs= STGs :   Long term goals  Time Frame for Long term goals : 3- 5 day or until d/c  Long term goal 1: Pt will complete total body bathing c Mod I and use of AE prn by d/c  Long term goal 2: Pt will complete total body dressing (UB/LB/Footwear) c SUP by d/c  Long term goal 3: Pt will complete toileting c MOD I by d/c  Long term goal 4: Pt will complete functional transfer (toilet, tub, shower) c MOD I by d/c. Long term goal 5: Pt will complete grooming/oral care task c IND by d/c  Long term goals 6: Pt will perform therex/therax to facilitate increased strength/endurance/ax tolerance (c emphasis on dynamic standing balance/tolerance >8 mins ) c MOD I in order to complete ADLs. :                                       OT IRF-TAMARA scores and goals since initial assessment:    ADLs:    Eating: Eating  Assistance Needed: Independent  Comment: X  CARE Score: 6  Discharge Goal: Independent       Oral Hygiene: Oral Hygiene  Assistance Needed: Independent  Comment: seated at sink d/t nausea  CARE Score: 6  Discharge Goal: Independent    UB/LB Bathing: Shower/Bathe Self  Assistance Needed: Independent  Comment: Mod I- majority performed seated; Mod I in stance to bathe posterior perineal area  CARE Score: 6  Discharge Goal: Independent    UB Dressing: Upper Body Dressing  Assistance Needed: Independent  Comment:  Mod I; Pt able to retreive clothing and don/doff pullover sweater  CARE Score: 6  Discharge Goal: Independent         LB Dressing: Lower Body Dressing  Assistance Needed: Independent  Comment: Mod I; Pt able to retreive clothing; doff/don depends and pants  CARE Score: 6  Discharge Goal: Independent    Donning and Cane Beds Footwear: Putting On/Taking Off Footwear  Assistance Needed: Independent  Comment: Pt able to don socks and shoes  CARE Score: 6  Discharge Goal: Independent      Toileting: Toileting Hygiene  Assistance Needed: Independent  Comment: Mod I c use of grab bars, pt demo good balance  CARE Score: 6  Discharge Goal: Independent      Toilet Transfers: Toilet Transfer  Assistance Needed: Independent  Comment: Mod I  CARE Score: 6  Discharge Goal: Independent      Impairments/deficits, barriers:  Fluctuating balance and endurance, COGNITION (memory, attention, safety awareness)  Assessment  Performance deficits / Impairments: Decreased functional mobility , Decreased ADL status, Decreased strength, Decreased balance, Decreased posture, Decreased high-level IADLs, Decreased ROM  Treatment Diagnosis: generalized weakness  Prognosis: Good  REQUIRES OT FOLLOW UP: Yes  Discharge Recommendations: Home with assist PRN  Equipment needed at discharge: None      COGNITIVE FUNCTION/SPEECH THERAPY (AS INDICATED)  LTG         Duration/Frequency of Treatment  Duration/Frequency of Treatment: 2x/week x 1 week 30 mins min                     Short-term Goals  Timeframe for Short-term Goals: 2x/weekx x1 week 30 mins min. Pt will use strategies to facilitate memory at home. Goal 1: Pt will complete education on memory strategies to utilize at home with min  cues for recall of familiar tasks. Completed--pt is resistant to using strategies even though she is capable. Feels using strategies shows her weaknesses and she indicates she wants to be able to recall without even though she needs to utilize strategies  Goal 2: Pt will complete med mgmt tasks to determine needs post discharge.   Goal met--pt would benefit from [address] to organize and initiate med box. Ongoing impairments or deficits or barriers: cognitive memory deficits  Areas where progress has been made: pt's ability to use strategies  Strategies that improve performance: external memory aides    Nursing Current Medical Status:   [x] Is continent of bowel and bladder     [] Is incontinent of bowel and bladder    [x] Has had an adequate number of bowel movements   [x] Urinates with no urinary retention >300ml in bladder   [] Targeting bladder protocol with walker removal   [x] Maintaining O2 SATs at 92% or greater   [x] Has pain managed while on ARU         [x] Has had no skin breakdown while on ARU   [] Has improved skin integrity via wound measurements   [] Has no signs/symptoms of infection at the wound site   [] Pressure wounds Stage/Location:    [] Arrived on unit with pressure wound  [x] Has been free from injury during hospitalization   [] Has experienced a fall during hospitalization  [x] Ongoing education with patient/family with understanding demonstrated for:  [] Receives IV Fluids  [] Other:        NUTRITION  Weight: 132 lb (59.9 kg) / Body mass index is 23.38 kg/m². Current diet: ADULT DIET; Regular  Intake:       Medical improvements/barriers: Plan for discharge        Team goals for next treatment period/Intervention for current barriers:   [x] Pt will increase activity tolerance for daily tasks.   [] Pt will improve bed mobility with reduced assist.  [x] Pt will improve safety in fx tasks with reduced cues/assist  [] Pt will improve transfers with reduced assist  [] Pt will improve toileting with reduced assist  [] Pt will improve ADL's with use of adaptive equipment with reduced assist  [] Pt will improve pain mgmt for maximum participation in tx program  [] Pt will improve communication to get basic needs met on unit  [] Pt will improve swallowing for safe diet advancement with use of strategies  [x]  Plan for discharge to home. Patient Strengths: Motivated, Cooperative and Pleasant    Justification for Continued Stay  Based on my medical assessment of the patient and review of information from the interdisciplinary team as part of this weekly team conference, the patient continues to meet the following criteria for IRF level of care:   The patient requires active and ongoing intervention of multiple therapy disciplines   The patient requires and intensive rehabilitation therapy program   The patient requires continued physician supervision by a rehabilitation physician   The patient requires 24 hours rehab nursing care   The patient requires an intensive and coordinated interdisciplinary team approach to the delivery of rehabilitative care. Assessment/Plan   [x]  The patient is making good progression towards their long term goals and is actively participating in and has a reasonable expectation to continue to benefit from the intensive rehabilitation therapy program   []  The estimated discharge date has been changed from initial team conference due to:   []  The estimated discharge destination has been changed from initial team conference due to:         Ongoing tx following discharge: [x][facility] NSG PT OT   []OUTPATIENT     [] No Further Treatment     [] Family/Caregiver Training  []  Transitional Living Arrangement   [] Home Assessment (date  )     [] Family Conference   []  Therapeutic Pass       []  Other: (specify)    Estimated Discharge Date: 11/30/21    Estimated Discharge Destination: []home alone   [x]home alone with assist prn  []Continuous supervision []Return home with s/o/spouse/family   [] Assisted living    []SNF     Team members participating in today's conference.     [x] ANA Goncalves, Medical Director  [x] Obie Fuentes,    [] Usman Marks, Nurse Mgr [] Akilah Castillo, Nurse Supervisor   []  Geo An, PT   [] Brandon Pabon, OT   [x]  Rian Guan, PT  [x] David Tran, OT      [x]  Vee Gordillo, SLP    []  Matt Augustine, TRESSA   [] Katie Medeiros, TRESSA   []  Wilma Webster RD     [] Carmen Ervin,     [x]Jeimy Morrow,     [x] Feliberto Gitelman, RN[] Mirela Hatch RN     [] Nii Servin RN    [] Blaise Hadley RN    [] Chris Boston RN  [] Edwin Ricardo RN    []     I have led this Team Conference and agree with the plan, Bill Valdez MD, 11/30/2021, 12:45 PM  Goals have been updated to reflect recent status.     Team conference note transcribed this date by: Erika Buckley MA, [id number] [facility], Therapy Coordinator

## 2021-11-29 NOTE — CARE COORDINATION
DME referral for RW given to Stephen [facility] [place] [facility]. 10:55 AM  Patient provided with list of Medicare participating [facility] agencies in the geographic area of the patient served. Patient selected [facility] and was provided with a comparative data handout from [address] [place] [address] website. The patient (and/or Family) was educated on the quality outcomes for each provider. Patient (and/or Family) demonstrated understanding. Per patient/family request, referral made to [facility]. [facility] referral for PT, OT, and Nursing given to [facility] with [facility].     11:10 AM  Hospital follow-up with PCP, Dr. Wolf Kyle ([phone]), set for 12/09/21 at 9:30 AM.  Urology follow-up set with Yousuf Villarreal 12/09/21 at 1 PM.

## 2021-11-29 NOTE — PROGRESS NOTES
Occupational Therapy    Physical Rehabilitation: OCCUPATIONAL THERAPY     [] daily progress note       [x] discharge       Patient Name:  Rajiv Reid   :  1935 MRN: [id number]  Room:  [address] [place] [address] Date of Admission: 2021  Rehabilitation Diagnosis:   Hypo-osmolality and hyponatremia [E87.1]  Hyponatremia [E87.1]       Date 2021       Day of ARU Week:  6   Time IN/OUT [id number]   Individual Tx Minutes 60   Group Tx Minutes    Co-Treat Minutes    Concurrent Tx Minutes    TOTAL Tx Time Mins 60   Variance Time    Variance Time []   Refusal due to:     []   Medical hold/reason:    []   Illness   []   Off Unit for test/procedure  []   Extra time needed to complete task  []   Therapeutic need  []   Other (specify):   Restrictions Restrictions/Precautions: General Precautions, Fall Risk         Communication with other providers: [x]   OK to see per nursing:     []   Spoke with team member regarding:      Subjective observations and cognitive status: Pt on the toilet with nsg tech in room on approach. Pt agreeable to therapy session. Pt c/o of stomach pain and diarrhea this morning. Nsg notified and given meds at this time. Pt had 3 episodes of diarrhea during session. Pain level/location:    /10       Location:    Discharge recommendations  Anticipated discharge date:    Destination: []?home alone   [x]? home alone w assist prn []? home w/ family    []? Continuous supervision       []? SNF            []? Assisted living     []? Other:   Continued therapy: [x]? Billie Nelson OT  []? OUTPATIENT  OT   []? No Further OT  Equipment needs: none         ADLs:    Eating: Eating  Assistance Needed: Independent  Comment: X  CARE Score: 6  Discharge Goal: Independent       Oral Hygiene: Oral Hygiene  Assistance Needed: Independent  Comment: seated at sink d/t nausea  CARE Score: 6  Discharge Goal: Independent    UB/LB Bathing: Shower/Bathe Self  Assistance Needed: Independent  Comment:  Mod I- majority performed seated; Mod I in stance to bathe posterior perineal area  CARE Score: 6  Discharge Goal: Independent    UB Dressing: Upper Body Dressing  Assistance Needed: Independent  Comment: Mod I; Pt able to retreive clothing and don/doff pullover sweater  CARE Score: 6  Discharge Goal: Independent         LB Dressing: Lower Body Dressing  Assistance Needed: Independent  Comment: Mod I; Pt able to retreive clothing; doff/don depends and pants  CARE Score: 6  Discharge Goal: Independent    Donning and [place] Footwear: Putting On/Taking Off Footwear  Assistance Needed: Independent  Comment: Pt able to don socks and shoes  CARE Score: 6  Discharge Goal: Independent      Toileting: Toileting Hygiene  Assistance Needed: Independent  Comment: Mod I c use of grab bars, pt demo good balance  CARE Score: 6  Discharge Goal: Independent      Toilet Transfers: Mod I  Toilet Transfer  Assistance Needed: Independent  Comment: Mod I  CARE Score: 6  Discharge Goal: Independent  Device Used:    [x]   Standard Toilet         [x]   Grab Bars           []  Bedside Commode       []   Elevated Toilet          []   Other:        Bed Mobility:           [x]   Pt received out of bed     Transfers:    Sit--> Stand: Mod I   Stand --> Sit:   Mod I   Stand-Pivot: Mod I   Other:    Assistive device required for transfer:   Cane       Functional Mobility:  To<>from bathroom and throughout room   Assistance:   Mod I   Device:   []   Rolling Walker     []   Standard Walker []   Wheelchair        [x]   [address]       []   4-Wheeled Rachelle Kaur         []   Cardiac Walker       []   Other:            Additional Therapeutic activities/exercises completed this date:     [x]   ADL Training   [x]   Balance/Postural training     [x]   Bed/Transfer Training   []   Endurance Training   []   Neuromuscular Re-ed   []   Nu-step:  Time:        Level:         #Steps:       []   Rebounder:    []  Seated     []  Standing        []   Supine Ther Ex (reps/sets):     []   Seated Ther Ex chair, Cane (recliner that lifts all the way up)  Receives Help From:  (sons live nearby but work full time. does not feel anyone would be able to assist her at home)  ADL Assistance: [address]: Needs assistance  Meal Prep:  (IND)  Laundry:  (IND)  Vacuuming: Total  Cleaning: Maximal  Gardening: Total  Yard Work: Total  Driving:  (IND)  Shopping: Moderate  Homemaking Responsibilities: Yes  Meal Prep Responsibility: Primary  Laundry Responsibility: Primary  Cleaning Responsibility: Secondary (grandson comes every other week to clean)  Bill Paying/Finance Responsibility: Primary  Shopping Responsibility: No (grandson does once a week)  Health Care Management: Primary  Ambulation Assistance: Independent (pt did not use assistive device)  Transfer Assistance: Independent  Active : Yes  Mode of Transportation: Car  Education: 12th grade and some business college. Occupation: Retired  Type of occupation: Retired   Leisure & Hobbies: Gets hair done 1x week, cooking, running errors. Jain, OP therapy. Meds are managed via pill bottles lined up in a cabinet drawer. Pt does report a few times that she's gotten confused with them. Finances are managed via auto deposit. Camden Siddiqi assists as needed. Also comes every Saturday to assist with any projects needed. IADL Comments: Pt reports she likes structure; recently lost her dtr and spouse. Pt notes memory changes PTA. Pt tries to rearrange items and organize items to assist with memory. [place] at [facility] and [facility] [address]. Additional Comments: Pt sleeps both in low flat bed and tall 4 poster bed depending on how she feels. Has had one fall where she bumped her head and she had stayed in the lower bed since then. Pt has had at least 2 falls in past year.  no inury    Objective                                                                                    Goals:  (Update in navigator)  Short term goals  Time Frame for Short term goals: LTGs= STGs:  Long term goals  Time Frame for Long term goals : 3- 5 day or until d/c  Long term goal 1: Pt will complete total body bathing c Mod I and use of AE prn by d/c- Met   Long term goal 2: Pt will complete total body dressing (UB/LB/Footwear) c SUP by d/c- Met   Long term goal 3: Pt will complete toileting c MOD I by d/c- Met   Long term goal 4: Pt will complete functional transfer (toilet, tub, shower) c MOD I by d/c. Met   Long term goal 5: Pt will complete grooming/oral care task c IND by d/c- Met   Long term goals 6: Pt will perform therex/therax to facilitate increased strength/endurance/ax tolerance (c emphasis on dynamic standing balance/tolerance >8 mins ) c MOD I in order to complete ADLs. :    Met     Plan of Care                                                                              Times per week: 5 days per week for a minimum of 60 minutes/day plus group as appropriate for 60 minutes.   Treatment to include Plan  Times per week: 5-6x  Times per day: Daily  Current Treatment Recommendations: Strengthening, Balance Training, Endurance Training, Pain Management, Other (comment), Home Management Training, Positioning, Safety Education & Training, Equipment Evaluation, Education, & procurement, Functional Mobility Training, ROM, Patient/Caregiver Education & Training    Electronically signed by   MAGALIS Borjas,  11/29/2021, 9:45 AM

## 2021-11-30 VITALS
OXYGEN SATURATION: 94 % | RESPIRATION RATE: 16 BRPM | HEIGHT: 63 IN | BODY MASS INDEX: 23.39 KG/M2 | WEIGHT: 132 LBS | TEMPERATURE: 98.1 F | HEART RATE: 60 BPM | SYSTOLIC BLOOD PRESSURE: 120 MMHG | DIASTOLIC BLOOD PRESSURE: 52 MMHG

## 2021-11-30 PROCEDURE — 6370000000 HC RX 637 (ALT 250 FOR IP): Performed by: INTERNAL MEDICINE

## 2021-11-30 PROCEDURE — 6370000000 HC RX 637 (ALT 250 FOR IP): Performed by: PHYSICAL MEDICINE & REHABILITATION

## 2021-11-30 PROCEDURE — 99239 HOSP IP/OBS DSCHRG MGMT >30: CPT | Performed by: PHYSICAL MEDICINE & REHABILITATION

## 2021-11-30 PROCEDURE — 97530 THERAPEUTIC ACTIVITIES: CPT

## 2021-11-30 PROCEDURE — 6360000002 HC RX W HCPCS: Performed by: PHYSICAL MEDICINE & REHABILITATION

## 2021-11-30 PROCEDURE — 94761 N-INVAS EAR/PLS OXIMETRY MLT: CPT

## 2021-11-30 PROCEDURE — 97116 GAIT TRAINING THERAPY: CPT

## 2021-11-30 RX ORDER — PYRIDOSTIGMINE BROMIDE 60 MG/1
30 TABLET ORAL EVERY 8 HOURS SCHEDULED
Qty: 45 TABLET | Refills: 0 | Status: SHIPPED | OUTPATIENT
Start: 2021-11-30 | End: 2021-12-29 | Stop reason: SDUPTHER

## 2021-11-30 RX ADMIN — GABAPENTIN 300 MG: 300 CAPSULE ORAL at 13:54

## 2021-11-30 RX ADMIN — ONDANSETRON 4 MG: 4 TABLET, ORALLY DISINTEGRATING ORAL at 10:21

## 2021-11-30 RX ADMIN — KETOTIFEN FUMARATE 1 DROP: 0.35 SOLUTION/ DROPS OPHTHALMIC at 09:30

## 2021-11-30 RX ADMIN — PANTOPRAZOLE SODIUM 40 MG: 40 TABLET, DELAYED RELEASE ORAL at 06:22

## 2021-11-30 RX ADMIN — VERAPAMIL HYDROCHLORIDE 90 MG: 180 TABLET, FILM COATED, EXTENDED RELEASE ORAL at 09:30

## 2021-11-30 RX ADMIN — GABAPENTIN 300 MG: 300 CAPSULE ORAL at 09:30

## 2021-11-30 RX ADMIN — LOPERAMIDE HYDROCHLORIDE 2 MG: 2 CAPSULE ORAL at 12:01

## 2021-11-30 RX ADMIN — PYRIDOSTIGMINE BROMIDE 30 MG: 60 TABLET ORAL at 13:55

## 2021-11-30 RX ADMIN — ENOXAPARIN SODIUM 40 MG: 100 INJECTION SUBCUTANEOUS at 09:30

## 2021-11-30 RX ADMIN — PYRIDOSTIGMINE BROMIDE 30 MG: 60 TABLET ORAL at 06:22

## 2021-11-30 RX ADMIN — ASPIRIN 81 MG: 81 TABLET, COATED ORAL at 09:30

## 2021-11-30 NOTE — CARE COORDINATION
Patient's DME delivered to room. Patient reporting that her son will provide dc transport, but she needs a time to tell him. He's working today. Patient agreeable to 1500 discharge. RN Sharad Rodríguez updated on time.           ARU  Discharge Summary    D/C Date: 11/30/21    Patient discharged to: home alone    Transported by: son/grandson    Referrals made to: [facility], [facility]     Additional information:     Caregiver training:

## 2021-11-30 NOTE — DISCHARGE SUMMARY
Patient Name: Viktoriya Michelle  Patient :  1935  Patient MRN:   [id number]      Admission Date:  2021  Discharge Date: 2021    Admitting diagnosis: Hyponatremia ( [address])     Comorbid diagnoses impacting rehabilitation: Generalized weakness, gait disturbance, essential hypertension, PAD, HLD, macular degeneration, trigeminal neuralgia, history of DVT, bilateral hydroureter    Discharging diagnosis: Hyponatremia ( [address])     Comorbid diagnoses impacting rehabilitation: Generalized weakness, gait disturbance, essential hypertension, PAD, HLD, macular degeneration, trigeminal neuralgia, history of DVT, bilateral hydroureter     History of present illness: Patient is an 74-year-old right-hand-dominant female who presented to our ED on 11/10/2021 with generalized weakness and difficulty walking. She described several weeks of progressive fatigue and weakness. Her family noted her thinking was not as sharp. In our ED she was found to be significantly hyponatremic and dehydrated. She had a cardiac and neurologic survey which did not yield any significant abnormalities. Her evaluation identified bilateral hydroureters without clear obstructive stones or extrinsic compression on the ureters or bladder. She required IV fluids and occupational and physical therapy. The patient has experienced a plethora of various somatic and emotional complaints during her time on the rehab unit. Generally her evaluation has been very benign. Cardiology has initiated Mestinon therapy for orthostatic hypotension. Gastroenterology has met with the patient and will follow up her IBS D symptoms as an outpatient. Prior (baseline) level of function: Independent.     Current level of function:         Current  IRF-TAMARA and Goals:   Occupational Therapy:    Short term goals  Time Frame for Short term goals: LTGs= STGs :   Long term goals  Time Frame for Long term goals : 3- 5 day or until d/c  Long term goal 1: Pt will complete total body bathing c Mod I and use of AE prn by d/c  Long term goal 2: Pt will complete total body dressing (UB/LB/Footwear) c SUP by d/c  Long term goal 3: Pt will complete toileting c MOD I by d/c  Long term goal 4: Pt will complete functional transfer (toilet, tub, shower) c MOD I by d/c. Long term goal 5: Pt will complete grooming/oral care task c IND by d/c  Long term goals 6: Pt will perform therex/therax to facilitate increased strength/endurance/ax tolerance (c emphasis on dynamic standing balance/tolerance >8 mins ) c MOD I in order to complete ADLs. :                                       Eating: Eating  Assistance Needed: Independent  Comment: X  CARE Score: 6  Discharge Goal: Independent       Oral Hygiene: Oral Hygiene  Assistance Needed: Independent  Comment: seated at sink d/t nausea  CARE Score: 6  Discharge Goal: Independent    UB/LB Bathing: Shower/Bathe Self  Assistance Needed: Independent  Comment: Mod I- majority performed seated; Mod I in stance to bathe posterior perineal area  CARE Score: 6  Discharge Goal: Independent    UB Dressing: Upper Body Dressing  Assistance Needed: Independent  Comment: Mod I; Pt able to retreive clothing and don/doff pullover sweater  CARE Score: 6  Discharge Goal: Independent         LB Dressing: Lower Body Dressing  Assistance Needed: Independent  Comment: Mod I; Pt able to retreive clothing; doff/don depends and pants  CARE Score: 6  Discharge Goal: Independent    Donning and [place] Footwear: Putting On/Taking Off Footwear  Assistance Needed: Independent  Comment: Pt able to don socks and shoes  CARE Score: 6  Discharge Goal: Independent      Toileting: Toileting Hygiene  Assistance Needed: Independent  Comment: Mod I c use of grab bars, pt demo good balance  CARE Score: 6  Discharge Goal: Independent      Toilet Transfers: Toilet Transfer  Assistance Needed: Independent  Comment:  Mod I  CARE Score: 6  Discharge Goal: Independent    Physical Therapy: Short term goals  Time Frame for Short term goals: 7 days STG=LTG  Short term goal 1: Pt will perform all bed mobility with mod I  Short term goal 2: Pt will perform sit to stand, pivot and car transfers  with mod I  Short term goal 3: Pt will ambulate  150  feet on level surfaces and 25' on unlevel surface with mod I   assist  using straight cane  Short term goal 4: Pt will ascend/descend curb step with   cane   and up to   12  steps with  rail(s) with   mod I  Short term goal 5: Pt will retrieve light item from floor with mod I   assist  using device as needed            Bed Mobility:   Sit to Lying  Assistance Needed: Independent  CARE Score: 6  Discharge Goal: Independent  Roll Left and Right  Assistance Needed: Independent  CARE Score: 6  Discharge Goal: Independent  Lying to Sitting on Side of Bed  Assistance Needed: Independent  Comment: without bed features  CARE Score: 6  Discharge Goal: Independent    Transfers:    Sit to Stand  Assistance Needed: Independent  Comment: demonstrated safe technique today  CARE Score: 6  Discharge Goal: Independent  Chair/Bed-to-Chair Transfer  Assistance Needed: Independent  Comment: safe performance today  CARE Score: 6  Discharge Goal: Independent  Toilet Transfer  Assistance Needed: Supervision or touching assistance  Comment: SB-CGA for balance with SPC and grab bar  CARE Score: 4  Car Transfer  Assistance Needed: Supervision or touching assistance  Comment: Supervision with SPC for approach  CARE Score: 4  Discharge Goal: Independent    Ambulation:    Walking Ability  Does the Patient Walk?: Yes     Walk 10 Feet  Assistance Needed: Independent  Comment: safe performance today  CARE Score: 6  Discharge Goal: Independent     Walk 50 Feet with Two Turns  Assistance Needed: Supervision or touching assistance  Comment: supervision with cane  Reason if not Attempted: Not attempted due to medical condition or safety concerns  CARE Score: 4  Discharge Goal: Independent bruising. Lower extremities: 4+/5 strength throughout. No atrophy or tremor. Calves soft. No signs of DVT. Normal tone. Sitting balance was good. Standing balance was fair-.    Lab Results   Component Value Date    WBC 5.3 11/22/2021    HGB 10.2 (L) 11/22/2021    HCT 31.6 (L) 11/22/2021    .3 (H) 11/22/2021     (L) 11/22/2021     Lab Results   Component Value Date    INR 0.96 01/25/2016    PROTIME 11.0 01/25/2016     Lab Results   Component Value Date    CREATININE 0.9 11/22/2021    BUN 15 11/22/2021     11/22/2021    K 5.0 11/22/2021     11/22/2021    CO2 26 11/22/2021     Lab Results   Component Value Date    ALT 9 (L) 11/10/2021    AST 20 11/10/2021    ALKPHOS 38 (L) 11/10/2021    BILITOT 0.4 11/10/2021           The patient presented to the ARU with the above history requiring a multidisciplinary treatment plan including close medical supervision by the Abhinav Henry Director. The patient participated in the prescribed therapy treatment plan with fair compliance and fair tolerance. She generally avoided significant medical complications. By the time of discharge the patient had become safer with adaptive equipment to transfer and toilet with a FWW with the supervision of family/caregivers. The patient was tolerating an oral diet without choking/coughing and was back to their baseline with regards to bowel and bladder control. Discharge instructions were reviewed with the patient. The patient is to follow up with the PCP in 1 week. Additionally, she will see gastroenterology and urology and 7 to 10 days. No driving. [facility] PT/OT/RN will be arranged.        Medication List      START taking these medications    pyridostigmine 60 MG tablet  Commonly known as: MESTINON  Take 0.5 tablets by mouth every 8 hours        CHANGE how you take these medications    gabapentin 300 MG capsule  Commonly known as: NEURONTIN  take 1 tablet by mouth three times a day for TRIGEMINAL NEURALGIA  What changed:   · how much to take  · how to take this  · when to take this  · additional instructions        CONTINUE taking these medications    aspirin 81 MG EC tablet     divalproex 250 MG extended release tablet  Commonly known as: DEPAKOTE ER     fluticasone 50 MCG/ACT nasal spray  Commonly known as: FLONASE  instill 1 spray into each nostril once daily     Handicap Placard Misc  by Does not apply route Good for 3 years     OCUVITE-LUTEIN PO     ondansetron 4 MG disintegrating tablet  Commonly known as: ZOFRAN-ODT  Take 1 tablet by mouth 3 times daily as needed for Nausea or Vomiting     Oscal 500/200 D-3 500-200 MG-UNIT per tablet  Generic drug: calcium-vitamin D     pantoprazole 40 MG tablet  Commonly known as: PROTONIX  Take 1 tablet by mouth daily     rosuvastatin 10 MG tablet  Commonly known as: CRESTOR  Take 1 tablet by mouth daily     sertraline 25 MG tablet  Commonly known as: ZOLOFT     Vascepa 0.5 g Caps  Generic drug: Icosapent Ethyl  take 2 capsules by mouth twice a day     verapamil 180 MG extended release tablet  Commonly known as: CALAN SR  take 1/2 tablet by mouth twice a day     Zaditor 0.025 % ophthalmic solution  Generic drug: ketotifen        STOP taking these medications    lidocaine 4 % external patch           Where to Get Your Medications      You can get these medications from any pharmacy    Bring a paper prescription for each of these medications  · pyridostigmine 60 MG tablet         CONDITION ON DISCHARGE: Stable. The prognosis is fair for further improvements in ADL's and safety with adapted gait/transfers. Record review, patient exam, discharge instructions, medication reconciliation and summary for this discharge visit took more than 30 minutes.

## 2021-11-30 NOTE — PROGRESS NOTES
Physical Therapy  . [x] daily progress note       [x] discharge       Patient Name:  Christi Stevenson   :  1935 MRN: [id number]  Room:  [address] [place] [address] Date of Admission: 2021  Rehabilitation Diagnosis:   Hypo-osmolality and hyponatremia [E87.1]  Hyponatremia [E87.1]       Date 2021       Day of ARU Week:  7   Time IN//920   Individual Tx Minutes 40   Group Tx Minutes    Co-Treat Minutes    Concurrent Tx Minutes    TOTAL Tx Time Mins 40   Variance Time    Variance Time []   Refusal due to:     []   Medical hold/reason:    []   Illness   []   Off Unit for test/procedure  []   Extra time needed to complete task  []   Therapeutic need  []   Other (specify):   Restrictions Restrictions/Precautions  Restrictions/Precautions: General Precautions, Fall Risk      Interdisciplinary communication [x]   Cleared for therapy per nursing     []   RN notified about issues during session  []   RN updated on pt performance  []   Spoke with   []   Spoke with OT  []   Spoke with MD  []   Other:    Subjective observations and cognitive status: Pt states she is feeling much better today, though anticipates after eating breakfast she may have BM.  Pt did not have BM during session   Pain level/location:  0  /10       Location:    Discharge recommendations  Anticipated discharge date:    Destination: []home alone   []home alone with assist PRN     [x] home w/ family      [] Continuous supervision  []SNF    [] Assisted living     [] Other:  Continued therapy: [x]HHC PT  []OUTPATIENT PT   [] No Further PT  []SNF PT  Caregiver training recommended: []Yes  [] No   Equipment needs: 2ww, cane     PT IRF-TAMARA scores and goals for discharge assessment:   Roll Left and Right  Assistance Needed: Independent  CARE Score: 6  Discharge Goal: Independent    Sit to Lying  Assistance Needed: Independent  Comment: 6 (deemed usual performance per team huddle)  CARE Score: 6  Discharge Goal: Independent    Lying to Sitting on Side of Bed  Assistance Needed: Independent  Comment: without bed features  CARE Score: 6  Discharge Goal: Independent    Sit to Stand  Assistance Needed: Independent  Comment: demonstrated safe technique today  CARE Score: 6  Discharge Goal: Independent    Chair/Bed-to-Chair Transfer  Assistance Needed: Independent  Comment: safe performance today  CARE Score: 6  Discharge Goal: Independent        Car Transfer  Assistance Needed: Supervision or touching assistance  Comment: Supervision with Danyelle Castanedavd for approach  CARE Score: 4  Discharge Goal: Independent            Picking Up Object  Assistance Needed: Supervision or touching assistance  Comment: supervision with difficulty using reacher  CARE Score: 4  Discharge Goal: Independent                           Walking Ability  Does the Patient Walk?: Yes    Walk 10 Feet  Assistance Needed: Independent  Comment: safe performance today  CARE Score: 6  Discharge Goal: Independent    Walk 50 Feet with Two Turns  Assistance Needed: Supervision or touching assistance  Comment: supervision with cane  CARE Score: 4  Discharge Goal: Independent    Walk 150 Feet  Assistance Needed: Supervision or touching assistance  Comment: SBA with SPC, mild path deviations, inconsistent stance times and step lengths.    CARE Score: 4  Discharge Goal: Independent    Walking 10 Feet on Uneven Surfaces  Assistance Needed: Supervision or touching assistance  Comment: CG with cane  CARE Score: 4  Discharge Goal: Independent    1 Step (Curb)  Assistance Needed: Supervision or touching assistance  Comment: SBA with cane  CARE Score: 4  Discharge Goal: Independent    4 Steps  Assistance Needed: Independent  Comment: mod I with cane and rail  CARE Score: 6  Discharge Goal: Independent    12 Steps  Assistance Needed: Independent  Comment: mod I with cane and rail  CARE Score: 6  Discharge Goal: Independent      Additional Therapeutic activities/exercises completed this date:     []   Nu-step:  Time: Level:         #Steps:       []   Rebounder:    []  Seated     []  Standing        []   Balance training         []   Postural training    []   Supine ther ex (reps/sets):     []   Seated ther ex (reps/sets):     []   Standing ther ex (reps/sets):     []   Other: Toileting activity completed with    []   Other:    Comments:      Patient/Caregiver Education and Training:   []   Role of PT  []   Education about Dx  []   Use of call light for assist   []   HEP provided and explained   []   Treatment plan reviewed  [x]   Home safety: reviewed with pt recommendation to use cane when she feels steady, but to use 2ww when feeling unsteady or when on unlevel surfaces and to focus on her gait alone when walking as safety decreases with dual tasking. Recommending pt have increased support at home.   []   Wheelchair mobility/management   []   Body mechanics  []   Bed Mobility/Transfer technique  []   Gait technique/sequencing  [x]   Proper use of assistive device/adaptive equipment: 2ww delivered during session and PT fit to pt   []   Stair training/Advanced mobility safety and technique  []   Reinforced patient's precautions/mobility while maintaining precautions  []   Postural awareness  []   Family/caregiver training  []   Progress was updated and reviewed in Rehabtracker with patient and/or family this date. []   Other:    Treatment Plan for Next Session: n/a     Assessment: This pt made progress toward goals, but did not meet some due to need for safety cuing. When pt focused on her gait/mobility she performs well, but when dual tasking or for some reason with increased anxiety, safety degrades. [facility] progress and for safety training in the home as well as increased external support from family or hired services.      Treatment/Activity Tolerance:   [] Tolerated treatment with no adverse effects    [x] Patient limited by fatigue  [] Patient limited by pain   [] Patient limited by medical complications:    [] Adverse reaction to Tx:   [] Significant change in status    Safety:       []  bed alarm set    []  chair alarm set    []  Pt refused alarms                []  Telesitter activated      [x]  Gait belt used during tx session      []other:       Number of Minutes/Billable Intervention  Gait Training 25   Therapeutic Exercise    Neuro Re-Ed    Therapeutic Activity 15   Wheelchair Propulsion    Group    Other:    TOTAL 40     Social History  Social/Functional History  Lives With: Alone  Type of Home: House  Home Layout: One level (Pt has a basement with a chair lift; family room is in the basement)  Home Access: Stairs to enter with rails  Entrance Stairs - Number of Steps: 4 steps  Entrance Stairs - Rails: Both  Bathroom Shower/Tub: Tub/Shower unit, Shower chair with back  [employer]: Standard  Bathroom Equipment: Grab bars in shower, Grab bars around toilet (has grab bars all the way around the bathroom (by the sink, by tub, and door))  Bathroom Accessibility: Not accessible  Home Equipment: Grab bars, Lift chair, Cane (recliner that lifts all the way up)  Receives Help From:  (sons live nearby but work full time. does not feel anyone would be able to assist her at home)  ADL Assistance: [address]: Needs assistance  Meal Prep:  (IND)  Laundry:  (IND)  Vacuuming: Total  Cleaning: Maximal  Gardening: Total  Yard Work: Total  Driving:  (IND)  Shopping:  Moderate  Homemaking Responsibilities: Yes  Meal Prep Responsibility: Primary  Laundry Responsibility: Primary  Cleaning Responsibility: Secondary (grandson comes every other week to clean)  Bill Paying/Finance Responsibility: Primary  Shopping Responsibility: No (grandson does once a week)  Health Care Management: Primary  Ambulation Assistance: Independent (pt did not use assistive device)  Transfer Assistance: Independent  Active : Yes  Mode of Transportation: Car  Education: 12th grade and some business college. Occupation: Retired  Type of occupation: Retired   Leisure & Hobbies: Gets hair done 1x week, cooking, running errors. [employer], OP therapy. Meds are managed via pill bottles lined up in a cabinet drawer. Pt does report a few times that she's gotten confused with them. Finances are managed via auto deposit. Lesia Adams assists as needed. Also comes every Saturday to assist with any projects needed. IADL Comments: Pt reports she likes structure; recently lost her dtr and spouse. Pt notes memory changes PTA. Pt tries to rearrange items and organize items to assist with memory. [facility]. Additional Comments: Pt sleeps both in low flat bed and tall 4 poster bed depending on how she feels. Has had one fall where she bumped her head and she had stayed in the lower bed since then. Pt has had at least 2 falls in past year. no inury    Objective                                                                                    Goals:  (Update in navigator)  Short term goals  Time Frame for Short term goals: 7 days STG=LTG  Short term goal 1: Pt will perform all bed mobility with mod I  Short term goal 2: Pt will perform sit to stand, pivot and car transfers  with mod I  Short term goal 3: Pt will ambulate  150  feet on level surfaces and 25' on unlevel surface with mod I   assist  using straight cane  Short term goal 4: Pt will ascend/descend curb step with   cane   and up to   12  steps with  rail(s) with   mod I  Short term goal 5: Pt will retrieve light item from floor with mod I   assist  using device as needed:   :        Plan of Care                                                                              Times per week: 5 days per week for a minimum of 60 minutes/day plus group as appropriate for 60 minutes.   Treatment to include Current Treatment Recommendations: Functional Mobility Training, IADL Training, Neuromuscular Re-education, Home Exercise Program, Equipment Evaluation, Education, & procurement, Transfer Training, Gait Training, Balance Training, Stair training, Patient/Caregiver Education & Training, Safety Education & Training    Electronically signed by   Dannie Stroud PT,   11/30/2021, 9:17 AM

## 2021-11-30 NOTE — CONSULTS
[address] [place] [address], [address] [facility]                                  CONSULTATION    PATIENT NAME: MANUEL MARTINEZ                          :        1935  MED REC NO:   [id number]                          ROOM:       [id number]  ACCOUNT NO:   [de-identified]                           ADMIT DATE: 2021  PROVIDER:     Angelique Carrillo MD    CONSULT DATE:  2021    REFERRING PROVIDER:  Joés Iniguez MD    CHIEF COMPLAINT:  History of nausea with loose BM. HISTORY OF PRESENT ILLNESSES:  As follows. The patient is an  71-year-old white female, patient of Dr. José Iniguez, who was  admitted to the hospital on 11/10/2021, after being transferred from  [facility] Emergency Room for generalized weakness and hypernatremia. The  patient was subsequently transferred to the rehab. The patient's past  medical history is significant for a history of hypertension,  hyperlipidemia, osteoarthritis, chronic depression, DVT, history of  acute diverticulitis in 2021, peptic ulcer disease, peripheral  vascular disease, prediabetes, coronary artery disease, status post  CABG, and tic douloureux. The patient is being discharged home today, but complains of some nausea  and loose BM after eating. According to the patient, she is having  loose BM since she got her booster dose for COVID 19 vaccine. There is  no history of vomiting, melena, or hematochezia. The patient has seen  Dr. Chastity Vital from GI in the past, who performed an EGD and  colonoscopy on 2013, and the biopsies from the small intestine and  stomach were unremarkable, and the biopsies from the ascending colon  were negative as well. The patient is hemodynamically stable and is  being discharged today, and the blood workup done during the present  hospitalization comprised a Chem profile on 2021, which was  unremarkable.   CBC showed a WBC count of 5.3, hemoglobin 10.2, platelet  count of 274,527. The patient's last CAT scan of the abdomen was on  11/10/2021, and showed chronic diverticulosis without acute inflammatory  changes, and also mild hydroureter was noted bilaterally as well. REVIEW OF SYSTEMS:  CENTRAL NERVOUS SYSTEM EXAMINATION:  The patient denies headache or  focal sensorimotor symptoms. CARDIOVASCULAR SYSTEM:  No history of chest pain, shortness of breath,  or leg swelling. GENITOURINARY SYSTEM:  No history of dysuria, pyuria, or hematuria. MUSCULOSKELETAL SYSTEM:  The patient complains of generalized weakness. RESPIRATORY SYSTEM:  No history of cough, hemoptysis, fever or chills. PAST MEDICAL HISTORY:  Significant for history of hypertension; coronary  artery disease, status post CABG; hyperlipidemia; DVT; osteoarthritis;  chronic depression; mitral valve prolapse; peptic ulcer disease;  peripheral vascular disease; supraventricular tachycardia; and tic  douloureux. FAMILY HISTORY:  The patient's sister was recently diagnosed with  carcinoma of the breast.    MEDICATIONS:  Please refer to the chart. SOCIOECONOMIC HISTORY:  No history of EtOH abuse. The patient is a  former smoker. PAST SURGICAL HISTORY:  The patient has had cholecystectomy done, tubal  ligation, and surgery on ureter, CABG, and tonsillectomy and  adenoidectomy, and cataract surgery. ALLERGIES:  The patient has multiple allergies, please refer to the  chart. PHYSICAL EXAMINATION:  GENERAL:  Physical examination shows an 37-year-old white female of  average build and nutritional status, who is lying flat in bed, in no  acute distress. She is awake, alert, and oriented and pleasant to talk  with. VITAL SIGNS:  Stable. HEENT:  Examination shows skull to be atraumatic. NECK:  Supple. CHEST:  Clear. HEART:  S1, S2, is normal.  ABDOMEN:  Soft, nontender, and nondistended. Liver and spleen are not  palpable. Bowel sounds are present. RECTAL:  Exam is deferred.   CNS: Exam shows the patient to be awake, alert, and oriented. There  are no focal sensorimotor signs. MUSCULOSKELETAL SYSTEM:  Exam shows evidence of degenerative joint  disease changes. LABORATORY DATA:  As above mentioned. IMPRESSION:  An 63-year-old white female with multiple comorbidities,  admitted with dehydration, generalized weakness, and hypernatremia and  complains of mild nausea and diarrhea and etiology to be determined. RECOMMENDATIONS:  1. Agree with present management. 2.  The tablets Zofran and Imodium on a p.r.n. basis for nausea and  diarrhea. 3.  We will follow the patient in the office after discharge and further  workup for diarrhea as an outpatient as needed. 4.  We will hold off on repeat colonoscopy at this point. 5.  The case and plan have been discussed in detail with the patient,  and we will follow her up as an outpatient. 6.  The patient can be discharged from a GI point of view today from the  rehab.         Kay Esparza MD    D: 11/30/2021 10:40:29       T: 11/30/2021 10:43:54     [id number]  Job#: [id number]     Doc#: [id number]    CC:

## 2021-11-30 NOTE — PROGRESS NOTES
[facility] Liaison is aware of discharge & will initiate [facility].

## 2021-11-30 NOTE — PLAN OF CARE
Problem: Infection:  Goal: Will remain free from infection  Description: Will remain free from infection  11/30/2021 0022 by Alex Palacios RN  Outcome: Ongoing  11/29/2021 1040 by Silvestre Cerrato RN  Outcome: Ongoing     Problem: Safety:  Goal: Free from accidental physical injury  Description: Free from accidental physical injury  11/30/2021 0022 by Alex Palacios RN  Outcome: Ongoing  11/29/2021 1040 by Silvestre Cerrato RN  Outcome: Ongoing  Goal: Free from intentional harm  Description: Free from intentional harm  11/30/2021 0022 by Alex Palacios RN  Outcome: Ongoing  11/29/2021 1040 by Silvestre Cerrato RN  Outcome: Ongoing     Problem: Daily Care:  Goal: Daily care needs are met  Description: Daily care needs are met  11/30/2021 0022 by Alex Palacios RN  Outcome: Ongoing  11/29/2021 1040 by Silvestre Cerrato RN  Outcome: Ongoing     Problem: Pain:  Goal: Patient's pain/discomfort is manageable  Description: Patient's pain/discomfort is manageable  11/30/2021 0022 by Alex Palacios RN  Outcome: Ongoing  11/29/2021 1040 by Silvestre Cerrato RN  Outcome: Ongoing     Problem: Skin Integrity:  Goal: Skin integrity will stabilize  Description: Skin integrity will stabilize  11/30/2021 0022 by Alex Palacios RN  Outcome: Ongoing  11/29/2021 1040 by Silvestre Cerrato RN  Outcome: Ongoing     Problem: Discharge Planning:  Goal: Patients continuum of care needs are met  Description: Patients continuum of care needs are met  11/30/2021 0022 by Alex Palacios RN  Outcome: Ongoing  11/29/2021 1040 by Silvestre Cerrato RN  Outcome: Ongoing     Problem: Skin Integrity:  Goal: Will show no infection signs and symptoms  Description: Will show no infection signs and symptoms  11/30/2021 0022 by Alex Palacios RN  Outcome: Ongoing  11/29/2021 1040 by Silvestre Cerrato RN  Outcome: Ongoing  Goal: Absence of new skin breakdown  Description: Absence of new skin breakdown  11/30/2021 0022 by Dylan Fernandes RN  Outcome: Ongoing  11/29/2021 1040 by Alfreda Johansen RN  Outcome: Ongoing

## 2021-12-01 ENCOUNTER — CARE COORDINATION (OUTPATIENT)
Dept: CASE MANAGEMENT | Age: 86
End: 2021-12-01

## 2021-12-01 NOTE — CARE COORDINATION
Care Transitions Outreach Attempt    Call within 2 business days of discharge: Yes   Attempted to reach patient for transitions of care follow up. Unable to reach patient. Patient: Yennifer Huerta Patient : 1935 MRN: <[id number]>    Last Discharge [address] [address]       Complaint Diagnosis Description Type Department Provider    21   Admission (Discharged) Dylan Winn MD        24 Hour Transition of Care Call:    1st Attempt to contact patient for Initial 24 Hour Transition from hospital to home Call:   Patient not reached. Left HIPAA Compliant message on Voice Mail to call CTN (number given) with any questions and an update on patient's condition since discharge. Will continue to follow. Called [facility], spoke to Dean Garcia. They are scheduled to see patient later today. Edie Ziegler LPN    [phone]  [facility]  Coordinator    Was this an external facility discharge?  No Discharge Facility: [facility]    Noted following upcoming appointments from discharge chart review:   [facility] follow up appointment(s):   Future Appointments   Date Time Provider Darvin Morales   2021  9:30 AM MD Cruz Sexton NOR [facility]   2021  1:15 PM Yoly Allen PA-C [facility]   2022  1:00 PM INFUSION ROOM CHAIR [address]   2022  2:00 PM MD SHERRY Sexton   2022  1:15 PM Valene Mortimer, MD [facility]   2022 11:00 AM SCHEDULE, [facility] AW[facility] OLGA VASQUES NOR AYDE     Non-[facility] follow up appointment(s):

## 2021-12-02 ENCOUNTER — CARE COORDINATION (OUTPATIENT)
Dept: CASE MANAGEMENT | Age: 86
End: 2021-12-02

## 2021-12-02 DIAGNOSIS — E87.1 HYPONATREMIA: Primary | ICD-10-CM

## 2021-12-02 PROCEDURE — 1111F DSCHRG MED/CURRENT MED MERGE: CPT | Performed by: FAMILY MEDICINE

## 2021-12-02 NOTE — CARE COORDINATION
[facility] Transitions Initial Follow Up Call    Call within 2 business days of discharge: Yes    Patient: Saulo Love Patient : 1935   MRN: [id number]  Reason for Admission: Hyponatremia  Discharge Date: 21 RARS: Readmission Risk Score: 14.1 ( )      Last Discharge [facility]       Complaint Diagnosis Description Type Department Provider    21   Admission (Discharged) Janene Boas, MD        Transitions of Care Initial Call:    Called and spoke to patient. Patient states she is progressing fine. Up and ambulating with cane. Denies nausea, vomiting, diarrhea, headache, muscle cramping and weakness, very low energy, or seizures. Reviewed medications with Patient. Confirmed Patient obtained and is taking medications as directed. There are no questions concerning medications at this time. Medication education provided needed, questions answered, verbalizes understanding. Stressed importance of medication compliance. Advised contacting Pharmacy/MD for refill needs. Expresses understanding. 1111F Medication Reconciliation completed. Routed to PCP. Patient has fair appetite and is drinking adequate fluids. Normal bladder and bowel elimination patterns. [facility] coming out to see patient. Reviewed appointments with patient. Will see PCP 2021. Explained the Transition to Home Program to patient. Patient is called after discharge by CTN to make sure all needs are met and to see if patient has any questions or problems. Expresses understanding. Will continue to follow. Brandan Mederos LPN    [phone]  [facility] Coordinator    Was this an external facility discharge?  No Discharge Facility: [facility]    Challenges to be reviewed by the provider   Additional needs identified to be addressed with provider No  none             Method of communication with provider : none      Advance Care Planning:   Does patient have an Advance Directive:  not on file. Was this a readmission? No  Patient stated reason for admission: Hyponatremia  Patients top risk factors for readmission: lack of knowledge about disease  level of motivation  medication management    Care Transition Nurse (CTN) contacted the patient by telephone to perform post hospital discharge assessment. Verified name and  with patient as identifiers. Provided introduction to self, and explanation of the CTN role. CTN reviewed discharge instructions, medical action plan and red flags with patient who verbalized understanding. Patient given an opportunity to ask questions and does not have any further questions or concerns at this time. Were discharge instructions available to patient? Yes. Reviewed appropriate site of care based on symptoms and resources available to patient including: PCP, Home health and When to call 911. The patient agrees to contact the PCP office for questions related to their healthcare. Medication reconciliation was performed with patient, who verbalizes understanding of administration of home medications. Advised obtaining a 90-day supply of all daily and as-needed medications. Reviewed and educated patient on any new and changed medications related to discharge diagnosis     Was patient discharged with a pulse oximeter? No     Discussed and confirmed pulse oximeter discharge instructions and when to notify provider or seek emergency care. LPN CC provided contact information. Plan for follow-up call in 5-7 days based on severity of symptoms and risk factors.        Non-face-to-face services provided:  Obtained and reviewed discharge summary and/or continuity of care documents    Care Transitions 24 Hour Call    Schedule Follow Up Appointment with PCP: Completed  Do you have any ongoing symptoms?: No  Do you have a copy of your discharge instructions?: Yes  Do you have all of your prescriptions and are they filled?: Yes  Have you been contacted by a [address]?: No  Have you scheduled your follow up appointment?: Yes (Comment: PCP 12/9/2021)  How are you going to get to your appointment?: Car - family or friend to transport  Were you discharged with any Home Care or Post Acute Services: Yes  Post Acute Services: Home Health (Comment: [address] [facility])  Do you have support at home?: Alone  Do you feel like you have everything you need to keep you well at home?: Yes  Are you an active caregiver in your home?: No  Care Transitions Interventions  No Identified Needs         Follow Up  Future Appointments   Date Time Provider Darvin Morales   12/9/2021  9:30 AM MD SHERRY Desai   12/27/2021  1:15 PM Gaby Allen PA-C [facility] AYDE   1/17/2022  1:00 PM INFUSION ROOM CHAIR [address] - [address]   1/25/2022  2:00 PM MD SHERRY Desai   1/27/2022  1:15 PM Cornelius Davis MD [facility]   8/8/2022 11:00 AM SCHEDULE, [facility] AWV OLGA Mendoza LPN

## 2021-12-09 ENCOUNTER — OFFICE VISIT (OUTPATIENT)
Dept: INTERNAL MEDICINE CLINIC | Age: 86
End: 2021-12-09
Payer: MEDICARE

## 2021-12-09 ENCOUNTER — CARE COORDINATION (OUTPATIENT)
Dept: CASE MANAGEMENT | Age: 86
End: 2021-12-09

## 2021-12-09 VITALS
OXYGEN SATURATION: 96 % | HEART RATE: 69 BPM | WEIGHT: 134 LBS | TEMPERATURE: 97 F | BODY MASS INDEX: 23.74 KG/M2 | DIASTOLIC BLOOD PRESSURE: 66 MMHG | SYSTOLIC BLOOD PRESSURE: 128 MMHG

## 2021-12-09 DIAGNOSIS — R53.1 GENERALIZED WEAKNESS: ICD-10-CM

## 2021-12-09 DIAGNOSIS — E87.1 HYPONATREMIA: Primary | ICD-10-CM

## 2021-12-09 DIAGNOSIS — K58.9 IRRITABLE BOWEL SYNDROME, UNSPECIFIED TYPE: ICD-10-CM

## 2021-12-09 DIAGNOSIS — D63.8 ANEMIA DUE TO CHRONIC ILLNESS: ICD-10-CM

## 2021-12-09 PROCEDURE — 1111F DSCHRG MED/CURRENT MED MERGE: CPT | Performed by: FAMILY MEDICINE

## 2021-12-09 PROCEDURE — 99495 TRANSJ CARE MGMT MOD F2F 14D: CPT | Performed by: FAMILY MEDICINE

## 2021-12-09 RX ORDER — ONDANSETRON 4 MG/1
4 TABLET, ORALLY DISINTEGRATING ORAL 2 TIMES DAILY PRN
Qty: 15 TABLET | Refills: 1 | Status: SHIPPED | OUTPATIENT
Start: 2021-12-09

## 2021-12-09 ASSESSMENT — ENCOUNTER SYMPTOMS
DIARRHEA: 0
ABDOMINAL PAIN: 0
COLOR CHANGE: 0
CONSTIPATION: 0
VOMITING: 0
CHEST TIGHTNESS: 0
SORE THROAT: 0
SHORTNESS OF BREATH: 0

## 2021-12-09 NOTE — PROGRESS NOTES
Post-Discharge Transitional Care Management Services or Hospital Follow Up      Keo Alexander   YOB: 1935    Date of Office Visit:  12/9/2021  Date of Hospital Admission: 11/17/21  Date of Hospital Discharge: 11/30/21  Readmission Risk Score(high >=14%.  Medium >=10%):Readmission Risk Score: 14.1 ( )      Care management risk score Rising risk (score 2-5) and Complex Care (Scores >=6): 6     Non face to face  following discharge, date last encounter closed (first attempt may have been earlier): 12/2/2021  9:18 AM 12/2/2021  9:18 AM    Call initiated 2 business days of discharge: Yes     Patient Active Problem List   Diagnosis    Chronic depression    Hyperlipidemia    Trigeminal neuralgia    Anemia due to chronic illness    Colon cancer screening    Osteoporosis    Supraventricular tachycardia ([address]    Recurrent ventral incisional hernia    Mild cognitive impairment    CAD (coronary artery disease)    Elevated TSH    PAD (peripheral artery disease) (HCC)    Sciatica of left side without back pain    Coronary artery disease involving coronary bypass graft of native heart without angina pectoris    S/P CABG x 3    Essential hypertension    MVP (mitral valve prolapse)    Bilateral carotid artery stenosis    Subclavian arterial stenosis (HCC)    Spigelian hernia    Ischemic cardiomyopathy    Glenohumeral arthritis, right    Normocytic anemia    Overactive bladder    Major depressive disorder, recurrent, in full remission ([address]    Asthmatic bronchitis    Age-related osteoporosis without current pathological fracture    Palpitations    SOB (shortness of breath)    Prediabetes    Generalized weakness    Hyponatremia    Gait disturbance    Macular degeneration of both eyes    History of DVT (deep vein thrombosis)    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, bilateral       Allergies   Allergen Reactions    Alendronate Sodium Other (See Comments)     GI upset    Bactrim [Sulfamethoxazole-Trimethoprim] Anaphylaxis    Boniva [Ibandronate Sodium] Other (See Comments)     Gi upset and declined egd; also to fosamax    Ciprofloxacin     Colesevelam     Lisinopril Other (See Comments)     Severe hyperkalemia (6) with bun >56      Macrobid [Nitrofurantoin]     Neggram [Nalidixic Acid]     Pce [Erythromycin]     Penicillins     Risperidone And Related     Welchol [Colesevelam Hcl] Other (See Comments)     constipation    Carbamazepine Nausea And Vomiting    Lovaza [Omega-3-Acid Ethyl Esters] Nausea And Vomiting    Metoprolol Nausea And Vomiting    Pyridium [Phenazopyridine] Palpitations       Medications listed as ordered at the time of discharge from hospital  @DISCHARGEMEDSLIST(<NOROUTINE> error)@      Medications marked \"taking\" at this time  Outpatient Medications Marked as Taking for the 12/9/21 encounter (Office Visit) with Apple Mc MD   Medication Sig Dispense Refill    ondansetron (ZOFRAN-ODT) 4 MG disintegrating tablet Take 1 tablet by mouth 2 times daily as needed for Nausea or Vomiting 15 tablet 1    pyridostigmine (MESTINON) 60 MG tablet Take 0.5 tablets by mouth every 8 hours 45 tablet 0    ketotifen (ZADITOR) 0.025 % ophthalmic solution Place 1 drop into both eyes 2 times daily      sertraline (ZOLOFT) 25 MG tablet take 1/2 tablet by mouth once daily for 2 days then take 1 tablet by mouth once daily THEREAFTER      gabapentin (NEURONTIN) 300 MG capsule take 1 tablet by mouth three times a day for TRIGEMINAL NEURALGIA (Patient taking differently: Take 300 mg by mouth 3 times daily. ) 90 capsule 1    pantoprazole (PROTONIX) 40 MG tablet Take 1 tablet by mouth daily 90 tablet 3    Icosapent Ethyl (VASCEPA) 0.5 g CAPS take 2 capsules by mouth twice a day 120 capsule 5    Handicap Placard MISC by Does not apply route Good for 3 years 1 each 0    verapamil (CALAN SR) 180 MG extended release tablet take 1/2 tablet by mouth twice a day 30 tablet 6    rosuvastatin (CRESTOR) 10 MG tablet Take 1 tablet by mouth daily 90 tablet 3    fluticasone (FLONASE) 50 MCG/ACT nasal spray instill 1 spray into each nostril once daily 1 Bottle 3    divalproex (DEPAKOTE ER) 250 MG extended release tablet Take 250 mg by mouth nightly       Multiple Vitamins-Minerals (OCUVITE-LUTEIN PO) Take by mouth      calcium-vitamin D (OSCAL 500/200 D-3) 500-200 MG-UNIT per tablet Take 1 tablet by mouth 2 times daily.  aspirin 81 MG EC tablet Take 81 mg by mouth daily. Medications patient taking as of now reconciled against medications ordered at time of hospital discharge: Yes    Chief Complaint   Patient presents with   [address] from [facility] [place] [place]     11/17/21-11/30/21       HPI    Inpatient course: Discharge summary reviewed- see chart. Interval history/Current status:     Patient was hospitalized last month for generalized weakness/fatigue, found to be hyponatremic ([address]). Was started on mestinon for orthostatic hypotension by cardiology and was advised to follow up with GI for treatment of IBS. Was also advised to follow up with urology for hydroureter found on evaluation. Was discharged to inpatient rehab for a few weeks and came home a week ago. Now has home health services and has been doing PT. Has been feeling improved, strength wise. Appetite is much better. Patient states still has issues with intermittent nausea and alternating constipation and diarrhea but today has been a good day. States that if she takes zofran, she does well for the next few days. Does state she has \"bad nerves\"- was recently started on zoloft by her psychiatrist, but does not know if it has been helping. Has follow up with him next week. Per ARU note: The patient has experienced a plethora of various somatic and emotional complaints during her time on the rehab unit. Generally her evaluation has been very benign.       Review of Systems Constitutional: Negative for activity change, appetite change, chills, fever and unexpected weight change. HENT: Negative for congestion and sore throat. Respiratory: Negative for chest tightness and shortness of breath. Cardiovascular: Negative for chest pain and palpitations. Gastrointestinal: Negative for abdominal pain, constipation, diarrhea and vomiting. Genitourinary: Negative for dysuria. Skin: Negative for color change. Neurological: Negative for dizziness and light-headedness. Psychiatric/Behavioral: Negative for dysphoric mood. The patient is not nervous/anxious. Vitals:    12/09/21 1337   BP: 128/66   Pulse: 69   Temp: 97 °F (36.1 °C)   SpO2: 96%   Weight: 134 lb (60.8 kg)     Body mass index is 23.74 kg/m². Wt Readings from Last 3 Encounters:   12/09/21 134 lb (60.8 kg)   11/30/21 132 lb (59.9 kg)   11/11/21 132 lb 14.4 oz (60.3 kg)     BP Readings from Last 3 Encounters:   12/09/21 128/66   11/30/21 (!) 120/52   11/17/21 (!) 158/62       Physical Exam  Vitals and nursing note reviewed. Constitutional:       General: She is not in acute distress. Appearance: Normal appearance. She is not ill-appearing or toxic-appearing. HENT:      Head: Normocephalic and atraumatic. Right Ear: External ear normal.      Left Ear: External ear normal.      Nose: Nose normal.      Mouth/Throat:      Pharynx: Oropharynx is clear. Eyes:      General: No scleral icterus. Right eye: No discharge. Left eye: No discharge. Extraocular Movements: Extraocular movements intact. Conjunctiva/sclera: Conjunctivae normal.   Cardiovascular:      Rate and Rhythm: Normal rate and regular rhythm. Heart sounds: Normal heart sounds. Pulmonary:      Effort: Pulmonary effort is normal.      Breath sounds: Normal breath sounds. No wheezing or rales. Musculoskeletal:         General: No deformity. Cervical back: Normal range of motion and neck supple. No rigidity. Skin:     General: Skin is warm and dry. Findings: No rash. Neurological:      General: No focal deficit present. Mental Status: She is alert. Mental status is at baseline. Motor: No weakness. Psychiatric:         Mood and Affect: Mood normal.         Behavior: Behavior normal.             Assessment/Plan:  1. Hyponatremia  Now normalized, will monitor.   - MI DISCHARGE MEDS RECONCILED W/ CURRENT OUTPATIENT MED LIST  - Comprehensive Metabolic Panel; Future    2. Generalized weakness  Improved, attributed to hyponatremia during hospitalization- rest of evaluation largely benign. Continue home PT.    - MI DISCHARGE MEDS RECONCILED W/ CURRENT OUTPATIENT MED LIST    3. Irritable bowel syndrome, unspecified type  With alternating constipation and diarrhea. Follow up with GI as advised. - Amb External Referral To Gastroenterology    4. Anemia due to chronic illness  Received IM B12 during hospitalization.   Will continue to monitor.    - CBC Auto Differential; Future        Medical Decision Making: moderate complexity

## 2021-12-09 NOTE — CARE COORDINATION
[facility] Transitions Follow Up Call    2021    Patient: Rosa Elena Reyes  Patient : 1935   MRN: [id number]  Reason for Admission: Hyponatremia  Discharge Date: 21 RARS: Readmission Risk Score: 14.1 ( )    Attempted to contact patient for Transition Subsequent call. Left HIPAA Compliant message on Voice Mail to call CTN (number given) with any questions and an update on patient's condition since discharge. Left HIPAA Compliant message on voice mail for Patient that this is the Final Transition Call and to call their Specialist/PCP for any problems or issues that may occur. Patient had Transitional Follow up appointment. 2021    Avtar Fontana LPN    [facility], [address] Transitions Subsequent and Final Call    Subsequent and Final Calls  Care Transitions Interventions  Other Interventions:            Follow Up  Future Appointments   Date Time Provider Darvin Morales   2021  1:15 PM Josefa [facility]   2022  1:00 PM INFUSION ROOM CHAIR 1 - [facility]   2022  2:00 PM MD SHERRY Marlow   2022  1:15 PM Amanda Sr MD [facility]   2022 11:00 AM SCHEDULE, [facility] OLGA Fontana LPN

## 2021-12-11 ENCOUNTER — HOSPITAL ENCOUNTER (OUTPATIENT)
Age: 86
Setting detail: SPECIMEN
Discharge: HOME | End: 2021-12-11
Payer: MEDICARE

## 2021-12-11 PROCEDURE — 81001 URINALYSIS AUTO W/SCOPE: CPT

## 2021-12-11 PROCEDURE — 87086 URINE CULTURE/COLONY COUNT: CPT

## 2021-12-13 LAB
BACTERIA: NEGATIVE /HPF
BILIRUBIN URINE: NEGATIVE MG/DL
BLOOD, URINE: NEGATIVE
CLARITY: CLEAR
COLOR: ABNORMAL
GLUCOSE, URINE: NEGATIVE MG/DL
KETONES, URINE: NEGATIVE MG/DL
LEUKOCYTE ESTERASE, URINE: NEGATIVE
MUCUS: ABNORMAL HPF
NITRITE URINE, QUANTITATIVE: NEGATIVE
PH, URINE: 8 (ref 5–8)
PROTEIN UA: NEGATIVE MG/DL
RBC URINE: <1 /HPF (ref 0–6)
SPECIFIC GRAVITY UA: 1.01 (ref 1–1.03)
TRICHOMONAS: ABNORMAL /HPF
UROBILINOGEN, URINE: NEGATIVE MG/DL (ref 0.2–1)
WBC UA: 1 /HPF (ref 0–5)

## 2021-12-14 ENCOUNTER — TELEPHONE (OUTPATIENT)
Dept: INTERNAL MEDICINE CLINIC | Age: 86
End: 2021-12-14

## 2021-12-14 DIAGNOSIS — Z12.31 VISIT FOR SCREENING MAMMOGRAM: Primary | ICD-10-CM

## 2021-12-14 NOTE — TELEPHONE ENCOUNTER
----- Message from Emekaayad Em sent at 12/14/2021 11:31 AM EST -----  Subject: Referral Request    QUESTIONS   Reason for referral request? Pt is calling in for orders for a screening   mammogram. Please advise. Has the physician seen you for this condition before? No   Preferred Specialist (if applicable)? Do you already have an appointment scheduled? No  Additional Information for Provider?   ---------------------------------------------------------------------------  --------------  CALL BACK INFO  What is the best way for the office to contact you? OK to leave message on   voicemail  Preferred Call Back Phone Number?  [phone]

## 2021-12-16 LAB
CULTURE: NORMAL
Lab: NORMAL
SPECIMEN: NORMAL

## 2021-12-17 ENCOUNTER — TELEPHONE (OUTPATIENT)
Dept: INTERNAL MEDICINE CLINIC | Age: 86
End: 2021-12-17

## 2021-12-20 ENCOUNTER — TELEPHONE (OUTPATIENT)
Dept: INTERNAL MEDICINE CLINIC | Age: 86
End: 2021-12-20

## 2021-12-20 NOTE — TELEPHONE ENCOUNTER
----- Message from Karina Carreon sent at 12/20/2021  2:18 PM EST -----  Subject: Message to Provider    QUESTIONS  Information for Provider? Patient called and stated that she gets meals   from [employer] and she received a pasta that has broccoli and she is   worried that it has its seeds in it. Patient would like to know if she can   eat it with her diverticulitis   ---------------------------------------------------------------------------  --------------  CALL BACK INFO  What is the best way for the office to contact you? OK to leave message on   voicemail  Preferred Call Back Phone Number? [phone]  ---------------------------------------------------------------------------  --------------  SCRIPT ANSWERS  Relationship to Patient?  Self

## 2021-12-27 ENCOUNTER — TELEPHONE (OUTPATIENT)
Dept: INTERNAL MEDICINE CLINIC | Age: 86
End: 2021-12-27

## 2021-12-29 ENCOUNTER — TELEPHONE (OUTPATIENT)
Dept: INTERNAL MEDICINE CLINIC | Age: 86
End: 2021-12-29

## 2021-12-29 RX ORDER — PYRIDOSTIGMINE BROMIDE 60 MG/1
30 TABLET ORAL EVERY 8 HOURS SCHEDULED
Qty: 45 TABLET | Refills: 0 | Status: SHIPPED | OUTPATIENT
Start: 2021-12-29 | End: 2022-01-24

## 2021-12-29 NOTE — TELEPHONE ENCOUNTER
Jose Cain from [facility] called in stating that the pt is doing well physically and has completed her PT. Stated that she is not well mentally though. Pt states she is not in a good place that she is struggling to pay bills and take care of herself. Anabelle Cheek stated her psych appointment went well so he is unsure what to do at this point. I spoke w Dr. Otoniel Gomez and she suggested maybe getting in contact with [facility] as pt would not qualify for a nursing home at this time. Will advise pt to try contacting [facility] to see if they have any services to help her out.

## 2021-12-29 NOTE — TELEPHONE ENCOUNTER
----- Message from Avtar Jeffrey sent at 12/29/2021  2:14 PM EST -----  Subject: Medication Problem    QUESTIONS  Name of Medication? pyridostigmine (MESTINON) 60 MG tablet  Patient-reported dosage and instructions? .5 every 8 hours  What question or problem do you have with the medication? Patient would   like to know if she is to continue the medication she was prescribed upon   her hospital release by DR. Scott Howell. .. Sherryle Moder pyridostigmine (MESTINON)   60 MG tablet she is out of the script  Preferred Pharmacy? [facility], [address] [place] [address]  phone number (if available)? [phone]  Additional Information for Provider? patient would like clarification on   whether she should continue taking this medication  ---------------------------------------------------------------------------  --------------  CALL BACK INFO  What is the best way for the office to contact you? OK to leave message on   voicemail  Preferred Call Back Phone Number? [phone]  ---------------------------------------------------------------------------  --------------  SCRIPT ANSWERS  Relationship to Patient?  Self

## 2021-12-29 NOTE — TELEPHONE ENCOUNTER
PA denied. PA resubmitted. Pt has been stable on same Rx, since 2012 or prior. PA denied again. Rx 14.90/90d at [address].

## 2021-12-30 NOTE — TELEPHONE ENCOUNTER
PA denied x 3, even though pt has been stable on Rx since 2012 or prior. Pt informed that Rx is $14.53/90 day supply, available at [facility]. Pt informed. She has plenty of Rx on hand. She will call office when getting low, and we will send new Rx to [address]. No further action is needed, at this time.

## 2022-01-06 ENCOUNTER — NURSE ONLY (OUTPATIENT)
Dept: INTERNAL MEDICINE CLINIC | Age: 87
End: 2022-01-06
Payer: MEDICARE

## 2022-01-06 ENCOUNTER — OFFICE VISIT (OUTPATIENT)
Dept: ORTHOPEDIC SURGERY | Age: 87
End: 2022-01-06
Payer: MEDICARE

## 2022-01-06 VITALS
HEIGHT: 63 IN | HEART RATE: 60 BPM | WEIGHT: 134 LBS | OXYGEN SATURATION: 94 % | BODY MASS INDEX: 23.74 KG/M2 | RESPIRATION RATE: 14 BRPM

## 2022-01-06 VITALS — TEMPERATURE: 97.5 F

## 2022-01-06 DIAGNOSIS — M19.011 PRIMARY OSTEOARTHRITIS OF RIGHT SHOULDER: Primary | ICD-10-CM

## 2022-01-06 DIAGNOSIS — E53.8 VITAMIN B 12 DEFICIENCY: Primary | ICD-10-CM

## 2022-01-06 PROCEDURE — 96372 THER/PROPH/DIAG INJ SC/IM: CPT | Performed by: FAMILY MEDICINE

## 2022-01-06 PROCEDURE — G8482 FLU IMMUNIZE ORDER/ADMIN: HCPCS | Performed by: PHYSICIAN ASSISTANT

## 2022-01-06 PROCEDURE — 20610 DRAIN/INJ JOINT/BURSA W/O US: CPT | Performed by: PHYSICIAN ASSISTANT

## 2022-01-06 PROCEDURE — 4040F PNEUMOC VAC/ADMIN/RCVD: CPT | Performed by: PHYSICIAN ASSISTANT

## 2022-01-06 PROCEDURE — 1036F TOBACCO NON-USER: CPT | Performed by: PHYSICIAN ASSISTANT

## 2022-01-06 PROCEDURE — 1090F PRES/ABSN URINE INCON ASSESS: CPT | Performed by: PHYSICIAN ASSISTANT

## 2022-01-06 PROCEDURE — 1123F ACP DISCUSS/DSCN MKR DOCD: CPT | Performed by: PHYSICIAN ASSISTANT

## 2022-01-06 PROCEDURE — G8420 CALC BMI NORM PARAMETERS: HCPCS | Performed by: PHYSICIAN ASSISTANT

## 2022-01-06 PROCEDURE — G8427 DOCREV CUR MEDS BY ELIG CLIN: HCPCS | Performed by: PHYSICIAN ASSISTANT

## 2022-01-06 PROCEDURE — G8399 PT W/DXA RESULTS DOCUMENT: HCPCS | Performed by: PHYSICIAN ASSISTANT

## 2022-01-06 RX ORDER — CYANOCOBALAMIN 1000 UG/ML
1000 INJECTION INTRAMUSCULAR; SUBCUTANEOUS ONCE
Status: COMPLETED | OUTPATIENT
Start: 2022-01-06 | End: 2022-01-06

## 2022-01-06 RX ORDER — EPINEPHRINE 1 MG/ML
0.3 INJECTION, SOLUTION, CONCENTRATE INTRAVENOUS PRN
Status: CANCELLED | OUTPATIENT
Start: 2022-01-24

## 2022-01-06 RX ORDER — CYANOCOBALAMIN 1000 UG/ML
1000 INJECTION INTRAMUSCULAR; SUBCUTANEOUS ONCE
Qty: 1 ML | Refills: 0 | Status: SHIPPED | OUTPATIENT
Start: 2022-01-06 | End: 2022-01-06 | Stop reason: CLARIF

## 2022-01-06 RX ORDER — SODIUM CHLORIDE 9 MG/ML
INJECTION, SOLUTION INTRAVENOUS CONTINUOUS
Status: CANCELLED | OUTPATIENT
Start: 2022-01-24

## 2022-01-06 RX ORDER — ALBUTEROL SULFATE 90 UG/1
4 AEROSOL, METERED RESPIRATORY (INHALATION) PRN
Status: CANCELLED | OUTPATIENT
Start: 2022-01-24

## 2022-01-06 RX ORDER — ONDANSETRON 2 MG/ML
8 INJECTION INTRAMUSCULAR; INTRAVENOUS
Status: CANCELLED | OUTPATIENT
Start: 2022-01-24

## 2022-01-06 RX ORDER — ACETAMINOPHEN 325 MG/1
650 TABLET ORAL
Status: CANCELLED | OUTPATIENT
Start: 2022-01-24

## 2022-01-06 RX ORDER — DIPHENHYDRAMINE HYDROCHLORIDE 50 MG/ML
50 INJECTION INTRAMUSCULAR; INTRAVENOUS
Status: CANCELLED | OUTPATIENT
Start: 2022-01-24

## 2022-01-06 RX ADMIN — CYANOCOBALAMIN 1000 MCG: 1000 INJECTION INTRAMUSCULAR; SUBCUTANEOUS at 15:16

## 2022-01-06 ASSESSMENT — ENCOUNTER SYMPTOMS
EYES NEGATIVE: 1
RESPIRATORY NEGATIVE: 1
GASTROINTESTINAL NEGATIVE: 1

## 2022-01-06 NOTE — PROGRESS NOTES
Patient is in the office today for right shoulder recheck. Patient states that the shoulder pain has been decreasing with range of motion and the pain has been increasing. Patient states that she feels like the steroid injection do work for her. Denies any new falls or injuries to the shoulder.

## 2022-01-06 NOTE — PROGRESS NOTES
[facility]      HPI:  Naun Tellez is a 82 y.o. female that presents to the office today with mild to moderate pain in the right shoulder. She states her pain today is about a 5/10, aching and worse with activity. She has been seen in this office before for an arthritic shoulder and had steroid injection in the shoulder which did help. She recently was in the hospital and discharged November 30 and since that time has been back home and starting to a little bit more activity on her own. She is not sure if she needs a shot today or if she should wait for a shot when the shoulder starts to hurt more. No new injuries. Past Medical History:   Diagnosis Date    Arthritis of shoulder region, right 09/2014    Osmar Gilmore Blind right eye     following right cataract surg    Chronic depression 2009    Dr. Delvis Pagan DVT (deep venous thrombosis) ([facility] [address] 1970    left leg    Former smoker     History of echocardiogram 09/21/2020    EF 55-60%, mild left ventricular hypertrophy, normal diastolic filling pattern for age, no signficiant valvular disease, no pericardial effusion     History of stress test 03/25/2021    normal LVEF calculated by the computer is probably falsely low.  LVEF is 40 %    Hx of Doppler ultrasound 03/01/2018    Carotid-mild 0-49% bilateral carotid,50% stenosis right subclavian    Hyperlipidemia     Hypertension     Macular degeneration     right    Mild cognitive impairment 02/2012    MMSE 26/30    MVP (mitral valve prolapse)     trace on echo 2014    Osteoporosis 05/2012    Pancytopenia 05/2011    mild with ; Dr Kait hawk 2010    Peptic ulcer disease     Peripheral vascular disease ([facility] [address] 01/2016    angio; dis below ankles; pletal    Prediabetes 2006    Recurrent ventral incisional hernia 2013    medial apex of GB scar    S/P CABG x 3 2003    3-vessel; Dr Sonal Wynn 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 Lesia Vanessa; Right side       Past Surgical History:   Procedure Laterality Date    CATARACT REMOVAL Right     poor result ; blind right eye; [facility] [address] [address] CORONARY ARTERY BYPASS GRAFT  2009    3 vessel    SHOULDER ARTHROSCOPY Right     TUBAL LIGATION      URETER SURGERY      Right ureteral repair       Family History   Problem Relation Age of Onset    High Blood Pressure Mother     Heart Attack Mother     Other Mother         unsteady gait    Other Father         silcosis    Cancer Sister 79        Breast cancer 2017    Stroke Sister        Social History     Socioeconomic History    Marital status:      Spouse name: Taylor Osullivan Number of children: 2    Years of education: 15    Highest education level: None   Occupational History    Occupation: retired   Tobacco Use    Smoking status: Former Smoker     Packs/day: 0.25     Years: 9.00     Pack years: 2.25     Types: Cigarettes     Start date:      Quit date: 1985     Years since quittin.1    Smokeless tobacco: Never Used   Vaping Use    Vaping Use: Never used   Substance and Sexual Activity    Alcohol use: No     Alcohol/week: 0.0 standard drinks    Drug use: No    Sexual activity: Not Currently     Partners: Male   Other Topics Concern    None   Social History Narrative    None     Social Determinants of Health     Financial Resource Strain: Low Risk     Difficulty of Paying Living Expenses: Not hard at all   Food Insecurity: No Food Insecurity    Worried About [address] in the Last Year: Never true    [facility] in the Last Year: Never true   Transportation Needs: No Transportation Needs    Lack of Transportation (Medical): No    Lack of Transportation (Non-Medical):  No   Physical Activity: Inactive    Days of Exercise per Week: 0 days    Minutes of Exercise per Session: 0 min Stress: No Stress Concern Present    Feeling of Stress : Not at all   Social Connections: Moderately Integrated    Frequency of Communication with Friends and Family: Twice a week    Frequency of Social Gatherings with Friends and Family: Once a week    Attends Bahai Services: More than 4 times per year    Active Member of [address] [place] [address] or Organizations: Yes    Attends Club or Organization Meetings: More than 4 times per year    Marital Status:     Intimate Partner Violence: Not At Risk    Fear of Current or Ex-Partner: No    Emotionally Abused: No    Physically Abused: No    Sexually Abused: No   Housing Stability: Low Risk     Unable to Pay for Housing in the Last Year: No    Number of Places Lived in the Last Year: 1    Unstable Housing in the Last Year: No       Current Outpatient Medications   Medication Sig Dispense Refill    pyridostigmine (MESTINON) 60 MG tablet Take 0.5 tablets by mouth every 8 hours 45 tablet 0    verapamil (CALAN SR) 180 MG extended release tablet Take 0.5 tablets by mouth 2 times daily 30 tablet 5    ondansetron (ZOFRAN-ODT) 4 MG disintegrating tablet Take 1 tablet by mouth 2 times daily as needed for Nausea or Vomiting 15 tablet 1    ketotifen (ZADITOR) 0.025 % ophthalmic solution Place 1 drop into both eyes 2 times daily      sertraline (ZOLOFT) 25 MG tablet take 1/2 tablet by mouth once daily for 2 days then take 1 tablet by mouth once daily THEREAFTER      gabapentin (NEURONTIN) 300 MG capsule take 1 tablet by mouth three times a day for TRIGEMINAL NEURALGIA (Patient taking differently: Take 300 mg by mouth 3 times daily. ) 90 capsule 1    pantoprazole (PROTONIX) 40 MG tablet Take 1 tablet by mouth daily 90 tablet 3    Icosapent Ethyl (VASCEPA) 0.5 g CAPS take 2 capsules by mouth twice a day 120 capsule 5    Handicap Placard MISC by Does not apply route Good for 3 years 1 each 0    rosuvastatin (CRESTOR) 10 MG tablet Take 1 tablet by mouth daily 90 tablet 3    fluticasone (FLONASE) 50 MCG/ACT nasal spray instill 1 spray into each nostril once daily 1 Bottle 3    divalproex (DEPAKOTE ER) 250 MG extended release tablet Take 250 mg by mouth nightly       Multiple Vitamins-Minerals (OCUVITE-LUTEIN PO) Take by mouth      calcium-vitamin D (OSCAL 500/200 D-3) 500-200 MG-UNIT per tablet Take 1 tablet by mouth 2 times daily.  aspirin 81 MG EC tablet Take 81 mg by mouth daily.  cyanocobalamin 1000 MCG/ML injection Inject 1 mL into the muscle once for 1 dose 1 mL 0     Current Facility-Administered Medications   Medication Dose Route Frequency Provider Last Rate Last Admin    cyanocobalamin injection 1,000 mcg  1,000 mcg SubCUTAneous Q30 Days Viv Hansen MD   1,000 mcg at 10/20/20 1625       Allergies   Allergen Reactions    Alendronate Sodium Other (See Comments)     GI upset    Bactrim [Sulfamethoxazole-Trimethoprim] Anaphylaxis    Boniva [Ibandronate Sodium] Other (See Comments)     Gi upset and declined egd; also to fosamax    Ciprofloxacin     Colesevelam     Lisinopril Other (See Comments)     Severe hyperkalemia (6) with bun >56      Macrobid [Nitrofurantoin]     Neggram [Nalidixic Acid]     Pce [Erythromycin]     Penicillins     Risperidone And Related     Welchol [Colesevelam Hcl] Other (See Comments)     constipation    Carbamazepine Nausea And Vomiting    Lovaza [Omega-3-Acid Ethyl Esters] Nausea And Vomiting    Metoprolol Nausea And Vomiting    Pyridium [Phenazopyridine] Palpitations       Vitals:    01/06/22 1401   Pulse: 60   Resp: 14   SpO2: 94%   Weight: 134 lb (60.8 kg)   Height: 5' 3\" (1.6 m)         Review of Systems:   Review of Systems   Constitutional: Negative. HENT: Negative. Eyes: Negative. Respiratory: Negative. Cardiovascular: Negative. Gastrointestinal: Negative. Genitourinary: Negative. Musculoskeletal: Positive for arthralgias and myalgias. Skin: Negative.     Neurological: Negative. Psychiatric/Behavioral: Negative. Physical Exam:   Gen/Psych:Examination reveals a pleasant individual in no acute distress. The patient is oriented to time, place and person. The patient's mood and affect are appropriate. Patient appears well nourished. Body habitus is normal    HEENT: Head is atraumatic normocephalic, ears are symmetric, eyes show equal pupils bilaterally, extraocular muscles intact. Hearing is intact to normal voice at 5 feet. Nares are patent bilaterally, no epistaxis, no rhinorrhea. Lymph:  No obvious lymphedema in bilateral upper extremities     Skin: Intact in bilateral upper extremities with no ulcerations, lesions, rash, erythema. Vascular: There are no varicosities in bilateral upper  extremities, sensation intact to light touch over bilateral upper extremities. Musculoskeletal:  Right shoulder exam:  Skin:  Clear with no erythema, there is no significant joint effusion  Deformity:  none  Atrophy:  none  Tenderness:  mild globally  Active ROM:   FE:150    IR side: L4           ER side: 40   Ad/Abduction: 160        Strength: FE:5-/5     ER:5/5   Neer:  moderately positive  Ferrari:  moderately positive    Imaging studies:  X-ray of the right shoulder reviewed which showed flattening of the humeral head with subchondral sclerosis within the glenoid and humeral head and osteophytes throughout the glenohumeral joint. There is also impingement of the acromion on the superior aspect of the humeral head. Assessment:    Diagnosis Orders   1. 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   This injection was recommended as a part of the overall treatment plan. Details of the procedure, potential risks, and potential benefits were discussed. Patient's questions were answered. Patient elected to proceed with procedure. Medication: 1 mL Kenalog 40 mg/ML, 1 mL 0.5% bupivacaine, 1 mL 1% lidocaine  Procedure:  Sterile technique was used as the skin over the injection site was prepped with alcohol. The right subacromial bursa was then injected with the above listed medication. A sterile bandage was placed over the injection site. The patient tolerated the procedure well without complication. *Please note this report has been partially produced using speech recognition Dragon software and may contain errors related to that system including errors in grammar, punctuation, and spelling, as well as words and phrases that may be inappropriate.  If there are any questions or concerns please feel free to contact the dictating provider for clarification

## 2022-01-06 NOTE — PATIENT INSTRUCTIONS
Continue to weight bear as tolerated  Continue range of motion  Ice and elevate as needed  Tylenol or Motrin for pain  Injection given into the right shoulder today  Rest for 24-48 hours  Follow up as needed

## 2022-01-24 RX ORDER — ONDANSETRON 2 MG/ML
8 INJECTION INTRAMUSCULAR; INTRAVENOUS
Status: CANCELLED | OUTPATIENT
Start: 2022-01-24

## 2022-01-24 RX ORDER — EPINEPHRINE 1 MG/ML
0.3 INJECTION, SOLUTION, CONCENTRATE INTRAVENOUS PRN
Status: CANCELLED | OUTPATIENT
Start: 2022-01-24

## 2022-01-24 RX ORDER — ACETAMINOPHEN 325 MG/1
650 TABLET ORAL
Status: CANCELLED | OUTPATIENT
Start: 2022-01-24

## 2022-01-24 RX ORDER — SODIUM CHLORIDE 9 MG/ML
INJECTION, SOLUTION INTRAVENOUS CONTINUOUS
Status: CANCELLED | OUTPATIENT
Start: 2022-01-24

## 2022-01-24 RX ORDER — ALBUTEROL SULFATE 90 UG/1
4 AEROSOL, METERED RESPIRATORY (INHALATION) PRN
Status: CANCELLED | OUTPATIENT
Start: 2022-01-24

## 2022-01-24 RX ORDER — DIPHENHYDRAMINE HYDROCHLORIDE 50 MG/ML
50 INJECTION INTRAMUSCULAR; INTRAVENOUS
Status: CANCELLED | OUTPATIENT
Start: 2022-01-24

## 2022-01-25 ENCOUNTER — OFFICE VISIT (OUTPATIENT)
Dept: INTERNAL MEDICINE CLINIC | Age: 87
End: 2022-01-25
Payer: MEDICARE

## 2022-01-25 VITALS
OXYGEN SATURATION: 98 % | HEIGHT: 63 IN | DIASTOLIC BLOOD PRESSURE: 86 MMHG | SYSTOLIC BLOOD PRESSURE: 114 MMHG | HEART RATE: 64 BPM | BODY MASS INDEX: 23.92 KG/M2 | WEIGHT: 135 LBS | TEMPERATURE: 97 F

## 2022-01-25 DIAGNOSIS — I10 PRIMARY HYPERTENSION: ICD-10-CM

## 2022-01-25 DIAGNOSIS — R10.32 LEFT LOWER QUADRANT ABDOMINAL PAIN: Primary | ICD-10-CM

## 2022-01-25 DIAGNOSIS — E78.2 MIXED HYPERLIPIDEMIA: ICD-10-CM

## 2022-01-25 PROCEDURE — G8482 FLU IMMUNIZE ORDER/ADMIN: HCPCS | Performed by: FAMILY MEDICINE

## 2022-01-25 PROCEDURE — 1036F TOBACCO NON-USER: CPT | Performed by: FAMILY MEDICINE

## 2022-01-25 PROCEDURE — 1090F PRES/ABSN URINE INCON ASSESS: CPT | Performed by: FAMILY MEDICINE

## 2022-01-25 PROCEDURE — 99214 OFFICE O/P EST MOD 30 MIN: CPT | Performed by: FAMILY MEDICINE

## 2022-01-25 PROCEDURE — G8399 PT W/DXA RESULTS DOCUMENT: HCPCS | Performed by: FAMILY MEDICINE

## 2022-01-25 PROCEDURE — 4040F PNEUMOC VAC/ADMIN/RCVD: CPT | Performed by: FAMILY MEDICINE

## 2022-01-25 PROCEDURE — G8427 DOCREV CUR MEDS BY ELIG CLIN: HCPCS | Performed by: FAMILY MEDICINE

## 2022-01-25 PROCEDURE — G8420 CALC BMI NORM PARAMETERS: HCPCS | Performed by: FAMILY MEDICINE

## 2022-01-25 PROCEDURE — 1123F ACP DISCUSS/DSCN MKR DOCD: CPT | Performed by: FAMILY MEDICINE

## 2022-01-25 RX ORDER — CIPROFLOXACIN 500 MG/1
500 TABLET, FILM COATED ORAL 2 TIMES DAILY
Qty: 14 TABLET | Refills: 0 | Status: SHIPPED | OUTPATIENT
Start: 2022-01-25 | End: 2022-02-04 | Stop reason: HOSPADM

## 2022-01-25 RX ORDER — METRONIDAZOLE 500 MG/1
500 TABLET ORAL 3 TIMES DAILY
Qty: 21 TABLET | Refills: 0 | Status: SHIPPED | OUTPATIENT
Start: 2022-01-25 | End: 2022-02-04 | Stop reason: HOSPADM

## 2022-01-25 RX ORDER — PANTOPRAZOLE SODIUM 40 MG/1
40 TABLET, DELAYED RELEASE ORAL 2 TIMES DAILY
Qty: 180 TABLET | Refills: 1 | Status: SHIPPED
Start: 2022-01-25

## 2022-01-25 ASSESSMENT — ENCOUNTER SYMPTOMS
SORE THROAT: 0
SHORTNESS OF BREATH: 0
VOMITING: 0
DIARRHEA: 1
CONSTIPATION: 0
CHEST TIGHTNESS: 0
ABDOMINAL PAIN: 1
BLOOD IN STOOL: 0
COLOR CHANGE: 0
NAUSEA: 1

## 2022-01-25 NOTE — PROGRESS NOTES
Subjective:      Chief Complaint   Patient presents with    3 Month Follow-Up    Diverticulitis       HPI:  César Courtney is a 82 y.o. female who presents today for follow up of chronic conditions as listed below. Was referred to GI for abnormal bowel movements after her most recent hospitalization. States she cancelled her appointment as she was no longer having symptoms and felt she was doing well. However, she did talk to GI provider on the phone and was advised to increase her protonix to BID. However, started having lower abdominal pain 2 days ago. Started in LLQ, now generalized to lower abdomen. Did have diarrhea initially, but then took a dose of imodium. Has had more solid bowel movements for the past 2 days. Abdominal pain is burning, worse with eating. Associated with nausea, has been taking zofran as needed. No fevers. Had episode of diverticulitis about 3 months ago and symptoms feel the same. Took ciprofloxacin and flagyl (although ciprofloxacin listed in allergies)- states she tolerated without issues. States she normally takes metamucil which works well for her (and has taken for several years), but stopped as the label said it could interact with other medications. States she has not gotten her labs completed. Past Medical History:   Diagnosis Date    Arthritis of shoulder region, right 09/2014    dara PughCelina Blind right eye     following right cataract surg    Chronic depression 2009    Dr. Trang Clark DVT (deep venous thrombosis) ([facility] [address] 1970    left leg    Former smoker     History of echocardiogram 09/21/2020    EF 55-60%, mild left ventricular hypertrophy, normal diastolic filling pattern for age, no signficiant valvular disease, no pericardial effusion     History of stress test 03/25/2021    normal LVEF calculated by the computer is probably falsely low.  LVEF is 40 %    Hx of Doppler ultrasound 03/01/2018    Carotid-mild 0-49% bilateral carotid,50% stenosis right subclavian    Hyperlipidemia     Hypertension     Macular degeneration     right    Mild cognitive impairment 2012    MMSE 26/30    MVP (mitral valve prolapse)     trace on echo     Osteoporosis 2012    Pancytopenia 2011    mild with ; Dr Esteban Qiu eval     Peptic ulcer disease     Peripheral vascular disease ([facility] [address] 2016    angio; dis below ankles; pletal    Prediabetes     Recurrent ventral incisional hernia     medial apex of GB scar    S/P CABG x 3     3-vessel; Dr Graham Tenorio Spigelian hernia 2017    right ant lateral wall; seen on CT abd    Supraventricular tachycardia ([facility] [address]     Freq ventriular ectopy    Tic [place]     Dr. Gustavo Lujan; Right side        Past Surgical History:   Procedure Laterality Date    CATARACT REMOVAL Right     poor result ; blind right eye; [facility] [address] [address] CORONARY ARTERY BYPASS GRAFT  2009    3 vessel    SHOULDER ARTHROSCOPY Right     TUBAL LIGATION      URETER SURGERY      Right ureteral repair       Social History     Tobacco Use    Smoking status: Former Smoker     Packs/day: 0.25     Years: 9.00     Pack years: 2.25     Types: Cigarettes     Start date:      Quit date: 1985     Years since quittin.1    Smokeless tobacco: Never Used   Substance Use Topics    Alcohol use: No     Alcohol/week: 0.0 standard drinks        Review of Systems   Constitutional: Negative for activity change, appetite change, chills, fever and unexpected weight change. HENT: Negative for congestion and sore throat. Respiratory: Negative for chest tightness and shortness of breath. Cardiovascular: Negative for chest pain and palpitations. Gastrointestinal: Positive for abdominal pain, diarrhea and nausea. Negative for blood in stool, constipation and vomiting. Genitourinary: Negative for dysuria. Skin: Negative for color change.    Neurological: Negative for dizziness and light-headedness. Psychiatric/Behavioral: Negative for dysphoric mood. The patient is not nervous/anxious. Prior to Visit Medications    Medication Sig Taking? Authorizing Provider   pyridostigmine (MESTINON) 60 MG tablet take 1/2 tablets by mouth every 8 hours Yes Sergei Moise MD   verapamil (CALAN SR) 180 MG extended release tablet Take 0.5 tablets by mouth 2 times daily Yes Theodore Hicks MD   ondansetron (ZOFRAN-ODT) 4 MG disintegrating tablet Take 1 tablet by mouth 2 times daily as needed for Nausea or Vomiting Yes Sergei Moise MD   ketotifen (ZADITOR) 0.025 % ophthalmic solution Place 1 drop into both eyes 2 times daily Yes Historical Provider, MD   sertraline (ZOLOFT) 25 MG tablet take 1/2 tablet by mouth once daily for 2 days then take 1 tablet by mouth once daily THEREAFTER Yes Historical Provider, MD   gabapentin (NEURONTIN) 300 MG capsule take 1 tablet by mouth three times a day for TRIGEMINAL NEURALGIA  Patient taking differently: Take 300 mg by mouth 3 times daily. Yes Sergei Moise MD   pantoprazole (PROTONIX) 40 MG tablet Take 1 tablet by mouth daily Yes Sergei Moise MD   Icosapent Ethyl (VASCEPA) 0.5 g CAPS take 2 capsules by mouth twice a day Yes Theodore Hicks MD   Handicap Placard MISC by Does not apply route Good for 3 years Yes Sergei Moise MD   rosuvastatin (CRESTOR) 10 MG tablet Take 1 tablet by mouth daily Yes Sergei Moise MD   fluticasone (FLONASE) 50 MCG/ACT nasal spray instill 1 spray into each nostril once daily Yes Sergei Moise MD   divalproex (DEPAKOTE ER) 250 MG extended release tablet Take 250 mg by mouth nightly  Yes Historical Provider, MD   Multiple Vitamins-Minerals (OCUVITE-LUTEIN PO) Take by mouth Yes Historical Provider, MD   calcium-vitamin D (OSCAL 500/200 D-3) 500-200 MG-UNIT per tablet Take 1 tablet by mouth 2 times daily. Yes Historical Provider, MD   aspirin 81 MG EC tablet Take 81 mg by mouth daily. Yes Historical Provider, MD          Objective:      /86 (Site: Left Upper Arm, Position: Sitting, Cuff Size: Medium Adult)   Pulse 64   Temp 97 °F (36.1 °C)   Ht 5' 3\" (1.6 m)   Wt 135 lb (61.2 kg)   SpO2 98%   BMI 23.91 kg/m²      Physical Exam  Vitals and nursing note reviewed. Constitutional:       General: She is not in acute distress. Appearance: Normal appearance. She is not ill-appearing or toxic-appearing. HENT:      Head: Normocephalic and atraumatic. Right Ear: External ear normal.      Left Ear: External ear normal.      Nose: Nose normal.      Mouth/Throat:      Pharynx: Oropharynx is clear. Eyes:      General: No scleral icterus. Right eye: No discharge. Left eye: No discharge. Extraocular Movements: Extraocular movements intact. Conjunctiva/sclera: Conjunctivae normal.   Cardiovascular:      Rate and Rhythm: Normal rate and regular rhythm. Heart sounds: Normal heart sounds. Pulmonary:      Effort: Pulmonary effort is normal.      Breath sounds: Normal breath sounds. No wheezing or rales. Abdominal:      General: Bowel sounds are normal. There is no distension. Palpations: Abdomen is soft. Tenderness: There is abdominal tenderness (LLQ tender to palpation). There is no guarding. Musculoskeletal:         General: No deformity. Cervical back: Normal range of motion and neck supple. No rigidity. Skin:     General: Skin is warm and dry. Findings: No rash. Neurological:      General: No focal deficit present. Mental Status: She is alert. Mental status is at baseline. Motor: No weakness. Psychiatric:         Mood and Affect: Mood normal.         Behavior: Behavior normal.            Assessment / Plan:      1. Left lower quadrant abdominal pain  Patient with history of diverticulitis. Given presenting symptoms, will treat presumptively for recurrence.   Ciprofloxacin listed in allergies, but patient reports she has taken without issues- will remove. Given that last episode was just a few months ago, encouraged to follow up with GI for further management. Also discussed importance of high fiber diet to help prevent future recurrences. Discussed that she can restart metamucil. Contact clinic if symptoms not improving in 2-3 days, or ER for any acutely worsening symptoms. Patient voiced understanding.     - ciprofloxacin (CIPRO) 500 MG tablet; Take 1 tablet by mouth 2 times daily for 7 days  Dispense: 14 tablet; Refill: 0  - metroNIDAZOLE (FLAGYL) 500 MG tablet; Take 1 tablet by mouth 3 times daily for 7 days  Dispense: 21 tablet; Refill: 0    2. Mixed hyperlipidemia  Continue crestor.   - Lipid Panel; Future    3. Primary hypertension  Stable, well controlled. Continue current medications. - CBC Auto Differential; Future  - Comprehensive Metabolic Panel; Future          I have spent 30 minutes on this patient encounter. Patient voiced understanding and agreement with plan. All questions/concerns were addressed, risks/side effects of medications were reviewed. Return precautions and after visit summary were provided. Return in about 3 months (around 4/25/2022). or earlier as needed.       Radha Thomas MD

## 2022-01-28 ENCOUNTER — HOSPITAL ENCOUNTER (EMERGENCY)
Age: 87
Discharge: HOME | DRG: 392 | End: 2022-01-28
Attending: EMERGENCY MEDICINE
Payer: MEDICARE

## 2022-01-28 ENCOUNTER — APPOINTMENT (OUTPATIENT)
Dept: CT IMAGING | Age: 87
DRG: 392 | End: 2022-01-28
Payer: MEDICARE

## 2022-01-28 VITALS
TEMPERATURE: 97 F | BODY MASS INDEX: 24.45 KG/M2 | SYSTOLIC BLOOD PRESSURE: 170 MMHG | HEART RATE: 68 BPM | OXYGEN SATURATION: 95 % | DIASTOLIC BLOOD PRESSURE: 91 MMHG | RESPIRATION RATE: 18 BRPM | WEIGHT: 138 LBS

## 2022-01-28 DIAGNOSIS — K57.32 DIVERTICULITIS OF COLON: Primary | ICD-10-CM

## 2022-01-28 LAB
ALBUMIN SERPL-MCNC: 3.7 GM/DL (ref 3.4–5)
ALP BLD-CCNC: 42 IU/L (ref 40–129)
ALT SERPL-CCNC: 12 U/L (ref 10–40)
ANION GAP SERPL CALCULATED.3IONS-SCNC: 13 MMOL/L (ref 4–16)
AST SERPL-CCNC: 24 IU/L (ref 15–37)
BACTERIA: ABNORMAL /HPF
BASOPHILS ABSOLUTE: 0 K/CU MM
BASOPHILS RELATIVE PERCENT: 0.3 % (ref 0–1)
BILIRUB SERPL-MCNC: 0.3 MG/DL (ref 0–1)
BILIRUBIN URINE: NEGATIVE MG/DL
BLOOD, URINE: NEGATIVE
BUN BLDV-MCNC: 10 MG/DL (ref 6–23)
CALCIUM SERPL-MCNC: 9.1 MG/DL (ref 8.3–10.6)
CAST TYPE: ABNORMAL /HPF
CHLORIDE BLD-SCNC: 90 MMOL/L (ref 99–110)
CLARITY: CLEAR
CO2: 23 MMOL/L (ref 21–32)
COLOR: YELLOW
CREAT SERPL-MCNC: 0.8 MG/DL (ref 0.6–1.1)
CRYSTAL TYPE: ABNORMAL /HPF
DIFFERENTIAL TYPE: ABNORMAL
EOSINOPHILS ABSOLUTE: 0.3 K/CU MM
EOSINOPHILS RELATIVE PERCENT: 4.7 % (ref 0–3)
EPITHELIAL CELLS, UA: 0 /HPF
GFR AFRICAN AMERICAN: >60 ML/MIN/1.73M2
GFR NON-AFRICAN AMERICAN: >60 ML/MIN/1.73M2
GLUCOSE BLD-MCNC: 135 MG/DL (ref 70–99)
GLUCOSE, URINE: NEGATIVE MG/DL
HCT VFR BLD CALC: 31.5 % (ref 37–47)
HEMOGLOBIN: 10.5 GM/DL (ref 12.5–16)
IMMATURE NEUTROPHIL %: 0.5 % (ref 0–0.43)
KETONES, URINE: NEGATIVE MG/DL
LEUKOCYTE ESTERASE, URINE: NEGATIVE
LYMPHOCYTES ABSOLUTE: 1.5 K/CU MM
LYMPHOCYTES RELATIVE PERCENT: 24.5 % (ref 24–44)
MCH RBC QN AUTO: 31.8 PG (ref 27–31)
MCHC RBC AUTO-ENTMCNC: 33.3 % (ref 32–36)
MCV RBC AUTO: 95.5 FL (ref 78–100)
MONOCYTES ABSOLUTE: 0.7 K/CU MM
MONOCYTES RELATIVE PERCENT: 11.6 % (ref 0–4)
NITRITE URINE, QUANTITATIVE: NEGATIVE
PDW BLD-RTO: 14.9 % (ref 11.7–14.9)
PH, URINE: 7.5 (ref 5–8)
PLATELET # BLD: 136 K/CU MM (ref 140–440)
PMV BLD AUTO: 9.3 FL (ref 7.5–11.1)
POTASSIUM SERPL-SCNC: 4.4 MMOL/L (ref 3.5–5.1)
PROTEIN UA: NEGATIVE MG/DL
RBC # BLD: 3.3 M/CU MM (ref 4.2–5.4)
RBC URINE: 0 /HPF (ref 0–6)
SEGMENTED NEUTROPHILS ABSOLUTE COUNT: 3.5 K/CU MM
SEGMENTED NEUTROPHILS RELATIVE PERCENT: 58.4 % (ref 36–66)
SODIUM BLD-SCNC: 126 MMOL/L (ref 135–145)
SPECIFIC GRAVITY UA: 1.01 (ref 1–1.03)
TOTAL IMMATURE NEUTOROPHIL: 0.03 K/CU MM
TOTAL PROTEIN: 6.5 GM/DL (ref 6.4–8.2)
UROBILINOGEN, URINE: 0.2 MG/DL (ref 0.2–1)
WBC # BLD: 5.9 K/CU MM (ref 4–10.5)
WBC UA: <0 /HPF (ref 0–5)

## 2022-01-28 PROCEDURE — 6360000002 HC RX W HCPCS: Performed by: EMERGENCY MEDICINE

## 2022-01-28 PROCEDURE — 85025 COMPLETE CBC W/AUTO DIFF WBC: CPT

## 2022-01-28 PROCEDURE — 80053 COMPREHEN METABOLIC PANEL: CPT

## 2022-01-28 PROCEDURE — 6360000004 HC RX CONTRAST MEDICATION: Performed by: EMERGENCY MEDICINE

## 2022-01-28 PROCEDURE — 96374 THER/PROPH/DIAG INJ IV PUSH: CPT

## 2022-01-28 PROCEDURE — 74177 CT ABD & PELVIS W/CONTRAST: CPT

## 2022-01-28 PROCEDURE — 2580000003 HC RX 258: Performed by: EMERGENCY MEDICINE

## 2022-01-28 PROCEDURE — 96361 HYDRATE IV INFUSION ADD-ON: CPT

## 2022-01-28 PROCEDURE — 81001 URINALYSIS AUTO W/SCOPE: CPT

## 2022-01-28 PROCEDURE — 99285 EMERGENCY DEPT VISIT HI MDM: CPT

## 2022-01-28 RX ORDER — MORPHINE SULFATE 2 MG/ML
2 INJECTION, SOLUTION INTRAMUSCULAR; INTRAVENOUS ONCE
Status: DISCONTINUED | OUTPATIENT
Start: 2022-01-28 | End: 2022-01-28 | Stop reason: HOSPADM

## 2022-01-28 RX ORDER — ONDANSETRON 2 MG/ML
4 INJECTION INTRAMUSCULAR; INTRAVENOUS EVERY 6 HOURS PRN
Status: DISCONTINUED | OUTPATIENT
Start: 2022-01-28 | End: 2022-01-28 | Stop reason: HOSPADM

## 2022-01-28 RX ORDER — 0.9 % SODIUM CHLORIDE 0.9 %
1000 INTRAVENOUS SOLUTION INTRAVENOUS ONCE
Status: COMPLETED | OUTPATIENT
Start: 2022-01-28 | End: 2022-01-28

## 2022-01-28 RX ADMIN — SODIUM CHLORIDE 1000 ML: 9 INJECTION, SOLUTION INTRAVENOUS at 14:02

## 2022-01-28 RX ADMIN — ONDANSETRON 4 MG: 2 INJECTION INTRAMUSCULAR; INTRAVENOUS at 14:02

## 2022-01-28 RX ADMIN — IOPAMIDOL 75 ML: 755 INJECTION, SOLUTION INTRAVENOUS at 13:40

## 2022-01-28 ASSESSMENT — PAIN SCALES - GENERAL
PAINLEVEL_OUTOF10: 2
PAINLEVEL_OUTOF10: 6

## 2022-01-28 ASSESSMENT — PAIN DESCRIPTION - LOCATION: LOCATION: ABDOMEN

## 2022-01-28 ASSESSMENT — PAIN DESCRIPTION - PAIN TYPE: TYPE: ACUTE PAIN

## 2022-01-28 NOTE — ED PROVIDER NOTES
Triage Chief Complaint:   Abdominal Pain      HPI:  Dina Velásquez is a 82 y.o. female that presents with abdominal pain. She states left lower quadrant abdominal pain which has been ongoing since earlier this week. She states she went to her primary care doctor on Tuesday and they started her on Cipro and Flagyl for what they thought was diverticulitis. She states she has had intermittent left-sided abdominal pain which has no exacerbating or alleviating factors. She states it comes on randomly. States sharp. No radiation of the pain. She reports associated nausea. No vomiting. She states diarrhea started yesterday. It is loose stool. No blood in the stool. She denies fever or chills. ROS:  General:  No fevers, no chills  Eyes:  No recent vison changes  ENT:  No sore throat, no nasal congestion  Cardiovascular:  No chest pain, no palpitations  Respiratory:  No shortness of breath, no cough  Gastrointestinal:  + pain, + nausea, no vomiting, + diarrhea  Musculoskeletal:  No muscle pain, no joint pain  Skin:  No rash, no pruritis, no easy bruising  Neurologic:  No headache or weakness  Genitourinary:  No dysuria, no hematuria  Extremities:  no edema, no pain    Past Medical History:   Diagnosis Date    Arthritis of shoulder region, right 09/2014    Yeimy Allan Blind right eye     following right cataract surg    Chronic depression 2009    Dr. Mark Donis    DVT (deep venous thrombosis) ([facility] [address] 1970    left leg    Former smoker     History of echocardiogram 09/21/2020    EF 55-60%, mild left ventricular hypertrophy, normal diastolic filling pattern for age, no signficiant valvular disease, no pericardial effusion     History of stress test 03/25/2021    normal LVEF calculated by the computer is probably falsely low.  LVEF is 40 %    Hx of Doppler ultrasound 03/01/2018    Carotid-mild 0-49% bilateral carotid,50% stenosis right subclavian    Hyperlipidemia     Hypertension     Macular degeneration     right  Mild cognitive impairment 2012    MMSE 26/30    MVP (mitral valve prolapse)     trace on echo 2014    Osteoporosis 2012    Pancytopenia 2011    mild with ; Dr Hoang Nail eval     Peptic ulcer disease     Peripheral vascular disease ([facility] [address] 2016    angio; dis below ankles; pletal    Prediabetes     Recurrent ventral incisional hernia     medial apex of GB scar    S/P CABG x 3     3-vessel; Dr Alexandre Rivas Spigelian hernia 2017    right ant lateral wall; seen on CT abd    Supraventricular tachycardia ([facility] [address]     Freq ventriular ectopy    Tic douloureux     Dr. Ryan Kam; Right side     Past Surgical History:   Procedure Laterality Date    CATARACT REMOVAL Right     poor result ; blind right eye; [address] CORONARY ARTERY BYPASS GRAFT  2009    3 vessel    SHOULDER ARTHROSCOPY Right     TUBAL LIGATION      URETER SURGERY      Right ureteral repair     Family History   Problem Relation Age of Onset    High Blood Pressure Mother     Heart Attack Mother     Other Mother         unsteady gait    Other Father         silcosis    Cancer Sister 79        Breast cancer 2017    Stroke Sister      Social History     Socioeconomic History    Marital status:       Spouse name: Jackie Argueta Number of children: 2    Years of education: 15    Highest education level: Not on file   Occupational History    Occupation: retired   Tobacco Use    Smoking status: Former Smoker     Packs/day: 0.25     Years: 9.00     Pack years: 2.25     Types: Cigarettes     Start date:      Quit date: 1985     Years since quittin.1    Smokeless tobacco: Never Used   Vaping Use    Vaping Use: Never used   Substance and Sexual Activity    Alcohol use: No     Alcohol/week: 0.0 standard drinks    Drug use: No    Sexual activity: Not Currently     Partners: Male   Other Topics Concern    Not on file   Social History Narrative    Not on file     Social Determinants of Health     Financial Resource Strain: Low Risk     Difficulty of Paying Living Expenses: Not hard at all   Food Insecurity: No Food Insecurity    Worried About Running Out of Food in the Last Year: Never true    Katie of Food in the Last Year: Never true   Transportation Needs: No Transportation Needs    Lack of Transportation (Medical): No    Lack of Transportation (Non-Medical): No   Physical Activity: Inactive    Days of Exercise per Week: 0 days    Minutes of Exercise per Session: 0 min   Stress: No Stress Concern Present    Feeling of Stress : Not at all   Social Connections: Moderately Integrated    Frequency of Communication with Friends and Family: Twice a week    Frequency of Social Gatherings with Friends and Family: Once a week    Attends [employer] Services: More than 4 times per year    Active Member of [employer] or Organizations: Yes    Attends Club or Organization Meetings: More than 4 times per year    Marital Status:     Intimate Partner Violence: Not At Risk    Fear of Current or Ex-Partner: No    Emotionally Abused: No    Physically Abused: No    Sexually Abused: No   Housing Stability: Low Risk     Unable to Pay for Housing in the Last Year: No    Number of Places Lived in the Last Year: 1    Unstable Housing in the Last Year: No     Current Facility-Administered Medications   Medication Dose Route Frequency Provider Last Rate Last Admin    cyanocobalamin injection 1,000 mcg  1,000 mcg SubCUTAneous Q30 Days Edie Manrique MD   1,000 mcg at 10/20/20 1625     Current Outpatient Medications   Medication Sig Dispense Refill    ciprofloxacin (CIPRO) 500 MG tablet Take 1 tablet by mouth 2 times daily for 7 days 14 tablet 0    metroNIDAZOLE (FLAGYL) 500 MG tablet Take 1 tablet by mouth 3 times daily for 7 days 21 tablet 0    pantoprazole (PROTONIX) 40 MG tablet Take 1 tablet by mouth 2 times daily 180 tablet 1    pyridostigmine (MESTINON) 60 MG tablet take 1/2 tablets by mouth every 8 hours 45 tablet 5    verapamil (CALAN SR) 180 MG extended release tablet Take 0.5 tablets by mouth 2 times daily 30 tablet 5    ondansetron (ZOFRAN-ODT) 4 MG disintegrating tablet Take 1 tablet by mouth 2 times daily as needed for Nausea or Vomiting 15 tablet 1    ketotifen (ZADITOR) 0.025 % ophthalmic solution Place 1 drop into both eyes 2 times daily      sertraline (ZOLOFT) 25 MG tablet take 1/2 tablet by mouth once daily for 2 days then take 1 tablet by mouth once daily THEREAFTER      gabapentin (NEURONTIN) 300 MG capsule take 1 tablet by mouth three times a day for TRIGEMINAL NEURALGIA (Patient taking differently: Take 300 mg by mouth 3 times daily. ) 90 capsule 1    Icosapent Ethyl (VASCEPA) 0.5 g CAPS take 2 capsules by mouth twice a day 120 capsule 5    Handicap Placard MISC by Does not apply route Good for 3 years 1 each 0    rosuvastatin (CRESTOR) 10 MG tablet Take 1 tablet by mouth daily 90 tablet 3    fluticasone (FLONASE) 50 MCG/ACT nasal spray instill 1 spray into each nostril once daily 1 Bottle 3    divalproex (DEPAKOTE ER) 250 MG extended release tablet Take 250 mg by mouth nightly       Multiple Vitamins-Minerals (OCUVITE-LUTEIN PO) Take by mouth      calcium-vitamin D (OSCAL 500/200 D-3) 500-200 MG-UNIT per tablet Take 1 tablet by mouth 2 times daily.  aspirin 81 MG EC tablet Take 81 mg by mouth daily.          Facility-Administered Medications Ordered in Other Encounters   Medication Dose Route Frequency Provider Last Rate Last Admin    denosumab (PROLIA) SC injection 60 mg  60 mg SubCUTAneous Once Rob Ye MD         Allergies   Allergen Reactions    Alendronate Sodium Other (See Comments)     GI upset    Bactrim [Sulfamethoxazole-Trimethoprim] Anaphylaxis    Boniva [Ibandronate Sodium] Other (See Comments)     Gi upset and declined egd; also to fosamax    Colesevelam     Lisinopril Other (See Comments)     Severe hyperkalemia (6) with bun >56      Macrobid [Nitrofurantoin]     Neggram [Nalidixic Acid]     Pce [Erythromycin]     Penicillins     Risperidone And Related     Welchol [Colesevelam Hcl] Other (See Comments)     constipation    Carbamazepine Nausea And Vomiting    Lovaza [Omega-3-Acid Ethyl Esters] Nausea And Vomiting    Metoprolol Nausea And Vomiting    Pyridium [Phenazopyridine] Palpitations       Nursing Notes Reviewed    Physical Exam:  ED Triage Vitals [01/28/22 1207]   Enc Vitals Group      BP (!) 163/62      Pulse 68      Resp 14      Temp 97 °F (36.1 °C)      Temp Source Infrared      SpO2 98 %      Weight 138 lb (62.6 kg)      Height       Head Circumference       Peak Flow       Pain Score       Pain Loc       Pain Edu? Excl. in [address]? General appearance: Awake, alert, No acute distress. Neck:  Supple without rigidity  ENT: Normal posterior pharynx, moist mucus membranes  Heart:  Regular rate and rhythm, no murmurs  Respiratory:  Lungs clear to auscultation bilaterally. Normal effort. Abdominal:  Moderate left sided abdominal tenderness to palpation, no rebound guarding or rigidity,  normal bowel sounds, no masses, no organomegaly   Back:  No CVA tenderness. Extremity: normal ROM, no edema  Skin: Warm. Dry. No rash. Neurological:  Alert and oriented. No focal deficits.  Speech normal.  Psyc: Normal mood and affect    I have reviewed and interpreted all of the currently available lab results from this visit (if applicable):  Results for orders placed or performed during the hospital encounter of 01/28/22   CBC Auto Differential   Result Value Ref Range    WBC 5.9 4.0 - 10.5 K/CU MM    RBC 3.30 (L) 4.2 - 5.4 M/CU MM    Hemoglobin 10.5 (L) 12.5 - 16.0 GM/DL    Hematocrit 31.5 (L) 37 - 47 %    MCV 95.5 78 - 100 FL    MCH 31.8 (H) 27 - 31 PG    MCHC 33.3 32.0 - 36.0 %    RDW 14.9 11.7 - 14.9 %    Platelets 491 (L) 291 - 440 K/CU MM    MPV 9.3 7.5 - 11.1 FL Differential Type AUTOMATED DIFFERENTIAL     Segs Relative 58.4 36 - 66 %    Lymphocytes % 24.5 24 - 44 %    Monocytes % 11.6 (H) 0 - 4 %    Eosinophils % 4.7 (H) 0 - 3 %    Basophils % 0.3 0 - 1 %    Segs Absolute 3.5 K/CU MM    Lymphocytes Absolute 1.5 K/CU MM    Monocytes Absolute 0.7 K/CU MM    Eosinophils Absolute 0.3 K/CU MM    Basophils Absolute 0.0 K/CU MM    Immature Neutrophil % 0.5 (H) 0 - 0.43 %    Total Immature Neutrophil 0.03 K/CU MM   CMP   Result Value Ref Range    Sodium 126 (L) 135 - 145 MMOL/L    Potassium 4.4 3.5 - 5.1 MMOL/L    Chloride 90 (L) 99 - 110 mMol/L    CO2 23 21 - 32 MMOL/L    BUN 10 6 - 23 MG/DL    CREATININE 0.8 0.6 - 1.1 MG/DL    Glucose 135 (H) 70 - 99 MG/DL    Calcium 9.1 8.3 - 10.6 MG/DL    Albumin 3.7 3.4 - 5.0 GM/DL    Total Protein 6.5 6.4 - 8.2 GM/DL    Total Bilirubin 0.3 0.0 - 1.0 MG/DL    ALT 12 10 - 40 U/L    AST 24 15 - 37 IU/L    Alkaline Phosphatase 42 40 - 129 IU/L    GFR Non-African American >60 >60 mL/min/1.73m2    GFR African American >60 >60 mL/min/1.73m2    Anion Gap 13 4 - 16          Chart review shows recent radiographs:  CT ABDOMEN PELVIS W IV CONTRAST Additional Contrast? None    Result Date: 1/28/2022  EXAMINATION: CT OF THE ABDOMEN AND PELVIS WITH CONTRAST 1/28/2022 1:21 pm TECHNIQUE: CT of the abdomen and pelvis was performed with the administration of intravenous contrast. Multiplanar reformatted images are provided for review. Dose modulation, iterative reconstruction, and/or weight based adjustment of the mA/kV was utilized to reduce the radiation dose to as low as reasonably achievable.  COMPARISON: CT abdomen/pelvis 11/10/2021 and 2017 HISTORY: ORDERING SYSTEM PROVIDED HISTORY: LLQ pain TECHNOLOGIST PROVIDED HISTORY: Reason for exam:->LLQ pain Additional Contrast?->None Decision Support Exception - unselect if not a suspected or confirmed emergency medical condition->Emergency Medical Condition (MA) FINDINGS: Thorax base:  Stable global enlarged heart size with no significant pericardial effusion. Moderate right coronary artery calcifications present. Findings suggesting prior CABG. Probable small sliding-type hiatal hernia. The visualized esophagus demonstrates no significant distention. The visualized lung bases demonstrate new bilateral patchy peripheral ground-glass opacities. No effusion or large volume consolidation. Abdomen:  Extensive abdominal aortic and large arterial vascular calcifications. Infrarenal abdominal aortic ectasia with no aneurysm formation. Prior cholecystectomy with postsurgical biliary dilatation. The hepatic veins and portal vasculature are patent. The liver and common bile duct demonstrate no acute abnormality. Pancreatic parenchymal atrophy with no acute abnormality. The spleen and adrenals demonstrate no acute abnormality. There is some questionable bilateral subcentimeter adrenal nodules that are stable compared to the prior exams that could represent adenomas, no follow-up imaging recommended. The bilateral kidneys are symmetric in size with stable mild perinephric stranding. There is bilateral multifocal chronic cortical defects with atrophy likely representing remote infarcts. Bilateral scattered less than 0.8 cm cysts, no follow-up imaging is recommended. No hydronephrosis or nephrolithiasis. The stomach is normal.  The small bowel demonstrates no acute abnormality. Stable right lower quadrant anterolateral wide-mouth hernia through the transversalis musculature with the hernia sac deep to the external oblique. This contains multiple loops of small bowel with no evidence of obstruction or vascular compromise. Moderate diffuse colonic diverticulosis most prominent in the sigmoid colon. In the left lower quadrant there is subtle pericolonic reticular soft tissue stranding which may represent edema. There is no abscess or free intraperitoneal air. Pelvis:   The bladder demonstrates mild distention with stable mild diffuse wall thickening. No intraluminal calculi or gas foci. The uterus and rectum are normal.  Numerous pelvic phleboliths. No ascites. No significant enlarged iliac chain lymph nodes. Musculoskeletal structures:  No diffuse body wall edema. Midline supraumbilical small fat-containing hernia is stable. Along the bilateral lower quadrants there are anterior subcutaneous amorphous soft tissue densities likely representing medication injection sites. Stable rectus abdominus muscular atrophy. No significant inguinal lymphadenopathy. Mid to lower lumbar spine degenerative disc and joint disease. Normal vertebral body heights. Normal pelvic alignment. No acute osseous abnormality. 1. Diffuse colonic diverticulosis with some subtle left lower quadrant proximal sigmoid pericolonic stranding that could represent mild acute diverticulitis. No bowel obstruction, perforation, or abscess. 2. New mild bilateral lung base peripheral ground-glass opacities. This could represent possible atelectasis though COVID pneumonia could have a similar appearance, correlate with clinical history. 3. Stable abdominal wall muscular atrophy with wide-mouth right anterolateral intramuscular hernia with the defect in the transversalis muscle lying deep to the external oblique muscle. 4. No hydronephrosis. Stable mild diffuse bladder wall thickening which may relate to chronic outlet obstruction such as possible bladder sphincter dyssynergy. MDM:  This is an 82-year-old female who presented with left-sided abdominal pain. She was found to have some inflammatory changes around the sigmoid diverticulosis which is suggestive of acute mild diverticulitis. She does have groundglass opacities in bilateral bases. She is not having any current symptoms related to this and it may be atelectasis given the lack of symptom. and she is taking Cipro and Flagyl for her abdominal symptoms.   At this point she is well-appearing, nontoxic and not hypoxic. She appears in no distress. Her sodium was low at 126 and chloride of 90, thus she was given IV fluids. She has a GI physician, states Dr. Kym Bunch. We would recommend outpatient follow-up with GI and also primary care provider in order to get her sodium rechecked this week. She is instructed to return if she has any new or worsening signs or symptoms.       Clinical Impression:  Diverticulitis, hyponatremia       (Please note that portions of this note may have been completed with a voice recognition program. Efforts were made to edit the dictations but occasionally words are mis-transcribed.)      David White DO  01/28/22 1520

## 2022-01-28 NOTE — ED NOTES
Discharge instructions were reviewed and the patient will follow up with the PCP. The patient was walked to the door and the grandson picked her up.                    Lulú Toth RN  01/28/22 [id number]

## 2022-01-28 NOTE — ED NOTES
Bed: E03  Expected date:   Expected time:   Means of arrival:   Comments:  Ems       Cindi Limon RN  01/28/22 3698

## 2022-01-31 ENCOUNTER — APPOINTMENT (OUTPATIENT)
Dept: CT IMAGING | Age: 87
DRG: 392 | End: 2022-01-31
Payer: MEDICARE

## 2022-01-31 ENCOUNTER — HOSPITAL ENCOUNTER (INPATIENT)
Age: 87
LOS: 4 days | Discharge: HOME | DRG: 392 | End: 2022-02-04
Attending: EMERGENCY MEDICINE | Admitting: INTERNAL MEDICINE
Payer: MEDICARE

## 2022-01-31 DIAGNOSIS — E87.6 HYPOKALEMIA: ICD-10-CM

## 2022-01-31 DIAGNOSIS — R11.2 NAUSEA AND VOMITING, INTRACTABILITY OF VOMITING NOT SPECIFIED, UNSPECIFIED VOMITING TYPE: Primary | ICD-10-CM

## 2022-01-31 DIAGNOSIS — E87.1 HYPONATREMIA: ICD-10-CM

## 2022-01-31 DIAGNOSIS — K29.90: ICD-10-CM

## 2022-01-31 DIAGNOSIS — E83.42 HYPOMAGNESEMIA: ICD-10-CM

## 2022-01-31 PROBLEM — K52.9 GASTROENTERITIS: Status: ACTIVE | Noted: 2022-01-31

## 2022-01-31 LAB
ALBUMIN SERPL-MCNC: 3.1 GM/DL (ref 3.4–5)
ALP BLD-CCNC: 41 IU/L (ref 40–129)
ALT SERPL-CCNC: 11 U/L (ref 10–40)
ANION GAP SERPL CALCULATED.3IONS-SCNC: 14 MMOL/L (ref 4–16)
AST SERPL-CCNC: 22 IU/L (ref 15–37)
BACTERIA: NEGATIVE /HPF
BASOPHILS ABSOLUTE: 0 K/CU MM
BASOPHILS RELATIVE PERCENT: 0.5 % (ref 0–1)
BILIRUB SERPL-MCNC: 0.3 MG/DL (ref 0–1)
BILIRUBIN URINE: NEGATIVE MG/DL
BLOOD, URINE: NEGATIVE
BUN BLDV-MCNC: 6 MG/DL (ref 6–23)
CALCIUM SERPL-MCNC: 7.1 MG/DL (ref 8.3–10.6)
CHLORIDE BLD-SCNC: 94 MMOL/L (ref 99–110)
CLARITY: CLEAR
CO2: 18 MMOL/L (ref 21–32)
COLOR: YELLOW
CREAT SERPL-MCNC: 0.6 MG/DL (ref 0.6–1.1)
DIFFERENTIAL TYPE: ABNORMAL
EOSINOPHILS ABSOLUTE: 0.1 K/CU MM
EOSINOPHILS RELATIVE PERCENT: 0.9 % (ref 0–3)
GFR AFRICAN AMERICAN: >60 ML/MIN/1.73M2
GFR NON-AFRICAN AMERICAN: >60 ML/MIN/1.73M2
GLUCOSE BLD-MCNC: 98 MG/DL (ref 70–99)
GLUCOSE, URINE: NEGATIVE MG/DL
HCT VFR BLD CALC: 37.3 % (ref 37–47)
HEMOGLOBIN: 12.2 GM/DL (ref 12.5–16)
IMMATURE NEUTROPHIL %: 0.6 % (ref 0–0.43)
KETONES, URINE: NEGATIVE MG/DL
LACTATE: 2.1 MMOL/L (ref 0.4–2)
LACTATE: 2.7 MMOL/L (ref 0.4–2)
LEUKOCYTE ESTERASE, URINE: NEGATIVE
LIPASE: 12 IU/L (ref 13–60)
LYMPHOCYTES ABSOLUTE: 2.5 K/CU MM
LYMPHOCYTES RELATIVE PERCENT: 30.7 % (ref 24–44)
MAGNESIUM: 1.2 MG/DL (ref 1.8–2.4)
MCH RBC QN AUTO: 31.4 PG (ref 27–31)
MCHC RBC AUTO-ENTMCNC: 32.7 % (ref 32–36)
MCV RBC AUTO: 95.9 FL (ref 78–100)
MONOCYTES ABSOLUTE: 1 K/CU MM
MONOCYTES RELATIVE PERCENT: 11.7 % (ref 0–4)
NITRITE URINE, QUANTITATIVE: NEGATIVE
NUCLEATED RBC %: 0 %
PDW BLD-RTO: 14.6 % (ref 11.7–14.9)
PH, URINE: 8 (ref 5–8)
PLATELET # BLD: 189 K/CU MM (ref 140–440)
PMV BLD AUTO: 9.3 FL (ref 7.5–11.1)
POTASSIUM SERPL-SCNC: 3 MMOL/L (ref 3.5–5.1)
PROTEIN UA: NEGATIVE MG/DL
RBC # BLD: 3.89 M/CU MM (ref 4.2–5.4)
RBC URINE: <1 /HPF (ref 0–6)
SARS-COV-2, NAAT: NOT DETECTED
SEGMENTED NEUTROPHILS ABSOLUTE COUNT: 4.5 K/CU MM
SEGMENTED NEUTROPHILS RELATIVE PERCENT: 55.6 % (ref 36–66)
SODIUM BLD-SCNC: 126 MMOL/L (ref 135–145)
SOURCE: NORMAL
SPECIFIC GRAVITY UA: 1.01 (ref 1–1.03)
SQUAMOUS EPITHELIAL: <1 /HPF
TOTAL IMMATURE NEUTOROPHIL: 0.05 K/CU MM
TOTAL NUCLEATED RBC: 0 K/CU MM
TOTAL PROTEIN: 5.5 GM/DL (ref 6.4–8.2)
TRICHOMONAS: NORMAL /HPF
TROPONIN T: <0.01 NG/ML
UROBILINOGEN, URINE: NORMAL MG/DL (ref 0.2–1)
WBC # BLD: 8.1 K/CU MM (ref 4–10.5)
WBC UA: <1 /HPF (ref 0–5)

## 2022-01-31 PROCEDURE — 83735 ASSAY OF MAGNESIUM: CPT

## 2022-01-31 PROCEDURE — 82728 ASSAY OF FERRITIN: CPT

## 2022-01-31 PROCEDURE — 83690 ASSAY OF LIPASE: CPT

## 2022-01-31 PROCEDURE — 2580000003 HC RX 258: Performed by: EMERGENCY MEDICINE

## 2022-01-31 PROCEDURE — 82607 VITAMIN B-12: CPT

## 2022-01-31 PROCEDURE — 6360000002 HC RX W HCPCS: Performed by: EMERGENCY MEDICINE

## 2022-01-31 PROCEDURE — 86850 RBC ANTIBODY SCREEN: CPT

## 2022-01-31 PROCEDURE — 86901 BLOOD TYPING SEROLOGIC RH(D): CPT

## 2022-01-31 PROCEDURE — 83550 IRON BINDING TEST: CPT

## 2022-01-31 PROCEDURE — 85045 AUTOMATED RETICULOCYTE COUNT: CPT

## 2022-01-31 PROCEDURE — 82746 ASSAY OF FOLIC ACID SERUM: CPT

## 2022-01-31 PROCEDURE — 93005 ELECTROCARDIOGRAM TRACING: CPT | Performed by: EMERGENCY MEDICINE

## 2022-01-31 PROCEDURE — 74177 CT ABD & PELVIS W/CONTRAST: CPT

## 2022-01-31 PROCEDURE — 87635 SARS-COV-2 COVID-19 AMP PRB: CPT

## 2022-01-31 PROCEDURE — 83605 ASSAY OF LACTIC ACID: CPT

## 2022-01-31 PROCEDURE — 99282 EMERGENCY DEPT VISIT SF MDM: CPT

## 2022-01-31 PROCEDURE — 84484 ASSAY OF TROPONIN QUANT: CPT

## 2022-01-31 PROCEDURE — 83540 ASSAY OF IRON: CPT

## 2022-01-31 PROCEDURE — 86900 BLOOD TYPING SEROLOGIC ABO: CPT

## 2022-01-31 PROCEDURE — C9113 INJ PANTOPRAZOLE SODIUM, VIA: HCPCS | Performed by: EMERGENCY MEDICINE

## 2022-01-31 PROCEDURE — 81001 URINALYSIS AUTO W/SCOPE: CPT

## 2022-01-31 PROCEDURE — 6370000000 HC RX 637 (ALT 250 FOR IP): Performed by: EMERGENCY MEDICINE

## 2022-01-31 PROCEDURE — 1200000000 HC SEMI PRIVATE

## 2022-01-31 PROCEDURE — 6360000004 HC RX CONTRAST MEDICATION: Performed by: EMERGENCY MEDICINE

## 2022-01-31 PROCEDURE — 85025 COMPLETE CBC W/AUTO DIFF WBC: CPT

## 2022-01-31 PROCEDURE — 80053 COMPREHEN METABOLIC PANEL: CPT

## 2022-01-31 RX ORDER — POTASSIUM CHLORIDE 7.45 MG/ML
10 INJECTION INTRAVENOUS PRN
Status: DISCONTINUED | OUTPATIENT
Start: 2022-01-31 | End: 2022-01-31

## 2022-01-31 RX ORDER — POTASSIUM CHLORIDE 20 MEQ/1
40 TABLET, EXTENDED RELEASE ORAL PRN
Status: DISCONTINUED | OUTPATIENT
Start: 2022-01-31 | End: 2022-02-04 | Stop reason: HOSPADM

## 2022-01-31 RX ORDER — SODIUM CHLORIDE 0.9 % (FLUSH) 0.9 %
5-40 SYRINGE (ML) INJECTION 2 TIMES DAILY
Status: DISCONTINUED | OUTPATIENT
Start: 2022-01-31 | End: 2022-02-01

## 2022-01-31 RX ORDER — SODIUM CHLORIDE 9 MG/ML
INJECTION, SOLUTION INTRAVENOUS CONTINUOUS
Status: DISCONTINUED | OUTPATIENT
Start: 2022-01-31 | End: 2022-02-02

## 2022-01-31 RX ORDER — POTASSIUM CHLORIDE 20 MEQ/1
40 TABLET, EXTENDED RELEASE ORAL PRN
Status: DISCONTINUED | OUTPATIENT
Start: 2022-01-31 | End: 2022-01-31

## 2022-01-31 RX ORDER — POTASSIUM CHLORIDE 7.45 MG/ML
10 INJECTION INTRAVENOUS PRN
Status: DISCONTINUED | OUTPATIENT
Start: 2022-01-31 | End: 2022-02-04 | Stop reason: HOSPADM

## 2022-01-31 RX ORDER — MAGNESIUM SULFATE IN WATER 40 MG/ML
2000 INJECTION, SOLUTION INTRAVENOUS ONCE
Status: COMPLETED | OUTPATIENT
Start: 2022-01-31 | End: 2022-02-01

## 2022-01-31 RX ORDER — ONDANSETRON 4 MG/1
4 TABLET, ORALLY DISINTEGRATING ORAL ONCE
Status: DISCONTINUED | OUTPATIENT
Start: 2022-01-31 | End: 2022-01-31

## 2022-01-31 RX ORDER — LIDOCAINE HYDROCHLORIDE 20 MG/ML
15 SOLUTION OROPHARYNGEAL ONCE
Status: COMPLETED | OUTPATIENT
Start: 2022-01-31 | End: 2022-01-31

## 2022-01-31 RX ORDER — PANTOPRAZOLE SODIUM 40 MG/10ML
40 INJECTION, POWDER, LYOPHILIZED, FOR SOLUTION INTRAVENOUS ONCE
Status: COMPLETED | OUTPATIENT
Start: 2022-01-31 | End: 2022-01-31

## 2022-01-31 RX ORDER — ONDANSETRON 2 MG/ML
4 INJECTION INTRAMUSCULAR; INTRAVENOUS EVERY 30 MIN PRN
Status: DISCONTINUED | OUTPATIENT
Start: 2022-01-31 | End: 2022-02-01

## 2022-01-31 RX ORDER — MAGNESIUM HYDROXIDE/ALUMINUM HYDROXICE/SIMETHICONE 120; 1200; 1200 MG/30ML; MG/30ML; MG/30ML
30 SUSPENSION ORAL ONCE
Status: COMPLETED | OUTPATIENT
Start: 2022-01-31 | End: 2022-01-31

## 2022-01-31 RX ADMIN — MAGNESIUM SULFATE HEPTAHYDRATE 2000 MG: 40 INJECTION, SOLUTION INTRAVENOUS at 22:40

## 2022-01-31 RX ADMIN — IOPAMIDOL 75 ML: 755 INJECTION, SOLUTION INTRAVENOUS at 19:53

## 2022-01-31 RX ADMIN — SODIUM CHLORIDE, PRESERVATIVE FREE 10 ML: 5 INJECTION INTRAVENOUS at 22:41

## 2022-01-31 RX ADMIN — ONDANSETRON 4 MG: 2 INJECTION INTRAMUSCULAR; INTRAVENOUS at 22:31

## 2022-01-31 RX ADMIN — PANTOPRAZOLE SODIUM 40 MG: 40 INJECTION, POWDER, FOR SOLUTION INTRAVENOUS at 22:31

## 2022-01-31 RX ADMIN — SODIUM CHLORIDE: 9 INJECTION, SOLUTION INTRAVENOUS at 22:45

## 2022-01-31 RX ADMIN — ALUMINUM HYDROXIDE, MAGNESIUM HYDROXIDE, AND SIMETHICONE 30 ML: 200; 200; 20 SUSPENSION ORAL at 22:31

## 2022-01-31 RX ADMIN — LIDOCAINE HYDROCHLORIDE 15 ML: 20 SOLUTION ORAL; TOPICAL at 22:31

## 2022-01-31 ASSESSMENT — PAIN DESCRIPTION - PAIN TYPE: TYPE: ACUTE PAIN

## 2022-01-31 ASSESSMENT — ENCOUNTER SYMPTOMS
CONSTIPATION: 0
RHINORRHEA: 0
EYE REDNESS: 0
SORE THROAT: 0
ABDOMINAL PAIN: 1
COUGH: 0
DIARRHEA: 0
BACK PAIN: 0
NAUSEA: 1
SHORTNESS OF BREATH: 1
VOMITING: 1

## 2022-01-31 ASSESSMENT — PAIN SCALES - WONG BAKER: WONGBAKER_NUMERICALRESPONSE: 0

## 2022-01-31 ASSESSMENT — PAIN SCALES - GENERAL: PAINLEVEL_OUTOF10: 1

## 2022-01-31 ASSESSMENT — PAIN DESCRIPTION - LOCATION: LOCATION: ABDOMEN

## 2022-01-31 ASSESSMENT — PAIN DESCRIPTION - ONSET: ONSET: ON-GOING

## 2022-01-31 ASSESSMENT — PAIN DESCRIPTION - ORIENTATION: ORIENTATION: UPPER

## 2022-01-31 ASSESSMENT — PAIN DESCRIPTION - DESCRIPTORS: DESCRIPTORS: ACHING

## 2022-01-31 ASSESSMENT — PAIN DESCRIPTION - PROGRESSION: CLINICAL_PROGRESSION: GRADUALLY WORSENING

## 2022-01-31 NOTE — ED PROVIDER NOTES
Triage Chief Complaint:   Abdominal Pain    [place]olski:  Fabián Teran is a 82 y.o. female that presents with complaint of burning pain across the top of her abdomen that has been going on since Sunday evening. She was seen at 27 and on Friday for pain in her lower abdomen and there was some inflammation near her colon at that time. She explains that last week she was having some symptoms and was seen by her primary care physician who started her on Cipro and Flagyl and she is also a few days left. She states she has had the current illness which includes the epigastric burning for the last 2 days. She has also had nausea and yellow-colored emesis for the last couple of days. She also complains of loose stools but states this was not actually diarrhea. She is not believe there was any blood in the stools. She denies any fevers. No dysuria or increased urinary frequency. Last night she felt short of breath and did cough up some yellow mucus but the symptoms resolved. She states she called and talked to Dr. Екатерина Rodriguez and told her to come to the emergency room to be admitted to the hospital.    ROS:   Review of Systems   Constitutional: Negative for chills and fever. HENT: Negative for congestion, rhinorrhea and sore throat. Eyes: Negative for redness and visual disturbance. Respiratory: Positive for shortness of breath (resolved). Negative for cough. Cardiovascular: Negative for chest pain and leg swelling. Gastrointestinal: Positive for abdominal pain (burning across upper abdomen), nausea and vomiting. Negative for constipation and diarrhea (loose stool ). Genitourinary: Negative for dysuria, frequency and hematuria. Musculoskeletal: Negative for arthralgias and back pain. Skin: Negative for rash and wound. Neurological: Negative for syncope and headaches. Psychiatric/Behavioral: Negative. Negative for hallucinations and suicidal ideas.        Past Medical History:   Diagnosis Date    Arthritis of shoulder region, right 09/2014    Susan Vera Blind right eye     following right cataract surg    Chronic depression 2009    Dr. Ivan Palma DVT (deep venous thrombosis) ([facility] [address] 1970    left leg    Former smoker     History of echocardiogram 09/21/2020    EF 55-60%, mild left ventricular hypertrophy, normal diastolic filling pattern for age, no signficiant valvular disease, no pericardial effusion     History of stress test 03/25/2021    normal LVEF calculated by the computer is probably falsely low.  LVEF is 40 %    Hx of Doppler ultrasound 03/01/2018    Carotid-mild 0-49% bilateral carotid,50% stenosis right subclavian    Hyperlipidemia     Hypertension     Macular degeneration     right    Mild cognitive impairment 02/2012    MMSE 26/30    MVP (mitral valve prolapse)     trace on echo 2014    Osteoporosis 05/2012    Pancytopenia 05/2011    mild with ; Dr Radha hawk 2010    Peptic ulcer disease     Peripheral vascular disease ([facility] [address] 01/2016    angio; dis below ankles; pletal    Prediabetes 2006    Recurrent ventral incisional hernia 2013    medial apex of GB scar    S/P CABG x 3 2003    3-vessel; Dr Valentina Bridges 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 Armin Jensen; Right side     Past Surgical History:   Procedure Laterality Date    CATARACT REMOVAL Right 2010    poor result ; blind right eye; [facility] [address] CORONARY ARTERY BYPASS GRAFT  8/2009    3 vessel    SHOULDER ARTHROSCOPY Right 2003    TUBAL LIGATION      URETER SURGERY      Right ureteral repair     Family History   Problem Relation Age of Onset    High Blood Pressure Mother     Heart Attack Mother     Other Mother         unsteady gait    Other Father         silcosis    Cancer Sister 79        Breast cancer 1/2017    Stroke Sister      Social History     Socioeconomic History    Marital status:      Spouse name: Ari Paul Number of children: 2    Years of education: 15    Highest education level: Not on file   Occupational History    Occupation: retired   Tobacco Use    Smoking status: Former Smoker     Packs/day: 0.25     Years: 9.00     Pack years: 2.25     Types: Cigarettes     Start date:      Quit date: 1985     Years since quittin.1    Smokeless tobacco: Never Used   Vaping Use    Vaping Use: Never used   Substance and Sexual Activity    Alcohol use: No     Alcohol/week: 0.0 standard drinks    Drug use: No    Sexual activity: Not Currently     Partners: Male   Other Topics Concern    Not on file   Social History Narrative    Not on file     Social Determinants of Health     Financial Resource Strain: Low Risk     Difficulty of Paying Living Expenses: Not hard at all   Food Insecurity: No Food Insecurity    Worried About [address] [place] [address] in the Last Year: Never true    Katie of Food in the Last Year: Never true   Transportation Needs: No Transportation Needs    Lack of Transportation (Medical): No    Lack of Transportation (Non-Medical): No   Physical Activity: Inactive    Days of Exercise per Week: 0 days    Minutes of Exercise per Session: 0 min   Stress: No Stress Concern Present    Feeling of Stress : Not at all   Social Connections: Moderately Integrated    Frequency of Communication with Friends and Family: Twice a week    Frequency of Social Gatherings with Friends and Family: Once a week    Attends Bahai Services: More than 4 times per year    Active Member of [address] [place] [address] or Organizations: Yes    Attends Club or Organization Meetings: More than 4 times per year    Marital Status:     Intimate Partner Violence: Not At Risk    Fear of Current or Ex-Partner: No    Emotionally Abused: No    Physically Abused: No    Sexually Abused: No   Housing Stability: Low Risk     Unable to Pay for Housing in the Last Year: No    Number of Places Lived in the Last Year: 1    Unstable Housing in the Last Year: No     Current Facility-Administered Medications   Medication Dose Route Frequency Provider Last Rate Last Admin    ondansetron (ZOFRAN) injection 4 mg  4 mg IntraVENous Q30 Min PRN Shelby Deleon MD        sodium chloride flush 0.9 % injection 5-40 mL  5-40 mL IntraVENous BID Shelby Deleon MD        aluminum & magnesium hydroxide-simethicone (MAALOX) 200-200-20 MG/5ML suspension 30 mL  30 mL Oral Once Shelby Deleon MD        lidocaine viscous hcl (XYLOCAINE) 2 % solution 15 mL  15 mL Mouth/Throat Once Shelby Deleon MD        0.9 % sodium chloride infusion   IntraVENous Continuous Shelby Deleon MD        pantoprazole (PROTONIX) injection 40 mg  40 mg IntraVENous Once Shelby Deleon MD        potassium chloride (KLOR-CON M) extended release tablet 40 mEq  40 mEq Oral PRN Shelby Deleon MD        Or   Isauro Shipman potassium bicarb-citric acid (EFFER-K) effervescent tablet 40 mEq  40 mEq Oral PRN Shelby Deleon MD        Or   Isauro Ramonita potassium chloride 10 mEq/100 mL IVPB (Peripheral Line)  10 mEq IntraVENous PRN Shelby Deleon MD        magnesium sulfate 2000 mg in 50 mL IVPB premix  2,000 mg IntraVENous Once Shelby Deleon MD        cyanocobalamin injection 1,000 mcg  1,000 mcg SubCUTAneous Q30 Days Adolfo Hartley MD   1,000 mcg at 10/20/20 1625     Current Outpatient Medications   Medication Sig Dispense Refill    ciprofloxacin (CIPRO) 500 MG tablet Take 1 tablet by mouth 2 times daily for 7 days 14 tablet 0    metroNIDAZOLE (FLAGYL) 500 MG tablet Take 1 tablet by mouth 3 times daily for 7 days 21 tablet 0    pantoprazole (PROTONIX) 40 MG tablet Take 1 tablet by mouth 2 times daily 180 tablet 1    pyridostigmine (MESTINON) 60 MG tablet take 1/2 tablets by mouth every 8 hours 45 tablet 5    verapamil (CALAN SR) 180 MG extended release tablet Take 0.5 tablets by mouth 2 times daily 30 tablet 5    ondansetron (ZOFRAN-ODT) 4 MG disintegrating tablet Take 1 tablet by mouth 2 times daily as needed for Nausea or Vomiting 15 tablet 1    ketotifen (ZADITOR) 0.025 % ophthalmic solution Place 1 drop into both eyes 2 times daily      sertraline (ZOLOFT) 25 MG tablet take 1/2 tablet by mouth once daily for 2 days then take 1 tablet by mouth once daily THEREAFTER      gabapentin (NEURONTIN) 300 MG capsule take 1 tablet by mouth three times a day for TRIGEMINAL NEURALGIA (Patient taking differently: Take 300 mg by mouth 3 times daily. ) 90 capsule 1    Icosapent Ethyl (VASCEPA) 0.5 g CAPS take 2 capsules by mouth twice a day 120 capsule 5    Handicap Placard MISC by Does not apply route Good for 3 years 1 each 0    rosuvastatin (CRESTOR) 10 MG tablet Take 1 tablet by mouth daily 90 tablet 3    fluticasone (FLONASE) 50 MCG/ACT nasal spray instill 1 spray into each nostril once daily 1 Bottle 3    divalproex (DEPAKOTE ER) 250 MG extended release tablet Take 250 mg by mouth nightly       Multiple Vitamins-Minerals (OCUVITE-LUTEIN PO) Take by mouth      calcium-vitamin D (OSCAL 500/200 D-3) 500-200 MG-UNIT per tablet Take 1 tablet by mouth 2 times daily.  aspirin 81 MG EC tablet Take 81 mg by mouth daily.          Facility-Administered Medications Ordered in Other Encounters   Medication Dose Route Frequency Provider Last Rate Last Admin    denosumab (PROLIA) SC injection 60 mg  60 mg SubCUTAneous Once John Ham MD         Allergies   Allergen Reactions    Alendronate Sodium Other (See Comments)     GI upset    Bactrim [Sulfamethoxazole-Trimethoprim] Anaphylaxis    Boniva [Ibandronate Sodium] Other (See Comments)     Gi upset and declined egd; also to fosamax    Colesevelam     Lisinopril Other (See Comments)     Severe hyperkalemia (6) with bun >56      Macrobid [Nitrofurantoin]     Neggram [Nalidixic Acid]     Pce [Erythromycin]     Penicillins     Risperidone And Related     Welchol [Colesevelam Hcl] Other (See Comments)     constipation    Carbamazepine Nausea And Vomiting    Lovaza [Omega-3-Acid Ethyl Esters] Nausea And Vomiting    Metoprolol Nausea And Vomiting    Pyridium [Phenazopyridine] Palpitations       Nursing Notes Reviewed     Physical Exam:   ED Triage Vitals [01/31/22 2105]   Enc Vitals Group      BP (!) 150/71      Pulse 72      Resp       Temp       Temp src       SpO2 100 %      Weight       Height       Head Circumference       Peak Flow       Pain Score       Pain Loc       Pain Edu? Excl. in [address]? BP (!) 150/71   Pulse 72   Temp 98.4 °F (36.9 °C) (Oral)   Resp 15   SpO2 100%   My pulse ox interpretation is - normal  Physical Exam  Constitutional:       Appearance: She is well-developed. She is not toxic-appearing or diaphoretic. Comments: Appears to feel unwell     HENT:      Head: Normocephalic and atraumatic. Eyes:      General:         Right eye: No discharge. Left eye: No discharge. Conjunctiva/sclera: Conjunctivae normal.   Cardiovascular:      Rate and Rhythm: Normal rate and regular rhythm. Pulmonary:      Effort: Pulmonary effort is normal. No respiratory distress. Breath sounds: Normal breath sounds. Abdominal:      General: There is no distension. Palpations: Abdomen is soft. Tenderness: There is abdominal tenderness (diffuse, more in lower abdomen on my exam, no peritoneal signs). There is no guarding or rebound. Musculoskeletal:         General: No swelling or signs of injury. Normal range of motion. Skin:     General: Skin is warm and dry. Neurological:      General: No focal deficit present. Mental Status: She is alert. Cranial Nerves: No cranial nerve deficit.    Psychiatric:         Mood and Affect: Mood normal.         Behavior: Behavior normal.         I have reviewed and interpreted all of the currently available lab results from this visit (if applicable):  Results for orders placed or performed during the hospital encounter of 01/31/22   COVID-19, Rapid    Specimen: Nasopharyngeal   Result Value Ref Range    Source THROAT     SARS-CoV-2, NAAT NOT DETECTED NOT DETECTED   CBC Auto Differential   Result Value Ref Range    WBC 8.1 4.0 - 10.5 K/CU MM    RBC 3.89 (L) 4.2 - 5.4 M/CU MM    Hemoglobin 12.2 (L) 12.5 - 16.0 GM/DL    Hematocrit 37.3 37 - 47 %    MCV 95.9 78 - 100 FL    MCH 31.4 (H) 27 - 31 PG    MCHC 32.7 32.0 - 36.0 %    RDW 14.6 11.7 - 14.9 %    Platelets 556 888 - 823 K/CU MM    MPV 9.3 7.5 - 11.1 FL    Differential Type AUTOMATED DIFFERENTIAL     Segs Relative 55.6 36 - 66 %    Lymphocytes % 30.7 24 - 44 %    Monocytes % 11.7 (H) 0 - 4 %    Eosinophils % 0.9 0 - 3 %    Basophils % 0.5 0 - 1 %    Segs Absolute 4.5 K/CU MM    Lymphocytes Absolute 2.5 K/CU MM    Monocytes Absolute 1.0 K/CU MM    Eosinophils Absolute 0.1 K/CU MM    Basophils Absolute 0.0 K/CU MM    Nucleated RBC % 0.0 %    Total Nucleated RBC 0.0 K/CU MM    Total Immature Neutrophil 0.05 K/CU MM    Immature Neutrophil % 0.6 (H) 0 - 0.43 %   Comprehensive Metabolic Panel w/ Reflex to MG   Result Value Ref Range    Sodium 126 (L) 135 - 145 MMOL/L    Potassium 3.0 (LL) 3.5 - 5.1 MMOL/L    Chloride 94 (L) 99 - 110 mMol/L    CO2 18 (L) 21 - 32 MMOL/L    BUN 6 6 - 23 MG/DL    CREATININE 0.6 0.6 - 1.1 MG/DL    Glucose 98 70 - 99 MG/DL    Calcium 7.1 (L) 8.3 - 10.6 MG/DL    Albumin 3.1 (L) 3.4 - 5.0 GM/DL    Total Protein 5.5 (L) 6.4 - 8.2 GM/DL    Total Bilirubin 0.3 0.0 - 1.0 MG/DL    ALT 11 10 - 40 U/L    AST 22 15 - 37 IU/L    Alkaline Phosphatase 41 40 - 129 IU/L    GFR Non-African American >60 >60 mL/min/1.73m2    GFR African American >60 >60 mL/min/1.73m2    Anion Gap 14 4 - 16   Troponin   Result Value Ref Range    Troponin T <0.010 <0.01 NG/ML   Lipase   Result Value Ref Range    Lipase 12 (L) 13 - 60 IU/L   Urinalysis, reflex to microscopic   Result Value Ref Range    Color, UA YELLOW YELLOW    Clarity, UA CLEAR CLEAR    Glucose, Urine NEGATIVE NEGATIVE MG/DL    Bilirubin Urine NEGATIVE NEGATIVE MG/DL    Ketones, Urine NEGATIVE NEGATIVE MG/DL    Specific Gravity, UA 1.015 1.001 - 1.035    Blood, Urine NEGATIVE NEGATIVE    pH, Urine 8.0 5.0 - 8.0    Protein, UA NEGATIVE NEGATIVE MG/DL    Urobilinogen, Urine NORMAL 0.2 - 1.0 MG/DL    Nitrite Urine, Quantitative NEGATIVE NEGATIVE    Leukocyte Esterase, Urine NEGATIVE NEGATIVE    RBC, UA <1 0 - 6 /HPF    WBC, UA <1 0 - 5 /HPF    Bacteria, UA NEGATIVE NEGATIVE /HPF    Squam Epithel, UA <1 /HPF    Trichomonas, UA NONE SEEN NONE SEEN /HPF   Lactic Acid, Plasma   Result Value Ref Range    Lactate 2.7 (HH) 0.4 - 2.0 mMOL/L   Magnesium   Result Value Ref Range    Magnesium 1.2 (L) 1.8 - 2.4 mg/dl   Lactic Acid, Plasma   Result Value Ref Range    Lactate 2.1 (HH) 0.4 - 2.0 mMOL/L   EKG 12 Lead   Result Value Ref Range    Ventricular Rate 75 BPM    Atrial Rate 75 BPM    P-R Interval 168 ms    QRS Duration 86 ms    Q-T Interval 408 ms    QTc Calculation (Bazett) 455 ms    P Axis 32 degrees    R Axis 2 degrees    T Axis 8 degrees    Diagnosis        Poor data quality, interpretation may be adversely affected  Sinus rhythm with premature atrial complexes with aberrant conduction  Nonspecific ST abnormality  Abnormal ECG  When compared with ECG of 10-NOV-2021 08:46,  premature ventricular complexes are no longer present  aberrant conduction is now present        Radiographs (if obtained):  [] The following radiograph was interpreted by myself in the absence of a radiologist:  [x]Radiologist's Report Reviewed:  CT ABDOMEN PELVIS W IV CONTRAST Additional Contrast? None   Final Result   1. Nonspecific wall thickening of the pylorus. Peptic ulcer disease not   excluded. 2. Colonic diverticulosis with no definitive CT evidence of diverticulitis. 3. Severe atherosclerotic disease.                EKG (if obtained): (All EKG's are interpreted by myself in the absence of a cardiologist)  Normal sinus rhythm with a rate of 74. RI interval 164, QRS 92, QTc 477. No ST elevations or depressions. Normal T waves. Mild artifact on the baseline. Impression: Nonspecific EKG. When compared to previous EKG from 11/10/2021, the previously noted PVCs are resolved, otherwise no significant changes. MDM:  Differential diagnoses considered include but are not limited to diverticulitis, gastritis, gastroenteritis, peptic ulcer disease, acute coronary syndrome, reaction to antibiotic, perforated diverticulitis, bowel obstruction. Basic labs were obtained and show a slight hyponatremia and hypokalemia as well as a decreased magnesium level. EKG is not concerning for acute ischemia and troponin is normal, do not suspect acute coronary syndrome. Lipase is normal, I do not suspect pancreatitis. Patient's lactic acid level slightly elevated. Patient given IV fluids as well as potassium replacement and magnesium replacement. CT scan of patient's abdomen shows nonspecific wall thickening of the pylorus. There is also colonic diverticulosis without definitive evidence of diverticulitis. Rapid Covid is negative. Urine is not concerning for infection. Patient continues to be nauseous despite nausea medication and continues to have upper abdominal discomfort. Given her continued nausea as well as electrolyte abnormalities, plan to move to the hospital for further evaluation and treatment. I spoke with Dr. Isidoro Mi, who graciously agreed to admit the patient. Plan of care explained to patient. All questions and concerns were addressed to the patient's satisfaction. Patient understood and agreed with plan. I did don appropriate PPE (including face mask, protective eye ware/safety glasses and gloves), as recommended by the health facility/national standard best practice, during my bedside interactions with the patient. Clinical Impression:  1.  Nausea and vomiting, intractability of vomiting not specified, unspecified vomiting type    2. Hyponatremia    3. Inflammation of pyloric mucosa    4. Hypokalemia    5. Hypomagnesemia          Jemal Salinas MD       Please note that portions of this note may have been complete with a voice recognition program.  Effortswere made to edit the dictations, but occasional words are mis-transcribed.           Jemal Salinas MD  01/31/22 [id number]

## 2022-02-01 ENCOUNTER — APPOINTMENT (OUTPATIENT)
Dept: GENERAL RADIOLOGY | Age: 87
DRG: 392 | End: 2022-02-01
Payer: MEDICARE

## 2022-02-01 LAB
ABO/RH: NORMAL
ANION GAP SERPL CALCULATED.3IONS-SCNC: 14 MMOL/L (ref 4–16)
ANTIBODY SCREEN: NEGATIVE
APTT: 28 SECONDS (ref 25.1–37.1)
BUN BLDV-MCNC: 7 MG/DL (ref 6–23)
CALCIUM SERPL-MCNC: 8.7 MG/DL (ref 8.3–10.6)
CHLORIDE BLD-SCNC: 87 MMOL/L (ref 99–110)
CO2: 22 MMOL/L (ref 21–32)
CREAT SERPL-MCNC: 0.7 MG/DL (ref 0.6–1.1)
DIFFERENTIAL TYPE: ABNORMAL
EKG ATRIAL RATE: 74 BPM
EKG DIAGNOSIS: NORMAL
EKG P AXIS: 44 DEGREES
EKG P-R INTERVAL: 164 MS
EKG Q-T INTERVAL: 430 MS
EKG QRS DURATION: 92 MS
EKG QTC CALCULATION (BAZETT): 477 MS
EKG R AXIS: -3 DEGREES
EKG T AXIS: 2 DEGREES
EKG VENTRICULAR RATE: 74 BPM
FERRITIN: 939 NG/ML (ref 15–150)
FOLATE: 19.1 NG/ML (ref 3.1–17.5)
GFR AFRICAN AMERICAN: >60 ML/MIN/1.73M2
GFR NON-AFRICAN AMERICAN: >60 ML/MIN/1.73M2
GLUCOSE BLD-MCNC: 130 MG/DL (ref 70–99)
HCT VFR BLD CALC: 36.2 % (ref 37–47)
HCT VFR BLD CALC: ABNORMAL % (ref 37–47)
HEMOGLOBIN: 12.1 GM/DL (ref 12.5–16)
HEMOGLOBIN: ABNORMAL GM/DL (ref 12.5–16)
INR BLD: 0.95 INDEX
IRON: 53 UG/DL (ref 37–145)
IRON: 70 UG/DL (ref 37–145)
MCH RBC QN AUTO: 31.7 PG (ref 27–31)
MCH RBC QN AUTO: ABNORMAL PG (ref 27–31)
MCHC RBC AUTO-ENTMCNC: 33.4 % (ref 32–36)
MCHC RBC AUTO-ENTMCNC: ABNORMAL % (ref 32–36)
MCV RBC AUTO: 94.8 FL (ref 78–100)
MCV RBC AUTO: ABNORMAL FL (ref 78–100)
PCT TRANSFERRIN: 24 % (ref 10–44)
PCT TRANSFERRIN: 27 % (ref 10–44)
PDW BLD-RTO: 14.7 % (ref 11.7–14.9)
PDW BLD-RTO: ABNORMAL % (ref 11.7–14.9)
PLATELET # BLD: 157 K/CU MM (ref 140–440)
PLATELET # BLD: ABNORMAL K/CU MM (ref 140–440)
PMV BLD AUTO: 9 FL (ref 7.5–11.1)
PMV BLD AUTO: ABNORMAL FL (ref 7.5–11.1)
POTASSIUM SERPL-SCNC: 4.5 MMOL/L (ref 3.5–5.1)
PROTHROMBIN TIME: 12.3 SECONDS (ref 11.7–14.5)
RBC # BLD: 3.82 M/CU MM (ref 4.2–5.4)
RBC # BLD: ABNORMAL M/CU MM (ref 4.2–5.4)
RETICULOCYTE COUNT PCT: 3.2 % (ref 0.2–2.2)
SEGMENTED NEUTROPHILS RELATIVE PERCENT: ABNORMAL % (ref 36–66)
SODIUM BLD-SCNC: 123 MMOL/L (ref 135–145)
TOTAL IRON BINDING CAPACITY: 217 UG/DL (ref 250–450)
TOTAL IRON BINDING CAPACITY: 263 UG/DL (ref 250–450)
UNSATURATED IRON BINDING CAPACITY: 164 UG/DL (ref 110–370)
UNSATURATED IRON BINDING CAPACITY: 193 UG/DL (ref 110–370)
VITAMIN B-12: 715.6 PG/ML (ref 211–911)
WBC # BLD: 5.7 K/CU MM (ref 4–10.5)
WBC # BLD: ABNORMAL K/CU MM (ref 4–10.5)

## 2022-02-01 PROCEDURE — 83550 IRON BINDING TEST: CPT

## 2022-02-01 PROCEDURE — 6370000000 HC RX 637 (ALT 250 FOR IP): Performed by: INTERNAL MEDICINE

## 2022-02-01 PROCEDURE — 71045 X-RAY EXAM CHEST 1 VIEW: CPT

## 2022-02-01 PROCEDURE — 6360000002 HC RX W HCPCS: Performed by: EMERGENCY MEDICINE

## 2022-02-01 PROCEDURE — 6360000002 HC RX W HCPCS: Performed by: NURSE PRACTITIONER

## 2022-02-01 PROCEDURE — 83540 ASSAY OF IRON: CPT

## 2022-02-01 PROCEDURE — 85730 THROMBOPLASTIN TIME PARTIAL: CPT

## 2022-02-01 PROCEDURE — 2580000003 HC RX 258: Performed by: INTERNAL MEDICINE

## 2022-02-01 PROCEDURE — 6360000002 HC RX W HCPCS: Performed by: INTERNAL MEDICINE

## 2022-02-01 PROCEDURE — C9113 INJ PANTOPRAZOLE SODIUM, VIA: HCPCS | Performed by: INTERNAL MEDICINE

## 2022-02-01 PROCEDURE — 85027 COMPLETE CBC AUTOMATED: CPT

## 2022-02-01 PROCEDURE — 85610 PROTHROMBIN TIME: CPT

## 2022-02-01 PROCEDURE — 80048 BASIC METABOLIC PNL TOTAL CA: CPT

## 2022-02-01 PROCEDURE — 2580000003 HC RX 258: Performed by: EMERGENCY MEDICINE

## 2022-02-01 PROCEDURE — 93010 ELECTROCARDIOGRAM REPORT: CPT | Performed by: INTERNAL MEDICINE

## 2022-02-01 PROCEDURE — 1200000000 HC SEMI PRIVATE

## 2022-02-01 RX ORDER — ONDANSETRON 4 MG/1
4 TABLET, ORALLY DISINTEGRATING ORAL EVERY 8 HOURS PRN
Status: DISCONTINUED | OUTPATIENT
Start: 2022-02-01 | End: 2022-02-04 | Stop reason: HOSPADM

## 2022-02-01 RX ORDER — SODIUM CHLORIDE 0.9 % (FLUSH) 0.9 %
5-40 SYRINGE (ML) INJECTION PRN
Status: DISCONTINUED | OUTPATIENT
Start: 2022-02-01 | End: 2022-02-04 | Stop reason: HOSPADM

## 2022-02-01 RX ORDER — MAGNESIUM SULFATE IN WATER 40 MG/ML
2000 INJECTION, SOLUTION INTRAVENOUS ONCE
Status: COMPLETED | OUTPATIENT
Start: 2022-02-01 | End: 2022-02-01

## 2022-02-01 RX ORDER — DIVALPROEX SODIUM 250 MG/1
250 TABLET, EXTENDED RELEASE ORAL NIGHTLY
Status: DISCONTINUED | OUTPATIENT
Start: 2022-02-01 | End: 2022-02-04 | Stop reason: HOSPADM

## 2022-02-01 RX ORDER — ACETAMINOPHEN 650 MG/1
650 SUPPOSITORY RECTAL EVERY 6 HOURS PRN
Status: DISCONTINUED | OUTPATIENT
Start: 2022-02-01 | End: 2022-02-04 | Stop reason: HOSPADM

## 2022-02-01 RX ORDER — ROSUVASTATIN CALCIUM 5 MG/1
10 TABLET, COATED ORAL DAILY
Status: DISCONTINUED | OUTPATIENT
Start: 2022-02-01 | End: 2022-02-04 | Stop reason: HOSPADM

## 2022-02-01 RX ORDER — KETOTIFEN FUMARATE 0.35 MG/ML
1 SOLUTION/ DROPS OPHTHALMIC 2 TIMES DAILY
Status: DISCONTINUED | OUTPATIENT
Start: 2022-02-01 | End: 2022-02-04 | Stop reason: HOSPADM

## 2022-02-01 RX ORDER — PROMETHAZINE HYDROCHLORIDE 25 MG/ML
12.5 INJECTION, SOLUTION INTRAMUSCULAR; INTRAVENOUS ONCE
Status: COMPLETED | OUTPATIENT
Start: 2022-02-01 | End: 2022-02-01

## 2022-02-01 RX ORDER — ONDANSETRON 2 MG/ML
4 INJECTION INTRAMUSCULAR; INTRAVENOUS EVERY 6 HOURS PRN
Status: DISCONTINUED | OUTPATIENT
Start: 2022-02-01 | End: 2022-02-04 | Stop reason: HOSPADM

## 2022-02-01 RX ORDER — SERTRALINE HYDROCHLORIDE 25 MG/1
25 TABLET, FILM COATED ORAL DAILY
Status: DISCONTINUED | OUTPATIENT
Start: 2022-02-01 | End: 2022-02-04 | Stop reason: HOSPADM

## 2022-02-01 RX ORDER — GABAPENTIN 300 MG/1
300 CAPSULE ORAL 3 TIMES DAILY
Status: DISCONTINUED | OUTPATIENT
Start: 2022-02-01 | End: 2022-02-04 | Stop reason: HOSPADM

## 2022-02-01 RX ORDER — PANTOPRAZOLE SODIUM 40 MG/10ML
40 INJECTION, POWDER, LYOPHILIZED, FOR SOLUTION INTRAVENOUS EVERY 12 HOURS
Status: DISCONTINUED | OUTPATIENT
Start: 2022-02-01 | End: 2022-02-04

## 2022-02-01 RX ORDER — ACETAMINOPHEN 325 MG/1
650 TABLET ORAL EVERY 6 HOURS PRN
Status: DISCONTINUED | OUTPATIENT
Start: 2022-02-01 | End: 2022-02-04 | Stop reason: HOSPADM

## 2022-02-01 RX ORDER — MORPHINE SULFATE 2 MG/ML
2 INJECTION, SOLUTION INTRAMUSCULAR; INTRAVENOUS ONCE
Status: COMPLETED | OUTPATIENT
Start: 2022-02-01 | End: 2022-02-01

## 2022-02-01 RX ORDER — PYRIDOSTIGMINE BROMIDE 60 MG/1
30 TABLET ORAL EVERY 8 HOURS SCHEDULED
Status: DISCONTINUED | OUTPATIENT
Start: 2022-02-01 | End: 2022-02-04 | Stop reason: HOSPADM

## 2022-02-01 RX ORDER — SODIUM CHLORIDE 0.9 % (FLUSH) 0.9 %
5-40 SYRINGE (ML) INJECTION EVERY 12 HOURS SCHEDULED
Status: DISCONTINUED | OUTPATIENT
Start: 2022-02-01 | End: 2022-02-04 | Stop reason: HOSPADM

## 2022-02-01 RX ORDER — FLUTICASONE PROPIONATE 50 MCG
1 SPRAY, SUSPENSION (ML) NASAL DAILY
Status: DISCONTINUED | OUTPATIENT
Start: 2022-02-01 | End: 2022-02-04 | Stop reason: HOSPADM

## 2022-02-01 RX ORDER — SODIUM CHLORIDE 1000 MG
1 TABLET, SOLUBLE MISCELLANEOUS 2 TIMES DAILY WITH MEALS
Status: DISCONTINUED | OUTPATIENT
Start: 2022-02-01 | End: 2022-02-02

## 2022-02-01 RX ORDER — SODIUM CHLORIDE 9 MG/ML
10 INJECTION INTRAVENOUS EVERY 12 HOURS
Status: DISCONTINUED | OUTPATIENT
Start: 2022-02-01 | End: 2022-02-04

## 2022-02-01 RX ORDER — VITS A,C,E/LUTEIN/MINERALS 300MCG-200
1 TABLET ORAL DAILY
Status: DISCONTINUED | OUTPATIENT
Start: 2022-02-01 | End: 2022-02-04 | Stop reason: HOSPADM

## 2022-02-01 RX ORDER — SODIUM CHLORIDE 9 MG/ML
25 INJECTION, SOLUTION INTRAVENOUS PRN
Status: DISCONTINUED | OUTPATIENT
Start: 2022-02-01 | End: 2022-02-04 | Stop reason: HOSPADM

## 2022-02-01 RX ORDER — ASPIRIN 81 MG/1
81 TABLET ORAL DAILY
Status: DISCONTINUED | OUTPATIENT
Start: 2022-02-01 | End: 2022-02-04 | Stop reason: HOSPADM

## 2022-02-01 RX ADMIN — GABAPENTIN 300 MG: 300 CAPSULE ORAL at 09:24

## 2022-02-01 RX ADMIN — Medication 1 G: at 22:00

## 2022-02-01 RX ADMIN — KETOTIFEN FUMARATE 1 DROP: 0.35 SOLUTION/ DROPS OPHTHALMIC at 23:01

## 2022-02-01 RX ADMIN — SODIUM CHLORIDE: 9 INJECTION, SOLUTION INTRAVENOUS at 09:31

## 2022-02-01 RX ADMIN — SERTRALINE HYDROCHLORIDE 25 MG: 25 TABLET ORAL at 09:24

## 2022-02-01 RX ADMIN — ROSUVASTATIN CALCIUM 10 MG: 5 TABLET, FILM COATED ORAL at 09:24

## 2022-02-01 RX ADMIN — POTASSIUM CHLORIDE 10 MEQ: 7.45 INJECTION INTRAVENOUS at 08:11

## 2022-02-01 RX ADMIN — VERAPAMIL HYDROCHLORIDE 90 MG: 180 TABLET, FILM COATED, EXTENDED RELEASE ORAL at 06:43

## 2022-02-01 RX ADMIN — DIVALPROEX SODIUM 250 MG: 250 TABLET, FILM COATED, EXTENDED RELEASE ORAL at 23:02

## 2022-02-01 RX ADMIN — MORPHINE SULFATE 2 MG: 2 INJECTION, SOLUTION INTRAMUSCULAR; INTRAVENOUS at 01:41

## 2022-02-01 RX ADMIN — POTASSIUM CHLORIDE 10 MEQ: 7.45 INJECTION INTRAVENOUS at 04:33

## 2022-02-01 RX ADMIN — SODIUM CHLORIDE, PRESERVATIVE FREE 10 ML: 5 INJECTION INTRAVENOUS at 22:00

## 2022-02-01 RX ADMIN — PYRIDOSTIGMINE BROMIDE 30 MG: 60 TABLET ORAL at 14:18

## 2022-02-01 RX ADMIN — ONDANSETRON 4 MG: 2 INJECTION INTRAMUSCULAR; INTRAVENOUS at 02:14

## 2022-02-01 RX ADMIN — POTASSIUM CHLORIDE 10 MEQ: 7.45 INJECTION INTRAVENOUS at 01:08

## 2022-02-01 RX ADMIN — ONDANSETRON 4 MG: 2 INJECTION INTRAMUSCULAR; INTRAVENOUS at 00:25

## 2022-02-01 RX ADMIN — KETOTIFEN FUMARATE 1 DROP: 0.35 SOLUTION/ DROPS OPHTHALMIC at 11:26

## 2022-02-01 RX ADMIN — PANTOPRAZOLE SODIUM 40 MG: 40 INJECTION, POWDER, FOR SOLUTION INTRAVENOUS at 22:00

## 2022-02-01 RX ADMIN — VERAPAMIL HYDROCHLORIDE 90 MG: 180 TABLET, FILM COATED, EXTENDED RELEASE ORAL at 23:02

## 2022-02-01 RX ADMIN — PYRIDOSTIGMINE BROMIDE 30 MG: 60 TABLET ORAL at 23:02

## 2022-02-01 RX ADMIN — MAGNESIUM SULFATE 2000 MG: 2 INJECTION INTRAVENOUS at 06:42

## 2022-02-01 RX ADMIN — PYRIDOSTIGMINE BROMIDE 30 MG: 60 TABLET ORAL at 06:43

## 2022-02-01 RX ADMIN — POTASSIUM CHLORIDE 10 MEQ: 7.45 INJECTION INTRAVENOUS at 06:41

## 2022-02-01 RX ADMIN — DIVALPROEX SODIUM 250 MG: 250 TABLET, FILM COATED, EXTENDED RELEASE ORAL at 06:43

## 2022-02-01 RX ADMIN — GABAPENTIN 300 MG: 300 CAPSULE ORAL at 22:00

## 2022-02-01 RX ADMIN — Medication 1 TABLET: at 11:27

## 2022-02-01 RX ADMIN — GABAPENTIN 300 MG: 300 CAPSULE ORAL at 14:18

## 2022-02-01 RX ADMIN — FLUTICASONE PROPIONATE 1 SPRAY: 50 SPRAY, METERED NASAL at 11:26

## 2022-02-01 RX ADMIN — PROMETHAZINE HYDROCHLORIDE 12.5 MG: 25 INJECTION INTRAMUSCULAR; INTRAVENOUS at 04:57

## 2022-02-01 ASSESSMENT — PAIN SCALES - GENERAL
PAINLEVEL_OUTOF10: 8
PAINLEVEL_OUTOF10: 0
PAINLEVEL_OUTOF10: 0

## 2022-02-01 NOTE — H&P
medial apex of GB scar    S/P CABG x 3     3-vessel; Dr Muñiz Blind Spigelian hernia 2017    right ant lateral wall; seen on CT abd    Supraventricular tachycardia ([address]     Freq ventriular ectopy    Tic douloureux     Dr. Asuncion Cameron; Right side     Past Surgical History:   Procedure Laterality Date    CATARACT REMOVAL Right 2010    poor result ; blind right eye; [address] CORONARY ARTERY BYPASS GRAFT  2009    3 vessel    SHOULDER ARTHROSCOPY Right     TUBAL LIGATION      URETER SURGERY      Right ureteral repair     Family History   Problem Relation Age of Onset    High Blood Pressure Mother     Heart Attack Mother     Other Mother         unsteady gait    Other Father         silcosis    Cancer Sister 79        Breast cancer 2017    Stroke Sister      Family Hx of HTN  Family Hx as reviewed above, otherwise non-contributory  Social History     Socioeconomic History    Marital status:       Spouse name: Iveth Prasad Number of children: 2    Years of education: 15    Highest education level: None   Occupational History    Occupation: retired   Tobacco Use    Smoking status: Former Smoker     Packs/day: 0.25     Years: 9.00     Pack years: 2.25     Types: Cigarettes     Start date:      Quit date: 1985     Years since quittin.1    Smokeless tobacco: Never Used   Vaping Use    Vaping Use: Never used   Substance and Sexual Activity    Alcohol use: No     Alcohol/week: 0.0 standard drinks    Drug use: No    Sexual activity: Not Currently     Partners: Male   Other Topics Concern    None   Social History Narrative    None     Social Determinants of Health     Financial Resource Strain: Low Risk     Difficulty of Paying Living Expenses: Not hard at all   Food Insecurity: No Food Insecurity    Worried About [address] in the Last Year: Never true    Katie of Food in the Last Year: Never true   Transportation Needs: No Transportation Needs    Lack of Transportation (Medical): No    Lack of Transportation (Non-Medical): No   Physical Activity: Inactive    Days of Exercise per Week: 0 days    Minutes of Exercise per Session: 0 min   Stress: No Stress Concern Present    Feeling of Stress : Not at all   Social Connections: Moderately Integrated    Frequency of Communication with Friends and Family: Twice a week    Frequency of Social Gatherings with Friends and Family: Once a week    Attends Je[employer] witness Services: More than 4 times per year    Active Member of [address] [place] [address] [employer] or Organizations: Yes    Attends Club or Organization Meetings: More than 4 times per year    Marital Status:    Intimate Partner Violence: Not At Risk    Fear of Current or Ex-Partner: No    Emotionally Abused: No    Physically Abused: No    Sexually Abused: No   Housing Stability: Low Risk     Unable to Pay for Housing in the Last Year: No    Number of Places Lived in the Last Year: 1    Unstable Housing in the Last Year: No       MEDICATIONS   Medications Prior to Admission  Not in a hospital admission.     Current Medications  Current Facility-Administered Medications   Medication Dose Route Frequency Provider Last Rate Last Admin    ondansetron (ZOFRAN) injection 4 mg  4 mg IntraVENous Q30 Min PRN Matt Hassan MD        sodium chloride flush 0.9 % injection 5-40 mL  5-40 mL IntraVENous BID Matt Hassan MD        aluminum & magnesium hydroxide-simethicone (MAALOX) 200-200-20 MG/5ML suspension 30 mL  30 mL Oral Once Matt Hassan MD        lidocaine viscous hcl (XYLOCAINE) 2 % solution 15 mL  15 mL Mouth/Throat Once Matt Hassan MD        0.9 % sodium chloride infusion   IntraVENous Continuous Matt Hassan MD        pantoprazole (PROTONIX) injection 40 mg  40 mg IntraVENous Once Matt Hassan MD        potassium chloride (KLOR-CON M) extended release tablet 40 mEq  40 mEq Oral PRN Matt Hassan MD        Or    potassium bicarb-citric acid (EFFER-K) effervescent tablet 40 mEq  40 mEq Oral PRN Betty Adrian MD        Or    potassium chloride 10 mEq/100 mL IVPB (Peripheral Line)  10 mEq IntraVENous PRN Betty Adrian MD        magnesium sulfate 2000 mg in 50 mL IVPB premix  2,000 mg IntraVENous Once Betty Adrian MD        cyanocobalamin injection 1,000 mcg  1,000 mcg SubCUTAneous Q30 Days Cortes Osuna MD   1,000 mcg at 10/20/20 1625     Current Outpatient Medications   Medication Sig Dispense Refill    ciprofloxacin (CIPRO) 500 MG tablet Take 1 tablet by mouth 2 times daily for 7 days 14 tablet 0    metroNIDAZOLE (FLAGYL) 500 MG tablet Take 1 tablet by mouth 3 times daily for 7 days 21 tablet 0    pantoprazole (PROTONIX) 40 MG tablet Take 1 tablet by mouth 2 times daily 180 tablet 1    pyridostigmine (MESTINON) 60 MG tablet take 1/2 tablets by mouth every 8 hours 45 tablet 5    verapamil (CALAN SR) 180 MG extended release tablet Take 0.5 tablets by mouth 2 times daily 30 tablet 5    ondansetron (ZOFRAN-ODT) 4 MG disintegrating tablet Take 1 tablet by mouth 2 times daily as needed for Nausea or Vomiting 15 tablet 1    ketotifen (ZADITOR) 0.025 % ophthalmic solution Place 1 drop into both eyes 2 times daily      sertraline (ZOLOFT) 25 MG tablet take 1/2 tablet by mouth once daily for 2 days then take 1 tablet by mouth once daily THEREAFTER      gabapentin (NEURONTIN) 300 MG capsule take 1 tablet by mouth three times a day for TRIGEMINAL NEURALGIA (Patient taking differently: Take 300 mg by mouth 3 times daily. ) 90 capsule 1    Icosapent Ethyl (VASCEPA) 0.5 g CAPS take 2 capsules by mouth twice a day 120 capsule 5    Handicap Placard MISC by Does not apply route Good for 3 years 1 each 0    rosuvastatin (CRESTOR) 10 MG tablet Take 1 tablet by mouth daily 90 tablet 3    fluticasone (FLONASE) 50 MCG/ACT nasal spray instill 1 spray into each nostril once daily 1 Bottle 3    divalproex (DEPAKOTE ER) 250 MG extended release tablet Take 250 mg by mouth nightly       Multiple Vitamins-Minerals (OCUVITE-LUTEIN PO) Take by mouth      calcium-vitamin D (OSCAL 500/200 D-3) 500-200 MG-UNIT per tablet Take 1 tablet by mouth 2 times daily.  aspirin 81 MG EC tablet Take 81 mg by mouth daily. Facility-Administered Medications Ordered in Other Encounters   Medication Dose Route Frequency Provider Last Rate Last Admin    denosumab (PROLIA) SC injection 60 mg  60 mg SubCUTAneous Once Raleigh Sawant MD             Allergies  Allergies   Allergen Reactions    Alendronate Sodium Other (See Comments)     GI upset    Bactrim [Sulfamethoxazole-Trimethoprim] Anaphylaxis    Boniva [Ibandronate Sodium] Other (See Comments)     Gi upset and declined egd; also to fosamax    Colesevelam     Lisinopril Other (See Comments)     Severe hyperkalemia (6) with bun >56      Macrobid [Nitrofurantoin]     Neggram [Nalidixic Acid]     Pce [Erythromycin]     Penicillins     Risperidone And Related     Welchol [Colesevelam Hcl] Other (See Comments)     constipation    Carbamazepine Nausea And Vomiting    Lovaza [Omega-3-Acid Ethyl Esters] Nausea And Vomiting    Metoprolol Nausea And Vomiting    Pyridium [Phenazopyridine] Palpitations       REVIEW OF SYSTEMS   Within above limitations. 14 point review of systems reviewed. Pertinent positive or negative as per HPI or otherwise negative per 14 point systems review. PHYSICAL EXAM     Wt Readings from Last 3 Encounters:   01/28/22 138 lb (62.6 kg)   01/25/22 135 lb (61.2 kg)   01/06/22 134 lb (60.8 kg)       Blood pressure (!) 150/71, pulse 72, temperature 98.4 °F (36.9 °C), temperature source Oral, resp. rate 15, SpO2 100 %, not currently breastfeeding. General - AAO x 3  Psych - Appropriate affect/speech. No agitation  Eyes - Eye lids intact. No scleral icterus  ENT - Lips wnl. External ear clear/dry/intact.  No thyromegaly on inspection  Neuro - No gross peripheral or central neuro deficits on inspection  Heart - Sinus. RRR. S1 and S2 present. No added HS/murmurs appreciated. No elevated JVD appreciated  Lung - Adequate air entry b/l, No crackles/wheezes appreciated  GI - Soft. No guarding/rigidity. No hepatosplenomegaly/ascites. BS+   - No CVA/suprapubic tenderness or palpable bladder distension  Skin - Intact. No rash/petechiae/ecchymosis. Warm extremities  MSK - Joints with normal ROM. No joint swellings    Lines/Drains/Airways/Wounds:  [unfilled]    LABS AND IMAGING   CBC  [unfilled]    Last 3 Hemoglobin  Lab Results   Component Value Date    HGB 12.2 01/31/2022    HGB 10.5 01/28/2022    HGB 10.2 11/22/2021     Last 3 WBC/ANC  Lab Results   Component Value Date    WBC 8.1 01/31/2022    WBC 5.9 01/28/2022    WBC 5.3 11/22/2021     No components found for: GRNLOCTYABS  Last 3 Platelets  No results found for: PLATELET  Chemistry  [unfilled]  [unfilled]  No results found for: LDH  Coagulation Studies  Lab Results   Component Value Date    INR 0.96 01/25/2016     Liver Function Studies  Lab Results   Component Value Date    ALT 11 01/31/2022    AST 22 01/31/2022    ALKPHOS 41 01/31/2022       Recent Imaging        Relevant labs and imaging reviewed    ASSESSMENT AND PLAN   Sean Mcnamara is a 82 y.o. female p/w    N/V Possibly d/t gastroenteritis/colitis  - CTAB noted  - greater than 3 loose/water BM per day  - C.  Diff stool study  - GI diarrhea panel  - cipro/flagyl IV formulations continued  - IV hydration, anti-emetic      inflammed pyloris  -ABX as above    Electrolyte abn  hypokalemia  - no EKG changes  - potassium repletion protocol  - telemetry  - repeat BMP    Hyponatremia  -hydrate, trend     hypomag  - replete, trend    Case d/w ED provider    DVT ppx: lovenox  Code status: full    [facility], Internal Medicine  1/31/2022 at 9:34 PM

## 2022-02-01 NOTE — CONSULTS
[address] [place] [address], [facility]                                  CONSULTATION    PATIENT NAME: MANUEL MARTINEZ                          :        1935  MED REC NO:   [id number]                          ROOM:       4  ACCOUNT NO:   [de-identified]                           ADMIT DATE: 2022  PROVIDER:     Jose Thomson MD    DATE OF CONSULTATION:  2022    PRIMARY PHYSICIAN:  Dr. Estevan López. CHIEF COMPLAINT:  1. Abdominal pain with diarrhea and abnormal CT scan, rule out acute  diverticulitis. 2.  Rule out acute gastroenteritis. 3.  Electrolyte abnormalities. HISTORY OF PRESENT ILLNESS:  The patient is an 72-year-old white female  patient, known to me from her last hospitalization on 2021 with  past medical history significant for hypertension; coronary artery  disease, status post CABG; hyperlipidemia; DVT; osteoarthritis; chronic  depression; mitral valve prolapse; peptic ulcer disease; peripheral  vascular disease; supraventricular tachycardia; tic douloureux; and  history of acute diverticulitis in the past, who called the office  yesterday complaining of severe lower abdominal pain along with loose  BM, nausea, and vomiting. There is no history of fever, chills,  hematemesis, melena, or hematochezia. The patient presented with the  same aforementioned symptoms to the ER on 2022, and she had a CAT  scan done of the abdomen and pelvis, which showed findings consistent  with acute diverticulitis. The patient has been on antibiotics per her  primary physician for acute diverticulitis. According to the patient,  her symptoms worsened over the weekend, and with the results, she had to  come back on 2022 and repeat CAT scan of the abdomen and pelvis  was done, and no acute findings, however, were noted.   The blood workup,  however, showed the patient to be hyponatremic with a sodium today is  123, potassium was 3. In view of the patient's intractable abdominal  symptoms with failure to respond to outpatient management, she is being  admitted for further workup and management. Stool studies for C. diff  and GI disease panel are in order. The patient is hemodynamically  stable. The patient has seen Dr. Yocasta Means from GI in the past, who  performed an EGD and colonoscopy on 04/04/2013 and biopsies from the  small intestine and stomach were unremarkable and biopsies from the  ascending colon were negative as well. The patient is hemodynamically  stable. REVIEW OF SYSTEMS:  CENTRAL NERVOUS SYSTEM:  The patient denies headache or focal  sensorimotor symptoms. CARDIOVASCULAR SYSTEM:  No history of chest pain, shortness of breath,  or leg swelling. GENITOURINARY SYSTEM:  No history of dysuria, pyuria, or hematuria. MUSCULOSKELETAL SYSTEMS:  The patient complains of generalized weakness. RESPIRATORY SYSTEM:  No history of cough, hemoptysis, fever, or chills. PAST MEDICAL HISTORY:  Significant for history of hypertension; coronary  artery disease, status post CABG; hyperlipidemia; DVT; osteoarthritis;  chronic depression; mitral valve prolapse; peptic ulcer disease;  peripheral vascular disease; supraventricular tachycardia; tic  douloureux; and acute diverticulitis. FAMILY HISTORY:  The patient's sister was diagnosed with carcinoma of  the breast.    MEDICATIONS:  Please refer to chart. SOCIOECONOMIC HISTORY:  No history of EtOH abuse. The patient does not  smoke cigarettes. SURGERIES:  The patient has had tonsillectomy and adenoidectomy done,  cataract surgery, CABG, and surgery on the kidneys/ureter and tubal  ligation, right shoulder surgery, and cholecystectomy. ALLERGIES:  The patient has multiple allergies. Please refer to the  chart.     PHYSICAL EXAMINATION:  GENERAL:  Physical examination shows an 62-year-old white female of thin  build and nutritional status, for her age, who is lying flat in bed, in  no acute distress. She is awake, alert, and oriented and pleasant to  talk with. VITAL SIGNS:  Stable. HEENT:  Examination shows skull to be atraumatic. NECK:  Supple. CHEST:  Clear. HEART:  S1 and S2 are normal.  ABDOMEN:  Soft, nondistended with mild generalized tenderness. No  guarding or rigidity. Liver and spleen are not palpable. Bowel sounds  are present. RECTAL:  Exam is deferred. CNS:  Exam shows the patient to be awake, alert, and oriented. There  are no focal sensorimotor signs. MUSCULOSKELETAL SYSTEM:  Exam shows evidence of degenerative joint  disease changes. LABORATORY DATA:  As above mentioned. IMPRESSION:  1. An 82-year-old white female with multiple comorbidities, presents  with intractable abdominal pain, nausea, vomiting, loose BM, and  abnormal CAT scan, rule out acute diverticulitis. 2.  Rule out acute gastroenteritis. 3.  Electrolyte abnormalities noted. RECOMMENDATIONS:  1. Agree with present management. 2.  We will follow up on stool studies. 3.  We will start the patient on adult diet. 4.  No need for colonoscopy at this point. 5.  We will check the patient's CBC, Chem profile, amylase, and lipase  level in a.m. 6.  The case and plan have been discussed in detail with the patient.         Lucas Da Silva MD    D: 02/01/2022 10:58:30       T: 02/01/2022 11:01:12     [id number]  Job#: [id number]     Doc#: [id number]    CC:  Dr. Nilo Ware

## 2022-02-01 NOTE — CONSULTS
Nephrology Service Consultation    Patient:  Meghan Mendenhall  MRN: [id number]  Consulting physician:  Josue Foster MD  Reason for Consult: Hyponatremia    History Obtained From:  patient, electronic medical record  PCP: John Ham MD    HISTORY OF PRESENT ILLNESS:   The patient is a 82 y.o. female who presents with weakness in the room with grandson. Drinks lots of water at home and is on multiple medications for anxiety depression with psychiatry. Patient has evidence of possible colitis with above setting and likely got more weak tired fatigue and sent to the hospital for evaluation found to be hyponatremic. With overall therapy sodium got lower and renal asked evaluate above setting with prior CABG anxiety tachycardia chronic pain issues    Past Medical History:        Diagnosis Date    Arthritis of shoulder region, right 09/2014    Tilda Knee Blind right eye     following right cataract surg    Chronic depression 2009    Dr. Marybel Sandhu DVT (deep venous thrombosis) ([facility] [address] 1970    left leg    Former smoker     History of echocardiogram 09/21/2020    EF 55-60%, mild left ventricular hypertrophy, normal diastolic filling pattern for age, no signficiant valvular disease, no pericardial effusion     History of stress test 03/25/2021    normal LVEF calculated by the computer is probably falsely low.  LVEF is 40 %    Hx of Doppler ultrasound 03/01/2018    Carotid-mild 0-49% bilateral carotid,50% stenosis right subclavian    Hyperlipidemia     Hypertension     Macular degeneration     right    Mild cognitive impairment 02/2012    MMSE 26/30    MVP (mitral valve prolapse)     trace on echo 2014    Osteoporosis 05/2012    Pancytopenia 05/2011    mild with ; Dr Gumaro hawk 2010    Peptic ulcer disease     Peripheral vascular disease ([facility] [address] 01/2016    angio; dis below ankles; pletal    Prediabetes 2006    Recurrent ventral incisional hernia 2013    medial apex of GB scar    S/P CABG x 3 2003    3-vessel; Dr Schumacher Epp 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 Manjinder Boyle; Right side       Past Surgical History:        Procedure Laterality Date    CATARACT REMOVAL Right 2010    poor result ; blind right eye; [place]    CHOLECYSTECTOMY      CORONARY ARTERY BYPASS GRAFT  8/2009    3 vessel    SHOULDER ARTHROSCOPY Right 2003    TUBAL LIGATION      URETER SURGERY      Right ureteral repair       Medications:   Scheduled Meds:   [Held by provider] aspirin  81 mg Oral Daily    divalproex  250 mg Oral Nightly    fluticasone  1 spray Each Nostril Daily    gabapentin  300 mg Oral TID    ketotifen  1 drop Both Eyes BID    ocuvite-lutein  1 tablet Oral Daily    pyridostigmine  30 mg Oral 3 times per day    verapamil  90 mg Oral BID    sertraline  25 mg Oral Daily    rosuvastatin  10 mg Oral Daily    sodium chloride flush  5-40 mL IntraVENous 2 times per day    pantoprazole  40 mg IntraVENous Q12H    And    sodium chloride (PF)  10 mL IntraVENous Q12H    sodium chloride  1 g Oral BID WC    cyanocobalamin  1,000 mcg SubCUTAneous Q30 Days     Continuous Infusions:   sodium chloride      sodium chloride 75 mL/hr at 02/01/22 0931     PRN Meds:.sodium chloride flush, sodium chloride, ondansetron **OR** ondansetron, acetaminophen **OR** acetaminophen, potassium chloride **OR** potassium alternative oral replacement **OR** potassium chloride    Allergies:  Alendronate sodium, Bactrim [sulfamethoxazole-trimethoprim], Boniva [ibandronate sodium], Colesevelam, Lisinopril, Macrobid [nitrofurantoin], Neggram [nalidixic acid], Pce [erythromycin], Penicillins, Risperidone and related, Welchol [colesevelam hcl], Carbamazepine, Lovaza [omega-3-acid ethyl esters], Metoprolol, and Pyridium [phenazopyridine]    Social History:   TOBACCO:   reports that she quit smoking about 36 years ago. Her smoking use included cigarettes. She started smoking about 46 years ago. She has a 2.25 pack-year smoking history. She has never used smokeless tobacco.  ETOH:   reports no history of alcohol use. OCCUPATION:      Family History:       Problem Relation Age of Onset    High Blood Pressure Mother     Heart Attack Mother     Other Mother         unsteady gait    Other Father         silcosis    Cancer Sister 79        Breast cancer 1/2017    Stroke Sister        REVIEW OF SYSTEMS:  Negative except for weak anxious tired. Physical Exam:    I/O: No intake/output data recorded. Vitals: BP (!) 168/57   Pulse 68   Temp 98.3 °F (36.8 °C) (Oral)   Resp 17   SpO2 97%   General appearance: awake weak  HEENT: Head: Normal, normocephalic, atraumatic. Neck: supple, symmetrical, trachea midline  Lungs: diminished breath sounds bilaterally  Heart: S1, S2   Abdomen: abnormal findings:  soft NT  Extremities: edema trace  Neurologic: Mental status: alertness: alert      CBC:   Recent Labs     01/31/22  1751 33/12/80  7003   WBC DUPLICATE ORDER  8.1 5.7   HGB DUPLICATE ORDER  07.7* 17.2*   PLT DUPLICATE ORDER  051 065     BMP:    Recent Labs     01/31/22  1751 02/01/22  0610   * 123*   K 3.0* 4.5   CL 94* 87*   CO2 18* 22   BUN 6 7   CREATININE 0.6 0.7   GLUCOSE 98 130*     Hepatic:   Recent Labs     01/31/22  1751   AST 22   ALT 11   BILITOT 0.3   ALKPHOS 41     Troponin: No results for input(s): TROPONINI in the last 72 hours. BNP: No results for input(s): BNP in the last 72 hours. Lipids: No results for input(s): CHOL, HDL in the last 72 hours.     Invalid input(s): LDLCALCU  ABGs: No results found for: PHART, PO2ART, DLX5TAU  INR:   Recent Labs     02/01/22  0610   INR 0.95     Renal Labs  Albumin:    Lab Results   Component Value Date    LABALBU 3.1 01/31/2022     Calcium:    Lab Results   Component Value Date    CALCIUM 8.7 02/01/2022     Phosphorus:    Lab Results   Component Value Date PHOS 2.0 11/12/2021     U/A:    Lab Results   Component Value Date    NITRU NEGATIVE 01/31/2022    NITRU POSITIVE 02/17/2020    COLORU YELLOW 01/31/2022    PHUR 5.5 04/29/2021    PHUR 5.5 02/17/2020    LABCAST >40 Hyaline 10/13/2012    WBCUA <1 01/31/2022    RBCUA <1 01/31/2022    MUCUS RARE 12/11/2021    TRICHOMONAS NONE SEEN 01/31/2022    YEAST Present 08/05/2019    BACTERIA NEGATIVE 01/31/2022    CLARITYU CLEAR 01/31/2022    SPECGRAV 1.015 01/31/2022    UROBILINOGEN NORMAL 01/31/2022    BILIRUBINUR NEGATIVE 01/31/2022    BILIRUBINUR neg 04/29/2021    BLOODU NEGATIVE 01/31/2022    GLUCOSEU neg 04/29/2021    GLUCOSEU Negative 02/17/2020    KETUA NEGATIVE 01/31/2022    AMORPHOUS 2+ 10/13/2012     ABG:  No results found for: PHART, QPV1IBK, PO2ART, XLA1WFO, BEART, THGBART, HOP4CRY, X2FFAAIZ  HgBA1c:    Lab Results   Component Value Date    LABA1C 5.8 08/31/2021     Microalbumen/Creatinine ratio:  No components found for: RUCREAT  TSH:    Lab Results   Component Value Date    TSH 3.250 03/06/2019     IRON:    Lab Results   Component Value Date    IRON 70 02/01/2022     Iron Saturation:  No components found for: PERCENTFE  TIBC:    Lab Results   Component Value Date    TIBC 263 02/01/2022     FERRITIN:    Lab Results   Component Value Date    FERRITIN 939 01/31/2022     RPR:  No results found for: RPR  LALA:  No results found for: ANATITER, LALA  24 Hour Urine for Creatinine Clearance:  No components found for: CREAT4, UHRS10, UTV10  -----------------------------------------------------------------      Assessment and Recommendations     Patient Active Problem List   Diagnosis Code    Chronic depression F32. A    Hyperlipidemia E78.5    Trigeminal neuralgia G50.0    Anemia due to chronic illness D63.8    Colon cancer screening Z12.11    Osteoporosis M81.0    Supraventricular tachycardia (HCC) I47.1    Recurrent ventral incisional hernia K43.2    Mild cognitive impairment G31.84    CAD (coronary artery disease) I25.10    Elevated TSH R79.89    PAD (peripheral artery disease) ([facility]) I73.9    Sciatica of left side without back pain M54.32    Coronary artery disease involving coronary bypass graft of native heart without angina pectoris I25.810    S/P CABG x 3 Z95.1    Essential hypertension I10    MVP (mitral valve prolapse) I34.1    Bilateral carotid artery stenosis I65.23    Subclavian arterial stenosis ([facility]) I77.1    Spigelian hernia K43.9    Ischemic cardiomyopathy I25.5    Glenohumeral arthritis, right M19.011    Normocytic anemia D64.9    Overactive bladder N32.81    Major depressive disorder, recurrent, in full remission ([facility]) F33.42    Asthmatic bronchitis J45.909    Age-related osteoporosis without current pathological fracture M81.0    Palpitations R00.2    SOB (shortness of breath) R06.02    Prediabetes R73.03    Generalized weakness R53.1    Hyponatremia E87.1    Gait disturbance R26.9    Macular degeneration of both eyes H35.30    History of DVT (deep vein thrombosis) Z86.718    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, 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    Gastroenteritis K52.9     Impression plan  #1 hyponatremia  #2 colitis with possible abdominal issues  #3 anxiety with bipolar disorder  #4 weakness    Plan   #1 maintain sodium tablets and restrict water, maintain normal saline IV fluids will follow slowly correct sodium as able  #2 follow-up GI work-up maintain antibiotics  #3 maintain meds for mental health likely has infection low sodium will monitor for now, follow-up psychiatry as an outpatient  #4 monitor energy and affect for now supportive care family in the room  We will follow  Electronically signed by Terra Lewis MD on 2/1/2022 at 5:10 PM

## 2022-02-01 NOTE — PROGRESS NOTES
Hospitalist Progress Note  Aleks Carey MD      Name:  Bernie Calvillo /Age/Sex: 1935  (82 y.o. female)   MRN & CSN:  [id number] & [id number] Admission Date/Time: 2022  4:01 PM   Location:  [place] [address] PCP: Donnell Dyer MD         Hospital Day: 2    Assessment and Plan:   Bernie Calvillo is a 82 y.o.  female  who presents with gastroenteritis/colitis     Gastroenteritis/colitis. Continue to have diarrhea. Continue IV Flagyl and Cipro. Continue antiemetics. Appreciate GI recommendations. Plan noted for medical management with no colonoscopy at this time     Electrolyte abnormalities. Replete as needed.  Persistent hyponatremia. Continue IV fluids. Consult nephrology.  There is no height or weight on file to calculate BMI. Diet ADULT DIET; Regular   DVT Prophylaxis Lovenox    Code Status Full Code   Disposition To home in 2-3 days     Subjective:     Patient continued to have some diarrhea. Abdominal pain much better    Ten point ROS reviewed negative, unless as noted above    Objective:     No intake or output data in the 24 hours ending 22 1236     Vitals: BP (!) 132/46   Pulse 72   Temp 98.3 °F (36.8 °C) (Oral)   Resp 15   SpO2 97%     Physical Exam:     GEN No acute distress. HEENT moist mucous membranes, no icterus  RESP CTA B  CVS:   RRR  GI/ S/NT/ND BS+   MSK No gross joint deformities. SKIN Normal coloration, warm, dry. NEURO no focal deficit  PSYCH Awake, alert, oriented x3.     Recent Results (from the past 24 hour(s))   EKG 12 Lead    Collection Time: 22  4:36 PM   Result Value Ref Range    Ventricular Rate 74 BPM    Atrial Rate 74 BPM    P-R Interval 164 ms    QRS Duration 92 ms    Q-T Interval 430 ms    QTc Calculation (Bazett) 477 ms    P Axis 44 degrees    R Axis -3 degrees    T Axis 2 degrees    Diagnosis       Normal sinus rhythm  Nonspecific ST abnormality  Abnormal ECG  When compared with ECG of 10-NOV-2021 08:46,  premature ventricular complexes are no longer present     COVID-19, Rapid    Collection Time: 01/31/22  4:45 PM    Specimen: Nasopharyngeal   Result Value Ref Range    Source THROAT     SARS-CoV-2, NAAT NOT DETECTED NOT DETECTED   Urinalysis, reflex to microscopic    Collection Time: 01/31/22  5:40 PM   Result Value Ref Range    Color, UA YELLOW YELLOW    Clarity, UA CLEAR CLEAR    Glucose, Urine NEGATIVE NEGATIVE MG/DL    Bilirubin Urine NEGATIVE NEGATIVE MG/DL    Ketones, Urine NEGATIVE NEGATIVE MG/DL    Specific Gravity, UA 1.015 1.001 - 1.035    Blood, Urine NEGATIVE NEGATIVE    pH, Urine 8.0 5.0 - 8.0    Protein, UA NEGATIVE NEGATIVE MG/DL    Urobilinogen, Urine NORMAL 0.2 - 1.0 MG/DL    Nitrite Urine, Quantitative NEGATIVE NEGATIVE    Leukocyte Esterase, Urine NEGATIVE NEGATIVE    RBC, UA <1 0 - 6 /HPF    WBC, UA <1 0 - 5 /HPF    Bacteria, UA NEGATIVE NEGATIVE /HPF    Squam Epithel, UA <1 /HPF    Trichomonas, UA NONE SEEN NONE SEEN /HPF   CBC Auto Differential    Collection Time: 01/31/22  5:51 PM   Result Value Ref Range    WBC 8.1 4.0 - 10.5 K/CU MM    RBC 3.89 (L) 4.2 - 5.4 M/CU MM    Hemoglobin 12.2 (L) 12.5 - 16.0 GM/DL    Hematocrit 37.3 37 - 47 %    MCV 95.9 78 - 100 FL    MCH 31.4 (H) 27 - 31 PG    MCHC 32.7 32.0 - 36.0 %    RDW 14.6 11.7 - 14.9 %    Platelets 048 143 - 627 K/CU MM    MPV 9.3 7.5 - 11.1 FL    Differential Type AUTOMATED DIFFERENTIAL     Segs Relative 55.6 36 - 66 %    Lymphocytes % 30.7 24 - 44 %    Monocytes % 11.7 (H) 0 - 4 %    Eosinophils % 0.9 0 - 3 %    Basophils % 0.5 0 - 1 %    Segs Absolute 4.5 K/CU MM    Lymphocytes Absolute 2.5 K/CU MM    Monocytes Absolute 1.0 K/CU MM    Eosinophils Absolute 0.1 K/CU MM    Basophils Absolute 0.0 K/CU MM    Nucleated RBC % 0.0 %    Total Nucleated RBC 0.0 K/CU MM    Total Immature Neutrophil 0.05 K/CU MM    Immature Neutrophil % 0.6 (H) 0 - 0.43 %   Comprehensive Metabolic Panel w/ Reflex to MG    Collection Time: 01/31/22  5:51 PM   Result Value Ref Range    Sodium 126 (L) 135 - 145 MMOL/L    Potassium 3.0 (LL) 3.5 - 5.1 MMOL/L    Chloride 94 (L) 99 - 110 mMol/L    CO2 18 (L) 21 - 32 MMOL/L    BUN 6 6 - 23 MG/DL    CREATININE 0.6 0.6 - 1.1 MG/DL    Glucose 98 70 - 99 MG/DL    Calcium 7.1 (L) 8.3 - 10.6 MG/DL    Albumin 3.1 (L) 3.4 - 5.0 GM/DL    Total Protein 5.5 (L) 6.4 - 8.2 GM/DL    Total Bilirubin 0.3 0.0 - 1.0 MG/DL    ALT 11 10 - 40 U/L    AST 22 15 - 37 IU/L    Alkaline Phosphatase 41 40 - 129 IU/L    GFR Non-African American >60 >60 mL/min/1.73m2    GFR African American >60 >60 mL/min/1.73m2    Anion Gap 14 4 - 16   Troponin    Collection Time: 01/31/22  5:51 PM   Result Value Ref Range    Troponin T <0.010 <0.01 NG/ML   Lipase    Collection Time: 01/31/22  5:51 PM   Result Value Ref Range    Lipase 12 (L) 13 - 60 IU/L   Lactic Acid, Plasma    Collection Time: 01/31/22  5:51 PM   Result Value Ref Range    Lactate 2.7 (HH) 0.4 - 2.0 mMOL/L   Magnesium    Collection Time: 01/31/22  5:51 PM   Result Value Ref Range    Magnesium 1.2 (L) 1.8 - 2.4 mg/dl   ANEMIA PANEL    Collection Time: 01/31/22  5:51 PM   Result Value Ref Range    WBC DUPLICATE ORDER 4.0 - 33.9 K/CU MM    RBC DUPLICATE ORDER 4.2 - 5.4 M/CU MM    Hemoglobin DUPLICATE ORDER 16.3 - 56.7 GM/DL    Hematocrit DUPLICATE ORDER 37 - 47 %    MCV DUPLICATE ORDER 78 - 063 FL    MCH DUPLICATE ORDER 27 - 31 PG    MCHC DUPLICATE ORDER 39.6 - 01.2 %    RDW DUPLICATE ORDER 91.0 - 38.7 %    Platelets DUPLICATE ORDER 630 - 210 K/CU MM    MPV DUPLICATE ORDER 7.5 - 79.2 FL    Differential Type DUPLICATE ORDER     Segs Relative DUPLICATE ORDER 36 - 66 %    Vitamin B-12 715.6 211 - 911 pg/ml    Folate 19.1 (H) 3.1 - 17.5 NG/ML    Iron 53 37 - 145 ug/dL    UIBC 164 110 - 370 ug/dL    TIBC 217 (L) 250 - 450 ug/dL    Transferrin % 24 10 - 44 %    Retic Ct Pct 3.2 (H) 0.2 - 2.20 %    Ferritin 939 (H) 15 - 150 NG/ML   TYPE AND SCREEN    Collection Time: 01/31/22  7:00 PM   Result Value Ref Range    ABO/Rh O POSITIVE     Antibody Screen NEGATIVE    Lactic Acid, Plasma    Collection Time: 01/31/22  9:04 PM   Result Value Ref Range    Lactate 2.1 (HH) 0.4 - 2.0 mMOL/L   CBC    Collection Time: 02/01/22  6:10 AM   Result Value Ref Range    WBC 5.7 4.0 - 10.5 K/CU MM    RBC 3.82 (L) 4.2 - 5.4 M/CU MM    Hemoglobin 12.1 (L) 12.5 - 16.0 GM/DL    Hematocrit 36.2 (L) 37 - 47 %    MCV 94.8 78 - 100 FL    MCH 31.7 (H) 27 - 31 PG    MCHC 33.4 32.0 - 36.0 %    RDW 14.7 11.7 - 14.9 %    Platelets 543 744 - 281 K/CU MM    MPV 9.0 7.5 - 11.1 FL   Protime-INR    Collection Time: 02/01/22  6:10 AM   Result Value Ref Range    Protime 12.3 11.7 - 14.5 SECONDS    INR 0.95 INDEX   APTT    Collection Time: 02/01/22  6:10 AM   Result Value Ref Range    aPTT 28.0 25.1 - 37.1 SECONDS   Iron and TIBC    Collection Time: 02/01/22  6:10 AM   Result Value Ref Range    Iron 70 37 - 145 ug/dL    UIBC 193 110 - 370 ug/dL    TIBC 263 250 - 450 ug/dL    Transferrin % 27 10 - 44 %   Basic Metabolic Panel w/ Reflex to MG    Collection Time: 02/01/22  6:10 AM   Result Value Ref Range    Sodium 123 (L) 135 - 145 MMOL/L    Potassium 4.5 3.5 - 5.1 MMOL/L    Chloride 87 (L) 99 - 110 mMol/L    CO2 22 21 - 32 MMOL/L    Anion Gap 14 4 - 16    BUN 7 6 - 23 MG/DL    CREATININE 0.7 0.6 - 1.1 MG/DL    Glucose 130 (H) 70 - 99 MG/DL    Calcium 8.7 8.3 - 10.6 MG/DL    GFR Non-African American >60 >60 mL/min/1.73m2    GFR African American >60 >60 mL/min/1.73m2       Medications:   Medications:    [Held by provider] aspirin  81 mg Oral Daily    divalproex  250 mg Oral Nightly    fluticasone  1 spray Each Nostril Daily    gabapentin  300 mg Oral TID    ketotifen  1 drop Both Eyes BID    ocuvite-lutein  1 tablet Oral Daily    pyridostigmine  30 mg Oral 3 times per day    verapamil  90 mg Oral BID    sertraline  25 mg Oral Daily    rosuvastatin  10 mg Oral Daily    sodium chloride flush  5-40 mL IntraVENous 2 times per day    pantoprazole  40 mg IntraVENous Q12H    And    sodium chloride (PF)  10 mL IntraVENous Q12H    cyanocobalamin  1,000 mcg SubCUTAneous Q30 Days      Infusions:    sodium chloride      sodium chloride 75 mL/hr at 02/01/22 0931     PRN Meds: sodium chloride flush, 5-40 mL, PRN  sodium chloride, 25 mL, PRN  ondansetron, 4 mg, Q8H PRN   Or  ondansetron, 4 mg, Q6H PRN  acetaminophen, 650 mg, Q6H PRN   Or  acetaminophen, 650 mg, Q6H PRN  potassium chloride, 40 mEq, PRN   Or  potassium alternative oral replacement, 40 mEq, PRN   Or  potassium chloride, 10 mEq, PRN          Electronically signed by Casey Figueroa MD, MD on 2/1/2022 at 12:36 PM

## 2022-02-01 NOTE — ED NOTES
Pt's morning lab draw indicates pt's potassium is 4.5. Stopping IV potassium.      Beulah Ormond, RN  02/01/22 8002

## 2022-02-02 LAB
ALBUMIN SERPL-MCNC: 3.5 GM/DL (ref 3.4–5)
AMYLASE: 41 U/L (ref 25–115)
ANION GAP SERPL CALCULATED.3IONS-SCNC: 13 MMOL/L (ref 4–16)
BACTERIA: NEGATIVE /HPF
BILIRUBIN URINE: NEGATIVE MG/DL
BLOOD, URINE: NEGATIVE
BUN BLDV-MCNC: 15 MG/DL (ref 6–23)
CALCIUM SERPL-MCNC: 8.7 MG/DL (ref 8.3–10.6)
CHLORIDE BLD-SCNC: 98 MMOL/L (ref 99–110)
CHLORIDE URINE RANDOM: 81 MMOL/L (ref 43–210)
CLARITY: CLEAR
CO2: 21 MMOL/L (ref 21–32)
COLOR: YELLOW
CREAT SERPL-MCNC: 0.9 MG/DL (ref 0.6–1.1)
CREATININE URINE: 58.1 MG/DL (ref 28–217)
GFR AFRICAN AMERICAN: >60 ML/MIN/1.73M2
GFR NON-AFRICAN AMERICAN: 60 ML/MIN/1.73M2
GLUCOSE BLD-MCNC: 109 MG/DL (ref 70–99)
GLUCOSE, URINE: NEGATIVE MG/DL
HCT VFR BLD CALC: 35.3 % (ref 37–47)
HEMOGLOBIN: 11.2 GM/DL (ref 12.5–16)
KETONES, URINE: NEGATIVE MG/DL
LEUKOCYTE ESTERASE, URINE: NEGATIVE
LIPASE: 16 IU/L (ref 13–60)
MCH RBC QN AUTO: 31.5 PG (ref 27–31)
MCHC RBC AUTO-ENTMCNC: 31.7 % (ref 32–36)
MCV RBC AUTO: 99.4 FL (ref 78–100)
MUCUS: ABNORMAL HPF
NITRITE URINE, QUANTITATIVE: NEGATIVE
PDW BLD-RTO: 15.1 % (ref 11.7–14.9)
PH, URINE: 6 (ref 5–8)
PHOSPHORUS: 3 MG/DL (ref 2.5–4.9)
PLATELET # BLD: 147 K/CU MM (ref 140–440)
PMV BLD AUTO: 8.9 FL (ref 7.5–11.1)
POTASSIUM SERPL-SCNC: 4 MMOL/L (ref 3.5–5.1)
POTASSIUM, UR: 15.2 MMOL/L (ref 22–119)
PROT/CREAT RATIO, UR: 0.1
PROTEIN UA: NEGATIVE MG/DL
RBC # BLD: 3.55 M/CU MM (ref 4.2–5.4)
RBC URINE: ABNORMAL /HPF (ref 0–6)
SODIUM BLD-SCNC: 132 MMOL/L (ref 135–145)
SODIUM URINE: 74 MMOL/L (ref 35–167)
SPECIFIC GRAVITY UA: 1.01 (ref 1–1.03)
URINE TOTAL PROTEIN: 7.1 MG/DL
UROBILINOGEN, URINE: NORMAL MG/DL (ref 0.2–1)
WBC # BLD: 6.1 K/CU MM (ref 4–10.5)
WBC UA: ABNORMAL /HPF (ref 0–5)

## 2022-02-02 PROCEDURE — 2580000003 HC RX 258: Performed by: INTERNAL MEDICINE

## 2022-02-02 PROCEDURE — 82570 ASSAY OF URINE CREATININE: CPT

## 2022-02-02 PROCEDURE — 83690 ASSAY OF LIPASE: CPT

## 2022-02-02 PROCEDURE — 80069 RENAL FUNCTION PANEL: CPT

## 2022-02-02 PROCEDURE — 36415 COLL VENOUS BLD VENIPUNCTURE: CPT

## 2022-02-02 PROCEDURE — 82150 ASSAY OF AMYLASE: CPT

## 2022-02-02 PROCEDURE — C9113 INJ PANTOPRAZOLE SODIUM, VIA: HCPCS | Performed by: INTERNAL MEDICINE

## 2022-02-02 PROCEDURE — G0328 FECAL BLOOD SCRN IMMUNOASSAY: HCPCS

## 2022-02-02 PROCEDURE — 6370000000 HC RX 637 (ALT 250 FOR IP): Performed by: INTERNAL MEDICINE

## 2022-02-02 PROCEDURE — 87449 NOS EACH ORGANISM AG IA: CPT

## 2022-02-02 PROCEDURE — 1200000000 HC SEMI PRIVATE

## 2022-02-02 PROCEDURE — 84300 ASSAY OF URINE SODIUM: CPT

## 2022-02-02 PROCEDURE — 6360000002 HC RX W HCPCS: Performed by: INTERNAL MEDICINE

## 2022-02-02 PROCEDURE — 80053 COMPREHEN METABOLIC PANEL: CPT

## 2022-02-02 PROCEDURE — 84156 ASSAY OF PROTEIN URINE: CPT

## 2022-02-02 PROCEDURE — 87324 CLOSTRIDIUM AG IA: CPT

## 2022-02-02 PROCEDURE — 82436 ASSAY OF URINE CHLORIDE: CPT

## 2022-02-02 PROCEDURE — 84133 ASSAY OF URINE POTASSIUM: CPT

## 2022-02-02 PROCEDURE — 81001 URINALYSIS AUTO W/SCOPE: CPT

## 2022-02-02 PROCEDURE — 85027 COMPLETE CBC AUTOMATED: CPT

## 2022-02-02 RX ADMIN — GABAPENTIN 300 MG: 300 CAPSULE ORAL at 08:13

## 2022-02-02 RX ADMIN — KETOTIFEN FUMARATE 1 DROP: 0.35 SOLUTION/ DROPS OPHTHALMIC at 23:05

## 2022-02-02 RX ADMIN — VERAPAMIL HYDROCHLORIDE 90 MG: 180 TABLET, FILM COATED, EXTENDED RELEASE ORAL at 08:40

## 2022-02-02 RX ADMIN — PYRIDOSTIGMINE BROMIDE 30 MG: 60 TABLET ORAL at 23:05

## 2022-02-02 RX ADMIN — DIVALPROEX SODIUM 250 MG: 250 TABLET, FILM COATED, EXTENDED RELEASE ORAL at 23:04

## 2022-02-02 RX ADMIN — GABAPENTIN 300 MG: 300 CAPSULE ORAL at 13:58

## 2022-02-02 RX ADMIN — PANTOPRAZOLE SODIUM 40 MG: 40 INJECTION, POWDER, FOR SOLUTION INTRAVENOUS at 08:13

## 2022-02-02 RX ADMIN — SODIUM CHLORIDE, PRESERVATIVE FREE 10 ML: 5 INJECTION INTRAVENOUS at 23:21

## 2022-02-02 RX ADMIN — SODIUM CHLORIDE, PRESERVATIVE FREE 10 ML: 5 INJECTION INTRAVENOUS at 08:27

## 2022-02-02 RX ADMIN — Medication 1 G: at 08:13

## 2022-02-02 RX ADMIN — ROSUVASTATIN CALCIUM 10 MG: 5 TABLET, FILM COATED ORAL at 08:13

## 2022-02-02 RX ADMIN — PYRIDOSTIGMINE BROMIDE 30 MG: 60 TABLET ORAL at 08:16

## 2022-02-02 RX ADMIN — GABAPENTIN 300 MG: 300 CAPSULE ORAL at 23:05

## 2022-02-02 RX ADMIN — VERAPAMIL HYDROCHLORIDE 90 MG: 180 TABLET, FILM COATED, EXTENDED RELEASE ORAL at 23:05

## 2022-02-02 RX ADMIN — KETOTIFEN FUMARATE 1 DROP: 0.35 SOLUTION/ DROPS OPHTHALMIC at 08:15

## 2022-02-02 RX ADMIN — Medication 1 TABLET: at 10:17

## 2022-02-02 RX ADMIN — SERTRALINE HYDROCHLORIDE 25 MG: 25 TABLET ORAL at 08:13

## 2022-02-02 RX ADMIN — PANTOPRAZOLE SODIUM 40 MG: 40 INJECTION, POWDER, FOR SOLUTION INTRAVENOUS at 23:05

## 2022-02-02 RX ADMIN — FLUTICASONE PROPIONATE 1 SPRAY: 50 SPRAY, METERED NASAL at 08:16

## 2022-02-02 RX ADMIN — PYRIDOSTIGMINE BROMIDE 30 MG: 60 TABLET ORAL at 13:58

## 2022-02-02 NOTE — PROGRESS NOTES
Hospitalist Progress Note  Jens Pryor MD      Name:  Raul Zaldivar /Age/Sex: 1935  (82 y.o. female)   MRN & CSN:  [id number] & [id number] Admission Date/Time: 2022  4:01 PM   Location:  [address] [place] [address] PCP: Sergei Moise [address] Day: 3    Assessment and Plan:   Raul Zaldivar is a 82 y.o.  female  who presents with gastroenteritis/colitis     Viral gastroenteritis/colitis. Diarrhea has continued to improve. Continue IV Flagyl and Cipro. Continue symptomatic treatment. Appreciate GI recommendations. Stool studies are still pending for definitive diagnosis. We will treat as gastroenteritis for now.  Electrolyte abnormalities. Replete as needed.  Persistent hyponatremia. Resolved. Continue IV fluid on salt tablets. Appreciate nephrology recommendations    Body mass index is 24.13 kg/m². Diet ADULT DIET; Regular; 1800 ml   DVT Prophylaxis Lovenox    Code Status Full Code   Disposition To home in 2-3 days     Subjective: The patient said her diarrhea has improved. So far she has 2 bowel movements with no blood in it. Ten point ROS reviewed negative, unless as noted above    Objective: Intake/Output Summary (Last 24 hours) at 2022 1244  Last data filed at 2022 1004  Gross per 24 hour   Intake 240 ml   Output --   Net 240 ml        Vitals: BP (!) 157/65   Pulse 64   Temp 98.6 °F (37 °C) (Oral)   Resp 18   Wt 136 lb 3.9 oz (61.8 kg)   SpO2 95%   BMI 24.13 kg/m²     Physical Exam:     GEN No acute distress. HEENT moist mucous membranes, no icterus  RESP CTA B  CVS:   RRR  GI/ S/NT/ND BS+   MSK No gross joint deformities. SKIN Normal coloration, warm, dry. NEURO no focal deficit  PSYCH Awake, alert, oriented x3.     Recent Results (from the past 24 hour(s))   CBC    Collection Time: 22 11:07 AM   Result Value Ref Range    WBC 6.1 4.0 - 10.5 K/CU MM    RBC 3.55 (L) 4.2 - 5.4 M/CU MM    Hemoglobin 11.2 (L) 12.5 - 16.0 GM/DL    Hematocrit 35.3 PRN          Electronically signed by Patricia Nixon MD, MD on 2/2/2022 at 12:44 PM

## 2022-02-02 NOTE — PROGRESS NOTES
Nephrology Progress Note  2/2/2022 3:46 PM  Subjective: Interval History: Dina Velásquez is a 82 y.o. female with week doing okay no acute distress        Data:   Scheduled Meds:   [Held by provider] aspirin  81 mg Oral Daily    divalproex  250 mg Oral Nightly    fluticasone  1 spray Each Nostril Daily    gabapentin  300 mg Oral TID    ketotifen  1 drop Both Eyes BID    ocuvite-lutein  1 tablet Oral Daily    pyridostigmine  30 mg Oral 3 times per day    verapamil  90 mg Oral BID    sertraline  25 mg Oral Daily    rosuvastatin  10 mg Oral Daily    sodium chloride flush  5-40 mL IntraVENous 2 times per day    pantoprazole  40 mg IntraVENous Q12H    And    sodium chloride (PF)  10 mL IntraVENous Q12H    sodium chloride  1 g Oral BID WC     Continuous Infusions:   sodium chloride      sodium chloride 75 mL/hr at 02/01/22 0931         CBC   Recent Labs     01/31/22  1751 02/01/22  0610 71/74/51  5617   WBC DUPLICATE ORDER  8.1 5.7 6.1   HGB DUPLICATE ORDER  39.3* 50.9* 48.2*   HCT DUPLICATE ORDER  80.7 33.0* 43.2*   PLT DUPLICATE ORDER  954 811 147      BMP   Recent Labs     01/31/22  1751 02/01/22  0610 02/02/22  1107   * 123* 132*   K 3.0* 4.5 4.0   CL 94* 87* 98*   CO2 18* 22 21   PHOS  --   --  3.0   BUN 6 7 15   CREATININE 0.6 0.7 0.9     Hepatic:   Recent Labs     01/31/22  1751   AST 22   ALT 11   BILITOT 0.3   ALKPHOS 41     Troponin: No results for input(s): TROPONINI in the last 72 hours. BNP: No results for input(s): BNP in the last 72 hours. Lipids: No results for input(s): CHOL, HDL in the last 72 hours.     Invalid input(s): LDLCALCU  ABGs: No results found for: PHART, PO2ART, AMV5LXU  INR:   Recent Labs     02/01/22  0610   INR 0.95     Renal Labs  Albumin:    Lab Results   Component Value Date    LABALBU 3.5 02/02/2022     Calcium:    Lab Results   Component Value Date    CALCIUM 8.7 02/02/2022     Phosphorus:    Lab Results   Component Value Date    PHOS 3.0 02/02/2022 U/A:    Lab Results   Component Value Date    NITRU NEGATIVE 01/31/2022    NITRU POSITIVE 02/17/2020    COLORU YELLOW 01/31/2022    PHUR 5.5 04/29/2021    PHUR 5.5 02/17/2020    LABCAST >40 Hyaline 10/13/2012    WBCUA <1 01/31/2022    RBCUA <1 01/31/2022    MUCUS RARE 12/11/2021    TRICHOMONAS NONE SEEN 01/31/2022    YEAST Present 08/05/2019    BACTERIA NEGATIVE 01/31/2022    CLARITYU CLEAR 01/31/2022    SPECGRAV 1.015 01/31/2022    UROBILINOGEN NORMAL 01/31/2022    BILIRUBINUR NEGATIVE 01/31/2022    BILIRUBINUR neg 04/29/2021    BLOODU NEGATIVE 01/31/2022    GLUCOSEU neg 04/29/2021    GLUCOSEU Negative 02/17/2020    KETUA NEGATIVE 01/31/2022    AMORPHOUS 2+ 10/13/2012     ABG:  No results found for: PHART, WEF5QOL, PO2ART, DNP4RGB, BEART, THGBART, GSC9GUI, D9ZWUYKA  HgBA1c:    Lab Results   Component Value Date    LABA1C 5.8 08/31/2021     Microalbumen/Creatinine ratio:  No components found for: RUCREAT  TSH:    Lab Results   Component Value Date    TSH 3.250 03/06/2019     IRON:    Lab Results   Component Value Date    IRON 70 02/01/2022     Iron Saturation:  No components found for: PERCENTFE  TIBC:    Lab Results   Component Value Date    TIBC 263 02/01/2022     FERRITIN:    Lab Results   Component Value Date    FERRITIN 939 01/31/2022     RPR:  No results found for: RPR  LALA:  No results found for: ANATITER, LALA  24 Hour Urine for Creatinine Clearance:  No components found for: CREAT4, UHRS10, UTV10      Objective:   I/O: No intake/output data recorded. No intake/output data recorded. I/O this shift:  In: 240 [P.O.:240]  Out: -   Vitals: /70   Pulse 67   Temp 98.3 °F (36.8 °C) (Oral)   Resp 18   Wt 136 lb 3.9 oz (61.8 kg)   SpO2 97%   BMI 24.13 kg/m²  {  General appearance: awake weak  HEENT: Head: Normal, normocephalic, atraumatic.   Neck: supple, symmetrical, trachea midline  Lungs: diminished breath sounds bilaterally  Heart: S1, S2 normal  Abdomen: abnormal findings:  soft nt  Extremities: edema trace  Neurologic: Mental status: alertness: alert        Assessment and Plan:      IMP:  #1 hyponatremia  #2 colitis with possible abdominal issues  #3 anxiety with bipolar disorder  #4 weakness    Plan     #1 sodium low stable at 132 will follow  #2 treat for colitis and above setting  #3 affect improved will monitor  #4 monitor energy will follow  Overall improving can work on discharge planning from renal standpoint stop sodium tablets for now is drinking fluids           Gaye Rider MD, MD

## 2022-02-02 NOTE — CARE COORDINATION
Intern entered pt room to initiate d/c planning. Pt from home alone. Drives. Pt grandson helps sometimes. Described using cane at baseline. Also has walker. Pt would like to go home at d/c if possible. Pt was at [facility] recently and had [address] (was d/c from  on Jan 18, per call w/ [address]) and is agreeable to [address] again if needed. Pt has PCP and insurance per chart. Case mgmt will continue to follow.

## 2022-02-02 NOTE — PROGRESS NOTES
DOING BETTER HAD 4-5 BMS YESTERDAY LIQUID WITH NO BLOOD 2 SO FAR TODAY NO VOMITING WANTS TO GO HOME TOMORROW  VITALS STABLE   LABS NOTED STOOL STUDIES PENDING  WILL CPM F/U STOOL STUDIES

## 2022-02-02 NOTE — PROGRESS NOTES
Physician Progress Note      Esequiel Caruso  CSN #:                  [id number]  :                       1935  ADMIT DATE:       2022 4:01 PM  DISCH DATE:  RESPONDING  PROVIDER #:        Tyler DUENAS          QUERY TEXT:    Pt admitted with Gastroenteritis/colitis and has colitis documented. If   possible, please document the type of colitis in the medical record. The medical record reflects the following:    Risk Factors: Gastroenteritis/colitis, Gastritis, On home flagyl and cipro,   Diverticulosis HX diverticulitis  Clinical Indicators: diarrhea, Abdominal pain \"with intractable abdominal   pain, nausea, vomiting, loose BM, and  abnormal CAT scan,\" documented in the 1consult , \"epigastric abdominal pain   that been going on since  evening, she states now associated with nausea   and vomiting that happened twice, nonbloody nonbilious. Patient also has had   loose stools for over a week. Patient was taking Cipro Flagyl, states has a   couple days left. \" in the H&P  Treatment:  IV Flagyl, IV Cipro , GI consult, Protonix    Thank you Anabelle Walker RN, CDS ([phone])  Options provided:  -- Bacterial Colitis  -- Acute Ischemic Colitis  -- Chronic Ischemic Colitis  -- Ulcerative Colitis  -- Other - I will add my own diagnosis  -- Disagree - Not applicable / Not valid  -- Disagree - Clinically unable to determine / Unknown  -- Refer to Clinical Documentation Reviewer    PROVIDER RESPONSE TEXT:    Viral gastroenteritis    Query created by: Rah Justin on 2022 8:25 AM      Electronically signed by:  Raquel Choi 2022 11:45 AM

## 2022-02-03 LAB
ALBUMIN SERPL-MCNC: 3.5 GM/DL (ref 3.4–5)
ANION GAP SERPL CALCULATED.3IONS-SCNC: 12 MMOL/L (ref 4–16)
BUN BLDV-MCNC: 13 MG/DL (ref 6–23)
CALCIUM SERPL-MCNC: 9 MG/DL (ref 8.3–10.6)
CHLORIDE BLD-SCNC: 98 MMOL/L (ref 99–110)
CO2: 22 MMOL/L (ref 21–32)
CREAT SERPL-MCNC: 0.7 MG/DL (ref 0.6–1.1)
GFR AFRICAN AMERICAN: >60 ML/MIN/1.73M2
GFR NON-AFRICAN AMERICAN: >60 ML/MIN/1.73M2
GLUCOSE BLD-MCNC: 88 MG/DL (ref 70–99)
HCT VFR BLD CALC: 35.6 % (ref 37–47)
HEMOCCULT SP1 STL QL: NEGATIVE
HEMOGLOBIN: 10.7 GM/DL (ref 12.5–16)
MCH RBC QN AUTO: 32 PG (ref 27–31)
MCHC RBC AUTO-ENTMCNC: 30.1 % (ref 32–36)
MCV RBC AUTO: 106.6 FL (ref 78–100)
PDW BLD-RTO: 15.5 % (ref 11.7–14.9)
PHOSPHORUS: 3.7 MG/DL (ref 2.5–4.9)
PLATELET # BLD: 145 K/CU MM (ref 140–440)
PMV BLD AUTO: 9.1 FL (ref 7.5–11.1)
POTASSIUM SERPL-SCNC: 4.6 MMOL/L (ref 3.5–5.1)
RBC # BLD: 3.34 M/CU MM (ref 4.2–5.4)
SODIUM BLD-SCNC: 132 MMOL/L (ref 135–145)
WBC # BLD: 5.5 K/CU MM (ref 4–10.5)

## 2022-02-03 PROCEDURE — 6370000000 HC RX 637 (ALT 250 FOR IP): Performed by: INTERNAL MEDICINE

## 2022-02-03 PROCEDURE — 80069 RENAL FUNCTION PANEL: CPT

## 2022-02-03 PROCEDURE — C9113 INJ PANTOPRAZOLE SODIUM, VIA: HCPCS | Performed by: INTERNAL MEDICINE

## 2022-02-03 PROCEDURE — 94761 N-INVAS EAR/PLS OXIMETRY MLT: CPT

## 2022-02-03 PROCEDURE — 6360000002 HC RX W HCPCS: Performed by: INTERNAL MEDICINE

## 2022-02-03 PROCEDURE — 1200000000 HC SEMI PRIVATE

## 2022-02-03 PROCEDURE — 2580000003 HC RX 258: Performed by: INTERNAL MEDICINE

## 2022-02-03 PROCEDURE — 85027 COMPLETE CBC AUTOMATED: CPT

## 2022-02-03 PROCEDURE — 36415 COLL VENOUS BLD VENIPUNCTURE: CPT

## 2022-02-03 PROCEDURE — 87507 IADNA-DNA/RNA PROBE TQ 12-25: CPT

## 2022-02-03 RX ADMIN — Medication 1 TABLET: at 08:18

## 2022-02-03 RX ADMIN — SODIUM CHLORIDE, PRESERVATIVE FREE 10 ML: 5 INJECTION INTRAVENOUS at 21:10

## 2022-02-03 RX ADMIN — SODIUM CHLORIDE, PRESERVATIVE FREE 10 ML: 5 INJECTION INTRAVENOUS at 08:19

## 2022-02-03 RX ADMIN — SERTRALINE HYDROCHLORIDE 25 MG: 25 TABLET ORAL at 08:18

## 2022-02-03 RX ADMIN — SODIUM CHLORIDE, PRESERVATIVE FREE 10 ML: 5 INJECTION INTRAVENOUS at 08:18

## 2022-02-03 RX ADMIN — PANTOPRAZOLE SODIUM 40 MG: 40 INJECTION, POWDER, FOR SOLUTION INTRAVENOUS at 08:18

## 2022-02-03 RX ADMIN — KETOTIFEN FUMARATE 1 DROP: 0.35 SOLUTION/ DROPS OPHTHALMIC at 08:19

## 2022-02-03 RX ADMIN — ACETAMINOPHEN 650 MG: 325 TABLET ORAL at 17:03

## 2022-02-03 RX ADMIN — VERAPAMIL HYDROCHLORIDE 90 MG: 180 TABLET, FILM COATED, EXTENDED RELEASE ORAL at 21:10

## 2022-02-03 RX ADMIN — GABAPENTIN 300 MG: 300 CAPSULE ORAL at 14:54

## 2022-02-03 RX ADMIN — PYRIDOSTIGMINE BROMIDE 30 MG: 60 TABLET ORAL at 21:09

## 2022-02-03 RX ADMIN — DIVALPROEX SODIUM 250 MG: 250 TABLET, FILM COATED, EXTENDED RELEASE ORAL at 21:10

## 2022-02-03 RX ADMIN — KETOTIFEN FUMARATE 1 DROP: 0.35 SOLUTION/ DROPS OPHTHALMIC at 21:10

## 2022-02-03 RX ADMIN — VERAPAMIL HYDROCHLORIDE 90 MG: 180 TABLET, FILM COATED, EXTENDED RELEASE ORAL at 08:18

## 2022-02-03 RX ADMIN — PYRIDOSTIGMINE BROMIDE 30 MG: 60 TABLET ORAL at 14:54

## 2022-02-03 RX ADMIN — PANTOPRAZOLE SODIUM 40 MG: 40 INJECTION, POWDER, FOR SOLUTION INTRAVENOUS at 21:10

## 2022-02-03 RX ADMIN — GABAPENTIN 300 MG: 300 CAPSULE ORAL at 21:10

## 2022-02-03 RX ADMIN — ROSUVASTATIN CALCIUM 10 MG: 5 TABLET, FILM COATED ORAL at 08:18

## 2022-02-03 RX ADMIN — GABAPENTIN 300 MG: 300 CAPSULE ORAL at 08:18

## 2022-02-03 RX ADMIN — SODIUM CHLORIDE, PRESERVATIVE FREE 10 ML: 5 INJECTION INTRAVENOUS at 21:11

## 2022-02-03 RX ADMIN — FLUTICASONE PROPIONATE 1 SPRAY: 50 SPRAY, METERED NASAL at 08:19

## 2022-02-03 RX ADMIN — PYRIDOSTIGMINE BROMIDE 30 MG: 60 TABLET ORAL at 05:42

## 2022-02-03 ASSESSMENT — PAIN SCALES - GENERAL
PAINLEVEL_OUTOF10: 2
PAINLEVEL_OUTOF10: 0
PAINLEVEL_OUTOF10: 3

## 2022-02-03 ASSESSMENT — PAIN DESCRIPTION - ONSET
ONSET: AWAKENED FROM SLEEP
ONSET: AWAKENED FROM SLEEP

## 2022-02-03 ASSESSMENT — PAIN DESCRIPTION - ORIENTATION: ORIENTATION: LEFT

## 2022-02-03 ASSESSMENT — PAIN DESCRIPTION - FREQUENCY
FREQUENCY: INTERMITTENT
FREQUENCY: INTERMITTENT

## 2022-02-03 ASSESSMENT — PAIN - FUNCTIONAL ASSESSMENT
PAIN_FUNCTIONAL_ASSESSMENT: ACTIVITIES ARE NOT PREVENTED
PAIN_FUNCTIONAL_ASSESSMENT: ACTIVITIES ARE NOT PREVENTED

## 2022-02-03 ASSESSMENT — PAIN DESCRIPTION - DESCRIPTORS
DESCRIPTORS: ACHING
DESCRIPTORS: ACHING

## 2022-02-03 ASSESSMENT — PAIN DESCRIPTION - PROGRESSION
CLINICAL_PROGRESSION: GRADUALLY IMPROVING
CLINICAL_PROGRESSION: GRADUALLY IMPROVING

## 2022-02-03 ASSESSMENT — PAIN DESCRIPTION - LOCATION
LOCATION: SHOULDER
LOCATION: GENERALIZED

## 2022-02-03 ASSESSMENT — PAIN DESCRIPTION - PAIN TYPE
TYPE: ACUTE PAIN
TYPE: ACUTE PAIN

## 2022-02-03 NOTE — PROGRESS NOTES
Nephrology Progress Note  2/3/2022 7:04 AM  Subjective: Interval History: Meghan Mendenhall is a 82 y.o. female weak resting in bed awaiting C. difficile results      Data:   Scheduled Meds:   [Held by provider] aspirin  81 mg Oral Daily    divalproex  250 mg Oral Nightly    fluticasone  1 spray Each Nostril Daily    gabapentin  300 mg Oral TID    ketotifen  1 drop Both Eyes BID    ocuvite-lutein  1 tablet Oral Daily    pyridostigmine  30 mg Oral 3 times per day    verapamil  90 mg Oral BID    sertraline  25 mg Oral Daily    rosuvastatin  10 mg Oral Daily    sodium chloride flush  5-40 mL IntraVENous 2 times per day    pantoprazole  40 mg IntraVENous Q12H    And    sodium chloride (PF)  10 mL IntraVENous Q12H     Continuous Infusions:   sodium chloride           CBC   Recent Labs     01/31/22  1751 02/01/22  0610 75/68/73  7972   WBC DUPLICATE ORDER  8.1 5.7 6.1   HGB DUPLICATE ORDER  98.2* 93.9* 91.4*   HCT DUPLICATE ORDER  21.8 85.0* 54.7*   PLT DUPLICATE ORDER  827 527 147      BMP   Recent Labs     01/31/22  1751 02/01/22  0610 02/02/22  1107   * 123* 132*   K 3.0* 4.5 4.0   CL 94* 87* 98*   CO2 18* 22 21   PHOS  --   --  3.0   BUN 6 7 15   CREATININE 0.6 0.7 0.9     Hepatic:   Recent Labs     01/31/22  1751   AST 22   ALT 11   BILITOT 0.3   ALKPHOS 41     Troponin: No results for input(s): TROPONINI in the last 72 hours. BNP: No results for input(s): BNP in the last 72 hours. Lipids: No results for input(s): CHOL, HDL in the last 72 hours.     Invalid input(s): LDLCALCU  ABGs: No results found for: PHART, PO2ART, MTX7YWN  INR:   Recent Labs     02/01/22  0610   INR 0.95     Renal Labs  Albumin:    Lab Results   Component Value Date    LABALBU 3.5 02/02/2022     Calcium:    Lab Results   Component Value Date    CALCIUM 8.7 02/02/2022     Phosphorus:    Lab Results   Component Value Date    PHOS 3.0 02/02/2022     U/A:    Lab Results   Component Value Date    NITRU NEGATIVE 02/02/2022 NITRU POSITIVE 02/17/2020    COLORU YELLOW 02/02/2022    PHUR 5.5 04/29/2021    PHUR 5.5 02/17/2020    LABCAST >40 Hyaline 10/13/2012    WBCUA NONE SEEN 02/02/2022    RBCUA NONE SEEN 02/02/2022    MUCUS RARE 02/02/2022    TRICHOMONAS NONE SEEN 01/31/2022    YEAST Present 08/05/2019    BACTERIA NEGATIVE 02/02/2022    CLARITYU CLEAR 02/02/2022    SPECGRAV 1.015 02/02/2022    UROBILINOGEN NORMAL 02/02/2022    BILIRUBINUR NEGATIVE 02/02/2022    BILIRUBINUR neg 04/29/2021    BLOODU NEGATIVE 02/02/2022    GLUCOSEU neg 04/29/2021    GLUCOSEU Negative 02/17/2020    KETUA NEGATIVE 02/02/2022    AMORPHOUS 2+ 10/13/2012     ABG:  No results found for: PHART, HCN3CXM, PO2ART, DLH6RBK, BEART, THGBART, PAY6TRD, Y4PLFVQO  HgBA1c:    Lab Results   Component Value Date    LABA1C 5.8 08/31/2021     Microalbumen/Creatinine ratio:  No components found for: RUCREAT  TSH:    Lab Results   Component Value Date    TSH 3.250 03/06/2019     IRON:    Lab Results   Component Value Date    IRON 70 02/01/2022     Iron Saturation:  No components found for: PERCENTFE  TIBC:    Lab Results   Component Value Date    TIBC 263 02/01/2022     FERRITIN:    Lab Results   Component Value Date    FERRITIN 939 01/31/2022     RPR:  No results found for: RPR  LALA:  No results found for: ANATITER, LALA  24 Hour Urine for Creatinine Clearance:  No components found for: CREAT4, UHRS10, UTV10      Objective:   I/O: 02/02 0701 - 02/03 0700  In: 240 [P.O.:240]  Out: -   I/O last 3 completed shifts: In: 240 [P.O.:240]  Out: -   No intake/output data recorded. Vitals: BP (!) 122/48   Pulse 59   Temp 97.6 °F (36.4 °C) (Oral)   Resp 18   Wt 124 lb 1.9 oz (56.3 kg)   SpO2 91%   BMI 21.99 kg/m²  {  General appearance: awake weak  HEENT: Head: Normal, normocephalic, atraumatic.   Neck: supple, symmetrical, trachea midline  Lungs: diminished breath sounds bilaterally  Heart: S1, S2 normal  Abdomen: abnormal findings:  soft nt  Extremities: edema trace  Neurologic: Mental status: alertness: alert        Assessment and Plan:      IMP:  #1 hyponatremia  #2 colitis with possible abdominal issues  #3 anxiety with bipolar disorder  #4 weakness    Plan     #1 sodium low stable monitor  #2 treat for colitis and above setting  #3 monitor affect slowly improving  #4 weakness appears better will follow  Overall improved from renal standpoint         Sarita Locke MD, MD
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
## 2022-02-03 NOTE — PROGRESS NOTES
DOING WELL FEELS BETTER DIARRHEA IMPROVED HOME TODAY  VITALS STABLE   LABS NOTED STOOL STUDIES PENDING  WILL CPM

## 2022-02-04 ENCOUNTER — HOSPITAL ENCOUNTER (OUTPATIENT)
Dept: INFUSION THERAPY | Age: 87
Setting detail: INFUSION SERIES
Discharge: HOME | End: 2022-02-04

## 2022-02-04 VITALS
WEIGHT: 128.09 LBS | OXYGEN SATURATION: 94 % | HEART RATE: 66 BPM | BODY MASS INDEX: 22.69 KG/M2 | TEMPERATURE: 98.1 F | RESPIRATION RATE: 15 BRPM | SYSTOLIC BLOOD PRESSURE: 150 MMHG | DIASTOLIC BLOOD PRESSURE: 64 MMHG

## 2022-02-04 DIAGNOSIS — M81.0 OSTEOPOROSIS, UNSPECIFIED OSTEOPOROSIS TYPE, UNSPECIFIED PATHOLOGICAL FRACTURE PRESENCE: ICD-10-CM

## 2022-02-04 DIAGNOSIS — M81.0 AGE-RELATED OSTEOPOROSIS WITHOUT CURRENT PATHOLOGICAL FRACTURE: Primary | ICD-10-CM

## 2022-02-04 LAB
ALBUMIN SERPL-MCNC: 3.7 GM/DL (ref 3.4–5)
ANION GAP SERPL CALCULATED.3IONS-SCNC: 12 MMOL/L (ref 4–16)
BUN BLDV-MCNC: 16 MG/DL (ref 6–23)
CALCIUM SERPL-MCNC: 9.1 MG/DL (ref 8.3–10.6)
CHLORIDE BLD-SCNC: 97 MMOL/L (ref 99–110)
CO2: 25 MMOL/L (ref 21–32)
CREAT SERPL-MCNC: 0.8 MG/DL (ref 0.6–1.1)
GFR AFRICAN AMERICAN: >60 ML/MIN/1.73M2
GFR NON-AFRICAN AMERICAN: >60 ML/MIN/1.73M2
GLUCOSE BLD-MCNC: 82 MG/DL (ref 70–99)
HCT VFR BLD CALC: 34.5 % (ref 37–47)
HEMOGLOBIN: 11.1 GM/DL (ref 12.5–16)
MCH RBC QN AUTO: 31.5 PG (ref 27–31)
MCHC RBC AUTO-ENTMCNC: 32.2 % (ref 32–36)
MCV RBC AUTO: 98 FL (ref 78–100)
PDW BLD-RTO: 15.5 % (ref 11.7–14.9)
PHOSPHORUS: 3.9 MG/DL (ref 2.5–4.9)
PLATELET # BLD: 162 K/CU MM (ref 140–440)
PMV BLD AUTO: 9.4 FL (ref 7.5–11.1)
POTASSIUM SERPL-SCNC: 4.3 MMOL/L (ref 3.5–5.1)
RBC # BLD: 3.52 M/CU MM (ref 4.2–5.4)
SODIUM BLD-SCNC: 134 MMOL/L (ref 135–145)
WBC # BLD: 5.8 K/CU MM (ref 4–10.5)

## 2022-02-04 PROCEDURE — 80069 RENAL FUNCTION PANEL: CPT

## 2022-02-04 PROCEDURE — 94761 N-INVAS EAR/PLS OXIMETRY MLT: CPT

## 2022-02-04 PROCEDURE — 36415 COLL VENOUS BLD VENIPUNCTURE: CPT

## 2022-02-04 PROCEDURE — 6370000000 HC RX 637 (ALT 250 FOR IP): Performed by: NURSE PRACTITIONER

## 2022-02-04 PROCEDURE — 85027 COMPLETE CBC AUTOMATED: CPT

## 2022-02-04 PROCEDURE — 6370000000 HC RX 637 (ALT 250 FOR IP): Performed by: INTERNAL MEDICINE

## 2022-02-04 RX ORDER — PANTOPRAZOLE SODIUM 40 MG/1
40 TABLET, DELAYED RELEASE ORAL
Status: DISCONTINUED | OUTPATIENT
Start: 2022-02-04 | End: 2022-02-04 | Stop reason: HOSPADM

## 2022-02-04 RX ADMIN — VERAPAMIL HYDROCHLORIDE 90 MG: 180 TABLET, FILM COATED, EXTENDED RELEASE ORAL at 07:52

## 2022-02-04 RX ADMIN — ROSUVASTATIN CALCIUM 10 MG: 5 TABLET, FILM COATED ORAL at 07:52

## 2022-02-04 RX ADMIN — PYRIDOSTIGMINE BROMIDE 30 MG: 60 TABLET ORAL at 05:53

## 2022-02-04 RX ADMIN — GABAPENTIN 300 MG: 300 CAPSULE ORAL at 07:52

## 2022-02-04 RX ADMIN — PANTOPRAZOLE SODIUM 40 MG: 40 TABLET, DELAYED RELEASE ORAL at 05:52

## 2022-02-04 RX ADMIN — FLUTICASONE PROPIONATE 1 SPRAY: 50 SPRAY, METERED NASAL at 07:52

## 2022-02-04 RX ADMIN — SERTRALINE HYDROCHLORIDE 25 MG: 25 TABLET ORAL at 07:52

## 2022-02-04 RX ADMIN — Medication 1 TABLET: at 07:52

## 2022-02-04 RX ADMIN — KETOTIFEN FUMARATE 1 DROP: 0.35 SOLUTION/ DROPS OPHTHALMIC at 07:51

## 2022-02-04 ASSESSMENT — PAIN SCALES - GENERAL: PAINLEVEL_OUTOF10: 0

## 2022-02-04 NOTE — DISCHARGE SUMMARY
Discharge Summary  Barbara Haq MD, MD     Name:  Samuel Worley /Age/Sex: 1935  (82 y.o. female)   MRN & CSN:  [id number] & [id number] Admission Date/Time: 2022  4:01 PM   Attending:  Barbara Haq MD Discharging Physician: Barbara Haq MD, MD     Hospital Course:   Samuel Worley is a 82 y.o.  female  who presents with with viral gastroenteritis and colitis. The patient was evaluated by GI and stool studies were done. The patient diarrhea resolved and her hyponatremia resolved. Once the patient was able to tolerate p.o. without any difficulty no diarrhea she will be discharged home to follow-up with GI as an outpatient. The patient expressed appropriate understanding of and agreement with the discharge recommendations, medications, and plan. Consults this admission:  IP CONSULT TO HOSPITALIST  IP CONSULT TO GI  IP CONSULT TO NEPHROLOGY    Discharge Instruction:     Follow up appointments:     No follow-up provider specified.     Primary care physician:  within 2 weeks    Diet:  regular diet     Activity: activity as tolerated    Disposition: Discharged to:   [x]Home, [][facility], []SNF, []Acute Rehab, []Hospice     Condition on discharge: Stable    Discharge Medications:        Medication List      CHANGE how you take these medications    gabapentin 300 MG capsule  Commonly known as: NEURONTIN  take 1 tablet by mouth three times a day for TRIGEMINAL NEURALGIA  What changed:   · how much to take  · how to take this  · when to take this  · additional instructions        CONTINUE taking these medications    aspirin 81 MG EC tablet     divalproex 250 MG extended release tablet  Commonly known as: DEPAKOTE ER     fluticasone 50 MCG/ACT nasal spray  Commonly known as: FLONASE  instill 1 spray into each nostril once daily     Handicap Placard Misc  by Does not apply route Good for 3 years     OCUVITE-LUTEIN PO     ondansetron 4 MG disintegrating tablet  Commonly known as: ZOFRAN-ODT  Take 1 tablet by mouth 2 times daily as needed for Nausea or Vomiting     Oscal 500/200 D-3 500-200 MG-UNIT per tablet  Generic drug: calcium-vitamin D     pantoprazole 40 MG tablet  Commonly known as: PROTONIX  Take 1 tablet by mouth 2 times daily     pyridostigmine 60 MG tablet  Commonly known as: MESTINON  take 1/2 tablets by mouth every 8 hours     rosuvastatin 10 MG tablet  Commonly known as: CRESTOR  Take 1 tablet by mouth daily     sertraline 25 MG tablet  Commonly known as: ZOLOFT     Vascepa 0.5 g Caps  Generic drug: Icosapent Ethyl  take 2 capsules by mouth twice a day     verapamil 180 MG extended release tablet  Commonly known as: CALAN SR  Take 0.5 tablets by mouth 2 times daily     Zaditor 0.025 % ophthalmic solution  Generic drug: ketotifen        STOP taking these medications    ciprofloxacin 500 MG tablet  Commonly known as: CIPRO     metroNIDAZOLE 500 MG tablet  Commonly known as: FLAGYL             Objective Findings at Discharge:     BP (!) 150/64   Pulse 66   Temp 98.1 °F (36.7 °C) (Oral)   Resp 15   Wt 128 lb 1.4 oz (58.1 kg)   SpO2 94%   BMI 22.69 kg/m²            PHYSICAL EXAM     GEN:  Awake female, sitting upright in bed in no apparent distress. RESP:  CTAB, no wheezes, rales or rhonchi. CVS:  RRR. No peripheral edema. GI/:  S/NT/ND BS+   NEURO: No focal defecits. PSYCH:  Awake, alert, oriented x 3. Affect appropriate. LABS: Reviewd    IMAGING: Reviwed    35 Minutes spent in preparation of discharging this patient including face to face encounter, discussions with the patient/family, medication reconciliation, and transition of care preparation.      Electronically signed by Anais Cline MD, MD on 2/4/2022 at 11:04 AM

## 2022-02-07 ENCOUNTER — CARE COORDINATION (OUTPATIENT)
Dept: CASE MANAGEMENT | Age: 87
End: 2022-02-07

## 2022-02-07 ENCOUNTER — TELEPHONE (OUTPATIENT)
Dept: INTERNAL MEDICINE CLINIC | Age: 87
End: 2022-02-07

## 2022-02-07 DIAGNOSIS — K52.9 GASTROENTERITIS: Primary | ICD-10-CM

## 2022-02-07 PROCEDURE — 1111F DSCHRG MED/CURRENT MED MERGE: CPT | Performed by: FAMILY MEDICINE

## 2022-02-07 NOTE — TELEPHONE ENCOUNTER
----- Message from Stephan De Luna sent at 2/4/2022  2:48 PM EST -----  Subject: Message to Provider    QUESTIONS  Information for Provider? PT Ada was released from the hospital this   morning 2/4/2022 around noon. Was diagnosed with colitis and dehydrated. PT is requesting a prescription for potassium and a salt tablet. PT would   like a call back. ---------------------------------------------------------------------------  --------------  Unknown Dura INFO  What is the best way for the office to contact you? OK to leave message on   voicemail  Preferred Call Back Phone Number? [phone]  ---------------------------------------------------------------------------  --------------  SCRIPT ANSWERS  Relationship to Patient?  Self

## 2022-02-07 NOTE — TELEPHONE ENCOUNTER
Most recent labs were reviewed. Her Na is essentially normal and her K is normal as well. We need to be careful of replacing electrolytes as too much can be harmful as well so if she was not prescribed Na/K on discharge, I would not recommend taking extra. If she is worried about dehydration/electrolytes, I would recommend trying pedialyte (or something similar). Those fluids are electrolyte-rich and can help prevent further dehydration. Thanks.

## 2022-02-07 NOTE — CARE COORDINATION
[facility] Transitions Initial Follow Up Call    Call within 2 business days of discharge: Yes    Patient: Jay Pelletier Patient : 1935   MRN: [id number]  Reason for Admission: abd pain  Discharge Date: 22 RARS: Readmission Risk Score: 22 ( )      Last Discharge [facility]       Complaint Diagnosis Description Type Department Provider    22 Abdominal Pain Nausea and vomiting, intractability of vomiting not specified, unspecified vomiting type . .. ED to Hosp-Admission (Discharged) (ADMITTED) Mikie Marques MD; LUCIE Min. Spoke with: Trenton Liu today and she says she is very weak and is asking if Home Health was ordered for her upon d/c. CTN spoke with [facility] [phone] and they did not receive an order to see her. CTN LM w/ PCP to order [facility] and they will get that ordered. Patient's grandson is bringing her food and checking on her. Med review competed and is correct. Eating, drinking & sleeping ok. Denies problems with urination/bowels. Denies diarrhea/n/v/abd pain/fever. We discussed f/u appt. With GI. She LM to schedule but is unclear if she has transportation. CTN told her we can get transportation set up. She says she is not strong enough to go out for appts. Right now. CTN agrees we will re-visit this next week after [place] has been there and see if she is feeling stronger. No other concerns voiced at this time. CTN # given to ptSofiya Simmons RN Care Transitions [phone]      Non-face-to-face services provided:  Scheduled appointment with PCP-DARYA DOMINGUEZ for PCP office to [facility]  Scheduled appointment with Specialist-PtSofiya DOMINGUEZ w/ Dr. Yaakov Abel office    Care Transitions 24 Hour Call    Do you have any ongoing symptoms?: No  Do you have a copy of your discharge instructions?: Yes  Do you have all of your prescriptions and are they filled?: Yes  Have you been contacted by a [id number] [facility] Pharmacist?: No  Have you scheduled your follow up appointment?: No  Were you discharged with any Home Care or Post Acute Services: No  Post Acute Services: Home Health (Comment: [address] [facility])  Do you have support at home?: Alone  Do you feel like you have everything you need to keep you well at home?: No  Are you an active caregiver in your home?: No  Care Transitions Interventions     Transitions of Care Initial Call      Challenges to be reviewed by the provider   Additional needs identified to be addressed with provider: No  none             Method of communication with provider : none      Advance Care Planning:   Does patient have an Advance Directive: on file. Was this a readmission? No  Patient stated reason for admission: colitis  Patients top risk factors for readmission: functional physical ability  lack of knowledge about disease  medical condition-colitis    Care Transition Nurse (CTN) contacted the patient by telephone to perform post hospital discharge assessment. Verified name and  with patient as identifiers. Provided introduction to self, and explanation of the CTN role. CTN reviewed discharge instructions, medical action plan and red flags with patient who verbalized understanding. Patient given an opportunity to ask questions and does not have any further questions or concerns at this time. Were discharge instructions available to patient? Yes. Reviewed appropriate site of care based on symptoms and resources available to patient including: PCP and Specialist. The patient agrees to contact the PCP office for questions related to their healthcare. Medication reconciliation was performed with patient, who verbalizes understanding of administration of home medications. Advised obtaining a 90-day supply of all daily and as-needed medications. Covid Risk Education     Educated patient about risk for severe COVID-19 due to risk factors according to CDC guidelines.  CTN reviewed discharge instructions, medical action plan and red flag symptoms with the patient who verbalized

## 2022-02-08 ENCOUNTER — OFFICE VISIT (OUTPATIENT)
Dept: INTERNAL MEDICINE CLINIC | Age: 87
End: 2022-02-08
Payer: MEDICARE

## 2022-02-08 VITALS
TEMPERATURE: 97.2 F | SYSTOLIC BLOOD PRESSURE: 122 MMHG | HEIGHT: 63 IN | BODY MASS INDEX: 23.57 KG/M2 | WEIGHT: 133 LBS | DIASTOLIC BLOOD PRESSURE: 64 MMHG | OXYGEN SATURATION: 97 % | HEART RATE: 72 BPM

## 2022-02-08 DIAGNOSIS — E53.8 VITAMIN B 12 DEFICIENCY: ICD-10-CM

## 2022-02-08 DIAGNOSIS — K57.92 DIVERTICULITIS: Primary | ICD-10-CM

## 2022-02-08 DIAGNOSIS — K52.9 GASTROENTERITIS: ICD-10-CM

## 2022-02-08 PROCEDURE — 1111F DSCHRG MED/CURRENT MED MERGE: CPT | Performed by: FAMILY MEDICINE

## 2022-02-08 PROCEDURE — 96372 THER/PROPH/DIAG INJ SC/IM: CPT | Performed by: FAMILY MEDICINE

## 2022-02-08 PROCEDURE — 99495 TRANSJ CARE MGMT MOD F2F 14D: CPT | Performed by: FAMILY MEDICINE

## 2022-02-08 RX ORDER — CYANOCOBALAMIN 1000 UG/ML
1000 INJECTION INTRAMUSCULAR; SUBCUTANEOUS ONCE
Status: COMPLETED | OUTPATIENT
Start: 2022-02-08 | End: 2022-02-08

## 2022-02-08 RX ADMIN — CYANOCOBALAMIN 1000 MCG: 1000 INJECTION INTRAMUSCULAR; SUBCUTANEOUS at 13:33

## 2022-02-08 ASSESSMENT — ENCOUNTER SYMPTOMS
ABDOMINAL PAIN: 0
SORE THROAT: 0
CHEST TIGHTNESS: 0
COLOR CHANGE: 0
CONSTIPATION: 0
SHORTNESS OF BREATH: 0
VOMITING: 0
DIARRHEA: 0

## 2022-02-08 NOTE — PROGRESS NOTES
Post-Discharge Transitional Care Management Services or Hospital Follow Up      Figueroa Purchase   YOB: 1935    Date of Office Visit:  2/8/2022  Date of Hospital Admission: 1/31/22  Date of Hospital Discharge: 2/4/22  Readmission Risk Score(high >=14%.  Medium >=10%):Readmission Risk Score: 22 ( )      Care management risk score Rising risk (score 2-5) and Complex Care (Scores >=6): 6     Non face to face  following discharge, date last encounter closed (first attempt may have been earlier): 2/7/2022  3:02 PM 2/7/2022  3:02 PM    Call initiated 2 business days of discharge: Yes     Patient Active Problem List   Diagnosis    Chronic depression    Hyperlipidemia    Trigeminal neuralgia    Anemia due to chronic illness    Colon cancer screening    Osteoporosis    Supraventricular tachycardia ([address]    Recurrent ventral incisional hernia    Mild cognitive impairment    CAD (coronary artery disease)    Elevated TSH    PAD (peripheral artery disease) (HCC)    Sciatica of left side without back pain    Coronary artery disease involving coronary bypass graft of native heart without angina pectoris    S/P CABG x 3    Essential hypertension    MVP (mitral valve prolapse)    Bilateral carotid artery stenosis    Subclavian arterial stenosis (HCC)    Spigelian hernia    Ischemic cardiomyopathy    Glenohumeral arthritis, right    Normocytic anemia    Overactive bladder    Major depressive disorder, recurrent, in full remission ([address]    Asthmatic bronchitis    Age-related osteoporosis without current pathological fracture    Palpitations    SOB (shortness of breath)    Prediabetes    Generalized weakness    Hyponatremia    Gait disturbance    Macular degeneration of both eyes    History of DVT (deep vein thrombosis)    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, bilateral    Gastroenteritis       Allergies   Allergen Reactions    Alendronate Sodium Other (See Comments)     GI upset    Bactrim [Sulfamethoxazole-Trimethoprim] Anaphylaxis    Boniva [Ibandronate Sodium] Other (See Comments)     Gi upset and declined egd; also to fosamax    Colesevelam     Lisinopril Other (See Comments)     Severe hyperkalemia (6) with bun >56      Macrobid [Nitrofurantoin]     Neggram [Nalidixic Acid]     Pce [Erythromycin]     Penicillins     Risperidone And Related     Welchol [Colesevelam Hcl] Other (See Comments)     constipation    Carbamazepine Nausea And Vomiting    Lovaza [Omega-3-Acid Ethyl Esters] Nausea And Vomiting    Metoprolol Nausea And Vomiting    Pyridium [Phenazopyridine] Palpitations       Medications listed as ordered at the time of discharge from hospital     Medication List          Accurate as of February 8, 2022  2:11 PM. If you have any questions, ask your nurse or doctor.             CHANGE how you take these medications    gabapentin 300 MG capsule  Commonly known as: NEURONTIN  take 1 tablet by mouth three times a day for TRIGEMINAL NEURALGIA  What changed:   · how much to take  · how to take this  · when to take this  · additional instructions        CONTINUE taking these medications    aspirin 81 MG EC tablet     divalproex 250 MG extended release tablet  Commonly known as: DEPAKOTE ER     fluticasone 50 MCG/ACT nasal spray  Commonly known as: FLONASE  instill 1 spray into each nostril once daily     Handicap Placard Misc  by Does not apply route Good for 3 years     OCUVITE-LUTEIN PO     ondansetron 4 MG disintegrating tablet  Commonly known as: ZOFRAN-ODT  Take 1 tablet by mouth 2 times daily as needed for Nausea or Vomiting     Oscal 500/200 D-3 500-200 MG-UNIT per tablet  Generic drug: calcium-vitamin D     pantoprazole 40 MG tablet  Commonly known as: PROTONIX  Take 1 tablet by mouth 2 times daily     pyridostigmine 60 MG tablet  Commonly known as: MESTINON  take 1/2 tablets by mouth every 8 hours     rosuvastatin 10 MG tablet  Commonly known as:  CRESTOR  Take 1 tablet by mouth daily     sertraline 25 MG tablet  Commonly known as: ZOLOFT     Vascepa 0.5 g Caps  Generic drug: Icosapent Ethyl  take 2 capsules by mouth twice a day     verapamil 180 MG extended release tablet  Commonly known as: CALAN SR  Take 0.5 tablets by mouth 2 times daily     Zaditor 0.025 % ophthalmic solution  Generic drug: ketotifen              Medications marked \"taking\" at this time  Outpatient Medications Marked as Taking for the 2/8/22 encounter (Office Visit) with Viv Hansen MD   Medication Sig Dispense Refill    pantoprazole (PROTONIX) 40 MG tablet Take 1 tablet by mouth 2 times daily 180 tablet 1    pyridostigmine (MESTINON) 60 MG tablet take 1/2 tablets by mouth every 8 hours 45 tablet 5    verapamil (CALAN SR) 180 MG extended release tablet Take 0.5 tablets by mouth 2 times daily 30 tablet 5    ondansetron (ZOFRAN-ODT) 4 MG disintegrating tablet Take 1 tablet by mouth 2 times daily as needed for Nausea or Vomiting 15 tablet 1    ketotifen (ZADITOR) 0.025 % ophthalmic solution Place 1 drop into both eyes 2 times daily      sertraline (ZOLOFT) 25 MG tablet take 1/2 tablet by mouth once daily for 2 days then take 1 tablet by mouth once daily THEREAFTER      gabapentin (NEURONTIN) 300 MG capsule take 1 tablet by mouth three times a day for TRIGEMINAL NEURALGIA (Patient taking differently: Take 300 mg by mouth 3 times daily. ) 90 capsule 1    Icosapent Ethyl (VASCEPA) 0.5 g CAPS take 2 capsules by mouth twice a day 120 capsule 5    Handicap Placard MISC by Does not apply route Good for 3 years 1 each 0    rosuvastatin (CRESTOR) 10 MG tablet Take 1 tablet by mouth daily 90 tablet 3    fluticasone (FLONASE) 50 MCG/ACT nasal spray instill 1 spray into each nostril once daily 1 Bottle 3    divalproex (DEPAKOTE ER) 250 MG extended release tablet Take 250 mg by mouth nightly       Multiple Vitamins-Minerals (OCUVITE-LUTEIN PO) Take by mouth      calcium-vitamin D (OSCAL 500/200 D-3) 500-200 MG-UNIT per tablet Take 1 tablet by mouth 2 times daily.  aspirin 81 MG EC tablet Take 81 mg by mouth daily. Medications patient taking as of now reconciled against medications ordered at time of hospital discharge: Yes    Chief Complaint   Patient presents with    Follow-Up from [facility]        HPI    Inpatient course: Discharge summary reviewed- see chart. Interval history/Current status:     Patient recently hospitalized for gastroenteritis and colitis. Was being treated for diverticulitis as outpatient but symptoms continued to worsen and was advised to go to ED by GI office. Treatment for diverticulitis was continued/completed during hospitalization, N/V/D resolved by the time of discharge. No changes to medications on discharge. Patient states she has follow up with GI in the next few weeks. States that as long as she eats the right things, she does not have GI issues. Has not had any further N/V/D since discharge from the hospital.  States that she has tried pedialyte, which helps settle her stomach when she drinks it. Reports that chika comes over every weekend and helps her with groceries, cleaning, bills and sets up her medications for the next few weeks. States [facility] has also reached out to see if she needs assistance- told them she would wait until this appointment to see if she would be receiving any home health services. States that as of now, she feels comfortable at home and as long as she keeps busy, she does well. Review of Systems   Constitutional: Positive for activity change and fatigue. Negative for appetite change, chills, fever and unexpected weight change. HENT: Negative for congestion and sore throat. Respiratory: Negative for chest tightness and shortness of breath. Cardiovascular: Negative for chest pain and palpitations.    Gastrointestinal: Negative for abdominal pain, constipation, diarrhea and vomiting. Genitourinary: Negative for dysuria. Musculoskeletal: Positive for gait problem. Skin: Negative for color change. Neurological: Negative for dizziness and light-headedness. Psychiatric/Behavioral: Negative for dysphoric mood. The patient is not nervous/anxious. Vitals:    02/08/22 1320   BP: 122/64   Site: Left Upper Arm   Position: Sitting   Cuff Size: Medium Adult   Pulse: 72   Temp: 97.2 °F (36.2 °C)   SpO2: 97%   Weight: 133 lb (60.3 kg)   Height: 5' 3\" (1.6 m)     Body mass index is 23.56 kg/m². Wt Readings from Last 3 Encounters:   02/08/22 133 lb (60.3 kg)   02/04/22 128 lb 1.4 oz (58.1 kg)   01/28/22 138 lb (62.6 kg)     BP Readings from Last 3 Encounters:   02/08/22 122/64   02/04/22 (!) 150/64   01/28/22 (!) 170/91       Physical Exam  Vitals and nursing note reviewed. Constitutional:       General: She is not in acute distress. Appearance: Normal appearance. She is not ill-appearing or toxic-appearing. HENT:      Head: Normocephalic and atraumatic. Right Ear: External ear normal.      Left Ear: External ear normal.      Nose: Nose normal.      Mouth/Throat:      Pharynx: Oropharynx is clear. Eyes:      General: No scleral icterus. Right eye: No discharge. Left eye: No discharge. Extraocular Movements: Extraocular movements intact. Conjunctiva/sclera: Conjunctivae normal.   Cardiovascular:      Rate and Rhythm: Normal rate and regular rhythm. Heart sounds: Normal heart sounds. Pulmonary:      Effort: Pulmonary effort is normal.      Breath sounds: Normal breath sounds. No wheezing or rales. Musculoskeletal:         General: No deformity. Cervical back: Normal range of motion and neck supple. No rigidity. Skin:     General: Skin is warm and dry. Findings: No rash. Neurological:      General: No focal deficit present. Mental Status: She is alert. Mental status is at baseline. Motor: No weakness. Gait: Gait abnormal (using cane). Psychiatric:         Mood and Affect: Mood normal.         Behavior: Behavior normal.         Assessment/Plan:  1. Diverticulitis  Now resolved. Has follow up with GI in the next few weeks. Patient has had overall deterioration in her health over the past year, with ongoing GI issues and multiple hospitalizations. Had extensive discussion regarding level of care, her independence and whether she feels she needs more than what she is currently getting. Patient states she is comfortable with her current situation and does not feel she needs home health or assisted living, etc.  States she will reach out to [facility] if she needs further services, but family is helping her with what she needs. Encouraged to contact clinic if she changes her mind, patient voiced understanding.    - [place] DISCHARGE MEDS RECONCILED W/ CURRENT OUTPATIENT MED LIST    2. Gastroenteritis  Resolved. - [place] DISCHARGE MEDS RECONCILED W/ CURRENT OUTPATIENT MED LIST    3. Vitamin B 12 deficiency  Administered today.   - cyanocobalamin injection 1,000 mcg        Medical Decision Making: moderate complexity

## 2022-02-10 ENCOUNTER — HOSPITAL ENCOUNTER (OUTPATIENT)
Dept: INFUSION THERAPY | Age: 87
Setting detail: INFUSION SERIES
Discharge: HOME | End: 2022-02-10
Payer: MEDICARE

## 2022-02-10 VITALS
DIASTOLIC BLOOD PRESSURE: 62 MMHG | TEMPERATURE: 97.3 F | RESPIRATION RATE: 16 BRPM | SYSTOLIC BLOOD PRESSURE: 96 MMHG | OXYGEN SATURATION: 97 % | HEART RATE: 62 BPM

## 2022-02-10 PROCEDURE — 6360000002 HC RX W HCPCS: Performed by: FAMILY MEDICINE

## 2022-02-10 PROCEDURE — 99211 OFF/OP EST MAY X REQ PHY/QHP: CPT

## 2022-02-10 PROCEDURE — 96372 THER/PROPH/DIAG INJ SC/IM: CPT

## 2022-02-10 RX ADMIN — DENOSUMAB 60 MG: 60 INJECTION SUBCUTANEOUS at 12:48

## 2022-02-10 NOTE — PLAN OF CARE
Ambulatory to unit room 2 for Prolia. Orientated to unit. Procedure and plan of care explained. Questions answered. Understanding verbalized.

## 2022-02-10 NOTE — DISCHARGE SUMMARY
Signed        Tolerated injection well. Reviewed discharge instructions, understanding verbalized. Copies of AVS given to take home. Patient discharged Down to exit per wheelchair.     Orders Placed This Encounter   Medications    denosumab (PROLIA) SC injection 60 mg

## 2022-02-11 ENCOUNTER — OFFICE VISIT (OUTPATIENT)
Dept: CARDIOLOGY CLINIC | Age: 87
End: 2022-02-11
Payer: MEDICARE

## 2022-02-11 VITALS
DIASTOLIC BLOOD PRESSURE: 60 MMHG | HEART RATE: 77 BPM | WEIGHT: 133 LBS | HEIGHT: 63 IN | SYSTOLIC BLOOD PRESSURE: 100 MMHG | BODY MASS INDEX: 23.57 KG/M2

## 2022-02-11 DIAGNOSIS — Z95.1 S/P CABG X 3: ICD-10-CM

## 2022-02-11 DIAGNOSIS — I25.810 CORONARY ARTERY DISEASE INVOLVING CORONARY BYPASS GRAFT OF NATIVE HEART WITHOUT ANGINA PECTORIS: Primary | ICD-10-CM

## 2022-02-11 DIAGNOSIS — I73.9 PAD (PERIPHERAL ARTERY DISEASE) (HCC): ICD-10-CM

## 2022-02-11 DIAGNOSIS — E78.2 MIXED HYPERLIPIDEMIA: ICD-10-CM

## 2022-02-11 DIAGNOSIS — I65.23 BILATERAL CAROTID ARTERY STENOSIS: ICD-10-CM

## 2022-02-11 DIAGNOSIS — I34.1 MVP (MITRAL VALVE PROLAPSE): ICD-10-CM

## 2022-02-11 DIAGNOSIS — I10 ESSENTIAL HYPERTENSION: ICD-10-CM

## 2022-02-11 DIAGNOSIS — I77.1 SUBCLAVIAN ARTERIAL STENOSIS (HCC): ICD-10-CM

## 2022-02-11 PROCEDURE — 99214 OFFICE O/P EST MOD 30 MIN: CPT | Performed by: INTERNAL MEDICINE

## 2022-02-11 PROCEDURE — G8399 PT W/DXA RESULTS DOCUMENT: HCPCS | Performed by: INTERNAL MEDICINE

## 2022-02-11 PROCEDURE — G8420 CALC BMI NORM PARAMETERS: HCPCS | Performed by: INTERNAL MEDICINE

## 2022-02-11 PROCEDURE — 1123F ACP DISCUSS/DSCN MKR DOCD: CPT | Performed by: INTERNAL MEDICINE

## 2022-02-11 PROCEDURE — 1036F TOBACCO NON-USER: CPT | Performed by: INTERNAL MEDICINE

## 2022-02-11 PROCEDURE — 1111F DSCHRG MED/CURRENT MED MERGE: CPT | Performed by: INTERNAL MEDICINE

## 2022-02-11 PROCEDURE — G8482 FLU IMMUNIZE ORDER/ADMIN: HCPCS | Performed by: INTERNAL MEDICINE

## 2022-02-11 PROCEDURE — 4040F PNEUMOC VAC/ADMIN/RCVD: CPT | Performed by: INTERNAL MEDICINE

## 2022-02-11 PROCEDURE — G8427 DOCREV CUR MEDS BY ELIG CLIN: HCPCS | Performed by: INTERNAL MEDICINE

## 2022-02-11 PROCEDURE — 1090F PRES/ABSN URINE INCON ASSESS: CPT | Performed by: INTERNAL MEDICINE

## 2022-02-11 RX ORDER — PYRIDOSTIGMINE BROMIDE 60 MG/1
30 TABLET ORAL DAILY
Qty: 45 TABLET | Refills: 5 | Status: SHIPPED | OUTPATIENT
Start: 2022-02-11

## 2022-02-11 NOTE — PROGRESS NOTES
Payal De La Rosa is a 82 y.o. female who has    CHIEF COMPLAINT AS FOLLOWS:  CHEST PAIN: Patient denies any C/O chest pains at this time. SOB: No C/O SOB at this time. LEG EDEMA: No leg edema                          PALPITATIONS: Denies any C/O Palpitations   DIZZINESS:  C/O feeling unbalanced. Patient was in hospital for dizziness & she was dehydrated at that time. SYNCOPE: None   OTHER/ ADDITIONAL COMPLAINTS: Says she has been feeling worse since she was started on mestinon                                    HPI: Patient is here for F/U on her CAD, HTN & Dyslipidemia problems. CAD: Patient has known CAD. Had CABG in the past.  HTN: Patient has known essential HTN. Has been treated with guideline recommended medical / physical/ diet therapy as stated below. Dyslipidemia: Patient has known mixed dyslipidemia. Has been treated with guideline recommended medical / physical/ diet therapy as stated below.                 Current Outpatient Medications   Medication Sig Dispense Refill    pantoprazole (PROTONIX) 40 MG tablet Take 1 tablet by mouth 2 times daily 180 tablet 1    pyridostigmine (MESTINON) 60 MG tablet take 1/2 tablets by mouth every 8 hours 45 tablet 5    verapamil (CALAN SR) 180 MG extended release tablet Take 0.5 tablets by mouth 2 times daily 30 tablet 5    ketotifen (ZADITOR) 0.025 % ophthalmic solution Place 1 drop into both eyes 2 times daily      sertraline (ZOLOFT) 25 MG tablet take 1/2 tablet by mouth once daily for 2 days then take 1 tablet by mouth once daily THEREAFTER      gabapentin (NEURONTIN) 300 MG capsule take 1 tablet by mouth three times a day for TRIGEMINAL NEURALGIA (Patient taking differently: Take 300 mg by mouth 3 times daily. ) 90 capsule 1    Icosapent Ethyl (VASCEPA) 0.5 g CAPS take 2 capsules by mouth twice a day 120 capsule 5    Handicap Placard MISC by Does not apply route Good for 3 years 1 each 0    rosuvastatin (CRESTOR) 10 MG tablet Take 1 tablet by mouth daily 90 tablet 3    fluticasone (FLONASE) 50 MCG/ACT nasal spray instill 1 spray into each nostril once daily 1 Bottle 3    Multiple Vitamins-Minerals (OCUVITE-LUTEIN PO) Take by mouth      aspirin 81 MG EC tablet Take 81 mg by mouth daily.  ondansetron (ZOFRAN-ODT) 4 MG disintegrating tablet Take 1 tablet by mouth 2 times daily as needed for Nausea or Vomiting (Patient not taking: Reported on 2/11/2022) 15 tablet 1    divalproex (DEPAKOTE ER) 250 MG extended release tablet Take 250 mg by mouth nightly  (Patient not taking: Reported on 2/11/2022)      calcium-vitamin D (OSCAL 500/200 D-3) 500-200 MG-UNIT per tablet Take 1 tablet by mouth 2 times daily.    (Patient not taking: Reported on 2/11/2022)       Current Facility-Administered Medications   Medication Dose Route Frequency Provider Last Rate Last Admin    cyanocobalamin injection 1,000 mcg  1,000 mcg SubCUTAneous Q30 Days Mariela Castelan MD   1,000 mcg at 10/20/20 1625     Allergies: Alendronate sodium, Bactrim [sulfamethoxazole-trimethoprim], Boniva [ibandronate sodium], Colesevelam, Lisinopril, Macrobid [nitrofurantoin], Neggram [nalidixic acid], Pce [erythromycin], Penicillins, Risperidone and related, Welchol [colesevelam hcl], Carbamazepine, Lovaza [omega-3-acid ethyl esters], Metoprolol, and Pyridium [phenazopyridine]  Review of Systems:    Constitutional: Negative for diaphoresis and fatigue  Respiratory: Negative for shortness of breath  Cardiovascular: Negative for chest pain, dyspnea on exertion, claudication, edema, irregular heartbeat, murmur, palpitations or shortness of breath  Musculoskeletal: Negative for muscle pain, muscular weakness, negative for pain in arm and leg or swelling in foot and leg    Objective:  /60 (Position: Standing)   Pulse 77   Ht 5' 3\" (1.6 m)   Wt 133 lb (60.3 kg)   BMI 23.56 kg/m²   Wt Readings from Last 3 Encounters:   02/11/22 133 lb (60.3 kg)   02/08/22 133 lb (60.3 kg)   02/04/22 128 lb 1.4 oz (58.1 kg)     Body mass index is 23.56 kg/m². GENERAL - Alert, oriented, pleasant, in no apparent distress. EYES: No jaundice, no conjunctival pallor. Neck - Supple. No jugular venous distention noted. No carotid bruits. Cardiovascular - Normal S1 and S2 without obvious murmur or gallop. Extremities - No cyanosis, clubbing, or significant edema. Pulmonary - No respiratory distress. No wheezes or rales.       MEDICAL DECISION MAKING & DATA REVIEW:    Lab Review   Lab Results   Component Value Date    TROPONINT <0.010 01/31/2022    TROPONINT <0.010 11/10/2021     No results found for: BNP, PROBNP  Lab Results   Component Value Date    INR 0.95 02/01/2022    INR 0.96 01/25/2016     Lab Results   Component Value Date    LABA1C 5.8 08/31/2021    LABA1C 6.1 03/22/2021     Lab Results   Component Value Date    WBC 5.8 02/04/2022    WBC 5.5 02/03/2022    HCT 34.5 (L) 02/04/2022    HCT 35.6 (L) 02/03/2022    MCV 98.0 02/04/2022    .6 (H) 02/03/2022     02/04/2022     02/03/2022     Lab Results   Component Value Date    CHOL 129 03/22/2021    CHOL 129 06/22/2020    TRIG 194 (H) 03/22/2021    TRIG 273 (H) 06/22/2020    HDL 33 (L) 03/22/2021    HDL 30 (L) 06/22/2020    LDLCALC 44 06/22/2020    LDLCALC 44 01/23/2020    LDLDIRECT 70 03/22/2021    LDLDIRECT 93 08/22/2016     Lab Results   Component Value Date    ALT 11 01/31/2022    ALT 12 01/28/2022    AST 22 01/31/2022    AST 24 01/28/2022     BMP:    Lab Results   Component Value Date     02/04/2022     02/03/2022    K 4.3 02/04/2022    K 4.6 02/03/2022    CL 97 02/04/2022    CL 98 02/03/2022    CO2 25 02/04/2022    CO2 22 02/03/2022    BUN 16 02/04/2022    BUN 13 02/03/2022    CREATININE 0.8 02/04/2022    CREATININE 0.7 02/03/2022     CMP:   Lab Results   Component Value Date     02/04/2022     02/03/2022    K 4.3 02/04/2022    K 4.6 02/03/2022    CL 97 02/04/2022    CL 98 02/03/2022 CO2 25 02/04/2022    CO2 22 02/03/2022    BUN 16 02/04/2022    BUN 13 02/03/2022    CREATININE 0.8 02/04/2022    CREATININE 0.7 02/03/2022    PROT 5.5 01/31/2022    PROT 6.5 01/28/2022    PROT 7.5 01/02/2013    PROT 7.4 10/13/2012     Lab Results   Component Value Date    TSH 3.250 03/06/2019    TSH 4.270 04/20/2018    TSHHS 5.080 02/03/2016       QUALITY MEASURES REVIEWED:  1.CAD:Patient is taking anti platelet agent:Yes  2. DYSLIPIDEMIA: Patient is on cholesterol lowering medication:Yes   3. Beta-Blocker therapy for CAD, if prior Myocardial Infarction:No   4. Counselled regarding smoking cessation. No   Patient does not Smoke. 5.Anticoagulation therapy (for A.Fib) No   Does Not have A.Fib.  6.Discussed weight management strategies. Assessment & Plan:  Primary / Secondary prevention is the goal by aggressive risk modification, healthy and therapeutic life style changes for cardiovascular risk reduction. CORONARY ARTERY DISEASE:Yes   clinically stable. Patient is on optimal medical regimen ( see medication list above )  -  Patient is currently  asymptomatic from CAD.          - changes in  treatment:   no, on ASA           - Testing ordered:  no  [place] classification: 1  3/2021    Normal study    Normal perfusion study with normal distribution in all coronal, short, and    horizontal axis.    The observed defect is consistent with diaphragmatic attenuation.  LVEF calculated by the computer is probably falsely low. LVEF is 40 %     HTN:BP is running low.              - changes in  treatment: reduce Verapamil dose   CARDIOMYOPATHY:  known   CONGESTIVE HEART FAILURE: NO KNOWN HISTORY.      VHD: No significant VHD noted  ECHO 9/2020   Left ventricular function is normal, EF is estimated at 55-60%. Mild left ventricular hypertrophy. Normal diastolic filling pattern for age. No significant valvular disease noted. No evidence of pericardial effusion.   DYSLIPIDEMIA: Patient's profile is at / near Goal.no, Triglycerides are still high but better than before.                                HDL is low                                Tolerating current medical regimen wellyes, Takes Vascepa & Crestor                                                        See most recent Lab values in Labs section above. CAROTID ARTERY DISEASE:.Right Subclavian stenosis. No C/O  OTHER RELEVANT DIAGNOSIS:as noted in patient's active problem list:  TESTS ORDERED:none this visit                                       All previously ordered tests reviewed.   ARRHYTHMIAS:  Known H/O SVT. Frequent PVCs                                 No C/O    TESTS ORDERED: none this visit     PREVIOUSLY ORDERED TESTS REVIEWED & DISCUSSED WITH THE PATIENT:     I personally reviewed & interpreted, all previously ordered tests as copied above. Latest Labs are pulled in to the note with dates. Labs, specially in Reference to Lipid profile, Cardiac testing in the form of Echo ( dated: ), stress tests ( dated: ) & other relevant cardiac testing reviewed with patient & recommendations made based on assessment of the results. Discussed role of Cardiac risk factors & effects + treatment of co morbidities with patient & advised accordingly. MEDICATIONS: List of medications patient is currently taking is reviewed in detail with the patient. Discussed any side effects or problems taking the medication. Recommend Reduce mestinon to 30 mg at bed time only. Reduce Verapamil to 120 mg daily. AFFIRMATION: I  reviewed patient's history, previous & current medical problems & all Labs + testing. This includes chart prep even prior to the vosit. Various goals are discussed and multiple questions answered. Relevant concelling performed.      Office follow up in one month

## 2022-02-11 NOTE — PATIENT INSTRUCTIONS
CORONARY ARTERY DISEASE:Yes   clinically stable. Patient is on optimal medical regimen ( see medication list above )  -  Patient is currently  asymptomatic from CAD.          - changes in  treatment:   no, on ASA           - Testing ordered:  no  [place] classification: 1  3/2021    Normal study    Normal perfusion study with normal distribution in all coronal, short, and    horizontal axis.    The observed defect is consistent with diaphragmatic attenuation.  LVEF calculated by the computer is probably falsely low. LVEF is 40 %     HTN:BP is running low.              - changes in  treatment: reduce Verapamil dose   CARDIOMYOPATHY:  known   CONGESTIVE HEART FAILURE: NO KNOWN HISTORY.      VHD: No significant VHD noted  ECHO 9/2020   Left ventricular function is normal, EF is estimated at 55-60%. Mild left ventricular hypertrophy. Normal diastolic filling pattern for age. No significant valvular disease noted. No evidence of pericardial effusion. DYSLIPIDEMIA: Patient's profile is at / near Goal.no, Triglycerides are still high but better than before.                                HDL is low                                Tolerating current medical regimen wellyes, Takes Vascepa & Crestor                                                        See most recent Lab values in Labs section above. CAROTID ARTERY DISEASE:.Right Subclavian stenosis. No C/O  OTHER RELEVANT DIAGNOSIS:as noted in patient's active problem list:  TESTS ORDERED:none this visit                                       All previously ordered tests reviewed.   ARRHYTHMIAS:  Known H/O SVT. Frequent PVCs                                 No C/O    TESTS ORDERED: none this visit     PREVIOUSLY ORDERED TESTS REVIEWED & DISCUSSED WITH THE PATIENT:     I personally reviewed & interpreted, all previously ordered tests as copied above. Latest Labs are pulled in to the note with dates.    Labs, specially in Reference to Lipid profile, Cardiac testing in the form of Echo ( dated: ), stress tests ( dated: ) & other relevant cardiac testing reviewed with patient & recommendations made based on assessment of the results. Discussed role of Cardiac risk factors & effects + treatment of co morbidities with patient & advised accordingly. MEDICATIONS: List of medications patient is currently taking is reviewed in detail with the patient. Discussed any side effects or problems taking the medication. Recommend Reduce mestinon to 30 mg at bed time only. Reduce Verapamil to 120 mg daily. AFFIRMATION: I  reviewed patient's history, previous & current medical problems & all Labs + testing. This includes chart prep even prior to the vosit. Various goals are discussed and multiple questions answered. Relevant concelling performed.      Office follow up in one month

## 2022-02-11 NOTE — LETTER
[facility] [address]  [address] [place] [address] [address]  Phone: [phone]  Fax: [phone]    Maria M Stratton MD    February 11, 2022     MD Brett Harris  [place] [facility] [address]    Patient: Jay Pelletier   MR Number: [id number]   YOB: 1935   Date of Visit: 2/11/2022       Dear Simon Jama: Thank you for referring Jay Pelletier to me for evaluation/treatment. Below are the relevant portions of my assessment and plan of care. If you have questions, please do not hesitate to call me. I look forward to following Ada along with you.     Sincerely,      Maria M Stratton MD

## 2022-02-15 ENCOUNTER — CARE COORDINATION (OUTPATIENT)
Dept: CASE MANAGEMENT | Age: 87
End: 2022-02-15

## 2022-02-15 NOTE — CARE COORDINATION
[facility] Transitions Follow Up Call    2/15/2022    Patient: Raulito Soto  Patient : 1935   MRN: [id number]  Reason for Admission: diarrhea  Discharge Date: 22 RARS: Readmission Risk Score: 22 ( )         Spoke with: Johnye Hashimoto today and she is doing her exercises and having some yogurt. She says she is feeling better overall. She is following a diet that the hospital dietician gave her for colitis. She ate a salad last night for dinner and felt her stomach bloat & was nauseated. This was not on her foods list. She has a phone encounter with GI today at 2:00 and will ask about what foods to avoid. Saw PCP 22 and she decided not to order [place] at this time. CTN will possibly refer to ambulatory dietician next week based on GI appt. Info. Denies n/v/d/abd pain/fever today. Mason Wright is grocery shopping for her according to the list she was given. No other concerns voiced at this time. Care Transitions Subsequent and Final Call    Subsequent and Final Calls  Do you have any ongoing symptoms?: No  Have your medications changed?: No  Do you have any questions related to your medications?: No  Do you currently have any active services?: No  Are you currently active with any services?: Home Health  Do you have any needs or concerns that I can assist you with?: No  Identified Barriers: Lack of Education  Care Transitions Interventions  No Identified Needs  Other Interventions:         Care Transitions Follow Up Call    Needs to be reviewed by the provider   Additional needs identified to be addressed with provider: No  none             Method of communication with provider : none      Care Transition Nurse (CTN) contacted the patient by telephone to follow up after admission on 22. Verified name and  with patient as identifiers. Addressed changes since last contact: none  Discussed follow-up appointments. If no appointment was previously scheduled, appointment scheduling offered: Yes.    Is follow up appointment scheduled within 7 days of discharge? Yes. Advance Care Planning:   Does patient have an Advance Directive: on file. CTN reviewed discharge instructions, medical action plan and red flags with patient and discussed any barriers to care and/or understanding of plan of care after discharge. Discussed appropriate site of care based on symptoms and resources available to patient including: PCP and Specialist. The patient agrees to contact the PCP office for questions related to their healthcare. Patients top risk factors for readmission: lack of knowledge about disease  medical condition-colitis  Interventions to address risk factors: Scheduled appointment with PCP-2/8/22 and Scheduled appointment with Specialist-GI 2/15/22      Non-[facility] follow up appointment(s):     CTN provided contact information for future needs. Plan for follow-up call in 7-10 days based on severity of symptoms and risk factors. Plan for next call: symptom management-diarrhea, abd pain, n/v?  referrals-possibly to amb dietician?         Follow Up  Future Appointments   Date Time Provider Darvin Morales   2/21/2022  2:00 PM [facility] ROOM 3 [facility]   3/10/2022  1:15 PM Mara Weiner [address] [facility]   3/17/2022  2:15 PM Yon Gomez MD [facility]   4/26/2022  2:00 PM MD Brett Pathak [facility]   8/8/2022 11:00 AM SCHEDULE, SRMX AWV LPN [address] [facility]   8/11/2022  1:00 PM INFUSION ROOM 1 - [address] [place] [address]       Sage Taylor RN

## 2022-02-21 ENCOUNTER — HOSPITAL ENCOUNTER (OUTPATIENT)
Dept: ULTRASOUND IMAGING | Age: 87
Discharge: HOME | End: 2022-02-21
Payer: MEDICARE

## 2022-02-21 DIAGNOSIS — N13.39 OTHER HYDRONEPHROSIS: ICD-10-CM

## 2022-02-21 PROCEDURE — 76775 US EXAM ABDO BACK WALL LIM: CPT

## 2022-02-22 ENCOUNTER — CARE COORDINATION (OUTPATIENT)
Dept: CASE MANAGEMENT | Age: 87
End: 2022-02-22

## 2022-02-22 NOTE — CARE COORDINATION
[facility] Transitions Follow Up Call    2022    Patient: Dina Velásquez  Patient : 1935   MRN: [id number]  Reason for Admission: gastroenteritis  Discharge Date: 22 RARS: Readmission Risk Score: 22 ( )         Spoke with: Ahsan Keyes today and she says she is doing pretty good overall. She did say she had an episode of nausea yesterday after breakfast (oatmeal & oat milk-no dairy products) and taking K+. CTN questioned why she was taking the K+ when her PCP said her K+ is wnl and she does not need to take it (see 22 note). Will message PCP to verify. Denies v/d/abd pain or cramping/fever. Taking Zofran for the nausea per GI Dr. With some relief and is eating 6 small meals per day. CTN suggested a food journal to track how she feels after eating certain foods. She is in agreement. Denies problems with urination/bowels. No other concerns voiced at this time. CTN will check back with her tomorrow after breakfast to see how she feels. Care Transitions Subsequent and Final Call    Subsequent and Final Calls  Do you have any ongoing symptoms?: Yes  Onset of Patient-reported symptoms:  In the past 7 days  Patient-reported symptoms: Nausea  Interventions for patient-reported symptoms: Notified PCP/Physician  Have your medications changed?: No  Do you have any questions related to your medications?: No  Do you currently have any active services?: No  Are you currently active with any services?: Home Health  Do you have any needs or concerns that I can assist you with?: No  Identified Barriers: Lack of Education  Care Transitions Interventions  No Identified Needs  Other Interventions:         Care Transitions Follow Up Call    Needs to be reviewed by the provider   Additional needs identified to be addressed with provider: Yes  medications-Ada should NOT be taking K+ supplements correct? (according to your note 22)             Method of communication with provider : chart routing      Care Transition Nurse (CTN) contacted the patient by telephone to follow up after admission on 22. Verified name and  with patient as identifiers. Addressed changes since last contact: medications-started taking K+ yesterday  Discussed follow-up appointments. If no appointment was previously scheduled, appointment scheduling offered: Yes. Is follow up appointment scheduled within 7 days of discharge? Yes. Advance Care Planning:   Does patient have an Advance Directive: on file. CTN reviewed discharge instructions, medical action plan and red flags with patient and discussed any barriers to care and/or understanding of plan of care after discharge. Discussed appropriate site of care based on symptoms and resources available to patient including: PCP and Specialist. The patient agrees to contact the PCP office for questions related to their healthcare. Patients top risk factors for readmission: lack of knowledge about disease  medical condition-gastroenteritis/colitis  multiple health system providers  Interventions to address risk factors: Scheduled appointment with PCP-22 and Scheduled appointment with Specialist-3/17/22 cardio      Non-[facility] follow up appointment(s):     CTN provided contact information for future needs. Plan for follow-up call in 1-2 days based on severity of symptoms and risk factors. Plan for next call: symptom management-nausea with breakfast? K+?         Follow Up  Future Appointments   Date Time Provider Darvin Morales   3/10/2022  [facility]   3/17/2022  2:15 PM Mohini Abdi MD [facility]   2022  2:00 PM Nelda Winchester MD [facility]   2022 11:00 AM SCHEDULE, SRMX AWV LPN [address] [facility]   2022  1:00 PM INFUSION ROOM 1 - [address] [place] [address]       Eloy Jarrell RN

## 2022-02-23 ENCOUNTER — CARE COORDINATION (OUTPATIENT)
Dept: CASE MANAGEMENT | Age: 87
End: 2022-02-23

## 2022-02-23 NOTE — CARE COORDINATION
CTN spoke with Vidhi Esquivel today as follow up re: nausea after breakfast yesterday. She did not take the K+ pill today and had no nausea. CTN informed her that Dr. Marlyne Meigs does NOT want her to take the K+. She expressed understanding. Will continue to follow.

## 2022-03-01 ENCOUNTER — CARE COORDINATION (OUTPATIENT)
Dept: CASE MANAGEMENT | Age: 87
End: 2022-03-01

## 2022-03-01 NOTE — CARE COORDINATION
[facility] Transitions Follow Up Call:Final Call    3/1/2022    Patient: Maxx Souas  Patient : 1935   MRN: [id number]  Reason for Admission: diarrhea  Discharge Date: 22 RARS: Readmission Risk Score: 22 ( )         Spoke with: Gm Winchester today and she said she is doing well. Has been busy and is going out this week to get her groceries. She is eating 6 small meals a day. She notices when she gets too hungry she eats too fast and too much and feels nauseated. She takes Zofran with relief. She had 1 bout of diarrhea this past week without abd pain or nausea, otherwise normal BM. CTN suggested keeping a food journal. She will consider this. Denies vomiting/fever/abd pain. Urinating normally. No other concerns voiced at this time. CTN informed of final call. Care Transitions Subsequent and Final Call    Subsequent and Final Calls  Do you have any ongoing symptoms?: Yes  Onset of Patient-reported symptoms: In the past 7 days  Patient-reported symptoms: Nausea  Interventions for patient-reported symptoms: Notified PCP/Physician  Have your medications changed?: No  Do you have any questions related to your medications?: No  Do you currently have any active services?: No  Are you currently active with any services?: Home Health  Do you have any needs or concerns that I can assist you with?: No  Identified Barriers: Lack of Education  Care Transitions Interventions  No Identified Needs  Other Interventions:         Care Transitions Follow Up Call    Needs to be reviewed by the provider   Additional needs identified to be addressed with provider: No  none             Method of communication with provider : none      Care Transition Nurse (CTN) contacted the patient by telephone to follow up after admission on 22. Verified name and  with patient as identifiers. Addressed changes since last contact: none  Discussed follow-up appointments.  If no appointment was previously scheduled, appointment scheduling offered: Yes. Is follow up appointment scheduled within 7 days of discharge? Yes. Advance Care Planning:   Does patient have an Advance Directive: on file. CTN reviewed discharge instructions, medical action plan and red flags with patient and discussed any barriers to care and/or understanding of plan of care after discharge. Discussed appropriate site of care based on symptoms and resources available to patient including: PCP and Specialist. The patient agrees to contact the PCP office for questions related to their healthcare. Patients top risk factors for readmission: lack of knowledge about disease  medical condition-gastroenteritis  Interventions to address risk factors: Scheduled appointment with PCP-4/26/22 and Scheduled appointment with [address] [place], [place] [address]       [id number] Deepali Marte follow up appointment(s):     CTN provided contact information for future needs. No further follow-up call indicated based on severity of symptoms and risk factors.   Plan for next call: none        Follow Up  Future Appointments   Date Time Provider Darvin Morales   3/10/2022  1:15 PM [facility]   3/17/2022  2:15 PM Thais Miller MD [facility]   4/26/2022  2:00 PM MD Blu [facility]   8/8/2022 11:00 AM SCHEDULE, SR[facility] AWV OLGA N [facility]   8/11/2022  1:00 PM INFUSION ROOM 1 - [address] [place] [address]       Monse Macdonald RN

## 2022-03-07 DIAGNOSIS — E78.2 MIXED HYPERLIPIDEMIA: ICD-10-CM

## 2022-03-07 RX ORDER — ROSUVASTATIN CALCIUM 10 MG/1
TABLET, COATED ORAL
Qty: 90 TABLET | Refills: 3 | Status: SHIPPED | OUTPATIENT
Start: 2022-03-07

## 2022-03-11 ENCOUNTER — NURSE ONLY (OUTPATIENT)
Dept: INTERNAL MEDICINE CLINIC | Age: 87
End: 2022-03-11
Payer: MEDICARE

## 2022-03-11 VITALS — TEMPERATURE: 97.2 F

## 2022-03-11 DIAGNOSIS — E53.8 VITAMIN B 12 DEFICIENCY: Primary | ICD-10-CM

## 2022-03-11 PROCEDURE — 96372 THER/PROPH/DIAG INJ SC/IM: CPT | Performed by: FAMILY MEDICINE

## 2022-03-11 RX ORDER — CYANOCOBALAMIN 1000 UG/ML
1000 INJECTION INTRAMUSCULAR; SUBCUTANEOUS ONCE
Status: COMPLETED | OUTPATIENT
Start: 2022-03-11 | End: 2022-03-11

## 2022-03-11 RX ADMIN — CYANOCOBALAMIN 1000 MCG: 1000 INJECTION INTRAMUSCULAR; SUBCUTANEOUS at 11:12

## 2022-03-17 ENCOUNTER — OFFICE VISIT (OUTPATIENT)
Dept: CARDIOLOGY CLINIC | Age: 87
End: 2022-03-17
Payer: MEDICARE

## 2022-03-17 VITALS
SYSTOLIC BLOOD PRESSURE: 124 MMHG | WEIGHT: 136 LBS | BODY MASS INDEX: 24.1 KG/M2 | HEART RATE: 76 BPM | DIASTOLIC BLOOD PRESSURE: 58 MMHG | HEIGHT: 63 IN

## 2022-03-17 DIAGNOSIS — Z95.1 S/P CABG X 3: ICD-10-CM

## 2022-03-17 DIAGNOSIS — Z86.718 HISTORY OF DVT (DEEP VEIN THROMBOSIS): ICD-10-CM

## 2022-03-17 DIAGNOSIS — I47.1 SUPRAVENTRICULAR TACHYCARDIA (HCC): ICD-10-CM

## 2022-03-17 DIAGNOSIS — I77.1 SUBCLAVIAN ARTERIAL STENOSIS (HCC): ICD-10-CM

## 2022-03-17 DIAGNOSIS — I73.9 PAD (PERIPHERAL ARTERY DISEASE) (HCC): ICD-10-CM

## 2022-03-17 DIAGNOSIS — I65.23 BILATERAL CAROTID ARTERY STENOSIS: ICD-10-CM

## 2022-03-17 DIAGNOSIS — I25.810 CORONARY ARTERY DISEASE INVOLVING CORONARY BYPASS GRAFT OF NATIVE HEART WITHOUT ANGINA PECTORIS: ICD-10-CM

## 2022-03-17 DIAGNOSIS — I10 ESSENTIAL HYPERTENSION: ICD-10-CM

## 2022-03-17 DIAGNOSIS — I34.1 MVP (MITRAL VALVE PROLAPSE): ICD-10-CM

## 2022-03-17 DIAGNOSIS — I25.10 CORONARY ARTERY DISEASE INVOLVING NATIVE CORONARY ARTERY OF NATIVE HEART WITHOUT ANGINA PECTORIS: Primary | ICD-10-CM

## 2022-03-17 DIAGNOSIS — I25.5 ISCHEMIC CARDIOMYOPATHY: ICD-10-CM

## 2022-03-17 PROCEDURE — G8427 DOCREV CUR MEDS BY ELIG CLIN: HCPCS | Performed by: INTERNAL MEDICINE

## 2022-03-17 PROCEDURE — 1090F PRES/ABSN URINE INCON ASSESS: CPT | Performed by: INTERNAL MEDICINE

## 2022-03-17 PROCEDURE — G8420 CALC BMI NORM PARAMETERS: HCPCS | Performed by: INTERNAL MEDICINE

## 2022-03-17 PROCEDURE — 99213 OFFICE O/P EST LOW 20 MIN: CPT | Performed by: INTERNAL MEDICINE

## 2022-03-17 PROCEDURE — G8399 PT W/DXA RESULTS DOCUMENT: HCPCS | Performed by: INTERNAL MEDICINE

## 2022-03-17 PROCEDURE — 4040F PNEUMOC VAC/ADMIN/RCVD: CPT | Performed by: INTERNAL MEDICINE

## 2022-03-17 PROCEDURE — G8482 FLU IMMUNIZE ORDER/ADMIN: HCPCS | Performed by: INTERNAL MEDICINE

## 2022-03-17 PROCEDURE — 1036F TOBACCO NON-USER: CPT | Performed by: INTERNAL MEDICINE

## 2022-03-17 PROCEDURE — 1123F ACP DISCUSS/DSCN MKR DOCD: CPT | Performed by: INTERNAL MEDICINE

## 2022-03-17 RX ORDER — UBIDECARENONE 75 MG
50 CAPSULE ORAL DAILY
COMMUNITY
End: 2022-08-18

## 2022-03-17 RX ORDER — LANOLIN ALCOHOL/MO/W.PET/CERES
325 CREAM (GRAM) TOPICAL
Status: ON HOLD | COMMUNITY
End: 2022-05-11 | Stop reason: HOSPADM

## 2022-03-17 NOTE — PATIENT INSTRUCTIONS
CORONARY ARTERY DISEASE:Yes   clinically stable. Patient is on optimal medical regimen ( see medication list above )  -  Patient is currently  asymptomatic from CAD.          - changes in  treatment:   no, on ASA           - Testing ordered:  no  Morrill classification: 1  3/2021    Normal study    Normal perfusion study with normal distribution in all coronal, short, and    horizontal axis.    The observed defect is consistent with diaphragmatic attenuation.  LVEF calculated by the computer is probably falsely low. LVEF is 40 %      HTN:BP is better.              - changes in  treatment: no, on Verapamil   CARDIOMYOPATHY:  known   CONGESTIVE HEART FAILURE: NO KNOWN HISTORY.      VHD: No significant VHD noted  ECHO 9/2020   Left ventricular function is normal, EF is estimated at 55-60%.  Mild left ventricular hypertrophy.   Normal diastolic filling pattern for age.  Suzette Warren significant valvular disease noted.   No evidence of pericardial effusion. DYSLIPIDEMIA: Patient's profile is at / near Goal.no, Triglycerides are still high but better than before.                                HDL is low                                Tolerating current medical regimen wellyes, Takes Vascepa & Crestor                                                        See most recent Lab values in Labs section above. CAROTID ARTERY DISEASE:.Right Subclavian stenosis. No C/O     ARRHYTHMIAS:  Known H/O SVT. Frequent PVCs                                 No C/O    TESTS ORDERED: none this visit     PREVIOUSLY ORDERED TESTS REVIEWED & DISCUSSED WITH THE PATIENT:     I personally reviewed & interpreted, all previously ordered tests as copied above. Latest Labs are pulled in to the note with dates. Labs, specially in Reference to Lipid profile, Cardiac testing in the form of Echo ( dated: ), stress tests ( dated: ) & other relevant cardiac testing reviewed with patient & recommendations made based on assessment of the results.     Discussed role of Cardiac risk factors & effects + treatment of co morbidities with patient & advised accordingly. MEDICATIONS: List of medications patient is currently taking is reviewed in detail with the patient. Discussed any side effects or problems taking the medication. Recommend Continue present management & medications as listed. AFFIRMATION: I spent at least 20 minutes of time reviewing patient's history, previous & current medical problems & all Labs + testing. This includes chart prep even prior to the vosit. Various goals are discussed and multiple questions answered. Relevant concelling performed. Office follow up in six months.

## 2022-03-17 NOTE — PROGRESS NOTES
) 90 capsule 1    Icosapent Ethyl (VASCEPA) 0.5 g CAPS take 2 capsules by mouth twice a day 120 capsule 5    Handicap Placard MISC by Does not apply route Good for 3 years 1 each 0    fluticasone (FLONASE) 50 MCG/ACT nasal spray instill 1 spray into each nostril once daily 1 Bottle 3    divalproex (DEPAKOTE ER) 250 MG extended release tablet Take 250 mg by mouth nightly       Multiple Vitamins-Minerals (OCUVITE-LUTEIN PO) Take by mouth      calcium-vitamin D (OSCAL 500/200 D-3) 500-200 MG-UNIT per tablet Take 1 tablet by mouth 2 times daily        aspirin 81 MG EC tablet Take 81 mg by mouth daily.         ondansetron (ZOFRAN-ODT) 4 MG disintegrating tablet Take 1 tablet by mouth 2 times daily as needed for Nausea or Vomiting (Patient not taking: Reported on 2/11/2022) 15 tablet 1     Current Facility-Administered Medications   Medication Dose Route Frequency Provider Last Rate Last Admin    cyanocobalamin injection 1,000 mcg  1,000 mcg SubCUTAneous Q30 Days Alejandro Griffiths MD   1,000 mcg at 10/20/20 1625     Allergies: Alendronate sodium, Bactrim [sulfamethoxazole-trimethoprim], Boniva [ibandronate sodium], Colesevelam, Lisinopril, Macrobid [nitrofurantoin], Neggram [nalidixic acid], Pce [erythromycin], Penicillins, Risperidone and related, Welchol [colesevelam hcl], Carbamazepine, Lovaza [omega-3-acid ethyl esters], Metoprolol, and Pyridium [phenazopyridine]  Review of Systems:    Constitutional: Negative for diaphoresis and fatigue  Respiratory: Negative for shortness of breath  Cardiovascular: Negative for chest pain, dyspnea on exertion, claudication, edema, irregular heartbeat, murmur, palpitations or shortness of breath  Musculoskeletal: Negative for muscle pain, muscular weakness, negative for pain in arm and leg or swelling in foot and leg    Objective:  BP (!) 124/58   Pulse 76   Ht 5' 3\" (1.6 m)   Wt 136 lb (61.7 kg)   BMI 24.09 kg/m²   Wt Readings from Last 3 Encounters:   03/17/22 136 lb (61.7 kg)   03/10/22 133 lb (60.3 kg)   02/11/22 133 lb (60.3 kg)     Body mass index is 24.09 kg/m². GENERAL - Alert, oriented, pleasant, in no apparent distress. EYES: No jaundice, no conjunctival pallor. Neck - Supple. No jugular venous distention noted. No carotid bruits. Cardiovascular - Normal S1 and S2 without obvious murmur or gallop. Extremities - No cyanosis, clubbing, or significant edema. Pulmonary - No respiratory distress. No wheezes or rales.       MEDICAL DECISION MAKING & DATA REVIEW:    Lab Review   Lab Results   Component Value Date    TROPONINT <0.010 01/31/2022    TROPONINT <0.010 11/10/2021     No results found for: BNP, PROBNP  Lab Results   Component Value Date    INR 0.95 02/01/2022    INR 0.96 01/25/2016     Lab Results   Component Value Date    LABA1C 5.8 08/31/2021    LABA1C 6.1 03/22/2021     Lab Results   Component Value Date    WBC 5.8 02/04/2022    WBC 5.5 02/03/2022    HCT 34.5 (L) 02/04/2022    HCT 35.6 (L) 02/03/2022    MCV 98.0 02/04/2022    .6 (H) 02/03/2022     02/04/2022     02/03/2022     Lab Results   Component Value Date    CHOL 129 03/22/2021    CHOL 129 06/22/2020    TRIG 194 (H) 03/22/2021    TRIG 273 (H) 06/22/2020    HDL 33 (L) 03/22/2021    HDL 30 (L) 06/22/2020    LDLCALC 44 06/22/2020    LDLCALC 44 01/23/2020    LDLDIRECT 70 03/22/2021    LDLDIRECT 93 08/22/2016     Lab Results   Component Value Date    ALT 11 01/31/2022    ALT 12 01/28/2022    AST 22 01/31/2022    AST 24 01/28/2022     BMP:    Lab Results   Component Value Date     02/04/2022     02/03/2022    K 4.3 02/04/2022    K 4.6 02/03/2022    CL 97 02/04/2022    CL 98 02/03/2022    CO2 25 02/04/2022    CO2 22 02/03/2022    BUN 16 02/04/2022    BUN 13 02/03/2022    CREATININE 0.8 02/04/2022    CREATININE 0.7 02/03/2022     CMP:   Lab Results   Component Value Date     02/04/2022     02/03/2022    K 4.3 02/04/2022    K 4.6 02/03/2022    CL 97 02/04/2022 CL 98 02/03/2022    CO2 25 02/04/2022    CO2 22 02/03/2022    BUN 16 02/04/2022    BUN 13 02/03/2022    CREATININE 0.8 02/04/2022    CREATININE 0.7 02/03/2022    PROT 5.5 01/31/2022    PROT 6.5 01/28/2022    PROT 7.5 01/02/2013    PROT 7.4 10/13/2012     Lab Results   Component Value Date    TSH 3.250 03/06/2019    TSH 4.270 04/20/2018    TSHHS 5.080 02/03/2016       QUALITY MEASURES REVIEWED:  1.CAD:Patient is taking anti platelet agent:Yes  2. DYSLIPIDEMIA: Patient is on cholesterol lowering medication:Yes   3. Beta-Blocker therapy for CAD, if prior Myocardial Infarction:No   4. Counselled regarding smoking cessation. No   Patient does not Smoke. 5.Anticoagulation therapy (for A.Fib) No   Does Not have A.Fib.  6.Discussed weight management strategies. Assessment & Plan:  Primary / Secondary prevention is the goal by aggressive risk modification, healthy and therapeutic life style changes for cardiovascular risk reduction. CORONARY ARTERY DISEASE:Yes   clinically stable. Patient is on optimal medical regimen ( see medication list above )  -  Patient is currently  asymptomatic from CAD.          - changes in  treatment:   no, on ASA           - Testing ordered:  no  Gallia classification: 1  3/2021    Normal study    Normal perfusion study with normal distribution in all coronal, short, and    horizontal axis.    The observed defect is consistent with diaphragmatic attenuation.  LVEF calculated by the computer is probably falsely low. LVEF is 40 %      HTN:BP is better.              - changes in  treatment: no, on Verapamil   CARDIOMYOPATHY:  known   CONGESTIVE HEART FAILURE: NO KNOWN HISTORY.      VHD: No significant VHD noted  ECHO 9/2020   Left ventricular function is normal, EF is estimated at 55-60%.  Mild left ventricular hypertrophy.   Normal diastolic filling pattern for age.  Jaden Reyes significant valvular disease noted.   No evidence of pericardial effusion.   DYSLIPIDEMIA: Patient's profile is at / near Goal.no, Triglycerides are still high but better than before.                                HDL is low                                Tolerating current medical regimen wellyes, Takes Vascepa & Crestor                                                        See most recent Lab values in Labs section above. CAROTID ARTERY DISEASE:.Right Subclavian stenosis. No C/O     ARRHYTHMIAS:  Known H/O SVT. Frequent PVCs                                 No C/O    TESTS ORDERED: none this visit     PREVIOUSLY ORDERED TESTS REVIEWED & DISCUSSED WITH THE PATIENT:     I personally reviewed & interpreted, all previously ordered tests as copied above. Latest Labs are pulled in to the note with dates. Labs, specially in Reference to Lipid profile, Cardiac testing in the form of Echo ( dated: ), stress tests ( dated: ) & other relevant cardiac testing reviewed with patient & recommendations made based on assessment of the results. Discussed role of Cardiac risk factors & effects + treatment of co morbidities with patient & advised accordingly. MEDICATIONS: List of medications patient is currently taking is reviewed in detail with the patient. Discussed any side effects or problems taking the medication. Recommend Continue present management & medications as listed. AFFIRMATION: I spent at least 20 minutes of time reviewing patient's history, previous & current medical problems & all Labs + testing. This includes chart prep even prior to the vosit. Various goals are discussed and multiple questions answered. Relevant concelling performed. Office follow up in six months.

## 2022-03-18 RX ORDER — ICOSAPENT ETHYL 500 MG/1
CAPSULE ORAL
Qty: 120 CAPSULE | Refills: 5 | Status: ON HOLD | OUTPATIENT
Start: 2022-03-18 | End: 2022-05-11 | Stop reason: HOSPADM

## 2022-03-24 ENCOUNTER — CARE COORDINATION (OUTPATIENT)
Dept: CARE COORDINATION | Age: 87
End: 2022-03-24

## 2022-03-24 NOTE — CARE COORDINATION
ACM call to pt regarding care coordination. ACM reviewed role and CC. Pt declines the need for enrollment at this time. ACM encouraged to contact PCP office any any future needs.

## 2022-04-01 NOTE — PROGRESS NOTES
- Continue home statin.    Report given to ARU via telephone. Patient transported to bed 1024 with belongings.

## 2022-04-07 ENCOUNTER — OFFICE VISIT (OUTPATIENT)
Dept: ORTHOPEDIC SURGERY | Age: 87
End: 2022-04-07
Payer: MEDICARE

## 2022-04-07 VITALS
HEIGHT: 63 IN | HEART RATE: 70 BPM | RESPIRATION RATE: 16 BRPM | OXYGEN SATURATION: 93 % | WEIGHT: 136 LBS | BODY MASS INDEX: 24.1 KG/M2

## 2022-04-07 DIAGNOSIS — M19.011 PRIMARY OSTEOARTHRITIS OF RIGHT SHOULDER: Primary | ICD-10-CM

## 2022-04-07 PROCEDURE — 20610 DRAIN/INJ JOINT/BURSA W/O US: CPT | Performed by: PHYSICIAN ASSISTANT

## 2022-04-07 ASSESSMENT — ENCOUNTER SYMPTOMS
RESPIRATORY NEGATIVE: 1
GASTROINTESTINAL NEGATIVE: 1
EYES NEGATIVE: 1

## 2022-04-07 NOTE — PROGRESS NOTES
[facility]      HPI:  Geo Hartman is a 82 y.o. female that returns the office today with recurrent right shoulder pain. She states that the pain today is not as bad as it is sometimes but the pain when she is doing her pain does increase. Currently her pain is a 3/10 at rest.  She states that is not as bad lately because she has not been as active but she wants to be more active and anticipates that her shoulder will start hurting more. She would like to go ahead with a steroid injection today. 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 Alesia Sainz DVT (deep venous thrombosis) ([facility] [address] 1970    left leg    Former smoker     History of echocardiogram 09/21/2020    EF 55-60%, mild left ventricular hypertrophy, normal diastolic filling pattern for age, no signficiant valvular disease, no pericardial effusion     History of stress test 03/25/2021    normal LVEF calculated by the computer is probably falsely low.  LVEF is 40 %    Hx of Doppler ultrasound 03/01/2018    Carotid-mild 0-49% bilateral carotid,50% stenosis right subclavian    Hyperlipidemia     Hypertension     Macular degeneration     right    Mild cognitive impairment 02/2012    MMSE 26/30    MVP (mitral valve prolapse)     trace on echo 2014    Osteoporosis 05/2012    Pancytopenia 05/2011    mild with ; Dr Alexandra hawk 2010    Peptic ulcer disease     Peripheral vascular disease ([address] 01/2016    angio; dis below ankles; pletal    Prediabetes 2006    Recurrent ventral incisional hernia 2013    medial apex of GB scar    S/P CABG x 3 2003    3-vessel; Dr Zana Mattson 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 Eunice Pham; Right side       Past Surgical History:   Procedure Laterality Date    CATARACT REMOVAL Right     poor result ; blind right eye; [place]    CHOLECYSTECTOMY      CORONARY ARTERY BYPASS GRAFT  2009    3 vessel    SHOULDER ARTHROSCOPY Right     TUBAL LIGATION      URETER SURGERY      Right ureteral repair       Family History   Problem Relation Age of Onset    High Blood Pressure Mother     Heart Attack Mother     Other Mother         unsteady gait    Other Father         silcosis    Cancer Sister 79        Breast cancer 2017    Stroke Sister        Social History     Socioeconomic History    Marital status:      Spouse name: Randy Moreno Number of children: 2    Years of education: 15    Highest education level: None   Occupational History    Occupation: retired   Tobacco Use    Smoking status: Former Smoker     Packs/day: 0.25     Years: 9.00     Pack years: 2.25     Types: Cigarettes     Start date:      Quit date: 1985     Years since quittin.3    Smokeless tobacco: Never Used   Vaping Use    Vaping Use: Never used   Substance and Sexual Activity    Alcohol use: No     Alcohol/week: 0.0 standard drinks    Drug use: No    Sexual activity: Not Currently     Partners: Male   Other Topics Concern    None   Social History Narrative    None     Social Determinants of Health     Financial Resource Strain: Low Risk     Difficulty of Paying Living Expenses: Not hard at all   Food Insecurity: No Food Insecurity    Worried About [employer] [employer] in the Last Year: Never true    [facility] in the Last Year: Never true   Transportation Needs: No Transportation Needs    Lack of Transportation (Medical): No    Lack of Transportation (Non-Medical): No   Physical Activity: Inactive    Days of Exercise per Week: 0 days    Minutes of Exercise per Session: 0 min   Stress: No Stress Concern Present    Feeling of Stress : Not at all   Social Connections:  Moderately Integrated    Frequency of Communication with Friends and Family: Twice a week    Frequency of Social Gatherings with Friends and Family: Once a week    Attends [employer] Services: More than 4 times per year    Active Member of [address] [place] [address] or Organizations: Yes    Attends Club or Organization Meetings: More than 4 times per year    Marital Status:     Intimate Partner Violence: Not At Risk    Fear of Current or Ex-Partner: No    Emotionally Abused: No    Physically Abused: No    Sexually Abused: No   Housing Stability: Low Risk     Unable to Pay for Housing in the Last Year: No    Number of Places Lived in the Last Year: 1    Unstable Housing in the Last Year: No       Current Outpatient Medications   Medication Sig Dispense Refill    VASCEPA 0.5 g CAPS take 2 capsules by mouth twice a day 120 capsule 5    ferrous sulfate (FE TABS 325) 325 (65 Fe) MG EC tablet Take 325 mg by mouth 3 times daily (with meals)      vitamin B-12 (CYANOCOBALAMIN) 100 MCG tablet Take 50 mcg by mouth daily      rosuvastatin (CRESTOR) 10 MG tablet take 1 tablet by mouth once daily 90 tablet 3    verapamil (CALAN SR) 120 MG extended release tablet Take 1 tablet by mouth daily 30 tablet 5    pyridostigmine (MESTINON) 60 MG tablet Take 0.5 tablets by mouth daily 45 tablet 5    pantoprazole (PROTONIX) 40 MG tablet Take 1 tablet by mouth 2 times daily (Patient taking differently: Take 40 mg by mouth daily ) 180 tablet 1    ondansetron (ZOFRAN-ODT) 4 MG disintegrating tablet Take 1 tablet by mouth 2 times daily as needed for Nausea or Vomiting 15 tablet 1    ketotifen (ZADITOR) 0.025 % ophthalmic solution Place 1 drop into both eyes 2 times daily      sertraline (ZOLOFT) 25 MG tablet take 1/2 tablet by mouth once daily for 2 days then take 1 tablet by mouth once daily THEREAFTER      gabapentin (NEURONTIN) 300 MG capsule take 1 tablet by mouth three times a day for TRIGEMINAL NEURALGIA (Patient taking differently: Take 300 mg by mouth 3 times daily. ) 90 capsule 1    Handicap Placard MISC by Does not apply route Good for 3 years 1 each 0    fluticasone (FLONASE) 50 MCG/ACT nasal spray instill 1 spray into each nostril once daily 1 Bottle 3    divalproex (DEPAKOTE ER) 250 MG extended release tablet Take 250 mg by mouth nightly       Multiple Vitamins-Minerals (OCUVITE-LUTEIN PO) Take by mouth      calcium-vitamin D (OSCAL 500/200 D-3) 500-200 MG-UNIT per tablet Take 1 tablet by mouth 2 times daily        aspirin 81 MG EC tablet Take 81 mg by mouth daily. Current Facility-Administered Medications   Medication Dose Route Frequency Provider Last Rate Last Admin    cyanocobalamin injection 1,000 mcg  1,000 mcg SubCUTAneous Q30 Days Inga Ferreira MD   1,000 mcg at 10/20/20 1625       Allergies   Allergen Reactions    Alendronate Sodium Other (See Comments)     GI upset    Bactrim [Sulfamethoxazole-Trimethoprim] Anaphylaxis    Boniva [Ibandronate Sodium] Other (See Comments)     Gi upset and declined egd; also to fosamax    Colesevelam     Lisinopril Other (See Comments)     Severe hyperkalemia (6) with bun >56      Macrobid [Nitrofurantoin]     Neggram [Nalidixic Acid]     Pce [Erythromycin]     Penicillins     Risperidone And Related     Welchol [Colesevelam Hcl] Other (See Comments)     constipation    Carbamazepine Nausea And Vomiting    Lovaza [Omega-3-Acid Ethyl Esters] Nausea And Vomiting    Metoprolol Nausea And Vomiting    Pyridium [Phenazopyridine] Palpitations       Vitals:    04/07/22 1309   Pulse: 70   Resp: 16   SpO2: 93%   Weight: 136 lb (61.7 kg)   Height: 5' 3\" (1.6 m)         Review of Systems:   Review of Systems   Constitutional: Negative. HENT: Negative. Eyes: Negative. Respiratory: Negative. Cardiovascular: Negative. Gastrointestinal: Negative. Genitourinary: Negative. Musculoskeletal: Positive for arthralgias and myalgias. Skin: Negative. Neurological: Negative. Psychiatric/Behavioral: Negative. Physical Exam:   Gen/Psych:Examination reveals a pleasant individual in no acute distress. The patient is oriented to time, place and person. The patient's mood and affect are appropriate. Patient appears well nourished. Body habitus is normal    HEENT: Head is atraumatic normocephalic, ears are symmetric, eyes show equal pupils bilaterally, extraocular muscles intact. Hearing is intact to normal voice at 5 feet. Nares are patent bilaterally, no epistaxis, no rhinorrhea. Lymph:  No obvious lymphedema in bilateral upper extremities     Skin: Intact in bilateral upper extremities with no ulcerations, lesions, rash, erythema. Vascular: There are no varicosities in bilateral upper  extremities, sensation intact to light touch over bilateral upper extremities. Musculoskeletal:  Right shoulder exam:  Skin:  Clear with no erythema, there is no significant joint effusion  Deformity:  none  Atrophy:  none  Tenderness:  mild globally  Active ROM:   FE:130    IR side: L4           ER side: 40   Ad/Abduction: 130        Strength: FE:5-/5     ER:5/5   Neer:  mildly positive  Ferrari:  mildly positive    Imaging studies:  X-ray of the right shoulder reviewed which showed flattening of the humeral head with subchondral sclerosis within the glenoid and humeral head and osteophytes throughout the glenohumeral joint. There is also impingement of the acromion on the superior aspect of the humeral head. Assessment:    Diagnosis Orders   1. 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 Weightbearing as tolerated    Right [facility]) Joint Aspiration / Injection Procedure:  Multiple treatment options were discussed. This injection was recommended as a part of the overall treatment plan. Details of the procedure, potential risks, and potential benefits were discussed. Patient's questions were answered. Patient elected to proceed with procedure. Medication: 1 mL's of 1% plain lidocaine, 1 mL (40 mg/mL) Kenalog, 1 mL 0.5% bupivacaine  Procedure:  Sterile technique was used as the skin over the injection site was prepped with alcohol. The right glenohumeral joint was then injected with the above listed medication. A sterile bandage was placed over the injection site. The patient tolerated the procedure well without complication. *Please note this report has been partially produced using speech recognition Dragon software and may contain errors related to that system including errors in grammar, punctuation, and spelling, as well as words and phrases that may be inappropriate.  If there are any questions or concerns please feel free to contact the dictating provider for clarification

## 2022-04-13 ENCOUNTER — NURSE ONLY (OUTPATIENT)
Dept: INTERNAL MEDICINE CLINIC | Age: 87
End: 2022-04-13
Payer: MEDICARE

## 2022-04-13 PROCEDURE — 96372 THER/PROPH/DIAG INJ SC/IM: CPT | Performed by: FAMILY MEDICINE

## 2022-04-13 RX ADMIN — CYANOCOBALAMIN 1000 MCG: 1000 INJECTION INTRAMUSCULAR; SUBCUTANEOUS at 14:09

## 2022-04-20 RX ORDER — FLUTICASONE PROPIONATE 50 MCG
SPRAY, SUSPENSION (ML) NASAL
Qty: 1 EACH | Refills: 3 | Status: CANCELLED | OUTPATIENT
Start: 2022-04-20

## 2022-04-20 NOTE — TELEPHONE ENCOUNTER
Spoke with patient does not want the  ferosul filled It makes her nauseous. She buys it over the counter.

## 2022-04-21 RX ORDER — FLUTICASONE PROPIONATE 50 MCG
SPRAY, SUSPENSION (ML) NASAL
Qty: 1 EACH | Refills: 3 | Status: SHIPPED | OUTPATIENT
Start: 2022-04-21

## 2022-04-21 RX ORDER — FERROUS SULFATE 325(65) MG
TABLET ORAL
Qty: 90 TABLET | Refills: 1 | OUTPATIENT
Start: 2022-04-21

## 2022-04-26 ENCOUNTER — HOSPITAL ENCOUNTER (INPATIENT)
Age: 87
LOS: 5 days | Discharge: NURSING, REHAB OR LONG-TERM CARE | DRG: 392 | End: 2022-05-02
Attending: EMERGENCY MEDICINE | Admitting: INTERNAL MEDICINE
Payer: MEDICARE

## 2022-04-26 ENCOUNTER — TELEMEDICINE (OUTPATIENT)
Dept: INTERNAL MEDICINE CLINIC | Age: 87
End: 2022-04-26
Payer: MEDICARE

## 2022-04-26 ENCOUNTER — APPOINTMENT (OUTPATIENT)
Dept: CT IMAGING | Age: 87
DRG: 392 | End: 2022-04-26
Payer: MEDICARE

## 2022-04-26 DIAGNOSIS — R11.0 NAUSEA: ICD-10-CM

## 2022-04-26 DIAGNOSIS — F33.42 MAJOR DEPRESSIVE DISORDER, RECURRENT, IN FULL REMISSION (HCC): ICD-10-CM

## 2022-04-26 DIAGNOSIS — K57.32 DIVERTICULITIS OF COLON: Primary | ICD-10-CM

## 2022-04-26 DIAGNOSIS — N18.30 STAGE 3 CHRONIC KIDNEY DISEASE, UNSPECIFIED WHETHER STAGE 3A OR 3B CKD (HCC): ICD-10-CM

## 2022-04-26 DIAGNOSIS — R73.03 PREDIABETES: ICD-10-CM

## 2022-04-26 DIAGNOSIS — R11.0 NAUSEA: Primary | ICD-10-CM

## 2022-04-26 DIAGNOSIS — E78.2 MIXED HYPERLIPIDEMIA: ICD-10-CM

## 2022-04-26 LAB
ALBUMIN SERPL-MCNC: 4.2 GM/DL (ref 3.4–5)
ALP BLD-CCNC: 39 IU/L (ref 40–129)
ALT SERPL-CCNC: 25 U/L (ref 10–40)
ANION GAP SERPL CALCULATED.3IONS-SCNC: 14 MMOL/L (ref 4–16)
AST SERPL-CCNC: 41 IU/L (ref 15–37)
BACTERIA: NEGATIVE /HPF
BASOPHILS ABSOLUTE: 0 K/CU MM
BASOPHILS RELATIVE PERCENT: 0.3 % (ref 0–1)
BILIRUB SERPL-MCNC: 0.4 MG/DL (ref 0–1)
BILIRUBIN URINE: NEGATIVE MG/DL
BLOOD, URINE: NEGATIVE
BUN BLDV-MCNC: 14 MG/DL (ref 6–23)
CALCIUM SERPL-MCNC: 8.7 MG/DL (ref 8.3–10.6)
CAST TYPE: NORMAL /HPF
CHLORIDE BLD-SCNC: 91 MMOL/L (ref 99–110)
CLARITY: CLEAR
CO2: 23 MMOL/L (ref 21–32)
COLOR: YELLOW
CREAT SERPL-MCNC: 0.9 MG/DL (ref 0.6–1.1)
CRYSTAL TYPE: NEGATIVE /HPF
DIFFERENTIAL TYPE: ABNORMAL
EOSINOPHILS ABSOLUTE: 0 K/CU MM
EOSINOPHILS RELATIVE PERCENT: 0.6 % (ref 0–3)
EPITHELIAL CELLS, UA: 1 /HPF
GFR AFRICAN AMERICAN: >60 ML/MIN/1.73M2
GFR NON-AFRICAN AMERICAN: 60 ML/MIN/1.73M2
GLUCOSE BLD-MCNC: 129 MG/DL (ref 70–99)
GLUCOSE, URINE: NEGATIVE MG/DL
HCT VFR BLD CALC: 36.5 % (ref 37–47)
HEMOGLOBIN: 12 GM/DL (ref 12.5–16)
IMMATURE NEUTROPHIL %: 0.6 % (ref 0–0.43)
KETONES, URINE: NEGATIVE MG/DL
LEUKOCYTE ESTERASE, URINE: NEGATIVE
LIPASE: 10 IU/L (ref 13–60)
LYMPHOCYTES ABSOLUTE: 2.5 K/CU MM
LYMPHOCYTES RELATIVE PERCENT: 37.4 % (ref 24–44)
MCH RBC QN AUTO: 32.8 PG (ref 27–31)
MCHC RBC AUTO-ENTMCNC: 32.9 % (ref 32–36)
MCV RBC AUTO: 99.7 FL (ref 78–100)
MONOCYTES ABSOLUTE: 0.7 K/CU MM
MONOCYTES RELATIVE PERCENT: 10.1 % (ref 0–4)
NITRITE URINE, QUANTITATIVE: NEGATIVE
PDW BLD-RTO: 13.8 % (ref 11.7–14.9)
PH, URINE: 8 (ref 5–8)
PLATELET # BLD: 164 K/CU MM (ref 140–440)
PMV BLD AUTO: 9.3 FL (ref 7.5–11.1)
POTASSIUM SERPL-SCNC: 4.5 MMOL/L (ref 3.5–5.1)
PROTEIN UA: NEGATIVE MG/DL
RBC # BLD: 3.66 M/CU MM (ref 4.2–5.4)
RBC URINE: NORMAL /HPF (ref 0–6)
SEGMENTED NEUTROPHILS ABSOLUTE COUNT: 3.4 K/CU MM
SEGMENTED NEUTROPHILS RELATIVE PERCENT: 51 % (ref 36–66)
SODIUM BLD-SCNC: 128 MMOL/L (ref 135–145)
SPECIFIC GRAVITY UA: 1.01 (ref 1–1.03)
TOTAL IMMATURE NEUTOROPHIL: 0.04 K/CU MM
TOTAL PROTEIN: 7.1 GM/DL (ref 6.4–8.2)
TROPONIN T: <0.01 NG/ML
UROBILINOGEN, URINE: 0.2 MG/DL (ref 0.2–1)
WBC # BLD: 6.6 K/CU MM (ref 4–10.5)
WBC UA: <1 /HPF (ref 0–5)

## 2022-04-26 PROCEDURE — 80053 COMPREHEN METABOLIC PANEL: CPT

## 2022-04-26 PROCEDURE — 2500000003 HC RX 250 WO HCPCS: Performed by: EMERGENCY MEDICINE

## 2022-04-26 PROCEDURE — G8399 PT W/DXA RESULTS DOCUMENT: HCPCS | Performed by: FAMILY MEDICINE

## 2022-04-26 PROCEDURE — 4040F PNEUMOC VAC/ADMIN/RCVD: CPT | Performed by: FAMILY MEDICINE

## 2022-04-26 PROCEDURE — 96367 TX/PROPH/DG ADDL SEQ IV INF: CPT

## 2022-04-26 PROCEDURE — 74177 CT ABD & PELVIS W/CONTRAST: CPT

## 2022-04-26 PROCEDURE — 85025 COMPLETE CBC W/AUTO DIFF WBC: CPT

## 2022-04-26 PROCEDURE — 6360000004 HC RX CONTRAST MEDICATION: Performed by: EMERGENCY MEDICINE

## 2022-04-26 PROCEDURE — 6360000002 HC RX W HCPCS: Performed by: EMERGENCY MEDICINE

## 2022-04-26 PROCEDURE — 84484 ASSAY OF TROPONIN QUANT: CPT

## 2022-04-26 PROCEDURE — G8420 CALC BMI NORM PARAMETERS: HCPCS | Performed by: FAMILY MEDICINE

## 2022-04-26 PROCEDURE — 81001 URINALYSIS AUTO W/SCOPE: CPT

## 2022-04-26 PROCEDURE — 83690 ASSAY OF LIPASE: CPT

## 2022-04-26 PROCEDURE — 1036F TOBACCO NON-USER: CPT | Performed by: FAMILY MEDICINE

## 2022-04-26 PROCEDURE — 93005 ELECTROCARDIOGRAM TRACING: CPT | Performed by: EMERGENCY MEDICINE

## 2022-04-26 PROCEDURE — 96375 TX/PRO/DX INJ NEW DRUG ADDON: CPT

## 2022-04-26 PROCEDURE — 2580000003 HC RX 258: Performed by: EMERGENCY MEDICINE

## 2022-04-26 PROCEDURE — 1123F ACP DISCUSS/DSCN MKR DOCD: CPT | Performed by: FAMILY MEDICINE

## 2022-04-26 PROCEDURE — 96365 THER/PROPH/DIAG IV INF INIT: CPT

## 2022-04-26 PROCEDURE — 99285 EMERGENCY DEPT VISIT HI MDM: CPT

## 2022-04-26 PROCEDURE — 1090F PRES/ABSN URINE INCON ASSESS: CPT | Performed by: FAMILY MEDICINE

## 2022-04-26 PROCEDURE — 99213 OFFICE O/P EST LOW 20 MIN: CPT | Performed by: FAMILY MEDICINE

## 2022-04-26 PROCEDURE — G8427 DOCREV CUR MEDS BY ELIG CLIN: HCPCS | Performed by: FAMILY MEDICINE

## 2022-04-26 RX ORDER — CIPROFLOXACIN 2 MG/ML
400 INJECTION, SOLUTION INTRAVENOUS ONCE
Status: COMPLETED | OUTPATIENT
Start: 2022-04-26 | End: 2022-04-27

## 2022-04-26 RX ORDER — ONDANSETRON 2 MG/ML
4 INJECTION INTRAMUSCULAR; INTRAVENOUS ONCE
Status: COMPLETED | OUTPATIENT
Start: 2022-04-26 | End: 2022-04-26

## 2022-04-26 RX ORDER — 0.9 % SODIUM CHLORIDE 0.9 %
1000 INTRAVENOUS SOLUTION INTRAVENOUS ONCE
Status: COMPLETED | OUTPATIENT
Start: 2022-04-26 | End: 2022-04-26

## 2022-04-26 RX ORDER — METOCLOPRAMIDE HYDROCHLORIDE 5 MG/ML
10 INJECTION INTRAMUSCULAR; INTRAVENOUS ONCE
Status: COMPLETED | OUTPATIENT
Start: 2022-04-26 | End: 2022-04-26

## 2022-04-26 RX ORDER — DIPHENHYDRAMINE HYDROCHLORIDE 50 MG/ML
25 INJECTION INTRAMUSCULAR; INTRAVENOUS ONCE
Status: COMPLETED | OUTPATIENT
Start: 2022-04-26 | End: 2022-04-26

## 2022-04-26 RX ORDER — METRONIDAZOLE 500 MG/100ML
500 INJECTION, SOLUTION INTRAVENOUS ONCE
Status: COMPLETED | OUTPATIENT
Start: 2022-04-26 | End: 2022-04-27

## 2022-04-26 RX ORDER — MORPHINE SULFATE 4 MG/ML
4 INJECTION, SOLUTION INTRAMUSCULAR; INTRAVENOUS ONCE
Status: COMPLETED | OUTPATIENT
Start: 2022-04-26 | End: 2022-04-26

## 2022-04-26 RX ADMIN — METRONIDAZOLE 500 MG: 500 INJECTION, SOLUTION INTRAVENOUS at 22:55

## 2022-04-26 RX ADMIN — DIPHENHYDRAMINE HYDROCHLORIDE 25 MG: 50 INJECTION INTRAMUSCULAR; INTRAVENOUS at 21:08

## 2022-04-26 RX ADMIN — MORPHINE SULFATE 4 MG: 4 INJECTION, SOLUTION INTRAMUSCULAR; INTRAVENOUS at 19:55

## 2022-04-26 RX ADMIN — ONDANSETRON 4 MG: 2 INJECTION INTRAMUSCULAR; INTRAVENOUS at 19:55

## 2022-04-26 RX ADMIN — SODIUM CHLORIDE 1000 ML: 9 INJECTION, SOLUTION INTRAVENOUS at 19:55

## 2022-04-26 RX ADMIN — METOCLOPRAMIDE 10 MG: 5 INJECTION, SOLUTION INTRAMUSCULAR; INTRAVENOUS at 21:09

## 2022-04-26 RX ADMIN — IOPAMIDOL 75 ML: 755 INJECTION, SOLUTION INTRAVENOUS at 21:01

## 2022-04-26 ASSESSMENT — ENCOUNTER SYMPTOMS
EYE PAIN: 0
CHEST TIGHTNESS: 0
COUGH: 0
SHORTNESS OF BREATH: 0
BACK PAIN: 0
NAUSEA: 1
EYE DISCHARGE: 0
SORE THROAT: 0
NAUSEA: 1
CONSTIPATION: 0
ABDOMINAL PAIN: 1
SHORTNESS OF BREATH: 0
VOMITING: 0
DIARRHEA: 0
RHINORRHEA: 0
VOMITING: 0
SORE THROAT: 0
DIARRHEA: 0
COLOR CHANGE: 0
ABDOMINAL PAIN: 0

## 2022-04-26 ASSESSMENT — PAIN DESCRIPTION - FREQUENCY
FREQUENCY: CONTINUOUS
FREQUENCY: CONTINUOUS

## 2022-04-26 ASSESSMENT — PAIN DESCRIPTION - ONSET
ONSET: ON-GOING
ONSET: ON-GOING

## 2022-04-26 ASSESSMENT — PAIN SCALES - GENERAL
PAINLEVEL_OUTOF10: 6
PAINLEVEL_OUTOF10: 8
PAINLEVEL_OUTOF10: 8

## 2022-04-26 ASSESSMENT — PAIN DESCRIPTION - PAIN TYPE: TYPE: ACUTE PAIN

## 2022-04-26 ASSESSMENT — PAIN DESCRIPTION - LOCATION
LOCATION: ABDOMEN

## 2022-04-26 ASSESSMENT — PAIN DESCRIPTION - DESCRIPTORS
DESCRIPTORS: ACHING

## 2022-04-26 ASSESSMENT — PAIN - FUNCTIONAL ASSESSMENT
PAIN_FUNCTIONAL_ASSESSMENT: 0-10
PAIN_FUNCTIONAL_ASSESSMENT: ACTIVITIES ARE NOT PREVENTED

## 2022-04-26 NOTE — PROGRESS NOTES
2022    TELEHEALTH EVALUATION -- Audio/Visual (During [id number] public health emergency)    HPI:    Elian Acosta (:  1935) has requested an audio/video evaluation for the following concern(s):    Patient states she has been having nausea since this morning, so preferred to do virtual visit. Denies any vomiting, abdominal pain, diarrhea, blood in stool, fever or any other associated symptoms. Has been taking zofran as needed, which has been helping. Is still tolerating po intake, trying to drink water and stay hydrated. Did have normal formed stools this morning. Otherwise has been doing well, states she has been adhering to her diet and has not had any GI issues since her last hospitalization 3 months ago. No recent changes to her medications. Denying any other concerns today, states she forgot to get her labs completed. Review of Systems   Constitutional: Negative for activity change, appetite change, chills, fever and unexpected weight change. HENT: Negative for congestion and sore throat. Respiratory: Negative for chest tightness and shortness of breath. Cardiovascular: Negative for chest pain and palpitations. Gastrointestinal: Positive for nausea. Negative for abdominal pain, constipation, diarrhea and vomiting. Genitourinary: Negative for dysuria. Skin: Negative for color change. Neurological: Negative for dizziness and light-headedness. Psychiatric/Behavioral: Negative for dysphoric mood. The patient is not nervous/anxious. Prior to Visit Medications    Medication Sig Taking?  Authorizing Provider   fluticasone (FLONASE) 50 MCG/ACT nasal spray instill 1 spray into each nostril once daily Yes Marisol Trevino MD   VASCEPA 0.5 g CAPS take 2 capsules by mouth twice a day Yes Emil Bui MD   ferrous sulfate (FE TABS 325) 325 (65 Fe) MG EC tablet Take 325 mg by mouth 3 times daily (with meals) Yes Historical Provider, MD   vitamin B-12 (CYANOCOBALAMIN) 100 MCG tablet Take 50 mcg by mouth daily Yes Historical Provider, MD   rosuvastatin (CRESTOR) 10 MG tablet take 1 tablet by mouth once daily Yes Alexandra Colon MD   verapamil (CALAN SR) 120 MG extended release tablet Take 1 tablet by mouth daily Yes Wolf Starkey MD   pyridostigmine (MESTINON) 60 MG tablet Take 0.5 tablets by mouth daily Yes Wolf Starkey MD   pantoprazole (PROTONIX) 40 MG tablet Take 1 tablet by mouth 2 times daily  Patient taking differently: Take 40 mg by mouth daily  Yes Alexandra Colon MD   ondansetron (ZOFRAN-ODT) 4 MG disintegrating tablet Take 1 tablet by mouth 2 times daily as needed for Nausea or Vomiting Yes Alexandra Colon MD   ketotifen (ZADITOR) 0.025 % ophthalmic solution Place 1 drop into both eyes 2 times daily Yes Historical Provider, MD   sertraline (ZOLOFT) 25 MG tablet take 1/2 tablet by mouth once daily for 2 days then take 1 tablet by mouth once daily THEREAFTER Yes Historical Provider, MD   gabapentin (NEURONTIN) 300 MG capsule take 1 tablet by mouth three times a day for TRIGEMINAL NEURALGIA  Patient taking differently: Take 300 mg by mouth 3 times daily. Yes Alexandra Colon MD   Handicap Placard MISC by Does not apply route Good for 3 years Yes Alexandra Colon MD   divalproex (DEPAKOTE ER) 250 MG extended release tablet Take 250 mg by mouth nightly  Yes Historical Provider, MD   Multiple Vitamins-Minerals (OCUVITE-LUTEIN PO) Take by mouth Yes Historical Provider, MD   calcium-vitamin D (OSCAL 500/200 D-3) 500-200 MG-UNIT per tablet Take 1 tablet by mouth 2 times daily   Yes Historical Provider, MD   aspirin 81 MG EC tablet Take 81 mg by mouth daily.    Yes Historical Provider, MD       Social History     Tobacco Use    Smoking status: Former Smoker     Packs/day: 0.25     Years: 9.00     Pack years: 2.25     Types: Cigarettes     Start date: 14     Quit date: 1985     Years since quittin.4    Smokeless tobacco: Never Used   Vaping Use    Vaping Use: Never used   Substance Use Topics    Alcohol use: No     Alcohol/week: 0.0 standard drinks    Drug use: No        Past Medical History:   Diagnosis Date    Arthritis of shoulder region, right 09/2014    Merwin Buerger Blind right eye     following right cataract surg    Chronic depression 2009    Dr. Anuel Lawton DVT (deep venous thrombosis) ([facility] 1970    left leg    Former smoker     History of echocardiogram 09/21/2020    EF 55-60%, mild left ventricular hypertrophy, normal diastolic filling pattern for age, no signficiant valvular disease, no pericardial effusion     History of stress test 03/25/2021    normal LVEF calculated by the computer is probably falsely low. LVEF is 40 %    Hx of Doppler ultrasound 03/01/2018    Carotid-mild 0-49% bilateral carotid,50% stenosis right subclavian    Hyperlipidemia     Hypertension     Macular degeneration     right    Mild cognitive impairment 02/2012    MMSE 26/30    MVP (mitral valve prolapse)     trace on echo 2014    Osteoporosis 05/2012    Pancytopenia 05/2011    mild with ; Dr Antonio hawk 2010    Peptic ulcer disease     Peripheral vascular disease ([facility] 01/2016    angio; dis below ankles; pletal    Prediabetes 2006    Recurrent ventral incisional hernia 2013    medial apex of GB scar    S/P CABG x 3 2003    3-vessel; Dr Neva Gifford 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 Mo Rome; Right side         ASSESSMENT/PLAN:  1. Nausea  Since this morning, not associated with any other symptoms. Still tolerating po intake, symptoms responding to zofran. Advised supportive care, monitoring for now. Contact clinic if symptoms do not improve over the next few days. 2. Mixed hyperlipidemia  Continue crestor.   - Lipid Panel; Future    3.  Prediabetes  Will monitor.   - CBC with Auto Differential; Future  - Comprehensive Metabolic Panel; Future  - Hemoglobin A1C; Future    4. Stage 3 chronic kidney disease, unspecified whether stage 3a or 3b CKD ([facility] [address]  Will monitor.   - Comprehensive Metabolic Panel; Future    5. Major depressive disorder, recurrent, in full remission ([facility] [address]  Symptoms well controlled with current regimen, being managed by psychiatry. Return in about 3 months (around 7/26/2022). Giorgio Sweeney, was evaluated through a synchronous (real-time) audio-video encounter. The patient (or guardian if applicable) is aware that this is a billable service, which includes applicable co-pays. This Virtual Visit was conducted with patient's (and/or legal guardian's) consent. The visit was conducted pursuant to the emergency declaration under the [address] [place] [address], [address] [place] [address] [facility] authority and the [employer] General Act. Patient identification was verified, and a caregiver was present when appropriate. The patient was located at home in a state where the provider was licensed to provide care. Total time spent on this encounter: 25 min    --Anu Frey MD on 4/26/2022 at 2:39 PM    An electronic signature was used to authenticate this note.

## 2022-04-26 NOTE — ED PROVIDER NOTES
[address] [facility]      Pt Name: Doe Devi  MRN: [id number]  [place] 1935  Date of evaluation: 4/26/2022  Provider: Radhika La MD    CHIEF COMPLAINT       Chief Complaint   Patient presents with    Nausea    Fatigue         HISTORY OF PRESENT ILLNESS      Doe Devi is a 82 y.o. female who presents to the emergency department  for   Chief Complaint   Patient presents with    Nausea    Fatigue       74-year-old female presents complaining of some generalized abdominal pain and nausea. She reports that 3 days ago she did have some diarrhea that resolved after taking some Imodium. She reports that she had 3-4 formed bowel movements a day. She endorses that her abdominal pain worsens when she comes to the emergency department for evaluation. No remarkable vomiting. Denies any fever or chills. Denies any chest pain. Denies any significant respiratory symptoms. No cough or shortness of breath. Denies any sick contacts. No active vomiting in the emergency department. She reports that she did take Zofran at home but did not completely help her symptoms. She has a 15 in the emergency department. No recent antibiotics. No atypical travel or exposures. Reports that she does urinate frequently during the night but denies any significant change in her urination. Nursing Notes, Triage Notes & Vital Signs were reviewed. REVIEW OF SYSTEMS    (2-9 systems for level 4, 10 or more for level 5)     Review of Systems   Constitutional: Negative for chills and fever. HENT: Negative for congestion, rhinorrhea and sore throat. Eyes: Negative for pain and discharge. Respiratory: Negative for cough and shortness of breath. Cardiovascular: Negative for chest pain and palpitations. Gastrointestinal: Positive for abdominal pain and nausea. Negative for diarrhea and vomiting. Endocrine: Negative for polydipsia.    Genitourinary: Negative for dysuria and flank pain.   Musculoskeletal: Negative for back pain and neck pain. Skin: Negative for pallor and wound. Neurological: Negative for dizziness, facial asymmetry, light-headedness, numbness and headaches. Psychiatric/Behavioral: Negative for confusion. Except as noted above the remainder of the review of systems was reviewed and negative. PAST MEDICAL HISTORY     Past Medical History:   Diagnosis Date    Arthritis of shoulder region, right 09/2014    Lora Jarvis Blind right eye     following right cataract surg    Chronic depression 2009    Dr. Georgi Butt DVT (deep venous thrombosis) ([facility] [address] 1970    left leg    Former smoker     History of echocardiogram 09/21/2020    EF 55-60%, mild left ventricular hypertrophy, normal diastolic filling pattern for age, no signficiant valvular disease, no pericardial effusion     History of stress test 03/25/2021    normal LVEF calculated by the computer is probably falsely low. LVEF is 40 %    Hx of Doppler ultrasound 03/01/2018    Carotid-mild 0-49% bilateral carotid,50% stenosis right subclavian    Hyperlipidemia     Hypertension     Macular degeneration     right    Mild cognitive impairment 02/2012    MMSE 26/30    MVP (mitral valve prolapse)     trace on echo 2014    Osteoporosis 05/2012    Pancytopenia 05/2011    mild with ; Dr Karan hawk 2010    Peptic ulcer disease     Peripheral vascular disease ([address] 01/2016    angio; dis below ankles; pletal    Prediabetes 2006    Recurrent ventral incisional hernia 2013    medial apex of GB scar    S/P CABG x 3 2003    3-vessel; Dr Corrales Carp 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 Temple [place]; Right side       Prior to Admission medications    Medication Sig Start Date End Date Taking?  Authorizing Provider   fluticasone (FLONASE) 50 MCG/ACT nasal spray instill 1 spray into each nostril once daily 4/21/22   José Antonio Willingham MD   VASCEPA 0.5 g CAPS take 2 capsules by mouth twice a day 3/18/22   Lul Bolaños MD   ferrous sulfate (FE TABS 325) 325 (65 Fe) MG EC tablet Take 325 mg by mouth daily (with breakfast)     Historical Provider, MD   vitamin B-12 (CYANOCOBALAMIN) 100 MCG tablet Take 50 mcg by mouth daily    Historical Provider, MD   rosuvastatin (CRESTOR) 10 MG tablet take 1 tablet by mouth once daily 3/7/22   José Antonio Willingham MD   verapamil (CALAN SR) 120 MG extended release tablet Take 1 tablet by mouth daily 2/11/22   Lul Bolaños MD   pyridostigmine (MESTINON) 60 MG tablet Take 0.5 tablets by mouth daily 2/11/22   Lul Bolaños MD   pantoprazole (PROTONIX) 40 MG tablet Take 1 tablet by mouth 2 times daily  Patient taking differently: Take 40 mg by mouth daily  1/25/22   José Antonio Willingham MD   ondansetron (ZOFRAN-ODT) 4 MG disintegrating tablet Take 1 tablet by mouth 2 times daily as needed for Nausea or Vomiting 12/9/21   José Antonio Willingham MD   ketotifen (ZADITOR) 0.025 % ophthalmic solution Place 1 drop into both eyes 2 times daily    Historical Provider, MD   sertraline (ZOLOFT) 25 MG tablet take 1/2 tablet by mouth once daily for 2 days then take 1 tablet by mouth once daily THEREAFTER 11/4/21   Historical Provider, MD   gabapentin (NEURONTIN) 300 MG capsule take 1 tablet by mouth three times a day for TRIGEMINAL NEURALGIA  Patient taking differently: Take 300 mg by mouth 3 times daily.   11/1/21 5/1/22  José Antonio Willingham MD   Handicap Placard MISC by Does not apply route Good for 3 years 7/12/21   José Antonio Willingham MD   divalproex (DEPAKOTE ER) 250 MG extended release tablet Take 250 mg by mouth nightly     Historical Provider, MD   Multiple Vitamins-Minerals (OCUVITE-LUTEIN PO) Take by mouth    Historical Provider, MD   calcium-vitamin D (OSCAL 500/200 D-3) 500-200 MG-UNIT per tablet Take 1 tablet by mouth 2 times daily      Historical Provider, MD aspirin 81 MG EC tablet Take 81 mg by mouth daily.       Historical Provider, MD        Patient Active Problem List   Diagnosis    Chronic depression    Hyperlipidemia    Trigeminal neuralgia    Anemia due to chronic illness    Colon cancer screening    Osteoporosis    Supraventricular tachycardia ([address]    Recurrent ventral incisional hernia    Mild cognitive impairment    CAD (coronary artery disease)    Elevated TSH    PAD (peripheral artery disease) (HCC)    Sciatica of left side without back pain    Coronary artery disease involving coronary bypass graft of native heart without angina pectoris    S/P CABG x 3    Essential hypertension    MVP (mitral valve prolapse)    Bilateral carotid artery stenosis    Subclavian arterial stenosis (HCC)    Spigelian hernia    Ischemic cardiomyopathy    Glenohumeral arthritis, right    Normocytic anemia    Overactive bladder    Major depressive disorder, recurrent, in full remission ([address]    Asthmatic bronchitis    Age-related osteoporosis without current pathological fracture    Palpitations    SOB (shortness of breath)    Prediabetes    Generalized weakness    Hyponatremia    Gait disturbance    Macular degeneration of both eyes    History of DVT (deep vein thrombosis)    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, bilateral    Gastroenteritis    Chronic renal disease, stage III ([address] [825959]    Diverticulitis         SURGICAL HISTORY       Past Surgical History:   Procedure Laterality Date    CATARACT REMOVAL Right 2010    poor result ; blind right eye; [place]    CHOLECYSTECTOMY      CORONARY ARTERY BYPASS GRAFT  8/2009    3 vessel    SHOULDER ARTHROSCOPY Right 2003    TUBAL LIGATION      URETER SURGERY      Right ureteral repair         CURRENT MEDICATIONS       Current Discharge Medication List      CONTINUE these medications which have NOT CHANGED    Details   fluticasone (FLONASE) 50 MCG/ACT nasal spray instill 1 spray into each nostril once daily  Qty: 1 each, Refills: 3      VASCEPA 0.5 g CAPS take 2 capsules by mouth twice a day  Qty: 120 capsule, Refills: 5      ferrous sulfate (FE TABS 325) 325 (65 Fe) MG EC tablet Take 325 mg by mouth daily (with breakfast)       vitamin B-12 (CYANOCOBALAMIN) 100 MCG tablet Take 50 mcg by mouth daily      rosuvastatin (CRESTOR) 10 MG tablet take 1 tablet by mouth once daily  Qty: 90 tablet, Refills: 3    Associated Diagnoses: Mixed hyperlipidemia      verapamil (CALAN SR) 120 MG extended release tablet Take 1 tablet by mouth daily  Qty: 30 tablet, Refills: 5      pyridostigmine (MESTINON) 60 MG tablet Take 0.5 tablets by mouth daily  Qty: 45 tablet, Refills: 5      pantoprazole (PROTONIX) 40 MG tablet Take 1 tablet by mouth 2 times daily  Qty: 180 tablet, Refills: 1      ondansetron (ZOFRAN-ODT) 4 MG disintegrating tablet Take 1 tablet by mouth 2 times daily as needed for Nausea or Vomiting  Qty: 15 tablet, Refills: 1      ketotifen (ZADITOR) 0.025 % ophthalmic solution Place 1 drop into both eyes 2 times daily      sertraline (ZOLOFT) 25 MG tablet take 1/2 tablet by mouth once daily for 2 days then take 1 tablet by mouth once daily THEREAFTER      gabapentin (NEURONTIN) 300 MG capsule take 1 tablet by mouth three times a day for TRIGEMINAL NEURALGIA  Qty: 90 capsule, Refills: 1    Associated Diagnoses: Tic douloureux      Handicap Placard MISC by Does not apply route Good for 3 years  Qty: 1 each, Refills: 0      divalproex (DEPAKOTE ER) 250 MG extended release tablet Take 250 mg by mouth nightly       Multiple Vitamins-Minerals (OCUVITE-LUTEIN PO) Take by mouth      calcium-vitamin D (OSCAL 500/200 D-3) 500-200 MG-UNIT per tablet Take 1 tablet by mouth 2 times daily        aspirin 81 MG EC tablet Take 81 mg by mouth daily.                ALLERGIES     Alendronate sodium, Bactrim [sulfamethoxazole-trimethoprim], Boniva [ibandronate sodium], Colesevelam, Lisinopril, Macrobid [nitrofurantoin], Neggram [nalidixic acid], Pce [erythromycin], Penicillins, Risperidone and related, Welchol [colesevelam hcl], Carbamazepine, Lovaza [omega-3-acid ethyl esters], Metoprolol, and Pyridium [phenazopyridine]    FAMILY HISTORY       Family History   Problem Relation Age of Onset    High Blood Pressure Mother     Heart Attack Mother     Other Mother         unsteady gait    Other Father         silcosis    Cancer Sister 79        Breast cancer 2017    Stroke Sister           SOCIAL HISTORY       Social History     Socioeconomic History    Marital status:      Spouse name: Randy Moreno Number of children: 2    Years of education: 15    Highest education level: Not on file   Occupational History    Occupation: retired   Tobacco Use    Smoking status: Former Smoker     Packs/day: 0.25     Years: 9.00     Pack years: 2.25     Types: Cigarettes     Start date:      Quit date: 1985     Years since quittin.4    Smokeless tobacco: Never Used   Vaping Use    Vaping Use: Never used   Substance and Sexual Activity    Alcohol use: No     Alcohol/week: 0.0 standard drinks    Drug use: No    Sexual activity: Not Currently     Partners: Male   Other Topics Concern    Not on file   Social History Narrative    Not on file     Social Determinants of Health     Financial Resource Strain: Low Risk     Difficulty of Paying Living Expenses: Not hard at all   Food Insecurity: No Food Insecurity    Worried About [facility] in the Last Year: Never true    Katie of Food in the Last Year: Never true   Transportation Needs: No Transportation Needs    Lack of Transportation (Medical): No    Lack of Transportation (Non-Medical): No   Physical Activity: Inactive    Days of Exercise per Week: 0 days    Minutes of Exercise per Session: 0 min   Stress: No Stress Concern Present    Feeling of Stress : Not at all   Social Connections:  Moderately Integrated    Frequency of Communication with Friends and Family: Twice a week    Frequency of Social Gatherings with Friends and Family: Once a week    Attends [employer] Services: More than 4 times per year    Active Member of [address] [place] [address] [employer] or Organizations: Yes    Attends Club or Organization Meetings: More than 4 times per year    Marital Status:    Intimate Partner Violence: Not At Risk    Fear of Current or Ex-Partner: No    Emotionally Abused: No    Physically Abused: No    Sexually Abused: No   Housing Stability: Low Risk     Unable to Pay for Housing in the Last Year: No    Number of Places Lived in the Last Year: 1    Unstable Housing in the Last Year: No       SCREENINGS    [place] Coma Scale  Eye Opening: Spontaneous  Best Verbal Response: Oriented  Best Motor Response: Obeys commands  Vladimir Coma Scale Score: 15          PHYSICAL EXAM    (up to 7 for level 4, 8 or more for level 5)     ED Triage Vitals [04/26/22 1911]   BP Temp Temp Source Pulse Resp SpO2 Height Weight   (!) 200/108 97.5 °F (36.4 °C) Oral 82 20 98 % 5' 3\" (1.6 m) 131 lb (59.4 kg)       Physical Exam  Vitals reviewed. Constitutional:       Appearance: She is not ill-appearing or toxic-appearing. HENT:      Head: Normocephalic and atraumatic. Nose: No congestion or rhinorrhea. Mouth/Throat:      Mouth: Mucous membranes are moist.      Pharynx: No oropharyngeal exudate or posterior oropharyngeal erythema. Eyes:      General:         Right eye: No discharge. Left eye: No discharge. Extraocular Movements: Extraocular movements intact. Pupils: Pupils are equal, round, and reactive to light. Cardiovascular:      Rate and Rhythm: Normal rate. Pulses: Normal pulses. Heart sounds: No gallop. Pulmonary:      Effort: Pulmonary effort is normal.      Breath sounds: Normal breath sounds. Abdominal:      Palpations: Abdomen is soft. Tenderness: There is abdominal tenderness. There is no guarding.       Comments: Mild diffuse tenderness, abdomen nonperitoneal, no guarding on palpation   Musculoskeletal:         General: No signs of injury. Normal range of motion. Cervical back: Normal range of motion and neck supple. Skin:     General: Skin is warm. Capillary Refill: Capillary refill takes less than 2 seconds. Neurological:      General: No focal deficit present. Mental Status: She is alert. DIAGNOSTIC RESULTS     Labs Reviewed   COMPREHENSIVE METABOLIC PANEL W/ REFLEX TO MG FOR LOW K - Abnormal; Notable for the following components:       Result Value    Sodium 128 (*)     Chloride 91 (*)     Glucose 129 (*)     AST 41 (*)     Alkaline Phosphatase 39 (*)     GFR Non- 60 (*)     All other components within normal limits   CBC WITH AUTO DIFFERENTIAL - Abnormal; Notable for the following components:    RBC 3.66 (*)     Hemoglobin 12.0 (*)     Hematocrit 36.5 (*)     MCH 32.8 (*)     Monocytes % 10.1 (*)     Immature Neutrophil % 0.6 (*)     All other components within normal limits   LIPASE - Abnormal; Notable for the following components:    Lipase 10 (*)     All other components within normal limits   TROPONIN   URINALYSIS WITH REFLEX TO CULTURE          EKG: All EKG's are interpreted by the Emergency Department Physician who either signs or Co-signs this chart in the absence of a cardiologist.       EKG Interpretation    Interpreted by emergency department physician    EKG is interpreted by me. EKG shows sinus rhythm at 75 beats per minutes, axis appears to be nondeviated, there are isolated PVCs, no remarkable ST segment elevation or depression, there are some flattened T waves in precordial leads, UT interval 162, QRS duration 102, QTC of 4046.   Final impression, nonspecific EKG    Armin Blank MD     RADIOLOGY:     Non-plain film images such as CT, Ultrasound and MRI are read by the radiologist. Plain radiographic images are visualized and preliminarily interpreted by the emergency physician. Interpretation per the Radiologist below, if available at the time of this note:    CT ABDOMEN PELVIS W IV CONTRAST Additional Contrast? None   Final Result   Suggestion of mild acute uncomplicated diverticulitis seen at the junction of   the descending colon and sigmoid colon. No evidence of perforation. No   diverticular abscess. Mild bilateral urothelial thickening and hyperenhancement, which raise the   possibility of urinary tract infection/inflammation. Please correlate with   urinalysis. ED BEDSIDE ULTRASOUND:   Performed by ED Physician Jesus Thomas MD       LABS:  Labs Reviewed   COMPREHENSIVE METABOLIC PANEL W/ REFLEX TO MG FOR LOW K - Abnormal; Notable for the following components:       Result Value    Sodium 128 (*)     Chloride 91 (*)     Glucose 129 (*)     AST 41 (*)     Alkaline Phosphatase 39 (*)     GFR Non- 60 (*)     All other components within normal limits   CBC WITH AUTO DIFFERENTIAL - Abnormal; Notable for the following components:    RBC 3.66 (*)     Hemoglobin 12.0 (*)     Hematocrit 36.5 (*)     MCH 32.8 (*)     Monocytes % 10.1 (*)     Immature Neutrophil % 0.6 (*)     All other components within normal limits   LIPASE - Abnormal; Notable for the following components:    Lipase 10 (*)     All other components within normal limits   TROPONIN   URINALYSIS WITH REFLEX TO CULTURE       All other labs were within normal range or not returned as of this dictation.     EMERGENCY DEPARTMENT COURSE and DIFFERENTIAL DIAGNOSIS/MDM:   Vitals:    Vitals:    04/26/22 2317 04/27/22 0000 04/27/22 0002 04/27/22 0051   BP: (!) 164/74 (!) 148/65 (!) 148/65 (!) 178/72   Pulse: 75 72 72 71   Resp: 18 16 20 18   Temp:    97.5 °F (36.4 °C)   TempSrc:    Oral   SpO2: 94% 96% 95% 95%   Weight:    133 lb 13.1 oz (60.7 kg)   Height:    5' 3\" (1.6 m)           MDM  Number of Diagnoses or Management Options  Diverticulitis of colon  Nausea  Diagnosis management comments: 81-year-old female presents complaining of some abdominal pain and nausea. She did have some diarrhea 3 days ago that resolved after Imodium. Denies any fevers or chills or sick contacts. No recent antibiotics or atypical exposures. She does have a history of several abdominal surgeries. She did have 3 bowel movements earlier today and she reports that they were firm. She presents with elevated blood pressure. Vitals otherwise unremarkable. No tachycardia. She is afebrile. Labs, EKG and abdominal CT scan are obtained. EKG is nondiagnostic showing acute signs ischemia arrhythmia. Troponin undetectable. No remarkable leukocytosis. Sodium is mildly low at 128 electrolytes are otherwise unremarkable. She was given fluids in the emergency department. [place] CT scan is concerning for diverticulitis. Patient continues to have nausea and some dry heaving in the emergency department. She is given multiple rounds of antiemetics. Given her continued nausea, she will be admitted for IV antibiotics for diverticulitis and for nausea control. She will be transferred to [place] for [facility] for further evaluation and management. Amount and/or Complexity of Data Reviewed  Decide to obtain previous medical records or to obtain history from someone other than the patient: yes        -  Patient seen and evaluated in the emergency department. -  Triage and nursing notes reviewed and incorporated. -  Old chart records reviewed and incorporated. -  Work-up included:  See above  -  Results discussed with patient. CONSULTS:  IP CONSULT TO GI    PROCEDURES:  None performed unless otherwise noted below     Procedures        FINAL IMPRESSION      1. Diverticulitis of colon    2. Nausea          DISPOSITION/PLAN   DISPOSITION Decision To Transfer 04/26/2022 10:52:40 PM      PATIENT REFERRED TO:  No follow-up provider specified.     DISCHARGE MEDICATIONS:  Current Discharge Medication List          ED Provider Disposition Time  DISPOSITION Decision To Transfer 04/26/2022 10:52:40 PM      Appropriate personal protective equipment was worn during the patient's evaluation. These included surgical, eye protection, surgical mask or in 95 respirator and gloves. The patient was also placed in a surgical mask for the prevention of possible spread of respiratory viral illnesses. The Patient was instructed to read the package inserts with any medication that was prescribed. Major potential reactions and medication interactions were discussed. The Patient understands that there are numerous possible adverse reactions not covered. The patient was also instructed to arrange follow-up with his or her primary care provider for review of any pending labwork or incidental findings on any radiology results that were obtained. All efforts were made to discuss any incidental findings that require further monitoring. Controlled Substances Monitoring:     No flowsheet data found.     (Please note that portions of this note were completed with a voice recognition program.  Efforts were made to edit the dictations but occasionally words are mis-transcribed.)    Shari Shaver MD (electronically signed)  Attending Emergency Physician           Shari Shaver MD  04/27/22 0157

## 2022-04-27 PROBLEM — K57.92 DIVERTICULITIS: Status: ACTIVE | Noted: 2022-04-27

## 2022-04-27 LAB
EKG ATRIAL RATE: 75 BPM
EKG DIAGNOSIS: NORMAL
EKG P AXIS: 54 DEGREES
EKG P-R INTERVAL: 162 MS
EKG Q-T INTERVAL: 400 MS
EKG QRS DURATION: 102 MS
EKG QTC CALCULATION (BAZETT): 446 MS
EKG R AXIS: 0 DEGREES
EKG T AXIS: -7 DEGREES
EKG VENTRICULAR RATE: 75 BPM
GLUCOSE BLD-MCNC: 148 MG/DL (ref 70–99)
LIPASE: 23 IU/L (ref 13–60)

## 2022-04-27 PROCEDURE — 2580000003 HC RX 258: Performed by: INTERNAL MEDICINE

## 2022-04-27 PROCEDURE — 6370000000 HC RX 637 (ALT 250 FOR IP): Performed by: INTERNAL MEDICINE

## 2022-04-27 PROCEDURE — 2500000003 HC RX 250 WO HCPCS: Performed by: INTERNAL MEDICINE

## 2022-04-27 PROCEDURE — 83690 ASSAY OF LIPASE: CPT

## 2022-04-27 PROCEDURE — 6360000002 HC RX W HCPCS: Performed by: EMERGENCY MEDICINE

## 2022-04-27 PROCEDURE — 6360000002 HC RX W HCPCS: Performed by: HOSPITALIST

## 2022-04-27 PROCEDURE — 36415 COLL VENOUS BLD VENIPUNCTURE: CPT

## 2022-04-27 PROCEDURE — 93010 ELECTROCARDIOGRAM REPORT: CPT | Performed by: INTERNAL MEDICINE

## 2022-04-27 PROCEDURE — 6360000002 HC RX W HCPCS: Performed by: INTERNAL MEDICINE

## 2022-04-27 PROCEDURE — 82962 GLUCOSE BLOOD TEST: CPT

## 2022-04-27 PROCEDURE — 1200000000 HC SEMI PRIVATE

## 2022-04-27 PROCEDURE — 94761 N-INVAS EAR/PLS OXIMETRY MLT: CPT

## 2022-04-27 RX ORDER — ONDANSETRON 4 MG/1
4 TABLET, ORALLY DISINTEGRATING ORAL EVERY 8 HOURS PRN
Status: DISCONTINUED | OUTPATIENT
Start: 2022-04-27 | End: 2022-05-02 | Stop reason: HOSPADM

## 2022-04-27 RX ORDER — SODIUM CHLORIDE 0.9 % (FLUSH) 0.9 %
10 SYRINGE (ML) INJECTION PRN
Status: DISCONTINUED | OUTPATIENT
Start: 2022-04-27 | End: 2022-05-02 | Stop reason: HOSPADM

## 2022-04-27 RX ORDER — PYRIDOSTIGMINE BROMIDE 60 MG/1
30 TABLET ORAL DAILY
Status: DISCONTINUED | OUTPATIENT
Start: 2022-04-27 | End: 2022-05-02 | Stop reason: HOSPADM

## 2022-04-27 RX ORDER — ENOXAPARIN SODIUM 100 MG/ML
40 INJECTION SUBCUTANEOUS DAILY
Status: DISCONTINUED | OUTPATIENT
Start: 2022-04-27 | End: 2022-05-02 | Stop reason: HOSPADM

## 2022-04-27 RX ORDER — MORPHINE SULFATE 4 MG/ML
4 INJECTION, SOLUTION INTRAMUSCULAR; INTRAVENOUS ONCE
Status: COMPLETED | OUTPATIENT
Start: 2022-04-27 | End: 2022-04-27

## 2022-04-27 RX ORDER — SODIUM CHLORIDE 9 MG/ML
INJECTION, SOLUTION INTRAVENOUS PRN
Status: DISCONTINUED | OUTPATIENT
Start: 2022-04-27 | End: 2022-05-02 | Stop reason: HOSPADM

## 2022-04-27 RX ORDER — ACETAMINOPHEN 650 MG/1
650 SUPPOSITORY RECTAL EVERY 6 HOURS PRN
Status: DISCONTINUED | OUTPATIENT
Start: 2022-04-27 | End: 2022-05-02 | Stop reason: HOSPADM

## 2022-04-27 RX ORDER — METOCLOPRAMIDE HYDROCHLORIDE 5 MG/ML
10 INJECTION INTRAMUSCULAR; INTRAVENOUS EVERY 6 HOURS PRN
Status: COMPLETED | OUTPATIENT
Start: 2022-04-27 | End: 2022-04-29

## 2022-04-27 RX ORDER — ONDANSETRON 4 MG/1
4 TABLET, ORALLY DISINTEGRATING ORAL 2 TIMES DAILY PRN
Status: DISCONTINUED | OUTPATIENT
Start: 2022-04-27 | End: 2022-04-27

## 2022-04-27 RX ORDER — GABAPENTIN 300 MG/1
300 CAPSULE ORAL 3 TIMES DAILY
Status: DISCONTINUED | OUTPATIENT
Start: 2022-04-27 | End: 2022-05-02 | Stop reason: HOSPADM

## 2022-04-27 RX ORDER — KETOTIFEN FUMARATE 0.35 MG/ML
1 SOLUTION/ DROPS OPHTHALMIC 2 TIMES DAILY
Status: DISCONTINUED | OUTPATIENT
Start: 2022-04-27 | End: 2022-05-02 | Stop reason: HOSPADM

## 2022-04-27 RX ORDER — PANTOPRAZOLE SODIUM 40 MG/1
40 TABLET, DELAYED RELEASE ORAL DAILY
Status: DISCONTINUED | OUTPATIENT
Start: 2022-04-27 | End: 2022-04-27

## 2022-04-27 RX ORDER — HYDRALAZINE HYDROCHLORIDE 20 MG/ML
5 INJECTION INTRAMUSCULAR; INTRAVENOUS EVERY 6 HOURS PRN
Status: DISCONTINUED | OUTPATIENT
Start: 2022-04-27 | End: 2022-04-27 | Stop reason: CLARIF

## 2022-04-27 RX ORDER — ROSUVASTATIN CALCIUM 5 MG/1
10 TABLET, COATED ORAL NIGHTLY
Status: DISCONTINUED | OUTPATIENT
Start: 2022-04-27 | End: 2022-05-02 | Stop reason: HOSPADM

## 2022-04-27 RX ORDER — ASPIRIN 81 MG/1
81 TABLET ORAL DAILY
Status: DISCONTINUED | OUTPATIENT
Start: 2022-04-27 | End: 2022-05-02 | Stop reason: HOSPADM

## 2022-04-27 RX ORDER — METRONIDAZOLE 500 MG/100ML
500 INJECTION, SOLUTION INTRAVENOUS EVERY 8 HOURS
Status: DISCONTINUED | OUTPATIENT
Start: 2022-04-27 | End: 2022-05-02

## 2022-04-27 RX ORDER — UBIDECARENONE 75 MG
50 CAPSULE ORAL DAILY
Status: DISCONTINUED | OUTPATIENT
Start: 2022-04-27 | End: 2022-05-02 | Stop reason: HOSPADM

## 2022-04-27 RX ORDER — SODIUM CHLORIDE 0.9 % (FLUSH) 0.9 %
10 SYRINGE (ML) INJECTION EVERY 12 HOURS SCHEDULED
Status: DISCONTINUED | OUTPATIENT
Start: 2022-04-27 | End: 2022-05-02 | Stop reason: HOSPADM

## 2022-04-27 RX ORDER — SODIUM CHLORIDE 9 MG/ML
INJECTION, SOLUTION INTRAVENOUS CONTINUOUS
Status: DISCONTINUED | OUTPATIENT
Start: 2022-04-27 | End: 2022-04-28

## 2022-04-27 RX ORDER — ONDANSETRON 2 MG/ML
4 INJECTION INTRAMUSCULAR; INTRAVENOUS ONCE
Status: COMPLETED | OUTPATIENT
Start: 2022-04-27 | End: 2022-04-27

## 2022-04-27 RX ORDER — ACETAMINOPHEN 325 MG/1
650 TABLET ORAL EVERY 6 HOURS PRN
Status: DISCONTINUED | OUTPATIENT
Start: 2022-04-27 | End: 2022-05-02 | Stop reason: HOSPADM

## 2022-04-27 RX ORDER — ONDANSETRON 2 MG/ML
4 INJECTION INTRAMUSCULAR; INTRAVENOUS EVERY 6 HOURS PRN
Status: DISCONTINUED | OUTPATIENT
Start: 2022-04-27 | End: 2022-05-02 | Stop reason: HOSPADM

## 2022-04-27 RX ORDER — DIVALPROEX SODIUM 250 MG/1
250 TABLET, EXTENDED RELEASE ORAL NIGHTLY
Status: DISCONTINUED | OUTPATIENT
Start: 2022-04-27 | End: 2022-05-02 | Stop reason: HOSPADM

## 2022-04-27 RX ORDER — PROMETHAZINE HYDROCHLORIDE 25 MG/1
25 SUPPOSITORY RECTAL EVERY 6 HOURS PRN
Status: DISCONTINUED | OUTPATIENT
Start: 2022-04-27 | End: 2022-04-27

## 2022-04-27 RX ORDER — PANTOPRAZOLE SODIUM 40 MG/10ML
40 INJECTION, POWDER, LYOPHILIZED, FOR SOLUTION INTRAVENOUS DAILY
Status: DISCONTINUED | OUTPATIENT
Start: 2022-04-28 | End: 2022-05-02 | Stop reason: HOSPADM

## 2022-04-27 RX ORDER — FERROUS SULFATE 325(65) MG
325 TABLET ORAL
Status: DISCONTINUED | OUTPATIENT
Start: 2022-04-27 | End: 2022-05-02 | Stop reason: HOSPADM

## 2022-04-27 RX ORDER — FLUTICASONE PROPIONATE 50 MCG
1 SPRAY, SUSPENSION (ML) NASAL DAILY
Status: DISCONTINUED | OUTPATIENT
Start: 2022-04-27 | End: 2022-05-02 | Stop reason: HOSPADM

## 2022-04-27 RX ORDER — LORAZEPAM 0.5 MG/1
0.5 TABLET ORAL EVERY 6 HOURS PRN
Status: DISCONTINUED | OUTPATIENT
Start: 2022-04-27 | End: 2022-05-02 | Stop reason: HOSPADM

## 2022-04-27 RX ORDER — CIPROFLOXACIN 2 MG/ML
400 INJECTION, SOLUTION INTRAVENOUS EVERY 12 HOURS
Status: DISCONTINUED | OUTPATIENT
Start: 2022-04-27 | End: 2022-05-01

## 2022-04-27 RX ADMIN — Medication 1 TABLET: at 10:10

## 2022-04-27 RX ADMIN — METRONIDAZOLE 500 MG: 500 INJECTION, SOLUTION INTRAVENOUS at 06:14

## 2022-04-27 RX ADMIN — METRONIDAZOLE 500 MG: 500 INJECTION, SOLUTION INTRAVENOUS at 14:43

## 2022-04-27 RX ADMIN — MORPHINE SULFATE 4 MG: 4 INJECTION, SOLUTION INTRAMUSCULAR; INTRAVENOUS at 03:58

## 2022-04-27 RX ADMIN — CIPROFLOXACIN 400 MG: 2 INJECTION, SOLUTION INTRAVENOUS at 00:04

## 2022-04-27 RX ADMIN — SODIUM CHLORIDE, PRESERVATIVE FREE 10 ML: 5 INJECTION INTRAVENOUS at 18:00

## 2022-04-27 RX ADMIN — GABAPENTIN 300 MG: 300 CAPSULE ORAL at 10:09

## 2022-04-27 RX ADMIN — METRONIDAZOLE 500 MG: 500 INJECTION, SOLUTION INTRAVENOUS at 22:28

## 2022-04-27 RX ADMIN — SERTRALINE HYDROCHLORIDE 25 MG: 50 TABLET ORAL at 10:09

## 2022-04-27 RX ADMIN — PANTOPRAZOLE SODIUM 40 MG: 40 TABLET, DELAYED RELEASE ORAL at 06:05

## 2022-04-27 RX ADMIN — FLUTICASONE PROPIONATE 1 SPRAY: 50 SPRAY, METERED NASAL at 10:11

## 2022-04-27 RX ADMIN — ENOXAPARIN SODIUM 40 MG: 100 INJECTION SUBCUTANEOUS at 10:10

## 2022-04-27 RX ADMIN — ONDANSETRON 4 MG: 2 INJECTION INTRAMUSCULAR; INTRAVENOUS at 20:22

## 2022-04-27 RX ADMIN — SODIUM CHLORIDE: 9 INJECTION, SOLUTION INTRAVENOUS at 18:04

## 2022-04-27 RX ADMIN — ONDANSETRON 4 MG: 2 INJECTION INTRAMUSCULAR; INTRAVENOUS at 10:22

## 2022-04-27 RX ADMIN — ONDANSETRON 4 MG: 2 INJECTION INTRAMUSCULAR; INTRAVENOUS at 17:59

## 2022-04-27 RX ADMIN — ASPIRIN 81 MG: 81 TABLET, COATED ORAL at 10:09

## 2022-04-27 RX ADMIN — ONDANSETRON 4 MG: 2 INJECTION INTRAMUSCULAR; INTRAVENOUS at 03:58

## 2022-04-27 RX ADMIN — CIPROFLOXACIN 400 MG: 2 INJECTION, SOLUTION INTRAVENOUS at 10:25

## 2022-04-27 RX ADMIN — PROMETHAZINE HYDROCHLORIDE 25 MG: 25 SUPPOSITORY RECTAL at 14:41

## 2022-04-27 RX ADMIN — VERAPAMIL HYDROCHLORIDE 120 MG: 120 TABLET, FILM COATED, EXTENDED RELEASE ORAL at 10:10

## 2022-04-27 RX ADMIN — SODIUM CHLORIDE, PRESERVATIVE FREE 10 ML: 5 INJECTION INTRAVENOUS at 20:25

## 2022-04-27 ASSESSMENT — PAIN - FUNCTIONAL ASSESSMENT
PAIN_FUNCTIONAL_ASSESSMENT: PREVENTS OR INTERFERES SOME ACTIVE ACTIVITIES AND ADLS

## 2022-04-27 ASSESSMENT — PAIN DESCRIPTION - LOCATION
LOCATION: ABDOMEN

## 2022-04-27 ASSESSMENT — PAIN DESCRIPTION - ORIENTATION
ORIENTATION: RIGHT;LEFT;MID
ORIENTATION: MID
ORIENTATION: MID

## 2022-04-27 ASSESSMENT — PAIN DESCRIPTION - PAIN TYPE
TYPE: ACUTE PAIN
TYPE: ACUTE PAIN;CHRONIC PAIN
TYPE: ACUTE PAIN

## 2022-04-27 ASSESSMENT — PAIN DESCRIPTION - ONSET
ONSET: ON-GOING
ONSET: ON-GOING

## 2022-04-27 ASSESSMENT — PAIN SCALES - GENERAL
PAINLEVEL_OUTOF10: 9
PAINLEVEL_OUTOF10: 7
PAINLEVEL_OUTOF10: 8
PAINLEVEL_OUTOF10: 1

## 2022-04-27 ASSESSMENT — PAIN DESCRIPTION - DESCRIPTORS
DESCRIPTORS: SHOOTING
DESCRIPTORS: SHARP
DESCRIPTORS: ACHING;CRAMPING

## 2022-04-27 ASSESSMENT — PAIN DESCRIPTION - FREQUENCY
FREQUENCY: INTERMITTENT
FREQUENCY: INTERMITTENT

## 2022-04-27 ASSESSMENT — PAIN SCALES - WONG BAKER: WONGBAKER_NUMERICALRESPONSE: 0

## 2022-04-27 NOTE — PLAN OF CARE
Problem: Discharge Planning  Goal: Discharge to home or other facility with appropriate resources  Outcome: Progressing     Problem: Pain  Goal: Verbalizes/displays adequate comfort level or baseline comfort level  Outcome: Progressing     Problem: ABCDS Injury Assessment  Goal: Absence of physical injury  Outcome: Progressing     Problem: Chronic Conditions and Co-morbidities  Goal: Patient's chronic conditions and co-morbidity symptoms are monitored and maintained or improved  Outcome: Progressing     Problem: Gastrointestinal - Adult  Goal: Minimal or absence of nausea and vomiting  Outcome: Progressing  Goal: Maintains or returns to baseline bowel function  Outcome: Progressing     Problem: Metabolic/Fluid and Electrolytes - Adult  Goal: Electrolytes maintained within normal limits  Outcome: Progressing  Goal: Hemodynamic stability and optimal renal function maintained  Outcome: Progressing

## 2022-04-27 NOTE — CONSULTS
[address] [place] [address], [address] [facility]                                  CONSULTATION    PATIENT NAME: MANUEL MARTINEZ                          :        1935  MED REC NO:   [id number]                          ROOM:       4102  ACCOUNT NO:   [de-identified]                           ADMIT DATE: 2022  PROVIDER:     Dawn Vaughan MD    CONSULT DATE:  2022    CHIEF COMPLAINT:  Left lower quadrant abdominal pain with abnormal CT  scan, rule out acute diverticulitis. HISTORY OF PRESENT ILLNESS:  As follows: The patient is an 29-year-old  female patient known to me, last seen in consult on 2022, patient  of Dr. Wilberto Arechiga with past medical history significant for  hypertension, coronary artery disease status post CABG, hyperlipidemia,  DVT, osteoarthritis, chronic depression, mitral valve prolapse, peptic  ulcer disease, peripheral vascular disease, supraventricular  tachycardia, tic douloureux, and acute diverticulitis x2 in the past,  who presented to emergency room yesterday with acute onset of left lower  quadrant upper abdominal pain. The patient did have acute diarrheal  illness prior to the inception of her pain, but denies any diarrhea at  present. There is no history of nausea, vomiting, hematemesis, melena,  or hematochezia. In the emergency room, the patient had a blood workup  done which comprised of chem profile, which was unremarkable. LFTs were  within normal limits. CBC showed a WBC count of 6.6, hemoglobin 12,  platelet count of 472,135. A CT scan of the abdomen and pelvis was done  and which showed acute diverticulitis at the junction of the descending  and sigmoid colon. No perforation or diverticular abscess was noted. The patient was admitted for further workup and management and is on  Cipro and Flagyl. The patient is hemodynamically stable.     The patient has seen Dr. Nigel Robledo from GI in the past who performed  an EGD and a colonoscopy on 04/04/2013, and biopsies from the small  intestine and stomach were unremarkable and biopsies from the ascending  colon were negative as well. The patient is hemodynamically stable. REVIEW OF SYSTEMS:  CENTRAL NERVOUS SYSTEM:  The patient denies headache or focal  sensorimotor symptoms. CARDIOVASCULAR SYSTEM:  No history of chest pain, shortness of breath,  or leg swelling. GENITOURINARY SYSTEM:  No history of dysuria, pyuria, or hematuria. MUSCULOSKELETAL SYSTEMS:  The patient complains of generalized weakness. RESPIRATORY SYSTEM:  No history of cough, hemoptysis, fever, or chills. PAST MEDICAL HISTORY:  Significant for history of hypertension, coronary  artery disease status post CABG, hyperlipidemia, DVT, osteoarthritis,  chronic depression, mitral valve prolapse, peptic ulcer disease,  peripheral vascular disease, supraventricular tachycardia, tic  douloureux, and acute diverticulitis x3. FAMILY HISTORY:  The patient's sister was diagnosed with carcinoma of  breast.    MEDICATIONS:  Please refer to the chart. SOCIOECONOMIC HISTORY:  No history of EtOH abuse. The patient does not  smoke cigarettes. SURGERIES:  The patient has had tonsillectomy and adenoidectomy,  cataract surgery, CABG, surgery on her kidneys/ureter, tubal ligation,  right shoulder surgery, and cholecystectomy. ALLERGIES:  The patient has multiple allergies, please refer to the  chart. PHYSICAL EXAMINATION:  GENERAL:  Physical examination shows an 74-year-old white female of thin  build and fair nutritional status for her age, who is lying comfortably  flat in bed, in no acute distress. She is awake, alert and oriented and  pleasant to talk with. VITAL SIGNS:  Stable. HEENT:  Examination shows skull to be atraumatic. NECK:  Supple. CHEST:  Clear.   HEART:  S1 and S2 are normal.  ABDOMEN:  Soft, nondistended with mild tenderness in the left lower  quadrant with no guarding or rigidity. Liver and spleen are not  palpable. Bowel sounds are present. RECTAL:  Exam is deferred. CNS:  Exam shows the patient to be awake, alert, and oriented. There  are no focal sensorimotor signs. MUSCULOSKELETAL SYSTEM:  Exam shows evidence of degenerative joint  disease changes. LABORATORY DATA:  As above mentioned. IMPRESSION:  An 80-year-old white female with multiple comorbidities  presents with recurrent episode of left lower quadrant abdominal pain  along with abnormal CT scan, rule out acute recurrent diverticulitis  (this is the patient's third episode of acute diverticulitis). RECOMMENDATIONS:  1. Agree with present management. 2.  We will follow up on stool studies also. 3.  Monitor the patient's CBC, chem profile. 4.  Monitor the patient for any complications of acute diverticulitis. 5.  Advance diet as tolerated. 6.  The patient will need an outpatient colonoscopy in fact once she  recuperates from her present episode of acute diverticulitis in about  eight weeks' time (this is the patient's third episode of acute  diverticulitis). 7.  Surgical consult later if needed. 8.  The case and plan have been discussed in detail with the patient and  all her questions have been answered.         Olamide Mishra MD    D: 04/27/2022 5:51:37       T: 04/27/2022 5:53:58     [email or web address]  Job#: [id number]     Doc#: [id number]    CC:

## 2022-04-27 NOTE — H&P
History and Physical      Name:  Jagruti Shrestha /Age/Sex: 1935  (82 y.o. female)   MRN & CSN:  [id number] & [id number] Encounter Date/Time: 2022 2:58 AM EDT   Location:  [facility]-A PCP: Evita Cárdenas MD       Hospital Day: 2    Assessment and Plan:   Jagruti Shrestha is a 82 y.o. female who presents with       Nausea and abdominal pain possibly d/t diverticulitis  - CTAB noted  - C. Diff stool study  - GI diarrhea panel  - cipro/flagyl IV formulations continued  - IV hydration, anti-emetic    Other chronic medical history, medications continued unless contra-indicated with above differential diagnosis:    HTN/HLD  Neuropathy  Depression/anxiety  - depakote 250 mg PO daily    D/w ED provider  Code status Full  DVT PPx Lovenox       History of Present Illness:     Jagruti Shrestha is a 82 y.o. female p/w diarrhea 4 days ago, which has improved after imodium, and then has not had diarrhea. She did have worsening of abdominal pain and nausea yesterday morning at 6 am. Denies any bleeding per rectum. Denies vomiting. Denies associated fevers. Last BM yesterday morning after the pain started, states it was normal consistency and color. Review of Systems: Need 10 Elements       Pt otherwise has no complaints of CP, SOB, dizziness, dysuria, joint pains, rash/boils, cough or fevers. Objective:   No intake or output data in the 24 hours ending 22 0258   Vitals:   Vitals:    22 2317 22 0000 22 0002 22 0051   BP: (!) 164/74 (!) 148/65 (!) 148/65 (!) 178/72   Pulse: 75 72 72 71   Resp: 18 16 20 18   Temp:    97.5 °F (36.4 °C)   TempSrc:    Oral   SpO2: 94% 96% 95% 95%   Weight:    133 lb 13.1 oz (60.7 kg)   Height:    5' 3\" (1.6 m)       Medications Prior to Admission     Prior to Admission medications    Medication Sig Start Date End Date Taking?  Authorizing Provider   fluticasone (FLONASE) 50 MCG/ACT nasal spray instill 1 spray into each nostril once daily 22   Martine Ervin MD Arjun   VASCEPA 0.5 g CAPS take 2 capsules by mouth twice a day 3/18/22   Ambreen Francis MD   ferrous sulfate (FE TABS 325) 325 (65 Fe) MG EC tablet Take 325 mg by mouth daily (with breakfast)     Historical Provider, MD   vitamin B-12 (CYANOCOBALAMIN) 100 MCG tablet Take 50 mcg by mouth daily    Historical Provider, MD   rosuvastatin (CRESTOR) 10 MG tablet take 1 tablet by mouth once daily 3/7/22   Anu Frey MD   verapamil (CALAN SR) 120 MG extended release tablet Take 1 tablet by mouth daily 2/11/22   Ambreen Francis MD   pyridostigmine (MESTINON) 60 MG tablet Take 0.5 tablets by mouth daily 2/11/22   Ambreen Francis MD   pantoprazole (PROTONIX) 40 MG tablet Take 1 tablet by mouth 2 times daily  Patient taking differently: Take 40 mg by mouth daily  1/25/22   Anu Frey MD   ondansetron (ZOFRAN-ODT) 4 MG disintegrating tablet Take 1 tablet by mouth 2 times daily as needed for Nausea or Vomiting 12/9/21   Anu Frey MD   ketotifen (ZADITOR) 0.025 % ophthalmic solution Place 1 drop into both eyes 2 times daily    Historical Provider, MD   sertraline (ZOLOFT) 25 MG tablet take 1/2 tablet by mouth once daily for 2 days then take 1 tablet by mouth once daily THEREAFTER 11/4/21   Historical Provider, MD   gabapentin (NEURONTIN) 300 MG capsule take 1 tablet by mouth three times a day for TRIGEMINAL NEURALGIA  Patient taking differently: Take 300 mg by mouth 3 times daily.   11/1/21 5/1/22  Anu Frey MD   Handicap Placard MISC by Does not apply route Good for 3 years 7/12/21   Anu Frey MD   divalproex (DEPAKOTE ER) 250 MG extended release tablet Take 250 mg by mouth nightly     Historical Provider, MD   Multiple Vitamins-Minerals (OCUVITE-LUTEIN PO) Take by mouth    Historical Provider, MD   calcium-vitamin D (OSCAL 500/200 D-3) 500-200 MG-UNIT per tablet Take 1 tablet by mouth 2 times daily      Historical Provider, MD   aspirin 81 MG EC tablet Take 81 mg by mouth daily. Historical Provider, MD       Physical Exam: Need 8 Elements   General: NAD   Eyes: EOMI  ENT: neck supple  Cardiovascular: Regular rate. NO edema  Respiratory: Symmetrical expansion,   Gastrointestinal: Soft, non tender  Genitourinary: no suprapubic tenderness  Skin: warm, dry  Neuro: Alert. Oriented  Psych: Mood appropriate. Past Medical History:   PMHx   Past Medical History:   Diagnosis Date    Arthritis of shoulder region, right 09/2014    Mey Long Blind right eye     following right cataract surg    Chronic depression 2009    Dr. Edi Beal DVT (deep venous thrombosis) ([address] 1970    left leg    Former smoker     History of echocardiogram 09/21/2020    EF 55-60%, mild left ventricular hypertrophy, normal diastolic filling pattern for age, no signficiant valvular disease, no pericardial effusion     History of stress test 03/25/2021    normal LVEF calculated by the computer is probably falsely low. LVEF is 40 %    Hx of Doppler ultrasound 03/01/2018    Carotid-mild 0-49% bilateral carotid,50% stenosis right subclavian    Hyperlipidemia     Hypertension     Macular degeneration     right    Mild cognitive impairment 02/2012    MMSE 26/30    MVP (mitral valve prolapse)     trace on echo 2014    Osteoporosis 05/2012    Pancytopenia 05/2011    mild with ; Dr Caden hawk 2010    Peptic ulcer disease     Peripheral vascular disease ([address] 01/2016    angio; dis below ankles; pletal    Prediabetes 2006    Recurrent ventral incisional hernia 2013    medial apex of GB scar    S/P CABG x 3 2003    3-vessel; Dr Daniel Curiel 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 Aranza Rod; Right side     PSHX:  has a past surgical history that includes Cholecystectomy; Coronary artery bypass graft (8/2009); Ureter surgery; Shoulder arthroscopy (Right, 2003);  Tubal ligation; and Cataract removal (Right, 2010). Allergies: Allergies   Allergen Reactions    Alendronate Sodium Other (See Comments)     GI upset    Bactrim [Sulfamethoxazole-Trimethoprim] Anaphylaxis    Boniva [Ibandronate Sodium] Other (See Comments)     Gi upset and declined egd; also to fosamax    Colesevelam     Lisinopril Other (See Comments)     Severe hyperkalemia (6) with bun >56      Macrobid [Nitrofurantoin]     Neggram [Nalidixic Acid]     Pce [Erythromycin]     Penicillins     Risperidone And Related     Welchol [Colesevelam Hcl] Other (See Comments)     constipation    Carbamazepine Nausea And Vomiting    Lovaza [Omega-3-Acid Ethyl Esters] Nausea And Vomiting    Metoprolol Nausea And Vomiting    Pyridium [Phenazopyridine] Palpitations     Fam HX:  family history includes Cancer (age of onset: 79) in her sister; Heart Attack in her mother; High Blood Pressure in her mother; Other in her father and mother; Stroke in her sister. Soc HX:   Social History     Socioeconomic History    Marital status:       Spouse name: Tao Brennan Number of children: 2    Years of education: 15    Highest education level: Not on file   Occupational History    Occupation: retired   Tobacco Use    Smoking status: Former Smoker     Packs/day: 0.25     Years: 9.00     Pack years: 2.25     Types: Cigarettes     Start date:      Quit date: 1985     Years since quittin.4    Smokeless tobacco: Never Used   Vaping Use    Vaping Use: Never used   Substance and Sexual Activity    Alcohol use: No     Alcohol/week: 0.0 standard drinks    Drug use: No    Sexual activity: Not Currently     Partners: Male   Other Topics Concern    Not on file   Social History Narrative    Not on file     Social Determinants of Health     Financial Resource Strain: Low Risk     Difficulty of Paying Living Expenses: Not hard at all   Food Insecurity: No Food Insecurity    Worried About [facility] in the Last Year: Never true   [address] in the Last Year: Never true   Transportation Needs: No Transportation Needs    Lack of Transportation (Medical): No    Lack of Transportation (Non-Medical): No   Physical Activity: Inactive    Days of Exercise per Week: 0 days    Minutes of Exercise per Session: 0 min   Stress: No Stress Concern Present    Feeling of Stress : Not at all   Social Connections: Moderately Integrated    Frequency of Communication with Friends and Family: Twice a week    Frequency of Social Gatherings with Friends and Family: Once a week    Attends [employer] Services: More than 4 times per year    Active Member of [address] [place] [address] [employer] or Organizations: Yes    Attends Club or Organization Meetings: More than 4 times per year    Marital Status:     Intimate Partner Violence: Not At Risk    Fear of Current or Ex-Partner: No    Emotionally Abused: No    Physically Abused: No    Sexually Abused: No   Housing Stability: Low Risk     Unable to Pay for Housing in the Last Year: No    Number of Places Lived in the Last Year: 1    Unstable Housing in the Last Year: No       Medications:   Medications:    ciprofloxacin  400 mg IntraVENous Q12H    metroNIDAZOLE  500 mg IntraVENous Q8H    calcium-cholecalciferol  1 tablet Oral BID    ketotifen  1 drop Both Eyes BID    vitamin B-12  50 mcg Oral Daily    verapamil  120 mg Oral Daily    aspirin  81 mg Oral Daily    divalproex  250 mg Oral Nightly    sertraline  25 mg Oral Daily    pantoprazole  40 mg Oral Daily    pyridostigmine  30 mg Oral Daily    rosuvastatin  10 mg Oral Nightly    ferrous sulfate  325 mg Oral Daily with breakfast    fluticasone  1 spray Each Nostril Daily    gabapentin  300 mg Oral TID    sodium chloride flush  10 mL IntraVENous 2 times per day    enoxaparin  40 mg SubCUTAneous Daily    morphine  4 mg IntraVENous Once      Infusions:    sodium chloride       PRN Meds: sodium chloride flush, 10 mL, PRN  sodium chloride, , PRN  ondansetron, 4 mg, Q8H PRN   Or  ondansetron, 4 mg, Q6H PRN  acetaminophen, 650 mg, Q6H PRN   Or  acetaminophen, 650 mg, Q6H PRN        Labs      CBC:   Recent Labs     04/26/22  1950   WBC 6.6   HGB 12.0*        BMP:    Recent Labs     04/26/22 1950   *   K 4.5   CL 91*   CO2 23   BUN 14   CREATININE 0.9   GLUCOSE 129*     Hepatic:   Recent Labs     04/26/22  1950   AST 41*   ALT 25   BILITOT 0.4   ALKPHOS 39*     Lipids:   Lab Results   Component Value Date    CHOL 129 03/22/2021    CHOL 129 06/22/2020    HDL 33 03/22/2021    HDL 20 01/03/2012    TRIG 194 03/22/2021     Hemoglobin A1C:   Lab Results   Component Value Date    LABA1C 5.8 08/31/2021     TSH:   Lab Results   Component Value Date    TSH 3.250 03/06/2019     Troponin:   Lab Results   Component Value Date    TROPONINT <0.010 04/26/2022    TROPONINT <0.010 01/31/2022    TROPONINT <0.010 11/10/2021     Lactic Acid: No results for input(s): LACTA in the last 72 hours. BNP: No results for input(s): PROBNP in the last 72 hours.   UA:  Lab Results   Component Value Date    NITRU NEGATIVE 04/26/2022    NITRU POSITIVE 02/17/2020    COLORU YELLOW 04/26/2022    PHUR 5.5 04/29/2021    PHUR 5.5 02/17/2020    LABCAST >40 Hyaline 10/13/2012    WBCUA <1 04/26/2022    RBCUA NO CELLS SEEN 04/26/2022    MUCUS RARE 02/02/2022    TRICHOMONAS NONE SEEN 01/31/2022    YEAST Present 08/05/2019    BACTERIA NEGATIVE 04/26/2022    CLARITYU CLEAR 04/26/2022    SPECGRAV 1.010 04/26/2022    LEUKOCYTESUR NEGATIVE 04/26/2022    UROBILINOGEN 0.2 04/26/2022    BILIRUBINUR NEGATIVE 04/26/2022    BILIRUBINUR neg 04/29/2021    BLOODU NEGATIVE 04/26/2022    GLUCOSEU neg 04/29/2021    GLUCOSEU Negative 02/17/2020    KETUA NEGATIVE 04/26/2022    AMORPHOUS 2+ 10/13/2012     Urine Cultures:   Lab Results   Component Value Date    LABURIN No growth at 18 to 36 hours 04/28/2021     Blood Cultures: No results found for: BC  No results found for: BLOODCULT2  Organism: Lab Results   Component Value Date    ORG Escherichia coli 02/17/2020    ORG Escherichia coli 02/17/2020       Imaging/Diagnostics Last 24 Hours   CT ABDOMEN PELVIS W IV CONTRAST Additional Contrast? None    Result Date: 4/26/2022  EXAMINATION: CT OF THE ABDOMEN AND PELVIS WITH CONTRAST 4/26/2022 6:00 pm TECHNIQUE: CT of the abdomen and pelvis was performed with the administration of intravenous contrast. Multiplanar reformatted images are provided for review. Dose modulation, iterative reconstruction, and/or weight based adjustment of the mA/kV was utilized to reduce the radiation dose to as low as reasonably achievable. COMPARISON: 01/31/2022 HISTORY: ORDERING SYSTEM PROVIDED HISTORY: lower abdominal pain; nausea; concern for intraabdominal process TECHNOLOGIST PROVIDED HISTORY: Reason for exam:->lower abdominal pain; nausea; concern for intraabdominal process Additional Contrast?->None Decision Support Exception - unselect if not a suspected or confirmed emergency medical condition->Emergency Medical Condition (MA) Reason for Exam: lower abdominal pain; nausea; concern for intraabdominal process. dry heaving without vomiting, patient states severe abdominal pain since this AM FINDINGS: Lower Chest: Parenchymal bands of atelectasis noted in the lower lungs. No acute infiltrate identified. Base of the heart unremarkable. Visualized extra thoracic soft tissues unremarkable. Organs: No acute or suspicious hepatic abnormalities. The gallbladder is surgically absent. Portal vein is patent. The spleen enhances normally. Adrenals are unremarkable. Pancreas is unremarkable. There is evidence of urothelial thickening bilaterally, with mild hyperenhancement. Bilateral perinephric fat stranding is identified. There is mild bilateral ureteral distention. No ureteral calculi found. GI/Bowel: There is moderate diverticulosis of the large bowel.   There is suggestion of mild mural thickening at the junction of the descending colon and sigmoid colon, with mild surrounding fat stranding, suggesting acute uncomplicated diverticulitis. The appendix is unremarkable in appearance. Small hiatal hernia. Otherwise, the distal esophagus, stomach, duodenal sweep, and the remainder of the small bowel are unremarkable in appearance. Pelvis: Uterus and adnexa regions unremarkable. No free pelvic fluid. Urinary bladder unremarkable. Peritoneum/Retroperitoneum: Moderately heavy vascular calcifications within the abdominal aorta. The abdominal aorta is normal in caliber. The superior mesenteric artery is normally enhancing. No lymphadenopathy. Bones/Soft Tissues: There is laxity of the right lateral abdominal wall, with atrophy of the abdominal wall musculature. No acute or suspicious bony abnormalities are identified. Suggestion of mild acute uncomplicated diverticulitis seen at the junction of the descending colon and sigmoid colon. No evidence of perforation. No diverticular abscess. Mild bilateral urothelial thickening and hyperenhancement, which raise the possibility of urinary tract infection/inflammation. Please correlate with urinalysis.            Electronically signed by Bessy Gómez MD on 4/27/2022 at 2:58 AM

## 2022-04-27 NOTE — PROGRESS NOTES
Hospital Medicine: Progress Note     Giorgio Sweeney  1935         Admit Date: 4/26/2022   Primary Care Physician:  Anu Frey MD    BP (!) 128/59   Pulse 65   Temp 97.6 °F (36.4 °C) (Oral)   Resp 18   Ht 5' 3\" (1.6 m)   Wt 133 lb 13.1 oz (60.7 kg)   SpO2 95%   BMI 23.71 kg/m²     Assessment and Plan:    82-year-old female who presented with complaint of lower abdominal pain and nausea. She initially had a 4-day history of diarrhea and took Imodium which abated her diarrheal symptom. CT abdomen and pelvis showed mild acute uncomplicated diverticulitis of the descending and sigmoid colon. She was admitted for further evaluation and management. Sigmoid colitis:  Continue fluid hydration and broad-spectrum antibiotics. Await culture data. Continue PPI and antiemetics. Noted GI input. Nausea and vomiting:  Antiemetics as needed. Hypertension:  Restart home medications if able to take. As needed hydralazine. Hyponatremia:  Likely due to volume depletion. Continue fluid hydration. Monitor and corrected electrolytes. Anxiety and depression:  On Depakote. Add as needed benzo if unable to take by mouth. Discharge plan: Plan discharge to home when improved. She lives alone. Interval History:    Seen and examined at bedside, awake and alert, up in bed, in no acute distress. Feels better today, on liquid diet. She is afebrile, no N/V. Nursing has no pressing concerns. Physical exam:  General appearance: Resting in bed, awake, conversant, no acute distress. Head/EENT: ncat, perrl, eomi, mmm. Neck: supple, no meningismus or thyro, no JVD or bruit.megaly  Chest: symmetric with equal expansion, no retractions or wall tenderness. Lungs:clear to auscultation bilaterally, no egophony. Heart: normal s1s2, no added sounds, no edema. Abdomen: Soft, positive bowel sounds, no palpable organomegaly.   Extremities: no c/c/e, good ROM x 4. Pulses: palpable and strong bilaterally. Skin: warm and dry. Neurologic: no focal deficits. Data Review:     CBC: Recent Labs     04/26/22 1950   WBC 6.6   HGB 12.0*        [facility]. Panel:    Recent Labs     04/26/22 1950   *   K 4.5   CL 91*   CO2 23   BUN 14   CREATININE 0.9   GLUCOSE 129*     Hepatic:   Recent Labs     04/26/22 1950   AST 41*   ALT 25   BILITOT 0.4   ALKPHOS 39*     Meds:    ciprofloxacin  400 mg IntraVENous Q12H    metroNIDAZOLE  500 mg IntraVENous Q8H    calcium-cholecalciferol  1 tablet Oral BID    ketotifen  1 drop Both Eyes BID    vitamin B-12  50 mcg Oral Daily    verapamil  120 mg Oral Daily    aspirin  81 mg Oral Daily    divalproex  250 mg Oral Nightly    sertraline  25 mg Oral Daily    pantoprazole  40 mg Oral Daily    pyridostigmine  30 mg Oral Daily    rosuvastatin  10 mg Oral Nightly    ferrous sulfate  325 mg Oral Daily with breakfast    fluticasone  1 spray Each Nostril Daily    gabapentin  300 mg Oral TID    sodium chloride flush  10 mL IntraVENous 2 times per day    enoxaparin  40 mg SubCUTAneous Daily     Yvrose Brothers MD    Note: Computer voice recognition system was used in parts of this documentation. There is a possibility of sound alike word errors inherent to this technology that may be missed during proof-reading and may alter the context of this note.

## 2022-04-27 NOTE — PROGRESS NOTES
4 Eyes Skin Assessment     NAME:  Jones Homans  YOB: 1935  MEDICAL RECORD NUMBER:  [id number]    The patient is being assess for  Admission    I agree that 2 RN's have performed a thorough Head to Toe Skin Assessment on the patient. ALL assessment sites listed below have been assessed. Areas assessed by both nurses:    Head, Face, Ears, Shoulders, Back, Chest and Arms, Elbows, Hands        Does the Patient have a Wound?  No noted wound(s)       Mayito Prevention initiated:  No   Wound Care Orders initiated:  No    Pressure Injury (Stage 3,4, Unstageable, DTI, NWPT, and Complex wounds) if present place consult order under [de-identified] No    New and Established Ostomies if present place consult order under : No      Nurse 1 eSignature: Electronically signed by Marylen Gambles, RN on 4/27/22 at 1:29 AM EDT    **SHARE this note so that the co-signing nurse is able to place an eSignature**    Nurse 2 eSignature: Electronically signed by Kb Gibbs RN on 4/27/22 at 1:32 AM EDT

## 2022-04-27 NOTE — ED NOTES
Report called to Leeanne receiving Nurse, Nurse updated on Patient and plan of care, all questions and concerns addressed. Patient understands she will be transferred to [facility] for further workup and treatment.       Damari Almazan, RN  04/26/22 [address] [address] [facility] [address] RN  04/26/22 1599

## 2022-04-28 ENCOUNTER — APPOINTMENT (OUTPATIENT)
Dept: GENERAL RADIOLOGY | Age: 87
DRG: 392 | End: 2022-04-28
Payer: MEDICARE

## 2022-04-28 LAB
ALBUMIN SERPL-MCNC: 3.9 GM/DL (ref 3.4–5)
ALP BLD-CCNC: 40 IU/L (ref 40–128)
ALT SERPL-CCNC: 19 U/L (ref 10–40)
ANION GAP SERPL CALCULATED.3IONS-SCNC: 17 MMOL/L (ref 4–16)
AST SERPL-CCNC: 37 IU/L (ref 15–37)
BASOPHILS ABSOLUTE: 0 K/CU MM
BASOPHILS RELATIVE PERCENT: 0.3 % (ref 0–1)
BILIRUB SERPL-MCNC: 0.6 MG/DL (ref 0–1)
BUN BLDV-MCNC: 14 MG/DL (ref 6–23)
CALCIUM SERPL-MCNC: 8.4 MG/DL (ref 8.3–10.6)
CHLORIDE BLD-SCNC: 88 MMOL/L (ref 99–110)
CO2: 18 MMOL/L (ref 21–32)
CREAT SERPL-MCNC: 0.7 MG/DL (ref 0.6–1.1)
DIFFERENTIAL TYPE: ABNORMAL
EOSINOPHILS ABSOLUTE: 0 K/CU MM
EOSINOPHILS RELATIVE PERCENT: 0.3 % (ref 0–3)
GFR AFRICAN AMERICAN: >60 ML/MIN/1.73M2
GFR NON-AFRICAN AMERICAN: >60 ML/MIN/1.73M2
GLUCOSE BLD-MCNC: 114 MG/DL (ref 70–99)
HCT VFR BLD CALC: 38.8 % (ref 37–47)
HEMOGLOBIN: 13.3 GM/DL (ref 12.5–16)
IMMATURE NEUTROPHIL %: 0.4 % (ref 0–0.43)
LYMPHOCYTES ABSOLUTE: 1.9 K/CU MM
LYMPHOCYTES RELATIVE PERCENT: 25.6 % (ref 24–44)
MCH RBC QN AUTO: 33.4 PG (ref 27–31)
MCHC RBC AUTO-ENTMCNC: 34.3 % (ref 32–36)
MCV RBC AUTO: 97.5 FL (ref 78–100)
MONOCYTES ABSOLUTE: 0.8 K/CU MM
MONOCYTES RELATIVE PERCENT: 10.1 % (ref 0–4)
NUCLEATED RBC %: 0 %
PDW BLD-RTO: 13.6 % (ref 11.7–14.9)
PLATELET # BLD: 180 K/CU MM (ref 140–440)
PMV BLD AUTO: 8.8 FL (ref 7.5–11.1)
POTASSIUM SERPL-SCNC: 4.9 MMOL/L (ref 3.5–5.1)
RBC # BLD: 3.98 M/CU MM (ref 4.2–5.4)
SEGMENTED NEUTROPHILS ABSOLUTE COUNT: 4.7 K/CU MM
SEGMENTED NEUTROPHILS RELATIVE PERCENT: 63.3 % (ref 36–66)
SODIUM BLD-SCNC: 123 MMOL/L (ref 135–145)
TOTAL IMMATURE NEUTOROPHIL: 0.03 K/CU MM
TOTAL NUCLEATED RBC: 0 K/CU MM
TOTAL PROTEIN: 6.8 GM/DL (ref 6.4–8.2)
WBC # BLD: 7.4 K/CU MM (ref 4–10.5)

## 2022-04-28 PROCEDURE — 6370000000 HC RX 637 (ALT 250 FOR IP): Performed by: INTERNAL MEDICINE

## 2022-04-28 PROCEDURE — 1200000000 HC SEMI PRIVATE

## 2022-04-28 PROCEDURE — 6360000002 HC RX W HCPCS: Performed by: INTERNAL MEDICINE

## 2022-04-28 PROCEDURE — 94761 N-INVAS EAR/PLS OXIMETRY MLT: CPT

## 2022-04-28 PROCEDURE — C9113 INJ PANTOPRAZOLE SODIUM, VIA: HCPCS | Performed by: INTERNAL MEDICINE

## 2022-04-28 PROCEDURE — 71045 X-RAY EXAM CHEST 1 VIEW: CPT

## 2022-04-28 PROCEDURE — 2580000003 HC RX 258: Performed by: INTERNAL MEDICINE

## 2022-04-28 PROCEDURE — 85025 COMPLETE CBC W/AUTO DIFF WBC: CPT

## 2022-04-28 PROCEDURE — 36415 COLL VENOUS BLD VENIPUNCTURE: CPT

## 2022-04-28 PROCEDURE — 80053 COMPREHEN METABOLIC PANEL: CPT

## 2022-04-28 PROCEDURE — 2500000003 HC RX 250 WO HCPCS: Performed by: INTERNAL MEDICINE

## 2022-04-28 RX ADMIN — METRONIDAZOLE 500 MG: 500 INJECTION, SOLUTION INTRAVENOUS at 06:45

## 2022-04-28 RX ADMIN — PYRIDOSTIGMINE BROMIDE 30 MG: 60 TABLET ORAL at 20:48

## 2022-04-28 RX ADMIN — LORAZEPAM 0.5 MG: 0.5 TABLET ORAL at 09:04

## 2022-04-28 RX ADMIN — ONDANSETRON 4 MG: 2 INJECTION INTRAMUSCULAR; INTRAVENOUS at 06:41

## 2022-04-28 RX ADMIN — CIPROFLOXACIN 400 MG: 2 INJECTION, SOLUTION INTRAVENOUS at 11:36

## 2022-04-28 RX ADMIN — ROSUVASTATIN CALCIUM 10 MG: 5 TABLET, FILM COATED ORAL at 20:47

## 2022-04-28 RX ADMIN — METOCLOPRAMIDE 10 MG: 5 INJECTION, SOLUTION INTRAMUSCULAR; INTRAVENOUS at 09:13

## 2022-04-28 RX ADMIN — CIPROFLOXACIN 400 MG: 2 INJECTION, SOLUTION INTRAVENOUS at 00:26

## 2022-04-28 RX ADMIN — SODIUM CHLORIDE, PRESERVATIVE FREE 10 ML: 5 INJECTION INTRAVENOUS at 20:48

## 2022-04-28 RX ADMIN — VERAPAMIL HYDROCHLORIDE 120 MG: 120 TABLET, FILM COATED, EXTENDED RELEASE ORAL at 09:16

## 2022-04-28 RX ADMIN — GABAPENTIN 300 MG: 300 CAPSULE ORAL at 20:47

## 2022-04-28 RX ADMIN — Medication 1 TABLET: at 20:47

## 2022-04-28 RX ADMIN — GABAPENTIN 300 MG: 300 CAPSULE ORAL at 14:12

## 2022-04-28 RX ADMIN — METRONIDAZOLE 500 MG: 500 INJECTION, SOLUTION INTRAVENOUS at 22:31

## 2022-04-28 RX ADMIN — SERTRALINE HYDROCHLORIDE 25 MG: 50 TABLET ORAL at 09:07

## 2022-04-28 RX ADMIN — ONDANSETRON 4 MG: 2 INJECTION INTRAMUSCULAR; INTRAVENOUS at 14:12

## 2022-04-28 RX ADMIN — SODIUM CHLORIDE: 9 INJECTION, SOLUTION INTRAVENOUS at 15:35

## 2022-04-28 RX ADMIN — PANTOPRAZOLE SODIUM 40 MG: 40 INJECTION, POWDER, FOR SOLUTION INTRAVENOUS at 09:05

## 2022-04-28 RX ADMIN — ENOXAPARIN SODIUM 40 MG: 100 INJECTION SUBCUTANEOUS at 09:17

## 2022-04-28 RX ADMIN — METOCLOPRAMIDE 10 MG: 5 INJECTION, SOLUTION INTRAMUSCULAR; INTRAVENOUS at 15:30

## 2022-04-28 RX ADMIN — METRONIDAZOLE 500 MG: 500 INJECTION, SOLUTION INTRAVENOUS at 14:17

## 2022-04-28 RX ADMIN — DIVALPROEX SODIUM 250 MG: 250 TABLET, FILM COATED, EXTENDED RELEASE ORAL at 20:47

## 2022-04-28 RX ADMIN — LORAZEPAM 0.5 MG: 0.5 TABLET ORAL at 15:30

## 2022-04-28 RX ADMIN — GABAPENTIN 300 MG: 300 CAPSULE ORAL at 09:08

## 2022-04-28 ASSESSMENT — PAIN SCALES - WONG BAKER
WONGBAKER_NUMERICALRESPONSE: 0

## 2022-04-28 ASSESSMENT — PAIN SCALES - GENERAL
PAINLEVEL_OUTOF10: 0

## 2022-04-28 NOTE — PROGRESS NOTES
Hospitalist Progress Note      Name:  Radha Bull /Age/Sex: 1935  (82 y.o. female)   MRN & CSN:  [id number] & [id number] Admission Date/Time: 2022  7:03 PM   Location:  [address] [place] [address] PCP: Julio Escoto MD         Hospital Day: 3    Assessment and Plan:   79-year-old female who presented with complaint of lower abdominal pain and nausea. She initially had a 4-day history of diarrhea and took Imodium which abated her diarrheal symptom. CT abdomen and pelvis showed mild acute uncomplicated diverticulitis of the descending and sigmoid colon. She was admitted for further evaluation and management.     Sigmoid colitis:  -Continue fluid hydration increase the rate to 100 cc an hour  -Continue Cipro and Flagyl  -Continue PPI and antiemetics. -Appreciate gastroenterology recommendations  -Outpatient colonoscopy after 8 weeks  -May need surgical consult if lack of improvement  -Currently on clear liquid diet advance as tolerated     Hypertension:  Restart home medications if able to take. As needed hydralazine.     Hyponatremia:  -Was thought to be due to volume depletion  -Lack of improvement with IV hydration  -Nephrology consulted     Anxiety and depression:  -Continue Depakote. Diet ADULT DIET; Clear Liquid   DVT Prophylaxis [x] Lovenox, []  Heparin, [] SCDs, [] Ambulation   GI Prophylaxis [x] PPI,  [] H2 Blocker,  [] Carafate,  [] Diet/Tube Feeds   Code Status Full Code   Disposition Patient requires continued admission due to nausea and vomiting   MDM [] Low, [x] Moderate,[]  High  Patient's risk as above due to diverticulitis with multiple comorbid conditions     History of Present Illness:     Chief Complaint:   The patient states that she is feels a little bit better. She does not have any appetite and still has nausea. She is on clear liquid diet. Does not want to advance at this time. Ten point ROS reviewed negative, unless as noted above    Objective:        Intake/Output Summary (Last 24 hours) at 4/28/2022 1324  Last data filed at 4/27/2022 1852  Gross per 24 hour   Intake 0 ml   Output --   Net 0 ml      Vitals:   Vitals:    04/28/22 0745   BP: (!) 154/84   Pulse: 99   Resp: 18   Temp: 98.1 °F (36.7 °C)   SpO2: 98%     Physical Exam:   General: NAD   Eyes: EOMI  ENT: neck supple  Cardiovascular: Regular rate. No edema  Respiratory: Symmetrical expansion,   Gastrointestinal: Soft, nondistended, tenderness of the lower abdominal  Genitourinary: no suprapubic tenderness  Skin: warm, dry  Neuro: Alert. Oriented  Psych: Mood appropriate. Medications:   Medications:    ciprofloxacin  400 mg IntraVENous Q12H    metroNIDAZOLE  500 mg IntraVENous Q8H    calcium-cholecalciferol  1 tablet Oral BID    ketotifen  1 drop Both Eyes BID    vitamin B-12  50 mcg Oral Daily    verapamil  120 mg Oral Daily    aspirin  81 mg Oral Daily    divalproex  250 mg Oral Nightly    sertraline  25 mg Oral Daily    pyridostigmine  30 mg Oral Daily    rosuvastatin  10 mg Oral Nightly    ferrous sulfate  325 mg Oral Daily with breakfast    fluticasone  1 spray Each Nostril Daily    gabapentin  300 mg Oral TID    sodium chloride flush  10 mL IntraVENous 2 times per day    enoxaparin  40 mg SubCUTAneous Daily    pantoprazole  40 mg IntraVENous Daily      Infusions:    sodium chloride      sodium chloride 75 mL/hr at 04/27/22 1804     PRN Meds: sodium chloride flush, 10 mL, PRN  sodium chloride, , PRN  ondansetron, 4 mg, Q8H PRN   Or  ondansetron, 4 mg, Q6H PRN  acetaminophen, 650 mg, Q6H PRN   Or  acetaminophen, 650 mg, Q6H PRN  metoclopramide, 10 mg, Q6H PRN  LORazepam, 0.5 mg, Q6H PRN  hydrALAZINE (APRESOLINE) ivpb, 5 mg, Q6H PRN          Patient is still admitted because nausea and vomiting. The anticipated discharge is in greater than 24 hours.      Electronically signed by Tiffanie Langford MD on 4/28/2022 at 1:24 PM

## 2022-04-28 NOTE — PLAN OF CARE
Problem: Discharge Planning  Goal: Discharge to home or other facility with appropriate resources  4/28/2022 1058 by Leigh Roach RN  Outcome: Progressing  4/28/2022 0324 by Abbie Teran  Outcome: Progressing     Problem: Pain  Goal: Verbalizes/displays adequate comfort level or baseline comfort level  4/28/2022 1058 by Leigh Roach RN  Outcome: Progressing  4/28/2022 0324 by Abbie Teran  Outcome: Progressing     Problem: ABCDS Injury Assessment  Goal: Absence of physical injury  4/28/2022 1058 by Leigh Roach RN  Outcome: Progressing  4/28/2022 0324 by Abbie Teran  Outcome: Progressing     Problem: Chronic Conditions and Co-morbidities  Goal: Patient's chronic conditions and co-morbidity symptoms are monitored and maintained or improved  4/28/2022 1058 by Leigh Roach RN  Outcome: Progressing  4/28/2022 0324 by Abbie Teran  Outcome: Progressing     Problem: Gastrointestinal - Adult  Goal: Minimal or absence of nausea and vomiting  4/28/2022 1058 by Leigh Roach RN  Outcome: Progressing  4/28/2022 0324 by Abbie Teran  Outcome: Not Progressing  Goal: Maintains or returns to baseline bowel function  4/28/2022 1058 by Leigh Roach RN  Outcome: Progressing  4/28/2022 0324 by Abbie Teran  Outcome: Progressing     Problem: Metabolic/Fluid and Electrolytes - Adult  Goal: Electrolytes maintained within normal limits  4/28/2022 1058 by Leigh Roach RN  Outcome: Progressing  4/28/2022 0324 by Abbie Teran  Outcome: Progressing  Goal: Hemodynamic stability and optimal renal function maintained  4/28/2022 1058 by Leigh Roach RN  Outcome: Progressing  4/28/2022 0324 by Abbie Teran  Outcome: Progressing     Problem: Safety - Adult  Goal: Free from fall injury  4/28/2022 1058 by Leigh Roach RN  Outcome: Progressing  4/28/2022 0324 by Abbie Teran  Outcome: Progressing     Problem: Skin/Tissue Integrity  Goal: Absence of new skin breakdown  Description: 1. Monitor for areas of redness and/or skin breakdown  2.   Assess vascular access sites hourly  3. Every 4-6 hours minimum:  Change oxygen saturation probe site  4. Every 4-6 hours:  If on nasal continuous positive airway pressure, respiratory therapy assess nares and determine need for appliance change or resting period.   4/28/2022 1058 by Earl James RN  Outcome: Progressing  4/28/2022 0324 by Marilynn Gordon  Outcome: Progressing

## 2022-04-28 NOTE — CONSULTS
Patient:   Sandie Kaiser    Date:  22  :  1935, 82 y.o. Nephrologist: Melissa Murillo MD  Provider: Wilberto Arechiga MD    Reason for Consult: Acute on chronic hyponatremia    Consult requested by : Dr Esteban Marina MD    Chief Complaint:     Nausea and diarrhea, abdominal discomfort and poor oral intake      HISTORY OF PRESENT ILLNESS:     Ms. Tyrel Whiting is an 61-year-old female, who was brought to the [facility] emergency department on Tuesday which is  with 1 day history of nausea. Upon presentation in the emergency department her blood pressure was recorded rather high 200/108, she underwent several diagnostic tests which include imaging and biochemical    Imaging study which include CT of the abdomen and pelvis with iodinated contrast showed bilateral hydroureter, and some suggestion of diverticulitis as per radiologist interpretation, and some perinephric stranding. Biochemical testing showed sodium of 128, with concomitant low chloride of 91 nearly coagulant per liter, with BUN and creatinine 14 and 0.9 mg/dL respectively, blood glucose at that time was 129. Other pertinent abnormal lab include hemoglobin of 12.0.  100% bland urine without any albuminuria, leukocyturia or hematuria. She was given 1 L of crystalloid solution mainly normal saline, followed by 100 mL an hour and she was subsequently admitted for further evaluation management. Additionally empirical antibiotic for presumed diverticulitis namely ciprofloxacin metronidazole initiated. She was placed on hydralazine for high blood pressure. Unfortunately despite of normal saline infusion, which she received more than 3 L now, her sodium went down to 123 mEq/L this morning, hence the kidney consult was requested    I was able to review her prior sodium since 2010 she has had intermittent hyponatremia, at least since 2010, her prashanth sodium was 123 on 2022.     Patient was sleepy when I examined her, most of the history is taken from her grandson who was in the room, as well as [facility] and other healthcare provider. Grandson who lives across the street from her, seems to be familiar with her medical history      PMH :   1. Chronic intermittent hyponatremia since -some thought to be from low protein diet in the past  2. Coronary artery disease status post CABG x3 vessel in -with relatively preserved left ventricular ejection fraction as of 2022 by resting echo parameter  3. Longstanding hypertension and dyslipidemia  4. Osteoporosis, thrombophilia  5. Atherosclerotic cardiovascular disease        PSH :  1.  Status post CABG x3 vessel in   2. Gallbladder surgery  3. Previous ureteral surgery  4. Also some orthopedic surgery and cataract surgery in the distant past along with tubal ligation      OB GYN Hx:   2 para 2      Habits :   She currently does not smoke, no history of alcohol illicit drug abuse    Soc Hx:  She lives alone in in urinary at home, her grandson lives across the street from her. She is  since 2018. She is a retired . Her activity is acceptable according to grandson.     [address]   Apparently cancer runs in the family  As well as hypertension        REVIEW OF SYSTEMS:     All pertinent ROS neg except   Nausea, diarrhea and abdominal discomfort    Current Facility-Administered Medications   Medication Dose Route Frequency Provider Last Rate Last Admin    ciprofloxacin (CIPRO) IVPB 400 mg  400 mg IntraVENous Q12H Lewis Luke MD   Stopped at 22 1240    metronidazole (FLAGYL) 500 mg in 0.9% NaCl 100 mL IVPB premix  500 mg IntraVENous Q8H Stu Leonard MD   Stopped at 22 1529    calcium-cholecalciferol 500-200 MG-UNIT per tablet 1 tablet  1 tablet Oral BID Terrance JORGE MD   1 tablet at 22 1010    ketotifen (ZADITOR) 0.025 % ophthalmic solution 1 drop  1 drop Both Eyes BID Lewis Luke MD        vitamin B-12 (CYANOCOBALAMIN) tablet 50 mcg  50 mcg Oral Daily Lewis Coleman Gilbert MD        verapamil (CALAN SR) extended release tablet 120 mg  120 mg Oral Daily Gertrude JORGE MD   120 mg at 04/28/22 0916    aspirin EC tablet 81 mg  81 mg Oral Daily Gertrude JORGE MD   81 mg at 04/27/22 1009    divalproex (DEPAKOTE ER) extended release tablet 250 mg  250 mg Oral Nightly Lewis Coleman Gilbert MD        sertraline (ZOLOFT) tablet 25 mg  25 mg Oral Daily Gertrude JORGE MD   25 mg at 04/28/22 8644    pyridostigmine (MESTINON) tablet 30 mg  30 mg Oral Daily Lewis Coleman Gilbert MD        rosuvastatin (CRESTOR) tablet 10 mg  10 mg Oral Nightly Lewis Coleman Gilbert MD        ferrous sulfate (IRON 325) tablet 325 mg  325 mg Oral Daily with breakfast Lewis Coleman Gilbert MD        fluticasone (FLONASE) 50 MCG/ACT nasal spray 1 spray  1 spray Each Nostril Daily Lewis Coleman Gilbert MD   1 spray at 04/27/22 1011    gabapentin (NEURONTIN) capsule 300 mg  300 mg Oral TID Nicolette Moses MD   300 mg at 04/28/22 1412    sodium chloride flush 0.9 % injection 10 mL  10 mL IntraVENous 2 times per day Nicolette Moses MD   10 mL at 04/27/22 2025    sodium chloride flush 0.9 % injection 10 mL  10 mL IntraVENous PRN Nicolette Moses MD   10 mL at 04/27/22 1800    0.9 % sodium chloride infusion   IntraVENous PRN Gertrdue JORGE MD        enoxaparin (LOVENOX) injection 40 mg  40 mg SubCUTAneous Daily Gertrude JORGE MD   40 mg at 04/28/22 0917    ondansetron (ZOFRAN-ODT) disintegrating tablet 4 mg  4 mg Oral Q8H PRN Lewis Coleman Gilbert MD        Or    ondansetron (ZOFRAN) injection 4 mg  4 mg IntraVENous Q6H PRN Lewis Coleman Gilbert MD   4 mg at 04/28/22 1412    acetaminophen (TYLENOL) tablet 650 mg  650 mg Oral Q6H PRN Lewis Coleman Gilbert MD        Or    acetaminophen (TYLENOL) suppository 650 mg  650 mg Rectal Q6H PRN Lewis Coleman Gilbert MD        pantoprazole (PROTONIX) injection 40 mg  40 mg IntraVENous Daily Andrew Knight MD   40 mg at 04/28/22 0905    metoclopramide (REGLAN) injection 10 mg  10 mg IntraVENous Q6H PRN Ari Rausch MD   10 mg at 04/28/22 1530    LORazepam (ATIVAN) tablet 0.5 mg  0.5 mg Oral Q6H PRN Ari Rausch MD   0.5 mg at 04/28/22 1530    hydrALAZINE (APRESOLINE) 5 mg in sodium chloride 0.9 % 50 mL ivpb  5 mg IntraVENous Q6H PRN Ari Rausch MD           /77   Pulse 99   Temp 98.5 °F (36.9 °C) (Oral)   Resp 16   Ht 5' 3\" (1.6 m)   Wt 132 lb (59.9 kg)   SpO2 97%   BMI 23.38 kg/m²     PHYSICAL EXAM:  General appearance: Thin, sleeping did open her eyes-no overt distress  HEENT: At least 1+ conjunctival pallor  Neck: Seems supple  Heart: Seems regular rate and rhythm, well-healed incision from previous sternotomy  LUNGS: Crackles bilaterally in posterior lung auscultation  Abdomen: Soft, nontender on superficial palpation  Extremities: No gross leg edema    LABS:  CBC:   Lab Results   Component Value Date    WBC 7.4 04/28/2022    WBC 6.6 04/26/2022    WBC 5.8 02/04/2022    HGB 13.3 04/28/2022    HGB 12.0 04/26/2022    HGB 11.1 02/04/2022     04/28/2022     04/26/2022     02/04/2022     Renal Panel:   Lab Results   Component Value Date     04/28/2022     04/26/2022     02/04/2022    K 4.9 04/28/2022    K 4.5 04/26/2022    K 4.3 02/04/2022    CL 88 04/28/2022    CL 91 04/26/2022    CL 97 02/04/2022    CO2 18 04/28/2022    CO2 23 04/26/2022    CO2 25 02/04/2022    BUN 14 04/28/2022    BUN 14 04/26/2022    BUN 16 02/04/2022    CREATININE 0.7 04/28/2022    CREATININE 0.9 04/26/2022    CREATININE 0.8 02/04/2022    GFRAA >60 04/28/2022    GFRAA >60 04/26/2022    GFRAA >60 02/04/2022    GFRAA 51 01/02/2013    GFRAA 47 10/13/2012    GFRAA >60 05/29/2012    LABGLOM >60 04/28/2022    LABGLOM 60 04/26/2022    LABGLOM >60 02/04/2022    LABALBU 3.9 04/28/2022    LABALBU 4.2 04/26/2022    LABALBU 3.7 02/04/2022         Calcium:    Lab Results   Component Value Date    CALCIUM 8.4 04/28/2022 Phosphorus:    Lab Results   Component Value Date    PHOS 3.9 02/04/2022       U/A:    Lab Results   Component Value Date    PROTEINU NEGATIVE 04/26/2022    NITRU NEGATIVE 04/26/2022    NITRU POSITIVE 02/17/2020    COLORU YELLOW 04/26/2022    PHUR 5.5 04/29/2021    PHUR 5.5 02/17/2020    LABCAST >40 Hyaline 10/13/2012    WBCUA <1 04/26/2022    RBCUA NO CELLS SEEN 04/26/2022    MUCUS RARE 02/02/2022    TRICHOMONAS NONE SEEN 01/31/2022    YEAST Present 08/05/2019    BACTERIA NEGATIVE 04/26/2022    CLARITYU CLEAR 04/26/2022    SPECGRAV 1.010 04/26/2022    UROBILINOGEN 0.2 04/26/2022    BILIRUBINUR NEGATIVE 04/26/2022    BILIRUBINUR neg 04/29/2021    BLOODU NEGATIVE 04/26/2022    GLUCOSEU neg 04/29/2021    GLUCOSEU Negative 02/17/2020    KETUA NEGATIVE 04/26/2022    AMORPHOUS 2+ 10/13/2012           IMPRESSION:  1. Acute on chronic hyponatremia-although her initially history is suggestive of hypovolemia, despite of significant normal saline her sodium went down, she also has some crackles on the lung as well as advanced age and coronary artery disease, I suspect she has pulmonary edema now. Also other potential etiology would be low protein diet, as she has had hyponatremia at least since 2010. Of course SIADH cannot be ruled out  2. She does have bilateral hydroureter, she was previously evaluated by urologist-and according to grandson had some ureteral surgery. There is no apparent stone, but bilateral hydroureter is visible on CT scan. I might have to discuss with the urology service-her bilateral hydroureter rather chronic as evidenced by CT in January 2022.  3.  Diverticulitis was suspected based on the symptoms and imaging study  4.   Underlying several chronic disease which include but not limited to coronary artery disease, atherosclerotic cardiovascular disease and hypertension    PLAN:    Hold  normal saline for now as ,clinical suspicion for pulmonary edema is high-based on the crackles on auscultation  Get a stat chest x-ray  UA, urinary indicis  proBNP level tomorrow morning  Along with serum osmolality  High-protein diet might help the sodium  If the chest x-ray confirmed pulmonary edema and then IV loop  I noticed, she is on pyridostigmine-unsure the indication-  Manual blood pressure when possible  I will add 1-2  cans of boost p.o. 3 times daily  If the urinary indicis suggest low protein diet-then might add urea powder  Follow clinically and biochemically

## 2022-04-28 NOTE — CARE COORDINATION
CM into see pt grandson in room and permission to speak in his presence obtain. CM discussed discharge planning  Cm  introduced self and explained role of CM. Pt is kind, alert and oriented. Pt lives alone in a one floor home. Pt DME includes a walker/cane, shower chair. Pt typically is independent and able to drive when she feels good. Pt has a dtr local and several grandchildren. CM informed that they all work during the day. Pt has used [employer] for transportation in the past if needed. Pt has a PCP/ insurance and able to obtain her meds. Per pt she is active with [facility]. CM called and spoke with Angela Lott. Pt is not active but they will accept pt again. Discharge plan is for pt to return home alone. Pt supported by her family. PCP/Insurance. [facility] will follow. CM provided card and encouraged to call for any needs or concern. CM is available if any needs arise.

## 2022-04-29 LAB
ADENOVIRUS F 40 41 PCR: NOT DETECTED
ANION GAP SERPL CALCULATED.3IONS-SCNC: 12 MMOL/L (ref 4–16)
ASTROVIRUS PCR: NOT DETECTED
BASOPHILS ABSOLUTE: 0 K/CU MM
BASOPHILS RELATIVE PERCENT: 0.4 % (ref 0–1)
BUN BLDV-MCNC: 19 MG/DL (ref 6–23)
CALCIUM SERPL-MCNC: 7.4 MG/DL (ref 8.3–10.6)
CAMPYLOBACTER PCR: NOT DETECTED
CHLORIDE BLD-SCNC: 91 MMOL/L (ref 99–110)
CO2: 22 MMOL/L (ref 21–32)
CREAT SERPL-MCNC: 1.1 MG/DL (ref 0.6–1.1)
CRYPTOSPORIDIUM PCR: NOT DETECTED
CYCLOSPORA CAYETANENSIS PCR: NOT DETECTED
DIFFERENTIAL TYPE: ABNORMAL
E COLI 0157 PCR: NOT DETECTED
E COLI ENTEROAGGREGATIVE PCR: NOT DETECTED
E COLI ENTEROPATHOGENIC PCR: NOT DETECTED
E COLI ENTEROTOXIGENIC PCR: NOT DETECTED
E COLI SHIGA LIKE TOXIN PCR: NOT DETECTED
E COLI SHIGELLA/ENTEROINVASIVE PCR: NOT DETECTED
ENTAMOEBA HISTOLYTICA PCR: NOT DETECTED
EOSINOPHILS ABSOLUTE: 0.2 K/CU MM
EOSINOPHILS RELATIVE PERCENT: 4.2 % (ref 0–3)
GFR AFRICAN AMERICAN: 57 ML/MIN/1.73M2
GFR NON-AFRICAN AMERICAN: 47 ML/MIN/1.73M2
GIARDIA LAMBLIA PCR: NOT DETECTED
GLUCOSE BLD-MCNC: 93 MG/DL (ref 70–99)
HCT VFR BLD CALC: 32.9 % (ref 37–47)
HEMOGLOBIN: 11.1 GM/DL (ref 12.5–16)
IMMATURE NEUTROPHIL %: 0.8 % (ref 0–0.43)
LYMPHOCYTES ABSOLUTE: 1.7 K/CU MM
LYMPHOCYTES RELATIVE PERCENT: 32.4 % (ref 24–44)
MAGNESIUM: 1.7 MG/DL (ref 1.8–2.4)
MCH RBC QN AUTO: 33.3 PG (ref 27–31)
MCHC RBC AUTO-ENTMCNC: 33.7 % (ref 32–36)
MCV RBC AUTO: 98.8 FL (ref 78–100)
MONOCYTES ABSOLUTE: 0.7 K/CU MM
MONOCYTES RELATIVE PERCENT: 12.3 % (ref 0–4)
NOROVIRUS GI GII PCR: NOT DETECTED
NUCLEATED RBC %: 0 %
OSMOLALITY: 272 MOS/L (ref 280–300)
PDW BLD-RTO: 13.7 % (ref 11.7–14.9)
PHOSPHORUS: 2.9 MG/DL (ref 2.5–4.9)
PLATELET # BLD: 155 K/CU MM (ref 140–440)
PLESIOMONAS SHIGELLOIDES PCR: NOT DETECTED
PMV BLD AUTO: 9.1 FL (ref 7.5–11.1)
POTASSIUM SERPL-SCNC: 3.8 MMOL/L (ref 3.5–5.1)
PRO-BNP: 7815 PG/ML
RBC # BLD: 3.33 M/CU MM (ref 4.2–5.4)
ROTAVIRUS A PCR: NOT DETECTED
SALMONELLA PCR: NOT DETECTED
SAPOVIRUS PCR: NOT DETECTED
SEGMENTED NEUTROPHILS ABSOLUTE COUNT: 2.6 K/CU MM
SEGMENTED NEUTROPHILS RELATIVE PERCENT: 49.9 % (ref 36–66)
SODIUM BLD-SCNC: 125 MMOL/L (ref 135–145)
TOTAL IMMATURE NEUTOROPHIL: 0.04 K/CU MM
TOTAL NUCLEATED RBC: 0 K/CU MM
VIBRIO CHOLERAE PCR: NOT DETECTED
VIBRIO PCR: NOT DETECTED
WBC # BLD: 5.3 K/CU MM (ref 4–10.5)
YERSINIA ENTEROCOLITICA PCR: NOT DETECTED

## 2022-04-29 PROCEDURE — C9113 INJ PANTOPRAZOLE SODIUM, VIA: HCPCS | Performed by: INTERNAL MEDICINE

## 2022-04-29 PROCEDURE — 85025 COMPLETE CBC W/AUTO DIFF WBC: CPT

## 2022-04-29 PROCEDURE — 2580000003 HC RX 258: Performed by: INTERNAL MEDICINE

## 2022-04-29 PROCEDURE — 83735 ASSAY OF MAGNESIUM: CPT

## 2022-04-29 PROCEDURE — 83880 ASSAY OF NATRIURETIC PEPTIDE: CPT

## 2022-04-29 PROCEDURE — 84100 ASSAY OF PHOSPHORUS: CPT

## 2022-04-29 PROCEDURE — 6360000002 HC RX W HCPCS: Performed by: INTERNAL MEDICINE

## 2022-04-29 PROCEDURE — 6370000000 HC RX 637 (ALT 250 FOR IP): Performed by: INTERNAL MEDICINE

## 2022-04-29 PROCEDURE — 87507 IADNA-DNA/RNA PROBE TQ 12-25: CPT

## 2022-04-29 PROCEDURE — 80048 BASIC METABOLIC PNL TOTAL CA: CPT

## 2022-04-29 PROCEDURE — 2500000003 HC RX 250 WO HCPCS: Performed by: INTERNAL MEDICINE

## 2022-04-29 PROCEDURE — 1200000000 HC SEMI PRIVATE

## 2022-04-29 PROCEDURE — 36415 COLL VENOUS BLD VENIPUNCTURE: CPT

## 2022-04-29 PROCEDURE — 83930 ASSAY OF BLOOD OSMOLALITY: CPT

## 2022-04-29 PROCEDURE — 94761 N-INVAS EAR/PLS OXIMETRY MLT: CPT

## 2022-04-29 RX ORDER — MAGNESIUM SULFATE IN WATER 40 MG/ML
2000 INJECTION, SOLUTION INTRAVENOUS ONCE
Status: COMPLETED | OUTPATIENT
Start: 2022-04-29 | End: 2022-04-29

## 2022-04-29 RX ORDER — FUROSEMIDE 10 MG/ML
20 INJECTION INTRAMUSCULAR; INTRAVENOUS ONCE
Status: COMPLETED | OUTPATIENT
Start: 2022-04-29 | End: 2022-04-29

## 2022-04-29 RX ADMIN — METOCLOPRAMIDE 10 MG: 5 INJECTION, SOLUTION INTRAMUSCULAR; INTRAVENOUS at 08:27

## 2022-04-29 RX ADMIN — VITAM B12 50 MCG: 100 TAB at 08:37

## 2022-04-29 RX ADMIN — PANTOPRAZOLE SODIUM 40 MG: 40 INJECTION, POWDER, FOR SOLUTION INTRAVENOUS at 08:30

## 2022-04-29 RX ADMIN — CIPROFLOXACIN 400 MG: 2 INJECTION, SOLUTION INTRAVENOUS at 12:13

## 2022-04-29 RX ADMIN — Medication 15 G: at 12:08

## 2022-04-29 RX ADMIN — ASPIRIN 81 MG: 81 TABLET, COATED ORAL at 08:35

## 2022-04-29 RX ADMIN — FUROSEMIDE 20 MG: 10 INJECTION, SOLUTION INTRAVENOUS at 09:56

## 2022-04-29 RX ADMIN — SODIUM CHLORIDE, PRESERVATIVE FREE 10 ML: 5 INJECTION INTRAVENOUS at 08:37

## 2022-04-29 RX ADMIN — METRONIDAZOLE 500 MG: 500 INJECTION, SOLUTION INTRAVENOUS at 22:22

## 2022-04-29 RX ADMIN — METRONIDAZOLE 500 MG: 500 INJECTION, SOLUTION INTRAVENOUS at 05:35

## 2022-04-29 RX ADMIN — METRONIDAZOLE 500 MG: 500 INJECTION, SOLUTION INTRAVENOUS at 13:47

## 2022-04-29 RX ADMIN — DIVALPROEX SODIUM 250 MG: 250 TABLET, FILM COATED, EXTENDED RELEASE ORAL at 20:19

## 2022-04-29 RX ADMIN — VERAPAMIL HYDROCHLORIDE 120 MG: 120 TABLET, FILM COATED, EXTENDED RELEASE ORAL at 08:35

## 2022-04-29 RX ADMIN — CIPROFLOXACIN 400 MG: 2 INJECTION, SOLUTION INTRAVENOUS at 00:32

## 2022-04-29 RX ADMIN — MAGNESIUM SULFATE HEPTAHYDRATE 2000 MG: 2 INJECTION, SOLUTION INTRAVENOUS at 09:47

## 2022-04-29 RX ADMIN — Medication 1 TABLET: at 20:18

## 2022-04-29 RX ADMIN — LORAZEPAM 0.5 MG: 0.5 TABLET ORAL at 08:33

## 2022-04-29 RX ADMIN — SODIUM CHLORIDE: 9 INJECTION, SOLUTION INTRAVENOUS at 22:22

## 2022-04-29 RX ADMIN — ONDANSETRON 4 MG: 2 INJECTION INTRAMUSCULAR; INTRAVENOUS at 09:28

## 2022-04-29 RX ADMIN — GABAPENTIN 300 MG: 300 CAPSULE ORAL at 13:41

## 2022-04-29 RX ADMIN — PYRIDOSTIGMINE BROMIDE 30 MG: 60 TABLET ORAL at 20:18

## 2022-04-29 RX ADMIN — Medication 1 TABLET: at 08:35

## 2022-04-29 RX ADMIN — ROSUVASTATIN CALCIUM 10 MG: 5 TABLET, FILM COATED ORAL at 20:18

## 2022-04-29 RX ADMIN — GABAPENTIN 300 MG: 300 CAPSULE ORAL at 08:35

## 2022-04-29 RX ADMIN — LORAZEPAM 0.5 MG: 0.5 TABLET ORAL at 22:24

## 2022-04-29 RX ADMIN — GABAPENTIN 300 MG: 300 CAPSULE ORAL at 20:18

## 2022-04-29 RX ADMIN — FERROUS SULFATE TAB 325 MG (65 MG ELEMENTAL FE) 325 MG: 325 (65 FE) TAB at 08:35

## 2022-04-29 RX ADMIN — ENOXAPARIN SODIUM 40 MG: 100 INJECTION SUBCUTANEOUS at 08:36

## 2022-04-29 RX ADMIN — SODIUM CHLORIDE: 9 INJECTION, SOLUTION INTRAVENOUS at 23:55

## 2022-04-29 RX ADMIN — SERTRALINE HYDROCHLORIDE 25 MG: 50 TABLET ORAL at 08:34

## 2022-04-29 RX ADMIN — SODIUM CHLORIDE, PRESERVATIVE FREE 10 ML: 5 INJECTION INTRAVENOUS at 20:21

## 2022-04-29 ASSESSMENT — PAIN SCALES - WONG BAKER
WONGBAKER_NUMERICALRESPONSE: 0

## 2022-04-29 ASSESSMENT — PAIN SCALES - GENERAL
PAINLEVEL_OUTOF10: 0

## 2022-04-29 NOTE — CARE COORDINATION
Please notify [facility] upon discharge, [phone], they will provide fax number to send [facility] order & AVS.

## 2022-04-29 NOTE — PLAN OF CARE
Problem: Discharge Planning  Goal: Discharge to home or other facility with appropriate resources  4/29/2022 1129 by Sidney Zambrano RN  Outcome: Progressing  4/29/2022 0555 by Leila Franklin  Outcome: Progressing     Problem: Pain  Goal: Verbalizes/displays adequate comfort level or baseline comfort level  4/29/2022 1129 by Sidney Zambrano RN  Outcome: Progressing  4/29/2022 0555 by Leila Franklin  Outcome: Progressing     Problem: ABCDS Injury Assessment  Goal: Absence of physical injury  4/29/2022 1129 by Sidney Zambrano RN  Outcome: Progressing  4/29/2022 0555 by Leila Franklin  Outcome: Progressing     Problem: Chronic Conditions and Co-morbidities  Goal: Patient's chronic conditions and co-morbidity symptoms are monitored and maintained or improved  4/29/2022 1129 by Sidney Zambrano RN  Outcome: Progressing  4/29/2022 0555 by Leila Franklin  Outcome: Progressing     Problem: Gastrointestinal - Adult  Goal: Minimal or absence of nausea and vomiting  4/29/2022 1129 by Sidney Zambrano RN  Outcome: Progressing  4/29/2022 0555 by Leila Franklin  Outcome: Progressing  Goal: Maintains or returns to baseline bowel function  4/29/2022 1129 by Sidney Zambrano RN  Outcome: Progressing  4/29/2022 0555 by Leila Franklin  Outcome: Progressing     Problem: Metabolic/Fluid and Electrolytes - Adult  Goal: Electrolytes maintained within normal limits  4/29/2022 1129 by Sidney Zambrano RN  Outcome: Progressing  4/29/2022 0555 by Leila Franklin  Outcome: Progressing  Goal: Hemodynamic stability and optimal renal function maintained  4/29/2022 1129 by Sidney Zambrano RN  Outcome: Progressing  4/29/2022 0555 by Leila Franklin  Outcome: Progressing     Problem: Skin/Tissue Integrity  Goal: Absence of new skin breakdown  Description: 1. Monitor for areas of redness and/or skin breakdown  2. Assess vascular access sites hourly  3. Every 4-6 hours minimum:  Change oxygen saturation probe site  4.   Every 4-6 hours:  If on nasal continuous positive airway pressure, respiratory therapy assess nares and determine need for appliance change or resting period.   4/29/2022 1129 by Jo Saenz RN  Outcome: Progressing  4/29/2022 0555 by Marianela Jung  Outcome: Progressing     Problem: Safety - Adult  Goal: Free from fall injury  4/29/2022 1129 by Jo Saenz RN  Outcome: Progressing  4/29/2022 0555 by Marianela Jung  Outcome: Progressing

## 2022-04-29 NOTE — PROGRESS NOTES
DOING WELL NO ABD PAIN N/ VOMITING BOWELS MOVING  VITALS STABLE   LABS NOTED   WILL CPM  ADVANCE DIET PT CAN BE D/C FROM [facility] PT TO CALL OFFICE FOR OUTPT COLONOSCOPY IN 8 WKS

## 2022-04-29 NOTE — PLAN OF CARE
Problem: Discharge Planning  Goal: Discharge to home or other facility with appropriate resources  Outcome: Progressing     Problem: Pain  Goal: Verbalizes/displays adequate comfort level or baseline comfort level  Outcome: Progressing     Problem: ABCDS Injury Assessment  Goal: Absence of physical injury  Outcome: Progressing     Problem: Chronic Conditions and Co-morbidities  Goal: Patient's chronic conditions and co-morbidity symptoms are monitored and maintained or improved  Outcome: Progressing     Problem: Gastrointestinal - Adult  Goal: Minimal or absence of nausea and vomiting  Outcome: Progressing  Goal: Maintains or returns to baseline bowel function  Outcome: Progressing     Problem: Metabolic/Fluid and Electrolytes - Adult  Goal: Electrolytes maintained within normal limits  Outcome: Progressing  Goal: Hemodynamic stability and optimal renal function maintained  Outcome: Progressing     Problem: Skin/Tissue Integrity  Goal: Absence of new skin breakdown  Description: 1. Monitor for areas of redness and/or skin breakdown  2. Assess vascular access sites hourly  3. Every 4-6 hours minimum:  Change oxygen saturation probe site  4. Every 4-6 hours:  If on nasal continuous positive airway pressure, respiratory therapy assess nares and determine need for appliance change or resting period.   Outcome: Progressing     Problem: Safety - Adult  Goal: Free from fall injury  Outcome: Progressing

## 2022-04-29 NOTE — PROGRESS NOTES
Nephrology Progress Note  4/29/2022 8:43 AM        Subjective:   Admit Date: 4/26/2022  PCP: Wilberto Arechiga MD    Interval History: Doing much better this morning  Alert, awake and oriented  I had good conversation with her    Diet:. Reported eating little bit better    ROS: No overt shortness of breath, PND or orthopnea  No confusion  Urine output was not recorded  No fever, acceptable blood pressure    Data:     Current meds:    ciprofloxacin  400 mg IntraVENous Q12H    metroNIDAZOLE  500 mg IntraVENous Q8H    calcium-cholecalciferol  1 tablet Oral BID    ketotifen  1 drop Both Eyes BID    vitamin B-12  50 mcg Oral Daily    verapamil  120 mg Oral Daily    aspirin  81 mg Oral Daily    divalproex  250 mg Oral Nightly    sertraline  25 mg Oral Daily    pyridostigmine  30 mg Oral Daily    rosuvastatin  10 mg Oral Nightly    ferrous sulfate  325 mg Oral Daily with breakfast    fluticasone  1 spray Each Nostril Daily    gabapentin  300 mg Oral TID    sodium chloride flush  10 mL IntraVENous 2 times per day    enoxaparin  40 mg SubCUTAneous Daily    pantoprazole  40 mg IntraVENous Daily      sodium chloride           No intake/output data recorded.     CBC:   Recent Labs     04/26/22  1950 04/28/22  0656 04/29/22 0622   WBC 6.6 7.4 5.3   HGB 12.0* 13.3 11.1*    180 155          Recent Labs     04/26/22  1950 04/28/22  0656 04/29/22 0622   * 123* 125*   K 4.5 4.9 3.8   CL 91* 88* 91*   CO2 23 18* 22   BUN 14 14 19   CREATININE 0.9 0.7 1.1   GLUCOSE 129* 114* 93       Lab Results   Component Value Date    CALCIUM 7.4 (L) 04/29/2022    PHOS 2.9 04/29/2022       Objective:     Vitals: /65   Pulse 78   Temp 98 °F (36.7 °C) (Oral)   Resp 16   Ht 5' 3\" (1.6 m)   Wt 134 lb 4.2 oz (60.9 kg)   SpO2 95%   BMI 23.78 kg/m²     General appearance: Alert, awake and oriented  HEENT: No conjunctival pallor  Neck: Seems supple  Lungs: Coarse breath sound auscultation no crackles  Heart: Regular rate and rhythm, well-healed incision from previous sternotomy  Abdomen: Soft, nontender  Extremities: No overt edema      Problem List :         Impression :     1. Acute on chronic hyponatremia-her serum osmolality was low, making true hypotonic hyponatremia-unfortunately urine specimen was not sent-sodium level is still better-although chest x-ray did not show any pulmonary edema, which is by the way only 50% sensitive for pulmonary edema, but her proBNP level is high-likely she is retaining salt and water-especially with more than 3 L of normal saline-low protein diet is another possibility  2. Acute diverticulitis were suspected  3. Chronic bilateral hydroureter, creatinine level slightly up-I will discuss with urologist  4. Underlying coronary artery disease, and hypertension    Recommendation/Plan  :     1. Lasix 20 mg IV x1 dose-mainly empirical therapy-see what happened to her urine output and sodium  2. High-protein diet  3. Also add urea powder twice a day-if she can tolerate  4. If possible send the urine specimen  5. Re do  kidney function tomorrow morning  6.  Follow clinically      Isma Hernandez MD MD

## 2022-04-29 NOTE — PROGRESS NOTES
Hospitalist Progress Note      Name:  Asael Urbina /Age/Sex: 1935  (82 y.o. female)   MRN & CSN:  [id number] & [id number] Admission Date/Time: 2022  7:03 PM   Location:  [address] [place] [address]- PCP: Everton Petty MD         Hospital Day: 4    Assessment and Plan:   74-year-old female who presented with complaint of lower abdominal pain and nausea. She initially had a 4-day history of diarrhea and took Imodium which abated her diarrheal symptom. CT abdomen and pelvis showed mild acute uncomplicated diverticulitis of the descending and sigmoid colon. She was admitted for further evaluation and management.     Sigmoid colitis:  -Continue Cipro and Flagyl  -Continue PPI and antiemetics. -Appreciate gastroenterology recommendations  -Outpatient colonoscopy after 8 weeks  -May need surgical consult if lack of improvement  -Pt doing better tolerating diet.      Hypertension:  Restart home medications      Hyponatremia:  -Was thought to be due to volume depletion  -Lack of improvement with IV hydration  -Nephrology consulted appreciate recommendations. Holding IV fluids. -SIADH can not be ruled out   -IV Lasix given x 1      Anxiety and depression:  -Continue Depakote. Anticipate discharge in 24 to 48 hours. Diet ADULT DIET; Regular; Low Fiber   DVT Prophylaxis [x] Lovenox, []  Heparin, [] SCDs, [] Ambulation   GI Prophylaxis [x] PPI,  [] H2 Blocker,  [] Carafate,  [] Diet/Tube Feeds   Code Status Full Code   Disposition Patient requires continued admission due to nausea and vomiting   MDM [] Low, [x] Moderate,[]  High  Patient's risk as above due to diverticulitis with multiple comorbid conditions     History of Present Illness:     Chief Complaint:   The patient states that she is feels a little bit better. She is tolerating diet. She denies any nausea vomiting and denies any fevers or chills. Ten point ROS reviewed negative, unless as noted above    Objective:        Intake/Output Summary (Last 24 hours) at 4/29/2022 1741  Last data filed at 4/29/2022 1331  Gross per 24 hour   Intake 700 ml   Output --   Net 700 ml      Vitals:   Vitals:    04/29/22 1334   BP: (!) 109/56   Pulse: 68   Resp: 16   Temp: 97.6 °F (36.4 °C)   SpO2: 96%     Physical Exam:   General: NAD   Eyes: EOMI  ENT: neck supple  Cardiovascular: Regular rate. No edema  Respiratory: Symmetrical expansion,   Gastrointestinal: Soft, nondistended, tenderness of the lower abdominal  Genitourinary: no suprapubic tenderness  Skin: warm, dry  Neuro: Alert. Oriented  Psych: Mood appropriate. Medications:   Medications:    urea  15 g Oral Daily    ciprofloxacin  400 mg IntraVENous Q12H    metroNIDAZOLE  500 mg IntraVENous Q8H    calcium-cholecalciferol  1 tablet Oral BID    ketotifen  1 drop Both Eyes BID    vitamin B-12  50 mcg Oral Daily    verapamil  120 mg Oral Daily    aspirin  81 mg Oral Daily    divalproex  250 mg Oral Nightly    sertraline  25 mg Oral Daily    pyridostigmine  30 mg Oral Daily    rosuvastatin  10 mg Oral Nightly    ferrous sulfate  325 mg Oral Daily with breakfast    fluticasone  1 spray Each Nostril Daily    gabapentin  300 mg Oral TID    sodium chloride flush  10 mL IntraVENous 2 times per day    enoxaparin  40 mg SubCUTAneous Daily    pantoprazole  40 mg IntraVENous Daily      Infusions:    sodium chloride       PRN Meds: sodium chloride flush, 10 mL, PRN  sodium chloride, , PRN  ondansetron, 4 mg, Q8H PRN   Or  ondansetron, 4 mg, Q6H PRN  acetaminophen, 650 mg, Q6H PRN   Or  acetaminophen, 650 mg, Q6H PRN  LORazepam, 0.5 mg, Q6H PRN  hydrALAZINE (APRESOLINE) ivpb, 5 mg, Q6H PRN          Patient is still admitted because nausea and vomiting. The anticipated discharge is in greater than 24 hours.      Electronically signed by Florentino Krause MD on 4/29/2022 at 5:41 PM

## 2022-04-30 LAB
ANION GAP SERPL CALCULATED.3IONS-SCNC: 12 MMOL/L (ref 4–16)
BASOPHILS ABSOLUTE: 0 K/CU MM
BASOPHILS RELATIVE PERCENT: 0.2 % (ref 0–1)
BUN BLDV-MCNC: 19 MG/DL (ref 6–23)
CALCIUM SERPL-MCNC: 7.5 MG/DL (ref 8.3–10.6)
CHLORIDE BLD-SCNC: 95 MMOL/L (ref 99–110)
CO2: 21 MMOL/L (ref 21–32)
CREAT SERPL-MCNC: 0.8 MG/DL (ref 0.6–1.1)
DIFFERENTIAL TYPE: ABNORMAL
EOSINOPHILS ABSOLUTE: 0.2 K/CU MM
EOSINOPHILS RELATIVE PERCENT: 4.5 % (ref 0–3)
GFR AFRICAN AMERICAN: >60 ML/MIN/1.73M2
GFR NON-AFRICAN AMERICAN: >60 ML/MIN/1.73M2
GLUCOSE BLD-MCNC: 97 MG/DL (ref 70–99)
HCT VFR BLD CALC: 31.8 % (ref 37–47)
HEMOGLOBIN: 10.8 GM/DL (ref 12.5–16)
IMMATURE NEUTROPHIL %: 0.4 % (ref 0–0.43)
LYMPHOCYTES ABSOLUTE: 1.4 K/CU MM
LYMPHOCYTES RELATIVE PERCENT: 25.8 % (ref 24–44)
MCH RBC QN AUTO: 33.5 PG (ref 27–31)
MCHC RBC AUTO-ENTMCNC: 34 % (ref 32–36)
MCV RBC AUTO: 98.8 FL (ref 78–100)
MONOCYTES ABSOLUTE: 0.7 K/CU MM
MONOCYTES RELATIVE PERCENT: 12.2 % (ref 0–4)
NUCLEATED RBC %: 0 %
PDW BLD-RTO: 13.6 % (ref 11.7–14.9)
PLATELET # BLD: 141 K/CU MM (ref 140–440)
PMV BLD AUTO: 9.1 FL (ref 7.5–11.1)
POTASSIUM SERPL-SCNC: 3.8 MMOL/L (ref 3.5–5.1)
RBC # BLD: 3.22 M/CU MM (ref 4.2–5.4)
SEGMENTED NEUTROPHILS ABSOLUTE COUNT: 3 K/CU MM
SEGMENTED NEUTROPHILS RELATIVE PERCENT: 56.9 % (ref 36–66)
SODIUM BLD-SCNC: 128 MMOL/L (ref 135–145)
TOTAL IMMATURE NEUTOROPHIL: 0.02 K/CU MM
TOTAL NUCLEATED RBC: 0 K/CU MM
WBC # BLD: 5.3 K/CU MM (ref 4–10.5)

## 2022-04-30 PROCEDURE — 80048 BASIC METABOLIC PNL TOTAL CA: CPT

## 2022-04-30 PROCEDURE — 6370000000 HC RX 637 (ALT 250 FOR IP): Performed by: INTERNAL MEDICINE

## 2022-04-30 PROCEDURE — 81001 URINALYSIS AUTO W/SCOPE: CPT

## 2022-04-30 PROCEDURE — 87449 NOS EACH ORGANISM AG IA: CPT

## 2022-04-30 PROCEDURE — 85025 COMPLETE CBC W/AUTO DIFF WBC: CPT

## 2022-04-30 PROCEDURE — 84300 ASSAY OF URINE SODIUM: CPT

## 2022-04-30 PROCEDURE — 36415 COLL VENOUS BLD VENIPUNCTURE: CPT

## 2022-04-30 PROCEDURE — 6360000002 HC RX W HCPCS: Performed by: INTERNAL MEDICINE

## 2022-04-30 PROCEDURE — 1200000000 HC SEMI PRIVATE

## 2022-04-30 PROCEDURE — 2580000003 HC RX 258: Performed by: INTERNAL MEDICINE

## 2022-04-30 PROCEDURE — 87324 CLOSTRIDIUM AG IA: CPT

## 2022-04-30 PROCEDURE — 2500000003 HC RX 250 WO HCPCS: Performed by: INTERNAL MEDICINE

## 2022-04-30 PROCEDURE — 94761 N-INVAS EAR/PLS OXIMETRY MLT: CPT

## 2022-04-30 PROCEDURE — C9113 INJ PANTOPRAZOLE SODIUM, VIA: HCPCS | Performed by: INTERNAL MEDICINE

## 2022-04-30 PROCEDURE — 83935 ASSAY OF URINE OSMOLALITY: CPT

## 2022-04-30 RX ORDER — PROMETHAZINE HYDROCHLORIDE 25 MG/ML
6.25 INJECTION, SOLUTION INTRAMUSCULAR; INTRAVENOUS ONCE
Status: DISCONTINUED | OUTPATIENT
Start: 2022-04-30 | End: 2022-05-02 | Stop reason: HOSPADM

## 2022-04-30 RX ADMIN — KETOTIFEN FUMARATE 1 DROP: 0.35 SOLUTION/ DROPS OPHTHALMIC at 20:54

## 2022-04-30 RX ADMIN — ACETAMINOPHEN 650 MG: 325 TABLET ORAL at 08:08

## 2022-04-30 RX ADMIN — METRONIDAZOLE 500 MG: 500 INJECTION, SOLUTION INTRAVENOUS at 05:37

## 2022-04-30 RX ADMIN — PANTOPRAZOLE SODIUM 40 MG: 40 INJECTION, POWDER, FOR SOLUTION INTRAVENOUS at 08:04

## 2022-04-30 RX ADMIN — METRONIDAZOLE 500 MG: 500 INJECTION, SOLUTION INTRAVENOUS at 15:10

## 2022-04-30 RX ADMIN — SODIUM CHLORIDE, PRESERVATIVE FREE 10 ML: 5 INJECTION INTRAVENOUS at 20:53

## 2022-04-30 RX ADMIN — CIPROFLOXACIN 400 MG: 2 INJECTION, SOLUTION INTRAVENOUS at 12:40

## 2022-04-30 RX ADMIN — GABAPENTIN 300 MG: 300 CAPSULE ORAL at 20:53

## 2022-04-30 RX ADMIN — SODIUM CHLORIDE, PRESERVATIVE FREE 10 ML: 5 INJECTION INTRAVENOUS at 08:04

## 2022-04-30 RX ADMIN — Medication 15 G: at 08:05

## 2022-04-30 RX ADMIN — LORAZEPAM 0.5 MG: 0.5 TABLET ORAL at 15:12

## 2022-04-30 RX ADMIN — ONDANSETRON 4 MG: 2 INJECTION INTRAMUSCULAR; INTRAVENOUS at 09:05

## 2022-04-30 RX ADMIN — PYRIDOSTIGMINE BROMIDE 30 MG: 60 TABLET ORAL at 20:53

## 2022-04-30 RX ADMIN — METRONIDAZOLE 500 MG: 500 INJECTION, SOLUTION INTRAVENOUS at 23:47

## 2022-04-30 RX ADMIN — Medication 1 TABLET: at 08:04

## 2022-04-30 RX ADMIN — CIPROFLOXACIN 400 MG: 2 INJECTION, SOLUTION INTRAVENOUS at 00:00

## 2022-04-30 RX ADMIN — SERTRALINE HYDROCHLORIDE 25 MG: 50 TABLET ORAL at 08:06

## 2022-04-30 RX ADMIN — FLUTICASONE PROPIONATE 1 SPRAY: 50 SPRAY, METERED NASAL at 08:04

## 2022-04-30 RX ADMIN — Medication 1 TABLET: at 20:53

## 2022-04-30 RX ADMIN — VERAPAMIL HYDROCHLORIDE 120 MG: 120 TABLET, FILM COATED, EXTENDED RELEASE ORAL at 08:04

## 2022-04-30 RX ADMIN — ENOXAPARIN SODIUM 40 MG: 100 INJECTION SUBCUTANEOUS at 08:05

## 2022-04-30 RX ADMIN — ROSUVASTATIN CALCIUM 10 MG: 5 TABLET, FILM COATED ORAL at 20:53

## 2022-04-30 RX ADMIN — GABAPENTIN 300 MG: 300 CAPSULE ORAL at 08:04

## 2022-04-30 RX ADMIN — ASPIRIN 81 MG: 81 TABLET, COATED ORAL at 08:04

## 2022-04-30 RX ADMIN — DIVALPROEX SODIUM 250 MG: 250 TABLET, FILM COATED, EXTENDED RELEASE ORAL at 20:54

## 2022-04-30 RX ADMIN — VITAM B12 50 MCG: 100 TAB at 08:06

## 2022-04-30 RX ADMIN — KETOTIFEN FUMARATE 1 DROP: 0.35 SOLUTION/ DROPS OPHTHALMIC at 08:05

## 2022-04-30 RX ADMIN — GABAPENTIN 300 MG: 300 CAPSULE ORAL at 15:10

## 2022-04-30 ASSESSMENT — PAIN DESCRIPTION - PAIN TYPE: TYPE: ACUTE PAIN

## 2022-04-30 ASSESSMENT — PAIN SCALES - GENERAL
PAINLEVEL_OUTOF10: 5
PAINLEVEL_OUTOF10: 0

## 2022-04-30 ASSESSMENT — PAIN DESCRIPTION - FREQUENCY: FREQUENCY: INTERMITTENT

## 2022-04-30 ASSESSMENT — PAIN - FUNCTIONAL ASSESSMENT: PAIN_FUNCTIONAL_ASSESSMENT: ACTIVITIES ARE NOT PREVENTED

## 2022-04-30 ASSESSMENT — PAIN DESCRIPTION - DESCRIPTORS: DESCRIPTORS: ACHING

## 2022-04-30 ASSESSMENT — PAIN DESCRIPTION - ONSET: ONSET: ON-GOING

## 2022-04-30 ASSESSMENT — PAIN DESCRIPTION - LOCATION: LOCATION: ABDOMEN

## 2022-04-30 NOTE — PROGRESS NOTES
Nephrology Progress Note  4/30/2022 10:55 AM  Subjective: Interval History: Asael Urbina is a 82 y.o. female with weakness and tired         Data:   Scheduled Meds:   urea  15 g Oral Daily    ciprofloxacin  400 mg IntraVENous Q12H    metroNIDAZOLE  500 mg IntraVENous Q8H    calcium-cholecalciferol  1 tablet Oral BID    ketotifen  1 drop Both Eyes BID    vitamin B-12  50 mcg Oral Daily    verapamil  120 mg Oral Daily    aspirin  81 mg Oral Daily    divalproex  250 mg Oral Nightly    sertraline  25 mg Oral Daily    pyridostigmine  30 mg Oral Daily    rosuvastatin  10 mg Oral Nightly    ferrous sulfate  325 mg Oral Daily with breakfast    fluticasone  1 spray Each Nostril Daily    gabapentin  300 mg Oral TID    sodium chloride flush  10 mL IntraVENous 2 times per day    enoxaparin  40 mg SubCUTAneous Daily    pantoprazole  40 mg IntraVENous Daily     Continuous Infusions:   sodium chloride 25 mL/hr at 04/29/22 2355         CBC   Recent Labs     04/28/22  0656 04/29/22  0622 04/30/22  0826   WBC 7.4 5.3 5.3   HGB 13.3 11.1* 10.8*   HCT 38.8 32.9* 31.8*    155 141      BMP   Recent Labs     04/28/22  0656 04/29/22  0622 04/30/22  0826   * 125* 128*   K 4.9 3.8 3.8   CL 88* 91* 95*   CO2 18* 22 21   PHOS  --  2.9  --    BUN 14 19 19   CREATININE 0.7 1.1 0.8     Hepatic:   Recent Labs     04/28/22  0656   AST 37   ALT 19   BILITOT 0.6   ALKPHOS 40     Troponin: No results for input(s): TROPONINI in the last 72 hours. BNP: No results for input(s): BNP in the last 72 hours. Lipids: No results for input(s): CHOL, HDL in the last 72 hours. Invalid input(s): LDLCALCU  ABGs: No results found for: PHART, PO2ART, XZL7WSU  INR: No results for input(s): INR in the last 72 hours.   Renal Labs  Albumin:    Lab Results   Component Value Date    LABALBU 3.9 04/28/2022     Calcium:    Lab Results   Component Value Date    CALCIUM 7.5 04/30/2022     Phosphorus:    Lab Results   Component Value Date PHOS 2.9 04/29/2022     U/A:    Lab Results   Component Value Date    NITRU NEGATIVE 04/26/2022    NITRU POSITIVE 02/17/2020    COLORU YELLOW 04/26/2022    PHUR 5.5 04/29/2021    PHUR 5.5 02/17/2020    LABCAST >40 Hyaline 10/13/2012    WBCUA <1 04/26/2022    RBCUA NO CELLS SEEN 04/26/2022    MUCUS RARE 02/02/2022    TRICHOMONAS NONE SEEN 01/31/2022    YEAST Present 08/05/2019    BACTERIA NEGATIVE 04/26/2022    CLARITYU CLEAR 04/26/2022    SPECGRAV 1.010 04/26/2022    UROBILINOGEN 0.2 04/26/2022    BILIRUBINUR NEGATIVE 04/26/2022    BILIRUBINUR neg 04/29/2021    BLOODU NEGATIVE 04/26/2022    GLUCOSEU neg 04/29/2021    GLUCOSEU Negative 02/17/2020    KETUA NEGATIVE 04/26/2022    AMORPHOUS 2+ 10/13/2012     ABG:  No results found for: PHART, QPH6TCX, PO2ART, TKS3OAS, BEART, THGBART, YQF6RJA, Z2MBGFUI  HgBA1c:    Lab Results   Component Value Date    LABA1C 5.8 08/31/2021     Microalbumen/Creatinine ratio:  No components found for: RUCREAT  TSH:    Lab Results   Component Value Date    TSH 3.250 03/06/2019     IRON:    Lab Results   Component Value Date    IRON 70 02/01/2022     Iron Saturation:  No components found for: PERCENTFE  TIBC:    Lab Results   Component Value Date    TIBC 263 02/01/2022     FERRITIN:    Lab Results   Component Value Date    FERRITIN 939 01/31/2022     RPR:  No results found for: RPR  LALA:  No results found for: ANATITER, LALA  24 Hour Urine for Creatinine Clearance:  No components found for: CREAT4, UHRS10, UTV10      Objective:   I/O: 04/29 0701 - 04/30 0700  In: 700 [P.O.:700]  Out: -   I/O last 3 completed shifts: In: 700 [P.O.:700]  Out: -   No intake/output data recorded. Vitals: BP (!) 104/39   Pulse 66   Temp 97.8 °F (36.6 °C)   Resp 16   Ht 5' 3\" (1.6 m)   Wt 135 lb 9.3 oz (61.5 kg)   SpO2 93%   BMI 24.02 kg/m²  {  General appearance: awake weak  HEENT: Head: Normal, normocephalic, atraumatic.   Neck: supple, symmetrical, trachea midline  Lungs: diminished breath sounds bilaterally  Heart: S1, S2 normal  Abdomen: abnormal findings:  soft nt  Extremities: edema trace  Neurologic: Mental status: alertness: awake        Assessment and Plan:      IMP:  Hyponatremia  Bilateral hydroureter and abd pain  Cad  htn with low bp    Plan      na to 128 monitor  On urea  Fu urology and GI on case not appear sig pain  Cardiac monitor  bp low monitor on verapamil monitor             Yoana Gonzalez MD, MD

## 2022-04-30 NOTE — PROGRESS NOTES
Hospitalist Progress Note      Name:  Lelo Carmichael /Age/Sex: 1935  (82 y.o. female)   MRN & CSN:  [id number] & [id number] Admission Date/Time: 2022  7:03 PM   Location:  [address] [place] [address]- PCP: Gentry Norman MD         Hospital Day: 5    Assessment and Plan:   72-year-old female who presented with complaint of lower abdominal pain and nausea. She initially had a 4-day history of diarrhea and took Imodium which abated her diarrheal symptom. CT abdomen and pelvis showed mild acute uncomplicated diverticulitis of the descending and sigmoid colon. She was admitted for further evaluation and management.     Sigmoid colitis:  -Continue Cipro and Flagyl  -Continue PPI and antiemetics. -Appreciate gastroenterology recommendations  -Outpatient colonoscopy after 8 weeks  -May need surgical consult if lack of improvement  -Check for c-diff      Hypertension:  Restart home medications      Hyponatremia:  -Was thought to be due to volume depletion  -Lack of improvement with IV hydration  -Nephrology consulted appreciate recommendations. Holding IV fluids. -SIADH can not be ruled out   -IV Lasix given x 1   -Na improved on Urea     Anxiety and depression:  -Continue Depakote. Plan was for discharge today but she has diarrhea and nausea. Will try other antiemetics as Zofran is not helping. Hold discharge     Diet ADULT DIET; Regular; Low Fiber   DVT Prophylaxis [x] Lovenox, []  Heparin, [] SCDs, [] Ambulation   GI Prophylaxis [x] PPI,  [] H2 Blocker,  [] Carafate,  [] Diet/Tube Feeds   Code Status Full Code   Disposition Patient requires continued admission due to nausea and vomiting   MDM [] Low, [x] Moderate,[]  High  Patient's risk as above due to diverticulitis with multiple comorbid conditions     History of Present Illness:     Chief Complaint:   The patient states that she has nausea and has diarrhea. She states that she was feeling good yesterday but not feeling good today. Tolerating diet. Ten point ROS reviewed negative, unless as noted above    Objective: Intake/Output Summary (Last 24 hours) at 4/30/2022 1429  Last data filed at 4/30/2022 1401  Gross per 24 hour   Intake --   Output 700 ml   Net -700 ml      Vitals:   Vitals:    04/30/22 0808   BP: (!) 104/39   Pulse: 66   Resp: 16   Temp: 97.8 °F (36.6 °C)   SpO2: 93%     Physical Exam:   General: NAD   Eyes: EOMI  ENT: neck supple  Cardiovascular: Regular rate. No edema  Respiratory: Symmetrical expansion,   Gastrointestinal: Soft, nondistended, tenderness of the lower abdominal  Genitourinary: no suprapubic tenderness  Skin: warm, dry  Neuro: Alert. Oriented  Psych: Mood appropriate. Medications:   Medications:    promethazine  6.25 mg IntraMUSCular Once    urea  15 g Oral Daily    ciprofloxacin  400 mg IntraVENous Q12H    metroNIDAZOLE  500 mg IntraVENous Q8H    calcium-cholecalciferol  1 tablet Oral BID    ketotifen  1 drop Both Eyes BID    vitamin B-12  50 mcg Oral Daily    verapamil  120 mg Oral Daily    aspirin  81 mg Oral Daily    divalproex  250 mg Oral Nightly    sertraline  25 mg Oral Daily    pyridostigmine  30 mg Oral Daily    rosuvastatin  10 mg Oral Nightly    ferrous sulfate  325 mg Oral Daily with breakfast    fluticasone  1 spray Each Nostril Daily    gabapentin  300 mg Oral TID    sodium chloride flush  10 mL IntraVENous 2 times per day    enoxaparin  40 mg SubCUTAneous Daily    pantoprazole  40 mg IntraVENous Daily      Infusions:    sodium chloride 25 mL/hr at 04/29/22 2355     PRN Meds: sodium chloride flush, 10 mL, PRN  sodium chloride, , PRN  ondansetron, 4 mg, Q8H PRN   Or  ondansetron, 4 mg, Q6H PRN  acetaminophen, 650 mg, Q6H PRN   Or  acetaminophen, 650 mg, Q6H PRN  LORazepam, 0.5 mg, Q6H PRN  hydrALAZINE (APRESOLINE) ivpb, 5 mg, Q6H PRN          Patient is still admitted because nausea and vomiting. The anticipated discharge is in greater than 24 hours.      Electronically signed by Nunu Villavicencio MD on 4/30/2022 at 2:29 PM

## 2022-05-01 LAB
ANION GAP SERPL CALCULATED.3IONS-SCNC: 12 MMOL/L (ref 4–16)
BACTERIA: NEGATIVE /HPF
BASOPHILS ABSOLUTE: 0 K/CU MM
BASOPHILS RELATIVE PERCENT: 0.2 % (ref 0–1)
BILIRUBIN URINE: NEGATIVE MG/DL
BLOOD, URINE: NEGATIVE
BUN BLDV-MCNC: 32 MG/DL (ref 6–23)
CALCIUM SERPL-MCNC: 8.7 MG/DL (ref 8.3–10.6)
CHLORIDE BLD-SCNC: 96 MMOL/L (ref 99–110)
CLARITY: CLEAR
CO2: 23 MMOL/L (ref 21–32)
COLOR: YELLOW
CREAT SERPL-MCNC: 0.7 MG/DL (ref 0.6–1.1)
DIFFERENTIAL TYPE: ABNORMAL
EOSINOPHILS ABSOLUTE: 0.2 K/CU MM
EOSINOPHILS RELATIVE PERCENT: 3.2 % (ref 0–3)
GFR AFRICAN AMERICAN: >60 ML/MIN/1.73M2
GFR NON-AFRICAN AMERICAN: >60 ML/MIN/1.73M2
GLUCOSE BLD-MCNC: 161 MG/DL (ref 70–99)
GLUCOSE, URINE: NEGATIVE MG/DL
HCT VFR BLD CALC: 34.9 % (ref 37–47)
HEMOGLOBIN: 11.6 GM/DL (ref 12.5–16)
IMMATURE NEUTROPHIL %: 0.6 % (ref 0–0.43)
KETONES, URINE: NEGATIVE MG/DL
LEUKOCYTE ESTERASE, URINE: NEGATIVE
LYMPHOCYTES ABSOLUTE: 1.3 K/CU MM
LYMPHOCYTES RELATIVE PERCENT: 28.4 % (ref 24–44)
MCH RBC QN AUTO: 33.3 PG (ref 27–31)
MCHC RBC AUTO-ENTMCNC: 33.2 % (ref 32–36)
MCV RBC AUTO: 100.3 FL (ref 78–100)
MONOCYTES ABSOLUTE: 0.5 K/CU MM
MONOCYTES RELATIVE PERCENT: 9.7 % (ref 0–4)
NITRITE URINE, QUANTITATIVE: NEGATIVE
NUCLEATED RBC %: 0 %
OSMOLALITY URINE: 353 MOS/L (ref 292–1090)
PDW BLD-RTO: 13.8 % (ref 11.7–14.9)
PH, URINE: 6 (ref 5–8)
PLATELET # BLD: 147 K/CU MM (ref 140–440)
PMV BLD AUTO: 8.8 FL (ref 7.5–11.1)
POTASSIUM SERPL-SCNC: 3.7 MMOL/L (ref 3.5–5.1)
PROTEIN UA: NEGATIVE MG/DL
RBC # BLD: 3.48 M/CU MM (ref 4.2–5.4)
RBC URINE: NORMAL /HPF (ref 0–6)
REASON FOR REJECTION: NORMAL
REJECTED TEST: NORMAL
SEGMENTED NEUTROPHILS ABSOLUTE COUNT: 2.7 K/CU MM
SEGMENTED NEUTROPHILS RELATIVE PERCENT: 57.9 % (ref 36–66)
SODIUM BLD-SCNC: 131 MMOL/L (ref 135–145)
SODIUM URINE: 54 MMOL/L (ref 35–167)
SPECIFIC GRAVITY UA: 1.01 (ref 1–1.03)
SQUAMOUS EPITHELIAL: <1 /HPF
TOTAL IMMATURE NEUTOROPHIL: 0.03 K/CU MM
TOTAL NUCLEATED RBC: 0 K/CU MM
UROBILINOGEN, URINE: 0.2 MG/DL (ref 0.2–1)
WBC # BLD: 4.7 K/CU MM (ref 4–10.5)
WBC UA: 1 /HPF (ref 0–5)

## 2022-05-01 PROCEDURE — 1200000000 HC SEMI PRIVATE

## 2022-05-01 PROCEDURE — 36415 COLL VENOUS BLD VENIPUNCTURE: CPT

## 2022-05-01 PROCEDURE — 85025 COMPLETE CBC W/AUTO DIFF WBC: CPT

## 2022-05-01 PROCEDURE — C9113 INJ PANTOPRAZOLE SODIUM, VIA: HCPCS | Performed by: INTERNAL MEDICINE

## 2022-05-01 PROCEDURE — 2580000003 HC RX 258: Performed by: INTERNAL MEDICINE

## 2022-05-01 PROCEDURE — 2500000003 HC RX 250 WO HCPCS: Performed by: INTERNAL MEDICINE

## 2022-05-01 PROCEDURE — 6360000002 HC RX W HCPCS: Performed by: INTERNAL MEDICINE

## 2022-05-01 PROCEDURE — 6370000000 HC RX 637 (ALT 250 FOR IP): Performed by: INTERNAL MEDICINE

## 2022-05-01 PROCEDURE — 6360000002 HC RX W HCPCS: Performed by: STUDENT IN AN ORGANIZED HEALTH CARE EDUCATION/TRAINING PROGRAM

## 2022-05-01 PROCEDURE — 94761 N-INVAS EAR/PLS OXIMETRY MLT: CPT

## 2022-05-01 PROCEDURE — 80048 BASIC METABOLIC PNL TOTAL CA: CPT

## 2022-05-01 RX ORDER — FUROSEMIDE 10 MG/ML
20 INJECTION INTRAMUSCULAR; INTRAVENOUS ONCE
Status: COMPLETED | OUTPATIENT
Start: 2022-05-01 | End: 2022-05-01

## 2022-05-01 RX ADMIN — SODIUM CHLORIDE, PRESERVATIVE FREE 10 ML: 5 INJECTION INTRAVENOUS at 21:00

## 2022-05-01 RX ADMIN — PYRIDOSTIGMINE BROMIDE 30 MG: 60 TABLET ORAL at 20:01

## 2022-05-01 RX ADMIN — ROSUVASTATIN CALCIUM 10 MG: 5 TABLET, FILM COATED ORAL at 19:52

## 2022-05-01 RX ADMIN — GABAPENTIN 300 MG: 300 CAPSULE ORAL at 08:51

## 2022-05-01 RX ADMIN — ENOXAPARIN SODIUM 40 MG: 100 INJECTION SUBCUTANEOUS at 08:49

## 2022-05-01 RX ADMIN — FUROSEMIDE 20 MG: 10 INJECTION INTRAMUSCULAR; INTRAVENOUS at 17:20

## 2022-05-01 RX ADMIN — VITAM B12 50 MCG: 100 TAB at 08:49

## 2022-05-01 RX ADMIN — METRONIDAZOLE 500 MG: 500 INJECTION, SOLUTION INTRAVENOUS at 06:06

## 2022-05-01 RX ADMIN — GABAPENTIN 300 MG: 300 CAPSULE ORAL at 12:40

## 2022-05-01 RX ADMIN — LORAZEPAM 0.5 MG: 0.5 TABLET ORAL at 16:53

## 2022-05-01 RX ADMIN — SODIUM CHLORIDE: 9 INJECTION, SOLUTION INTRAVENOUS at 12:43

## 2022-05-01 RX ADMIN — Medication 15 G: at 08:51

## 2022-05-01 RX ADMIN — PANTOPRAZOLE SODIUM 40 MG: 40 INJECTION, POWDER, FOR SOLUTION INTRAVENOUS at 08:49

## 2022-05-01 RX ADMIN — FERROUS SULFATE TAB 325 MG (65 MG ELEMENTAL FE) 325 MG: 325 (65 FE) TAB at 08:50

## 2022-05-01 RX ADMIN — METRONIDAZOLE 500 MG: 500 INJECTION, SOLUTION INTRAVENOUS at 22:42

## 2022-05-01 RX ADMIN — Medication 1 TABLET: at 19:52

## 2022-05-01 RX ADMIN — VERAPAMIL HYDROCHLORIDE 120 MG: 120 TABLET, FILM COATED, EXTENDED RELEASE ORAL at 08:51

## 2022-05-01 RX ADMIN — Medication 1 TABLET: at 08:49

## 2022-05-01 RX ADMIN — DIVALPROEX SODIUM 250 MG: 250 TABLET, FILM COATED, EXTENDED RELEASE ORAL at 19:53

## 2022-05-01 RX ADMIN — GABAPENTIN 300 MG: 300 CAPSULE ORAL at 19:52

## 2022-05-01 RX ADMIN — KETOTIFEN FUMARATE 1 DROP: 0.35 SOLUTION/ DROPS OPHTHALMIC at 08:51

## 2022-05-01 RX ADMIN — KETOTIFEN FUMARATE 1 DROP: 0.35 SOLUTION/ DROPS OPHTHALMIC at 19:53

## 2022-05-01 RX ADMIN — METRONIDAZOLE 500 MG: 500 INJECTION, SOLUTION INTRAVENOUS at 12:44

## 2022-05-01 RX ADMIN — SERTRALINE HYDROCHLORIDE 25 MG: 50 TABLET ORAL at 08:50

## 2022-05-01 RX ADMIN — CIPROFLOXACIN 400 MG: 2 INJECTION, SOLUTION INTRAVENOUS at 01:55

## 2022-05-01 RX ADMIN — CIPROFLOXACIN 400 MG: 2 INJECTION, SOLUTION INTRAVENOUS at 12:47

## 2022-05-01 RX ADMIN — FLUTICASONE PROPIONATE 1 SPRAY: 50 SPRAY, METERED NASAL at 08:54

## 2022-05-01 RX ADMIN — SODIUM CHLORIDE, PRESERVATIVE FREE 10 ML: 5 INJECTION INTRAVENOUS at 08:51

## 2022-05-01 RX ADMIN — ASPIRIN 81 MG: 81 TABLET, COATED ORAL at 09:02

## 2022-05-01 RX ADMIN — ACETAMINOPHEN 650 MG: 325 TABLET ORAL at 16:53

## 2022-05-01 ASSESSMENT — PAIN - FUNCTIONAL ASSESSMENT: PAIN_FUNCTIONAL_ASSESSMENT: ACTIVITIES ARE NOT PREVENTED

## 2022-05-01 ASSESSMENT — PAIN SCALES - GENERAL
PAINLEVEL_OUTOF10: 0
PAINLEVEL_OUTOF10: 2
PAINLEVEL_OUTOF10: 0

## 2022-05-01 ASSESSMENT — PAIN DESCRIPTION - DESCRIPTORS: DESCRIPTORS: ACHING

## 2022-05-01 ASSESSMENT — PAIN SCALES - WONG BAKER
WONGBAKER_NUMERICALRESPONSE: 0
WONGBAKER_NUMERICALRESPONSE: 0

## 2022-05-01 ASSESSMENT — PAIN DESCRIPTION - ORIENTATION: ORIENTATION: LEFT

## 2022-05-01 ASSESSMENT — PAIN DESCRIPTION - LOCATION: LOCATION: SHOULDER

## 2022-05-01 NOTE — PLAN OF CARE
Problem: Discharge Planning  Goal: Discharge to home or other facility with appropriate resources  5/1/2022 0345 by Natalia Rebolledo LPN  Outcome: Progressing  4/30/2022 1520 by Emily Estrella RN  Outcome: Progressing     Problem: Pain  Goal: Verbalizes/displays adequate comfort level or baseline comfort level  5/1/2022 0345 by Natalia Rebolledo LPN  Outcome: Progressing  4/30/2022 1520 by Emily Estrella RN  Outcome: Progressing     Problem: ABCDS Injury Assessment  Goal: Absence of physical injury  5/1/2022 0345 by Natalia Rebolledo LPN  Outcome: Progressing  4/30/2022 1520 by Emily Estrella RN  Outcome: Progressing     Problem: Chronic Conditions and Co-morbidities  Goal: Patient's chronic conditions and co-morbidity symptoms are monitored and maintained or improved  5/1/2022 0345 by Natalia Rebolledo LPN  Outcome: Progressing  4/30/2022 1520 by Emily Estrella RN  Outcome: Progressing     Problem: Gastrointestinal - Adult  Goal: Minimal or absence of nausea and vomiting  5/1/2022 0345 by Natalia Rebolledo LPN  Outcome: Progressing  4/30/2022 1520 by Emily Estrella RN  Outcome: Progressing  Goal: Maintains or returns to baseline bowel function  5/1/2022 0345 by Natalia Rebolledo LPN  Outcome: Progressing  4/30/2022 1520 by Emily Estrella RN  Outcome: Progressing     Problem: Metabolic/Fluid and Electrolytes - Adult  Goal: Electrolytes maintained within normal limits  5/1/2022 0345 by Natalia Rebolledo LPN  Outcome: Progressing  4/30/2022 1520 by Emily Estrella RN  Outcome: Progressing  Goal: Hemodynamic stability and optimal renal function maintained  5/1/2022 0345 by Natalia Rebolledo LPN  Outcome: Progressing  4/30/2022 1520 by Emily Estrella RN  Outcome: Progressing     Problem: Skin/Tissue Integrity  Goal: Absence of new skin breakdown  Description: 1. Monitor for areas of redness and/or skin breakdown  2. Assess vascular access sites hourly  3.   Every 4-6 hours minimum:  Change oxygen saturation probe site  4. Every 4-6 hours:  If on nasal continuous positive airway pressure, respiratory therapy assess nares and determine need for appliance change or resting period. 5/1/2022 0345 by Joelle Lucero LPN  Outcome: Progressing  4/30/2022 1520 by Emily Estrella, RN  Outcome: Progressing     Problem: Safety - Adult  Goal: Free from fall injury  5/1/2022 0345 by Joelle Luecro LPN  Outcome: Progressing  4/30/2022 1520 by Emily Estrella, RN  Outcome: Progressing

## 2022-05-01 NOTE — PROGRESS NOTES
Hospitalist Progress Note      Name:  Gallo Perera /Age/Sex: 1935  (82 y.o. female)   MRN & CSN:  [id number] & [id number] Admission Date/Time: 2022  7:03 PM   Location:  [address] [place] [address] PCP: Ashley Oneill MD         Hospital Day: 6    Assessment and Plan:   73-year-old female who presented with complaint of lower abdominal pain and nausea. She initially had a 4-day history of diarrhea and took Imodium which abated her diarrheal symptom. CT abdomen and pelvis showed mild acute uncomplicated diverticulitis of the descending and sigmoid colon. She was admitted for further evaluation and management.     Mild acute uncomplicated diverticulitis (recurrent):  -GI disease panel negative. Will stop ciprofloxacin. Stool is now more formed. No further diarrhea  -Appreciate gastroenterology recommendations  -Outpatient colonoscopy in 8 weeks due to recurrent diverticulitis  -May need surgical consult if lack of improvement  -C Diff in process. Continue flagyl which she was already started on for diverticulitis. Will stop once C Diff results negative. Creat 0.7. WBC  4.7. Nausea/vomiting  -Resolved. Tolerating regular diet this morning  --Continue PPI and antiemetics. -GI follow up as outpatient.     Hypertension:  Continue verapamil    Hyponatremia possibly due to CHF exacerbation  -Initially thought to be due to volume depletion, lack of improvement with IVF. - proBNP elevated at 7800. Will get echo today. -Nephrology following  -SIADH can not be ruled out   -will give lasix 20mg IV once. D/w renal     Anxiety and depression:  -Continue Depakote. DVT prophylaxis: Enoxaparin  Disposition: Home    Diet ADULT DIET;  Regular; Low Fiber   DVT Prophylaxis [x] Lovenox, []  Heparin, [] SCDs, [] Ambulation   GI Prophylaxis [x] PPI,  [] H2 Blocker,  [] Carafate,  [] Diet/Tube Feeds   Code Status Full Code   Disposition Patient requires continued admission due to nausea and vomiting   MDM [] Low, [x] Moderate,[]  High  Patient's risk as above due to diverticulitis with multiple comorbid conditions     History of Present Illness:     Chief Complaint:   Better today but still feels weak. Trying to eat breakfast.      Ten point ROS reviewed negative, unless as noted above    Objective: Intake/Output Summary (Last 24 hours) at 5/1/2022 1318  Last data filed at 4/30/2022 1401  Gross per 24 hour   Intake --   Output 700 ml   Net -700 ml      Vitals:   Vitals:    05/01/22 0915   BP: (!) 143/71   Pulse: 75   Resp: 16   Temp: 97.6 °F (36.4 °C)   SpO2: 96%     Physical Exam:   General: NAD   Eyes: EOMI  ENT: neck supple  Cardiovascular: Regular rate. No edema  Respiratory: Symmetrical expansion,   Gastrointestinal: Soft, nondistended, tenderness of the lower abdominal  Genitourinary: no suprapubic tenderness  Skin: warm, dry  Neuro: Alert. Oriented  Psych: Mood appropriate.      Medications:   Medications:    promethazine  6.25 mg IntraMUSCular Once    urea  15 g Oral Daily    ciprofloxacin  400 mg IntraVENous Q12H    metroNIDAZOLE  500 mg IntraVENous Q8H    calcium-cholecalciferol  1 tablet Oral BID    ketotifen  1 drop Both Eyes BID    vitamin B-12  50 mcg Oral Daily    verapamil  120 mg Oral Daily    aspirin  81 mg Oral Daily    divalproex  250 mg Oral Nightly    sertraline  25 mg Oral Daily    pyridostigmine  30 mg Oral Daily    rosuvastatin  10 mg Oral Nightly    ferrous sulfate  325 mg Oral Daily with breakfast    fluticasone  1 spray Each Nostril Daily    gabapentin  300 mg Oral TID    sodium chloride flush  10 mL IntraVENous 2 times per day    enoxaparin  40 mg SubCUTAneous Daily    pantoprazole  40 mg IntraVENous Daily      Infusions:    sodium chloride 25 mL/hr at 05/01/22 1243     PRN Meds: sodium chloride flush, 10 mL, PRN  sodium chloride, , PRN  ondansetron, 4 mg, Q8H PRN   Or  ondansetron, 4 mg, Q6H PRN  acetaminophen, 650 mg, Q6H PRN   Or  acetaminophen, 650 mg, Q6H PRN  LORazepam, 0.5 mg, Q6H PRN  hydrALAZINE (APRESOLINE) ivpb, 5 mg, Q6H PRN        Electronically signed by Missy Vargas MD on 5/1/2022 at 1:18 PM

## 2022-05-01 NOTE — PLAN OF CARE
Problem: Discharge Planning  Goal: Discharge to home or other facility with appropriate resources  5/1/2022 1030 by Tameka Herbert RN  Outcome: Progressing  5/1/2022 0345 by Braden Reis LPN  Outcome: Progressing     Problem: Pain  Goal: Verbalizes/displays adequate comfort level or baseline comfort level  5/1/2022 1030 by Tameka Herbert RN  Outcome: Progressing  5/1/2022 0345 by Braden Reis LPN  Outcome: Progressing     Problem: ABCDS Injury Assessment  Goal: Absence of physical injury  5/1/2022 1030 by Tameka Herbert RN  Outcome: Progressing  5/1/2022 0345 by Braden Reis LPN  Outcome: Progressing     Problem: Chronic Conditions and Co-morbidities  Goal: Patient's chronic conditions and co-morbidity symptoms are monitored and maintained or improved  5/1/2022 1030 by Tameka Herbert RN  Outcome: Progressing  5/1/2022 0345 by Braden eRis LPN  Outcome: Progressing     Problem: Gastrointestinal - Adult  Goal: Minimal or absence of nausea and vomiting  5/1/2022 1030 by Tameka Herbert RN  Outcome: Progressing  5/1/2022 0345 by Braden Reis LPN  Outcome: Progressing  Goal: Maintains or returns to baseline bowel function  5/1/2022 1030 by Tameka Herbert RN  Outcome: Progressing  5/1/2022 0345 by Braden Reis LPN  Outcome: Progressing     Problem: Metabolic/Fluid and Electrolytes - Adult  Goal: Electrolytes maintained within normal limits  5/1/2022 1030 by Tameka Herbert RN  Outcome: Progressing  5/1/2022 0345 by Braden Reis LPN  Outcome: Progressing  Goal: Hemodynamic stability and optimal renal function maintained  5/1/2022 1030 by Tameka Herbert RN  Outcome: Progressing  5/1/2022 0345 by Braden Reis LPN  Outcome: Progressing     Problem: Skin/Tissue Integrity  Goal: Absence of new skin breakdown  Description: 1. Monitor for areas of redness and/or skin breakdown  2. Assess vascular access sites hourly  3.   Every 4-6 hours minimum:  Change oxygen saturation probe site  4. Every 4-6 hours:  If on nasal continuous positive airway pressure, respiratory therapy assess nares and determine need for appliance change or resting period.   5/1/2022 1030 by Deepali Ramirez RN  Outcome: Progressing  5/1/2022 0345 by Karly Ramirez LPN  Outcome: Progressing     Problem: Safety - Adult  Goal: Free from fall injury  5/1/2022 1030 by Deepali Ramirez RN  Outcome: Progressing  5/1/2022 0345 by Karly Ramirez LPN  Outcome: Progressing

## 2022-05-01 NOTE — PROGRESS NOTES
Nephrology Progress Note  5/1/2022 9:24 AM  Subjective: Interval History: Lelo Carmichael is a 82 y.o. female who is arousable but resting in bed somewhat tired        Data:   Scheduled Meds:   promethazine  6.25 mg IntraMUSCular Once    urea  15 g Oral Daily    ciprofloxacin  400 mg IntraVENous Q12H    metroNIDAZOLE  500 mg IntraVENous Q8H    calcium-cholecalciferol  1 tablet Oral BID    ketotifen  1 drop Both Eyes BID    vitamin B-12  50 mcg Oral Daily    verapamil  120 mg Oral Daily    aspirin  81 mg Oral Daily    divalproex  250 mg Oral Nightly    sertraline  25 mg Oral Daily    pyridostigmine  30 mg Oral Daily    rosuvastatin  10 mg Oral Nightly    ferrous sulfate  325 mg Oral Daily with breakfast    fluticasone  1 spray Each Nostril Daily    gabapentin  300 mg Oral TID    sodium chloride flush  10 mL IntraVENous 2 times per day    enoxaparin  40 mg SubCUTAneous Daily    pantoprazole  40 mg IntraVENous Daily     Continuous Infusions:   sodium chloride 25 mL/hr at 04/29/22 2355         CBC   Recent Labs     04/29/22  0622 04/30/22  0826   WBC 5.3 5.3   HGB 11.1* 10.8*   HCT 32.9* 31.8*    141      BMP   Recent Labs     04/29/22  0622 04/30/22  0826   * 128*   K 3.8 3.8   CL 91* 95*   CO2 22 21   PHOS 2.9  --    BUN 19 19   CREATININE 1.1 0.8     Hepatic:   No results for input(s): AST, ALT, ALB, BILITOT, ALKPHOS in the last 72 hours. Troponin: No results for input(s): TROPONINI in the last 72 hours. BNP: No results for input(s): BNP in the last 72 hours. Lipids: No results for input(s): CHOL, HDL in the last 72 hours. Invalid input(s): LDLCALCU  ABGs: No results found for: PHART, PO2ART, MEU0GIF  INR: No results for input(s): INR in the last 72 hours.   Renal Labs  Albumin:    Lab Results   Component Value Date    LABALBU 3.9 04/28/2022     Calcium:    Lab Results   Component Value Date    CALCIUM 7.5 04/30/2022     Phosphorus:    Lab Results   Component Value Date    PHOS 2.9 04/29/2022     U/A:    Lab Results   Component Value Date    NITRU NEGATIVE 04/30/2022    NITRU POSITIVE 02/17/2020    COLORU YELLOW 04/30/2022    PHUR 5.5 04/29/2021    PHUR 5.5 02/17/2020    LABCAST >40 Hyaline 10/13/2012    WBCUA 1 04/30/2022    RBCUA NONE SEEN 04/30/2022    MUCUS RARE 02/02/2022    TRICHOMONAS NONE SEEN 01/31/2022    YEAST Present 08/05/2019    BACTERIA NEGATIVE 04/30/2022    CLARITYU CLEAR 04/30/2022    SPECGRAV 1.010 04/30/2022    UROBILINOGEN 0.2 04/30/2022    BILIRUBINUR NEGATIVE 04/30/2022    BILIRUBINUR neg 04/29/2021    BLOODU NEGATIVE 04/30/2022    GLUCOSEU neg 04/29/2021    GLUCOSEU Negative 02/17/2020    KETUA NEGATIVE 04/30/2022    AMORPHOUS 2+ 10/13/2012     ABG:  No results found for: PHART, BRF5AGA, PO2ART, HSL9BQN, BEART, THGBART, VJA1MPS, P7TYWIBQ  HgBA1c:    Lab Results   Component Value Date    LABA1C 5.8 08/31/2021     Microalbumen/Creatinine ratio:  No components found for: RUCREAT  TSH:    Lab Results   Component Value Date    TSH 3.250 03/06/2019     IRON:    Lab Results   Component Value Date    IRON 70 02/01/2022     Iron Saturation:  No components found for: PERCENTFE  TIBC:    Lab Results   Component Value Date    TIBC 263 02/01/2022     FERRITIN:    Lab Results   Component Value Date    FERRITIN 939 01/31/2022     RPR:  No results found for: RPR  LALA:  No results found for: ANATITER, LALA  24 Hour Urine for Creatinine Clearance:  No components found for: CREAT4, UHRS10, UTV10      Objective:   I/O: 04/30 0701 - 05/01 0700  In: -   Out: 700 [Urine:700]  I/O last 3 completed shifts:  In: -   Out: 700 [Urine:700]  No intake/output data recorded. Vitals: BP (!) 143/71   Pulse 75   Temp 97.6 °F (36.4 °C) (Oral)   Resp 16   Ht 5' 3\" (1.6 m)   Wt 135 lb 2.3 oz (61.3 kg)   SpO2 96%   BMI 23.94 kg/m²  {  General appearance: awake weak  HEENT: Head: Normal, normocephalic, atraumatic.   Neck: supple, symmetrical, trachea midline  Lungs: diminished breath sounds bilaterally  Heart: S1, S2 normal  Abdomen: abnormal findings:  soft nt  Extremities: edema trace  Neurologic: Mental status: alertness: awake        Assessment and Plan:      IMP:  Hyponatremia  Bilateral hydroureter and abd pain  Cad  htn with low bp    Plan     #1 order repeat labs awaiting repeat sodium level  #2 stable urine output denies any discomfort with urination  #3 cardiac status monitor  #4 blood pressure stable today  Supportive care will follow             Delores Petersen MD, MD

## 2022-05-02 ENCOUNTER — HOSPITAL ENCOUNTER (INPATIENT)
Age: 87
LOS: 10 days | Discharge: HOME HEALTH | DRG: 948 | End: 2022-05-12
Attending: PHYSICAL MEDICINE & REHABILITATION | Admitting: PHYSICAL MEDICINE & REHABILITATION
Payer: MEDICARE

## 2022-05-02 VITALS
HEART RATE: 72 BPM | HEIGHT: 63 IN | SYSTOLIC BLOOD PRESSURE: 103 MMHG | BODY MASS INDEX: 24.06 KG/M2 | RESPIRATION RATE: 16 BRPM | TEMPERATURE: 98.6 F | OXYGEN SATURATION: 97 % | WEIGHT: 135.8 LBS | DIASTOLIC BLOOD PRESSURE: 45 MMHG

## 2022-05-02 LAB
ANION GAP SERPL CALCULATED.3IONS-SCNC: 9 MMOL/L (ref 4–16)
BASOPHILS ABSOLUTE: 0 K/CU MM
BASOPHILS RELATIVE PERCENT: 0.4 % (ref 0–1)
BUN BLDV-MCNC: 20 MG/DL (ref 6–23)
CALCIUM SERPL-MCNC: 9.6 MG/DL (ref 8.3–10.6)
CHLORIDE BLD-SCNC: 98 MMOL/L (ref 99–110)
CO2: 29 MMOL/L (ref 21–32)
CREAT SERPL-MCNC: 0.9 MG/DL (ref 0.6–1.1)
DIFFERENTIAL TYPE: ABNORMAL
EOSINOPHILS ABSOLUTE: 0.2 K/CU MM
EOSINOPHILS RELATIVE PERCENT: 5 % (ref 0–3)
GFR AFRICAN AMERICAN: >60 ML/MIN/1.73M2
GFR NON-AFRICAN AMERICAN: 60 ML/MIN/1.73M2
GLUCOSE BLD-MCNC: 108 MG/DL (ref 70–99)
HCT VFR BLD CALC: 35.3 % (ref 37–47)
HEMOGLOBIN: 11.6 GM/DL (ref 12.5–16)
IMMATURE NEUTROPHIL %: 0.4 % (ref 0–0.43)
LYMPHOCYTES ABSOLUTE: 2.1 K/CU MM
LYMPHOCYTES RELATIVE PERCENT: 43.7 % (ref 24–44)
MCH RBC QN AUTO: 33.1 PG (ref 27–31)
MCHC RBC AUTO-ENTMCNC: 32.9 % (ref 32–36)
MCV RBC AUTO: 100.9 FL (ref 78–100)
MONOCYTES ABSOLUTE: 0.6 K/CU MM
MONOCYTES RELATIVE PERCENT: 12.6 % (ref 0–4)
NUCLEATED RBC %: 0 %
PDW BLD-RTO: 13.8 % (ref 11.7–14.9)
PLATELET # BLD: 158 K/CU MM (ref 140–440)
PMV BLD AUTO: 9.4 FL (ref 7.5–11.1)
POTASSIUM SERPL-SCNC: 4 MMOL/L (ref 3.5–5.1)
RBC # BLD: 3.5 M/CU MM (ref 4.2–5.4)
SARS-COV-2, NAAT: NOT DETECTED
SEGMENTED NEUTROPHILS ABSOLUTE COUNT: 1.8 K/CU MM
SEGMENTED NEUTROPHILS RELATIVE PERCENT: 37.9 % (ref 36–66)
SODIUM BLD-SCNC: 136 MMOL/L (ref 135–145)
SOURCE: NORMAL
TOTAL IMMATURE NEUTOROPHIL: 0.02 K/CU MM
TOTAL NUCLEATED RBC: 0 K/CU MM
WBC # BLD: 4.8 K/CU MM (ref 4–10.5)

## 2022-05-02 PROCEDURE — 2580000003 HC RX 258: Performed by: STUDENT IN AN ORGANIZED HEALTH CARE EDUCATION/TRAINING PROGRAM

## 2022-05-02 PROCEDURE — 6360000002 HC RX W HCPCS: Performed by: INTERNAL MEDICINE

## 2022-05-02 PROCEDURE — 85025 COMPLETE CBC W/AUTO DIFF WBC: CPT

## 2022-05-02 PROCEDURE — 97163 PT EVAL HIGH COMPLEX 45 MIN: CPT

## 2022-05-02 PROCEDURE — 36415 COLL VENOUS BLD VENIPUNCTURE: CPT

## 2022-05-02 PROCEDURE — 2580000003 HC RX 258: Performed by: INTERNAL MEDICINE

## 2022-05-02 PROCEDURE — 87635 SARS-COV-2 COVID-19 AMP PRB: CPT

## 2022-05-02 PROCEDURE — 97530 THERAPEUTIC ACTIVITIES: CPT

## 2022-05-02 PROCEDURE — 6370000000 HC RX 637 (ALT 250 FOR IP): Performed by: STUDENT IN AN ORGANIZED HEALTH CARE EDUCATION/TRAINING PROGRAM

## 2022-05-02 PROCEDURE — 80048 BASIC METABOLIC PNL TOTAL CA: CPT

## 2022-05-02 PROCEDURE — 2500000003 HC RX 250 WO HCPCS: Performed by: INTERNAL MEDICINE

## 2022-05-02 PROCEDURE — 94761 N-INVAS EAR/PLS OXIMETRY MLT: CPT

## 2022-05-02 PROCEDURE — 6370000000 HC RX 637 (ALT 250 FOR IP): Performed by: INTERNAL MEDICINE

## 2022-05-02 PROCEDURE — 1280000000 HC REHAB R&B

## 2022-05-02 PROCEDURE — C9113 INJ PANTOPRAZOLE SODIUM, VIA: HCPCS | Performed by: INTERNAL MEDICINE

## 2022-05-02 PROCEDURE — 93308 TTE F-UP OR LMTD: CPT

## 2022-05-02 PROCEDURE — 97116 GAIT TRAINING THERAPY: CPT

## 2022-05-02 PROCEDURE — 99223 1ST HOSP IP/OBS HIGH 75: CPT | Performed by: PHYSICAL MEDICINE & REHABILITATION

## 2022-05-02 PROCEDURE — 87324 CLOSTRIDIUM AG IA: CPT

## 2022-05-02 PROCEDURE — 97166 OT EVAL MOD COMPLEX 45 MIN: CPT

## 2022-05-02 RX ORDER — LANOLIN ALCOHOL/MO/W.PET/CERES
400 CREAM (GRAM) TOPICAL DAILY
Status: CANCELLED | OUTPATIENT
Start: 2022-05-03

## 2022-05-02 RX ORDER — PANTOPRAZOLE SODIUM 40 MG/1
40 TABLET, DELAYED RELEASE ORAL
Status: CANCELLED | OUTPATIENT
Start: 2022-05-03

## 2022-05-02 RX ORDER — ONDANSETRON 2 MG/ML
4 INJECTION INTRAMUSCULAR; INTRAVENOUS EVERY 6 HOURS PRN
Status: CANCELLED | OUTPATIENT
Start: 2022-05-02

## 2022-05-02 RX ORDER — SODIUM CHLORIDE 0.9 % (FLUSH) 0.9 %
10 SYRINGE (ML) INJECTION EVERY 12 HOURS SCHEDULED
Status: CANCELLED | OUTPATIENT
Start: 2022-05-02

## 2022-05-02 RX ORDER — ACETAMINOPHEN 325 MG/1
650 TABLET ORAL EVERY 6 HOURS PRN
Status: CANCELLED | OUTPATIENT
Start: 2022-05-02

## 2022-05-02 RX ORDER — ONDANSETRON 4 MG/1
4 TABLET, ORALLY DISINTEGRATING ORAL EVERY 8 HOURS PRN
Status: CANCELLED | OUTPATIENT
Start: 2022-05-02

## 2022-05-02 RX ORDER — MAGNESIUM SULFATE 1 G/100ML
1000 INJECTION INTRAVENOUS PRN
Status: DISCONTINUED | OUTPATIENT
Start: 2022-05-02 | End: 2022-05-02 | Stop reason: HOSPADM

## 2022-05-02 RX ORDER — ACETAMINOPHEN 325 MG/1
650 TABLET ORAL EVERY 4 HOURS PRN
Status: DISCONTINUED | OUTPATIENT
Start: 2022-05-02 | End: 2022-05-12 | Stop reason: HOSPADM

## 2022-05-02 RX ORDER — ONDANSETRON 4 MG/1
4 TABLET, ORALLY DISINTEGRATING ORAL EVERY 8 HOURS PRN
Status: DISCONTINUED | OUTPATIENT
Start: 2022-05-02 | End: 2022-05-12 | Stop reason: HOSPADM

## 2022-05-02 RX ORDER — LORAZEPAM 0.5 MG/1
0.5 TABLET ORAL EVERY 6 HOURS PRN
Status: DISCONTINUED | OUTPATIENT
Start: 2022-05-02 | End: 2022-05-12 | Stop reason: HOSPADM

## 2022-05-02 RX ORDER — FERROUS SULFATE 325(65) MG
325 TABLET ORAL
Status: CANCELLED | OUTPATIENT
Start: 2022-05-03

## 2022-05-02 RX ORDER — ACETAMINOPHEN 325 MG/1
650 TABLET ORAL EVERY 6 HOURS PRN
Status: DISCONTINUED | OUTPATIENT
Start: 2022-05-02 | End: 2022-05-02 | Stop reason: SDUPTHER

## 2022-05-02 RX ORDER — FUROSEMIDE 20 MG/1
20 TABLET ORAL DAILY
Status: DISCONTINUED | OUTPATIENT
Start: 2022-05-02 | End: 2022-05-02

## 2022-05-02 RX ORDER — ACETAMINOPHEN 650 MG/1
650 SUPPOSITORY RECTAL EVERY 6 HOURS PRN
Status: DISCONTINUED | OUTPATIENT
Start: 2022-05-02 | End: 2022-05-02

## 2022-05-02 RX ORDER — LANOLIN ALCOHOL/MO/W.PET/CERES
400 CREAM (GRAM) TOPICAL DAILY
Status: DISCONTINUED | OUTPATIENT
Start: 2022-05-02 | End: 2022-05-02 | Stop reason: HOSPADM

## 2022-05-02 RX ORDER — PYRIDOSTIGMINE BROMIDE 60 MG/1
30 TABLET ORAL DAILY
Status: CANCELLED | OUTPATIENT
Start: 2022-05-02

## 2022-05-02 RX ORDER — ASPIRIN 81 MG/1
81 TABLET ORAL DAILY
Status: DISCONTINUED | OUTPATIENT
Start: 2022-05-03 | End: 2022-05-12 | Stop reason: HOSPADM

## 2022-05-02 RX ORDER — FERROUS SULFATE 325(65) MG
325 TABLET ORAL
Status: DISCONTINUED | OUTPATIENT
Start: 2022-05-03 | End: 2022-05-08

## 2022-05-02 RX ORDER — SODIUM CHLORIDE 0.9 % (FLUSH) 0.9 %
10 SYRINGE (ML) INJECTION PRN
Status: CANCELLED | OUTPATIENT
Start: 2022-05-02

## 2022-05-02 RX ORDER — GABAPENTIN 300 MG/1
300 CAPSULE ORAL 3 TIMES DAILY
Status: CANCELLED | OUTPATIENT
Start: 2022-05-02

## 2022-05-02 RX ORDER — ENOXAPARIN SODIUM 100 MG/ML
40 INJECTION SUBCUTANEOUS DAILY
Status: CANCELLED | OUTPATIENT
Start: 2022-05-02

## 2022-05-02 RX ORDER — FUROSEMIDE 20 MG/1
20 TABLET ORAL DAILY
Status: CANCELLED | OUTPATIENT
Start: 2022-05-02

## 2022-05-02 RX ORDER — LORAZEPAM 0.5 MG/1
0.5 TABLET ORAL EVERY 6 HOURS PRN
Status: CANCELLED | OUTPATIENT
Start: 2022-05-02

## 2022-05-02 RX ORDER — VERAPAMIL HYDROCHLORIDE 240 MG/1
120 TABLET, FILM COATED, EXTENDED RELEASE ORAL DAILY
Status: CANCELLED | OUTPATIENT
Start: 2022-05-02

## 2022-05-02 RX ORDER — KETOTIFEN FUMARATE 0.35 MG/ML
1 SOLUTION/ DROPS OPHTHALMIC 2 TIMES DAILY
Status: DISCONTINUED | OUTPATIENT
Start: 2022-05-02 | End: 2022-05-12 | Stop reason: HOSPADM

## 2022-05-02 RX ORDER — GABAPENTIN 300 MG/1
300 CAPSULE ORAL 3 TIMES DAILY
Status: DISCONTINUED | OUTPATIENT
Start: 2022-05-02 | End: 2022-05-12 | Stop reason: HOSPADM

## 2022-05-02 RX ORDER — UBIDECARENONE 75 MG
50 CAPSULE ORAL DAILY
Status: CANCELLED | OUTPATIENT
Start: 2022-05-02

## 2022-05-02 RX ORDER — ROSUVASTATIN CALCIUM 5 MG/1
10 TABLET, COATED ORAL NIGHTLY
Status: CANCELLED | OUTPATIENT
Start: 2022-05-02

## 2022-05-02 RX ORDER — ACETAMINOPHEN 650 MG/1
650 SUPPOSITORY RECTAL EVERY 6 HOURS PRN
Status: CANCELLED | OUTPATIENT
Start: 2022-05-02

## 2022-05-02 RX ORDER — POLYETHYLENE GLYCOL 3350 17 G/17G
17 POWDER, FOR SOLUTION ORAL DAILY PRN
Status: DISCONTINUED | OUTPATIENT
Start: 2022-05-02 | End: 2022-05-12 | Stop reason: HOSPADM

## 2022-05-02 RX ORDER — FLUTICASONE PROPIONATE 50 MCG
1 SPRAY, SUSPENSION (ML) NASAL DAILY
Status: DISCONTINUED | OUTPATIENT
Start: 2022-05-03 | End: 2022-05-12 | Stop reason: HOSPADM

## 2022-05-02 RX ORDER — FLUTICASONE PROPIONATE 50 MCG
1 SPRAY, SUSPENSION (ML) NASAL DAILY
Status: DISCONTINUED | OUTPATIENT
Start: 2022-05-02 | End: 2022-05-02

## 2022-05-02 RX ORDER — ROSUVASTATIN CALCIUM 5 MG/1
10 TABLET, COATED ORAL NIGHTLY
Status: DISCONTINUED | OUTPATIENT
Start: 2022-05-02 | End: 2022-05-12 | Stop reason: HOSPADM

## 2022-05-02 RX ORDER — SODIUM CHLORIDE 0.9 % (FLUSH) 0.9 %
10 SYRINGE (ML) INJECTION PRN
Status: DISCONTINUED | OUTPATIENT
Start: 2022-05-02 | End: 2022-05-07

## 2022-05-02 RX ORDER — PYRIDOSTIGMINE BROMIDE 60 MG/1
30 TABLET ORAL DAILY
Status: DISCONTINUED | OUTPATIENT
Start: 2022-05-02 | End: 2022-05-12 | Stop reason: HOSPADM

## 2022-05-02 RX ORDER — ONDANSETRON 2 MG/ML
4 INJECTION INTRAMUSCULAR; INTRAVENOUS EVERY 6 HOURS PRN
Status: DISCONTINUED | OUTPATIENT
Start: 2022-05-02 | End: 2022-05-12 | Stop reason: HOSPADM

## 2022-05-02 RX ORDER — KETOTIFEN FUMARATE 0.35 MG/ML
1 SOLUTION/ DROPS OPHTHALMIC 2 TIMES DAILY
Status: CANCELLED | OUTPATIENT
Start: 2022-05-02

## 2022-05-02 RX ORDER — DIVALPROEX SODIUM 250 MG/1
250 TABLET, EXTENDED RELEASE ORAL NIGHTLY
Status: CANCELLED | OUTPATIENT
Start: 2022-05-02

## 2022-05-02 RX ORDER — UBIDECARENONE 75 MG
50 CAPSULE ORAL DAILY
Status: DISCONTINUED | OUTPATIENT
Start: 2022-05-03 | End: 2022-05-12 | Stop reason: HOSPADM

## 2022-05-02 RX ORDER — ENOXAPARIN SODIUM 100 MG/ML
40 INJECTION SUBCUTANEOUS DAILY
Status: DISCONTINUED | OUTPATIENT
Start: 2022-05-03 | End: 2022-05-11

## 2022-05-02 RX ORDER — SODIUM CHLORIDE 0.9 % (FLUSH) 0.9 %
10 SYRINGE (ML) INJECTION EVERY 12 HOURS SCHEDULED
Status: DISCONTINUED | OUTPATIENT
Start: 2022-05-02 | End: 2022-05-07

## 2022-05-02 RX ORDER — LANOLIN ALCOHOL/MO/W.PET/CERES
400 CREAM (GRAM) TOPICAL DAILY
Status: DISCONTINUED | OUTPATIENT
Start: 2022-05-03 | End: 2022-05-12 | Stop reason: HOSPADM

## 2022-05-02 RX ORDER — DIVALPROEX SODIUM 250 MG/1
250 TABLET, EXTENDED RELEASE ORAL NIGHTLY
Status: DISCONTINUED | OUTPATIENT
Start: 2022-05-02 | End: 2022-05-12 | Stop reason: HOSPADM

## 2022-05-02 RX ORDER — PANTOPRAZOLE SODIUM 40 MG/1
40 TABLET, DELAYED RELEASE ORAL
Status: DISCONTINUED | OUTPATIENT
Start: 2022-05-03 | End: 2022-05-12 | Stop reason: HOSPADM

## 2022-05-02 RX ORDER — FUROSEMIDE 20 MG/1
20 TABLET ORAL DAILY
Status: DISCONTINUED | OUTPATIENT
Start: 2022-05-03 | End: 2022-05-12 | Stop reason: HOSPADM

## 2022-05-02 RX ORDER — ASPIRIN 81 MG/1
81 TABLET ORAL DAILY
Status: DISCONTINUED | OUTPATIENT
Start: 2022-05-02 | End: 2022-05-02

## 2022-05-02 RX ORDER — FLUTICASONE PROPIONATE 50 MCG
1 SPRAY, SUSPENSION (ML) NASAL DAILY
Status: CANCELLED | OUTPATIENT
Start: 2022-05-02

## 2022-05-02 RX ORDER — ASPIRIN 81 MG/1
81 TABLET ORAL DAILY
Status: CANCELLED | OUTPATIENT
Start: 2022-05-02

## 2022-05-02 RX ADMIN — PANTOPRAZOLE SODIUM 40 MG: 40 INJECTION, POWDER, FOR SOLUTION INTRAVENOUS at 08:37

## 2022-05-02 RX ADMIN — METRONIDAZOLE 500 MG: 500 INJECTION, SOLUTION INTRAVENOUS at 05:50

## 2022-05-02 RX ADMIN — DIVALPROEX SODIUM 250 MG: 250 TABLET, FILM COATED, EXTENDED RELEASE ORAL at 21:00

## 2022-05-02 RX ADMIN — PYRIDOSTIGMINE BROMIDE 30 MG: 60 TABLET ORAL at 21:00

## 2022-05-02 RX ADMIN — Medication 15 G: at 08:37

## 2022-05-02 RX ADMIN — KETOTIFEN FUMARATE 1 DROP: 0.35 SOLUTION/ DROPS OPHTHALMIC at 21:00

## 2022-05-02 RX ADMIN — SODIUM CHLORIDE, PRESERVATIVE FREE 10 ML: 5 INJECTION INTRAVENOUS at 08:39

## 2022-05-02 RX ADMIN — GABAPENTIN 300 MG: 300 CAPSULE ORAL at 21:00

## 2022-05-02 RX ADMIN — Medication 1 TABLET: at 21:00

## 2022-05-02 RX ADMIN — ENOXAPARIN SODIUM 40 MG: 100 INJECTION SUBCUTANEOUS at 08:37

## 2022-05-02 RX ADMIN — ROSUVASTATIN CALCIUM 10 MG: 5 TABLET, FILM COATED ORAL at 21:00

## 2022-05-02 RX ADMIN — GABAPENTIN 300 MG: 300 CAPSULE ORAL at 08:37

## 2022-05-02 RX ADMIN — FLUTICASONE PROPIONATE 1 SPRAY: 50 SPRAY, METERED NASAL at 08:38

## 2022-05-02 RX ADMIN — Medication 1 TABLET: at 08:37

## 2022-05-02 RX ADMIN — GABAPENTIN 300 MG: 300 CAPSULE ORAL at 13:46

## 2022-05-02 RX ADMIN — MAGNESIUM OXIDE 400 MG (241.3 MG MAGNESIUM) TABLET 400 MG: TABLET at 14:59

## 2022-05-02 RX ADMIN — KETOTIFEN FUMARATE 1 DROP: 0.35 SOLUTION/ DROPS OPHTHALMIC at 08:38

## 2022-05-02 RX ADMIN — ASPIRIN 81 MG: 81 TABLET, COATED ORAL at 08:37

## 2022-05-02 RX ADMIN — SODIUM CHLORIDE, PRESERVATIVE FREE 10 ML: 5 INJECTION INTRAVENOUS at 21:04

## 2022-05-02 RX ADMIN — VERAPAMIL HYDROCHLORIDE 120 MG: 120 TABLET, FILM COATED, EXTENDED RELEASE ORAL at 08:39

## 2022-05-02 RX ADMIN — SERTRALINE HYDROCHLORIDE 25 MG: 50 TABLET ORAL at 08:37

## 2022-05-02 RX ADMIN — VITAM B12 50 MCG: 100 TAB at 08:38

## 2022-05-02 ASSESSMENT — PAIN SCALES - GENERAL: PAINLEVEL_OUTOF10: 0

## 2022-05-02 NOTE — PROGRESS NOTES
Occupational Therapy    [facility] ACUTE CARE OCCUPATIONAL THERAPY EVALUATION    Rigo Parsons, 1935, 4102/4102-A, 5/2/2022    Discharge Recommendation: Jd Salcedo (Pt is likely most appropriate for transition to assisted living after a short rehab stay)      History:  Ruby:  The primary encounter diagnosis was Diverticulitis of colon. A diagnosis of Nausea was also pertinent to this visit. Subjective:  Patient states: \"I was doing everything until I came into the hospital. Now I've been in bed for five days! \"  Pain: Pt denied pain this date  Communication with other providers: PT KRISTIE Samson CM  Restrictions: General Precautions, Fall Risk, Bed/chair alarm    Home Setup/Prior level of function:  Social/Functional History  Lives With: Alone  Type of Home: House  Home Layout: One level (with basement, which pt does not use)  Home Access: Stairs to enter with rails  Entrance Stairs - Number of Steps: 4  Entrance Stairs - Rails: Both  Bathroom Shower/Tub: Tub/Shower unit  Bathroom Toilet: Handicap height  Bathroom Equipment: Grab bars in [facility] chair  Home Equipment: [employer]  Has the patient had two or more falls in the past year or any fall with injury in the past year?: No  Receives Help From: Family  ADL Assistance: [address]: Needs assistance (chika does cleaning and grocery shopping)  Homemaking Responsibilities: Yes (typically cooks and does laundry)  Ambulation Assistance: Independent (mod I with cane PRN, often uses no AD)  Transfer Assistance: Independent  Active : Yes  Occupation: Retired    Examination:  · Observation: Supine in bed upon arrival. Agreeable to evaluation with encouragement and education.   · Vision: WFL (Glasses)  · Hearing: [employer]  · Vitals: Stable vitals throughout session    Body Systems and functions:  · ROM: Active shoulder flexion grossly 0-90' in BL UEs, WNL distally in BL UEs  · Strength: 4-/5 MMT all major muscle groups BL UEs (generally weak)  · Sensation: WFL (denies numbness/tingling)  · Tone: Normal  · Coordination: WFL for ADLs  · Perception: WNL    Activities of Daily Living (ADLs):  · Feeding: Independent (began eating lunch at end of session)  · Grooming: CGA (able to complete in standing at sink)  · UB bathing: SBA   · LB bathing: Min A (dynamic standing balance with reaching bottom)  · UB dressing: SBA (donning clean robe seated EOB)  · LB dressing: Min A (dynamic standing balance with clothing mgmt to hips, able to don BL socks seated EOB SBA by crossing legs into \"figure 4 position\")  · Toileting: Min A (dynamic standing balance with clothing mgmt both directions)    Cognitive and Psychosocial Functioning:  · Overall cognitive status: WFL (mild attention-seeking behaviors at times)  · Affect: Normal     Balance:   · Sitting: SBA in unsupported sitting EOB  · Standing: CGA with RW static standing, Min A for dynamic standing tasks    Functional Mobility:  · Bed Mobility: SBA supine to sitting EOB (HOB elevated to 30', utilized bed rail with transition)  · Transfers: CGA sit to stand from bed, Min A stand to sit to bed and recliner for controlled descent (min cues for safe hand placement each direction)  · Ambulation: CGA to Min A with RW ~50 ft; intermittent unsteadiness and knee buckling, close chair follow provided for safety (see PT evaluation for gait assessment)      AM-PAC 6 click short form for inpatient daily activity:   How much help from another person does the patient currently need. .. Unable  Dep A Lot  Max A A Lot   Mod A A Little  Min A A Little   CGA  SBA None   Mod I  Indep  Sup   1. Putting on and taking off regular lower body clothing? [] 1    [] 2   [] 2   [x] 3   [] 3   [] 4      2. Bathing (including washing, rinsing, drying)? [] 1   [] 2   [] 2 [x] 3 [] 3 [] 4   3. Toileting, which includes using toilet, bedpan, or urinal? [] 1    [] 2   [] 2   [x] 3   [] 3   [] 4     4.  Putting on and taking off regular upper body clothing? [] 1   [] 2   [] 2   [] 3   [x] 3    [] 4      5. Taking care of personal grooming such as brushing teeth? [] 1   [] 2    [] 2 [] 3    [x] 3   [] 4      6. Eating meals? [] 1   [] 2   [] 2   [] 3   [] 3   [x] 4      Raw Score:  19     [24=0% impaired(CH), 23=1-19%(CI), 20-22=20-39%(CJ), 15-19=40-59%(CK), 10-14=60-79%(CL), 7-9=80-99%(CM), 6=100%(CN)]     Treatment:  Therapeutic Activity Training:   Therapeutic activity training was instructed today. Cues were given for safety, sequence, UE/LE placement, awareness, and balance. Activities performed today included bed mobility training, UB/LB dressing tasks, transfer training, household mobility with RW, education on role of OT, POC, importance of EOB/OOB activity, recommendation for initial use of RW, d/c planning      Safety Measures: Gait belt used, Left in Chair, Alarm in place    Assessment:  Pt is an 82year old female with a past medical history of Arthritis of shoulder region, right, Blind right eye, Chronic depression, DVT (deep venous thrombosis) ([address] Former smoker, History of echocardiogram, History of stress test, Hx of Doppler ultrasound, Hyperlipidemia, Hypertension, Macular degeneration, Mild cognitive impairment, MVP (mitral valve prolapse), Osteoporosis, Pancytopenia, Peptic ulcer disease, Peripheral vascular disease ([address] Prediabetes, Recurrent ventral incisional hernia, S/P CABG x 3, Spigelian hernia, Supraventricular tachycardia ([address] and Tic [place]oure[place]. Pt admitted with nausea/vomiting/abdominal pain and diagnosed with diverticulitis. Pt undergoing conservative management at this time. Pt lives at home alone and reports at baseline she is independent with ADLs, most IADLs, ambulates independently or mod I with a cane, and drives. Pt currently presents with the above impairments. Recommend continued OT services in SNF at discharge.  Pt is likely most appropriate for transition to assisted living after a short rehab stay. Complexity: Moderate  Prognosis: Good  Plan: 2+x/week      Goals:  1. Pt will complete all aspects of bed mobility for EOB/OOB ADLs mod I with HOB flat  2. Pt will complete UB/LB bathing CGA with setup  3. Pt will complete all aspects of LB dressing CGA with setup  4. Pt will complete all functional transfers to and from bed, chair, toilet, shower chair SBA/good safety awareness   5. Pt will ambulate HH distance to bathroom for toileting SBA with RW  6. Pt will complete all aspects of toileting task CGA  7. Pt will complete oral hygiene/grooming routine in standing at sink SBA with setup/no seated rest breaks  8.  Pt will complete ther ex/ther act with focus on UE strengthening and functional activity tolerance in standing >8 minutes      Time:   Time in: 1150  Time out: 1214  Timed treatment minutes: 9  Total time: 24      Electronically signed by:    BRITTANY Hamilton/L, [address] [facility], .[phone]

## 2022-05-02 NOTE — DISCHARGE SUMMARY
Discharge Summary    Name:  Salazar Chavira /Age/Sex: 1935  (82 y.o. female)   MRN & CSN:  [id number] & [id number] Admission Date/Time: 2022  7:03 PM   Attending:  Nitza Lobato MD Discharging Physician: Nitza Lobato MD     Hospital Course:   Salazar Chavira is a 82 y.o.  female  who presents with Diverticulitis    Mild acute uncomplicated diverticulitis (recurrent):  -GI disease panel negative. S/p cipro/flagyl. Stool is now more formed. No further diarrhea  -GI follow up on dc.   -Outpatient colonoscopy in 8 weeks due to recurrent diverticulitis  -C Diff specimen not processed as it was not liquid stool. DC antibiotics. Creat 0.7. WBC  4.7.  -Improved     Nausea/vomiting  -Resolved. Tolerating regular diet this morning  --Continue PPI and antiemetics. -GI follow up as outpatient.     Hypertension:  Continue verapamil     Hyponatremia possibly due fluid overload  -Initially thought to be due to volume depletion, lack of improvement with IVF. - proBNP elevated at 7800. Normal LVEF on echocardiogram.  -seen by nephro. Na has normalized with lasix. Will stop urea tabs and continue on po lasix daily.      Anxiety and depression:  -Continue Depakote.     Debility  -Discharged to  today. PVCs/bigeminy  Hypomagnesemia  -replace magnesium. Will get cardiology to see her when she is at the ARU. Continue verapamil as above. Apparently does not tolerate BB due to n/v.      The patient expressed appropriate understanding of and agreement with the discharge recommendations, medications, and plan.      Consults this admission:  IP CONSULT TO GI  IP CONSULT TO NEPHROLOGY  IP CONSULT TO CARDIOLOGY  IP CONSULT TO GI  IP CONSULT TO CARDIOLOGY    Discharge Instruction:   Follow up appointments: nephrology, GI  Primary care physician:  within 2 weeks    Diet:  regular diet   Activity: activity as tolerated  Disposition: Discharged to:   []Home, []HHC, []SNF, [x]Acute Rehab, []Hospice   Condition on discharge: Stable    Discharge Medications:        Medication List      CONTINUE taking these medications    Handicap Placard Misc  by Does not apply route Good for 3 years        ASK your doctor about these medications    aspirin 81 MG EC tablet     divalproex 250 MG extended release tablet  Commonly known as: DEPAKOTE ER     ferrous sulfate 325 (65 Fe) MG EC tablet  Commonly known as: FE TABS 325     fluticasone 50 MCG/ACT nasal spray  Commonly known as: FLONASE  instill 1 spray into each nostril once daily     gabapentin 300 MG capsule  Commonly known as: NEURONTIN  take 1 tablet by mouth three times a day for TRIGEMINAL NEURALGIA     OCUVITE-LUTEIN PO     ondansetron 4 MG disintegrating tablet  Commonly known as: ZOFRAN-ODT  Take 1 tablet by mouth 2 times daily as needed for Nausea or Vomiting     Oscal 500/200 D-3 500-200 MG-UNIT per tablet  Generic drug: calcium-vitamin D     pantoprazole 40 MG tablet  Commonly known as: PROTONIX  Take 1 tablet by mouth 2 times daily     pyridostigmine 60 MG tablet  Commonly known as: MESTINON  Take 0.5 tablets by mouth daily     rosuvastatin 10 MG tablet  Commonly known as: CRESTOR  take 1 tablet by mouth once daily     sertraline 25 MG tablet  Commonly known as: ZOLOFT     Vascepa 0.5 g Caps  Generic drug: Icosapent Ethyl  take 2 capsules by mouth twice a day     verapamil 120 MG extended release tablet  Commonly known as: CALAN SR  Take 1 tablet by mouth daily     vitamin B-12 100 MCG tablet  Commonly known as: CYANOCOBALAMIN     Zaditor 0.025 % ophthalmic solution  Generic drug: ketotifen            Objective Findings at Discharge:   BP (!) 103/45   Pulse 72   Temp 98.6 °F (37 °C) (Oral)   Resp 16   Ht 5' 3\" (1.6 m)   Wt 135 lb 12.8 oz (61.6 kg)   SpO2 97%   BMI 24.06 kg/m²            PHYSICAL EXAM   GEN Awake female, sitting upright in bed in no apparent distress. Appears given age. Looks weak  EYES Pupils are equally round.   No scleral erythema, discharge, or conjunctivitis. HENT Mucous membranes are moist. Oral pharynx without exudates, no evidence of thrush. NECK Supple, no apparent thyromegaly or masses. RESP Clear to auscultation, no wheezes, rales or rhonchi. Symmetric chest movement while on room air. CARDIO/VASC S1/S2 auscultated. Regular rate without appreciable murmurs, rubs, or gallops. No JVD or carotid bruits. Peripheral pulses equal bilaterally and palpable. No peripheral edema. GI Abdomen is soft without significant tenderness, masses, or guarding. Bowel sounds are normoactive. Rectal exam deferred.  No costovertebral angle tenderness. Normal appearing external genitalia. Reese catheter is not present. HEME/LYMPH No palpable cervical lymphadenopathy and no hepatosplenomegaly. No petechiae or ecchymoses. MSK No gross joint deformities. SKIN Normal coloration, warm, dry. NEURO Cranial nerves appear grossly intact, normal speech, no lateralizing weakness. PSYCH Awake, alert, oriented x 4. Affect appropriate. BMP/CBC  Recent Labs     04/30/22  0826 05/01/22  1142 05/02/22  0608   * 131* 136   K 3.8 3.7 4.0   CL 95* 96* 98*   CO2 21 23 29   BUN 19 32* 20   CREATININE 0.8 0.7 0.9   WBC 5.3 4.7 4.8   HCT 31.8* 34.9* 35.3*    147 158       IMAGING:  CT Abdo pelvis  Suggestion of mild acute uncomplicated diverticulitis seen at the junction of   the descending colon and sigmoid colon.  No evidence of perforation.  No   diverticular abscess.       Mild bilateral urothelial thickening and hyperenhancement, which raise the   possibility of urinary tract infection/inflammation.  Please correlate with   urinalysis.      Discharge Time of 60 minutes    Electronically signed by Manisha Harper MD on 5/2/2022 at 5:10 PM

## 2022-05-02 NOTE — CARE COORDINATION
Reviewed chart and discussed in IDR, then spoke with pt. She is very weak and states has not been out of bed since at [facility];.  We discussed ARU and she has been there in past not sure if she can tolerate that much activity. She asked about HC explained that the help they give can be limited. Then discussed SNU and she does not want that. Gave her SNU and [facility] [place], [facility]. list to review , CM will await therapy evnahid then speak with pt again.

## 2022-05-02 NOTE — PROGRESS NOTES
Physical Therapy  Facility/Department: [address] [place] [address]  Physical Therapy Initial Assessment    Name: Jaylen Aviles  : 1935  MRN: [id number]  Date of Service: 2022    Discharge Recommendations:  Subacute/Skilled Nursing Facility          Assessment   Assessment: Pt is an 82 y.o. female with medical history, surgical history, co-morbidities, and personal factors including  has a past medical history of Arthritis of shoulder region, right, Blind right eye, Chronic depression, DVT (deep venous thrombosis) ([address] Former smoker, History of echocardiogram, History of stress test, Hx of Doppler ultrasound, Hyperlipidemia, Hypertension, Macular degeneration, Mild cognitive impairment, MVP (mitral valve prolapse), Osteoporosis, Pancytopenia, Peptic ulcer disease, Peripheral vascular disease ([address] Prediabetes, Recurrent ventral incisional hernia, S/P CABG x 3, Spigelian hernia, Supraventricular tachycardia ([address] Tic dou[place]oureux, Cholecystectomy; Coronary artery bypass graft (2009); Ureter surgery; Shoulder arthroscopy (Right, ); Tubal ligation; and Cataract removal (Right, ) with admission for diverticulitis of colon and nausea. Prior to admission, pt was modified independent with functional mobility and ADLs. Examination of body systems reveals decreased strength, decreased balance, decreased aerobic capacity, impaired cognition, and decreased independence with functional mobility.          Therapy Prognosis: Good  Decision Making: High Complexity  Clinical Presentation: unpredictable characteristics  Activity Tolerance  Activity Tolerance: Patient tolerated evaluation without incident     Plan   Plan  Plan: 3-5 times per week  Current Treatment Recommendations: Strengthening,ROM,Balance training,Functional mobility training,Transfer training,ADL/Self-care training,IADL training,Cognitive/Perceptual training,Endurance training,Gait training,Stair training,Neuromuscular re-education,Home exercise program,Pain management,Cognitive reorientation,Safety education & training,Patient/Caregiver education & training,Equipment evaluation, education, & procurement,Positioning,Therapeutic activities  Safety Devices  Type of Devices: All fall risk precautions in place,Patient at risk for falls,Call light within reach,Chair alarm in place,Left in chair,Gait belt,Nurse notified     Restrictions  Restrictions/Precautions  Restrictions/Precautions: Fall Risk,General Precautions     Subjective   General  Chart Reviewed: Yes  Patient assessed for rehabilitation services?: Yes  Family / Caregiver Present: No  Follows Commands: Within Functional Limits  Subjective  Subjective: pain: denies: 0/10         Social/Functional History  Social/Functional History  Lives With: Alone  Type of Home: House  Home Layout: One level (with basement, which pt does not use)  Home Access: Stairs to enter with rails  Entrance Stairs - Number of Steps: 4  Entrance Stairs - Rails: Both  Bathroom Shower/Tub: Tub/Shower unit  Bathroom Toilet: Handicap height  Bathroom Equipment: Grab bars in [facility] [employer] chair  Home Equipment: [employer]  Has the patient had two or more falls in the past year or any fall with injury in the past year?: No  Receives Help From: Family  ADL Assistance: [facility] [facility] [address]: Needs assistance (chika does cleaning and grocery shopping)  Homemaking Responsibilities: Yes (typically cooks and does laundry)  Ambulation Assistance: Independent (mod I with cane PRN, often uses no AD)  Transfer Assistance: Independent  Active : Yes  Occupation: Retired  Vision/Hearing  Hearing: Within functional limits    Cognition   Orientation  Overall Orientation Status: Within Functional Limits  Cognition  Overall Cognitive Status: Exceptions  Arousal/Alertness: Appropriate responses to stimuli  Following Commands:  Follows all commands without difficulty  Attention Span: Appears intact  Safety Judgement: Decreased awareness of need for safety  Insights: Decreased awareness of deficits  Initiation: Requires cues for some  Sequencing: Requires cues for some     Objective                  Strength RLE  Comment: knee extension: 4/5, ankle DF: 4/5  Strength LLE  Comment: knee extension: 4-/5, ankle DF: 4-/5           Bed mobility  Supine to Sit: Contact guard assistance  Transfers  Sit to Stand: Minimal Assistance;Contact guard assistance (with verbal cues to push through bed/chair and avoid pulling on walker)  Stand to sit: Contact guard assistance;Minimal Assistance (with verbal cues to feel chair against back of legs, reach back, and sit slowly)  Ambulation  Surface: level tile  Device: Rolling Walker  Assistance: Contact guard assistance  Quality of Gait: decreased gait speed, decreased step length bilaterally, intermittent bilateral knee buckling, unsteady  Distance: 35 feet + 20 feet + 10 feet  Comments: with verbal and tactile cues for BLE placement, walker placement, and sequence throughout ambulation; with verbal and tactile cues to maintain upright posture in order to avoid COM shifting outside of AMA; with verbal cues for directions in order to successfully navigate hallway and return to correct room     Balance  Posture: Fair  Sitting - Static: Good  Sitting - Dynamic: Good  Standing - Static: Poor;+  Standing - Dynamic: Poor;+        Gait Training:  Cues were given for safety, sequence, device management, balance, posture, awareness, path. Therapeutic Activity Training:   Therapeutic activity training was instructed today. Cues were given for safety, sequence, UE/LE placement, awareness, and balance. Activities performed today included bed mobility training, sup-sit, sit-stand.               AM-PAC Score  AM-PAC Inpatient Mobility Raw Score : 16 (05/02/22 1219)  AM-PAC Inpatient T-Scale Score : 40.78 (05/02/22 1219)  Mobility Inpatient CMS 0-100% Score: 54.16 (05/02/22 1219)  Mobility Inpatient CMS G-Code Modifier : CK (05/02/22 1219)          Goals  Long Term Goals  Time Frame for Long term goals :  In one week:  Long term goal 1: Pt will complete all bed mobility with supervision  Long term goal 2: Pt will complete sit <> stand transfers with SBAx1  Long term goal 3: Pt will ambulate 300 feet with SBAx1 with LRAD  Long term goal 4: Pt will independently complete 3 sets of 10 reps of BLE AROM exercises in available and allowed ROM       Time In: 1151  Time Out: 1226  Total Treatment Time: 35  Timed Code Treatment Minutes: Anand Kapadia [address] [facility] PT, DPT  License #: [id number]

## 2022-05-02 NOTE — PROGRESS NOTES
Nephrology Progress Note  5/2/2022 7:32 AM        Subjective:   Admit Date: 4/26/2022  PCP: Paul Tsai MD    Interval History: Alert, awake and oriented    Diet: Per patient increase physical    ROS: No shortness of breath or confusion  Urine output recorded 525 cc for the last 24 hours  Afebrile and acceptable blood pressure    Data:     Current meds:    promethazine  6.25 mg IntraMUSCular Once    urea  15 g Oral Daily    metroNIDAZOLE  500 mg IntraVENous Q8H    calcium-cholecalciferol  1 tablet Oral BID    ketotifen  1 drop Both Eyes BID    vitamin B-12  50 mcg Oral Daily    verapamil  120 mg Oral Daily    aspirin  81 mg Oral Daily    divalproex  250 mg Oral Nightly    sertraline  25 mg Oral Daily    pyridostigmine  30 mg Oral Daily    rosuvastatin  10 mg Oral Nightly    ferrous sulfate  325 mg Oral Daily with breakfast    fluticasone  1 spray Each Nostril Daily    gabapentin  300 mg Oral TID    sodium chloride flush  10 mL IntraVENous 2 times per day    enoxaparin  40 mg SubCUTAneous Daily    pantoprazole  40 mg IntraVENous Daily      sodium chloride 25 mL/hr at 05/01/22 1243         I/O last 3 completed shifts:   In: 500 [P.O.:500]  Out: 525 [Urine:525]    CBC:   Recent Labs     04/30/22  0826 05/01/22  1142 05/02/22  0608   WBC 5.3 4.7 4.8   HGB 10.8* 11.6* 11.6*    147 158          Recent Labs     04/30/22  0826 05/01/22  1142   * 131*   K 3.8 3.7   CL 95* 96*   CO2 21 23   BUN 19 32*   CREATININE 0.8 0.7   GLUCOSE 97 161*       Lab Results   Component Value Date    CALCIUM 8.7 05/01/2022    PHOS 2.9 04/29/2022       Objective:     Vitals: BP (!) 133/50   Pulse 68   Temp 97.5 °F (36.4 °C) (Oral)   Resp 14   Ht 5' 3\" (1.6 m)   Wt 135 lb 12.8 oz (61.6 kg)   SpO2 96%   BMI 24.06 kg/m²     General appearance: Alert, awake and oriented  HEENT: Positive conjunctival pallor-trace to plus  Neck: Supple  Lungs: No crackles on auscultation  Heart: Seems regular rate and rhythm  Abdomen: Soft, nontender  Extremities: No overt edema      Problem List :         Impression :     1. Hyponatremia-sodium was improving-had 1 dose of loop-she may have excess total body water-she also had bilateral chronic hydroureter-also low protein diet was suspected  2. Acute diverticulitis or suspected  3. Also has hypertension coronary artery disease    Recommendation/Plan  :     1. Encourage high-protein diet  2. Stop normal saline  3. Follow clinically and biochemically  4. May DC from my standpoint  5. She will need a CMP, magnesium, phosphorus in 1 week  6.  Follow-up with me in 2 weeks      Isma Hernandez MD MD

## 2022-05-02 NOTE — CARE COORDINATION
Met with patient andand discussed ARU. Juan Antonio was on ARU in November 2021 and was able to discharge home alone after stay. Per patient she was doing good. Patient states she still drives and does her own grocery shopping. Explained to patient the required 3 hours of therapy a day. Also explained the average length of stay is 11 days, could be longer or shorter depending on recommendations of therapy and Dr. Ambriz. Patient expresses her understanding and states she's agreeable to admit to ARU. With patients permission called and left  with her grandson to notify him of admission to ARU and requested clothes be brought in. Patient meets criteria and is approved to come to ARU. Patient able to admit once medically stable and after ARU Medical Director and  sign the pre-admission screen (PAS), pending rapid COVID results. Patient/Caregiver provided printed discharge information.

## 2022-05-02 NOTE — PROGRESS NOTES
Hospitalist Progress Note      Name:  Regulo Peña /Age/Sex: 1935  (82 y.o. female)   MRN & CSN:  [id number] & [id number] Admission Date/Time: 2022  7:03 PM   Location:  [address] [place] [address] PCP: Tiesha Gilmore MD         Hospital Day: 7    Assessment and Plan:   46-year-old female who presented with complaint of lower abdominal pain and nausea. She initially had a 4-day history of diarrhea and took Imodium which abated her diarrheal symptom. CT abdomen and pelvis showed mild acute uncomplicated diverticulitis of the descending and sigmoid colon. She was admitted for further evaluation and management.     Mild acute uncomplicated diverticulitis (recurrent):  -GI disease panel negative. Will stop ciprofloxacin. Stool is now more formed. No further diarrhea  -Appreciate gastroenterology recommendations  -Outpatient colonoscopy in 8 weeks due to recurrent diverticulitis  -C Diff specimen not processed as it was not liquid stool. DC antibiotics. Creat 0.7. WBC  4.7.  -Improved    Nausea/vomiting  -Resolved. Tolerating regular diet this morning  --Continue PPI and antiemetics. -GI follow up as outpatient.     Hypertension:  Continue verapamil    Hyponatremia possibly due to CHF exacerbation  -Initially thought to be due to volume depletion, lack of improvement with IVF. - proBNP elevated at 7800. Normal LVEF on echocardiogram.  -Nephrology following  -Sodium has normalized after IV Lasix 20 mg x 1     Anxiety and depression:  -Continue Depakote. Debility  -Patient states she is extremely weak and has not been out of bed during this hospital stay. PT/OT consult for evaluation and discharge recommendations. Patient does not want to go to SNF but ARU may be a possibility. She lives alone    DVT prophylaxis: Enoxaparin  Disposition: Home    Diet ADULT DIET;  Regular; Low Fiber   DVT Prophylaxis [x] Lovenox, []  Heparin, [] SCDs, [] Ambulation   GI Prophylaxis [x] PPI,  [] H2 Blocker,  [] Carafate,  [] Diet/Tube Feeds   Code Status Full Code   Disposition Patient requires continued admission due to nausea and vomiting   MDM [] Low, [x] Moderate,[]  High  Patient's risk as above due to diverticulitis with multiple comorbid conditions     History of Present Illness:     Chief Complaint:   States that she feels very weak. Other than that she denies new complaints. Ten point ROS reviewed negative, unless as noted above    Objective: Intake/Output Summary (Last 24 hours) at 5/2/2022 1249  Last data filed at 5/2/2022 0352  Gross per 24 hour   Intake 500 ml   Output 525 ml   Net -25 ml      Vitals:   Vitals:    05/02/22 0900   BP: (!) 206/84   Pulse: 78   Resp: 16   Temp: 97.9 °F (36.6 °C)   SpO2: 96%     Physical Exam:   General: NAD   Eyes: EOMI  ENT: neck supple  Cardiovascular: Regular rate. No edema  Respiratory: Symmetrical expansion,   Gastrointestinal: Soft, nondistended, tenderness of the lower abdominal  Genitourinary: no suprapubic tenderness  Skin: warm, dry  Neuro: Alert. Oriented  Psych: Mood appropriate.      Medications:   Medications:    promethazine  6.25 mg IntraMUSCular Once    urea  15 g Oral Daily    metroNIDAZOLE  500 mg IntraVENous Q8H    calcium-cholecalciferol  1 tablet Oral BID    ketotifen  1 drop Both Eyes BID    vitamin B-12  50 mcg Oral Daily    verapamil  120 mg Oral Daily    aspirin  81 mg Oral Daily    divalproex  250 mg Oral Nightly    sertraline  25 mg Oral Daily    pyridostigmine  30 mg Oral Daily    rosuvastatin  10 mg Oral Nightly    ferrous sulfate  325 mg Oral Daily with breakfast    fluticasone  1 spray Each Nostril Daily    gabapentin  300 mg Oral TID    sodium chloride flush  10 mL IntraVENous 2 times per day    enoxaparin  40 mg SubCUTAneous Daily    pantoprazole  40 mg IntraVENous Daily      Infusions:    sodium chloride 25 mL/hr at 05/01/22 1243     PRN Meds: sodium chloride flush, 10 mL, PRN  sodium chloride, , PRN  ondansetron, 4 mg, Q8H PRN Or  ondansetron, 4 mg, Q6H PRN  acetaminophen, 650 mg, Q6H PRN   Or  acetaminophen, 650 mg, Q6H PRN  LORazepam, 0.5 mg, Q6H PRN  hydrALAZINE (APRESOLINE) ivpb, 5 mg, Q6H PRN        Electronically signed by Gemma Chicas MD on 5/2/2022 at 12:49 PM

## 2022-05-02 NOTE — CARE COORDINATION
Met c pt to continue discharge planning- reviewed SNF options. Pt states she lives in [place] and was interested in [facility]- unfortunately at this time [facility] not have any skilled openings. Advised that [facility] to her, followed by others in [place]. Pt not familiar with any facilities. Pt states she would like her grandson Alton to assist in decision. Permission given to call SHARLENE Linda to [facility] Linda  requesting he call Tolu Mann RN CM back to discuss options for rehab. Met c pt and she states if she could be approved for ARU she would be agreeable to admit there. [facility] is reviewing. Received approval to admit to ARU from [facility]- sent PS to Dr. Kavon Byers to update, requested Covid and discharge readmit orders.

## 2022-05-02 NOTE — H&P
Olga Lidia Ortiz    : 1935  Acct #: [de-identified]  MRN: [id number]              History and physical      Admitting diagnosis: Diverticulitis ([address])    Comorbid diagnoses impacting rehabilitation: Generalized weakness, gait disturbance, essential hypertension, hyponatremia, depression with anxiety, bigeminy, trigeminal neuralgia, orthostatic hypotension    Chief complaint: General malaise and general weakness. History of present illness: Patient is an 65-year-old right-hand-dominant female who suffered abrupt onset of abdominal misery, recurrent diarrhea and nausea that prompted an ED visit on 2022. She was dehydrated with diarrhea and was diagnosed with diverticulitis. She demonstrated bigeminy and orthostatic hypotension and has had to receive IV antibiotics, fluid resuscitation and adjustments to her cardiac medications. During this convalescence she has become hyponatremic and too weak to do her own toileting and transfers. She requires inpatient rehabilitation to address these issues. Review of systems: Mild abdominal cramping with pain. Some nausea. Some positional dizziness. Generalized weakness. No new numbness, chills or cough. The remainder of their review of systems was negative except as mentioned in the history of present illness.     Social History: Lives With: Alone  Type of Home: House  Home Layout: One level (with basement, which pt does not use)  Home Access: Stairs to enter with rails  Entrance Stairs - Number of Steps: 4  Entrance Stairs - Rails: Both  Bathroom Shower/Tub: Tub/Shower unit  Bathroom Toilet: Handicap height  Bathroom Equipment: Grab bars in [facility] chair  Home Equipment: [employer]  Has the patient had two or more falls in the past year or any fall with injury in the past year?: No  Receives Help From: Family  ADL Assistance: [address]: Needs assistance (grandson does cleaning and grocery shopping)  Homemaking Responsibilities: Yes (typically cooks and does laundry)  Ambulation Assistance: Independent (mod I with cane PRN, often uses no AD)  Transfer Assistance: Independent  Active : Yes  Occupation: Retired    She reports that she quit smoking about 36 years ago. Her smoking use included cigarettes. She started smoking about 46 years ago. She has a 2.25 pack-year smoking history. She has never used smokeless tobacco. She reports that she does not drink alcohol and does not use drugs. Prior (baseline) level of function: Modified independent with mobility and self-care. Current level of function: Moderate physical assistance needed for mobility and self-care. Allergies:  Alendronate sodium, Bactrim [sulfamethoxazole-trimethoprim], Boniva [ibandronate sodium], Colesevelam, Lisinopril, Macrobid [nitrofurantoin], Neggram [nalidixic acid], Pce [erythromycin], Penicillins, Risperidone and related, Welchol [colesevelam hcl], Carbamazepine, Lovaza [omega-3-acid ethyl esters], Metoprolol, and Pyridium [phenazopyridine]    Past Medical History:   Past Medical History:   Diagnosis Date    Arthritis of shoulder region, right 09/2014    Ayaka Picket Blind right eye     following right cataract surg    Chronic depression 2009    Dr. Maribel Lawrence D[facility] 1970    left leg    Former smoker     History of echocardiogram 09/21/2020    EF 55-60%, mild left ventricular hypertrophy, normal diastolic filling pattern for age, no signficiant valvular disease, no pericardial effusion     History of stress test 03/25/2021    normal LVEF calculated by the computer is probably falsely low.  LVEF is 40 %    Hx of Doppler ultrasound 03/01/2018    Carotid-mild 0-49% bilateral carotid,50% stenosis right subclavian    Hyperlipidemia     Hypertension     Macular degeneration     right    Mild cognitive impairment 02/2012    MMSE 26/30    MVP (mitral valve prolapse)     trace on echo 2014    Osteoporosis 05/2012    Pancytopenia 05/2011    mild with ; Dr Waqas hawk 2010    Peptic ulcer disease     Peripheral vascular disease ([address] 01/2016    angio; dis below ankles; pletal    Prediabetes 2006    Recurrent ventral incisional hernia 2013    medial apex of GB scar    S/P CABG x 3 2003    3-vessel; Dr Rama Benitez 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 Anderson Myers; Right side        Past Surgical History:     Past Surgical History:   Procedure Laterality Date    CATARACT REMOVAL Right 2010    poor result ; blind right eye; [place]    CHOLECYSTECTOMY      CORONARY ARTERY BYPASS GRAFT  8/2009    3 vessel    SHOULDER ARTHROSCOPY Right 2003    TUBAL LIGATION      URETER SURGERY      Right ureteral repair       Current Medications:     Current Facility-Administered Medications:     acetaminophen (TYLENOL) tablet 650 mg, 650 mg, Oral, Q6H PRN **OR** [DISCONTINUED] acetaminophen (TYLENOL) suppository 650 mg, 650 mg, Rectal, Q6H PRN, MD Zohreh Sawyer  [START ON 5/3/2022] enoxaparin (LOVENOX) injection 40 mg, 40 mg, SubCUTAneous, Daily, Ravi Silveira MD    ondansetron (ZOFRAN-ODT) disintegrating tablet 4 mg, 4 mg, Oral, Q8H PRN **OR** ondansetron (ZOFRAN) injection 4 mg, 4 mg, IntraVENous, Q6H PRN, Ravi Silveira MD    sodium chloride flush 0.9 % injection 10 mL, 10 mL, IntraVENous, 2 times per day, Ravi Silveira MD    sodium chloride flush 0.9 % injection 10 mL, 10 mL, IntraVENous, PRN, Ravi Silveira MD    calcium-cholecalciferol 500-200 MG-UNIT per tablet 1 tablet, 1 tablet, Oral, BID, Ravi Silveira MD    divalproex (DEPAKOTE ER) extended release tablet 250 mg, 250 mg, Oral, Nightly, MD Zohreh Sawyer  Mickiel Spar ON 5/3/2022] ferrous sulfate (IRON 325) tablet 325 mg, 325 mg, Oral, Daily with breakfast, Ravi Silveira MD    gabapentin (NEURONTIN) capsule 300 mg, 300 mg, Oral, TID, Ravi Silveira MD    ketotifen (ZADITOR) 0.025 % ophthalmic solution 1 drop, 1 drop, Both Eyes, BID, King Pearson MD    LORazepam (ATIVAN) tablet 0.5 mg, 0.5 mg, Oral, Q6H PRN, MD Ansley Holder  [START ON 5/3/2022] magnesium oxide (MAG-OX) tablet 400 mg, 400 mg, Oral, Daily, MD Ansley Holder ON 5/3/2022] pantoprazole (PROTONIX) tablet 40 mg, 40 mg, Oral, QAM AC, King Pearson MD    pyridostigmine (MESTINON) tablet 30 mg, 30 mg, Oral, Daily, King Pearson MD    rosuvastatin (CRESTOR) tablet 10 mg, 10 mg, Oral, Nightly, MD Ansley Holder ON 5/3/2022] sertraline (ZOLOFT) tablet 25 mg, 25 mg, Oral, Daily, MD Ansley Holder ON 5/3/2022] verapamil (CALAN SR) extended release tablet 120 mg, 120 mg, Oral, Daily, MD Ansley Holder ON 5/3/2022] vitamin B-12 (CYANOCOBALAMIN) tablet 50 mcg, 50 mcg, Oral, Daily, MD Ansley Holder ON 5/3/2022] aspirin EC tablet 81 mg, 81 mg, Oral, Daily, ANA Fink MD    [START ON 5/3/2022] fluticasone (FLONASE) 50 MCG/ACT nasal spray 1 spray, 1 spray, Each Nostril, Daily, MD Ansley Burger ON 5/3/2022] furosemide (LASIX) tablet 20 mg, 20 mg, Oral, Daily, ANA Fink MD    acetaminophen (TYLENOL) tablet 650 mg, 650 mg, Oral, Q4H PRN, ANA Fink MD    polyethylene glycol [facility]) packet 17 g, 17 g, Oral, Daily PRN, ANA Fink MD    magnesium hydroxide (MILK OF MAGNESIA) 400 MG/5ML suspension 30 mL, 30 mL, Oral, Daily PRN, ANA Fink MD    Family History:   Family History   Problem Relation Age of Onset    High Blood Pressure Mother     Heart Attack Mother     Other Mother         unsteady gait    Other Father         silcosis    Cancer Sister 79        Breast cancer 1/2017    Stroke Sister        Exam:    Blood pressure (!) 168/53, pulse 76, temperature 98.1 °F (36.7 °C), temperature source Oral, resp. rate 18, height 5' 3\" (1.6 m), weight 132 lb 7.9 oz (60.1 kg), SpO2 96 %, not currently breastfeeding.     General: Patient was seen recumbent in bed. She was soft-spoken but able to participate in simple conversation. Oriented x3. In no distress. HEENT: Neck supple. About 45 degrees of active cervical spine rotation bilaterally. Visual fields seem full. Speech is slow and deliberate. MMM. No JVD or adenopathy. Pulmonary: Shallow respirations without wheezes or rales. Cardiac: Regular rate and rhythm. Abdomen: Patient's abdomen was soft and nondistended. Bowel sounds were present throughout. There was no rebound, guarding or masses noted. Spinal exam: Prominent bony landmarks without broken skin. Upper extremities: Slow and deliberate bring her hands up to meet mine. Symmetric  strength. No dexterity or coordination issues. Lower extremities: Give way with MMT with abdominal pain and generalized weakness. 4 -/5 strength throughout. No signs of DVT. Sitting balance was good. Standing balance was poor. Lab Results   Component Value Date    WBC 4.8 05/02/2022    HGB 11.6 (L) 05/02/2022    HCT 35.3 (L) 05/02/2022    .9 (H) 05/02/2022     05/02/2022     Lab Results   Component Value Date    INR 0.95 02/01/2022    INR 0.96 01/25/2016    PROTIME 12.3 02/01/2022    PROTIME 11.0 01/25/2016     Lab Results   Component Value Date    CREATININE 0.9 05/02/2022    BUN 20 05/02/2022     05/02/2022    K 4.0 05/02/2022    CL 98 (L) 05/02/2022    CO2 29 05/02/2022     Lab Results   Component Value Date    ALT 19 04/28/2022    AST 37 04/28/2022    ALKPHOS 40 04/28/2022    BILITOT 0.6 04/28/2022         Impression: 53-year-old female with a history of hypertension, trigeminal neuralgia and depression who was suffered acute diverticulitis with dehydration, bigeminy and orthostatic hypotension. Strengths for the patient: Alertness, some experience with adaptive equipment and her accessible home.     Limitations/barriers for the patient: Her age, she lives alone and her orthostasis. Recommendation: Acute inpatient rehabilitation with occupational and physical therapy 180 minutes 5 out of every 7 days. Will address basic and  advancing mobility with self-care instruction and adaptive equipment training. Caregiver education will be offered. Expected length of stay  prior to a supervised level of function for discharge home with a walker and [facility] OT/PT is 2 weeks. Additional recommendation:    1. Diverticulitis with gait disturbance: The patient requires daily occupational and physical therapy. She requires encouragement to take in oral fluids. She is on vitamin supplementation and a bland diet. She requires adaptive equipment training, possibly caregiver education and bowel and bladder monitoring. DVT prophylaxis. Aggressive pulmonary hygiene measures. 2. DVT prophylaxis: Lovenox 40 mg subcu daily. I must periodically monitor her hemoglobin and platelet count while on this medication. Weightbearing activities will be pursued daily. GI prophylaxis is available. 3. Essential hypertension: Pain control, diuretic therapy with Lasix and verapamil. Vital signs are checked at rest and with activity. Target systolic blood pressures 120-193.  4. Hyponatremia: Periodic monitoring of her chemistries. Cautious use of diuretics. Encouraging consistent oral intake of a general diet. 5. Depression with anxiety: Zoloft and pain management. Regulating her sleep-wake schedule. Treating her in a calm and consistent environment when possible. Ativan as needed. 6. Bigeminy: Monitoring of her vital signs. Keeping close tabs on her chemistries. Outpatient follow-up with cardiology. 7. Trigeminal neuralgia: Depakote, gabapentin and Zoloft. Acetaminophen as needed. 8. Orthostatic hypotension: Mestinon, cautious diuretic use, AFIA stockings and slow and deliberate position changes were needed. Vital signs checked at rest and with activity.     I personally performed a history and physical on this patient within 24 hours of admission to the rehab unit. I have reviewed the preadmission screening and concur with its findings without change. A detailed plan of care will be established by hospital day 4 and I attest the patient is appropriate for inpatient rehabilitation at this time. I have compared the patient's current functional status noted during my history and physical with that of the preadmission screen and I have found no significant differences.

## 2022-05-03 PROCEDURE — 97163 PT EVAL HIGH COMPLEX 45 MIN: CPT

## 2022-05-03 PROCEDURE — 97535 SELF CARE MNGMENT TRAINING: CPT

## 2022-05-03 PROCEDURE — 2580000003 HC RX 258: Performed by: STUDENT IN AN ORGANIZED HEALTH CARE EDUCATION/TRAINING PROGRAM

## 2022-05-03 PROCEDURE — 6370000000 HC RX 637 (ALT 250 FOR IP): Performed by: PHYSICAL MEDICINE & REHABILITATION

## 2022-05-03 PROCEDURE — 99232 SBSQ HOSP IP/OBS MODERATE 35: CPT | Performed by: PHYSICAL MEDICINE & REHABILITATION

## 2022-05-03 PROCEDURE — 6360000002 HC RX W HCPCS: Performed by: STUDENT IN AN ORGANIZED HEALTH CARE EDUCATION/TRAINING PROGRAM

## 2022-05-03 PROCEDURE — 97542 WHEELCHAIR MNGMENT TRAINING: CPT

## 2022-05-03 PROCEDURE — 6370000000 HC RX 637 (ALT 250 FOR IP): Performed by: STUDENT IN AN ORGANIZED HEALTH CARE EDUCATION/TRAINING PROGRAM

## 2022-05-03 PROCEDURE — 97530 THERAPEUTIC ACTIVITIES: CPT

## 2022-05-03 PROCEDURE — 1280000000 HC REHAB R&B

## 2022-05-03 PROCEDURE — 97116 GAIT TRAINING THERAPY: CPT

## 2022-05-03 PROCEDURE — 97166 OT EVAL MOD COMPLEX 45 MIN: CPT

## 2022-05-03 RX ADMIN — KETOTIFEN FUMARATE 1 DROP: 0.35 SOLUTION/ DROPS OPHTHALMIC at 09:44

## 2022-05-03 RX ADMIN — DIVALPROEX SODIUM 250 MG: 250 TABLET, FILM COATED, EXTENDED RELEASE ORAL at 21:21

## 2022-05-03 RX ADMIN — ROSUVASTATIN CALCIUM 10 MG: 5 TABLET, FILM COATED ORAL at 21:13

## 2022-05-03 RX ADMIN — SODIUM CHLORIDE, PRESERVATIVE FREE 10 ML: 5 INJECTION INTRAVENOUS at 09:36

## 2022-05-03 RX ADMIN — SERTRALINE HYDROCHLORIDE 25 MG: 50 TABLET ORAL at 09:36

## 2022-05-03 RX ADMIN — FLUTICASONE PROPIONATE 1 SPRAY: 50 SPRAY, METERED NASAL at 09:41

## 2022-05-03 RX ADMIN — SODIUM CHLORIDE, PRESERVATIVE FREE 10 ML: 5 INJECTION INTRAVENOUS at 21:21

## 2022-05-03 RX ADMIN — Medication 1 TABLET: at 21:12

## 2022-05-03 RX ADMIN — VERAPAMIL HYDROCHLORIDE 120 MG: 120 TABLET, FILM COATED, EXTENDED RELEASE ORAL at 09:38

## 2022-05-03 RX ADMIN — LORAZEPAM 0.5 MG: 0.5 TABLET ORAL at 21:21

## 2022-05-03 RX ADMIN — GABAPENTIN 300 MG: 300 CAPSULE ORAL at 15:18

## 2022-05-03 RX ADMIN — KETOTIFEN FUMARATE 1 DROP: 0.35 SOLUTION/ DROPS OPHTHALMIC at 21:22

## 2022-05-03 RX ADMIN — ONDANSETRON 4 MG: 4 TABLET, ORALLY DISINTEGRATING ORAL at 18:29

## 2022-05-03 RX ADMIN — ASPIRIN 81 MG: 81 TABLET, COATED ORAL at 09:36

## 2022-05-03 RX ADMIN — GABAPENTIN 300 MG: 300 CAPSULE ORAL at 09:36

## 2022-05-03 RX ADMIN — GABAPENTIN 300 MG: 300 CAPSULE ORAL at 21:13

## 2022-05-03 RX ADMIN — VITAM B12 50 MCG: 100 TAB at 09:38

## 2022-05-03 RX ADMIN — Medication 1 TABLET: at 09:36

## 2022-05-03 RX ADMIN — ENOXAPARIN SODIUM 40 MG: 100 INJECTION SUBCUTANEOUS at 09:40

## 2022-05-03 RX ADMIN — PANTOPRAZOLE SODIUM 40 MG: 40 TABLET, DELAYED RELEASE ORAL at 06:05

## 2022-05-03 RX ADMIN — MAGNESIUM OXIDE 400 MG (241.3 MG MAGNESIUM) TABLET 400 MG: TABLET at 09:37

## 2022-05-03 RX ADMIN — FUROSEMIDE 20 MG: 20 TABLET ORAL at 09:37

## 2022-05-03 RX ADMIN — PYRIDOSTIGMINE BROMIDE 30 MG: 60 TABLET ORAL at 21:12

## 2022-05-03 NOTE — PROGRESS NOTES
ARU Interdisciplinary Plan of Care (IPOC)  [facility] Dr. Charles Finch [facility], [address]  [phone]  Fax: [phone]        Doe Devi    : 1935  Acct #: [de-identified]  MRN: [id number]   PHYSICIAN:  Cirilo Aviles MD  Primary Active Problems:   Active Hospital Problems    Diagnosis Date Noted    Depression with anxiety [F41.8]      Priority: Medium    Bigeminy [I49.8]      Priority: Medium    Hypotension due to hypovolemia [I95.89, E86.1]      Priority: Medium    Diverticulitis [K57.92] 2022     Priority: Medium       Rehabilitation Diagnosis:     Diverticulitis of intestine, part unspecified, without perforation or abscess without bleeding [K57.92]  Diverticulitis [K57.92]          CARE PLAN     NURSING:  Doe Devi while on this unit will:      Bowel and Bladder   [x] Be continent of bowel and bladder      [x] Have an adequate number of bowel movements   [x] Urinate with no urinary retention >300ml in bladder   [] Bladder Scan: (details)   [] Complete bladder protocol with walker removal   [] Initiate Bladder Program to toilet every ___ hours   [] Initiate Bowel Program to toilet every ___hours   [] Bladder training    [] Bowel training  Pulmonary   [x] Maintain O2 SATs at 92% or greater  Pain Management   [x] Have pain managed while on ARU        [x] Be pain free by discharge    [] Medication Management and Education  Maintenance of Skin Integrity/Wound Management   [x] Have no skin breakdown while on ARU   [x] Have improved skin integrity via wound measurements   [x] Have no signs/symptoms of infection via infection protection and monitoring at the          wound site  Fall Prevention   [x] Be free from injury during hospitalization via fall prevention measures     [] Disease management and Education  Precautions   [] Weight Bearing Precautions   [] Swallowing Precautions   [x] Monitoring of Risks of Complications   [] DVT Prophylaxis    [] Fluid/electrolyte/Nutrition Management    [x] Complete education with patient/family with understanding demonstrated for          in-room safety with transfers to bed, toilet, wheelchair, shower as well as                bathroom activities and hygiene. [] Adjustment   [] Other:   Nursing interventions may include bowel/bladder training, education for medical assistive devices, medication education, O2 saturation management, energy conservation, stress management techniques, fall prevention, alarms protocol, seating and positioning, skin/wound care, pressure relief instruction,dressing changes,  infection protection, DVT prophylaxis, and/or assistance with in room safety with transfers to bed, toilet, wheelchair, shower as well as bathroom activities and hygiene. Patient/caregiver education for:   [] Disease/sustained injury/management      [x] Medication Use   [] Surgical intervention   [x] Safety/Precautions   [] Body mechanics and or joint protection   [] Health maintenance         PHYSICAL THERAPY:  Goals:                               Long Term Goals  Time Frame for Long term goals : 10-12 tx days:  Long term goal 1: Pt will complete sup<->sit Ind. Long term goal 2: Pt will complete OOB transfers using 2WW Mod Ind. Long term goal 3: Pt will ambulate 10 ft and 50 ft with turns using 2WW Mod Ind. Long term goal 4: Pt will ambulate 150 ft over level surface and at least 10 ft of uneven surface using 2WW with SBA. Long term goal 5: Pt will ascend/descend curb step using 2WW and 1 flight of stairs using railings with SBA-Sup. Additional Goals?: Yes  Long term goal 6: Pt will complete object retrieval from the floor with 2WW and reacher Mod Ind. Long term goal 7: Pt will propel manual w/c 150 ft with turns Mod Ind. These goals were reviewed with this patient at the time of assessment and Elmer Gee is in agreement.      Plan of Care: Pt to be seen 5 days per week for a minimum of 60 minutes for 10-12 days. Current Treatment Recommendations: Strengthening,Balance training,Functional mobility training,Transfer training,Endurance training,Gait training,Stair training,Home exercise program,Safety education & training,Patient/Caregiver education & training,Equipment evaluation, education, & procurement,Therapeutic activities,Wheelchair mobility training community reintegration,animal assisted therapy, and concurrent/group therapy. PT IRF-TAMARA scores and goals for initial assessment:   Bed Mobility:   Sit to Lying  Assistance Needed: Supervision or touching assistance  Comment: SBA without use of bed features  CARE Score: 4  Discharge Goal: Independent  Roll Left and Right  Assistance Needed: Independent  Comment: Ind without use of bed features  CARE Score: 6  Discharge Goal: Independent  Lying to Sitting on Side of Bed  Assistance Needed: Supervision or touching assistance  Comment: SBA without use of bed features  CARE Score: 4  Discharge Goal: Independent    Transfers:    Sit to Stand  Assistance Needed: Partial/moderate assistance  Comment: Mod A; required use of trunk momentum, 2WW, and holding of trunk to be able to stand; also required stabilization of 2WW as pt usually pulled onto AD during transfer process  CARE Score: 3  Discharge Goal: Independent  Chair/Bed-to-Chair Transfer  Assistance Needed: Partial/moderate assistance  Comment: Mod A to stand, Min A to turn and sit using 2WW  CARE Score: 3  Discharge Goal: Independent  Toilet Transfer  Assistance needed: Partial/moderate assistance  Comment:  Mod to stand, Min A to sit with grab bars  CARE Score: 3  Car Transfer  Assistance Needed: Partial/moderate assistance  Comment: Min A with 2WW, pt able to actively lift BLE in and out of car however required holding of trunk to sit, turn, and stand including stabilization of AD as pt pulled on it to be able to stand  CARE Score: 3  Discharge Goal: Independent    Ambulation: Walking Ability  Does the Patient Walk?: Yes     Walk 10 Feet  Assistance Needed: Dependent  Comment: Min A using 2WW and usually required 2nd person assist for close w/c follow due to pt's fear of falling with her unpredictable strength, balance, and endurance  CARE Score: 1  Discharge Goal: Independent     Walk 50 Feet with Two Turns  Comment: max tolerance today was 23 ft using 2WW with Min A + SBA of 2nd person for close w/c follow (ambulation tolerance was limited by sudden onset of dizziness and increased fatigue; BP was 104/74, HR=76, SpO2 in room air was 99%)  Reason if not Attempted: Not attempted due to medical condition or safety concerns  CARE Score: 88  Discharge Goal: Independent     Walk 150 Feet  Comment: pt unable today due to safety concerns (dizziness and increased fatigue); pt will not likely be able to complete this task in the 1st 3 days  Reason if not Attempted: Not attempted due to medical condition or safety concerns  CARE Score: 88  Discharge Goal: Supervision or touching assistance     Walking 10 Feet on Uneven Surfaces  Comment: pt unable today due to safety concerns (dizziness and increased fatigue)  Reason if not Attempted: Not attempted due to medical condition or safety concerns  CARE Score: 88  Discharge Goal: Supervision or touching assistance     1 Step (Curb)  Comment: pt unable today due to safety concerns (dizziness and increased fatigue)  Reason if not Attempted: Not attempted due to medical condition or safety concerns  CARE Score: 88  Discharge Goal: Supervision or touching assistance     4 Steps  Comment: pt unable today due to safety concerns (dizziness and increased fatigue); pt will not likely be able to complete this task in the 1st 3 days  Reason if not Attempted: Not attempted due to medical condition or safety concerns  CARE Score: 88  Discharge Goal: Supervision or touching assistance     12 Steps  Comment: pt unable today due to safety concerns (dizziness and increased fatigue); pt will not likely be able to complete this task in the 1st 3 days  Reason if not Attempted: Not attempted due to medical condition or safety concerns  CARE Score: 88  Discharge Goal: Supervision or touching assistance    Gait Deviations: []None []Slow josé miguel  [] Increased AMA  [] Staggers []Deviated Path  [] Decreased step length  [] Decreased step height  []Decreased arm swing  [] Shuffles  [] Decreased head and trunk rotation  []other:        Wheelchair:  w/c Ability: Wheelchair Ability  Uses a Wheelchair and/or Scooter?: Yes  Wheel 50 Feet with Two Turns  Assistance Needed: Supervision or touching assistance  Comment: SBA-Sup using BUE primarily  CARE Score: 4  Discharge Goal: Independent  Wheel 150 Feet  Assistance Needed: Substantial/maximal assistance  Comment: pt was only able to propel up to 70 ft today (limited by fatigue)  CARE Score: 2  Discharge Goal: Independent          Balance:        Object: Picking Up Object  Assistance Needed: Partial/moderate assistance  Comment: Min A with 2WW and reacher  CARE Score: 3  Discharge Goal: Independent  OCCUPATIONAL THERAPY:  Goals:             Short Term Goals  Time Frame for Short term goals: 5-7 days  Short Term Goal 1: Pt will complete self-feeding IND. :  Long Term Goals  Time Frame for Long term goals : 12-14 days or until d/c. Long Term Goal 1: Pt will complete oral hygiene c Mod I.  Long Term Goal 2: Pt will complete grooming tasks c Mod I.  Long Term Goal 3: Pt will complete total body bathing c Mod I and AE PRN. Long Term Goal 4: Pt will complete UB dressing c Ind. Long Term Goal 5: Pt will complete LB dressing c Mod I. Additional Goals?: Yes  Long Term Goal 6: Pt will doff/don footwear c Mod I.  Long Term Goal 7: Pt will complete toileting c Mod I.  Long Term Goal 8: Pt will perform functional transfers (bed, chair, toilet, shower) c DME PRN.   Long Term Goal 9: Pt will perform therex/therax to facilitate increased strength/endurance/ax tolerance (c emphasis on dynamic standing balance/tolerance > 8 mins) c Mod I.  Long Term Goal 10: Pt will complete light home management tasks c Mod I. :    These goals were reviewed with this patient at the time of assessment and Geo Hartman is in agreement    Plan of Care:  Pt to be seen 5 days per week for a minimum of 60 minutes for 14 days. Plan  Times per Day: Daily  Current Treatment Recommendations: Strengthening,ROM,Balance training,Functional mobility training,Endurance training,Neuromuscular re-education,Safety education & training,Patient/Caregiver education & training,Equipment evaluation, education, & procurement,Self-Care / ADL,Home management training,Coordination training         cognitive training, home management, energy conservation training, community reintegration, splint fabrication, patient/caregiver education and training, animal assisted therapy, and concurrent and/or group therapy. OT IRF-TAMARA scores and goals for initial assessment:    ADLs:    Eating: Eating  Assistance Needed: Setup or clean-up assistance  Comment: setup assistance for breakfast.  CARE Score: 5  Discharge Goal: Independent       Oral Hygiene: Oral Hygiene  Assistance Needed: Setup or clean-up assistance  Comment: Pt required assist opening toothpaste and mouthwash. CARE Score: 5  Discharge Goal: Independent    UB/LB Bathing: Shower/Bathe Self  Assistance Needed: Partial/moderate assistance  Comment: Pt required assist to remain in stance and complete perineal hygiene. CARE Score: 3  Discharge Goal: Independent    UB Dressing: Upper Body Dressing  Assistance Needed: Supervision or touching assistance  Comment: SBA to don pullover sweatshirt.   CARE Score: 4  Discharge Goal: Independent         LB Dressing: Lower Body Dressing  Assistance Needed: Substantial/maximal assistance  Comment: Pt able to get B feet into pants, however required assist to pull pants up and to maintain stance for therapist to pull pants over hips. CARE Score: 2  Discharge Goal: Independent    Donning and [place] Footwear: Putting On/Taking Off Footwear  Assistance Needed: Supervision or touching assistance  Comment: CGA for Pt to doff/don socks. CARE Score: 4  Discharge Goal: Independent      Toiletin [address] needed: Partial/moderate assistance  Comment: Pt required Min A for balance on toilet to complete perineal hygiene and Mod assist in stance to doff depends. CARE Score: 3  Discharge Goal: Independent      Toilet Transfers: Toilet Transfer  Assistance needed: Partial/moderate assistance  Comment: Mod to stand, Min A to sit with grab bars  CARE Score: 3  Discharge Goal: Independent      SPEECH THERAPY: (If ordered)  Plan of Care and Goals:   LTG                                                            LTG:                           Treatments may include speech/language/communication therapy, cognitive training, animal assisted therapy, group therapy, education, and/or dysphagia therapy based on the above goals. Co-treats where appropriate with PT or OT to facilitate patient goals in functional tasks. These goals were reviewed with this patient at the time of assessment and Elian Acosta is in agreement. CASE MANAGEMENT:  Goals:   Assist patient/family with discharge planning, patient/family counseling, assistance in obtaining recommended equipment and other services, and coordination with insurance during ARU stay. Patient Goals:   Return to maximum level of independent function. Activities Prior to Admit:   Homemaking Responsibilities: Yes  Active : Yes  Mode of Transportation: Car  Occupation: Retired  Leisure & Hobbies: Pt enjoys watching television, cooking, and being active. Intensity of Therapy  Elian Acosta will be seen a minimum of 3 hours of therapy per day/a minimum of 5 out of 7 days per week.     [] In this rare instance due to the nature of this patient's medical involvement, this patient will be seen 15 hours per week (900 minutes within a 7 day period). Treatments may include therapeutic exercises, gait training, neuromuscular re-ed, transfer training, community reintegration, bed mobility, w/c mobility and training, self care, home mgmt, cognitive training, energy conservation,dysphagia tx, speech/language/communication therapy, group therapy, and patient/family education. In addition, dietician/nutritionist may monitor calorie count as well as intake and collaboratively work with SLP on dietary upgrades. Neuropsychology/Psychology may evaluate and provide necessary support. Group therapy as appropriate to facilitate improved endurance, STR, COORD, function, safety, transfers, awareness and insight into deficits, problem solving, memory, and social interaction and engagement. Medical issues being managed closely and that require 24 hour availability of a physician:   [] Swallowing Precautions                                     [] Weight bearing precautions   [] Wound Care                             [x] Infection Prevention   [x] DVT Prophylaxis/assessment              [x] Monitoring for complications    [x] Fall Precautions/Prevention                         [x] Fluid/Electrolyte/Nutrition Balance   [] Voice Protection                           [x] Medication Management   [x] Respiratory                   [x] Pain Mgmt   [x] Bowel/Bladder Fx    Medical Prognosis: [] Good  [x] Fair    [] Guarded   Total expected IRF days 14                                            Physician anticipated functional outcomes:  FWW and HHC RN/OT/PT and supervision.   Rehab Goals:   [] Return to premorbid function of_______________________________.    [] Independent   [] Mod I  [x] Supervision  [] CGA   [] Min A   [] Mod A  Level for ambulation []without assistive device  [x] with assistive device        [] Independent   [] Mod I  [x] Supervision [] CGA   [] Min A   [] Mod A Level for transfers []without assistive device  [x] with assistive device         [] Independent   [] Mod I  [x] Supervision [] CGA   [] Min A   [] Mod A Level with ADL's []without assistive device   [x] with assistive device     ___________________     Level with cognitive skills requiring [] No assist [x] Supervision  [] Active Assist/Cues     [] Maximize level of mobility and ADL's to decrease burden on caregiver    IPOC brief synthesis of Preadmission Screen, Post-Admission Evaluation, and Therapy Evaluations: Acute inpatient rehabilitation with occupational and physical therapy 180 minutes 5 out of every 7 days. Will address basic and  advancing mobility with self-care instruction and adaptive equipment training. Caregiver education will be offered. Expected length of stay  prior to a supervised level of function for discharge home with a walker and [facility] OT/PT is 2 weeks.     Additional recommendation:     1. Diverticulitis with gait disturbance: The patient requires daily occupational and physical therapy. She requires encouragement to take in oral fluids. She is on vitamin supplementation and a bland diet. She requires adaptive equipment training, possibly caregiver education and bowel and bladder monitoring. DVT prophylaxis. Aggressive pulmonary hygiene measures. 2. DVT prophylaxis: Lovenox 40 mg subcu daily. I must periodically monitor her hemoglobin and platelet count while on this medication. Weightbearing activities will be pursued daily. GI prophylaxis is available. 3. Essential hypertension: Pain control, diuretic therapy with Lasix and verapamil. Vital signs are checked at rest and with activity. Target systolic blood pressures 700-513.  4. Hyponatremia: Periodic monitoring of her chemistries. Cautious use of diuretics. Encouraging consistent oral intake of a general diet. 5. Depression with anxiety: Zoloft and pain management. Regulating her sleep-wake schedule.   Treating her in a calm and consistent environment when possible. Ativan as needed. 6. Bigeminy: Monitoring of her vital signs. Keeping close tabs on her chemistries. Outpatient follow-up with cardiology. 7. Trigeminal neuralgia: Depakote, gabapentin and Zoloft. Acetaminophen as needed. 8. Orthostatic hypotension: Mestinon, cautious diuretic use, AFIA stockings and slow and deliberate position changes were needed. Vital signs checked at rest and with activity. Anticipated discharge destination:    [] Home Independently   [x] Home with supervision    []SNF     [] Other       This plan has been reviewed with me in a language I understand.  I have had the opportunity to include my input with my therapy team.    ________________________________________________   ______________________  Patient/Significant Other      Date    I have reviewed this initial plan of care and agree with its contents:    Title   Name    Date    Time    Physician: Geo العراقي MD 5/4/2022 8:34 AM    Case Mgmt:  Celine Caroline 5/4/2022 1414    OT: Karla Hill, OTD, OTR/L 05/03/22 1550    PT: Cresencio Olmos PT     05/03/2022    14:55    RN: Luca White RN    ST:    Dietician:     KAREN Virgen

## 2022-05-03 NOTE — PROGRESS NOTES
A Complete drug regimen review was completed for this patient this date. [x]  No clinically significant medication issue was identified   []  Yes, a clinically significant medication issue was identified     []  Adverse Drug Event:    []  Allergy:    []  Side Effect:    []  Ineffective Therapy:    []  Drug interaction:     []  Duplicate Therapy:    []  Untreated Indication:    []  Non-adherence:    []  Other:  Nursing contacted the physician:       Date:5/2/22                Time:1900  Actions recommended by physician were completed:   Date:   5/2/22              Time:2100  Action(s) Taken:             [x]  New Physician Order Received    []  Issue Noted by Physician;  However No Action Required    []  Other:

## 2022-05-03 NOTE — PLAN OF CARE
Problem: Discharge Planning  Goal: Discharge to home or other facility with appropriate resources  5/3/2022 1007 by Marlin Posadas LPN  Outcome: Progressing  5/2/2022 2205 by Mason Lujan RN  Outcome: Not Progressing     Problem: Safety - Adult  Goal: Free from fall injury  5/3/2022 1007 by Marlin Posadas LPN  Outcome: Progressing  5/2/2022 2205 by Mason Lujan RN  Outcome: Not Progressing     Problem: Skin/Tissue Integrity  Goal: Absence of new skin breakdown  Description: 1. Monitor for areas of redness and/or skin breakdown  2. Assess vascular access sites hourly  3. Every 4-6 hours minimum:  Change oxygen saturation probe site  4. Every 4-6 hours:  If on nasal continuous positive airway pressure, respiratory therapy assess nares and determine need for appliance change or resting period.   5/3/2022 1007 by Marlin Posadas LPN  Outcome: Progressing  5/2/2022 2205 by Mason Lujan RN  Outcome: Not Progressing     Problem: ABCDS Injury Assessment  Goal: Absence of physical injury  Outcome: Progressing

## 2022-05-03 NOTE — PROGRESS NOTES
4 Eyes Skin Assessment     NAME:  Vadim Li  YOB: 1935  MEDICAL RECORD NUMBER:  [id number]    The patient is being assess for  Admission     I agree that 2 RN's have performed a thorough Head to Toe Skin Assessment on the patient. ALL assessment sites listed below have been assessed. Areas assessed by both nurses:    Head, Face, Ears, Shoulders, Back, Chest, Arms, Elbows, Hands, Sacrum. Buttock, Coccyx, Ischium and Legs. Feet and Heels   Pt has slight redness near her rectum. No other skin issues noted. Does the Patient have a Wound? Yes wound(s) were present on assessment. LDA wound assessment was Initiated and completed        Mayito Prevention initiated:  Yes   Wound Care Orders initiated:  Yes    Pressure Injury (Stage 3,4, Unstageable, DTI, NWPT, and Complex wounds) if present place consult order under [de-identified] Yes    New and Established Ostomies if present place consult order under : No      Nurse 1 eSignature:RANDAL Melendez RN     **SHARE this note so that the co-signing nurse is able to place an eSignature**    Nurse 2 eSignature: Ana Jean-Baptiste LPN

## 2022-05-03 NOTE — PROGRESS NOTES
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 Odell Mitchell DVT (deep venous thrombosis) ([facility] [address] 1970    left leg    Former smoker     History of echocardiogram 09/21/2020    EF 55-60%, mild left ventricular hypertrophy, normal diastolic filling pattern for age, no signficiant valvular disease, no pericardial effusion     History of stress test 03/25/2021    normal LVEF calculated by the computer is probably falsely low. LVEF is 40 %    Hx of Doppler ultrasound 03/01/2018    Carotid-mild 0-49% bilateral carotid,50% stenosis right subclavian    Hyperlipidemia     Hypertension     Macular degeneration     right    Mild cognitive impairment 02/2012    MMSE 26/30    MVP (mitral valve prolapse)     trace on echo 2014    Osteoporosis 05/2012    Pancytopenia 05/2011    mild with ; Dr Filomena hawk 2010    Peptic ulcer disease     Peripheral vascular disease ([address] 01/2016    angio; dis below ankles; pletal    Prediabetes 2006    Recurrent ventral incisional hernia 2013    medial apex of GB scar    S/P CABG x 3 2003    3-vessel; Dr Oz Sainz 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 Deana Baptiste; Right side     Past Surgical History:   Procedure Laterality Date    CATARACT REMOVAL Right 2010    poor result ; blind right eye; [address] CORONARY ARTERY BYPASS GRAFT  8/2009    3 vessel    SHOULDER ARTHROSCOPY Right 2003    TUBAL LIGATION      URETER SURGERY      Right ureteral repair     Fall History: None   Social History:  Social/Functional History  Lives With: Alone  Type of Home: House  Home Layout: One level (Pt has basement but does not use.  Has stair lift but has never been used.)  Home Access: Stairs to enter with rails  Entrance Stairs - Number of Steps: 3-4 EV  Entrance Stairs - Rails: Both  Bathroom Shower/Tub: Tub/Shower unit,Shower chair with back  Bathroom Toilet: Handicap height (Pt states frame over toilet c grab bars.)  Bathroom Equipment: Grab bars in shower,Shower chair,Grab bars around toilet,Hand-held shower  Bathroom Accessibility: Accessible  Home Equipment: Cane,Walker, rolling,Grab bars,Reacher  Has the patient had two or more falls in the past year or any fall with injury in the past year?: No  Receives Help From: Family (Pinky Suero lives 4 doors down. He assists c grocery shopping and house cleaning.)  ADL Assistance: Independent  Homemaking Assistance: Needs assistance (chika does cleaning and grocery shopping)  Homemaking Responsibilities: Yes  Meal Prep Responsibility: Primary  Laundry Responsibility: Primary  Cleaning Responsibility: Primary  Bill Paying/Finance Responsibility: No (Chika took over ~ 2 months ago as Pt states she began \"having a hard time remembering\")  Shopping Responsibility: Primary  Health Care Management: Secondary (Pt and chika complete together.)  Ambulation Assistance: Independent (Pt reports she would use a cane at night and would take it in public just incase. Pt primarily Ind without device.)  Transfer Assistance: Independent  Active : Yes  Mode of Transportation: Car  Occupation: Retired  Type of Occupation:   Leisure & Hobbies: Pt enjoys watching television, cooking, and being active. Additional Comments: Pt has regular, flat bed at home; no falls in the past year    Restrictions:  Restrictions/Precautions  Restrictions/Precautions: Fall Risk,Contact Precautions (C-diff)       Subjective: Pt resting in recliner, alert, reports feeling tired from ADL session earlier, denies any pain, perseverated on legs feeling weak. \"I couldn't stand earlier. \"    Pain Level: 0/10 Objective:  Orientation  Overall Orientation Status: Within Functional Limits  Orientation Level: Oriented X4        Vision  Vision Exceptions: Wears glasses at all times  Hearing  Hearing: Within functional limits    ROM:      AROM RLE (degrees)  RLE AROM: WFL     AROM LLE (degrees)  LLE AROM : WFL                 Strength:    Strength RLE  Comment: grossly 3+/5 on hips; 4/5 on knee and ankle  Strength LLE  Comment: grossly 3+/5 on hips; 4/5 on knee and ankle              Bed Mobility:   Lying to Sitting on Side of Bed  Assistance Needed: Supervision or touching assistance  Comment: SBA without use of bed features  CARE Score: 4  Discharge Goal: Independent  Roll Left and Right  Assistance Needed: Independent  Comment: Ind without use of bed features  CARE Score: 6  Discharge Goal: Independent  Sit to Lying  Assistance Needed: Supervision or touching assistance  Comment: SBA without use of bed features  CARE Score: 4  Discharge Goal: Independent    Transfers:    Sit to Stand  Assistance Needed: Partial/moderate assistance  Comment: Mod A; required use of trunk momentum, 2WW, and holding of trunk to be able to stand; also required stabilization of 2WW as pt usually pulled onto AD during transfer process  CARE Score: 3  Discharge Goal: Independent  Chair/Bed-to-Chair Transfer  Assistance Needed: Partial/moderate assistance  Comment: Mod A to stand, Min A to turn and sit using 2WW  CARE Score: 3  Discharge Goal: Independent  Toilet Transfer  Assistance needed: Partial/moderate assistance  Comment:  Mod to stand, Min A to sit with grab bars  CARE Score: 3  Car Transfer  Assistance Needed: Partial/moderate assistance  Comment: Min A with 2WW, pt able to actively lift BLE in and out of car however required holding of trunk to sit, turn, and stand including stabilization of AD as pt pulled on it to be able to stand  CARE Score: 3  Discharge Goal: Independent    Ambulation:   Device used PTA: cane at night at home and for community ambulation    Walking Ability  Does the Patient Walk?: Yes     Walk 10 Feet  Assistance Needed: Dependent  Comment: Min A using 2WW and usually required 2nd person assist for close w/c follow due to pt's fear of falling with her unpredictable strength, balance, and endurance  CARE Score: 1  Discharge Goal: Independent     Walk 50 Feet with Two Turns  Comment: max tolerance today was 23 ft using 2WW with Min A + SBA of 2nd person for close w/c follow (ambulation tolerance was limited by sudden onset of dizziness and increased fatigue; BP was 104/74, HR=76, SpO2 in room air was 99%)  Reason if not Attempted: Not attempted due to medical condition or safety concerns  CARE Score: 88  Discharge Goal: Independent     Walk 150 Feet  Comment: pt unable today due to safety concerns (dizziness and increased fatigue); pt will not likely be able to complete this task in the 1st 3 days  Reason if not Attempted: Not attempted due to medical condition or safety concerns  CARE Score: 88  Discharge Goal: Supervision or touching assistance     Walking 10 Feet on Uneven Surfaces  Comment: pt unable today due to safety concerns (dizziness and increased fatigue)  Reason if not Attempted: Not attempted due to medical condition or safety concerns  CARE Score: 88  Discharge Goal: Supervision or touching assistance     1 Step (Curb)  Comment: pt unable today due to safety concerns (dizziness and increased fatigue)  Reason if not Attempted: Not attempted due to medical condition or safety concerns  CARE Score: 88  Discharge Goal: Supervision or touching assistance     4 Steps  Comment: pt unable today due to safety concerns (dizziness and increased fatigue); pt will not likely be able to complete this task in the 1st 3 days  Reason if not Attempted: Not attempted due to medical condition or safety concerns  CARE Score: 88  Discharge Goal: Supervision or touching assistance     12 Steps  Comment: pt unable today due to safety concerns (dizziness and increased fatigue); pt will not likely be able to complete this task in the 1st 3 days  Reason if not Attempted: Not attempted due to medical condition or safety concerns  CARE Score: 88  Discharge Goal: Supervision or touching assistance    Gait Deviations: []None [x]Slow josé miguel  [] Increased AMA  [x] Staggers []Deviated Path  [x] Decreased stride length  [] Decreased step height  []Decreased arm swing  [] Shuffles  [] Decreased head and trunk rotation  []other:        Wheelchair:  w/c Ability: Wheelchair Ability  Uses a Wheelchair and/or Scooter?: Yes  Wheel 50 Feet with Two Turns  Assistance Needed: Supervision or touching assistance  Comment: SBA-Sup using BUE primarily  CARE Score: 4  Discharge Goal: Independent  Wheel 150 Feet  Assistance Needed: Substantial/maximal assistance  Comment: pt was only able to propel up to 70 ft today (limited by fatigue)  CARE Score: 2  Discharge Goal: Independent          Balance:        Object: Picking Up Object  Assistance Needed: Partial/moderate assistance  Comment: Min A with 2WW and reacher  CARE Score: 3  Discharge Goal: Independent    Assessment:   The patient is an 82year old female admitted onto ARU after hospitalization for generalized abdominal pain and nausea. Pt also had diarrhea 3 days prior to admission. Imaging revealed ,mild acute uncomplicated diverticulitis at the junction of the descending colon and sigmoid colon. Pt also managed medically for acute on chronic hyponatremia, HTN, and pain. Pt was alert and oriented, required use of 2WW due to her impaired strength and balance, was anxious initially to stand and transfer with 1-person only but improved as PT eval progressed, usually pulled onto 2WW to stand despite VCs provided, fatigued quickly with activities due to debilitated state and likely with ongoing GI issues (diarrhea), and had poor ambulation tolerance and fear of falling with standing tasks.  Pt had more apparent strength and balance deficits per OT report as exhibited by jolting of hips and intercepted falls due to knees buckling. These were not observed during PT eval but had dizziness that limited her ambulation tolerance. Pt demonstrates potential to improve towards established PT goals with continued strength, balance, and functional mobility training and provided her medical status continue to improve and remain stable. Body Structures, Functions, Activity Limitations Requiring Skilled Therapeutic Intervention: Decreased functional mobility ,Decreased ADL status,Decreased strength,Decreased endurance,Decreased balance,Decreased high-level IADLs,Decreased safe awareness     Therapy Prognosis: Good,Guarded  Decision Making: High Complexity  Clinical Presentation: unstable/unpredictable characteristics      Patient education:   ARU schedule, ARU expectations for participation, plan of care  Treatment Initiated:  Functional mobility training, gait training, patient education  Barriers to Improvement: psychological issues related to depression and anxiety?, GI status   Discharge Recommendations:  [facility] PT   Equipment Recommendations: has 2WW    Goals:  Patient Goals   Patient goals : to increase strength and go home     Long Term Goals  Time Frame for Long term goals : 10-12 tx days:  Long term goal 1: Pt will complete sup<->sit Ind. Long term goal 2: Pt will complete OOB transfers using 2WW Mod Ind. Long term goal 3: Pt will ambulate 10 ft and 50 ft with turns using 2WW Mod Ind. Long term goal 4: Pt will ambulate 150 ft over level surface and at least 10 ft of uneven surface using 2WW with SBA. Long term goal 5: Pt will ascend/descend curb step using 2WW and 1 flight of stairs using railings with SBA-Sup. Additional Goals?: Yes  Long term goal 6: Pt will complete object retrieval from the floor with 2WW and reacher Mod Ind. Long term goal 7: Pt will propel manual w/c 150 ft with turns Mod Ind.   Plan:    Requires PT Follow-Up: Yes  Pt will be seen at least 60 minutes per day for a minimum of 5 days per week, plus group therapy as appropriate  Plan  Current Treatment Recommendations: Strengthening,Balance training,Functional mobility training,Transfer training,Endurance training,Gait training,Stair training,Home exercise program,Safety education & training,Patient/Caregiver education & training,Equipment evaluation, education, & procurement,Therapeutic activities,Wheelchair mobility training    PT Individual Minutes  Time In: 9745; 3023  Time Out: 1130;1424  Minutes: 37+43=678  Variance: +16  Reason: extra time to complete tasks         Timed Code Treatment Minutes: 91 Minutes    Number of Minutes/Billable Intervention    PT Evaluation 15   Gait Training 20   Therapeutic Exercise    Neuro Re-Ed    Therapeutic Activity 56   Wheelchair Propulsion 15   Group    Other:    TOTAL 106       Electronically signed by:    Funmilayo Spann, PT  5/3/2022, 14:54

## 2022-05-03 NOTE — PROGRESS NOTES
Comprehensive Nutrition Assessment    Type and Reason for Visit:  Initial,Consult (oral nutrition supplement)    Nutrition Recommendations/Plan:   1. Continue low fiber diet at this time  2. Encourage consistent intake  3. Will continue to follow up during stay     Malnutrition Assessment:  Malnutrition Status:  Insufficient data (05/03/22 1232)    Context:  Acute Illness       Nutrition Assessment:    Admit to rehab with hx diverticulitis, debility, nausea and diarrhea on admit to acute floor but resolving. Currently able to tolerate low fiber diet, meal intake recently %. Brief discussion regarding food choices on low fiber diet. Will follow at moderate nutrition risk at this time. Nutrition Related Findings:    asleep in bed but getting up for lunch meal, meds noted: calcium, B12, magox Wound Type: None       Current Nutrition Intake & Therapies:    Average Meal Intake: %  Average Supplements Intake: None Ordered  ADULT DIET; Regular; Low Fiber    Anthropometric Measures:  Height: 5' 3\" (160 cm)  Ideal Body Weight (IBW): 115 lbs (52 kg)       Current Body Weight: 132 lb 15 oz (60.3 kg), 115.6 % IBW.  Weight Source: Not Specified  Current BMI (kg/m2): 23.6  Usual Body Weight: 137 lb (62.1 kg) (per hx in August)  % Weight Change (Calculated): -3  Weight Adjustment For: No Adjustment                 BMI Categories: Normal Weight (BMI 22.0 to 24.9) age over 72    Estimated Daily Nutrient Needs:  Energy Requirements Based On: Kcal/kg  Weight Used for Energy Requirements: Current  Energy (kcal/day): 2011-2377 (25-30 hans/kg)  Weight Used for Protein Requirements: Current  Protein (g/day): 60-72 (1-1.2 g/kg)  Method Used for Fluid Requirements: 1 ml/kcal  Fluid (ml/day): 1500    Nutrition Diagnosis:   · Predicted inadequate energy intake related to altered GI function as evidenced by nausea,poor intake prior to admission      Nutrition Interventions:   Food and/or Nutrient Delivery: Continue Current Diet,Snacks (Comment)  Nutrition Education/Counseling: Education initiated  Coordination of Nutrition Care: Continue to monitor while inpatient       Goals:     Goals: PO intake 75% or greater       Nutrition Monitoring and Evaluation:   Behavioral-Environmental Outcomes: None Identified  Food/Nutrient Intake Outcomes: Food and Nutrient Intake  Physical Signs/Symptoms Outcomes: Biochemical Data,Meal Time Behavior,Skin,Weight,Nausea or Vomiting,Diarrhea    Discharge Planning:    Continue current diet     Rashi President, SALMA, LAURA  Contact: [phone]

## 2022-05-03 NOTE — PROGRESS NOTES
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 Aguila Chavez DVT (deep venous thrombosis) ([facility] [address] 1970    left leg    Former smoker     History of echocardiogram 09/21/2020    EF 55-60%, mild left ventricular hypertrophy, normal diastolic filling pattern for age, no signficiant valvular disease, no pericardial effusion     History of stress test 03/25/2021    normal LVEF calculated by the computer is probably falsely low.  LVEF is 40 %    Hx of Doppler ultrasound 03/01/2018    Carotid-mild 0-49% bilateral carotid,50% stenosis right subclavian    Hyperlipidemia     Hypertension     Macular degeneration     right    Mild cognitive impairment 02/2012    MMSE 26/30    MVP (mitral valve prolapse)     trace on echo 2014    Osteoporosis 05/2012    Pancytopenia 05/2011    mild with ; Dr Jordan hawk 2010    Peptic ulcer disease     Peripheral vascular disease ([address] 01/2016    angio; dis below ankles; pletal    Prediabetes 2006    Recurrent ventral incisional hernia 2013    medial apex of GB scar    S/P CABG x 3 2003    3-vessel; Dr Kolby Ferrer 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 Rasheeda Butcher; Right side     Past Surgical History:   Procedure Laterality Date    CATARACT REMOVAL Right 2010    poor result ; blind right eye; [address] CORONARY ARTERY BYPASS GRAFT  8/2009    3 vessel    SHOULDER ARTHROSCOPY Right 2003    TUBAL LIGATION      URETER SURGERY      Right ureteral repair     Social History:  Social/Functional History  Lives With: Alone  Type of Home: House  Home Layout: One level (Pt has basement but does not use. Has stair lift but has never been used.)  Home Access: Stairs to enter with rails  Entrance Stairs - Number of Steps: 3-4 EV  Entrance Stairs - Rails: Both  Bathroom Shower/Tub: Tub/Shower unit,Shower chair with back  Bathroom Toilet: Handicap height (Pt states frame over toilet c grab bars.)  Bathroom Equipment: Grab bars in shower,Shower chair,Grab bars around toilet,Hand-held shower  Bathroom Accessibility: Accessible  Home Equipment: Cane,Walker, rolling,Grab bars,Reacher  Has the patient had two or more falls in the past year or any fall with injury in the past year?: No  Receives Help From: Family (Gustabo Morillo lives 4 doors down. He assists c grocery shopping and house cleaning.)  ADL Assistance: Independent  Homemaking Assistance: Needs assistance (alireza does cleaning and grocery shopping)  Homemaking Responsibilities: Yes  Meal Prep Responsibility: Primary  Laundry Responsibility: Primary  Cleaning Responsibility: Primary  Bill Paying/Finance Responsibility: No (Alireza took over ~ 2 months ago as Pt states she began \"having a hard time remembering\")  Shopping Responsibility: Primary  Health Care Management: Secondary (Pt and alireza complete together.)  Ambulation Assistance: Independent (Pt reports she would use a cane at night and would take it in public just incase. Pt primarily Ind without device.)  Transfer Assistance: Independent  Active : Yes  Mode of Transportation: Car  Occupation: Retired  Type of Occupation:   Leisure & Hobbies: Pt enjoys watching television, cooking, and being active. Additional Comments: Pt has regular, flat bed at home; no falls in the past year    Restrictions:  Restrictions/Precautions  Restrictions/Precautions: Fall Risk,Contact Precautions (C-diff)                Pain Level: Pt c/o 6/10 pain in L hip while standing for ADLs.        Objective:  Observation/Palpation  Observation: Pt lying in supine upon entrance, pleasant and agreeable to therapy. During session, Pt upset (almost tearful) c current medical state and fixated on this topic. Vision  Vision Exceptions: Wears glasses at all times  Vision - Basic Assessment  Prior Vision: Wears glasses all the time  Vision Comments: Pt blind in R eye. Hearing  Hearing: Within functional limits    ROM:      LUE AROM (degrees)  LUE AROM : WFL     Left Hand AROM (degrees)  Left Hand AROM: WFL     RUE AROM (degrees)  RUE AROM : Exceptions (Pt c past rotator cuff repair.)  R Shoulder Flexion 0-180: 0-90     Right Hand AROM (degrees)  Right Hand AROM: WFL    Strength:    LUE Strength  L Hand General: 4+/5  LUE Strength Comment: 4/5 grossly  RUE Strength  R Hand General: 4+/5  RUE Strength Comment: 4/5 grossly    Quality of Movement: Tone RUE  RUE Tone: Normotonic  Tone LUE  LUE Tone: Normotonic  Coordination  Coordination and Movement Description: Tremors  Quality of Movement Other  Comment: Pt reports hx of B hand tremors d/t medication. This OT observed moderate tremors to B hands during session c mild tremor in facial muscles as well. Sensation:    Sensation  Overall Sensation Status: WNL     ADLs:  Eating: Eating  Assistance Needed: Setup or clean-up assistance  Comment: setup assistance for breakfast.  CARE Score: 5  Discharge Goal: Independent       Oral Hygiene: Oral Hygiene  Assistance Needed: Setup or clean-up assistance  Comment: Pt required assist opening toothpaste and mouthwash. CARE Score: 5  Discharge Goal: Independent    UB/LB Bathing: Shower/Bathe Self  Assistance Needed: Partial/moderate assistance  Comment: Pt required assist to remain in stance and complete perineal hygiene. CARE Score: 3  Discharge Goal: Independent    UB Dressing: Upper Body Dressing  Assistance Needed: Supervision or touching assistance  Comment: SBA to don pullover sweatshirt.   CARE Score: 4  Discharge Goal: Independent         LB Dressing:   Lower Body Dressing  Assistance Needed: Substantial/maximal assistance  Comment: Pt able to get B feet into pants, however required assist to pull pants up and to maintain stance for therapist to pull pants over hips. CARE Score: 2  Discharge Goal: Independent    Donning and [place] Footwear: Putting On/Taking Off Footwear  Assistance Needed: Supervision or touching assistance  Comment: CGA for Pt to doff/don socks. CARE Score: 4  Discharge Goal: Independent      Toiletin [address] needed: Partial/moderate assistance  Comment: Pt required Min A for balance on toilet to complete perineal hygiene and Mod assist in stance to doff depends. CARE Score: 3  Discharge Goal: Independent      Bed Mobility:    Bed mobility  Supine to Sit: Contact guard assistance    Transfers:    Transfers  Stand Pivot Transfers: Moderate assistance (One instance of Mod A d/t LOB during transfer.)  Sit to stand: Moderate assistance (Ranged from Min to Mod A.)  Stand to sit: Minimal assistance           Toilet Transfer  Assistance needed: Partial/moderate assistance  Comment: Mod to stand, Min A to sit with grab bars  CARE Score: 3  Discharge Goal: Independent    Functional Mobility:    Balance  Sitting Balance: Contact guard assistance  Standing Balance: Moderate assistance (Required Moderate assistance for dynamic balance during ADL.)  Standing Balance  Time: ~ 30 seconds  Activity: ADL  Comment: Pt would experience dizziness upon standing. Functional Mobility  Functional - Mobility Device: Wheelchair  Activity: To/from bathroom  Assist Level: Moderate assistance  Functional Mobility Comments: Mod A d/t fatigue. Cognition:  Cognition  Overall Cognitive Status: Exceptions  Arousal/Alertness: Appropriate responses to stimuli  Following Commands:  Follows all commands without difficulty  Attention Span: Appears intact  Safety Judgement: Decreased awareness of need for safety  Insights: Decreased awareness of deficits  Initiation: Requires cues for some  Sequencing: Requires cues for some    Perception:  Perception  Overall Perceptual Status: WFL      Assessment:     Performance deficits / Impairments: Decreased functional mobility ,Decreased safe awareness,Decreased balance,Decreased coordination,Decreased ADL status,Decreased vision/visual deficit,Decreased endurance,Decreased high-level IADLs,Decreased strength,Decreased fine motor control    The patient is an 82year old female admitted onto ARU after hospitalization for c/o generalized abdominal pain and nausea. Imaging was obtained and Pt found to have mild acute uncomplicated diverticulitis seen at the junction of the descending colon and sigmoid colon. PTA, Pt living alone, independent c all ADLs and functional mobility. Today, Pt severely limited by poor endurance and dizziness. Pt able to complete sponge bath sinkside c Min-Mod A as well as dressing including LB dressing c Max A. Pt's functional transfers ranged from Min-Mod A and Pt had a couple instances of LOB during stand pivot transfers. The QI, MMT, and ROM standardized assessments were used this date to determine the above performance deficits, which compromise pt's ability to safely complete ADLs/IADLs/mobility. Pt will benefit from ARU OT services to increase functional performance and return to PLOF. Decision Making: Medium Complexity  Clinical Presentation:  Pt presents to this ARU c deficits including endurance, ax tolerance, functional balance/transfers/mobility, RUE ROM, strength, FMC, and ADLs. Patient education:   ARU sierra, Role of O.T., O.T. plan of care  []   Patient goal was established and reviewed in Rehabtracker with patient and/or family this date. REQUIRES OT FOLLOW-UP: Yes  Discharge Recommendations:  Home c increased supervision/assist and C OT. Equipment Recommendations:  Possible TTB. Goals:     Short Term Goals  Time Frame for Short term goals: 5-7 days  Short Term Goal 1: Pt will complete self-feeding IND.   Long Term Goals  Time Frame for Long term goals : 12-14 days or until d/c. Long Term Goal 1: Pt will complete oral hygiene c Mod I.  Long Term Goal 2: Pt will complete grooming tasks c Mod I.  Long Term Goal 3: Pt will complete total body bathing c Mod I and AE PRN. Long Term Goal 4: Pt will complete UB dressing c Ind. Long Term Goal 5: Pt will complete LB dressing c Mod I. Additional Goals?: Yes  Long Term Goal 6: Pt will doff/don footwear c Mod I.  Long Term Goal 7: Pt will complete toileting c Mod I.  Long Term Goal 8: Pt will perform functional transfers (bed, chair, toilet, shower) c DME PRN. Long Term Goal 9: Pt will perform therex/therax to facilitate increased strength/endurance/ax tolerance (c emphasis on dynamic standing balance/tolerance > 8 mins) c Mod I.  Long Term Goal 10:  Pt will complete light home management tasks c Mod I.    Plan:    Pt will be seen at least 60 minutes per day for a minimum of 5 days per week, plus group therapy as appropriate  Current Treatment Recommendations: Strengthening,ROM,Balance training,Functional mobility training,Endurance training,Neuromuscular re-education,Safety education & training,Patient/Caregiver education & training,Equipment evaluation, education, & procurement,Self-Care / ADL,Home management training,Coordination training    OT Individual Minutes  Time In: 0730  Time Out: 9661  Minutes: 97                Number of Minutes/Billable Intervention      OT Evaluation 20   Therapeutic Exercise    ADL Self-care 77   Neuro Re-Ed    Therapeutic Activity    Group    Other:    TOTAL 97     Electronically signed by:    AGNES Barrientos, OTR/L    5/3/2022, 3:49 PM

## 2022-05-04 PROCEDURE — 97116 GAIT TRAINING THERAPY: CPT

## 2022-05-04 PROCEDURE — 6370000000 HC RX 637 (ALT 250 FOR IP): Performed by: STUDENT IN AN ORGANIZED HEALTH CARE EDUCATION/TRAINING PROGRAM

## 2022-05-04 PROCEDURE — 6370000000 HC RX 637 (ALT 250 FOR IP): Performed by: PHYSICAL MEDICINE & REHABILITATION

## 2022-05-04 PROCEDURE — 97110 THERAPEUTIC EXERCISES: CPT

## 2022-05-04 PROCEDURE — 97535 SELF CARE MNGMENT TRAINING: CPT

## 2022-05-04 PROCEDURE — 6360000002 HC RX W HCPCS: Performed by: STUDENT IN AN ORGANIZED HEALTH CARE EDUCATION/TRAINING PROGRAM

## 2022-05-04 PROCEDURE — 97530 THERAPEUTIC ACTIVITIES: CPT

## 2022-05-04 PROCEDURE — 94761 N-INVAS EAR/PLS OXIMETRY MLT: CPT

## 2022-05-04 PROCEDURE — 2580000003 HC RX 258: Performed by: STUDENT IN AN ORGANIZED HEALTH CARE EDUCATION/TRAINING PROGRAM

## 2022-05-04 PROCEDURE — 1280000000 HC REHAB R&B

## 2022-05-04 PROCEDURE — 99232 SBSQ HOSP IP/OBS MODERATE 35: CPT | Performed by: PHYSICAL MEDICINE & REHABILITATION

## 2022-05-04 RX ADMIN — VERAPAMIL HYDROCHLORIDE 120 MG: 120 TABLET, FILM COATED, EXTENDED RELEASE ORAL at 11:19

## 2022-05-04 RX ADMIN — KETOTIFEN FUMARATE 1 DROP: 0.35 SOLUTION/ DROPS OPHTHALMIC at 20:30

## 2022-05-04 RX ADMIN — GABAPENTIN 300 MG: 300 CAPSULE ORAL at 11:21

## 2022-05-04 RX ADMIN — GABAPENTIN 300 MG: 300 CAPSULE ORAL at 20:30

## 2022-05-04 RX ADMIN — FUROSEMIDE 20 MG: 20 TABLET ORAL at 11:19

## 2022-05-04 RX ADMIN — ASPIRIN 81 MG: 81 TABLET, COATED ORAL at 11:21

## 2022-05-04 RX ADMIN — ACETAMINOPHEN 650 MG: 325 TABLET ORAL at 06:47

## 2022-05-04 RX ADMIN — SERTRALINE HYDROCHLORIDE 25 MG: 50 TABLET ORAL at 11:21

## 2022-05-04 RX ADMIN — PYRIDOSTIGMINE BROMIDE 30 MG: 60 TABLET ORAL at 20:30

## 2022-05-04 RX ADMIN — SODIUM CHLORIDE, PRESERVATIVE FREE 10 ML: 5 INJECTION INTRAVENOUS at 11:26

## 2022-05-04 RX ADMIN — PANTOPRAZOLE SODIUM 40 MG: 40 TABLET, DELAYED RELEASE ORAL at 06:47

## 2022-05-04 RX ADMIN — SODIUM CHLORIDE, PRESERVATIVE FREE 10 ML: 5 INJECTION INTRAVENOUS at 20:30

## 2022-05-04 RX ADMIN — FERROUS SULFATE TAB 325 MG (65 MG ELEMENTAL FE) 325 MG: 325 (65 FE) TAB at 11:32

## 2022-05-04 RX ADMIN — VITAM B12 50 MCG: 100 TAB at 11:20

## 2022-05-04 RX ADMIN — DIVALPROEX SODIUM 250 MG: 250 TABLET, FILM COATED, EXTENDED RELEASE ORAL at 20:30

## 2022-05-04 RX ADMIN — ENOXAPARIN SODIUM 40 MG: 100 INJECTION SUBCUTANEOUS at 11:22

## 2022-05-04 RX ADMIN — ROSUVASTATIN CALCIUM 10 MG: 5 TABLET, FILM COATED ORAL at 20:30

## 2022-05-04 RX ADMIN — Medication 1 TABLET: at 20:30

## 2022-05-04 RX ADMIN — GABAPENTIN 300 MG: 300 CAPSULE ORAL at 15:53

## 2022-05-04 RX ADMIN — Medication 1 TABLET: at 11:19

## 2022-05-04 RX ADMIN — FLUTICASONE PROPIONATE 1 SPRAY: 50 SPRAY, METERED NASAL at 11:25

## 2022-05-04 RX ADMIN — MAGNESIUM OXIDE 400 MG (241.3 MG MAGNESIUM) TABLET 400 MG: TABLET at 11:19

## 2022-05-04 RX ADMIN — KETOTIFEN FUMARATE 1 DROP: 0.35 SOLUTION/ DROPS OPHTHALMIC at 11:25

## 2022-05-04 ASSESSMENT — PAIN SCALES - WONG BAKER: WONGBAKER_NUMERICALRESPONSE: 0

## 2022-05-04 ASSESSMENT — PAIN DESCRIPTION - LOCATION: LOCATION: GENERALIZED

## 2022-05-04 ASSESSMENT — PAIN SCALES - GENERAL: PAINLEVEL_OUTOF10: 4

## 2022-05-04 NOTE — CARE COORDINATION
Case Management Admission Note      Patient:Jazmine Strange      :1935  JFN:[id number]  Rehab Dx/Hx: Diverticulitis of intestine, part unspecified, without perforation or abscess without bleeding [K57.92]  Diverticulitis [K57.92]    Chief Complaint:   Past Medical History:   Diagnosis Date    Arthritis of shoulder region, right 2014    Mey Long Blind right eye     following right cataract surg    Chronic depression     Dr. Edi Beal DVT (deep venous thrombosis) ([facility] [address] 1970    left leg    Former smoker     History of echocardiogram 2020    EF 55-60%, mild left ventricular hypertrophy, normal diastolic filling pattern for age, no signficiant valvular disease, no pericardial effusion     History of stress test 2021    normal LVEF calculated by the computer is probably falsely low.  LVEF is 40 %    Hx of Doppler ultrasound 2018    Carotid-mild 0-49% bilateral carotid,50% stenosis right subclavian    Hyperlipidemia     Hypertension     Macular degeneration     right    Mild cognitive impairment 2012    MMSE 26/30    MVP (mitral valve prolapse)     trace on echo     Osteoporosis 2012    Pancytopenia 2011    mild with ; Dr Caden hawk 2010    Peptic ulcer disease     Peripheral vascular disease ([address] 2016    angio; dis below ankles; pletal    Prediabetes 2006    Recurrent ventral incisional hernia 2013    medial apex of GB scar    S/P CABG x 3     3-vessel; Dr Daniel Curiel Spigelian hernia 2017    right ant lateral wall; seen on CT abd    Supraventricular tachycardia ([address]     Freq ventriular ectopy    Tic douloureux     Dr. Aranza Rod; Right side     Past Surgical History:   Procedure Laterality Date    CATARACT REMOVAL Right     poor result ; blind right eye; [place]    CHOLECYSTECTOMY      CORONARY ARTERY BYPASS GRAFT  2009    3 vessel    SHOULDER ARTHROSCOPY Right     TUBAL LIGATION      URETER SURGERY      Right ureteral repair     Allergies   Allergen Reactions    Alendronate Sodium Other (See Comments)     GI upset    Bactrim [Sulfamethoxazole-Trimethoprim] Anaphylaxis    Boniva [Ibandronate Sodium] Other (See Comments)     Gi upset and declined egd; also to fosamax    Colesevelam     Lisinopril Other (See Comments)     Severe hyperkalemia (6) with bun >56      Macrobid [Nitrofurantoin]     Neggram [Nalidixic Acid]     Pce [Erythromycin]     Penicillins     Risperidone And Related     Welchol [Colesevelam Hcl] Other (See Comments)     constipation    Carbamazepine Nausea And Vomiting    Lovaza [Omega-3-Acid Ethyl Esters] Nausea And Vomiting    Metoprolol Nausea And Vomiting    Pyridium [Phenazopyridine] Palpitations     Precautions: falls    Date of Admit: 5/2/2022  Room #: 1007/1007-A      Current functional status at time of admit:        Home Living/DME Available:      Type of Home: House  Home Access: Stairs to enter with rails  Bathroom Shower/Tub: Tub/Shower unit,Shower chair with back  Bathroom Toilet: Handicap height (Pt states frame over toilet c grab bars.)  Bathroom Equipment: Grab bars in shower,Shower chair,Grab bars around toilet,Hand-held shower  Home Equipment: Cane,Walker, rolling,Grab bars,Reacher       IADL Hx:   Homemaking Responsibilities: Yes  Active : Yes  Mode of Transportation: Car  Occupation: Retired  Leisure & Hobbies: Pt enjoys watching television, cooking, and being active. Spouse:    Family:    Comments:  Patient plans dc 1-level home alone. Patient indep & drive PRN. Patient has supportive local friends. Whiteboard updated.     Cassie Carmen, 5/4/2022, 2:15 PM

## 2022-05-04 NOTE — PROGRESS NOTES
Physical Rehabilitation: OCCUPATIONAL THERAPY     [x] daily progress note       [] discharge       Patient Name:  Caroline Gauthier   :  1935 MRN: [id number]  Room:  [address] [place] [address] Date of Admission: 2022  Rehabilitation Diagnosis:   Diverticulitis of intestine, part unspecified, without perforation or abscess without bleeding [K57.92]  Diverticulitis [K57.92]       Date 2022       Day of ARU Week:  3   Time IN/OUT 1020/1130   Individual Tx Minutes 79   Group Tx Minutes    Co-Treat Minutes    Concurrent Tx Minutes    TOTAL Tx Time Mins 70   Variance Time    Variance Time []   Refusal due to:     []   Medical hold/reason:    []   Illness   []   Off Unit for test/procedure  []   Extra time needed to complete task  []   Therapeutic need  []   Other (specify):   Restrictions Restrictions/Precautions: Fall Risk,Contact Precautions (C-diff)         Communication with other providers: [x]   OK to see per nursing:     []   Spoke with team member regarding:      Subjective observations and cognitive status: Sitting up in recliner pleasant and agreeable to therapy     Pain level/location:    /10       Location: no pain per patient    Discharge recommendations  Anticipated discharge date:  TBD  Destination: []home alone   [x]home alone w assist prn   [] home w/ family    [] Continuous supervision       []SNF    [] Assisted living     [] Other:   Continued therapy: [x]HHC OT  []OUTPATIENT  OT   [] No Further OT  Equipment needs: TBD     Toiletin [address] needed: Partial/moderate assistance  Comment: Pt required Min A for balance on toilet to complete perineal hygiene and Mod assist in stance to doff depends. CARE Score: 3  Discharge Goal: Independent      Toilet Transfers: Toilet Transfer  Assistance needed: Partial/moderate assistance  Comment:  Mod to stand, Min A to sit with grab bars  CARE Score: 3  Discharge Goal: Independent  Device Used:    []   Standard Toilet         []   [facility] []  Bedside Commode       []   Elevated Toilet          []   Other:        Bed Mobility:           [x]   Pt received out of bed   Rolling R/L:    Scooting:    Supine --> Sit:    Sit --> Supine:      Transfers:    Sit--> Stand: Light Min A   Stand --> Sit:   CGA verbal cues for controlled descent   Stand-Pivot:   CGA/Light Min A vc for controlled descent   Other:    Assistive device required for transfer:   RW      Functional Mobility:  From room to car at end of hallway at RW level with CGA/Min A; wheelchair mobility with focus on endurance and strengthening throughout ARU   Assistance:  CGA/Min A  To steady, patient had knee buckle times two to L side c min a to correct    Device:   [x]   Rolling Walker     []   Standard Walker []   Wheelchair        []   Talya [place]       []   4-Wheeled [place]         []   Cardiac Walker       []   Other:          Additional Therapeutic activities/exercises completed this date:     []   ADL Training   [x]   Balance/Postural training     [x]   Bed/Transfer Training patient instructed in transfer training with focus on  Hand foot and body placement during directional changes and controlled descent when sitting, patient fluctuates from CGA to Min A 2* to fatigue, knee buckle to L LE, patient benefits from rocking (1,2,3 stand method) c scoot to [facility] does require verbal cues for hand placement       [x]   Endurance Training patient instructed in bilateral reciprocal patterned movement while seated with min resistance for 12 minutes c rest break at 7.30 minutes melissa to increase strength and endurance for facilitation of ADl and mobility tasks    []   Neuromuscular Re-ed   []   Nu-step:  Time:        Level:         #Steps:       []   Rebounder:    []  Seated     []  Standing        []   Supine Ther Ex (reps/sets):     []   Seated Ther Ex (reps/sets):     []   Standing Ther Ex (reps/sets):     []   Other:      Comments:      Patient/Caregiver Education and Training:   [x] Adaptive Equipment Use  [x]   Bed Mobility/Transfer Technique/Safety  [x]   Energy Conservation Tips  []   Family training  [x]   Postural Awareness  [x]   Safety During Functional Activities  []   Reinforced Patient's Precautions   []   Progress was updated and reviewed in Rehabtracker with patient and/or family this         date. Treatment Plan for Next Session: POC to continue as tolerated        Treatment/Activity Tolerance:   [x] Tolerated treatment with no adverse effects    [] Patient limited by fatigue  [] Patient limited by pain   [] Patient limited by medical complications:    [] Adverse reaction to Tx:   [] Significant change in status    Safety:       []  bed alarm set    []  chair alarm set    []  Pt refused alarms                []  Telesitter activated      [x]  Gait belt used during tx session      []other:       Number of Minutes/Billable Intervention  Therapeutic Exercise 15   ADL Self-care 15   Neuro Re-Ed    Therapeutic Activity 40   Group    Other:    TOTAL 70       Social History  Social/Functional History  Lives With: Alone  Type of Home: House  Home Layout: One level (Pt has basement but does not use. Has stair lift but has never been used.)  Home Access: Stairs to enter with rails  Entrance Stairs - Number of Steps: 3-4 EV  Entrance Stairs - Rails: Both  Bathroom Shower/Tub: Tub/Shower unit,Shower chair with back  Bathroom Toilet: Handicap height (Pt states frame over toilet c grab bars.)  Bathroom Equipment: Grab bars in shower,Shower chair,Grab bars around toilet,Hand-held shower  Bathroom Accessibility: Accessible  Home Equipment: Cane,Walker, rolling,Grab bars,Reacher  Has the patient had two or more falls in the past year or any fall with injury in the past year?: No  Receives Help From: Family (Gustabo 84 lives 4 doors down.  He assists c grocery shopping and house cleaning.)  ADL Assistance: Independent  Homemaking Assistance: Needs assistance (chika does cleaning and grocery shopping)  Homemaking Responsibilities: Yes  Meal Prep Responsibility: Primary  Laundry Responsibility: Primary  Cleaning Responsibility: Primary  Bill Paying/Finance Responsibility: No (Grandson took over ~ 2 months ago as Pt states she began \"having a hard time remembering\")  Shopping Responsibility: Primary  Health Care Management: Secondary (Pt and grandson complete together.)  Ambulation Assistance: Independent (Pt reports she would use a cane at night and would take it in public just incase. Pt primarily Ind without device.)  Transfer Assistance: Independent  Active : Yes  Mode of Transportation: Car  Occupation: Retired  Type of Occupation:   Leisure & Hobbies: Pt enjoys watching television, cooking, and being active. Additional Comments: Pt has regular, flat bed at home; no falls in the past year    Objective                                                                                    Goals:  (Update in navigator)  Short Term Goals  Time Frame for Short term goals: 5-7 days  Short Term Goal 1: Pt will complete self-feeding IND.:  Long Term Goals  Time Frame for Long term goals : 12-14 days or until d/c. Long Term Goal 1: Pt will complete oral hygiene c Mod I.  Long Term Goal 2: Pt will complete grooming tasks c Mod I.  Long Term Goal 3: Pt will complete total body bathing c Mod I and AE PRN. Long Term Goal 4: Pt will complete UB dressing c Ind. Long Term Goal 5: Pt will complete LB dressing c Mod I. Additional Goals?: Yes  Long Term Goal 6: Pt will doff/don footwear c Mod I.  Long Term Goal 7: Pt will complete toileting c Mod I.  Long Term Goal 8: Pt will perform functional transfers (bed, chair, toilet, shower) c DME PRN. Long Term Goal 9: Pt will perform therex/therax to facilitate increased strength/endurance/ax tolerance (c emphasis on dynamic standing balance/tolerance > 8 mins) c Mod I.  Long Term Goal 10:  Pt will complete light home management tasks c Mod I.:        Plan of Care                                                                              Times per week: 5 days per week for a minimum of 60 minutes/day plus group as appropriate for 60 minutes.   Treatment to include Plan  Times per Day: Daily  Current Treatment Recommendations: Strengthening,ROM,Balance training,Functional mobility training,Endurance training,Neuromuscular re-education,Safety education & training,Patient/Caregiver education & training,Equipment evaluation, education, & procurement,Self-Care / ADL,Home management training,Coordination training    Electronically signed by   MAGALIS Herrera,  5/4/2022, 7:16 AM

## 2022-05-04 NOTE — PATIENT CARE CONFERENCE
ACUTE REHAB TEAM CONFERENCE SUMMARY   [address] [facility]    NAME: Enrqiueta Coronel  : 1935 ADMIT DATE: 2022    Rehab Admitting Dx:Diverticulitis of intestine, part unspecified, without perforation or abscess without bleeding [K57.92]  Diverticulitis [K57.92]  Patient Comorbid Conditions: Active Hospital Problems    Diagnosis Date Noted    Depression with anxiety [F41.8]      Priority: Medium    Bigeminy [I49.8]      Priority: Medium    Hypotension due to hypovolemia [I95.89, E86.1]      Priority: Medium    Diverticulitis [K57.92] 2022     Priority: Medium     Date: 2022    CASE MANAGEMENT  Current issues/needs regarding patient and family discharge status:   Dc 1-level home alone. Indep & drive PTA    PHYSICAL THERAPY (Updated in QI)        Long Term Goals  Time Frame for Long term goals : 10-12 tx days:  Long term goal 1: Pt will complete sup<->sit Ind. Long term goal 2: Pt will complete OOB transfers using 2WW Mod Ind. Long term goal 3: Pt will ambulate 10 ft and 50 ft with turns using 2WW Mod Ind. Long term goal 4: Pt will ambulate 150 ft over level surface and at least 10 ft of uneven surface using 2WW with SBA. Long term goal 5: Pt will ascend/descend curb step using 2WW and 1 flight of stairs using railings with SBA-Sup. Additional Goals?: Yes  Long term goal 6: Pt will complete object retrieval from the floor with 2WW and reacher Mod Ind. Long term goal 7: Pt will propel manual w/c 150 ft with turns Mod Ind. Impairments/deficits, barriers:     Body Structures, Functions, Activity Limitations Requiring Skilled Therapeutic Intervention: Decreased functional mobility ,Decreased ADL status,Decreased strength,Decreased endurance,Decreased balance,Decreased high-level IADLs,Decreased safe awareness     Therapy Prognosis: Good,Guarded  Decision Making: High Complexity  Clinical Presentation: unstable/unpredictable characteristics  Equipment needed at discharge: has 2WW PT IRF-TAMARA scores since initial assessment  Bed Mobility:   Sit to Lying  Assistance Needed: Independent  Comment: Ind  CARE Score: 6  Discharge Goal: Independent    Roll Left and Right  Assistance Needed: Independent  Comment: Ind without use of bed features  CARE Score: 6  Discharge Goal: Independent    Lying to Sitting on Side of Bed  Assistance Needed: Supervision or touching assistance  Comment: SBA without use of bed features  CARE Score: 4  Discharge Goal: Independent    Transfers:    Sit to Stand  Assistance Needed: Supervision or touching assistance  Comment: SBA using 2WW  CARE Score: 4  Discharge Goal: Independent    Chair/Bed-to-Chair Transfer  Assistance Needed: Supervision or touching assistance  Comment: SBA without AD  CARE Score: 4  Discharge Goal: Independent    Toilet Transfer  Assistance needed: Partial/moderate assistance  Comment: Min A to sit, Mod A to stand using grab bars and 2WW  CARE Score: 3    Car Transfer  Assistance Needed: Partial/moderate assistance  Comment: SBA using 2WW  CARE Score: 3  Discharge Goal: Independent    Ambulation:    Walking Ability  Does the Patient Walk?: Yes     Walk 10 Feet  Assistance Needed: Supervision or touching assistance  Comment: CGA using 2WW  CARE Score: 4  Discharge Goal: Independent     Walk 50 Feet with Two Turns  Assistance Needed: Supervision or touching assistance  Comment: CGA using 2WW  Reason if not Attempted: Not attempted due to medical condition or safety concerns  CARE Score: 4  Discharge Goal: Independent     Walk 150 Feet  Assistance Needed: Supervision or touching assistance  Comment: CGA using 2WW  Reason if not Attempted: Not attempted due to medical condition or safety concerns  CARE Score: 4  Discharge Goal: Supervision or touching assistance     Walking 10 Feet on Uneven Surfaces  Assistance Needed: Supervision or touching assistance  Comment: CGA using 2WW  Reason if not Attempted: Not attempted due to medical condition or safety concerns  CARE Score: 4  Discharge Goal: Supervision or touching assistance     1 Step (Curb)  Assistance Needed: Supervision or touching assistance  Comment: CGA using 2WW  Reason if not Attempted: Not attempted due to medical condition or safety concerns  CARE Score: 4  Discharge Goal: Supervision or touching assistance     4 Steps  Assistance Needed: Supervision or touching assistance  Comment: SBA-CGA using railings to ascend/descend 4\"/6\" step heights  Reason if not Attempted: Not attempted due to medical condition or safety concerns  CARE Score: 4  Discharge Goal: Supervision or touching assistance     12 Steps  Assistance Needed: Supervision or touching assistance  Comment: SBA-CGA using railings to ascend/descend 4\"/6\" step heights  Reason if not Attempted: Not attempted due to medical condition or safety concerns  CARE Score: 4  Discharge Goal: Supervision or touching assistance           Wheelchair:  w/c Ability: Wheelchair Ability  Uses a Wheelchair and/or Scooter?: Yes    Wheel 50 Feet with Two Turns  Assistance Needed: Supervision or touching assistance  Comment: SBA-Sup using BUE primarily  CARE Score: 4  Discharge Goal: Independent    Wheel 150 Feet  Assistance Needed: Substantial/maximal assistance  Comment: pt was only able to propel up to 70 ft today (limited by fatigue)  CARE Score: 2  Discharge Goal: Independent              Balance:        Object: Picking Up Object  Assistance Needed: Supervision or touching assistance  Comment: SBA with 2WW and reacher  CARE Score: 4  Discharge Goal: Independent    Fall Risk: []  Yes  []  No    OCCUPATIONAL THERAPY  (Updated in QI)  Short Term Goals  Time Frame for Short term goals: 5-7 days  Short Term Goal 1: Pt will complete self-feeding IND. :   Long Term Goals  Time Frame for Long term goals : 12-14 days or until d/c.   Long Term Goal 1: Pt will complete oral hygiene c Mod I.  Long Term Goal 2: Pt will complete grooming tasks c Mod I.  Long Term Goal 3: Pt will complete total body bathing c Mod I and AE PRN. Long Term Goal 4: Pt will complete UB dressing c Ind. Long Term Goal 5: Pt will complete LB dressing c Mod I. Additional Goals?: Yes  Long Term Goal 6: Pt will doff/don footwear c Mod I.  Long Term Goal 7: Pt will complete toileting c Mod I.  Long Term Goal 8: Pt will perform functional transfers (bed, chair, toilet, shower) c DME PRN. Long Term Goal 9: Pt will perform therex/therax to facilitate increased strength/endurance/ax tolerance (c emphasis on dynamic standing balance/tolerance > 8 mins) c Mod I.  Long Term Goal 10: Pt will complete light home management tasks c Mod I. :                                       OT IRF-TAMARA scores and goals since initial assessment:    ADLs:    Eating: Eating  Assistance Needed: Independent  Comment: X  CARE Score: 6  Discharge Goal: Independent       Oral Hygiene: Oral Hygiene  Assistance Needed: Independent  Comment: X  CARE Score: 6  Discharge Goal: Independent    UB/LB Bathing: Shower/Bathe Self  Assistance Needed: Supervision or touching assistance  Comment: CGA in stance for safety, seated majority of shower  CARE Score: 4  Discharge Goal: Independent    UB Dressing: Upper Body Dressing  Assistance Needed: Setup or clean-up assistance  Comment: Set up  CARE Score: 5  Discharge Goal: Independent         LB Dressing: Lower Body Dressing  Assistance Needed: Supervision or touching assistance  Comment: SB/CGA in stance to manage over hips  CARE Score: 4  Discharge Goal: Independent    Donning and [place] Footwear: Putting On/Taking Off Footwear  Assistance Needed: Setup or clean-up assistance  Comment: set up required no use of AE  CARE Score: 5  Discharge Goal: Independent      Toiletin [address] needed: Supervision or touching assistance  Comment: CGA in stance to manage clothing over hips, completes hygiene seated  CARE Score: 4  Discharge Goal: Independent      Toilet Transfers: Toilet Transfer  Assistance needed: Partial/moderate assistance  Comment: Min A 2* to height of toilet  CARE Score: 3  Discharge Goal: Independent      Impairments/deficits, barriers:  Endurance, ax tolerance, functional balance/transfers, and ADLs. Assessment  Performance deficits / Impairments: Decreased functional mobility ,Decreased safe awareness,Decreased balance,Decreased coordination,Decreased ADL status,Decreased vision/visual deficit,Decreased endurance,Decreased high-level IADLs,Decreased strength,Decreased fine motor control  Decision Making: Medium Complexity  Equipment needed at discharge:None. COGNITIVE FUNCTION/SPEECH THERAPY (AS INDICATED)  LTG                                      Nursing Current Medical Status:   [x] Is continent of bowel and bladder     [] Is incontinent of bowel and bladder    [x] Has had an adequate number of bowel movements   [] Urinates with no urinary retention >300ml in bladder   [] Targeting bladder protocol with walker removal   [x] Maintaining O2 SATs at 92% or greater   [] Has pain managed while on ARU         [x] Has had no skin breakdown while on ARU   [] Has improved skin integrity via wound measurements   [] Has no signs/symptoms of infection at the wound site   [] Pressure wounds Stage/Location:    [] Arrived on unit with pressure wound  [x] Has been free from injury during hospitalization   [] Has experienced a fall during hospitalization  [] Ongoing education with patient/family with understanding demonstrated for:  [] Receives IV Fluids  [] Other:        NUTRITION  Weight: 136 lb 11 oz (62 kg) / Body mass index is 24.21 kg/m². Current diet: ADULT DIET; Regular; Low Fiber  Intake: Meal intake %, improving intake at this time       Medical improvements/barriers: Progressing well, improved anxiety        Team goals for next treatment period/Intervention for current barriers:   [x] Pt will increase activity tolerance for daily tasks.   [] Pt will improve bed mobility with reduced assist.  [x] Pt will improve safety in fx tasks with reduced cues/assist  [x] Pt will improve transfers with reduced assist  [x] Pt will improve toileting with reduced assist  [x] Pt will improve ADL's with use of adaptive equipment with reduced assist  [] Pt will improve pain mgmt for maximum participation in tx program  [] Pt will improve communication to get basic needs met on unit  [] Pt will improve swallowing for safe diet advancement with use of strategies  []  Plan for discharge to home. Patient Strengths: Motivated, Cooperative and Pleasant    Justification for Continued Stay  Based on my medical assessment of the patient and review of information from the interdisciplinary team as part of this weekly team conference, the patient continues to meet the following criteria for IRF level of care:   The patient requires active and ongoing intervention of multiple therapy disciplines   The patient requires and intensive rehabilitation therapy program   The patient requires continued physician supervision by a rehabilitation physician   The patient requires 24 hours rehab nursing care   The patient requires an intensive and coordinated interdisciplinary team approach to the delivery of rehabilitative care.      Assessment/Plan   [x]  The patient is making good progression towards their long term goals and is actively participating in and has a reasonable expectation to continue to benefit from the intensive rehabilitation therapy program   []  The estimated discharge date has been changed from initial team conference due to:   []  The estimated discharge destination has been changed from initial team conference due to:         Ongoing tx following discharge: [x][facility] PT OT      []OUTPATIENT     [] No Further Treatment     [] Family/Caregiver Training  []  Transitional Living Arrangement   [] Home Assessment (date  )     [] Family Conference   []  Therapeutic Pass       []  Other: (specify)    Estimated Discharge Date: 5/12/22    Estimated Discharge Destination: []home alone   [x]home alone with assist prn  []Continuous supervision []Return home with s/o/spouse/family   [] Assisted living    []SNF     Team members participating in today's conference. [x] Maryjo Herbert, Medical Director  [x] Preston Xie,       [] Genna Dave, RN Nurse Supervisor     [x]  Romi Gayle, PT  []  Agatha Leslie, PT       [] Anu Baptiste, OT   [x] [facility], OT  [] Kennedy Villavicencio, OT     [x]  George Lincoln, SLP    []  Lila Flores, SLP   [] TRESSA Mahmood      [] Sunita Dowd Mgr   [x]Tom Keys Mgr     [x] Anthony Peña RN   [] Benjie Sandy RN    [] Maude Camp RN    [] Alex Cruz RN    [] Denae Bryant RN   [] Jefferson Franklin RN   [] Pedro Fermin, RN Nurse Mgr     I have led this Team Conference and agree with the plan, Maryjo Herbert MD, 5/5/2022, 12:35 PM  Goals have been updated to reflect recent status.     Team conference note transcribed this date by: Lu Gomes MA, Yudith Tay, Therapy Coordinator

## 2022-05-04 NOTE — PROGRESS NOTES
Thania Jacobs    : 1935  Acct #: [de-identified]  MRN: [id number]              PM&R Progress Note      Admitting diagnosis: Diverticulitis ( [address])     Comorbid diagnoses impacting rehabilitation: Generalized weakness, gait disturbance, essential hypertension, hyponatremia, depression with anxiety, bigeminy, trigeminal neuralgia, orthostatic hypotension     Chief complaint: No abdominal cramping. Formed bowel movement today. Generally weak. Prior (baseline) level of function: Independent. Current level of function:         Current  IRF-TAMARA and Goals:   Occupational Therapy:    Short Term Goals  Time Frame for Short term goals: 5-7 days  Short Term Goal 1: Pt will complete self-feeding IND. :   Long Term Goals  Time Frame for Long term goals : 12-14 days or until d/c. Long Term Goal 1: Pt will complete oral hygiene c Mod I.  Long Term Goal 2: Pt will complete grooming tasks c Mod I.  Long Term Goal 3: Pt will complete total body bathing c Mod I and AE PRN. Long Term Goal 4: Pt will complete UB dressing c Ind. Long Term Goal 5: Pt will complete LB dressing c Mod I. Additional Goals?: Yes  Long Term Goal 6: Pt will doff/don footwear c Mod I.  Long Term Goal 7: Pt will complete toileting c Mod I.  Long Term Goal 8: Pt will perform functional transfers (bed, chair, toilet, shower) c DME PRN. Long Term Goal 9: Pt will perform therex/therax to facilitate increased strength/endurance/ax tolerance (c emphasis on dynamic standing balance/tolerance > 8 mins) c Mod I.  Long Term Goal 10: Pt will complete light home management tasks c Mod I. :                                       Eating: Eating  Assistance Needed: Setup or clean-up assistance  Comment: setup assistance for breakfast.  CARE Score: 5  Discharge Goal: Independent       Oral Hygiene: Oral Hygiene  Assistance Needed: Setup or clean-up assistance  Comment: Pt required assist opening toothpaste and mouthwash.   CARE Score: 5  Discharge Goal: Independent    UB/LB Bathing: Shower/Bathe Self  Assistance Needed: Partial/moderate assistance  Comment: Pt required assist to remain in stance and complete perineal hygiene. CARE Score: 3  Discharge Goal: Independent    UB Dressing: Upper Body Dressing  Assistance Needed: Supervision or touching assistance  Comment: SBA to don pullover sweatshirt. CARE Score: 4  Discharge Goal: Independent         LB Dressing: Lower Body Dressing  Assistance Needed: Substantial/maximal assistance  Comment: Pt able to get B feet into pants, however required assist to pull pants up and to maintain stance for therapist to pull pants over hips. CARE Score: 2  Discharge Goal: Independent    Donning and [place] Footwear: Putting On/Taking Off Footwear  Assistance Needed: Supervision or touching assistance  Comment: CGA for Pt to doff/don socks. CARE Score: 4  Discharge Goal: Independent      Toiletin [address] needed: Partial/moderate assistance  Comment: Pt required Min A for balance on toilet to complete perineal hygiene and Mod assist in stance to doff depends. CARE Score: 3  Discharge Goal: Independent      Toilet Transfers: Toilet Transfer  Assistance needed: Partial/moderate assistance  Comment: Mod to stand, Min A to sit with grab bars  CARE Score: 3  Discharge Goal: Independent    Physical Therapy:         Long Term Goals  Time Frame for Long term goals : 10-12 tx days:  Long term goal 1: Pt will complete sup<->sit Ind. Long term goal 2: Pt will complete OOB transfers using 2WW Mod Ind. Long term goal 3: Pt will ambulate 10 ft and 50 ft with turns using 2WW Mod Ind. Long term goal 4: Pt will ambulate 150 ft over level surface and at least 10 ft of uneven surface using 2WW with SBA. Long term goal 5: Pt will ascend/descend curb step using 2WW and 1 flight of stairs using railings with SBA-Sup.   Additional Goals?: Yes  Long term goal 6: Pt will complete object retrieval from the floor with 2WW and reacher Mod Ind. Long term goal 7: Pt will propel manual w/c 150 ft with turns Mod Ind. Bed Mobility:   Sit to Lying  Assistance Needed: Supervision or touching assistance  Comment: SBA without use of bed features  CARE Score: 4  Discharge Goal: Independent  Roll Left and Right  Assistance Needed: Independent  Comment: Ind without use of bed features  CARE Score: 6  Discharge Goal: Independent  Lying to Sitting on Side of Bed  Assistance Needed: Supervision or touching assistance  Comment: SBA without use of bed features  CARE Score: 4  Discharge Goal: Independent    Transfers:    Sit to Stand  Assistance Needed: Partial/moderate assistance  Comment: Mod A; required use of trunk momentum, 2WW, and holding of trunk to be able to stand; also required stabilization of 2WW as pt usually pulled onto AD during transfer process  CARE Score: 3  Discharge Goal: Independent  Chair/Bed-to-Chair Transfer  Assistance Needed: Partial/moderate assistance  Comment: Mod A to stand, Min A to turn and sit using 2WW  CARE Score: 3  Discharge Goal: Independent  Toilet Transfer  Assistance needed: Partial/moderate assistance  Comment:  Mod to stand, Min A to sit with grab bars  CARE Score: 3  Car Transfer  Assistance Needed: Partial/moderate assistance  Comment: Min A with 2WW, pt able to actively lift BLE in and out of car however required holding of trunk to sit, turn, and stand including stabilization of AD as pt pulled on it to be able to stand  CARE Score: 3  Discharge Goal: Independent    Ambulation:    Walking Ability  Does the Patient Walk?: Yes     Walk 10 Feet  Assistance Needed: Dependent  Comment: Min A using 2WW and usually required 2nd person assist for close w/c follow due to pt's fear of falling with her unpredictable strength, balance, and endurance  CARE Score: 1  Discharge Goal: Independent     Walk 50 Feet with Two Turns  Comment: max tolerance today was 23 ft using 2WW with Min A + SBA of 2nd person for close w/c follow (ambulation tolerance was limited by sudden onset of dizziness and increased fatigue; BP was 104/74, HR=76, SpO2 in room air was 99%)  Reason if not Attempted: Not attempted due to medical condition or safety concerns  CARE Score: 88  Discharge Goal: Independent     Walk 150 Feet  Comment: pt unable today due to safety concerns (dizziness and increased fatigue); pt will not likely be able to complete this task in the 1st 3 days  Reason if not Attempted: Not attempted due to medical condition or safety concerns  CARE Score: 88  Discharge Goal: Supervision or touching assistance     Walking 10 Feet on Uneven Surfaces  Comment: pt unable today due to safety concerns (dizziness and increased fatigue)  Reason if not Attempted: Not attempted due to medical condition or safety concerns  CARE Score: 88  Discharge Goal: Supervision or touching assistance     1 Step (Curb)  Comment: pt unable today due to safety concerns (dizziness and increased fatigue)  Reason if not Attempted: Not attempted due to medical condition or safety concerns  CARE Score: 88  Discharge Goal: Supervision or touching assistance     4 Steps  Comment: pt unable today due to safety concerns (dizziness and increased fatigue); pt will not likely be able to complete this task in the 1st 3 days  Reason if not Attempted: Not attempted due to medical condition or safety concerns  CARE Score: 88  Discharge Goal: Supervision or touching assistance     12 Steps  Comment: pt unable today due to safety concerns (dizziness and increased fatigue); pt will not likely be able to complete this task in the 1st 3 days  Reason if not Attempted: Not attempted due to medical condition or safety concerns  CARE Score: 88  Discharge Goal: Supervision or touching assistance       Wheelchair:  w/c Ability: Wheelchair Ability  Uses a Wheelchair and/or Scooter?: Yes  Wheel 50 Feet with Two Turns  Assistance Needed: Supervision or touching assistance  Comment: SBA-Sup using BUE primarily  CARE Score: 4  Discharge Goal: Independent  Wheel 150 Feet  Assistance Needed: Substantial/maximal assistance  Comment: pt was only able to propel up to 79 ft today (limited by fatigue)  CARE Score: 2  Discharge Goal: Independent          Balance:        Object: Picking Up Object  Assistance Needed: Partial/moderate assistance  Comment: Min A with 2WW and reacher  CARE Score: 3  Discharge Goal: Independent    I      Exam:    Blood pressure (!) 122/57, pulse 79, temperature 98.6 °F (37 °C), resp. rate 16, height 5' 3\" (1.6 m), weight 132 lb 15 oz (60.3 kg), SpO2 92 %, not currently breastfeeding. General: Observed sitting in a wheelchair in therapy. Alert and talkative. Fair insight. HEENT: MMM. No JVD. Clear speech. Pulmonary: Symmetric air exchange without wheezes or rales. Cardiac: RRR. Abdomen: Patient's abdomen is soft and nondistended. Bowel sounds were present throughout. There was no rebound, guarding or masses noted. Upper extremities: Able to bring both hands up to meet mine. Normal tone with some giveaway with MMT. Lower extremities: No signs of DVT. Trace edema. Calves were soft. Sitting balance was good. Standing balance was poor.     Lab Results   Component Value Date    WBC 4.8 05/02/2022    HGB 11.6 (L) 05/02/2022    HCT 35.3 (L) 05/02/2022    .9 (H) 05/02/2022     05/02/2022     Lab Results   Component Value Date    INR 0.95 02/01/2022    INR 0.96 01/25/2016    PROTIME 12.3 02/01/2022    PROTIME 11.0 01/25/2016     Lab Results   Component Value Date    CREATININE 0.9 05/02/2022    BUN 20 05/02/2022     05/02/2022    K 4.0 05/02/2022    CL 98 (L) 05/02/2022    CO2 29 05/02/2022     Lab Results   Component Value Date    ALT 19 04/28/2022    AST 37 04/28/2022    ALKPHOS 40 04/28/2022    BILITOT 0.6 04/28/2022       Expected length of stay  prior to a supervised level of function for discharge home with a walker and [facility] OT/PT is 2 weeks. Recommendations:    1. Diverticulitis with gait disturbance: Developing the routine for her daily occupational and physical therapy. Encouraging her to take in oral fluids. She is on vitamin supplementation and a bland diet. She requires adaptive equipment training, possibly caregiver education and bowel and bladder monitoring. DVT prophylaxis. Aggressive pulmonary hygiene measures. Verbal cues and moderate physical assistance for transfers. 2. DVT prophylaxis: Lovenox 40 mg subcu daily. I must periodically monitor her hemoglobin and platelet count while on this medication. Weightbearing activities are pursued daily. GI prophylaxis is available. No signs of acute blood loss. 3. Essential hypertension: Pain control, diuretic therapy with Lasix and verapamil. Vital signs are checked at rest and with activity. Target systolic blood pressures 308-426. Blood pressure within target range today. 4. Hyponatremia: Periodic monitoring of her chemistries. Cautious use of diuretics. Encouraging consistent oral intake of a general diet. 5. Depression with anxiety: Zoloft and pain management. Regulating her sleep-wake schedule. Treating her in a calm and consistent environment when possible. Ativan as needed. 6. Bigeminy: Monitoring of her vital signs. Keeping close tabs on her chemistries. Outpatient follow-up with cardiology. 7. Trigeminal neuralgia: Depakote, gabapentin and Zoloft. Acetaminophen as needed. 8. Orthostatic hypotension: Mestinon, cautious diuretic use, AFIA stockings and slow and deliberate position changes were needed. Vital signs checked at rest and with activity.   She has general fatigue but no particular positional dizziness today.

## 2022-05-04 NOTE — PROGRESS NOTES
Physical Therapy    [x] daily progress note       [] discharge       Patient Name:  Thania Jacobs   :  1935 MRN: [id number]  Room:  [address] [place] [address] Date of Admission: 2022  Rehabilitation Diagnosis:   Diverticulitis of intestine, part unspecified, without perforation or abscess without bleeding [K57.92]  Diverticulitis [K57.92]       Date 2022       Day of ARU Week:  3   Time IN//1000  1558/1648   Individual Tx Minutes 60   Group Tx Minutes    Co-Treat Minutes    Concurrent Tx Minutes    TOTAL Tx Time Mins 60   Variance Time    Variance Time []   Refusal due to:     []   Medical hold/reason:    []   Illness   []   Off Unit for test/procedure  []   Extra time needed to complete task  []   Therapeutic need  []   Other (specify):   Restrictions Restrictions/Precautions  Restrictions/Precautions: Fall Risk,Contact Precautions (C-diff)      Interdisciplinary communication [x]   Cleared for therapy per nursing     []   RN notified about issues during session  []   RN updated on pt performance  []   Spoke with   []   Spoke with OT  []   Spoke with MD  [x]   Other: coordinated with PCT recommended assist for toileting and toilet transfer   Subjective observations and cognitive status: (AM) Pt resting in bed in L sidelying, states sleeping well last night, ate some breakfast, denied any need to use the bathroom when asked in bed and suddenly had BM urgency as soon as she sat at EOB resulting in bowel accident in depends as she walked to the bathroom with assist.  (PM) Pt resting in recliner, eating some snacks, \"I think I know why I felt so weak earlier. I was not drinking enough. \"     Pain level/location:   0 /10       Location:    Discharge recommendations  Anticipated discharge date:  TBD  Destination: []home alone   []home alone with assist PRN     [] home w/ family      [] Continuous supervision  []SNF    [] Assisted living     [] Other:  Continued therapy: []C PT  []OUTPATIENT PT   [] No Further PT  []SNF PT  Caregiver training recommended: []Yes  [] No   Equipment needs: has 2WW     PT IRF-TAMARA scores and goals for discharge assessment:   Lying to Sitting on Side of Bed  Assistance Needed: Supervision or touching assistance  Comment: SBA without use of bed features  CARE Score: 4  Discharge Goal: Independent     (AM) Sit to Stand  Assistance Needed: Partial/moderate assistance  Comment: Mod A; required constant cues on hand placement to correct pt's tendency to pull onto AD as observed on PT eval; required use of trunk momentum to be able to shift weight forward and complete standing using 2WW  CARE Score: 3  Discharge Goal: Independent    (PM) Sit to Stand  Assistance Needed: Supervision or touching assistance  Comment: CGA using 2WW (performance improved with verbal prompts initially and demonstrated carry over in succeeding transfers from w/c and recliner)  CARE Score: 4  Discharge Goal: Independent     (AM) Chair/Bed-to-Chair Transfer  Assistance Needed: Partial/moderate assistance  Comment: Mod A to stand, Min A to turn and sit using 2WW  CARE Score: 3  Discharge Goal: Independent    (PM) Chair/Bed-to-Chair Transfer  Assistance Needed: Supervision or touching assistance  Comment: CGA using 2WW (performance improved with verbal prompts initially and demonstrated carry over in succeeding transfers to w/c and recliner  CARE Score: 4  Discharge Goal: Independent    (AM) Toilet Transfer  Assistance needed: Partial/moderate assistance  Comment: Mod A to stand, Min A to sit using grab bars and 2WW  CARE Score: 3  Discharge Goal: Independent    Walk 10 Feet?   Walk 10 Feet?: Yes      Walking Ability  Does the Patient Walk?: Yes    Walk 10 Feet  Assistance Needed: Supervision or touching assistance  Comment: (AM) Min A using 2WW (PM) CGA using 2WW (pt's gait quality was steadier with verbal prompts to slow down; pt performed 20 ft distance with turns x 4 trials in room)  CARE Score: 4  Discharge Goal: Independent     (AM) Walk 50 Feet with Two Turns  Comment: max tolerance today was 22 ft using 2WW with Min A + close w/c follow of the same person; gait quality was unsteady as pt ambulated with fast speed; L knee instability observed; ambulation tolerance was limited by fatigue/increased weakness  Reason if not Attempted: Not attempted due to medical condition or safety concerns  CARE Score: 88  Discharge Goal: Independent    Additional Therapeutic activities/exercises completed this date:     []   Nu-step:  Time:        Level:         #Steps:       []   Rebounder:    []  Seated     []  Standing        []   Balance training         []   Postural training    []   Supine ther ex (reps/sets):     [x]   Seated ther ex (reps/sets): AROM R/L ankle PF/PF/circles x 20 reps, alternating LAQ x 5 sec hold x 10 reps    [x]   Standing ther ex (reps/sets): mini squats with BUE support x 10 reps, marches with opposite UE support x 10 reps (LLE ROM <RLE ROM; no R/L knee instability observed throughout), bilat toe/ankle raises with BUE support x 10 reps    [x]   Other: Toileting activity completed with Min A for LB clothing management in standing, SBA for perineal hygiene at seated level following episode of BM   []   Other:    Comments:      Patient/Caregiver Education and Training:   []   Role of PT  []   Education about Dx  []   Use of call light for assist   []   HEP provided and explained   [x]   Treatment plan reviewed  []   Home safety  []   Wheelchair mobility/management   []   Body mechanics  [x]   Bed Mobility/Transfer technique  []   Gait technique/sequencing  []   Proper use of assistive device/adaptive equipment  []   Stair training/Advanced mobility safety and technique  []   Reinforced patient's precautions/mobility while maintaining precautions  []   Postural awareness  []   Family/caregiver training  []   Progress was updated and reviewed in Rehabtracker with patient and/or family this date.   [] Other:    Treatment Plan for Next Session: transfers and increase level surface gait distance in hallway using 2WW; BLE strengthening      Assessment: This pt demonstrated a positive response to today's treatment as evidenced by consistent ability to complete OOB transfers and short distance ambulation with 1-person assist. The patient has increased BM frequency requiring extra time in the bathroom and reducing her focus and required time to participate in gait training during AM session. Pt's confidence and performance for standing tasks was much improved during PM session and demonstrated some carry over on proper hand placement for transfers with blocked practice and will need continued assessment if able to carry over/recall tomorrow. Treatment/Activity Tolerance:   [] Tolerated treatment with no adverse effects    [x] Patient limited by fatigue  [] Patient limited by pain   [x] Patient limited by medical complications: (AM) increased BM frequency     [] Adverse reaction to Tx:   [] Significant change in status    Safety:       []  bed alarm set    [x]  (PM) chair alarm set    []  Pt refused alarms                []  Telesitter activated      [x]  Gait belt used during tx session      [x]other: (AM) pt left in toilet under PCT's care        Number of Minutes/Billable Intervention  Gait Training 30   Therapeutic Exercise 15   Neuro Re-Ed    Therapeutic Activity 65   Wheelchair Propulsion    Group    Other:    TOTAL 110     Social History  Social/Functional History  Lives With: Alone  Type of Home: House  Home Layout: One level (Pt has basement but does not use.  Has stair lift but has never been used.)  Home Access: Stairs to enter with rails  Entrance Stairs - Number of Steps: 3-4 EV  Entrance Stairs - Rails: Both  Bathroom Shower/Tub: Tub/Shower unit,Shower chair with back  Bathroom Toilet: Handicap height (Pt states frame over toilet c grab bars.)  Bathroom Equipment: Grab bars in shower,Shower chair,Grab bars around toilet,Hand-held shower  Bathroom Accessibility: Accessible  Home Equipment: Cane,Walker, rolling,Grab bars,Reacher  Has the patient had two or more falls in the past year or any fall with injury in the past year?: No  Receives Help From: Family (Gustabo Morillo lives 4 doors down. He assists c grocery shopping and house cleaning.)  ADL Assistance: Independent  Homemaking Assistance: Needs assistance (alireza does cleaning and grocery shopping)  Homemaking Responsibilities: Yes  Meal Prep Responsibility: Primary  Laundry Responsibility: Primary  Cleaning Responsibility: Primary  Bill Paying/Finance Responsibility: No (Alireza took over ~ 2 months ago as Pt states she began \"having a hard time remembering\")  Shopping Responsibility: Primary  Health Care Management: Secondary (Pt and grandem complete together.)  Ambulation Assistance: Independent (Pt reports she would use a cane at night and would take it in public just incase. Pt primarily Ind without device.)  Transfer Assistance: Independent  Active : Yes  Mode of Transportation: Car  Occupation: Retired  Type of Occupation:   Leisure & Hobbies: Pt enjoys watching television, cooking, and being active. Additional Comments: Pt has regular, flat bed at home; no falls in the past year    Objective                                                                                    Goals:  (Update in navigator)   : Long Term Goals  Time Frame for Long term goals : 10-12 tx days:  Long term goal 1: Pt will complete sup<->sit Ind. Long term goal 2: Pt will complete OOB transfers using 2WW Mod Ind. Long term goal 3: Pt will ambulate 10 ft and 50 ft with turns using 2WW Mod Ind. Long term goal 4: Pt will ambulate 150 ft over level surface and at least 10 ft of uneven surface using 2WW with SBA. Long term goal 5: Pt will ascend/descend curb step using 2WW and 1 flight of stairs using railings with SBA-Sup.   Additional Goals?: Yes  Long term goal 6: Pt will complete object retrieval from the floor with 2WW and reacher Mod Ind. Long term goal 7: Pt will propel manual w/c 150 ft with turns Mod Ind.:        Plan of Care                                                                              Times per week: 5 days per week for a minimum of 60 minutes/day plus group as appropriate for 60 minutes.   Treatment to include Current Treatment Recommendations: Strengthening,Balance training,Functional mobility training,Transfer training,Endurance training,Gait training,Stair training,Home exercise program,Safety education & training,Patient/Caregiver education & training,Equipment evaluation, education, & procurement,Therapeutic activities,Wheelchair mobility training    Electronically signed by   Kareem Neighbours, PT  5/4/2022, 4:49 PM

## 2022-05-05 PROCEDURE — 97535 SELF CARE MNGMENT TRAINING: CPT

## 2022-05-05 PROCEDURE — 97530 THERAPEUTIC ACTIVITIES: CPT

## 2022-05-05 PROCEDURE — 1280000000 HC REHAB R&B

## 2022-05-05 PROCEDURE — 2580000003 HC RX 258: Performed by: STUDENT IN AN ORGANIZED HEALTH CARE EDUCATION/TRAINING PROGRAM

## 2022-05-05 PROCEDURE — 97116 GAIT TRAINING THERAPY: CPT

## 2022-05-05 PROCEDURE — 99233 SBSQ HOSP IP/OBS HIGH 50: CPT | Performed by: PHYSICAL MEDICINE & REHABILITATION

## 2022-05-05 PROCEDURE — 6360000002 HC RX W HCPCS: Performed by: STUDENT IN AN ORGANIZED HEALTH CARE EDUCATION/TRAINING PROGRAM

## 2022-05-05 PROCEDURE — 94761 N-INVAS EAR/PLS OXIMETRY MLT: CPT

## 2022-05-05 PROCEDURE — 6370000000 HC RX 637 (ALT 250 FOR IP): Performed by: STUDENT IN AN ORGANIZED HEALTH CARE EDUCATION/TRAINING PROGRAM

## 2022-05-05 PROCEDURE — 6370000000 HC RX 637 (ALT 250 FOR IP): Performed by: PHYSICAL MEDICINE & REHABILITATION

## 2022-05-05 PROCEDURE — 97110 THERAPEUTIC EXERCISES: CPT

## 2022-05-05 RX ADMIN — GABAPENTIN 300 MG: 300 CAPSULE ORAL at 15:01

## 2022-05-05 RX ADMIN — KETOTIFEN FUMARATE 1 DROP: 0.35 SOLUTION/ DROPS OPHTHALMIC at 21:13

## 2022-05-05 RX ADMIN — ROSUVASTATIN CALCIUM 10 MG: 5 TABLET, FILM COATED ORAL at 21:12

## 2022-05-05 RX ADMIN — GABAPENTIN 300 MG: 300 CAPSULE ORAL at 08:46

## 2022-05-05 RX ADMIN — PANTOPRAZOLE SODIUM 40 MG: 40 TABLET, DELAYED RELEASE ORAL at 06:10

## 2022-05-05 RX ADMIN — Medication 1 TABLET: at 21:12

## 2022-05-05 RX ADMIN — PYRIDOSTIGMINE BROMIDE 30 MG: 60 TABLET ORAL at 21:13

## 2022-05-05 RX ADMIN — ENOXAPARIN SODIUM 40 MG: 100 INJECTION SUBCUTANEOUS at 08:46

## 2022-05-05 RX ADMIN — SODIUM CHLORIDE, PRESERVATIVE FREE 10 ML: 5 INJECTION INTRAVENOUS at 08:49

## 2022-05-05 RX ADMIN — KETOTIFEN FUMARATE 1 DROP: 0.35 SOLUTION/ DROPS OPHTHALMIC at 08:48

## 2022-05-05 RX ADMIN — SODIUM CHLORIDE, PRESERVATIVE FREE 10 ML: 5 INJECTION INTRAVENOUS at 21:13

## 2022-05-05 RX ADMIN — Medication 1 TABLET: at 08:46

## 2022-05-05 RX ADMIN — ASPIRIN 81 MG: 81 TABLET, COATED ORAL at 08:46

## 2022-05-05 RX ADMIN — FUROSEMIDE 20 MG: 20 TABLET ORAL at 08:46

## 2022-05-05 RX ADMIN — DIVALPROEX SODIUM 250 MG: 250 TABLET, FILM COATED, EXTENDED RELEASE ORAL at 21:12

## 2022-05-05 RX ADMIN — FLUTICASONE PROPIONATE 1 SPRAY: 50 SPRAY, METERED NASAL at 08:48

## 2022-05-05 RX ADMIN — SERTRALINE HYDROCHLORIDE 25 MG: 50 TABLET ORAL at 08:46

## 2022-05-05 RX ADMIN — VITAM B12 50 MCG: 100 TAB at 08:47

## 2022-05-05 RX ADMIN — GABAPENTIN 300 MG: 300 CAPSULE ORAL at 21:13

## 2022-05-05 RX ADMIN — ACETAMINOPHEN 650 MG: 325 TABLET ORAL at 06:10

## 2022-05-05 RX ADMIN — MAGNESIUM OXIDE 400 MG (241.3 MG MAGNESIUM) TABLET 400 MG: TABLET at 08:45

## 2022-05-05 RX ADMIN — VERAPAMIL HYDROCHLORIDE 120 MG: 120 TABLET, FILM COATED, EXTENDED RELEASE ORAL at 08:47

## 2022-05-05 RX ADMIN — LORAZEPAM 0.5 MG: 0.5 TABLET ORAL at 23:54

## 2022-05-05 ASSESSMENT — PAIN SCALES - WONG BAKER
WONGBAKER_NUMERICALRESPONSE: 0

## 2022-05-05 ASSESSMENT — PAIN SCALES - GENERAL
PAINLEVEL_OUTOF10: 0

## 2022-05-05 NOTE — PROGRESS NOTES
Physical Rehabilitation: OCCUPATIONAL THERAPY     [x] daily progress note       [] discharge       Patient Name:  Abran Duverney   :  1935 MRN: [id number]  Room:  [address] [place] [address] Date of Admission: 2022  Rehabilitation Diagnosis:   Diverticulitis of intestine, part unspecified, without perforation or abscess without bleeding [K57.92]  Diverticulitis [K57.92]        Date 2022       Day of ARU Week:  4   Time IN//845; 1400/1430   Individual Tx Minutes 90 + 30   Group Tx Minutes    Co-Treat Minutes    Concurrent Tx Minutes    TOTAL Tx Time Mins 120   Variance Time    Variance Time []   Refusal due to:     []   Medical hold/reason:    []   Illness   []   Off Unit for test/procedure  []   Extra time needed to complete task  []   Therapeutic need  []   Other (specify):   Restrictions Restrictions/Precautions: Fall Risk,Contact Precautions (C-diff)         Communication with other providers: [x]   OK to see per nursing:     []   Spoke with team member regarding:      Subjective observations and cognitive status: Patient side lying upon approach, pleasant and agreeable to therapy      Pain level/location:    /10       Location: per patient no pain noted    Discharge recommendations  Anticipated discharge date:  TBD  Destination: [x]home alone   []home alone w assist prn   [] home w/ family    [] Continuous supervision       []SNF    [] Assisted living     [] Other:   Continued therapy: [x]HHC OT  []OUTPATIENT  OT   [] No Further OT  Equipment needs: None        ADLs:    Eating: Eating  Assistance Needed: Independent  Comment: X  CARE Score: 6  Discharge Goal: Independent       Oral Hygiene: Oral Hygiene  Assistance Needed: Independent  Comment: X  CARE Score: 6  Discharge Goal: Independent    UB/LB Bathing: Shower/Bathe Self  Assistance Needed: Supervision or touching assistance  Comment: CGA in stance for safety, seated majority of shower  CARE Score: 4  Discharge Goal: Independent    UB Dressing: Upper Body Dressing  Assistance Needed: Setup or clean-up assistance  Comment: Set up  CARE Score: 5  Discharge Goal: Independent         LB Dressing: Lower Body Dressing  Assistance Needed: Supervision or touching assistance  Comment: SB/CGA in stance to manage over hips  CARE Score: 4  Discharge Goal: Independent    Donning and [place] Footwear: Putting On/Taking Off Footwear  Assistance Needed: Setup or clean-up assistance  Comment: set up required no use of AE  CARE Score: 5  Discharge Goal: Independent      Toiletin [address] needed: Supervision or touching assistance  Comment: CGA in stance to manage clothing over hips, completes hygiene seated  CARE Score: 4  Discharge Goal: Independent      Toilet Transfers:     Toilet Transfer  Assistance needed: Partial/moderate assistance  Comment: Min A 2* to height of toilet  CARE Score: 3  Discharge Goal: Independent  Device Used:    [x]   Standard Toilet         [x]   Grab Bars           []  Bedside Commode       []   Elevated Toilet          []   Other:     Placed BSC frame over toilet to increase independence, patient has toilet riser at baseline         Bed Mobility:           []   Pt received out of bed   Rolling R/L:    Scooting:  Sup  Supine --> Sit:  Sup  Sit --> Supine:      Transfers:    Sit--> Stand:  CGA  Stand --> Sit:   CGA  Stand-Pivot:   CGA  Other:    Assistive device required for transfer:   RW      Functional Mobility:  throughout room/bathroom PM SESSION: throughout ARU down hallway to car and  Back to dining room with no rest breaks with SBA   Assistance:  CGA/Light Min A c L knee buckle, patient able to correct with Min A   Device:   [x]   Rolling Walker     []   Standard Walker []   Wheelchair        []   [employer]       []   4-Wheeled [place] [employer]         []   Cardiac Walker       []   Other:        Additional Therapeutic activities/exercises completed this date:     [x]   ADL Training   [x]   Balance/Postural training     [x]   Bed/Transfer Training   [x]   Endurance Training Patient instructed in bilateral reciprocal patterned movement  For 6 minutes and seated 6 minutes with rest break in between to increase strength and endurance for facilitation of ADL/IADL and mobility tasks    []   Neuromuscular Re-ed   []   Nu-step:  Time:        Level:         #Steps:       []   Rebounder:    []  Seated     []  Standing        []   Supine Ther Ex (reps/sets):     []   Seated Ther Ex (reps/sets):     []   Standing Ther Ex (reps/sets):     []   Other:      Comments:  Patient does require rest breaks and increased time to complete tasks  throughout full ADL session       Patient/Caregiver Education and Training:   [x]   Adaptive Equipment Use  [x]   Bed Mobility/Transfer Technique/Safety  [x]   Energy Conservation Tips  []   Family training  [x]   Postural Awareness  [x]   Safety During Functional Activities  []   Reinforced Patient's Precautions   []   Progress was updated and reviewed in Rehabtracker with patient and/or family this         date. Treatment Plan for Next Session: POC to continue as tolerated; Endurance, transfer training  from varying surfaces            Treatment/Activity Tolerance:   [x] Tolerated treatment with no adverse effects    [] Patient limited by fatigue  [] Patient limited by pain   [] Patient limited by medical complications:    [] Adverse reaction to Tx:   [] Significant change in status    Safety:       []  bed alarm set    [x]  chair alarm set    []  Pt refused alarms                []  Telesitter activated      [x]  Gait belt used during tx session      []other:       Number of Minutes/Billable Intervention  Therapeutic Exercise    ADL Self-care 60   Neuro Re-Ed    Therapeutic Activity 30 + 30   Group    Other:    TOTAL 120       Social History  Social/Functional History  Lives With: Alone  Type of Home: House  Home Layout: One level (Pt has basement but does not use.  Has stair lift but has never been used.)  Home Access: Stairs to enter with rails  Entrance Stairs - Number of Steps: 3-4 EV  Entrance Stairs - Rails: Both  Bathroom Shower/Tub: Tub/Shower unit,Shower chair with back  Bathroom Toilet: Handicap height (Pt states frame over toilet c grab bars.)  Bathroom Equipment: Grab bars in shower,Shower chair,Grab bars around toilet,Hand-held shower  Bathroom Accessibility: Accessible  Home Equipment: Cane,Walker, rolling,Grab bars,Reacher  Has the patient had two or more falls in the past year or any fall with injury in the past year?: No  Receives Help From: Family (Gustabo Morillo lives 4 doors down. He assists c grocery shopping and house cleaning.)  ADL Assistance: Independent  Homemaking Assistance: Needs assistance (chika does cleaning and grocery shopping)  Homemaking Responsibilities: Yes  Meal Prep Responsibility: Primary  Laundry Responsibility: Primary  Cleaning Responsibility: Primary  Bill Paying/Finance Responsibility: No (Chika took over ~ 2 months ago as Pt states she began \"having a hard time remembering\")  Shopping Responsibility: Primary  Health Care Management: Secondary (Pt and grandem complete together.)  Ambulation Assistance: Independent (Pt reports she would use a cane at night and would take it in public just incase. Pt primarily Ind without device.)  Transfer Assistance: Independent  Active : Yes  Mode of Transportation: Car  Occupation: Retired  Type of Occupation:   Leisure & Hobbies: Pt enjoys watching television, cooking, and being active. Additional Comments: Pt has regular, flat bed at home; no falls in the past year    Objective                                                                                    Goals:  (Update in navigator)  Short Term Goals  Time Frame for Short term goals: 5-7 days  Short Term Goal 1: Pt will complete self-feeding IND.:  Long Term Goals  Time Frame for Long term goals : 12-14 days or until d/c.   Long Term Goal 1: Pt will complete oral hygiene c Mod I.  Long Term Goal 2: Pt will complete grooming tasks c Mod I.  Long Term Goal 3: Pt will complete total body bathing c Mod I and AE PRN. Long Term Goal 4: Pt will complete UB dressing c Ind. Long Term Goal 5: Pt will complete LB dressing c Mod I. Additional Goals?: Yes  Long Term Goal 6: Pt will doff/don footwear c Mod I.  Long Term Goal 7: Pt will complete toileting c Mod I.  Long Term Goal 8: Pt will perform functional transfers (bed, chair, toilet, shower) c DME PRN. Long Term Goal 9: Pt will perform therex/therax to facilitate increased strength/endurance/ax tolerance (c emphasis on dynamic standing balance/tolerance > 8 mins) c Mod I.  Long Term Goal 10: Pt will complete light home management tasks c Mod I.:        Plan of Care                                                                              Times per week: 5 days per week for a minimum of 60 minutes/day plus group as appropriate for 60 minutes.   Treatment to include Plan  Times per Day: Daily  Current Treatment Recommendations: Strengthening,ROM,Balance training,Functional mobility training,Endurance training,Neuromuscular re-education,Safety education & training,Patient/Caregiver education & training,Equipment evaluation, education, & procurement,Self-Care / ADL,Home management training,Coordination training    Electronically signed by   MAGALIS Lester,  5/5/2022, 8:22 AM

## 2022-05-05 NOTE — FLOWSHEET NOTE
[x] daily progress note       [] discharge       Patient Name:  Mayelin Dockery   :  1935 MRN: [id number]  Room:  [address] [place] [address] Date of Admission: 2022  Rehabilitation Diagnosis:   Diverticulitis of intestine, part unspecified, without perforation or abscess without bleeding [K57.92]  Diverticulitis [K57.92]       Date 2022       Day of ARU Week:  4   Time IN/OUT 7975-3006   Individual Tx Minutes 60   TOTAL Tx Time Mins 60   Restrictions Restrictions/Precautions  Restrictions/Precautions: Fall Risk,Contact Precautions (C-diff)      Communication with other providers: [x]   OK to see per nursing:     []   Spoke with team member regarding:      Subjective observations and cognitive status: Pt seen sitting up in recliner at beginning of treatment. Agreeable to therapy. Pain level/location:   0 /10           Discharge recommendations   Anticipated discharge date:  TBD  Destination: []?home alone   []?home alone with assist PRN     []? home w/ family      []? Continuous supervision  []? SNF    []? Assisted living     []? Other:  Continued therapy: []?C PT  []? OUTPATIENT PT   []? No Further PT  []? SNF PT  Caregiver training recommended: []? Yes  []? No   Equipment needs: has 2WW         Bed Mobility:           [x]   Pt received out of bed   Sit --> lying:  Independent without rails     Transfers:    Sit--> Stand: SBA with RW   Stand --> Sit:  SBA with RW  Stand pivot transfers:  SBA without AD  Car Transfers:  SBA with RW   Assistive device required for transfer: RW  Vcs for proper sequencing. Gait:    Distance:    Assistance: CGA  Device: RW  Gait Quality: reciprocal pattern     Stairs   # Completed: 13  Assistance: CGA  Supportive Device:  2 rails  Vcs for proper sequencing.      Uneven Surfaces:       Assistance:  CGA  Device:   RW  Surfaces Completed:   [x]  Carpeted Surface with bean bags beneath      []  Throw rugs       []  Ramp       []  Outdoor pavements        []  Grass             []  Loose gravel        []  Other:     Pt amb up/down 4\" curb step CGA with RW. Additional Therapeutic activities/exercises completed this date:     []   Nu-step:  Time:        Level:         #Steps:       []   Rebounder:    []  Seated     []  Standing        []   Balance training         []   Postural training    [x]   Supine ther ex (reps/sets):  Lower trunk rotation x 10 reps; pelvic tilts x 10 reps; gluteus maikol stretching x 10 second holds x 1 set each. Hip adduction isometric pillow squeezes x 20 reps, bridging x 10 reps (all for strengthening and stretching)     []   Seated ther ex (reps/sets):     []   Standing ther ex (reps/sets):     []   Picking up object from floor (standing):                   []   Reacher used   []   Other:   []   Other:    Patient/Caregiver Education and Training:   [x]   Bed Mobility/Transfer technique/safety  [x]   Gait technique/sequencing  [x]   Proper use of assistive device  [x]   Advanced mobility safety and technique  []   Reinforced patient's precautions/mobility while maintaining precautions  []   Postural awareness  []   Family training    Treatment Plan for Next Session: gait; transfers; advanced gait; stair training; exercises; balance training     Assessment: This pt demonstrated a positive response to today's treatment as evidenced by improved mobility. The patient is making progress toward established goals as evidenced by QI scores. Ongoing deficits are observed in the areas of strength, balance, endurance, and safety and continued focus on this is recommended.      Treatment/Activity Tolerance:   [x] Tolerated treatment with no adverse effects    [] Patient limited by fatigue  [] Patient limited by pain   [] Patient limited by medical complications:    [] Adverse reaction to Tx:   [] Significant change in status    Safety:       [x]  bed alarm set    []  chair alarm set    []  Pt refused alarms                []  Telesitter activated      [x]  Gait belt used during tx session      [x]other: pt left supine in bed with call light at end of treatment. Number of Minutes/Billable Intervention  Gait Training 30   Therapeutic Exercise 15   Neuro Re-Ed    Therapeutic Activity 15   Wheelchair Propulsion    Group    Other:    TOTAL 60         Social History  Social/Functional History  Lives With: Alone  Type of Home: House  Home Layout: One level (Pt has basement but does not use. Has stair lift but has never been used.)  Home Access: Stairs to enter with rails  Entrance Stairs - Number of Steps: 3-4 EV  Entrance Stairs - Rails: Both  Bathroom Shower/Tub: Tub/Shower unit,Shower chair with back  Bathroom Toilet: Handicap height (Pt states frame over toilet c grab bars.)  Bathroom Equipment: Grab bars in shower,Shower chair,Grab bars around toilet,Hand-held shower  Bathroom Accessibility: Accessible  Home Equipment: Cane,Walker, rolling,Grab bars,Reacher  Has the patient had two or more falls in the past year or any fall with injury in the past year?: No  Receives Help From: Family (Gustabo Morillo lives 4 doors down. He assists c grocery shopping and house cleaning.)  ADL Assistance: Independent  Homemaking Assistance: Needs assistance (alireza does cleaning and grocery shopping)  Homemaking Responsibilities: Yes  Meal Prep Responsibility: Primary  Laundry Responsibility: Primary  Cleaning Responsibility: Primary  Bill Paying/Finance Responsibility: No (Alireza took over ~ 2 months ago as Pt states she began \"having a hard time remembering\")  Shopping Responsibility: Primary  Health Care Management: Secondary (Pt and grandson complete together.)  Ambulation Assistance: Independent (Pt reports she would use a cane at night and would take it in public just incase.  Pt primarily Ind without device.)  Transfer Assistance: Independent  Active : Yes  Mode of Transportation: Car  Occupation: Retired  Type of Occupation:   Leisure & Hobbies: Pt enjoys watching television, cooking, and being active. Additional Comments: Pt has regular, flat bed at home; no falls in the past year    Objective                                                                                    Goals:  (Update in navigator)   : Long Term Goals  Time Frame for Long term goals : 10-12 tx days:  Long term goal 1: Pt will complete sup<->sit Ind. Long term goal 2: Pt will complete OOB transfers using 2WW Mod Ind. Long term goal 3: Pt will ambulate 10 ft and 50 ft with turns using 2WW Mod Ind. Long term goal 4: Pt will ambulate 150 ft over level surface and at least 10 ft of uneven surface using 2WW with SBA. Long term goal 5: Pt will ascend/descend curb step using 2WW and 1 flight of stairs using railings with SBA-Sup. Additional Goals?: Yes  Long term goal 6: Pt will complete object retrieval from the floor with 2WW and reacher Mod Ind. Long term goal 7: Pt will propel manual w/c 150 ft with turns Mod Ind.:        Plan of Care                                                                              Times per week: 5 days per week for a minimum of 60 minutes/day plus group as appropriate for 60 minutes.   Treatment to include Current Treatment Recommendations: Strengthening,Balance training,Functional mobility training,Transfer training,Endurance training,Gait training,Stair training,Home exercise program,Safety education & training,Patient/Caregiver education & training,Equipment evaluation, education, & procurement,Therapeutic activities,Wheelchair mobility training    Electronically signed by   Kennedi Perez, [id number]  5/5/2022, 10:06 AM

## 2022-05-05 NOTE — PROGRESS NOTES
Geo Hartman    : 1935  Acct #: [de-identified]  MRN: [id number]              PM&R Progress Note      Admitting diagnosis: Diverticulitis ( [address])     Comorbid diagnoses impacting rehabilitation: Generalized weakness, gait disturbance, essential hypertension, hyponatremia, depression with anxiety, bigeminy, trigeminal neuralgia, orthostatic hypotension     Chief complaint: Generalized fatigue. Fair appetite. No belly cramping. Prior (baseline) level of function: Independent. Current level of function:         Current  IRF-TAMARA and Goals:   Occupational Therapy:    Short Term Goals  Time Frame for Short term goals: 5-7 days  Short Term Goal 1: Pt will complete self-feeding IND. :   Long Term Goals  Time Frame for Long term goals : 12-14 days or until d/c. Long Term Goal 1: Pt will complete oral hygiene c Mod I.  Long Term Goal 2: Pt will complete grooming tasks c Mod I.  Long Term Goal 3: Pt will complete total body bathing c Mod I and AE PRN. Long Term Goal 4: Pt will complete UB dressing c Ind. Long Term Goal 5: Pt will complete LB dressing c Mod I. Additional Goals?: Yes  Long Term Goal 6: Pt will doff/don footwear c Mod I.  Long Term Goal 7: Pt will complete toileting c Mod I.  Long Term Goal 8: Pt will perform functional transfers (bed, chair, toilet, shower) c DME PRN. Long Term Goal 9: Pt will perform therex/therax to facilitate increased strength/endurance/ax tolerance (c emphasis on dynamic standing balance/tolerance > 8 mins) c Mod I.  Long Term Goal 10: Pt will complete light home management tasks c Mod I. :                                       Eating: Eating  Assistance Needed: Setup or clean-up assistance  Comment: setup assistance for breakfast.  CARE Score: 5  Discharge Goal: Independent       Oral Hygiene: Oral Hygiene  Assistance Needed: Setup or clean-up assistance  Comment: Pt required assist opening toothpaste and mouthwash.   CARE Score: 5  Discharge Goal: Independent    UB/LB Bathing: Shower/Bathe Self  Assistance Needed: Partial/moderate assistance  Comment: Pt required assist to remain in stance and complete perineal hygiene. CARE Score: 3  Discharge Goal: Independent    UB Dressing: Upper Body Dressing  Assistance Needed: Supervision or touching assistance  Comment: SBA to don pullover sweatshirt. CARE Score: 4  Discharge Goal: Independent         LB Dressing: Lower Body Dressing  Assistance Needed: Substantial/maximal assistance  Comment: Pt able to get B feet into pants, however required assist to pull pants up and to maintain stance for therapist to pull pants over hips. CARE Score: 2  Discharge Goal: Independent    Donning and [place] Footwear: Putting On/Taking Off Footwear  Assistance Needed: Supervision or touching assistance  Comment: CGA for Pt to doff/don socks. CARE Score: 4  Discharge Goal: Independent      Toiletin [address] needed: Partial/moderate assistance  Comment: Min A in stance to manage clothing over hips  CARE Score: 3  Discharge Goal: Independent      Toilet Transfers: Toilet Transfer  Assistance needed: Partial/moderate assistance  Comment: MIn A heavy use of grab bars, cues for controlled descent  CARE Score: 3  Discharge Goal: Independent    Physical Therapy:         Long Term Goals  Time Frame for Long term goals : 10-12 tx days:  Long term goal 1: Pt will complete sup<->sit Ind. Long term goal 2: Pt will complete OOB transfers using 2WW Mod Ind. Long term goal 3: Pt will ambulate 10 ft and 50 ft with turns using 2WW Mod Ind. Long term goal 4: Pt will ambulate 150 ft over level surface and at least 10 ft of uneven surface using 2WW with SBA. Long term goal 5: Pt will ascend/descend curb step using 2WW and 1 flight of stairs using railings with SBA-Sup. Additional Goals?: Yes  Long term goal 6: Pt will complete object retrieval from the floor with 2WW and reacher Mod Ind.   Long term goal 7: Pt will propel manual w/c 150 ft with turns Mod Ind.       Bed Mobility:   Sit to Lying  Assistance Needed: Supervision or touching assistance  Comment: SBA without use of bed features  CARE Score: 4  Discharge Goal: Independent  Roll Left and Right  Assistance Needed: Independent  Comment: Ind without use of bed features  CARE Score: 6  Discharge Goal: Independent  Lying to Sitting on Side of Bed  Assistance Needed: Supervision or touching assistance  Comment: SBA without use of bed features  CARE Score: 4  Discharge Goal: Independent    Transfers:    Sit to Stand  Assistance Needed: Supervision or touching assistance  Comment: CGA using 2WW (performance improved with verbal prompts initially and demonstrated carry over in succeeding transfers from w/c and recliner)  CARE Score: 4  Discharge Goal: Independent  Chair/Bed-to-Chair Transfer  Assistance Needed: Supervision or touching assistance  Comment: CGA using 2WW (performance improved with verbal prompts initially and demonstrated carry over in succeeding transfers to w/c and recliner  CARE Score: 4  Discharge Goal: Independent  Toilet Transfer  Assistance needed: Partial/moderate assistance  Comment: Min A to sit, Mod A to stand using grab bars and 2WW  CARE Score: 3  Car Transfer  Assistance Needed: Partial/moderate assistance  Comment: Min A with 2WW, pt able to actively lift BLE in and out of car however required holding of trunk to sit, turn, and stand including stabilization of AD as pt pulled on it to be able to stand  CARE Score: 3  Discharge Goal: Independent    Ambulation:    Walking Ability  Does the Patient Walk?: Yes     Walk 10 Feet  Assistance Needed: Supervision or touching assistance  Comment: CGA using 2WW (pt's gait quality was steadier with verbal prompts to slow down; pt performed 20 ft distance with turns x 4 trials in room)  CARE Score: 4  Discharge Goal: Independent     Walk 50 Feet with Two Turns  Comment: max tolerance today was 22 ft using 2WW with Min A + close w/c follow of the same person; gait quality was unsteady as pt ambulated with fast speed; L knee instability observed; ambulation tolerance was limited by fatigue/increased weakness  Reason if not Attempted: Not attempted due to medical condition or safety concerns  CARE Score: 88  Discharge Goal: Independent     Walk 150 Feet  Comment: pt unable today due to safety concerns (dizziness and increased fatigue); pt will not likely be able to complete this task in the 1st 3 days  Reason if not Attempted: Not attempted due to medical condition or safety concerns  CARE Score: 88  Discharge Goal: Supervision or touching assistance     Walking 10 Feet on Uneven Surfaces  Comment: pt unable today due to safety concerns (dizziness and increased fatigue)  Reason if not Attempted: Not attempted due to medical condition or safety concerns  CARE Score: 88  Discharge Goal: Supervision or touching assistance     1 Step (Curb)  Comment: pt unable today due to safety concerns (dizziness and increased fatigue)  Reason if not Attempted: Not attempted due to medical condition or safety concerns  CARE Score: 88  Discharge Goal: Supervision or touching assistance     4 Steps  Comment: pt unable today due to safety concerns (dizziness and increased fatigue); pt will not likely be able to complete this task in the 1st 3 days  Reason if not Attempted: Not attempted due to medical condition or safety concerns  CARE Score: 88  Discharge Goal: Supervision or touching assistance     12 Steps  Comment: pt unable today due to safety concerns (dizziness and increased fatigue); pt will not likely be able to complete this task in the 1st 3 days  Reason if not Attempted: Not attempted due to medical condition or safety concerns  CARE Score: 88  Discharge Goal: Supervision or touching assistance       Wheelchair:  w/c Ability: Wheelchair Ability  Uses a Wheelchair and/or Scooter?: Yes  Wheel 50 Feet with Two Turns  Assistance Needed: Supervision or touching assistance  Comment: SBA-Sup using BUE primarily  CARE Score: 4  Discharge Goal: Independent  Wheel 150 Feet  Assistance Needed: Substantial/maximal assistance  Comment: pt was only able to propel up to 79 ft today (limited by fatigue)  CARE Score: 2  Discharge Goal: Independent          Balance:        Object: Picking Up Object  Assistance Needed: Partial/moderate assistance  Comment: Min A with 2WW and reacher  CARE Score: 3  Discharge Goal: Independent    I      Exam:    Blood pressure (!) 118/53, pulse 71, temperature 98.1 °F (36.7 °C), temperature source Oral, resp. rate 16, height 5' 3\" (1.6 m), weight 135 lb 2.3 oz (61.3 kg), SpO2 95 %, not currently breastfeeding. General: Observed sitting up in a wheelchair. She was talkative and alert. In fair spirits. HEENT: MMM. Neck supple. No JVD. Gazing right and left. Pulmonary: Shallow respirations without wheezes or rales. Cardiac: RRR. Abdomen: Patient's abdomen is soft and nondistended. Bowel sounds were present throughout. There was no rebound, guarding or masses noted. Upper extremities: Slow and deliberate with bringing her hands up to meet mine. Normal tone without tremor. Lower extremities: No signs of DVT. Heels clear. 4/5 strength across the knees and ankles. Sitting balance was good. Standing balance was poor.     Lab Results   Component Value Date    WBC 4.8 05/02/2022    HGB 11.6 (L) 05/02/2022    HCT 35.3 (L) 05/02/2022    .9 (H) 05/02/2022     05/02/2022     Lab Results   Component Value Date    INR 0.95 02/01/2022    INR 0.96 01/25/2016    PROTIME 12.3 02/01/2022    PROTIME 11.0 01/25/2016     Lab Results   Component Value Date    CREATININE 0.9 05/02/2022    BUN 20 05/02/2022     05/02/2022    K 4.0 05/02/2022    CL 98 (L) 05/02/2022    CO2 29 05/02/2022     Lab Results   Component Value Date    ALT 19 04/28/2022    AST 37 04/28/2022    ALKPHOS 40 04/28/2022    BILITOT 0.6 04/28/2022       Expected length of stay  prior to a supervised level of function for discharge home with a walker and [facility] OT/PT is 2 weeks. Recommendations:    1. Diverticulitis with gait disturbance:  She is responding to cueing to engage in her daily occupational and physical therapy.      She has been a little more consistent with taking in oral fluids.  Tolerating her vitamin supplementation and a bland diet.  She requires adaptive equipment training, possibly caregiver education and bowel and bladder monitoring.  DVT prophylaxis.  Aggressive pulmonary hygiene measures. Verbal cues and minimum to moderate physical assistance for transfers. 2. DVT prophylaxis: Lovenox 40 mg subcu daily.  I must periodically monitor her hemoglobin and platelet count while on this medication.  Weightbearing activities are slowly improving daily.  GI prophylaxis is available. No new bruising or swelling. 3. Essential hypertension: Pain control, diuretic therapy with Lasix and verapamil.  Vital signs are checked at rest and with activity.  Target systolic blood pressures 298-864. Blood pressure is essentially within the target range today. 4. Hyponatremia: Periodic monitoring of her chemistries.  Cautious use of diuretics.  Encouraging consistent oral intake of a general diet. 5. Depression with anxiety: Zoloft and pain management.  Regulating her sleep-wake schedule.  Treating her in a calm and consistent environment when possible.  Ativan as needed infrequently. 6. Bigeminy: Monitoring of her vital signs.  Keeping close tabs on her chemistries.  Outpatient follow-up with cardiology. 7. Trigeminal neuralgia: Depakote, gabapentin and Zoloft.  Acetaminophen as needed. 8. Orthostatic hypotension: Mestinon, cautious diuretic use, AFIA stockings and slow and deliberate position changes were needed.  Vital signs checked at rest and with activity.   She has general fatigue but minimal positional dizziness today.

## 2022-05-05 NOTE — PLAN OF CARE
Problem: Discharge Planning  Goal: Discharge to home or other facility with appropriate resources  5/5/2022 0923 by Frederick Rebollar RN  Outcome: Progressing  5/4/2022 2107 by Jackie Alanis LPN  Outcome: Progressing     Problem: Safety - Adult  Goal: Free from fall injury  5/5/2022 0923 by Frederick Rebollar RN  Outcome: Progressing  5/4/2022 2107 by Jackie Alanis LPN  Outcome: Progressing     Problem: Skin/Tissue Integrity  Goal: Absence of new skin breakdown  Description: 1. Monitor for areas of redness and/or skin breakdown  2. Assess vascular access sites hourly  3. Every 4-6 hours minimum:  Change oxygen saturation probe site  4. Every 4-6 hours:  If on nasal continuous positive airway pressure, respiratory therapy assess nares and determine need for appliance change or resting period.   5/5/2022 2342 by Frederick Rebollar RN  Outcome: Progressing  5/4/2022 2107 by Jackie Alanis LPN  Outcome: Progressing     Problem: ABCDS Injury Assessment  Goal: Absence of physical injury  5/5/2022 0923 by Frederick Rebollar RN  Outcome: Progressing  5/4/2022 2107 by Jackie Alanis LPN  Outcome: Progressing  Flowsheets (Taken 5/4/2022 2022)  Absence of Physical Injury: Implement safety measures based on patient assessment

## 2022-05-06 PROCEDURE — 6370000000 HC RX 637 (ALT 250 FOR IP): Performed by: PHYSICAL MEDICINE & REHABILITATION

## 2022-05-06 PROCEDURE — 97530 THERAPEUTIC ACTIVITIES: CPT

## 2022-05-06 PROCEDURE — 97110 THERAPEUTIC EXERCISES: CPT

## 2022-05-06 PROCEDURE — 6370000000 HC RX 637 (ALT 250 FOR IP): Performed by: STUDENT IN AN ORGANIZED HEALTH CARE EDUCATION/TRAINING PROGRAM

## 2022-05-06 PROCEDURE — 6360000002 HC RX W HCPCS: Performed by: STUDENT IN AN ORGANIZED HEALTH CARE EDUCATION/TRAINING PROGRAM

## 2022-05-06 PROCEDURE — 94761 N-INVAS EAR/PLS OXIMETRY MLT: CPT

## 2022-05-06 PROCEDURE — 97116 GAIT TRAINING THERAPY: CPT

## 2022-05-06 PROCEDURE — 1280000000 HC REHAB R&B

## 2022-05-06 PROCEDURE — 99232 SBSQ HOSP IP/OBS MODERATE 35: CPT | Performed by: PHYSICAL MEDICINE & REHABILITATION

## 2022-05-06 PROCEDURE — 97535 SELF CARE MNGMENT TRAINING: CPT

## 2022-05-06 RX ADMIN — ROSUVASTATIN CALCIUM 10 MG: 5 TABLET, FILM COATED ORAL at 20:47

## 2022-05-06 RX ADMIN — FUROSEMIDE 20 MG: 20 TABLET ORAL at 08:30

## 2022-05-06 RX ADMIN — ACETAMINOPHEN 650 MG: 325 TABLET ORAL at 05:33

## 2022-05-06 RX ADMIN — FLUTICASONE PROPIONATE 1 SPRAY: 50 SPRAY, METERED NASAL at 08:29

## 2022-05-06 RX ADMIN — SERTRALINE HYDROCHLORIDE 25 MG: 50 TABLET ORAL at 08:30

## 2022-05-06 RX ADMIN — GABAPENTIN 300 MG: 300 CAPSULE ORAL at 08:31

## 2022-05-06 RX ADMIN — GABAPENTIN 300 MG: 300 CAPSULE ORAL at 20:47

## 2022-05-06 RX ADMIN — MAGNESIUM OXIDE 400 MG (241.3 MG MAGNESIUM) TABLET 400 MG: TABLET at 08:30

## 2022-05-06 RX ADMIN — ASPIRIN 81 MG: 81 TABLET, COATED ORAL at 08:30

## 2022-05-06 RX ADMIN — ENOXAPARIN SODIUM 40 MG: 100 INJECTION SUBCUTANEOUS at 08:31

## 2022-05-06 RX ADMIN — GABAPENTIN 300 MG: 300 CAPSULE ORAL at 14:14

## 2022-05-06 RX ADMIN — PYRIDOSTIGMINE BROMIDE 30 MG: 60 TABLET ORAL at 20:47

## 2022-05-06 RX ADMIN — KETOTIFEN FUMARATE 1 DROP: 0.35 SOLUTION/ DROPS OPHTHALMIC at 08:30

## 2022-05-06 RX ADMIN — KETOTIFEN FUMARATE 1 DROP: 0.35 SOLUTION/ DROPS OPHTHALMIC at 20:47

## 2022-05-06 RX ADMIN — DIVALPROEX SODIUM 250 MG: 250 TABLET, FILM COATED, EXTENDED RELEASE ORAL at 20:47

## 2022-05-06 RX ADMIN — Medication 1 TABLET: at 08:30

## 2022-05-06 RX ADMIN — Medication 1 TABLET: at 20:47

## 2022-05-06 RX ADMIN — PANTOPRAZOLE SODIUM 40 MG: 40 TABLET, DELAYED RELEASE ORAL at 05:33

## 2022-05-06 RX ADMIN — VITAM B12 50 MCG: 100 TAB at 08:30

## 2022-05-06 RX ADMIN — VERAPAMIL HYDROCHLORIDE 120 MG: 120 TABLET, FILM COATED, EXTENDED RELEASE ORAL at 08:29

## 2022-05-06 ASSESSMENT — PAIN SCALES - WONG BAKER
WONGBAKER_NUMERICALRESPONSE: 0

## 2022-05-06 ASSESSMENT — PAIN - FUNCTIONAL ASSESSMENT
PAIN_FUNCTIONAL_ASSESSMENT: PREVENTS OR INTERFERES SOME ACTIVE ACTIVITIES AND ADLS
PAIN_FUNCTIONAL_ASSESSMENT: PREVENTS OR INTERFERES SOME ACTIVE ACTIVITIES AND ADLS

## 2022-05-06 ASSESSMENT — PAIN SCALES - GENERAL
PAINLEVEL_OUTOF10: 5
PAINLEVEL_OUTOF10: 0
PAINLEVEL_OUTOF10: 6

## 2022-05-06 ASSESSMENT — PAIN DESCRIPTION - ORIENTATION
ORIENTATION: RIGHT
ORIENTATION: RIGHT

## 2022-05-06 ASSESSMENT — PAIN DESCRIPTION - LOCATION
LOCATION: SHOULDER
LOCATION: SHOULDER

## 2022-05-06 ASSESSMENT — PAIN DESCRIPTION - DESCRIPTORS
DESCRIPTORS: ACHING;DISCOMFORT
DESCRIPTORS: ACHING

## 2022-05-06 ASSESSMENT — PAIN DESCRIPTION - ONSET: ONSET: ON-GOING

## 2022-05-06 ASSESSMENT — PAIN DESCRIPTION - FREQUENCY: FREQUENCY: INTERMITTENT

## 2022-05-06 ASSESSMENT — PAIN DESCRIPTION - PAIN TYPE: TYPE: CHRONIC PAIN

## 2022-05-06 NOTE — CARE COORDINATION
Patient reviewed at today's care conference. Patient will dc home alone 5/12. Has all needed DME. [facility] pt/ot. Local supportive grandson    Case mgt updated patient in room. Patient agreeable to dc date and planned. Confirmed with patient she has no DME needs. Patient agreeable to Billie eNlson pt/ot. Reports her grandson usually drives her to appointments. Also reports supportive friends.

## 2022-05-06 NOTE — PROGRESS NOTES
Occupational Therapy    Physical Rehabilitation: OCCUPATIONAL THERAPY     [x] daily progress note       [] discharge       Patient Name:  Radha Bull   :  1935 MRN: [id number]  Room:  [address] [place] [address] Date of Admission: 2022  Rehabilitation Diagnosis:   Diverticulitis of intestine, part unspecified, without perforation or abscess without bleeding [K57.92]  Diverticulitis [K57.92]       Date 2022       Day of ARU Week:  5   Time IN/OUT 1108/1213   Individual Tx Minutes 65   Group Tx Minutes    Co-Treat Minutes    Concurrent Tx Minutes    TOTAL Tx Time Mins 65   Variance Time +5    Variance Time []   Refusal due to:     []   Medical hold/reason:    []   Illness   []   Off Unit for test/procedure  [x]   Extra time needed to complete task  []   Therapeutic need  []   Other (specify):   Restrictions Restrictions/Precautions: Fall Risk,Contact Precautions (C-diff)         Communication with other providers: [x]   OK to see per nursing:     []   Spoke with team member regarding:      Subjective observations and cognitive status: Pt lying in bed upon entrance, pleasant and agreeable to therapy stating \"I am glad I got a little nap in, I could not sleep last night\". Pain level/location:    /10       Location: No pain reported    Discharge recommendations  Anticipated discharge date:    Destination: [x]home alone   []home alone w assist prn   [] home w/ family    [] Continuous supervision       []SNF    [] Assisted living     [] Other:   Continued therapy: [x]HHC OT  []OUTPATIENT  OT   [] No Further OT  Equipment needs: None       ADLs:       LB Dressing: Lower Body Dressing  Assistance Needed: Supervision or touching assistance  Comment: SB/CGA in stance to manage over hips  CARE Score: 4  Discharge Goal: Independent      Toiletin Road needed: Supervision or touching assistance  Comment: CGA in stance to manage clothing over hips, completes hygiene seated  CARE Score: 4  Discharge Goal: Independent      Toilet Transfers: Toilet Transfer  Assistance needed: Supervision or touching assistance  Comment: CGA c BSC frame and use of grab bars. CARE Score: 4  Discharge Goal: Independent  Device Used:    []   Standard Toilet         [x]   Grab Bars           [x]  Bedside Commode Frame       []   Elevated Toilet          []   Other:        Bed Mobility:           []   Pt received out of bed   Supine --> Sit:  Sup  Sit --> Supine:  Sup    Transfers:    Sit--> Stand:  CGA  Stand --> Sit:   CGA  Stand-Pivot:   CGA  Other:    Assistive device required for transfer:   2WW      Functional Mobility:    Assistance:  ~ 125 ft CGA c one instance of Min A as Pt's knee gave out  Device:   [x]   Rolling Walker     []   Standard Walker []   Wheelchair        []   [employer]       []   4-Wheeled [employer] [place]         []   Cardiac Walker       []   Other:          Additional Therapeutic activities/exercises completed this date:     [x]   ADL Training    [x]   Balance/Postural training    Pt stood to engage in dynamic standing balance task requiring Pt reach out of AMA and across midline. Pt had one instance of LOB but able to correct self. Pt performed task c CG/SBA without need to hold onto walker for support. Pt able to tolerate ~ 12 mins at a time of standing. Performed to increase Pt's standing balance/tolerance and endurance to increase safety and independence c ADLs/IADLs. [x]   Bed/Transfer Training   [x]   Endurance Training   []   Neuromuscular Re-ed   []   Nu-step:  Time:        Level:         #Steps:       []   Rebounder:    []  Seated     []  Standing        []   Supine Ther Ex (reps/sets):     [x]   Seated Ther Ex (reps/sets):  Pt completed BUE strengthening exercises c 2# dumbbell c LUE and no weight c RUE d/t \"bad shoulder\".  Pt completed bicep curls, chest press, overhead press, shoulder external/internal rotation, forearm pronation/supination, and wrist flexion/extension, 1 set of 15 reps c extended rest breaks. []   Standing Ther Ex (reps/sets):     []   Other:      Comments: All intervention performed to increase pt's endurance, ax tolerance, balance, stength, and I c ADLs/IADLs and functional transfers/mobility. Patient/Caregiver Education and Training:   []   [employer] Equipment Use  []   Bed Mobility/Transfer Technique/Safety  []   Energy Conservation Tips  []   Family training  []   Postural Awareness  []   Safety During Functional Activities  []   Reinforced Patient's Precautions   []   Progress was updated and reviewed in Rehabtracker with patient and/or family this         date. Treatment Plan for Next Session: Continue OT POC      Assessment: This pt demonstrated a positive response to today's treatment as evidenced by increased independence c toilet transfers c use of BSC frame. The patient is making good progress toward established goals as evidenced by QI scores. Ongoing deficits are observed in the areas of functional balance/transfers/mobility, endurance, ax tolerance, strength, and ADLs and continued focus on this is recommended. Treatment/Activity Tolerance:   [x] Tolerated treatment with no adverse effects    [] Patient limited by fatigue  [] Patient limited by pain   [] Patient limited by medical complications:    [] Adverse reaction to Tx:   [] Significant change in status    Safety:       [x]  bed alarm set    []  chair alarm set    []  Pt refused alarms                []  Telesitter activated      [x]  Gait belt used during tx session      []other:       Number of Minutes/Billable Intervention  Therapeutic Exercise 30   ADL Self-care 15   Neuro Re-Ed    Therapeutic Activity 20   Group    Other:    TOTAL 65       Social History  Social/Functional History  Lives With: Alone  Type of Home: House  Home Layout: One level (Pt has basement but does not use.  Has stair lift but has never been used.)  Home Access: Stairs to enter with rails  Entrance Stairs - Number of Steps: 3-4 EV  Entrance Stairs - Rails: Both  Bathroom Shower/Tub: Tub/Shower unit,Shower chair with back  Bathroom Toilet: Handicap height (Pt states frame over toilet c grab bars.)  Bathroom Equipment: Grab bars in shower,Shower chair,Grab bars around toilet,Hand-held shower  Bathroom Accessibility: Accessible  Home Equipment: Cane,Walker, rolling,Grab bars,Reacher  Has the patient had two or more falls in the past year or any fall with injury in the past year?: No  Receives Help From: Family (Gustabo Morillo lives 4 doors down. He assists c grocery shopping and house cleaning.)  ADL Assistance: Independent  Homemaking Assistance: Needs assistance (alireza does cleaning and grocery shopping)  Homemaking Responsibilities: Yes  Meal Prep Responsibility: Primary  Laundry Responsibility: Primary  Cleaning Responsibility: Primary  Bill Paying/Finance Responsibility: No (Alireza took over ~ 2 months ago as Pt states she began \"having a hard time remembering\")  Shopping Responsibility: Primary  Health Care Management: Secondary (Pt and alireza complete together.)  Ambulation Assistance: Independent (Pt reports she would use a cane at night and would take it in public just incase. Pt primarily Ind without device.)  Transfer Assistance: Independent  Active : Yes  Mode of Transportation: Car  Occupation: Retired  Type of Occupation:   Leisure & Hobbies: Pt enjoys watching television, cooking, and being active. Additional Comments: Pt has regular, flat bed at home; no falls in the past year    Objective                                                                                    Goals:  (Update in navigator)  Short Term Goals  Time Frame for Short term goals: 5-7 days  Short Term Goal 1: Pt will complete self-feeding IND.:  Long Term Goals  Time Frame for Long term goals : 12-14 days or until d/c.   Long Term Goal 1: Pt will complete oral hygiene c Mod I.  Long Term Goal 2: Pt will complete grooming tasks c Mod I.  Long Term Goal 3: Pt will complete total body bathing c Mod I and AE PRN. Long Term Goal 4: Pt will complete UB dressing c Ind. Long Term Goal 5: Pt will complete LB dressing c Mod I. Additional Goals?: Yes  Long Term Goal 6: Pt will doff/don footwear c Mod I.  Long Term Goal 7: Pt will complete toileting c Mod I.  Long Term Goal 8: Pt will perform functional transfers (bed, chair, toilet, shower) c DME PRN. Long Term Goal 9: Pt will perform therex/therax to facilitate increased strength/endurance/ax tolerance (c emphasis on dynamic standing balance/tolerance > 8 mins) c Mod I.  Long Term Goal 10: Pt will complete light home management tasks c Mod I.:        Plan of Care                                                                              Times per week: 5 days per week for a minimum of 60 minutes/day plus group as appropriate for 60 minutes.   Treatment to include Plan  Times per Day: Daily  Current Treatment Recommendations: Strengthening,ROM,Balance training,Functional mobility training,Endurance training,Neuromuscular re-education,Safety education & training,Patient/Caregiver education & training,Equipment evaluation, education, & procurement,Self-Care / ADL,Home management training,Coordination training    Electronically signed by   AGNES Truong, OTR/L  5/6/2022, 12:55 PM

## 2022-05-06 NOTE — FLOWSHEET NOTE
[x] daily progress note       [] discharge       Patient Name:  Giorgio Sweeney   :  1935 MRN: [id number]  Room:  [address] [place] [address] Date of Admission: 2022  Rehabilitation Diagnosis:   Diverticulitis of intestine, part unspecified, without perforation or abscess without bleeding [K57.92]  Diverticulitis [K57.92]       Date 2022       Day of ARU Week:  5   Time IN/-1000  3075-3131   Individual Tx Minutes 120   TOTAL Tx Time Mins 120   Restrictions Restrictions/Precautions  Restrictions/Precautions: Fall Risk,Contact Precautions (C-diff)      Communication with other providers: [x]   OK to see per nursing:     []   Spoke with team member regarding:      Subjective observations and cognitive status: AM session: pt seen in semi-kaur's position in bed at beginning of treatment. Agreeable to therapy. PM session: pt seen in semi-kaur's position in bed at beginning of treatment. Agreeable to therapy. Pain level/location: 0/10          Discharge recommendations  Anticipated discharge date:  Expected Discharge Date: 22  Destination: []??home alone   []? ?home alone with assist PRN     []? ? home w/ family      []? ? Continuous supervision  []? ?SNF    []? ? Assisted living     []? ? Other:  Continued therapy: []??C PT  []??OUTPATIENT PT   []? ? No Further PT  []? ?SNF PT  Caregiver training recommended: []? ?Yes  []? ? No   Equipment needs: has 2WW     Bed Mobility:            Rolling R/L:  Independent   Scooting: Independent   Lying --> Sit:  Independent   Sit --> lying:  Independent     Transfers:    Sit--> Stand:  SBA  Stand --> Sit:  SBA  Chair-->Bed/Bed --> Chair:  SBA without AD   Toilet Transfer: SBA with grab bars  Assistive device required for transfer:  RW   Vcs for proper hand placement.      Gait:    Distance:  AM session: 278'; PM session: 356'   Assistance: CGA in AM; SBA in PM   Device: RW  Gait Quality:  Reciprocal pattern; pt reported that her left knee felt weak in the AM, so CGA was warranted at that time. Stairs (PM session)   # Completed:  15  Assistance:  SBA  Supportive Device:  2 rails     Uneven Surfaces: (PM session)       Assistance: SBA  Device:    RW  Surfaces Completed:   [x]  Carpeted Surface with bean bags beneath      []  Throw rugs       []  Ramp       []  Outdoor pavements        []  Grass             []  Loose gravel        []  Other:       PM session: pt amb up/down 4\" curb step SBA with RW. Vcs for walker safety and proper positioning. Additional Therapeutic activities/exercises completed this date:     [x]   Nu-step: AM session: Time: 10 minutes     Level: 2       (for strengthening and endurance training)   []   Rebounder:    []  Seated     []  Standing        [x]   Balance training: AM session: pt stood at EOB and pulled pants up with SBA with RW.          []   Postural training    [x]   Supine ther ex (reps/sets): AM and PM session: Lower trunk rotation x 10 reps; pelvic tilts x 10 reps; gluteus maikol stretching x 10 second holds x 1 set each. Hip adduction isometric pillow squeezes x 20 reps, bridging x 10 reps, hip abduction with light thera-band resist x 20 reps (all for strengthening and stretching). []   Seated ther ex (reps/sets):     [x]   Standing ther ex (reps/sets): PM session: marching, hip extension, hip abduction, heel raises, toe raises, mini-squats, knee flexion x 10 reps each for strengthening.     []   Picking up object from floor (standing):                   []   Reacher used   []   Other:   []   Other:    Patient/Caregiver Education and Training:   [x]   Bed Mobility/Transfer technique/safety  [x]   Gait technique/sequencing  [x]   Proper use of assistive device  [x]   Advanced mobility safety and technique  []   Reinforced patient's precautions/mobility while maintaining precautions  []   Postural awareness  []   Family training    Treatment Plan for Next Session: gait; transfers; advanced gait; stair training; exercises; balance training Assessment: This pt demonstrated a positive response to today's treatment as evidenced by improved mobility. The patient is making progress toward established goals as evidenced by QI scores. Ongoing deficits are observed in the areas of strength, balance, endurance, and safety and continued focus on this is recommended. Treatment/Activity Tolerance:   [x] Tolerated treatment with no adverse effects    [] Patient limited by fatigue  [] Patient limited by pain   [] Patient limited by medical complications:    [] Adverse reaction to Tx:   [] Significant change in status    Safety:       [x]  bed alarm set    []  chair alarm set    []  Pt refused alarms                []  Telesitter activated      [x]  Gait belt used during tx session      [x]other:  After AM session: pt left supine in bed with call light at end of treatment. After PM session: pt left supine in bed with call light at end of treatment. Number of Minutes/Billable Intervention  Gait Training 75   Therapeutic Exercise 30   Neuro Re-Ed    Therapeutic Activity 15   Wheelchair Propulsion    Group    Other:    TOTAL 120         Social History  Social/Functional History  Lives With: Alone  Type of Home: House  Home Layout: One level (Pt has basement but does not use. Has stair lift but has never been used.)  Home Access: Stairs to enter with rails  Entrance Stairs - Number of Steps: 3-4 EV  Entrance Stairs - Rails: Both  Bathroom Shower/Tub: Tub/Shower unit,Shower chair with back  Bathroom Toilet: Handicap height (Pt states frame over toilet c grab bars.)  Bathroom Equipment: Grab bars in shower,Shower chair,Grab bars around toilet,Hand-held shower  Bathroom Accessibility: Accessible  Home Equipment: Cane,Walker, rolling,Grab bars,Reacher  Has the patient had two or more falls in the past year or any fall with injury in the past year?: No  Receives Help From: Family (Gustabo 84 lives 4 doors down.  He assists c grocery shopping and house cleaning.)  ADL Assistance: Independent  Homemaking Assistance: Needs assistance (chika does cleaning and grocery shopping)  Homemaking Responsibilities: Yes  Meal Prep Responsibility: Primary  Laundry Responsibility: Primary  Cleaning Responsibility: Primary  Bill Paying/Finance Responsibility: No (Chika took over ~ 2 months ago as Pt states she began \"having a hard time remembering\")  Shopping Responsibility: Primary  Health Care Management: Secondary (Pt and grandson complete together.)  Ambulation Assistance: Independent (Pt reports she would use a cane at night and would take it in public just incase. Pt primarily Ind without device.)  Transfer Assistance: Independent  Active : Yes  Mode of Transportation: Car  Occupation: Retired  Type of Occupation:   Leisure & Hobbies: Pt enjoys watching television, cooking, and being active. Additional Comments: Pt has regular, flat bed at home; no falls in the past year    Objective                                                                                    Goals:  (Update in navigator)   : Long Term Goals  Time Frame for Long term goals : 10-12 tx days:  Long term goal 1: Pt will complete sup<->sit Ind. Long term goal 2: Pt will complete OOB transfers using 2WW Mod Ind. Long term goal 3: Pt will ambulate 10 ft and 50 ft with turns using 2WW Mod Ind. Long term goal 4: Pt will ambulate 150 ft over level surface and at least 10 ft of uneven surface using 2WW with SBA. Long term goal 5: Pt will ascend/descend curb step using 2WW and 1 flight of stairs using railings with SBA-Sup. Additional Goals?: Yes  Long term goal 6: Pt will complete object retrieval from the floor with 2WW and reacher Mod Ind.   Long term goal 7: Pt will propel manual w/c 150 ft with turns Mod Ind.:        Plan of Care                                                                              Times per week: 5 days per week for a minimum of 60 minutes/day plus group as appropriate for 60 minutes.   Treatment to include Current Treatment Recommendations: Strengthening,Balance training,Functional mobility training,Transfer training,Endurance training,Gait training,Stair training,Home exercise program,Safety education & training,Patient/Caregiver education & training,Equipment evaluation, education, & procurement,Therapeutic activities,Wheelchair mobility training    Electronically signed by   Kalyani Liao, [id number]  5/6/2022, 9:38 AM

## 2022-05-06 NOTE — PROGRESS NOTES
Regulo Peña    : 1935  Acct #: [de-identified]  MRN: [id number]              PM&R Progress Note      Admitting diagnosis: Diverticulitis ( [address])     Comorbid diagnoses impacting rehabilitation: Generalized weakness, gait disturbance, essential hypertension, hyponatremia, depression with anxiety, bigeminy, trigeminal neuralgia, orthostatic hypotension    Chief complaint: Feeling stronger already. No chills or cough. Abdominal discomfort. More formed bowel movements. Prior (baseline) level of function: Independent. Current level of function:         Current  IRF-TAMARA and Goals:   Occupational Therapy:    Short Term Goals  Time Frame for Short term goals: 5-7 days  Short Term Goal 1: Pt will complete self-feeding IND. :   Long Term Goals  Time Frame for Long term goals : 12-14 days or until d/c. Long Term Goal 1: Pt will complete oral hygiene c Mod I.  Long Term Goal 2: Pt will complete grooming tasks c Mod I.  Long Term Goal 3: Pt will complete total body bathing c Mod I and AE PRN. Long Term Goal 4: Pt will complete UB dressing c Ind. Long Term Goal 5: Pt will complete LB dressing c Mod I. Additional Goals?: Yes  Long Term Goal 6: Pt will doff/don footwear c Mod I.  Long Term Goal 7: Pt will complete toileting c Mod I.  Long Term Goal 8: Pt will perform functional transfers (bed, chair, toilet, shower) c DME PRN. Long Term Goal 9: Pt will perform therex/therax to facilitate increased strength/endurance/ax tolerance (c emphasis on dynamic standing balance/tolerance > 8 mins) c Mod I.  Long Term Goal 10:  Pt will complete light home management tasks c Mod I. :                                       Eating: Eating  Assistance Needed: Independent  Comment: X  CARE Score: 6  Discharge Goal: Independent       Oral Hygiene: Oral Hygiene  Assistance Needed: Independent  Comment: X  CARE Score: 6  Discharge Goal: Independent    UB/LB Bathing: Shower/Bathe Self  Assistance Needed: Supervision or touching assistance  Comment: CGA in stance for safety, seated majority of shower  CARE Score: 4  Discharge Goal: Independent    UB Dressing: Upper Body Dressing  Assistance Needed: Setup or clean-up assistance  Comment: Set up  CARE Score: 5  Discharge Goal: Independent         LB Dressing: Lower Body Dressing  Assistance Needed: Supervision or touching assistance  Comment: SB/CGA in stance to manage over hips  CARE Score: 4  Discharge Goal: Independent    Donning and [place] Footwear: Putting On/Taking Off Footwear  Assistance Needed: Setup or clean-up assistance  Comment: set up required no use of AE  CARE Score: 5  Discharge Goal: Independent      Toiletin [address] needed: Supervision or touching assistance  Comment: CGA in stance to manage clothing over hips, completes hygiene seated  CARE Score: 4  Discharge Goal: Independent      Toilet Transfers: Toilet Transfer  Assistance needed: Partial/moderate assistance  Comment: Min A 2* to height of toilet  CARE Score: 3  Discharge Goal: Independent    Physical Therapy:         Long Term Goals  Time Frame for Long term goals : 10-12 tx days:  Long term goal 1: Pt will complete sup<->sit Ind. Long term goal 2: Pt will complete OOB transfers using 2WW Mod Ind. Long term goal 3: Pt will ambulate 10 ft and 50 ft with turns using 2WW Mod Ind. Long term goal 4: Pt will ambulate 150 ft over level surface and at least 10 ft of uneven surface using 2WW with SBA. Long term goal 5: Pt will ascend/descend curb step using 2WW and 1 flight of stairs using railings with SBA-Sup. Additional Goals?: Yes  Long term goal 6: Pt will complete object retrieval from the floor with 2WW and reacher Mod Ind. Long term goal 7: Pt will propel manual w/c 150 ft with turns Mod Ind.       Bed Mobility:   Sit to Lying  Assistance Needed: Independent  Comment: Ind  CARE Score: 6  Discharge Goal: Independent  Roll Left and Right  Assistance Needed: Independent  Comment: Ind without use of bed features  CARE Score: 6  Discharge Goal: Independent  Lying to Sitting on Side of Bed  Assistance Needed: Supervision or touching assistance  Comment: SBA without use of bed features  CARE Score: 4  Discharge Goal: Independent    Transfers:    Sit to Stand  Assistance Needed: Supervision or touching assistance  Comment: SBA using 2WW  CARE Score: 4  Discharge Goal: Independent  Chair/Bed-to-Chair Transfer  Assistance Needed: Supervision or touching assistance  Comment: SBA without AD  CARE Score: 4  Discharge Goal: Independent  Toilet Transfer  Assistance needed: Partial/moderate assistance  Comment: Min A to sit, Mod A to stand using grab bars and 2WW  CARE Score: 3  Car Transfer  Assistance Needed: Partial/moderate assistance  Comment: SBA using 2WW  CARE Score: 3  Discharge Goal: Independent    Ambulation:    Walking Ability  Does the Patient Walk?: Yes     Walk 10 Feet  Assistance Needed: Supervision or touching assistance  Comment: CGA using 2WW  CARE Score: 4  Discharge Goal: Independent     Walk 50 Feet with Two Turns  Assistance Needed: Supervision or touching assistance  Comment: CGA using 2WW  Reason if not Attempted: Not attempted due to medical condition or safety concerns  CARE Score: 4  Discharge Goal: Independent     Walk 150 Feet  Assistance Needed: Supervision or touching assistance  Comment: CGA using 2WW  Reason if not Attempted: Not attempted due to medical condition or safety concerns  CARE Score: 4  Discharge Goal: Supervision or touching assistance     Walking 10 Feet on Uneven Surfaces  Assistance Needed: Supervision or touching assistance  Comment: CGA using 2WW  Reason if not Attempted: Not attempted due to medical condition or safety concerns  CARE Score: 4  Discharge Goal: Supervision or touching assistance     1 Step (Curb)  Assistance Needed: Supervision or touching assistance  Comment: CGA using 2WW  Reason if not Attempted: Not attempted due to medical condition or safety concerns  CARE Score: 4  Discharge Goal: Supervision or touching assistance     4 Steps  Assistance Needed: Supervision or touching assistance  Comment: SBA-CGA using railings to ascend/descend 4\"/6\" step heights  Reason if not Attempted: Not attempted due to medical condition or safety concerns  CARE Score: 4  Discharge Goal: Supervision or touching assistance     12 Steps  Assistance Needed: Supervision or touching assistance  Comment: SBA-CGA using railings to ascend/descend 4\"/6\" step heights  Reason if not Attempted: Not attempted due to medical condition or safety concerns  CARE Score: 4  Discharge Goal: Supervision or touching assistance       Wheelchair:  w/c Ability: Wheelchair Ability  Uses a Wheelchair and/or Scooter?: Yes  Wheel 50 Feet with Two Turns  Assistance Needed: Supervision or touching assistance  Comment: SBA-Sup using BUE primarily  CARE Score: 4  Discharge Goal: Independent  Wheel 150 Feet  Assistance Needed: Substantial/maximal assistance  Comment: pt was only able to propel up to 70 ft today (limited by fatigue)  CARE Score: 2  Discharge Goal: Independent          Balance:        Object: Picking Up Object  Assistance Needed: Supervision or touching assistance  Comment: SBA with 2WW and reacher  CARE Score: 4  Discharge Goal: Independent    I      Exam:    Blood pressure 109/85, pulse 78, temperature 98.8 °F (37.1 °C), temperature source Oral, resp. rate 16, height 5' 3\" (1.6 m), weight 136 lb 11 oz (62 kg), SpO2 97 %, not currently breastfeeding. General: Again up in a wheelchair. Not able to move it herself. Alert. HEENT: Gazing right and left. MMM. Neck supple. Pulmonary: Symmetric air exchange without wheezes or coughing. Cardiac: Regular rate and rhythm. Abdomen: Patient's abdomen is soft and nondistended. Bowel sounds were present throughout. There was no rebound, guarding or masses noted. Upper extremities: No new bruising or swelling.     Lower extremities: No signs of DVT. Sitting balance was good. Standing balance was poor. Lab Results   Component Value Date    WBC 4.8 05/02/2022    HGB 11.6 (L) 05/02/2022    HCT 35.3 (L) 05/02/2022    .9 (H) 05/02/2022     05/02/2022     Lab Results   Component Value Date    INR 0.95 02/01/2022    INR 0.96 01/25/2016    PROTIME 12.3 02/01/2022    PROTIME 11.0 01/25/2016     Lab Results   Component Value Date    CREATININE 0.9 05/02/2022    BUN 20 05/02/2022     05/02/2022    K 4.0 05/02/2022    CL 98 (L) 05/02/2022    CO2 29 05/02/2022     Lab Results   Component Value Date    ALT 19 04/28/2022    AST 37 04/28/2022    ALKPHOS 40 04/28/2022    BILITOT 0.6 04/28/2022       Expected length of stay  prior to a supervised level of function for discharge home with a walker and [facility] OT/PT is 5/12/2022. Recommendations:    1. Diverticulitis with gait disturbance:   She is already showing improved tolerance for activities in her daily occupational and physical therapy.   oral intake seems appropriate at this point.  Tolerating her vitamin supplementation and a bland diet.  Ongoing adaptive equipment training, possibly caregiver education and bowel and bladder monitoring.  DVT prophylaxis.  She benefits from aggressive pulmonary hygiene measures.  Verbal cues and minimum physical assistance for transfers. 2. DVT prophylaxis: Lovenox 40 mg subcu daily.  I must periodically monitor her hemoglobin and platelet count while on this medication.  Weightbearing activities are slowly improving daily.  GI prophylaxis is available.  No clinical signs of blood loss. 3. Essential hypertension: Pain control, diuretic therapy with Lasix and verapamil.  Vital signs are checked at rest and with activity.  Target systolic blood pressures 905-180.  SRWYJ pressure is just below her target range today. Monitoring closely. 4. Hyponatremia: Periodic monitoring of her chemistries.  Cautious use of diuretics.  Encouraging consistent oral intake of a general diet. 5. Depression with anxiety: Zoloft and pain management.  Regulating her sleep-wake schedule.  Extending her treatment around to more common areas. Sam Rather as needed infrequently. 6. Bigeminy: Monitoring of her vital signs.  Keeping close tabs on her chemistries.  Outpatient follow-up with cardiology. 7. Trigeminal neuralgia: Depakote, gabapentin and Zoloft.  Acetaminophen as needed. 8. Orthostatic hypotension: Mestinon, cautious diuretic use, AFIA stockings and slow and deliberate position changes were needed.  Vital signs checked at rest and with activity.  She has general fatigue but minimal positional dizziness again today.         Counseling and Coordination of Care: In care conference today I met with the patient's OT, PT, RN and . We discussed the patient's problems, progress and prognosis. Disposition issues were clarified and plans were established for ongoing rehabilitation efforts beyond the ARU stay. I reviewed this information with the patient during a second distinct visit with the patient. More than half of the total time of 34 minutes spent with the patient involved counseling and coordination of care.

## 2022-05-07 PROCEDURE — 97530 THERAPEUTIC ACTIVITIES: CPT

## 2022-05-07 PROCEDURE — 6370000000 HC RX 637 (ALT 250 FOR IP): Performed by: PHYSICAL MEDICINE & REHABILITATION

## 2022-05-07 PROCEDURE — 94150 VITAL CAPACITY TEST: CPT

## 2022-05-07 PROCEDURE — 94761 N-INVAS EAR/PLS OXIMETRY MLT: CPT

## 2022-05-07 PROCEDURE — 6370000000 HC RX 637 (ALT 250 FOR IP): Performed by: STUDENT IN AN ORGANIZED HEALTH CARE EDUCATION/TRAINING PROGRAM

## 2022-05-07 PROCEDURE — 6360000002 HC RX W HCPCS: Performed by: STUDENT IN AN ORGANIZED HEALTH CARE EDUCATION/TRAINING PROGRAM

## 2022-05-07 PROCEDURE — 97535 SELF CARE MNGMENT TRAINING: CPT

## 2022-05-07 PROCEDURE — 97112 NEUROMUSCULAR REEDUCATION: CPT

## 2022-05-07 PROCEDURE — 97116 GAIT TRAINING THERAPY: CPT

## 2022-05-07 PROCEDURE — 94664 DEMO&/EVAL PT USE INHALER: CPT

## 2022-05-07 PROCEDURE — 1280000000 HC REHAB R&B

## 2022-05-07 PROCEDURE — 97110 THERAPEUTIC EXERCISES: CPT

## 2022-05-07 RX ADMIN — GABAPENTIN 300 MG: 300 CAPSULE ORAL at 21:18

## 2022-05-07 RX ADMIN — VERAPAMIL HYDROCHLORIDE 120 MG: 120 TABLET, FILM COATED, EXTENDED RELEASE ORAL at 08:59

## 2022-05-07 RX ADMIN — MAGNESIUM OXIDE 400 MG (241.3 MG MAGNESIUM) TABLET 400 MG: TABLET at 08:58

## 2022-05-07 RX ADMIN — ASPIRIN 81 MG: 81 TABLET, COATED ORAL at 08:59

## 2022-05-07 RX ADMIN — SERTRALINE HYDROCHLORIDE 25 MG: 50 TABLET ORAL at 09:05

## 2022-05-07 RX ADMIN — ROSUVASTATIN CALCIUM 10 MG: 5 TABLET, FILM COATED ORAL at 21:19

## 2022-05-07 RX ADMIN — KETOTIFEN FUMARATE 1 DROP: 0.35 SOLUTION/ DROPS OPHTHALMIC at 21:18

## 2022-05-07 RX ADMIN — GABAPENTIN 300 MG: 300 CAPSULE ORAL at 13:55

## 2022-05-07 RX ADMIN — Medication 1 TABLET: at 09:00

## 2022-05-07 RX ADMIN — GABAPENTIN 300 MG: 300 CAPSULE ORAL at 09:00

## 2022-05-07 RX ADMIN — VITAM B12 50 MCG: 100 TAB at 09:00

## 2022-05-07 RX ADMIN — Medication 1 TABLET: at 21:19

## 2022-05-07 RX ADMIN — ENOXAPARIN SODIUM 40 MG: 100 INJECTION SUBCUTANEOUS at 09:00

## 2022-05-07 RX ADMIN — FLUTICASONE PROPIONATE 1 SPRAY: 50 SPRAY, METERED NASAL at 08:58

## 2022-05-07 RX ADMIN — FUROSEMIDE 20 MG: 20 TABLET ORAL at 09:00

## 2022-05-07 RX ADMIN — DIVALPROEX SODIUM 250 MG: 250 TABLET, FILM COATED, EXTENDED RELEASE ORAL at 21:18

## 2022-05-07 RX ADMIN — KETOTIFEN FUMARATE 1 DROP: 0.35 SOLUTION/ DROPS OPHTHALMIC at 09:05

## 2022-05-07 RX ADMIN — PANTOPRAZOLE SODIUM 40 MG: 40 TABLET, DELAYED RELEASE ORAL at 06:25

## 2022-05-07 RX ADMIN — PYRIDOSTIGMINE BROMIDE 30 MG: 60 TABLET ORAL at 21:19

## 2022-05-07 ASSESSMENT — PAIN SCALES - WONG BAKER
WONGBAKER_NUMERICALRESPONSE: 0

## 2022-05-07 ASSESSMENT — PAIN SCALES - GENERAL: PAINLEVEL_OUTOF10: 0

## 2022-05-07 NOTE — PLAN OF CARE
Problem: Discharge Planning  Goal: Discharge to home or other facility with appropriate resources  Outcome: Progressing  Flowsheets (Taken 5/6/2022 2020)  Discharge to home or other facility with appropriate resources: Identify barriers to discharge with patient and caregiver     Problem: Safety - Adult  Goal: Free from fall injury  Outcome: Progressing     Problem: Skin/Tissue Integrity  Goal: Absence of new skin breakdown  Description: 1. Monitor for areas of redness and/or skin breakdown  2. Assess vascular access sites hourly  3. Every 4-6 hours minimum:  Change oxygen saturation probe site  4. Every 4-6 hours:  If on nasal continuous positive airway pressure, respiratory therapy assess nares and determine need for appliance change or resting period.   Outcome: Progressing     Problem: ABCDS Injury Assessment  Goal: Absence of physical injury  Outcome: Progressing     Problem: Pain  Goal: Verbalizes/displays adequate comfort level or baseline comfort level  Outcome: Progressing

## 2022-05-07 NOTE — PROGRESS NOTES
Physical Rehabilitation: OCCUPATIONAL THERAPY     [x] daily progress note       [] discharge       Patient Name:  Mirian Acosta   :  1935 MRN: [id number]  Room:  [address] [place] [address] Date of Admission: 2022  Rehabilitation Diagnosis:   Diverticulitis of intestine, part unspecified, without perforation or abscess without bleeding [K57.92]  Diverticulitis [K57.92]       Date 2022       Day of ARU Week:  6   Time IN//815; 1245/1330   Individual Tx Minutes 76 + 45   Group Tx Minutes    Co-Treat Minutes    Concurrent Tx Minutes    TOTAL Tx Time Mins 120   Variance Time    Variance Time []   Refusal due to:     []   Medical hold/reason:    []   Illness   []   Off Unit for test/procedure  []   Extra time needed to complete task  []   Therapeutic need  []   Other (specify):   Restrictions Restrictions/Precautions: Fall Risk,Contact Precautions (C-diff)         Communication with other providers: [x]   OK to see per nursing:     []   Spoke with team member regarding:      Subjective observations and cognitive status: Patient side lying upon approach patient pleasant and agreeable to therapy  PM SESSION: patient up in recliner having just had lunch, pleasant and agreeable to therapy       Pain level/location:    /10       Location: per patient no pain noted    Discharge recommendations  Anticipated discharge date:    Destination: []home alone   [x]home alone w assist prn   [] home w/ family    [] Continuous supervision       []SNF    [] Assisted living     [] Other:   Continued therapy: [x]HHC OT  []OUTPATIENT  OT   [] No Further OT  Equipment needs: None        ADLs:    Eating: Eating  Assistance Needed: Independent  Comment: X  CARE Score: 6  Discharge Goal: Independent     Declines weighted utensils, and weights around wrist to assist with tremors     Oral Hygiene: Oral Hygiene  Assistance Needed: Independent  Comment: MOd I seated sinkside  CARE Score: 6  Discharge Goal: Independent    UB/LB Bathing: Shower/Bathe Self  Assistance Needed: Supervision or touching assistance  Comment: Sup in stance majority seated  CARE Score: 4  Discharge Goal: Independent    UB Dressing: Upper Body Dressing  Assistance Needed: Independent  Comment: X  CARE Score: 6  Discharge Goal: Independent         LB Dressing: Lower Body Dressing  Assistance Needed: Supervision or touching assistance  Comment: Sup in stance managing over hips  CARE Score: 4  Discharge Goal: Independent    Donning and [place] Footwear: Putting On/Taking Off Footwear  Assistance Needed: Independent  Comment: X  CARE Score: 6  Discharge Goal: Independent      Toiletin [address] needed: Supervision or touching assistance  Comment: Sup in stance hygiene completed seated  CARE Score: 4  Discharge Goal: Independent   PM SESSION:  Sup    Toilet Transfers: Toilet Transfer  Assistance needed: Supervision or touching assistance  Comment: Sup  CARE Score: 4  Discharge Goal: Independent  Device Used:    [x]   Standard Toilet         [x]   Grab Bars           []  Bedside Commode       []   Elevated Toilet           PM SESSION: Sup       Bed Mobility:           []   Pt received out of bed   Rolling R/L:    Scooting:   Mod I   Supine --> Sit:  Mod I   Sit --> Supine:      Transfers:    Sit--> Stand:  Sup  Stand --> Sit:   Sup  Stand-Pivot:   Sup  Other:    Assistive device required for transfer:   RW      Functional Mobility:  Throughout room/bathroom, PM SESSION : throughout ARU one seated rest break Sup   Assistance:  Sup/SBA  Device:   [x]   Rolling Walker     []   Standard Walker []   Wheelchair        []   Kiana Noble       []   4-Wheeled [place]         []   Cardiac [place]       []   Other:        Homemaking Tasks:   Patient retrieves clothing and tranports to bathroom at [place] level       Additional Therapeutic activities/exercises completed this date:     [x]   ADL Training   [x]   Balance/Postural training patient instructed in standing tolerance and standing balance task at [place] board, patient stands 8 minutes first and 5 minute second stand, focus pn balance with reaching outside of AMA and and crossing midline     [x]   Bed/Transfer Training patient instructed in transfer training sit to stand with focus on proper  Hand foot and body placement from different surfaces  of varying height to increase independence and safety with transfers patient completes 2 sets of 10 with rest break at 5, 9 and second set at 4    [x]   Endurance Training patient instructed in bilateral reciprocal patterned movement for 12 minutes with mod resistance while seated to increase strength and activity tolerance for facilitation  of ADL  And mobility tasks    []   Neuromuscular Re-ed   []   Nu-step:  Time:        Level:         #Steps:       []   Rebounder:    []  Seated     []  Standing        []   Supine Ther Ex (reps/sets):     []   Seated Ther Ex (reps/sets):     []   Standing Ther Ex (reps/sets):     []   Other:      Comments:  Patient offered weighted utensil or weighted wrist cuffs this date for self feeding  2* to tremor increasing to R UE, patient refused however did take education well and therapist prints out weighted utensils from [employer] if patient reconsiders upon dc to home     Patient/Caregiver Education and Training:   [x]   Adaptive Equipment Use  [x]   Bed Mobility/Transfer Technique/Safety  [x]   Energy Conservation Tips  []   Family training  [x]   Postural Awareness  [x]   Safety During Functional Activities  []   Reinforced Patient's Precautions   []   Progress was updated and reviewed in Rehabtracker with patient and/or family this         date.     Treatment Plan for Next Session: POC to continue as tolerated        Treatment/Activity Tolerance:   [x] Tolerated treatment with no adverse effects    [] Patient limited by fatigue  [] Patient limited by pain   [] Patient limited by medical complications:    [] Adverse reaction to Tx:   [] Significant change in status    Safety:       []  bed alarm set    [x]  chair alarm set    []  Pt refused alarms                []  Telesitter activated      [x]  Gait belt used during tx session      []other:       Number of Minutes/Billable Intervention  Therapeutic Exercise    ADL Self-care 60   Neuro Re-Ed    Therapeutic Activity 15 + 45   Group    Other:    TOTAL 120       Social History  Social/Functional History  Lives With: Alone  Type of Home: House  Home Layout: One level (Pt has basement but does not use. Has stair lift but has never been used.)  Home Access: Stairs to enter with rails  Entrance Stairs - Number of Steps: 3-4 EV  Entrance Stairs - Rails: Both  Bathroom Shower/Tub: Tub/Shower unit,Shower chair with back  Bathroom Toilet: Handicap height (Pt states frame over toilet c grab bars.)  Bathroom Equipment: Grab bars in shower,Shower chair,Grab bars around toilet,Hand-held shower  Bathroom Accessibility: Accessible  Home Equipment: Cane,Walker, rolling,Grab bars,Reacher  Has the patient had two or more falls in the past year or any fall with injury in the past year?: No  Receives Help From: Family (Gustabo Morillo lives 4 doors down. He assists c grocery shopping and house cleaning.)  ADL Assistance: Independent  Homemaking Assistance: Needs assistance (alireza does cleaning and grocery shopping)  Homemaking Responsibilities: Yes  Meal Prep Responsibility: Primary  Laundry Responsibility: Primary  Cleaning Responsibility: Primary  Bill Paying/Finance Responsibility: No (Alireza took over ~ 2 months ago as Pt states she began \"having a hard time remembering\")  Shopping Responsibility: Primary  Health Care Management: Secondary (Pt and grandson complete together.)  Ambulation Assistance: Independent (Pt reports she would use a cane at night and would take it in public just incase.  Pt primarily Ind without device.)  Transfer Assistance: Independent  Active : Yes  Mode of Transportation: Car  Occupation: Retired  Type of Occupation: Bank teller  Leisure & Hobbies: Pt enjoys watching television, cooking, and being active. Additional Comments: Pt has regular, flat bed at home; no falls in the past year    Objective                                                                                    Goals:  (Update in navigator)  Short Term Goals  Time Frame for Short term goals: 5-7 days  Short Term Goal 1: Pt will complete self-feeding IND.:  Long Term Goals  Time Frame for Long term goals : 12-14 days or until d/c. Long Term Goal 1: Pt will complete oral hygiene c Mod I.  Long Term Goal 2: Pt will complete grooming tasks c Mod I.  Long Term Goal 3: Pt will complete total body bathing c Mod I and AE PRN. Long Term Goal 4: Pt will complete UB dressing c Ind. Long Term Goal 5: Pt will complete LB dressing c Mod I. Additional Goals?: Yes  Long Term Goal 6: Pt will doff/don footwear c Mod I.  Long Term Goal 7: Pt will complete toileting c Mod I.  Long Term Goal 8: Pt will perform functional transfers (bed, chair, toilet, shower) c DME PRN. Long Term Goal 9: Pt will perform therex/therax to facilitate increased strength/endurance/ax tolerance (c emphasis on dynamic standing balance/tolerance > 8 mins) c Mod I.  Long Term Goal 10: Pt will complete light home management tasks c Mod I.:        Plan of Care                                                                              Times per week: 5 days per week for a minimum of 60 minutes/day plus group as appropriate for 60 minutes.   Treatment to include Plan  Times per Day: Daily  Current Treatment Recommendations: Strengthening,ROM,Balance training,Functional mobility training,Endurance training,Neuromuscular re-education,Safety education & training,Patient/Caregiver education & training,Equipment evaluation, education, & procurement,Self-Care / ADL,Home management training,Coordination training    Electronically signed by   MAGALIS Ferrell,  5/7/2022, 7:49 AM

## 2022-05-07 NOTE — PROGRESS NOTES
Tia Salas    : 1935  Acct #: [de-identified]  MRN: [id number]              PM&R Progress Note      Admitting diagnosis: Diverticulitis ( [address])     Comorbid diagnoses impacting rehabilitation: Generalized weakness, gait disturbance, essential hypertension, hyponatremia, depression with anxiety, bigeminy, trigeminal neuralgia, orthostatic hypotension    Chief complaint: Transient nausea with her a.m. meal.  No emesis. Daily bowel activity (vomiting). Prior (baseline) level of function: Independent. Current level of function:         Current  IRF-TAMARA and Goals:   Occupational Therapy:    Short Term Goals  Time Frame for Short term goals: 5-7 days  Short Term Goal 1: Pt will complete self-feeding IND. :   Long Term Goals  Time Frame for Long term goals : 12-14 days or until d/c. Long Term Goal 1: Pt will complete oral hygiene c Mod I.  Long Term Goal 2: Pt will complete grooming tasks c Mod I.  Long Term Goal 3: Pt will complete total body bathing c Mod I and AE PRN. Long Term Goal 4: Pt will complete UB dressing c Ind. Long Term Goal 5: Pt will complete LB dressing c Mod I. Additional Goals?: Yes  Long Term Goal 6: Pt will doff/don footwear c Mod I.  Long Term Goal 7: Pt will complete toileting c Mod I.  Long Term Goal 8: Pt will perform functional transfers (bed, chair, toilet, shower) c DME PRN. Long Term Goal 9: Pt will perform therex/therax to facilitate increased strength/endurance/ax tolerance (c emphasis on dynamic standing balance/tolerance > 8 mins) c Mod I.  Long Term Goal 10:  Pt will complete light home management tasks c Mod I. :                                       Eating: Eating  Assistance Needed: Independent  Comment: X  CARE Score: 6  Discharge Goal: Independent       Oral Hygiene: Oral Hygiene  Assistance Needed: Independent  Comment: X  CARE Score: 6  Discharge Goal: Independent    UB/LB Bathing: Shower/Bathe Self  Assistance Needed: Supervision or touching assistance  Comment: CGA in stance for safety, seated majority of shower  CARE Score: 4  Discharge Goal: Independent    UB Dressing: Upper Body Dressing  Assistance Needed: Setup or clean-up assistance  Comment: Set up  CARE Score: 5  Discharge Goal: Independent         LB Dressing: Lower Body Dressing  Assistance Needed: Supervision or touching assistance  Comment: SB/CGA in stance to manage over hips  CARE Score: 4  Discharge Goal: Independent    Donning and [place] Footwear: Putting On/Taking Off Footwear  Assistance Needed: Setup or clean-up assistance  Comment: set up required no use of AE  CARE Score: 5  Discharge Goal: Independent      Toiletin [address] needed: Supervision or touching assistance  Comment: CGA in stance to manage clothing over hips, completes hygiene seated  CARE Score: 4  Discharge Goal: Independent      Toilet Transfers: Toilet Transfer  Assistance needed: Supervision or touching assistance  Comment: CGA c BSC frame and use of grab bars. CARE Score: 4  Discharge Goal: Independent    Physical Therapy:         Long Term Goals  Time Frame for Long term goals : 10-12 tx days:  Long term goal 1: Pt will complete sup<->sit Ind. Long term goal 2: Pt will complete OOB transfers using 2WW Mod Ind. Long term goal 3: Pt will ambulate 10 ft and 50 ft with turns using 2WW Mod Ind. Long term goal 4: Pt will ambulate 150 ft over level surface and at least 10 ft of uneven surface using 2WW with SBA. Long term goal 5: Pt will ascend/descend curb step using 2WW and 1 flight of stairs using railings with SBA-Sup. Additional Goals?: Yes  Long term goal 6: Pt will complete object retrieval from the floor with 2WW and reacher Mod Ind. Long term goal 7: Pt will propel manual w/c 150 ft with turns Mod Ind.       Bed Mobility:   Sit to Lying  Assistance Needed: Independent  Comment: Ind  CARE Score: 6  Discharge Goal: Independent  Roll Left and Right  Assistance Needed: Independent  Comment: Ind without use of bed features  CARE Score: 6  Discharge Goal: Independent  Lying to Sitting on Side of Bed  Assistance Needed: Supervision or touching assistance  Comment: SBA without use of bed features  CARE Score: 4  Discharge Goal: Independent    Transfers:    Sit to Stand  Assistance Needed: Supervision or touching assistance  Comment: SBA using 2WW  CARE Score: 4  Discharge Goal: Independent  Chair/Bed-to-Chair Transfer  Assistance Needed: Supervision or touching assistance  Comment: SBA without AD  CARE Score: 4  Discharge Goal: Independent  Toilet Transfer  Assistance needed: Partial/moderate assistance  Comment: Min A to sit, Mod A to stand using grab bars and 2WW  CARE Score: 3  Car Transfer  Assistance Needed: Partial/moderate assistance  Comment: SBA using 2WW  CARE Score: 3  Discharge Goal: Independent    Ambulation:    Walking Ability  Does the Patient Walk?: Yes     Walk 10 Feet  Assistance Needed: Supervision or touching assistance  Comment: CGA using 2WW  CARE Score: 4  Discharge Goal: Independent     Walk 50 Feet with Two Turns  Assistance Needed: Supervision or touching assistance  Comment: CGA using 2WW  Reason if not Attempted: Not attempted due to medical condition or safety concerns  CARE Score: 4  Discharge Goal: Independent     Walk 150 Feet  Assistance Needed: Supervision or touching assistance  Comment: CGA using 2WW  Reason if not Attempted: Not attempted due to medical condition or safety concerns  CARE Score: 4  Discharge Goal: Supervision or touching assistance     Walking 10 Feet on Uneven Surfaces  Assistance Needed: Supervision or touching assistance  Comment: CGA using 2WW  Reason if not Attempted: Not attempted due to medical condition or safety concerns  CARE Score: 4  Discharge Goal: Supervision or touching assistance     1 Step (Curb)  Assistance Needed: Supervision or touching assistance  Comment: CGA using 2WW  Reason if not Attempted: Not attempted due to medical condition or safety concerns  CARE Score: 4  Discharge Goal: Supervision or touching assistance     4 Steps  Assistance Needed: Supervision or touching assistance  Comment: SBA-CGA using railings to ascend/descend 4\"/6\" step heights  Reason if not Attempted: Not attempted due to medical condition or safety concerns  CARE Score: 4  Discharge Goal: Supervision or touching assistance     12 Steps  Assistance Needed: Supervision or touching assistance  Comment: SBA-CGA using railings to ascend/descend 4\"/6\" step heights  Reason if not Attempted: Not attempted due to medical condition or safety concerns  CARE Score: 4  Discharge Goal: Supervision or touching assistance       Wheelchair:  w/c Ability: Wheelchair Ability  Uses a Wheelchair and/or Scooter?: Yes  Wheel 50 Feet with Two Turns  Assistance Needed: Supervision or touching assistance  Comment: SBA-Sup using BUE primarily  CARE Score: 4  Discharge Goal: Independent  Wheel 150 Feet  Assistance Needed: Substantial/maximal assistance  Comment: pt was only able to propel up to 70 ft today (limited by fatigue)  CARE Score: 2  Discharge Goal: Independent          Balance:        Object: Picking Up Object  Assistance Needed: Supervision or touching assistance  Comment: SBA with 2WW and reacher  CARE Score: 4  Discharge Goal: Independent    I      Exam:    Blood pressure (!) 126/51, pulse 60, temperature 97.9 °F (36.6 °C), resp. rate 16, height 5' 3\" (1.6 m), weight 135 lb 12.9 oz (61.6 kg), SpO2 95 %, not currently breastfeeding. General: Reclined in bed. Alert. Gazing right and left. In no distress. HEENT: Symmetric facial expression and full visual field. MMM. Pulmonary: Unlabored without wheezes or rales. Cardiac: RRR. Abdomen: Patient's abdomen is soft and nondistended. Bowel sounds were present throughout. There was no rebound, guarding or masses noted. Upper extremities: Able to bring both hands up overhead. No new tremor or clonus.     Lower extremities: 4/5 strength across the knees and ankles. Heels clear. No signs of DVT. Sitting balance was good. Standing balance was poor. Lab Results   Component Value Date    WBC 4.8 05/02/2022    HGB 11.6 (L) 05/02/2022    HCT 35.3 (L) 05/02/2022    .9 (H) 05/02/2022     05/02/2022     Lab Results   Component Value Date    INR 0.95 02/01/2022    INR 0.96 01/25/2016    PROTIME 12.3 02/01/2022    PROTIME 11.0 01/25/2016     Lab Results   Component Value Date    CREATININE 0.9 05/02/2022    BUN 20 05/02/2022     05/02/2022    K 4.0 05/02/2022    CL 98 (L) 05/02/2022    CO2 29 05/02/2022     Lab Results   Component Value Date    ALT 19 04/28/2022    AST 37 04/28/2022    ALKPHOS 40 04/28/2022    BILITOT 0.6 04/28/2022       Expected length of stay  prior to a supervised level of function for discharge home with a walker and [facility] OT/PT is 5/12/2022. Recommendations:    1. Diverticulitis with gait disturbance:    Requires some tactile cueing to correct her balance loss when up on her feet yet. Overall progressing with the daily occupational and physical therapy.  Her oral intake seems appropriate at this point.  Tolerating her vitamin supplementation and a bland diet.  Ongoing adaptive equipment training, possibly caregiver education and bowel and bladder monitoring.  DVT prophylaxis.  She benefits from aggressive pulmonary hygiene measures.  Verbal cues and CGA-minimum physical assistance for transfers. 2. DVT prophylaxis: Lovenox 40 mg subcu daily.  I must periodically monitor her hemoglobin and platelet count while on this medication.  Weightbearing activities are slowly improving daily.  GI prophylaxis is available.  No new bruising or swelling. 3. Essential hypertension: Pain control, diuretic therapy with Lasix and verapamil.  Vital signs are checked at rest and with activity.  Target systolic blood pressures 754-515.  TYTVW pressure is within the target range today.   Monitoring closely. 4. Hyponatremia: Periodic monitoring of her chemistries.  Cautious use of diuretics.  Encouraging consistent oral intake of a general diet. We will check labs after the weekend. 5. Depression with anxiety: Zoloft and pain management.  Regulating her sleep-wake schedule.  Extending her treatment around to more common areas. Charles Ortiz needed infrequently. 6. Bigeminy: Monitoring of her vital signs.  Keeping close tabs on her chemistries.  Outpatient follow-up with cardiology. 7. Trigeminal neuralgia: Depakote, gabapentin and Zoloft.  Acetaminophen as needed. 8. Orthostatic hypotension: Mestinon, cautious diuretic use, AFIA stockings and slow and deliberate position changes were needed.  Vital signs checked at rest and with activity.  She has general fatigue but minimal positional dizziness despite her nausea.

## 2022-05-07 NOTE — PROGRESS NOTES
Physical Therapy  [x] daily progress note       [] discharge       Patient Name:  Goran Oconnor   :  1935 MRN: [id number]  Room:  [address] [place] [address] Date of Admission: 2022  Rehabilitation Diagnosis:   Diverticulitis of intestine, part unspecified, without perforation or abscess without bleeding [K57.92]  Diverticulitis [K57.92]       Date 2022       Day of ARU Week:  6   Time IN/-1033   Individual Tx Minutes 60   Group Tx Minutes    Co-Treat Minutes    Concurrent Tx Minutes    TOTAL Tx Time Mins 60   Variance Time    Variance Time []   Refusal due to:     []   Medical hold/reason:    []   Illness   []   Off Unit for test/procedure  []   Extra time needed to complete task  []   Therapeutic need  []   Other (specify):   Restrictions Restrictions/Precautions  Restrictions/Precautions: Fall Risk,Contact Precautions (C-diff)      Communication with other providers: [x]   OK to see per nursing:     []   Spoke with team member regarding:      Subjective observations and cognitive status:  Pt observed sitting in recliner upon PTA arrival. Pt states she was surprised how well she did this AM c OT. Pt denies pain and is agreeable to PT session. Pain level/location:   0 /10       Location:    Discharge recommendations  Anticipated discharge date:  Expected Discharge Date: 22  Destination: []???home alone   []? ??home alone with assist PRN     []? ?? home w/ family      []? ?? Continuous supervision  []? ??SNF    []??? Assisted living     []? ?? Other:  Continued therapy: []???C PT  []???OUTPATIENT PT   []??? No Further PT  []???SNF PT  Caregiver training recommended: []? ? ? Yes  []??? No   Equipment needs: has 2WW         Bed Mobility:           [x]   Pt received out of bed   Rolling R/L: I  Scooting:  MI  Lying --> Sit:  NT  Sit --> lying:  MI    Transfers:    Sit--> Stand:  SBA  Stand --> Sit:   SBA  Chair-->Bed/Bed --> Chair:   Sup/Mi  Car Transfers:  NT  Toilet Transfer (if applicable):  SBAc grab bars  Other:    Assistive device required for transfer:   fww    Gait:    Distance:  300' x 2   Assistance:  Sup/MI  Device:  fww  Gait Quality:  Reciprocal gait      Stairs   # Completed:  5  Assistance:  sup  Supportive Device:  Single HR        Additional Therapeutic activities/exercises completed this date:     [x]   Nu-step:  Time:   7min     Level:   4      #Steps:       []   Rebounder:    []  Seated     []  Standing        []   Balance training         []   Postural training    []   Supine ther ex (reps/sets):     [x]   Seated ther ex (reps/sets):  Fig 4 piriformis stretch 5x10s ea BL   []   Standing ther ex (reps/sets):     [x]   Picking up object from floor (standing):during 3 trial of cone weaving, pt asked to pick cone up from floor- min cues for improved AMA and body mechanics c AD, able to  8/8 cones c fair form                   []   Reacher used   [x]   Other:dual tasking activity negotiating through cones to find # on floor- emphasis on safe AD management c turns and pivots, improving visual scanning and planning. Pt brushed against 2 cones during first trial.    [x]   Other: supine hip release, SIJ mob and L ant rot inomminate corrected     Comments:  Pt cos L hip \"catching\" and increased piriformis mm spasm c bending/ activity. Performed sitting fig 4 piriformis stretch to aide in release. Pt reports improved subjective co's and was able to resume activity sans complications. Patient/Caregiver Education and Training:   [x]   Bed Mobility/Transfer technique/safety  [x]   Gait technique/sequencing  [x]   Proper use of assistive device  []   Advanced mobility safety and technique  []   Reinforced patient's precautions/mobility while maintaining precautions  [x]   Postural awareness  []   Family training  []   Progress was updated and reviewed in Rehabtracker with patient and/or family this date. Treatment Plan for Next Session: dynamic balance,  stairs    Assessment:   This pt demonstrated a positive  response to today's treatment as evidenced by gait and balance progressions. The patient is making steady progress toward established goals as evidenced by QI scores. Ongoing deficits are observed in the areas of balance and activity endurance and continued focus on progressing deficits towards PT goals is recommended. Treatment/Activity Tolerance:   [x] Tolerated treatment with no adverse effects    [] Patient limited by fatigue  [] Patient limited by pain   [] Patient limited by medical complications:    [] Adverse reaction to Tx:   [] Significant change in status    Safety:       [x]  bed alarm set    []  chair alarm set    []  Pt refused alarms                []  Telesitter activated      [x]  Gait belt used during tx session      []other:         Number of Minutes/Billable Intervention  Gait Training 13   Therapeutic Exercise 12   Neuro Re-Ed 15   Therapeutic Activity 20   Wheelchair Propulsion    Group    Other:    TOTAL 60         Social History  Social/Functional History  Lives With: Alone  Type of Home: House  Home Layout: One level (Pt has basement but does not use. Has stair lift but has never been used.)  Home Access: Stairs to enter with rails  Entrance Stairs - Number of Steps: 3-4 EV  Entrance Stairs - Rails: Both  Bathroom Shower/Tub: Tub/Shower unit,Shower chair with back  Bathroom Toilet: Handicap height (Pt states frame over toilet c grab bars.)  Bathroom Equipment: Grab bars in shower,Shower chair,Grab bars around toilet,Hand-held shower  Bathroom Accessibility: Accessible  Home Equipment: Cane,Walker, rolling,Grab bars,Reacher  Has the patient had two or more falls in the past year or any fall with injury in the past year?: No  Receives Help From: Family (Gustabo Morillo lives 4 doors down.  He assists c grocery shopping and house cleaning.)  ADL Assistance: Independent  Homemaking Assistance: Needs assistance (chika does cleaning and grocery shopping)  Homemaking Responsibilities: Yes  Meal Prep Responsibility: Primary  Laundry Responsibility: Primary  Cleaning Responsibility: Primary  Bill Paying/Finance Responsibility: No (Grandson took over ~ 2 months ago as Pt states she began \"having a hard time remembering\")  Shopping Responsibility: Primary  Health Care Management: Secondary (Pt and grandson complete together.)  Ambulation Assistance: Independent (Pt reports she would use a cane at night and would take it in public just incase. Pt primarily Ind without device.)  Transfer Assistance: Independent  Active : Yes  Mode of Transportation: Car  Occupation: Retired  Type of Occupation:   Leisure & Hobbies: Pt enjoys watching television, cooking, and being active. Additional Comments: Pt has regular, flat bed at home; no falls in the past year    Objective                                                                                    Goals:  (Update in navigator)   : Long Term Goals  Time Frame for Long term goals : 10-12 tx days:  Long term goal 1: Pt will complete sup<->sit Ind. Long term goal 2: Pt will complete OOB transfers using 2WW Mod Ind. Long term goal 3: Pt will ambulate 10 ft and 50 ft with turns using 2WW Mod Ind. Long term goal 4: Pt will ambulate 150 ft over level surface and at least 10 ft of uneven surface using 2WW with SBA. Long term goal 5: Pt will ascend/descend curb step using 2WW and 1 flight of stairs using railings with SBA-Sup. Additional Goals?: Yes  Long term goal 6: Pt will complete object retrieval from the floor with 2WW and reacher Mod Ind. Long term goal 7: Pt will propel manual w/c 150 ft with turns Mod Ind.:        Plan of Care                                                                              Times per week: 5 days per week for a minimum of 60 minutes/day plus group as appropriate for 60 minutes.   Treatment to include Current Treatment Recommendations: Strengthening,Balance training,Functional mobility training,Transfer training,Endurance training,Gait training,Stair training,Home exercise program,Safety education & training,Patient/Caregiver education & training,Equipment evaluation, education, & procurement,Therapeutic activities,Wheelchair mobility training    Electronically signed by   Travis Ramirez, PTA [id number]  5/7/2022, 9:45 AM

## 2022-05-08 PROCEDURE — 6370000000 HC RX 637 (ALT 250 FOR IP): Performed by: PHYSICAL MEDICINE & REHABILITATION

## 2022-05-08 PROCEDURE — 6370000000 HC RX 637 (ALT 250 FOR IP): Performed by: STUDENT IN AN ORGANIZED HEALTH CARE EDUCATION/TRAINING PROGRAM

## 2022-05-08 PROCEDURE — 1280000000 HC REHAB R&B

## 2022-05-08 PROCEDURE — 94761 N-INVAS EAR/PLS OXIMETRY MLT: CPT

## 2022-05-08 PROCEDURE — 6360000002 HC RX W HCPCS: Performed by: STUDENT IN AN ORGANIZED HEALTH CARE EDUCATION/TRAINING PROGRAM

## 2022-05-08 PROCEDURE — 94150 VITAL CAPACITY TEST: CPT

## 2022-05-08 RX ADMIN — Medication 1 TABLET: at 08:58

## 2022-05-08 RX ADMIN — FLUTICASONE PROPIONATE 1 SPRAY: 50 SPRAY, METERED NASAL at 09:03

## 2022-05-08 RX ADMIN — KETOTIFEN FUMARATE 1 DROP: 0.35 SOLUTION/ DROPS OPHTHALMIC at 09:09

## 2022-05-08 RX ADMIN — ONDANSETRON 4 MG: 4 TABLET, ORALLY DISINTEGRATING ORAL at 15:50

## 2022-05-08 RX ADMIN — SERTRALINE HYDROCHLORIDE 25 MG: 50 TABLET ORAL at 08:58

## 2022-05-08 RX ADMIN — ONDANSETRON 4 MG: 4 TABLET, ORALLY DISINTEGRATING ORAL at 02:28

## 2022-05-08 RX ADMIN — PANTOPRAZOLE SODIUM 40 MG: 40 TABLET, DELAYED RELEASE ORAL at 05:49

## 2022-05-08 RX ADMIN — FUROSEMIDE 20 MG: 20 TABLET ORAL at 08:58

## 2022-05-08 RX ADMIN — VERAPAMIL HYDROCHLORIDE 120 MG: 120 TABLET, FILM COATED, EXTENDED RELEASE ORAL at 09:03

## 2022-05-08 RX ADMIN — GABAPENTIN 300 MG: 300 CAPSULE ORAL at 20:29

## 2022-05-08 RX ADMIN — ASPIRIN 81 MG: 81 TABLET, COATED ORAL at 08:58

## 2022-05-08 RX ADMIN — MAGNESIUM OXIDE 400 MG (241.3 MG MAGNESIUM) TABLET 400 MG: TABLET at 08:58

## 2022-05-08 RX ADMIN — VITAM B12 50 MCG: 100 TAB at 09:05

## 2022-05-08 RX ADMIN — ROSUVASTATIN CALCIUM 10 MG: 5 TABLET, FILM COATED ORAL at 20:28

## 2022-05-08 RX ADMIN — PYRIDOSTIGMINE BROMIDE 30 MG: 60 TABLET ORAL at 20:27

## 2022-05-08 RX ADMIN — GABAPENTIN 300 MG: 300 CAPSULE ORAL at 08:58

## 2022-05-08 RX ADMIN — Medication 1 TABLET: at 20:28

## 2022-05-08 RX ADMIN — DIVALPROEX SODIUM 250 MG: 250 TABLET, FILM COATED, EXTENDED RELEASE ORAL at 20:26

## 2022-05-08 RX ADMIN — GABAPENTIN 300 MG: 300 CAPSULE ORAL at 12:25

## 2022-05-08 RX ADMIN — ENOXAPARIN SODIUM 40 MG: 100 INJECTION SUBCUTANEOUS at 09:08

## 2022-05-08 RX ADMIN — KETOTIFEN FUMARATE 1 DROP: 0.35 SOLUTION/ DROPS OPHTHALMIC at 20:29

## 2022-05-08 ASSESSMENT — PAIN SCALES - WONG BAKER
WONGBAKER_NUMERICALRESPONSE: 0

## 2022-05-08 ASSESSMENT — PAIN SCALES - GENERAL: PAINLEVEL_OUTOF10: 0

## 2022-05-08 NOTE — PLAN OF CARE
Problem: Safety - Adult  Goal: Free from fall injury  5/8/2022 1112 by Erika Gibbs RN  Outcome: Progressing  5/7/2022 2232 by Otilio Beverly LPN  Outcome: Progressing  5/7/2022 2232 by Otilio Beverly LPN  Outcome: Progressing

## 2022-05-09 LAB
ANION GAP SERPL CALCULATED.3IONS-SCNC: 11 MMOL/L (ref 4–16)
BUN BLDV-MCNC: 18 MG/DL (ref 6–23)
CALCIUM SERPL-MCNC: 9.3 MG/DL (ref 8.3–10.6)
CHLORIDE BLD-SCNC: 102 MMOL/L (ref 99–110)
CO2: 27 MMOL/L (ref 21–32)
CREAT SERPL-MCNC: 0.9 MG/DL (ref 0.6–1.1)
GFR AFRICAN AMERICAN: >60 ML/MIN/1.73M2
GFR NON-AFRICAN AMERICAN: 60 ML/MIN/1.73M2
GLUCOSE BLD-MCNC: 92 MG/DL (ref 70–99)
HCT VFR BLD CALC: 33.2 % (ref 37–47)
HEMOGLOBIN: 10.6 GM/DL (ref 12.5–16)
MCH RBC QN AUTO: 33.7 PG (ref 27–31)
MCHC RBC AUTO-ENTMCNC: 31.9 % (ref 32–36)
MCV RBC AUTO: 105.4 FL (ref 78–100)
PDW BLD-RTO: 14.1 % (ref 11.7–14.9)
PLATELET # BLD: 140 K/CU MM (ref 140–440)
PMV BLD AUTO: 9.5 FL (ref 7.5–11.1)
POTASSIUM SERPL-SCNC: 4.9 MMOL/L (ref 3.5–5.1)
RBC # BLD: 3.15 M/CU MM (ref 4.2–5.4)
SODIUM BLD-SCNC: 140 MMOL/L (ref 135–145)
WBC # BLD: 4.6 K/CU MM (ref 4–10.5)

## 2022-05-09 PROCEDURE — 94150 VITAL CAPACITY TEST: CPT

## 2022-05-09 PROCEDURE — 6360000002 HC RX W HCPCS: Performed by: STUDENT IN AN ORGANIZED HEALTH CARE EDUCATION/TRAINING PROGRAM

## 2022-05-09 PROCEDURE — 99232 SBSQ HOSP IP/OBS MODERATE 35: CPT | Performed by: PHYSICAL MEDICINE & REHABILITATION

## 2022-05-09 PROCEDURE — 6370000000 HC RX 637 (ALT 250 FOR IP): Performed by: STUDENT IN AN ORGANIZED HEALTH CARE EDUCATION/TRAINING PROGRAM

## 2022-05-09 PROCEDURE — 97116 GAIT TRAINING THERAPY: CPT

## 2022-05-09 PROCEDURE — 97530 THERAPEUTIC ACTIVITIES: CPT

## 2022-05-09 PROCEDURE — 97110 THERAPEUTIC EXERCISES: CPT

## 2022-05-09 PROCEDURE — 94761 N-INVAS EAR/PLS OXIMETRY MLT: CPT

## 2022-05-09 PROCEDURE — 1280000000 HC REHAB R&B

## 2022-05-09 PROCEDURE — 6370000000 HC RX 637 (ALT 250 FOR IP): Performed by: PHYSICAL MEDICINE & REHABILITATION

## 2022-05-09 PROCEDURE — 36415 COLL VENOUS BLD VENIPUNCTURE: CPT

## 2022-05-09 PROCEDURE — 85027 COMPLETE CBC AUTOMATED: CPT

## 2022-05-09 PROCEDURE — 80048 BASIC METABOLIC PNL TOTAL CA: CPT

## 2022-05-09 PROCEDURE — 97535 SELF CARE MNGMENT TRAINING: CPT

## 2022-05-09 RX ADMIN — PYRIDOSTIGMINE BROMIDE 30 MG: 60 TABLET ORAL at 21:36

## 2022-05-09 RX ADMIN — MAGNESIUM OXIDE 400 MG (241.3 MG MAGNESIUM) TABLET 400 MG: TABLET at 09:33

## 2022-05-09 RX ADMIN — Medication 1 TABLET: at 09:33

## 2022-05-09 RX ADMIN — Medication 1 TABLET: at 21:36

## 2022-05-09 RX ADMIN — ROSUVASTATIN CALCIUM 10 MG: 5 TABLET, FILM COATED ORAL at 21:36

## 2022-05-09 RX ADMIN — KETOTIFEN FUMARATE 1 DROP: 0.35 SOLUTION/ DROPS OPHTHALMIC at 09:30

## 2022-05-09 RX ADMIN — GABAPENTIN 300 MG: 300 CAPSULE ORAL at 14:12

## 2022-05-09 RX ADMIN — KETOTIFEN FUMARATE 1 DROP: 0.35 SOLUTION/ DROPS OPHTHALMIC at 21:39

## 2022-05-09 RX ADMIN — ASPIRIN 81 MG: 81 TABLET, COATED ORAL at 09:33

## 2022-05-09 RX ADMIN — FLUTICASONE PROPIONATE 1 SPRAY: 50 SPRAY, METERED NASAL at 09:30

## 2022-05-09 RX ADMIN — PANTOPRAZOLE SODIUM 40 MG: 40 TABLET, DELAYED RELEASE ORAL at 06:43

## 2022-05-09 RX ADMIN — VITAM B12 50 MCG: 100 TAB at 09:33

## 2022-05-09 RX ADMIN — GABAPENTIN 300 MG: 300 CAPSULE ORAL at 09:33

## 2022-05-09 RX ADMIN — GABAPENTIN 300 MG: 300 CAPSULE ORAL at 21:36

## 2022-05-09 RX ADMIN — SERTRALINE HYDROCHLORIDE 25 MG: 50 TABLET ORAL at 09:33

## 2022-05-09 RX ADMIN — DIVALPROEX SODIUM 250 MG: 250 TABLET, FILM COATED, EXTENDED RELEASE ORAL at 21:39

## 2022-05-09 RX ADMIN — VERAPAMIL HYDROCHLORIDE 120 MG: 120 TABLET, FILM COATED, EXTENDED RELEASE ORAL at 09:33

## 2022-05-09 RX ADMIN — FUROSEMIDE 20 MG: 20 TABLET ORAL at 09:33

## 2022-05-09 RX ADMIN — ENOXAPARIN SODIUM 40 MG: 100 INJECTION SUBCUTANEOUS at 09:36

## 2022-05-09 ASSESSMENT — PAIN SCALES - GENERAL
PAINLEVEL_OUTOF10: 0
PAINLEVEL_OUTOF10: 0

## 2022-05-09 ASSESSMENT — PAIN SCALES - WONG BAKER: WONGBAKER_NUMERICALRESPONSE: 0

## 2022-05-09 NOTE — PROGRESS NOTES
Notified provider regarding stool sample order, patient has order for stool sample to rule out cdiff. If im looking correctly the order  48hrs after 5/2 when placed. Patient currently not experience any pain or fever and stool appears soft and form this morning. Requested Dr. Shagufta Lundberg to review order. Per Dr. Shagufta Lundberg no longer needed.

## 2022-05-09 NOTE — PLAN OF CARE
Problem: Discharge Planning  Goal: Discharge to home or other facility with appropriate resources  Outcome: Progressing     Problem: Safety - Adult  Goal: Free from fall injury  5/8/2022 2145 by Davonte Juarez RN  Outcome: Progressing  5/8/2022 1112 by Sammie Morley RN  Outcome: Progressing     Problem: Skin/Tissue Integrity  Goal: Absence of new skin breakdown  Description: 1. Monitor for areas of redness and/or skin breakdown  2. Assess vascular access sites hourly  3. Every 4-6 hours minimum:  Change oxygen saturation probe site  4. Every 4-6 hours:  If on nasal continuous positive airway pressure, respiratory therapy assess nares and determine need for appliance change or resting period.   Outcome: Progressing     Problem: ABCDS Injury Assessment  Goal: Absence of physical injury  Outcome: Progressing     Problem: Pain  Goal: Verbalizes/displays adequate comfort level or baseline comfort level  Outcome: Progressing

## 2022-05-09 NOTE — PROGRESS NOTES
Physical Rehabilitation: OCCUPATIONAL THERAPY     [x] daily progress note       [] discharge       Patient Name:  Savanna Hansen   :  1935 MRN: [id number]  Room:  [address] [place] [address] Date of Admission: 2022  Rehabilitation Diagnosis:   Diverticulitis of intestine, part unspecified, without perforation or abscess without bleeding [K57.92]  Diverticulitis [K57.92]       Date 2022       Day of ARU Week:  1   Time IN//945   Individual Tx Minutes 60   Group Tx Minutes    Co-Treat Minutes    Concurrent Tx Minutes    TOTAL Tx Time Mins 60   Variance Time    Variance Time []   Refusal due to:     []   Medical hold/reason:    []   Illness   []   Off Unit for test/procedure  []   Extra time needed to complete task  []   Therapeutic need  []   Other (specify):   Restrictions Restrictions/Precautions: Fall Risk,Contact Precautions (C-diff)         Communication with other providers: [x]   OK to see per nursing:     []   Spoke with team member regarding:      Subjective observations and cognitive status: Patient side lying in bed upon approach, pleasant and agreeable to therapy    Pain level/location:    10       Location: per patient no pain noted    Discharge recommendations  Anticipated discharge date:    Destination: []home alone   [x]home alone w assist prn   [] home w/ family    [] Continuous supervision       []SNF    [] Assisted living     [] Other:   Continued therapy: [x]HHC OT  []OUTPATIENT  OT   [] No Further OT  Equipment needs: None        ADLs:    Eating: Eating  Assistance Needed: Independent  Comment: X  CARE Score: 6  Discharge Goal: Independent       Oral Hygiene: Oral Hygiene  Assistance Needed: Independent  Comment: X  CARE Score: 6  Discharge Goal: Independent    UB/LB Bathing: Shower/Bathe Self  Assistance Needed: Supervision or touching assistance  Comment: Sup in stance for safety  majority seated  CARE Score: 4  Discharge Goal: Independent    UB Dressing: [address]  Needed: Independent  Comment: X  CARE Score: 6  Discharge Goal: Independent         LB Dressing: Lower Body Dressing  Assistance Needed: Independent  Comment: X  CARE Score: 6  Discharge Goal: Independent    Donning and [place] Footwear: Putting On/Taking Off Footwear  Assistance Needed: Independent  Comment: X  CARE Score: 6  Discharge Goal: Independent      Toileting: Toileting Hygiene  Assistance needed: Independent  Comment: Mod I  CARE Score: 6  Discharge Goal: Independent      Toilet Transfers: Toilet Transfer  Assistance needed: Independent  Comment: Mod I using grab bars  CARE Score: 6  Discharge Goal: Independent  Device Used:    []   Standard Toilet         [x]   Grab Bars           []  Bedside Commode over standard toilet        []   Elevated Toilet          []   Other:        Bed Mobility:           []   Pt received out of bed   Rolling R/L:    Scooting:  Ind  Supine --> Sit:  Ind  Sit --> Supine:  INd    Transfers:    Sit--> Stand:   Mod I   Stand --> Sit:   Mod I   Stand-Pivot:   Sup/Mod I   Other:    Assistive device required for transfer:   RW      Functional Mobility:  Throughout room/bathroom   Assistance:  RW  Device:   [x]   Rolling Walker     []   Standard Walker []   Wheelchair        []   [employer]       []   Ofelia Brennan         []   Cardiac Ragini Bile       []   Other:        Homemaking Tasks:   Patient retrieves clothing from closet and transports to bathroom in prep of full ADL       Additional Therapeutic activities/exercises completed this date:     [x]   ADL Training   [x]   Balance/Postural training     [x]   Bed/Transfer Training   []   Endurance Training   []   Neuromuscular Re-ed   []   Nu-step:  Time:        Level:         #Steps:       []   Rebounder:    []  Seated     []  Standing        []   Supine Ther Ex (reps/sets):     []   Seated Ther Ex (reps/sets):     []   Standing Ther Ex (reps/sets):     []   Other:      Comments:      Patient/Caregiver Education and Training:   [x] Adaptive Equipment Use  [x]   Bed Mobility/Transfer Technique/Safety  [x]   Energy Conservation Tips  []   Family training  [x]   Postural Awareness  [x]   Safety During Functional Activities  []   Reinforced Patient's Precautions   []   Progress was updated and reviewed in Rehabtracker with patient and/or family this         date. Treatment Plan for Next Session: POC to continue as tolerated         Treatment/Activity Tolerance:   [x] Tolerated treatment with no adverse effects    [] Patient limited by fatigue  [] Patient limited by pain   [] Patient limited by medical complications:    [] Adverse reaction to Tx:   [] Significant change in status    Safety:       []  bed alarm set    [x]  chair alarm set    []  Pt refused alarms                []  Telesitter activated      [x]  Gait belt used during tx session      []other:       Number of Minutes/Billable Intervention  Therapeutic Exercise    ADL Self-care 45   Neuro Re-Ed    Therapeutic Activity 15   Group    Other:    TOTAL 60       Social History  Social/Functional History  Lives With: Alone  Type of Home: House  Home Layout: One level (Pt has basement but does not use. Has stair lift but has never been used.)  Home Access: Stairs to enter with rails  Entrance Stairs - Number of Steps: 3-4 EV  Entrance Stairs - Rails: Both  Bathroom Shower/Tub: Tub/Shower unit,Shower chair with back  Bathroom Toilet: Handicap height (Pt states frame over toilet c grab bars.)  Bathroom Equipment: Grab bars in shower,Shower chair,Grab bars around toilet,Hand-held shower  Bathroom Accessibility: Accessible  Home Equipment: Cane,Walker, rolling,Grab bars,Reacher  Has the patient had two or more falls in the past year or any fall with injury in the past year?: No  Receives Help From: Family (Gustabo 84 lives 4 doors down.  He assists c grocery shopping and house cleaning.)  ADL Assistance: Independent  Homemaking Assistance: Needs assistance (chika does cleaning and grocery shopping)  Homemaking Responsibilities: Yes  Meal Prep Responsibility: Primary  Laundry Responsibility: Primary  Cleaning Responsibility: Primary  Bill Paying/Finance Responsibility: No (Grandson took over ~ 2 months ago as Pt states she began \"having a hard time remembering\")  Shopping Responsibility: Primary  Health Care Management: Secondary (Pt and grandson complete together.)  Ambulation Assistance: Independent (Pt reports she would use a cane at night and would take it in public just incase. Pt primarily Ind without device.)  Transfer Assistance: Independent  Active : Yes  Mode of Transportation: Car  Occupation: Retired  Type of Occupation:   Leisure & Hobbies: Pt enjoys watching television, cooking, and being active. Additional Comments: Pt has regular, flat bed at home; no falls in the past year    Objective                                                                                    Goals:  (Update in navigator)  Short Term Goals  Time Frame for Short term goals: 5-7 days  Short Term Goal 1: Pt will complete self-feeding IND.:  Long Term Goals  Time Frame for Long term goals : 12-14 days or until d/c. Long Term Goal 1: Pt will complete oral hygiene c Mod I.  Long Term Goal 2: Pt will complete grooming tasks c Mod I.  Long Term Goal 3: Pt will complete total body bathing c Mod I and AE PRN. Long Term Goal 4: Pt will complete UB dressing c Ind. Long Term Goal 5: Pt will complete LB dressing c Mod I. Additional Goals?: Yes  Long Term Goal 6: Pt will doff/don footwear c Mod I.  Long Term Goal 7: Pt will complete toileting c Mod I.  Long Term Goal 8: Pt will perform functional transfers (bed, chair, toilet, shower) c DME PRN. Long Term Goal 9: Pt will perform therex/therax to facilitate increased strength/endurance/ax tolerance (c emphasis on dynamic standing balance/tolerance > 8 mins) c Mod I.  Long Term Goal 10:  Pt will complete light home management tasks c Mod I.:        Plan of Care                                                                              Times per week: 5 days per week for a minimum of 60 minutes/day plus group as appropriate for 60 minutes.   Treatment to include Plan  Times per Day: Daily  Current Treatment Recommendations: Strengthening,ROM,Balance training,Functional mobility training,Endurance training,Neuromuscular re-education,Safety education & training,Patient/Caregiver education & training,Equipment evaluation, education, & procurement,Self-Care / ADL,Home management training,Coordination training    Electronically signed by   MAGALIS Bran,  5/9/2022, 9:12 AM

## 2022-05-09 NOTE — FLOWSHEET NOTE
[x] daily progress note       [] discharge       Patient Name:  Jaylen Aviles   :  1935 MRN: [id number]  Room:  [address] [place] [address] Date of Admission: 2022  Rehabilitation Diagnosis:   Diverticulitis of intestine, part unspecified, without perforation or abscess without bleeding [K57.92]  Diverticulitis [K57.92]       Date 2022       Day of ARU Week:  1   Time IN/OUT 9022-9327  [id number]   Individual Tx Minutes 125   TOTAL Tx Time Mins 125   Variance Time +5 minutes   Variance Time []   Refusal due to:     []   Medical hold/reason:    []   Illness   []   Off Unit for test/procedure  [x]   Extra time needed to complete task  []   Therapeutic need  []   Other (specify):   Restrictions Restrictions/Precautions  Restrictions/Precautions: Fall Risk,Contact Precautions (C-diff)      Communication with other providers: [x]   OK to see per nursing:     []   Spoke with team member regarding:      Subjective observations and cognitive status: AM session: Pt seen up at sink at beginning of treatment. Agreeable to therapy. PM session: pt seen in semi-kaur's position in bed at beginning of treatment. Agreeable to therapy. Pain level/location: 0/10; pt had right hip pain towards end of AM session, but patient did not rate intensity. Discharge recommendations  Anticipated discharge date:  Expected Discharge Date: 22  Destination: []???home alone   []? ??home alone with assist PRN     []? ?? home w/ family      []? ?? Continuous supervision  []? ??SNF    []??? Assisted living     []? ?? Other:  Continued therapy: []???C PT  []???OUTPATIENT PT   []??? No Further PT  []???SNF PT  Caregiver training recommended: []? ? ? Yes  []??? No   Equipment needs: has 2WW     Bed Mobility:           [x]   Pt received out of bed   Scooting:  Independent   Lying --> Sit:  Independent   Sit --> lying: Independent     Transfers:    Sit--> Stand:  Supervision   Stand --> Sit:   Supervision   Stand pivot transfers: Supervision Toilet Transfer: Supervision with grab bars   Assistive device required for transfer:  RW  Ragena Meigs for proper hand placement. Gait:    Distance:  AM session: 208'+137'; PM session: 710'     Assistance: supervision   Device:  RW  Gait Quality:  Reciprocal pattern     Stairs AM session:   # Completed: 15  Assistance: supervision   Supportive Device:  2 rails  Reciprocal pattern     Uneven Surfaces:  AM session:      Assistance:  Supervision  Device: RW  Surfaces Completed:   [x]  Carpeted surface with bean bags beneath      []  Throw rugs       []  Ramp       []  Outdoor pavements        []  Grass             []  Loose gravel        []  Other:     AM session: Pt amb up/down 4\" curb step supervision with RW. Additional Therapeutic activities/exercises completed this date:     [x]   Nu-step: AM session: Time:  11 minutes      Level:  3   *(for strengthening and endurance training)    [x]   Rebounder: PM session:   []  Seated     [x]  Standing (CGA for balance with 3 Ib med ball x 20 reps without AD)         [x]   Balance training: AM session: pt stood at toilet and pulled pants/briefs up/down supervision for balance with grab bars. PM session: in // bars, pt stood on 2 black stability trainers with CGA-min A for balance without UE support x 2 minutes. In // bars, pt stood on 1 black stability  with narrow stance with CGA-min A for balance with 1 to no UE support x 2 minutes. In // bars, pt stood on 2 black stability trainers staggered with CGA-min A for balance with 1 to no UE support x 2 minutes. In // bars, pt performed fwd/bwd walking with BUE support (1 trial), 1 UE support (1 trial) and no UE support (2 trials) with SBA-CGA for balance. In // bars, pt performed sidestepping x 4 trials total (BUE support, 1 UE support, and no UE support with SBA-CGA for balance. In // bars, pt performed tandem walking x 3 trials with 1 UE support with SBA-CGA for balance.          []   Postural training    [] Supine ther ex (reps/sets):     [x]   Seated ther ex (reps/sets): AM session: trunk rotation/flexion stretch towards right to help relieve right hip pain. 30 second hold. Pt reported that it helped. [x]   Standing ther ex (reps/sets):  AM session: Marching, hip extension, hip abduction, heel raises, toe raises, knee flexion, mini-squats x 10 reps each for strengthening. []   Picking up object from floor (standing):                   []   Reacher used   []   Other:   []   Other:    Patient/Caregiver Education and Training:   [x]   Bed Mobility/Transfer technique/safety  [x]   Gait technique/sequencing  [x]   Proper use of assistive device  [x]   Advanced mobility safety and technique  []   Reinforced patient's precautions/mobility while maintaining precautions  []   Postural awareness  []   Family training    Treatment Plan for Next Session: gait; transfers; advanced gait; stair training; exercises    Assessment: This pt demonstrated a positive response to today's treatment as evidenced by improved mobility. The patient is making progress toward established goals as evidenced by QI scores. Ongoing deficits are observed in the areas of strength, balance, endurance, and safety and continued focus on this is recommended. Treatment/Activity Tolerance:   [x] Tolerated treatment with no adverse effects    [] Patient limited by fatigue  [] Patient limited by pain   [] Patient limited by medical complications:    [] Adverse reaction to Tx:   [] Significant change in status    Safety:       []  bed alarm set    [x]  chair alarm set    []  Pt refused alarms                []  Telesitter activated      [x]  Gait belt used during tx session      [x]other: After AM session: pt left sitting up in recliner with call light at end of treatment. After PM session: pt left in semi-kaur's position in bed with call light at end of treatment.        Number of Minutes/Billable Intervention  Gait Training 75   Therapeutic Exercise 30   Neuro Re-Ed    Therapeutic Activity 20   Wheelchair Propulsion    Group    Other:    TOTAL 125         Social History  Social/Functional History  Lives With: Alone  Type of Home: House  Home Layout: One level (Pt has basement but does not use. Has stair lift but has never been used.)  Home Access: Stairs to enter with rails  Entrance Stairs - Number of Steps: 3-4 EV  Entrance Stairs - Rails: Both  Bathroom Shower/Tub: Tub/Shower unit,Shower chair with back  Bathroom Toilet: Handicap height (Pt states frame over toilet c grab bars.)  Bathroom Equipment: Grab bars in shower,Shower chair,Grab bars around toilet,Hand-held shower  Bathroom Accessibility: Accessible  Home Equipment: Cane,Walker, rolling,Grab bars,Reacher  Has the patient had two or more falls in the past year or any fall with injury in the past year?: No  Receives Help From: Family (Gustabo Morillo lives 4 doors down. He assists c grocery shopping and house cleaning.)  ADL Assistance: Independent  Homemaking Assistance: Needs assistance (alireza does cleaning and grocery shopping)  Homemaking Responsibilities: Yes  Meal Prep Responsibility: Primary  Laundry Responsibility: Primary  Cleaning Responsibility: Primary  Bill Paying/Finance Responsibility: No (Alireza took over ~ 2 months ago as Pt states she began \"having a hard time remembering\")  Shopping Responsibility: Primary  Health Care Management: Secondary (Pt and grandson complete together.)  Ambulation Assistance: Independent (Pt reports she would use a cane at night and would take it in public just incase. Pt primarily Ind without device.)  Transfer Assistance: Independent  Active : Yes  Mode of Transportation: Car  Occupation: Retired  Type of Occupation:   Leisure & Hobbies: Pt enjoys watching television, cooking, and being active.   Additional Comments: Pt has regular, flat bed at home; no falls in the past year    Objective Goals:  (Update in navigator)   : Long Term Goals  Time Frame for Long term goals : 10-12 tx days:  Long term goal 1: Pt will complete sup<->sit Ind. Long term goal 2: Pt will complete OOB transfers using 2WW Mod Ind. Long term goal 3: Pt will ambulate 10 ft and 50 ft with turns using 2WW Mod Ind. Long term goal 4: Pt will ambulate 150 ft over level surface and at least 10 ft of uneven surface using 2WW with SBA. Long term goal 5: Pt will ascend/descend curb step using 2WW and 1 flight of stairs using railings with SBA-Sup. Additional Goals?: Yes  Long term goal 6: Pt will complete object retrieval from the floor with 2WW and reacher Mod Ind. Long term goal 7: Pt will propel manual w/c 150 ft with turns Mod Ind.:        Plan of Care                                                                              Times per week: 5 days per week for a minimum of 60 minutes/day plus group as appropriate for 60 minutes.   Treatment to include Current Treatment Recommendations: Strengthening,Balance training,Functional mobility training,Transfer training,Endurance training,Gait training,Stair training,Home exercise program,Safety education & training,Patient/Caregiver education & training,Equipment evaluation, education, & procurement,Therapeutic activities,Wheelchair mobility training    Electronically signed by   Matt Carrillo [id number]  5/9/2022, 10:36 AM

## 2022-05-09 NOTE — PROGRESS NOTES
Regulo Peña    : 1935  Acct #: [de-identified]  MRN: [id number]              PM&R Progress Note      Admitting diagnosis: Diverticulitis ( [address])     Comorbid diagnoses impacting rehabilitation: Generalized weakness, gait disturbance, essential hypertension, hyponatremia, depression with anxiety, bigeminy, trigeminal neuralgia, orthostatic hypotension    Chief complaint: 2 bowel movements today (soft to but controlled). No abdominal cramping. Good appetite. Prior (baseline) level of function: Independent. Current level of function:         Current  IRF-TAMARA and Goals:   Occupational Therapy:    Short Term Goals  Time Frame for Short term goals: 5-7 days  Short Term Goal 1: Pt will complete self-feeding IND. :   Long Term Goals  Time Frame for Long term goals : 12-14 days or until d/c. Long Term Goal 1: Pt will complete oral hygiene c Mod I.  Long Term Goal 2: Pt will complete grooming tasks c Mod I.  Long Term Goal 3: Pt will complete total body bathing c Mod I and AE PRN. Long Term Goal 4: Pt will complete UB dressing c Ind. Long Term Goal 5: Pt will complete LB dressing c Mod I. Additional Goals?: Yes  Long Term Goal 6: Pt will doff/don footwear c Mod I.  Long Term Goal 7: Pt will complete toileting c Mod I.  Long Term Goal 8: Pt will perform functional transfers (bed, chair, toilet, shower) c DME PRN. Long Term Goal 9: Pt will perform therex/therax to facilitate increased strength/endurance/ax tolerance (c emphasis on dynamic standing balance/tolerance > 8 mins) c Mod I.  Long Term Goal 10:  Pt will complete light home management tasks c Mod I. :                                       Eating: Eating  Assistance Needed: Independent  Comment: X  CARE Score: 6  Discharge Goal: Independent       Oral Hygiene: Oral Hygiene  Assistance Needed: Independent  Comment: X  CARE Score: 6  Discharge Goal: Independent    UB/LB Bathing: Shower/Bathe Self  Assistance Needed: Supervision or touching assistance  Comment: Sup in stance for safety  majority seated  CARE Score: 4  Discharge Goal: Independent    UB Dressing: Upper Body Dressing  Assistance Needed: Independent  Comment: X  CARE Score: 6  Discharge Goal: Independent         LB Dressing: Lower Body Dressing  Assistance Needed: Independent  Comment: X  CARE Score: 6  Discharge Goal: Independent    Donning and [place] Footwear: Putting On/Taking Off Footwear  Assistance Needed: Independent  Comment: X  CARE Score: 6  Discharge Goal: Independent      Toileting: Toileting Hygiene  Assistance needed: Independent  Comment: Mod I  CARE Score: 6  Discharge Goal: Independent      Toilet Transfers: Toilet Transfer  Assistance needed: Independent  Comment: Mod I using grab bars  CARE Score: 6  Discharge Goal: Independent    Physical Therapy:         Long Term Goals  Time Frame for Long term goals : 10-12 tx days:  Long term goal 1: Pt will complete sup<->sit Ind. Long term goal 2: Pt will complete OOB transfers using 2WW Mod Ind. Long term goal 3: Pt will ambulate 10 ft and 50 ft with turns using 2WW Mod Ind. Long term goal 4: Pt will ambulate 150 ft over level surface and at least 10 ft of uneven surface using 2WW with SBA. Long term goal 5: Pt will ascend/descend curb step using 2WW and 1 flight of stairs using railings with SBA-Sup. Additional Goals?: Yes  Long term goal 6: Pt will complete object retrieval from the floor with 2WW and reacher Mod Ind. Long term goal 7: Pt will propel manual w/c 150 ft with turns Mod Ind.       Bed Mobility:   Sit to Lying  Assistance Needed: Independent  Comment: Ind  CARE Score: 6  Discharge Goal: Independent  Roll Left and Right  Assistance Needed: Independent  Comment: Ind without use of bed features  CARE Score: 6  Discharge Goal: Independent  Lying to Sitting on Side of Bed  Assistance Needed: Supervision or touching assistance  Comment: SBA without use of bed features  CARE Score: 4  Discharge Goal: Independent    Transfers:    Sit to Stand  Assistance Needed: Supervision or touching assistance  Comment: SBA using 2WW  CARE Score: 4  Discharge Goal: Independent  Chair/Bed-to-Chair Transfer  Assistance Needed: Supervision or touching assistance  Comment: SBA without AD  CARE Score: 4  Discharge Goal: Independent  Toilet Transfer  Assistance needed: Supervision or touching assistance  Comment: Min A to sit, Mod A to stand using grab bars and 2WW  CARE Score: 4  Car Transfer  Assistance Needed: Partial/moderate assistance  Comment: SBA using 2WW  CARE Score: 3  Discharge Goal: Independent    Ambulation:    Walking Ability  Does the Patient Walk?: Yes     Walk 10 Feet  Assistance Needed: Supervision or touching assistance  Comment: CGA using 2WW  CARE Score: 4  Discharge Goal: Independent     Walk 50 Feet with Two Turns  Assistance Needed: Supervision or touching assistance  Comment: CGA using 2WW  Reason if not Attempted: Not attempted due to medical condition or safety concerns  CARE Score: 4  Discharge Goal: Independent     Walk 150 Feet  Assistance Needed: Supervision or touching assistance  Comment: CGA using 2WW  Reason if not Attempted: Not attempted due to medical condition or safety concerns  CARE Score: 4  Discharge Goal: Supervision or touching assistance     Walking 10 Feet on Uneven Surfaces  Assistance Needed: Supervision or touching assistance  Comment: CGA using 2WW  Reason if not Attempted: Not attempted due to medical condition or safety concerns  CARE Score: 4  Discharge Goal: Supervision or touching assistance     1 Step (Curb)  Assistance Needed: Supervision or touching assistance  Comment: CGA using 2WW  Reason if not Attempted: Not attempted due to medical condition or safety concerns  CARE Score: 4  Discharge Goal: Supervision or touching assistance     4 Steps  Assistance Needed: Supervision or touching assistance  Comment: SBA-CGA using railings to ascend/descend 4\"/6\" step heights  Reason if not Attempted: Not attempted due to medical condition or safety concerns  CARE Score: 4  Discharge Goal: Supervision or touching assistance     12 Steps  Assistance Needed: Supervision or touching assistance  Comment: SBA-CGA using railings to ascend/descend 4\"/6\" step heights  Reason if not Attempted: Not attempted due to medical condition or safety concerns  CARE Score: 4  Discharge Goal: Supervision or touching assistance       Wheelchair:  w/c Ability: Wheelchair Ability  Uses a Wheelchair and/or Scooter?: Yes  Wheel 50 Feet with Two Turns  Assistance Needed: Supervision or touching assistance  Comment: SBA-Sup using BUE primarily  CARE Score: 4  Discharge Goal: Independent  Wheel 150 Feet  Assistance Needed: Substantial/maximal assistance  Comment: pt was only able to propel up to 79 ft today (limited by fatigue)  CARE Score: 2  Discharge Goal: Independent          Balance:        Object: Picking Up Object  Assistance Needed: Supervision or touching assistance  Comment: SBA with 2WW and reacher  CARE Score: 4  Discharge Goal: Independent    I      Exam:    Blood pressure 127/71, pulse 84, temperature 98.1 °F (36.7 °C), temperature source Oral, resp. rate 16, height 5' 3\" (1.6 m), weight 136 lb 11 oz (62 kg), SpO2 96 %, not currently breastfeeding. General: Up in a bedside chair after eating lunch. In good spirits. Bright and alert. Fair insight. HEENT: MMM. Neck supple. No JVD. Pulmonary: Symmetric air exchange without wheezes or rales. Cardiac: Regular rate and rhythm. Abdomen: Patient's abdomen is soft and nondistended. Bowel sounds were present throughout. There was no rebound, guarding or masses noted. Upper extremities: Moves both upper limbs though functional range of motion. No new bruising, swelling or tremor. Lower extremities: No signs of DVT. Format/5 strength across the knees and ankles. Sitting balance was good. Standing balance was poor.     Lab Results Component Value Date    WBC 4.6 05/09/2022    HGB 10.6 (L) 05/09/2022    HCT 33.2 (L) 05/09/2022    .4 (H) 05/09/2022     05/09/2022     Lab Results   Component Value Date    INR 0.95 02/01/2022    INR 0.96 01/25/2016    PROTIME 12.3 02/01/2022    PROTIME 11.0 01/25/2016     Lab Results   Component Value Date    CREATININE 0.9 05/09/2022    BUN 18 05/09/2022     05/09/2022    K 4.9 05/09/2022     05/09/2022    CO2 27 05/09/2022     Lab Results   Component Value Date    ALT 19 04/28/2022    AST 37 04/28/2022    ALKPHOS 40 04/28/2022    BILITOT 0.6 04/28/2022       Expected length of stay  prior to a supervised level of function for discharge home with a walker and [address] OT/PT is 5/12/2022. Recommendations:    1. Diverticulitis with gait disturbance: Demonstrating some benefit from participating in the daily occupational and physical therapy.  Her oral intake continues to improve.  Tolerating her vitamin supplementation and a bland, low fiber diet.  Ongoing adaptive equipment training, possibly caregiver education and bowel and bladder monitoring.  DVT prophylaxis.  She benefits from aggressive pulmonary hygiene measures.  Verbal cues and CGA for transfers. 2. DVT prophylaxis: Lovenox 40 mg subcu daily.  I must periodically monitor her hemoglobin and platelet count while on this medication.  Weightbearing activities are slowly improving daily.  GI prophylaxis is available.  No clinical signs of blood loss. 3. Essential hypertension: Pain control, diuretic therapy with Lasix and verapamil.  Vital signs are checked at rest and with activity.  Target systolic blood pressures 638-431.  YBEYF pressure is within the target range again today.  Monitoring closely. 4. Hyponatremia: Periodic monitoring of her chemistries.  Cautious use of diuretics.  Encouraging consistent oral intake of a general diet. Sodium 140.   Creatinine normal at 1.0.  5. Depression with anxiety: Zoloft and pain management.  Reports sleeping much more consistently now.    Extending her treatment around to more common areas. Jalil Mandujano needed infrequently. 6. Bigeminy: Monitoring of her vital signs.  Keeping close tabs on her chemistries.  Outpatient follow-up with cardiology. 7. Trigeminal neuralgia: Depakote, gabapentin and Zoloft.  Acetaminophen as needed. 8. Orthostatic hypotension: Mestinon, cautious diuretic use, AFIA stockings and slow and deliberate position changes recommended.  Vital signs checked at rest and with activity.  She has general fatigue but minimal positional dizziness despite her occasional nausea.

## 2022-05-09 NOTE — PLAN OF CARE
Problem: Discharge Planning  Goal: Discharge to home or other facility with appropriate resources  5/9/2022 1056 by Janes Leon LPN  Outcome: Progressing  Flowsheets (Taken 5/9/2022 1051)  Discharge to home or other facility with appropriate resources:   Identify barriers to discharge with patient and caregiver   Arrange for needed discharge resources and transportation as appropriate  5/8/2022 2145 by Wily Lancaster RN  Outcome: Progressing     Problem: Safety - Adult  Goal: Free from fall injury  5/9/2022 1056 by Janes Leon LPN  Outcome: Progressing  5/8/2022 2145 by Wily Lancaster RN  Outcome: Progressing     Problem: Skin/Tissue Integrity  Goal: Absence of new skin breakdown  Description: 1. Monitor for areas of redness and/or skin breakdown  2. Assess vascular access sites hourly  3. Every 4-6 hours minimum:  Change oxygen saturation probe site  4. Every 4-6 hours:  If on nasal continuous positive airway pressure, respiratory therapy assess nares and determine need for appliance change or resting period.   5/9/2022 1056 by Janes Leon LPN  Outcome: Progressing  5/8/2022 2145 by Wily Lancaster RN  Outcome: Progressing     Problem: ABCDS Injury Assessment  Goal: Absence of physical injury  5/9/2022 1056 by Janes Leon LPN  Outcome: Progressing  5/8/2022 2145 by Wily Lancaster RN  Outcome: Progressing     Problem: Pain  Goal: Verbalizes/displays adequate comfort level or baseline comfort level  5/9/2022 1056 by Janes Leon LPN  Outcome: Progressing  5/8/2022 2145 by Wily Lancaster RN  Outcome: Progressing

## 2022-05-09 NOTE — PROGRESS NOTES
Comprehensive Nutrition Assessment    Type and Reason for Visit:  Reassess    Nutrition Recommendations/Plan:   1. Continue low fiber diet   2. Will continue to follow up during stay      Malnutrition Assessment:  Malnutrition Status:  Insufficient data (05/03/22 1232)    Context:  Acute Illness       Nutrition Assessment:    Remains on low fiber diet with meal intake at most meals %. Reasonable intake at this time, will continue to follow at moderate nutriton risk at this time. Nutrition Related Findings:    up in chair for lunch, ordering meals with patient ambassador Wound Type: None       Current Nutrition Intake & Therapies:    Average Meal Intake: %  Average Supplements Intake: None Ordered  ADULT DIET; Regular; Low Fiber    Anthropometric Measures:  Height: 5' 3\" (160 cm)  Ideal Body Weight (IBW): 115 lbs (52 kg)       Current Body Weight: 136 lb 11 oz (62 kg), 115.6 % IBW.  Weight Source: Bed Scale  Current BMI (kg/m2): 24.2  Usual Body Weight: 137 lb (62.1 kg) (per hx in August)  % Weight Change (Calculated): -3  Weight Adjustment For: No Adjustment                 BMI Categories: Normal Weight (BMI 22.0 to 24.9) age over 72    Estimated Daily Nutrient Needs:  Energy Requirements Based On: Kcal/kg  Weight Used for Energy Requirements: Current  Energy (kcal/day): 9645-4002 (25-30 hans/kg)  Weight Used for Protein Requirements: Current  Protein (g/day): 60-72 (1-1.2 g/kg)  Method Used for Fluid Requirements: 1 ml/kcal  Fluid (ml/day): 1500    Nutrition Diagnosis:   · Predicted inadequate energy intake related to altered GI function as evidenced by nausea,poor intake prior to admission      Nutrition Interventions:   Food and/or Nutrient Delivery: Continue Current Diet  Nutrition Education/Counseling: Education initiated  Coordination of Nutrition Care: Continue to monitor while inpatient       Goals:  Previous Goal Met: Progressing toward Goal(s)  Goals: PO intake 75% or greater       Nutrition Monitoring and Evaluation:   Behavioral-Environmental Outcomes: None Identified  Food/Nutrient Intake Outcomes: Diet Advancement/Tolerance,Food and Nutrient Intake  Physical Signs/Symptoms Outcomes: Biochemical Data,GI Status,Skin,Weight,Meal Time Behavior    Discharge Planning:    Continue current diet     Edy Dorsey RD, LD  Contact: [phone]

## 2022-05-10 PROCEDURE — 97530 THERAPEUTIC ACTIVITIES: CPT

## 2022-05-10 PROCEDURE — 6370000000 HC RX 637 (ALT 250 FOR IP): Performed by: STUDENT IN AN ORGANIZED HEALTH CARE EDUCATION/TRAINING PROGRAM

## 2022-05-10 PROCEDURE — 1280000000 HC REHAB R&B

## 2022-05-10 PROCEDURE — 99232 SBSQ HOSP IP/OBS MODERATE 35: CPT | Performed by: PHYSICAL MEDICINE & REHABILITATION

## 2022-05-10 PROCEDURE — 6360000002 HC RX W HCPCS: Performed by: STUDENT IN AN ORGANIZED HEALTH CARE EDUCATION/TRAINING PROGRAM

## 2022-05-10 PROCEDURE — 97116 GAIT TRAINING THERAPY: CPT

## 2022-05-10 PROCEDURE — 94150 VITAL CAPACITY TEST: CPT

## 2022-05-10 PROCEDURE — 97535 SELF CARE MNGMENT TRAINING: CPT

## 2022-05-10 PROCEDURE — 94761 N-INVAS EAR/PLS OXIMETRY MLT: CPT

## 2022-05-10 PROCEDURE — 6370000000 HC RX 637 (ALT 250 FOR IP): Performed by: PHYSICAL MEDICINE & REHABILITATION

## 2022-05-10 PROCEDURE — 97110 THERAPEUTIC EXERCISES: CPT

## 2022-05-10 RX ADMIN — VITAM B12 50 MCG: 100 TAB at 09:11

## 2022-05-10 RX ADMIN — FLUTICASONE PROPIONATE 1 SPRAY: 50 SPRAY, METERED NASAL at 09:12

## 2022-05-10 RX ADMIN — ROSUVASTATIN CALCIUM 10 MG: 5 TABLET, FILM COATED ORAL at 20:30

## 2022-05-10 RX ADMIN — KETOTIFEN FUMARATE 1 DROP: 0.35 SOLUTION/ DROPS OPHTHALMIC at 09:18

## 2022-05-10 RX ADMIN — GABAPENTIN 300 MG: 300 CAPSULE ORAL at 14:51

## 2022-05-10 RX ADMIN — FUROSEMIDE 20 MG: 20 TABLET ORAL at 09:12

## 2022-05-10 RX ADMIN — PYRIDOSTIGMINE BROMIDE 30 MG: 60 TABLET ORAL at 20:30

## 2022-05-10 RX ADMIN — VERAPAMIL HYDROCHLORIDE 120 MG: 120 TABLET, FILM COATED, EXTENDED RELEASE ORAL at 09:11

## 2022-05-10 RX ADMIN — DIVALPROEX SODIUM 250 MG: 250 TABLET, FILM COATED, EXTENDED RELEASE ORAL at 20:30

## 2022-05-10 RX ADMIN — PANTOPRAZOLE SODIUM 40 MG: 40 TABLET, DELAYED RELEASE ORAL at 06:21

## 2022-05-10 RX ADMIN — ASPIRIN 81 MG: 81 TABLET, COATED ORAL at 09:11

## 2022-05-10 RX ADMIN — Medication 1 TABLET: at 09:11

## 2022-05-10 RX ADMIN — Medication 1 TABLET: at 20:30

## 2022-05-10 RX ADMIN — MAGNESIUM OXIDE 400 MG (241.3 MG MAGNESIUM) TABLET 400 MG: TABLET at 09:11

## 2022-05-10 RX ADMIN — GABAPENTIN 300 MG: 300 CAPSULE ORAL at 20:30

## 2022-05-10 RX ADMIN — ONDANSETRON 4 MG: 4 TABLET, ORALLY DISINTEGRATING ORAL at 14:43

## 2022-05-10 RX ADMIN — GABAPENTIN 300 MG: 300 CAPSULE ORAL at 09:11

## 2022-05-10 RX ADMIN — ENOXAPARIN SODIUM 40 MG: 100 INJECTION SUBCUTANEOUS at 09:12

## 2022-05-10 RX ADMIN — SERTRALINE HYDROCHLORIDE 25 MG: 50 TABLET ORAL at 09:11

## 2022-05-10 RX ADMIN — ONDANSETRON 4 MG: 4 TABLET, ORALLY DISINTEGRATING ORAL at 23:54

## 2022-05-10 RX ADMIN — KETOTIFEN FUMARATE 1 DROP: 0.35 SOLUTION/ DROPS OPHTHALMIC at 20:30

## 2022-05-10 RX ADMIN — LORAZEPAM 0.5 MG: 0.5 TABLET ORAL at 20:30

## 2022-05-10 NOTE — PROGRESS NOTES
Ahmet Elizondo    : 1935  Acct #: [de-identified]  MRN: [id number]              PM&R Progress Note      Admitting diagnosis: Diverticulitis ( [address])     Comorbid diagnoses impacting rehabilitation: Generalized weakness, gait disturbance, essential hypertension, hyponatremia, depression with anxiety, bigeminy, trigeminal neuralgia, orthostatic hypotension    Chief complaint: No abdominal pain or cramping. Fair minus appetite today. Prior (baseline) level of function: Independent. Current level of function:         Current  IRF-TAMARA and Goals:   Occupational Therapy:    Short Term Goals  Time Frame for Short term goals: 5-7 days  Short Term Goal 1: Pt will complete self-feeding IND. :   Long Term Goals  Time Frame for Long term goals : 12-14 days or until d/c. Long Term Goal 1: Pt will complete oral hygiene c Mod I.  Long Term Goal 2: Pt will complete grooming tasks c Mod I.  Long Term Goal 3: Pt will complete total body bathing c Mod I and AE PRN. Long Term Goal 4: Pt will complete UB dressing c Ind. Long Term Goal 5: Pt will complete LB dressing c Mod I. Additional Goals?: Yes  Long Term Goal 6: Pt will doff/don footwear c Mod I.  Long Term Goal 7: Pt will complete toileting c Mod I.  Long Term Goal 8: Pt will perform functional transfers (bed, chair, toilet, shower) c DME PRN. Long Term Goal 9: Pt will perform therex/therax to facilitate increased strength/endurance/ax tolerance (c emphasis on dynamic standing balance/tolerance > 8 mins) c Mod I.  Long Term Goal 10:  Pt will complete light home management tasks c Mod I. :                                       Eating: Eating  Assistance Needed: Independent  Comment: X  CARE Score: 6  Discharge Goal: Independent       Oral Hygiene: Oral Hygiene  Assistance Needed: Independent  Comment: X  CARE Score: 6  Discharge Goal: Independent    UB/LB Bathing: Shower/Bathe Self  Assistance Needed: Supervision or touching assistance  Comment: Sup in stance for safety majority seated  CARE Score: 4  Discharge Goal: Independent    UB Dressing: Upper Body Dressing  Assistance Needed: Independent  Comment: X  CARE Score: 6  Discharge Goal: Independent         LB Dressing: Lower Body Dressing  Assistance Needed: Independent  Comment: X  CARE Score: 6  Discharge Goal: Independent    Donning and [place] Footwear: Putting On/Taking Off Footwear  Assistance Needed: Independent  Comment: Ind  CARE Score: 6  Discharge Goal: Independent      Toileting: Toileting Hygiene  Assistance needed: Independent  Comment: Mod I  CARE Score: 6  Discharge Goal: Independent      Toilet Transfers: Toilet Transfer  Assistance needed: Independent  Comment: Mod I  CARE Score: 6  Discharge Goal: Independent    Physical Therapy:         Long Term Goals  Time Frame for Long term goals : 10-12 tx days:  Long term goal 1: Pt will complete sup<->sit Ind. Long term goal 2: Pt will complete OOB transfers using 2WW Mod Ind. Long term goal 3: Pt will ambulate 10 ft and 50 ft with turns using 2WW Mod Ind. Long term goal 4: Pt will ambulate 150 ft over level surface and at least 10 ft of uneven surface using 2WW with SBA. Long term goal 5: Pt will ascend/descend curb step using 2WW and 1 flight of stairs using railings with SBA-Sup. Additional Goals?: Yes  Long term goal 6: Pt will complete object retrieval from the floor with 2WW and reacher Mod Ind. Long term goal 7: Pt will propel manual w/c 150 ft with turns Mod Ind.       Bed Mobility:   Sit to Lying  Assistance Needed: Independent  Comment: Ind  CARE Score: 6  Discharge Goal: Independent  Roll Left and Right  Assistance Needed: Independent  Comment: Ind without use of bed features  CARE Score: 6  Discharge Goal: Independent  Lying to Sitting on Side of Bed  Assistance Needed: Supervision or touching assistance  Comment: SBA without use of bed features  CARE Score: 4  Discharge Goal: Independent    Transfers:    Sit to Stand  Assistance Needed: Supervision or touching assistance  Comment: SBA using 2WW  CARE Score: 4  Discharge Goal: Independent  Chair/Bed-to-Chair Transfer  Assistance Needed: Supervision or touching assistance  Comment: SBA without AD  CARE Score: 4  Discharge Goal: Independent  Toilet Transfer  Assistance needed: Supervision or touching assistance  Comment: Min A to sit, Mod A to stand using grab bars and 2WW  CARE Score: 4  Car Transfer  Assistance Needed: Partial/moderate assistance  Comment: SBA using 2WW  CARE Score: 3  Discharge Goal: Independent    Ambulation:    Walking Ability  Does the Patient Walk?: Yes     Walk 10 Feet  Assistance Needed: Supervision or touching assistance  Comment: CGA using 2WW  CARE Score: 4  Discharge Goal: Independent     Walk 50 Feet with Two Turns  Assistance Needed: Supervision or touching assistance  Comment: CGA using 2WW  Reason if not Attempted: Not attempted due to medical condition or safety concerns  CARE Score: 4  Discharge Goal: Independent     Walk 150 Feet  Assistance Needed: Supervision or touching assistance  Comment: CGA using 2WW  Reason if not Attempted: Not attempted due to medical condition or safety concerns  CARE Score: 4  Discharge Goal: Supervision or touching assistance     Walking 10 Feet on Uneven Surfaces  Assistance Needed: Supervision or touching assistance  Comment: CGA using 2WW  Reason if not Attempted: Not attempted due to medical condition or safety concerns  CARE Score: 4  Discharge Goal: Supervision or touching assistance     1 Step (Curb)  Assistance Needed: Supervision or touching assistance  Comment: CGA using 2WW  Reason if not Attempted: Not attempted due to medical condition or safety concerns  CARE Score: 4  Discharge Goal: Supervision or touching assistance     4 Steps  Assistance Needed: Supervision or touching assistance  Comment: SBA-CGA using railings to ascend/descend 4\"/6\" step heights  Reason if not Attempted: Not attempted due to medical condition or safety concerns  CARE Score: 4  Discharge Goal: Supervision or touching assistance     12 Steps  Assistance Needed: Supervision or touching assistance  Comment: SBA-CGA using railings to ascend/descend 4\"/6\" step heights  Reason if not Attempted: Not attempted due to medical condition or safety concerns  CARE Score: 4  Discharge Goal: Supervision or touching assistance       Wheelchair:  w/c Ability: Wheelchair Ability  Uses a Wheelchair and/or Scooter?: Yes  Wheel 50 Feet with Two Turns  Assistance Needed: Supervision or touching assistance  Comment: SBA-Sup using BUE primarily  CARE Score: 4  Discharge Goal: Independent  Wheel 150 Feet  Assistance Needed: Substantial/maximal assistance  Comment: pt was only able to propel up to 79 ft today (limited by fatigue)  CARE Score: 2  Discharge Goal: Independent          Balance:        Object: Picking Up Object  Assistance Needed: Supervision or touching assistance  Comment: SBA with 2WW and reacher  CARE Score: 4  Discharge Goal: Independent    I      Exam:    Blood pressure (!) 119/40, pulse 65, temperature 97.9 °F (36.6 °C), resp. rate 14, height 5' 3\" (1.6 m), weight 134 lb 14.7 oz (61.2 kg), SpO2 95 %, not currently breastfeeding. General: Up in a bedside chair. Talkative with staff. In good spirits. HEENT: Gazing right and left. Clear speech. MMM. Pulmonary: No wheezes or rales. Cardiac: Regular rate and rhythm. Abdomen: Patient's abdomen is soft and nondistended. Bowel sounds were present throughout. There was no rebound, guarding or masses noted. Upper extremities: Able to bring both hands together in the midline. No bruising or tremoring noted. Lower extremities: 4/5 strength across the knees and ankles. No signs of DVT. Sitting balance was good.   Standing balance was fair-.    Lab Results   Component Value Date    WBC 4.6 05/09/2022    HGB 10.6 (L) 05/09/2022    HCT 33.2 (L) 05/09/2022    .4 (H) 05/09/2022     05/09/2022     Lab Results   Component Value Date    INR 0.95 02/01/2022    INR 0.96 01/25/2016    PROTIME 12.3 02/01/2022    PROTIME 11.0 01/25/2016     Lab Results   Component Value Date    CREATININE 0.9 05/09/2022    BUN 18 05/09/2022     05/09/2022    K 4.9 05/09/2022     05/09/2022    CO2 27 05/09/2022     Lab Results   Component Value Date    ALT 19 04/28/2022    AST 37 04/28/2022    ALKPHOS 40 04/28/2022    BILITOT 0.6 04/28/2022       Expected length of stay  prior to a supervised level of function for discharge home with a walker and [facility] OT/PT is 5/12/2022. Recommendations:    1. Diverticulitis with gait disturbance: Continues to engage well in her daily occupational and physical therapy.  Her oral intake continues to improve.  Tolerating her vitamin supplementation and a bland, low fiber diet.  Ongoing adaptive equipment training, possibly caregiver education and bowel and bladder monitoring.  DVT prophylaxis.  She benefits from aggressive pulmonary hygiene measures.  Verbal cues and SBA-CGA for transfers. 2. DVT prophylaxis: Lovenox 40 mg subcu daily.  I must periodically monitor her hemoglobin and platelet count while on this medication.  Weightbearing activities are slowly improving daily.  GI prophylaxis is available.  No new bruising or swelling. 3. Essential hypertension: Pain control, diuretic therapy with Lasix and verapamil.  Vital signs are checked at rest and with activity.  Target systolic blood pressures 657-726.  OLXRX pressure is within the target range again today.  Monitoring closely. 4. Hyponatremia: Periodic monitoring of her chemistries.  Cautious use of diuretics.  Encouraging consistent oral intake of a general diet.   Sodium 140. Creatinine normal at 1.0.  5. Depression with anxiety: Zoloft and pain management.  Reports sleeping much more consistently now.    Extending her treatment around to more common areas. Deloras Cordia is infrequently used.     6. Bigeminy: Monitoring of her vital signs.  Keeping close tabs on her chemistries.  Outpatient follow-up with cardiology. 7. Trigeminal neuralgia: Depakote, gabapentin and Zoloft.  Acetaminophen as needed. 8. Orthostatic hypotension: Mestinon, cautious diuretic use, AFIA stockings and slow and deliberate position changes recommended.  Vital signs checked at rest and with activity.  She has general fatigue but no consistent symptoms of orthostasis.

## 2022-05-10 NOTE — FLOWSHEET NOTE
[x] daily progress note       [] discharge       Patient Name:  Jones Homans   :  1935 MRN: [id number]  Room:  [address] [place] [address] Date of Admission: 2022  Rehabilitation Diagnosis:   Diverticulitis of intestine, part unspecified, without perforation or abscess without bleeding [K57.92]  Diverticulitis [K57.92]       Date 5/10/2022       Day of ARU Week:  2   Time IN/-1045   Individual Tx Minutes 60   TOTAL Tx Time Mins 60   Restrictions Restrictions/Precautions  Restrictions/Precautions: Fall Risk,Contact Precautions (C-diff)      Communication with other providers: [x]   OK to see per nursing:     []   Spoke with team member regarding:      Subjective observations and cognitive status: Pt seen sitting up in recliner at beginning of treatment. Agreeable to therapy. Pain level/location: 0/10         Discharge recommendations  Anticipated discharge date:  Expected Discharge Date: 22  Destination: []? ???home alone   []? ???home alone with assist PRN     []???? home w/ family      []???? Continuous supervision  []????SNF    []???? Assisted living     []???? Other:  Continued therapy: [x]????C PT  []????OUTPATIENT PT   []???? No Further PT  []????SNF PT  Caregiver training recommended: []?? ??Yes  [x]? ??? No   Equipment needs: has 2WW     Bed Mobility:           [x]   Pt received out of bed   Sit --> lying: Independent     Transfers:    Sit--> Stand:  Supervision   Stand --> Sit:   Supervision   Chair-->Bed/Bed --> Chair:   Supervision   Toilet Transfer: supervision with grab bars   Assistive device required for transfer:  RW  Vcs for proper hand placement. Gait:    Distance:  139'+585'+612'  Assistance:  Supervision   Device: RW  Gait Quality:  Reciprocal pattern; vcs to slow down josé miguel.      Uneven Surfaces:       Assistance:   Supervision   Device:   RW   Surfaces Completed:   []  Green mat with bean bags beneath      [x]  Throw rugs       [x]  Ramp       [x]  Outdoor pavements        []  Grass []  Loose gravel        []  Other:    Vcs for safety and to slow down josé miguel. Additional Therapeutic activities/exercises completed this date:     [x]   Nu-step: AM session: Time: 8 minutes        Level: 4      (for strengthening and endurance training)     []   Rebounder:    []  Seated     []  Standing        []   Balance training         []   Postural training    []   Supine ther ex (reps/sets):      [x]   Seated ther ex (reps/sets): Trunk rotation/flexion stretch towards right to help relieve right hip pain. 60 second hold. Pt reported that it helped. []   Standing ther ex (reps/sets):     []   Picking up object from floor (standing):                   []   Reacher used   []   Other:   []   Other:    Patient/Caregiver Education and Training:   [x]   Bed Mobility/Transfer technique/safety  [x]   Gait technique/sequencing  [x]   Proper use of assistive device  [x]   Advanced mobility safety and technique  []   Reinforced patient's precautions/mobility while maintaining precautions  []   Postural awareness  []   Family training    Treatment Plan for Next Session: QI       Assessment: This pt demonstrated a positive response to today's treatment as evidenced by improved mobility. The patient is making progress toward established goals as evidenced by QI scores. Ongoing deficits are observed in the areas of strength, balance, endurance, and safety and continued focus on this is recommended. Treatment/Activity Tolerance:   [x] Tolerated treatment with no adverse effects    [] Patient limited by fatigue  [] Patient limited by pain   [] Patient limited by medical complications:    [] Adverse reaction to Tx:   [] Significant change in status    Safety:       []  bed alarm set    []  chair alarm set    []  Pt refused alarms                []  Telesitter activated      [x]  Gait belt used during tx session      [x]other: pt left in semi-kaur's position with call light at end of treatment. Number of Minutes/Billable Intervention  Gait Training 52   Therapeutic Exercise 8   Neuro Re-Ed    Therapeutic Activity    Wheelchair Propulsion    Group    Other:    TOTAL 60         Social History  Social/Functional History  Lives With: Alone  Type of Home: House  Home Layout: One level (Pt has basement but does not use. Has stair lift but has never been used.)  Home Access: Stairs to enter with rails  Entrance Stairs - Number of Steps: 3-4 EV  Entrance Stairs - Rails: Both  Bathroom Shower/Tub: Tub/Shower unit,Shower chair with back  Bathroom Toilet: Handicap height (Pt states frame over toilet c grab bars.)  Bathroom Equipment: Grab bars in shower,Shower chair,Grab bars around toilet,Hand-held shower  Bathroom Accessibility: Accessible  Home Equipment: Cane,Walker, rolling,Grab bars,Reacher  Has the patient had two or more falls in the past year or any fall with injury in the past year?: No  Receives Help From: Family (Gregoria Camp lives 4 doors down. He assists c grocery shopping and house cleaning.)  ADL Assistance: Independent  Homemaking Assistance: Needs assistance (chika does cleaning and grocery shopping)  Homemaking Responsibilities: Yes  Meal Prep Responsibility: Primary  Laundry Responsibility: Primary  Cleaning Responsibility: Primary  Bill Paying/Finance Responsibility: No (Chika took over ~ 2 months ago as Pt states she began \"having a hard time remembering\")  Shopping Responsibility: Primary  Health Care Management: Secondary (Pt and chika complete together.)  Ambulation Assistance: Independent (Pt reports she would use a cane at night and would take it in public just incase. Pt primarily Ind without device.)  Transfer Assistance: Independent  Active : Yes  Mode of Transportation: Car  Occupation: Retired  Type of Occupation:   Leisure & Hobbies: Pt enjoys watching television, cooking, and being active.   Additional Comments: Pt has regular, flat bed at home; no falls in the past year    Objective                                                                                    Goals:  (Update in navigator)   : Long Term Goals  Time Frame for Long term goals : 10-12 tx days:  Long term goal 1: Pt will complete sup<->sit Ind. Long term goal 2: Pt will complete OOB transfers using 2WW Mod Ind. Long term goal 3: Pt will ambulate 10 ft and 50 ft with turns using 2WW Mod Ind. Long term goal 4: Pt will ambulate 150 ft over level surface and at least 10 ft of uneven surface using 2WW with SBA. Long term goal 5: Pt will ascend/descend curb step using 2WW and 1 flight of stairs using railings with SBA-Sup. Additional Goals?: Yes  Long term goal 6: Pt will complete object retrieval from the floor with 2WW and reacher Mod Ind. Long term goal 7: Pt will propel manual w/c 150 ft with turns Mod Ind.:        Plan of Care                                                                              Times per week: 5 days per week for a minimum of 60 minutes/day plus group as appropriate for 60 minutes.   Treatment to include Current Treatment Recommendations: Strengthening,Balance training,Functional mobility training,Transfer training,Endurance training,Gait training,Stair training,Home exercise program,Safety education & training,Patient/Caregiver education & training,Equipment evaluation, education, & procurement,Therapeutic activities,Wheelchair mobility training    Electronically signed by   Maryam Nixon [id number]  5/10/2022, 9:50 AM

## 2022-05-10 NOTE — PROGRESS NOTES
Nutrition Education    · Educated on low fiber diet  · Learners: Patient  · Readiness: Acceptance  · Method: Explanation, Handout and Teachback  · Response: Verbalizes Understanding and Demonstrated Understanding  · Contact name and number provided.     Mainor Benavides RD, LD  Contact Number: [phone]

## 2022-05-10 NOTE — CARE COORDINATION
Case mgt met with patient in room. Patient on track for dc home as planned 5/12/22. [facility] pt/ot. She has no dc concerns. Her grandson will provide dc transport @1600.     Patient referred to [facility] via [employer] serve

## 2022-05-10 NOTE — PROGRESS NOTES
Physical Rehabilitation: OCCUPATIONAL THERAPY     [x] daily progress note       [] discharge       Patient Name:  Jones Homans   :  1935 MRN: [id number]  Room:  [address] [place] [address] Date of Admission: 2022  Rehabilitation Diagnosis:   Diverticulitis of intestine, part unspecified, without perforation or abscess without bleeding [K57.92]  Diverticulitis [K57.92]       Date 5/10/2022       Day of ARU Week:  2   Time IN//815   Individual Tx Minutes 60   Group Tx Minutes    Co-Treat Minutes    Concurrent Tx Minutes    TOTAL Tx Time Mins 60   Variance Time    Variance Time []   Refusal due to:     []   Medical hold/reason:    []   Illness   []   Off Unit for test/procedure  []   Extra time needed to complete task  []   Therapeutic need  []   Other (specify):   Restrictions Restrictions/Precautions: Fall Risk,Contact Precautions (C-diff)         Communication with other providers: [x]   OK to see per nursing:     []   Spoke with team member regarding:      Subjective observations and cognitive status: Patient in semi fowlers pleasant and agreeable to therapy, patient was awakened by staff to get washed up for therapy at 630a this morning so patient declines full shower this date      Pain level/location:    /10       Location: per patient no pain noted    Discharge recommendations  Anticipated discharge date:    Destination: []home alone   [x]home alone w assist prn   [] home w/ family    [] Continuous supervision       []SNF    [] Assisted living     [] Other:   Continued therapy: [x]HHC OT  []OUTPATIENT  OT   [] No Further OT  Equipment needs: None        ADLs:  Donning and [place] Footwear: Putting On/Taking Off Footwear  Assistance Needed: Independent  Comment: Ind  CARE Score: 6  Discharge Goal: Independent      Toileting: Toileting Hygiene  Assistance needed: Independent  Comment: Mod I  CARE Score: 6  Discharge Goal: Independent      Toilet Transfers:     Toilet Transfer  Assistance needed: Independent  Comment: Mod I  CARE Score: 6  Discharge Goal: Independent  Device Used:    [x]   Standard Toilet         [x]   Grab Bars           []  Bedside Commode       []   Elevated Toilet          []   Other:        Bed Mobility:           []   Pt received out of bed   Rolling R/L:  Mod I   Scooting: Mod I   Supine --> Sit:  Mod I   Sit --> Supine: Mod I     Transfers:    Sit--> Stand: Mod I   Stand --> Sit:   Mod I   Stand-Pivot: Mod I   Other:    Assistive device required for transfer:   RW      Functional Mobility:  Throughout ARU  Prior to and following treatment session   Assistance:   Mod I   Device:   [x]   Rolling Walker     []   Standard Walker []   Wheelchair        []   [employer]       []   4-Wheeled [employer] [place]         []   Cardiac Walker       []   Other:            Additional Therapeutic activities/exercises completed this date:     [x]   ADL Training    [x]   Balance/Postural training     [x]   Bed/Transfer Training patient instructed in transfer training tasks to transfer from different surfaces of varying heights to increase safety awareness to reduce fall risk upon dc to home    [x]   Endurance Training patient instructed in bilateral reciprocal patterned movement for 12 minutes while seated with mod resistance to increase endurance and strength for facilitation of ADL and IADL tasks    []   Neuromuscular Re-ed   []   Nu-step:  Time:        Level:         #Steps:       [x]   Rebounder:    []  Seated     [x]  Standing        []   Supine Ther Ex (reps/sets):     []   Seated Ther Ex (reps/sets):     []   Standing Ther Ex (reps/sets):     []   Other:      Comments:      Patient/Caregiver Education and Training:   [x]   [employer] Equipment Use  [x]   Bed Mobility/Transfer Technique/Safety  [x]   Energy Conservation Tips  []   Family training  [x]   Postural Awareness  [x]   Safety During Functional Activities  []   Reinforced Patient's Precautions   []   Progress was updated and reviewed in Rehabtracker with patient and/or family this         date. Treatment Plan for Next Session: POC to continue as tolerated       Treatment/Activity Tolerance:   [x] Tolerated treatment with no adverse effects    [] Patient limited by fatigue  [] Patient limited by pain   [] Patient limited by medical complications:    [] Adverse reaction to Tx:   [] Significant change in status    Safety:       []  bed alarm set    [x]  chair alarm set    []  Pt refused alarms                []  Telesitter activated      [x]  Gait belt used during tx session      []other:       Number of Minutes/Billable Intervention  Therapeutic Exercise    ADL Self-care 15   Neuro Re-Ed    Therapeutic Activity 45   Group    Other:    TOTAL 60       Social History  Social/Functional History  Lives With: Alone  Type of Home: House  Home Layout: One level (Pt has basement but does not use. Has stair lift but has never been used.)  Home Access: Stairs to enter with rails  Entrance Stairs - Number of Steps: 3-4 EV  Entrance Stairs - Rails: Both  Bathroom Shower/Tub: Tub/Shower unit,Shower chair with back  Bathroom Toilet: Handicap height (Pt states frame over toilet c grab bars.)  Bathroom Equipment: Grab bars in shower,Shower chair,Grab bars around toilet,Hand-held shower  Bathroom Accessibility: Accessible  Home Equipment: Cane,Walker, rolling,Grab bars,Reacher  Has the patient had two or more falls in the past year or any fall with injury in the past year?: No  Receives Help From: Family (Gustabo Morillo lives 4 doors down.  He assists c grocery shopping and house cleaning.)  ADL Assistance: Independent  Homemaking Assistance: Needs assistance (alireza does cleaning and grocery shopping)  Homemaking Responsibilities: Yes  Meal Prep Responsibility: Primary  Laundry Responsibility: Primary  Cleaning Responsibility: Primary  Bill Paying/Finance Responsibility: No (Alireza took over ~ 2 months ago as Pt states she began [place] a hard time remembering\")  Shopping Responsibility: Primary  Health Care Management: Secondary (Pt and grandson complete together.)  Ambulation Assistance: Independent (Pt reports she would use a cane at night and would take it in public just incase. Pt primarily Ind without device.)  Transfer Assistance: Independent  Active : Yes  Mode of Transportation: Car  Occupation: Retired  Type of Occupation:   Leisure & Hobbies: Pt enjoys watching television, cooking, and being active. Additional Comments: Pt has regular, flat bed at home; no falls in the past year    Objective                                                                                    Goals:  (Update in navigator)  Short Term Goals  Time Frame for Short term goals: 5-7 days  Short Term Goal 1: Pt will complete self-feeding IND.:  Long Term Goals  Time Frame for Long term goals : 12-14 days or until d/c. Long Term Goal 1: Pt will complete oral hygiene c Mod I.  Long Term Goal 2: Pt will complete grooming tasks c Mod I.  Long Term Goal 3: Pt will complete total body bathing c Mod I and AE PRN. Long Term Goal 4: Pt will complete UB dressing c Ind. Long Term Goal 5: Pt will complete LB dressing c Mod I. Additional Goals?: Yes  Long Term Goal 6: Pt will doff/don footwear c Mod I.  Long Term Goal 7: Pt will complete toileting c Mod I.  Long Term Goal 8: Pt will perform functional transfers (bed, chair, toilet, shower) c DME PRN. Long Term Goal 9: Pt will perform therex/therax to facilitate increased strength/endurance/ax tolerance (c emphasis on dynamic standing balance/tolerance > 8 mins) c Mod I.  Long Term Goal 10: Pt will complete light home management tasks c Mod I.:        Plan of Care                                                                              Times per week: 5 days per week for a minimum of 60 minutes/day plus group as appropriate for 60 minutes.   Treatment to include Plan  Times per Day: Daily  Current Treatment Recommendations: Strengthening,ROM,Balance training,Functional mobility training,Endurance training,Neuromuscular re-education,Safety education & training,Patient/Caregiver education & training,Equipment evaluation, education, & procurement,Self-Care / ADL,Home management training,Coordination training    Electronically signed by   MAGALIS Calderon,  5/10/2022, 7:43 AM

## 2022-05-11 ENCOUNTER — TELEPHONE (OUTPATIENT)
Dept: INTERNAL MEDICINE CLINIC | Age: 87
End: 2022-05-11

## 2022-05-11 PROCEDURE — 97530 THERAPEUTIC ACTIVITIES: CPT

## 2022-05-11 PROCEDURE — 6370000000 HC RX 637 (ALT 250 FOR IP): Performed by: PHYSICAL MEDICINE & REHABILITATION

## 2022-05-11 PROCEDURE — 97116 GAIT TRAINING THERAPY: CPT

## 2022-05-11 PROCEDURE — 94150 VITAL CAPACITY TEST: CPT

## 2022-05-11 PROCEDURE — 97112 NEUROMUSCULAR REEDUCATION: CPT

## 2022-05-11 PROCEDURE — 6360000002 HC RX W HCPCS: Performed by: STUDENT IN AN ORGANIZED HEALTH CARE EDUCATION/TRAINING PROGRAM

## 2022-05-11 PROCEDURE — 99232 SBSQ HOSP IP/OBS MODERATE 35: CPT | Performed by: PHYSICAL MEDICINE & REHABILITATION

## 2022-05-11 PROCEDURE — 94761 N-INVAS EAR/PLS OXIMETRY MLT: CPT

## 2022-05-11 PROCEDURE — 97535 SELF CARE MNGMENT TRAINING: CPT

## 2022-05-11 PROCEDURE — 6370000000 HC RX 637 (ALT 250 FOR IP): Performed by: STUDENT IN AN ORGANIZED HEALTH CARE EDUCATION/TRAINING PROGRAM

## 2022-05-11 PROCEDURE — 1280000000 HC REHAB R&B

## 2022-05-11 RX ORDER — FUROSEMIDE 20 MG/1
20 TABLET ORAL DAILY
Qty: 30 TABLET | Refills: 0 | Status: SHIPPED | OUTPATIENT
Start: 2022-05-12 | End: 2022-05-31 | Stop reason: SDUPTHER

## 2022-05-11 RX ORDER — LANOLIN ALCOHOL/MO/W.PET/CERES
400 CREAM (GRAM) TOPICAL DAILY
Qty: 30 TABLET | Refills: 0 | Status: SHIPPED | OUTPATIENT
Start: 2022-05-12 | End: 2022-05-31 | Stop reason: SDUPTHER

## 2022-05-11 RX ADMIN — VERAPAMIL HYDROCHLORIDE 120 MG: 120 TABLET, FILM COATED, EXTENDED RELEASE ORAL at 09:03

## 2022-05-11 RX ADMIN — MAGNESIUM OXIDE 400 MG (241.3 MG MAGNESIUM) TABLET 400 MG: TABLET at 09:03

## 2022-05-11 RX ADMIN — KETOTIFEN FUMARATE 1 DROP: 0.35 SOLUTION/ DROPS OPHTHALMIC at 09:03

## 2022-05-11 RX ADMIN — ROSUVASTATIN CALCIUM 10 MG: 5 TABLET, FILM COATED ORAL at 21:38

## 2022-05-11 RX ADMIN — SERTRALINE HYDROCHLORIDE 25 MG: 50 TABLET ORAL at 09:03

## 2022-05-11 RX ADMIN — GABAPENTIN 300 MG: 300 CAPSULE ORAL at 09:03

## 2022-05-11 RX ADMIN — PYRIDOSTIGMINE BROMIDE 30 MG: 60 TABLET ORAL at 21:38

## 2022-05-11 RX ADMIN — GABAPENTIN 300 MG: 300 CAPSULE ORAL at 14:30

## 2022-05-11 RX ADMIN — FUROSEMIDE 20 MG: 20 TABLET ORAL at 09:03

## 2022-05-11 RX ADMIN — DIVALPROEX SODIUM 250 MG: 250 TABLET, FILM COATED, EXTENDED RELEASE ORAL at 21:38

## 2022-05-11 RX ADMIN — VITAM B12 50 MCG: 100 TAB at 09:08

## 2022-05-11 RX ADMIN — Medication 1 TABLET: at 21:38

## 2022-05-11 RX ADMIN — PANTOPRAZOLE SODIUM 40 MG: 40 TABLET, DELAYED RELEASE ORAL at 06:16

## 2022-05-11 RX ADMIN — KETOTIFEN FUMARATE 1 DROP: 0.35 SOLUTION/ DROPS OPHTHALMIC at 21:48

## 2022-05-11 RX ADMIN — GABAPENTIN 300 MG: 300 CAPSULE ORAL at 21:38

## 2022-05-11 RX ADMIN — LORAZEPAM 0.5 MG: 0.5 TABLET ORAL at 21:38

## 2022-05-11 RX ADMIN — ASPIRIN 81 MG: 81 TABLET, COATED ORAL at 09:03

## 2022-05-11 RX ADMIN — ENOXAPARIN SODIUM 40 MG: 100 INJECTION SUBCUTANEOUS at 09:04

## 2022-05-11 RX ADMIN — FLUTICASONE PROPIONATE 1 SPRAY: 50 SPRAY, METERED NASAL at 09:03

## 2022-05-11 RX ADMIN — Medication 1 TABLET: at 09:03

## 2022-05-11 ASSESSMENT — PAIN SCALES - GENERAL: PAINLEVEL_OUTOF10: 0

## 2022-05-11 NOTE — PROGRESS NOTES
Jordy Burns    : 1935  Acct #: [de-identified]  MRN: [id number]              PM&R Progress Note      Admitting diagnosis: Diverticulitis ( [address])     Comorbid diagnoses impacting rehabilitation: Generalized weakness, gait disturbance, essential hypertension, hyponatremia, depression with anxiety, bigeminy, trigeminal neuralgia, orthostatic hypotension     Chief complaint: Looking forward to discharge tomorrow. 3 formed bowel movements without incontinence today. Prior (baseline) level of function: Independent. Current level of function:         Current  [facility] and Goals:   Occupational Therapy:    Short Term Goals  Time Frame for Short term goals: 5-7 days  Short Term Goal 1: Pt will complete self-feeding IND. :   Long Term Goals  Time Frame for Long term goals : 12-14 days or until d/c. Long Term Goal 1: Pt will complete oral hygiene c Mod I.  Long Term Goal 2: Pt will complete grooming tasks c Mod I.  Long Term Goal 3: Pt will complete total body bathing c Mod I and AE PRN. Long Term Goal 4: Pt will complete UB dressing c Ind. Long Term Goal 5: Pt will complete LB dressing c Mod I. Additional Goals?: Yes  Long Term Goal 6: Pt will doff/don footwear c Mod I.  Long Term Goal 7: Pt will complete toileting c Mod I.  Long Term Goal 8: Pt will perform functional transfers (bed, chair, toilet, shower) c DME PRN. Long Term Goal 9: Pt will perform therex/therax to facilitate increased strength/endurance/ax tolerance (c emphasis on dynamic standing balance/tolerance > 8 mins) c Mod I.  Long Term Goal 10: Pt will complete light home management tasks c Mod I. :                                       Eating: Eating  Assistance Needed: Independent  Comment: X  CARE Score: 6  Discharge Goal: Independent       Oral Hygiene: Oral Hygiene  Assistance Needed: Independent  Comment: X  CARE Score: 6  Discharge Goal: Independent    UB/LB Bathing: Shower/Bathe Self  Assistance Needed: Independent  Comment:  Mod I seated majority of shower  CARE Score: 6  Discharge Goal: Independent    UB Dressing: Upper Body Dressing  Assistance Needed: Independent  Comment: X  CARE Score: 6  Discharge Goal: Independent         LB Dressing: Lower Body Dressing  Assistance Needed: Independent  Comment: X  CARE Score: 6  Discharge Goal: Independent    Donning and [place] Footwear: Putting On/Taking Off Footwear  Assistance Needed: Independent  Comment: X  CARE Score: 6  Discharge Goal: Independent      Toileting: Toileting Hygiene  Assistance needed: Independent  Comment: Mod I  CARE Score: 6  Discharge Goal: Independent      Toilet Transfers: Toilet Transfer  Assistance needed: Independent  Comment: Mod I  CARE Score: 6  Discharge Goal: Independent    Physical Therapy:         Long Term Goals  Time Frame for Long term goals : 10-12 tx days:  Long term goal 1: Pt will complete sup<->sit Ind. Long term goal 2: Pt will complete OOB transfers using 2WW Mod Ind. Long term goal 3: Pt will ambulate 10 ft and 50 ft with turns using 2WW Mod Ind. Long term goal 4: Pt will ambulate 150 ft over level surface and at least 10 ft of uneven surface using 2WW with SBA. Long term goal 5: Pt will ascend/descend curb step using 2WW and 1 flight of stairs using railings with SBA-Sup. Additional Goals?: Yes  Long term goal 6: Pt will complete object retrieval from the floor with 2WW and reacher Mod Ind. Long term goal 7: Pt will propel manual w/c 150 ft with turns Mod Ind.       Bed Mobility:   Sit to Lying  Assistance Needed: Independent  Comment: Ind without use of bed features  CARE Score: 6  Discharge Goal: Independent  Roll Left and Right  Assistance Needed: Independent  Comment: Ind without use of bed features  CARE Score: 6  Discharge Goal: Independent  Lying to Sitting on Side of Bed  Assistance Needed: Independent  Comment: Ind without use of bed features  CARE Score: 6  Discharge Goal: Independent    Transfers:    Sit to Stand  Assistance Needed: Independent  Comment: Mod Ind with 2WW (pt initiates self-correction with hand placement; able to complete transfers safely and efficiently from various sitting surfaces)  CARE Score: 6  Discharge Goal: Independent  Chair/Bed-to-Chair Transfer  Assistance Needed: Independent  Comment: Mod Ind with 2WW; can also complete transfer Ind.  CARE Score: 6  Discharge Goal: Independent  Toilet Transfer  Assistance needed: Supervision or touching assistance  Comment: Min A to sit, Mod A to stand using grab bars and 2WW  CARE Score: 4  Car Transfer  Assistance Needed: Independent  Comment: Mod Ind with 2WW  CARE Score: 6  Discharge Goal: Independent    Ambulation:    Walking Ability  Does the Patient Walk?: Yes     Walk 10 Feet  Assistance Needed: Independent  Comment: Mod Ind with 2WW (gait quality consistently steady)  CARE Score: 6  Discharge Goal: Independent     Walk 50 Feet with Two Turns  Assistance Needed: Independent  Comment: Mod Ind with 2WW (gait quality consistently steady)  Reason if not Attempted: Not attempted due to medical condition or safety concerns  CARE Score: 6  Discharge Goal: Independent     Walk 150 Feet  Assistance Needed: Independent  Comment: Mod Ind with 2WW (gait quality consistently steady)  Reason if not Attempted: Not attempted due to medical condition or safety concerns  CARE Score: 6  Discharge Goal: Supervision or touching assistance     Walking 10 Feet on Uneven Surfaces  Assistance Needed: Independent  Comment: Mod Ind with 2WW on outdoor surfaces  Reason if not Attempted: Not attempted due to medical condition or safety concerns  CARE Score: 6  Discharge Goal: Supervision or touching assistance     1 Step (Curb)  Assistance Needed: Independent  Comment: Mod Ind with 2WW  Reason if not Attempted: Not attempted due to medical condition or safety concerns  CARE Score: 6  Discharge Goal: Supervision or touching assistance     4 Steps  Assistance Needed: Independent  Comment:  Mod Ind using railings with consistent step-through pattern on ascent/descent of 4\"/6\" step heights; pt consistently steady  Reason if not Attempted: Not attempted due to medical condition or safety concerns  CARE Score: 6  Discharge Goal: Supervision or touching assistance     12 Steps  Assistance Needed: Independent  Comment: Mod Ind using railings with consistent step-through pattern on ascent/descent of 4\"/6\" step heights; pt consistently steady  Reason if not Attempted: Not attempted due to medical condition or safety concerns  CARE Score: 6  Discharge Goal: Supervision or touching assistance       Wheelchair:  w/c Ability: Wheelchair Ability  Uses a Wheelchair and/or Scooter?: No (pt has progressed to full, ambulatory status that pt does not need a w/c on discharge)  Wheel 50 Feet with Two Turns  Assistance Needed: Supervision or touching assistance  Comment: SBA-Sup using BUE primarily  CARE Score: 4  Discharge Goal: Independent  Wheel 150 Feet  Assistance Needed: Substantial/maximal assistance  Comment: pt was only able to propel up to 70 ft today (limited by fatigue)  CARE Score: 2  Discharge Goal: Independent          Balance:        Object: Picking Up Object  Assistance Needed: Independent  Comment: Mod Ind with LUE support on 2WW; task completed without use of reacher  CARE Score: 6  Discharge Goal: Independent    I      Exam:    Blood pressure (!) 107/59, pulse 62, temperature 98.2 °F (36.8 °C), temperature source Oral, resp. rate 16, height 5' 3\" (1.6 m), weight 134 lb 14.7 oz (61.2 kg), SpO2 97 %, not currently breastfeeding. General: Lying back in bed getting her blood pressure checked. Alert and comfortable. In no distress. Oriented x3. HEENT: MMM. Neck supple. Pulmonary: No wheezes, rales or coughing. Cardiac: Regular rate and rhythm. Abdomen: Patient's abdomen is soft and nondistended. Bowel sounds were present throughout. There was no rebound, guarding or masses noted.     Upper extremities: Moving both upper limbs though functional range of motion with normal strength and coordination. Lower extremities: No signs of DVT. Sitting balance was good. Standing balance was fair-.    Lab Results   Component Value Date    WBC 4.6 05/09/2022    HGB 10.6 (L) 05/09/2022    HCT 33.2 (L) 05/09/2022    .4 (H) 05/09/2022     05/09/2022     Lab Results   Component Value Date    INR 0.95 02/01/2022    INR 0.96 01/25/2016    PROTIME 12.3 02/01/2022    PROTIME 11.0 01/25/2016     Lab Results   Component Value Date    CREATININE 0.9 05/09/2022    BUN 18 05/09/2022     05/09/2022    K 4.9 05/09/2022     05/09/2022    CO2 27 05/09/2022     Lab Results   Component Value Date    ALT 19 04/28/2022    AST 37 04/28/2022    ALKPHOS 40 04/28/2022    BILITOT 0.6 04/28/2022       Expected length of stay  prior to a supervised level of function for discharge home with a walker and [place] OT/PT is 5/12/2022. Recommendations:    1. Diverticulitis with gait disturbance: We will transition to home care based therapy starting tomorrow after discharge. She has been continent of occasional bowel movements each day. No GI distress. She is tolerating her prescribed diet. Verbal cues and standby assistance for transfers. Benefiting from aggressive pulmonary hygiene measures. 2. DVT prophylaxis: Lovenox 40 mg subcu daily.  I must periodically monitor her hemoglobin and platelet count while on this medication.  Weightbearing activities have significantly improved recently. Haydee Forward prophylaxis is available.  No clinical signs of blood loss. Hemoglobin 10.6. 3. Essential hypertension: Pain control, diuretic therapy with Lasix and verapamil.  Vital signs are checked at rest and with activity.  Target systolic blood pressures [id number].  WXGHM pressure is just below the target range again today.  Monitoring closely.   4. Hyponatremia: Periodic monitoring of her chemistries.  Cautious use of diuretics.  Encouraging consistent oral intake of a general diet.   Sodium 140.  Creatinine normal at 1.0.  5. Depression with anxiety: Zoloft and pain management.  Reports sleeping much more consistently now.    Extending her treatment around to more common areas. Charo Core is infrequently used. 6. Bigeminy: Monitoring of her vital signs.  Keeping close tabs on her chemistries.  Outpatient follow-up with cardiology. 7. Trigeminal neuralgia: Depakote, gabapentin and Zoloft.  Acetaminophen as needed. 8. Orthostatic hypotension: Mestinon, cautious diuretic use, AFIA stockings and slow and deliberate position changes recommended.  Vital signs checked at rest and with activity.  She has general fatigue but no consistent symptoms of orthostasis.

## 2022-05-11 NOTE — TELEPHONE ENCOUNTER
Reta Huang calling from [facility], informing pt is being d/c'd from [facility] and requests VO for home care. Verbal order given. Reta Huang voiced understanding/thanks. No further action is needed, at this time.

## 2022-05-11 NOTE — PATIENT CARE CONFERENCE
ACUTE REHAB TEAM CONFERENCE SUMMARY   [address] [facility]    NAME: Romi Bains  : 1935 ADMIT DATE: 2022    Rehab Admitting Dx:Diverticulitis of intestine, part unspecified, without perforation or abscess without bleeding [K57.92]  Diverticulitis [K57.92]  Patient Comorbid Conditions: Active Hospital Problems    Diagnosis Date Noted    Depression with anxiety [F41.8]      Priority: Medium    Bigeminy [I49.8]      Priority: Medium    Hypotension due to hypovolemia [I95.89, E86.1]      Priority: Medium    Diverticulitis [K57.92] 2022     Priority: Medium     Date: 2022    CASE MANAGEMENT  Current issues/needs regarding patient and family discharge status:   [place] [place] Family transport. [address] [place] [address] Rd pt/ot. No DME needs    PHYSICAL THERAPY (Updated in QI)        Long Term Goals  Time Frame for Long term goals : 10-12 tx days:  Long term goal 1: Pt will complete sup<->sit Ind. Long term goal 2: Pt will complete OOB transfers using 2WW Mod Ind. Long term goal 3: Pt will ambulate 10 ft and 50 ft with turns using 2WW Mod Ind. Long term goal 4: Pt will ambulate 150 ft over level surface and at least 10 ft of uneven surface using 2WW with SBA. Long term goal 5: Pt will ascend/descend curb step using 2WW and 1 flight of stairs using railings with SBA-Sup. Additional Goals?: Yes  Long term goal 6: Pt will complete object retrieval from the floor with 2WW and reacher Mod Ind. Long term goal 7: Pt will propel manual w/c 150 ft with turns Mod Ind. Impairments/deficits, barriers:     Body Structures, Functions, Activity Limitations Requiring Skilled Therapeutic Intervention: Decreased functional mobility ,Decreased ADL status,Decreased strength,Decreased endurance,Decreased balance,Decreased high-level IADLs,Decreased safe awareness     Therapy Prognosis: Good,Guarded  Decision Making: High Complexity  Clinical Presentation: unstable/unpredictable characteristics  Equipment needed at discharge: has 2WW       PT IRF-TAMARA scores since initial assessment  Bed Mobility:   Sit to Lying  Assistance Needed: Independent  Comment: Ind without use of bed features  CARE Score: 6  Discharge Goal: Independent    Roll Left and Right  Assistance Needed: Independent  Comment: Ind without use of bed features  CARE Score: 6  Discharge Goal: Independent    Lying to Sitting on Side of Bed  Assistance Needed: Independent  Comment: Ind without use of bed features  CARE Score: 6  Discharge Goal: Independent    Transfers:    Sit to Stand  Assistance Needed: Independent  Comment: Mod Ind with 2WW (pt initiates self-correction with hand placement; able to complete transfers safely and efficiently from various sitting surfaces)  CARE Score: 6  Discharge Goal: Independent    Chair/Bed-to-Chair Transfer  Assistance Needed: Independent  Comment: Mod Ind with 2WW; can also complete transfer Ind.  CARE Score: 6  Discharge Goal: Independent    Toilet Transfer  Assistance needed: Supervision or touching assistance  Comment: Min A to sit, Mod A to stand using grab bars and 2WW  CARE Score: 4    Car Transfer  Assistance Needed: Independent  Comment: Mod Ind with 2WW  CARE Score: 6  Discharge Goal: Independent    Ambulation:    Walking Ability  Does the Patient Walk?: Yes     Walk 10 Feet  Assistance Needed: Independent  Comment: Mod Ind with 2WW (gait quality consistently steady)  CARE Score: 6  Discharge Goal: Independent     Walk 50 Feet with Two Turns  Assistance Needed: Independent  Comment: Mod Ind with 2WW (gait quality consistently steady)  Reason if not Attempted: Not attempted due to medical condition or safety concerns  CARE Score: 6  Discharge Goal: Independent     Walk 150 Feet  Assistance Needed: Independent  Comment:  Mod Ind with 2WW (gait quality consistently steady)  Reason if not Attempted: Not attempted due to medical condition or safety concerns  CARE Score: 6  Discharge Goal: Supervision or touching assistance     Walking 10 Feet on Uneven Surfaces  Assistance Needed: Independent  Comment: Mod Ind with 2WW on outdoor surfaces  Reason if not Attempted: Not attempted due to medical condition or safety concerns  CARE Score: 6  Discharge Goal: Supervision or touching assistance     1 Step (Curb)  Assistance Needed: Independent  Comment: Mod Ind with 2WW  Reason if not Attempted: Not attempted due to medical condition or safety concerns  CARE Score: 6  Discharge Goal: Supervision or touching assistance     4 Steps  Assistance Needed: Independent  Comment: Mod Ind using railings with consistent step-through pattern on ascent/descent of 4\"/6\" step heights; pt consistently steady  Reason if not Attempted: Not attempted due to medical condition or safety concerns  CARE Score: 6  Discharge Goal: Supervision or touching assistance     12 Steps  Assistance Needed: Independent  Comment: Mod Ind using railings with consistent step-through pattern on ascent/descent of 4\"/6\" step heights; pt consistently steady  Reason if not Attempted: Not attempted due to medical condition or safety concerns  CARE Score: 6  Discharge Goal: Supervision or touching assistance           Wheelchair:  w/c Ability: Wheelchair Ability  Uses a Wheelchair and/or Scooter?: No (pt has progressed to full, ambulatory status that pt does not need a w/c on discharge)    Wheel 50 Feet with Two Turns  Assistance Needed: Supervision or touching assistance  Comment: SBA-Sup using BUE primarily  CARE Score: 4  Discharge Goal: Independent    Wheel 150 Feet  Assistance Needed: Substantial/maximal assistance  Comment: pt was only able to propel up to 70 ft today (limited by fatigue)  CARE Score: 2  Discharge Goal: Independent              Balance:        Object: Picking Up Object  Assistance Needed: Independent  Comment:  Mod Ind with LUE support on 2WW; task completed without use of reacher  CARE Score: 6  Discharge Goal: Independent    Fall Risk: []  Yes []  No    OCCUPATIONAL THERAPY  (Updated in QI)  Short Term Goals  Time Frame for Short term goals: 5-7 days  Short Term Goal 1: Pt will complete self-feeding IND. :   Long Term Goals  Time Frame for Long term goals : 12-14 days or until d/c. Long Term Goal 1: Pt will complete oral hygiene c Mod I.  Long Term Goal 2: Pt will complete grooming tasks c Mod I.  Long Term Goal 3: Pt will complete total body bathing c Mod I and AE PRN. Long Term Goal 4: Pt will complete UB dressing c Ind. Long Term Goal 5: Pt will complete LB dressing c Mod I. Additional Goals?: Yes  Long Term Goal 6: Pt will doff/don footwear c Mod I.  Long Term Goal 7: Pt will complete toileting c Mod I.  Long Term Goal 8: Pt will perform functional transfers (bed, chair, toilet, shower) c DME PRN. Long Term Goal 9: Pt will perform therex/therax to facilitate increased strength/endurance/ax tolerance (c emphasis on dynamic standing balance/tolerance > 8 mins) c Mod I.  Long Term Goal 10: Pt will complete light home management tasks c Mod I. :                                       OT IRF-TAMARA scores and goals since initial assessment:    ADLs:    Eating: Eating  Assistance Needed: Independent  Comment: X  CARE Score: 6  Discharge Goal: Independent       Oral Hygiene: Oral Hygiene  Assistance Needed: Independent  Comment: X  CARE Score: 6  Discharge Goal: Independent    UB/LB Bathing: Shower/Bathe Self  Assistance Needed: Independent  Comment: Mod I seated majority of shower  CARE Score: 6  Discharge Goal: Independent    UB Dressing: Upper Body Dressing  Assistance Needed: Independent  Comment: X  CARE Score: 6  Discharge Goal: Independent         LB Dressing: Lower Body Dressing  Assistance Needed: Independent  Comment: X  CARE Score: 6  Discharge Goal: Independent    Donning and [place] Footwear: Putting On/Taking Off Footwear  Assistance Needed: Independent  Comment: X  CARE Score: 6  Discharge Goal: Independent      Toileting:  Toileting Hygiene  Assistance needed: Independent  Comment: Mod I  CARE Score: 6  Discharge Goal: Independent      Toilet Transfers: Toilet Transfer  Assistance needed: Independent  Comment: Mod I  CARE Score: 6  Discharge Goal: Independent      Impairments/deficits, barriers:  Endurance and ax tolerance. Assessment  Performance deficits / Impairments: Decreased functional mobility ,Decreased safe awareness,Decreased balance,Decreased coordination,Decreased ADL status,Decreased vision/visual deficit,Decreased endurance,Decreased high-level IADLs,Decreased strength,Decreased fine motor control  Decision Making: Medium Complexity  Equipment needed at discharge: None. COGNITIVE FUNCTION/SPEECH THERAPY (AS INDICATED)  LTG                                                 Nursing Current Medical Status:   [x] Is continent of bowel      [x] Is incontinent of bladder    [x] Has had an adequate number of bowel movements   [] Urinates with no urinary retention >300ml in bladder   [] Targeting bladder protocol with walker removal   [x] Maintaining O2 SATs at 92% or greater   [x] Has pain managed while on ARU         [] Has had no skin breakdown while on ARU   [x] Has improved skin integrity via wound measurements   [] Has no signs/symptoms of infection at the wound site   [] Pressure wounds Stage/Location:    [] Arrived on unit with pressure wound  [x] Has been free from injury during hospitalization   [] Has experienced a fall during hospitalization  [x] Ongoing education with patient/family with understanding demonstrated for:  [] Receives IV Fluids  [] Other:        NUTRITION  Weight: 136 lb 11 oz (62 kg) / Body mass index is 24.21 kg/m². Current diet: ADULT DIET; Regular; Low Fiber  Intake:       Medical improvements/barriers: Plan for discharge        Team goals for next treatment period/Intervention for current barriers:   [] Pt will increase activity tolerance for daily tasks.   [] Pt will improve bed mobility with reduced assist.  [] Pt will improve safety in fx tasks with reduced cues/assist  [] Pt will improve transfers with reduced assist  [] Pt will improve toileting with reduced assist  [] Pt will improve ADL's with use of adaptive equipment with reduced assist  [] Pt will improve pain mgmt for maximum participation in tx program  [] Pt will improve communication to get basic needs met on unit  [] Pt will improve swallowing for safe diet advancement with use of strategies  [x]  Plan for discharge to home. Patient Strengths: Motivated, Cooperative and Pleasant    Justification for Continued Stay  Based on my medical assessment of the patient and review of information from the interdisciplinary team as part of this weekly team conference, the patient continues to meet the following criteria for IRF level of care:   The patient requires active and ongoing intervention of multiple therapy disciplines   The patient requires and intensive rehabilitation therapy program   The patient requires continued physician supervision by a rehabilitation physician   The patient requires 24 hours rehab nursing care   The patient requires an intensive and coordinated interdisciplinary team approach to the delivery of rehabilitative care.      Assessment/Plan   [x]  The patient is making good progression towards their long term goals and is actively participating in and has a reasonable expectation to continue to benefit from the intensive rehabilitation therapy program   []  The estimated discharge date has been changed from initial team conference due to:   []  The estimated discharge destination has been changed from initial team conference due to:         Ongoing tx following discharge: [x]HHC PT OT    []OUTPATIENT     [] No Further Treatment     [] Family/Caregiver Training  []  Transitional Living Arrangement   [] Home Assessment (date  )     [] Family Conference   []  Therapeutic Pass       []  Other: (specify)    Estimated Discharge Date: 5/12/22    Estimated Discharge Destination: []home alone   [x]home alone with assist prn  []Continuous supervision []Return home with s/o/spouse/family   [] Assisted living    []SNF     Team members participating in today's conference. [x] Laurel Mills, Medical Director  [x] Micah Payne,       [x] Nirmala Brown, RN Nurse Supervisor     [x]  Kuldeep Ritchie, PT  []  Omar Breen, PT       [] Taylor Hill, OT   [x] Martha Vasquez, OT  [] William García, OT     [x]  Kobe Peraza, TRESSA    []  Jose E Ramirez, TRESSA   [] Isaac Josue, SLP      [] Juan Pace Mgr   [x]Tom Rice Mgr     [] Pennie Schultz RN   [] Yas Lu RN    [x] Lefty Limon, RN    [] Janes Mills RN    [] Jolena Soulier, RN   [] Joey Oconnor RN   [] Niurka Reed, RN Nurse Mgr     I have led this Team Conference and agree with the plan, Laurel Mills MD, 5/12/2022, 12:34 PM  Goals have been updated to reflect recent status.     Team conference note transcribed this date by: Beatriz العراقي MA, Idalmis Kirk, Therapy Coordinator

## 2022-05-11 NOTE — PROGRESS NOTES
Physical Therapy  . [x] daily progress note       [] discharge       Patient Name:  Goran Oconnor   :  1935 MRN: [id number]  Room:  [address] [place] [address] Date of Admission: 2022  Rehabilitation Diagnosis:   Diverticulitis of intestine, part unspecified, without perforation or abscess without bleeding [K57.92]  Diverticulitis [K57.92]       Date 2022       Day of ARU Week:  3   Time IN/OUT [id number]   Individual Tx Minutes    Group Tx Minutes    Co-Treat Minutes    Concurrent Tx Minutes    TOTAL Tx Time Mins 53   Variance Time -7(+7 reported by Concepcion PT)   Variance Time []   Refusal due to:     []   Medical hold/reason:    []   Illness   []   Off Unit for test/procedure  []   Extra time needed to complete task  []   Therapeutic need  []   Other (specify):   Restrictions Restrictions/Precautions  Restrictions/Precautions: Fall Risk,Contact Precautions (C-diff)      Interdisciplinary communication [x]   Cleared for therapy per nursing     []   RN notified about issues during session  []   RN updated on pt performance  []   Spoke with   []   Spoke with OT  []   Spoke with MD  []   Other:    Subjective observations and cognitive status: Pt in recliner agreeable to therapy. Pt wanted to purchase reacher. Informed Rehab Aide   Pain level/location:    /10       Location:    Discharge recommendations  Anticipated discharge date:    Destination: []home alone   [x]home alone with assist PRN     [] home w/ family      [] Continuous supervision  []SNF    [] Assisted living     [] Other:  Continued therapy: [x]HHC PT  []OUTPATIENT  PT   [] No Further PT  []SNF PT  Caregiver training recommended: []Yes  [] No   Equipment needs: 2ww     Bed Mobility:           []   Pt received out of bed   Sit --> lying: Mod I  Bed features used: [] Yes  [x] No       Transfers:    Sit--> Stand:   Mod I  Stand --> Sit:   Mod I  Chair-->Bed/Bed --> Chair:   Mod I    Gait:    Distance:  50'x2 (sub max) and throughout advance gait activities below  Assistance: Mod I with no distractions, cues for safest path/obstacle negotiation  Device:  2ww  Gait Quality:  Continuous steps of varying step width        Additional Therapeutic activities/exercises completed this date:     []   Nu-step:  Time:        Level:         #Steps:       []   Rebounder:    []  Seated     []  Standing        []   Balance training         []   Postural training    []   Supine ther ex (reps/sets):     []   Seated ther ex (reps/sets):     []   Standing ther ex (reps/sets):     []   Picking up object from floor (standing):                   []   Reacher used   [x]   Other: neuro re-ed:  4-square activity with pt intially with very slow, deliberate steps, needing min assist to steady and having non-automatic movement, especially stepping backward. Educated pt to not \"think\" about the task, just \"do\" the task. Pt improved immediately to CGA with consistent josé miguel of steps. Side stepping over targets with CGA and one min assist recovered LOB with good josé miguel and adjustment of step length to targets. Forward gait over targets with pt needing min assist due to LOB to L. Tactile cues for L abductor tightening did not improve LOB, neither did cues for increased width of AMA. Pt has chronic problems with this hip. [x]   Other:divided attention gait tasks:  Weaving between tightly spaced cones with pt performing each one at a right angle with superfluous walking between cones to line up walker. PT demonstrated more fluid, wave like pattern between cones and pt able to partially emulate. Pt tending to take one task at a time instead of seeing the full activity. No LOB , but 25-30% obstacle strike. Numbered targets on floor with pt having to negotiate between targets to identify a particular number, then had pt  in sequence.  Pt ran over targets x 4(10%)  Comments:      Patient/Caregiver Education and Training:   []   Role of PT  [x]   Education about Dx  []   Use of call light for assist   []   Wheelchair mobility/management  []   HEP provided and explained   []   Treatment plan reviewed  []   Home safety  []   Body mechanics  []   Positioning  [x]   Bed Mobility/Transfer technique  [x]   Gait technique/sequencing  [x]   Proper use of assistive device/adaptive equipment  []   Stair training/Advanced mobility safety and technique  []   Reinforced patient's precautions/mobility while maintaining precautions  []   Postural awareness  []   Family/caregiver training  []   Progress was updated and reviewed in Rehabtracker with patient and/or family this date. []   Other:      Treatment Plan for Next Session:n/a    Assessment:Pt discharged tomorrow as per note. Pt has no questions for this therapist      Treatment/Activity Tolerance:   [x] Tolerated treatment with no adverse effects    [] Patient limited by fatigue  [] Patient limited by pain   [] Patient limited by medical complications:    [] Adverse reaction to Tx:   [] Significant change in status    Barrier/s to progress/learning:   []   None  [x]   Cognition  []   Hearing deficit  []   Pre-morbid mental/psychological status   []   Motivation  []   Communication  [x]   Anxiety  []   Vision deficit  []   Attention  []   Other:      Safety:       [x]  bed alarm set    []  chair alarm set    []  Pt refused alarms                []  Telesitter activated      [x]  Gait belt used during tx session      []other:         Number of Minutes/Billable Intervention  Gait Training 13   Therapeutic Exercise    Neuro Re-Ed4 40   Therapeutic Activity    Wheelchair Propulsion    Group    Other:    TOTAL 53         Social History  Social/Functional History  Lives With: Alone  Type of Home: House  Home Layout: One level (Pt has basement but does not use.  Has stair lift but has never been used.)  Home Access: Stairs to enter with rails  Entrance Stairs - Number of Steps: 3-4 EV  Entrance Stairs - Rails: Both  Bathroom Shower/Tub: Tub/Shower unit,Shower chair with back  Bathroom Toilet: Handicap height (Pt states frame over toilet c grab bars.)  Bathroom Equipment: Grab bars in shower,Shower chair,Grab bars around toilet,Hand-held shower  Bathroom Accessibility: Accessible  Home Equipment: Cane,Walker, rolling,Grab bars,Reacher  Has the patient had two or more falls in the past year or any fall with injury in the past year?: No  Receives Help From: Family (Gustabo Morillo lives 4 doors down. He assists c grocery shopping and house cleaning.)  ADL Assistance: Independent  Homemaking Assistance: Needs assistance (alireza does cleaning and grocery shopping)  Homemaking Responsibilities: Yes  Meal Prep Responsibility: Primary  Laundry Responsibility: Primary  Cleaning Responsibility: Primary  Bill Paying/Finance Responsibility: No (Alireza took over ~ 2 months ago as Pt states she began \"having a hard time remembering\")  Shopping Responsibility: Primary  Health Care Management: Secondary (Pt and grandem complete together.)  Ambulation Assistance: Independent (Pt reports she would use a cane at night and would take it in public just incase. Pt primarily Ind without device.)  Transfer Assistance: Independent  Active : Yes  Mode of Transportation: Car  Occupation: Retired  Type of Occupation:   Leisure & Hobbies: Pt enjoys watching television, cooking, and being active. Additional Comments: Pt has regular, flat bed at home; no falls in the past year    Objective                                                                                    Goals:  (Update in navigator)   : Long Term Goals  Time Frame for Long term goals : 10-12 tx days:  Long term goal 1: Pt will complete sup<->sit Ind. Long term goal 2: Pt will complete OOB transfers using 2WW Mod Ind. Long term goal 3: Pt will ambulate 10 ft and 50 ft with turns using 2WW Mod Ind.   Long term goal 4: Pt will ambulate 150 ft over level surface and at least 10 ft of uneven surface using 2WW with SBA. Long term goal 5: Pt will ascend/descend curb step using 2WW and 1 flight of stairs using railings with SBA-Sup. Additional Goals?: Yes  Long term goal 6: Pt will complete object retrieval from the floor with 2WW and reacher Mod Ind. Long term goal 7: Pt will propel manual w/c 150 ft with turns Mod Ind.:        Plan of Care                                                                              Times per week: 5 days per week for a minimum of 60 minutes/day plus group as appropriate for 60 minutes.   Treatment to include Current Treatment Recommendations: Strengthening,Balance training,Functional mobility training,Transfer training,Endurance training,Gait training,Stair training,Home exercise program,Safety education & training,Patient/Caregiver education & training,Equipment evaluation, education, & procurement,Therapeutic activities,Wheelchair mobility training    Electronically signed by   Sheeba Monroe PT,  5/11/2022, 1:12 PM

## 2022-05-11 NOTE — PROGRESS NOTES
Physical Therapy    [x] daily progress note       [x] discharge       Patient Name:  Goran Oconnor   :  1935 MRN: [id number]  Room:  [address] [place] [address] Date of Admission: 2022  Rehabilitation Diagnosis:   Diverticulitis of intestine, part unspecified, without perforation or abscess without bleeding [K57.92]  Diverticulitis [K57.92]       Date 2022       Day of ARU Week:  3   Time IN/OUT 1100/1207   Individual Tx Minutes 79   Group Tx Minutes    Co-Treat Minutes    Concurrent Tx Minutes    TOTAL Tx Time Mins 67   Variance Time +7   Variance Time []   Refusal due to:     []   Medical hold/reason:    []   Illness   []   Off Unit for test/procedure  [x]   Extra time needed to complete tasks  []   Therapeutic need  []   Other (specify):   Restrictions Restrictions/Precautions  Restrictions/Precautions: Fall Risk,Contact Precautions (C-diff)      Interdisciplinary communication [x]   Cleared for therapy per nursing     []   RN notified about issues during session  []   RN updated on pt performance  []   Spoke with   []   Spoke with OT  []   Spoke with MD  []   Other:    Subjective observations and cognitive status: Pt resting in recliner, alert and pleasant, aware of discharge plan, pleased with all the progress she has made while at ARU. Pain level/location: 0 /10       Location:    Discharge recommendations  Anticipated discharge date:  22  Destination: []?????home alone   [x]?????home alone with assist PRN     []????? home w/ family      []????? Continuous supervision  []??? ??SNF    []????? Assisted living     []????? Other:  Continued therapy: [x]??? ? ?[facility] PT  []?????OUTPATIENT PT   []????? No Further PT  []??? ??SNF PT  Caregiver training recommended: []??? ? ? Yes  [x]? ???? No   Equipment needs: has 2WW     PT IRF-TAMARA scores and goals for discharge assessment:   Roll Left and Right  Assistance Needed: Independent  Comment: Ind without use of bed features  CARE Score: 6  Discharge Goal: Independent    Sit to Lying  Assistance Needed: Independent  Comment: Ind without use of bed features  CARE Score: 6  Discharge Goal: Independent    Lying to Sitting on Side of Bed  Assistance Needed: Independent  Comment: Ind without use of bed features  CARE Score: 6  Discharge Goal: Independent    Sit to Stand  Assistance Needed: Independent  Comment: Mod Ind with 2WW (pt initiates self-correction with hand placement; able to complete transfers safely and efficiently from various sitting surfaces)  CARE Score: 6  Discharge Goal: Independent    Chair/Bed-to-Chair Transfer  Assistance Needed: Independent  Comment: Mod Ind with 2WW; can also complete transfer Ind.  CARE Score: 6  Discharge Goal: Independent    Toilet Transfer  Assistance needed: Independent  Comment: Mod I  CARE Score: 6  Discharge Goal: Independent    Car Transfer  Assistance Needed: Independent  Comment: Mod Ind with 2WW  CARE Score: 6  Discharge Goal: Independent    Walk 10 Feet? Walk 10 Feet?: Yes    1 Step  1 Step?: Yes    Picking Up Object  Assistance Needed: Independent  Comment: Mod Ind with LUE support on 2WW; task completed without use of reacher  CARE Score: 6  Discharge Goal: Independent    Wheelchair Ability  Uses a Wheelchair and/or Scooter?: No (pt has progressed to full, ambulatory status that pt does not need a w/c on discharge)      Walking Ability  Does the Patient Walk?: Yes    Walk 10 Feet  Assistance Needed: Independent  Comment: Mod Ind with 2WW (gait quality consistently steady)  CARE Score: 6  Discharge Goal: Independent    Walk 50 Feet with Two Turns  Assistance Needed: Independent  Comment: Mod Ind with 2WW (gait quality consistently steady)  Reason if not Attempted: Not attempted due to medical condition or safety concerns  CARE Score: 6  Discharge Goal: Independent    Walk 150 Feet  Assistance Needed: Independent  Comment:  Mod Ind with 2WW (gait quality consistently steady)  Reason if not Attempted: Not attempted due to medical condition or safety concerns  CARE Score: 6  Discharge Goal: Supervision or touching assistance    Walking 10 Feet on Uneven Surfaces  Assistance Needed: Independent  Comment: Mod Ind with 2WW on outdoor surfaces  Reason if not Attempted: Not attempted due to medical condition or safety concerns  CARE Score: 6  Discharge Goal: Supervision or touching assistance    1 Step (Curb)  Assistance Needed: Independent  Comment: Mod Ind with 2WW  Reason if not Attempted: Not attempted due to medical condition or safety concerns  CARE Score: 6  Discharge Goal: Supervision or touching assistance    4 Steps  Assistance Needed: Independent  Comment: Mod Ind using railings with consistent step-through pattern on ascent/descent of 4\"/6\" step heights; pt consistently steady  Reason if not Attempted: Not attempted due to medical condition or safety concerns  CARE Score: 6  Discharge Goal: Supervision or touching assistance    12 Steps  Assistance Needed: Independent  Comment: Mod Ind using railings with consistent step-through pattern on ascent/descent of 4\"/6\" step heights; pt consistently steady  Reason if not Attempted: Not attempted due to medical condition or safety concerns  CARE Score: 6  Discharge Goal: Supervision or touching assistance    Patient/Caregiver Education and Training:   []   Role of PT  []   Education about Dx  []   Use of call light for assist   []   HEP provided and explained   [x]   Treatment plan reviewed  []   Home safety  []   Wheelchair mobility/management   []   Body mechanics  []   Bed Mobility/Transfer technique  []   Gait technique/sequencing  []   Proper use of assistive device/adaptive equipment  []   Stair training/Advanced mobility safety and technique  []   Reinforced patient's precautions/mobility while maintaining precautions  []   Postural awareness  []   Family/caregiver training  [x]   Progress was reviewed with patient this date.   [x]   Other: benefits of C PT, Pt education about benefits of continued walking regimen at home to prevent deconditioning and as a vital component of continued recovery. Treatment Plan for Next Session: discharge to home     Assessment: This pt demonstrated progress in all areas of her functional mobility and even surpassed some of the established PT goals. Treatment/Activity Tolerance:   [x] Tolerated treatment with no adverse effects    [] Patient limited by fatigue  [] Patient limited by pain   [] Patient limited by medical complications:    [] Adverse reaction to Tx:   [] Significant change in status    Safety:       []  bed alarm set    [x]  chair alarm set    []  Pt refused alarms                []  Telesitter activated      [x]  Gait belt used during tx session      []other:       Number of Minutes/Billable Intervention  Gait Training 37   Therapeutic Exercise    Neuro Re-Ed    Therapeutic Activity 30   Wheelchair Propulsion    Group    Other:    TOTAL 67     Social History  Social/Functional History  Lives With: Alone  Type of Home: House  Home Layout: One level (Pt has basement but does not use. Has stair lift but has never been used.)  Home Access: Stairs to enter with rails  Entrance Stairs - Number of Steps: 3-4 EV  Entrance Stairs - Rails: Both  Bathroom Shower/Tub: Tub/Shower unit,Shower chair with back  Bathroom Toilet: Handicap height (Pt states frame over toilet c grab bars.)  Bathroom Equipment: Grab bars in shower,Shower chair,Grab bars around toilet,Hand-held shower  Bathroom Accessibility: Accessible  Home Equipment: Cane,Walker, rolling,Grab bars,Reacher  Has the patient had two or more falls in the past year or any fall with injury in the past year?: No  Receives Help From: Family (Gustabo 84 lives 4 doors down.  He assists c grocery shopping and house cleaning.)  ADL Assistance: Independent  Homemaking Assistance: Needs assistance (chika does cleaning and grocery shopping)  Homemaking Responsibilities: Yes  Meal Prep Responsibility: Primary  Laundry Responsibility: Primary  Cleaning Responsibility: Primary  Bill Paying/Finance Responsibility: No (Grandem took over ~ 2 months ago as Pt states she began \"having a hard time remembering\")  Shopping Responsibility: Primary  Health Care Management: Secondary (Pt and grandson complete together.)  Ambulation Assistance: Independent (Pt reports she would use a cane at night and would take it in public just incase. Pt primarily Ind without device.)  Transfer Assistance: Independent  Active : Yes  Mode of Transportation: Car  Occupation: Retired  Type of Occupation:   Leisure & Hobbies: Pt enjoys watching television, cooking, and being active. Additional Comments: Pt has regular, flat bed at home; no falls in the past year    Objective                                                                                    Goals:  (Update in navigator)   : Long Term Goals  Time Frame for Long term goals : 10-12 tx days:  Long term goal 1: Pt will complete sup<->sit Ind. Long term goal 2: Pt will complete OOB transfers using 2WW Mod Ind. Long term goal 3: Pt will ambulate 10 ft and 50 ft with turns using 2WW Mod Ind. Long term goal 4: Pt will ambulate 150 ft over level surface and at least 10 ft of uneven surface using 2WW with SBA. Long term goal 5: Pt will ascend/descend curb step using 2WW and 1 flight of stairs using railings with SBA-Sup. Additional Goals?: Yes  Long term goal 6: Pt will complete object retrieval from the floor with 2WW and reacher Mod Ind. Long term goal 7: Pt will propel manual w/c 150 ft with turns Mod Ind.:        Plan of Care                                                                              Times per week: 5 days per week for a minimum of 60 minutes/day plus group as appropriate for 60 minutes.   Treatment to include Current Treatment Recommendations: Strengthening,Balance training,Functional mobility training,Transfer training,Endurance training,Gait training,Stair training,Home exercise program,Safety education & training,Patient/Caregiver education & training,Equipment evaluation, education, & procurement,Therapeutic activities,Wheelchair mobility training    Electronically signed by   Kareem Neighbours, PT  5/11/2022, 12:13 PM

## 2022-05-11 NOTE — PROGRESS NOTES
Physical Rehabilitation: OCCUPATIONAL THERAPY     [x] daily progress note       [x] discharge       Patient Name:  Rigo Parsons   :  1935 MRN: [id number]  Room:  [address] [place] [address] Date of Admission: 2022  Rehabilitation Diagnosis:   Diverticulitis of intestine, part unspecified, without perforation or abscess without bleeding [K57.92]  Diverticulitis [K57.92]       Date 2022       Day of ARU Week:  3   Time IN//815   Individual Tx Minutes 60   Group Tx Minutes    Co-Treat Minutes    Concurrent Tx Minutes    TOTAL Tx Time Mins 60   Variance Time    Variance Time []   Refusal due to:     []   Medical hold/reason:    []   Illness   []   Off Unit for test/procedure  []   Extra time needed to complete task  []   Therapeutic need  []   Other (specify):   Restrictions Restrictions/Precautions: Fall Risk,Contact Precautions (C-diff)         Communication with other providers: [x]   OK to see per nursing:     []   Spoke with team member regarding:      Subjective observations and cognitive status:  patient in semi fowlers upon approach, co meal previous night making her sick, did not have a good night, however is pleasant and agreeable to therapy      Pain level/location:    /10       Location: no pain per patient    Discharge recommendations  Anticipated discharge date:    Destination: []home alone   [x]home alone w assist prn   [] home w/ family    [] Continuous supervision       []SNF    [] Assisted living     [] Other:   Continued therapy: [x]HHC OT  []OUTPATIENT  OT   [] No Further OT  Equipment needs: None        ADLs:    Eating: Eating  Assistance Needed: Independent  Comment: X  CARE Score: 6  Discharge Goal: Independent       Oral Hygiene: Oral Hygiene  Assistance Needed: Independent  Comment: X  CARE Score: 6  Discharge Goal: Independent    UB/LB Bathing: Shower/Bathe Self  Assistance Needed: Independent  Comment:  Mod I seated majority of shower  CARE Score: 6  Discharge Goal: Independent    UB Dressing: Upper Body Dressing  Assistance Needed: Independent  Comment: X  CARE Score: 6  Discharge Goal: Independent         LB Dressing: Lower Body Dressing  Assistance Needed: Independent  Comment: X  CARE Score: 6  Discharge Goal: Independent    Donning and [place] Footwear: Putting On/Taking Off Footwear  Assistance Needed: Independent  Comment: X  CARE Score: 6  Discharge Goal: Independent      Toileting: Toileting Hygiene  Assistance needed: Independent  Comment: Mod I  CARE Score: 6  Discharge Goal: Independent      Toilet Transfers: Toilet Transfer  Assistance needed: Independent  Comment: Mod I  CARE Score: 6  Discharge Goal: Independent  Device Used:    []   Standard Toilet         []   Grab Bars           []  Bedside Commode       []   Elevated Toilet          []   Other:        Bed Mobility:           []   Pt received out of bed   Rolling R/L:    Scooting:  Ind  Supine --> Sit:  Ind  Sit --> Supine:  Ind    Transfers:    Sit--> Stand: Mod I   Stand --> Sit:   Mod I   Stand-Pivot: Mod I   Other:    Assistive device required for transfer:   RW      Functional Mobility:  Throughout room/bathroom   Assistance:   Mod I   Device:   [x]   Rolling Walker     []   Standard Walker []   Wheelchair        []   [facility]       []   4-Wheeled Michelle Johan         []   Cardiac Michelle Johan       []   Other:        Homemaking Tasks:   Patient retrieves clothing from closet and transports to bathroom suly RW level       Additional Therapeutic activities/exercises completed this date:     [x]   ADL Training   [x]   Balance/Postural training     [x]   Bed/Transfer Training   []   Endurance Training   []   Neuromuscular Re-ed   []   Nu-step:  Time:        Level:         #Steps:       []   Rebounder:    []  Seated     []  Standing        []   Supine Ther Ex (reps/sets):     []   Seated Ther Ex (reps/sets):     []   Standing Ther Ex (reps/sets):     []   Other:      Comments:      Patient/Caregiver Education and Training:   [x] Adaptive Equipment Use  [x]   Bed Mobility/Transfer Technique/Safety  [x]   Energy Conservation Tips  []   Family training  [x]   Postural Awareness  [x]   Safety During Functional Activities  []   Reinforced Patient's Precautions   []   Progress was updated and reviewed in Rehabtracker with patient and/or family this         date. Treatment Plan for Next Session: POC to continue as tolerated        Treatment/Activity Tolerance:   [x] Tolerated treatment with no adverse effects    [] Patient limited by fatigue  [] Patient limited by pain   [] Patient limited by medical complications:    [] Adverse reaction to Tx:   [] Significant change in status    Safety:       []  bed alarm set    [x]  chair alarm set    []  Pt refused alarms                []  Telesitter activated      [x]  Gait belt used during tx session      []other:       Number of Minutes/Billable Intervention  Therapeutic Exercise    ADL Self-care 45   Neuro Re-Ed    Therapeutic Activity 15   Group    Other:    TOTAL 60       Social History  Social/Functional History  Lives With: Alone  Type of Home: House  Home Layout: One level (Pt has basement but does not use. Has stair lift but has never been used.)  Home Access: Stairs to enter with rails  Entrance Stairs - Number of Steps: 3-4 EV  Entrance Stairs - Rails: Both  Bathroom Shower/Tub: Tub/Shower unit,Shower chair with back  Bathroom Toilet: Handicap height (Pt states frame over toilet c grab bars.)  Bathroom Equipment: Grab bars in shower,Shower chair,Grab bars around toilet,Hand-held shower  Bathroom Accessibility: Accessible  Home Equipment: Cane,Walker, rolling,Grab bars,Reacher  Has the patient had two or more falls in the past year or any fall with injury in the past year?: No  Receives Help From: Family (Gustabo 84 lives 4 doors down.  He assists c grocery shopping and house cleaning.)  ADL Assistance: Independent  Homemaking Assistance: Needs assistance (chika does cleaning and grocery shopping)  Homemaking Responsibilities: Yes  Meal Prep Responsibility: Primary  Laundry Responsibility: Primary  Cleaning Responsibility: Primary  Bill Paying/Finance Responsibility: No (Grandson took over ~ 2 months ago as Pt states she began \"having a hard time remembering\")  Shopping Responsibility: Primary  Health Care Management: Secondary (Pt and grandson complete together.)  Ambulation Assistance: Independent (Pt reports she would use a cane at night and would take it in public just incase. Pt primarily Ind without device.)  Transfer Assistance: Independent  Active : Yes  Mode of Transportation: Car  Occupation: Retired  Type of Occupation:   Leisure & Hobbies: Pt enjoys watching television, cooking, and being active. Additional Comments: Pt has regular, flat bed at home; no falls in the past year    Objective                                                                                    Goals:  (Update in navigator)  Short Term Goals  Time Frame for Short term goals: 5-7 days  Short Term Goal 1: Pt will complete self-feeding IND.:  Long Term Goals  Time Frame for Long term goals : 12-14 days or until d/c. Long Term Goal 1: Pt will complete oral hygiene c Mod I.  Long Term Goal 2: Pt will complete grooming tasks c Mod I.  Long Term Goal 3: Pt will complete total body bathing c Mod I and AE PRN. Long Term Goal 4: Pt will complete UB dressing c Ind. Long Term Goal 5: Pt will complete LB dressing c Mod I. Additional Goals?: Yes  Long Term Goal 6: Pt will doff/don footwear c Mod I.  Long Term Goal 7: Pt will complete toileting c Mod I.  Long Term Goal 8: Pt will perform functional transfers (bed, chair, toilet, shower) c DME PRN. Long Term Goal 9: Pt will perform therex/therax to facilitate increased strength/endurance/ax tolerance (c emphasis on dynamic standing balance/tolerance > 8 mins) c Mod I.  Long Term Goal 10:  Pt will complete light home management tasks c Mod I.:        Plan of Care                                                                              Times per week: 5 days per week for a minimum of 60 minutes/day plus group as appropriate for 60 minutes.   Treatment to include Plan  Times per Day: Daily  Current Treatment Recommendations: Strengthening,ROM,Balance training,Functional mobility training,Endurance training,Neuromuscular re-education,Safety education & training,Patient/Caregiver education & training,Equipment evaluation, education, & procurement,Self-Care / ADL,Home management training,Coordination training    Electronically signed by   MAGALIS Mckeon,  5/11/2022, 8:09 AM

## 2022-05-11 NOTE — PLAN OF CARE
Problem: Discharge Planning  Goal: Discharge to home or other facility with appropriate resources  Outcome: Progressing     Problem: Safety - Adult  Goal: Free from fall injury  Outcome: Progressing     Problem: Skin/Tissue Integrity  Goal: Absence of new skin breakdown  Description: 1. Monitor for areas of redness and/or skin breakdown  2. Assess vascular access sites hourly  3. Every 4-6 hours minimum:  Change oxygen saturation probe site  4. Every 4-6 hours:  If on nasal continuous positive airway pressure, respiratory therapy assess nares and determine need for appliance change or resting period.   Outcome: Progressing     Problem: ABCDS Injury Assessment  Goal: Absence of physical injury  Outcome: Progressing     Problem: Pain  Goal: Verbalizes/displays adequate comfort level or baseline comfort level  Outcome: Progressing

## 2022-05-12 VITALS
HEART RATE: 62 BPM | DIASTOLIC BLOOD PRESSURE: 67 MMHG | HEIGHT: 63 IN | WEIGHT: 136.69 LBS | TEMPERATURE: 97.9 F | BODY MASS INDEX: 24.22 KG/M2 | OXYGEN SATURATION: 95 % | RESPIRATION RATE: 16 BRPM | SYSTOLIC BLOOD PRESSURE: 117 MMHG

## 2022-05-12 LAB
ANION GAP SERPL CALCULATED.3IONS-SCNC: 9 MMOL/L (ref 4–16)
BUN BLDV-MCNC: 20 MG/DL (ref 6–23)
CALCIUM SERPL-MCNC: 9 MG/DL (ref 8.3–10.6)
CHLORIDE BLD-SCNC: 101 MMOL/L (ref 99–110)
CO2: 28 MMOL/L (ref 21–32)
CREAT SERPL-MCNC: 0.9 MG/DL (ref 0.6–1.1)
GFR AFRICAN AMERICAN: >60 ML/MIN/1.73M2
GFR NON-AFRICAN AMERICAN: 60 ML/MIN/1.73M2
GLUCOSE BLD-MCNC: 97 MG/DL (ref 70–99)
POTASSIUM SERPL-SCNC: 4.5 MMOL/L (ref 3.5–5.1)
SODIUM BLD-SCNC: 138 MMOL/L (ref 135–145)

## 2022-05-12 PROCEDURE — 99239 HOSP IP/OBS DSCHRG MGMT >30: CPT | Performed by: PHYSICAL MEDICINE & REHABILITATION

## 2022-05-12 PROCEDURE — 80048 BASIC METABOLIC PNL TOTAL CA: CPT

## 2022-05-12 PROCEDURE — 6370000000 HC RX 637 (ALT 250 FOR IP): Performed by: STUDENT IN AN ORGANIZED HEALTH CARE EDUCATION/TRAINING PROGRAM

## 2022-05-12 PROCEDURE — 94761 N-INVAS EAR/PLS OXIMETRY MLT: CPT

## 2022-05-12 PROCEDURE — 36415 COLL VENOUS BLD VENIPUNCTURE: CPT

## 2022-05-12 PROCEDURE — 6370000000 HC RX 637 (ALT 250 FOR IP): Performed by: PHYSICAL MEDICINE & REHABILITATION

## 2022-05-12 RX ADMIN — PANTOPRAZOLE SODIUM 40 MG: 40 TABLET, DELAYED RELEASE ORAL at 05:12

## 2022-05-12 RX ADMIN — SERTRALINE HYDROCHLORIDE 25 MG: 50 TABLET ORAL at 08:02

## 2022-05-12 RX ADMIN — VITAM B12 50 MCG: 100 TAB at 08:02

## 2022-05-12 RX ADMIN — KETOTIFEN FUMARATE 1 DROP: 0.35 SOLUTION/ DROPS OPHTHALMIC at 08:05

## 2022-05-12 RX ADMIN — VERAPAMIL HYDROCHLORIDE 120 MG: 120 TABLET, FILM COATED, EXTENDED RELEASE ORAL at 08:03

## 2022-05-12 RX ADMIN — FLUTICASONE PROPIONATE 1 SPRAY: 50 SPRAY, METERED NASAL at 08:05

## 2022-05-12 RX ADMIN — Medication 1 TABLET: at 08:02

## 2022-05-12 RX ADMIN — FUROSEMIDE 20 MG: 20 TABLET ORAL at 08:02

## 2022-05-12 RX ADMIN — GABAPENTIN 300 MG: 300 CAPSULE ORAL at 13:48

## 2022-05-12 RX ADMIN — MAGNESIUM OXIDE 400 MG (241.3 MG MAGNESIUM) TABLET 400 MG: TABLET at 08:02

## 2022-05-12 RX ADMIN — GABAPENTIN 300 MG: 300 CAPSULE ORAL at 08:02

## 2022-05-12 RX ADMIN — ASPIRIN 81 MG: 81 TABLET, COATED ORAL at 08:02

## 2022-05-12 NOTE — PROGRESS NOTES
[facility] Liaison spoke w/pt & is aware of discharge & will initiate [facility]. Sent labs over to [facility] from Dr Deidre Reese office.

## 2022-05-12 NOTE — CARE COORDINATION
ARU  Discharge Summary    D/C Date: 5/12/22    Patient discharged to:  Home alone    Transported by: chika    Referrals made to:  [facility]    Additional information: no DME needs    Caregiver training: no requested

## 2022-05-12 NOTE — PROGRESS NOTES
Discharge instruction and prescription given to and signed by Berny HARGROVE. Belongings taken by son to his auto. Pt's propelled by wheelchair to son auto. Discharge completed as ordered.

## 2022-05-13 ENCOUNTER — CARE COORDINATION (OUTPATIENT)
Dept: CASE MANAGEMENT | Age: 87
End: 2022-05-13

## 2022-05-13 ENCOUNTER — TELEPHONE (OUTPATIENT)
Dept: CARDIOLOGY CLINIC | Age: 87
End: 2022-05-13

## 2022-05-13 DIAGNOSIS — K57.92 DIVERTICULITIS: Primary | ICD-10-CM

## 2022-05-13 PROCEDURE — 1111F DSCHRG MED/CURRENT MED MERGE: CPT | Performed by: FAMILY MEDICINE

## 2022-05-13 NOTE — CARE COORDINATION
[facility] Trans[facility] Initial Follow Up Call    Call within 2 business days of discharge: Yes    Patient: Jagruti Shrestha Patient : 1935    MRN: [id number]  Reason for Admission: Diverticulitis    Discharge Date: 22 RARS: Readmission Risk Score: 17.9 ( )      Last Discharge [facility]       Complaint Diagnosis Description Type Department Provider    22   Admission (Discharged) Jeancarlos Kinsey MD        Transitions of Care Initial Call:  Explained the role of Care Transition Nurse and the Transition program, patient is agreeable to follow up calls Post discharge from the [facility] 664 and spoke to patient. Patient states she is doing well. Denies abdominal pain, abdominal bloating and extreme amount of gas, Nausea, vomiting, diarrhea, Fever and chills, and constipation. Reviewed medications with Patient. Confirmed Patient obtained and is taking medications as directed. There are no questions concerning medications at this time. Medication education provided needed, questions answered, verbalizes understanding. Stressed importance of medication compliance. Advised contacting Pharmacy/MD for refill needs. Expresses understanding. [id number] Medication Reconciliation completed. Routed to PCP. [address] and spoke to Stef. She states they are going to follow patient and call her either later today or tomorrow. Offered patient assistance making PCP appointment, Declined. Patient states she will call PCP and make appointment. Routed and e-mailed need of 7 day F/U hospital appointment to [facility]. Instructed patient that PCP will need to be seen within 7 days of discharge. Expresses understanding. Explained the Transition to Home Program to patient. Patient is called after discharge by CTN to make sure all needs are met and to see if patient has any questions or problems. Expresses understanding.      Patient is aware of when to contact MD with any new or worsening symptoms. Advised to contact PCP / with any health concerns for early outpatient intervention in an effort to avoid hospitalization. Report any worsening symptoms to PCP and/or Call 911 and/or GO TO  EMERGENCY ROOM if symptoms are severe. Expresses understanding. Will continue to follow. Lakesha Galvin LPN    [phone]  [facility] / [place] [address] Coordinator      Was this an external facility discharge? Discharge Facility: [facility]    Challenges to be reviewed by the provider   Additional needs identified to be addressed with provider Yes  Needs 7 day Hospital F/U appointment. Method of communication with provider : chart routing      Advance Care Planning:   Does patient have an Advance Directive:  reviewed and current. Was this a readmission? No   Patient stated reason for admission: Diverticulitis    Patients top risk factors for readmission: lack of knowledge about disease  medical condition-Diverticulitis  medication management    Care Transition Nurse (CTN) contacted the patient by telephone to perform post hospital discharge assessment. Verified name and  with patient as identifiers. Provided introduction to self, and explanation of the CTN role. CTN reviewed discharge instructions, medical action plan and red flags with patient who verbalized understanding. Patient given an opportunity to ask questions and does not have any further questions or concerns at this time. Were discharge instructions available to patient? Yes. Reviewed appropriate site of care based on symptoms and resources available to patient including: PCP  Specialist  Home health  When to call [address] The patient agrees to contact the PCP office for questions related to their healthcare. Medication reconciliation was performed with patient, who verbalizes understanding of administration of home medications. Advised obtaining a 90-day supply of all daily and as-needed medications. Reviewed and educated patient on any new and changed medications related to discharge diagnosis     Was patient discharged with a pulse oximeter? No If Yes - Discussed and confirmed pulse oximeter discharge instructions and when to notify provider or seek emergency care. LPN CC provided contact information. Plan for follow-up call in 5-7 days based on severity of symptoms and risk factors.          Non-face-to-face services provided:  Obtained and reviewed discharge summary and/or continuity of care documents    Care Transitions 24 Hour Call    Schedule Follow Up Appointment with PCP: Delia Langston you have a copy of your discharge instructions?: Yes  Do you have all of your prescriptions and are they filled?: Yes  Have you been contacted by a [facility] Pharmacist?: No  Have you scheduled your follow up appointment?: No  Post Acute Services: Home Health (Comment: [facility])  Do you have support at home?: Alone  Do you feel like you have everything you need to keep you well at home?: Yes  Are you an active caregiver in your home?: No  Care Transitions Interventions  No Identified Needs         Follow Up  Future Appointments   Date Time Provider Darvin Morales   5/26/2022 12:15 PM Tita Charlton MD [id number] [facility]   7/7/2022  1:15 PM Jigar Walker PA-C [facility]   7/28/2022  2:00 PM Alexandra Colon MD N CAPRICE NOR [facility]   8/8/2022 11:00 AM SCHEDULE, SRMX AWV OLGA N CAPR[facility] NOR [facility]   8/11/2022  1:00 PM INFUSION ROOM 1 - [facility]   9/20/2022  2:45 PM Wolf Starkey MD [facility] AYDE Elizabeth LPN

## 2022-05-13 NOTE — DISCHARGE SUMMARY
Patient Name: Asael Urbina  Patient :  1935  Patient MRN:   [id number]      Admission Date:  2022  Discharge Date: 2022    Admitting diagnosis: Diverticulitis ( [address])     Comorbid diagnoses impacting rehabilitation: Generalized weakness, gait disturbance, essential hypertension, hyponatremia, depression with anxiety, bigeminy, trigeminal neuralgia, orthostatic hypotension     Discharging diagnosis: Diverticulitis ( [address])     Comorbid diagnoses impacting rehabilitation: Generalized weakness, gait disturbance, essential hypertension, hyponatremia, depression with anxiety, bigeminy, trigeminal neuralgia, orthostatic hypotension        History of present illness: Patient is an 42-year-old right-hand-dominant female who suffered abrupt onset of abdominal misery, recurrent diarrhea and nausea that prompted an ED visit on 2022. She was dehydrated with diarrhea and was diagnosed with diverticulitis. She demonstrated bigeminy and orthostatic hypotension and had to receive IV antibiotics, fluid resuscitation and adjustments to her cardiac medications. During this convalescence she became hyponatremic and very weak. Prior (baseline) level of function: Independent. Current level of function:         Current  IRF-TAMARA and Goals:   Occupational Therapy:    Short Term Goals  Time Frame for Short term goals: 5-7 days  Short Term Goal 1: Pt will complete self-feeding IND. :   Long Term Goals  Time Frame for Long term goals : 12-14 days or until d/c. Long Term Goal 1: Pt will complete oral hygiene c Mod I.  Long Term Goal 2: Pt will complete grooming tasks c Mod I.  Long Term Goal 3: Pt will complete total body bathing c Mod I and AE PRN. Long Term Goal 4: Pt will complete UB dressing c Ind. Long Term Goal 5: Pt will complete LB dressing c Mod I.   Additional Goals?: Yes  Long Term Goal 6: Pt will doff/don footwear c Mod I.  Long Term Goal 7: Pt will complete toileting c Mod I.  Long Term Goal 8: Pt will perform functional transfers (bed, chair, toilet, shower) c DME PRN. Long Term Goal 9: Pt will perform therex/therax to facilitate increased strength/endurance/ax tolerance (c emphasis on dynamic standing balance/tolerance > 8 mins) c Mod I.  Long Term Goal 10: Pt will complete light home management tasks c Mod I. :                                       Eating: Eating  Assistance Needed: Independent  Comment: X  CARE Score: 6  Discharge Goal: Independent       Oral Hygiene: Oral Hygiene  Assistance Needed: Independent  Comment: X  CARE Score: 6  Discharge Goal: Independent    UB/LB Bathing: Shower/Bathe Self  Assistance Needed: Independent  Comment: Mod I seated majority of shower  CARE Score: 6  Discharge Goal: Independent    UB Dressing: Upper Body Dressing  Assistance Needed: Independent  Comment: X  CARE Score: 6  Discharge Goal: Independent         LB Dressing: Lower Body Dressing  Assistance Needed: Independent  Comment: X  CARE Score: 6  Discharge Goal: Independent    Donning and [place] Footwear: Putting On/Taking Off Footwear  Assistance Needed: Independent  Comment: X  CARE Score: 6  Discharge Goal: Independent      Toileting: Toileting Hygiene  Assistance needed: Independent  Comment: Mod I  CARE Score: 6  Discharge Goal: Independent      Toilet Transfers: Toilet Transfer  Assistance needed: Independent  Comment: Mod I  CARE Score: 6  Discharge Goal: Independent    Physical Therapy:         Long Term Goals  Time Frame for Long term goals : 10-12 tx days:  Long term goal 1: Pt will complete sup<->sit Ind. Long term goal 2: Pt will complete OOB transfers using 2WW Mod Ind. Long term goal 3: Pt will ambulate 10 ft and 50 ft with turns using 2WW Mod Ind. Long term goal 4: Pt will ambulate 150 ft over level surface and at least 10 ft of uneven surface using 2WW with SBA. Long term goal 5: Pt will ascend/descend curb step using 2WW and 1 flight of stairs using railings with SBA-Sup.   Additional Goals?: Yes  Long term goal 6: Pt will complete object retrieval from the floor with 2WW and reacher Mod Ind. Long term goal 7: Pt will propel manual w/c 150 ft with turns Mod Ind. Bed Mobility:   Sit to Lying  Assistance Needed: Independent  Comment: Ind without use of bed features  CARE Score: 6  Discharge Goal: Independent  Roll Left and Right  Assistance Needed: Independent  Comment: Ind without use of bed features  CARE Score: 6  Discharge Goal: Independent  Lying to Sitting on Side of Bed  Assistance Needed: Independent  Comment: Ind without use of bed features  CARE Score: 6  Discharge Goal: Independent    Transfers:    Sit to Stand  Assistance Needed: Independent  Comment: Mod Ind with 2WW (pt initiates self-correction with hand placement; able to complete transfers safely and efficiently from various sitting surfaces)  CARE Score: 6  Discharge Goal: Independent  Chair/Bed-to-Chair Transfer  Assistance Needed: Independent  Comment: Mod Ind with 2WW; can also complete transfer Ind.  CARE Score: 6  Discharge Goal: Independent  Toilet Transfer  Assistance needed: Supervision or touching assistance  Comment: Min A to sit, Mod A to stand using grab bars and 2WW  CARE Score: 4  Car Transfer  Assistance Needed: Independent  Comment: Mod Ind with 2WW  CARE Score: 6  Discharge Goal: Independent    Ambulation:    Walking Ability  Does the Patient Walk?: Yes     Walk 10 Feet  Assistance Needed: Independent  Comment: Mod Ind with 2WW (gait quality consistently steady)  CARE Score: 6  Discharge Goal: Independent     Walk 50 Feet with Two Turns  Assistance Needed: Independent  Comment: Mod Ind with 2WW (gait quality consistently steady)  Reason if not Attempted: Not attempted due to medical condition or safety concerns  CARE Score: 6  Discharge Goal: Independent     Walk 150 Feet  Assistance Needed: Independent  Comment:  Mod Ind with 2WW (gait quality consistently steady)  Reason if not Attempted: Not attempted due to medical condition or safety concerns  CARE Score: 6  Discharge Goal: Supervision or touching assistance     Walking 10 Feet on Uneven Surfaces  Assistance Needed: Independent  Comment: Mod Ind with 2WW on outdoor surfaces  Reason if not Attempted: Not attempted due to medical condition or safety concerns  CARE Score: 6  Discharge Goal: Supervision or touching assistance     1 Step (Curb)  Assistance Needed: Independent  Comment: Mod Ind with 2WW  Reason if not Attempted: Not attempted due to medical condition or safety concerns  CARE Score: 6  Discharge Goal: Supervision or touching assistance     4 Steps  Assistance Needed: Independent  Comment: Mod Ind using railings with consistent step-through pattern on ascent/descent of 4\"/6\" step heights; pt consistently steady  Reason if not Attempted: Not attempted due to medical condition or safety concerns  CARE Score: 6  Discharge Goal: Supervision or touching assistance     12 Steps  Assistance Needed: Independent  Comment: Mod Ind using railings with consistent step-through pattern on ascent/descent of 4\"/6\" step heights; pt consistently steady  Reason if not Attempted: Not attempted due to medical condition or safety concerns  CARE Score: 6  Discharge Goal: Supervision or touching assistance       Wheelchair:  w/c Ability: Wheelchair Ability  Uses a Wheelchair and/or Scooter?: No (pt has progressed to full, ambulatory status that pt does not need a w/c on discharge)  Wheel 50 Feet with Two Turns  Assistance Needed: Supervision or touching assistance  Comment: SBA-Sup using BUE primarily  CARE Score: 4  Discharge Goal: Independent  Wheel 150 Feet  Assistance Needed: Substantial/maximal assistance  Comment: pt was only able to propel up to 70 ft today (limited by fatigue)  CARE Score: 2  Discharge Goal: Independent          Balance:        Object: Picking Up Object  Assistance Needed: Independent  Comment:  Mod Ind with LUE support on 2WW; task completed without use of reacher  CARE Score: 6  Discharge Goal: Independent    I      Exam:    Blood pressure 117/67, pulse 62, temperature 97.9 °F (36.6 °C), resp. rate 16, height 5' 3\" (1.6 m), weight 136 lb 11 oz (62 kg), SpO2 95 %, not currently breastfeeding. General: Sitting up in a bedside chair. Alert and oriented. In no distress. Asking questions about discharge. HEENT: Gazing right and left freely. MMM. Neck supple. Pulmonary: Unlabored without wheezes, rales or coughing. Cardiac: Regular rate and rhythm. Abdomen: Patient's abdomen is soft and nondistended. Bowel sounds were present throughout. There was no rebound, guarding or masses noted. Upper extremities: Moving both upper limbs though functional range of motion with normal strength and coordination. Lower extremities: No peripheral edema. Calves soft. 4+/5 strength throughout. No signs of DVT. Sitting balance was good. Standing balance was fair-.    Lab Results   Component Value Date    WBC 4.6 05/09/2022    HGB 10.6 (L) 05/09/2022    HCT 33.2 (L) 05/09/2022    .4 (H) 05/09/2022     05/09/2022     Lab Results   Component Value Date    INR 0.95 02/01/2022    INR 0.96 01/25/2016    PROTIME 12.3 02/01/2022    PROTIME 11.0 01/25/2016     Lab Results   Component Value Date    CREATININE 0.9 05/12/2022    BUN 20 05/12/2022     05/12/2022    K 4.5 05/12/2022     05/12/2022    CO2 28 05/12/2022     Lab Results   Component Value Date    ALT 19 04/28/2022    AST 37 04/28/2022    ALKPHOS 40 04/28/2022    BILITOT 0.6 04/28/2022           The patient presented to the ARU with the above history requiring a multidisciplinary treatment plan including close medical supervision by the Abhinav Henry Director. The patient participated in the prescribed therapy treatment plan with reasonable compliance and progressive tolerance. They avoided significant medical complications.     By the time of discharge the patient had become safer with adaptive equipment to transfer and toilet with a FWW with the supervision of family/caregivers. The patient was tolerating an oral diet without choking/coughing and was back to their baseline with regards to bowel and bladder control. Discharge instructions were reviewed with the patient. The patient is to follow up with the PCP in 1 week. No driving. [facility] PT/OT/RN will be arranged.        Medication List      START taking these medications    furosemide 20 MG tablet  Commonly known as: LASIX  Take 1 tablet by mouth daily     magnesium oxide 400 (240 Mg) MG tablet  Commonly known as: MAG-OX  Take 1 tablet by mouth daily        CHANGE how you take these medications    gabapentin 300 MG capsule  Commonly known as: NEURONTIN  take 1 tablet by mouth three times a day for TRIGEMINAL NEURALGIA  What changed:   · how much to take  · how to take this  · when to take this  · additional instructions     pantoprazole 40 MG tablet  Commonly known as: PROTONIX  Take 1 tablet by mouth 2 times daily  What changed: when to take this        CONTINUE taking these medications    aspirin 81 MG EC tablet     divalproex 250 MG extended release tablet  Commonly known as: DEPAKOTE ER     fluticasone 50 MCG/ACT nasal spray  Commonly known as: FLONASE  instill 1 spray into each nostril once daily     Handicap Placard Misc  by Does not apply route Good for 3 years     OCUVITE-LUTEIN PO     ondansetron 4 MG disintegrating tablet  Commonly known as: ZOFRAN-ODT  Take 1 tablet by mouth 2 times daily as needed for Nausea or Vomiting     Oscal 500/200 D-3 500-200 MG-UNIT per tablet  Generic drug: calcium-vitamin D     pyridostigmine 60 MG tablet  Commonly known as: MESTINON  Take 0.5 tablets by mouth daily     rosuvastatin 10 MG tablet  Commonly known as: CRESTOR  take 1 tablet by mouth once daily     sertraline 25 MG tablet  Commonly known as: ZOLOFT     verapamil 120 MG extended release tablet  Commonly known as: CALAN SR  Take 1 tablet by mouth daily     vitamin B-12 100 MCG tablet  Commonly known as: CYANOCOBALAMIN     Zaditor 0.025 % ophthalmic solution  Generic drug: ketotifen        STOP taking these medications    ferrous sulfate 325 (65 Fe) MG EC tablet  Commonly known as: FE TABS 325     Vascepa 0.5 g Caps  Generic drug: Icosapent Ethyl           Where to Get Your Medications      You can get these medications from any pharmacy    Bring a paper prescription for each of these medications  · furosemide 20 MG tablet  · magnesium oxide 400 (240 Mg) MG tablet         CONDITION ON DISCHARGE: Stable. The prognosis is fair for further improvements in ADL's and safety with adapted gait/transfers. Record review, patient exam, discharge instructions, medication reconciliation and summary for this discharge visit took more than 30 minutes.

## 2022-05-13 NOTE — TELEPHONE ENCOUNTER
Patient said she was in the hospital 2 wks and Dr Sally Ruth took her off Vascepa and put her on lasix. She wants to know if that is ok with Dr [address]   Please call

## 2022-05-19 ENCOUNTER — OFFICE VISIT (OUTPATIENT)
Dept: INTERNAL MEDICINE CLINIC | Age: 87
End: 2022-05-19
Payer: MEDICARE

## 2022-05-19 ENCOUNTER — HOSPITAL ENCOUNTER (OUTPATIENT)
Age: 87
Setting detail: SPECIMEN
Discharge: HOME | End: 2022-05-19
Payer: MEDICARE

## 2022-05-19 VITALS
HEART RATE: 67 BPM | SYSTOLIC BLOOD PRESSURE: 132 MMHG | OXYGEN SATURATION: 97 % | BODY MASS INDEX: 24.09 KG/M2 | WEIGHT: 136 LBS | DIASTOLIC BLOOD PRESSURE: 71 MMHG

## 2022-05-19 DIAGNOSIS — K57.92 DIVERTICULITIS: ICD-10-CM

## 2022-05-19 DIAGNOSIS — Z09 HOSPITAL DISCHARGE FOLLOW-UP: Primary | ICD-10-CM

## 2022-05-19 LAB
ALBUMIN SERPL-MCNC: 4 GM/DL (ref 3.4–5)
ALP BLD-CCNC: 35 IU/L (ref 40–128)
ALT SERPL-CCNC: 11 U/L (ref 10–40)
ANION GAP SERPL CALCULATED.3IONS-SCNC: 13 MMOL/L (ref 4–16)
AST SERPL-CCNC: 20 IU/L (ref 15–37)
BILIRUB SERPL-MCNC: 0.4 MG/DL (ref 0–1)
BUN BLDV-MCNC: 16 MG/DL (ref 6–23)
CALCIUM SERPL-MCNC: 9.3 MG/DL (ref 8.3–10.6)
CHLORIDE BLD-SCNC: 96 MMOL/L (ref 99–110)
CO2: 26 MMOL/L (ref 21–32)
CREAT SERPL-MCNC: 0.9 MG/DL (ref 0.6–1.1)
GFR AFRICAN AMERICAN: >60 ML/MIN/1.73M2
GFR NON-AFRICAN AMERICAN: 60 ML/MIN/1.73M2
GLUCOSE BLD-MCNC: 163 MG/DL (ref 70–99)
MAGNESIUM: 1.9 MG/DL (ref 1.8–2.4)
PHOSPHORUS: 3.8 MG/DL (ref 2.5–4.9)
POTASSIUM SERPL-SCNC: 4.8 MMOL/L (ref 3.5–5.1)
SODIUM BLD-SCNC: 135 MMOL/L (ref 135–145)
TOTAL PROTEIN: 6.6 GM/DL (ref 6.4–8.2)

## 2022-05-19 PROCEDURE — 84100 ASSAY OF PHOSPHORUS: CPT

## 2022-05-19 PROCEDURE — 80053 COMPREHEN METABOLIC PANEL: CPT

## 2022-05-19 PROCEDURE — 99495 TRANSJ CARE MGMT MOD F2F 14D: CPT | Performed by: FAMILY MEDICINE

## 2022-05-19 PROCEDURE — 83735 ASSAY OF MAGNESIUM: CPT

## 2022-05-19 PROCEDURE — 1111F DSCHRG MED/CURRENT MED MERGE: CPT | Performed by: FAMILY MEDICINE

## 2022-05-19 RX ORDER — ICOSAPENT ETHYL 500 MG/1
2 CAPSULE ORAL 2 TIMES DAILY
COMMUNITY
End: 2022-05-19 | Stop reason: ALTCHOICE

## 2022-05-19 NOTE — PROGRESS NOTES
Post-Discharge Transitional Care  Follow Up      Jones Homans   YOB: 1935    Date of Office Visit:  5/19/2022  Date of Hospital Admission: 5/2/22  Date of Hospital Discharge: 5/12/22  Risk of hospital readmission (high >=14%. Medium >=10%) :Readmission Risk Score: 17.9 ( )      Care management risk score Rising risk (score 2-5) and Complex Care (Scores >=6): 11     Non face to face  following discharge, date last encounter closed (first attempt may have been earlier): 5/13/2022 10:00 AM    Call initiated 2 business days of discharge: Yes    ASSESSMENT/PLAN:   Hospital discharge follow-up  -     WA DISCHARGE MEDS RECONCILED W/ CURRENT OUTPATIENT MED LIST  Diverticulitis  Patient with recurrent diverticulitis. Unfortunately, given age and comorbidities, likely a poor candidate for surgical management. Will be following up with GI to have colonoscopy and discuss further management. Advised to continue following recommended diet. Medical Decision Making: moderate complexity  Return in 3 months (on 8/19/2022). On this date 5/19/2022 I have spent 25 minutes reviewing previous notes, test results and face to face with the patient discussing the diagnosis and importance of compliance with the treatment plan as well as documenting on the day of the visit. Subjective:   HPI:  Follow up of Hospital problems/diagnosis(es):  Diverticulitis    Inpatient course: Discharge summary reviewed- see chart. Interval history/Current status:     Patient recently hospitalized for diverticulitis, treated with IV antibiotics and transferred to ARU for rehab. Was discharged home a few days ago. States she has been following a strict fiber-restricted diet as advised during hospitalization and has been feeling well since discharge. Is just worried when she will have recurrence of symptoms as she has had repeated episodes of diverticulitis in the past several months.       Has follow up with nephrology next week (due to electrolyte imbalances during hospitalization). States she will be calling to schedule follow up with GI. Was told she would need a colonoscopy as an outpatient. No acute concerns today. Patient Active Problem List   Diagnosis    Chronic depression    Hyperlipidemia    Trigeminal neuralgia    Anemia due to chronic illness    Colon cancer screening    Osteoporosis    Supraventricular tachycardia ([address]    Recurrent ventral incisional hernia    Mild cognitive impairment    CAD (coronary artery disease)    Elevated TSH    PAD (peripheral artery disease) (HCC)    Sciatica of left side without back pain    Coronary artery disease involving coronary bypass graft of native heart without angina pectoris    S/P CABG x 3    Essential hypertension    MVP (mitral valve prolapse)    Bilateral carotid artery stenosis    Subclavian arterial stenosis (HCC)    Spigelian hernia    Ischemic cardiomyopathy    Glenohumeral arthritis, right    Normocytic anemia    Overactive bladder    Major depressive disorder, recurrent, in full remission ([address]    Asthmatic bronchitis    Age-related osteoporosis without current pathological fracture    Palpitations    SOB (shortness of breath)    Prediabetes    Generalized weakness    Hyponatremia    Gait disturbance    Macular degeneration of both eyes    History of DVT (deep vein thrombosis)    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 If you have any questions, ask your nurse or doctor.             CHANGE how you take these medications    gabapentin 300 MG capsule  Commonly known as: NEURONTIN  take 1 tablet by mouth three times a day for TRIGEMINAL NEURALGIA  What changed:   · how much to take  · how to take this  · when to take this  · additional instructions     pantoprazole 40 MG tablet  Commonly known as: PROTONIX  Take 1 tablet by mouth 2 times daily  What changed: when to take this        CONTINUE taking these medications    aspirin 81 MG EC tablet     divalproex 250 MG extended release tablet  Commonly known as: DEPAKOTE ER     fluticasone 50 MCG/ACT nasal spray  Commonly known as: FLONASE  instill 1 spray into each nostril once daily     furosemide 20 MG tablet  Commonly known as: LASIX  Take 1 tablet by mouth daily     Handicap Placard Misc  by Does not apply route Good for 3 years     magnesium oxide 400 (240 Mg) MG tablet  Commonly known as: MAG-OX  Take 1 tablet by mouth daily     OCUVITE-LUTEIN PO     ondansetron 4 MG disintegrating tablet  Commonly known as: ZOFRAN-ODT  Take 1 tablet by mouth 2 times daily as needed for Nausea or Vomiting     Oscal 500/200 D-3 500-200 MG-UNIT per tablet  Generic drug: calcium-vitamin D     pyridostigmine 60 MG tablet  Commonly known as: MESTINON  Take 0.5 tablets by mouth daily     rosuvastatin 10 MG tablet  Commonly known as: CRESTOR  take 1 tablet by mouth once daily     sertraline 25 MG tablet  Commonly known as: ZOLOFT     verapamil 120 MG extended release tablet  Commonly known as: CALAN SR  Take 1 tablet by mouth daily     vitamin B-12 100 MCG tablet  Commonly known as: CYANOCOBALAMIN     Zaditor 0.025 % ophthalmic solution  Generic drug: ketotifen        STOP taking these medications    Vascepa 0.5 g Caps  Generic drug: Icosapent Ethyl  Stopped by: Eloise Rhodes MD              Medications marked \"taking\" at this time  Outpatient Medications Marked as Taking for the 5/19/22 encounter (Office Visit) with Eloise Rhodes MD   Medication Sig Dispense Refill    furosemide (LASIX) 20 MG tablet Take 1 tablet by mouth daily 30 tablet 0    magnesium oxide (MAG-OX) 400 (240 Mg) MG tablet Take 1 tablet by mouth daily 30 tablet 0    fluticasone (FLONASE) 50 MCG/ACT nasal spray instill 1 spray into each nostril once daily 1 each 3    vitamin B-12 (CYANOCOBALAMIN) 100 MCG tablet Take 50 mcg by mouth daily      rosuvastatin (CRESTOR) 10 MG tablet take 1 tablet by mouth once daily 90 tablet 3    verapamil (CALAN SR) 120 MG extended release tablet Take 1 tablet by mouth daily 30 tablet 5    pyridostigmine (MESTINON) 60 MG tablet Take 0.5 tablets by mouth daily 45 tablet 5    pantoprazole (PROTONIX) 40 MG tablet Take 1 tablet by mouth 2 times daily (Patient taking differently: Take 40 mg by mouth daily ) 180 tablet 1    ondansetron (ZOFRAN-ODT) 4 MG disintegrating tablet Take 1 tablet by mouth 2 times daily as needed for Nausea or Vomiting 15 tablet 1    ketotifen (ZADITOR) 0.025 % ophthalmic solution Place 1 drop into both eyes 2 times daily      sertraline (ZOLOFT) 25 MG tablet take 1/2 tablet by mouth once daily for 2 days then take 1 tablet by mouth once daily THEREAFTER      gabapentin (NEURONTIN) 300 MG capsule take 1 tablet by mouth three times a day for TRIGEMINAL NEURALGIA (Patient taking differently: Take 300 mg by mouth 3 times daily. ) 90 capsule 1    Handicap Placard MISC by Does not apply route Good for 3 years 1 each 0    divalproex (DEPAKOTE ER) 250 MG extended release tablet Take 250 mg by mouth nightly       Multiple Vitamins-Minerals (OCUVITE-LUTEIN PO) Take by mouth      calcium-vitamin D (OSCAL 500/200 D-3) 500-200 MG-UNIT per tablet Take 1 tablet by mouth 2 times daily        aspirin 81 MG EC tablet Take 81 mg by mouth daily. Medications patient taking as of now reconciled against medications ordered at time of hospital discharge: Yes    A comprehensive review of systems was negative except for what was noted in the HPI.     Objective:    /71   Pulse 67   Wt 136 lb (61.7 kg)   SpO2 97%   BMI 24.09 kg/m²   General Appearance: alert and oriented to person, place and time, well developed and well- nourished, in no acute distress  Skin: warm and dry, no rash or erythema  Head: normocephalic and atraumatic  Eyes: pupils equal, round, and reactive to light, extraocular eye movements intact, conjunctivae normal  ENT: tympanic membrane, external ear and ear canal normal bilaterally, nose without deformity, nasal mucosa and turbinates normal without polyps  Pulmonary/Chest: clear to auscultation bilaterally- no wheezes, rales or rhonchi, normal air movement, no respiratory distress  Cardiovascular: normal rate, regular rhythm, normal S1 and S2, no murmurs, rubs, clicks, or gallops, distal pulses intact, no carotid bruits      An electronic signature was used to authenticate this note.   --Jenn Felix MD

## 2022-05-20 ENCOUNTER — CARE COORDINATION (OUTPATIENT)
Dept: CASE MANAGEMENT | Age: 87
End: 2022-05-20

## 2022-05-20 NOTE — CARE COORDINATION
[facility] Transitions Follow Up Call    2022    Patient: Ahmet Elizondo  Patient : 1935    MRN: [id number]  Reason for Admission: Diverticulitis    Discharge Date: 22 RARS: Readmission Risk Score: 17.9 ( )    Attempted to contact patient for Transition Subsequent call. Left HIPAA Compliant message on Voice Mail to call [facility] (number given) with any questions and an update on patient's condition since discharge. Left HIPAA Compliant message on voice mail for Patient that this is the Final Transition Call and to call their Specialist/PCP for any problems or issues that may occur. Navya Barrera LPN    [phone]  [facility] / [address] [place] [address] Transitions Subsequent and Final Call    Subsequent and Final Calls  Care Transitions Interventions  Other Interventions:            Follow Up  Future Appointments   Date Time Provider Darvin Morales   2022 12:15 PM Angelina Olson MD [id number]   2022  2:30 PM SCHEDULE, ALFONSO KRISHNAN NS N [facility]   2022  1:15 PM Rachel Fraser PA-C [facility]   2022 11:00 AM SCHEDULE, SRMX AWV OLGA N [facility]   2022  1:00 [facility]   2022  3:30 PM MD Mitzy Coyle [facility]   2022  2:45 PM Matthieu Barraza MD [facility]       Navya Barrera LPN

## 2022-05-26 PROBLEM — I50.22 CHRONIC SYSTOLIC (CONGESTIVE) HEART FAILURE (HCC): Status: ACTIVE | Noted: 2022-05-26

## 2022-05-31 RX ORDER — LANOLIN ALCOHOL/MO/W.PET/CERES
400 CREAM (GRAM) TOPICAL DAILY
Qty: 30 TABLET | Refills: 3 | Status: SHIPPED | OUTPATIENT
Start: 2022-05-31 | End: 2022-08-18 | Stop reason: SDUPTHER

## 2022-05-31 RX ORDER — FUROSEMIDE 20 MG/1
20 TABLET ORAL DAILY
Qty: 30 TABLET | Refills: 3 | Status: SHIPPED | OUTPATIENT
Start: 2022-05-31 | End: 2022-08-18 | Stop reason: SDUPTHER

## 2022-06-01 ENCOUNTER — NURSE ONLY (OUTPATIENT)
Dept: INTERNAL MEDICINE CLINIC | Age: 87
End: 2022-06-01
Payer: MEDICARE

## 2022-06-01 DIAGNOSIS — E53.8 VITAMIN B 12 DEFICIENCY: Primary | ICD-10-CM

## 2022-06-01 PROCEDURE — 99211 OFF/OP EST MAY X REQ PHY/QHP: CPT | Performed by: FAMILY MEDICINE

## 2022-06-01 PROCEDURE — 96372 THER/PROPH/DIAG INJ SC/IM: CPT | Performed by: FAMILY MEDICINE

## 2022-06-01 RX ORDER — CYANOCOBALAMIN 1000 UG/ML
1000 INJECTION INTRAMUSCULAR; SUBCUTANEOUS ONCE
Status: COMPLETED | OUTPATIENT
Start: 2022-06-01 | End: 2022-06-01

## 2022-06-01 RX ADMIN — CYANOCOBALAMIN 1000 MCG: 1000 INJECTION INTRAMUSCULAR; SUBCUTANEOUS at 15:02

## 2022-06-27 ENCOUNTER — TELEPHONE (OUTPATIENT)
Dept: INTERNAL MEDICINE CLINIC | Age: 87
End: 2022-06-27

## 2022-06-27 NOTE — TELEPHONE ENCOUNTER
Missing from discharge summary, patient states because hospital does not carry. Dr Marlin Cotton office readded, pt now needs refills.

## 2022-06-27 NOTE — TELEPHONE ENCOUNTER
Patient states she was told at hospital Vascepa 0.5 mg that she has been taking was cancelled by Dr. Wiley Coffman. Patient called Dr. Mariana Carter office and they state they did not cancel it. Patient tried to have it filled Saturday and pharmacy states Rx needs signed prior to filling. Reaching out to us to see what is going on with Rx and have it filled.

## 2022-06-27 NOTE — TELEPHONE ENCOUNTER
Patient called she stated she is to be taking Vascepa and was not sure who may have discontinued this medication

## 2022-06-28 RX ORDER — ICOSAPENT ETHYL 1000 MG/1
2 CAPSULE ORAL 2 TIMES DAILY
Qty: 360 CAPSULE | Refills: 3 | Status: SHIPPED | OUTPATIENT
Start: 2022-06-28

## 2022-06-28 NOTE — TELEPHONE ENCOUNTER
Vascepa Rx'd by Jasmine Chau. There is a refill request routed to Dr Sigifredo Rhodes. Who will determine if pt will continue. No further action is needed, at this time.

## 2022-07-01 ENCOUNTER — NURSE ONLY (OUTPATIENT)
Dept: INTERNAL MEDICINE CLINIC | Age: 87
End: 2022-07-01
Payer: MEDICARE

## 2022-07-01 DIAGNOSIS — E53.8 VITAMIN B 12 DEFICIENCY: Primary | ICD-10-CM

## 2022-07-01 PROCEDURE — 99999 PR OFFICE/OUTPT VISIT,PROCEDURE ONLY: CPT | Performed by: FAMILY MEDICINE

## 2022-07-01 PROCEDURE — 96372 THER/PROPH/DIAG INJ SC/IM: CPT | Performed by: FAMILY MEDICINE

## 2022-07-01 RX ORDER — CYANOCOBALAMIN 1000 UG/ML
1000 INJECTION INTRAMUSCULAR; SUBCUTANEOUS ONCE
Status: COMPLETED | OUTPATIENT
Start: 2022-07-01 | End: 2022-07-01

## 2022-07-01 RX ADMIN — CYANOCOBALAMIN 1000 MCG: 1000 INJECTION INTRAMUSCULAR; SUBCUTANEOUS at 12:06

## 2022-07-06 ENCOUNTER — TELEPHONE (OUTPATIENT)
Dept: INTERNAL MEDICINE CLINIC | Age: 87
End: 2022-07-06

## 2022-07-06 NOTE — TELEPHONE ENCOUNTER
Received Nurse Triage call on patient. Stating she was having sweats, some shaking, achy feeling and a headache off and on. I advised the patient that Dr. Tinsley Speaks is out of the office until Tuesday next week and we suggest she call the Walk in clinic and see if they could see her today or go to Urgent Care. Patient states these symptoms had went away since she had been on the phone with me. States she does not want to rush it and go there is she does not need to.  Patient states she will call the Walk In or go to Urgent Care if the symptoms persist.

## 2022-07-08 RX ORDER — ONDANSETRON 2 MG/ML
8 INJECTION INTRAMUSCULAR; INTRAVENOUS
Status: CANCELLED | OUTPATIENT
Start: 2022-08-11

## 2022-07-08 RX ORDER — DIPHENHYDRAMINE HYDROCHLORIDE 50 MG/ML
50 INJECTION INTRAMUSCULAR; INTRAVENOUS
Status: CANCELLED | OUTPATIENT
Start: 2022-08-11

## 2022-07-08 RX ORDER — ALBUTEROL SULFATE 90 UG/1
4 AEROSOL, METERED RESPIRATORY (INHALATION) PRN
Status: CANCELLED | OUTPATIENT
Start: 2022-08-11

## 2022-07-08 RX ORDER — SODIUM CHLORIDE 9 MG/ML
INJECTION, SOLUTION INTRAVENOUS CONTINUOUS
Status: CANCELLED | OUTPATIENT
Start: 2022-08-11

## 2022-07-08 RX ORDER — ACETAMINOPHEN 325 MG/1
650 TABLET ORAL
Status: CANCELLED | OUTPATIENT
Start: 2022-08-11

## 2022-07-08 RX ORDER — EPINEPHRINE 1 MG/ML
0.3 INJECTION, SOLUTION, CONCENTRATE INTRAVENOUS PRN
Status: CANCELLED | OUTPATIENT
Start: 2022-08-11

## 2022-07-08 RX ORDER — FAMOTIDINE 10 MG/ML
20 INJECTION, SOLUTION INTRAVENOUS
Status: CANCELLED | OUTPATIENT
Start: 2022-08-11

## 2022-07-21 ENCOUNTER — OFFICE VISIT (OUTPATIENT)
Dept: ORTHOPEDIC SURGERY | Age: 87
End: 2022-07-21
Payer: MEDICARE

## 2022-07-21 VITALS
DIASTOLIC BLOOD PRESSURE: 64 MMHG | HEIGHT: 63 IN | WEIGHT: 134 LBS | RESPIRATION RATE: 15 BRPM | SYSTOLIC BLOOD PRESSURE: 98 MMHG | HEART RATE: 85 BPM | BODY MASS INDEX: 23.74 KG/M2

## 2022-07-21 DIAGNOSIS — M19.011 PRIMARY OSTEOARTHRITIS OF RIGHT SHOULDER: Primary | ICD-10-CM

## 2022-07-21 PROCEDURE — 20610 DRAIN/INJ JOINT/BURSA W/O US: CPT | Performed by: ORTHOPAEDIC SURGERY

## 2022-07-21 NOTE — PATIENT INSTRUCTIONS
Continue weight-bearing as tolerated. Continue range of motion exercises as instructed. Ice and elevate as needed. Tylenol or Motrin for pain.    Steroid injection given today in the right shoulder  Follow up in 3 month

## 2022-07-21 NOTE — PROGRESS NOTES
Patient returns to the office today for FU of the right shoulder injection given on 4/7/22 given by Saumya Luna. Pt states the injection always help with her pain and would like another injection today. She has been using a heating pad which will help with her pain.

## 2022-07-21 NOTE — PROGRESS NOTES
7/21/2022   Chief Complaint   Patient presents with    Shoulder Pain     Right shoulder injection 4/7/22        History of Present Illness:                             Oz Greene is a 82 y.o. female who returns today for evaluation of her right shoulder pain and possible repeat injection. She has done well in the past with injections for right shoulder arthritis. Last injection was performed over 3 months ago. She has been seen in the past by the physician assistant. She is doing well currently with her conservative treatments and would like to continue avoiding surgery if possible. She would like to have a repeat injection today. She complains of deep aching pain in the right shoulder which is worse with weightbearing activities or lifting pushing pulling or overhead activities. No new traumatic injuries or falls since the last visit. Patient returns to the office today for FU of the right shoulder injection given on 4/7/22 given by Beronica COOPER. Pt states the injection always help with her pain and would like another injection today. She has been using a heating pad which will help with her pain. Medical History  Patient's medications, allergies, past medical, surgical, social and family histories were reviewed and updated as appropriate. Past Medical History:   Diagnosis Date    Arthritis of shoulder region, right 09/2014    Michael Green    Blind right eye     following right cataract surg    Chronic depression 2009    Dr. Annalise Haskins    DVT (deep venous thrombosis) ([address] 1970    left leg    Former smoker     History of echocardiogram 09/21/2020    EF 55-60%, mild left ventricular hypertrophy, normal diastolic filling pattern for age, no signficiant valvular disease, no pericardial effusion     History of stress test 03/25/2021    normal LVEF calculated by the computer is probably falsely low.  LVEF is 40 %    Hx of Doppler ultrasound 03/01/2018    Carotid-mild 0-49% bilateral carotid,50% stenosis right subclavian    Hyperlipidemia     Hypertension     Macular degeneration     right    Mild cognitive impairment 2012    MMSE 26/30    MVP (mitral valve prolapse)     trace on echo     Osteoporosis 2012    Pancytopenia 2011    mild with ; Dr Herrera Her eval     Peptic ulcer disease     Peripheral vascular disease ([facility] 2016    angio; dis below ankles; pletal    Prediabetes     Recurrent ventral incisional hernia     medial apex of GB scar    S/P CABG x 3     3-vessel; Dr Majo Valdes    Spigelian hernia 2017    right ant lateral wall; seen on CT abd    Supraventricular tachycardia ([address]     Freq ventriular ectopy    Tic douloureux     Dr. Eloise Ross; Right side     Past Surgical History:   Procedure Laterality Date    CATARACT REMOVAL Right     poor result ; blind right eye; [address] GRAFT  2009    3 vessel    SHOULDER ARTHROSCOPY Right 2003    TUBAL LIGATION      URETER SURGERY      Right ureteral repair     Family History   Problem Relation Age of Onset    High Blood Pressure Mother     Heart Attack Mother     Other Mother         unsteady gait    Other Father         silcosis    Cancer Sister 79        Breast cancer 2017    Stroke Sister      Social History     Socioeconomic History    Marital status:       Spouse name: Nii Jain    Number of children: 2    Years of education: 15    Highest education level: None   Occupational History    Occupation: retired   Tobacco Use    Smoking status: Former     Packs/day: 0.25     Years: 9.00     Pack years: 2.25     Types: Cigarettes     Start date:      Quit date: 1985     Years since quittin.6    Smokeless tobacco: Never   Vaping Use    Vaping Use: Never used   Substance and Sexual Activity    Alcohol use: No     Alcohol/week: 0.0 standard drinks    Drug use: No    Sexual activity: Not Currently     Partners: Male     Social Determinants of Health Financial Resource Strain: Low Risk     Difficulty of Paying Living Expenses: Not hard at all   Food Insecurity: No Food Insecurity    Worried About Running Out of Food in the Last Year: Never true    Ran Out of Food in the Last Year: Never true   Transportation Needs: No Transportation Needs    Lack of Transportation (Medical): No    Lack of Transportation (Non-Medical): No   Physical Activity: Inactive    Days of Exercise per Week: 0 days    Minutes of Exercise per Session: 0 min   Stress: No Stress Concern Present    Feeling of Stress : Not at all   Social Connections: Moderately Integrated    Frequency of Communication with Friends and Family: Twice a week    Frequency of Social Gatherings with Friends and Family: Once a week    Attends Anabaptist Services: More than 4 times per year    Active Member of [address] [place] [address] [employer] or Organizations: Yes    Attends Club or Organization Meetings: More than 4 times per year    Marital Status:     Intimate Partner Violence: Not At Risk    Fear of Current or Ex-Partner: No    Emotionally Abused: No    Physically Abused: No    Sexually Abused: No   Housing Stability: Low Risk     Unable to Pay for Housing in the Last Year: No    Number of Places Lived in the Last Year: 1    Unstable Housing in the Last Year: No     Current Outpatient Medications   Medication Sig Dispense Refill    Promethazine HCl 6.25 MG/5ML SOLN take 5 milliliters by mouth three times a day if needed      Icosapent Ethyl (VASCEPA) 1 g CAPS capsule Take 2 capsules by mouth 2 times daily 360 capsule 3    furosemide (LASIX) 20 MG tablet Take 1 tablet by mouth daily 30 tablet 3    magnesium oxide (MAG-OX) 400 (240 Mg) MG tablet Take 1 tablet by mouth daily 30 tablet 3    acetaminophen (TYLENOL) 325 mg tablet Take 650 mg by mouth as needed for Pain      Multiple Vitamins-Minerals (CENTRUM SILVER ADULT 50+) TABS Take 1 tablet by mouth daily      ferrous sulfate (IRON 325) 325 (65 Fe) MG tablet Take 325 mg by mouth [Nitrofurantoin]     Neggram [Nalidixic Acid]     Pce [Erythromycin]     Penicillins     Risperidone And Related     Welchol [Colesevelam Hcl] Other (See Comments)     constipation    Carbamazepine Nausea And Vomiting    Lovaza [Omega-3-Acid Ethyl Esters] Nausea And Vomiting    Metoprolol Nausea And Vomiting    Pyridium [Phenazopyridine] Palpitations         Review of Systems   Constitutional:  Negative for activity change and fever. Respiratory:  Negative for chest tightness and shortness of breath. Cardiovascular:  Negative for chest pain. Skin:  Negative for color change. Neurological:  Negative for dizziness. Psychiatric/Behavioral:  The patient is not nervous/anxious. Examination:  General Exam:  Vitals: BP 98/64   Pulse 85   Resp 15   Ht 5' 3\" (1.6 m)   Wt 134 lb (60.8 kg)   BMI 23.74 kg/m²    Physical Exam  Vitals and nursing note reviewed. Constitutional:       Appearance: Normal appearance. HENT:      Head: Normocephalic and atraumatic. Eyes:      Conjunctiva/sclera: Conjunctivae normal.      Pupils: Pupils are equal, round, and reactive to light. Pulmonary:      Effort: Pulmonary effort is normal.   Musculoskeletal:      Left shoulder: No swelling, deformity, laceration, tenderness or bony tenderness. Normal range of motion. Normal strength. Normal pulse. Right elbow: No swelling, deformity, effusion or lacerations. Normal range of motion. No tenderness. Left elbow: Normal.      Cervical back: Normal range of motion. Comments: Right Upper Extremity:    There is moderate to severe tenderness diffusely throughout the shoulder. Tenderness to palpation is maximal over the anterior and lateral aspects of the shoulder. Active range of motion is limited due to pain. Forward elevation present to 100 degrees, abduction present to 80 degrees, external rotation 15 degrees.   Passive range of motion is moderately restricted and limited due to pain and apprehension. Forward elevation 120 degrees, abduction 100 degrees. Rotator cuff testing is difficult due to pain. Strength 4/5 forward elevation and abduction of the shoulder. There is breakaway to strength testing due to pain. Positive empty can test.  Positive drop arm sign. Positive Ferrari and Neer signs. Moderate pain at the acromioclavicular joint with crossarm test.    Skin is intact. Sensation is intact in the upper extremity. 2+ radial pulse. No edema. No muscle atrophy. Skin:     General: Skin is warm and dry. Neurological:      Mental Status: She is alert and oriented to person, place, and time. Psychiatric:         Mood and Affect: Mood normal.         Behavior: Behavior normal.          Diagnostic testing:  X-ray images were reviewed by myself and discussed with the patient:  X-ray images of the right shoulder from 6/23/2021 were reviewed by myself and discussed with the patient:    FINDINGS:   Severe arthritic changes affect the glenohumeral joint with near bone-on-bone   apposition of the inferior aspect of the glenohumeral joint. There is marked   flattening of the medial aspect of the humeral head, which is new from the   prior study. There is a 2.1 cm subacromial spur. There is redemonstration   of changes of prior open heart surgery with multiple median sternotomy wires   and mediastinal surgical clips. Impression   Severe arthritic changes affecting the glenohumeral joint have progressed   when compared to the prior study. There is marked flattening of the medial   aspect of humeral head, which is new from the prior study and can conceivably   represent underlying avascular necrosis. 2.1 cm subacromial spur.        Office Procedures:  Orders Placed This Encounter   Procedures    DC ARTHROCENTESIS ASPIR&/INJ MAJOR JT/BURSA W/O US       Assessment and Plan  1. right shoulder advanced primary osteoarthritis    We discussed continuing conservative treatment at this stage. She like to have repeat injection today      Procedure Note, Right Shoulder Subacromial Injection:    The posterior aspect of the right shoulder joint was prepped with alcohol. The subacromial space was then injected using a 22-gauge needle. The shoulder was injected with 40 mg of Kenalog and 4 mL of 1% plain lidocaine. The patient tolerated the procedure well with no complications. A Band-Aid was applied. We briefly discussed the potential for surgical intervention in the future if she fails conservative measures. This would consist of a total shoulder replacement. She would like to defer that discussion at this time.     Follow-up in 3 months with Bebeto Oh    Electronically signed by Kymberly Salas MD on 7/21/2022 at 3:43 PM

## 2022-07-22 ASSESSMENT — ENCOUNTER SYMPTOMS
SHORTNESS OF BREATH: 0
COLOR CHANGE: 0
CHEST TIGHTNESS: 0

## 2022-07-27 ENCOUNTER — TELEPHONE (OUTPATIENT)
Dept: INTERNAL MEDICINE CLINIC | Age: 87
End: 2022-07-27

## 2022-07-27 NOTE — TELEPHONE ENCOUNTER
----- Message from Lev White sent at 7/27/2022  3:00 PM EDT -----  Subject: Message to Provider    QUESTIONS  Information for Provider? Patient called to reschedule her 7/28 appt due   to feeling \"WEAK\" due to a fall backwards in bath tub red flag word. I   offered he to speak to Nurse Pt. Refused and just wanted to rescheduled   appt.   ---------------------------------------------------------------------------  --------------  Padmini Truong INFO  [phone]; OK to leave message on voicemail  ---------------------------------------------------------------------------  --------------  SCRIPT ANSWERS  Relationship to Patient?  Self

## 2022-07-27 NOTE — TELEPHONE ENCOUNTER
Called and spoke to patient. Patient states she fell backwards in the bath tub on Saturday. Patient states she hit her head. Patient states she had an appointment with Dr. Brandon Palmer on 07/28/22 that she wanted to cancel and reschedule do to feeling \"weak\" since her fall. I tried to express to the patient the importance of her being checked out and evaluated since she did hit her head. I offered the patient her appointment on 07/28/22. The patient refused the appointment and states she is fine and does not need to be evaluated. I asked the patient to go to the ER to be evaluated and got the same response. I offered to call the squad for the patient so she did not drive herself there. Patient declined. I asked the patient if Antoni Otoole [facility]) could take her to the ER and she stated that he could not as he is busy at work till [place] and she did not want to bother him. I again expressed the importance of being checked ut yet again to the patient and the patient is still declining to be evaluated by her PCP or the ER. Patient states she will let us know if she needs something and she will call the squad if she feels she needs to go to the ER.

## 2022-07-28 ENCOUNTER — TELEPHONE (OUTPATIENT)
Dept: INTERNAL MEDICINE CLINIC | Age: 87
End: 2022-07-28

## 2022-08-05 ENCOUNTER — NURSE ONLY (OUTPATIENT)
Dept: INTERNAL MEDICINE CLINIC | Age: 87
End: 2022-08-05
Payer: MEDICARE

## 2022-08-05 DIAGNOSIS — E53.8 VITAMIN B 12 DEFICIENCY: Primary | ICD-10-CM

## 2022-08-05 PROCEDURE — 96372 THER/PROPH/DIAG INJ SC/IM: CPT | Performed by: FAMILY MEDICINE

## 2022-08-05 RX ORDER — CYANOCOBALAMIN 1000 UG/ML
1000 INJECTION INTRAMUSCULAR; SUBCUTANEOUS ONCE
Status: COMPLETED | OUTPATIENT
Start: 2022-08-05 | End: 2022-08-05

## 2022-08-05 RX ADMIN — CYANOCOBALAMIN 1000 MCG: 1000 INJECTION INTRAMUSCULAR; SUBCUTANEOUS at 11:47

## 2022-08-08 ENCOUNTER — HOSPITAL ENCOUNTER (OUTPATIENT)
Dept: WOMENS IMAGING | Age: 87
Discharge: HOME | End: 2022-08-08
Payer: MEDICARE

## 2022-08-08 ENCOUNTER — TELEMEDICINE (OUTPATIENT)
Dept: INTERNAL MEDICINE CLINIC | Age: 87
End: 2022-08-08
Payer: MEDICARE

## 2022-08-08 DIAGNOSIS — Z12.31 VISIT FOR SCREENING MAMMOGRAM: ICD-10-CM

## 2022-08-08 DIAGNOSIS — Z00.00 MEDICARE ANNUAL WELLNESS VISIT, SUBSEQUENT: Primary | ICD-10-CM

## 2022-08-08 PROCEDURE — 1123F ACP DISCUSS/DSCN MKR DOCD: CPT | Performed by: FAMILY MEDICINE

## 2022-08-08 PROCEDURE — 77067 SCR MAMMO BI INCL CAD: CPT

## 2022-08-08 PROCEDURE — G0439 PPPS, SUBSEQ VISIT: HCPCS | Performed by: FAMILY MEDICINE

## 2022-08-08 ASSESSMENT — PATIENT HEALTH QUESTIONNAIRE - PHQ9
1. LITTLE INTEREST OR PLEASURE IN DOING THINGS: 0
SUM OF ALL RESPONSES TO PHQ QUESTIONS 1-9: 3
5. POOR APPETITE OR OVEREATING: 0
9. THOUGHTS THAT YOU WOULD BE BETTER OFF DEAD, OR OF HURTING YOURSELF: 0
SUM OF ALL RESPONSES TO PHQ QUESTIONS 1-9: 3
10. IF YOU CHECKED OFF ANY PROBLEMS, HOW DIFFICULT HAVE THESE PROBLEMS MADE IT FOR YOU TO DO YOUR WORK, TAKE CARE OF THINGS AT HOME, OR GET ALONG WITH OTHER PEOPLE: 0
SUM OF ALL RESPONSES TO PHQ9 QUESTIONS 1 & 2: 0
2. FEELING DOWN, DEPRESSED OR HOPELESS: 0
SUM OF ALL RESPONSES TO PHQ QUESTIONS 1-9: 3
7. TROUBLE CONCENTRATING ON THINGS, SUCH AS READING THE NEWSPAPER OR WATCHING TELEVISION: 3
4. FEELING TIRED OR HAVING LITTLE ENERGY: 0
6. FEELING BAD ABOUT YOURSELF - OR THAT YOU ARE A FAILURE OR HAVE LET YOURSELF OR YOUR FAMILY DOWN: 0
SUM OF ALL RESPONSES TO PHQ QUESTIONS 1-9: 3
3. TROUBLE FALLING OR STAYING ASLEEP: 0
8. MOVING OR SPEAKING SO SLOWLY THAT OTHER PEOPLE COULD HAVE NOTICED. OR THE OPPOSITE, BEING SO FIGETY OR RESTLESS THAT YOU HAVE BEEN MOVING AROUND A LOT MORE THAN USUAL: 0

## 2022-08-08 ASSESSMENT — LIFESTYLE VARIABLES
HOW MANY STANDARD DRINKS CONTAINING ALCOHOL DO YOU HAVE ON A TYPICAL DAY: PATIENT DOES NOT DRINK
HOW OFTEN DO YOU HAVE A DRINK CONTAINING ALCOHOL: NEVER

## 2022-08-08 NOTE — PATIENT INSTRUCTIONS
Personalized Preventive Plan for Gerardine Lat - 8/8/2022  Medicare offers a range of preventive health benefits. Some of the tests and screenings are paid in full while other may be subject to a deductible, co-insurance, and/or copay. Some of these benefits include a comprehensive review of your medical history including lifestyle, illnesses that may run in your family, and various assessments and screenings as appropriate. After reviewing your medical record and screening and assessments performed today your provider may have ordered immunizations, labs, imaging, and/or referrals for you. A list of these orders (if applicable) as well as your Preventive Care list are included within your After Visit Summary for your review. Other Preventive Recommendations:    A preventive eye exam performed by an eye specialist is recommended every 1-2 years to screen for glaucoma; cataracts, macular degeneration, and other eye disorders. A preventive dental visit is recommended every 6 months. Try to get at least 150 minutes of exercise per week or 10,000 steps per day on a pedometer . Order or download the FREE \"Exercise & Physical Activity: Your Everyday Guide\" from [employer]. Call 3-934.774.6098 or search [employer] online. You need 4164-8085 mg of calcium and 8711-3523 IU of vitamin D per day. It is possible to meet your calcium requirement with diet alone, but a vitamin D supplement is usually necessary to meet this goal.  When exposed to the sun, use a sunscreen that protects against both UVA and UVB radiation with an SPF of 30 or greater. Reapply every 2 to 3 hours or after sweating, drying off with a towel, or swimming. Always wear a seat belt when traveling in a car. Always wear a helmet when riding a bicycle or motorcycle.

## 2022-08-08 NOTE — PROGRESS NOTES
Medicare Annual Wellness Visit    Nanci Caruso is here for Medicare AWV    Assessment & Plan   Medicare annual wellness visit, subsequent    Recommendations for Preventive Services Due: see orders and patient instructions/AVS.  Recommended screening schedule for the next 5-10 years is provided to the patient in written form: see Patient Instructions/AVS.     No follow-ups on file. Subjective       Patient's complete Health Risk Assessment and screening values have been reviewed and are found in Flowsheets. The following problems were reviewed today and where indicated follow up appointments were made and/or referrals ordered. Positive Risk Factor Screenings with Interventions:    Fall Risk:  Do you feel unsteady or are you worried about falling? : (!) yes (states she worries/unsteady-uses a cane)  2 or more falls in past year?: no  Fall with injury in past year?: no   Fall Risk Interventions:    Patient declines any further evaluation/treatment for this issue  Patient states she uses a cane when feeling unsteady              Hearing/Vision:  Do you or your family notice any trouble with your hearing that hasn't been managed with hearing aids?: (!) Yes  Do you have difficulty driving, watching TV, or doing any of your daily activities because of your eyesight?: No  Have you had an eye exam within the past year?: Yes  No results found. Hearing/Vision Interventions:  Hearing concerns:  patient declines any further evaluation/treatment for hearing issues            Objective      Patient-Reported Vitals  No data recorded        Unable to obtain 3 vital signs due to patient not having equipment to take blood pressure/temperature.     Allergies   Allergen Reactions    Alendronate Sodium Other (See Comments)     GI upset    Bactrim [Sulfamethoxazole-Trimethoprim] Anaphylaxis    Boniva [Ibandronate Sodium] Other (See Comments)     Gi upset and declined egd; also to fosamax    Colesevelam     Lisinopril Other (See Vaughn Saenz MD   ketotifen (ZADITOR) 0.025 % ophthalmic solution Place 1 drop into both eyes 2 times daily Yes Historical Provider, MD   sertraline (ZOLOFT) 25 MG tablet Take 25 mg by mouth daily  Yes Historical Provider, MD   Handicap Placard MISC by Does not apply route Good for 3 years Yes Vaughn Saenz MD   divalproex (DEPAKOTE ER) 250 MG extended release tablet Take 250 mg by mouth nightly  Yes Historical Provider, MD   Multiple Vitamins-Minerals (OCUVITE-LUTEIN PO) Take by mouth Yes Historical Provider, MD   calcium-vitamin D (OSCAL-500) 500-200 MG-UNIT per tablet Take 1 tablet by mouth 2 times daily   Yes Historical Provider, MD   aspirin 81 MG EC tablet Take 81 mg by mouth daily. Yes Historical Provider, MD   ferrous sulfate (IRON 325) 325 (65 Fe) MG tablet Take 325 mg by mouth daily  Patient not taking: Reported on 8/8/2022  Historical Provider, MD   vitamin B-12 (CYANOCOBALAMIN) 100 MCG tablet Take 50 mcg by mouth daily  Patient not taking: Reported on 8/8/2022  Historical Provider, MD   gabapentin (NEURONTIN) 300 MG capsule take 1 tablet by mouth three times a day for TRIGEMINAL NEURALGIA  Patient taking differently: Take 300 mg by mouth in the morning and 300 mg at noon and 300 mg before bedtime. Vaughn Saenz MD       [facility] (Including outside providers/suppliers regularly involved in providing care):   Patient Care Team:  Vaughn Saenz MD as PCP - General (Family Medicine)  Vaughn Saenz MD as PCP - [facility] Empaneled Provider  Kermit Spencer MD as Consulting Physician (Cardiology)  Rosamaria Ott MD as Consulting Physician (Cardiology)     Reviewed and updated this visit:  Allergies  Meds                I, Blas Tavarez LPN, [birth year], performed the documented evaluation under the direct supervision of the attending physician. Edith Garrido, was evaluated through a synchronous (real-time) audio encounter.  The patient (or guardian if applicable) is aware that this is a billable service, which includes applicable co-pays. This Virtual Visit was conducted with patient's (and/or legal guardian's) consent. The visit was conducted pursuant to the emergency declaration under the [facility], [address] [place] [address] authority and the [employer] General Act. Patient identification was verified, and a caregiver was present when appropriate. The patient was located at Home: [address] [place] [address]. Provider was located at [facility] (Appt Dept): [address] [place] [address]. Total time spent for this encounter: Not billed by time    --Gaudencio Blake LPN on 8/5/1650 at 35:08 PM    An electronic signature was used to authenticate this note.

## 2022-08-15 ENCOUNTER — TELEPHONE (OUTPATIENT)
Dept: INTERNAL MEDICINE CLINIC | Age: 87
End: 2022-08-15

## 2022-08-15 ENCOUNTER — HOSPITAL ENCOUNTER (OUTPATIENT)
Age: 87
Discharge: HOME | End: 2022-08-15
Payer: MEDICARE

## 2022-08-15 DIAGNOSIS — E78.2 MIXED HYPERLIPIDEMIA: ICD-10-CM

## 2022-08-15 DIAGNOSIS — R73.03 PREDIABETES: ICD-10-CM

## 2022-08-15 LAB
ALBUMIN SERPL-MCNC: 4.1 GM/DL (ref 3.4–5)
ALP BLD-CCNC: 52 IU/L (ref 40–128)
ALT SERPL-CCNC: 15 U/L (ref 10–40)
ANION GAP SERPL CALCULATED.3IONS-SCNC: 9 MMOL/L (ref 4–16)
AST SERPL-CCNC: 29 IU/L (ref 15–37)
BASOPHILS ABSOLUTE: 0 K/CU MM
BASOPHILS RELATIVE PERCENT: 0.6 % (ref 0–1)
BILIRUB SERPL-MCNC: 0.4 MG/DL (ref 0–1)
BUN BLDV-MCNC: 20 MG/DL (ref 6–23)
CALCIUM SERPL-MCNC: 9.8 MG/DL (ref 8.3–10.6)
CHLORIDE BLD-SCNC: 102 MMOL/L (ref 99–110)
CHOLESTEROL: 114 MG/DL
CO2: 29 MMOL/L (ref 21–32)
CREAT SERPL-MCNC: 1.2 MG/DL (ref 0.6–1.1)
DIFFERENTIAL TYPE: ABNORMAL
DOSE AMOUNT: ABNORMAL
DOSE TIME: ABNORMAL
EOSINOPHILS ABSOLUTE: 0.3 K/CU MM
EOSINOPHILS RELATIVE PERCENT: 5.2 % (ref 0–3)
GFR AFRICAN AMERICAN: 52 ML/MIN/1.73M2
GFR NON-AFRICAN AMERICAN: 43 ML/MIN/1.73M2
GLUCOSE BLD-MCNC: 104 MG/DL (ref 70–99)
HCT VFR BLD CALC: 35.6 % (ref 37–47)
HDLC SERPL-MCNC: 30 MG/DL
HEMOGLOBIN: 11.2 GM/DL (ref 12.5–16)
IMMATURE NEUTROPHIL %: 0.4 % (ref 0–0.43)
LDL CHOLESTEROL CALCULATED: 49 MG/DL
LYMPHOCYTES ABSOLUTE: 1.7 K/CU MM
LYMPHOCYTES RELATIVE PERCENT: 35.9 % (ref 24–44)
MCH RBC QN AUTO: 33.5 PG (ref 27–31)
MCHC RBC AUTO-ENTMCNC: 31.5 % (ref 32–36)
MCV RBC AUTO: 106.6 FL (ref 78–100)
MONOCYTES ABSOLUTE: 0.6 K/CU MM
MONOCYTES RELATIVE PERCENT: 11.9 % (ref 0–4)
NUCLEATED RBC %: 0 %
PDW BLD-RTO: 14.1 % (ref 11.7–14.9)
PLATELET # BLD: 159 K/CU MM (ref 140–440)
PMV BLD AUTO: 9.9 FL (ref 7.5–11.1)
POTASSIUM SERPL-SCNC: 4.6 MMOL/L (ref 3.5–5.1)
RBC # BLD: 3.34 M/CU MM (ref 4.2–5.4)
SEGMENTED NEUTROPHILS ABSOLUTE COUNT: 2.2 K/CU MM
SEGMENTED NEUTROPHILS RELATIVE PERCENT: 46 % (ref 36–66)
SODIUM BLD-SCNC: 140 MMOL/L (ref 135–145)
TOTAL IMMATURE NEUTOROPHIL: 0.02 K/CU MM
TOTAL NUCLEATED RBC: 0 K/CU MM
TOTAL PROTEIN: 6.6 GM/DL (ref 6.4–8.2)
TRIGL SERPL-MCNC: 174 MG/DL
VALPROIC ACID LEVEL: 40.5 UG/ML (ref 50–100)
WBC # BLD: 4.8 K/CU MM (ref 4–10.5)

## 2022-08-15 PROCEDURE — 80053 COMPREHEN METABOLIC PANEL: CPT

## 2022-08-15 PROCEDURE — 36415 COLL VENOUS BLD VENIPUNCTURE: CPT

## 2022-08-15 PROCEDURE — 80164 ASSAY DIPROPYLACETIC ACD TOT: CPT

## 2022-08-15 PROCEDURE — 85025 COMPLETE CBC W/AUTO DIFF WBC: CPT

## 2022-08-15 PROCEDURE — 80061 LIPID PANEL: CPT

## 2022-08-15 RX ORDER — ALBUTEROL SULFATE 90 UG/1
4 AEROSOL, METERED RESPIRATORY (INHALATION) PRN
Status: CANCELLED | OUTPATIENT
Start: 2022-08-16

## 2022-08-15 RX ORDER — ONDANSETRON 2 MG/ML
8 INJECTION INTRAMUSCULAR; INTRAVENOUS
Status: CANCELLED | OUTPATIENT
Start: 2022-08-16

## 2022-08-15 RX ORDER — SODIUM CHLORIDE 9 MG/ML
INJECTION, SOLUTION INTRAVENOUS CONTINUOUS
Status: CANCELLED | OUTPATIENT
Start: 2022-08-16

## 2022-08-15 RX ORDER — DIPHENHYDRAMINE HYDROCHLORIDE 50 MG/ML
50 INJECTION INTRAMUSCULAR; INTRAVENOUS
Status: CANCELLED | OUTPATIENT
Start: 2022-08-16

## 2022-08-15 RX ORDER — EPINEPHRINE 1 MG/ML
0.3 INJECTION, SOLUTION, CONCENTRATE INTRAVENOUS PRN
Status: CANCELLED | OUTPATIENT
Start: 2022-08-16

## 2022-08-15 RX ORDER — ACETAMINOPHEN 325 MG/1
650 TABLET ORAL
Status: CANCELLED | OUTPATIENT
Start: 2022-08-16

## 2022-08-15 RX ORDER — FAMOTIDINE 10 MG/ML
20 INJECTION, SOLUTION INTRAVENOUS
Status: CANCELLED | OUTPATIENT
Start: 2022-08-16

## 2022-08-15 NOTE — TELEPHONE ENCOUNTER
[facility] called wanting orders for prolia injection for this patient.  Fax [phone]: Miah Rios

## 2022-08-16 ENCOUNTER — HOSPITAL ENCOUNTER (OUTPATIENT)
Dept: INFUSION THERAPY | Age: 87
Setting detail: INFUSION SERIES
Discharge: HOME | End: 2022-08-16
Payer: MEDICARE

## 2022-08-16 VITALS
SYSTOLIC BLOOD PRESSURE: 120 MMHG | HEART RATE: 73 BPM | DIASTOLIC BLOOD PRESSURE: 48 MMHG | TEMPERATURE: 97.4 F | RESPIRATION RATE: 16 BRPM | OXYGEN SATURATION: 97 %

## 2022-08-16 DIAGNOSIS — M81.0 AGE-RELATED OSTEOPOROSIS WITHOUT CURRENT PATHOLOGICAL FRACTURE: Primary | ICD-10-CM

## 2022-08-16 DIAGNOSIS — M81.0 OSTEOPOROSIS, UNSPECIFIED OSTEOPOROSIS TYPE, UNSPECIFIED PATHOLOGICAL FRACTURE PRESENCE: ICD-10-CM

## 2022-08-16 PROCEDURE — 6360000002 HC RX W HCPCS: Performed by: FAMILY MEDICINE

## 2022-08-16 PROCEDURE — 99211 OFF/OP EST MAY X REQ PHY/QHP: CPT

## 2022-08-16 PROCEDURE — 96372 THER/PROPH/DIAG INJ SC/IM: CPT

## 2022-08-16 RX ORDER — FAMOTIDINE 10 MG/ML
20 INJECTION, SOLUTION INTRAVENOUS
Status: CANCELLED | OUTPATIENT
Start: 2023-02-14

## 2022-08-16 RX ORDER — ALBUTEROL SULFATE 90 UG/1
4 AEROSOL, METERED RESPIRATORY (INHALATION) PRN
Status: CANCELLED | OUTPATIENT
Start: 2023-02-14

## 2022-08-16 RX ORDER — SODIUM CHLORIDE 9 MG/ML
INJECTION, SOLUTION INTRAVENOUS CONTINUOUS
Status: CANCELLED | OUTPATIENT
Start: 2023-02-14

## 2022-08-16 RX ORDER — EPINEPHRINE 1 MG/ML
0.3 INJECTION, SOLUTION, CONCENTRATE INTRAVENOUS PRN
Status: CANCELLED | OUTPATIENT
Start: 2023-02-14

## 2022-08-16 RX ORDER — DIPHENHYDRAMINE HYDROCHLORIDE 50 MG/ML
50 INJECTION INTRAMUSCULAR; INTRAVENOUS
Status: CANCELLED | OUTPATIENT
Start: 2023-02-14

## 2022-08-16 RX ORDER — ACETAMINOPHEN 325 MG/1
650 TABLET ORAL
Status: CANCELLED | OUTPATIENT
Start: 2023-02-14

## 2022-08-16 RX ORDER — ONDANSETRON 2 MG/ML
8 INJECTION INTRAMUSCULAR; INTRAVENOUS
Status: CANCELLED | OUTPATIENT
Start: 2023-02-14

## 2022-08-16 RX ADMIN — DENOSUMAB 60 MG: 60 INJECTION SUBCUTANEOUS at 13:20

## 2022-08-18 ENCOUNTER — OFFICE VISIT (OUTPATIENT)
Dept: INTERNAL MEDICINE CLINIC | Age: 87
End: 2022-08-18
Payer: MEDICARE

## 2022-08-18 VITALS
WEIGHT: 137 LBS | BODY MASS INDEX: 24.27 KG/M2 | OXYGEN SATURATION: 98 % | DIASTOLIC BLOOD PRESSURE: 60 MMHG | SYSTOLIC BLOOD PRESSURE: 124 MMHG | HEIGHT: 63 IN | HEART RATE: 66 BPM

## 2022-08-18 DIAGNOSIS — M54.50 ACUTE LEFT-SIDED LOW BACK PAIN WITHOUT SCIATICA: ICD-10-CM

## 2022-08-18 DIAGNOSIS — E78.2 MIXED HYPERLIPIDEMIA: Primary | ICD-10-CM

## 2022-08-18 DIAGNOSIS — D64.9 NORMOCYTIC ANEMIA: ICD-10-CM

## 2022-08-18 PROBLEM — K52.9 GASTROENTERITIS: Status: RESOLVED | Noted: 2022-01-31 | Resolved: 2022-08-18

## 2022-08-18 PROBLEM — E87.1 HYPONATREMIA: Status: RESOLVED | Noted: 2021-11-15 | Resolved: 2022-08-18

## 2022-08-18 PROBLEM — R53.1 GENERALIZED WEAKNESS: Status: RESOLVED | Noted: 2021-11-10 | Resolved: 2022-08-18

## 2022-08-18 PROBLEM — F33.42 MAJOR DEPRESSIVE DISORDER, RECURRENT, IN FULL REMISSION (HCC): Status: RESOLVED | Noted: 2020-10-21 | Resolved: 2022-08-18

## 2022-08-18 PROBLEM — R06.02 SOB (SHORTNESS OF BREATH): Status: RESOLVED | Noted: 2021-03-18 | Resolved: 2022-08-18

## 2022-08-18 PROCEDURE — 1123F ACP DISCUSS/DSCN MKR DOCD: CPT | Performed by: FAMILY MEDICINE

## 2022-08-18 PROCEDURE — G8427 DOCREV CUR MEDS BY ELIG CLIN: HCPCS | Performed by: FAMILY MEDICINE

## 2022-08-18 PROCEDURE — G8399 PT W/DXA RESULTS DOCUMENT: HCPCS | Performed by: FAMILY MEDICINE

## 2022-08-18 PROCEDURE — G8420 CALC BMI NORM PARAMETERS: HCPCS | Performed by: FAMILY MEDICINE

## 2022-08-18 PROCEDURE — 1036F TOBACCO NON-USER: CPT | Performed by: FAMILY MEDICINE

## 2022-08-18 PROCEDURE — 99213 OFFICE O/P EST LOW 20 MIN: CPT | Performed by: FAMILY MEDICINE

## 2022-08-18 PROCEDURE — 1090F PRES/ABSN URINE INCON ASSESS: CPT | Performed by: FAMILY MEDICINE

## 2022-08-18 RX ORDER — FUROSEMIDE 20 MG/1
20 TABLET ORAL DAILY
Qty: 30 TABLET | Refills: 3 | Status: SHIPPED | OUTPATIENT
Start: 2022-08-18 | End: 2022-09-13 | Stop reason: SDUPTHER

## 2022-08-18 RX ORDER — LANOLIN ALCOHOL/MO/W.PET/CERES
400 CREAM (GRAM) TOPICAL DAILY
Qty: 30 TABLET | Refills: 3 | Status: SHIPPED | OUTPATIENT
Start: 2022-08-18

## 2022-08-18 ASSESSMENT — ENCOUNTER SYMPTOMS
BACK PAIN: 1
CONSTIPATION: 0
VOMITING: 0
COLOR CHANGE: 0
CHEST TIGHTNESS: 0
SORE THROAT: 0
ABDOMINAL PAIN: 0
DIARRHEA: 0
SHORTNESS OF BREATH: 0

## 2022-08-18 NOTE — PROGRESS NOTES
Subjective:      Chief Complaint   Patient presents with    3 Month Follow-Up    Back Pain     For the past 3 weeks - no known injury       HPI:  Christi Stevenson is a 82 y.o. female who presents today for follow up of chronic conditions as listed below. Was hospitalized for diverticulitis 3 months ago. States that she has been doing well since then- has not had any further flares of GI symptoms. Has gained about 3 lbs in the past month. Has been having L sided low back pain for the past few weeks. States that due to her R shoulder pain (for which she is seeing ortho), she has not been able to sleep on that side. States lower back pain started when she changed sleeping positions. Is not consistent, states she has been doing her back exercises daily. No other concerns today. 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 Doc Samuel    DVT (deep venous thrombosis) ([facility] [address] 1970    left leg    Former smoker     History of echocardiogram 09/21/2020    EF 55-60%, mild left ventricular hypertrophy, normal diastolic filling pattern for age, no signficiant valvular disease, no pericardial effusion     History of stress test 03/25/2021    normal LVEF calculated by the computer is probably falsely low.  LVEF is 40 %    Hx of Doppler ultrasound 03/01/2018    Carotid-mild 0-49% bilateral carotid,50% stenosis right subclavian    Hyperlipidemia     Hypertension     Macular degeneration     right    Mild cognitive impairment 02/2012    MMSE 26/30    MVP (mitral valve prolapse)     trace on echo 2014    Osteoporosis 05/2012    Pancytopenia 05/2011    mild with ; Dr Kayy hawk 2010    Peptic ulcer disease     Peripheral vascular disease ([facility] [address] 01/2016    angio; dis below ankles; pletal    Prediabetes 2006    Recurrent ventral incisional hernia 2013    medial apex of GB scar    S/P CABG x 3     3-vessel; Dr Rad Daley    Spigelian hernia 2017    right ant lateral wall; seen on CT abd    Supraventricular tachycardia ([address]     Freq ventriular ectopy    Tic douloure[place] 2008    Dr. Annie Talbot; Right side        Past Surgical History:   Procedure Laterality Date    CATARACT REMOVAL Right     poor result ; blind right eye; [address] GRAFT  2009    3 vessel    SHOULDER ARTHROSCOPY Right     TUBAL LIGATION      URETER SURGERY      Right ureteral repair       Social History     Tobacco Use    Smoking status: Former     Packs/day: 0.25     Years: 9.00     Pack years: 2.25     Types: Cigarettes     Start date:      Quit date: 1985     Years since quittin.7    Smokeless tobacco: Never   Substance Use Topics    Alcohol use: No     Alcohol/week: 0.0 standard drinks        Review of Systems   Constitutional:  Negative for activity change, appetite change, chills, fever and unexpected weight change. HENT:  Negative for congestion and sore throat. Respiratory:  Negative for chest tightness and shortness of breath. Cardiovascular:  Negative for chest pain and palpitations. Gastrointestinal:  Negative for abdominal pain, constipation, diarrhea and vomiting. Genitourinary:  Negative for dysuria. Musculoskeletal:  Positive for back pain. Skin:  Negative for color change. Neurological:  Negative for dizziness and light-headedness. Psychiatric/Behavioral:  Negative for dysphoric mood. The patient is not nervous/anxious. Prior to Visit Medications    Medication Sig Taking?  Authorizing Provider   Cyanocobalamin 1000 MCG/ML KIT Inject as directed  Historical Provider, MD   verapamil (CALAN SR) 120 MG extended release tablet Take 1 tablet by mouth nightly  Melita Lau MD   Promethazine HCl 6.25 MG/5ML SOLN take 5 milliliters by mouth three times a day if needed  Historical Provider, MD   Icosapent Ethyl (VASCEPA) 1 g CAPS capsule Take 2 capsules by mouth 2 times daily  Marva Zafar MD   furosemide (LASIX) 20 MG tablet Take 1 tablet by mouth daily  Wen Hannah MD   magnesium oxide (MAG-OX) 400 (240 Mg) MG tablet Take 1 tablet by mouth daily  Wen Hannah MD   acetaminophen (TYLENOL) 325 mg tablet Take 650 mg by mouth as needed for Pain  Historical Provider, MD   Multiple Vitamins-Minerals (CENTRUM SILVER ADULT 50+) TABS Take 1 tablet by mouth daily  Historical Provider, MD   ferrous sulfate (IRON 325) 325 (65 Fe) MG tablet Take 325 mg by mouth daily  Patient not taking: Reported on 8/8/2022  Historical Provider, MD   fluticasone (FLONASE) 50 MCG/ACT nasal spray instill 1 spray into each nostril once daily  Wen Hannah MD   vitamin B-12 (CYANOCOBALAMIN) 100 MCG tablet Take 50 mcg by mouth daily  Patient not taking: Reported on 8/8/2022  Historical Provider, MD   rosuvastatin (CRESTOR) 10 MG tablet take 1 tablet by mouth once daily  Wen Hannah MD   pyridostigmine (MESTINON) 60 MG tablet Take 0.5 tablets by mouth daily  Marva Zafar MD   pantoprazole (PROTONIX) 40 MG tablet Take 1 tablet by mouth 2 times daily  Patient taking differently: Take 40 mg by mouth in the morning. Wen Hannah MD   ondansetron (ZOFRAN-ODT) 4 MG disintegrating tablet Take 1 tablet by mouth 2 times daily as needed for Nausea or Vomiting  Wen Hannah MD   ketotifen (ZADITOR) 0.025 % ophthalmic solution Place 1 drop into both eyes 2 times daily  Historical Provider, MD   sertraline (ZOLOFT) 25 MG tablet Take 25 mg by mouth daily   Historical Provider, MD   gabapentin (NEURONTIN) 300 MG capsule take 1 tablet by mouth three times a day for TRIGEMINAL NEURALGIA  Patient taking differently: Take 300 mg by mouth in the morning and 300 mg at noon and 300 mg before bedtime.   Wen Hannah MD   Handicap Placard MISC by Does not apply route Good for 3 years  Wen Hannah MD   divalproex (DEPAKOTE ER) 250 MG extended release tablet Take 250 mg by mouth nightly   Historical Provider, MD   Multiple Vitamins-Minerals (OCUVITE-LUTEIN PO) Take by mouth  Historical Provider, MD   calcium-vitamin D (OSCAL-500) 500-200 MG-UNIT per tablet Take 1 tablet by mouth 2 times daily    Historical Provider, MD   aspirin 81 MG EC tablet Take 81 mg by mouth daily. Historical Provider, MD          Objective:      /60 (Site: Left Upper Arm, Position: Sitting, Cuff Size: Medium Adult)   Pulse 66   Ht 5' 3\" (1.6 m)   Wt 137 lb (62.1 kg)   SpO2 98%   BMI 24.27 kg/m²      Physical Exam  Vitals and nursing note reviewed. Constitutional:       General: She is not in acute distress. Appearance: Normal appearance. She is not ill-appearing or toxic-appearing. HENT:      Head: Normocephalic and atraumatic. Right Ear: External ear normal.      Left Ear: External ear normal.      Nose: Nose normal.      Mouth/Throat:      Pharynx: Oropharynx is clear. Eyes:      General: No scleral icterus. Right eye: No discharge. Left eye: No discharge. Extraocular Movements: Extraocular movements intact. Conjunctiva/sclera: Conjunctivae normal.   Cardiovascular:      Rate and Rhythm: Normal rate and regular rhythm. Heart sounds: Normal heart sounds. Pulmonary:      Effort: Pulmonary effort is normal.      Breath sounds: Normal breath sounds. No wheezing or rales. Musculoskeletal:         General: No deformity. Cervical back: Normal range of motion and neck supple. No rigidity. Skin:     General: Skin is warm and dry. Findings: No rash. Neurological:      General: No focal deficit present. Mental Status: She is alert. Mental status is at baseline. Motor: No weakness. Psychiatric:         Mood and Affect: Mood normal.         Behavior: Behavior normal.          Assessment / Plan:      1. Mixed hyperlipidemia  Continue crestor.      2. Acute left-sided low back pain without sciatica  Likely musculoskeletal strain from change in sleeping position. Patient with history of sensitivities/side effects to medications and declines any treatment. Advised continued exercises, stretching, try heat, topicals. Contact clinic if symptoms do not improve in the next few weeks. 3. Normocytic anemia  Slightly improved on most recent labs, will continue to monitor. I have spent 20 minutes on this patient encounter. Patient voiced understanding and agreement with plan. All questions/concerns were addressed, risks/side effects of medications were reviewed. Return precautions and after visit summary were provided. Return in about 3 months (around 11/18/2022). or earlier as needed.       Haily Lopez MD

## 2022-08-25 ENCOUNTER — CARE COORDINATION (OUTPATIENT)
Dept: CARE COORDINATION | Age: 87
End: 2022-08-25

## 2022-08-25 NOTE — CARE COORDINATION
Ambulatory Care Coordination Note  8/25/2022    ACC: Amanda Hernandez, RN    Summary Note: ACM call to pt. Spoke to [employer]. She is pleasant and talkative on the phone. ACM reviewed role and CC. Pt agreeable to outreach and education. States she lives alone in her home, denies any access barriers. States she is independent with driving, shopping, personal care, meals. Denies the need for [facility]. States is she doesn't feel like driving, she will utilize [facility] transportation services. DME: Pt reports having a cane for ambulation and shower chair. States she is having a walk in shower installed in a few weeks. Has grabs bars and mat. Denies the need for any additional DME. Discussed fall prevention and education. Pt reports falling in the shower approximately 4 weeks ago, did not have to go to hospital.   Meds: Pt manages medications on her own. Has on hand and understands and takes as prescribed. [facility] insurance covers the cost with minimal co-pays. Denies the need for financial assistance. Pt was in hospital in April and May d/t diverticulitis. Has been following restricted 13g Fiber diet diligently. Does express that she has not been able to find substitutes for her diet that was given to her in the hospital. Does not use a computer and is not able to look things up. States she has been eating the same diet since being d/c in May. [facility] offered referral for RD, Deena Au. Pt agreeable and appreciative for referral.     CHF: Does not monitor weight daily. States she will monitor if she feels like she has gained weight. Current weight 137. Denies any SB, LE edema. Discussed monitoring weights. Encouraged heart healthy, low sodium diet. Explained education would be mailed.      Message sent to Deena Au for RD referral.     Actions:  ACM role and CC reviewed  Week one assessment  Med Rec completed  SDOH  CHF, Fall education  RD referral  Community resources discussed    Plan:  Monitor referrals  Care gaps  CHF, Fall education    Congestive Heart Failure Assessment    Are you currently restricting fluids?: No Restriction  Do you understand a low sodium diet?: Yes  Do you understand how to read food labels?: Yes  How many restaurant meals do you eat per week?: 0  Do you salt your food before tasting it?: No     No patient-reported symptoms      Symptoms:  None: Yes      Symptom course: stable  Patient-reported weight (lb): 137  Weight trend: stable  Salt intake watch compared to last visit: stable      and   General Assessment    Do you have any symptoms that are causing concern?: No           Ambulatory Care Coordination Assessment    Care Coordination Protocol  Referral from Primary Care Provider: No  Week 1 - Initial Assessment     Do you have all of your prescriptions and are they filled?: Yes  Barriers to medication adherence: None  Are you able to afford your medications?: Yes  How often do you have trouble taking your medications the way you have been told to take them?: I always take them as prescribed. Do you have Home O2 Therapy?: No      Ability to seek help/take action for Emergent Urgent situations i.e. fire, crime, inclement weather or health crisis. : Independent  Ability to ambulate to restroom: Independent  Ability handle personal hygeine needs (bathing/dressing/grooming): Independent  Ability to manage Medications: Independent  Ability to prepare Food Preparation: Independent  Ability to maintain home (clean home, laundry): Independent  Ability to drive and/or has transportation: Independent  Ability to do shopping: Independent  Ability to manage finances: Independent  Is patient able to live independently?: Yes     Current Housing: Private Residence        Per the Fall Risk Screening, did the patient have 2 or more falls or 1 fall with injury in the past year?: Yes  How often do you think you are about to fall and you do NOT fall?  For example, you grab something to stabilize yourself or hold onto a wall/furniture?: Occasionally  Use of a Mobility Aid: Yes  Difficulty walking/impaired gait: No  Issues with feet or shoes like numbness, edema, shoes not fitting: No  Changes in vision, poor vision or poor lighting in environment: No  Dizziness: No  Other Fall Risk: No     Frequent urination at night?: No  Do you use rails/bars?: Yes  Do you have a non-slip tub mat?: Yes     Are you experiencing loss of meaning?: No  Are you experiencing loss of hope and peace?: No     Thinking about your patient's physical health needs, are there any symptoms or problems (risk indicators) you are unsure about that require further investigation?: No identified areas of uncertainly or problems already being investigated   Are the patients physical health problems impacting on their mental well-being?: No identified areas of concern   Are there any problems with your patients lifestyle behaviors (alcohol, drugs, diet, exercise) that are impacting on physical or mental well-being?: No identified areas of concern   Do you have any other concerns about your patients mental well-being?  How would you rate their severity and impact on the patient?: No identified areas of concern   How would you rate their home environment in terms of safety and stability (including domestic violence, insecure housing, neighbor harassment)?: Consistently safe, supportive, stable, no identified problems   How do daily activities impact on the patient's well-being? (include current or anticipated unemployment, work, caregiving, access to transportation or other): No identified problems or perceived positive benefits   How would you rate their social network (family, work, friends)?: Adequate participation with social networks   How would you rate their financial resources (including ability to afford all required medical care)?: Financially secure, resources adequate, no identified problems   How wells does the patient now understand their health and well-being (symptoms, signs or risk factors) and what they need to do to manage their health?: Reasonable to good understanding and already engages in managing health or is willing to undertake better management   How well do you think your patient can engage in healthcare discussions? (Barriers include language, deafness, aphasia, alcohol or drug problems, learning difficulties, concentration): Clear and open communication, no identified barriers   Do other services need to be involved to help this patient?: Other care/services not required at this time   Are current services involved with this patient well-coordinated? (Include coordination with other services you are now recommendation): All required care/services in place and well-coordinated   Suggested Interventions and Community Resources  Fall Risk Prevention: Not Started Home Care Waiver: Declined   Medication Assistance Program: Declined   Registered Dietician: Not Started   Transportation Services: Completed   Zone Management Tools: Not Started         Set up/Review an Education Plan, Set up/Review Goals                Prior to Admission medications    Medication Sig Start Date End Date Taking?  Authorizing Provider   furosemide (LASIX) 20 MG tablet Take 1 tablet by mouth daily 8/18/22  Yes Vaibhav Camargo MD   magnesium oxide (MAG-OX) 400 (240 Mg) MG tablet Take 1 tablet by mouth daily 8/18/22  Yes Vaibhav Camargo MD   Cyanocobalamin 1000 MCG/ML KIT Inject as directed   Yes Historical Provider, MD   verapamil (CALAN SR) 120 MG extended release tablet Take 1 tablet by mouth nightly 8/2/22  Yes Anais Lombardi MD   Promethazine HCl 6.25 MG/5ML SOLN take 5 milliliters by mouth three times a day if needed 6/23/22  Yes Historical Provider, MD   Icosapent Ethyl (VASCEPA) 1 g CAPS capsule Take 2 capsules by mouth 2 times daily 6/28/22  Yes Anais Lombardi MD   acetaminophen (TYLENOL) 325 mg tablet Take 650 mg by mouth as needed for Pain   Yes Historical Provider, MD   Multiple Vitamins-Minerals (CENTRUM SILVER ADULT 50+) TABS Take 1 tablet by mouth daily   Yes Historical Provider, MD   fluticasone (FLONASE) 50 MCG/ACT nasal spray instill 1 spray into each nostril once daily 4/21/22  Yes Wolf Kyle MD   rosuvastatin (CRESTOR) 10 MG tablet take 1 tablet by mouth once daily 3/7/22  Yes Wolf Kyle MD   pyridostigmine (MESTINON) 60 MG tablet Take 0.5 tablets by mouth daily 2/11/22  Yes Andrew Michael MD   pantoprazole (PROTONIX) 40 MG tablet Take 1 tablet by mouth 2 times daily  Patient taking differently: Take 40 mg by mouth daily 1/25/22  Yes Wolf Kyle MD   ondansetron (ZOFRAN-ODT) 4 MG disintegrating tablet Take 1 tablet by mouth 2 times daily as needed for Nausea or Vomiting 12/9/21  Yes Wolf Kyle MD   ketotifen (ZADITOR) 0.025 % ophthalmic solution Place 1 drop into both eyes 2 times daily   Yes Historical Provider, MD   sertraline (ZOLOFT) 25 MG tablet Take 25 mg by mouth daily  11/4/21  Yes Historical Provider, MD   divalproex (DEPAKOTE ER) 250 MG extended release tablet Take 250 mg by mouth nightly    Yes Historical Provider, MD   Multiple Vitamins-Minerals (OCUVITE-LUTEIN PO) Take by mouth   Yes Historical Provider, MD   calcium-vitamin D (OSCAL-500) 500-200 MG-UNIT per tablet Take 1 tablet by mouth 2 times daily     Yes Historical Provider, MD   aspirin 81 MG EC tablet Take 81 mg by mouth daily. Yes Historical Provider, MD   gabapentin (NEURONTIN) 300 MG capsule take 1 tablet by mouth three times a day for TRIGEMINAL NEURALGIA  Patient taking differently: Take 300 mg by mouth 3 times daily.  11/1/21 8/18/22  Wolf Kyle MD   Handicap Placard MISC by Does not apply route Good for 3 years 7/12/21   Wolf Kyle MD       Future Appointments   Date Time Provider Darvin Morales   9/9/2022  1:30 PM Juanjose Bryant   9/20/2022  2:45 PM Andrew Michael MD Critical access hospital Heart [facility]   10/24/2022  2:30 PM Caroline Suarez [facility] SPM MMA   11/21/2022  2:00 PM MD Denisha Maravilla   11/29/2022 11:45 AM Lluvia Lujan MD AFLADVNPHHTN AFL ADV NEPH   2/17/2023  1:00 PM INFUSION ROOM 1 - [facility]   8/14/2023 10:00 AM SCHEDULE, SRMX AWV LPN N SFIELD [facility]

## 2022-08-29 ENCOUNTER — CARE COORDINATION (OUTPATIENT)
Dept: CARE COORDINATION | Age: 87
End: 2022-08-29

## 2022-08-29 NOTE — CARE COORDINATION
Registered Dietitian Initial Assessment for Care Coordination      Sasha Trevino  August 29, 2022    Initial Referral Reason: Diverticulitis    Patient Care Team:  Deejay Miller MD as PCP - General (Family Medicine)  Deejay Miller MD as PCP - [facility]  Melita Lau MD as Consulting Physician (Cardiology)  Jayshree Hinson MD as Consulting Physician (Cardiology)  Oscar Maynard, KRISTIE as Ambulatory Care Manager  Mere Alvarez RD, LD as Dietitian    Patient Active Problem List   Diagnosis    Chronic depression    Hyperlipidemia    Trigeminal neuralgia    Anemia due to chronic illness    Colon cancer screening    Supraventricular tachycardia ([address]    Recurrent ventral incisional hernia    Mild cognitive impairment    CAD (coronary artery disease)    Elevated TSH    PAD (peripheral artery disease) ([address]    Sciatica of left side without back pain    Coronary artery disease involving coronary bypass graft of native heart without angina pectoris    S/P CABG x 3    Essential hypertension    MVP (mitral valve prolapse)    Bilateral carotid artery stenosis    Subclavian arterial stenosis (HCC)    Spigelian hernia    Ischemic cardiomyopathy    Glenohumeral arthritis, right    Normocytic anemia    Overactive bladder    Asthmatic bronchitis    Age-related osteoporosis without current pathological fracture    Palpitations    Prediabetes    Gait disturbance    Macular degeneration of both eyes    History of DVT (deep vein thrombosis)    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, bilateral    Chronic renal disease, stage III ([address] [124858]    Diverticulitis    Depression with anxiety    Bigeminy    Hypotension due to hypovolemia    Chronic systolic (congestive) heart failure    Acute left-sided low back pain without sciatica       Current Outpatient Medications   Medication Sig Dispense Refill    furosemide (LASIX) 20 MG tablet Take 1 tablet by mouth daily 30 tablet 3 magnesium oxide (MAG-OX) 400 (240 Mg) MG tablet Take 1 tablet by mouth daily 30 tablet 3    Cyanocobalamin 1000 MCG/ML KIT Inject as directed      verapamil (CALAN SR) 120 MG extended release tablet Take 1 tablet by mouth nightly 30 tablet 5    Promethazine HCl 6.25 MG/5ML SOLN take 5 milliliters by mouth three times a day if needed      Icosapent Ethyl (VASCEPA) 1 g CAPS capsule Take 2 capsules by mouth 2 times daily 360 capsule 3    acetaminophen (TYLENOL) 325 mg tablet Take 650 mg by mouth as needed for Pain      Multiple Vitamins-Minerals (CENTRUM SILVER ADULT 50+) TABS Take 1 tablet by mouth daily      fluticasone (FLONASE) 50 MCG/ACT nasal spray instill 1 spray into each nostril once daily 1 each 3    rosuvastatin (CRESTOR) 10 MG tablet take 1 tablet by mouth once daily 90 tablet 3    pyridostigmine (MESTINON) 60 MG tablet Take 0.5 tablets by mouth daily 45 tablet 5    pantoprazole (PROTONIX) 40 MG tablet Take 1 tablet by mouth 2 times daily (Patient taking differently: Take 40 mg by mouth daily) 180 tablet 1    ondansetron (ZOFRAN-ODT) 4 MG disintegrating tablet Take 1 tablet by mouth 2 times daily as needed for Nausea or Vomiting 15 tablet 1    ketotifen (ZADITOR) 0.025 % ophthalmic solution Place 1 drop into both eyes 2 times daily      sertraline (ZOLOFT) 25 MG tablet Take 25 mg by mouth daily       gabapentin (NEURONTIN) 300 MG capsule take 1 tablet by mouth three times a day for TRIGEMINAL NEURALGIA (Patient taking differently: Take 300 mg by mouth 3 times daily.) 90 capsule 1    Handicap Placard MISC by Does not apply route Good for 3 years 1 each 0    divalproex (DEPAKOTE ER) 250 MG extended release tablet Take 250 mg by mouth nightly       Multiple Vitamins-Minerals (OCUVITE-LUTEIN PO) Take by mouth      calcium-vitamin D (OSCAL-500) 500-200 MG-UNIT per tablet Take 1 tablet by mouth 2 times daily        aspirin 81 MG EC tablet Take 81 mg by mouth daily.          No current facility-administered medications for this visit. Visit for:  Obesity/Weight loss  Diabetes:  Hypertension:  Hyperlipidemia:  Other:diverticulitis     Anthropometric Measurements:  HT:5'3  Weight:137  IBW:115 + or - 10%  BMI:24    Biochemical Data, Medical Tests and Procedures:    Lab Results   Component Value Date    LABA1C 5.8 08/31/2021     Lab Results   Component Value Date     03/22/2021       Lab Results   Component Value Date    CHOL 114 08/15/2022    CHOL 129 03/22/2021    CHOL 129 06/22/2020     Lab Results   Component Value Date    TRIG 174 (H) 08/15/2022    TRIG 194 (H) 03/22/2021    TRIG 273 (H) 06/22/2020     Lab Results   Component Value Date    HDL 30 (L) 08/15/2022    HDL 33 (L) 03/22/2021    HDL 30 (L) 06/22/2020     Lab Results   Component Value Date    LDLCALC 49 08/15/2022    LDLCALC 44 06/22/2020    LDLCALC 44 01/23/2020     Lab Results   Component Value Date    LABVLDL 55 (H) 06/22/2020    LABVLDL 75 (H) 01/23/2020    LABVLDL Comment 11/13/2019     No results found for: [facility]    Lab Results   Component Value Date    WBC 4.8 08/15/2022    HGB 11.2 (L) 08/15/2022    HCT 35.6 (L) 08/15/2022    .6 (H) 08/15/2022     08/15/2022       Lab Results   Component Value Date    CREATININE 1.2 (H) 08/15/2022    BUN 20 08/15/2022     08/15/2022    K 4.6 08/15/2022     08/15/2022    CO2 29 08/15/2022         NUTRITION DIAGNOSIS    #1 Problem  Food and nutrition-related knowledge deficit       Etiology  related to lack of prior nutrition related education       Signs/Symptoms  as evidenced by referral for nutrition education for diverticulitis    NUTRITION INTERVENTION  Nutrition Prescription:    cardiac diet providing 1500kcals/day   Protein needs:60gms/d  Fluid needs:1800cc/day      Patient Goals: 1. Patient recently had diverticulitis has had several episodes over the past year. She is trying to follow a 13gm fiber/day plan.  Discussed limiting fiber intake, reading food labels and choosing foods with <2gms of fiber/serving. We discussed avoiding raw fruits/vegetables and whole grains and eventually slowly increasing fiber intake- add one high fiber food at a time. Patient was told to avoid seeds/ nuts - reviewed foods that contain seeds and nuts. Patient will be sent a list of low fiber foods and high fiber foods to slowly increase when symptoms subside. 2. Discussed avoiding- greasy, fried and sugary foods. Sugary beverages, juices, sodas, ect. Dairy products with high amounts of lactose, Foods sweetened with isomalt, mannitol, sorbitol and xylitol- artificial sweeteners. 3. Discussed eating small frequent meals throughout the day- 4-6 small meals. 4. Discussed menu options for breakfast/lunch and dinner when following low fiber diet- discussed cereals low in fiber, sandwich options for lunch with canned or lower fiber fruits, chicken, rice and other options for dinner. Will send patient low fiber menu ideas. Nutrition Intervention Need:  Initial/Brief:  Comprehensive Nutrition Education:X  Coordination of other care during nutrition care:    Monitoring and Evaluation:  Ability to plan meals/snacks:  Ability to select healthy foods/meals:X  Food and Nutrition Knowledge:X  Self Monitoring:  Physical Activity:  Energy intake:  Fluid/beverage intake:  Fat/chol intake:  Carb intake: Other:fiber intake: X    Plan: 1. Will continue to educate patient on diverticulitis and diet- low fiber foods and increasing fiber intake very slowly when symptoms subside, small frequent meals, foods to avoid/limit. Patient will be sent nutrition information on all the above discussed.   2. Will follow up in 2-3 weeks to review information and answer questions    LAURA Doyle

## 2022-08-29 NOTE — CARE COORDINATION
Referral from Gallito Whaley., RN-  enrolled this patient in Care Coordination today and she is agreeable for a RD referral. She was hospitalized in April and May 2022 d/t diverticulitis. While in the hospital the dietician gave her a 13g Fiber diet. She has diligently been following this same daily diet since May (saltine crackers, broth, creamy peanut butter, etc.). She is desperate for some variety. She does not use a computer and cannot look up information. It sounds like she needs something that can give her a list of foods and the fiber value    Will reach out to patient for consult.   Rossana,LAURA

## 2022-09-06 NOTE — LETTER
"Chief Complaint  Follow-up, non-small cell lung cancer    Subjective        Leann E Fifi presents to [facility] THORACIC SURGERY  History of Present Illness    Ms. Calix is a pleasant 83-year-old lady who is status post right middle lobectomy in July 2020 for stage I non-small cell lung cancer.  She was last seen in our office in March 2022 and has no new complaints since that time.  She denies shortness of breath, increased cough and unintentional weight loss.  She presents today with follow-up CT chest.      Objective   Vital Signs:  /91 (BP Location: Left arm, Patient Position: Sitting, Cuff Size: Large Adult)   Pulse 91   Temp 98.2 °F (36.8 °C) (Infrared)   Ht 163.8 cm (64.5\")   Wt 121 kg (267 lb)   SpO2 96%   BMI 45.12 kg/m²   Estimated body mass index is 45.12 kg/m² as calculated from the following:    Height as of this encounter: 163.8 cm (64.5\").    Weight as of this encounter: 121 kg (267 lb).          Physical Exam  Constitutional:       Appearance: Normal appearance. She is obese. She is not ill-appearing.   HENT:      Head: Normocephalic and atraumatic.   Cardiovascular:      Rate and Rhythm: Normal rate.   Pulmonary:      Effort: Pulmonary effort is normal. No respiratory distress.   Musculoskeletal:         General: Normal range of motion.      Cervical back: Normal range of motion and neck supple.   Skin:     General: Skin is warm.   Neurological:      General: No focal deficit present.      Mental Status: She is alert and oriented to person, place, and time.   Psychiatric:         Mood and Affect: Mood normal.         Thought Content: Thought content normal.        Result Review :                 I have independently reviewed the CT of the chest performed on 8/30/2022 which demonstrates postoperative changes and scarring consistent with right middle lobectomy.  There is a 2.4 cm left apical lipoma without change.  There are calcified granulomas throughout the left " [address]  [address] [place] [address]  Phone: [phone]  Fax: [phone]    Teresa Webster MD        January 28, 2021     MD Brett Whiting  [facility] [id number]    Patient: Messi Castro  MR Number: [id number]  YOB: 1935  Date of Visit: 1/28/2021    Dear Dr. Hallie Urbina: Thank you for the request for consultation for Messi Castro to me for the evaluation of CAD. Below are the relevant portions of my assessment and plan of care. If you have questions, please do not hesitate to call me. I look forward to following Ada along with you.     Sincerely,        Teresa Webster MD lung.  There is a stable 6 mm nodule in the right upper lobe.  No new or enlarging pulmonary nodules.  Stable thyroid nodules and adrenal adenoma without change.  No mediastinal or hilar adenopathy.    Assessment and Plan   Diagnoses and all orders for this visit:    1. Primary cancer of right middle lobe of lung (HCC) (Primary)  -     CT Chest Without Contrast; Future    2. Former smoker  -     CT Chest Without Contrast; Future    3. Lipoma of lung  -     CT Chest Without Contrast; Future      Ms. Calix presents today for continued surveillance given her history of non-small cell lung cancer.  She is status post right middle lobectomy on July 29 2020 for a T1BN0 adenocarcinoma.  Her most recent CT chest is stable without evidence of any new or enlarging pulmonary nodules.  There is a stable 6 mm nodule in the right upper lobe and left apical lipoma without change.  We will continue her surveillance in 6-month intervals with CT chest followed by an office visit.         I spent 28 minutes caring for June on this date of service. This time includes time spent by me in the following activities:preparing for the visit, reviewing tests, performing a medically appropriate examination and/or evaluation , referring and communicating with other health care professionals , documenting information in the medical record and independently interpreting results and communicating that information with the patient/family/caregiver  Follow Up   Return in about 6 months (around 3/6/2023) for Next scheduled follow up.  Patient was given instructions and counseling regarding her condition or for health maintenance advice. Please see specific information pulled into the AVS if appropriate.

## 2022-09-07 ENCOUNTER — CARE COORDINATION (OUTPATIENT)
Dept: CARE COORDINATION | Age: 87
End: 2022-09-07

## 2022-09-07 NOTE — CARE COORDINATION
Ambulatory Care Coordination Note  9/8/2022    ACC: Oscar Hernandez, RN    Summary Note: ACM call to pt, spoke to Sergei Nichole. Confirmed she has received education and ACM contact information in the mail. States she spoke to Justin Grier, [address] [place] [address] and she sent information. Pt is grateful for the information from the RD and the referral. States she has been in contact with her GI doctor, Dr. Jazmín Solorzano. States she dicussed adding new things slowly to her diet with his nurse. She does have questions regarding 4th covid vaccine. States the last 2 injections have caused severe diarrhea. She is concerned this will irritate her bowels, leading to a flare up of diverticulitis. States per Dr. Cindi Paniagua she should take imodium 2 days before injection and 2 days after. She would like to discuss with Dr. Lesly Cardenas. ACM will route message to Dr. Lesly Cardenas. CHF: Does not monitor weight daily, only if she feels she has gained weight. Last weight was 136. Denies any SB, LE edema. Discussed monitoring weights and when to contact PCP per zone tool. Pt is planning to get her flu vaccine - states she has already received her letter notifying they are available. Discussed fall prevention and safety.      Actions:  CHF, Fall education  Message to Dr. Lesly Cardenas re: covid vaccine     Plan:  Care gaps  CHF, Fall education      Congestive Heart Failure Assessment    Are you currently restricting fluids?: No Restriction  Do you understand a low sodium diet?: Yes  Do you understand how to read food labels?: Yes  How many restaurant meals do you eat per week?: 0  Do you salt your food before tasting it?: No     No patient-reported symptoms      Symptoms:  None: Yes      Symptom course: stable  Patient-reported weight (lb): 136  Weight trend: stable  Salt intake watch compared to last visit: stable      and   General Assessment    Do you have any symptoms that are causing concern?: No           Lab Results       None            Care Coordination Interventions Referral from Primary Care Provider: No  Suggested Interventions and Community Resources  Fall Risk Prevention: In Process  Home Care Waiver: Declined  Medication Assistance Program: Declined  Registered Dietician: Completed  Transportation Support: Completed  Zone Management Tools: In Process          Goals Addressed                   This Visit's Progress     Conditions and Symptoms   On track     I will schedule office visits, as directed by my provider. I will keep my appointment or reschedule if I have to cancel. I will notify my provider of any barriers to my plan of care. I will follow my Zone Management tool to seek urgent or emergent care. I will notify my provider of any symptoms that indicate a worsening of my condition. Barriers: fear of failure, lack of support, overwhelmed by complexity of regimen, and lack of education  Plan for overcoming my barriers: Utilize [facility] for education and community resources  Confidence: 8/10  Anticipated Goal Completion Date: 11/25/2022                Prior to Admission medications    Medication Sig Start Date End Date Taking?  Authorizing Provider   furosemide (LASIX) 20 MG tablet Take 1 tablet by mouth daily 8/18/22   Apple Mc MD   magnesium oxide (MAG-OX) 400 (240 Mg) MG tablet Take 1 tablet by mouth daily 8/18/22   Apple Mc MD   Cyanocobalamin 1000 MCG/ML KIT Inject as directed    Historical Provider, MD   verapamil (CALAN SR) 120 MG extended release tablet Take 1 tablet by mouth nightly 8/2/22   Mello Pedersen MD   Promethazine HCl 6.25 MG/5ML SOLN take 5 milliliters by mouth three times a day if needed 6/23/22   Historical Provider, MD   Icosapent Ethyl (VASCEPA) 1 g CAPS capsule Take 2 capsules by mouth 2 times daily 6/28/22   Mello Pedersen MD   acetaminophen (TYLENOL) 325 mg tablet Take 650 mg by mouth as needed for Pain    Historical Provider, MD   Multiple Vitamins-Minerals (CENTRUM SILVER ADULT 50+) TABS Take 1 tablet by mouth daily    Historical Provider, MD   fluticasone (FLONASE) 50 MCG/ACT nasal spray instill 1 spray into each nostril once daily 4/21/22   Alma Barboza MD   rosuvastatin (CRESTOR) 10 MG tablet take 1 tablet by mouth once daily 3/7/22   Alma Barboza MD   pyridostigmine (MESTINON) 60 MG tablet Take 0.5 tablets by mouth daily 2/11/22   Kathy Granados MD   pantoprazole (PROTONIX) 40 MG tablet Take 1 tablet by mouth 2 times daily  Patient taking differently: Take 40 mg by mouth daily 1/25/22   Alma Barboza MD   ondansetron (ZOFRAN-ODT) 4 MG disintegrating tablet Take 1 tablet by mouth 2 times daily as needed for Nausea or Vomiting 12/9/21   Alma Barboza MD   ketotifen (ZADITOR) 0.025 % ophthalmic solution Place 1 drop into both eyes 2 times daily    Historical Provider, MD   sertraline (ZOLOFT) 25 MG tablet Take 25 mg by mouth daily  11/4/21   Historical Provider, MD   gabapentin (NEURONTIN) 300 MG capsule take 1 tablet by mouth three times a day for TRIGEMINAL NEURALGIA  Patient taking differently: Take 300 mg by mouth 3 times daily. 11/1/21 8/18/22  Alma Barboza MD   Handicap Placard MISC by Does not apply route Good for 3 years 7/12/21   Alma Barboza MD   divalproex (DEPAKOTE ER) 250 MG extended release tablet Take 250 mg by mouth nightly     Historical Provider, MD   Multiple Vitamins-Minerals (OCUVITE-LUTEIN PO) Take by mouth    Historical Provider, MD   calcium-vitamin D (OSCAL-500) 500-200 MG-UNIT per tablet Take 1 tablet by mouth 2 times daily      Historical Provider, MD   aspirin 81 MG EC tablet Take 81 mg by mouth daily.       Historical Provider, MD       Future Appointments   Date Time Provider Darvin Morales   9/9/2022 11:00 AM Micky Amor [facility]   9/20/2022  2:45 PM Kathy Granados MD [facility]   10/24/2022  2:30 PM Annabelle Peabody [facility]   11/21/2022  2:00 PM MD Rayo Elizondo   11/29/2022 11:45 Josué Whitt MD [id number] AFL ADV [facility]   2/17/2023  1:00 PM INFUSION ROOM [address] - [address]   8/14/2023 10:00 AM SCHEDULE, SRMX AWV LPN N CAPRICE FLORES [facility]

## 2022-09-08 NOTE — TELEPHONE ENCOUNTER
Spoke with patient. States she has had the 2 initial vaccines as well as 2 boosters. Discussed that given her history of severe diverticulitis episodes/GI symptoms, would be reasonable to hold off on booster. Also discussed risk of kori COVID (with or without booster) and that her risks of complications without booster would be a little higher but that she ultimately needs to weigh risks/benefits. Patient thinks she will hold off for now, will contact us with any further concerns.

## 2022-09-09 ENCOUNTER — NURSE ONLY (OUTPATIENT)
Dept: INTERNAL MEDICINE CLINIC | Age: 87
End: 2022-09-09
Payer: MEDICARE

## 2022-09-09 DIAGNOSIS — E53.8 VITAMIN B 12 DEFICIENCY: Primary | ICD-10-CM

## 2022-09-09 PROCEDURE — 96372 THER/PROPH/DIAG INJ SC/IM: CPT | Performed by: FAMILY MEDICINE

## 2022-09-09 RX ORDER — CYANOCOBALAMIN 1000 UG/ML
1000 INJECTION INTRAMUSCULAR; SUBCUTANEOUS ONCE
Status: COMPLETED | OUTPATIENT
Start: 2022-09-09 | End: 2022-09-09

## 2022-09-09 RX ADMIN — CYANOCOBALAMIN 1000 MCG: 1000 INJECTION INTRAMUSCULAR; SUBCUTANEOUS at 11:30

## 2022-09-13 DIAGNOSIS — G50.0 TIC DOULOUREUX: ICD-10-CM

## 2022-09-13 RX ORDER — GABAPENTIN 300 MG/1
CAPSULE ORAL
Qty: 270 CAPSULE | Refills: 1 | Status: SHIPPED | OUTPATIENT
Start: 2022-09-13 | End: 2023-06-30

## 2022-09-13 RX ORDER — FUROSEMIDE 20 MG/1
20 TABLET ORAL DAILY
Qty: 90 TABLET | Refills: 1 | Status: SHIPPED | OUTPATIENT
Start: 2022-09-13

## 2022-09-15 ENCOUNTER — CARE COORDINATION (OUTPATIENT)
Dept: CARE COORDINATION | Age: 87
End: 2022-09-15

## 2022-09-15 NOTE — CARE COORDINATION
Nutrition Care Coordinator Follow-Up visit:    Food Recall:eating 3 meals/d    Activity Level:  Sedentary:  Lightly Active:X  Moderately Active:  Very Active:    Adult BMI:  Underweight (below 18.5)  Normal Weight (18.5-24.9)X  Overweight (25-29. 9)  Obese (30-39. 9)  Morbidly Obese (>40)    Weight Change:no change    Plan:  Plan was established with patient:  Increase dietary fiber by consuming whole grains, fruits and vegetables:  Limit dietary cholesterol to >200mg/day: Increase water intake:  Avoid added sugar:  Avoid sweetened beverages:  Choose lean meats:  Limit fiber intake: X    Monitoring: Will monitor weight:  Will monitor adherence to meal plan:X  Will monitor adherence to exercise plan: Will monitor HGA1c:    Handouts Provided :  Low Carb snacking:  Carb counting /individual meal plan:  Portion Control:  Food Labels:  Physical Activity:  Low Fat/Cholesterol:  Hypo/Hyperglycemia:  Calorie Controlled Meal Plan:  Diverticulitis and diet : X    Patient goals set:  1..Patient has had several episodes of diverticulitis over the past year. She is  following a 13gm fiber/day plan provided to her at her last hospital stay. Reviewed limiting fiber intake, reading food labels and choosing foods with <2gms of fiber/serving. We discussed avoiding raw fruits/vegetables and whole grains and eventually slowly increasing fiber intake- add one high fiber food at a time. Patient was told to avoid seeds/ nuts - reviewed foods that contain seeds and nuts. Reviewed list of low fiber foods and high fiber foods to slowly increase when symptoms subside. 2. Reviewed avoiding- greasy, fried and sugary foods. Sugary beverages, juices, sodas, ect. Dairy products with high amounts of lactose, Foods sweetened with isomalt, mannitol, sorbitol and xylitol- artificial sweeteners. 3. Reviewed eating small frequent meals throughout the day- 4-6 small meals.    4. Reviewed menu options for breakfast/lunch and dinner when following low fiber diet- reviewed cereals low in fiber, sandwich options for lunch with canned or lower fiber fruits, chicken, rice and other options for dinner. Will send patient low fiber menu ideas. Patient states she got nutrition information and has found it very helpful, found new foods she can eat. Patient has no questions at this time. Will follow up in 3-4 weeks to review and answer questions.   Marilu Cutler

## 2022-09-16 ENCOUNTER — CARE COORDINATION (OUTPATIENT)
Dept: CARE COORDINATION | Age: 87
End: 2022-09-16

## 2022-09-16 NOTE — CARE COORDINATION
Attempted to reach patient for continued Care Coordination follow up and education. Patient was unavailable at the time of my call, and phone line continued to ring. No option for voicemail.

## 2022-09-23 ENCOUNTER — CARE COORDINATION (OUTPATIENT)
Dept: CARE COORDINATION | Age: 87
End: 2022-09-23

## 2022-10-07 ENCOUNTER — CARE COORDINATION (OUTPATIENT)
Dept: CARE COORDINATION | Age: 87
End: 2022-10-07

## 2022-10-07 ENCOUNTER — NURSE ONLY (OUTPATIENT)
Dept: INTERNAL MEDICINE CLINIC | Age: 87
End: 2022-10-07
Payer: MEDICARE

## 2022-10-07 DIAGNOSIS — E53.8 VITAMIN B 12 DEFICIENCY: Primary | ICD-10-CM

## 2022-10-07 PROCEDURE — 96372 THER/PROPH/DIAG INJ SC/IM: CPT | Performed by: FAMILY MEDICINE

## 2022-10-07 RX ORDER — CYANOCOBALAMIN 1000 UG/ML
1000 INJECTION INTRAMUSCULAR; SUBCUTANEOUS ONCE
Status: COMPLETED | OUTPATIENT
Start: 2022-10-07 | End: 2022-10-07

## 2022-10-07 RX ADMIN — CYANOCOBALAMIN 1000 MCG: 1000 INJECTION INTRAMUSCULAR; SUBCUTANEOUS at 11:19

## 2022-10-11 ENCOUNTER — CARE COORDINATION (OUTPATIENT)
Dept: CARE COORDINATION | Age: 87
End: 2022-10-11

## 2022-10-11 NOTE — CARE COORDINATION
Nutrition Care Coordinator Follow-Up visit:    Food Recall:eating 3 meals/d    Activity Level:  Sedentary:  Lightly Active:X  Moderately Active:  Very Active:    Adult BMI:  Underweight (below 18.5)  Normal Weight (18.5-24.9)X  Overweight (25-29. 9)  Obese (30-39. 9)  Morbidly Obese (>40)    Weight Change:no change    Plan:  Plan was established with patient:  Increase dietary fiber by consuming whole grains, fruits and vegetables:X  Limit dietary cholesterol to >200mg/day: Increase water intake:  Avoid added sugar:  Avoid sweetened beverages:  Choose lean meats:      Monitoring: Will monitor weight:  Will monitor adherence to meal plan:X  Will monitor adherence to exercise plan: Will monitor HGA1c:    Handouts Provided :  Low Carb snacking:  Carb counting /individual meal plan:  Portion Control:  Food Labels:  Physical Activity:  Low Fat/Cholesterol:  Hypo/Hyperglycemia:  Calorie Controlled Meal Plan:  High fiber foods: X    Goals: Increase water consumption to 8oz. 6-8 times daily:  Manage blood sugars by consuming 3 meals spaced every 4-5 hours with 2-3 snacks daily:  Increase fiber and decrease fat intake by consuming 1-2 fruit servings and 2-3 vegetable servings per day. discussed  Increase physical activity by:  Consume less than 2,000mg of sodium/day  Avoid consumption of sweetened beverages and added sugar by reading food labels:  Monitor blood sugars by using meter to check blood glucose before morning meal and 2 hours after a meal daily:  Decrease risk of coronary heart disease by consuming fish that contains omega-3 fatty acids at least twice a week, avoiding partially hydrogenated oil/trans fats and limiting saturated fat intake by reading food labels:    Patient goals set: 1. Patient states feeling much better, no further symptoms of diverticulitis. We discussed starting to add fiber back into her diet 1 food at a time.  Encouraged adding cooked vegetables first then fresh vegetables/fruits one at a time. Patient was told to avoid seeds/ nuts - reviewed foods that contain seeds and nuts. 2. Reviewed avoiding- greasy, fried and sugary foods. Sugary beverages, juices, sodas, ect. Dairy products with high amounts of lactose, Foods sweetened with isomalt, mannitol, sorbitol and xylitol- artificial sweeteners. 3. Reviewed eating small frequent meals throughout the day- 4-6 small meals. 4. Reviewed menu options for breakfast/lunch and dinner when increasing fiber intake will send a list of high fiber foods and continue to encourage to add fiber slowly to meals/snacks. Patient states feeling much better and questioned weather she should start adding fiber back into diet. Discussed slowly adding high fiber foods one at a time. Reviewed high fiber foods with patient. Will follow up in 3-4 weeks to review and answer questions.     Sameer Christopher

## 2022-10-12 ENCOUNTER — CARE COORDINATION (OUTPATIENT)
Dept: CARE COORDINATION | Age: 87
End: 2022-10-12

## 2022-10-12 NOTE — CARE COORDINATION
ACM received return call from pt. Spoke to [employer]. States she was just returning form the grocery store. States she is doing ok with her diet. States she was recently treated for bladder infection by Urologist. Has follow up appt with Urologist on 10/18/22. Denies any other needs at this time.

## 2022-10-24 ENCOUNTER — OFFICE VISIT (OUTPATIENT)
Dept: ORTHOPEDIC SURGERY | Age: 87
End: 2022-10-24
Payer: MEDICARE

## 2022-10-24 ENCOUNTER — TELEPHONE (OUTPATIENT)
Dept: INTERNAL MEDICINE CLINIC | Age: 87
End: 2022-10-24

## 2022-10-24 VITALS
RESPIRATION RATE: 16 BRPM | HEART RATE: 73 BPM | HEIGHT: 63 IN | WEIGHT: 132.1 LBS | BODY MASS INDEX: 23.41 KG/M2 | SYSTOLIC BLOOD PRESSURE: 117 MMHG | DIASTOLIC BLOOD PRESSURE: 69 MMHG

## 2022-10-24 DIAGNOSIS — M25.559 HIP PAIN: Primary | ICD-10-CM

## 2022-10-24 DIAGNOSIS — M19.011 PRIMARY OSTEOARTHRITIS OF RIGHT SHOULDER: Primary | ICD-10-CM

## 2022-10-24 PROCEDURE — 1123F ACP DISCUSS/DSCN MKR DOCD: CPT | Performed by: PHYSICIAN ASSISTANT

## 2022-10-24 PROCEDURE — 20610 DRAIN/INJ JOINT/BURSA W/O US: CPT | Performed by: PHYSICIAN ASSISTANT

## 2022-10-24 NOTE — TELEPHONE ENCOUNTER
----- Message from Ken Marcos sent at 10/24/2022  8:58 AM EDT -----  Subject: Referral Request    Reason for referral request? [facility] for hip pain  Provider patient wants to be referred to(if known):     Provider Phone Number(if known): Additional Information for Provider?  Patient is requesting a new referral   for [facility] for her hip pain   ---------------------------------------------------------------------------  --------------  Emily Cline INFO    [phone]; OK to leave message on voicemail  ---------------------------------------------------------------------------  --------------

## 2022-10-24 NOTE — PROGRESS NOTES
[facility]      HPI:  Demond Turner is a 82 y.o. female that presents back to the office today with recurrent right shoulder pain. She rates her pain at a 7/10 today. She has been seen in this office multiple times before and had steroid injections with varying relief. Her last injection was July 21, 2022 from Dr. Lavern Gottron. She states that she got moderate relief from that injection up until recently. She would like to do another steroid injection today. She has had no new injuries since her last visit to this office. Past Medical History:   Diagnosis Date    Arthritis of shoulder region, right 09/2014    Cleda Motjohan    Blind right eye     following right cataract surg    Chronic depression 2009    Dr. Muriel Drake    DVT (deep venous thrombosis) ([facility] [address] 1970    left leg    Former smoker     History of echocardiogram 09/21/2020    EF 55-60%, mild left ventricular hypertrophy, normal diastolic filling pattern for age, no signficiant valvular disease, no pericardial effusion     History of stress test 03/25/2021    normal LVEF calculated by the computer is probably falsely low.  LVEF is 40 %    Hx of Doppler ultrasound 03/01/2018    Carotid-mild 0-49% bilateral carotid,50% stenosis right subclavian    Hyperlipidemia     Hypertension     Macular degeneration     right    Mild cognitive impairment 02/2012    MMSE 26/30    MVP (mitral valve prolapse)     trace on echo 2014    Osteoporosis 05/2012    Pancytopenia 05/2011    mild with ; Dr Margaret hawk 2010    Peptic ulcer disease     Peripheral vascular disease ([address] 01/2016    angio; dis below ankles; pletal    Prediabetes 2006    Recurrent ventral incisional hernia 2013    medial apex of GB scar    S/P CABG x 3 2003    3-vessel; Dr Oz Nieves    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 Danica Strickland; Right side       Past Surgical History:   Procedure Laterality Date    CATARACT REMOVAL Right 2010    poor result ; blind right eye; [place]    CHOLECYSTECTOMY      CORONARY ARTERY BYPASS GRAFT  2009    3 vessel    SHOULDER ARTHROSCOPY Right     TUBAL LIGATION      URETER SURGERY      Right ureteral repair       Family History   Problem Relation Age of Onset    High Blood Pressure Mother     Heart Attack Mother     Other Mother         unsteady gait    Other Father         silcosis    Other Sister         brain damage    Heart Attack Sister     Stroke Sister     Breast Cancer Sister        Social History     Socioeconomic History    Marital status:      Spouse name: Izabel Hung    Number of children: 2    Years of education: 15    Highest education level: None   Occupational History    Occupation: retired   Tobacco Use    Smoking status: Former     Packs/day: 0.25     Years: 9.00     Pack years: 2.25     Types: Cigarettes     Start date:      Quit date: 1985     Years since quittin.9    Smokeless tobacco: Never   Vaping Use    Vaping Use: Never used   Substance and Sexual Activity    Alcohol use: No     Alcohol/week: 0.0 standard drinks    Drug use: No    Sexual activity: Not Currently     Partners: Male     Social Determinants of Health     Financial Resource Strain: Low Risk     Difficulty of Paying Living Expenses: Not hard at all   Food Insecurity: No Food Insecurity    Worried About [facility] in the Last Year: Never true    [facility] in the Last Year: Never true   Transportation Needs: No Transportation Needs    Lack of Transportation (Medical): No    Lack of Transportation (Non-Medical): No   Physical Activity: Insufficiently Active    Days of Exercise per Week: 7 days    Minutes of Exercise per Session: 20 min   Stress: No Stress Concern Present    Feeling of Stress : Not at all   Social Connections:  Moderately Integrated    Frequency of Communication with Friends and Family: Twice a week Take 1 tablet by mouth 2 times daily as needed for Nausea or Vomiting 15 tablet 1    ketotifen (ZADITOR) 0.025 % ophthalmic solution Place 1 drop into both eyes 2 times daily      sertraline (ZOLOFT) 25 MG tablet Take 25 mg by mouth daily       Handicap Placard MISC by Does not apply route Good for 3 years 1 each 0    divalproex (DEPAKOTE ER) 250 MG extended release tablet Take 250 mg by mouth nightly       Multiple Vitamins-Minerals (OCUVITE-LUTEIN PO) Take by mouth      calcium-vitamin D (OSCAL-500) 500-200 MG-UNIT per tablet Take 1 tablet by mouth 2 times daily        aspirin 81 MG EC tablet Take 81 mg by mouth daily. No current facility-administered medications for this visit. Allergies   Allergen Reactions    Alendronate Sodium Other (See Comments)     GI upset    Bactrim [Sulfamethoxazole-Trimethoprim] Anaphylaxis    Boniva [Ibandronate Sodium] Other (See Comments)     Gi upset and declined egd; also to fosamax    Colesevelam     Lisinopril Other (See Comments)     Severe hyperkalemia (6) with bun >56      Macrobid [Nitrofurantoin]     Neggram [Nalidixic Acid]     Pce [Erythromycin]     Penicillins     Risperidone And Related     Welchol [Colesevelam Hcl] Other (See Comments)     constipation    Carbamazepine Nausea And Vomiting    Lovaza [Omega-3-Acid Ethyl Esters] Nausea And Vomiting    Metoprolol Nausea And Vomiting    Pyridium [Phenazopyridine] Palpitations       Vitals:    10/24/22 1441   BP: 117/69   Pulse: 73   Resp: 16   Weight: 132 lb 1.6 oz (59.9 kg)   Height: 5' 3\" (1.6 m)         Review of Systems:   Review of Systems   Constitutional: Negative. HENT: Negative. Eyes: Negative. Respiratory: Negative. Cardiovascular: Negative. Gastrointestinal: Negative. Genitourinary: Negative. Musculoskeletal:  Positive for arthralgias and myalgias. Skin: Negative. Neurological: Negative. Psychiatric/Behavioral: Negative.           Physical Exam:   Gen/Psych:Examination reveals a pleasant individual in no acute distress. The patient is oriented to time, place and person. The patient's mood and affect are appropriate. Patient appears well nourished. Body habitus is normal    HEENT: Head is atraumatic normocephalic, ears are symmetric, eyes show equal pupils bilaterally, extraocular muscles intact. Hearing is intact to normal voice at 5 feet. Nares are patent bilaterally, no epistaxis, no rhinorrhea. Lymph:  No obvious lymphedema in bilateral upper extremities     Skin: Intact in bilateral upper extremities with no ulcerations, lesions, rash, erythema. Vascular: There are no varicosities in bilateral upper  extremities, sensation intact to light touch over bilateral upper extremities. Musculoskeletal:  Right shoulder exam:  Skin:  Clear with no erythema, there is no significant joint effusion  Deformity:  none  Atrophy:  none  Tenderness:  mild globally  Active ROM:   FE:150    IR side: L4           ER side: 40   Ad/Abduction: 160        Strength: FE:5-/5     ER:5/5   Neer:  moderately positive  Ferrari:  moderately positive    Imaging studies:  X-rays were reviewed which showed flattening of the humeral head and joint space narrowing as well as mild superior migration of the humeral head. Assessment:    Diagnosis Orders   1. Primary osteoarthritis of right shoulder  ND ARTHROCENTESIS ASPIR&/INJ MAJOR JT/BURSA W/O US            Plan:   Patient Instructions   Continue to weight bear as tolerated  Continue range of motion   Ice and elevate as needed  Tylenol or motrin for pain   Injection given today in office   Rest for 24-48 hours  Follow up as needed      Right Glenohumeral [facility]) Joint Aspiration / Injection Procedure:  Multiple treatment options were discussed. This injection was recommended as a part of the overall treatment plan. Details of the procedure, potential risks, and potential benefits were discussed. Patient's questions were answered.   Patient elected to proceed with procedure. Medication: 1 mL's of 1% plain lidocaine, 1 mL 0.5% bupivacaine, 1 mL (40 mg/mL) Kenalog,  Procedure:  Sterile technique was used as the skin over the injection site was prepped with alcohol. The right glenohumeral joint was then injected with the above listed medication. A sterile bandage was placed over the injection site. The patient tolerated the procedure well without complication. *Please note this report has been partially produced using speech recognition Dragon software and may contain errors related to that system including errors in grammar, punctuation, and spelling, as well as words and phrases that may be inappropriate.  If there are any questions or concerns please feel free to contact the dictating provider for clarification

## 2022-10-25 ASSESSMENT — ENCOUNTER SYMPTOMS
EYES NEGATIVE: 1
RESPIRATORY NEGATIVE: 1
GASTROINTESTINAL NEGATIVE: 1

## 2022-10-27 ENCOUNTER — CARE COORDINATION (OUTPATIENT)
Dept: CARE COORDINATION | Age: 87
End: 2022-10-27

## 2022-10-27 NOTE — CARE COORDINATION
Ambulatory Care Coordination Note  10/28/2022    ACC: Justin Hernandez RN    ACM call to pt, spoke to Geovanni Ponce. States she is doing well, just a little tired from the weekend. [facility] she had her family over and cooked. [facility] she has been active with Melissa Tan RD and has been introducing new items back into her diet slowly. States she continues with low fiber diet d/t fear of another diverticulitis flare. ACM encouraged to continue with RD. Dicussed recent Ortho visit and steroid injection on 10/24. States her right shoulder is feeling much better. [facility] had requested PT orders for [facility] for left hip pain. Confirmed referral was placed to [facility]. Pt confirms she has eval scheduled next week 11/3. Confirmed upcoming appt for B12 injection on 11/4. Pt sees Dr. Lora Rodríguez for Nephrology and has appt 11/29. ACM discussed new [facility] Explained Edrie Kirby - CM will be taking over 11/17. ACM explained she would reach out to pt again prior to hand-off. Pt voiced understanding. CHF: Reports current weight was 131. Denies any edema or SOB. Discussed monitor sodium intake. Discussed s/s to monitor and when to contact PCP.     Plan:   CHF education    Offered patient enrollment in the Remote Patient Monitoring (RPM) program for in-home monitoring: NA.    Congestive Heart Failure Assessment    Are you currently restricting fluids?: No Restriction  Do you understand a low sodium diet?: Yes  Do you understand how to read food labels?: Yes  How many restaurant meals do you eat per week?: 0  Do you salt your food before tasting it?: No     No patient-reported symptoms      Symptoms:  None: Yes      Symptom course: stable  Patient-reported weight (lb): 131  Weight trend: stable  Salt intake watch compared to last visit: stable      and   General Assessment    Do you have any symptoms that are causing concern?: No             Lab Results       None            Care Coordination Interventions    Referral from Primary Care Provider: No  Suggested Interventions and Community Resources  Fall Risk Prevention: In Process  Home Care Waiver: Declined  Medication Assistance Program: Declined  Registered Dietician: Completed  Transportation Support: Completed  Zone Management Tools: In Process          Goals Addressed                   This Visit's Progress     Conditions and Symptoms   On track     I will schedule office visits, as directed by my provider. I will keep my appointment or reschedule if I have to cancel. I will notify my provider of any barriers to my plan of care. I will follow my Zone Management tool to seek urgent or emergent care. I will notify my provider of any symptoms that indicate a worsening of my condition. Barriers: fear of failure, lack of support, overwhelmed by complexity of regimen, and lack of education  Plan for overcoming my barriers: Utilize [facility] for education and community resources  Confidence: 8/10  Anticipated Goal Completion Date: 11/25/2022                Prior to Admission medications    Medication Sig Start Date End Date Taking?  Authorizing Provider   furosemide (LASIX) 20 MG tablet Take 1 tablet by mouth daily 9/13/22   Isa Germain MD   gabapentin (NEURONTIN) 300 MG capsule take 1 tablet by mouth three times a day for TRIGEMINAL NEURALGIA 9/13/22 6/30/23  Isa Germain MD   magnesium oxide (MAG-OX) 400 (240 Mg) MG tablet Take 1 tablet by mouth daily 8/18/22   Isa Germain MD   Cyanocobalamin 1000 MCG/ML KIT Inject as directed    Historical Provider, MD   verapamil (CALAN SR) 120 MG extended release tablet Take 1 tablet by mouth nightly 8/2/22   Jenni Velez MD   Promethazine HCl 6.25 MG/5ML SOLN take 5 milliliters by mouth three times a day if needed 6/23/22   Historical Provider, MD   Icosapent Ethyl (VASCEPA) 1 g CAPS capsule Take 2 capsules by mouth 2 times daily 6/28/22   Jenni Velez MD   acetaminophen (TYLENOL) 325 mg tablet Take 650 mg by mouth as needed for Pain    Historical Provider, MD   Multiple Vitamins-Minerals (CENTRUM SILVER ADULT 50+) TABS Take 1 tablet by mouth daily    Historical Provider, MD   fluticasone (FLONASE) 50 MCG/ACT nasal spray instill 1 spray into each nostril once daily 4/21/22   Dhaval Deleon MD   rosuvastatin (CRESTOR) 10 MG tablet take 1 tablet by mouth once daily 3/7/22   Dhaval Deleon MD   pyridostigmine (MESTINON) 60 MG tablet Take 0.5 tablets by mouth daily 2/11/22   Nicol Aceves MD   pantoprazole (PROTONIX) 40 MG tablet Take 1 tablet by mouth 2 times daily  Patient taking differently: Take 40 mg by mouth daily 1/25/22   Dhaval Deleon MD   ondansetron (ZOFRAN-ODT) 4 MG disintegrating tablet Take 1 tablet by mouth 2 times daily as needed for Nausea or Vomiting 12/9/21   Dhaval Deleon MD   ketotifen (ZADITOR) 0.025 % ophthalmic solution Place 1 drop into both eyes 2 times daily    Historical Provider, MD   sertraline (ZOLOFT) 25 MG tablet Take 25 mg by mouth daily  11/4/21   Historical Provider, MD   Handicap Placard MISC by Does not apply route Good for 3 years 7/12/21   Dhaval Deleon MD   divalproex (DEPAKOTE ER) 250 MG extended release tablet Take 250 mg by mouth nightly     Historical Provider, MD   Multiple Vitamins-Minerals (OCUVITE-LUTEIN PO) Take by mouth    Historical Provider, MD   calcium-vitamin D (OSCAL-500) 500-200 MG-UNIT per tablet Take 1 tablet by mouth 2 times daily      Historical Provider, MD   aspirin 81 MG EC tablet Take 81 mg by mouth daily.       Historical Provider, MD       Future Appointments   Date Time Provider Darvin Morales   11/4/2022  1:30 PM Kristin Burr Puls IM Dacia Cifuentes NOR [facility]   11/8/2022  2:30 PM Nicol Aceves MD [facility]   11/21/2022  2:00 PM Dhaval Deleon MD [facility]   11/29/2022 11:45 AM Festus Galvin MD AFLADVNPHHTN AFL [facility]   1/23/2023  1:15 PM Nikolai Allen PA-C [facility] SPM MMA   2/17/2023  1:00 PM INFUSION ROOM 1 - [facility] SRMZ INF [place]   8/14/2023 10:00 AM Srmx Pedro Im Awv Lpn N [facility] NOR [facility]

## 2022-10-28 ENCOUNTER — CARE COORDINATION (OUTPATIENT)
Dept: CARE COORDINATION | Age: 87
End: 2022-10-28

## 2022-10-28 NOTE — CARE COORDINATION
Nutrition Care Coordinator Follow-Up visit:    Food Recall:eating 2-3 meals/d    Activity Level:  Sedentary:X  Lightly Active: Moderately Active:  Very Active:    Adult BMI:  Underweight (below 18.5)  Normal Weight (18.5-24.9)X  Overweight (25-29. 9)  Obese (30-39. 9)  Morbidly Obese (>40)    Weight Change:down 5# over past 2 months    Plan:  Plan was established with patient:  Increase dietary fiber by consuming whole grains, fruits and vegetables:X  Limit dietary cholesterol to >200mg/day: Increase water intake:  Avoid added sugar:  Avoid sweetened beverages:  Choose lean meats:      Monitoring: Will monitor weight:  Will monitor adherence to meal plan:X  Will monitor adherence to exercise plan: Will monitor HGA1c:    Handouts Provided :  Low Carb snacking:  Carb counting /individual meal plan:  Portion Control:  Food Labels:X  Physical Activity:  Low Fat/Cholesterol:  Hypo/Hyperglycemia:  Calorie Controlled Meal Plan:    Goals: Increase water consumption to 8oz. 6-8 times daily:  Manage blood sugars by consuming 3 meals spaced every 4-5 hours with 2-3 snacks daily:  Increase fiber and decrease fat intake by consuming 1-2 fruit servings and 2-3 vegetable servings per day. disussed  Increase physical activity by:  Consume less than 2,000mg of sodium/day  Avoid consumption of sweetened beverages and added sugar by reading food labels:  Monitor blood sugars by using meter to check blood glucose before morning meal and 2 hours after a meal daily:  Decrease risk of coronary heart disease by consuming fish that contains omega-3 fatty acids at least twice a week, avoiding partially hydrogenated oil/trans fats and limiting saturated fat intake by reading food labels:reviewed    Patient goals set: 1. Patient states had symptoms this morning-diarrhea but starting to feel better. Reviewed starting to add fiber back into her diet 1 food at a time when she is not having any symptoms.  Encouraged adding cooked vegetables first then fresh vegetables/fruits one at a time. Patient was told to avoid seeds/ nuts - reviewed foods that contain seeds and nuts. 2. Reviewed avoiding- greasy, fried and sugary foods. Sugary beverages, juices, sodas, ect. Dairy products with high amounts of lactose, Foods sweetened with isomalt, mannitol, sorbitol and xylitol- artificial sweeteners. 3. Reviewed eating small frequent meals throughout the day- 4-6 small meals. 4. Reviewed menu options for breakfast/lunch and dinner when increasing fiber intake will send a list of high fiber foods and continue to encourage to add fiber slowly to meals/snacks. Patient states feels she was having a flare up this morning- diarrhea but starting to feel better. She is going to back off on adding high fiber foods for a few days. Reviewed slowly adding high fiber foods one at a time but waiting until she is not having any symptoms. Reviewed high fiber foods with patient. Will follow up in 3-4 weeks to review and answer questions.     Chuyita Delgadillo

## 2022-11-04 ENCOUNTER — NURSE ONLY (OUTPATIENT)
Dept: INTERNAL MEDICINE CLINIC | Age: 87
End: 2022-11-04
Payer: MEDICARE

## 2022-11-04 DIAGNOSIS — E53.8 VITAMIN B 12 DEFICIENCY: Primary | ICD-10-CM

## 2022-11-04 PROCEDURE — 96372 THER/PROPH/DIAG INJ SC/IM: CPT | Performed by: FAMILY MEDICINE

## 2022-11-04 RX ORDER — CYANOCOBALAMIN 1000 UG/ML
1000 INJECTION, SOLUTION INTRAMUSCULAR; SUBCUTANEOUS ONCE
Status: COMPLETED | OUTPATIENT
Start: 2022-11-04 | End: 2022-11-04

## 2022-11-04 RX ADMIN — CYANOCOBALAMIN 1000 MCG: 1000 INJECTION, SOLUTION INTRAMUSCULAR; SUBCUTANEOUS at 13:41

## 2022-11-14 DIAGNOSIS — G50.0 TIC DOULOUREUX: ICD-10-CM

## 2022-11-14 RX ORDER — GABAPENTIN 300 MG/1
CAPSULE ORAL
Qty: 270 CAPSULE | Refills: 1 | Status: SHIPPED | OUTPATIENT
Start: 2022-11-14 | End: 2022-11-15 | Stop reason: SDUPTHER

## 2022-11-15 ENCOUNTER — CARE COORDINATION (OUTPATIENT)
Dept: CARE COORDINATION | Age: 87
End: 2022-11-15

## 2022-11-15 ENCOUNTER — TELEPHONE (OUTPATIENT)
Dept: INTERNAL MEDICINE CLINIC | Age: 87
End: 2022-11-15

## 2022-11-15 DIAGNOSIS — G50.0 TIC DOULOUREUX: ICD-10-CM

## 2022-11-15 RX ORDER — GABAPENTIN 300 MG/1
CAPSULE ORAL
Qty: 270 CAPSULE | Refills: 1 | Status: SHIPPED | OUTPATIENT
Start: 2022-11-15 | End: 2023-09-01

## 2022-11-15 NOTE — CARE COORDINATION
ACM missed call from pt. VM received requesting return call to pt. Ambulatory Care Coordination Note  11/15/2022    ACC: Rolando Cushing Gleason, RN    ACM call to pt, spoke to [address]. States she is doing ok. States she is trying to get her medication refill sorted out. States she requested a refill of her Neurontin as she has been taking her \"flare up\" dose. States she had always taken a double dose per Dr. Henry Vivas when she has a flare up of Trigeminal Neuralgia. For the last 2 weeks, sine having a dental filling, she has had increased pain and been taking 2 capsules 3x daily. States she has enough left for 6 days at continued double dose. States pain has improved with increased dose and it had been \"quite some time\" since she had a flare up like this. States she contacted [employer] and they will not fill prescription sent yesterday by PCP d/t it being too soon to fill. Pt is upset as she is afraid of running out of medication. ACM explained she would contact PCP for advisement. States she has been active with Thomas Gordon RD. States she continues with low fiber diet d/t fear of another diverticulitis flare. ACM encouraged to continue with SALMA. Has started PT at [facility] for left hip pain. Has appt today. Pt sees Dr. Geovanny Stokes for Nephrology and has appt 11/29. ACM discussed new [facility] ([facility]). Explained Starling Jatin - CM will be taking over 11/17. Pt voiced understanding. CHF: Reports current weight was 132. Denies any edema or SOB. Discussed monitor sodium intake. Discussed s/s to monitor and when to contact PCP.      Plan:   Hand-ff to [facility] ACM 11/17    Offered patient enrollment in the Remote Patient Monitoring (RPM) program for in-home monitoring: NA.    Congestive Heart Failure Assessment    Are you currently restricting fluids?: No Restriction  Do you understand a low sodium diet?: Yes  Do you understand how to read food labels?: Yes  How many restaurant meals do you eat per week?: 0  Do you salt your food before tasting it?: No     No patient-reported symptoms      Symptoms:  None: Yes      Symptom course: stable  Patient-reported weight (lb): 132  Weight trend: stable  Salt intake watch compared to last visit: stable      and   General Assessment    Do you have any symptoms that are causing concern?: Yes  Progression since Onset: Gradually Improving  Reported Symptoms: Pain (Comment: Facial pain - trigeminal neuralgia)           Lab Results       None            Care Coordination Interventions    Referral from Primary Care Provider: No  Suggested Interventions and Community Resources  Fall Risk Prevention: In Process  Home Care Waiver: Declined  Medication Assistance Program: Declined  Registered Dietician: Completed  Transportation Support: Completed  Zone Management Tools: In Process          Goals Addressed                   This Visit's Progress     Conditions and Symptoms   On track     I will schedule office visits, as directed by my provider. I will keep my appointment or reschedule if I have to cancel. I will notify my provider of any barriers to my plan of care. I will follow my Zone Management tool to seek urgent or emergent care. I will notify my provider of any symptoms that indicate a worsening of my condition.     Barriers: fear of failure, lack of support, overwhelmed by complexity of regimen, and lack of education  Plan for overcoming my barriers: Utilize [facility] for education and community resources  Confidence: 8/10  Anticipated Goal Completion Date: 11/25/2022         Nutrition Plan   On track     I will read labels and limit fiber intake/ avoid high fiber foods    Barriers: lack of education  Plan for overcoming my barriers: CC dietitian to educate patient on low fiber foods, limiting fiber at meals and snacks and introducing fibrous foods slowly  Confidence: 6/10  Anticipated Goal Completion Date: 11/29/22  10/11/22- patient will slowly introduce fiber to diet- 1 food at a time. New date 11/30/22              Prior to Admission medications    Medication Sig Start Date End Date Taking?  Authorizing Provider   gabapentin (NEURONTIN) 300 MG capsule take 1 tablet by mouth three times a day for TRIGEMINAL NEURALGIA  Patient taking differently: Indications: Taking 2 tabs 3x daily for Tigeminal Neuralgia flare up take 1 tablet by mouth three times a day for TRIGEMINAL NEURALGIA 11/14/22 8/31/23 Yes Leticia Etienne MD   furosemide (LASIX) 20 MG tablet Take 1 tablet by mouth daily 9/13/22   Leticia Etienne MD   magnesium oxide (MAG-OX) 400 (240 Mg) MG tablet Take 1 tablet by mouth daily 8/18/22   Leticia Etienne MD   Cyanocobalamin 1000 MCG/ML KIT Inject as directed    Historical Provider, MD   verapamil (CALAN SR) 120 MG extended release tablet Take 1 tablet by mouth nightly 8/2/22   Bobo Walker MD   Promethazine HCl 6.25 MG/5ML SOLN take 5 milliliters by mouth three times a day if needed 6/23/22   Historical Provider, MD   Icosapent Ethyl (VASCEPA) 1 g CAPS capsule Take 2 capsules by mouth 2 times daily 6/28/22   Bobo Walker MD   acetaminophen (TYLENOL) 325 mg tablet Take 650 mg by mouth as needed for Pain    Historical Provider, MD   Multiple Vitamins-Minerals (CENTRUM SILVER ADULT 50+) TABS Take 1 tablet by mouth daily    Historical Provider, MD   fluticasone (FLONASE) 50 MCG/ACT nasal spray instill 1 spray into each nostril once daily 4/21/22   Leticia Etienne MD   rosuvastatin (CRESTOR) 10 MG tablet take 1 tablet by mouth once daily 3/7/22   Leticia Etienne MD   pyridostigmine (MESTINON) 60 MG tablet Take 0.5 tablets by mouth daily 2/11/22   Bobo Walker MD   pantoprazole (PROTONIX) 40 MG tablet Take 1 tablet by mouth 2 times daily  Patient taking differently: Take 40 mg by mouth daily 1/25/22   Leticia Etienne MD   ondansetron (ZOFRAN-ODT) 4 MG disintegrating tablet Take 1 tablet by mouth 2 times daily as needed for Nausea or Vomiting 12/9/21 Ramsey Spurling, MD   ketotifen (ZADITOR) 0.025 % ophthalmic solution Place 1 drop into both eyes 2 times daily    Historical Provider, MD   sertraline (ZOLOFT) 25 MG tablet Take 25 mg by mouth daily  11/4/21   Historical Provider, MD   Handicap Placard MISC by Does not apply route Good for 3 years 7/12/21   Ramsey Spurling, MD   divalproex (DEPAKOTE ER) 250 MG extended release tablet Take 250 mg by mouth nightly     Historical Provider, MD   Multiple Vitamins-Minerals (OCUVITE-LUTEIN PO) Take by mouth    Historical Provider, MD   calcium-vitamin D (OSCAL-500) 500-200 MG-UNIT per tablet Take 1 tablet by mouth 2 times daily      Historical Provider, MD   aspirin 81 MG EC tablet Take 81 mg by mouth daily.       Historical Provider, MD       Future Appointments   Date Time Provider Darvin Morales   11/21/2022  2:00 PM Ramsey Spurling, MD Mady Schneider NOR [facility]   11/29/2022 11:45 AM Mita Rodriguez MD AFLADVNPHHTN AFL [facility]   12/2/2022  1:00 PM SCHEDULE, ALFONSO IM NS N [place] NOR [place]   12/7/2022 12:15 PM Adrienen Mckenzie MD [facility] Heart [facility]   1/23/2023  1:15 PM Leona Allen PA-C [facility]   2/17/2023  1:00 PM INFUSION ROOM 1 - [address]   8/14/2023 10:00 AM Srmx [place] Im Awv Lpn N [place] NOR [facility]

## 2022-11-15 NOTE — TELEPHONE ENCOUNTER
I can try sending in a new RX to a different pharmacy if [address] [place] [address] won't fill it. Otherwise can we try calling [facility] and authorize the early fill?

## 2022-11-15 NOTE — TELEPHONE ENCOUNTER
Pt called and is concerned about her Gabapentin Rx. Pt states sometimes she has to take an extra Gabapentin d/t increased pain and her pharmacy will not let her refill too soon. Pt would like for her sig to be 1-2 tablets tid instead of 1tab tid Please advise.

## 2022-11-15 NOTE — TELEPHONE ENCOUNTER
Another encounter open for same issue, but I've reordered the RX with adjusted sig as requested. If they still won't fill the medication, please see previous encounter. Thanks. Neuro

## 2022-11-16 NOTE — CARE COORDINATION
ACM chart review. New prescription e-scribed with sig adjusted to [facility]. ACM call to [facility]. Spoke to Pharmacist, Madhav Hargrove. States prescription has been filled and ready to customer pickup and she had been notified. ACM call to pt. No answer. Left  requesting return call to ACM with any questions or issues.

## 2022-11-18 ENCOUNTER — CARE COORDINATION (OUTPATIENT)
Dept: CARE COORDINATION | Age: 87
End: 2022-11-18

## 2022-11-18 NOTE — CARE COORDINATION
ACM provided patient report hand off to Marquis Irvin RN for [address] [place] [address] for Case Management. No further outreach to be completed for CC. Episode resolved and PCP notified.

## 2022-11-21 ENCOUNTER — TELEPHONE (OUTPATIENT)
Dept: INTERNAL MEDICINE CLINIC | Age: 87
End: 2022-11-21

## 2022-11-21 DIAGNOSIS — R51.9 FACIAL PAIN: Primary | ICD-10-CM

## 2022-11-21 RX ORDER — TRAMADOL HYDROCHLORIDE 50 MG/1
50 TABLET ORAL 2 TIMES DAILY PRN
Qty: 15 TABLET | Refills: 0 | Status: SHIPPED | OUTPATIENT
Start: 2022-11-21 | End: 2022-11-28

## 2022-11-21 NOTE — TELEPHONE ENCOUNTER
I've called in some tramadol for the patient to try for pain, but if it does not help at all, I would advise her to follow up with her dentist to ensure this is not related to her recent procedure. Thanks!

## 2022-11-21 NOTE — TELEPHONE ENCOUNTER
Informed patient of results and they voiced understanding. She will also be calling the dentist office to advise them what she was given and see if they want to see her again.

## 2022-11-21 NOTE — TELEPHONE ENCOUNTER
----- Message from Lacy Norman sent at 11/21/2022  9:53 AM EST -----  Subject: Message to Provider    QUESTIONS  Information for Provider? Lakshmi Ybarra is wanting to know if she can get   prescribed something for her pain from [place] in her face. She had an   appointment today with Dr. Facundo Chavez but is unable to make it due to the   amount of pain she is in.   ---------------------------------------------------------------------------  --------------  [id number] OOYYO  [phone]; OK to leave message on voicemail  ---------------------------------------------------------------------------  --------------  SCRIPT ANSWERS  Relationship to Patient?  Self

## 2022-11-21 NOTE — TELEPHONE ENCOUNTER
Called and spoke with pt. She is taking 2 tabs of gabapentin TID, but it is not helping. Pt states the pain starts next to the tooth she had filled about 3-4 weeks ago and shoots up to her forehead. Pt states she consulted with her dentist and they are not sure if it is from the dental work she had done. Pt states that the pain is worse in the cold weather.

## 2022-11-22 ENCOUNTER — CARE COORDINATION (OUTPATIENT)
Dept: CARE COORDINATION | Age: 87
End: 2022-11-22

## 2022-11-22 NOTE — CARE COORDINATION
Nutrition Care Coordinator Follow-Up visit:    Food Recall:eating 2-3 meals/d    Activity Level:  Sedentary:X  Lightly Active: Moderately Active:  Very Active:    Adult BMI:  Underweight (below 18.5)  Normal Weight (18.5-24.9)X  Overweight (25-29. 9)  Obese (30-39. 9)  Morbidly Obese (>40)    Weight Change:no change    Plan:  Plan was established with patient:  Increase dietary fiber by consuming whole grains, fruits and vegetables:C  Limit dietary cholesterol to >200mg/day: Increase water intake:  Avoid added sugar:  Avoid sweetened beverages:  Choose lean meats:      Monitoring: Will monitor weight:  Will monitor adherence to meal plan:X  Will monitor adherence to exercise plan: Will monitor HGA1c:    Handouts Provided :  Low Carb snacking:  Carb counting /individual meal plan:  Portion Control:  Food Labels:  Physical Activity:  Low Fat/Cholesterol:  Hypo/Hyperglycemia:  Calorie Controlled Meal Plan:    Goals: Increase water consumption to 8oz. 6-8 times daily:  Manage blood sugars by consuming 3 meals spaced every 4-5 hours with 2-3 snacks daily:  Increase fiber and decrease fat intake by consuming 1-2 fruit servings and 2-3 vegetable servings per day. reviewed  Increase physical activity by:  Consume less than 2,000mg of sodium/day  Avoid consumption of sweetened beverages and added sugar by reading food labels:  Monitor blood sugars by using meter to check blood glucose before morning meal and 2 hours after a meal daily:  Decrease risk of coronary heart disease by consuming fish that contains omega-3 fatty acids at least twice a week, avoiding partially hydrogenated oil/trans fats and limiting saturated fat intake by reading food labels:    Patient goals set: 1. Patient states doing well, feeling better since we last talked. Reviewed starting to add fiber back into her diet 1 food at a time when she is not having any symptoms.  Encouraged adding cooked vegetables first then fresh/raw vegetables/fruits one at a time. Patient was told to avoid seeds/ nuts - reviewed foods that contain seeds and nuts. 2. Reviewed avoiding- greasy, fried and sugary foods. Sugary beverages, juices, sodas, ect. Dairy products with high amounts of lactose, Foods sweetened with isomalt, mannitol, sorbitol and xylitol- artificial sweeteners. 3. Reviewed eating small frequent meals throughout the day- 4-6 small meals. 4. Reviewed menu options for breakfast/lunch and dinner when increasing fiber intake will send a list of high fiber foods and continue to encourage to add fiber slowly to meals/snacks. Patient states doing well but still scared to add too much fiber back into her diet. Reviewed slowly adding high fiber foods in one at a time. Reviewed high fiber foods with patient. Will follow up in 3-4 weeks to review and answer questions.     Cha Will

## 2022-12-02 ENCOUNTER — NURSE ONLY (OUTPATIENT)
Dept: INTERNAL MEDICINE CLINIC | Age: 87
End: 2022-12-02

## 2022-12-02 DIAGNOSIS — E53.8 VITAMIN B 12 DEFICIENCY: Primary | ICD-10-CM

## 2022-12-02 RX ORDER — CYANOCOBALAMIN 1000 UG/ML
1000 INJECTION, SOLUTION INTRAMUSCULAR; SUBCUTANEOUS ONCE
Status: COMPLETED | OUTPATIENT
Start: 2022-12-02 | End: 2022-12-02

## 2022-12-02 RX ADMIN — CYANOCOBALAMIN 1000 MCG: 1000 INJECTION, SOLUTION INTRAMUSCULAR; SUBCUTANEOUS at 13:26

## 2022-12-05 ENCOUNTER — OFFICE VISIT (OUTPATIENT)
Dept: INTERNAL MEDICINE CLINIC | Age: 87
End: 2022-12-05
Payer: MEDICARE

## 2022-12-05 VITALS
OXYGEN SATURATION: 97 % | HEART RATE: 67 BPM | WEIGHT: 138 LBS | DIASTOLIC BLOOD PRESSURE: 66 MMHG | HEIGHT: 63 IN | SYSTOLIC BLOOD PRESSURE: 120 MMHG | BODY MASS INDEX: 24.45 KG/M2

## 2022-12-05 DIAGNOSIS — J02.9 PHARYNGITIS, UNSPECIFIED ETIOLOGY: Primary | ICD-10-CM

## 2022-12-05 LAB
INFLUENZA A ANTIBODY: NEGATIVE
INFLUENZA B ANTIBODY: NEGATIVE
S PYO AG THROAT QL: NORMAL

## 2022-12-05 PROCEDURE — 99214 OFFICE O/P EST MOD 30 MIN: CPT

## 2022-12-05 PROCEDURE — 87804 INFLUENZA ASSAY W/OPTIC: CPT

## 2022-12-05 PROCEDURE — 87880 STREP A ASSAY W/OPTIC: CPT

## 2022-12-05 PROCEDURE — G8484 FLU IMMUNIZE NO ADMIN: HCPCS

## 2022-12-05 PROCEDURE — 3074F SYST BP LT 130 MM HG: CPT

## 2022-12-05 PROCEDURE — 1123F ACP DISCUSS/DSCN MKR DOCD: CPT

## 2022-12-05 PROCEDURE — G8427 DOCREV CUR MEDS BY ELIG CLIN: HCPCS

## 2022-12-05 PROCEDURE — G8399 PT W/DXA RESULTS DOCUMENT: HCPCS

## 2022-12-05 PROCEDURE — 3078F DIAST BP <80 MM HG: CPT

## 2022-12-05 PROCEDURE — 1090F PRES/ABSN URINE INCON ASSESS: CPT

## 2022-12-05 PROCEDURE — G8420 CALC BMI NORM PARAMETERS: HCPCS

## 2022-12-05 PROCEDURE — 1036F TOBACCO NON-USER: CPT

## 2022-12-05 RX ORDER — BENZONATATE 100 MG/1
100 CAPSULE ORAL 3 TIMES DAILY PRN
Qty: 30 CAPSULE | Refills: 0 | Status: SHIPPED | OUTPATIENT
Start: 2022-12-05 | End: 2022-12-12

## 2022-12-05 SDOH — ECONOMIC STABILITY: FOOD INSECURITY: WITHIN THE PAST 12 MONTHS, YOU WORRIED THAT YOUR FOOD WOULD RUN OUT BEFORE YOU GOT MONEY TO BUY MORE.: NEVER TRUE

## 2022-12-05 SDOH — ECONOMIC STABILITY: FOOD INSECURITY: WITHIN THE PAST 12 MONTHS, THE FOOD YOU BOUGHT JUST DIDN'T LAST AND YOU DIDN'T HAVE MONEY TO GET MORE.: NEVER TRUE

## 2022-12-05 ASSESSMENT — ENCOUNTER SYMPTOMS
SINUS PAIN: 0
SORE THROAT: 1
CHOKING: 0
WHEEZING: 0
VOMITING: 0
CHEST TIGHTNESS: 0
NAUSEA: 0
COLOR CHANGE: 0
DIARRHEA: 0
CONSTIPATION: 0
STRIDOR: 0
SHORTNESS OF BREATH: 0
EYE PAIN: 0
RHINORRHEA: 1
EYE REDNESS: 0
FACIAL SWELLING: 0
TROUBLE SWALLOWING: 0
COUGH: 0
ABDOMINAL PAIN: 0
SINUS PRESSURE: 0
EYE DISCHARGE: 0

## 2022-12-05 ASSESSMENT — VISUAL ACUITY: OU: 1

## 2022-12-05 ASSESSMENT — SOCIAL DETERMINANTS OF HEALTH (SDOH): HOW HARD IS IT FOR YOU TO PAY FOR THE VERY BASICS LIKE FOOD, HOUSING, MEDICAL CARE, AND HEATING?: NOT VERY HARD

## 2022-12-05 NOTE — PROGRESS NOTES
Subjective:      Chief Complaint   Patient presents with    Pharyngitis    Laryngitis     Since last Friday and has gotten worse. Gargling with salt water    Other     Nerve pain in face and head on right side     HPI:  Celi Delvalle is a 82 y.o. female who presents today with sore throat, fatigue, body aches, chills, sinus congestion x 3 days. Denies cough, fever. Past Medical History:   Diagnosis Date    Arthritis of shoulder region, right 09/2014    Beny Santana    Blind right eye     following right cataract surg    Chronic depression 2009    Dr. Sharee Kiran    DVT (deep venous thrombosis) ([address] 1970    left leg    Former smoker     History of echocardiogram 09/21/2020    EF 55-60%, mild left ventricular hypertrophy, normal diastolic filling pattern for age, no signficiant valvular disease, no pericardial effusion     History of stress test 03/25/2021    normal LVEF calculated by the computer is probably falsely low.  LVEF is 40 %    Hx of Doppler ultrasound 03/01/2018    Carotid-mild 0-49% bilateral carotid,50% stenosis right subclavian    Hyperlipidemia     Hypertension     Macular degeneration     right    Mild cognitive impairment 02/2012    MMSE 26/30    MVP (mitral valve prolapse)     trace on echo 2014    Osteoporosis 05/2012    Pancytopenia 05/2011    mild with ; Dr Ashley Silva eval 2010    Peptic ulcer disease     Peripheral vascular disease ([address] 01/2016    angio; dis below ankles; pletal    Prediabetes 2006    Recurrent ventral incisional hernia 2013    medial apex of GB scar    S/P CABG x 3 2003    3-vessel; Dr [address]    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 Hardik Saxena; Right side      Past Surgical History:   Procedure Laterality Date    CATARACT REMOVAL Right 2010    poor result ; blind right eye; [place]    CHOLECYSTECTOMY      CORONARY ARTERY BYPASS GRAFT  8/2009    3 vessel SHOULDER ARTHROSCOPY Right     TUBAL LIGATION      URETER SURGERY      Right ureteral repair     Social History     Tobacco Use    Smoking status: Former     Packs/day: 0.25     Years: 9.00     Pack years: 2.25     Types: Cigarettes     Start date: 14     Quit date: 1985     Years since quittin.0    Smokeless tobacco: Never   Substance Use Topics    Alcohol use: No     Alcohol/week: 0.0 standard drinks      Review of Systems   Constitutional:  Positive for chills and fatigue. Negative for activity change, appetite change, diaphoresis, fever and unexpected weight change. HENT:  Positive for rhinorrhea and sore throat. Negative for congestion, ear discharge, ear pain, facial swelling, nosebleeds, postnasal drip, sinus pressure, sinus pain and trouble swallowing. Eyes:  Negative for pain, discharge, redness and visual disturbance. Respiratory:  Negative for cough, choking, chest tightness, shortness of breath, wheezing and stridor. Cardiovascular:  Negative for chest pain, palpitations and leg swelling. Gastrointestinal:  Negative for abdominal pain, constipation, diarrhea, nausea and vomiting. Genitourinary:  Negative for difficulty urinating, dysuria, flank pain and hematuria. Musculoskeletal:  Negative for gait problem and myalgias. Skin:  Negative for color change and pallor. Neurological:  Negative for dizziness, syncope, weakness, light-headedness, numbness and headaches. Psychiatric/Behavioral:  Negative for agitation, behavioral problems and decreased concentration. Prior to Visit Medications    Medication Sig Taking?  Authorizing Provider   gabapentin (NEURONTIN) 300 MG capsule Take 1-2 tabs by mouth up to three times a day for trigeminal neuralgia Yes Ramsey Spurling, MD   furosemide (LASIX) 20 MG tablet Take 1 tablet by mouth daily Yes Ramsey Spurling, MD   magnesium oxide (MAG-OX) 400 (240 Mg) MG tablet Take 1 tablet by mouth daily Yes Ramsey Spurling, MD   Cyanocobalamin 1000 MCG/ML KIT Inject as directed Yes Historical Provider, MD   verapamil (CALAN SR) 120 MG extended release tablet Take 1 tablet by mouth nightly Yes Kristina Alcocer MD   Promethazine HCl 6.25 MG/5ML SOLN take 5 milliliters by mouth three times a day if needed Yes Historical Provider, MD   Icosapent Ethyl (VASCEPA) 1 g CAPS capsule Take 2 capsules by mouth 2 times daily Yes Kristina Alcocer MD   acetaminophen (TYLENOL) 325 mg tablet Take 650 mg by mouth as needed for Pain Yes Historical Provider, MD   Multiple Vitamins-Minerals (CENTRUM SILVER ADULT 50+) TABS Take 1 tablet by mouth daily Yes Historical Provider, MD   fluticasone (FLONASE) 50 MCG/ACT nasal spray instill 1 spray into each nostril once daily Yes Dayna Alvarez MD   rosuvastatin (CRESTOR) 10 MG tablet take 1 tablet by mouth once daily Yes Dayna Alvarez MD   pyridostigmine (MESTINON) 60 MG tablet Take 0.5 tablets by mouth daily Yes Kristina Alcocer MD   pantoprazole (PROTONIX) 40 MG tablet Take 1 tablet by mouth 2 times daily  Patient taking differently: Take 40 mg by mouth daily Yes Dayna Alvarez MD   ondansetron (ZOFRAN-ODT) 4 MG disintegrating tablet Take 1 tablet by mouth 2 times daily as needed for Nausea or Vomiting Yes Dayna Alvarez MD   ketotifen (ZADITOR) 0.025 % ophthalmic solution Place 1 drop into both eyes 2 times daily Yes Historical Provider, MD   sertraline (ZOLOFT) 25 MG tablet Take 25 mg by mouth daily  Yes Historical Provider, MD   Handicap Placard MISC by Does not apply route Good for 3 years Yes Dayna Alvarez MD   divalproex (DEPAKOTE ER) 250 MG extended release tablet Take 250 mg by mouth nightly  Yes Historical Provider, MD   Multiple Vitamins-Minerals (OCUVITE-LUTEIN PO) Take by mouth Yes Historical Provider, MD   calcium-vitamin D (OSCAL-500) 500-200 MG-UNIT per tablet Take 1 tablet by mouth 2 times daily   Yes Historical Provider, MD   aspirin 81 MG EC tablet Take 81 mg by mouth daily. Yes Historical Provider, MD        Objective:      /66 (Site: Right Upper Arm, Position: Sitting, Cuff Size: Medium Adult)   Pulse 67   Ht 5' 3\" (1.6 m)   Wt 138 lb (62.6 kg)   SpO2 97%   BMI 24.45 kg/m²    Physical Exam  Vitals reviewed. Constitutional:       General: She is awake. She is not in acute distress. Appearance: Normal appearance. She is well-developed. She is not ill-appearing or toxic-appearing. HENT:      Head: Normocephalic. Jaw: There is normal jaw occlusion. Right Ear: Hearing, tympanic membrane, ear canal and external ear normal.      Left Ear: Hearing, tympanic membrane, ear canal and external ear normal.      Nose: Rhinorrhea present. No nasal tenderness or congestion. Right Sinus: No maxillary sinus tenderness or frontal sinus tenderness. Left Sinus: No maxillary sinus tenderness or frontal sinus tenderness. Mouth/Throat:      Lips: Pink. Mouth: Mucous membranes are moist.      Pharynx: Oropharynx is clear. Posterior oropharyngeal erythema present. Tonsils: Tonsillar exudate present. No tonsillar abscesses. Eyes:      General: Lids are normal. Vision grossly intact. Gaze aligned appropriately. Extraocular Movements: Extraocular movements intact. Conjunctiva/sclera: Conjunctivae normal.      Pupils: Pupils are equal, round, and reactive to light. Cardiovascular:      Rate and Rhythm: Normal rate and regular rhythm. Pulses: Normal pulses. Heart sounds: Normal heart sounds, S1 normal and S2 normal.   Pulmonary:      Effort: Pulmonary effort is normal. No respiratory distress. Breath sounds: Normal breath sounds. Abdominal:      General: Bowel sounds are normal.      Palpations: Abdomen is soft. Tenderness: There is no abdominal tenderness. There is no right CVA tenderness, left CVA tenderness, guarding or rebound. Musculoskeletal:         General: Normal range of motion.       Cervical back: Normal range of motion. Skin:     General: Skin is warm and dry. Capillary Refill: Capillary refill takes less than 2 seconds. Neurological:      General: No focal deficit present. Mental Status: She is alert and oriented to person, place, and time. Mental status is at baseline. GCS: GCS eye subscore is 4. GCS verbal subscore is 5. GCS motor subscore is 6. Cranial Nerves: No cranial nerve deficit. Sensory: Sensation is intact. No sensory deficit. Motor: Motor function is intact. Coordination: Coordination is intact. Gait: Gait is intact. Psychiatric:         Attention and Perception: Attention and perception normal.         Mood and Affect: Mood and affect normal.         Speech: Speech normal.         Behavior: Behavior normal. Behavior is cooperative. Thought Content: Thought content normal.         Cognition and Memory: Cognition and memory normal.         Judgment: Judgment normal.        Assessment / Plan:        1. Pharyngitis, unspecified etiology  Negative flu and strep. Likely viral etiology discussed continuing nasal rinses, Flonase, honey and gargling salt water. Addition of throat lozenges and PRN tessalon. Patient to call office if symptoms not showing improvement at 7-10 days. Discussed possible antibiotic options with PCP if needed later this week due to patient condition. Questions answered at time of appointment and patient agrees with plan of care. - POCT Influenza A/B  - POCT rapid strep A  - Benzocaine-Menthol (CEPACOL) 6-10 MG LOZG lozenge; Take 1 lozenge by mouth every 2 hours as needed for Sore Throat  Dispense: 18 lozenge; Refill: 0  - benzonatate (TESSALON) 100 MG capsule; Take 1 capsule by mouth 3 times daily as needed for Cough  Dispense: 30 capsule; Refill: 0      I have spent 25 minutes on this patient encounter. Patient voiced understanding and agreement with plan.   All questions/concerns were addressed, risks/side effects of

## 2022-12-09 ENCOUNTER — CARE COORDINATION (OUTPATIENT)
Dept: CARE COORDINATION | Age: 87
End: 2022-12-09

## 2022-12-09 NOTE — CARE COORDINATION
Patient goals reviewed: 1. Patient states doing well, feeling better since we last talked. Reviewed starting to add fiber back into her diet 1 food at a time when she is not having any symptoms. Encouraged adding cooked vegetables first then fresh/raw vegetables/fruits one at a time. Patient was told to avoid seeds/ nuts - reviewed foods that contain seeds and nuts. 2. Reviewed avoiding- greasy, fried and sugary foods. Sugary beverages, juices, sodas, ect. Dairy products with high amounts of lactose, Foods sweetened with isomalt, mannitol, sorbitol and xylitol- artificial sweeteners. 3. Reviewed eating small frequent meals throughout the day- 4-6 small meals. 4. Reviewed menu options for breakfast/lunch and dinner when increasing fiber intake will send a list of high fiber foods and continue to encourage to add fiber slowly to meals/snacks. 12/9/22-patient has good understanding of high/low fiber foods. Provided with contact information to call with questions. Removed from care coordination, will remove from panel.    LAURA Tracy

## 2022-12-15 ENCOUNTER — CARE COORDINATION (OUTPATIENT)
Dept: CARE COORDINATION | Age: 87
End: 2022-12-15

## 2022-12-15 NOTE — CARE COORDINATION
Incoming call from patient with questions regarding going out to eat. Patient is avoiding high fiber foods still, she is still afraid to eat foods  High in fiber. Encouraged her to try 1 food at a time again slowly introducing  Fiber again. We discussed foods to avoid when eating out, patient   Verbalized understanding. Encouraged to call again with questions.   LAURA Tracy

## 2022-12-19 RX ORDER — LANOLIN ALCOHOL/MO/W.PET/CERES
CREAM (GRAM) TOPICAL
Qty: 30 TABLET | Refills: 3 | Status: SHIPPED | OUTPATIENT
Start: 2022-12-19

## 2022-12-29 ENCOUNTER — TELEPHONE (OUTPATIENT)
Dept: CARDIOLOGY CLINIC | Age: 87
End: 2022-12-29

## 2023-01-03 ENCOUNTER — OFFICE VISIT (OUTPATIENT)
Dept: INTERNAL MEDICINE CLINIC | Age: 88
End: 2023-01-03
Payer: MEDICARE

## 2023-01-03 VITALS
HEART RATE: 75 BPM | BODY MASS INDEX: 23.74 KG/M2 | WEIGHT: 134 LBS | SYSTOLIC BLOOD PRESSURE: 122 MMHG | DIASTOLIC BLOOD PRESSURE: 70 MMHG | OXYGEN SATURATION: 98 % | HEIGHT: 63 IN

## 2023-01-03 DIAGNOSIS — E53.8 VITAMIN B 12 DEFICIENCY: Primary | ICD-10-CM

## 2023-01-03 DIAGNOSIS — E78.2 MIXED HYPERLIPIDEMIA: ICD-10-CM

## 2023-01-03 DIAGNOSIS — R79.89 ABNORMAL TSH: ICD-10-CM

## 2023-01-03 DIAGNOSIS — D64.9 NORMOCYTIC ANEMIA: ICD-10-CM

## 2023-01-03 DIAGNOSIS — G50.0 TIC DOULOUREUX: ICD-10-CM

## 2023-01-03 DIAGNOSIS — N18.31 CKD STAGE G3A/A1, GFR 45-59 AND ALBUMIN CREATININE RATIO <30 MG/G (HCC): ICD-10-CM

## 2023-01-03 DIAGNOSIS — I50.22 CHRONIC SYSTOLIC (CONGESTIVE) HEART FAILURE (HCC): ICD-10-CM

## 2023-01-03 DIAGNOSIS — R73.03 PREDIABETES: ICD-10-CM

## 2023-01-03 DIAGNOSIS — L65.9 HAIR LOSS: ICD-10-CM

## 2023-01-03 DIAGNOSIS — I73.9 PAD (PERIPHERAL ARTERY DISEASE) (HCC): ICD-10-CM

## 2023-01-03 PROCEDURE — 96372 THER/PROPH/DIAG INJ SC/IM: CPT | Performed by: FAMILY MEDICINE

## 2023-01-03 PROCEDURE — G8420 CALC BMI NORM PARAMETERS: HCPCS | Performed by: FAMILY MEDICINE

## 2023-01-03 PROCEDURE — 3074F SYST BP LT 130 MM HG: CPT | Performed by: FAMILY MEDICINE

## 2023-01-03 PROCEDURE — G8484 FLU IMMUNIZE NO ADMIN: HCPCS | Performed by: FAMILY MEDICINE

## 2023-01-03 PROCEDURE — 1123F ACP DISCUSS/DSCN MKR DOCD: CPT | Performed by: FAMILY MEDICINE

## 2023-01-03 PROCEDURE — 3078F DIAST BP <80 MM HG: CPT | Performed by: FAMILY MEDICINE

## 2023-01-03 PROCEDURE — G8427 DOCREV CUR MEDS BY ELIG CLIN: HCPCS | Performed by: FAMILY MEDICINE

## 2023-01-03 PROCEDURE — G8399 PT W/DXA RESULTS DOCUMENT: HCPCS | Performed by: FAMILY MEDICINE

## 2023-01-03 PROCEDURE — 1090F PRES/ABSN URINE INCON ASSESS: CPT | Performed by: FAMILY MEDICINE

## 2023-01-03 PROCEDURE — 99214 OFFICE O/P EST MOD 30 MIN: CPT | Performed by: FAMILY MEDICINE

## 2023-01-03 PROCEDURE — 1036F TOBACCO NON-USER: CPT | Performed by: FAMILY MEDICINE

## 2023-01-03 RX ORDER — FUROSEMIDE 20 MG/1
20 TABLET ORAL DAILY
Qty: 90 TABLET | Refills: 1 | Status: SHIPPED | OUTPATIENT
Start: 2023-01-03

## 2023-01-03 RX ORDER — CYANOCOBALAMIN 1000 UG/ML
1000 INJECTION, SOLUTION INTRAMUSCULAR; SUBCUTANEOUS ONCE
Status: COMPLETED | OUTPATIENT
Start: 2023-01-03 | End: 2023-01-03

## 2023-01-03 RX ORDER — PANTOPRAZOLE SODIUM 40 MG/1
40 TABLET, DELAYED RELEASE ORAL DAILY
Qty: 90 TABLET | Refills: 1 | Status: SHIPPED | OUTPATIENT
Start: 2023-01-03

## 2023-01-03 RX ADMIN — CYANOCOBALAMIN 1000 MCG: 1000 INJECTION, SOLUTION INTRAMUSCULAR; SUBCUTANEOUS at 13:22

## 2023-01-03 ASSESSMENT — ENCOUNTER SYMPTOMS
SORE THROAT: 0
VOMITING: 0
CONSTIPATION: 0
ABDOMINAL PAIN: 0
COLOR CHANGE: 0
DIARRHEA: 0
CHEST TIGHTNESS: 0
SHORTNESS OF BREATH: 0

## 2023-01-03 ASSESSMENT — PATIENT HEALTH QUESTIONNAIRE - PHQ9
SUM OF ALL RESPONSES TO PHQ QUESTIONS 1-9: 1
2. FEELING DOWN, DEPRESSED OR HOPELESS: 0
10. IF YOU CHECKED OFF ANY PROBLEMS, HOW DIFFICULT HAVE THESE PROBLEMS MADE IT FOR YOU TO DO YOUR WORK, TAKE CARE OF THINGS AT HOME, OR GET ALONG WITH OTHER PEOPLE: 0
9. THOUGHTS THAT YOU WOULD BE BETTER OFF DEAD, OR OF HURTING YOURSELF: 0
4. FEELING TIRED OR HAVING LITTLE ENERGY: 0
3. TROUBLE FALLING OR STAYING ASLEEP: 0
7. TROUBLE CONCENTRATING ON THINGS, SUCH AS READING THE NEWSPAPER OR WATCHING TELEVISION: 0
5. POOR APPETITE OR OVEREATING: 0
SUM OF ALL RESPONSES TO PHQ QUESTIONS 1-9: 1
SUM OF ALL RESPONSES TO PHQ9 QUESTIONS 1 & 2: 1
SUM OF ALL RESPONSES TO PHQ QUESTIONS 1-9: 1
8. MOVING OR SPEAKING SO SLOWLY THAT OTHER PEOPLE COULD HAVE NOTICED. OR THE OPPOSITE, BEING SO FIGETY OR RESTLESS THAT YOU HAVE BEEN MOVING AROUND A LOT MORE THAN USUAL: 0
6. FEELING BAD ABOUT YOURSELF - OR THAT YOU ARE A FAILURE OR HAVE LET YOURSELF OR YOUR FAMILY DOWN: 0
SUM OF ALL RESPONSES TO PHQ QUESTIONS 1-9: 1
1. LITTLE INTEREST OR PLEASURE IN DOING THINGS: 1

## 2023-01-03 NOTE — PROGRESS NOTES
Subjective:      Chief Complaint   Patient presents with    Hair/Scalp Problem     Pt states she is losing her hair        HPI:  Keo Alexander is a 82 y.o. female who presents today for follow up of chronic conditions as listed below. Is still taking gabapentin 2 tabs 3 times a day. States she increases dose as needed for flares of facial pain. States she has been having more hair loss recently. Has been using a specialized shampoo. States she does have a history of her thyroid labs being abnormal, but has not needed to take any medication. Has also been having some increased L hip pain, but is already seeing ortho in 2 weeks. Otherwise no issues today. Requesting B12 injection. Past Medical History:   Diagnosis Date    Arthritis of shoulder region, right 09/2014    Stewart Hakarl    Blind right eye     following right cataract surg    Chronic depression 2009    Dr. Kush Swanson    DVT (deep venous thrombosis) ([facility] 1970    left leg    Former smoker     History of echocardiogram 09/21/2020    EF 55-60%, mild left ventricular hypertrophy, normal diastolic filling pattern for age, no signficiant valvular disease, no pericardial effusion     History of stress test 03/25/2021    normal LVEF calculated by the computer is probably falsely low.  LVEF is 40 %    Hx of Doppler ultrasound 03/01/2018    Carotid-mild 0-49% bilateral carotid,50% stenosis right subclavian    Hyperlipidemia     Hypertension     Macular degeneration     right    Mild cognitive impairment 02/2012    MMSE 26/30    MVP (mitral valve prolapse)     trace on echo 2014    Osteoporosis 05/2012    Pancytopenia 05/2011    mild with ; Dr Lucina hawk 2010    Peptic ulcer disease     Peripheral vascular disease ([facility] [address] 01/2016    angio; dis below ankles; pletal    Prediabetes 2006    Recurrent ventral incisional hernia 2013    medial apex of GB scar    S/P CABG x 3 2003    3-vessel; Dr Rochelle Bray    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 Jeremy Jauregui; Right side        Past Surgical History:   Procedure Laterality Date    CATARACT REMOVAL Right 2010    poor result ; blind right eye; [address] GRAFT  2009    3 vessel    SHOULDER ARTHROSCOPY Right     TUBAL LIGATION      URETER SURGERY      Right ureteral repair       Social History     Tobacco Use    Smoking status: Former     Packs/day: 0.25     Years: 9.00     Pack years: 2.25     Types: Cigarettes     Start date:      Quit date: 1985     Years since quittin.1    Smokeless tobacco: Never   Substance Use Topics    Alcohol use: No     Alcohol/week: 0.0 standard drinks        Review of Systems   Constitutional:  Negative for activity change, appetite change, chills, fever and unexpected weight change. HENT:  Negative for congestion and sore throat. Respiratory:  Negative for chest tightness and shortness of breath. Cardiovascular:  Negative for chest pain and palpitations. Gastrointestinal:  Negative for abdominal pain, constipation, diarrhea and vomiting. Genitourinary:  Negative for dysuria. Skin:  Negative for color change. Neurological:  Negative for dizziness and light-headedness. Psychiatric/Behavioral:  Negative for dysphoric mood. The patient is not nervous/anxious. Prior to Visit Medications    Medication Sig Taking?  Authorizing Provider   magnesium oxide (MAG-OX) 400 (240 Mg) MG tablet take 1 tablet by mouth once daily Yes Radha Rodriguez MD   gabapentin (NEURONTIN) 300 MG capsule Take 1-2 tabs by mouth up to three times a day for trigeminal neuralgia Yes Radha Rodriguez MD   furosemide (LASIX) 20 MG tablet Take 1 tablet by mouth daily Yes Radha Rodriguez MD   Cyanocobalamin 1000 MCG/ML KIT Inject as directed Yes Historical Provider, MD   verapamil (CALAN SR) 120 MG extended release tablet Take 1 tablet by mouth nightly Yes Familia Erazo MD   Promethazine HCl 6.25 MG/5ML SOLN take 5 milliliters by mouth three times a day if needed Yes Historical Provider, MD   Icosapent Ethyl (VASCEPA) 1 g CAPS capsule Take 2 capsules by mouth 2 times daily Yes Familia Erazo MD   acetaminophen (TYLENOL) 325 mg tablet Take 650 mg by mouth as needed for Pain Yes Historical Provider, MD   Multiple Vitamins-Minerals (CENTRUM SILVER ADULT 50+) TABS Take 1 tablet by mouth daily Yes Historical Provider, MD   fluticasone (FLONASE) 50 MCG/ACT nasal spray instill 1 spray into each nostril once daily Yes Brandi Payton MD   rosuvastatin (CRESTOR) 10 MG tablet take 1 tablet by mouth once daily Yes Brandi Payton MD   pyridostigmine (MESTINON) 60 MG tablet Take 0.5 tablets by mouth daily Yes Familia Erazo MD   pantoprazole (PROTONIX) 40 MG tablet Take 1 tablet by mouth 2 times daily  Patient taking differently: Take 40 mg by mouth daily Yes Brandi Payton MD   ondansetron (ZOFRAN-ODT) 4 MG disintegrating tablet Take 1 tablet by mouth 2 times daily as needed for Nausea or Vomiting Yes Brandi Payton MD   ketotifen (ZADITOR) 0.025 % ophthalmic solution Place 1 drop into both eyes 2 times daily Yes Historical Provider, MD   sertraline (ZOLOFT) 25 MG tablet Take 25 mg by mouth daily  Yes Historical Provider, MD   Handicap Placard MISC by Does not apply route Good for 3 years Yes Brandi Payton MD   divalproex (DEPAKOTE ER) 250 MG extended release tablet Take 250 mg by mouth nightly  Yes Historical Provider, MD   Multiple Vitamins-Minerals (OCUVITE-LUTEIN PO) Take by mouth Yes Historical Provider, MD   calcium-vitamin D (OSCAL-500) 500-200 MG-UNIT per tablet Take 1 tablet by mouth 2 times daily   Yes Historical Provider, MD   aspirin 81 MG EC tablet Take 81 mg by mouth daily.    Yes Historical Provider, MD          Objective:      /70 (Site: Left Upper Arm, Position: Sitting, Cuff Size: Medium Adult)   Pulse 75   Ht 5' 3\" (1.6 m)   Wt 134 lb (60.8 kg)   SpO2 98%   BMI 23.74 kg/m²      Physical Exam  Vitals and nursing note reviewed. Constitutional:       General: She is not in acute distress. Appearance: Normal appearance. She is not ill-appearing or toxic-appearing. HENT:      Head: Normocephalic and atraumatic. Right Ear: External ear normal.      Left Ear: External ear normal.      Nose: Nose normal.      Mouth/Throat:      Pharynx: Oropharynx is clear. Eyes:      General: No scleral icterus. Right eye: No discharge. Left eye: No discharge. Extraocular Movements: Extraocular movements intact. Conjunctiva/sclera: Conjunctivae normal.   Cardiovascular:      Rate and Rhythm: Normal rate and regular rhythm. Heart sounds: Normal heart sounds. Pulmonary:      Effort: Pulmonary effort is normal.      Breath sounds: Normal breath sounds. No wheezing or rales. Musculoskeletal:         General: No deformity. Cervical back: Normal range of motion and neck supple. No rigidity. Skin:     General: Skin is warm and dry. Findings: No rash. Neurological:      General: No focal deficit present. Mental Status: She is alert. Mental status is at baseline. Motor: No weakness. Psychiatric:         Mood and Affect: Mood normal.         Behavior: Behavior normal.          Assessment / Plan:      1. Vitamin B 12 deficiency  Administered today. - cyanocobalamin injection 1,000 mcg    2. Mixed hyperlipidemia  Continue crestor.   - Lipid Panel; Future    3. Prediabetes  Will monitor.   - Comprehensive Metabolic Panel; Future  - Hemoglobin A1C; Future    4. Abnormal TSH  History of abnormal labs, patient reporting increased hair loss. Will recheck.   - TSH; Future    5. Hair loss  Will screen for thyroid disorder.   - TSH; Future    6. Normocytic anemia  Overall stable, most recent Hb slightly improved.   Will monitor.   - CBC with Auto Differential; Future    7. Tic douloureux  Symptoms manageable with gabapentin- continue. 8. Chronic systolic (congestive) heart failure [facility])  Patient asymptomatic, following with cardiology. 9. PAD (peripheral artery disease) ([facility])  Asymptomatic, following with cardiology. Continue crestor. 10. CKD stage G3a/A1, GFR 45-59 and albumin creatinine ratio <30 mg/g ([facility])  Stable, will continue to monitor. I have spent 30 minutes on this patient encounter. Patient voiced understanding and agreement with plan. All questions/concerns were addressed, risks/side effects of medications were reviewed. Return precautions and after visit summary were provided. Return in about 3 months (around 4/3/2023). or earlier as needed.       Sebastien Trejo MD

## 2023-01-12 ENCOUNTER — HOSPITAL ENCOUNTER (OUTPATIENT)
Age: 88
Discharge: HOME | End: 2023-01-12
Payer: MEDICARE

## 2023-01-12 DIAGNOSIS — E78.2 MIXED HYPERLIPIDEMIA: ICD-10-CM

## 2023-01-12 DIAGNOSIS — L65.9 HAIR LOSS: ICD-10-CM

## 2023-01-12 DIAGNOSIS — E87.1 HYPONATREMIA: ICD-10-CM

## 2023-01-12 DIAGNOSIS — R73.03 PREDIABETES: ICD-10-CM

## 2023-01-12 DIAGNOSIS — D64.9 NORMOCYTIC ANEMIA: ICD-10-CM

## 2023-01-12 DIAGNOSIS — R79.89 ABNORMAL TSH: ICD-10-CM

## 2023-01-12 LAB
ALBUMIN SERPL-MCNC: 4.2 GM/DL (ref 3.4–5)
ALP BLD-CCNC: 41 IU/L (ref 40–128)
ALT SERPL-CCNC: 9 U/L (ref 10–40)
ANION GAP SERPL CALCULATED.3IONS-SCNC: 10 MMOL/L (ref 4–16)
AST SERPL-CCNC: 24 IU/L (ref 15–37)
BASOPHILS ABSOLUTE: 0 K/CU MM
BASOPHILS RELATIVE PERCENT: 0.5 % (ref 0–1)
BILIRUB SERPL-MCNC: 0.4 MG/DL (ref 0–1)
BILIRUBIN URINE: NEGATIVE MG/DL
BLOOD, URINE: NEGATIVE
BUN BLDV-MCNC: 21 MG/DL (ref 6–23)
CALCIUM SERPL-MCNC: 9.6 MG/DL (ref 8.3–10.6)
CHLORIDE BLD-SCNC: 94 MMOL/L (ref 99–110)
CHOLESTEROL: 120 MG/DL
CLARITY: CLEAR
CO2: 30 MMOL/L (ref 21–32)
COLOR: YELLOW
COMMENT UA: NORMAL
CREAT SERPL-MCNC: 1.2 MG/DL (ref 0.6–1.1)
DIFFERENTIAL TYPE: ABNORMAL
EOSINOPHILS ABSOLUTE: 0.2 K/CU MM
EOSINOPHILS RELATIVE PERCENT: 3.4 % (ref 0–3)
ESTIMATED AVERAGE GLUCOSE: 120 MG/DL
GFR SERPL CREATININE-BSD FRML MDRD: 44 ML/MIN/1.73M2
GLUCOSE BLD-MCNC: 140 MG/DL (ref 70–99)
GLUCOSE, URINE: NEGATIVE MG/DL
HBA1C MFR BLD: 5.8 % (ref 4.2–6.3)
HCT VFR BLD CALC: 36.3 % (ref 37–47)
HDLC SERPL-MCNC: 35 MG/DL
HEMOGLOBIN: 11.5 GM/DL (ref 12.5–16)
IMMATURE NEUTROPHIL %: 0.3 % (ref 0–0.43)
KETONES, URINE: NEGATIVE MG/DL
LDL CHOLESTEROL CALCULATED: 44 MG/DL
LEUKOCYTE ESTERASE, URINE: NEGATIVE
LYMPHOCYTES ABSOLUTE: 2.5 K/CU MM
LYMPHOCYTES RELATIVE PERCENT: 42.3 % (ref 24–44)
MAGNESIUM: 1.9 MG/DL (ref 1.8–2.4)
MCH RBC QN AUTO: 33.1 PG (ref 27–31)
MCHC RBC AUTO-ENTMCNC: 31.7 % (ref 32–36)
MCV RBC AUTO: 104.6 FL (ref 78–100)
MONOCYTES ABSOLUTE: 0.6 K/CU MM
MONOCYTES RELATIVE PERCENT: 10.8 % (ref 0–4)
NITRITE URINE, QUANTITATIVE: NEGATIVE
NUCLEATED RBC %: 0 %
PDW BLD-RTO: 13.5 % (ref 11.7–14.9)
PH, URINE: 5.5 (ref 5–8)
PHOSPHORUS: 4.5 MG/DL (ref 2.5–4.9)
PLATELET # BLD: 142 K/CU MM (ref 140–440)
PMV BLD AUTO: 9.8 FL (ref 7.5–11.1)
POTASSIUM SERPL-SCNC: 4.9 MMOL/L (ref 3.5–5.1)
PROTEIN UA: NEGATIVE MG/DL
RBC # BLD: 3.47 M/CU MM (ref 4.2–5.4)
SEGMENTED NEUTROPHILS ABSOLUTE COUNT: 2.5 K/CU MM
SEGMENTED NEUTROPHILS RELATIVE PERCENT: 42.7 % (ref 36–66)
SODIUM BLD-SCNC: 134 MMOL/L (ref 135–145)
SPECIFIC GRAVITY UA: 1.01 (ref 1–1.03)
TOTAL IMMATURE NEUTOROPHIL: 0.02 K/CU MM
TOTAL NUCLEATED RBC: 0 K/CU MM
TOTAL PROTEIN: 6.8 GM/DL (ref 6.4–8.2)
TRIGL SERPL-MCNC: 203 MG/DL
TSH HIGH SENSITIVITY: 2.19 UIU/ML (ref 0.27–4.2)
UROBILINOGEN, URINE: 0.2 MG/DL (ref 0.2–1)
WBC # BLD: 5.8 K/CU MM (ref 4–10.5)

## 2023-01-12 PROCEDURE — 81003 URINALYSIS AUTO W/O SCOPE: CPT

## 2023-01-12 PROCEDURE — 84100 ASSAY OF PHOSPHORUS: CPT

## 2023-01-12 PROCEDURE — 80053 COMPREHEN METABOLIC PANEL: CPT

## 2023-01-12 PROCEDURE — 85025 COMPLETE CBC W/AUTO DIFF WBC: CPT

## 2023-01-12 PROCEDURE — 36415 COLL VENOUS BLD VENIPUNCTURE: CPT

## 2023-01-12 PROCEDURE — 84443 ASSAY THYROID STIM HORMONE: CPT

## 2023-01-12 PROCEDURE — 80061 LIPID PANEL: CPT

## 2023-01-12 PROCEDURE — 83735 ASSAY OF MAGNESIUM: CPT

## 2023-01-12 PROCEDURE — 83036 HEMOGLOBIN GLYCOSYLATED A1C: CPT

## 2023-01-16 RX ORDER — DIPHENHYDRAMINE HYDROCHLORIDE 50 MG/ML
50 INJECTION INTRAMUSCULAR; INTRAVENOUS
Status: CANCELLED | OUTPATIENT
Start: 2023-02-17

## 2023-01-16 RX ORDER — EPINEPHRINE 1 MG/ML
0.3 INJECTION, SOLUTION, CONCENTRATE INTRAVENOUS PRN
Status: CANCELLED | OUTPATIENT
Start: 2023-02-17

## 2023-01-16 RX ORDER — ALBUTEROL SULFATE 90 UG/1
4 AEROSOL, METERED RESPIRATORY (INHALATION) PRN
Status: CANCELLED | OUTPATIENT
Start: 2023-02-17

## 2023-01-16 RX ORDER — SODIUM CHLORIDE 9 MG/ML
INJECTION, SOLUTION INTRAVENOUS CONTINUOUS
Status: CANCELLED | OUTPATIENT
Start: 2023-02-17

## 2023-01-16 RX ORDER — ONDANSETRON 2 MG/ML
8 INJECTION INTRAMUSCULAR; INTRAVENOUS
Status: CANCELLED | OUTPATIENT
Start: 2023-02-17

## 2023-01-16 RX ORDER — ACETAMINOPHEN 325 MG/1
650 TABLET ORAL
Status: CANCELLED | OUTPATIENT
Start: 2023-02-17

## 2023-01-16 RX ORDER — FAMOTIDINE 10 MG/ML
20 INJECTION, SOLUTION INTRAVENOUS
Status: CANCELLED | OUTPATIENT
Start: 2023-02-17

## 2023-01-23 ENCOUNTER — OFFICE VISIT (OUTPATIENT)
Dept: ORTHOPEDIC SURGERY | Age: 88
End: 2023-01-23
Payer: MEDICARE

## 2023-01-23 VITALS
BODY MASS INDEX: 24.1 KG/M2 | WEIGHT: 136 LBS | SYSTOLIC BLOOD PRESSURE: 112 MMHG | HEART RATE: 73 BPM | OXYGEN SATURATION: 95 % | DIASTOLIC BLOOD PRESSURE: 68 MMHG | RESPIRATION RATE: 16 BRPM | HEIGHT: 63 IN

## 2023-01-23 DIAGNOSIS — M19.011 PRIMARY OSTEOARTHRITIS OF RIGHT SHOULDER: Primary | ICD-10-CM

## 2023-01-23 PROCEDURE — 20610 DRAIN/INJ JOINT/BURSA W/O US: CPT | Performed by: PHYSICIAN ASSISTANT

## 2023-01-23 PROCEDURE — 99999 PR OFFICE/OUTPT VISIT,PROCEDURE ONLY: CPT | Performed by: PHYSICIAN ASSISTANT

## 2023-01-23 NOTE — PATIENT INSTRUCTIONS
Continue to weight bear as tolerated  Continue range of motion  Ice and elevate as needed  Tylenol or Motrin for pain  Injection in the right shoulder  Avoid any impact activities  Follow up as needed      We are committed to providing you the best care possible. If you receive a survey after visiting one of our offices, please take time to share your experience concerning your physician office visit. These surveys are confidential and no health information about you is shared. We are eager to improve for you and we are counting on your feedback to help make that happen.

## 2023-01-25 ASSESSMENT — ENCOUNTER SYMPTOMS
RESPIRATORY NEGATIVE: 1
GASTROINTESTINAL NEGATIVE: 1
EYES NEGATIVE: 1

## 2023-01-25 NOTE — PROGRESS NOTES
[facility]      HPI:  Campbell Bolaños is a 82 y.o. female that presents back to the office today with recurrent right shoulder pain. She last had an injection in the shoulder about 3 and half months ago. She states that injection worked up until about 2-1/2 weeks ago. She like to get another injection. Today she rates her pain at a 6/10, worse with motion. She has been working on motion at home so as to not get too stiff with the shoulder. She does not want to consider shoulder replacement. Past Medical History:   Diagnosis Date    Arthritis of shoulder region, right 09/2014    Elinda Serve    Blind right eye     following right cataract surg    Chronic depression 2009    Dr. Cristi Bess    DVT (deep venous thrombosis) ([facility] [address] 1970    left leg    Former smoker     History of echocardiogram 09/21/2020    EF 55-60%, mild left ventricular hypertrophy, normal diastolic filling pattern for age, no signficiant valvular disease, no pericardial effusion     History of stress test 03/25/2021    normal LVEF calculated by the computer is probably falsely low.  LVEF is 40 %    Hx of Doppler ultrasound 03/01/2018    Carotid-mild 0-49% bilateral carotid,50% stenosis right subclavian    Hyperlipidemia     Hypertension     Macular degeneration     right    Mild cognitive impairment 02/2012    MMSE 26/30    MVP (mitral valve prolapse)     trace on echo 2014    Osteoporosis 05/2012    Pancytopenia 05/2011    mild with ; Dr Georges hawk 2010    Peptic ulcer disease     Peripheral vascular disease ([address] 01/2016    angio; dis below ankles; pletal    Prediabetes 2006    Recurrent ventral incisional hernia 2013    medial apex of GB scar    S/P CABG x 3 2003    3-vessel; Dr Kevin Washington    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 Cass Damon; Right side       Past Surgical History:   Procedure Laterality Date    CATARACT REMOVAL Right     poor result ; blind right eye; [address] GRAFT  2009    3 vessel    SHOULDER ARTHROSCOPY Right     TUBAL LIGATION      URETER SURGERY      Right ureteral repair       Family History   Problem Relation Age of Onset    High Blood Pressure Mother     Heart Attack Mother     Other Mother         unsteady gait    Other Father         silcosis    Other Sister         brain damage    Heart Attack Sister     Stroke Sister     Breast Cancer Sister        Social History     Socioeconomic History    Marital status:       Spouse name: Aureliano Partida    Number of children: 2    Years of education: 15    Highest education level: None   Occupational History    Occupation: retired   Tobacco Use    Smoking status: Former     Packs/day: 0.25     Years: 9.00     Pack years: 2.25     Types: Cigarettes     Start date:      Quit date: 1985     Years since quittin.1    Smokeless tobacco: Never   Vaping Use    Vaping Use: Never used   Substance and Sexual Activity    Alcohol use: No     Alcohol/week: 0.0 standard drinks    Drug use: No    Sexual activity: Not Currently     Partners: Male     Social Determinants of Health     Financial Resource Strain: Low Risk     Difficulty of Paying Living Expenses: Not very hard   Food Insecurity: No Food Insecurity    Worried About Running Out of Food in the Last Year: Never true    Ran Out of Food in the Last Year: Never true   Physical Activity: Insufficiently Active    Days of Exercise per Week: 7 days    Minutes of Exercise per Session: 20 min       Current Outpatient Medications   Medication Sig Dispense Refill    pantoprazole (PROTONIX) 40 MG tablet Take 1 tablet by mouth daily 90 tablet 1    furosemide (LASIX) 20 MG tablet Take 1 tablet by mouth daily 90 tablet 1    magnesium oxide (MAG-OX) 400 (240 Mg) MG tablet take 1 tablet by mouth once daily 30 tablet 3    gabapentin (NEURONTIN) 300 MG capsule Take 1-2 tabs by mouth up to three times a day for trigeminal neuralgia 270 capsule 1    Cyanocobalamin 1000 MCG/ML KIT Inject as directed      verapamil (CALAN SR) 120 MG extended release tablet Take 1 tablet by mouth nightly 30 tablet 5    Promethazine HCl 6.25 MG/5ML SOLN take 5 milliliters by mouth three times a day if needed      Icosapent Ethyl (VASCEPA) 1 g CAPS capsule Take 2 capsules by mouth 2 times daily 360 capsule 3    acetaminophen (TYLENOL) 325 mg tablet Take 650 mg by mouth as needed for Pain      Multiple Vitamins-Minerals (CENTRUM SILVER ADULT 50+) TABS Take 1 tablet by mouth daily      fluticasone (FLONASE) 50 MCG/ACT nasal spray instill 1 spray into each nostril once daily 1 each 3    rosuvastatin (CRESTOR) 10 MG tablet take 1 tablet by mouth once daily 90 tablet 3    pyridostigmine (MESTINON) 60 MG tablet Take 0.5 tablets by mouth daily 45 tablet 5    ondansetron (ZOFRAN-ODT) 4 MG disintegrating tablet Take 1 tablet by mouth 2 times daily as needed for Nausea or Vomiting 15 tablet 1    ketotifen (ZADITOR) 0.025 % ophthalmic solution Place 1 drop into both eyes 2 times daily      sertraline (ZOLOFT) 25 MG tablet Take 25 mg by mouth daily       Handicap Placard MISC by Does not apply route Good for 3 years 1 each 0    divalproex (DEPAKOTE ER) 250 MG extended release tablet Take 250 mg by mouth nightly       Multiple Vitamins-Minerals (OCUVITE-LUTEIN PO) Take by mouth      calcium-vitamin D (OSCAL-500) 500-200 MG-UNIT per tablet Take 1 tablet by mouth 2 times daily        aspirin 81 MG EC tablet Take 81 mg by mouth daily. No current facility-administered medications for this visit.        Allergies   Allergen Reactions    Alendronate Sodium Other (See Comments)     GI upset    Bactrim [Sulfamethoxazole-Trimethoprim] Anaphylaxis    Boniva [Ibandronate Sodium] Other (See Comments)     Gi upset and declined egd; also to fosamax    Colesevelam     Lisinopril Other (See Comments)     Severe hyperkalemia (6) with bun >56      Macrobid [Nitrofurantoin]     Neggram [Nalidixic Acid]     Pce [Erythromycin]     Penicillins     Risperidone And Related     Welchol [Colesevelam Hcl] Other (See Comments)     constipation    Carbamazepine Nausea And Vomiting    Lovaza [Omega-3-Acid Ethyl Esters] Nausea And Vomiting    Metoprolol Nausea And Vomiting    Pyridium [Phenazopyridine] Palpitations       Vitals:    01/23/23 1317   BP: 112/68   Site: Right Upper Arm   Position: Sitting   Cuff Size: Medium Adult   Pulse: 73   Resp: 16   SpO2: 95%   Weight: 136 lb (61.7 kg)   Height: 5' 3\" (1.6 m)         Review of Systems:   Review of Systems   Constitutional: Negative. HENT: Negative. Eyes: Negative. Respiratory: Negative. Cardiovascular: Negative. Gastrointestinal: Negative. Genitourinary: Negative. Musculoskeletal:  Positive for arthralgias and myalgias. Skin: Negative. Neurological: Negative. Psychiatric/Behavioral: Negative. Physical Exam:   Gen/Psych:Examination reveals a pleasant individual in no acute distress. The patient is oriented to time, place and person. The patient's mood and affect are appropriate. Patient appears well nourished. Body habitus is normal    HEENT: Head is atraumatic normocephalic, ears are symmetric, eyes show equal pupils bilaterally, extraocular muscles intact. Hearing is intact to normal voice at 5 feet. Nares are patent bilaterally, no epistaxis, no rhinorrhea. Lymph:  No obvious lymphedema in bilateral upper extremities     Skin: Intact in bilateral upper extremities with no ulcerations, lesions, rash, erythema. Vascular: There are no varicosities in bilateral upper  extremities, sensation intact to light touch over bilateral upper extremities. Musculoskeletal:  Right shoulder exam:  Painful range of motion of the shoulder with approximately 110 degrees forward elevation.     Imaging studies:  X-rays were reviewed which showed flattening of the humeral head and joint space narrowing as well as mild superior migration of the humeral head. Assessment:    Diagnosis Orders   1. Primary osteoarthritis of right shoulder  ME ARTHROCENTESIS ASPIR&/INJ MAJOR JT/BURSA W/O US            Plan:   Patient Instructions   Continue to weight bear as tolerated  Continue range of motion  Ice and elevate as needed  Tylenol or Motrin for pain  Injection in the right shoulder  Avoid any impact activities  Follow up as needed      We are committed to providing you the best care possible. If you receive a survey after visiting one of our offices, please take time to share your experience concerning your physician office visit. These surveys are confidential and no health information about you is shared. We are eager to improve for you and we are counting on your feedback to help make that happen. Right Glenohumeral [facility]) Joint Aspiration / Injection Procedure:  Multiple treatment options were discussed. This injection was recommended as a part of the overall treatment plan. Details of the procedure, potential risks, and potential benefits were discussed. Patient's questions were answered. Patient elected to proceed with procedure. Medication: 1 mL's of 1% plain lidocaine, 1 mL 0.5% bupivacaine, 1 mL (40 mg/mL) Kenalog,  Procedure:  Sterile technique was used as the skin over the injection site was prepped with alcohol. The right glenohumeral joint was then injected with the above listed medication. A sterile bandage was placed over the injection site. The patient tolerated the procedure well without complication. *Please note this report has been partially produced using speech recognition Dragon software and may contain errors related to that system including errors in grammar, punctuation, and spelling, as well as words and phrases that may be inappropriate.  If there are any questions or concerns please feel free to contact the dictating provider for clarification

## 2023-02-03 ENCOUNTER — NURSE ONLY (OUTPATIENT)
Dept: INTERNAL MEDICINE CLINIC | Age: 88
End: 2023-02-03

## 2023-02-03 DIAGNOSIS — E53.8 VITAMIN B 12 DEFICIENCY: Primary | ICD-10-CM

## 2023-02-03 RX ORDER — CYANOCOBALAMIN 1000 UG/ML
1000 INJECTION, SOLUTION INTRAMUSCULAR; SUBCUTANEOUS ONCE
Status: COMPLETED | OUTPATIENT
Start: 2023-02-03 | End: 2023-02-03

## 2023-02-03 RX ADMIN — CYANOCOBALAMIN 1000 MCG: 1000 INJECTION, SOLUTION INTRAMUSCULAR; SUBCUTANEOUS at 13:37

## 2023-02-07 ENCOUNTER — OFFICE VISIT (OUTPATIENT)
Dept: CARDIOLOGY CLINIC | Age: 88
End: 2023-02-07
Payer: MEDICARE

## 2023-02-07 VITALS
WEIGHT: 139.2 LBS | HEIGHT: 63 IN | BODY MASS INDEX: 24.66 KG/M2 | HEART RATE: 62 BPM | SYSTOLIC BLOOD PRESSURE: 122 MMHG | DIASTOLIC BLOOD PRESSURE: 80 MMHG

## 2023-02-07 DIAGNOSIS — I73.9 PAD (PERIPHERAL ARTERY DISEASE) (HCC): ICD-10-CM

## 2023-02-07 DIAGNOSIS — I25.5 ISCHEMIC CARDIOMYOPATHY: ICD-10-CM

## 2023-02-07 DIAGNOSIS — I34.1 MVP (MITRAL VALVE PROLAPSE): ICD-10-CM

## 2023-02-07 DIAGNOSIS — I25.10 CORONARY ARTERY DISEASE INVOLVING NATIVE CORONARY ARTERY OF NATIVE HEART WITHOUT ANGINA PECTORIS: Primary | ICD-10-CM

## 2023-02-07 DIAGNOSIS — I10 ESSENTIAL HYPERTENSION: ICD-10-CM

## 2023-02-07 DIAGNOSIS — Z86.718 HISTORY OF DVT (DEEP VEIN THROMBOSIS): ICD-10-CM

## 2023-02-07 DIAGNOSIS — I25.810 CORONARY ARTERY DISEASE INVOLVING CORONARY BYPASS GRAFT OF NATIVE HEART WITHOUT ANGINA PECTORIS: ICD-10-CM

## 2023-02-07 DIAGNOSIS — E78.2 MIXED HYPERLIPIDEMIA: ICD-10-CM

## 2023-02-07 PROCEDURE — 1090F PRES/ABSN URINE INCON ASSESS: CPT | Performed by: INTERNAL MEDICINE

## 2023-02-07 PROCEDURE — G8420 CALC BMI NORM PARAMETERS: HCPCS | Performed by: INTERNAL MEDICINE

## 2023-02-07 PROCEDURE — 99213 OFFICE O/P EST LOW 20 MIN: CPT | Performed by: INTERNAL MEDICINE

## 2023-02-07 PROCEDURE — 1036F TOBACCO NON-USER: CPT | Performed by: INTERNAL MEDICINE

## 2023-02-07 PROCEDURE — 1123F ACP DISCUSS/DSCN MKR DOCD: CPT | Performed by: INTERNAL MEDICINE

## 2023-02-07 PROCEDURE — G8484 FLU IMMUNIZE NO ADMIN: HCPCS | Performed by: INTERNAL MEDICINE

## 2023-02-07 PROCEDURE — G8427 DOCREV CUR MEDS BY ELIG CLIN: HCPCS | Performed by: INTERNAL MEDICINE

## 2023-02-07 NOTE — PATIENT INSTRUCTIONS
CORONARY ARTERY DISEASE:Yes   clinically stable. Patient is on optimal medical regimen ( see medication list above )  -  Patient is currently  asymptomatic from CAD. - changes in  treatment:   no, on ASA           - Testing ordered:  no  [facility] classification: 1  3/2021    Normal study    Normal perfusion study with normal distribution in all coronal, short, and    horizontal axis. The observed defect is consistent with diaphragmatic attenuation. LVEF calculated by the computer is probably falsely low. LVEF is 40 %      HTN:BP is better. - changes in  treatment: no, on Verapamil   CARDIOMYOPATHY:  known   CONGESTIVE HEART FAILURE: NO KNOWN HISTORY.      VHD: No significant VHD noted  ECHO 9/2020   Left ventricular function is normal, EF is estimated at 55-60%. Mild left ventricular hypertrophy. Normal diastolic filling pattern for age. No significant valvular disease noted. No evidence of pericardial effusion. DYSLIPIDEMIA: Patient's profile is at / near Goal.no, Triglycerides are still high but better than before. HDL is low                                Tolerating current medical regimen wellyes, Takes Vascepa & Crestor                                                        See most recent Lab values in Labs section above. CAROTID ARTERY DISEASE:.Right Subclavian stenosis. No C/O      ARRHYTHMIAS:  Known H/O SVT. Frequent PVCs                                 No C/O   TESTS ORDERED:none this visit     PREVIOUSLY ORDERED TESTS REVIEWED & DISCUSSED WITH THE PATIENT:     I personally reviewed & interpreted, all previously ordered tests as copied above. Latest Labs are pulled in to the note with dates. Labs, specially in Reference to Lipid profile, Cardiac testing in the form of Echo ( dated: ), stress tests ( dated: ) & other relevant cardiac testing reviewed with patient & recommendations made based on assessment of the results.     Discussed role of Cardiac risk factors & effects + treatment of co morbidities with patient & advised accordingly. MEDICATIONS: List of medications patient is currently taking is reviewed in detail with the patient. Discussed any side effects or problems taking the medication. Recommend Continue present management & medications as listed. AFFIRMATION: I spent at least 20 minutes of time reviewing patient's history, previous & current medical problems & all Labs + testing. This includes chart prep even prior to the vosit. Various goals are discussed and multiple questions answered. Relevant concelling performed. Office follow up in one year.

## 2023-02-07 NOTE — LETTER
[address]  [address] [place] [address]  Phone: [phone]  Fax: [phone]    Blu Gale MD    February 7, 2023     [facility] [address]    Patient: Neo Scott   MR Number: [id number]   YOB: 1935   Date of Visit: 2/7/2023       Dear Bertrand Moreno: Thank you for referring Neo Scott to me for evaluation/treatment. Below are the relevant portions of my assessment and plan of care. If you have questions, please do not hesitate to call me. I look forward to following Ada along with you.     Sincerely,      Blu Gale MD

## 2023-02-07 NOTE — PROGRESS NOTES
Jolynn Castleman is a 82 y.o. female who has    CHIEF COMPLAINT AS FOLLOWS:  CHEST PAIN:  Patient denies any C/O chest pains at this time. SOB: No C/O SOB at this time. LEG EDEMA: No leg edema                          PALPITATIONS: Denies any C/O Palpitations   DIZZINESS:  No C/O dizziness. SYNCOPE: None   OTHER/ ADDITIONAL COMPLAINTS:   SYNCOPE: None   OTHER/ ADDITIONAL COMPLAINTS:                                     HPI: Patient is here for F/U on her CAD,  HTN & Dyslipidemia problems. CAD: Patient has known CAD. Had CABG in the past.  VHD: Patient has known mitral / aortic valve disease. Has had valve repair / surgery in the past.  HTN: Patient has known essential HTN. Has been treated with guideline recommended medical / physical/ diet therapy as stated below. Dyslipidemia: Patient has known mixed dyslipidemia. Has been treated with guideline recommended medical / physical/ diet therapy as stated below.                 Current Outpatient Medications   Medication Sig Dispense Refill    verapamil (CALAN SR) 120 MG extended release tablet Take 1 tablet by mouth nightly 30 tablet 5    pantoprazole (PROTONIX) 40 MG tablet Take 1 tablet by mouth daily 90 tablet 1    furosemide (LASIX) 20 MG tablet Take 1 tablet by mouth daily 90 tablet 1    magnesium oxide (MAG-OX) 400 (240 Mg) MG tablet take 1 tablet by mouth once daily 30 tablet 3    gabapentin (NEURONTIN) 300 MG capsule Take 1-2 tabs by mouth up to three times a day for trigeminal neuralgia 270 capsule 1    Cyanocobalamin 1000 MCG/ML KIT Inject as directed      Icosapent Ethyl (VASCEPA) 1 g CAPS capsule Take 2 capsules by mouth 2 times daily 360 capsule 3    acetaminophen (TYLENOL) 325 mg tablet Take 650 mg by mouth as needed for Pain      Multiple Vitamins-Minerals (CENTRUM SILVER ADULT 50+) TABS Take 1 tablet by mouth daily      fluticasone (FLONASE) 50 MCG/ACT nasal spray instill 1 spray into each nostril once daily 1 each 3    rosuvastatin (CRESTOR) 10 MG tablet take 1 tablet by mouth once daily 90 tablet 3    pyridostigmine (MESTINON) 60 MG tablet Take 0.5 tablets by mouth daily 45 tablet 5    ondansetron (ZOFRAN-ODT) 4 MG disintegrating tablet Take 1 tablet by mouth 2 times daily as needed for Nausea or Vomiting 15 tablet 1    ketotifen (ZADITOR) 0.025 % ophthalmic solution Place 1 drop into both eyes 2 times daily      sertraline (ZOLOFT) 25 MG tablet Take 25 mg by mouth daily       Handicap Placard MISC by Does not apply route Good for 3 years 1 each 0    divalproex (DEPAKOTE ER) 250 MG extended release tablet Take 250 mg by mouth nightly       Multiple Vitamins-Minerals (OCUVITE-LUTEIN PO) Take by mouth      aspirin 81 MG EC tablet Take 81 mg by mouth daily. Promethazine HCl 6.25 MG/5ML SOLN take 5 milliliters by mouth three times a day if needed (Patient not taking: Reported on 2/7/2023)      calcium-vitamin D (OSCAL-500) 500-200 MG-UNIT per tablet Take 1 tablet by mouth 2 times daily   (Patient not taking: Reported on 2/7/2023)       No current facility-administered medications for this visit.      Allergies: Alendronate sodium, Bactrim [sulfamethoxazole-trimethoprim], Boniva [ibandronate sodium], Colesevelam, Lisinopril, Macrobid [nitrofurantoin], Neggram [nalidixic acid], Pce [erythromycin], Penicillins, Risperidone and related, Welchol [colesevelam hcl], Carbamazepine, Lovaza [omega-3-acid ethyl esters], Metoprolol, and Pyridium [phenazopyridine]  Review of Systems:    Constitutional: Negative for diaphoresis and fatigue  Respiratory: Negative for shortness of breath  Cardiovascular: Negative for chest pain, dyspnea on exertion, claudication, edema, irregular heartbeat, murmur, palpitations or shortness of breath  Musculoskeletal: Negative for muscle pain, muscular weakness, negative for pain in arm and leg or swelling in foot and leg    Objective:  /80 (Site: Left Upper Arm, Position: Sitting, Cuff Size: Medium Adult)   Pulse 62   Ht 5' 3\" (1.6 m)   Wt 139 lb 3.2 oz (63.1 kg)   BMI 24.66 kg/m²   Wt Readings from Last 3 Encounters:   02/07/23 139 lb 3.2 oz (63.1 kg)   01/23/23 136 lb (61.7 kg)   01/16/23 138 lb 6.4 oz (62.8 kg)     Body mass index is 24.66 kg/m². GENERAL - Alert, oriented, pleasant, in no apparent distress. EYES: No jaundice, no conjunctival pallor. Neck - Supple. No jugular venous distention noted. No carotid bruits. Cardiovascular - Normal S1 and S2 without obvious murmur or gallop. Extremities - No cyanosis, clubbing, or significant edema. Pulmonary - No respiratory distress. No wheezes or rales.       MEDICAL DECISION MAKING & DATA REVIEW:    Lab Review   Lab Results   Component Value Date/Time    TROPONINT <0.010 04/26/2022 07:50 PM    TROPONINT <0.010 01/31/2022 05:51 PM     Lab Results   Component Value Date/Time    PROBNP 7,815 04/29/2022 06:22 AM     Lab Results   Component Value Date    INR 0.95 02/01/2022    INR 0.96 01/25/2016     Lab Results   Component Value Date    LABA1C 5.8 01/12/2023    LABA1C 5.8 08/31/2021     Lab Results   Component Value Date    WBC 5.8 01/12/2023    WBC 4.8 08/15/2022    HCT 36.3 (L) 01/12/2023    HCT 35.6 (L) 08/15/2022    .6 (H) 01/12/2023    .6 (H) 08/15/2022     01/12/2023     08/15/2022     Lab Results   Component Value Date    CHOL 120 01/12/2023    CHOL 114 08/15/2022    TRIG 203 (H) 01/12/2023    TRIG 174 (H) 08/15/2022    HDL 35 (L) 01/12/2023    HDL 30 (L) 08/15/2022    LDLCALC 44 01/12/2023    LDLCALC 49 08/15/2022    LDLDIRECT 70 03/22/2021    LDLDIRECT 93 08/22/2016     Lab Results   Component Value Date    ALT 9 (L) 01/12/2023    ALT 15 08/15/2022    AST 24 01/12/2023    AST 29 08/15/2022     BMP:    Lab Results   Component Value Date/Time     01/12/2023 01:00 PM     08/15/2022 08:57 AM    K 4.9 01/12/2023 01:00 PM    K 4.6 08/15/2022 08:57 AM    CL 94 01/12/2023 01:00 PM     08/15/2022 08:57 AM    CO2 30 01/12/2023 01:00 PM    CO2 29 08/15/2022 08:57 AM    BUN 21 01/12/2023 01:00 PM    BUN 20 08/15/2022 08:57 AM    CREATININE 1.2 01/12/2023 01:00 PM    CREATININE 1.2 08/15/2022 08:57 AM     CMP:   Lab Results   Component Value Date/Time     01/12/2023 01:00 PM     08/15/2022 08:57 AM    K 4.9 01/12/2023 01:00 PM    K 4.6 08/15/2022 08:57 AM    CL 94 01/12/2023 01:00 PM     08/15/2022 08:57 AM    CO2 30 01/12/2023 01:00 PM    CO2 29 08/15/2022 08:57 AM    BUN 21 01/12/2023 01:00 PM    BUN 20 08/15/2022 08:57 AM    CREATININE 1.2 01/12/2023 01:00 PM    CREATININE 1.2 08/15/2022 08:57 AM    PROT 6.8 01/12/2023 01:00 PM    PROT 6.6 08/15/2022 08:57 AM    PROT 7.5 01/02/2013 10:17 PM    PROT 7.4 10/13/2012 01:58 PM     Lab Results   Component Value Date/Time    TSH 3.250 03/06/2019 07:26 AM    TSH 4.270 04/20/2018 08:01 AM    TSHHS 2.190 01/12/2023 01:00 PM    TSHHS 5.080 02/03/2016 09:19 PM       QUALITY MEASURES REVIEWED:  1.CAD:Patient is taking anti platelet agent:Yes  2. DYSLIPIDEMIA: Patient is on cholesterol lowering medication:Yes  3. Beta-Blocker therapy for CAD, if prior Myocardial Infarction:No   4. Counselled regarding smoking cessation. No   Patient does not Smoke. 5.Anticoagulation therapy (for A.Fib) No   Does Not have A.Fib.   6.Discussed weight management strategies. Assessment & Plan:  Primary / Secondary prevention is the goal by aggressive risk modification, healthy and therapeutic life style changes for cardiovascular risk reduction. CORONARY ARTERY DISEASE:Yes   clinically stable. Patient is on optimal medical regimen ( see medication list above )  -  Patient is currently  asymptomatic from CAD. - changes in  treatment:   no, on ASA           - Testing ordered:  no  [facility] classification: 1  3/2021    Normal study    Normal perfusion study with normal distribution in all coronal, short, and    horizontal axis.     The observed defect is consistent with diaphragmatic attenuation. LVEF calculated by the computer is probably falsely low. LVEF is 40 %      HTN:BP is better. - changes in  treatment: no, on Verapamil   CARDIOMYOPATHY:  known   CONGESTIVE HEART FAILURE: NO KNOWN HISTORY.      VHD: No significant VHD noted  ECHO 9/2020   Left ventricular function is normal, EF is estimated at 55-60%. Mild left ventricular hypertrophy. Normal diastolic filling pattern for age. No significant valvular disease noted. No evidence of pericardial effusion. DYSLIPIDEMIA: Patient's profile is at / near Goal.no, Triglycerides are still high but better than before. HDL is low                                Tolerating current medical regimen wellyes, Takes Vascepa & Crestor                                                        See most recent Lab values in Labs section above. CAROTID ARTERY DISEASE:.Right Subclavian stenosis. No C/O      ARRHYTHMIAS:  Known H/O SVT. Frequent PVCs                                 No C/O   TESTS ORDERED:none this visit     PREVIOUSLY ORDERED TESTS REVIEWED & DISCUSSED WITH THE PATIENT:     I personally reviewed & interpreted, all previously ordered tests as copied above. Latest Labs are pulled in to the note with dates. Labs, specially in Reference to Lipid profile, Cardiac testing in the form of Echo ( dated: ), stress tests ( dated: ) & other relevant cardiac testing reviewed with patient & recommendations made based on assessment of the results. Discussed role of Cardiac risk factors & effects + treatment of co morbidities with patient & advised accordingly. MEDICATIONS: List of medications patient is currently taking is reviewed in detail with the patient. Discussed any side effects or problems taking the medication. Recommend Continue present management & medications as listed.      AFFIRMATION: I spent at least 20 minutes of time reviewing patient's history, previous & current medical problems & all Labs + testing. This includes chart prep even prior to the vosit. Various goals are discussed and multiple questions answered. Relevant concelling performed. Office follow up in one year.

## 2023-02-23 ENCOUNTER — HOSPITAL ENCOUNTER (OUTPATIENT)
Dept: INFUSION THERAPY | Age: 88
Setting detail: INFUSION SERIES
Discharge: HOME | End: 2023-02-23
Payer: MEDICARE

## 2023-02-23 VITALS
TEMPERATURE: 97.7 F | OXYGEN SATURATION: 94 % | RESPIRATION RATE: 16 BRPM | HEART RATE: 74 BPM | DIASTOLIC BLOOD PRESSURE: 60 MMHG | SYSTOLIC BLOOD PRESSURE: 122 MMHG

## 2023-02-23 DIAGNOSIS — M81.0 AGE-RELATED OSTEOPOROSIS WITHOUT CURRENT PATHOLOGICAL FRACTURE: Primary | ICD-10-CM

## 2023-02-23 PROCEDURE — 99211 OFF/OP EST MAY X REQ PHY/QHP: CPT

## 2023-02-23 PROCEDURE — 6360000002 HC RX W HCPCS: Performed by: FAMILY MEDICINE

## 2023-02-23 PROCEDURE — 96372 THER/PROPH/DIAG INJ SC/IM: CPT

## 2023-02-23 RX ORDER — ALBUTEROL SULFATE 90 UG/1
4 AEROSOL, METERED RESPIRATORY (INHALATION) PRN
Status: CANCELLED | OUTPATIENT
Start: 2023-02-23

## 2023-02-23 RX ORDER — FAMOTIDINE 10 MG/ML
20 INJECTION, SOLUTION INTRAVENOUS
Status: CANCELLED | OUTPATIENT
Start: 2023-02-23

## 2023-02-23 RX ORDER — SODIUM CHLORIDE 9 MG/ML
INJECTION, SOLUTION INTRAVENOUS CONTINUOUS
Status: CANCELLED | OUTPATIENT
Start: 2023-02-23

## 2023-02-23 RX ORDER — ACETAMINOPHEN 325 MG/1
650 TABLET ORAL
Status: CANCELLED | OUTPATIENT
Start: 2023-02-23

## 2023-02-23 RX ORDER — EPINEPHRINE 1 MG/ML
0.3 INJECTION, SOLUTION, CONCENTRATE INTRAVENOUS PRN
Status: CANCELLED | OUTPATIENT
Start: 2023-02-23

## 2023-02-23 RX ORDER — DIPHENHYDRAMINE HYDROCHLORIDE 50 MG/ML
50 INJECTION INTRAMUSCULAR; INTRAVENOUS
Status: CANCELLED | OUTPATIENT
Start: 2023-02-23

## 2023-02-23 RX ORDER — ONDANSETRON 2 MG/ML
8 INJECTION INTRAMUSCULAR; INTRAVENOUS
Status: CANCELLED | OUTPATIENT
Start: 2023-02-23

## 2023-02-23 RX ADMIN — DENOSUMAB 60 MG: 60 INJECTION SUBCUTANEOUS at 12:46

## 2023-02-26 DIAGNOSIS — E78.2 MIXED HYPERLIPIDEMIA: ICD-10-CM

## 2023-02-27 RX ORDER — ROSUVASTATIN CALCIUM 10 MG/1
TABLET, COATED ORAL
Qty: 90 TABLET | Refills: 3 | Status: SHIPPED | OUTPATIENT
Start: 2023-02-27

## 2023-02-28 RX ORDER — FLUTICASONE PROPIONATE 50 MCG
SPRAY, SUSPENSION (ML) NASAL
Qty: 16 G | Refills: 3 | Status: SHIPPED | OUTPATIENT
Start: 2023-02-28

## 2023-03-08 ENCOUNTER — NURSE ONLY (OUTPATIENT)
Dept: INTERNAL MEDICINE CLINIC | Age: 88
End: 2023-03-08
Payer: MEDICARE

## 2023-03-08 DIAGNOSIS — E53.8 VITAMIN B 12 DEFICIENCY: Primary | ICD-10-CM

## 2023-03-08 PROCEDURE — 96372 THER/PROPH/DIAG INJ SC/IM: CPT | Performed by: FAMILY MEDICINE

## 2023-03-08 RX ORDER — CYANOCOBALAMIN 1000 UG/ML
1000 INJECTION, SOLUTION INTRAMUSCULAR; SUBCUTANEOUS ONCE
Status: COMPLETED | OUTPATIENT
Start: 2023-03-08 | End: 2023-03-08

## 2023-03-08 RX ADMIN — CYANOCOBALAMIN 1000 MCG: 1000 INJECTION, SOLUTION INTRAMUSCULAR; SUBCUTANEOUS at 15:09

## 2023-03-15 RX ORDER — DOCUSATE SODIUM 100 MG/1
100 CAPSULE, LIQUID FILLED ORAL 2 TIMES DAILY
Qty: 60 CAPSULE | Refills: 0 | Status: SHIPPED | OUTPATIENT
Start: 2023-03-15 | End: 2023-04-14

## 2023-03-31 ENCOUNTER — TELEPHONE (OUTPATIENT)
Dept: INTERNAL MEDICINE CLINIC | Age: 88
End: 2023-03-31

## 2023-03-31 NOTE — TELEPHONE ENCOUNTER
Received PA request for Protonix 40mg, take 1 tab PO QD, #90/90d. DX K21.9- GERD. PA completed via covermymeds. PA apporoved until 3/30/2024. Approval scanned to chart. Pt informed. No further action is needed, at this time.

## 2023-04-03 ENCOUNTER — OFFICE VISIT (OUTPATIENT)
Dept: INTERNAL MEDICINE CLINIC | Age: 88
End: 2023-04-03
Payer: MEDICARE

## 2023-04-03 VITALS
OXYGEN SATURATION: 97 % | SYSTOLIC BLOOD PRESSURE: 122 MMHG | HEART RATE: 72 BPM | DIASTOLIC BLOOD PRESSURE: 80 MMHG | BODY MASS INDEX: 25.69 KG/M2 | HEIGHT: 63 IN | WEIGHT: 145 LBS

## 2023-04-03 DIAGNOSIS — F33.42 MAJOR DEPRESSIVE DISORDER, RECURRENT, IN FULL REMISSION (HCC): ICD-10-CM

## 2023-04-03 DIAGNOSIS — N18.31 CKD STAGE G3A/A1, GFR 45-59 AND ALBUMIN CREATININE RATIO <30 MG/G (HCC): ICD-10-CM

## 2023-04-03 DIAGNOSIS — E78.2 MIXED HYPERLIPIDEMIA: Primary | ICD-10-CM

## 2023-04-03 DIAGNOSIS — R73.03 PREDIABETES: ICD-10-CM

## 2023-04-03 DIAGNOSIS — E53.8 VITAMIN B 12 DEFICIENCY: ICD-10-CM

## 2023-04-03 PROCEDURE — 1090F PRES/ABSN URINE INCON ASSESS: CPT | Performed by: FAMILY MEDICINE

## 2023-04-03 PROCEDURE — G8427 DOCREV CUR MEDS BY ELIG CLIN: HCPCS | Performed by: FAMILY MEDICINE

## 2023-04-03 PROCEDURE — 96372 THER/PROPH/DIAG INJ SC/IM: CPT | Performed by: FAMILY MEDICINE

## 2023-04-03 PROCEDURE — 99214 OFFICE O/P EST MOD 30 MIN: CPT | Performed by: FAMILY MEDICINE

## 2023-04-03 PROCEDURE — 1123F ACP DISCUSS/DSCN MKR DOCD: CPT | Performed by: FAMILY MEDICINE

## 2023-04-03 PROCEDURE — 1036F TOBACCO NON-USER: CPT | Performed by: FAMILY MEDICINE

## 2023-04-03 PROCEDURE — G8417 CALC BMI ABV UP PARAM F/U: HCPCS | Performed by: FAMILY MEDICINE

## 2023-04-03 RX ORDER — CYANOCOBALAMIN 1000 UG/ML
1000 INJECTION, SOLUTION INTRAMUSCULAR; SUBCUTANEOUS ONCE
Status: COMPLETED | OUTPATIENT
Start: 2023-04-03 | End: 2023-04-03

## 2023-04-03 RX ADMIN — CYANOCOBALAMIN 1000 MCG: 1000 INJECTION, SOLUTION INTRAMUSCULAR; SUBCUTANEOUS at 15:21

## 2023-04-03 SDOH — ECONOMIC STABILITY: FOOD INSECURITY: WITHIN THE PAST 12 MONTHS, THE FOOD YOU BOUGHT JUST DIDN'T LAST AND YOU DIDN'T HAVE MONEY TO GET MORE.: NEVER TRUE

## 2023-04-03 SDOH — ECONOMIC STABILITY: INCOME INSECURITY: HOW HARD IS IT FOR YOU TO PAY FOR THE VERY BASICS LIKE FOOD, HOUSING, MEDICAL CARE, AND HEATING?: NOT HARD AT ALL

## 2023-04-03 SDOH — ECONOMIC STABILITY: FOOD INSECURITY: WITHIN THE PAST 12 MONTHS, YOU WORRIED THAT YOUR FOOD WOULD RUN OUT BEFORE YOU GOT MONEY TO BUY MORE.: NEVER TRUE

## 2023-04-03 ASSESSMENT — ENCOUNTER SYMPTOMS
SHORTNESS OF BREATH: 0
SORE THROAT: 0
COLOR CHANGE: 0
CHEST TIGHTNESS: 0
DIARRHEA: 0
ABDOMINAL PAIN: 0
CONSTIPATION: 0
VOMITING: 0

## 2023-04-03 NOTE — CARE COORDINATION
Attempted to reach patient for continued Care Coordination follow up and education. Patient was unavailable at the time of my call, and I am unable to leave a voicemail.
Quality 130: Documentation Of Current Medications In The Medical Record: Current Medications Documented
Detail Level: Detailed
Quality 226: Preventive Care And Screening: Tobacco Use: Screening And Cessation Intervention: Patient screened for tobacco use and is an ex/non-smoker

## 2023-04-03 NOTE — PROGRESS NOTES
Subjective:      Chief Complaint   Patient presents with    3 Month Follow-Up       HPI:  Kierra Caldwell is a 82 y.o. female who presents today for follow up of chronic conditions as listed below. Is still taking gabapentin 2 tabs 3 times a day. States she increases dose as needed for flares of facial pain. Trigeminal neuralgia has been doing well since her last appointment. Requesting B12 injection. Has been feeling more tired lately. States she does not have the same interest in doing the things she used to enjoy doing. Having difficulty with motivation/energy. Does not have family around and feels lonely. Still follows with Dr. Jovan Nixon every 3 months. Otherwise feels she has been doing well. GI symptoms have been well controlled, has not had any diverticulitis flares in the past several months. Past Medical History:   Diagnosis Date    Arthritis of shoulder region, right 09/2014    Kimberley Amend    Blind right eye     following right cataract surg    Chronic depression 2009    Dr. Jovan Nixon    DVT (deep venous thrombosis) ([facility] [address] 1970    left leg    Former smoker     History of echocardiogram 09/21/2020    EF 55-60%, mild left ventricular hypertrophy, normal diastolic filling pattern for age, no signficiant valvular disease, no pericardial effusion     History of stress test 03/25/2021    normal LVEF calculated by the computer is probably falsely low.  LVEF is 40 %    Hx of Doppler ultrasound 03/01/2018    Carotid-mild 0-49% bilateral carotid,50% stenosis right subclavian    Hyperlipidemia     Hypertension     Macular degeneration     right    Mild cognitive impairment 02/2012    MMSE 26/30    MVP (mitral valve prolapse)     trace on echo 2014    Osteoporosis 05/2012    Pancytopenia 05/2011    mild with ; Dr Eliot hawk 2010    Peptic ulcer disease     Peripheral vascular disease ([facility] [address] 01/2016    angio; dis below ankles; pletal    Prediabetes 2006    Recurrent ventral incisional hernia 2013

## 2023-04-18 ENCOUNTER — TELEPHONE (OUTPATIENT)
Dept: INTERNAL MEDICINE CLINIC | Age: 88
End: 2023-04-18

## 2023-04-24 ENCOUNTER — OFFICE VISIT (OUTPATIENT)
Dept: ORTHOPEDIC SURGERY | Age: 88
End: 2023-04-24
Payer: MEDICARE

## 2023-04-24 VITALS
HEART RATE: 76 BPM | RESPIRATION RATE: 16 BRPM | OXYGEN SATURATION: 97 % | WEIGHT: 143 LBS | BODY MASS INDEX: 25.34 KG/M2 | HEIGHT: 63 IN

## 2023-04-24 DIAGNOSIS — M19.012 PRIMARY OSTEOARTHRITIS OF BOTH SHOULDERS: Primary | ICD-10-CM

## 2023-04-24 DIAGNOSIS — M19.011 PRIMARY OSTEOARTHRITIS OF BOTH SHOULDERS: Primary | ICD-10-CM

## 2023-04-24 PROCEDURE — 1036F TOBACCO NON-USER: CPT | Performed by: PHYSICIAN ASSISTANT

## 2023-04-24 PROCEDURE — 99213 OFFICE O/P EST LOW 20 MIN: CPT | Performed by: PHYSICIAN ASSISTANT

## 2023-04-24 PROCEDURE — 1123F ACP DISCUSS/DSCN MKR DOCD: CPT | Performed by: PHYSICIAN ASSISTANT

## 2023-04-24 PROCEDURE — 1090F PRES/ABSN URINE INCON ASSESS: CPT | Performed by: PHYSICIAN ASSISTANT

## 2023-04-24 PROCEDURE — G8417 CALC BMI ABV UP PARAM F/U: HCPCS | Performed by: PHYSICIAN ASSISTANT

## 2023-04-24 PROCEDURE — G8427 DOCREV CUR MEDS BY ELIG CLIN: HCPCS | Performed by: PHYSICIAN ASSISTANT

## 2023-04-24 PROCEDURE — 20610 DRAIN/INJ JOINT/BURSA W/O US: CPT | Performed by: PHYSICIAN ASSISTANT

## 2023-04-24 NOTE — PATIENT INSTRUCTIONS
Continue weight bear as tolerated  Continue range of motion and exercises   Ice and elevate as needed  Tylenol or Motrin for pain  B/L shoulder Injections given today in the office   Rest for 24-48 hours  Follow up in 3 months

## 2023-04-25 ENCOUNTER — TELEPHONE (OUTPATIENT)
Dept: CARDIOLOGY CLINIC | Age: 88
End: 2023-04-25

## 2023-04-25 RX ORDER — LANOLIN ALCOHOL/MO/W.PET/CERES
CREAM (GRAM) TOPICAL
Qty: 30 TABLET | Refills: 3 | Status: SHIPPED | OUTPATIENT
Start: 2023-04-25

## 2023-04-25 NOTE — TELEPHONE ENCOUNTER
Discussed with Dr Santiago Reasons, not prescribed for shakiness but patient can try full tab. Patient will try to see if difference.  Any future issue she will discuss with PCP

## 2023-04-25 NOTE — TELEPHONE ENCOUNTER
Mestinon/ Was prescribed this in hospital. Dr. Oz Nieves cut her back to 1/2 pill once daily. Since then, she has been shaking really bad. She is wondering if she should be put back on 1 tab daily. She has been taking this at bedtime.

## 2023-04-26 ENCOUNTER — TELEPHONE (OUTPATIENT)
Dept: INTERNAL MEDICINE CLINIC | Age: 88
End: 2023-04-26

## 2023-04-26 RX ORDER — LANOLIN ALCOHOL/MO/W.PET/CERES
400 CREAM (GRAM) TOPICAL DAILY
Qty: 30 TABLET | Refills: 3 | OUTPATIENT
Start: 2023-04-26

## 2023-04-26 ASSESSMENT — ENCOUNTER SYMPTOMS
EYES NEGATIVE: 1
RESPIRATORY NEGATIVE: 1
GASTROINTESTINAL NEGATIVE: 1

## 2023-04-26 NOTE — TELEPHONE ENCOUNTER
Pt called in office stating that  put her on a medication called Mestinon but Dr. Jerman Mendez cut this tablet in half (half of a 60 mg) But she is starting to feel really shaky & Dr. Jerman Menedz is wanting to see if PCP recommends increasing this medication

## 2023-04-26 NOTE — PROGRESS NOTES
Patient is in the office with kari
Kenalog 40 mg/ML, 1 mL 0.5% bupivacaine, 1 mL 1% lidocaine  Procedure:  Sterile technique was used as the skin over the injection site was prepped with alcohol. The left subacromial bursa was then injected with the above listed medication. A sterile bandage was placed over the injection site. The patient tolerated the procedure well without complication. *Please note this report has been partially produced using speech recognition Dragon software and may contain errors related to that system including errors in grammar, punctuation, and spelling, as well as words and phrases that may be inappropriate.  If there are any questions or concerns please feel free to contact the dictating provider for clarification

## 2023-04-27 NOTE — TELEPHONE ENCOUNTER
Per medication review, it looks like patient has been on this medication since 2021, but that it has been prescribed 1/2 tab every 8 hours until Feb 2022 when it was switched to 1/2 tab once daily. I'm not sure if this is how the patient has been taking it thus far, or whether she just changed her dosing recently (as I did not change the dose, nor did I originally prescribe it). Either way, if she is having new symptoms since changing her dose, I would revert back to what she was previously doing. Thanks.

## 2023-05-03 ENCOUNTER — NURSE ONLY (OUTPATIENT)
Dept: INTERNAL MEDICINE CLINIC | Age: 88
End: 2023-05-03
Payer: MEDICARE

## 2023-05-03 DIAGNOSIS — E53.8 VITAMIN B 12 DEFICIENCY: Primary | ICD-10-CM

## 2023-05-03 PROCEDURE — 96372 THER/PROPH/DIAG INJ SC/IM: CPT | Performed by: FAMILY MEDICINE

## 2023-05-03 RX ORDER — CYANOCOBALAMIN 1000 UG/ML
1000 INJECTION, SOLUTION INTRAMUSCULAR; SUBCUTANEOUS ONCE
Status: COMPLETED | OUTPATIENT
Start: 2023-05-03 | End: 2023-05-03

## 2023-05-03 RX ADMIN — CYANOCOBALAMIN 1000 MCG: 1000 INJECTION, SOLUTION INTRAMUSCULAR; SUBCUTANEOUS at 14:24

## 2023-05-04 RX ORDER — PANTOPRAZOLE SODIUM 40 MG/1
TABLET, DELAYED RELEASE ORAL
Qty: 90 TABLET | Refills: 1 | Status: SHIPPED | OUTPATIENT
Start: 2023-05-04

## 2023-06-07 ENCOUNTER — NURSE ONLY (OUTPATIENT)
Dept: INTERNAL MEDICINE CLINIC | Age: 88
End: 2023-06-07
Payer: MEDICARE

## 2023-06-07 DIAGNOSIS — E53.8 VITAMIN B 12 DEFICIENCY: Primary | ICD-10-CM

## 2023-06-07 PROCEDURE — 96372 THER/PROPH/DIAG INJ SC/IM: CPT | Performed by: FAMILY MEDICINE

## 2023-06-07 PROCEDURE — 99999 PR OFFICE/OUTPT VISIT,PROCEDURE ONLY: CPT | Performed by: FAMILY MEDICINE

## 2023-06-07 RX ORDER — CYANOCOBALAMIN 1000 UG/ML
1000 INJECTION, SOLUTION INTRAMUSCULAR; SUBCUTANEOUS ONCE
Status: COMPLETED | OUTPATIENT
Start: 2023-06-07 | End: 2023-06-07

## 2023-06-07 RX ADMIN — CYANOCOBALAMIN 1000 MCG: 1000 INJECTION, SOLUTION INTRAMUSCULAR; SUBCUTANEOUS at 13:42

## 2023-06-07 NOTE — PROGRESS NOTES
Pt came in on this date for B12 injection only    Stick was successful with 0 waste on the Left Deltoid.

## 2023-06-21 ENCOUNTER — HOSPITAL ENCOUNTER (INPATIENT)
Age: 88
LOS: 5 days | Discharge: NURSING, REHAB OR LONG-TERM CARE | DRG: 640 | End: 2023-06-27
Attending: EMERGENCY MEDICINE | Admitting: INTERNAL MEDICINE
Payer: MEDICARE

## 2023-06-21 DIAGNOSIS — R11.2 NAUSEA VOMITING AND DIARRHEA: Primary | ICD-10-CM

## 2023-06-21 DIAGNOSIS — E87.1 HYPONATREMIA: ICD-10-CM

## 2023-06-21 DIAGNOSIS — R19.7 NAUSEA VOMITING AND DIARRHEA: Primary | ICD-10-CM

## 2023-06-21 PROCEDURE — 6360000002 HC RX W HCPCS: Performed by: EMERGENCY MEDICINE

## 2023-06-21 PROCEDURE — 83690 ASSAY OF LIPASE: CPT

## 2023-06-21 PROCEDURE — 96374 THER/PROPH/DIAG INJ IV PUSH: CPT

## 2023-06-21 PROCEDURE — 99285 EMERGENCY DEPT VISIT HI MDM: CPT

## 2023-06-21 PROCEDURE — 83930 ASSAY OF BLOOD OSMOLALITY: CPT

## 2023-06-21 PROCEDURE — 80053 COMPREHEN METABOLIC PANEL: CPT

## 2023-06-21 PROCEDURE — 83605 ASSAY OF LACTIC ACID: CPT

## 2023-06-21 PROCEDURE — 96361 HYDRATE IV INFUSION ADD-ON: CPT

## 2023-06-21 PROCEDURE — 2580000003 HC RX 258: Performed by: EMERGENCY MEDICINE

## 2023-06-21 PROCEDURE — 85025 COMPLETE CBC W/AUTO DIFF WBC: CPT

## 2023-06-21 RX ORDER — ONDANSETRON 2 MG/ML
8 INJECTION INTRAMUSCULAR; INTRAVENOUS ONCE
Status: COMPLETED | OUTPATIENT
Start: 2023-06-21 | End: 2023-06-21

## 2023-06-21 RX ORDER — 0.9 % SODIUM CHLORIDE 0.9 %
1000 INTRAVENOUS SOLUTION INTRAVENOUS ONCE
Status: COMPLETED | OUTPATIENT
Start: 2023-06-21 | End: 2023-06-22

## 2023-06-21 RX ADMIN — ONDANSETRON 8 MG: 2 INJECTION INTRAMUSCULAR; INTRAVENOUS at 23:40

## 2023-06-21 RX ADMIN — SODIUM CHLORIDE 1000 ML: 9 INJECTION, SOLUTION INTRAVENOUS at 23:41

## 2023-06-22 ENCOUNTER — APPOINTMENT (OUTPATIENT)
Dept: CT IMAGING | Age: 88
DRG: 640 | End: 2023-06-22
Payer: MEDICARE

## 2023-06-22 PROBLEM — E87.1 HYPONATREMIA: Status: ACTIVE | Noted: 2023-06-22

## 2023-06-22 LAB
ALBUMIN SERPL-MCNC: 4.3 GM/DL (ref 3.4–5)
ALP BLD-CCNC: 40 IU/L (ref 40–129)
ALT SERPL-CCNC: 12 U/L (ref 10–40)
ANION GAP SERPL CALCULATED.3IONS-SCNC: 12 MMOL/L (ref 4–16)
ANION GAP SERPL CALCULATED.3IONS-SCNC: 16 MMOL/L (ref 4–16)
AST SERPL-CCNC: 24 IU/L (ref 15–37)
BASOPHILS ABSOLUTE: 0 K/CU MM
BASOPHILS ABSOLUTE: 0 K/CU MM
BASOPHILS RELATIVE PERCENT: 0.2 % (ref 0–1)
BASOPHILS RELATIVE PERCENT: 0.3 % (ref 0–1)
BILIRUB SERPL-MCNC: 0.7 MG/DL (ref 0–1)
BILIRUBIN URINE: NEGATIVE MG/DL
BLOOD, URINE: NEGATIVE
BUN SERPL-MCNC: 10 MG/DL (ref 6–23)
BUN SERPL-MCNC: 14 MG/DL (ref 6–23)
CALCIUM SERPL-MCNC: 8.4 MG/DL (ref 8.3–10.6)
CALCIUM SERPL-MCNC: 8.7 MG/DL (ref 8.3–10.6)
CHLORIDE BLD-SCNC: 80 MMOL/L (ref 99–110)
CHLORIDE BLD-SCNC: 86 MMOL/L (ref 99–110)
CLARITY: CLEAR
CO2: 20 MMOL/L (ref 21–32)
CO2: 23 MMOL/L (ref 21–32)
COLOR: YELLOW
COMMENT UA: NORMAL
CREAT SERPL-MCNC: 0.8 MG/DL (ref 0.6–1.1)
CREAT SERPL-MCNC: 1 MG/DL (ref 0.6–1.1)
DIFFERENTIAL TYPE: ABNORMAL
DIFFERENTIAL TYPE: ABNORMAL
EOSINOPHILS ABSOLUTE: 0 K/CU MM
EOSINOPHILS ABSOLUTE: 0.1 K/CU MM
EOSINOPHILS RELATIVE PERCENT: 0.3 % (ref 0–3)
EOSINOPHILS RELATIVE PERCENT: 0.9 % (ref 0–3)
GFR SERPL CREATININE-BSD FRML MDRD: 55 ML/MIN/1.73M2
GFR SERPL CREATININE-BSD FRML MDRD: >60 ML/MIN/1.73M2
GLUCOSE SERPL-MCNC: 132 MG/DL (ref 70–99)
GLUCOSE SERPL-MCNC: 155 MG/DL (ref 70–99)
GLUCOSE, URINE: NEGATIVE MG/DL
HCT VFR BLD CALC: 35 % (ref 37–47)
HCT VFR BLD CALC: 36.1 % (ref 37–47)
HEMOGLOBIN: 12.1 GM/DL (ref 12.5–16)
HEMOGLOBIN: 12.3 GM/DL (ref 12.5–16)
IMMATURE NEUTROPHIL %: 0.2 % (ref 0–0.43)
IMMATURE NEUTROPHIL %: 0.3 % (ref 0–0.43)
KETONES, URINE: NEGATIVE MG/DL
LACTATE: 2.8 MMOL/L (ref 0.5–1.9)
LEUKOCYTE ESTERASE, URINE: NEGATIVE
LIPASE: 28 IU/L (ref 13–60)
LYMPHOCYTES ABSOLUTE: 2.1 K/CU MM
LYMPHOCYTES ABSOLUTE: 2.4 K/CU MM
LYMPHOCYTES RELATIVE PERCENT: 32.2 % (ref 24–44)
LYMPHOCYTES RELATIVE PERCENT: 35.6 % (ref 24–44)
MAGNESIUM: 1.7 MG/DL (ref 1.8–2.4)
MCH RBC QN AUTO: 32.4 PG (ref 27–31)
MCH RBC QN AUTO: 32.6 PG (ref 27–31)
MCHC RBC AUTO-ENTMCNC: 34.1 % (ref 32–36)
MCHC RBC AUTO-ENTMCNC: 34.6 % (ref 32–36)
MCV RBC AUTO: 93.8 FL (ref 78–100)
MCV RBC AUTO: 95.8 FL (ref 78–100)
MONOCYTES ABSOLUTE: 0.7 K/CU MM
MONOCYTES ABSOLUTE: 0.7 K/CU MM
MONOCYTES RELATIVE PERCENT: 10.2 % (ref 0–4)
MONOCYTES RELATIVE PERCENT: 10.4 % (ref 0–4)
NITRITE URINE, QUANTITATIVE: NEGATIVE
NUCLEATED RBC %: 0 %
NUCLEATED RBC %: 0 %
OSMOLALITY UR: 262 MOS/L (ref 280–300)
OSMOLALITY UR: 275 MOS/L (ref 292–1090)
PDW BLD-RTO: 13.1 % (ref 11.7–14.9)
PDW BLD-RTO: 13.2 % (ref 11.7–14.9)
PH, URINE: 8 (ref 5–8)
PHOSPHORUS: 2.8 MG/DL (ref 2.5–4.9)
PLATELET # BLD: 147 K/CU MM (ref 140–440)
PLATELET # BLD: 153 K/CU MM (ref 140–440)
PMV BLD AUTO: 9.2 FL (ref 7.5–11.1)
PMV BLD AUTO: 9.3 FL (ref 7.5–11.1)
POTASSIUM SERPL-SCNC: 3.7 MMOL/L (ref 3.5–5.1)
POTASSIUM SERPL-SCNC: 4.5 MMOL/L (ref 3.5–5.1)
PROTEIN UA: NEGATIVE MG/DL
RBC # BLD: 3.73 M/CU MM (ref 4.2–5.4)
RBC # BLD: 3.77 M/CU MM (ref 4.2–5.4)
SEGMENTED NEUTROPHILS ABSOLUTE COUNT: 3.5 K/CU MM
SEGMENTED NEUTROPHILS ABSOLUTE COUNT: 3.7 K/CU MM
SEGMENTED NEUTROPHILS RELATIVE PERCENT: 52.5 % (ref 36–66)
SEGMENTED NEUTROPHILS RELATIVE PERCENT: 56.9 % (ref 36–66)
SODIUM BLD-SCNC: 116 MMOL/L (ref 135–145)
SODIUM BLD-SCNC: 117 MMOL/L (ref 135–145)
SODIUM BLD-SCNC: 119 MMOL/L (ref 135–145)
SODIUM BLD-SCNC: 120 MMOL/L (ref 135–145)
SODIUM BLD-SCNC: 121 MMOL/L (ref 135–145)
SODIUM BLD-SCNC: 121 MMOL/L (ref 135–145)
SODIUM URINE: 105 MMOL/L (ref 35–167)
SPECIFIC GRAVITY UA: 1.01 (ref 1–1.03)
TOTAL IMMATURE NEUTOROPHIL: 0.01 K/CU MM
TOTAL IMMATURE NEUTOROPHIL: 0.02 K/CU MM
TOTAL NUCLEATED RBC: 0 K/CU MM
TOTAL NUCLEATED RBC: 0 K/CU MM
TOTAL PROTEIN: 7.4 GM/DL (ref 6.4–8.2)
UROBILINOGEN, URINE: 0.2 MG/DL (ref 0.2–1)
WBC # BLD: 6.5 K/CU MM (ref 4–10.5)
WBC # BLD: 6.7 K/CU MM (ref 4–10.5)

## 2023-06-22 PROCEDURE — 81003 URINALYSIS AUTO W/O SCOPE: CPT

## 2023-06-22 PROCEDURE — 2580000003 HC RX 258

## 2023-06-22 PROCEDURE — 74177 CT ABD & PELVIS W/CONTRAST: CPT

## 2023-06-22 PROCEDURE — 85025 COMPLETE CBC W/AUTO DIFF WBC: CPT

## 2023-06-22 PROCEDURE — 84295 ASSAY OF SERUM SODIUM: CPT

## 2023-06-22 PROCEDURE — 84100 ASSAY OF PHOSPHORUS: CPT

## 2023-06-22 PROCEDURE — 80048 BASIC METABOLIC PNL TOTAL CA: CPT

## 2023-06-22 PROCEDURE — 2000000000 HC ICU R&B

## 2023-06-22 PROCEDURE — 6370000000 HC RX 637 (ALT 250 FOR IP)

## 2023-06-22 PROCEDURE — 6360000002 HC RX W HCPCS: Performed by: INTERNAL MEDICINE

## 2023-06-22 PROCEDURE — 6360000004 HC RX CONTRAST MEDICATION: Performed by: EMERGENCY MEDICINE

## 2023-06-22 PROCEDURE — 94761 N-INVAS EAR/PLS OXIMETRY MLT: CPT

## 2023-06-22 PROCEDURE — 6360000002 HC RX W HCPCS

## 2023-06-22 PROCEDURE — 84300 ASSAY OF URINE SODIUM: CPT

## 2023-06-22 PROCEDURE — 83935 ASSAY OF URINE OSMOLALITY: CPT

## 2023-06-22 PROCEDURE — 2580000003 HC RX 258: Performed by: INTERNAL MEDICINE

## 2023-06-22 PROCEDURE — 83735 ASSAY OF MAGNESIUM: CPT

## 2023-06-22 RX ORDER — PYRIDOSTIGMINE BROMIDE 60 MG/1
30 TABLET ORAL DAILY
Status: DISCONTINUED | OUTPATIENT
Start: 2023-06-22 | End: 2023-06-27 | Stop reason: HOSPADM

## 2023-06-22 RX ORDER — POTASSIUM CHLORIDE 7.45 MG/ML
10 INJECTION INTRAVENOUS PRN
Status: DISCONTINUED | OUTPATIENT
Start: 2023-06-22 | End: 2023-06-27 | Stop reason: HOSPADM

## 2023-06-22 RX ORDER — SODIUM CHLORIDE 1 G/1
1 TABLET ORAL
Status: DISCONTINUED | OUTPATIENT
Start: 2023-06-23 | End: 2023-06-24

## 2023-06-22 RX ORDER — SODIUM CHLORIDE 9 MG/ML
INJECTION, SOLUTION INTRAVENOUS CONTINUOUS
Status: DISCONTINUED | OUTPATIENT
Start: 2023-06-22 | End: 2023-06-22

## 2023-06-22 RX ORDER — ONDANSETRON 2 MG/ML
4 INJECTION INTRAMUSCULAR; INTRAVENOUS EVERY 6 HOURS PRN
Status: DISCONTINUED | OUTPATIENT
Start: 2023-06-22 | End: 2023-06-27 | Stop reason: HOSPADM

## 2023-06-22 RX ORDER — MAGNESIUM SULFATE 1 G/100ML
1000 INJECTION INTRAVENOUS ONCE
Status: COMPLETED | OUTPATIENT
Start: 2023-06-22 | End: 2023-06-22

## 2023-06-22 RX ORDER — ONDANSETRON 4 MG/1
4 TABLET, ORALLY DISINTEGRATING ORAL EVERY 8 HOURS PRN
Status: DISCONTINUED | OUTPATIENT
Start: 2023-06-22 | End: 2023-06-27 | Stop reason: HOSPADM

## 2023-06-22 RX ORDER — SODIUM CHLORIDE 0.9 % (FLUSH) 0.9 %
5-40 SYRINGE (ML) INJECTION 2 TIMES DAILY
Status: DISCONTINUED | OUTPATIENT
Start: 2023-06-22 | End: 2023-06-22

## 2023-06-22 RX ORDER — ROSUVASTATIN CALCIUM 5 MG/1
10 TABLET, COATED ORAL DAILY
Status: DISCONTINUED | OUTPATIENT
Start: 2023-06-22 | End: 2023-06-27 | Stop reason: HOSPADM

## 2023-06-22 RX ORDER — SODIUM CHLORIDE 9 MG/ML
INJECTION, SOLUTION INTRAVENOUS PRN
Status: DISCONTINUED | OUTPATIENT
Start: 2023-06-22 | End: 2023-06-27 | Stop reason: HOSPADM

## 2023-06-22 RX ORDER — POLYETHYLENE GLYCOL 3350 17 G/17G
17 POWDER, FOR SOLUTION ORAL DAILY PRN
Status: DISCONTINUED | OUTPATIENT
Start: 2023-06-22 | End: 2023-06-27 | Stop reason: HOSPADM

## 2023-06-22 RX ORDER — PANTOPRAZOLE SODIUM 40 MG/1
40 TABLET, DELAYED RELEASE ORAL DAILY
Status: DISCONTINUED | OUTPATIENT
Start: 2023-06-22 | End: 2023-06-27 | Stop reason: HOSPADM

## 2023-06-22 RX ORDER — ACETAMINOPHEN 650 MG/1
650 SUPPOSITORY RECTAL EVERY 6 HOURS PRN
Status: DISCONTINUED | OUTPATIENT
Start: 2023-06-22 | End: 2023-06-27 | Stop reason: HOSPADM

## 2023-06-22 RX ORDER — PROCHLORPERAZINE EDISYLATE 5 MG/ML
5 INJECTION INTRAMUSCULAR; INTRAVENOUS EVERY 6 HOURS PRN
Status: DISCONTINUED | OUTPATIENT
Start: 2023-06-22 | End: 2023-06-27 | Stop reason: HOSPADM

## 2023-06-22 RX ORDER — ACETAMINOPHEN 325 MG/1
650 TABLET ORAL ONCE
Status: DISCONTINUED | OUTPATIENT
Start: 2023-06-22 | End: 2023-06-27 | Stop reason: HOSPADM

## 2023-06-22 RX ORDER — GABAPENTIN 300 MG/1
300 CAPSULE ORAL 3 TIMES DAILY
Status: DISCONTINUED | OUTPATIENT
Start: 2023-06-22 | End: 2023-06-27 | Stop reason: HOSPADM

## 2023-06-22 RX ORDER — ENOXAPARIN SODIUM 100 MG/ML
40 INJECTION SUBCUTANEOUS DAILY
Status: DISCONTINUED | OUTPATIENT
Start: 2023-06-22 | End: 2023-06-27 | Stop reason: HOSPADM

## 2023-06-22 RX ORDER — POTASSIUM CHLORIDE 29.8 MG/ML
20 INJECTION INTRAVENOUS PRN
Status: DISCONTINUED | OUTPATIENT
Start: 2023-06-22 | End: 2023-06-27 | Stop reason: HOSPADM

## 2023-06-22 RX ORDER — DIVALPROEX SODIUM 250 MG/1
250 TABLET, EXTENDED RELEASE ORAL NIGHTLY
Status: DISCONTINUED | OUTPATIENT
Start: 2023-06-22 | End: 2023-06-27 | Stop reason: HOSPADM

## 2023-06-22 RX ORDER — SODIUM CHLORIDE 9 MG/ML
INJECTION, SOLUTION INTRAVENOUS CONTINUOUS
Status: DISCONTINUED | OUTPATIENT
Start: 2023-06-22 | End: 2023-06-23

## 2023-06-22 RX ORDER — ACETAMINOPHEN 325 MG/1
650 TABLET ORAL EVERY 6 HOURS PRN
Status: DISCONTINUED | OUTPATIENT
Start: 2023-06-22 | End: 2023-06-27 | Stop reason: HOSPADM

## 2023-06-22 RX ORDER — SODIUM CHLORIDE 0.9 % (FLUSH) 0.9 %
5-40 SYRINGE (ML) INJECTION PRN
Status: DISCONTINUED | OUTPATIENT
Start: 2023-06-22 | End: 2023-06-27 | Stop reason: HOSPADM

## 2023-06-22 RX ORDER — SODIUM CHLORIDE 0.9 % (FLUSH) 0.9 %
5-40 SYRINGE (ML) INJECTION EVERY 12 HOURS SCHEDULED
Status: DISCONTINUED | OUTPATIENT
Start: 2023-06-22 | End: 2023-06-27 | Stop reason: HOSPADM

## 2023-06-22 RX ORDER — FAMOTIDINE 20 MG/1
20 TABLET, FILM COATED ORAL 2 TIMES DAILY
Status: DISCONTINUED | OUTPATIENT
Start: 2023-06-22 | End: 2023-06-22

## 2023-06-22 RX ORDER — MAGNESIUM SULFATE IN WATER 40 MG/ML
2000 INJECTION, SOLUTION INTRAVENOUS PRN
Status: DISCONTINUED | OUTPATIENT
Start: 2023-06-22 | End: 2023-06-27 | Stop reason: HOSPADM

## 2023-06-22 RX ADMIN — IOPAMIDOL 75 ML: 755 INJECTION, SOLUTION INTRAVENOUS at 01:30

## 2023-06-22 RX ADMIN — PROCHLORPERAZINE EDISYLATE 5 MG: 5 INJECTION INTRAMUSCULAR; INTRAVENOUS at 09:25

## 2023-06-22 RX ADMIN — PANTOPRAZOLE SODIUM 40 MG: 40 TABLET, DELAYED RELEASE ORAL at 05:49

## 2023-06-22 RX ADMIN — SODIUM CHLORIDE, PRESERVATIVE FREE 10 ML: 5 INJECTION INTRAVENOUS at 20:38

## 2023-06-22 RX ADMIN — GABAPENTIN 300 MG: 300 CAPSULE ORAL at 20:39

## 2023-06-22 RX ADMIN — SERTRALINE HYDROCHLORIDE 25 MG: 50 TABLET ORAL at 09:18

## 2023-06-22 RX ADMIN — SODIUM CHLORIDE, PRESERVATIVE FREE 10 ML: 5 INJECTION INTRAVENOUS at 09:19

## 2023-06-22 RX ADMIN — MAGNESIUM SULFATE HEPTAHYDRATE 1000 MG: 1 INJECTION, SOLUTION INTRAVENOUS at 06:56

## 2023-06-22 RX ADMIN — ONDANSETRON 4 MG: 2 INJECTION INTRAMUSCULAR; INTRAVENOUS at 05:49

## 2023-06-22 RX ADMIN — DIVALPROEX SODIUM 250 MG: 250 TABLET, EXTENDED RELEASE ORAL at 20:39

## 2023-06-22 RX ADMIN — VERAPAMIL HYDROCHLORIDE 120 MG: 120 TABLET, FILM COATED, EXTENDED RELEASE ORAL at 20:39

## 2023-06-22 RX ADMIN — SODIUM CHLORIDE: 9 INJECTION, SOLUTION INTRAVENOUS at 19:48

## 2023-06-22 RX ADMIN — GABAPENTIN 300 MG: 300 CAPSULE ORAL at 09:19

## 2023-06-22 RX ADMIN — SODIUM CHLORIDE: 9 INJECTION, SOLUTION INTRAVENOUS at 09:17

## 2023-06-22 RX ADMIN — PYRIDOSTIGMINE BROMIDE 30 MG: 60 TABLET ORAL at 09:18

## 2023-06-22 RX ADMIN — GABAPENTIN 300 MG: 300 CAPSULE ORAL at 14:32

## 2023-06-22 RX ADMIN — SODIUM CHLORIDE: 9 INJECTION, SOLUTION INTRAVENOUS at 04:34

## 2023-06-22 RX ADMIN — ENOXAPARIN SODIUM 40 MG: 100 INJECTION SUBCUTANEOUS at 09:19

## 2023-06-22 RX ADMIN — PROCHLORPERAZINE EDISYLATE 5 MG: 5 INJECTION INTRAMUSCULAR; INTRAVENOUS at 16:12

## 2023-06-22 RX ADMIN — ROSUVASTATIN CALCIUM 10 MG: 5 TABLET, FILM COATED ORAL at 09:19

## 2023-06-22 ASSESSMENT — ENCOUNTER SYMPTOMS
BACK PAIN: 0
ABDOMINAL PAIN: 1
ABDOMINAL DISTENTION: 0
SHORTNESS OF BREATH: 0
VOMITING: 1
NAUSEA: 1
SORE THROAT: 0
DIARRHEA: 1
COUGH: 0

## 2023-06-22 ASSESSMENT — PAIN SCALES - WONG BAKER
WONGBAKER_NUMERICALRESPONSE: 0

## 2023-06-22 ASSESSMENT — PAIN SCALES - GENERAL: PAINLEVEL_OUTOF10: 0

## 2023-06-23 LAB
ANION GAP SERPL CALCULATED.3IONS-SCNC: 10 MMOL/L (ref 4–16)
BASOPHILS ABSOLUTE: 0 K/CU MM
BASOPHILS RELATIVE PERCENT: 0.2 % (ref 0–1)
BUN SERPL-MCNC: 9 MG/DL (ref 6–23)
CALCIUM SERPL-MCNC: 7.3 MG/DL (ref 8.3–10.6)
CHLORIDE BLD-SCNC: 91 MMOL/L (ref 99–110)
CO2: 21 MMOL/L (ref 21–32)
CREAT SERPL-MCNC: 0.8 MG/DL (ref 0.6–1.1)
DIFFERENTIAL TYPE: ABNORMAL
EOSINOPHILS ABSOLUTE: 0.3 K/CU MM
EOSINOPHILS RELATIVE PERCENT: 5.1 % (ref 0–3)
GFR SERPL CREATININE-BSD FRML MDRD: >60 ML/MIN/1.73M2
GLUCOSE SERPL-MCNC: 90 MG/DL (ref 70–99)
HCT VFR BLD CALC: 32.6 % (ref 37–47)
HEMOGLOBIN: 11 GM/DL (ref 12.5–16)
IMMATURE NEUTROPHIL %: 0.4 % (ref 0–0.43)
LYMPHOCYTES ABSOLUTE: 2 K/CU MM
LYMPHOCYTES RELATIVE PERCENT: 36.6 % (ref 24–44)
MAGNESIUM: 1.9 MG/DL (ref 1.8–2.4)
MCH RBC QN AUTO: 32.5 PG (ref 27–31)
MCHC RBC AUTO-ENTMCNC: 33.7 % (ref 32–36)
MCV RBC AUTO: 96.4 FL (ref 78–100)
MONOCYTES ABSOLUTE: 0.6 K/CU MM
MONOCYTES RELATIVE PERCENT: 11.7 % (ref 0–4)
NUCLEATED RBC %: 0 %
PDW BLD-RTO: 13.4 % (ref 11.7–14.9)
PHOSPHORUS: 2.7 MG/DL (ref 2.5–4.9)
PLATELET # BLD: 117 K/CU MM (ref 140–440)
PMV BLD AUTO: 8.8 FL (ref 7.5–11.1)
POTASSIUM SERPL-SCNC: 3.9 MMOL/L (ref 3.5–5.1)
RBC # BLD: 3.38 M/CU MM (ref 4.2–5.4)
SEGMENTED NEUTROPHILS ABSOLUTE COUNT: 2.5 K/CU MM
SEGMENTED NEUTROPHILS RELATIVE PERCENT: 46 % (ref 36–66)
SODIUM BLD-SCNC: 121 MMOL/L (ref 135–145)
SODIUM BLD-SCNC: 122 MMOL/L (ref 135–145)
SODIUM BLD-SCNC: 128 MMOL/L (ref 135–145)
SODIUM BLD-SCNC: 140 MMOL/L (ref 135–145)
TOTAL IMMATURE NEUTOROPHIL: 0.02 K/CU MM
TOTAL NUCLEATED RBC: 0 K/CU MM
WBC # BLD: 5.5 K/CU MM (ref 4–10.5)

## 2023-06-23 PROCEDURE — 80048 BASIC METABOLIC PNL TOTAL CA: CPT

## 2023-06-23 PROCEDURE — 6360000002 HC RX W HCPCS

## 2023-06-23 PROCEDURE — 2060000000 HC ICU INTERMEDIATE R&B

## 2023-06-23 PROCEDURE — 97535 SELF CARE MNGMENT TRAINING: CPT

## 2023-06-23 PROCEDURE — 6370000000 HC RX 637 (ALT 250 FOR IP): Performed by: HOSPITALIST

## 2023-06-23 PROCEDURE — 2000000000 HC ICU R&B

## 2023-06-23 PROCEDURE — 2580000003 HC RX 258

## 2023-06-23 PROCEDURE — 6370000000 HC RX 637 (ALT 250 FOR IP)

## 2023-06-23 PROCEDURE — 84295 ASSAY OF SERUM SODIUM: CPT

## 2023-06-23 PROCEDURE — 83735 ASSAY OF MAGNESIUM: CPT

## 2023-06-23 PROCEDURE — 94761 N-INVAS EAR/PLS OXIMETRY MLT: CPT

## 2023-06-23 PROCEDURE — 84100 ASSAY OF PHOSPHORUS: CPT

## 2023-06-23 PROCEDURE — 97162 PT EVAL MOD COMPLEX 30 MIN: CPT

## 2023-06-23 PROCEDURE — 6370000000 HC RX 637 (ALT 250 FOR IP): Performed by: INTERNAL MEDICINE

## 2023-06-23 PROCEDURE — 97116 GAIT TRAINING THERAPY: CPT

## 2023-06-23 PROCEDURE — 97166 OT EVAL MOD COMPLEX 45 MIN: CPT

## 2023-06-23 PROCEDURE — 85025 COMPLETE CBC W/AUTO DIFF WBC: CPT

## 2023-06-23 RX ORDER — LOPERAMIDE HYDROCHLORIDE 2 MG/1
2 CAPSULE ORAL 4 TIMES DAILY PRN
Status: DISCONTINUED | OUTPATIENT
Start: 2023-06-23 | End: 2023-06-27 | Stop reason: HOSPADM

## 2023-06-23 RX ORDER — TOLVAPTAN 15 MG/1
15 TABLET ORAL ONCE
Status: COMPLETED | OUTPATIENT
Start: 2023-06-23 | End: 2023-06-23

## 2023-06-23 RX ADMIN — SODIUM CHLORIDE 1 G: 1 TABLET ORAL at 16:19

## 2023-06-23 RX ADMIN — ROSUVASTATIN CALCIUM 10 MG: 5 TABLET, FILM COATED ORAL at 09:23

## 2023-06-23 RX ADMIN — ENOXAPARIN SODIUM 40 MG: 100 INJECTION SUBCUTANEOUS at 09:28

## 2023-06-23 RX ADMIN — TOLVAPTAN 15 MG: 15 TABLET ORAL at 09:27

## 2023-06-23 RX ADMIN — SODIUM CHLORIDE, PRESERVATIVE FREE 10 ML: 5 INJECTION INTRAVENOUS at 08:54

## 2023-06-23 RX ADMIN — GABAPENTIN 300 MG: 300 CAPSULE ORAL at 19:58

## 2023-06-23 RX ADMIN — SERTRALINE HYDROCHLORIDE 25 MG: 50 TABLET ORAL at 09:23

## 2023-06-23 RX ADMIN — PANTOPRAZOLE SODIUM 40 MG: 40 TABLET, DELAYED RELEASE ORAL at 05:59

## 2023-06-23 RX ADMIN — PROCHLORPERAZINE EDISYLATE 5 MG: 5 INJECTION INTRAMUSCULAR; INTRAVENOUS at 09:39

## 2023-06-23 RX ADMIN — PYRIDOSTIGMINE BROMIDE 30 MG: 60 TABLET ORAL at 09:23

## 2023-06-23 RX ADMIN — LOPERAMIDE HYDROCHLORIDE 2 MG: 2 CAPSULE ORAL at 16:23

## 2023-06-23 RX ADMIN — VERAPAMIL HYDROCHLORIDE 120 MG: 120 TABLET, FILM COATED, EXTENDED RELEASE ORAL at 19:58

## 2023-06-23 RX ADMIN — ACETAMINOPHEN 650 MG: 325 TABLET ORAL at 05:59

## 2023-06-23 RX ADMIN — GABAPENTIN 300 MG: 300 CAPSULE ORAL at 09:24

## 2023-06-23 RX ADMIN — DIVALPROEX SODIUM 250 MG: 250 TABLET, EXTENDED RELEASE ORAL at 19:58

## 2023-06-23 RX ADMIN — GABAPENTIN 300 MG: 300 CAPSULE ORAL at 16:19

## 2023-06-23 RX ADMIN — SODIUM CHLORIDE 1 G: 1 TABLET ORAL at 09:24

## 2023-06-23 RX ADMIN — SODIUM CHLORIDE, PRESERVATIVE FREE 10 ML: 5 INJECTION INTRAVENOUS at 19:58

## 2023-06-23 ASSESSMENT — PAIN SCALES - WONG BAKER
WONGBAKER_NUMERICALRESPONSE: 0

## 2023-06-23 ASSESSMENT — PAIN DESCRIPTION - ORIENTATION: ORIENTATION: RIGHT

## 2023-06-23 ASSESSMENT — PAIN DESCRIPTION - LOCATION: LOCATION: LEG

## 2023-06-23 ASSESSMENT — PAIN SCALES - GENERAL: PAINLEVEL_OUTOF10: 5

## 2023-06-23 ASSESSMENT — PAIN DESCRIPTION - DESCRIPTORS: DESCRIPTORS: ACHING;DISCOMFORT

## 2023-06-23 ASSESSMENT — PAIN - FUNCTIONAL ASSESSMENT: PAIN_FUNCTIONAL_ASSESSMENT: ACTIVITIES ARE NOT PREVENTED

## 2023-06-24 LAB
ANION GAP SERPL CALCULATED.3IONS-SCNC: 10 MMOL/L (ref 4–16)
BASOPHILS ABSOLUTE: 0 K/CU MM
BASOPHILS RELATIVE PERCENT: 0.2 % (ref 0–1)
BUN SERPL-MCNC: 9 MG/DL (ref 6–23)
CALCIUM SERPL-MCNC: 6.4 MG/DL (ref 8.3–10.6)
CHLORIDE BLD-SCNC: 110 MMOL/L (ref 99–110)
CO2: 20 MMOL/L (ref 21–32)
CREAT SERPL-MCNC: 0.8 MG/DL (ref 0.6–1.1)
DIFFERENTIAL TYPE: ABNORMAL
EOSINOPHILS ABSOLUTE: 0.2 K/CU MM
EOSINOPHILS RELATIVE PERCENT: 3.6 % (ref 0–3)
GFR SERPL CREATININE-BSD FRML MDRD: >60 ML/MIN/1.73M2
GLUCOSE SERPL-MCNC: 75 MG/DL (ref 70–99)
HCT VFR BLD CALC: 29.2 % (ref 37–47)
HEMOGLOBIN: 9.6 GM/DL (ref 12.5–16)
IMMATURE NEUTROPHIL %: 0.4 % (ref 0–0.43)
LYMPHOCYTES ABSOLUTE: 1.4 K/CU MM
LYMPHOCYTES RELATIVE PERCENT: 29 % (ref 24–44)
MAGNESIUM: 2 MG/DL (ref 1.8–2.4)
MCH RBC QN AUTO: 33.1 PG (ref 27–31)
MCHC RBC AUTO-ENTMCNC: 32.9 % (ref 32–36)
MCV RBC AUTO: 100.7 FL (ref 78–100)
MONOCYTES ABSOLUTE: 0.6 K/CU MM
MONOCYTES RELATIVE PERCENT: 11.9 % (ref 0–4)
NUCLEATED RBC %: 0 %
PDW BLD-RTO: 14.2 % (ref 11.7–14.9)
PHOSPHORUS: 2.6 MG/DL (ref 2.5–4.9)
PLATELET # BLD: 118 K/CU MM (ref 140–440)
PMV BLD AUTO: 9.1 FL (ref 7.5–11.1)
POTASSIUM SERPL-SCNC: 3.5 MMOL/L (ref 3.5–5.1)
RBC # BLD: 2.9 M/CU MM (ref 4.2–5.4)
SEGMENTED NEUTROPHILS ABSOLUTE COUNT: 2.6 K/CU MM
SEGMENTED NEUTROPHILS RELATIVE PERCENT: 54.9 % (ref 36–66)
SODIUM BLD-SCNC: 138 MMOL/L (ref 135–145)
SODIUM BLD-SCNC: 140 MMOL/L (ref 135–145)
TOTAL IMMATURE NEUTOROPHIL: 0.02 K/CU MM
TOTAL NUCLEATED RBC: 0 K/CU MM
WBC # BLD: 4.7 K/CU MM (ref 4–10.5)

## 2023-06-24 PROCEDURE — 84100 ASSAY OF PHOSPHORUS: CPT

## 2023-06-24 PROCEDURE — 83735 ASSAY OF MAGNESIUM: CPT

## 2023-06-24 PROCEDURE — 85025 COMPLETE CBC W/AUTO DIFF WBC: CPT

## 2023-06-24 PROCEDURE — 80048 BASIC METABOLIC PNL TOTAL CA: CPT

## 2023-06-24 PROCEDURE — 6360000002 HC RX W HCPCS

## 2023-06-24 PROCEDURE — 2140000000 HC CCU INTERMEDIATE R&B

## 2023-06-24 PROCEDURE — 6370000000 HC RX 637 (ALT 250 FOR IP): Performed by: HOSPITALIST

## 2023-06-24 PROCEDURE — 84295 ASSAY OF SERUM SODIUM: CPT

## 2023-06-24 PROCEDURE — 6370000000 HC RX 637 (ALT 250 FOR IP)

## 2023-06-24 PROCEDURE — 94761 N-INVAS EAR/PLS OXIMETRY MLT: CPT

## 2023-06-24 PROCEDURE — 2580000003 HC RX 258

## 2023-06-24 PROCEDURE — 6360000002 HC RX W HCPCS: Performed by: INTERNAL MEDICINE

## 2023-06-24 PROCEDURE — 93005 ELECTROCARDIOGRAM TRACING: CPT | Performed by: HOSPITALIST

## 2023-06-24 PROCEDURE — 76937 US GUIDE VASCULAR ACCESS: CPT

## 2023-06-24 RX ORDER — CALCIUM GLUCONATE 20 MG/ML
2000 INJECTION, SOLUTION INTRAVENOUS ONCE
Status: COMPLETED | OUTPATIENT
Start: 2023-06-24 | End: 2023-06-24

## 2023-06-24 RX ORDER — FLUTICASONE PROPIONATE 50 MCG
1 SPRAY, SUSPENSION (ML) NASAL DAILY
Status: DISCONTINUED | OUTPATIENT
Start: 2023-06-24 | End: 2023-06-27 | Stop reason: HOSPADM

## 2023-06-24 RX ORDER — LABETALOL HYDROCHLORIDE 5 MG/ML
10 INJECTION, SOLUTION INTRAVENOUS EVERY 4 HOURS PRN
Status: DISCONTINUED | OUTPATIENT
Start: 2023-06-24 | End: 2023-06-27 | Stop reason: HOSPADM

## 2023-06-24 RX ORDER — KETOTIFEN FUMARATE 0.35 MG/ML
1 SOLUTION/ DROPS OPHTHALMIC 2 TIMES DAILY
Status: DISCONTINUED | OUTPATIENT
Start: 2023-06-24 | End: 2023-06-27 | Stop reason: HOSPADM

## 2023-06-24 RX ADMIN — SODIUM CHLORIDE, PRESERVATIVE FREE 10 ML: 5 INJECTION INTRAVENOUS at 21:29

## 2023-06-24 RX ADMIN — ENOXAPARIN SODIUM 40 MG: 100 INJECTION SUBCUTANEOUS at 08:54

## 2023-06-24 RX ADMIN — GABAPENTIN 300 MG: 300 CAPSULE ORAL at 21:28

## 2023-06-24 RX ADMIN — FLUTICASONE PROPIONATE 1 SPRAY: 50 SPRAY, METERED NASAL at 11:14

## 2023-06-24 RX ADMIN — KETOTIFEN FUMARATE 1 DROP: 0.35 SOLUTION/ DROPS OPHTHALMIC at 14:13

## 2023-06-24 RX ADMIN — GABAPENTIN 300 MG: 300 CAPSULE ORAL at 08:54

## 2023-06-24 RX ADMIN — KETOTIFEN FUMARATE 1 DROP: 0.35 SOLUTION/ DROPS OPHTHALMIC at 21:31

## 2023-06-24 RX ADMIN — SERTRALINE HYDROCHLORIDE 25 MG: 50 TABLET ORAL at 08:54

## 2023-06-24 RX ADMIN — ROSUVASTATIN CALCIUM 10 MG: 5 TABLET, FILM COATED ORAL at 08:53

## 2023-06-24 RX ADMIN — PANTOPRAZOLE SODIUM 40 MG: 40 TABLET, DELAYED RELEASE ORAL at 08:54

## 2023-06-24 RX ADMIN — DIVALPROEX SODIUM 250 MG: 250 TABLET, EXTENDED RELEASE ORAL at 21:29

## 2023-06-24 RX ADMIN — PYRIDOSTIGMINE BROMIDE 30 MG: 60 TABLET ORAL at 08:54

## 2023-06-24 RX ADMIN — GABAPENTIN 300 MG: 300 CAPSULE ORAL at 13:20

## 2023-06-24 RX ADMIN — SODIUM CHLORIDE, PRESERVATIVE FREE 10 ML: 5 INJECTION INTRAVENOUS at 08:55

## 2023-06-24 RX ADMIN — VERAPAMIL HYDROCHLORIDE 120 MG: 120 TABLET, FILM COATED, EXTENDED RELEASE ORAL at 21:29

## 2023-06-24 RX ADMIN — CALCIUM GLUCONATE 2000 MG: 20 INJECTION, SOLUTION INTRAVENOUS at 14:15

## 2023-06-24 ASSESSMENT — PAIN SCALES - GENERAL: PAINLEVEL_OUTOF10: 0

## 2023-06-25 LAB
HCT VFR BLD CALC: 32.7 % (ref 37–47)
HEMOGLOBIN: 10.7 GM/DL (ref 12.5–16)
MCH RBC QN AUTO: 32.6 PG (ref 27–31)
MCHC RBC AUTO-ENTMCNC: 32.7 % (ref 32–36)
MCV RBC AUTO: 99.7 FL (ref 78–100)
PDW BLD-RTO: 13.9 % (ref 11.7–14.9)
PLATELET # BLD: 130 K/CU MM (ref 140–440)
PMV BLD AUTO: 9.2 FL (ref 7.5–11.1)
RBC # BLD: 3.28 M/CU MM (ref 4.2–5.4)
WBC # BLD: 5 K/CU MM (ref 4–10.5)

## 2023-06-25 PROCEDURE — 6370000000 HC RX 637 (ALT 250 FOR IP)

## 2023-06-25 PROCEDURE — 6360000002 HC RX W HCPCS

## 2023-06-25 PROCEDURE — 36415 COLL VENOUS BLD VENIPUNCTURE: CPT

## 2023-06-25 PROCEDURE — 85027 COMPLETE CBC AUTOMATED: CPT

## 2023-06-25 PROCEDURE — 2140000000 HC CCU INTERMEDIATE R&B

## 2023-06-25 PROCEDURE — 97535 SELF CARE MNGMENT TRAINING: CPT

## 2023-06-25 PROCEDURE — 94761 N-INVAS EAR/PLS OXIMETRY MLT: CPT

## 2023-06-25 PROCEDURE — 97530 THERAPEUTIC ACTIVITIES: CPT

## 2023-06-25 PROCEDURE — 2580000003 HC RX 258

## 2023-06-25 PROCEDURE — 6370000000 HC RX 637 (ALT 250 FOR IP): Performed by: HOSPITALIST

## 2023-06-25 RX ADMIN — DIVALPROEX SODIUM 250 MG: 250 TABLET, EXTENDED RELEASE ORAL at 21:22

## 2023-06-25 RX ADMIN — VERAPAMIL HYDROCHLORIDE 120 MG: 120 TABLET, FILM COATED, EXTENDED RELEASE ORAL at 21:22

## 2023-06-25 RX ADMIN — ENOXAPARIN SODIUM 40 MG: 100 INJECTION SUBCUTANEOUS at 10:53

## 2023-06-25 RX ADMIN — PYRIDOSTIGMINE BROMIDE 30 MG: 60 TABLET ORAL at 10:50

## 2023-06-25 RX ADMIN — KETOTIFEN FUMARATE 1 DROP: 0.35 SOLUTION/ DROPS OPHTHALMIC at 10:54

## 2023-06-25 RX ADMIN — ACETAMINOPHEN 650 MG: 325 TABLET ORAL at 22:26

## 2023-06-25 RX ADMIN — KETOTIFEN FUMARATE 1 DROP: 0.35 SOLUTION/ DROPS OPHTHALMIC at 21:22

## 2023-06-25 RX ADMIN — SODIUM CHLORIDE, PRESERVATIVE FREE 10 ML: 5 INJECTION INTRAVENOUS at 10:53

## 2023-06-25 RX ADMIN — GABAPENTIN 300 MG: 300 CAPSULE ORAL at 21:22

## 2023-06-25 RX ADMIN — ROSUVASTATIN CALCIUM 10 MG: 5 TABLET, FILM COATED ORAL at 10:50

## 2023-06-25 RX ADMIN — SERTRALINE HYDROCHLORIDE 25 MG: 50 TABLET ORAL at 10:50

## 2023-06-25 RX ADMIN — GABAPENTIN 300 MG: 300 CAPSULE ORAL at 15:21

## 2023-06-25 RX ADMIN — SODIUM CHLORIDE, PRESERVATIVE FREE 10 ML: 5 INJECTION INTRAVENOUS at 21:22

## 2023-06-25 RX ADMIN — PANTOPRAZOLE SODIUM 40 MG: 40 TABLET, DELAYED RELEASE ORAL at 06:59

## 2023-06-25 RX ADMIN — GABAPENTIN 300 MG: 300 CAPSULE ORAL at 10:50

## 2023-06-25 ASSESSMENT — PAIN SCALES - WONG BAKER: WONGBAKER_NUMERICALRESPONSE: 0

## 2023-06-25 ASSESSMENT — PAIN SCALES - GENERAL: PAINLEVEL_OUTOF10: 0

## 2023-06-26 ENCOUNTER — APPOINTMENT (OUTPATIENT)
Dept: ULTRASOUND IMAGING | Age: 88
DRG: 640 | End: 2023-06-26
Payer: MEDICARE

## 2023-06-26 ENCOUNTER — APPOINTMENT (OUTPATIENT)
Dept: GENERAL RADIOLOGY | Age: 88
DRG: 640 | End: 2023-06-26
Payer: MEDICARE

## 2023-06-26 LAB
BACTERIA: ABNORMAL /HPF
BILIRUBIN URINE: NEGATIVE MG/DL
BLOOD, URINE: ABNORMAL
CLARITY: CLEAR
COLOR: YELLOW
GLUCOSE, URINE: NEGATIVE MG/DL
KETONES, URINE: ABNORMAL MG/DL
LEUKOCYTE ESTERASE, URINE: ABNORMAL
LV EF: 55 %
LVEF MODALITY: NORMAL
MUCUS: ABNORMAL HPF
NITRITE URINE, QUANTITATIVE: POSITIVE
PH, URINE: 6 (ref 5–8)
PROTEIN UA: NEGATIVE MG/DL
RBC URINE: 2 /HPF (ref 0–6)
SPECIFIC GRAVITY UA: 1.01 (ref 1–1.03)
SQUAMOUS EPITHELIAL: 1 /HPF
TRICHOMONAS: ABNORMAL /HPF
UROBILINOGEN, URINE: 1 MG/DL (ref 0.2–1)
WBC UA: 8 /HPF (ref 0–5)

## 2023-06-26 PROCEDURE — 6370000000 HC RX 637 (ALT 250 FOR IP): Performed by: HOSPITALIST

## 2023-06-26 PROCEDURE — 6370000000 HC RX 637 (ALT 250 FOR IP)

## 2023-06-26 PROCEDURE — 93306 TTE W/DOPPLER COMPLETE: CPT

## 2023-06-26 PROCEDURE — 6370000000 HC RX 637 (ALT 250 FOR IP): Performed by: INTERNAL MEDICINE

## 2023-06-26 PROCEDURE — 2580000003 HC RX 258

## 2023-06-26 PROCEDURE — 97110 THERAPEUTIC EXERCISES: CPT

## 2023-06-26 PROCEDURE — 97116 GAIT TRAINING THERAPY: CPT

## 2023-06-26 PROCEDURE — 94761 N-INVAS EAR/PLS OXIMETRY MLT: CPT

## 2023-06-26 PROCEDURE — 74018 RADEX ABDOMEN 1 VIEW: CPT

## 2023-06-26 PROCEDURE — 2580000003 HC RX 258: Performed by: HOSPITALIST

## 2023-06-26 PROCEDURE — 93971 EXTREMITY STUDY: CPT

## 2023-06-26 PROCEDURE — 6360000002 HC RX W HCPCS

## 2023-06-26 PROCEDURE — 97530 THERAPEUTIC ACTIVITIES: CPT

## 2023-06-26 PROCEDURE — 6360000002 HC RX W HCPCS: Performed by: HOSPITALIST

## 2023-06-26 PROCEDURE — 81001 URINALYSIS AUTO W/SCOPE: CPT

## 2023-06-26 PROCEDURE — 1200000000 HC SEMI PRIVATE

## 2023-06-26 RX ORDER — POTASSIUM CHLORIDE 20 MEQ/1
20 TABLET, EXTENDED RELEASE ORAL
Status: COMPLETED | OUTPATIENT
Start: 2023-06-26 | End: 2023-06-27

## 2023-06-26 RX ADMIN — CEFTRIAXONE SODIUM 1000 MG: 1 INJECTION, POWDER, FOR SOLUTION INTRAMUSCULAR; INTRAVENOUS at 17:09

## 2023-06-26 RX ADMIN — KETOTIFEN FUMARATE 1 DROP: 0.35 SOLUTION/ DROPS OPHTHALMIC at 08:19

## 2023-06-26 RX ADMIN — SODIUM CHLORIDE, PRESERVATIVE FREE 10 ML: 5 INJECTION INTRAVENOUS at 08:17

## 2023-06-26 RX ADMIN — VERAPAMIL HYDROCHLORIDE 120 MG: 120 TABLET, FILM COATED, EXTENDED RELEASE ORAL at 22:25

## 2023-06-26 RX ADMIN — POTASSIUM CHLORIDE 20 MEQ: 1500 TABLET, EXTENDED RELEASE ORAL at 08:18

## 2023-06-26 RX ADMIN — GABAPENTIN 300 MG: 300 CAPSULE ORAL at 15:50

## 2023-06-26 RX ADMIN — POTASSIUM CHLORIDE 20 MEQ: 1500 TABLET, EXTENDED RELEASE ORAL at 12:45

## 2023-06-26 RX ADMIN — DIVALPROEX SODIUM 250 MG: 250 TABLET, EXTENDED RELEASE ORAL at 22:25

## 2023-06-26 RX ADMIN — PANTOPRAZOLE SODIUM 40 MG: 40 TABLET, DELAYED RELEASE ORAL at 05:23

## 2023-06-26 RX ADMIN — GABAPENTIN 300 MG: 300 CAPSULE ORAL at 22:25

## 2023-06-26 RX ADMIN — FLUTICASONE PROPIONATE 1 SPRAY: 50 SPRAY, METERED NASAL at 08:19

## 2023-06-26 RX ADMIN — ENOXAPARIN SODIUM 40 MG: 100 INJECTION SUBCUTANEOUS at 08:18

## 2023-06-26 RX ADMIN — SERTRALINE HYDROCHLORIDE 25 MG: 50 TABLET ORAL at 08:17

## 2023-06-26 RX ADMIN — GABAPENTIN 300 MG: 300 CAPSULE ORAL at 08:18

## 2023-06-26 RX ADMIN — ROSUVASTATIN CALCIUM 10 MG: 5 TABLET, FILM COATED ORAL at 08:17

## 2023-06-26 RX ADMIN — PYRIDOSTIGMINE BROMIDE 30 MG: 60 TABLET ORAL at 08:18

## 2023-06-26 RX ADMIN — POTASSIUM CHLORIDE 20 MEQ: 1500 TABLET, EXTENDED RELEASE ORAL at 15:50

## 2023-06-26 RX ADMIN — KETOTIFEN FUMARATE 1 DROP: 0.35 SOLUTION/ DROPS OPHTHALMIC at 22:28

## 2023-06-26 RX ADMIN — SODIUM CHLORIDE, PRESERVATIVE FREE 10 ML: 5 INJECTION INTRAVENOUS at 22:26

## 2023-06-26 ASSESSMENT — PAIN SCALES - GENERAL: PAINLEVEL_OUTOF10: 0

## 2023-06-27 VITALS
BODY MASS INDEX: 23.62 KG/M2 | WEIGHT: 133.3 LBS | TEMPERATURE: 97.6 F | HEART RATE: 70 BPM | DIASTOLIC BLOOD PRESSURE: 55 MMHG | OXYGEN SATURATION: 95 % | SYSTOLIC BLOOD PRESSURE: 137 MMHG | RESPIRATION RATE: 16 BRPM | HEIGHT: 63 IN

## 2023-06-27 LAB
ANION GAP SERPL CALCULATED.3IONS-SCNC: 11 MMOL/L (ref 4–16)
BACTERIA: ABNORMAL /HPF
BILIRUBIN URINE: NEGATIVE MG/DL
BLOOD, URINE: NEGATIVE
BUN SERPL-MCNC: 20 MG/DL (ref 6–23)
CALCIUM SERPL-MCNC: 8.9 MG/DL (ref 8.3–10.6)
CHLORIDE BLD-SCNC: 98 MMOL/L (ref 99–110)
CLARITY: CLEAR
CO2: 22 MMOL/L (ref 21–32)
COLOR: YELLOW
CREAT SERPL-MCNC: 0.9 MG/DL (ref 0.6–1.1)
GFR SERPL CREATININE-BSD FRML MDRD: >60 ML/MIN/1.73M2
GLUCOSE SERPL-MCNC: 104 MG/DL (ref 70–99)
GLUCOSE, URINE: NEGATIVE MG/DL
HCT VFR BLD CALC: 33.5 % (ref 37–47)
HEMOGLOBIN: 11.1 GM/DL (ref 12.5–16)
KETONES, URINE: NEGATIVE MG/DL
LEUKOCYTE ESTERASE, URINE: ABNORMAL
MCH RBC QN AUTO: 32.7 PG (ref 27–31)
MCHC RBC AUTO-ENTMCNC: 33.1 % (ref 32–36)
MCV RBC AUTO: 98.8 FL (ref 78–100)
MUCUS: ABNORMAL HPF
NITRITE URINE, QUANTITATIVE: NEGATIVE
OSMOLALITY UR: 433 MOS/L (ref 292–1090)
PDW BLD-RTO: 13.9 % (ref 11.7–14.9)
PH, URINE: 7 (ref 5–8)
PLATELET # BLD: 140 K/CU MM (ref 140–440)
PMV BLD AUTO: 9.2 FL (ref 7.5–11.1)
POTASSIUM SERPL-SCNC: 4.6 MMOL/L (ref 3.5–5.1)
PROTEIN UA: NEGATIVE MG/DL
RBC # BLD: 3.39 M/CU MM (ref 4.2–5.4)
RBC URINE: 1 /HPF (ref 0–6)
SARS-COV-2 RDRP RESP QL NAA+PROBE: NOT DETECTED
SODIUM BLD-SCNC: 131 MMOL/L (ref 135–145)
SODIUM URINE: 113 MMOL/L (ref 35–167)
SOURCE: NORMAL
SPECIFIC GRAVITY UA: 1.01 (ref 1–1.03)
SQUAMOUS EPITHELIAL: <1 /HPF
TRICHOMONAS: ABNORMAL /HPF
UROBILINOGEN, URINE: 0.2 MG/DL (ref 0.2–1)
WBC # BLD: 6.5 K/CU MM (ref 4–10.5)
WBC UA: 17 /HPF (ref 0–5)

## 2023-06-27 PROCEDURE — 6360000002 HC RX W HCPCS: Performed by: HOSPITALIST

## 2023-06-27 PROCEDURE — 85027 COMPLETE CBC AUTOMATED: CPT

## 2023-06-27 PROCEDURE — 80048 BASIC METABOLIC PNL TOTAL CA: CPT

## 2023-06-27 PROCEDURE — 83935 ASSAY OF URINE OSMOLALITY: CPT

## 2023-06-27 PROCEDURE — 6370000000 HC RX 637 (ALT 250 FOR IP)

## 2023-06-27 PROCEDURE — 6370000000 HC RX 637 (ALT 250 FOR IP): Performed by: HOSPITALIST

## 2023-06-27 PROCEDURE — 87635 SARS-COV-2 COVID-19 AMP PRB: CPT

## 2023-06-27 PROCEDURE — 97116 GAIT TRAINING THERAPY: CPT

## 2023-06-27 PROCEDURE — 84300 ASSAY OF URINE SODIUM: CPT

## 2023-06-27 PROCEDURE — 6370000000 HC RX 637 (ALT 250 FOR IP): Performed by: INTERNAL MEDICINE

## 2023-06-27 PROCEDURE — 36415 COLL VENOUS BLD VENIPUNCTURE: CPT

## 2023-06-27 PROCEDURE — 81001 URINALYSIS AUTO W/SCOPE: CPT

## 2023-06-27 PROCEDURE — 2580000003 HC RX 258

## 2023-06-27 PROCEDURE — 94761 N-INVAS EAR/PLS OXIMETRY MLT: CPT

## 2023-06-27 PROCEDURE — 97530 THERAPEUTIC ACTIVITIES: CPT

## 2023-06-27 PROCEDURE — 2580000003 HC RX 258: Performed by: HOSPITALIST

## 2023-06-27 PROCEDURE — 87086 URINE CULTURE/COLONY COUNT: CPT

## 2023-06-27 PROCEDURE — 6360000002 HC RX W HCPCS

## 2023-06-27 PROCEDURE — 97110 THERAPEUTIC EXERCISES: CPT

## 2023-06-27 RX ORDER — CEFDINIR 300 MG/1
300 CAPSULE ORAL 2 TIMES DAILY
Qty: 6 CAPSULE | Refills: 0 | DISCHARGE
Start: 2023-06-27 | End: 2023-06-30

## 2023-06-27 RX ADMIN — POTASSIUM CHLORIDE 20 MEQ: 1500 TABLET, EXTENDED RELEASE ORAL at 09:32

## 2023-06-27 RX ADMIN — ROSUVASTATIN CALCIUM 10 MG: 5 TABLET, FILM COATED ORAL at 09:34

## 2023-06-27 RX ADMIN — PANTOPRAZOLE SODIUM 40 MG: 40 TABLET, DELAYED RELEASE ORAL at 06:56

## 2023-06-27 RX ADMIN — ACETAMINOPHEN 650 MG: 325 TABLET ORAL at 09:33

## 2023-06-27 RX ADMIN — SODIUM CHLORIDE, PRESERVATIVE FREE 10 ML: 5 INJECTION INTRAVENOUS at 09:35

## 2023-06-27 RX ADMIN — LOPERAMIDE HYDROCHLORIDE 2 MG: 2 CAPSULE ORAL at 16:34

## 2023-06-27 RX ADMIN — GABAPENTIN 300 MG: 300 CAPSULE ORAL at 09:32

## 2023-06-27 RX ADMIN — SERTRALINE HYDROCHLORIDE 25 MG: 50 TABLET ORAL at 09:34

## 2023-06-27 RX ADMIN — ENOXAPARIN SODIUM 40 MG: 100 INJECTION SUBCUTANEOUS at 09:35

## 2023-06-27 RX ADMIN — KETOTIFEN FUMARATE 1 DROP: 0.35 SOLUTION/ DROPS OPHTHALMIC at 09:39

## 2023-06-27 RX ADMIN — FLUTICASONE PROPIONATE 1 SPRAY: 50 SPRAY, METERED NASAL at 09:38

## 2023-06-27 RX ADMIN — GABAPENTIN 300 MG: 300 CAPSULE ORAL at 13:53

## 2023-06-27 RX ADMIN — CEFTRIAXONE SODIUM 1000 MG: 1 INJECTION, POWDER, FOR SOLUTION INTRAMUSCULAR; INTRAVENOUS at 16:44

## 2023-06-27 RX ADMIN — PYRIDOSTIGMINE BROMIDE 30 MG: 60 TABLET ORAL at 09:32

## 2023-06-27 ASSESSMENT — PAIN DESCRIPTION - LOCATION: LOCATION: LEG

## 2023-06-27 ASSESSMENT — PAIN DESCRIPTION - ONSET: ONSET: ON-GOING

## 2023-06-27 ASSESSMENT — PAIN SCALES - GENERAL
PAINLEVEL_OUTOF10: 4
PAINLEVEL_OUTOF10: 7

## 2023-06-27 ASSESSMENT — PAIN DESCRIPTION - DESCRIPTORS: DESCRIPTORS: ACHING;TENDER

## 2023-06-27 ASSESSMENT — PAIN - FUNCTIONAL ASSESSMENT: PAIN_FUNCTIONAL_ASSESSMENT: ACTIVITIES ARE NOT PREVENTED

## 2023-06-27 ASSESSMENT — PAIN DESCRIPTION - FREQUENCY: FREQUENCY: INTERMITTENT

## 2023-06-27 ASSESSMENT — PAIN DESCRIPTION - ORIENTATION: ORIENTATION: RIGHT

## 2023-06-27 ASSESSMENT — PAIN DESCRIPTION - PAIN TYPE: TYPE: ACUTE PAIN

## 2023-06-28 ENCOUNTER — CLINICAL DOCUMENTATION ONLY (OUTPATIENT)
Facility: CLINIC | Age: 88
End: 2023-06-28

## 2023-06-28 LAB
CULTURE: NORMAL
EKG ATRIAL RATE: 90 BPM
EKG DIAGNOSIS: NORMAL
EKG P AXIS: 61 DEGREES
EKG P-R INTERVAL: 180 MS
EKG Q-T INTERVAL: 358 MS
EKG QRS DURATION: 100 MS
EKG QTC CALCULATION (BAZETT): 437 MS
EKG R AXIS: 31 DEGREES
EKG T AXIS: 10 DEGREES
EKG VENTRICULAR RATE: 90 BPM
Lab: NORMAL
SPECIMEN: NORMAL

## 2023-06-28 PROCEDURE — 93010 ELECTROCARDIOGRAM REPORT: CPT | Performed by: INTERNAL MEDICINE

## 2023-07-03 ENCOUNTER — TELEPHONE (OUTPATIENT)
Dept: INTERNAL MEDICINE CLINIC | Age: 88
End: 2023-07-03

## 2023-07-03 NOTE — TELEPHONE ENCOUNTER
Unfortunately, I do not see any mention of a mestinon dose change in Dr. Sherley Stephens last note (from Feb). However, it looks like cardiology did refill the medication in April 2023 with the instructions of 1/2 tab once a day (30mg). If she is still having concerns, I would advise them to call the cardiology office to confirm/clarify and see what Dr. Rachel Roberts wants her to do. Thanks.

## 2023-07-03 NOTE — TELEPHONE ENCOUNTER
Patient called in stating that she is in [address] [place] [address] and there is a dispute between herself and her nurse. The patient states that she is supposed to be taking Mestinon 0.5 tablet 3 x a day, the nursing staff have her taking this daily. I reviewed patients information and see that Dr. Gillian Bear refilled this last.  Patient states that at last office visit with us she was told to take this 3 x a day. She would like this clarified and call her nurse at [address] [place] [address] at [phone].

## 2023-07-24 ENCOUNTER — OFFICE VISIT (OUTPATIENT)
Dept: ORTHOPEDIC SURGERY | Age: 88
End: 2023-07-24
Payer: MEDICARE

## 2023-07-24 VITALS
BODY MASS INDEX: 23.04 KG/M2 | DIASTOLIC BLOOD PRESSURE: 74 MMHG | HEIGHT: 63 IN | WEIGHT: 130 LBS | SYSTOLIC BLOOD PRESSURE: 116 MMHG | HEART RATE: 83 BPM | RESPIRATION RATE: 16 BRPM | OXYGEN SATURATION: 97 %

## 2023-07-24 DIAGNOSIS — M19.012 PRIMARY OSTEOARTHRITIS OF BOTH SHOULDERS: Primary | ICD-10-CM

## 2023-07-24 DIAGNOSIS — M19.011 PRIMARY OSTEOARTHRITIS OF BOTH SHOULDERS: Primary | ICD-10-CM

## 2023-07-24 PROCEDURE — G8420 CALC BMI NORM PARAMETERS: HCPCS | Performed by: PHYSICIAN ASSISTANT

## 2023-07-24 PROCEDURE — 99211 OFF/OP EST MAY X REQ PHY/QHP: CPT | Performed by: PHYSICIAN ASSISTANT

## 2023-07-24 PROCEDURE — 20610 DRAIN/INJ JOINT/BURSA W/O US: CPT | Performed by: PHYSICIAN ASSISTANT

## 2023-07-24 PROCEDURE — G8427 DOCREV CUR MEDS BY ELIG CLIN: HCPCS | Performed by: PHYSICIAN ASSISTANT

## 2023-07-24 RX ORDER — TRIAMCINOLONE ACETONIDE 40 MG/ML
40 INJECTION, SUSPENSION INTRA-ARTICULAR; INTRAMUSCULAR ONCE
Status: COMPLETED | OUTPATIENT
Start: 2023-07-24 | End: 2023-07-24

## 2023-07-24 RX ADMIN — TRIAMCINOLONE ACETONIDE 40 MG: 40 INJECTION, SUSPENSION INTRA-ARTICULAR; INTRAMUSCULAR at 13:28

## 2023-07-24 RX ADMIN — TRIAMCINOLONE ACETONIDE 40 MG: 40 INJECTION, SUSPENSION INTRA-ARTICULAR; INTRAMUSCULAR at 13:27

## 2023-07-24 NOTE — PATIENT INSTRUCTIONS
Continue to weight bear as tolerated  Continue range of motion  Ice and elevate as needed  Tylenol or Motrin for pain  Injection given into the bilateral shoulders  No high impact activities for a least 24-48 hours  Follow up in 3 months if needed      We are committed to providing you the best care possible. If you receive a survey after visiting one of our offices, please take time to share your experience concerning your physician office visit. These surveys are confidential and no health information about you is shared. We are eager to improve for you and we are counting on your feedback to help make that happen.

## 2023-07-25 ASSESSMENT — ENCOUNTER SYMPTOMS
EYES NEGATIVE: 1
RESPIRATORY NEGATIVE: 1
GASTROINTESTINAL NEGATIVE: 1

## 2023-07-26 NOTE — PROGRESS NOTES
Diffuse  Active ROM:   FE: 700    Abduction: 80 degrees    Outside Record review: Left shoulder x-rays reviewed from prior visit    Imaging: 3 views of the right shoulder taken and reviewed in the office today show severe glenohumeral arthritis with No acute fractures or dislocations noted. The official read and interpretation of these x-rays will be done by the the [facility]       Assessment:    Diagnosis Orders   1. Primary osteoarthritis of both shoulders  [facility] ARTHROCENTESIS ASPIR&/INJ MAJOR JT/BURSA W/O US            Plan:   Patient Instructions   Continue to weight bear as tolerated  Continue range of motion  Ice and elevate as needed  Tylenol or Motrin for pain  Injection given into the bilateral shoulders  No high impact activities for a least 24-48 hours  Follow up in 3 months if needed      We are committed to providing you the best care possible. If you receive a survey after visiting one of our offices, please take time to share your experience concerning your physician office visit. These surveys are confidential and no health information about you is shared. We are eager to improve for you and we are counting on your feedback to help make that happen. right Subacromial Bursa Aspiration / Injection Procedure:  Multiple treatment options were discussed. This injection was recommended as a part of the overall treatment plan. Details of the procedure, potential risks, and potential benefits were discussed. Patient's questions were answered. Patient elected to proceed with procedure. Medication: 1 mL Kenalog 40 mg/ML, 1 mL 0.5% bupivacaine, 1 mL 1% lidocaine  Procedure:  Sterile technique was used as the skin over the injection site was prepped with alcohol. The right subacromial bursa was then injected with the above listed medication. A sterile bandage was placed over the injection site. The patient tolerated the procedure well without complication.       left Subacromial Stephanie Bias

## 2023-08-08 ENCOUNTER — CLINICAL DOCUMENTATION ONLY (OUTPATIENT)
Facility: CLINIC | Age: 88
End: 2023-08-08

## 2023-08-10 ENCOUNTER — TELEPHONE (OUTPATIENT)
Dept: INTERNAL MEDICINE CLINIC | Age: 88
End: 2023-08-10

## 2023-08-10 DIAGNOSIS — Z09 HOSPITAL DISCHARGE FOLLOW-UP: Primary | ICD-10-CM

## 2023-08-10 DIAGNOSIS — R53.1 GENERALIZED WEAKNESS: ICD-10-CM

## 2023-08-10 NOTE — TELEPHONE ENCOUNTER
Annabella Lieberman  [facility] Practice Staff  Good Morning,   Ms. Saumya Hunter discharged from [address] [place] [address] 08/07/23. Our TCM RN spoke with Ms. Saumya Hunter on 08/08/23 and was told that patient was supposed to have home health care , RN/PT/OT, through [facility] but they had not yet reached out to patient. I called [facility] this morning and they do not have orders for Ms. Saumya Hunter. Are you able to send home health orders to [facility]?      Thank you,   [facility] TCM Team

## 2023-08-10 NOTE — TELEPHONE ENCOUNTER
Referral has been ordered. (If they require in person visit for referral, patient will need to be scheduled with Raiza Kamara or myself.   Thanks.)

## 2023-08-14 ENCOUNTER — TELEPHONE (OUTPATIENT)
Dept: INTERNAL MEDICINE CLINIC | Age: 88
End: 2023-08-14

## 2023-08-14 ENCOUNTER — TELEMEDICINE (OUTPATIENT)
Dept: INTERNAL MEDICINE CLINIC | Age: 88
End: 2023-08-14
Payer: MEDICARE

## 2023-08-14 DIAGNOSIS — Z00.00 MEDICARE ANNUAL WELLNESS VISIT, SUBSEQUENT: Primary | ICD-10-CM

## 2023-08-14 PROCEDURE — 1123F ACP DISCUSS/DSCN MKR DOCD: CPT | Performed by: FAMILY MEDICINE

## 2023-08-14 PROCEDURE — G0439 PPPS, SUBSEQ VISIT: HCPCS | Performed by: FAMILY MEDICINE

## 2023-08-14 RX ORDER — BIOTIN 2500 MCG
CAPSULE ORAL DAILY
COMMUNITY

## 2023-08-14 ASSESSMENT — PATIENT HEALTH QUESTIONNAIRE - PHQ9
SUM OF ALL RESPONSES TO PHQ QUESTIONS 1-9: 11
9. THOUGHTS THAT YOU WOULD BE BETTER OFF DEAD, OR OF HURTING YOURSELF: 0
5. POOR APPETITE OR OVEREATING: 0
6. FEELING BAD ABOUT YOURSELF - OR THAT YOU ARE A FAILURE OR HAVE LET YOURSELF OR YOUR FAMILY DOWN: 2
10. IF YOU CHECKED OFF ANY PROBLEMS, HOW DIFFICULT HAVE THESE PROBLEMS MADE IT FOR YOU TO DO YOUR WORK, TAKE CARE OF THINGS AT HOME, OR GET ALONG WITH OTHER PEOPLE: 0
4. FEELING TIRED OR HAVING LITTLE ENERGY: 3
SUM OF ALL RESPONSES TO PHQ QUESTIONS 1-9: 11
SUM OF ALL RESPONSES TO PHQ QUESTIONS 1-9: 11
8. MOVING OR SPEAKING SO SLOWLY THAT OTHER PEOPLE COULD HAVE NOTICED. OR THE OPPOSITE, BEING SO FIGETY OR RESTLESS THAT YOU HAVE BEEN MOVING AROUND A LOT MORE THAN USUAL: 0
1. LITTLE INTEREST OR PLEASURE IN DOING THINGS: 3
7. TROUBLE CONCENTRATING ON THINGS, SUCH AS READING THE NEWSPAPER OR WATCHING TELEVISION: 3
3. TROUBLE FALLING OR STAYING ASLEEP: 0
SUM OF ALL RESPONSES TO PHQ9 QUESTIONS 1 & 2: 3
SUM OF ALL RESPONSES TO PHQ QUESTIONS 1-9: 11
2. FEELING DOWN, DEPRESSED OR HOPELESS: 0

## 2023-08-14 ASSESSMENT — COLUMBIA-SUICIDE SEVERITY RATING SCALE - C-SSRS
1. WITHIN THE PAST MONTH, HAVE YOU WISHED YOU WERE DEAD OR WISHED YOU COULD GO TO SLEEP AND NOT WAKE UP?: NO
2. HAVE YOU ACTUALLY HAD ANY THOUGHTS OF KILLING YOURSELF?: NO
6. HAVE YOU EVER DONE ANYTHING, STARTED TO DO ANYTHING, OR PREPARED TO DO ANYTHING TO END YOUR LIFE?: NO

## 2023-08-14 NOTE — PATIENT INSTRUCTIONS
Personalized Preventive Plan for Herminia Thrasher - 8/14/2023  Medicare offers a range of preventive health benefits. Some of the tests and screenings are paid in full while other may be subject to a deductible, co-insurance, and/or copay. Some of these benefits include a comprehensive review of your medical history including lifestyle, illnesses that may run in your family, and various assessments and screenings as appropriate. After reviewing your medical record and screening and assessments performed today your provider may have ordered immunizations, labs, imaging, and/or referrals for you. A list of these orders (if applicable) as well as your Preventive Care list are included within your After Visit Summary for your review. Other Preventive Recommendations:    A preventive eye exam performed by an eye specialist is recommended every 1-2 years to screen for glaucoma; cataracts, macular degeneration, and other eye disorders. A preventive dental visit is recommended every 6 months. Try to get at least 150 minutes of exercise per week or 10,000 steps per day on a pedometer . Order or download the FREE \"Exercise & Physical Activity: Your Everyday Guide\" from [employer]. Call 9-466.666.8971 or search [employer] online. You need 0169-6519 mg of calcium and 7194-8432 IU of vitamin D per day. It is possible to meet your calcium requirement with diet alone, but a vitamin D supplement is usually necessary to meet this goal.  When exposed to the sun, use a sunscreen that protects against both UVA and UVB radiation with an SPF of 30 or greater. Reapply every 2 to 3 hours or after sweating, drying off with a towel, or swimming. Always wear a seat belt when traveling in a car. Always wear a helmet when riding a bicycle or motorcycle.

## 2023-08-14 NOTE — PROGRESS NOTES
Medicare Annual Wellness Visit    Paola Goodman is here for Medicare AWV    Assessment & Plan   Medicare annual wellness visit, subsequent  Recommendations for Preventive Services Due: see orders and patient instructions/AVS.  Recommended screening schedule for the next 5-10 years is provided to the patient in written form: see Patient Instructions/AVS.     No follow-ups on file. Subjective       Patient's complete Health Risk Assessment and screening values have been reviewed and are found in Flowsheets. The following problems were reviewed today and where indicated follow up appointments were made and/or referrals ordered. Positive Risk Factor Screenings with Interventions:    Fall Risk:  Do you feel unsteady or are you worried about falling? : (!) yes (walker)  2 or more falls in past year?: no  Fall with injury in past year?: no     Interventions:    Patient comments: states she does use a walker for stability. Patient declines any further evaluation or treatment     Depression:  PHQ-2 Score: 3  PHQ-9 Total Score: 11    Interpretation:   1-4 = minimal  5-9 = mild  10-14 = moderate  15-19 = moderately severe  20-27 = severe  Interventions:  LPN INTERVENTION GUIDE: SCORE 5-14 = MODERATE DEPRESSION: FOLLOW UP IN 1 WEEK  Patient is scheduled to see NP in the office 8/18/23. General HRA Questions:  Select all that apply: (!) Anger (not physically where she thinks she should be)    Anger Interventions:  Patient comments: states she is angry with herself because she does not feel like she is where she should be physically, since her stay in the nursing home.   Patient declined any further interventions or treatment            ADL's:   Patient reports needing help with:  Select all that apply: [employer], Housekeeping, Laundry, [employer], Shopping (neighbor did laundry when she was in NH;grandsons,granddgtr; grandson bank/shop)  Interventions:  Patient comments: states he neighbor did her

## 2023-08-18 ENCOUNTER — OFFICE VISIT (OUTPATIENT)
Dept: INTERNAL MEDICINE CLINIC | Age: 88
End: 2023-08-18
Payer: MEDICARE

## 2023-08-18 VITALS
SYSTOLIC BLOOD PRESSURE: 130 MMHG | HEART RATE: 74 BPM | WEIGHT: 136 LBS | DIASTOLIC BLOOD PRESSURE: 70 MMHG | BODY MASS INDEX: 24.1 KG/M2 | HEIGHT: 63 IN | OXYGEN SATURATION: 99 %

## 2023-08-18 DIAGNOSIS — Z09 HOSPITAL DISCHARGE FOLLOW-UP: Primary | ICD-10-CM

## 2023-08-18 DIAGNOSIS — F33.42 MAJOR DEPRESSIVE DISORDER, RECURRENT, IN FULL REMISSION (HCC): ICD-10-CM

## 2023-08-18 DIAGNOSIS — D63.8 ANEMIA DUE TO CHRONIC ILLNESS: ICD-10-CM

## 2023-08-18 DIAGNOSIS — G31.84 MILD COGNITIVE IMPAIRMENT: ICD-10-CM

## 2023-08-18 DIAGNOSIS — I73.9 PAD (PERIPHERAL ARTERY DISEASE) (HCC): ICD-10-CM

## 2023-08-18 DIAGNOSIS — N30.00 ACUTE CYSTITIS WITHOUT HEMATURIA: ICD-10-CM

## 2023-08-18 DIAGNOSIS — I50.22 CHRONIC SYSTOLIC (CONGESTIVE) HEART FAILURE (HCC): ICD-10-CM

## 2023-08-18 DIAGNOSIS — N18.31 CKD STAGE G3A/A1, GFR 45-59 AND ALBUMIN CREATININE RATIO <30 MG/G (HCC): ICD-10-CM

## 2023-08-18 PROCEDURE — 1123F ACP DISCUSS/DSCN MKR DOCD: CPT

## 2023-08-18 PROCEDURE — 96372 THER/PROPH/DIAG INJ SC/IM: CPT

## 2023-08-18 PROCEDURE — 99214 OFFICE O/P EST MOD 30 MIN: CPT

## 2023-08-18 PROCEDURE — G8420 CALC BMI NORM PARAMETERS: HCPCS

## 2023-08-18 PROCEDURE — 1090F PRES/ABSN URINE INCON ASSESS: CPT

## 2023-08-18 PROCEDURE — G8427 DOCREV CUR MEDS BY ELIG CLIN: HCPCS

## 2023-08-18 PROCEDURE — 1036F TOBACCO NON-USER: CPT

## 2023-08-18 RX ORDER — LEVOFLOXACIN 250 MG/1
250 TABLET ORAL DAILY
COMMUNITY
Start: 2023-08-07

## 2023-08-18 RX ORDER — CYANOCOBALAMIN 1000 UG/ML
1000 INJECTION, SOLUTION INTRAMUSCULAR; SUBCUTANEOUS ONCE
Status: COMPLETED | OUTPATIENT
Start: 2023-08-18 | End: 2023-08-18

## 2023-08-18 RX ORDER — NITROFURANTOIN 25; 75 MG/1; MG/1
100 CAPSULE ORAL 2 TIMES DAILY
Qty: 10 CAPSULE | Refills: 0 | Status: SHIPPED | OUTPATIENT
Start: 2023-08-18 | End: 2023-08-23

## 2023-08-18 RX ORDER — LOPERAMIDE HYDROCHLORIDE 2 MG/1
2 CAPSULE ORAL 4 TIMES DAILY PRN
COMMUNITY

## 2023-08-18 RX ADMIN — CYANOCOBALAMIN 1000 MCG: 1000 INJECTION, SOLUTION INTRAMUSCULAR; SUBCUTANEOUS at 15:06

## 2023-08-18 NOTE — PROGRESS NOTES
sounds: Normal breath sounds. Abdominal:      General: Bowel sounds are normal.      Palpations: Abdomen is soft. Tenderness: There is no abdominal tenderness. There is no right CVA tenderness, left CVA tenderness, guarding or rebound. Musculoskeletal:         General: Normal range of motion. Cervical back: Normal range of motion. Skin:     General: Skin is warm and dry. Capillary Refill: Capillary refill takes less than 2 seconds. Neurological:      General: No focal deficit present. Mental Status: She is alert and oriented to person, place, and time. Mental status is at baseline. GCS: GCS eye subscore is 4. GCS verbal subscore is 5. GCS motor subscore is 6. Cranial Nerves: No cranial nerve deficit. Sensory: Sensation is intact. No sensory deficit. Motor: Motor function is intact. Coordination: Coordination is intact. Gait: Gait is intact. Psychiatric:         Attention and Perception: Attention and perception normal.         Mood and Affect: Mood and affect normal.         Speech: Speech normal.         Behavior: Behavior normal. Behavior is cooperative. Thought Content: Thought content normal.         Cognition and Memory: Cognition and memory normal.         Judgment: Judgment normal.        Assessment / Plan:        1. Hospital discharge follow-up  F/u in office, reconciled information in chart and medications. 2. Acute cystitis without hematuria  UTI again today. Treating with 5 days of Macrobid. Questions answered at time of appointment and patient agrees with plan of care. - nitrofurantoin, macrocrystal-monohydrate, (MACROBID) 100 MG capsule; Take 1 capsule by mouth 2 times daily for 5 days  Dispense: 10 capsule; Refill: 0    3. Major depressive disorder, recurrent, in full remission ([address])  Continue Zoloft at dose and following with psychiatry.      4. CKD stage G3a/A1, GFR 45-59 and albumin creatinine ratio <30 mg/g [facility])  Ambulatory

## 2023-08-21 ENCOUNTER — CARE COORDINATION (OUTPATIENT)
Dept: CARE COORDINATION | Age: 88
End: 2023-08-21

## 2023-08-21 ENCOUNTER — TELEPHONE (OUTPATIENT)
Dept: INTERNAL MEDICINE CLINIC | Age: 88
End: 2023-08-21

## 2023-08-21 NOTE — TELEPHONE ENCOUNTER
Spoke with PCP. Spoke with pt. PCP states sodium should be closely monitored, and pt should check with [facility], to see if they think a supplement is appropriate for her. Pt voiced understanding/thanks.

## 2023-08-22 ASSESSMENT — ENCOUNTER SYMPTOMS
RHINORRHEA: 0
DIARRHEA: 0
EYE PAIN: 0
SORE THROAT: 0
NAUSEA: 0
COLOR CHANGE: 0
COUGH: 0
EYE DISCHARGE: 0
CHOKING: 0
SHORTNESS OF BREATH: 0
EYE REDNESS: 0
FACIAL SWELLING: 0
CONSTIPATION: 0
ABDOMINAL PAIN: 0
VOMITING: 0
WHEEZING: 0
STRIDOR: 0
TROUBLE SWALLOWING: 0
CHEST TIGHTNESS: 0

## 2023-08-22 ASSESSMENT — VISUAL ACUITY: OU: 1

## 2023-08-23 ENCOUNTER — CLINICAL DOCUMENTATION ONLY (OUTPATIENT)
Facility: CLINIC | Age: 88
End: 2023-08-23

## 2023-08-23 ENCOUNTER — TELEPHONE (OUTPATIENT)
Dept: INTERNAL MEDICINE CLINIC | Age: 88
End: 2023-08-23

## 2023-08-23 NOTE — TELEPHONE ENCOUNTER
Patient called stating she was taking macrobid for UTI and it made her sick and could not finish medication. She took a medication she already had for her stomach and it still did not help. Would like another prescription for UTI sent in to her pharmacy.

## 2023-08-24 ENCOUNTER — CARE COORDINATION (OUTPATIENT)
Dept: CARE COORDINATION | Age: 88
End: 2023-08-24

## 2023-08-24 NOTE — TELEPHONE ENCOUNTER
I spoke with Jacinto Lowry NP and she wants pt to come in for a urine. I called and spoke with yuko and she stated that she can come in tomorrow and give a urine sample. She also stated that she was able to take the medication, she took a zofran and then took the antibiotic and felt okay.

## 2023-08-24 NOTE — TELEPHONE ENCOUNTER
I do not see a order for a POCT urine from 8/18. There is no order under labs or in note. Would you like Pt to come in for POCT urine?

## 2023-08-24 NOTE — CARE COORDINATION
12/21/23  E-mail sent to Jd Caldwell to confirm CM. Spoke with patient for ACM follow up:  PCP referral for enrollment. Oriented to the role of ACM. Confirmed that patient is active with [facility] and is well supported at this time. No questions or concerns noted. No further outreach planned.

## 2023-08-25 ENCOUNTER — HOSPITAL ENCOUNTER (OUTPATIENT)
Age: 88
Discharge: HOME | End: 2023-08-25
Payer: MEDICARE

## 2023-08-25 ENCOUNTER — TELEPHONE (OUTPATIENT)
Dept: INTERNAL MEDICINE CLINIC | Age: 88
End: 2023-08-25
Payer: MEDICARE

## 2023-08-25 DIAGNOSIS — N39.0 RECURRENT UTI: Primary | ICD-10-CM

## 2023-08-25 DIAGNOSIS — E87.1 HYPONATREMIA: ICD-10-CM

## 2023-08-25 DIAGNOSIS — N18.31 CKD STAGE G3A/A1, GFR 45-59 AND ALBUMIN CREATININE RATIO <30 MG/G (HCC): ICD-10-CM

## 2023-08-25 LAB
BILIRUBIN, POC: NORMAL
BLOOD URINE, POC: NORMAL
CLARITY, POC: CLEAR
COLOR, POC: NORMAL
GLUCOSE URINE, POC: NORMAL
KETONES, POC: NORMAL
LEUKOCYTE EST, POC: NORMAL
NITRITE, POC: NORMAL
OSMOLALITY UR: 292 MOS/L (ref 280–300)
OSMOLALITY UR: 466 MOS/L (ref 292–1090)
PH, POC: 7.5
PROTEIN, POC: NORMAL
SODIUM URINE: 59 MMOL/L (ref 35–167)
SPECIFIC GRAVITY, POC: 1.01
UROBILINOGEN, POC: 0.2

## 2023-08-25 PROCEDURE — 84300 ASSAY OF URINE SODIUM: CPT

## 2023-08-25 PROCEDURE — 83930 ASSAY OF BLOOD OSMOLALITY: CPT

## 2023-08-25 PROCEDURE — 36415 COLL VENOUS BLD VENIPUNCTURE: CPT

## 2023-08-25 PROCEDURE — 83935 ASSAY OF URINE OSMOLALITY: CPT

## 2023-08-25 PROCEDURE — 81002 URINALYSIS NONAUTO W/O SCOPE: CPT | Performed by: FAMILY MEDICINE

## 2023-08-28 ENCOUNTER — TELEPHONE (OUTPATIENT)
Dept: INTERNAL MEDICINE CLINIC | Age: 88
End: 2023-08-28

## 2023-08-28 NOTE — TELEPHONE ENCOUNTER
Per discharge note from hospital, it looks like nephrology wanted her to discontinue lasix. I would advise patient to stay off of lasix but if she develops leg swelling, SOB, etc, she needs to contact us or her cardiologist.  Thanks.

## 2023-08-31 RX ORDER — ICOSAPENT ETHYL 1000 MG/1
2 CAPSULE ORAL 2 TIMES DAILY
Qty: 360 CAPSULE | Refills: 1 | Status: SHIPPED | OUTPATIENT
Start: 2023-08-31

## 2023-09-04 DIAGNOSIS — G50.0 TIC DOULOUREUX: ICD-10-CM

## 2023-09-05 ENCOUNTER — TELEPHONE (OUTPATIENT)
Dept: INTERNAL MEDICINE CLINIC | Age: 88
End: 2023-09-05

## 2023-09-05 RX ORDER — GABAPENTIN 300 MG/1
CAPSULE ORAL
Qty: 270 CAPSULE | Refills: 1 | Status: SHIPPED | OUTPATIENT
Start: 2023-09-05 | End: 2023-12-05

## 2023-09-05 NOTE — TELEPHONE ENCOUNTER
I don't see a culture and her last UA looks wnl. Please schedule her for an appointment with Ashley Steinberg at her earliest available (as I will not be in clinic the rest of this week), thank you!

## 2023-09-05 NOTE — TELEPHONE ENCOUNTER
[facility] called stating that pt gave POC urinalysis on 8/25. She was taking Macrobid and finished it. States patient is still having burning when she urinates. Do you want her to get a new POC? Come in for appointment?

## 2023-09-06 NOTE — TELEPHONE ENCOUNTER
For some reason I got a VM from [facility] super late. She left that vm for me & I got it a week later. Called pt & she is doing a lot better & is following up with urology. No further action needed at this time.

## 2023-09-07 ENCOUNTER — HOSPITAL ENCOUNTER (OUTPATIENT)
Age: 88
Setting detail: SPECIMEN
Discharge: HOME | End: 2023-09-07
Payer: MEDICARE

## 2023-09-07 ENCOUNTER — TELEPHONE (OUTPATIENT)
Dept: INTERNAL MEDICINE CLINIC | Age: 88
End: 2023-09-07

## 2023-09-07 LAB
BACTERIA: NEGATIVE /HPF
BILIRUBIN URINE: NEGATIVE MG/DL
BLOOD, URINE: NEGATIVE
CLARITY: ABNORMAL
COLOR: YELLOW
GLUCOSE, URINE: NEGATIVE MG/DL
HYALINE CASTS: 0 /LPF
KETONES, URINE: ABNORMAL MG/DL
LEUKOCYTE ESTERASE, URINE: ABNORMAL
MUCUS: ABNORMAL HPF
NITRITE URINE, QUANTITATIVE: NEGATIVE
PH, URINE: 6.5 (ref 5–8)
PROTEIN UA: NEGATIVE MG/DL
RBC URINE: 1 /HPF (ref 0–6)
SPECIFIC GRAVITY UA: 1.01 (ref 1–1.03)
SQUAMOUS EPITHELIAL: <1 /HPF
UROBILINOGEN, URINE: 0.2 MG/DL (ref 0.2–1)
WBC UA: 67 /HPF (ref 0–5)

## 2023-09-07 PROCEDURE — 87086 URINE CULTURE/COLONY COUNT: CPT

## 2023-09-07 PROCEDURE — 87186 SC STD MICRODIL/AGAR DIL: CPT

## 2023-09-07 PROCEDURE — 87077 CULTURE AEROBIC IDENTIFY: CPT

## 2023-09-07 PROCEDURE — 81001 URINALYSIS AUTO W/SCOPE: CPT

## 2023-09-07 NOTE — TELEPHONE ENCOUNTER
Neva Massey from [facility] called stating that they need a verbal order for a urine analysis for patient. Call back [phone] for Neva Massey.

## 2023-09-07 NOTE — TELEPHONE ENCOUNTER
Called and spoke with rochelle and voiced that she needs to see urology as she follows with them for this issue and if she cant get in for an Appt to let us know and we can dip her urine here and that if they want verbals to call urology. Monica Muñiz voiced understanding.

## 2023-09-07 NOTE — TELEPHONE ENCOUNTER
Patient called and stated she has been feeling like she has something worse than a UTI. The odor is a lot stronger, and she's feeling extremely nauseous lately to the point it's waking her up from her at night. Very intense burning sensation but so far today has not experienced it.

## 2023-09-07 NOTE — TELEPHONE ENCOUNTER
Called and informed the Pt that she needs to call urology and make an Appt and if they can not get her in in a timely manger to give us a call and she can bring us a Urine sample. Pt voiced understanding.

## 2023-09-08 ENCOUNTER — TELEPHONE (OUTPATIENT)
Dept: INFUSION THERAPY | Age: 88
End: 2023-09-08

## 2023-09-08 ENCOUNTER — TELEPHONE (OUTPATIENT)
Dept: INTERNAL MEDICINE CLINIC | Age: 88
End: 2023-09-08

## 2023-09-08 DIAGNOSIS — R73.03 PREDIABETES: Primary | ICD-10-CM

## 2023-09-08 DIAGNOSIS — Z12.31 ENCOUNTER FOR SCREENING MAMMOGRAM FOR MALIGNANT NEOPLASM OF BREAST: ICD-10-CM

## 2023-09-08 DIAGNOSIS — E78.2 MIXED HYPERLIPIDEMIA: ICD-10-CM

## 2023-09-08 DIAGNOSIS — I10 PRIMARY HYPERTENSION: ICD-10-CM

## 2023-09-08 NOTE — TELEPHONE ENCOUNTER
PT CALLED ASKING FOR [employer] APPT, NEW ORDER NEEDED. L/M WITH DR NICOLE'S OFFICE REQUESTING ONE TO BE FAXED. I TRIED TO CALL [facility] BACK BUT PHONE RANG BUSY.

## 2023-09-08 NOTE — TELEPHONE ENCOUNTER
----- Message from Moncho Trujillo sent at 9/8/2023  9:20 AM EDT -----  Subject: Referral Request    Reason for referral request? Patient says the Infusion Dept at [facility] needs a new order for her PROLIA injections. This writer sees a   referral done on 7/24/23 but is not sure if that is what she's needing. Please call the patient to advise. Provider patient wants to be referred to(if known):     Provider Phone Number(if known):     Additional Information for Provider?   ---------------------------------------------------------------------------  --------------  [address]    [phone]; OK to leave message on voicemail  ---------------------------------------------------------------------------  --------------

## 2023-09-10 LAB
CULTURE: ABNORMAL
CULTURE: ABNORMAL
Lab: ABNORMAL
SPECIMEN: ABNORMAL

## 2023-09-11 NOTE — TELEPHONE ENCOUNTER
Patient informed. Handicap placard mailed. Patient requesting Mammogram order be placed. Patient also wanting to know what labs she needs done before her appt in November.

## 2023-09-13 ENCOUNTER — NURSE ONLY (OUTPATIENT)
Dept: INTERNAL MEDICINE CLINIC | Age: 88
End: 2023-09-13
Payer: MEDICARE

## 2023-09-13 DIAGNOSIS — E53.8 VITAMIN B 12 DEFICIENCY: Primary | ICD-10-CM

## 2023-09-13 PROCEDURE — 96372 THER/PROPH/DIAG INJ SC/IM: CPT | Performed by: FAMILY MEDICINE

## 2023-09-13 RX ORDER — CYANOCOBALAMIN 1000 UG/ML
1000 INJECTION, SOLUTION INTRAMUSCULAR; SUBCUTANEOUS ONCE
Status: COMPLETED | OUTPATIENT
Start: 2023-09-13 | End: 2023-09-13

## 2023-09-13 RX ADMIN — CYANOCOBALAMIN 1000 MCG: 1000 INJECTION, SOLUTION INTRAMUSCULAR; SUBCUTANEOUS at 13:32

## 2023-09-14 RX ORDER — DIPHENHYDRAMINE HYDROCHLORIDE 50 MG/ML
50 INJECTION INTRAMUSCULAR; INTRAVENOUS
Status: CANCELLED | OUTPATIENT
Start: 2023-09-15

## 2023-09-14 RX ORDER — EPINEPHRINE 1 MG/ML
0.3 INJECTION, SOLUTION, CONCENTRATE INTRAVENOUS PRN
Status: CANCELLED | OUTPATIENT
Start: 2023-09-15

## 2023-09-14 RX ORDER — FAMOTIDINE 10 MG/ML
20 INJECTION, SOLUTION INTRAVENOUS
Status: CANCELLED | OUTPATIENT
Start: 2023-09-15

## 2023-09-14 RX ORDER — ACETAMINOPHEN 325 MG/1
650 TABLET ORAL
Status: CANCELLED | OUTPATIENT
Start: 2023-09-15

## 2023-09-14 RX ORDER — ONDANSETRON 2 MG/ML
8 INJECTION INTRAMUSCULAR; INTRAVENOUS
Status: CANCELLED | OUTPATIENT
Start: 2023-09-15

## 2023-09-14 RX ORDER — ALBUTEROL SULFATE 90 UG/1
4 AEROSOL, METERED RESPIRATORY (INHALATION) PRN
Status: CANCELLED | OUTPATIENT
Start: 2023-09-15

## 2023-09-18 ENCOUNTER — TELEPHONE (OUTPATIENT)
Dept: INTERNAL MEDICINE CLINIC | Age: 88
End: 2023-09-18

## 2023-09-18 NOTE — TELEPHONE ENCOUNTER
Patient has 2 questions and would like a call back. 1. Patient wants to know why was her magnesium stopped? 2. Patient wants to  know can she get her mestinon increased? Would like a call back.

## 2023-09-18 NOTE — TELEPHONE ENCOUNTER
Received a call from Lilli Solares [facility]. Said the patient called them and said her tremors have become a lot worse ever since she stopped her magnesium. Said she spoke with the pharmacy and they told her provider had cancelled it. Wants to know if she can start it again and why she was taken off of it.

## 2023-09-18 NOTE — TELEPHONE ENCOUNTER
Please notify patient that I did not discontinue her Mg- I reviewed her chart, it looks like the hospitalist discontinued as it was marked \"stop taking at discharge. \"  She may restart if she wants, we will monitor her Mg levels. Can you see why she is taking mestinon? It is usually used for myasthenia gravis, which she does not have. It was not a medication I started- we can discuss increasing it if it appropriate. Thanks.

## 2023-09-19 NOTE — TELEPHONE ENCOUNTER
If you do want her to take Mg what mg? Pt will buy OTC or have PCP order medication but she wants to know if it will make her feel better. Mestinon was taking for her tremors and they are getting really bad that's why she was wanting it increased.  was the one that started her on it.

## 2023-09-19 NOTE — TELEPHONE ENCOUNTER
If she thinks stopping the Mg made her tremors worse, I would prefer her to try restarting that first.    I recommend 280-300mg of Mg daily- she can find OTC. If she restarts Mg and has not had any improvement in symptoms in the next few weeks, we can discuss increasing mestinon dose. Thanks.

## 2023-09-20 RX ORDER — PANTOPRAZOLE SODIUM 40 MG/1
40 TABLET, DELAYED RELEASE ORAL DAILY
Qty: 90 TABLET | Refills: 1 | Status: SHIPPED | OUTPATIENT
Start: 2023-09-20

## 2023-09-22 ENCOUNTER — TELEPHONE (OUTPATIENT)
Dept: CARDIOLOGY CLINIC | Age: 88
End: 2023-09-22

## 2023-09-22 NOTE — TELEPHONE ENCOUNTER
Patient called she was told that Dr Jose Alberto Damon discontinued her Magnesium , she was not aware of that , she stated that her heart has been racing and feels that she needs this medication

## 2023-09-24 ENCOUNTER — APPOINTMENT (OUTPATIENT)
Dept: CT IMAGING | Age: 88
End: 2023-09-24
Payer: MEDICARE

## 2023-09-24 ENCOUNTER — HOSPITAL ENCOUNTER (EMERGENCY)
Age: 88
Discharge: HOME | End: 2023-09-24
Attending: EMERGENCY MEDICINE
Payer: MEDICARE

## 2023-09-24 VITALS
TEMPERATURE: 98.3 F | OXYGEN SATURATION: 99 % | DIASTOLIC BLOOD PRESSURE: 97 MMHG | HEART RATE: 77 BPM | SYSTOLIC BLOOD PRESSURE: 151 MMHG | RESPIRATION RATE: 18 BRPM

## 2023-09-24 DIAGNOSIS — R11.0 NAUSEA: Primary | ICD-10-CM

## 2023-09-24 LAB
ALBUMIN SERPL-MCNC: 4.5 GM/DL (ref 3.4–5)
ALP BLD-CCNC: 65 IU/L (ref 40–128)
ALT SERPL-CCNC: 14 U/L (ref 10–40)
ANION GAP SERPL CALCULATED.3IONS-SCNC: 14 MMOL/L (ref 4–16)
AST SERPL-CCNC: 28 IU/L (ref 15–37)
BASOPHILS ABSOLUTE: 0 K/CU MM
BASOPHILS RELATIVE PERCENT: 0.1 % (ref 0–1)
BILIRUB SERPL-MCNC: 0.5 MG/DL (ref 0–1)
BUN SERPL-MCNC: 23 MG/DL (ref 6–23)
CALCIUM SERPL-MCNC: 10.1 MG/DL (ref 8.3–10.6)
CHLORIDE BLD-SCNC: 96 MMOL/L (ref 99–110)
CO2: 24 MMOL/L (ref 21–32)
CREAT SERPL-MCNC: 1 MG/DL (ref 0.6–1.1)
DIFFERENTIAL TYPE: ABNORMAL
EKG ATRIAL RATE: 85 BPM
EKG DIAGNOSIS: NORMAL
EKG P AXIS: 80 DEGREES
EKG P-R INTERVAL: 166 MS
EKG Q-T INTERVAL: 380 MS
EKG QRS DURATION: 96 MS
EKG QTC CALCULATION (BAZETT): 452 MS
EKG R AXIS: 44 DEGREES
EKG T AXIS: -12 DEGREES
EKG VENTRICULAR RATE: 85 BPM
EOSINOPHILS ABSOLUTE: 0.1 K/CU MM
EOSINOPHILS RELATIVE PERCENT: 1.4 % (ref 0–3)
GFR SERPL CREATININE-BSD FRML MDRD: 55 ML/MIN/1.73M2
GLUCOSE SERPL-MCNC: 137 MG/DL (ref 70–99)
HCT VFR BLD CALC: 39.5 % (ref 37–47)
HEMOGLOBIN: 12.4 GM/DL (ref 12.5–16)
IMMATURE NEUTROPHIL %: 0.6 % (ref 0–0.43)
LIPASE: 81 IU/L (ref 13–60)
LYMPHOCYTES ABSOLUTE: 1.5 K/CU MM
LYMPHOCYTES RELATIVE PERCENT: 14.4 % (ref 24–44)
MCH RBC QN AUTO: 32.5 PG (ref 27–31)
MCHC RBC AUTO-ENTMCNC: 31.4 % (ref 32–36)
MCV RBC AUTO: 103.7 FL (ref 78–100)
MONOCYTES ABSOLUTE: 0.9 K/CU MM
MONOCYTES RELATIVE PERCENT: 8.7 % (ref 0–4)
NUCLEATED RBC %: 0 %
PDW BLD-RTO: 14.9 % (ref 11.7–14.9)
PLATELET # BLD: 168 K/CU MM (ref 140–440)
PMV BLD AUTO: 9.1 FL (ref 7.5–11.1)
POTASSIUM SERPL-SCNC: 4.9 MMOL/L (ref 3.5–5.1)
RBC # BLD: 3.81 M/CU MM (ref 4.2–5.4)
SEGMENTED NEUTROPHILS ABSOLUTE COUNT: 7.5 K/CU MM
SEGMENTED NEUTROPHILS RELATIVE PERCENT: 74.8 % (ref 36–66)
SODIUM BLD-SCNC: 134 MMOL/L (ref 135–145)
TOTAL IMMATURE NEUTOROPHIL: 0.06 K/CU MM
TOTAL NUCLEATED RBC: 0 K/CU MM
TOTAL PROTEIN: 7.6 GM/DL (ref 6.4–8.2)
TROPONIN T: <0.01 NG/ML
WBC # BLD: 10.1 K/CU MM (ref 4–10.5)

## 2023-09-24 PROCEDURE — 93005 ELECTROCARDIOGRAM TRACING: CPT | Performed by: EMERGENCY MEDICINE

## 2023-09-24 PROCEDURE — 83690 ASSAY OF LIPASE: CPT

## 2023-09-24 PROCEDURE — 74177 CT ABD & PELVIS W/CONTRAST: CPT

## 2023-09-24 PROCEDURE — 6360000002 HC RX W HCPCS: Performed by: EMERGENCY MEDICINE

## 2023-09-24 PROCEDURE — 96375 TX/PRO/DX INJ NEW DRUG ADDON: CPT

## 2023-09-24 PROCEDURE — 2580000003 HC RX 258: Performed by: EMERGENCY MEDICINE

## 2023-09-24 PROCEDURE — 85025 COMPLETE CBC W/AUTO DIFF WBC: CPT

## 2023-09-24 PROCEDURE — C9113 INJ PANTOPRAZOLE SODIUM, VIA: HCPCS | Performed by: EMERGENCY MEDICINE

## 2023-09-24 PROCEDURE — 99285 EMERGENCY DEPT VISIT HI MDM: CPT

## 2023-09-24 PROCEDURE — 84484 ASSAY OF TROPONIN QUANT: CPT

## 2023-09-24 PROCEDURE — 96374 THER/PROPH/DIAG INJ IV PUSH: CPT

## 2023-09-24 PROCEDURE — 6360000004 HC RX CONTRAST MEDICATION: Performed by: EMERGENCY MEDICINE

## 2023-09-24 PROCEDURE — 80053 COMPREHEN METABOLIC PANEL: CPT

## 2023-09-24 PROCEDURE — 93010 ELECTROCARDIOGRAM REPORT: CPT | Performed by: INTERNAL MEDICINE

## 2023-09-24 RX ORDER — 0.9 % SODIUM CHLORIDE 0.9 %
500 INTRAVENOUS SOLUTION INTRAVENOUS ONCE
Status: COMPLETED | OUTPATIENT
Start: 2023-09-24 | End: 2023-09-24

## 2023-09-24 RX ORDER — SODIUM CHLORIDE 0.9 % (FLUSH) 0.9 %
5-40 SYRINGE (ML) INJECTION 2 TIMES DAILY
Status: DISCONTINUED | OUTPATIENT
Start: 2023-09-24 | End: 2023-09-24 | Stop reason: HOSPADM

## 2023-09-24 RX ORDER — PROCHLORPERAZINE EDISYLATE 5 MG/ML
10 INJECTION INTRAMUSCULAR; INTRAVENOUS ONCE
Status: COMPLETED | OUTPATIENT
Start: 2023-09-24 | End: 2023-09-24

## 2023-09-24 RX ORDER — PANTOPRAZOLE SODIUM 40 MG/10ML
40 INJECTION, POWDER, LYOPHILIZED, FOR SOLUTION INTRAVENOUS ONCE
Status: COMPLETED | OUTPATIENT
Start: 2023-09-24 | End: 2023-09-24

## 2023-09-24 RX ORDER — MORPHINE SULFATE 2 MG/ML
2 INJECTION, SOLUTION INTRAMUSCULAR; INTRAVENOUS ONCE
Status: DISCONTINUED | OUTPATIENT
Start: 2023-09-24 | End: 2023-09-24 | Stop reason: HOSPADM

## 2023-09-24 RX ADMIN — IOPAMIDOL 75 ML: 755 INJECTION, SOLUTION INTRAVENOUS at 02:56

## 2023-09-24 RX ADMIN — PROCHLORPERAZINE EDISYLATE 10 MG: 5 INJECTION INTRAMUSCULAR; INTRAVENOUS at 01:03

## 2023-09-24 RX ADMIN — SODIUM CHLORIDE 500 ML: 9 INJECTION, SOLUTION INTRAVENOUS at 01:03

## 2023-09-24 RX ADMIN — PANTOPRAZOLE SODIUM 40 MG: 40 INJECTION, POWDER, FOR SOLUTION INTRAVENOUS at 01:03

## 2023-09-24 ASSESSMENT — ENCOUNTER SYMPTOMS: ABDOMINAL PAIN: 1

## 2023-09-24 NOTE — DISCHARGE INSTRUCTIONS
Your CT and labs today were overall nonacute. No acute surgical issues noted. If you develop any worsening or concerning symptoms, please seek immediate medical evaluation.

## 2023-09-24 NOTE — ED PROVIDER NOTES
MEDICATIONS:  New Prescriptions    No medications on file       ED Provider Disposition Time  DISPOSITION Decision To Discharge 09/24/2023 04:00:55 AM      Appropriate personal protective equipment was worn during the patient's evaluation. These included surgical, eye protection, surgical mask or in 95 respirator and gloves. The patient was also placed in a surgical mask for the prevention of possible spread of respiratory viral illnesses. The Patient was instructed to read the package inserts with any medication that was prescribed. Major potential reactions and medication interactions were discussed. The Patient understands that there are numerous possible adverse reactions not covered. The patient was also instructed to arrange follow-up with his or her primary care provider for review of any pending labwork or incidental findings on any radiology results that were obtained. All efforts were made to discuss any incidental findings that require further monitoring.       Controlled Substances Monitoring:          No data to display                (Please note that portions of this note were completed with a voice recognition program.  Efforts were made to edit the dictations but occasionally words are mis-transcribed.)    Chinedu Murphy MD (electronically signed)  Attending Emergency Physician           Chinedu Murphy MD  09/24/23 [id number]

## 2023-09-26 ENCOUNTER — HOSPITAL ENCOUNTER (OUTPATIENT)
Dept: INFUSION THERAPY | Age: 88
Setting detail: INFUSION SERIES
Discharge: HOME | End: 2023-09-26

## 2023-09-26 DIAGNOSIS — M81.0 AGE-RELATED OSTEOPOROSIS WITHOUT CURRENT PATHOLOGICAL FRACTURE: Primary | ICD-10-CM

## 2023-09-26 RX ORDER — ONDANSETRON 2 MG/ML
8 INJECTION INTRAMUSCULAR; INTRAVENOUS
Status: CANCELLED | OUTPATIENT
Start: 2023-09-26

## 2023-09-26 RX ORDER — EPINEPHRINE 1 MG/ML
0.3 INJECTION, SOLUTION, CONCENTRATE INTRAVENOUS PRN
Status: CANCELLED | OUTPATIENT
Start: 2023-09-26

## 2023-09-26 RX ORDER — ALBUTEROL SULFATE 90 UG/1
4 AEROSOL, METERED RESPIRATORY (INHALATION) PRN
Status: CANCELLED | OUTPATIENT
Start: 2023-09-26

## 2023-09-26 RX ORDER — DIPHENHYDRAMINE HYDROCHLORIDE 50 MG/ML
50 INJECTION INTRAMUSCULAR; INTRAVENOUS
Status: CANCELLED | OUTPATIENT
Start: 2023-09-26

## 2023-09-26 RX ORDER — ACETAMINOPHEN 325 MG/1
650 TABLET ORAL
Status: CANCELLED | OUTPATIENT
Start: 2023-09-26

## 2023-09-26 RX ORDER — FAMOTIDINE 10 MG/ML
20 INJECTION, SOLUTION INTRAVENOUS
Status: CANCELLED | OUTPATIENT
Start: 2023-09-26

## 2023-10-02 ENCOUNTER — TELEPHONE (OUTPATIENT)
Dept: INTERNAL MEDICINE CLINIC | Age: 88
End: 2023-10-02

## 2023-10-02 DIAGNOSIS — N64.52 NIPPLE DISCHARGE: Primary | ICD-10-CM

## 2023-10-02 NOTE — TELEPHONE ENCOUNTER
----- Message from Kota Sargent sent at 10/2/2023 12:24 PM EDT -----  Subject: Referral Request    Reason for referral request? Mammogram   Provider patient wants to be referred to(if known):     Provider Phone Number(if known): Additional Information for Provider? Pt was told she needed an extensive   mammogram ordered as she has drainage from right breast /nipple.  Please   advise.   ---------------------------------------------------------------------------  --------------  Shana Rincon [facility]    [phone]; OK to leave message on voicemail  ---------------------------------------------------------------------------  --------------

## 2023-10-02 NOTE — TELEPHONE ENCOUNTER
R diagnostic mammogram has been ordered, please notify and provide phone number to call and schedule.

## 2023-10-03 ENCOUNTER — TELEPHONE (OUTPATIENT)
Dept: INTERNAL MEDICINE CLINIC | Age: 88
End: 2023-10-03

## 2023-10-03 NOTE — TELEPHONE ENCOUNTER
I ordered the mammogram because it was requested. If she has had the same drainage for years with no changes, she does not need to have it completed. I can follow up with her at her appointment next month. Thanks.

## 2023-10-03 NOTE — TELEPHONE ENCOUNTER
----- Message from Antonio Leong sent at 10/3/2023 12:34 PM EDT -----  Subject: Appointment Request    Reason for Call: Established Patient Appointment needed: Routine ED Follow   Up Visit    QUESTIONS    Reason for appointment request? No appointments available during search     Additional Information for Provider? pt was seen at the ed room on 9/24   and was told to follow up with pcp in this regards to get a appointment as   soon as possible . PT WAS SEEN for vomiting's and nausea . Please reach out   to pt in this regards to get her scheduled during our search there was no   appointment . Please get pt scheduled   ---------------------------------------------------------------------------  --------------  Kell VIDAL  [phone]; OK to leave message on voicemail  ---------------------------------------------------------------------------  --------------  SCRIPT ANSWERS

## 2023-10-03 NOTE — TELEPHONE ENCOUNTER
Patient stated she has had drainage for 2-3 years and no one has done anything about it denies pain,redness and swelling.  Patient wants to know if she really needs to have this done since it has been going on for so long

## 2023-10-04 ENCOUNTER — HOSPITAL ENCOUNTER (OUTPATIENT)
Age: 88
Discharge: HOME | End: 2023-10-04
Payer: MEDICARE

## 2023-10-04 DIAGNOSIS — E87.1 HYPONATREMIA: ICD-10-CM

## 2023-10-04 LAB
ALBUMIN SERPL-MCNC: 4.2 GM/DL (ref 3.4–5)
ALP BLD-CCNC: 58 IU/L (ref 40–128)
ALT SERPL-CCNC: 13 U/L (ref 10–40)
ANION GAP SERPL CALCULATED.3IONS-SCNC: 12 MMOL/L (ref 4–16)
AST SERPL-CCNC: 25 IU/L (ref 15–37)
BACTERIA: ABNORMAL /HPF
BILIRUB SERPL-MCNC: 0.4 MG/DL (ref 0–1)
BILIRUBIN URINE: NEGATIVE MG/DL
BLOOD, URINE: NEGATIVE
BUN SERPL-MCNC: 20 MG/DL (ref 6–23)
CALCIUM SERPL-MCNC: 9.8 MG/DL (ref 8.3–10.6)
CHLORIDE BLD-SCNC: 94 MMOL/L (ref 99–110)
CLARITY: CLEAR
CO2: 26 MMOL/L (ref 21–32)
COLOR: YELLOW
CREAT SERPL-MCNC: 1 MG/DL (ref 0.6–1.1)
GFR SERPL CREATININE-BSD FRML MDRD: 55 ML/MIN/1.73M2
GLUCOSE SERPL-MCNC: 95 MG/DL (ref 70–99)
GLUCOSE, URINE: NEGATIVE MG/DL
KETONES, URINE: NEGATIVE MG/DL
LEUKOCYTE ESTERASE, URINE: ABNORMAL
MUCUS: ABNORMAL HPF
NITRITE URINE, QUANTITATIVE: NEGATIVE
PH, URINE: 8 (ref 5–8)
POTASSIUM SERPL-SCNC: 5.1 MMOL/L (ref 3.5–5.1)
PROTEIN UA: NEGATIVE MG/DL
RBC URINE: <1 /HPF (ref 0–6)
SODIUM BLD-SCNC: 132 MMOL/L (ref 135–145)
SPECIFIC GRAVITY UA: 1.01 (ref 1–1.03)
SQUAMOUS EPITHELIAL: <1 /HPF
TOTAL PROTEIN: 6.9 GM/DL (ref 6.4–8.2)
TRICHOMONAS: ABNORMAL /HPF
UROBILINOGEN, URINE: 0.2 MG/DL (ref 0.2–1)
WBC UA: 66 /HPF (ref 0–5)

## 2023-10-04 PROCEDURE — 82570 ASSAY OF URINE CREATININE: CPT

## 2023-10-04 PROCEDURE — 82043 UR ALBUMIN QUANTITATIVE: CPT

## 2023-10-04 PROCEDURE — 36415 COLL VENOUS BLD VENIPUNCTURE: CPT

## 2023-10-04 PROCEDURE — 80053 COMPREHEN METABOLIC PANEL: CPT

## 2023-10-04 PROCEDURE — 81001 URINALYSIS AUTO W/SCOPE: CPT

## 2023-10-04 NOTE — TELEPHONE ENCOUNTER
Called and spoke with Pt she stated that she isnt having anything new and that it has been there for years.  I advised her to hold off on getting it and we will follow with her on 10/9/23

## 2023-10-05 LAB
CREAT UR-MCNC: 79.5 MG/DL (ref 28–217)
MICROALBUMIN 24H UR-MCNC: 1.8 MG/DL
MICROALBUMIN/CREAT UR-RTO: 22.6 MG/G CREAT (ref 0–30)

## 2023-10-09 ENCOUNTER — OFFICE VISIT (OUTPATIENT)
Dept: INTERNAL MEDICINE CLINIC | Age: 88
End: 2023-10-09
Payer: MEDICARE

## 2023-10-09 VITALS
HEART RATE: 72 BPM | DIASTOLIC BLOOD PRESSURE: 70 MMHG | SYSTOLIC BLOOD PRESSURE: 126 MMHG | OXYGEN SATURATION: 96 % | BODY MASS INDEX: 24.45 KG/M2 | WEIGHT: 138 LBS

## 2023-10-09 DIAGNOSIS — N39.0 RECURRENT UTI: ICD-10-CM

## 2023-10-09 DIAGNOSIS — D63.8 ANEMIA DUE TO CHRONIC ILLNESS: ICD-10-CM

## 2023-10-09 DIAGNOSIS — E53.8 VITAMIN B 12 DEFICIENCY: Primary | ICD-10-CM

## 2023-10-09 PROCEDURE — 1090F PRES/ABSN URINE INCON ASSESS: CPT

## 2023-10-09 PROCEDURE — 99213 OFFICE O/P EST LOW 20 MIN: CPT

## 2023-10-09 PROCEDURE — 96372 THER/PROPH/DIAG INJ SC/IM: CPT

## 2023-10-09 PROCEDURE — G8427 DOCREV CUR MEDS BY ELIG CLIN: HCPCS

## 2023-10-09 PROCEDURE — G8484 FLU IMMUNIZE NO ADMIN: HCPCS

## 2023-10-09 PROCEDURE — 1036F TOBACCO NON-USER: CPT

## 2023-10-09 PROCEDURE — 1123F ACP DISCUSS/DSCN MKR DOCD: CPT

## 2023-10-09 PROCEDURE — G8420 CALC BMI NORM PARAMETERS: HCPCS

## 2023-10-09 RX ORDER — CYANOCOBALAMIN 1000 UG/ML
1000 INJECTION, SOLUTION INTRAMUSCULAR; SUBCUTANEOUS ONCE
Status: COMPLETED | OUTPATIENT
Start: 2023-10-09 | End: 2023-10-09

## 2023-10-09 RX ORDER — NITROFURANTOIN 25; 75 MG/1; MG/1
CAPSULE ORAL
COMMUNITY
Start: 2023-10-08

## 2023-10-09 RX ADMIN — CYANOCOBALAMIN 1000 MCG: 1000 INJECTION, SOLUTION INTRAMUSCULAR; SUBCUTANEOUS at 15:15

## 2023-10-09 ASSESSMENT — ENCOUNTER SYMPTOMS
FACIAL SWELLING: 0
SHORTNESS OF BREATH: 0
EYE PAIN: 0
CONSTIPATION: 0
EYE DISCHARGE: 0
CHEST TIGHTNESS: 0
EYE REDNESS: 0
RHINORRHEA: 0
TROUBLE SWALLOWING: 0
COUGH: 0
WHEEZING: 0
DIARRHEA: 0
VOMITING: 0
STRIDOR: 0
ABDOMINAL PAIN: 0
SORE THROAT: 0
CHOKING: 0
NAUSEA: 0
COLOR CHANGE: 0

## 2023-10-09 ASSESSMENT — VISUAL ACUITY: OU: 1

## 2023-10-09 NOTE — PROGRESS NOTES
motion. Skin:     General: Skin is warm and dry. Capillary Refill: Capillary refill takes less than 2 seconds. Neurological:      General: No focal deficit present. Mental Status: She is alert and oriented to person, place, and time. Mental status is at baseline. GCS: GCS eye subscore is 4. GCS verbal subscore is 5. GCS motor subscore is 6. Cranial Nerves: No cranial nerve deficit. Sensory: Sensation is intact. No sensory deficit. Motor: Motor function is intact. Coordination: Coordination is intact. Gait: Gait is intact. Psychiatric:         Attention and Perception: Attention and perception normal.         Mood and Affect: Mood and affect normal.         Speech: Speech normal.         Behavior: Behavior normal. Behavior is cooperative. Thought Content: Thought content normal.         Cognition and Memory: Cognition and memory normal.         Judgment: Judgment normal.          Assessment / Plan:        1. Vitamin B 12 deficiency  Continue with planned injections. Questions answered at time of appointment and patient agrees with plan of care. - cyanocobalamin injection 1,000 mcg    2. Anemia due to chronic illness  Will continue to monitor. 3. Recurrent UTI  Continue following with urology. I have spent 28 minutes on this patient encounter. Patient voiced understanding and agreement with plan. All questions/concerns were addressed, risks/side effects of medications were reviewed. Return precautions and after visit summary were provided. Return if symptoms worsen or fail to improve. or earlier as needed. Please note this report has been produced using speech recognition software and may contain errors related to that system including errors in grammar, punctuation, and spelling, as well as words and phrases that may be inappropriate.  If there are any questions or concerns please feel free to contact the dictating provider for

## 2023-10-12 ENCOUNTER — HOSPITAL ENCOUNTER (OUTPATIENT)
Age: 88
Discharge: HOME | End: 2023-10-12
Payer: MEDICARE

## 2023-10-12 DIAGNOSIS — E87.1 HYPONATREMIA: ICD-10-CM

## 2023-10-12 LAB
ALBUMIN SERPL-MCNC: 3.9 GM/DL (ref 3.4–5)
ALP BLD-CCNC: 63 IU/L (ref 40–129)
ALT SERPL-CCNC: 12 U/L (ref 10–40)
ANION GAP SERPL CALCULATED.3IONS-SCNC: 9 MMOL/L (ref 4–16)
AST SERPL-CCNC: 25 IU/L (ref 15–37)
BILIRUB SERPL-MCNC: 0.3 MG/DL (ref 0–1)
BUN SERPL-MCNC: 16 MG/DL (ref 6–23)
CALCIUM SERPL-MCNC: 9.7 MG/DL (ref 8.3–10.6)
CHLORIDE BLD-SCNC: 92 MMOL/L (ref 99–110)
CO2: 29 MMOL/L (ref 21–32)
CREAT SERPL-MCNC: 1.1 MG/DL (ref 0.6–1.1)
DOSE AMOUNT: ABNORMAL
DOSE TIME: ABNORMAL
GFR SERPL CREATININE-BSD FRML MDRD: 49 ML/MIN/1.73M2
GLUCOSE SERPL-MCNC: 124 MG/DL (ref 70–99)
HCT VFR BLD CALC: 35.7 % (ref 37–47)
HEMOGLOBIN: 11.2 GM/DL (ref 12.5–16)
MCH RBC QN AUTO: 32.8 PG (ref 27–31)
MCHC RBC AUTO-ENTMCNC: 31.4 % (ref 32–36)
MCV RBC AUTO: 104.7 FL (ref 78–100)
PDW BLD-RTO: 14.4 % (ref 11.7–14.9)
PLATELET # BLD: 153 K/CU MM (ref 140–440)
PMV BLD AUTO: 9.7 FL (ref 7.5–11.1)
POTASSIUM SERPL-SCNC: 4.6 MMOL/L (ref 3.5–5.1)
PRO-BNP: 1508 PG/ML
RBC # BLD: 3.41 M/CU MM (ref 4.2–5.4)
SODIUM BLD-SCNC: 130 MMOL/L (ref 135–145)
TOTAL PROTEIN: 6.9 GM/DL (ref 6.4–8.2)
VALPROIC ACID LEVEL: 43.8 UG/ML (ref 50–100)
WBC # BLD: 6.1 K/CU MM (ref 4–10.5)

## 2023-10-12 PROCEDURE — 36415 COLL VENOUS BLD VENIPUNCTURE: CPT

## 2023-10-12 PROCEDURE — 83880 ASSAY OF NATRIURETIC PEPTIDE: CPT

## 2023-10-12 PROCEDURE — 80164 ASSAY DIPROPYLACETIC ACD TOT: CPT

## 2023-10-12 PROCEDURE — 80053 COMPREHEN METABOLIC PANEL: CPT

## 2023-10-12 PROCEDURE — 85027 COMPLETE CBC AUTOMATED: CPT

## 2023-10-13 RX ORDER — PYRIDOSTIGMINE BROMIDE 60 MG/1
30 TABLET ORAL
Qty: 45 TABLET | Refills: 1 | OUTPATIENT
Start: 2023-10-13

## 2023-10-13 RX ORDER — PYRIDOSTIGMINE BROMIDE 60 MG/1
30 TABLET ORAL 2 TIMES DAILY
Qty: 90 TABLET | Refills: 1 | Status: SHIPPED | OUTPATIENT
Start: 2023-10-13

## 2023-10-16 ENCOUNTER — CLINICAL DOCUMENTATION ONLY (OUTPATIENT)
Facility: CLINIC | Age: 88
End: 2023-10-16

## 2023-10-16 ENCOUNTER — TELEPHONE (OUTPATIENT)
Dept: INTERNAL MEDICINE CLINIC | Age: 88
End: 2023-10-16

## 2023-10-16 NOTE — TELEPHONE ENCOUNTER
Received a call from the patient, stated she got a voicemail from this office on Friday the 13th, said that whoever called spoke too fast and she couldn't understand what it was about but thinks it had something to do with her mammogram. Did not see any messages from Friday so I'm unsure what's going on.

## 2023-10-23 ENCOUNTER — HOSPITAL ENCOUNTER (OUTPATIENT)
Age: 88
Discharge: HOME | End: 2023-10-23
Payer: MEDICARE

## 2023-10-23 ENCOUNTER — OFFICE VISIT (OUTPATIENT)
Dept: ORTHOPEDIC SURGERY | Age: 88
End: 2023-10-23

## 2023-10-23 VITALS
WEIGHT: 136 LBS | RESPIRATION RATE: 16 BRPM | BODY MASS INDEX: 24.1 KG/M2 | HEIGHT: 63 IN | SYSTOLIC BLOOD PRESSURE: 116 MMHG | DIASTOLIC BLOOD PRESSURE: 78 MMHG

## 2023-10-23 DIAGNOSIS — M19.012 PRIMARY OSTEOARTHRITIS OF LEFT SHOULDER: ICD-10-CM

## 2023-10-23 DIAGNOSIS — E87.1 HYPONATREMIA: ICD-10-CM

## 2023-10-23 DIAGNOSIS — M19.011 PRIMARY OSTEOARTHRITIS OF RIGHT SHOULDER: Primary | ICD-10-CM

## 2023-10-23 LAB
ALBUMIN SERPL-MCNC: 4.2 GM/DL (ref 3.4–5)
ALP BLD-CCNC: 71 IU/L (ref 40–128)
ALT SERPL-CCNC: 14 U/L (ref 10–40)
ANION GAP SERPL CALCULATED.3IONS-SCNC: 10 MMOL/L (ref 4–16)
AST SERPL-CCNC: 27 IU/L (ref 15–37)
BILIRUB SERPL-MCNC: 0.3 MG/DL (ref 0–1)
BUN SERPL-MCNC: 17 MG/DL (ref 6–23)
CALCIUM SERPL-MCNC: 10.5 MG/DL (ref 8.3–10.6)
CHLORIDE BLD-SCNC: 95 MMOL/L (ref 99–110)
CO2: 28 MMOL/L (ref 21–32)
CREAT SERPL-MCNC: 1 MG/DL (ref 0.6–1.1)
CREAT UR-MCNC: 249 MG/DL (ref 28–217)
FERRITIN: 590 NG/ML (ref 15–150)
GFR SERPL CREATININE-BSD FRML MDRD: 55 ML/MIN/1.73M2
GLUCOSE SERPL-MCNC: 121 MG/DL (ref 70–99)
IRON: 56 UG/DL (ref 37–145)
MICROALBUMIN 24H UR-MCNC: 6.7 MG/DL
MICROALBUMIN/CREAT UR-RTO: 26.9 MG/G CREAT (ref 0–30)
OSMOLALITY UR: 286 MOS/L (ref 280–300)
OSMOLALITY UR: 549 MOS/L (ref 292–1090)
PCT TRANSFERRIN: 19 % (ref 10–44)
POTASSIUM SERPL-SCNC: 4.5 MMOL/L (ref 3.5–5.1)
RETICULOCYTE COUNT PCT: 3.8 % (ref 0.2–2.2)
SODIUM BLD-SCNC: 133 MMOL/L (ref 135–145)
SODIUM URINE: 34 MMOL/L (ref 35–167)
TOTAL IRON BINDING CAPACITY: 302 UG/DL (ref 250–450)
TOTAL PROTEIN: 7 GM/DL (ref 6.4–8.2)
UNSATURATED IRON BINDING CAPACITY: 246 UG/DL (ref 110–370)

## 2023-10-23 PROCEDURE — 83550 IRON BINDING TEST: CPT

## 2023-10-23 PROCEDURE — 84300 ASSAY OF URINE SODIUM: CPT

## 2023-10-23 PROCEDURE — 82728 ASSAY OF FERRITIN: CPT

## 2023-10-23 PROCEDURE — 83930 ASSAY OF BLOOD OSMOLALITY: CPT

## 2023-10-23 PROCEDURE — 80053 COMPREHEN METABOLIC PANEL: CPT

## 2023-10-23 PROCEDURE — 83540 ASSAY OF IRON: CPT

## 2023-10-23 PROCEDURE — 85045 AUTOMATED RETICULOCYTE COUNT: CPT

## 2023-10-23 PROCEDURE — 36415 COLL VENOUS BLD VENIPUNCTURE: CPT

## 2023-10-23 PROCEDURE — 83935 ASSAY OF URINE OSMOLALITY: CPT

## 2023-10-23 PROCEDURE — 82570 ASSAY OF URINE CREATININE: CPT

## 2023-10-23 PROCEDURE — 82043 UR ALBUMIN QUANTITATIVE: CPT

## 2023-10-23 RX ORDER — TRIAMCINOLONE ACETONIDE 40 MG/ML
40 INJECTION, SUSPENSION INTRA-ARTICULAR; INTRAMUSCULAR ONCE
Status: COMPLETED | OUTPATIENT
Start: 2023-10-23 | End: 2023-10-23

## 2023-10-23 RX ADMIN — TRIAMCINOLONE ACETONIDE 40 MG: 40 INJECTION, SUSPENSION INTRA-ARTICULAR; INTRAMUSCULAR at 14:19

## 2023-10-23 ASSESSMENT — ENCOUNTER SYMPTOMS
RESPIRATORY NEGATIVE: 1
EYES NEGATIVE: 1
GASTROINTESTINAL NEGATIVE: 1

## 2023-10-23 NOTE — PATIENT INSTRUCTIONS
Follow-up in 3 months for repeat injections in bilateral shoulders, if pain resolves then cancel appointment.

## 2023-10-23 NOTE — PROGRESS NOTES
MAJOR JT/BURSA W/O US    triamcinolone acetonide (KENALOG-40) injection 40 mg            Plan:   Patient Instructions   Follow-up in 3 months for repeat injections in bilateral shoulders, if pain resolves then cancel appointment. right Subacromial Bursa Aspiration / Injection Procedure:  Multiple treatment options were discussed. This injection was recommended as a part of the overall treatment plan. Details of the procedure, potential risks, and potential benefits were discussed. Patient's questions were answered. Patient elected to proceed with procedure. Medication: 1 mL Kenalog 40 mg/ML, 1 mL 0.5% bupivacaine, 1 mL 1% lidocaine  Procedure:  Sterile technique was used as the skin over the injection site was prepped with alcohol. The right subacromial bursa was then injected with the above listed medication. A sterile bandage was placed over the injection site. The patient tolerated the procedure well without complication. left Subacromial Bursa Aspiration / Injection Procedure:  Multiple treatment options were discussed. This injection was recommended as a part of the overall treatment plan. Details of the procedure, potential risks, and potential benefits were discussed. Patient's questions were answered. Patient elected to proceed with procedure. Medication: 1 mL Kenalog 40 mg/ML, 1 mL 0.5% bupivacaine, 1 mL 1% lidocaine  Procedure:  Sterile technique was used as the skin over the injection site was prepped with alcohol. The left subacromial bursa was then injected with the above listed medication. A sterile bandage was placed over the injection site. The patient tolerated the procedure well without complication. *Please note this report has been partially produced using speech recognition Dragon software and may contain errors related to that system including errors in grammar, punctuation, and spelling, as well as words and phrases that may be inappropriate.  If there are any

## 2023-10-26 ENCOUNTER — HOSPITAL ENCOUNTER (OUTPATIENT)
Dept: WOMENS IMAGING | Age: 88
Discharge: HOME | End: 2023-10-26
Attending: FAMILY MEDICINE
Payer: MEDICARE

## 2023-10-26 VITALS — WEIGHT: 133 LBS | HEIGHT: 63 IN | BODY MASS INDEX: 23.57 KG/M2

## 2023-10-26 DIAGNOSIS — Z12.31 ENCOUNTER FOR SCREENING MAMMOGRAM FOR MALIGNANT NEOPLASM OF BREAST: ICD-10-CM

## 2023-10-26 PROCEDURE — 77063 BREAST TOMOSYNTHESIS BI: CPT

## 2023-10-26 RX ORDER — ALBUTEROL SULFATE 90 UG/1
4 AEROSOL, METERED RESPIRATORY (INHALATION) PRN
Status: CANCELLED | OUTPATIENT
Start: 2023-10-27

## 2023-10-26 RX ORDER — ONDANSETRON 2 MG/ML
8 INJECTION INTRAMUSCULAR; INTRAVENOUS
Status: CANCELLED | OUTPATIENT
Start: 2023-10-27

## 2023-10-26 RX ORDER — EPINEPHRINE 1 MG/ML
0.3 INJECTION, SOLUTION, CONCENTRATE INTRAVENOUS PRN
Status: CANCELLED | OUTPATIENT
Start: 2023-10-27

## 2023-10-26 RX ORDER — DIPHENHYDRAMINE HYDROCHLORIDE 50 MG/ML
50 INJECTION INTRAMUSCULAR; INTRAVENOUS
Status: CANCELLED | OUTPATIENT
Start: 2023-10-27

## 2023-10-26 RX ORDER — FAMOTIDINE 10 MG/ML
20 INJECTION, SOLUTION INTRAVENOUS
Status: CANCELLED | OUTPATIENT
Start: 2023-10-27

## 2023-10-26 RX ORDER — ACETAMINOPHEN 325 MG/1
650 TABLET ORAL
Status: CANCELLED | OUTPATIENT
Start: 2023-10-27

## 2023-10-26 RX ORDER — SODIUM CHLORIDE 9 MG/ML
INJECTION, SOLUTION INTRAVENOUS CONTINUOUS
Status: CANCELLED | OUTPATIENT
Start: 2023-10-27

## 2023-10-27 ENCOUNTER — HOSPITAL ENCOUNTER (OUTPATIENT)
Dept: INFUSION THERAPY | Age: 88
Setting detail: INFUSION SERIES
Discharge: HOME | End: 2023-10-27
Payer: MEDICARE

## 2023-10-27 VITALS
RESPIRATION RATE: 16 BRPM | DIASTOLIC BLOOD PRESSURE: 59 MMHG | SYSTOLIC BLOOD PRESSURE: 131 MMHG | OXYGEN SATURATION: 94 % | TEMPERATURE: 97.4 F | HEART RATE: 80 BPM

## 2023-10-27 DIAGNOSIS — M81.0 AGE-RELATED OSTEOPOROSIS WITHOUT CURRENT PATHOLOGICAL FRACTURE: Primary | ICD-10-CM

## 2023-10-27 PROCEDURE — 6360000002 HC RX W HCPCS: Performed by: FAMILY MEDICINE

## 2023-10-27 PROCEDURE — 96372 THER/PROPH/DIAG INJ SC/IM: CPT

## 2023-10-27 RX ORDER — FAMOTIDINE 10 MG/ML
20 INJECTION, SOLUTION INTRAVENOUS
Status: CANCELLED | OUTPATIENT
Start: 2023-10-27

## 2023-10-27 RX ORDER — EPINEPHRINE 1 MG/ML
0.3 INJECTION, SOLUTION, CONCENTRATE INTRAVENOUS PRN
Status: CANCELLED | OUTPATIENT
Start: 2023-10-27

## 2023-10-27 RX ORDER — SODIUM CHLORIDE 9 MG/ML
INJECTION, SOLUTION INTRAVENOUS CONTINUOUS
Status: CANCELLED | OUTPATIENT
Start: 2023-10-27

## 2023-10-27 RX ORDER — ALBUTEROL SULFATE 90 UG/1
4 AEROSOL, METERED RESPIRATORY (INHALATION) PRN
Status: CANCELLED | OUTPATIENT
Start: 2023-10-27

## 2023-10-27 RX ORDER — DIPHENHYDRAMINE HYDROCHLORIDE 50 MG/ML
50 INJECTION INTRAMUSCULAR; INTRAVENOUS
Status: CANCELLED | OUTPATIENT
Start: 2023-10-27

## 2023-10-27 RX ORDER — ACETAMINOPHEN 325 MG/1
650 TABLET ORAL
Status: CANCELLED | OUTPATIENT
Start: 2023-10-27

## 2023-10-27 RX ORDER — ONDANSETRON 2 MG/ML
8 INJECTION INTRAMUSCULAR; INTRAVENOUS
Status: CANCELLED | OUTPATIENT
Start: 2023-10-27

## 2023-10-27 RX ADMIN — DENOSUMAB 60 MG: 60 INJECTION SUBCUTANEOUS at 10:38

## 2023-10-27 NOTE — PROGRESS NOTES
DSD applied. Pt tolerated well. Discharged instructions given to pt, pt voiced understanding. Pt discharged via ambulatory by self to exit.

## 2023-11-21 ENCOUNTER — TELEPHONE (OUTPATIENT)
Dept: INTERNAL MEDICINE CLINIC | Age: 88
End: 2023-11-21

## 2023-11-21 ENCOUNTER — NURSE ONLY (OUTPATIENT)
Dept: INTERNAL MEDICINE CLINIC | Age: 88
End: 2023-11-21
Payer: MEDICARE

## 2023-11-21 DIAGNOSIS — E78.2 MIXED HYPERLIPIDEMIA: ICD-10-CM

## 2023-11-21 DIAGNOSIS — N39.0 RECURRENT UTI: Primary | ICD-10-CM

## 2023-11-21 DIAGNOSIS — I10 PRIMARY HYPERTENSION: ICD-10-CM

## 2023-11-21 DIAGNOSIS — E53.8 VITAMIN B 12 DEFICIENCY: ICD-10-CM

## 2023-11-21 DIAGNOSIS — R73.03 PREDIABETES: ICD-10-CM

## 2023-11-21 LAB
BILIRUBIN, POC: ABNORMAL
BLOOD URINE, POC: ABNORMAL
CLARITY, POC: CLEAR
COLOR, POC: YELLOW
GLUCOSE URINE, POC: ABNORMAL
KETONES, POC: ABNORMAL
LEUKOCYTE EST, POC: ABNORMAL
NITRITE, POC: ABNORMAL
PH, POC: 8.5
PROTEIN, POC: ABNORMAL
SPECIFIC GRAVITY, POC: 1.01
UROBILINOGEN, POC: 0.2

## 2023-11-21 PROCEDURE — 81002 URINALYSIS NONAUTO W/O SCOPE: CPT | Performed by: FAMILY MEDICINE

## 2023-11-21 PROCEDURE — 99999 PR OFFICE/OUTPT VISIT,PROCEDURE ONLY: CPT | Performed by: FAMILY MEDICINE

## 2023-11-21 PROCEDURE — 36415 COLL VENOUS BLD VENIPUNCTURE: CPT | Performed by: FAMILY MEDICINE

## 2023-11-21 PROCEDURE — 96372 THER/PROPH/DIAG INJ SC/IM: CPT | Performed by: FAMILY MEDICINE

## 2023-11-21 RX ORDER — NITROFURANTOIN 25; 75 MG/1; MG/1
100 CAPSULE ORAL 2 TIMES DAILY
Qty: 10 CAPSULE | Refills: 0 | Status: SHIPPED | OUTPATIENT
Start: 2023-11-21 | End: 2023-11-26

## 2023-11-21 RX ORDER — CYANOCOBALAMIN 1000 UG/ML
1000 INJECTION, SOLUTION INTRAMUSCULAR; SUBCUTANEOUS ONCE
Status: COMPLETED | OUTPATIENT
Start: 2023-11-21 | End: 2023-11-21

## 2023-11-21 RX ADMIN — CYANOCOBALAMIN 1000 MCG: 1000 INJECTION, SOLUTION INTRAMUSCULAR; SUBCUTANEOUS at 10:33

## 2023-11-21 NOTE — TELEPHONE ENCOUNTER
Called and left VM with Pt and told her that we sent medication in for her and advised to call back with any question

## 2023-11-21 NOTE — TELEPHONE ENCOUNTER
Patient was here today for a lab draw-she brought urine in from home to have us check it-ran a poct urine on the patient-shows (+) leukocytes-obtained urine for c/s-she is requesting something for the urgency and frequency-she usually takes macrobid-please advise

## 2023-11-21 NOTE — TELEPHONE ENCOUNTER
I've ordered macrobid for patient. Please send for culture and notify patient to  medication, thanks.

## 2023-11-22 ENCOUNTER — TELEPHONE (OUTPATIENT)
Dept: INTERNAL MEDICINE CLINIC | Age: 88
End: 2023-11-22

## 2023-11-22 LAB
ALBUMIN SERPL-MCNC: 4.3 G/DL (ref 3.4–5)
ALBUMIN/GLOB SERPL: 1.5 {RATIO} (ref 1.1–2.2)
ALP SERPL-CCNC: 70 U/L (ref 40–129)
ALT SERPL-CCNC: 13 U/L (ref 10–40)
ANION GAP SERPL CALCULATED.3IONS-SCNC: 11 MMOL/L (ref 3–16)
AST SERPL-CCNC: 24 U/L (ref 15–37)
BASOPHILS # BLD: 0.1 K/UL (ref 0–0.2)
BASOPHILS NFR BLD: 2.9 %
BILIRUB SERPL-MCNC: 0.5 MG/DL (ref 0–1)
BUN SERPL-MCNC: 14 MG/DL (ref 7–20)
CALCIUM SERPL-MCNC: 9.3 MG/DL (ref 8.3–10.6)
CHLORIDE SERPL-SCNC: 93 MMOL/L (ref 99–110)
CHOLEST SERPL-MCNC: 116 MG/DL (ref 0–199)
CO2 SERPL-SCNC: 28 MMOL/L (ref 21–32)
CREAT SERPL-MCNC: 0.9 MG/DL (ref 0.6–1.2)
DEPRECATED RDW RBC AUTO: 14.8 % (ref 12.4–15.4)
EOSINOPHIL # BLD: 0.1 K/UL (ref 0–0.6)
EOSINOPHIL NFR BLD: 2.8 %
EST. AVERAGE GLUCOSE BLD GHB EST-MCNC: 125.5 MG/DL
GFR SERPLBLD CREATININE-BSD FMLA CKD-EPI: >60 ML/MIN/{1.73_M2}
GLUCOSE SERPL-MCNC: 97 MG/DL (ref 70–99)
HBA1C MFR BLD: 6 %
HCT VFR BLD AUTO: 36.5 % (ref 36–48)
HDLC SERPL-MCNC: 41 MG/DL (ref 40–60)
HGB BLD-MCNC: 12.6 G/DL (ref 12–16)
LDLC SERPL CALC-MCNC: 49 MG/DL
LYMPHOCYTES # BLD: 1.7 K/UL (ref 1–5.1)
LYMPHOCYTES NFR BLD: 41.7 %
MCH RBC QN AUTO: 34.1 PG (ref 26–34)
MCHC RBC AUTO-ENTMCNC: 34.4 G/DL (ref 31–36)
MCV RBC AUTO: 99.1 FL (ref 80–100)
MONOCYTES # BLD: 0.4 K/UL (ref 0–1.3)
MONOCYTES NFR BLD: 10.3 %
NEUTROPHILS # BLD: 1.7 K/UL (ref 1.7–7.7)
NEUTROPHILS NFR BLD: 42.3 %
PLATELET # BLD AUTO: 151 K/UL (ref 135–450)
PMV BLD AUTO: 7.9 FL (ref 5–10.5)
POTASSIUM SERPL-SCNC: 4.8 MMOL/L (ref 3.5–5.1)
PROT SERPL-MCNC: 7.1 G/DL (ref 6.4–8.2)
RBC # BLD AUTO: 3.68 M/UL (ref 4–5.2)
SODIUM SERPL-SCNC: 132 MMOL/L (ref 136–145)
TRIGL SERPL-MCNC: 129 MG/DL (ref 0–150)
VLDLC SERPL CALC-MCNC: 26 MG/DL
WBC # BLD AUTO: 4.1 K/UL (ref 4–11)

## 2023-11-22 NOTE — TELEPHONE ENCOUNTER
Got a call from [place] with [facility]. Saw the patient today she is complaining about back pain, patient told them she has been doing exercises to help with it. Home Care would like to know if it's okay to send physical therapy out for an evaluation. Can be reached at [phone].

## 2023-11-25 LAB
BACTERIA UR CULT: ABNORMAL
ORGANISM: ABNORMAL

## 2023-11-28 ENCOUNTER — OFFICE VISIT (OUTPATIENT)
Dept: INTERNAL MEDICINE CLINIC | Age: 88
End: 2023-11-28
Payer: MEDICARE

## 2023-11-28 VITALS
SYSTOLIC BLOOD PRESSURE: 118 MMHG | HEART RATE: 70 BPM | BODY MASS INDEX: 24.45 KG/M2 | DIASTOLIC BLOOD PRESSURE: 70 MMHG | OXYGEN SATURATION: 99 % | WEIGHT: 138 LBS | HEIGHT: 63 IN

## 2023-11-28 DIAGNOSIS — E78.2 MIXED HYPERLIPIDEMIA: ICD-10-CM

## 2023-11-28 DIAGNOSIS — N39.0 RECURRENT UTI: Primary | ICD-10-CM

## 2023-11-28 DIAGNOSIS — R73.03 PREDIABETES: ICD-10-CM

## 2023-11-28 DIAGNOSIS — F32.A CHRONIC DEPRESSION: ICD-10-CM

## 2023-11-28 DIAGNOSIS — G50.0 TRIGEMINAL NEURALGIA: ICD-10-CM

## 2023-11-28 DIAGNOSIS — I10 ESSENTIAL HYPERTENSION: ICD-10-CM

## 2023-11-28 LAB
BILIRUBIN, POC: NORMAL
BLOOD URINE, POC: NORMAL
CLARITY, POC: CLEAR
COLOR, POC: YELLOW
GLUCOSE URINE, POC: NORMAL
KETONES, POC: NORMAL
LEUKOCYTE EST, POC: NORMAL
NITRITE, POC: NORMAL
PH, POC: 7
PROTEIN, POC: 30
SPECIFIC GRAVITY, POC: 1.01
UROBILINOGEN, POC: 0.2

## 2023-11-28 PROCEDURE — 1090F PRES/ABSN URINE INCON ASSESS: CPT | Performed by: FAMILY MEDICINE

## 2023-11-28 PROCEDURE — 99214 OFFICE O/P EST MOD 30 MIN: CPT | Performed by: FAMILY MEDICINE

## 2023-11-28 PROCEDURE — G8427 DOCREV CUR MEDS BY ELIG CLIN: HCPCS | Performed by: FAMILY MEDICINE

## 2023-11-28 PROCEDURE — G8484 FLU IMMUNIZE NO ADMIN: HCPCS | Performed by: FAMILY MEDICINE

## 2023-11-28 PROCEDURE — 81002 URINALYSIS NONAUTO W/O SCOPE: CPT | Performed by: FAMILY MEDICINE

## 2023-11-28 PROCEDURE — G8420 CALC BMI NORM PARAMETERS: HCPCS | Performed by: FAMILY MEDICINE

## 2023-11-28 PROCEDURE — 1036F TOBACCO NON-USER: CPT | Performed by: FAMILY MEDICINE

## 2023-11-28 PROCEDURE — 1123F ACP DISCUSS/DSCN MKR DOCD: CPT | Performed by: FAMILY MEDICINE

## 2023-11-28 ASSESSMENT — ENCOUNTER SYMPTOMS
SHORTNESS OF BREATH: 0
COLOR CHANGE: 0
CHEST TIGHTNESS: 0
VOMITING: 0
DIARRHEA: 0
CONSTIPATION: 0
NAUSEA: 1
SORE THROAT: 0
ABDOMINAL PAIN: 0

## 2023-11-28 NOTE — PROGRESS NOTES
the morning and at bedtime Yes Elizabet Tyson MD   nitrofurantoin, macrocrystal-monohydrate, (MACROBID) 100 MG capsule  Yes Katherine Richardson MD   Magnesium 400 MG CAPS Take 400 mg by mouth daily Yes Katherine Richardson MD   pantoprazole (PROTONIX) 40 MG tablet Take 1 tablet by mouth daily Yes ABELARDO Colindres CNP   Handicap Placard MISC by Does not apply route GOOD FOR 5 YEARS Yes Giulia Hernandez MD   gabapentin (NEURONTIN) 300 MG capsule take 1 to 2 capsules by mouth three times a day Yes Giulia Hernandez MD   Icosapent Ethyl (VASCEPA) 1 g CAPS capsule Take 2 capsules by mouth 2 times daily Yes Elizabet Tyson MD   levoFLOXacin (LEVAQUIN) 250 MG tablet Take 1 tablet by mouth daily Yes Katherine Richardson MD   loperamide (IMODIUM) 2 MG capsule Take 1 capsule by mouth 4 times daily as needed for Diarrhea Yes Katherine Richardson MD   Cholecalciferol (VITAMIN D3) 25 MCG (1000 UT) CAPS Take by mouth daily Yes Katherine Richardson MD   Biotin 2500 MCG CAPS Take by mouth daily Yes Katherine Richardson MD   Calcium Carbonate (CALCIUM 600 PO) Take by mouth daily Yes Katherine Richardson MD   phenylephrine-mineral oil-petrolatum (PREPARATION H) 0.25-14-74.9 % rectal ointment Place rectally 2 times daily as needed for Hemorrhoids Yes Mathew Licea MD   fluticasone (FLONASE) 50 MCG/ACT nasal spray instill 1 spray into each nostril once daily Yes Giulia Hernandez MD   rosuvastatin (CRESTOR) 10 MG tablet take 1 tablet by mouth once daily Yes ABELARDO Colindres CNP   Cyanocobalamin 1000 MCG/ML KIT Inject as directed Yes Katherine Richardson MD   acetaminophen (TYLENOL) 325 mg tablet Take 2 tablets by mouth as needed for Pain Yes Katherine Richardson MD   Multiple Vitamins-Minerals (CENTRUM SILVER ADULT 50+) TABS Take 1 tablet by mouth daily Yes Katherine Richardson MD   ondansetron (ZOFRAN-ODT) 4 MG disintegrating tablet Take 1 tablet by mouth 2 times daily as needed for Nausea or

## 2023-12-09 ENCOUNTER — HOSPITAL ENCOUNTER (OUTPATIENT)
Age: 88
Discharge: HOME | End: 2023-12-09
Payer: MEDICARE

## 2023-12-09 LAB
ALBUMIN SERPL-MCNC: 4.4 GM/DL (ref 3.4–5)
ALP BLD-CCNC: 54 IU/L (ref 40–129)
ALT SERPL-CCNC: 16 U/L (ref 10–40)
ANION GAP SERPL CALCULATED.3IONS-SCNC: 11 MMOL/L (ref 4–16)
AST SERPL-CCNC: 28 IU/L (ref 15–37)
BILIRUB SERPL-MCNC: 0.4 MG/DL (ref 0–1)
BUN SERPL-MCNC: 15 MG/DL (ref 6–23)
CALCIUM SERPL-MCNC: 9.3 MG/DL (ref 8.3–10.6)
CHLORIDE BLD-SCNC: 94 MMOL/L (ref 99–110)
CO2: 26 MMOL/L (ref 21–32)
CREAT SERPL-MCNC: 0.9 MG/DL (ref 0.6–1.1)
FERRITIN: 768 NG/ML (ref 15–150)
GFR SERPL CREATININE-BSD FRML MDRD: >60 ML/MIN/1.73M2
GLUCOSE SERPL-MCNC: 109 MG/DL (ref 70–99)
IRON: 108 UG/DL (ref 37–145)
OSMOLALITY UR: 275 MOS/L (ref 280–300)
OSMOLALITY UR: 312 MOS/L (ref 292–1090)
PCT TRANSFERRIN: 36 % (ref 10–44)
POTASSIUM SERPL-SCNC: 5 MMOL/L (ref 3.5–5.1)
RETICULOCYTE COUNT PCT: 4.1 % (ref 0.2–2.2)
SODIUM BLD-SCNC: 131 MMOL/L (ref 135–145)
SODIUM URINE: 45 MMOL/L (ref 35–167)
TOTAL IRON BINDING CAPACITY: 303 UG/DL (ref 250–450)
TOTAL PROTEIN: 7 GM/DL (ref 6.4–8.2)
UNSATURATED IRON BINDING CAPACITY: 195 UG/DL (ref 110–370)

## 2023-12-09 PROCEDURE — 36415 COLL VENOUS BLD VENIPUNCTURE: CPT

## 2023-12-09 PROCEDURE — 80053 COMPREHEN METABOLIC PANEL: CPT

## 2023-12-09 PROCEDURE — 83540 ASSAY OF IRON: CPT

## 2023-12-09 PROCEDURE — 83550 IRON BINDING TEST: CPT

## 2023-12-09 PROCEDURE — 84300 ASSAY OF URINE SODIUM: CPT

## 2023-12-09 PROCEDURE — 83935 ASSAY OF URINE OSMOLALITY: CPT

## 2023-12-09 PROCEDURE — 83930 ASSAY OF BLOOD OSMOLALITY: CPT

## 2023-12-09 PROCEDURE — 85045 AUTOMATED RETICULOCYTE COUNT: CPT

## 2023-12-09 PROCEDURE — 82728 ASSAY OF FERRITIN: CPT

## 2024-01-07 ENCOUNTER — HOSPITAL ENCOUNTER (OUTPATIENT)
Age: 89
Setting detail: SPECIMEN
Discharge: HOME | End: 2024-01-07
Payer: MEDICARE

## 2024-01-07 PROCEDURE — 87086 URINE CULTURE/COLONY COUNT: CPT

## 2024-01-07 PROCEDURE — 81003 URINALYSIS AUTO W/O SCOPE: CPT

## 2024-01-07 PROCEDURE — 87077 CULTURE AEROBIC IDENTIFY: CPT

## 2024-01-07 PROCEDURE — 87186 SC STD MICRODIL/AGAR DIL: CPT

## 2024-01-10 ENCOUNTER — TELEPHONE (OUTPATIENT)
Dept: INTERNAL MEDICINE CLINIC | Age: 89
End: 2024-01-10

## 2024-01-10 LAB
CULTURE: ABNORMAL
CULTURE: ABNORMAL
Lab: ABNORMAL
SPECIMEN: ABNORMAL

## 2024-01-10 RX ORDER — NITROFURANTOIN 25; 75 MG/1; MG/1
100 CAPSULE ORAL 2 TIMES DAILY
Qty: 10 CAPSULE | Refills: 0 | Status: SHIPPED | OUTPATIENT
Start: 2024-01-10 | End: 2024-01-15

## 2024-01-10 NOTE — TELEPHONE ENCOUNTER
I've called in antibiotics for patient (macrobid listed as allergy, but patient has taken multiple times in the past, can tolerate).  Please notify- thanks.

## 2024-01-10 NOTE — TELEPHONE ENCOUNTER
Patient called read her results to her. She would like something sent in to [facility] pharmacy  for the Ecoli.

## 2024-01-22 ENCOUNTER — NURSE ONLY (OUTPATIENT)
Dept: INTERNAL MEDICINE CLINIC | Age: 89
End: 2024-01-22
Payer: MEDICARE

## 2024-01-22 ENCOUNTER — OFFICE VISIT (OUTPATIENT)
Dept: ORTHOPEDIC SURGERY | Age: 89
End: 2024-01-22
Payer: MEDICARE

## 2024-01-22 VITALS
WEIGHT: 137 LBS | HEART RATE: 63 BPM | BODY MASS INDEX: 24.27 KG/M2 | OXYGEN SATURATION: 97 % | HEIGHT: 63 IN | RESPIRATION RATE: 14 BRPM

## 2024-01-22 DIAGNOSIS — M19.011 PRIMARY OSTEOARTHRITIS OF RIGHT SHOULDER: Primary | ICD-10-CM

## 2024-01-22 DIAGNOSIS — M19.012 PRIMARY OSTEOARTHRITIS OF LEFT SHOULDER: ICD-10-CM

## 2024-01-22 DIAGNOSIS — E53.8 VITAMIN B 12 DEFICIENCY: Primary | ICD-10-CM

## 2024-01-22 PROCEDURE — 20610 DRAIN/INJ JOINT/BURSA W/O US: CPT | Performed by: PHYSICIAN ASSISTANT

## 2024-01-22 PROCEDURE — 96372 THER/PROPH/DIAG INJ SC/IM: CPT | Performed by: FAMILY MEDICINE

## 2024-01-22 PROCEDURE — 99999 PR OFFICE/OUTPT VISIT,PROCEDURE ONLY: CPT | Performed by: FAMILY MEDICINE

## 2024-01-22 RX ORDER — CYANOCOBALAMIN 1000 UG/ML
1000 INJECTION, SOLUTION INTRAMUSCULAR; SUBCUTANEOUS ONCE
Status: COMPLETED | OUTPATIENT
Start: 2024-01-22 | End: 2024-01-22

## 2024-01-22 RX ORDER — TRIAMCINOLONE ACETONIDE 40 MG/ML
40 INJECTION, SUSPENSION INTRA-ARTICULAR; INTRAMUSCULAR ONCE
Status: COMPLETED | OUTPATIENT
Start: 2024-01-22 | End: 2024-01-22

## 2024-01-22 RX ADMIN — TRIAMCINOLONE ACETONIDE 40 MG: 40 INJECTION, SUSPENSION INTRA-ARTICULAR; INTRAMUSCULAR at 15:05

## 2024-01-22 RX ADMIN — CYANOCOBALAMIN 1000 MCG: 1000 INJECTION, SOLUTION INTRAMUSCULAR; SUBCUTANEOUS at 15:42

## 2024-01-22 ASSESSMENT — ENCOUNTER SYMPTOMS
GASTROINTESTINAL NEGATIVE: 1
RESPIRATORY NEGATIVE: 1
EYES NEGATIVE: 1

## 2024-01-22 NOTE — PATIENT INSTRUCTIONS
Continue weight-bearing as tolerated.  Continue range of motion exercises as instructed.  Ice and elevate as needed.  Tylenol or Motrin for pain.  Injection given into the bilateral shoulder.  Follow up in 3 months    We are committed to providing you the best care possible.  If you receive a survey after visiting one of our offices, please take time to share your experience concerning your physician office visit.  These surveys are confidential and no health information about you is shared.  We are eager to improve for you and we are counting on your feedback to help make that happen.

## 2024-01-22 NOTE — PROGRESS NOTES
elevate as needed.  Tylenol or Motrin for pain.  Injection given into the bilateral shoulder.  Follow up in 3 months    We are committed to providing you the best care possible.  If you receive a survey after visiting one of our offices, please take time to share your experience concerning your physician office visit.  These surveys are confidential and no health information about you is shared.  We are eager to improve for you and we are counting on your feedback to help make that happen.     right Subacromial Bursa Aspiration / Injection Procedure:  Multiple treatment options were discussed.  This injection was recommended as a part of the overall treatment plan.  Details of the procedure, potential risks, and potential benefits were discussed.  Patient's questions were answered.  Patient elected to proceed with procedure.  Medication: 1 mL Kenalog 40 mg/ML, 1 mL 0.5% bupivacaine, 1 mL 1% lidocaine  Procedure:  Sterile technique was used as the skin over the injection site was prepped with alcohol.  The right subacromial bursa was then injected with the above listed medication.  A sterile bandage was placed over the injection site.  The patient tolerated the procedure well without complication.      left Subacromial Bursa Aspiration / Injection Procedure:  Multiple treatment options were discussed.  This injection was recommended as a part of the overall treatment plan.  Details of the procedure, potential risks, and potential benefits were discussed.  Patient's questions were answered.  Patient elected to proceed with procedure.  Medication: 1 mL Kenalog 40 mg/ML, 1 mL 0.5% bupivacaine, 1 mL 1% lidocaine  Procedure:  Sterile technique was used as the skin over the injection site was prepped with alcohol.  The left subacromial bursa was then injected with the above listed medication.  A sterile bandage was placed over the injection site.  The patient tolerated the procedure well without complication.

## 2024-02-08 ENCOUNTER — OFFICE VISIT (OUTPATIENT)
Dept: CARDIOLOGY CLINIC | Age: 89
End: 2024-02-08
Payer: MEDICARE

## 2024-02-08 VITALS
HEART RATE: 72 BPM | WEIGHT: 135 LBS | HEIGHT: 63 IN | DIASTOLIC BLOOD PRESSURE: 60 MMHG | BODY MASS INDEX: 23.92 KG/M2 | SYSTOLIC BLOOD PRESSURE: 128 MMHG

## 2024-02-08 DIAGNOSIS — Z86.718 HISTORY OF DVT (DEEP VEIN THROMBOSIS): ICD-10-CM

## 2024-02-08 DIAGNOSIS — I65.23 BILATERAL CAROTID ARTERY STENOSIS: ICD-10-CM

## 2024-02-08 DIAGNOSIS — I34.1 MVP (MITRAL VALVE PROLAPSE): ICD-10-CM

## 2024-02-08 DIAGNOSIS — I25.5 ISCHEMIC CARDIOMYOPATHY: ICD-10-CM

## 2024-02-08 DIAGNOSIS — Z95.1 S/P CABG X 3: ICD-10-CM

## 2024-02-08 DIAGNOSIS — I77.1 SUBCLAVIAN ARTERIAL STENOSIS (HCC): ICD-10-CM

## 2024-02-08 DIAGNOSIS — I25.810 CORONARY ARTERY DISEASE INVOLVING CORONARY BYPASS GRAFT OF NATIVE HEART WITHOUT ANGINA PECTORIS: Primary | ICD-10-CM

## 2024-02-08 DIAGNOSIS — E78.2 MIXED HYPERLIPIDEMIA: ICD-10-CM

## 2024-02-08 DIAGNOSIS — I73.9 PAD (PERIPHERAL ARTERY DISEASE) (HCC): ICD-10-CM

## 2024-02-08 DIAGNOSIS — I10 ESSENTIAL HYPERTENSION: ICD-10-CM

## 2024-02-08 PROCEDURE — G8427 DOCREV CUR MEDS BY ELIG CLIN: HCPCS | Performed by: INTERNAL MEDICINE

## 2024-02-08 PROCEDURE — 1123F ACP DISCUSS/DSCN MKR DOCD: CPT | Performed by: INTERNAL MEDICINE

## 2024-02-08 PROCEDURE — G8484 FLU IMMUNIZE NO ADMIN: HCPCS | Performed by: INTERNAL MEDICINE

## 2024-02-08 PROCEDURE — G8420 CALC BMI NORM PARAMETERS: HCPCS | Performed by: INTERNAL MEDICINE

## 2024-02-08 PROCEDURE — 99213 OFFICE O/P EST LOW 20 MIN: CPT | Performed by: INTERNAL MEDICINE

## 2024-02-08 PROCEDURE — 1036F TOBACCO NON-USER: CPT | Performed by: INTERNAL MEDICINE

## 2024-02-08 PROCEDURE — 1090F PRES/ABSN URINE INCON ASSESS: CPT | Performed by: INTERNAL MEDICINE

## 2024-02-08 RX ORDER — ICOSAPENT ETHYL 1000 MG/1
2 CAPSULE ORAL 2 TIMES DAILY
Qty: 360 CAPSULE | Refills: 1 | Status: SHIPPED | OUTPATIENT
Start: 2024-02-08

## 2024-02-08 NOTE — PROGRESS NOTES
1  3/2021    Normal study    Normal perfusion study with normal distribution in all coronal, short, and    horizontal axis.    The observed defect is consistent with diaphragmatic attenuation.    LVEF calculated by the computer is probably falsely low. LVEF is 40 %      HTN:BP is better.              - changes in  treatment: no, on Verapamil   CARDIOMYOPATHY:  known   CONGESTIVE HEART FAILURE: NO KNOWN HISTORY.      VHD: No significant VHD noted  ECHO 9/2020   Left ventricular function is normal, EF is estimated at 55-60%.   Mild left ventricular hypertrophy.   Normal diastolic filling pattern for age.   No significant valvular disease noted.   No evidence of pericardial effusion.    DYSLIPIDEMIA: Patient's profile is at / near Goal.no, Triglycerides are still high but better than before.                                HDL is low                                Tolerating current medical regimen wellyes, Takes Vascepa & Crestor                                                        See most recent Lab values in Labs section above.  CAROTID ARTERY DISEASE:.Right Subclavian stenosis. No C/O      ARRHYTHMIAS:  Known H/O SVT. Frequent PVCs                                 No C/O   TESTS ORDERED:none this visit     PREVIOUSLY ORDERED TESTS REVIEWED & DISCUSSED WITH THE PATIENT:     I personally reviewed & interpreted, all previously ordered tests as copied above. Latest Labs are pulled in to the note with dates.   Labs, specially in Reference to Lipid profile, Cardiac testing in the form of Echo ( dated: ), stress tests ( dated: ) & other relevant cardiac testing reviewed with patient & recommendations made based on assessment of the results.    Discussed role of Cardiac risk factors & effects + treatment of co morbidities with patient & advised accordingly.     MEDICATIONS: List of medications patient is currently taking is reviewed in detail with the patient. Discussed any side effects or problems taking the medication.

## 2024-02-08 NOTE — PATIENT INSTRUCTIONS
CORONARY ARTERY DISEASE:Yes   clinically stable. Patient is on optimal medical regimen ( see medication list above )  -  Patient is currently  asymptomatic from CAD.            - changes in  treatment:   no, on ASA           - Testing ordered:  no  Malaysian classification: 1  3/2021    Normal study    Normal perfusion study with normal distribution in all coronal, short, and    horizontal axis.    The observed defect is consistent with diaphragmatic attenuation.    LVEF calculated by the computer is probably falsely low. LVEF is 40 %      HTN:BP is better.              - changes in  treatment: no, on Verapamil   CARDIOMYOPATHY:  known   CONGESTIVE HEART FAILURE: NO KNOWN HISTORY.      VHD: No significant VHD noted  ECHO 9/2020   Left ventricular function is normal, EF is estimated at 55-60%.   Mild left ventricular hypertrophy.   Normal diastolic filling pattern for age.   No significant valvular disease noted.   No evidence of pericardial effusion.    DYSLIPIDEMIA: Patient's profile is at / near Goal.no, Triglycerides are still high but better than before.                                HDL is low                                Tolerating current medical regimen wellyes, Takes Vascepa & Crestor                                                        See most recent Lab values in Labs section above.  CAROTID ARTERY DISEASE:.Right Subclavian stenosis. No C/O      ARRHYTHMIAS:  Known H/O SVT. Frequent PVCs                                 No C/O   TESTS ORDERED:none this visit     PREVIOUSLY ORDERED TESTS REVIEWED & DISCUSSED WITH THE PATIENT:     I personally reviewed & interpreted, all previously ordered tests as copied above. Latest Labs are pulled in to the note with dates.   Labs, specially in Reference to Lipid profile, Cardiac testing in the form of Echo ( dated: ), stress tests ( dated: ) & other relevant cardiac testing reviewed with patient & recommendations made based on assessment of the

## 2024-02-28 ENCOUNTER — NURSE ONLY (OUTPATIENT)
Dept: INTERNAL MEDICINE CLINIC | Age: 89
End: 2024-02-28
Payer: MEDICARE

## 2024-02-28 DIAGNOSIS — E53.8 VITAMIN B 12 DEFICIENCY: Primary | ICD-10-CM

## 2024-02-28 PROCEDURE — 96372 THER/PROPH/DIAG INJ SC/IM: CPT | Performed by: FAMILY MEDICINE

## 2024-02-28 RX ORDER — CYANOCOBALAMIN 1000 UG/ML
1000 INJECTION, SOLUTION INTRAMUSCULAR; SUBCUTANEOUS ONCE
Status: COMPLETED | OUTPATIENT
Start: 2024-02-28 | End: 2024-02-28

## 2024-02-28 RX ADMIN — CYANOCOBALAMIN 1000 MCG: 1000 INJECTION, SOLUTION INTRAMUSCULAR; SUBCUTANEOUS at 10:50

## 2024-02-29 ENCOUNTER — TELEPHONE (OUTPATIENT)
Dept: INTERNAL MEDICINE CLINIC | Age: 89
End: 2024-02-29

## 2024-02-29 DIAGNOSIS — E55.9 VITAMIN D DEFICIENCY: ICD-10-CM

## 2024-02-29 DIAGNOSIS — Z00.00 GENERAL MEDICAL EXAM: Primary | ICD-10-CM

## 2024-02-29 RX ORDER — ICOSAPENT ETHYL 1 G/1
2 CAPSULE ORAL 2 TIMES DAILY
Qty: 360 CAPSULE | Refills: 1 | Status: CANCELLED | OUTPATIENT
Start: 2024-02-29

## 2024-03-01 ENCOUNTER — TELEPHONE (OUTPATIENT)
Dept: INTERNAL MEDICINE CLINIC | Age: 89
End: 2024-03-01

## 2024-03-01 NOTE — TELEPHONE ENCOUNTER
Patient called stating that she had a Kidney infection and was put on antibiotics By Dr. Nevarez  and it gave her diarrhea so he changed it back to macrobid. Meantime she heard on the news that there was plastic in the bottled water and thinks that is the cause of it and wants to purify her water. Patient would like a call back.

## 2024-03-04 NOTE — TELEPHONE ENCOUNTER
Called pt & informed that her water is most likely not causing her uti. I asked her if she is still having symptoms & she said no.

## 2024-03-04 NOTE — TELEPHONE ENCOUNTER
Per CMM:    Information regarding your request  The patient currently has access to the requested medication and a Prior Authorization is not needed for the patient/medication.

## 2024-03-04 NOTE — TELEPHONE ENCOUNTER
PA submitted for Vascepa via [facility].  Awaiting response.

## 2024-03-07 DIAGNOSIS — E55.9 VITAMIN D DEFICIENCY: Primary | ICD-10-CM

## 2024-03-07 DIAGNOSIS — I10 ESSENTIAL HYPERTENSION: ICD-10-CM

## 2024-03-07 DIAGNOSIS — R73.03 PREDIABETES: ICD-10-CM

## 2024-03-07 DIAGNOSIS — E78.2 MIXED HYPERLIPIDEMIA: ICD-10-CM

## 2024-03-07 PROCEDURE — 36415 COLL VENOUS BLD VENIPUNCTURE: CPT | Performed by: FAMILY MEDICINE

## 2024-03-08 ENCOUNTER — NURSE ONLY (OUTPATIENT)
Dept: INTERNAL MEDICINE CLINIC | Age: 89
End: 2024-03-08
Payer: MEDICARE

## 2024-03-08 DIAGNOSIS — N30.00 ACUTE CYSTITIS WITHOUT HEMATURIA: Primary | ICD-10-CM

## 2024-03-08 LAB
BASOPHILS # BLD: 0 K/UL (ref 0–0.2)
BASOPHILS NFR BLD: 0.4 %
BILIRUBIN, POC: NORMAL
BLOOD URINE, POC: NORMAL
CLARITY, POC: YELLOW
COLOR, POC: CLEAR
DEPRECATED RDW RBC AUTO: 14.1 % (ref 12.4–15.4)
EOSINOPHIL # BLD: 0.1 K/UL (ref 0–0.6)
EOSINOPHIL NFR BLD: 0.7 %
GLUCOSE URINE, POC: NORMAL
HCT VFR BLD AUTO: 36.4 % (ref 36–48)
HGB BLD-MCNC: 12.9 G/DL (ref 12–16)
KETONES, POC: NORMAL
LEUKOCYTE EST, POC: NORMAL
LYMPHOCYTES # BLD: 2 K/UL (ref 1–5.1)
LYMPHOCYTES NFR BLD: 27 %
MCH RBC QN AUTO: 34.6 PG (ref 26–34)
MCHC RBC AUTO-ENTMCNC: 35.4 G/DL (ref 31–36)
MCV RBC AUTO: 97.9 FL (ref 80–100)
MONOCYTES # BLD: 0.6 K/UL (ref 0–1.3)
MONOCYTES NFR BLD: 8.8 %
NEUTROPHILS # BLD: 4.6 K/UL (ref 1.7–7.7)
NEUTROPHILS NFR BLD: 63.1 %
NITRITE, POC: NORMAL
PH, POC: 8.5
PLATELET # BLD AUTO: 158 K/UL (ref 135–450)
PMV BLD AUTO: 8 FL (ref 5–10.5)
PROTEIN, POC: NORMAL
RBC # BLD AUTO: 3.72 M/UL (ref 4–5.2)
SPECIFIC GRAVITY, POC: 1.01
UROBILINOGEN, POC: 0.2
WBC # BLD AUTO: 7.4 K/UL (ref 4–11)

## 2024-03-08 PROCEDURE — 81002 URINALYSIS NONAUTO W/O SCOPE: CPT

## 2024-03-08 NOTE — PROGRESS NOTES
Patient here for blood work-obtained 2 sst's and 1 lavender obtained form left AC-tolerated well-she c/o uti symptoms-u/a completed today but it was (-)

## 2024-03-09 LAB
25(OH)D3 SERPL-MCNC: 63.1 NG/ML
ALBUMIN SERPL-MCNC: 4.5 G/DL (ref 3.4–5)
ALBUMIN/GLOB SERPL: 1.6 {RATIO} (ref 1.1–2.2)
ALP SERPL-CCNC: 47 U/L (ref 40–129)
ALT SERPL-CCNC: 21 U/L (ref 10–40)
ANION GAP SERPL CALCULATED.3IONS-SCNC: 11 MMOL/L (ref 3–16)
AST SERPL-CCNC: 35 U/L (ref 15–37)
BILIRUB SERPL-MCNC: 0.5 MG/DL (ref 0–1)
BUN SERPL-MCNC: 13 MG/DL (ref 7–20)
CALCIUM SERPL-MCNC: 9.8 MG/DL (ref 8.3–10.6)
CHLORIDE SERPL-SCNC: 90 MMOL/L (ref 99–110)
CHOLEST SERPL-MCNC: 118 MG/DL (ref 0–199)
CO2 SERPL-SCNC: 27 MMOL/L (ref 21–32)
CREAT SERPL-MCNC: 0.9 MG/DL (ref 0.6–1.2)
EST. AVERAGE GLUCOSE BLD GHB EST-MCNC: 116.9 MG/DL
GFR SERPLBLD CREATININE-BSD FMLA CKD-EPI: >60 ML/MIN/{1.73_M2}
GLUCOSE SERPL-MCNC: 106 MG/DL (ref 70–99)
HBA1C MFR BLD: 5.7 %
HDLC SERPL-MCNC: 35 MG/DL (ref 40–60)
LDLC SERPL CALC-MCNC: 56 MG/DL
POTASSIUM SERPL-SCNC: 4.6 MMOL/L (ref 3.5–5.1)
PROT SERPL-MCNC: 7.3 G/DL (ref 6.4–8.2)
SODIUM SERPL-SCNC: 128 MMOL/L (ref 136–145)
TRIGL SERPL-MCNC: 137 MG/DL (ref 0–150)
VLDLC SERPL CALC-MCNC: 27 MG/DL

## 2024-03-12 ENCOUNTER — OFFICE VISIT (OUTPATIENT)
Dept: INTERNAL MEDICINE CLINIC | Age: 89
End: 2024-03-12
Payer: MEDICARE

## 2024-03-12 ENCOUNTER — TELEPHONE (OUTPATIENT)
Dept: INTERNAL MEDICINE CLINIC | Age: 89
End: 2024-03-12

## 2024-03-12 VITALS
DIASTOLIC BLOOD PRESSURE: 60 MMHG | BODY MASS INDEX: 23.39 KG/M2 | HEIGHT: 63 IN | HEART RATE: 72 BPM | WEIGHT: 132 LBS | OXYGEN SATURATION: 98 % | SYSTOLIC BLOOD PRESSURE: 112 MMHG

## 2024-03-12 DIAGNOSIS — N18.31 CKD STAGE G3A/A1, GFR 45-59 AND ALBUMIN CREATININE RATIO <30 MG/G (HCC): ICD-10-CM

## 2024-03-12 DIAGNOSIS — E78.2 MIXED HYPERLIPIDEMIA: ICD-10-CM

## 2024-03-12 DIAGNOSIS — I10 ESSENTIAL HYPERTENSION: Primary | ICD-10-CM

## 2024-03-12 DIAGNOSIS — R25.1 TREMOR: ICD-10-CM

## 2024-03-12 DIAGNOSIS — R73.03 PREDIABETES: ICD-10-CM

## 2024-03-12 DIAGNOSIS — F33.42 MAJOR DEPRESSIVE DISORDER, RECURRENT, IN FULL REMISSION (HCC): ICD-10-CM

## 2024-03-12 DIAGNOSIS — E87.1 HYPONATREMIA: ICD-10-CM

## 2024-03-12 DIAGNOSIS — N39.0 RECURRENT UTI: ICD-10-CM

## 2024-03-12 DIAGNOSIS — I50.22 CHRONIC SYSTOLIC (CONGESTIVE) HEART FAILURE (HCC): ICD-10-CM

## 2024-03-12 PROCEDURE — G2211 COMPLEX E/M VISIT ADD ON: HCPCS | Performed by: FAMILY MEDICINE

## 2024-03-12 PROCEDURE — G8427 DOCREV CUR MEDS BY ELIG CLIN: HCPCS | Performed by: FAMILY MEDICINE

## 2024-03-12 PROCEDURE — 1090F PRES/ABSN URINE INCON ASSESS: CPT | Performed by: FAMILY MEDICINE

## 2024-03-12 PROCEDURE — 99214 OFFICE O/P EST MOD 30 MIN: CPT | Performed by: FAMILY MEDICINE

## 2024-03-12 PROCEDURE — 1036F TOBACCO NON-USER: CPT | Performed by: FAMILY MEDICINE

## 2024-03-12 PROCEDURE — G8420 CALC BMI NORM PARAMETERS: HCPCS | Performed by: FAMILY MEDICINE

## 2024-03-12 PROCEDURE — G8484 FLU IMMUNIZE NO ADMIN: HCPCS | Performed by: FAMILY MEDICINE

## 2024-03-12 PROCEDURE — 1123F ACP DISCUSS/DSCN MKR DOCD: CPT | Performed by: FAMILY MEDICINE

## 2024-03-12 RX ORDER — PYRIDOSTIGMINE BROMIDE 60 MG/1
30 TABLET ORAL 2 TIMES DAILY
Qty: 90 TABLET | Refills: 1 | Status: SHIPPED | OUTPATIENT
Start: 2024-03-12

## 2024-03-12 ASSESSMENT — ENCOUNTER SYMPTOMS
SHORTNESS OF BREATH: 0
COLOR CHANGE: 0
DIARRHEA: 0
CHEST TIGHTNESS: 0
ABDOMINAL PAIN: 0
SORE THROAT: 0
VOMITING: 0
CONSTIPATION: 0

## 2024-03-12 ASSESSMENT — PATIENT HEALTH QUESTIONNAIRE - PHQ9
2. FEELING DOWN, DEPRESSED OR HOPELESS: 2
SUM OF ALL RESPONSES TO PHQ QUESTIONS 1-9: 4
9. THOUGHTS THAT YOU WOULD BE BETTER OFF DEAD, OR OF HURTING YOURSELF: 0
4. FEELING TIRED OR HAVING LITTLE ENERGY: 0
6. FEELING BAD ABOUT YOURSELF - OR THAT YOU ARE A FAILURE OR HAVE LET YOURSELF OR YOUR FAMILY DOWN: 0
SUM OF ALL RESPONSES TO PHQ QUESTIONS 1-9: 4
5. POOR APPETITE OR OVEREATING: 0
8. MOVING OR SPEAKING SO SLOWLY THAT OTHER PEOPLE COULD HAVE NOTICED. OR THE OPPOSITE, BEING SO FIGETY OR RESTLESS THAT YOU HAVE BEEN MOVING AROUND A LOT MORE THAN USUAL: 0
10. IF YOU CHECKED OFF ANY PROBLEMS, HOW DIFFICULT HAVE THESE PROBLEMS MADE IT FOR YOU TO DO YOUR WORK, TAKE CARE OF THINGS AT HOME, OR GET ALONG WITH OTHER PEOPLE: 0
1. LITTLE INTEREST OR PLEASURE IN DOING THINGS: 2
7. TROUBLE CONCENTRATING ON THINGS, SUCH AS READING THE NEWSPAPER OR WATCHING TELEVISION: 0
SUM OF ALL RESPONSES TO PHQ QUESTIONS 1-9: 4
3. TROUBLE FALLING OR STAYING ASLEEP: 0
SUM OF ALL RESPONSES TO PHQ QUESTIONS 1-9: 4
SUM OF ALL RESPONSES TO PHQ9 QUESTIONS 1 & 2: 4

## 2024-03-12 NOTE — TELEPHONE ENCOUNTER
Patient called giving the medications that she had a reaction to is fossomycin  and Tromethine. SHARDA

## 2024-03-12 NOTE — PROGRESS NOTES
2. Chronic systolic (congestive) heart failure (HCC)  Stable, continue following with cardiology.     3. CKD stage G3a/A1, GFR 45-59 and albumin creatinine ratio <30 mg/g (HCC)  Stable, will monitor.     4. Major depressive disorder, recurrent, in full remission (HCC)  Stable, well controlled.  Continue current medications.     5. Recurrent UTI  UA negative, advised to follow up with urologist if still symptomatic.      6. Mixed hyperlipidemia  Lipid panel stable, continue crestor.     7. Prediabetes  Stable, will monitor.     8. Hyponatremia  Slight drop in Na, but overall stable for the past several months.  Will monitor.     9. Tremor  Stable, well controlled.  Continue current medications.   - pyRIDostigmine (MESTINON) 60 MG tablet; Take 0.5 tablets by mouth in the morning and at bedtime  Dispense: 90 tablet; Refill: 1          I have spent 30 minutes on this patient encounter.     Patient voiced understanding and agreement with plan.  All questions/concerns were addressed, risks/side effects of medications were reviewed.  Return precautions and after visit summary were provided.  Return in about 3 months (around 6/12/2024). or earlier as needed.      Jayda Díaz MD

## 2024-03-12 NOTE — TELEPHONE ENCOUNTER
Patient called in stating that it was discussed today that she increase her Mestinon and was to take 1 in the am and 1 in the pm. When she picked up from the pharmacy it was not increase.  I verified the dosage and explained that it was documented that she continue as she was doing well.  She states that she is not doing well and she shakes all the time. Please advise.

## 2024-03-13 NOTE — TELEPHONE ENCOUNTER
We never discussed increasing her dose, she just said she needed a refill.  If she wants to try an increased dose, I would advise just starting with one full tab in am and continue 1/2 tab in pm- call when she runs out of medication and we can refill early.  Thanks.

## 2024-03-14 ENCOUNTER — TELEPHONE (OUTPATIENT)
Dept: CARDIOLOGY CLINIC | Age: 89
End: 2024-03-14

## 2024-03-14 DIAGNOSIS — E78.2 MIXED HYPERLIPIDEMIA: ICD-10-CM

## 2024-03-14 RX ORDER — ROSUVASTATIN CALCIUM 10 MG/1
TABLET, COATED ORAL
Qty: 90 TABLET | Refills: 3 | Status: SHIPPED | OUTPATIENT
Start: 2024-03-14

## 2024-03-16 DIAGNOSIS — G50.0 TIC DOULOUREUX: ICD-10-CM

## 2024-03-18 RX ORDER — GABAPENTIN 300 MG/1
CAPSULE ORAL
Qty: 270 CAPSULE | Refills: 1 | Status: SHIPPED | OUTPATIENT
Start: 2024-03-18 | End: 2024-06-18

## 2024-03-21 ENCOUNTER — COMMUNITY CARE MANAGEMENT (OUTPATIENT)
Facility: CLINIC | Age: 89
End: 2024-03-21

## 2024-03-25 ENCOUNTER — TELEPHONE (OUTPATIENT)
Dept: INTERNAL MEDICINE CLINIC | Age: 89
End: 2024-03-25

## 2024-03-26 ENCOUNTER — HOSPITAL ENCOUNTER (EMERGENCY)
Age: 89
Discharge: LEFT BEFORE DISCHARGE | End: 2024-03-26

## 2024-03-28 ENCOUNTER — APPOINTMENT (OUTPATIENT)
Dept: GENERAL RADIOLOGY | Age: 89
DRG: 392 | End: 2024-03-28
Payer: MEDICARE

## 2024-03-28 ENCOUNTER — HOSPITAL ENCOUNTER (INPATIENT)
Age: 89
LOS: 5 days | Discharge: NURSING, REHAB OR LONG-TERM CARE | DRG: 392 | End: 2024-04-02
Attending: EMERGENCY MEDICINE | Admitting: STUDENT IN AN ORGANIZED HEALTH CARE EDUCATION/TRAINING PROGRAM
Payer: MEDICARE

## 2024-03-28 ENCOUNTER — APPOINTMENT (OUTPATIENT)
Dept: CT IMAGING | Age: 89
DRG: 392 | End: 2024-03-28
Payer: MEDICARE

## 2024-03-28 DIAGNOSIS — E87.1 HYPONATREMIA: ICD-10-CM

## 2024-03-28 DIAGNOSIS — K57.32 DIVERTICULITIS OF COLON: Primary | ICD-10-CM

## 2024-03-28 LAB
ALBUMIN SERPL-MCNC: 4 GM/DL (ref 3.4–5)
ALP BLD-CCNC: 45 IU/L (ref 40–129)
ALT SERPL-CCNC: 15 U/L (ref 10–40)
ANION GAP SERPL CALCULATED.3IONS-SCNC: 12 MMOL/L (ref 7–16)
AST SERPL-CCNC: 27 IU/L (ref 15–37)
BACTERIA: NEGATIVE /HPF
BASOPHILS ABSOLUTE: 0 K/CU MM
BASOPHILS RELATIVE PERCENT: 0.3 % (ref 0–1)
BILIRUB SERPL-MCNC: 0.7 MG/DL (ref 0–1)
BILIRUBIN URINE: NEGATIVE MG/DL
BLOOD, URINE: ABNORMAL
BUN SERPL-MCNC: 15 MG/DL (ref 6–23)
CALCIUM SERPL-MCNC: 9.6 MG/DL (ref 8.3–10.6)
CHLORIDE BLD-SCNC: 96 MMOL/L (ref 99–110)
CLARITY: CLEAR
CO2: 25 MMOL/L (ref 21–32)
COLOR: YELLOW
CREAT SERPL-MCNC: 1 MG/DL (ref 0.6–1.1)
DIFFERENTIAL TYPE: ABNORMAL
EKG ATRIAL RATE: 70 BPM
EKG DIAGNOSIS: NORMAL
EKG P AXIS: 52 DEGREES
EKG P-R INTERVAL: 162 MS
EKG Q-T INTERVAL: 452 MS
EKG QRS DURATION: 80 MS
EKG QTC CALCULATION (BAZETT): 488 MS
EKG R AXIS: 42 DEGREES
EKG T AXIS: 22 DEGREES
EKG VENTRICULAR RATE: 70 BPM
EOSINOPHILS ABSOLUTE: 0.1 K/CU MM
EOSINOPHILS RELATIVE PERCENT: 0.9 % (ref 0–3)
GFR SERPL CREATININE-BSD FRML MDRD: 55 ML/MIN/1.73M2
GLUCOSE SERPL-MCNC: 101 MG/DL (ref 70–99)
GLUCOSE, URINE: NEGATIVE MG/DL
HCT VFR BLD CALC: 35.7 % (ref 37–47)
HEMOGLOBIN: 11.7 GM/DL (ref 12.5–16)
IMMATURE NEUTROPHIL %: 0.4 % (ref 0–0.43)
INR BLD: 1 INDEX
KETONES, URINE: NEGATIVE MG/DL
LACTATE: 2.5 MMOL/L (ref 0.5–1.9)
LEUKOCYTE ESTERASE, URINE: ABNORMAL
LIPASE: 12 IU/L (ref 13–60)
LYMPHOCYTES ABSOLUTE: 2.1 K/CU MM
LYMPHOCYTES RELATIVE PERCENT: 26.5 % (ref 24–44)
MAGNESIUM: 2 MG/DL (ref 1.8–2.4)
MCH RBC QN AUTO: 33.2 PG (ref 27–31)
MCHC RBC AUTO-ENTMCNC: 32.8 % (ref 32–36)
MCV RBC AUTO: 101.4 FL (ref 78–100)
MONOCYTES ABSOLUTE: 0.7 K/CU MM
MONOCYTES RELATIVE PERCENT: 9.3 % (ref 0–4)
NITRITE URINE, QUANTITATIVE: NEGATIVE
NUCLEATED RBC %: 0 %
PDW BLD-RTO: 14.1 % (ref 11.7–14.9)
PH, URINE: 8 (ref 5–8)
PLATELET # BLD: 135 K/CU MM (ref 140–440)
PMV BLD AUTO: 8.8 FL (ref 7.5–11.1)
POTASSIUM SERPL-SCNC: 4.4 MMOL/L (ref 3.5–5.1)
PROTEIN UA: NEGATIVE MG/DL
PROTHROMBIN TIME: 13.4 SECONDS (ref 11.7–14.5)
RBC # BLD: 3.52 M/CU MM (ref 4.2–5.4)
RBC URINE: 1 /HPF (ref 0–6)
SEGMENTED NEUTROPHILS ABSOLUTE COUNT: 4.9 K/CU MM
SEGMENTED NEUTROPHILS RELATIVE PERCENT: 62.6 % (ref 36–66)
SODIUM BLD-SCNC: 133 MMOL/L (ref 135–145)
SPECIFIC GRAVITY UA: 1.01 (ref 1–1.03)
SQUAMOUS EPITHELIAL: <1 /HPF
TOTAL IMMATURE NEUTOROPHIL: 0.03 K/CU MM
TOTAL NUCLEATED RBC: 0 K/CU MM
TOTAL PROTEIN: 7 GM/DL (ref 6.4–8.2)
TRICHOMONAS: ABNORMAL /HPF
UROBILINOGEN, URINE: 0.2 MG/DL (ref 0.2–1)
WBC # BLD: 7.8 K/CU MM (ref 4–10.5)
WBC UA: 10 /HPF (ref 0–5)

## 2024-03-28 PROCEDURE — 80053 COMPREHEN METABOLIC PANEL: CPT

## 2024-03-28 PROCEDURE — 2580000003 HC RX 258: Performed by: STUDENT IN AN ORGANIZED HEALTH CARE EDUCATION/TRAINING PROGRAM

## 2024-03-28 PROCEDURE — 83690 ASSAY OF LIPASE: CPT

## 2024-03-28 PROCEDURE — 71045 X-RAY EXAM CHEST 1 VIEW: CPT

## 2024-03-28 PROCEDURE — 87086 URINE CULTURE/COLONY COUNT: CPT

## 2024-03-28 PROCEDURE — 1200000000 HC SEMI PRIVATE

## 2024-03-28 PROCEDURE — 6370000000 HC RX 637 (ALT 250 FOR IP): Performed by: EMERGENCY MEDICINE

## 2024-03-28 PROCEDURE — 81001 URINALYSIS AUTO W/SCOPE: CPT

## 2024-03-28 PROCEDURE — 99285 EMERGENCY DEPT VISIT HI MDM: CPT

## 2024-03-28 PROCEDURE — 87088 URINE BACTERIA CULTURE: CPT

## 2024-03-28 PROCEDURE — 6360000002 HC RX W HCPCS: Performed by: PHYSICIAN ASSISTANT

## 2024-03-28 PROCEDURE — 94761 N-INVAS EAR/PLS OXIMETRY MLT: CPT

## 2024-03-28 PROCEDURE — 6360000002 HC RX W HCPCS: Performed by: EMERGENCY MEDICINE

## 2024-03-28 PROCEDURE — 93010 ELECTROCARDIOGRAM REPORT: CPT | Performed by: INTERNAL MEDICINE

## 2024-03-28 PROCEDURE — 96361 HYDRATE IV INFUSION ADD-ON: CPT

## 2024-03-28 PROCEDURE — 6360000004 HC RX CONTRAST MEDICATION: Performed by: PHYSICIAN ASSISTANT

## 2024-03-28 PROCEDURE — 96374 THER/PROPH/DIAG INJ IV PUSH: CPT

## 2024-03-28 PROCEDURE — 6360000002 HC RX W HCPCS: Performed by: STUDENT IN AN ORGANIZED HEALTH CARE EDUCATION/TRAINING PROGRAM

## 2024-03-28 PROCEDURE — 85610 PROTHROMBIN TIME: CPT

## 2024-03-28 PROCEDURE — 96375 TX/PRO/DX INJ NEW DRUG ADDON: CPT

## 2024-03-28 PROCEDURE — 2580000003 HC RX 258: Performed by: PHYSICIAN ASSISTANT

## 2024-03-28 PROCEDURE — 83605 ASSAY OF LACTIC ACID: CPT

## 2024-03-28 PROCEDURE — 83735 ASSAY OF MAGNESIUM: CPT

## 2024-03-28 PROCEDURE — 85025 COMPLETE CBC W/AUTO DIFF WBC: CPT

## 2024-03-28 PROCEDURE — 93005 ELECTROCARDIOGRAM TRACING: CPT | Performed by: PHYSICIAN ASSISTANT

## 2024-03-28 PROCEDURE — 6370000000 HC RX 637 (ALT 250 FOR IP): Performed by: STUDENT IN AN ORGANIZED HEALTH CARE EDUCATION/TRAINING PROGRAM

## 2024-03-28 PROCEDURE — 74177 CT ABD & PELVIS W/CONTRAST: CPT

## 2024-03-28 PROCEDURE — 87186 SC STD MICRODIL/AGAR DIL: CPT

## 2024-03-28 RX ORDER — ROSUVASTATIN CALCIUM 5 MG/1
10 TABLET, COATED ORAL NIGHTLY
Status: DISCONTINUED | OUTPATIENT
Start: 2024-03-28 | End: 2024-04-02 | Stop reason: HOSPADM

## 2024-03-28 RX ORDER — ONDANSETRON 2 MG/ML
4 INJECTION INTRAMUSCULAR; INTRAVENOUS EVERY 6 HOURS PRN
Status: DISCONTINUED | OUTPATIENT
Start: 2024-03-28 | End: 2024-04-02 | Stop reason: HOSPADM

## 2024-03-28 RX ORDER — SODIUM CHLORIDE 9 MG/ML
INJECTION, SOLUTION INTRAVENOUS CONTINUOUS
OUTPATIENT
Start: 2024-04-29

## 2024-03-28 RX ORDER — 0.9 % SODIUM CHLORIDE 0.9 %
500 INTRAVENOUS SOLUTION INTRAVENOUS ONCE
Status: COMPLETED | OUTPATIENT
Start: 2024-03-28 | End: 2024-03-28

## 2024-03-28 RX ORDER — SODIUM CHLORIDE 0.9 % (FLUSH) 0.9 %
5-40 SYRINGE (ML) INJECTION EVERY 12 HOURS SCHEDULED
Status: DISCONTINUED | OUTPATIENT
Start: 2024-03-28 | End: 2024-04-02 | Stop reason: HOSPADM

## 2024-03-28 RX ORDER — ACETAMINOPHEN 325 MG/1
650 TABLET ORAL
OUTPATIENT
Start: 2024-04-29

## 2024-03-28 RX ORDER — GABAPENTIN 300 MG/1
300 CAPSULE ORAL 3 TIMES DAILY
Status: DISCONTINUED | OUTPATIENT
Start: 2024-03-28 | End: 2024-04-02 | Stop reason: HOSPADM

## 2024-03-28 RX ORDER — FENTANYL CITRATE 50 UG/ML
50 INJECTION, SOLUTION INTRAMUSCULAR; INTRAVENOUS ONCE
Status: COMPLETED | OUTPATIENT
Start: 2024-03-28 | End: 2024-03-28

## 2024-03-28 RX ORDER — POTASSIUM CHLORIDE 20 MEQ/1
40 TABLET, EXTENDED RELEASE ORAL PRN
Status: DISCONTINUED | OUTPATIENT
Start: 2024-03-28 | End: 2024-04-02 | Stop reason: HOSPADM

## 2024-03-28 RX ORDER — FAMOTIDINE 10 MG/ML
20 INJECTION, SOLUTION INTRAVENOUS
OUTPATIENT
Start: 2024-04-29

## 2024-03-28 RX ORDER — DIVALPROEX SODIUM 250 MG/1
250 TABLET, EXTENDED RELEASE ORAL NIGHTLY
Status: DISCONTINUED | OUTPATIENT
Start: 2024-03-28 | End: 2024-04-02 | Stop reason: HOSPADM

## 2024-03-28 RX ORDER — POTASSIUM CHLORIDE 7.45 MG/ML
10 INJECTION INTRAVENOUS PRN
Status: DISCONTINUED | OUTPATIENT
Start: 2024-03-28 | End: 2024-04-02 | Stop reason: HOSPADM

## 2024-03-28 RX ORDER — GABAPENTIN 300 MG/1
300 CAPSULE ORAL ONCE
Status: COMPLETED | OUTPATIENT
Start: 2024-03-28 | End: 2024-03-28

## 2024-03-28 RX ORDER — ENOXAPARIN SODIUM 100 MG/ML
40 INJECTION SUBCUTANEOUS DAILY
Status: DISCONTINUED | OUTPATIENT
Start: 2024-03-28 | End: 2024-04-02 | Stop reason: HOSPADM

## 2024-03-28 RX ORDER — ONDANSETRON 4 MG/1
4 TABLET, ORALLY DISINTEGRATING ORAL EVERY 8 HOURS PRN
Status: DISCONTINUED | OUTPATIENT
Start: 2024-03-28 | End: 2024-04-02 | Stop reason: HOSPADM

## 2024-03-28 RX ORDER — LEVOFLOXACIN 5 MG/ML
750 INJECTION, SOLUTION INTRAVENOUS EVERY 24 HOURS
Status: DISCONTINUED | OUTPATIENT
Start: 2024-03-28 | End: 2024-03-28

## 2024-03-28 RX ORDER — SODIUM CHLORIDE 9 MG/ML
INJECTION, SOLUTION INTRAVENOUS PRN
Status: DISCONTINUED | OUTPATIENT
Start: 2024-03-28 | End: 2024-04-02 | Stop reason: HOSPADM

## 2024-03-28 RX ORDER — DIPHENHYDRAMINE HYDROCHLORIDE 50 MG/ML
50 INJECTION INTRAMUSCULAR; INTRAVENOUS
OUTPATIENT
Start: 2024-04-29

## 2024-03-28 RX ORDER — SODIUM CHLORIDE, SODIUM LACTATE, POTASSIUM CHLORIDE, CALCIUM CHLORIDE 600; 310; 30; 20 MG/100ML; MG/100ML; MG/100ML; MG/100ML
INJECTION, SOLUTION INTRAVENOUS CONTINUOUS
Status: DISPENSED | OUTPATIENT
Start: 2024-03-28 | End: 2024-03-29

## 2024-03-28 RX ORDER — SODIUM CHLORIDE 0.9 % (FLUSH) 0.9 %
5-40 SYRINGE (ML) INJECTION PRN
Status: DISCONTINUED | OUTPATIENT
Start: 2024-03-28 | End: 2024-04-02 | Stop reason: HOSPADM

## 2024-03-28 RX ORDER — ALBUTEROL SULFATE 90 UG/1
4 AEROSOL, METERED RESPIRATORY (INHALATION) PRN
OUTPATIENT
Start: 2024-04-29

## 2024-03-28 RX ORDER — ONDANSETRON 2 MG/ML
4 INJECTION INTRAMUSCULAR; INTRAVENOUS EVERY 6 HOURS PRN
Status: DISCONTINUED | OUTPATIENT
Start: 2024-03-28 | End: 2024-03-28 | Stop reason: SDUPTHER

## 2024-03-28 RX ORDER — POLYETHYLENE GLYCOL 3350 17 G/17G
17 POWDER, FOR SOLUTION ORAL DAILY PRN
Status: DISCONTINUED | OUTPATIENT
Start: 2024-03-28 | End: 2024-04-02 | Stop reason: HOSPADM

## 2024-03-28 RX ORDER — MORPHINE SULFATE 4 MG/ML
4 INJECTION, SOLUTION INTRAMUSCULAR; INTRAVENOUS EVERY 30 MIN PRN
Status: DISCONTINUED | OUTPATIENT
Start: 2024-03-28 | End: 2024-04-02 | Stop reason: HOSPADM

## 2024-03-28 RX ORDER — EPINEPHRINE 1 MG/ML
0.3 INJECTION, SOLUTION, CONCENTRATE INTRAVENOUS PRN
OUTPATIENT
Start: 2024-04-29

## 2024-03-28 RX ORDER — PANTOPRAZOLE SODIUM 40 MG/1
40 TABLET, DELAYED RELEASE ORAL DAILY
Status: DISCONTINUED | OUTPATIENT
Start: 2024-03-29 | End: 2024-04-01

## 2024-03-28 RX ORDER — ACETAMINOPHEN 325 MG/1
650 TABLET ORAL EVERY 6 HOURS PRN
Status: DISCONTINUED | OUTPATIENT
Start: 2024-03-28 | End: 2024-04-02 | Stop reason: HOSPADM

## 2024-03-28 RX ORDER — MAGNESIUM SULFATE IN WATER 40 MG/ML
2000 INJECTION, SOLUTION INTRAVENOUS PRN
Status: DISCONTINUED | OUTPATIENT
Start: 2024-03-28 | End: 2024-04-02 | Stop reason: HOSPADM

## 2024-03-28 RX ORDER — PYRIDOSTIGMINE BROMIDE 60 MG/1
30 TABLET ORAL 2 TIMES DAILY
Status: DISCONTINUED | OUTPATIENT
Start: 2024-03-28 | End: 2024-04-02 | Stop reason: HOSPADM

## 2024-03-28 RX ORDER — FENTANYL CITRATE 50 UG/ML
25 INJECTION, SOLUTION INTRAMUSCULAR; INTRAVENOUS ONCE
Status: DISCONTINUED | OUTPATIENT
Start: 2024-03-28 | End: 2024-03-28

## 2024-03-28 RX ORDER — ONDANSETRON 2 MG/ML
8 INJECTION INTRAMUSCULAR; INTRAVENOUS
OUTPATIENT
Start: 2024-04-29

## 2024-03-28 RX ORDER — METRONIDAZOLE 500 MG/100ML
500 INJECTION, SOLUTION INTRAVENOUS ONCE
Status: DISCONTINUED | OUTPATIENT
Start: 2024-03-28 | End: 2024-03-28

## 2024-03-28 RX ORDER — ACETAMINOPHEN 650 MG/1
650 SUPPOSITORY RECTAL EVERY 6 HOURS PRN
Status: DISCONTINUED | OUTPATIENT
Start: 2024-03-28 | End: 2024-04-02 | Stop reason: HOSPADM

## 2024-03-28 RX ADMIN — PYRIDOSTIGMINE BROMIDE 30 MG: 60 TABLET ORAL at 21:52

## 2024-03-28 RX ADMIN — SODIUM CHLORIDE, PRESERVATIVE FREE 10 ML: 5 INJECTION INTRAVENOUS at 21:53

## 2024-03-28 RX ADMIN — MEROPENEM 1000 MG: 1 INJECTION, POWDER, FOR SOLUTION INTRAVENOUS at 15:12

## 2024-03-28 RX ADMIN — SODIUM CHLORIDE 500 ML: 9 INJECTION, SOLUTION INTRAVENOUS at 09:28

## 2024-03-28 RX ADMIN — PYRIDOSTIGMINE BROMIDE 30 MG: 60 TABLET ORAL at 15:16

## 2024-03-28 RX ADMIN — SODIUM CHLORIDE 25 ML: 9 INJECTION, SOLUTION INTRAVENOUS at 15:11

## 2024-03-28 RX ADMIN — SODIUM CHLORIDE, POTASSIUM CHLORIDE, SODIUM LACTATE AND CALCIUM CHLORIDE: 600; 310; 30; 20 INJECTION, SOLUTION INTRAVENOUS at 15:01

## 2024-03-28 RX ADMIN — GABAPENTIN 300 MG: 300 CAPSULE ORAL at 21:52

## 2024-03-28 RX ADMIN — GABAPENTIN 300 MG: 300 CAPSULE ORAL at 09:26

## 2024-03-28 RX ADMIN — ROSUVASTATIN CALCIUM 10 MG: 5 TABLET, COATED ORAL at 21:52

## 2024-03-28 RX ADMIN — FENTANYL CITRATE 50 MCG: 50 INJECTION, SOLUTION INTRAMUSCULAR; INTRAVENOUS at 09:26

## 2024-03-28 RX ADMIN — IOPAMIDOL 80 ML: 755 INJECTION, SOLUTION INTRAVENOUS at 10:41

## 2024-03-28 RX ADMIN — SODIUM CHLORIDE 500 ML: 9 INJECTION, SOLUTION INTRAVENOUS at 10:41

## 2024-03-28 RX ADMIN — ONDANSETRON 4 MG: 2 INJECTION INTRAMUSCULAR; INTRAVENOUS at 10:44

## 2024-03-28 RX ADMIN — ENOXAPARIN SODIUM 40 MG: 100 INJECTION SUBCUTANEOUS at 15:16

## 2024-03-28 RX ADMIN — DIVALPROEX SODIUM 250 MG: 250 TABLET, FILM COATED, EXTENDED RELEASE ORAL at 21:53

## 2024-03-28 RX ADMIN — SERTRALINE HYDROCHLORIDE 50 MG: 50 TABLET ORAL at 15:16

## 2024-03-28 RX ADMIN — GABAPENTIN 300 MG: 300 CAPSULE ORAL at 15:16

## 2024-03-28 RX ADMIN — VERAPAMIL HYDROCHLORIDE 120 MG: 120 TABLET, FILM COATED, EXTENDED RELEASE ORAL at 21:53

## 2024-03-28 RX ADMIN — MORPHINE SULFATE 4 MG: 4 INJECTION, SOLUTION INTRAMUSCULAR; INTRAVENOUS at 10:44

## 2024-03-28 ASSESSMENT — PAIN DESCRIPTION - LOCATION
LOCATION: ABDOMEN
LOCATION: ABDOMEN;GROIN
LOCATION: GROIN

## 2024-03-28 ASSESSMENT — PAIN DESCRIPTION - ORIENTATION
ORIENTATION: RIGHT;LEFT;LOWER
ORIENTATION: LOWER

## 2024-03-28 ASSESSMENT — PAIN - FUNCTIONAL ASSESSMENT
PAIN_FUNCTIONAL_ASSESSMENT: PREVENTS OR INTERFERES SOME ACTIVE ACTIVITIES AND ADLS
PAIN_FUNCTIONAL_ASSESSMENT: ACTIVITIES ARE NOT PREVENTED

## 2024-03-28 ASSESSMENT — PAIN DESCRIPTION - DESCRIPTORS: DESCRIPTORS: ACHING;SHOOTING

## 2024-03-28 ASSESSMENT — PAIN SCALES - GENERAL
PAINLEVEL_OUTOF10: 8
PAINLEVEL_OUTOF10: 5
PAINLEVEL_OUTOF10: 10

## 2024-03-28 ASSESSMENT — PAIN DESCRIPTION - PAIN TYPE: TYPE: ACUTE PAIN

## 2024-03-28 NOTE — H&P
V2.0  History and Physical      Name:  Jazmine Hutchinson /Age/Sex: 1935  (89 y.o. female)   MRN & CSN:  [id number] & [id number] Encounter Date/Time: 3/28/2024 1:11 PM EDT   Location:  ED17/ED-17 PCP: Jayda Díaz MD       Hospital Day: 1    History from:     patient    History of Present Illness:     Chief Complaint: Diverticulitis    Jazmine Hutchinson is a 89 y.o. female with pmh of hypertension, hyperlipidemia, and recurrent UTIs who presents with left-sided lower quadrant abdominal pain as well as dysuria.  Patient recently was treated for recurrent UTI with fosfomycin.  Does have a known history of ESBL.  Patient was unsure if her pain was more flank or abdominal.  Imaging did show acute diverticulitis.  Will be her first occurrence of this though she does have mild diverticulosis.  On discussion patient stated that her urologist told her that if her UTI were to recur within the next month and likely would need treatment with IV medications as she does have a resistant E. coli.    Assessment and Plan:   Jazmine Hutchinson is a 89 y.o. female  who presents with Diverticulitis    Acute diverticulitis  Patient does have a history of diverticulosis and severe first bout of diverticulitis.  Has left lower abdominal pain.  Does have ESBL history  IV meropenem  Follow-up blood cultures  Infectious disease consulted    ESBL UTI  UA still consistent with infection.  Mostly recently on fosfomycin  Continue meropenem  Follow-up urinary culture  Infectious disease consulted    Hypertension  Continue verapamil    Hyperlipidemia  Continue statin    Disposition:   Current Living situation: Home  Expected Disposition: To be determined: May need SNF if has to be on IV antibiotics  Estimated D/C: 3 days    Diet No diet orders on file   DVT Prophylaxis [x] Lovenox, []  Heparin, [] SCDs, [] Ambulation,  [] Eliquis, [] Xarelto   Code Status Prior   Surrogate Decision Maker/ POA      Personally reviewed Lab Studies and Imaging

## 2024-03-28 NOTE — ED NOTES
ED TO INPATIENT SBAR HANDOFF    Patient Name: Jazmine Hutchinson   :  1935  89 y.o.   Preferred Name  Jazmine  Family/Caregiver Present no   Restraints no   C-SSRS: Risk of Suicide: No Risk  Sitter no   Sepsis Risk Score Sepsis Risk Score: 2.09      Situation  Chief Complaint   Patient presents with    Groin Pain     Brief Description of Patient's Condition: Patient reports she jumped out of bed this morning causing pain in her groin.  Complains of cramping in lower legs.  Mental Status: oriented  Arrived from: home    Imaging:   CT ABDOMEN PELVIS W IV CONTRAST Additional Contrast? None   Final Result   Colonic diverticulosis. Diffuse wall thickening and mild pericolonic soft tissue   stranding in the sigmoid region, consistent with early acute diverticulitis.      Small axial hiatal hernia.      Status post cholecystectomy.      No other acute findings seen in the abdomen or pelvis.      XR CHEST PORTABLE   Final Result      No acute radiographic abnormality of the chest. Prior sternotomy is stable      Electronically signed by Prestno Leon DO        Abnormal labs:   Abnormal Labs Reviewed   LIPASE - Abnormal; Notable for the following components:       Result Value    Lipase 12 (*)     All other components within normal limits   URINALYSIS - Abnormal; Notable for the following components:    Blood, Urine SMALL NUMBER OR AMOUNT OBSERVED (*)     Leukocyte Esterase, Urine SMALL NUMBER OR AMOUNT OBSERVED (*)     All other components within normal limits   COMPREHENSIVE METABOLIC PANEL - Abnormal; Notable for the following components:    Sodium 133 (*)     Chloride 96 (*)     Glucose 101 (*)     Est, Glom Filt Rate 55 (*)     All other components within normal limits   CBC WITH AUTO DIFFERENTIAL - Abnormal; Notable for the following components:    RBC 3.52 (*)     Hemoglobin 11.7 (*)     Hematocrit 35.7 (*)     .4 (*)     MCH 33.2 (*)     Platelets 135 (*)     Monocytes % 9.3 (*)     All other components

## 2024-03-28 NOTE — ED PROVIDER NOTES
HPI: In brief, patient is coming today for several days of lower abdominal pain.  She states it is bilateral in her lower abdomen but worse seems to be on the left compared to the right.  Patient has not noticed any trouble with bowel movements or urination.  Slight nausea but no bloody or bilious emesis.  No rashes.  No trauma.  Denies any numbness tingling or weakness.    Focused exam revealed patient does have tenderness palpation of the left lower quadrant as well as the right lower quadrant with mild voluntary guarding.  No palpable masses.  Nondistended.  Good distal pulses in all 4 extremities including the lower extremities.  Lungs are clear to auscultation and the patient has a regular rhythm and normal rate.  Does appear somewhat anxious here.    ED course/MDM: Patient's exam was concerning for intra-abdominal pathology so CT imaging was obtained.  Once CT imaging resulted at 1103 we did order antibiotics to cover for diverticulitis.  Plan will be to hospitalize for symptom management and lactic acidosis which I believe is likely secondary to the infection but also to be chronic in nature as previous lactic's have also been elevated even higher than what was today's.  Repeat is currently pending.  Patient was agreeable with plan of care provided.  All questions and concerns answered at this time.    Procedures: 12 lead EKG per my interpretation:  Normal Sinus Rhythm with a tremor  Rate: 70  QTc is normal  There are no T wave changes.  There are are no ST concerning segment changes.    Prior EKG to compare with was available and is the same    (All EKG's are interpreted by myself in the absence of a cardiologist)    I personally saw the patient and made/approved the management plan (in conjunction with the ROSEMARY) and take responsibility for the patient management.     For all further details of the patient's emergency department visit, please see the advanced practice provider's documentation.    I am the 
additional pressors or fluid bolus or blood pressure or airway support.      History from : Patient    Limitations to history : None    Patient was given the following medications:  Medications   ondansetron (ZOFRAN) injection 4 mg (4 mg IntraVENous Given 3/28/24 1044)   morphine sulfate (PF) injection 4 mg (4 mg IntraVENous Given 3/28/24 1044)   meropenem (MERREM) 1,000 mg in sodium chloride 0.9 % 100 mL IVPB (mini-bag) (has no administration in time range)     Followed by   meropenem (MERREM) 1,000 mg in sodium chloride 0.9 % 100 mL IVPB (mini-bag) (has no administration in time range)   sodium chloride 0.9 % bolus 500 mL (0 mLs IntraVENous Stopped 3/28/24 1042)   fentaNYL (SUBLIMAZE) injection 50 mcg (50 mcg IntraVENous Given 3/28/24 0926)   gabapentin (NEURONTIN) capsule 300 mg (300 mg Oral Given 3/28/24 0926)   sodium chloride 0.9 % bolus 500 mL (500 mLs IntraVENous New Bag 3/28/24 1041)   iopamidol (ISOVUE-370) 76 % injection 80 mL (80 mLs IntraVENous Given 3/28/24 1041)       Independent Imaging Interpretation by me: Anterior posterior one-view portable chest x-ray, elevation of the right hemidiaphragm, cardiac silhouette does not display cardiomegaly lung markings extend to the periphery no obvious infiltrations or consolidations.  Patient is status post sternotomy.    EKG (if obtained): (All EKG's are interpreted by myself in the absence of a cardiologist)  (All EKG's are interpreted by myself in the absence of a cardiologist) EKG performed at [address] [place] [address] on 20 March 2024.  Ventricular rate of 70 beats per WV interval 162 QRS 80, QTc 488 no axis deviation.  No ST elevations or depressions.  Patient does have a tremor but able to distinguish what is required from this EKG.  No evidence of acute infarction or ischemia.    Chronic conditions affecting care: n/a    Social Determinants : None    Records Reviewed : None      Disposition Considerations (tests considered but not done, Shared Decision Making, Pt

## 2024-03-29 LAB
ANION GAP SERPL CALCULATED.3IONS-SCNC: 12 MMOL/L (ref 7–16)
BASOPHILS ABSOLUTE: 0 K/CU MM
BASOPHILS RELATIVE PERCENT: 0.4 % (ref 0–1)
BUN SERPL-MCNC: 14 MG/DL (ref 6–23)
CALCIUM SERPL-MCNC: 8.7 MG/DL (ref 8.3–10.6)
CHLORIDE BLD-SCNC: 99 MMOL/L (ref 99–110)
CO2: 25 MMOL/L (ref 21–32)
CREAT SERPL-MCNC: 0.9 MG/DL (ref 0.6–1.1)
DIFFERENTIAL TYPE: ABNORMAL
EOSINOPHILS ABSOLUTE: 0.1 K/CU MM
EOSINOPHILS RELATIVE PERCENT: 1.5 % (ref 0–3)
GFR SERPL CREATININE-BSD FRML MDRD: 62 ML/MIN/1.73M2
GLUCOSE SERPL-MCNC: 100 MG/DL (ref 70–99)
HCT VFR BLD CALC: 35.2 % (ref 37–47)
HEMOGLOBIN: 11.5 GM/DL (ref 12.5–16)
IMMATURE NEUTROPHIL %: 0.4 % (ref 0–0.43)
LYMPHOCYTES ABSOLUTE: 1.5 K/CU MM
LYMPHOCYTES RELATIVE PERCENT: 27.9 % (ref 24–44)
MCH RBC QN AUTO: 33.6 PG (ref 27–31)
MCHC RBC AUTO-ENTMCNC: 32.7 % (ref 32–36)
MCV RBC AUTO: 102.9 FL (ref 78–100)
MONOCYTES ABSOLUTE: 0.5 K/CU MM
MONOCYTES RELATIVE PERCENT: 9.1 % (ref 0–4)
NUCLEATED RBC %: 0 %
PDW BLD-RTO: 14.2 % (ref 11.7–14.9)
PLATELET # BLD: 135 K/CU MM (ref 140–440)
PMV BLD AUTO: 9.2 FL (ref 7.5–11.1)
POTASSIUM SERPL-SCNC: 4.2 MMOL/L (ref 3.5–5.1)
RBC # BLD: 3.42 M/CU MM (ref 4.2–5.4)
SEGMENTED NEUTROPHILS ABSOLUTE COUNT: 3.3 K/CU MM
SEGMENTED NEUTROPHILS RELATIVE PERCENT: 60.7 % (ref 36–66)
SODIUM BLD-SCNC: 136 MMOL/L (ref 135–145)
TOTAL IMMATURE NEUTOROPHIL: 0.02 K/CU MM
TOTAL NUCLEATED RBC: 0 K/CU MM
WBC # BLD: 5.5 K/CU MM (ref 4–10.5)

## 2024-03-29 PROCEDURE — 97161 PT EVAL LOW COMPLEX 20 MIN: CPT

## 2024-03-29 PROCEDURE — 6360000002 HC RX W HCPCS: Performed by: STUDENT IN AN ORGANIZED HEALTH CARE EDUCATION/TRAINING PROGRAM

## 2024-03-29 PROCEDURE — 6370000000 HC RX 637 (ALT 250 FOR IP): Performed by: STUDENT IN AN ORGANIZED HEALTH CARE EDUCATION/TRAINING PROGRAM

## 2024-03-29 PROCEDURE — 1200000000 HC SEMI PRIVATE

## 2024-03-29 PROCEDURE — 36415 COLL VENOUS BLD VENIPUNCTURE: CPT

## 2024-03-29 PROCEDURE — 80048 BASIC METABOLIC PNL TOTAL CA: CPT

## 2024-03-29 PROCEDURE — 97530 THERAPEUTIC ACTIVITIES: CPT

## 2024-03-29 PROCEDURE — 2580000003 HC RX 258: Performed by: STUDENT IN AN ORGANIZED HEALTH CARE EDUCATION/TRAINING PROGRAM

## 2024-03-29 PROCEDURE — 97116 GAIT TRAINING THERAPY: CPT

## 2024-03-29 PROCEDURE — 94761 N-INVAS EAR/PLS OXIMETRY MLT: CPT

## 2024-03-29 PROCEDURE — 85025 COMPLETE CBC W/AUTO DIFF WBC: CPT

## 2024-03-29 RX ORDER — CALCIUM CARBONATE 500(1250)
500 TABLET ORAL DAILY
Status: DISCONTINUED | OUTPATIENT
Start: 2024-03-29 | End: 2024-04-02 | Stop reason: HOSPADM

## 2024-03-29 RX ADMIN — CALCIUM 500 MG: 500 TABLET ORAL at 21:34

## 2024-03-29 RX ADMIN — MORPHINE SULFATE 4 MG: 4 INJECTION, SOLUTION INTRAMUSCULAR; INTRAVENOUS at 01:58

## 2024-03-29 RX ADMIN — ACETAMINOPHEN 650 MG: 325 TABLET ORAL at 23:45

## 2024-03-29 RX ADMIN — PYRIDOSTIGMINE BROMIDE 30 MG: 60 TABLET ORAL at 21:34

## 2024-03-29 RX ADMIN — ENOXAPARIN SODIUM 40 MG: 100 INJECTION SUBCUTANEOUS at 09:15

## 2024-03-29 RX ADMIN — GABAPENTIN 300 MG: 300 CAPSULE ORAL at 21:34

## 2024-03-29 RX ADMIN — SODIUM CHLORIDE, PRESERVATIVE FREE 10 ML: 5 INJECTION INTRAVENOUS at 21:40

## 2024-03-29 RX ADMIN — ROSUVASTATIN CALCIUM 10 MG: 5 TABLET, COATED ORAL at 21:33

## 2024-03-29 RX ADMIN — VERAPAMIL HYDROCHLORIDE 120 MG: 120 TABLET, FILM COATED, EXTENDED RELEASE ORAL at 21:34

## 2024-03-29 RX ADMIN — GABAPENTIN 300 MG: 300 CAPSULE ORAL at 09:14

## 2024-03-29 RX ADMIN — GABAPENTIN 300 MG: 300 CAPSULE ORAL at 14:01

## 2024-03-29 RX ADMIN — DIVALPROEX SODIUM 250 MG: 250 TABLET, FILM COATED, EXTENDED RELEASE ORAL at 21:33

## 2024-03-29 RX ADMIN — SODIUM CHLORIDE, PRESERVATIVE FREE 10 ML: 5 INJECTION INTRAVENOUS at 09:21

## 2024-03-29 RX ADMIN — SERTRALINE HYDROCHLORIDE 50 MG: 50 TABLET ORAL at 09:14

## 2024-03-29 RX ADMIN — PYRIDOSTIGMINE BROMIDE 30 MG: 60 TABLET ORAL at 09:14

## 2024-03-29 RX ADMIN — PANTOPRAZOLE SODIUM 40 MG: 40 TABLET, DELAYED RELEASE ORAL at 09:14

## 2024-03-29 ASSESSMENT — PAIN DESCRIPTION - LOCATION
LOCATION: HEAD
LOCATION: SHOULDER

## 2024-03-29 ASSESSMENT — PAIN SCALES - GENERAL
PAINLEVEL_OUTOF10: 0
PAINLEVEL_OUTOF10: 5
PAINLEVEL_OUTOF10: 10

## 2024-03-29 ASSESSMENT — PAIN DESCRIPTION - DESCRIPTORS
DESCRIPTORS: ACHING
DESCRIPTORS: THROBBING

## 2024-03-29 ASSESSMENT — PAIN DESCRIPTION - ORIENTATION: ORIENTATION: LEFT

## 2024-03-29 NOTE — CARE COORDINATION
03/29/24 1529   Service Assessment   Patient Orientation Alert and Oriented   Cognition Alert   History Provided By Patient;Friend   Primary Caregiver Self   Support Systems Family Members   Patient's Healthcare Decision Maker is: Named in Scanned ACP Document   PCP Verified by CM Yes   Prior Functional Level Independent in ADLs/IADLs   Current Functional Level Assistance with the following:;Bathing;Toileting;Mobility   Can patient return to prior living arrangement Unknown at present   Ability to make needs known: Good   Family able to assist with home care needs: Yes   Would you like for me to discuss the discharge plan with any other family members/significant others, and if so, who? No   Financial Resources Medicare Community Resources None     This RN case manager to the bedside to initiate DC planning and discuss therapy recs. Patient is from home alone. Uses a cane to assist in mobility. No HHC. Patient is declining SNF at this time. But is going to think about [facility]. If not swing patient is agreeable to HHC and would like [facility]. Please follow up with patient tomorrow morning for decision on DC plan.

## 2024-03-29 NOTE — PLAN OF CARE
Problem: ABCDS Injury Assessment  Goal: Absence of physical injury  3/29/2024 0437 by Renate Jang, RN  Outcome: Progressing  3/28/2024 1452 by Yvrose Vogel, RN  Outcome: Progressing     Problem: Discharge Planning  Goal: Discharge to home or other facility with appropriate resources  3/28/2024 1452 by Yvrose Vogel, RN  Outcome: Progressing     Problem: Safety - Adult  Goal: Free from fall injury  Outcome: Progressing

## 2024-03-29 NOTE — DISCHARGE INSTR - DIET

## 2024-03-30 LAB
ANION GAP SERPL CALCULATED.3IONS-SCNC: 11 MMOL/L (ref 7–16)
BASOPHILS ABSOLUTE: 0 K/CU MM
BASOPHILS RELATIVE PERCENT: 0.2 % (ref 0–1)
BUN SERPL-MCNC: 14 MG/DL (ref 6–23)
CALCIUM SERPL-MCNC: 8.8 MG/DL (ref 8.3–10.6)
CHLORIDE BLD-SCNC: 97 MMOL/L (ref 99–110)
CO2: 24 MMOL/L (ref 21–32)
CREAT SERPL-MCNC: 0.9 MG/DL (ref 0.6–1.1)
DIFFERENTIAL TYPE: ABNORMAL
EOSINOPHILS ABSOLUTE: 0.2 K/CU MM
EOSINOPHILS RELATIVE PERCENT: 3.1 % (ref 0–3)
GFR SERPL CREATININE-BSD FRML MDRD: 62 ML/MIN/1.73M2
GLUCOSE SERPL-MCNC: 129 MG/DL (ref 70–99)
HCT VFR BLD CALC: 33.4 % (ref 37–47)
HEMOGLOBIN: 11.1 GM/DL (ref 12.5–16)
IMMATURE NEUTROPHIL %: 0.6 % (ref 0–0.43)
LYMPHOCYTES ABSOLUTE: 1.9 K/CU MM
LYMPHOCYTES RELATIVE PERCENT: 36.2 % (ref 24–44)
MCH RBC QN AUTO: 33.7 PG (ref 27–31)
MCHC RBC AUTO-ENTMCNC: 33.2 % (ref 32–36)
MCV RBC AUTO: 101.5 FL (ref 78–100)
MONOCYTES ABSOLUTE: 0.5 K/CU MM
MONOCYTES RELATIVE PERCENT: 9.7 % (ref 0–4)
NUCLEATED RBC %: 0 %
PDW BLD-RTO: 13.7 % (ref 11.7–14.9)
PLATELET # BLD: 136 K/CU MM (ref 140–440)
PMV BLD AUTO: 9.2 FL (ref 7.5–11.1)
POTASSIUM SERPL-SCNC: 5.1 MMOL/L (ref 3.5–5.1)
RBC # BLD: 3.29 M/CU MM (ref 4.2–5.4)
SEGMENTED NEUTROPHILS ABSOLUTE COUNT: 2.6 K/CU MM
SEGMENTED NEUTROPHILS RELATIVE PERCENT: 50.2 % (ref 36–66)
SODIUM BLD-SCNC: 132 MMOL/L (ref 135–145)
TOTAL IMMATURE NEUTOROPHIL: 0.03 K/CU MM
TOTAL NUCLEATED RBC: 0 K/CU MM
WBC # BLD: 5.2 K/CU MM (ref 4–10.5)

## 2024-03-30 PROCEDURE — 36415 COLL VENOUS BLD VENIPUNCTURE: CPT

## 2024-03-30 PROCEDURE — 6370000000 HC RX 637 (ALT 250 FOR IP): Performed by: STUDENT IN AN ORGANIZED HEALTH CARE EDUCATION/TRAINING PROGRAM

## 2024-03-30 PROCEDURE — 97116 GAIT TRAINING THERAPY: CPT

## 2024-03-30 PROCEDURE — 6360000002 HC RX W HCPCS: Performed by: STUDENT IN AN ORGANIZED HEALTH CARE EDUCATION/TRAINING PROGRAM

## 2024-03-30 PROCEDURE — 97166 OT EVAL MOD COMPLEX 45 MIN: CPT

## 2024-03-30 PROCEDURE — 6360000002 HC RX W HCPCS: Performed by: NURSE PRACTITIONER

## 2024-03-30 PROCEDURE — 94761 N-INVAS EAR/PLS OXIMETRY MLT: CPT

## 2024-03-30 PROCEDURE — 97530 THERAPEUTIC ACTIVITIES: CPT

## 2024-03-30 PROCEDURE — 2580000003 HC RX 258: Performed by: STUDENT IN AN ORGANIZED HEALTH CARE EDUCATION/TRAINING PROGRAM

## 2024-03-30 PROCEDURE — 1200000000 HC SEMI PRIVATE

## 2024-03-30 PROCEDURE — 80048 BASIC METABOLIC PNL TOTAL CA: CPT

## 2024-03-30 PROCEDURE — 85025 COMPLETE CBC W/AUTO DIFF WBC: CPT

## 2024-03-30 RX ORDER — MORPHINE SULFATE 2 MG/ML
2 INJECTION, SOLUTION INTRAMUSCULAR; INTRAVENOUS ONCE
Status: COMPLETED | OUTPATIENT
Start: 2024-03-30 | End: 2024-03-30

## 2024-03-30 RX ORDER — METRONIDAZOLE 500 MG/100ML
500 INJECTION, SOLUTION INTRAVENOUS EVERY 8 HOURS
Status: DISCONTINUED | OUTPATIENT
Start: 2024-03-30 | End: 2024-04-02 | Stop reason: HOSPADM

## 2024-03-30 RX ADMIN — SODIUM CHLORIDE: 9 INJECTION, SOLUTION INTRAVENOUS at 10:12

## 2024-03-30 RX ADMIN — SODIUM CHLORIDE, PRESERVATIVE FREE 10 ML: 5 INJECTION INTRAVENOUS at 07:02

## 2024-03-30 RX ADMIN — METRONIDAZOLE 500 MG: 500 INJECTION, SOLUTION INTRAVENOUS at 10:14

## 2024-03-30 RX ADMIN — DIVALPROEX SODIUM 250 MG: 250 TABLET, FILM COATED, EXTENDED RELEASE ORAL at 22:11

## 2024-03-30 RX ADMIN — ROSUVASTATIN CALCIUM 10 MG: 5 TABLET, COATED ORAL at 22:10

## 2024-03-30 RX ADMIN — SODIUM CHLORIDE: 9 INJECTION, SOLUTION INTRAVENOUS at 19:06

## 2024-03-30 RX ADMIN — GABAPENTIN 300 MG: 300 CAPSULE ORAL at 10:04

## 2024-03-30 RX ADMIN — CALCIUM 500 MG: 500 TABLET ORAL at 22:09

## 2024-03-30 RX ADMIN — SODIUM CHLORIDE, PRESERVATIVE FREE 10 ML: 5 INJECTION INTRAVENOUS at 10:05

## 2024-03-30 RX ADMIN — PANTOPRAZOLE SODIUM 40 MG: 40 TABLET, DELAYED RELEASE ORAL at 07:00

## 2024-03-30 RX ADMIN — ENOXAPARIN SODIUM 40 MG: 100 INJECTION SUBCUTANEOUS at 10:04

## 2024-03-30 RX ADMIN — SODIUM CHLORIDE, PRESERVATIVE FREE 10 ML: 5 INJECTION INTRAVENOUS at 22:21

## 2024-03-30 RX ADMIN — VERAPAMIL HYDROCHLORIDE 120 MG: 120 TABLET, FILM COATED, EXTENDED RELEASE ORAL at 22:11

## 2024-03-30 RX ADMIN — PYRIDOSTIGMINE BROMIDE 30 MG: 60 TABLET ORAL at 22:10

## 2024-03-30 RX ADMIN — METRONIDAZOLE 500 MG: 500 INJECTION, SOLUTION INTRAVENOUS at 19:07

## 2024-03-30 RX ADMIN — SERTRALINE HYDROCHLORIDE 50 MG: 50 TABLET ORAL at 10:04

## 2024-03-30 RX ADMIN — MORPHINE SULFATE 2 MG: 2 INJECTION, SOLUTION INTRAMUSCULAR; INTRAVENOUS at 07:01

## 2024-03-30 RX ADMIN — GABAPENTIN 300 MG: 300 CAPSULE ORAL at 22:10

## 2024-03-30 RX ADMIN — GABAPENTIN 300 MG: 300 CAPSULE ORAL at 13:19

## 2024-03-30 RX ADMIN — PYRIDOSTIGMINE BROMIDE 30 MG: 60 TABLET ORAL at 10:04

## 2024-03-30 ASSESSMENT — PAIN - FUNCTIONAL ASSESSMENT: PAIN_FUNCTIONAL_ASSESSMENT: ACTIVITIES ARE NOT PREVENTED

## 2024-03-30 ASSESSMENT — PAIN SCALES - GENERAL
PAINLEVEL_OUTOF10: 0
PAINLEVEL_OUTOF10: 10
PAINLEVEL_OUTOF10: 7

## 2024-03-30 ASSESSMENT — PAIN DESCRIPTION - ORIENTATION: ORIENTATION: RIGHT;LOWER

## 2024-03-30 ASSESSMENT — PAIN DESCRIPTION - ONSET: ONSET: ON-GOING

## 2024-03-30 ASSESSMENT — PAIN DESCRIPTION - PAIN TYPE: TYPE: ACUTE PAIN

## 2024-03-30 ASSESSMENT — PAIN DESCRIPTION - FREQUENCY: FREQUENCY: CONTINUOUS

## 2024-03-30 ASSESSMENT — PAIN DESCRIPTION - DESCRIPTORS
DESCRIPTORS: ACHING
DESCRIPTORS: ACHING

## 2024-03-30 ASSESSMENT — PAIN DESCRIPTION - LOCATION
LOCATION: ABDOMEN
LOCATION: ABDOMEN

## 2024-03-30 NOTE — CARE COORDINATION
Patient approved for ARU admission when medically stable.  No precert needed.  Marci served Dr Montelongo

## 2024-03-30 NOTE — CARE COORDINATION
Followed up with pt. Between [facility] and [facility] she would like [facility] due to distance to [place] and being away from her family. Discussed PT recs and that pt only walked ~20ft with Min A and AD. Pt asked about ARU. Has been to ARU before. Has medicare. CM to make referral to ARU. If pt is not accepted she plans home with [facility]. States that she has a lot of family on her street and in her area in case she needs anything.    CM to make ARU referral.     1130 - ARU referral made to . Left  with referral for [facility]. Waiting on call back.

## 2024-03-31 LAB
ANION GAP SERPL CALCULATED.3IONS-SCNC: 14 MMOL/L (ref 7–16)
BASOPHILS ABSOLUTE: 0 K/CU MM
BASOPHILS RELATIVE PERCENT: 0.2 % (ref 0–1)
BUN SERPL-MCNC: 14 MG/DL (ref 6–23)
CALCIUM SERPL-MCNC: 8.3 MG/DL (ref 8.3–10.6)
CHLORIDE BLD-SCNC: 96 MMOL/L (ref 99–110)
CO2: 22 MMOL/L (ref 21–32)
CREAT SERPL-MCNC: 0.7 MG/DL (ref 0.6–1.1)
CULTURE: ABNORMAL
CULTURE: ABNORMAL
DIFFERENTIAL TYPE: ABNORMAL
EOSINOPHILS ABSOLUTE: 0.2 K/CU MM
EOSINOPHILS RELATIVE PERCENT: 3.1 % (ref 0–3)
GFR SERPL CREATININE-BSD FRML MDRD: 84 ML/MIN/1.73M2
GLUCOSE SERPL-MCNC: 103 MG/DL (ref 70–99)
HCT VFR BLD CALC: 37.1 % (ref 37–47)
HEMOGLOBIN: 11.5 GM/DL (ref 12.5–16)
IMMATURE NEUTROPHIL %: 0.2 % (ref 0–0.43)
LYMPHOCYTES ABSOLUTE: 1.8 K/CU MM
LYMPHOCYTES RELATIVE PERCENT: 32.6 % (ref 24–44)
Lab: ABNORMAL
MCH RBC QN AUTO: 32.7 PG (ref 27–31)
MCHC RBC AUTO-ENTMCNC: 31 % (ref 32–36)
MCV RBC AUTO: 105.4 FL (ref 78–100)
MONOCYTES ABSOLUTE: 0.5 K/CU MM
MONOCYTES RELATIVE PERCENT: 10 % (ref 0–4)
NUCLEATED RBC %: 0 %
PDW BLD-RTO: 13.7 % (ref 11.7–14.9)
PLATELET # BLD: 135 K/CU MM (ref 140–440)
PMV BLD AUTO: 9.5 FL (ref 7.5–11.1)
POTASSIUM SERPL-SCNC: 4.1 MMOL/L (ref 3.5–5.1)
RBC # BLD: 3.52 M/CU MM (ref 4.2–5.4)
SEGMENTED NEUTROPHILS ABSOLUTE COUNT: 2.9 K/CU MM
SEGMENTED NEUTROPHILS RELATIVE PERCENT: 53.9 % (ref 36–66)
SODIUM BLD-SCNC: 132 MMOL/L (ref 135–145)
SPECIMEN: ABNORMAL
TOTAL IMMATURE NEUTOROPHIL: 0.01 K/CU MM
TOTAL NUCLEATED RBC: 0 K/CU MM
WBC # BLD: 5.4 K/CU MM (ref 4–10.5)

## 2024-03-31 PROCEDURE — 36415 COLL VENOUS BLD VENIPUNCTURE: CPT

## 2024-03-31 PROCEDURE — 2580000003 HC RX 258: Performed by: STUDENT IN AN ORGANIZED HEALTH CARE EDUCATION/TRAINING PROGRAM

## 2024-03-31 PROCEDURE — 6370000000 HC RX 637 (ALT 250 FOR IP): Performed by: STUDENT IN AN ORGANIZED HEALTH CARE EDUCATION/TRAINING PROGRAM

## 2024-03-31 PROCEDURE — 97530 THERAPEUTIC ACTIVITIES: CPT

## 2024-03-31 PROCEDURE — 1200000000 HC SEMI PRIVATE

## 2024-03-31 PROCEDURE — 6360000002 HC RX W HCPCS: Performed by: STUDENT IN AN ORGANIZED HEALTH CARE EDUCATION/TRAINING PROGRAM

## 2024-03-31 PROCEDURE — 80048 BASIC METABOLIC PNL TOTAL CA: CPT

## 2024-03-31 PROCEDURE — 85025 COMPLETE CBC W/AUTO DIFF WBC: CPT

## 2024-03-31 PROCEDURE — 97116 GAIT TRAINING THERAPY: CPT

## 2024-03-31 PROCEDURE — 94761 N-INVAS EAR/PLS OXIMETRY MLT: CPT

## 2024-03-31 RX ORDER — LOPERAMIDE HYDROCHLORIDE 2 MG/1
2 CAPSULE ORAL 4 TIMES DAILY PRN
Status: DISCONTINUED | OUTPATIENT
Start: 2024-03-31 | End: 2024-04-02 | Stop reason: HOSPADM

## 2024-03-31 RX ADMIN — ENOXAPARIN SODIUM 40 MG: 100 INJECTION SUBCUTANEOUS at 09:01

## 2024-03-31 RX ADMIN — METRONIDAZOLE 500 MG: 500 INJECTION, SOLUTION INTRAVENOUS at 13:21

## 2024-03-31 RX ADMIN — PYRIDOSTIGMINE BROMIDE 30 MG: 60 TABLET ORAL at 22:27

## 2024-03-31 RX ADMIN — METRONIDAZOLE 500 MG: 500 INJECTION, SOLUTION INTRAVENOUS at 22:45

## 2024-03-31 RX ADMIN — DIVALPROEX SODIUM 250 MG: 250 TABLET, FILM COATED, EXTENDED RELEASE ORAL at 22:54

## 2024-03-31 RX ADMIN — CALCIUM 500 MG: 500 TABLET ORAL at 22:27

## 2024-03-31 RX ADMIN — VERAPAMIL HYDROCHLORIDE 120 MG: 120 TABLET, FILM COATED, EXTENDED RELEASE ORAL at 22:54

## 2024-03-31 RX ADMIN — SODIUM CHLORIDE: 9 INJECTION, SOLUTION INTRAVENOUS at 22:49

## 2024-03-31 RX ADMIN — GABAPENTIN 300 MG: 300 CAPSULE ORAL at 22:27

## 2024-03-31 RX ADMIN — SERTRALINE HYDROCHLORIDE 50 MG: 50 TABLET ORAL at 09:02

## 2024-03-31 RX ADMIN — PYRIDOSTIGMINE BROMIDE 30 MG: 60 TABLET ORAL at 09:02

## 2024-03-31 RX ADMIN — GABAPENTIN 300 MG: 300 CAPSULE ORAL at 09:02

## 2024-03-31 RX ADMIN — LOPERAMIDE HYDROCHLORIDE 2 MG: 2 CAPSULE ORAL at 15:57

## 2024-03-31 RX ADMIN — PANTOPRAZOLE SODIUM 40 MG: 40 TABLET, DELAYED RELEASE ORAL at 09:02

## 2024-03-31 RX ADMIN — SODIUM CHLORIDE, PRESERVATIVE FREE 10 ML: 5 INJECTION INTRAVENOUS at 09:03

## 2024-03-31 RX ADMIN — GABAPENTIN 300 MG: 300 CAPSULE ORAL at 15:17

## 2024-03-31 RX ADMIN — METRONIDAZOLE 500 MG: 500 INJECTION, SOLUTION INTRAVENOUS at 04:07

## 2024-03-31 RX ADMIN — ROSUVASTATIN CALCIUM 10 MG: 5 TABLET, COATED ORAL at 22:27

## 2024-03-31 ASSESSMENT — PAIN SCALES - GENERAL
PAINLEVEL_OUTOF10: 0

## 2024-03-31 NOTE — PLAN OF CARE
Problem: ABCDS Injury Assessment  Goal: Absence of physical injury  Outcome: Progressing     Problem: Discharge Planning  Goal: Discharge to home or other facility with appropriate resources  Outcome: Progressing     Problem: Safety - Adult  Goal: Free from fall injury  Outcome: Progressing     Problem: Chronic Conditions and Co-morbidities  Goal: Patient's chronic conditions and co-morbidity symptoms are monitored and maintained or improved  Outcome: Progressing     Problem: Pain  Goal: Verbalizes/displays adequate comfort level or baseline comfort level  Outcome: Progressing

## 2024-04-01 LAB
ANION GAP SERPL CALCULATED.3IONS-SCNC: 11 MMOL/L (ref 7–16)
BASOPHILS ABSOLUTE: 0 K/CU MM
BASOPHILS RELATIVE PERCENT: 0.5 % (ref 0–1)
BUN SERPL-MCNC: 11 MG/DL (ref 6–23)
CALCIUM SERPL-MCNC: 8.5 MG/DL (ref 8.3–10.6)
CHLORIDE BLD-SCNC: 96 MMOL/L (ref 99–110)
CO2: 23 MMOL/L (ref 21–32)
CREAT SERPL-MCNC: 0.8 MG/DL (ref 0.6–1.1)
DIFFERENTIAL TYPE: ABNORMAL
EOSINOPHILS ABSOLUTE: 0.4 K/CU MM
EOSINOPHILS RELATIVE PERCENT: 6.3 % (ref 0–3)
GFR SERPL CREATININE-BSD FRML MDRD: 71 ML/MIN/1.73M2
GLUCOSE SERPL-MCNC: 100 MG/DL (ref 70–99)
HCT VFR BLD CALC: 34.7 % (ref 37–47)
HEMOGLOBIN: 11.1 GM/DL (ref 12.5–16)
IMMATURE NEUTROPHIL %: 0.5 % (ref 0–0.43)
LYMPHOCYTES ABSOLUTE: 2 K/CU MM
LYMPHOCYTES RELATIVE PERCENT: 36.1 % (ref 24–44)
MAGNESIUM: 1.7 MG/DL (ref 1.8–2.4)
MCH RBC QN AUTO: 33 PG (ref 27–31)
MCHC RBC AUTO-ENTMCNC: 32 % (ref 32–36)
MCV RBC AUTO: 103.3 FL (ref 78–100)
MONOCYTES ABSOLUTE: 0.6 K/CU MM
MONOCYTES RELATIVE PERCENT: 11.1 % (ref 0–4)
NUCLEATED RBC %: 0 %
PDW BLD-RTO: 13.8 % (ref 11.7–14.9)
PLATELET # BLD: 149 K/CU MM (ref 140–440)
PMV BLD AUTO: 9.5 FL (ref 7.5–11.1)
POTASSIUM SERPL-SCNC: 4.1 MMOL/L (ref 3.5–5.1)
RBC # BLD: 3.36 M/CU MM (ref 4.2–5.4)
SEGMENTED NEUTROPHILS ABSOLUTE COUNT: 2.5 K/CU MM
SEGMENTED NEUTROPHILS RELATIVE PERCENT: 45.5 % (ref 36–66)
SODIUM BLD-SCNC: 130 MMOL/L (ref 135–145)
TOTAL IMMATURE NEUTOROPHIL: 0.03 K/CU MM
TOTAL NUCLEATED RBC: 0 K/CU MM
WBC # BLD: 5.6 K/CU MM (ref 4–10.5)

## 2024-04-01 PROCEDURE — 80048 BASIC METABOLIC PNL TOTAL CA: CPT

## 2024-04-01 PROCEDURE — 6360000002 HC RX W HCPCS: Performed by: STUDENT IN AN ORGANIZED HEALTH CARE EDUCATION/TRAINING PROGRAM

## 2024-04-01 PROCEDURE — 2580000003 HC RX 258: Performed by: STUDENT IN AN ORGANIZED HEALTH CARE EDUCATION/TRAINING PROGRAM

## 2024-04-01 PROCEDURE — 6370000000 HC RX 637 (ALT 250 FOR IP): Performed by: STUDENT IN AN ORGANIZED HEALTH CARE EDUCATION/TRAINING PROGRAM

## 2024-04-01 PROCEDURE — 85025 COMPLETE CBC W/AUTO DIFF WBC: CPT

## 2024-04-01 PROCEDURE — 1200000000 HC SEMI PRIVATE

## 2024-04-01 PROCEDURE — 94761 N-INVAS EAR/PLS OXIMETRY MLT: CPT

## 2024-04-01 PROCEDURE — 83735 ASSAY OF MAGNESIUM: CPT

## 2024-04-01 PROCEDURE — 36415 COLL VENOUS BLD VENIPUNCTURE: CPT

## 2024-04-01 PROCEDURE — 93005 ELECTROCARDIOGRAM TRACING: CPT | Performed by: STUDENT IN AN ORGANIZED HEALTH CARE EDUCATION/TRAINING PROGRAM

## 2024-04-01 RX ORDER — ONDANSETRON 4 MG/1
4 TABLET, ORALLY DISINTEGRATING ORAL EVERY 8 HOURS PRN
Status: CANCELLED | OUTPATIENT
Start: 2024-04-01

## 2024-04-01 RX ORDER — GABAPENTIN 300 MG/1
300 CAPSULE ORAL 3 TIMES DAILY
Status: CANCELLED | OUTPATIENT
Start: 2024-04-01

## 2024-04-01 RX ORDER — ONDANSETRON 2 MG/ML
4 INJECTION INTRAMUSCULAR; INTRAVENOUS EVERY 6 HOURS PRN
Status: CANCELLED | OUTPATIENT
Start: 2024-04-01

## 2024-04-01 RX ORDER — CALCIUM CARBONATE 500(1250)
500 TABLET ORAL DAILY
Status: CANCELLED | OUTPATIENT
Start: 2024-04-01

## 2024-04-01 RX ORDER — SODIUM CHLORIDE 0.9 % (FLUSH) 0.9 %
5-40 SYRINGE (ML) INJECTION PRN
Status: CANCELLED | OUTPATIENT
Start: 2024-04-01

## 2024-04-01 RX ORDER — LOPERAMIDE HYDROCHLORIDE 2 MG/1
2 CAPSULE ORAL 4 TIMES DAILY PRN
Status: CANCELLED | OUTPATIENT
Start: 2024-04-01

## 2024-04-01 RX ORDER — ACETAMINOPHEN 650 MG/1
650 SUPPOSITORY RECTAL EVERY 6 HOURS PRN
Status: CANCELLED | OUTPATIENT
Start: 2024-04-01

## 2024-04-01 RX ORDER — POLYETHYLENE GLYCOL 3350 17 G/17G
17 POWDER, FOR SOLUTION ORAL DAILY PRN
Status: CANCELLED | OUTPATIENT
Start: 2024-04-01

## 2024-04-01 RX ORDER — PYRIDOSTIGMINE BROMIDE 60 MG/1
30 TABLET ORAL 2 TIMES DAILY
Status: CANCELLED | OUTPATIENT
Start: 2024-04-01

## 2024-04-01 RX ORDER — POTASSIUM CHLORIDE 20 MEQ/1
40 TABLET, EXTENDED RELEASE ORAL PRN
Status: CANCELLED | OUTPATIENT
Start: 2024-04-01

## 2024-04-01 RX ORDER — SODIUM CHLORIDE 9 MG/ML
INJECTION, SOLUTION INTRAVENOUS PRN
Status: CANCELLED | OUTPATIENT
Start: 2024-04-01

## 2024-04-01 RX ORDER — MORPHINE SULFATE 4 MG/ML
4 INJECTION, SOLUTION INTRAMUSCULAR; INTRAVENOUS EVERY 30 MIN PRN
Status: CANCELLED | OUTPATIENT
Start: 2024-04-01

## 2024-04-01 RX ORDER — MAGNESIUM SULFATE IN WATER 40 MG/ML
2000 INJECTION, SOLUTION INTRAVENOUS PRN
Status: CANCELLED | OUTPATIENT
Start: 2024-04-01

## 2024-04-01 RX ORDER — POTASSIUM CHLORIDE 7.45 MG/ML
10 INJECTION INTRAVENOUS PRN
Status: CANCELLED | OUTPATIENT
Start: 2024-04-01

## 2024-04-01 RX ORDER — DIVALPROEX SODIUM 250 MG/1
250 TABLET, EXTENDED RELEASE ORAL NIGHTLY
Status: CANCELLED | OUTPATIENT
Start: 2024-04-01

## 2024-04-01 RX ORDER — MAGNESIUM SULFATE IN WATER 40 MG/ML
2000 INJECTION, SOLUTION INTRAVENOUS ONCE
Status: COMPLETED | OUTPATIENT
Start: 2024-04-01 | End: 2024-04-01

## 2024-04-01 RX ORDER — ROSUVASTATIN CALCIUM 5 MG/1
10 TABLET, COATED ORAL NIGHTLY
Status: CANCELLED | OUTPATIENT
Start: 2024-04-01

## 2024-04-01 RX ORDER — ACETAMINOPHEN 325 MG/1
650 TABLET ORAL EVERY 6 HOURS PRN
Status: CANCELLED | OUTPATIENT
Start: 2024-04-01

## 2024-04-01 RX ORDER — METRONIDAZOLE 500 MG/100ML
500 INJECTION, SOLUTION INTRAVENOUS EVERY 8 HOURS
Status: CANCELLED | OUTPATIENT
Start: 2024-04-01

## 2024-04-01 RX ORDER — SODIUM CHLORIDE 0.9 % (FLUSH) 0.9 %
5-40 SYRINGE (ML) INJECTION EVERY 12 HOURS SCHEDULED
Status: CANCELLED | OUTPATIENT
Start: 2024-04-01

## 2024-04-01 RX ORDER — ENOXAPARIN SODIUM 100 MG/ML
40 INJECTION SUBCUTANEOUS DAILY
Status: CANCELLED | OUTPATIENT
Start: 2024-04-02

## 2024-04-01 RX ORDER — PANTOPRAZOLE SODIUM 40 MG/1
40 TABLET, DELAYED RELEASE ORAL DAILY
Status: CANCELLED | OUTPATIENT
Start: 2024-04-02

## 2024-04-01 RX ORDER — PANTOPRAZOLE SODIUM 40 MG/1
40 TABLET, DELAYED RELEASE ORAL DAILY
Status: DISCONTINUED | OUTPATIENT
Start: 2024-04-01 | End: 2024-04-02 | Stop reason: HOSPADM

## 2024-04-01 RX ADMIN — SODIUM CHLORIDE, PRESERVATIVE FREE 10 ML: 5 INJECTION INTRAVENOUS at 08:26

## 2024-04-01 RX ADMIN — ONDANSETRON 4 MG: 2 INJECTION INTRAMUSCULAR; INTRAVENOUS at 11:01

## 2024-04-01 RX ADMIN — ROSUVASTATIN CALCIUM 10 MG: 5 TABLET, COATED ORAL at 22:45

## 2024-04-01 RX ADMIN — MAGNESIUM SULFATE HEPTAHYDRATE 2000 MG: 40 INJECTION, SOLUTION INTRAVENOUS at 19:09

## 2024-04-01 RX ADMIN — ENOXAPARIN SODIUM 40 MG: 100 INJECTION SUBCUTANEOUS at 08:25

## 2024-04-01 RX ADMIN — METRONIDAZOLE 500 MG: 500 INJECTION, SOLUTION INTRAVENOUS at 23:08

## 2024-04-01 RX ADMIN — VERAPAMIL HYDROCHLORIDE 120 MG: 120 TABLET, FILM COATED, EXTENDED RELEASE ORAL at 22:50

## 2024-04-01 RX ADMIN — METRONIDAZOLE 500 MG: 500 INJECTION, SOLUTION INTRAVENOUS at 14:21

## 2024-04-01 RX ADMIN — SERTRALINE HYDROCHLORIDE 50 MG: 50 TABLET ORAL at 08:25

## 2024-04-01 RX ADMIN — GABAPENTIN 300 MG: 300 CAPSULE ORAL at 08:25

## 2024-04-01 RX ADMIN — PYRIDOSTIGMINE BROMIDE 30 MG: 60 TABLET ORAL at 22:44

## 2024-04-01 RX ADMIN — CALCIUM 500 MG: 500 TABLET ORAL at 22:45

## 2024-04-01 RX ADMIN — DIVALPROEX SODIUM 250 MG: 250 TABLET, FILM COATED, EXTENDED RELEASE ORAL at 22:50

## 2024-04-01 RX ADMIN — GABAPENTIN 300 MG: 300 CAPSULE ORAL at 22:45

## 2024-04-01 RX ADMIN — METRONIDAZOLE 500 MG: 500 INJECTION, SOLUTION INTRAVENOUS at 05:47

## 2024-04-01 RX ADMIN — PYRIDOSTIGMINE BROMIDE 30 MG: 60 TABLET ORAL at 08:24

## 2024-04-01 RX ADMIN — PANTOPRAZOLE SODIUM 40 MG: 40 TABLET, DELAYED RELEASE ORAL at 08:31

## 2024-04-01 RX ADMIN — GABAPENTIN 300 MG: 300 CAPSULE ORAL at 14:18

## 2024-04-01 ASSESSMENT — PAIN SCALES - GENERAL
PAINLEVEL_OUTOF10: 0

## 2024-04-01 NOTE — DISCHARGE SUMMARY
extended release tablet  Commonly known as: CALAN SR  Take 1 tablet by mouth nightly     Vitamin D3 25 MCG (1000 UT) Caps  Generic drug: vitamin D           * This list has 2 medication(s) that are the same as other medications prescribed for you. Read the directions carefully, and ask your doctor or other care provider to review them with you.                 Objective Findings at Discharge:   BP (!) 140/53   Pulse 80   Temp 98.4 °F (36.9 °C) (Oral)   Resp 18   Ht 1.575 m (5' 2\")   Wt 62.7 kg (138 lb 3.7 oz)   SpO2 95%   BMI 25.28 kg/m²       Physical Exam:     General: NAD  Eyes: EOMI  ENT: neck supple  Cardiovascular: Regular rate.  Respiratory: Clear to auscultation  Gastrointestinal: Soft, non tender  Genitourinary: no suprapubic tenderness  Musculoskeletal: No edema  Skin: warm, dry  Neuro: Alert.  Psych: Mood appropriate.         Labs and Imaging   CT ABDOMEN PELVIS W IV CONTRAST Additional Contrast? None    Result Date: 3/28/2024  CT OF THE ABDOMEN AND PELVIS WITH CONTRAST: HISTORY: Abdominal pain. TECHNIQUE: Thin section axial images were obtained through the abdomen and pelvis, with IV administration of 80 cc of Isovue-370 contrast. Coronal and sagittal reformatting performed. Up-to-date CT equipment and radiation dose reduction techniques were utilized. COMPARISON: 9/24/2023. FINDINGS: Lower lungs are clear. There is some minor linear parenchymal scarring in the left base. The heart is mildly enlarged. There is no pericardial or pleural effusion. The liver and spleen are normal in size and configuration, and appear homogeneous, normal attenuation. Gallbladder has been removed. There is no biliary duct dilatation. Pancreas is somewhat atrophic, unchanged. Adrenal glands are normal. Kidneys demonstrate symmetrical function. Prominent right extrarenal pelvis. No evidence of renal or ureteral obstruction bilaterally. Bladder is moderately distended. Uterus projects to the right of midline. No uterine

## 2024-04-01 NOTE — CARE COORDINATION
Met with patient and discussed ARU.  Explained to patient the required 3 hours of therapy a day.  Also explained the average length of stay is 11 days, could be longer or shorter depending on recommendations of therapy and Dr. Szymanski.  Patient expresses her understanding and states she's agreeable to admit to ARU.      Discussed patients discharge plan from ARU.  Per patient she wants to discharge home from ARU. Discussed the difference between ARU vs. SNF.  Patient expresses no interest in discharging to SNF      Per patient she will call her grandson to bring clothes for her to have while on ARU.     1250:  Read recent nurse progress note stating patient having \"frequent PVCs\"  PSd MD asking if patient should stay on acute today d/t PVCs.  Per MD patient will be monitored one more day on acute prior to discharging to ARU.  Notified ARU team.

## 2024-04-02 ENCOUNTER — HOSPITAL ENCOUNTER (INPATIENT)
Age: 89
DRG: 948 | End: 2024-04-02
Attending: PHYSICAL MEDICINE & REHABILITATION | Admitting: PHYSICAL MEDICINE & REHABILITATION
Payer: MEDICARE

## 2024-04-02 VITALS
OXYGEN SATURATION: 95 % | WEIGHT: 138.23 LBS | BODY MASS INDEX: 25.44 KG/M2 | TEMPERATURE: 98.6 F | DIASTOLIC BLOOD PRESSURE: 50 MMHG | RESPIRATION RATE: 18 BRPM | HEART RATE: 72 BPM | SYSTOLIC BLOOD PRESSURE: 119 MMHG | HEIGHT: 62 IN

## 2024-04-02 LAB
ANION GAP SERPL CALCULATED.3IONS-SCNC: 11 MMOL/L (ref 7–16)
BASOPHILS ABSOLUTE: 0 K/CU MM
BASOPHILS RELATIVE PERCENT: 0.3 % (ref 0–1)
BUN SERPL-MCNC: 14 MG/DL (ref 6–23)
CALCIUM SERPL-MCNC: 8.2 MG/DL (ref 8.3–10.6)
CHLORIDE BLD-SCNC: 97 MMOL/L (ref 99–110)
CO2: 22 MMOL/L (ref 21–32)
CREAT SERPL-MCNC: 0.8 MG/DL (ref 0.6–1.1)
DIFFERENTIAL TYPE: ABNORMAL
EOSINOPHILS ABSOLUTE: 0.3 K/CU MM
EOSINOPHILS RELATIVE PERCENT: 5.2 % (ref 0–3)
GFR SERPL CREATININE-BSD FRML MDRD: 71 ML/MIN/1.73M2
GLUCOSE SERPL-MCNC: 127 MG/DL (ref 70–99)
HCT VFR BLD CALC: 31 % (ref 37–47)
HEMOGLOBIN: 10.3 GM/DL (ref 12.5–16)
IMMATURE NEUTROPHIL %: 0.3 % (ref 0–0.43)
LYMPHOCYTES ABSOLUTE: 1.8 K/CU MM
LYMPHOCYTES RELATIVE PERCENT: 31.4 % (ref 24–44)
MCH RBC QN AUTO: 33.4 PG (ref 27–31)
MCHC RBC AUTO-ENTMCNC: 33.2 % (ref 32–36)
MCV RBC AUTO: 100.6 FL (ref 78–100)
MONOCYTES ABSOLUTE: 0.6 K/CU MM
MONOCYTES RELATIVE PERCENT: 10.6 % (ref 0–4)
NUCLEATED RBC %: 0 %
PDW BLD-RTO: 13.6 % (ref 11.7–14.9)
PLATELET # BLD: 128 K/CU MM (ref 140–440)
PMV BLD AUTO: 9.1 FL (ref 7.5–11.1)
POTASSIUM SERPL-SCNC: 3.9 MMOL/L (ref 3.5–5.1)
RBC # BLD: 3.08 M/CU MM (ref 4.2–5.4)
SEGMENTED NEUTROPHILS ABSOLUTE COUNT: 3 K/CU MM
SEGMENTED NEUTROPHILS RELATIVE PERCENT: 52.2 % (ref 36–66)
SODIUM BLD-SCNC: 130 MMOL/L (ref 135–145)
TOTAL IMMATURE NEUTOROPHIL: 0.02 K/CU MM
TOTAL NUCLEATED RBC: 0 K/CU MM
WBC # BLD: 5.7 K/CU MM (ref 4–10.5)

## 2024-04-02 PROCEDURE — 2580000003 HC RX 258: Performed by: STUDENT IN AN ORGANIZED HEALTH CARE EDUCATION/TRAINING PROGRAM

## 2024-04-02 PROCEDURE — 6360000002 HC RX W HCPCS: Performed by: STUDENT IN AN ORGANIZED HEALTH CARE EDUCATION/TRAINING PROGRAM

## 2024-04-02 PROCEDURE — 6370000000 HC RX 637 (ALT 250 FOR IP): Performed by: STUDENT IN AN ORGANIZED HEALTH CARE EDUCATION/TRAINING PROGRAM

## 2024-04-02 PROCEDURE — 93005 ELECTROCARDIOGRAM TRACING: CPT | Performed by: STUDENT IN AN ORGANIZED HEALTH CARE EDUCATION/TRAINING PROGRAM

## 2024-04-02 PROCEDURE — 97530 THERAPEUTIC ACTIVITIES: CPT

## 2024-04-02 PROCEDURE — 1280000000 HC REHAB R&B

## 2024-04-02 PROCEDURE — 85025 COMPLETE CBC W/AUTO DIFF WBC: CPT

## 2024-04-02 PROCEDURE — 94761 N-INVAS EAR/PLS OXIMETRY MLT: CPT

## 2024-04-02 PROCEDURE — 99223 1ST HOSP IP/OBS HIGH 75: CPT | Performed by: PHYSICAL MEDICINE & REHABILITATION

## 2024-04-02 PROCEDURE — 80048 BASIC METABOLIC PNL TOTAL CA: CPT

## 2024-04-02 PROCEDURE — 36415 COLL VENOUS BLD VENIPUNCTURE: CPT

## 2024-04-02 PROCEDURE — 99222 1ST HOSP IP/OBS MODERATE 55: CPT | Performed by: INTERNAL MEDICINE

## 2024-04-02 PROCEDURE — 97116 GAIT TRAINING THERAPY: CPT

## 2024-04-02 RX ORDER — ROSUVASTATIN CALCIUM 5 MG/1
10 TABLET, COATED ORAL NIGHTLY
Status: DISPENSED | OUTPATIENT
Start: 2024-04-02

## 2024-04-02 RX ORDER — SODIUM CHLORIDE 9 MG/ML
INJECTION, SOLUTION INTRAVENOUS PRN
Status: ACTIVE | OUTPATIENT
Start: 2024-04-02

## 2024-04-02 RX ORDER — ONDANSETRON 2 MG/ML
4 INJECTION INTRAMUSCULAR; INTRAVENOUS EVERY 6 HOURS PRN
Status: ACTIVE | OUTPATIENT
Start: 2024-04-02

## 2024-04-02 RX ORDER — LOPERAMIDE HYDROCHLORIDE 2 MG/1
2 CAPSULE ORAL 4 TIMES DAILY PRN
Status: DISPENSED | OUTPATIENT
Start: 2024-04-02

## 2024-04-02 RX ORDER — POTASSIUM CHLORIDE 20 MEQ/1
40 TABLET, EXTENDED RELEASE ORAL PRN
Status: DISCONTINUED | OUTPATIENT
Start: 2024-04-02 | End: 2024-04-02

## 2024-04-02 RX ORDER — CALCIUM CARBONATE 500(1250)
500 TABLET ORAL DAILY
Status: DISPENSED | OUTPATIENT
Start: 2024-04-02

## 2024-04-02 RX ORDER — ACETAMINOPHEN 325 MG/1
650 TABLET ORAL EVERY 6 HOURS PRN
Status: DISPENSED | OUTPATIENT
Start: 2024-04-02

## 2024-04-02 RX ORDER — POLYETHYLENE GLYCOL 3350 17 G/17G
17 POWDER, FOR SOLUTION ORAL DAILY PRN
Status: ACTIVE | OUTPATIENT
Start: 2024-04-02

## 2024-04-02 RX ORDER — BISACODYL 10 MG
10 SUPPOSITORY, RECTAL RECTAL DAILY PRN
Status: CANCELLED | OUTPATIENT
Start: 2024-04-02

## 2024-04-02 RX ORDER — ONDANSETRON 4 MG/1
4 TABLET, ORALLY DISINTEGRATING ORAL EVERY 8 HOURS PRN
Status: DISPENSED | OUTPATIENT
Start: 2024-04-02

## 2024-04-02 RX ORDER — DIVALPROEX SODIUM 250 MG/1
250 TABLET, EXTENDED RELEASE ORAL NIGHTLY
Status: DISPENSED | OUTPATIENT
Start: 2024-04-02

## 2024-04-02 RX ORDER — SODIUM CHLORIDE 0.9 % (FLUSH) 0.9 %
5-40 SYRINGE (ML) INJECTION PRN
Status: ACTIVE | OUTPATIENT
Start: 2024-04-02

## 2024-04-02 RX ORDER — GABAPENTIN 300 MG/1
300 CAPSULE ORAL 3 TIMES DAILY
Status: DISPENSED | OUTPATIENT
Start: 2024-04-02

## 2024-04-02 RX ORDER — PYRIDOSTIGMINE BROMIDE 60 MG/1
30 TABLET ORAL 2 TIMES DAILY
Status: DISPENSED | OUTPATIENT
Start: 2024-04-02

## 2024-04-02 RX ORDER — BISACODYL 10 MG
10 SUPPOSITORY, RECTAL RECTAL DAILY PRN
Status: ACTIVE | OUTPATIENT
Start: 2024-04-02

## 2024-04-02 RX ORDER — SODIUM CHLORIDE 0.9 % (FLUSH) 0.9 %
5-40 SYRINGE (ML) INJECTION EVERY 12 HOURS SCHEDULED
Status: DISCONTINUED | OUTPATIENT
Start: 2024-04-02 | End: 2024-04-07

## 2024-04-02 RX ORDER — PANTOPRAZOLE SODIUM 40 MG/1
40 TABLET, DELAYED RELEASE ORAL DAILY
Status: CANCELLED | OUTPATIENT
Start: 2024-04-03

## 2024-04-02 RX ORDER — ENOXAPARIN SODIUM 100 MG/ML
40 INJECTION SUBCUTANEOUS DAILY
Status: DISPENSED | OUTPATIENT
Start: 2024-04-03

## 2024-04-02 RX ORDER — POTASSIUM CHLORIDE 7.45 MG/ML
10 INJECTION INTRAVENOUS PRN
Status: DISCONTINUED | OUTPATIENT
Start: 2024-04-02 | End: 2024-04-02

## 2024-04-02 RX ORDER — PANTOPRAZOLE SODIUM 40 MG/1
40 TABLET, DELAYED RELEASE ORAL DAILY
Status: DISPENSED | OUTPATIENT
Start: 2024-04-03

## 2024-04-02 RX ORDER — MORPHINE SULFATE 4 MG/ML
4 INJECTION, SOLUTION INTRAMUSCULAR; INTRAVENOUS EVERY 30 MIN PRN
Status: DISCONTINUED | OUTPATIENT
Start: 2024-04-02 | End: 2024-04-04

## 2024-04-02 RX ORDER — MAGNESIUM SULFATE IN WATER 40 MG/ML
2000 INJECTION, SOLUTION INTRAVENOUS PRN
Status: DISCONTINUED | OUTPATIENT
Start: 2024-04-02 | End: 2024-04-02

## 2024-04-02 RX ORDER — METRONIDAZOLE 500 MG/100ML
500 INJECTION, SOLUTION INTRAVENOUS EVERY 8 HOURS
Status: DISCONTINUED | OUTPATIENT
Start: 2024-04-02 | End: 2024-04-04 | Stop reason: ALTCHOICE

## 2024-04-02 RX ORDER — ACETAMINOPHEN 650 MG/1
650 SUPPOSITORY RECTAL EVERY 6 HOURS PRN
Status: DISCONTINUED | OUTPATIENT
Start: 2024-04-02 | End: 2024-04-02

## 2024-04-02 RX ADMIN — CALCIUM 500 MG: 500 TABLET ORAL at 21:33

## 2024-04-02 RX ADMIN — ROSUVASTATIN CALCIUM 10 MG: 5 TABLET, COATED ORAL at 21:33

## 2024-04-02 RX ADMIN — SERTRALINE HYDROCHLORIDE 50 MG: 50 TABLET ORAL at 08:57

## 2024-04-02 RX ADMIN — VERAPAMIL HYDROCHLORIDE 120 MG: 120 TABLET, FILM COATED, EXTENDED RELEASE ORAL at 21:34

## 2024-04-02 RX ADMIN — METRONIDAZOLE 500 MG: 500 INJECTION, SOLUTION INTRAVENOUS at 13:25

## 2024-04-02 RX ADMIN — SODIUM CHLORIDE 10 ML/HR: 9 INJECTION, SOLUTION INTRAVENOUS at 05:27

## 2024-04-02 RX ADMIN — SODIUM CHLORIDE, PRESERVATIVE FREE 10 ML: 5 INJECTION INTRAVENOUS at 21:36

## 2024-04-02 RX ADMIN — PYRIDOSTIGMINE BROMIDE 30 MG: 60 TABLET ORAL at 21:34

## 2024-04-02 RX ADMIN — METRONIDAZOLE 500 MG: 500 INJECTION, SOLUTION INTRAVENOUS at 21:33

## 2024-04-02 RX ADMIN — GABAPENTIN 300 MG: 300 CAPSULE ORAL at 21:33

## 2024-04-02 RX ADMIN — PYRIDOSTIGMINE BROMIDE 30 MG: 60 TABLET ORAL at 08:56

## 2024-04-02 RX ADMIN — SODIUM CHLORIDE, PRESERVATIVE FREE 10 ML: 5 INJECTION INTRAVENOUS at 08:56

## 2024-04-02 RX ADMIN — DIVALPROEX SODIUM 250 MG: 250 TABLET, FILM COATED, EXTENDED RELEASE ORAL at 21:34

## 2024-04-02 RX ADMIN — PANTOPRAZOLE SODIUM 40 MG: 40 TABLET, DELAYED RELEASE ORAL at 05:26

## 2024-04-02 RX ADMIN — METRONIDAZOLE 500 MG: 500 INJECTION, SOLUTION INTRAVENOUS at 05:29

## 2024-04-02 RX ADMIN — GABAPENTIN 300 MG: 300 CAPSULE ORAL at 08:56

## 2024-04-02 RX ADMIN — GABAPENTIN 300 MG: 300 CAPSULE ORAL at 13:30

## 2024-04-02 RX ADMIN — SODIUM CHLORIDE 250 ML: 9 INJECTION, SOLUTION INTRAVENOUS at 21:32

## 2024-04-02 RX ADMIN — ENOXAPARIN SODIUM 40 MG: 100 INJECTION SUBCUTANEOUS at 08:57

## 2024-04-02 ASSESSMENT — PAIN SCALES - GENERAL
PAINLEVEL_OUTOF10: 0

## 2024-04-02 NOTE — PROGRESS NOTES
ARU Admission Assessment - [facility]      A Complete drug regimen review was completed for this patient this date.   [x]  No clinically significant medication issue was identified   []  Yes, a clinically significant medication issue was identified     []  Adverse Drug Event:    []  Allergy:    []  Side Effect:    []  Ineffective Therapy:    []  Drug interaction:     []  Duplicate Therapy:    []  Untreated Indication:    []  Non-adherence:    []  Other:  Nursing contacted the physician:       Date: 4/2/2024               Time:17:00    Actions recommended by physician were completed:   Date:                 Time:  Action(s) Taken:             [x]  New Physician Order Received- acknowledged all orders    []  Issue Noted by Physician; However No Action Required    []  Other:        Ethnicity  \"Are you of , /a, or [place]ib[place]ian origin?\"  Check all that apply:  [x] A.  No, not of , /a, or [place]ian Origin  [] B.  Yes, Turkmen, Turkmen American, Chicano/a  [] C.  Yes, Slovak  [] D.  Yes, Cymro  [] E.  Yes, another , , or [place]ian origin  [] X.  Patient unable to respond    Race  \"What is your race?\"  Check all that apply:  [x] A.  White  [] B.  Black or   [] C.   or   [] D.   Maldivian  [] E.  Chinese  [] F.  Gambian  [] G. Fijian  [] H.  Turkish  [] I.  Samoan  [] J.  Other   [] K.    [] L.  Peruvian or Alexandre  [] M.  Malaysian  [] N.  Other   [] X.  Patient unable to respond    Language  A.  \"What is your preferred language?\"   english    B.  \"Do you need or want an  to communicate with a doctor or health care staff?\"  Check only one:  [x] 0.  No  [] 1.  Yes  [] 9.  Unable to determine    Transportation  \"In the past 6-12 months, has lack of transportation kept you from medical appointments, meetings, work, or from getting things needed for daily living?\"  Check all that  often do you feel lonely or isolated from those around you?\"  [] 0.  Never  [] 1.  Rarely  [x] 2.  Sometimes  [] 3.  Often  [] 4.  Always  [] 8.  Patient unable to respond    Pain Effect on Sleep  \"Over the past 5 days, how much of the time has pain made it hard for you to sleep at night?\"  []  0.  Does not apply - I have not had any pain or hurting in the past 5 days  [x]  1.  Rarely or not at all  []  2.  Occasionally  []  3.  Frequently  []  4.  Almost constantly  []  8.  Unable to answer  **If the patient answers \"0.  Does not apply\" to this question, skip the next two \"Pain\" questions**      Pain Interference with Therapy Activities  \"Over the past 5 days, how often have you limited your participation in rehabilitation therapy sessions due to pain?\"  []  0.  Does not apply - I have not received rehabilitation therapy in the past 5 days  [x]  1.  Rarely or not at all  []  2.  Occasionally  []  3.  Frequently  []  4.  Almost constantly  []  8.  Unable to answer    Pain Interference with Day-to-Day Activities:  \"Over the past 5 days, how often have you limited your day-to-day activities (excluding rehabilitation therapy session)?\"  [x]  1.  Rarely or not at all  []  2.  Occasionally  []  3.  Frequently  []  4.  Almost constantly  []  8.  Unable to answer

## 2024-04-02 NOTE — PLAN OF CARE
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, 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] 10/02/2019    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] 2017       Rehabilitation Diagnosis:     Diverticulitis of intestine, part unspecified, without perforation or abscess without bleeding [K57.92]  Diverticulitis [K57.92]          CARE PLAN     NURSING:  Jazmine Hutchinson while on this unit will:     Bowel and Bladder   [x] Be continent of bowel and bladder      [x] Have an adequate number of bowel movements   [] Urinate with no urinary retention >300ml in bladder   [] Bladder Scan: (details)   [] Complete bladder protocol with walker removal   [] Initiate Bladder Program to toilet every ___ hours   [] Initiate Bowel Program to toilet every ___hours   [] Bladder training    [] Bowel training  Pulmonary   [x] Maintain O2 SATs at 92% or greater  Pain Management   [x] Have pain managed while on ARU        [] Be pain free by discharge    [x] Medication Management and Education  Maintenance of Skin Integrity/Wound Management   [x] Have no skin breakdown while on ARU   [] Have improved skin integrity via wound measurements   [] Have no signs/symptoms of infection via infection protection and monitoring at the          wound site  Fall Prevention   [x] Be free from injury during hospitalization via  training, Patient/Caregiver education & training, Equipment evaluation, education, & procurement, Positioning, Self-Care / ADL, Home management training, Cognitive/Perceptual training, Coordination training         cognitive training, home management, energy conservation training, community reintegration, splint fabrication, patient/caregiver education and training, animal assisted therapy, and concurrent and/or group therapy.      OT IRF-TAMARA scores and goals for initial assessment:    ADLs:    Eating: Eating  Assistance Needed: Setup or clean-up assistance  Comment: Pt required assist opening brown sugar packet for cream of wheat. Pt able to eat IND.  CARE Score: 5  Discharge Goal: Independent   independant    Oral Hygiene: Oral Hygiene  Assistance Needed: Supervision or touching assistance  Comment: Supervision to brush teeth seated 2* fatigue.  CARE Score: 4  Discharge Goal: Independent independant    UB/LB Bathing: Shower/Bathe Self  Assistance Needed: Supervision or touching assistance  Comment: Pt completed bathing at the sink d/t time constraints. Pt was able to wash UB and LB while seated and in stance, CGA while washing ronald area and buttocks. Pt used a combination of forward flexion and figure four positioning to wash distal BLEs.  CARE Score: 4  Discharge Goal: Independentsome assistance    UB Dressing: Upper Body Dressing  Assistance Needed: Supervision or touching assistance  Comment: Pt donned sweatshirt while seated in the w/c.  CARE Score: 4  Discharge Goal: Independent   independant      LB Dressing: Lower Body Dressing  Assistance Needed: Supervision or touching assistance  Comment: Pt able to thread BLEs into briefs and pants with extended time. CGA in stance to manage over hips.  CARE Score: 4  Discharge Goal: Independentsome assistnce, independant    Donning and [place] Footwear: Putting On/Taking Off Footwear  Assistance Needed: Supervision or touching assistance  Comment: Pt able to doff

## 2024-04-02 NOTE — PLAN OF CARE
Problem: Pain  Goal: Verbalizes/displays adequate comfort level or baseline comfort level  4/2/2024 0902 by Misti Ch LPN  Outcome: Progressing  4/2/2024 0355 by Jose Alberto Hope RN  Outcome: Progressing     Problem: ABCDS Injury Assessment  Goal: Absence of physical injury  4/2/2024 0902 by Misti Ch LPN  Outcome: Adequate for Discharge  4/2/2024 0355 by Jose Alberto Hope RN  Outcome: Progressing     Problem: Discharge Planning  Goal: Discharge to home or other facility with appropriate resources  4/2/2024 0902 by Misti Ch LPN  Outcome: Adequate for Discharge  4/2/2024 0355 by Jose Alberto Hope RN  Outcome: Progressing     Problem: Safety - Adult  Goal: Free from fall injury  4/2/2024 0902 by Misti Ch LPN  Outcome: Adequate for Discharge  4/2/2024 0355 by Jose Alberto Hope RN  Outcome: Progressing     Problem: Chronic Conditions and Co-morbidities  Goal: Patient's chronic conditions and co-morbidity symptoms are monitored and maintained or improved  4/2/2024 0902 by Misti Ch LPN  Outcome: Adequate for Discharge  4/2/2024 0355 by Jose Alberto Hope RN  Outcome: Progressing

## 2024-04-02 NOTE — CARE COORDINATION
No further cardiac w/u per cardiology.  MD states patient is medically ready.  Gave Dr. Szymanski an update on patients status.  Dr. Szymanski okay with patient admitting to ARU today.     Patient meets criteria and is approved to come to ARU.   Patient able to admit after ARU Medical Director and  sign the pre-admission screen (PAS).    ARU charge will call patients nurse once bed is ready for patient on ARU.

## 2024-04-02 NOTE — PROGRESS NOTES
ARU Admission Report    [x] Approved in Relay (Time entered 1204)  [x] Printed 2 copies of PAS  [x] Bed has been reserved  [x] Dr Szymanski has been Notified of Patient Admission   [x] Signed and Held Orders are in chart  [x] Hospitalist has completed discharge order    Room transferring from [facility]: 1010/1010-A         Room assigned on ARU:  1010  Report received from : donovan ariza RN  2024      12:15 PM    NAME: Jazmine Hutchinson   : 1935  AGE: 89 y.o.  Code Status:   Advance Directives       Power of  Living Will ACP-Advance Directive ACP-Power of     Not on File Filed on 20 Filed Not on File          Full Code    Dx: Diverticulitis of intestine, part unspecified, without perforation or abscess without bleeding [K57.92]  ICD Code:  Isolation Status: There are no current isolations documented for this patient.  Allergies:   Allergies   Allergen Reactions    Alendronate Sodium Other (See Comments)     GI upset    Bactrim [Sulfamethoxazole-Trimethoprim] Anaphylaxis    Boniva [Ibandronate Sodium] Other (See Comments)     Gi upset and declined egd; also to fosamax    Colesevelam     Lisinopril Other (See Comments)     Severe hyperkalemia (6) with bun >56      Macrobid [Nitrofurantoin]     Neggram [Nalidixic Acid]     Pce [Erythromycin]     Penicillins     Risperidone And Related     Welchol [Colesevelam Hcl] Other (See Comments)     constipation    Carbamazepine Nausea And Vomiting    Lovaza [Omega-3-Acid Ethyl Esters] Nausea And Vomiting    Metoprolol Nausea And Vomiting    Pyridium [Phenazopyridine] Palpitations     Medical Hx:   Past Medical History:   Diagnosis Date    Arthritis of shoulder region, right 2014    Dee    Blind right eye     following right cataract surg    Chronic depression     Dr. Payton    DVT (deep venous thrombosis) (Former[facility]) 1970    left leg    Former smoker     History of echocardiogram 2020    EF 55-60%, mild left ventricular hypertrophy, normal

## 2024-04-02 NOTE — CONSULTS
[facility] [place]      Name:  Jazmine Hutchinson /Age/Sex: 1935  (89 y.o. female)   MRN & CSN:  [id number] & [id number] Encounter Date/Time: 3/28/2024 10:00 PM EDT   [facility] PCP: Jayda Díaz MD     Attending:Shakeel Mac,*  Consulting Provider: Rl Sadler MD      Hospital Day: 1    This is a telemedicine consult requested by: Dr. Mac  I have Personally reviewed Lab Studies, Imaging, and discussed with Dr. Mac       Physician Location: [place] working from home   Patient Location: [facility]   Assessment and Recommendations   Pt is a 89 yo F whom ID is c/f Acute Diverticulitis w/ pmh + for HTN, HLD, and recurrent UTI who presented on 3/28 with LLQ pain and dysuria.         Assessment/Plan:  #Acute, Simple Diverticulitis: CTAP shows colonic diverticulosis with diffuse wall thickening and mild pericolonic soft tissue stranding in the sigmoid region. No wt count, fever, or findings consistent with complicated. However, given age recommend monitoring inpatient.   #Acute Cystitis?: UA notable for minimal pyuria at 10, LLQ pain in the setting of radiographic acute diverticulitis.  Low suspicion for acute cystitis at this time.     -D/c Meropenem  -Continue to monitor off of antibiotics given uncomplicated diverticulitis and low suspicion for acute cystitis  -If develops worsening pain, fever, dysuria, or leukocytosis would re-evaluate the role of antibiotics at that time and reach out to ID at that time        ID will sign off at this time  Please page/call with any further questions    History of Present Illness:     Chief Complaint: Diverticulitis    Pt is a 89 yo F whom ID is c/f Acute Diverticulitis w/ pmh + for HTN, HLD, and recurrent UTI who presented on 3/28 with LLQ pain and dysuria.     Pt presents with LLQ pain, though unable to discern flank vs abdominal. Given history of recurrent UTI and ESBL she decided 
CONSULT DICTATED  #[id number]  
Comprehensive Nutrition Assessment    Type and Reason for Visit:  Initial, Consult, Patient Education    Nutrition Recommendations/Plan:   Begin Fortified Gelatin Oral nutrition supplement BID  Add dietary preferences to diet; trial Low Sodium diet; liberalize fiber restriction for pt's ability to choose   Refer to nephrology for fluid recommendations   Monitor PO intakes, GI status, weights, fluids, labs, lytes, glucose, and plan of care      Malnutrition Assessment:  Malnutrition Status:  Insufficient data (03/29/24 1513)    Context:  Acute Illness       Nutrition Assessment:    Pt requests diet education from dietitian due to diverticulitis and abdominal pain. Hx recurrent UTIs, multiple ABX regimens, CKDIII, CAD s/p CABG x 3, HLD, PAD, CHF, Mild diverticulosis. Pt c/o declining since discharge from NH. Pt was told by NH dietitian to follow 13 gm fiber and no salt diet with 6 cups/day of fluids or ~500 ml fluid restriction. Pt followed strictly to dietary changes, avoided different food groups,counted each fluid intake, only eat soft green beans, turkey, and mashed potatoes reptitively. C/o ab pain and constipation, pta pt consumed a white bread sandwich with bologne because she had felt awful with strict dietary recommendations and life stressors over the past year and thought \"low fiber\" was the healthier choice. Provided extensive education at bedside. Pt does not tolerate cow's milk alone, able to consume yogurt. Will trial fortified gelatein supplements BID for additional protein. Follow as high nutrition risk.    Nutrition Related Findings:    Lactate 2.5, +diverticulitis +C/o 6-8x urinates/night at home, has poor sleep, lives alone. Knows how to cook. Wound Type: None      Intake/Output Summary (Last 24 hours) at 3/29/2024 1515  Last data filed at 3/29/2024 1255  Gross per 24 hour   Intake 500 ml   Output 700 ml   Net -200 ml     Current Nutrition Intake & Therapies:    Average Meal Intake: Unable to 
Comprehensive Nutrition Assessment    Type and Reason for Visit:  Reassess    Nutrition Recommendations/Plan:   Continue current diet, liberalize low fiber restriction as medically appropriate  Will continue to offer fortified gelatin oral nutrition supplement once back in stock  Monitor weights, po intakes, GI status, labs, POC     Malnutrition Assessment:  Malnutrition Status:  At risk for malnutrition (Comment) (04/02/24 1113)    Context:  Acute Illness       Nutrition Assessment:    Received consult for diverticulitis diet education. Pt did not have any specific questions, has spoken previously w/ various RDs, including extensive education 4d ago. Pt mostly c/o feeling like she has limited food options on low fiber diet, educated pt about low fiber in times of diverticulitis flare, then slowly increasing fiber intake as tolerated once resolved. Provided additional Diverticulitis handout from [facility], Fiber content of foods list from [facility]. Pt reports good appetite and po intakes here, eating >50% of all of her meals. NFPE performed, no significant wasting noted for age. Likely d/c to ARU today. Continue to follow at moderate nutrition risk.    Nutrition Related Findings:    meds, labs reviewed; Wound Type: None       Current Nutrition Intake & Therapies:    Average Meal Intake: 51-75%, %  Average Supplements Intake: Unable to assess (OOS)  ADULT ORAL NUTRITION SUPPLEMENT; Breakfast, Lunch; Fortified Gelatin Oral Supplement  ADULT DIET; Regular; Low Fiber    Anthropometric Measures:  Height: 157.5 cm (5' 2\")  Ideal Body Weight (IBW): 110 lbs (50 kg)    Admission Body Weight: 59.9 kg (132 lb)  Current Body Weight: 62.7 kg (138 lb 3.7 oz), 120 % IBW. Weight Source: Stated  Current BMI (kg/m2): 25.3  Usual Body Weight:  (135-140 lb in hx)     Weight Adjustment For: No Adjustment                 BMI Categories: Normal Weight (BMI 18.5-24.9)    Estimated Daily Nutrient Needs:  Energy Requirements Based On: 
[facility] ACUTE CARE PHYSICAL THERAPY EVALUATION  Jazmine Hutchinson, 1935, 1117/1117-A, 3/29/2024    History  [place]:  The primary encounter diagnosis was Diverticulitis of colon. A diagnosis of Hyponatremia was also pertinent to this visit.  Patient  has a past medical history of Arthritis of shoulder region, right, Blind right eye, Chronic depression, DVT (deep venous thrombosis) ([facility]), Former smoker, History of echocardiogram, History of stress test, Hx of Doppler ultrasound, Hyperlipidemia, Hypertension, Macular degeneration, Mild cognitive impairment, MVP (mitral valve prolapse), Osteoporosis, Pancytopenia, Peptic ulcer disease, Peripheral vascular disease ([facility]), Prediabetes, Recurrent ventral incisional hernia, S/P CABG x 3, Spigelian hernia, Supraventricular tachycardia, and Tic douloureux.  Patient  has a past surgical history that includes Cholecystectomy; Coronary artery bypass graft (8/2009); Ureter surgery; Shoulder arthroscopy (Right, 2003); Tubal ligation; and Cataract removal (Right, 2010).    Discharge Recommendation: Encourage facility for post-acute rehabilitation, anticipate 1-2 hours per day and 5 days per week.    Subjective:    Patient states:  \"I used to cook every Sunday.\"      Pain:  pain in L quadrant, did not state numerical value.      Communication with other providers:  Handoff to Vimal FLOWERS    Restrictions: General Precautions, Fall Risk    Home Setup/Prior level of function  Social/Functional History  Lives With: Alone  Type of Home: House  Home Layout: One level, Laundry in basement  Home Access: Stairs to enter with rails  Entrance Stairs - Number of Steps: 3  Entrance Stairs - Rails: Both  Bathroom Shower/Tub: Tub/Shower unit  Bathroom Toilet: Standard  Bathroom Equipment: Grab bars in shower, Grab bars around toilet, Tub transfer bench  Home Equipment: Cane, Rollator  Has the patient had two or more falls in the past year or any fall with injury in the past year?: Yes  ADL 
not palpable.  RECTAL:  Deferred.  CNS:  Shows the patient to be awake, alert, and oriented.  There are no focal sensorimotor signs.  MUSCULOSKELETAL:  Remarkable for degenerative joint disease changes.    LABORATORY DATA:  As above mentioned.    IMPRESSION:  An 89-year-old white female with multiple comorbidities including 4 prior episodes of acute diverticulitis, presents with 1-week history of left lower quadrant abdominal pain with abnormal CAT scan, rule out acute sigmoid diverticulitis.    RECOMMENDATIONS:    1. I agree with present management with IV antibiotics.  2. We will follow up CBC and BMP in a.m.  3. Advance diet as tolerated to a low residue diet.  4. Monitor the patient for any complications of acute diverticulitis.  5. The patient has been instructed to have a followup colonoscopy in 8 weeks' time after the acute episode of diverticulitis resolves, but the patient is reluctant to have colonoscopy in future because of her age.  6. The case and plan have been discussed in detail with the patient and all her questions were answered.  The case and plan were also discussed with the hospitalist, Dr. Stevie Montelongo.          ELEAZAR JOSUE MD      D:  03/30/2024 13:19:52     T:  03/30/2024 13:48:20     AR/MAYRA  Job #:  [id number]     Doc#:  [id number]     
\"ALKPHOS\" in the last 72 hours.  No results for input(s): \"TROPONINI\" in the last 72 hours.  No results found for: \"BNP\"  Lab Results   Component Value Date    INR 1.0 03/28/2024    PROTIME 13.4 03/28/2024         EKG:    Chest Xray:    ECHO:  Labs, echo, meds reviewed  Assessment: 89 y.o.year old with PMH of  has a past medical history of Arthritis of shoulder region, right, Blind right eye, Chronic depression, DVT (deep venous thrombosis) ([facility]), Former smoker, History of echocardiogram, History of stress test, Hx of Doppler ultrasound, Hyperlipidemia, Hypertension, Macular degeneration, Mild cognitive impairment, MVP (mitral valve prolapse), Osteoporosis, Pancytopenia, Peptic ulcer disease, Peripheral vascular disease ([facility]), Prediabetes, Recurrent ventral incisional hernia, S/P CABG x 3, Spigelian hernia, Supraventricular tachycardia, and Tic douloureux.      Recommendations:    PVCs with a history of CAD CABG presenting with acute diverticulitis with mild electrolyte abnormalilites and elevated lactic acid will not recommend any further cardiac workup recommend to continue verapamil, not on beta-blocker and treat her underlying illness, continue aspirin and statins, LVEF is normal from last echo  Acute diverticulitis continue antibiotics and appreciated GI consult and medical follow-up  Hypertension continue verapamil  Dyslipidemia continue statins  All labs, medications and tests reviewed, continue all other medications of all above medical condition listed as is.         Osmar Muñiz MD, 4/2/2024 9:45 AM

## 2024-04-02 NOTE — PROGRESS NOTES
V2.0    [facility] [place]      Name:  Jazmine Hutchinson /Age/Sex: 1935  (89 y.o. female)   MRN & CSN:  [id number] & [id number] Encounter Date/Time: 2024 11:08 AM EDT   Location:  [address] [place] [address] PCP: Jayda Díaz MD     Attending:Susan Palacios MD       Hospital Day: 6    Assessment and Recommendations   Jazmine Hutchinson is a 89 y.o. female  who presents with Diverticulitis    Acute diverticulitis  -Presented with abdominal pain  -CT abdomen and pelvis consistent with diverticulitis  -Continue supportive care  -GI was consulted and recommendations appreciated.  No acute intervention  -Dietitian consulted to help with dietary education    PVCs  -Noted on telemetry, causing discharge to be deferred by 1 day  -Cardiology consulted, no further cardiac workup recommended, continue verapamil      Asymptomatic bacteriuria  -Meropenem discontinued per ID recommendation    Hypertension  -Continue verapamil    Hyperlipidemia  -Continue statins    Diet ADULT ORAL NUTRITION SUPPLEMENT; Breakfast, Lunch; Fortified Gelatin Oral Supplement  ADULT DIET; Regular; Low Fiber   DVT Prophylaxis [x] Lovenox, []  Heparin, [] SCDs, [] Ambulation,  [] Eliquis, [] Xarelto  [] Coumadin   Code Status Full Code   Disposition From: Home  Expected Disposition: Home  Estimated Date of Discharge: 1 to 2 days  Patient requires continued admission due to acute diverticulitis   Surrogate Decision Maker/ POA       Personally reviewed Lab Studies and Imaging     Plan of care discussed with cardiology, CM regarding patient care        Subjective:     Chief Complaint:     Patient seen and examined at bedside this morning.  Seen working with PT.  Patient denies any active complaints.  Plan of care discussed at length.  All questions answered.  Review of Systems:      Pertinent positives and negatives discussed in HPI    Objective:     Intake/Output Summary (Last 24 hours) at 2024 1133  Last data filed at 2024 0902  Gross per 
    V2.0    [facility] [place]      Name:  Jazmine Hutchinson /Age/Sex: 1935  (89 y.o. female)   MRN & CSN:  [id number] & [id number] Encounter Date/Time: 3/30/2024 11:08 AM EDT   Location:  [facility] PCP: Jayda Díaz MD     Attending:Stevie Montelongo MD       Hospital Day: 3    Assessment and Recommendations   Jazmine Hutchinson is a 89 y.o. female  who presents with Diverticulitis      1.  Acute diverticulitis  -Presented with abdominal pain  -CT abdomen and pelvis consistent with diverticulitis  -Continue supportive care  -Follow-up on infectious workup  -GI consulted    2.  Asymptomatic bacteriuria  -Meropenem discontinued per ID recommendation    3.  Hypertension  -Continue verapamil    4.  Hyperlipidemia  -Continue statins    Diet ADULT ORAL NUTRITION SUPPLEMENT; Breakfast, Lunch; Fortified Gelatin Oral Supplement  ADULT DIET; Full Liquid; Low Sodium (2 gm); C/o cow's milks may cause diarrhea. Allow to have milk as ingredient. Likes yogurt. Poorly tolerates cooked spinach.   DVT Prophylaxis [] Lovenox, []  Heparin, [] SCDs, [] Ambulation,  [] Eliquis, [] Xarelto  [] Coumadin   Code Status Full Code   Disposition From: Home  Expected Disposition: Home  Estimated Date of Discharge: 1 to 2 days  Patient requires continued admission due to acute diverticulitis   Surrogate Decision Maker/ POA       Personally reviewed Lab Studies and Imaging             Subjective:     Chief Complaint:     Patient seen and examined at bedside this morning.  Reports that earlier today she had some abdominal pain but no nausea or vomiting.  Denies diarrhea      Review of Systems:      Pertinent positives and negatives discussed in HPI    Objective:     Intake/Output Summary (Last 24 hours) at 3/30/2024 1120  Last data filed at 3/30/2024 1023  Gross per 24 hour   Intake 480 ml   Output 650 ml   Net -170 ml      Vitals:   Vitals:    24 0533 24 0630 24 0731 24 0945   BP: (!) 151/58 (!) 165/74  (!) 156/54   Pulse: 
   04/02/24 1148   Encounter Summary   Encounter Overview/Reason  Initial Encounter   Service Provided For: Patient   Referral/Consult From: [facility]   Support System Family members   Last Encounter  04/02/24  (PT is feeling better, out of the bed and in her chair, she was calm and coping well, she had a better appetite, she wanted prayer.)   Complexity of Encounter Low   Begin Time 1140   End Time  1151   Total Time Calculated 11 min   Spiritual/Emotional needs   Type Spiritual Support   Grief, Loss, and Adjustments   Type Adjustment to illness   Assessment/Intervention/Outcome   Assessment Calm;Concerns with suffering;Coping;Hopeful   Intervention Active listening;Discussed belief system/Zoroastrianism practices/rohini;Discussed illness injury and it’s impact;Discussed relationship with God;Explored/Affirmed feelings, thoughts, concerns;Explored Coping Skills/Resources   Outcome Comfort;Coping;Encouraged   Plan and Referrals   Plan/Referrals Continue Support (comment)       
  Physical Therapy Treatment Note  Name: Jazmine Hutchinson MRN: [id number] :   1935   Date:  3/31/2024   Admission Date: 3/28/2024 Room:  [address] [place] [address] rec: Encourage facility for intensive rehabilitation, anticipate 3 hours per day and 5 days per week.    Restrictions/Precautions:          fall risk, contact  Communication with other providers:  RN  Subjective:  Patient states:  agreeable to tx, says talked to family and did not feel she'd have support she would need if dc home  Pain:   Location, Type, Intensity (0/10 to 10/10):  denies  Objective:    Observation:  in bed upon arrival  Treatment, including education/measures:  Therapeutic Activity Training:   Therapeutic activity training was instructed today.  Cues were given for safety, sequence, UE/LE placement, awareness, and balance.    Activities performed today included bed mobility training, sup-sit, sit-stand, SPT.   Gait Training:  Cues were given for safety, sequence, device management, balance, posture, awareness, path.       Bed mobility SBA  Transfers SBA  Gait: 2 x 75 ft stable path with walker, better josé miguel and speed today, still lacking stamina and overall standing tolerance to complete extended standing ADLs. Challenged with obstacles and multiple turns, no LOB. Many STS trials with and without RW, better power today.    Assessment / Impression:    Pt much improved since yesterday; however she is not near her high PLOF and estimates she is at about 50% of this level. She was approved for ARU   Patient's tolerance of treatment:  good   Adverse Reaction:   Significant change in status and impact:    Barriers to improvement:    Plan for Next Session:    Per POC  Time in:  1330  Time out:  1410  Timed treatment minutes:  40  Total treatment time:  40    Previously filed items:  Social/Functional History  Lives With: Alone  Type of Home: House  Home Layout: One level, Laundry in basement  Home Access: Stairs to enter with rails  Entrance Stairs - 
  Physical Therapy Treatment Note  Name: Jazmine Hutchinson MRN: [id number] :   1935   Date:  2024   Admission Date: 3/28/2024 Room:  [address] [place] [address]   Restrictions/Precautions:  General Precautions, Fall Risk  Communication with other providers:  KRISTIE Savage    Subjective:  Patient states:  \"I just don't want to be sick anymore.\"  Pain:   Location, Type, Intensity (0/10 to 10/10):  did not report numerical value    Objective:    Observation:  patient approached presenting supine in bed and agreeable to participate in therapy services.     Treatment, including education/measures:  Facilitation of bed mobility in preparation for OOB related tasks. Patient able to perform supine>sitting at EOB with SUP with use of bed functions. Patient performed STS transfer from EOB>standing with FWW and SBA. Patient presenting with good stability while performing. Patient ambulated 2x120' with FWW and CGA progressing to SBA. Patient presenting with good stability and normalized gait speed. Therapist communicated with patient regarding regressing to SPC, patient agreeable. Patient ambulated 15' with SPC and CGA. Patient presenting with decreased gait speed and decreased toe clearance B while utilizing SPC.     Patient was left in bedside chair with chair alarm set, call light in reach, and all needs met.     Assessment / Impression:    Patient presenting with improved stability on this date. Patient able to perform functional transfers with FWW and SBA. Patient ambulates 120' with FWW and CGA progressing to SBA.     Patient's tolerance of treatment:  fair   Adverse Reaction: none  Significant change in status and impact:  none  Barriers to improvement:  Generalized Deconditioning    Plan for Next Session:    Continue to address goals as stated in patient POC    Time in:  0953  Time out:  1023  Timed treatment minutes:  26  Total treatment time:  30    Previously filed items:  Social/Functional History  Lives With: Alone  Type of Home: 
  Physical Therapy Treatment Note  Name: Jazmine Hutchinson MRN: [id number] :   1935   Date:  3/30/2024   Admission Date: 3/28/2024 Room:  [address] [place] [address]   Restrictions/Precautions:          SNF/swing  Communication with other providers:  OT  Subjective:  Patient states:  agreeable to PT tx, feels some better than yesterday, still having abdominal pain worse with activity  Pain:   Location, Type, Intensity (0/10 to 10/10):  LLQ pain and L hip pain  Objective:    Observation:  in bed upon arrival, VSS  Treatment, including education/measures:  Therapeutic Activity Training:   Therapeutic activity training was instructed today.  Cues were given for safety, sequence, UE/LE placement, awareness, and balance.    Activities performed today included bed mobility training, sup-sit, sit-stand, SPT.   Gait Training:  Cues were given for safety, sequence, device management, balance, posture, awareness, path.     Bed mobility CGA-- increased time and effort, painful, cues for sequencing    Transfers CGA at RW-- cues for hand placement, slow and effortful, improves with repetition     50 ft x 4 at RW-- short step length, slow josé miguel, cues for AD mgt with turns, stable path and no LOB, reports one episode of lightheadedness that resolves quickly     Stairs-- ascends and descends 3 stairs with rail, difficulty with eccentric control on larger step downs    Assessment / Impression:      Pt appears to be improving, currently CGA for OOB activity, weaker than baseline. Continue to rec SNF/Swing for STR, but pt to consider home with HHPT and increased family support/ HHC services, which could be reasonable pending progress    Patient's tolerance of treatment:  good   Adverse Reaction:   Significant change in status and impact:    Barriers to improvement:    Plan for Next Session:    Per POC  Time in:  1040  Time out:  1115  Timed treatment minutes:  24  Total treatment time:  35    Previously filed items:  Social/Functional 
4 Eyes Skin Assessment     NAME:  Jazmine Hutchinson  YOB: 1935  MEDICAL RECORD NUMBER:  [id number]    The patient is being assessed for  Admission    I agree that at least one RN has performed a thorough Head to Toe Skin Assessment on the patient. ALL assessment sites listed below have been assessed.      Areas assessed by both nurses:    Head, Face, Ears, Shoulders, Back, Chest, Arms, Elbows, Hands, Sacrum. Buttock, Coccyx, Ischium, Legs. Feet and Heels, and Under Medical Devices         Does the Patient have a Wound? No noted wound(s)       Mayito Prevention initiated by RN: No  Wound Care Orders initiated by RN: No    Pressure Injury (Stage 3,4, Unstageable, DTI, NWPT, and Complex wounds) if present, place Wound referral order by RN under : No    New Ostomies, if present place, Ostomy referral order under : No     Nurse 1 eSignature: Electronically signed by Misti Ch LPN on 3/28/24 at 2:53 PM EDT    **SHARE this note so that the co-signing nurse can place an eSignature**    Nurse 2 eSignature: Electronically signed by Michelle Frey LPN on 3/28/24 at 6:37 PM EDT    
4 Eyes Skin Assessment     NAME:  Jazmine Hutchinson  YOB: 1935  MEDICAL RECORD NUMBER:  [id number]    The patient is being assessed for  Admission    I agree that at least one RN has performed a thorough Head to Toe Skin Assessment on the patient. ALL assessment sites listed below have been assessed.      Areas assessed by both nurses:    Head, Face, Ears, Shoulders, Back, Chest, Arms, Elbows, Hands, Sacrum. Buttock, Coccyx, Ischium, and Legs. Feet and Heels        Does the Patient have a Wound? No noted wound(s)       Mayito Prevention initiated by RN: No  Wound Care Orders initiated by RN: No    Pressure Injury (Stage 3,4, Unstageable, DTI, NWPT, and Complex wounds) if present, place Wound referral order by RN under : No    New Ostomies, if present place, Ostomy referral order under : No     Nurse 1 eSignature: Electronically signed by Yvrose Vogel RN on 3/28/24 at 7:13 PM EDT    **SHARE this note so that the co-signing nurse can place an eSignature**    Nurse 2 eSignature: Electronically signed by Klaudia Faust LPN on 3/28/24 at 7:10 PM EDT    
DOING BETTER C.O WEAKNESS NO ABD PAIN C/O SOME LOOSE BM NO GROSS GIT BLEEDING TOLERATING FULL LIQUIDS WELL  VITALS STABLE   LABS NOTED   WILL ADVANCE DIET TO LOW FIBER D/C SOON WILL BENEFIT FROM GOING TO ASSISTED LIVING BECAUSE OF HER AGE D/W PT AND NEIGHBOR  
Occupational Therapy  Attempted to see pt on this date for OT session. Pt currently meeting with dietary and noted possible D/C to ARU.  Will attempt as able and appropriate.    Beena YU/L  
Occupational Therapy  [facility] ACUTE CARE OCCUPATIONAL THERAPY EVALUATION    Jazmine Hutchinson, 1935, 1117/1117-A, 3/30/2024    Discharge Recommendation: Encourage facility for intensive rehabilitation, anticipate 3 hours per day and 5 days per week.     History:  [place]:  The primary encounter diagnosis was Diverticulitis of colon. A diagnosis of Hyponatremia was also pertinent to this visit.  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 Payton    DVT (deep venous thrombosis) ([facility]) 1970    left leg    Former smoker     History of echocardiogram 09/21/2020    EF 55-60%, mild left ventricular hypertrophy, normal diastolic filling pattern for age, no signficiant valvular disease, no pericardial effusion     History of stress test 03/25/2021    normal LVEF calculated by the computer is probably falsely low. LVEF is 40 %    Hx of Doppler ultrasound 03/01/2018    Carotid-mild 0-49% bilateral carotid,50% stenosis right subclavian    Hyperlipidemia     Hypertension     Macular degeneration     right    Mild cognitive impairment 02/2012    MMSE 26/30    MVP (mitral valve prolapse)     trace on echo 2014    Osteoporosis 05/2012    Pancytopenia 05/2011    mild with ; Dr Felix hawk 2010    Peptic ulcer disease     Peripheral vascular disease ([facility]) 01/2016    angio; dis below ankles; pletal    Prediabetes 2006    Recurrent ventral incisional hernia 2013    medial apex of GB scar    S/P CABG x 3 2003    3-vessel; Dr Gallego    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 Mckeon; Right side       Subjective:  Patient states: \"I'm just so behind on things at home\"  Pain:  reports 5-7/10 LLQ pain and L hip pain  Communication with other providers: co-kenn w/ PT, 
PATIENT JUST RAN 4 V TACKS .  NOTIFIED DR. MOY.   
This nurse was informed that Patient keeps having frequent PVC's .  This nurse did notify Dr. Montelongo.    
disease, no pericardial effusion     History of stress test 03/25/2021    normal LVEF calculated by the computer is probably falsely low. LVEF is 40 %    Hx of Doppler ultrasound 03/01/2018    Carotid-mild 0-49% bilateral carotid,50% stenosis right subclavian    Hyperlipidemia     Hypertension     Macular degeneration     right    Mild cognitive impairment 02/2012    MMSE 26/30    MVP (mitral valve prolapse)     trace on echo 2014    Osteoporosis 05/2012    Pancytopenia 05/2011    mild with ; Dr Felix hawk 2010    Peptic ulcer disease     Peripheral vascular disease ([facility]) 01/2016    angio; dis below ankles; pletal    Prediabetes 2006    Recurrent ventral incisional hernia 2013    medial apex of GB scar    S/P CABG x 3 2003    3-vessel; Dr Gallego    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 Mckeon; Right side        Past Surgical History:   Procedure Laterality Date    CATARACT REMOVAL Right 2010    poor result ; blind right eye; [place]    CHOLECYSTECTOMY      CORONARY ARTERY BYPASS GRAFT  8/2009    3 vessel    SHOULDER ARTHROSCOPY Right 2003    TUBAL LIGATION      URETER SURGERY      Right ureteral repair       Background: abdominal pain,  diverticulose  Precautions:          Diet: ADULT ORAL NUTRITION SUPPLEMENT; Breakfast, Lunch; Fortified Gelatin Oral Supplement  ADULT DIET; Regular; Low Fiber  Accuchecks: no  Labs:   Lab Results   Component Value Date    WBC 5.7 04/02/2024    HGB 10.3 (L) 04/02/2024    HCT 31.0 (L) 04/02/2024    .6 (H) 04/02/2024     (L) 04/02/2024     Recent Labs     03/31/24  0451 04/01/24  0555 04/02/24  0157   BUN 14 11 14             Estimated Creatinine Clearance: 43 mL/min (based on SCr of 0.8 mg/dL).  INR: No results for input(s): \"INR\" in the last 72 hours.  No results for input(s): \"BMP\" in the last 72 hours.  LDA:   Peripheral IV 03/28/24 Right 
    BMP:    Recent Labs     03/28/24  0840 03/29/24  0813   * 136   K 4.4 4.2   CL 96* 99   CO2 25 25   BUN 15 14   CREATININE 1.0 0.9   GLUCOSE 101* 100*     Hepatic:   Recent Labs     03/28/24  0840   AST 27   ALT 15   BILITOT 0.7   ALKPHOS 45     Lipids:   Lab Results   Component Value Date/Time    CHOL 118 03/08/2024 10:39 AM    HDL 35 03/08/2024 10:39 AM    HDL 20 01/03/2012 12:00 AM    TRIG 137 03/08/2024 10:39 AM     Hemoglobin A1C:   Lab Results   Component Value Date/Time    LABA1C 5.7 03/08/2024 10:39 AM     TSH:   Lab Results   Component Value Date/Time    TSH 3.250 03/06/2019 07:26 AM     Troponin:   Lab Results   Component Value Date/Time    TROPONINT <0.010 09/24/2023 12:50 AM    TROPONINT <0.010 04/26/2022 07:50 PM    TROPONINT <0.010 01/31/2022 05:51 PM     Lactic Acid: No results for input(s): \"LACTA\" in the last 72 hours.  BNP: No results for input(s): \"PROBNP\" in the last 72 hours.  UA:  Lab Results   Component Value Date/Time    NITRU NEGATIVE 03/28/2024 09:20 AM    NITRU POSITIVE 02/17/2020 04:31 PM    COLORU YELLOW 03/28/2024 09:20 AM    PHUR 8.5 03/08/2024 10:49 AM    PHUR 5.5 02/17/2020 04:31 PM    LABCAST >40 Hyaline 10/13/2012 02:09 PM    WBCUA 10 03/28/2024 09:20 AM    RBCUA 1 03/28/2024 09:20 AM    MUCUS RARE 10/04/2023 12:44 PM    TRICHOMONAS NONE SEEN 03/28/2024 09:20 AM    YEAST Present 08/05/2019 03:30 PM    BACTERIA NEGATIVE 03/28/2024 09:20 AM    CLARITYU CLEAR 03/28/2024 09:20 AM    SPECGRAV 1.015 03/28/2024 09:20 AM    LEUKOCYTESUR SMALL NUMBER OR AMOUNT OBSERVED 03/28/2024 09:20 AM    UROBILINOGEN 0.2 03/28/2024 09:20 AM    BILIRUBINUR NEGATIVE 03/28/2024 09:20 AM    BILIRUBINUR neg 03/08/2024 10:49 AM    BLOODU SMALL NUMBER OR AMOUNT OBSERVED 03/28/2024 09:20 AM    GLUCOSEU neg 03/08/2024 10:49 AM    GLUCOSEU Negative 02/17/2020 04:31 PM    KETUA NEGATIVE 03/28/2024 09:20 AM    AMORPHOUS 2+ 10/13/2012 02:09 PM     Urine Cultures:   Lab Results   Component Value Date/Time 
03/08/2024 10:49 AM    BLOODU SMALL NUMBER OR AMOUNT OBSERVED 03/28/2024 09:20 AM    GLUCOSEU neg 03/08/2024 10:49 AM    GLUCOSEU Negative 02/17/2020 04:31 PM    KETUA NEGATIVE 03/28/2024 09:20 AM    AMORPHOUS 2+ 10/13/2012 02:09 PM     Urine Cultures:   Lab Results   Component Value Date/Time    LABURIN  11/21/2023 10:37 AM     >100,000 CFU/ml  CONTACT PRECAUTIONS INDICATED  Carbapenem antibiotics are the best option for infections caused  by ESBL producing organisms.  Other antibiotic classes are  likely to result in treatment failure, even for organisms  demonstrating in vitro susceptibility.       Blood Cultures: No results found for: \"BC\"  No results found for: \"BLOODCULT2\"  Organism:   Lab Results   Component Value Date/Time    ORG Escherichia coli ESBL 11/21/2023 10:37 AM         Electronically signed by Stevie Montelongo MD on 3/31/2024 at 9:46 AM

## 2024-04-02 NOTE — H&P
Jazmine Hutchinson    : 1935  [id number]  MRN: [id number]              History and physical    Date of face-to-face exam: 2024.  Time of face-to-face exam: 1650.    Admitting diagnosis: Diverticulitis (IGC 16)    Comorbid diagnoses impacting rehabilitation: Generalized weakness, gait disturbance, uncontrolled pain, depression (dysthymic syndrome), CAD, primary osteoarthritis of each knee, acquired hypothyroidism, bacteriuria, essential hypertension    Chief complaint: General fatigue.  Mild abdominal discomfort.    History of present illness: The patient is an 89-year-old right-hand-dominant female with recurring urinary tract infections and intermittent abdominal pain \"for a while\".  In mid March she had been treated again as an outpatient by her urologist for a urinary tract infection.  Last week she developed abdominal pain and was being seen as an outpatient by gastroenterology.  Abdominal imaging was ordered but her pain worsened before could be completed.  She was directed to the ED with severe abdominal pain on 3/28/2024.  Her evaluation ultimately led to the diagnosis of acute diverticulitis.  This has been treated with pain medicine, diet adjustments and IV antibiotics and hydration.  She had bacteriuria at that time but did not have an abnormal culture of her urine.  PVCs developed but cardiology felt that it was due to electrolyte disturbances due to her GI issues.  The patient has become too weak to do her own toileting, transfers and self-care and cannot return directly home.  She requires inpatient rehabilitation to address these issues.      Review of systems: General fatigue.  Some positional dizziness.  Intermittent abdominal cramping.  No diarrhea or dysuria.  Some urinary urgency with incontinence at times.  The remainder of their review of systems was negative except as mentioned in the history of present illness.    Social History: Lives With: Alone  Type of Home: House  Home

## 2024-04-02 NOTE — PROGRESS NOTES
4 Eyes Skin Assessment     NAME:  Jazmine Hutchinson  YOB: 1935  MEDICAL RECORD NUMBER:  [id number]    The patient is being assessed for  Admission    I agree that at least one RN has performed a thorough Head to Toe Skin Assessment on the patient. ALL assessment sites listed below have been assessed.      Areas assessed by both nurses:    Head, Face, Ears, Shoulders, Back, Chest, Arms, Elbows, Hands, Sacrum. Buttock, Coccyx, Ischium, Legs. Feet and Heels, and Under Medical Devices         Does the Patient have a Wound? No noted wound(s) however patient came to unit with healing excoriation to groin and vaginal area from previous purwick use. Patient gets up to toilet now.        Mayito Prevention initiated by RN: No  Wound Care Orders initiated by RN: No    Pressure Injury (Stage 3,4, Unstageable, DTI, NWPT, and Complex wounds) if present, place Wound referral order by RN under : No    New Ostomies, if present place, Ostomy referral order under : No     Nurse 1 eSignature: Electronically signed by Yvrose Stone RN on 4/2/24 at 5:18 PM EDT    **SHARE this note so that the co-signing nurse can place an eSignature**    Nurse 2 eSignature: Electronically signed by Daniella Garza RN on 4/2/24 at 6:26 PM EDT

## 2024-04-03 ENCOUNTER — COMMUNITY CARE MANAGEMENT (OUTPATIENT)
Facility: CLINIC | Age: 89
End: 2024-04-03

## 2024-04-03 PROBLEM — M19.012 PRIMARY OSTEOARTHRITIS OF BOTH SHOULDERS: Status: ACTIVE | Noted: 2019-10-02

## 2024-04-03 PROBLEM — R52 UNCONTROLLED PAIN: Status: ACTIVE | Noted: 2024-04-03

## 2024-04-03 PROBLEM — I25.118 CORONARY ARTERY DISEASE OF NATIVE ARTERY OF NATIVE HEART WITH STABLE ANGINA PECTORIS (HCC): Status: ACTIVE | Noted: 2017-01-20

## 2024-04-03 PROBLEM — G25.0 ESSENTIAL TREMOR: Status: ACTIVE | Noted: 2024-04-03

## 2024-04-03 PROBLEM — I49.3 PVC'S (PREMATURE VENTRICULAR CONTRACTIONS): Status: ACTIVE | Noted: 2024-04-03

## 2024-04-03 LAB
ANION GAP SERPL CALCULATED.3IONS-SCNC: 12 MMOL/L (ref 7–16)
BASOPHILS ABSOLUTE: 0 K/CU MM
BASOPHILS RELATIVE PERCENT: 0.5 % (ref 0–1)
BUN SERPL-MCNC: 13 MG/DL (ref 6–23)
CALCIUM SERPL-MCNC: 8.5 MG/DL (ref 8.3–10.6)
CHLORIDE BLD-SCNC: 94 MMOL/L (ref 99–110)
CO2: 23 MMOL/L (ref 21–32)
CREAT SERPL-MCNC: 0.9 MG/DL (ref 0.6–1.1)
DIFFERENTIAL TYPE: ABNORMAL
EKG ATRIAL RATE: 69 BPM
EKG ATRIAL RATE: 71 BPM
EKG DIAGNOSIS: NORMAL
EKG DIAGNOSIS: NORMAL
EKG P AXIS: 50 DEGREES
EKG P AXIS: 52 DEGREES
EKG P-R INTERVAL: 170 MS
EKG P-R INTERVAL: 190 MS
EKG Q-T INTERVAL: 418 MS
EKG Q-T INTERVAL: 428 MS
EKG QRS DURATION: 108 MS
EKG QRS DURATION: 110 MS
EKG QTC CALCULATION (BAZETT): 454 MS
EKG QTC CALCULATION (BAZETT): 458 MS
EKG R AXIS: -5 DEGREES
EKG R AXIS: -8 DEGREES
EKG T AXIS: -15 DEGREES
EKG T AXIS: -16 DEGREES
EKG VENTRICULAR RATE: 69 BPM
EKG VENTRICULAR RATE: 71 BPM
EOSINOPHILS ABSOLUTE: 0.4 K/CU MM
EOSINOPHILS RELATIVE PERCENT: 5.7 % (ref 0–3)
GFR SERPL CREATININE-BSD FRML MDRD: 62 ML/MIN/1.73M2
GLUCOSE SERPL-MCNC: 184 MG/DL (ref 70–99)
HCT VFR BLD CALC: 33.2 % (ref 37–47)
HEMOGLOBIN: 10.9 GM/DL (ref 12.5–16)
IMMATURE NEUTROPHIL %: 0.6 % (ref 0–0.43)
LYMPHOCYTES ABSOLUTE: 1.5 K/CU MM
LYMPHOCYTES RELATIVE PERCENT: 23 % (ref 24–44)
MCH RBC QN AUTO: 33.5 PG (ref 27–31)
MCHC RBC AUTO-ENTMCNC: 32.8 % (ref 32–36)
MCV RBC AUTO: 102.2 FL (ref 78–100)
MONOCYTES ABSOLUTE: 0.5 K/CU MM
MONOCYTES RELATIVE PERCENT: 8.1 % (ref 0–4)
NUCLEATED RBC %: 0 %
PDW BLD-RTO: 13.9 % (ref 11.7–14.9)
PLATELET # BLD: 145 K/CU MM (ref 140–440)
PMV BLD AUTO: 9.4 FL (ref 7.5–11.1)
POTASSIUM SERPL-SCNC: 4.1 MMOL/L (ref 3.5–5.1)
RBC # BLD: 3.25 M/CU MM (ref 4.2–5.4)
SEGMENTED NEUTROPHILS ABSOLUTE COUNT: 4.1 K/CU MM
SEGMENTED NEUTROPHILS RELATIVE PERCENT: 62.1 % (ref 36–66)
SODIUM BLD-SCNC: 129 MMOL/L (ref 135–145)
TOTAL IMMATURE NEUTOROPHIL: 0.04 K/CU MM
TOTAL NUCLEATED RBC: 0 K/CU MM
WBC # BLD: 6.5 K/CU MM (ref 4–10.5)

## 2024-04-03 PROCEDURE — 6370000000 HC RX 637 (ALT 250 FOR IP): Performed by: STUDENT IN AN ORGANIZED HEALTH CARE EDUCATION/TRAINING PROGRAM

## 2024-04-03 PROCEDURE — 97530 THERAPEUTIC ACTIVITIES: CPT

## 2024-04-03 PROCEDURE — 2580000003 HC RX 258: Performed by: STUDENT IN AN ORGANIZED HEALTH CARE EDUCATION/TRAINING PROGRAM

## 2024-04-03 PROCEDURE — 1280000000 HC REHAB R&B

## 2024-04-03 PROCEDURE — 97535 SELF CARE MNGMENT TRAINING: CPT

## 2024-04-03 PROCEDURE — 93010 ELECTROCARDIOGRAM REPORT: CPT | Performed by: INTERNAL MEDICINE

## 2024-04-03 PROCEDURE — 97166 OT EVAL MOD COMPLEX 45 MIN: CPT

## 2024-04-03 PROCEDURE — 94761 N-INVAS EAR/PLS OXIMETRY MLT: CPT

## 2024-04-03 PROCEDURE — 6360000002 HC RX W HCPCS: Performed by: STUDENT IN AN ORGANIZED HEALTH CARE EDUCATION/TRAINING PROGRAM

## 2024-04-03 PROCEDURE — 36415 COLL VENOUS BLD VENIPUNCTURE: CPT

## 2024-04-03 PROCEDURE — 80048 BASIC METABOLIC PNL TOTAL CA: CPT

## 2024-04-03 PROCEDURE — 85025 COMPLETE CBC W/AUTO DIFF WBC: CPT

## 2024-04-03 PROCEDURE — 97162 PT EVAL MOD COMPLEX 30 MIN: CPT

## 2024-04-03 PROCEDURE — 97116 GAIT TRAINING THERAPY: CPT

## 2024-04-03 RX ADMIN — PYRIDOSTIGMINE BROMIDE 30 MG: 60 TABLET ORAL at 22:00

## 2024-04-03 RX ADMIN — METRONIDAZOLE 500 MG: 500 INJECTION, SOLUTION INTRAVENOUS at 21:40

## 2024-04-03 RX ADMIN — METRONIDAZOLE 500 MG: 500 INJECTION, SOLUTION INTRAVENOUS at 14:15

## 2024-04-03 RX ADMIN — GABAPENTIN 300 MG: 300 CAPSULE ORAL at 09:46

## 2024-04-03 RX ADMIN — PYRIDOSTIGMINE BROMIDE 30 MG: 60 TABLET ORAL at 09:45

## 2024-04-03 RX ADMIN — GABAPENTIN 300 MG: 300 CAPSULE ORAL at 15:54

## 2024-04-03 RX ADMIN — METRONIDAZOLE 500 MG: 500 INJECTION, SOLUTION INTRAVENOUS at 06:30

## 2024-04-03 RX ADMIN — SODIUM CHLORIDE, PRESERVATIVE FREE 10 ML: 5 INJECTION INTRAVENOUS at 09:47

## 2024-04-03 RX ADMIN — VERAPAMIL HYDROCHLORIDE 120 MG: 120 TABLET, FILM COATED, EXTENDED RELEASE ORAL at 21:45

## 2024-04-03 RX ADMIN — ROSUVASTATIN CALCIUM 10 MG: 5 TABLET, COATED ORAL at 21:45

## 2024-04-03 RX ADMIN — GABAPENTIN 300 MG: 300 CAPSULE ORAL at 21:45

## 2024-04-03 RX ADMIN — LOPERAMIDE HYDROCHLORIDE 2 MG: 2 CAPSULE ORAL at 15:54

## 2024-04-03 RX ADMIN — CALCIUM 500 MG: 500 TABLET ORAL at 21:45

## 2024-04-03 RX ADMIN — SODIUM CHLORIDE 25 ML/HR: 9 INJECTION, SOLUTION INTRAVENOUS at 14:14

## 2024-04-03 RX ADMIN — DIVALPROEX SODIUM 250 MG: 250 TABLET, FILM COATED, EXTENDED RELEASE ORAL at 21:45

## 2024-04-03 RX ADMIN — PANTOPRAZOLE SODIUM 40 MG: 40 TABLET, DELAYED RELEASE ORAL at 06:58

## 2024-04-03 RX ADMIN — SODIUM CHLORIDE, PRESERVATIVE FREE 10 ML: 5 INJECTION INTRAVENOUS at 21:40

## 2024-04-03 RX ADMIN — ENOXAPARIN SODIUM 40 MG: 100 INJECTION SUBCUTANEOUS at 09:46

## 2024-04-03 RX ADMIN — SERTRALINE HYDROCHLORIDE 50 MG: 50 TABLET ORAL at 09:46

## 2024-04-03 NOTE — CARE COORDINATION
Case Management Admission Note      Patient:Jazmine Hutchinson      :1935  MRN:[id number]  Rehab Dx/Hx: Diverticulitis of intestine, part unspecified, without perforation or abscess without bleeding [K57.92]  Diverticulitis [K57.92]    Chief Complaint:   Past Medical History:   Diagnosis Date    Arthritis of shoulder region, right 2014    [place]    Blind right eye     following right cataract surg    Chronic depression     Dr. Payton    DVT (deep venous thrombosis) ([facility]) 1970    left leg    Former smoker     History of echocardiogram 2020    EF 55-60%, mild left ventricular hypertrophy, normal diastolic filling pattern for age, no signficiant valvular disease, no pericardial effusion     History of stress test 2021    normal LVEF calculated by the computer is probably falsely low. LVEF is 40 %    Hx of Doppler ultrasound 2018    Carotid-mild 0-49% bilateral carotid,50% stenosis right subclavian    Hyperlipidemia     Hypertension     Macular degeneration     right    Mild cognitive impairment 2012    MMSE 26/30    MVP (mitral valve prolapse)     trace on echo     Osteoporosis 2012    Pancytopenia 2011    mild with ; Dr Felix hawk 2010    Peptic ulcer disease     Peripheral vascular disease ([facility]) 2016    angio; dis below ankles; pletal    Prediabetes 2006    Recurrent ventral incisional hernia 2013    medial apex of GB scar    S/P CABG x 3     3-vessel; Dr Gallego    Spigelian hernia 2017    right ant lateral wall; seen on CT abd    Supraventricular tachycardia     Freq ventriular ectopy    Tic dou[place]oureux 2008    Dr. Mckeon; Right side     Past Surgical History:   Procedure Laterality Date    CATARACT REMOVAL Right     poor result ; blind right eye; [place]    CHOLECYSTECTOMY      CORONARY ARTERY BYPASS GRAFT  2009    3 vessel    SHOULDER ARTHROSCOPY Right     TUBAL LIGATION      URETER SURGERY      Right ureteral repair     Allergies   Allergen

## 2024-04-03 NOTE — PROGRESS NOTES
Comprehensive Nutrition Assessment    Type and Reason for Visit:  Initial, Consult, Patient Education (oral nutrition supplement)    Nutrition Recommendations/Plan:   Continue low fiber diet at this time, advance as per GI   Will continue to offer geletin oral nutrition supplement when available  Will continue to follow up during stay      Malnutrition Assessment:  Malnutrition Status:  At risk for malnutrition (Comment) (recent reduced intake) (04/03/24 1218)    Context:  Acute Illness       Nutrition Assessment:    Admit to acute rehab unit with recent hospital stay for diverticulitis. Able to tolerate low fiber diet at this time. Meal intake 50-75% at recent meals. Patient with no additional concerns or questions regarding meal plan, aware that low fiber diet is transitional diet. Does like geletin supplement and will consume during stay when available, currently out of stock. Will continue to follow at moderate nutrition risk at this time.    Nutrition Related Findings:    up in chair, hx CAD, HTN diverticulitis        lives alone but assist from family and neighbors Wound Type: None       Current Nutrition Intake & Therapies:    Average Meal Intake: 51-75%  Average Supplements Intake: 0% (currently out of stock)  ADULT ORAL NUTRITION SUPPLEMENT; Breakfast, Lunch; Fortified Gelatin Oral Supplement  ADULT DIET; Regular; Low Fiber    Anthropometric Measures:  Height: 158 cm (5' 2.21\")  Ideal Body Weight (IBW): 111 lbs (50 kg)    Admission Body Weight: 59.8 kg (131 lb 13.4 oz) (March 2024)  Current Body Weight: 61.9 kg (136 lb 7.4 oz), 122.9 % IBW. Weight Source: Bed Scale  Current BMI (kg/m2): 24.8  Usual Body Weight: 61.2 kg (135 lb) (per hx)  % Weight Change (Calculated): 1.1  Weight Adjustment For: No Adjustment                 BMI Categories: Normal Weight (BMI 22.0 to 24.9) age over 65    Estimated Daily Nutrient Needs:  Energy Requirements Based On: Kcal/kg  Weight Used for Energy Requirements:  Current  Energy (kcal/day): 4780-1424 (25-27 hans/kg)  Weight Used for Protein Requirements: Ideal  Protein (g/day): 50-60 (1-1.2 g/kg)  Method Used for Fluid Requirements: 1 ml/kcal  Fluid (ml/day): 1700    Nutrition Diagnosis:   Predicted inadequate energy intake related to other (comment) (reduced appetite in illness) as evidenced by poor intake prior to admission    Nutrition Interventions:   Food and/or Nutrient Delivery: Continue Current Diet, Continue Oral Nutrition Supplement  Nutrition Education/Counseling: Education completed  Coordination of Nutrition Care: Continue to monitor while inpatient  Plan of Care discussed with: patient    Goals:     Goals: PO intake 75% or greater, by next RD assessment       Nutrition Monitoring and Evaluation:   Behavioral-Environmental Outcomes: None Identified  Food/Nutrient Intake Outcomes: Diet Advancement/Tolerance, Food and Nutrient Intake, Supplement Intake  Physical Signs/Symptoms Outcomes: Biochemical Data, Skin, Weight, Meal Time Behavior    Discharge Planning:    Continue Oral Nutrition Supplement     Adriana Munoz, SALMA, LD  Contact: [phone]

## 2024-04-03 NOTE — PROGRESS NOTES
Jazmine Hutchinson    : 1935  Acct #: [id number]  MRN: [id number]              PM&R Progress Note      Admitting diagnosis: ***    Comorbid diagnoses impacting rehabilitation: ***    Chief complaint: ***    Prior (baseline) level of function: Independent.    Current level of function:         Current  IRF-TAMARA and Goals:   Occupational Therapy:      :     :                                       Eating: Eating  Assistance Needed: Setup or clean-up assistance  Comment: Pt required assist opening brown sugar packet for cream of wheat. Pt able to eat IND.  CARE Score: 5  Discharge Goal: Independent       Oral Hygiene: Oral Hygiene  Assistance Needed: Supervision or touching assistance  Comment: Supervision to brush teeth seated 2* fatigue.  CARE Score: 4  Discharge Goal: Independent    UB/LB Bathing: Shower/Bathe Self  Assistance Needed: Supervision or touching assistance  Comment: Pt completed bathing at the sink d/t time constraints. Pt was able to wash UB and LB while seated and in stance, CGA while washing ronald area and buttocks. Pt used a combination of forward flexion and figure four positioning to wash distal BLEs.  CARE Score: 4  Discharge Goal: Independent    UB Dressing: Upper Body Dressing  Assistance Needed: Supervision or touching assistance  Comment: Pt donned sweatshirt while seated in the w/c.  CARE Score: 4  Discharge Goal: Independent         LB Dressing: Lower Body Dressing  Assistance Needed: Supervision or touching assistance  Comment: Pt able to thread BLEs into briefs and pants with extended time. CGA in stance to manage over hips.  CARE Score: 4  Discharge Goal: Independent    Donning and [place] Footwear: Putting On/Taking Off Footwear  Assistance Needed: Supervision or touching assistance  Comment: Pt able to doff hospital socks and person socks and don new hospital socks with figure four positioning. Pt's tennis shoes present but Pt states she is not putting them on until grandson brings her a

## 2024-04-03 NOTE — PROGRESS NOTES
Physical Therapy    [facility] ARU PHYSICAL THERAPY EVALUATION    Chart Review:  Past Medical History:   Diagnosis Date    Arthritis of shoulder region, right 09/2014    [place]    Blind right eye     following right cataract surg    Chronic depression 2009    Dr. Payton    DVT (deep venous thrombosis) ([facility]) 1970    left leg    Former smoker     History of echocardiogram 09/21/2020    EF 55-60%, mild left ventricular hypertrophy, normal diastolic filling pattern for age, no signficiant valvular disease, no pericardial effusion     History of stress test 03/25/2021    normal LVEF calculated by the computer is probably falsely low. LVEF is 40 %    Hx of Doppler ultrasound 03/01/2018    Carotid-mild 0-49% bilateral carotid,50% stenosis right subclavian    Hyperlipidemia     Hypertension     Macular degeneration     right    Mild cognitive impairment 02/2012    MMSE 26/30    MVP (mitral valve prolapse)     trace on echo 2014    Osteoporosis 05/2012    Pancytopenia 05/2011    mild with ; Dr Felix hawk 2010    Peptic ulcer disease     Peripheral vascular disease ([facility]) 01/2016    angio; dis below ankles; pletal    Prediabetes 2006    Recurrent ventral incisional hernia 2013    medial apex of GB scar    S/P CABG x 3 2003    3-vessel; Dr Gallego    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 Mckeon; Right side     Past Surgical History:   Procedure Laterality Date    CATARACT REMOVAL Right 2010    poor result ; blind right eye; [place]    CHOLECYSTECTOMY      CORONARY ARTERY BYPASS GRAFT  8/2009    3 vessel    SHOULDER ARTHROSCOPY Right 2003    TUBAL LIGATION      URETER SURGERY      Right ureteral repair     Fall History: Has the patient had two or more falls in the past year or any fall with injury in the past year?: No (Pt reports one fall without injury in the last year.)  Social History:   discharge will require ongoing assessment depending on pt's improvement in her balance, gait quality, and endurance.        Body Structures, Functions, Activity Limitations Requiring Skilled Therapeutic Intervention: Decreased functional mobility , Decreased ADL status, Decreased strength, Decreased body mechanics, Decreased safe awareness, Decreased endurance, Decreased balance, Decreased high-level IADLs, Increased pain, Decreased fine motor control     Therapy Prognosis: Good  Decision Making: Medium Complexity  Clinical Presentation: evolving with changing characteristics      Patient education:   ARU schedule, ARU expectations for participation, plan of care  Treatment Initiated:  Functional mobility training, gait training, patient education  Barriers to Improvement: GI status/issues (BM urgency), cardiac status, chronic L hip pain/weakness, limited PLOF, Hx of recurrent UTI   Discharge Recommendations:  [facility] PT   Equipment Recommendations:  has SPC, 2WW, rollator, and reacher; transport chair?     Goals:  Patient Goals   Patient Goals : to increase mobility and indepedence and to return home  Short Term Goals  Time Frame for Short Term Goals: 10 tx days:  Short Term Goal 1: Pt will complete bed mobility (scooting, rolling R/L, and sup<->sit) Ind.  Short Term Goal 2: Pt will complete OOB transfers Mod Ind-Ind.  Short Term Goal 3: Pt will ambulate 10 ft over level and uneven surfaces and 50 ft with turns over level surface using (walker versus SPC) Mod Ind->Ind.  Short Term Goal 4: Pt will ambulate 150 ft over level surface using walker (2WW versus rollator) with SBA.  Short Term Goal 5: Pt will ascend/descend curb step using walker (2WW versus rollator) and 4-5 steps using railings Mod Ind.  Additional Goals?: Yes  Short Term Goal 6: Pt will ascend/descend a flight of stairs using railings with SBA.  Short Term Goal 7: Pt will complete object retrieval from the floor with reacher Mod Ind.     Plan:    Requires

## 2024-04-03 NOTE — PROGRESS NOTES
patient spoke with Dr Khan by phone for worsening pain. He directed her to [facility] ED. She was admitted 3/28. The patient described 6 hours of severe left lower quadrant abdominal pain and left flank pain. Scans indicated colon diverticulitis - this is the patient's 5th episode. Patient sees Dr REED for recurrent UTIs. Her most recent was treated 2 weeks ago. An ID consult was completed and acute cystitis ruled out at this time. The patient is on IV flagyl. Cardiology consulted d/t PVCs. Per cardio patient with history of CAD CABG presenting with acute diverticulitis with mild electrolyte abnormalities and elevated lactic acid; no further cardiac needs at this time Patient is also being treated for hyponatremia, lactic acidosis, asymptomatic bacteriuria. Patient was mostly a household ambulator PTA.      The above information came from the pre-admission screen form. Today, Pt very pleasant and agreeable to therapy, however very verbose and hard to redirect. OT constantly kept cueing Pt to stay on task throughout. Pt required several rest breaks through ADL tasks but ultimately able to complete at the CG/SBA level. Pt also performed functional mobility within the room and functional transfers with CG/SBA. Pt did have a low w/c brought into the room and did require Min A to get up from the lower surface (therapy to replace with higher chair) with all w/c transfers. Pt demos intermittent tremors which impact her C skills during ADLs requiring OT open items such as the toothpaste. I am predicting Pt will reach the Mod I level with ADLs/IADLs while on this ARU to return home safely with assist prn from grandsons. The QI, MMT, and ROM standardized assessments were used this date to determine the above performance deficits, which compromise pt's ability to safely complete ADLs/IADLs/mobility.  Pt will benefit from ARU OT services to increase functional performance and return to PLOF.             Decision Making: Medium  management training, Cognitive/Perceptual training, Coordination training    OT Individual Minutes  Time In: 0740  Time Out: 0925  Minutes: 105       Evaluation was completed 1:1 session.      OT  Individual Evaluation 30   Individual tx performed as shown below   Therapeutic Exercise    ADL Self-care 75   Neuro Re-Ed    Therapeutic Activity    Group    Variance with Reason: +15 Pt care needs - communicated to Concepcion PT   TOTAL 105     Electronically signed by:    AGNES Patrick, OTR/L #[id number]    4/3/2024, 1:37 PM

## 2024-04-03 NOTE — PATIENT CARE CONFERENCE
ACUTE REHAB TEAM CONFERENCE SUMMARY   [facility]    NAME: Jazmine Hutchinson  : 1935 ADMIT DATE: 2024    Rehab Admitting Dx:Diverticulitis of intestine, part unspecified, without perforation or abscess without bleeding [K57.92]  Diverticulitis [K57.92]  Patient Comorbid Conditions:   Active Hospital Problems    Diagnosis Date Noted    Diverticulitis [K57.92] 2022     Priority: Medium    Uncontrolled pain [R52] 2024    PVC's (premature ventricular contractions) [I49.3] 2024    Essential tremor [G25.0] 2024    Generalized weakness [R53.1] 11/10/2021    Primary osteoarthritis of both shoulders [M19.011, M19.012] 10/02/2019    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] 2017     Date: 2024    CASE MANAGEMENT  Current issues/needs regarding patient and family discharge status: patient lives alone  Patient and family goal:   discharge home alone with HHC and PRN support from family    PHYSICAL THERAPY (Updated in QI)  Short Term Goals  Time Frame for Short Term Goals: 10 tx days:  Short Term Goal 1: Pt will complete bed mobility (scooting, rolling R/L, and sup<->sit) Ind.  Short Term Goal 2: Pt will complete OOB transfers Mod Ind-Ind.  Short Term Goal 3: Pt will ambulate 10 ft over level and uneven surfaces and 50 ft with turns over level surface using (walker versus SPC) Mod Ind->Ind.  Short Term Goal 4: Pt will ambulate 150 ft over level surface using walker (2WW versus rollator) with SBA.  Short Term Goal 5: Pt will ascend/descend curb step using walker (2WW versus rollator) and 4-5 steps using railings Mod Ind.  Additional Goals?: Yes  Short Term Goal 6: Pt will ascend/descend a flight of stairs using railings with SBA.  Short Term Goal 7: Pt will complete object retrieval from the floor with reacher Mod Ind.          Impairments/deficits, barriers:    Body Structures, Functions, Activity Limitations  intensive rehabilitation therapy program   []  The estimated discharge date has been changed from initial team conference due to:   []  The estimated discharge destination has been changed from initial team conference due to:         Ongoing tx following discharge: [x]HHC OT PT NSG   []OUTPATIENT     [] No Further Treatment     [] Family/Caregiver Training  []  Transitional Living Arrangement   [] Home Assessment (date  )     [] Family Conference   []  Therapeutic Pass       []  Other: (specify)    Estimated Discharge Date: 4/11/24    Estimated Discharge Destination: []home alone   [x]home alone with assist prn  []Continuous supervision []Return home with s/o/spouse/family   [] Assisted living    []SNF     Team members participating in today's conference.    [x] ANA Szymanski, Medical Director      [x] Cristobal Haddad, DPT,         [] Daniella Garza, KRISTIE Nurse Manager   [] Kaden Corrales RN Nurse Manager     [x]  Concepcion Nash, PT  []  Tete Page, PT       [] Shanika Rosenberg, OT   [x] Willis Crespo, OT  [] Evelin To, OT     [x]  Adia Rhoades, SLP    []  Ivonne Faust, SLP   [] Nae Gaxiola, TRESSA      [x]Jeimy Pacheco,   []Tete Brush MSW, LSW,      [] Walker Diana, KRISTIE   [] Romelia Livingston, KRISTIE    [x] Jeff Holt, KRISTIE    [] Chuyita Boyd, KRISTIE []       I have led this Team Conference and agree with the plan, ANA Szymanski MD, 4/4/2024, 12:43 PM  []   I, the Medical Director, was required to lead today's conference via telephone due to an unanticipated scheduling conflict.  I agree with the decisions made by the inter-disciplinary team at today's meeting.      Goals have been updated to reflect recent status.    Team conference note transcribed this date by: Adia Rhoades MA, CCC-SLP, Therapy Coordinator

## 2024-04-04 LAB
HCT VFR BLD CALC: 33.7 % (ref 37–47)
HEMOGLOBIN: 11 GM/DL (ref 12.5–16)

## 2024-04-04 PROCEDURE — 85018 HEMOGLOBIN: CPT

## 2024-04-04 PROCEDURE — 97110 THERAPEUTIC EXERCISES: CPT

## 2024-04-04 PROCEDURE — 6370000000 HC RX 637 (ALT 250 FOR IP): Performed by: STUDENT IN AN ORGANIZED HEALTH CARE EDUCATION/TRAINING PROGRAM

## 2024-04-04 PROCEDURE — 94150 VITAL CAPACITY TEST: CPT

## 2024-04-04 PROCEDURE — 2580000003 HC RX 258: Performed by: PHYSICAL MEDICINE & REHABILITATION

## 2024-04-04 PROCEDURE — 97116 GAIT TRAINING THERAPY: CPT

## 2024-04-04 PROCEDURE — 2580000003 HC RX 258: Performed by: STUDENT IN AN ORGANIZED HEALTH CARE EDUCATION/TRAINING PROGRAM

## 2024-04-04 PROCEDURE — 94761 N-INVAS EAR/PLS OXIMETRY MLT: CPT

## 2024-04-04 PROCEDURE — 97530 THERAPEUTIC ACTIVITIES: CPT

## 2024-04-04 PROCEDURE — 36415 COLL VENOUS BLD VENIPUNCTURE: CPT

## 2024-04-04 PROCEDURE — 85014 HEMATOCRIT: CPT

## 2024-04-04 PROCEDURE — 97535 SELF CARE MNGMENT TRAINING: CPT

## 2024-04-04 PROCEDURE — 6360000002 HC RX W HCPCS: Performed by: STUDENT IN AN ORGANIZED HEALTH CARE EDUCATION/TRAINING PROGRAM

## 2024-04-04 PROCEDURE — 1280000000 HC REHAB R&B

## 2024-04-04 PROCEDURE — 6370000000 HC RX 637 (ALT 250 FOR IP): Performed by: PHYSICAL MEDICINE & REHABILITATION

## 2024-04-04 RX ORDER — METRONIDAZOLE 250 MG/1
500 TABLET ORAL EVERY 8 HOURS SCHEDULED
Status: DISPENSED | OUTPATIENT
Start: 2024-04-04

## 2024-04-04 RX ORDER — 0.9 % SODIUM CHLORIDE 0.9 %
500 INTRAVENOUS SOLUTION INTRAVENOUS ONCE
Status: COMPLETED | OUTPATIENT
Start: 2024-04-04 | End: 2024-04-04

## 2024-04-04 RX ADMIN — METRONIDAZOLE 500 MG: 250 TABLET ORAL at 17:11

## 2024-04-04 RX ADMIN — PYRIDOSTIGMINE BROMIDE 30 MG: 60 TABLET ORAL at 09:14

## 2024-04-04 RX ADMIN — SODIUM CHLORIDE, PRESERVATIVE FREE 10 ML: 5 INJECTION INTRAVENOUS at 09:15

## 2024-04-04 RX ADMIN — SODIUM CHLORIDE 500 ML: 9 INJECTION, SOLUTION INTRAVENOUS at 12:21

## 2024-04-04 RX ADMIN — DIVALPROEX SODIUM 250 MG: 250 TABLET, FILM COATED, EXTENDED RELEASE ORAL at 20:44

## 2024-04-04 RX ADMIN — SERTRALINE HYDROCHLORIDE 50 MG: 50 TABLET ORAL at 09:14

## 2024-04-04 RX ADMIN — GABAPENTIN 300 MG: 300 CAPSULE ORAL at 09:14

## 2024-04-04 RX ADMIN — PANTOPRAZOLE SODIUM 40 MG: 40 TABLET, DELAYED RELEASE ORAL at 05:33

## 2024-04-04 RX ADMIN — METRONIDAZOLE 500 MG: 250 TABLET ORAL at 20:42

## 2024-04-04 RX ADMIN — METRONIDAZOLE 500 MG: 500 INJECTION, SOLUTION INTRAVENOUS at 05:35

## 2024-04-04 RX ADMIN — CALCIUM 500 MG: 500 TABLET ORAL at 20:42

## 2024-04-04 RX ADMIN — PYRIDOSTIGMINE BROMIDE 30 MG: 60 TABLET ORAL at 20:43

## 2024-04-04 RX ADMIN — GABAPENTIN 300 MG: 300 CAPSULE ORAL at 15:05

## 2024-04-04 RX ADMIN — ROSUVASTATIN CALCIUM 10 MG: 5 TABLET, COATED ORAL at 20:42

## 2024-04-04 RX ADMIN — GABAPENTIN 300 MG: 300 CAPSULE ORAL at 20:42

## 2024-04-04 RX ADMIN — VERAPAMIL HYDROCHLORIDE 120 MG: 120 TABLET, FILM COATED, EXTENDED RELEASE ORAL at 20:44

## 2024-04-04 RX ADMIN — ENOXAPARIN SODIUM 40 MG: 100 INJECTION SUBCUTANEOUS at 09:14

## 2024-04-04 ASSESSMENT — PAIN SCALES - GENERAL
PAINLEVEL_OUTOF10: 0
PAINLEVEL_OUTOF10: 0

## 2024-04-04 NOTE — FLOWSHEET NOTE
[x] daily progress note       [] discharge       Patient Name:  Jazmine Hutchinson   :  1935 MRN: [id number]  Room:  [address] [place], [place] [address] Date of Admission: 2024  Rehabilitation Diagnosis:   Diverticulitis of intestine, part unspecified, without perforation or abscess without bleeding [K57.92]  Diverticulitis [K57.92]       Date 2024       Day of ARU Week:  3   Time IN/OUT 7310-6354   Individual Tx Minutes 60   TOTAL Tx Time Mins 60   Restrictions Restrictions/Precautions  Restrictions/Precautions: Contact Precautions, Fall Risk  Position Activity Restriction  Other position/activity restrictions: IV lock RUE, chronic L hip pain/weakness   Communication with other providers: [x]   OK to see per nursing:     []   Spoke with team member regarding:      Subjective observations and cognitive status: pt seen sitting up in w/c at beginning of treatment. Pt passed from YU (Faye). Pt agreeable to therapy.   Pain level/location: 0/10         Discharge recommendations  Anticipated discharge date:  TBD  Destination: []home alone   []home alone with assist PRN     [] home w/ family      [] Continuous supervision  []SNF    [] Assisted living     [] Other:   Continued therapy: []HHC PT  []OUTPATIENT  PT   [] No Further PT  Equipment needs: has SPC, 2WW, rollator, and reacher; transport chair?     [x]   Pt received out of bed     Transfers:    Sit--> Stand:  SBA  Stand --> Sit:   SBA  Stand pivot transfer:   SBA  Assistive device required for transfer:   RW    Gait:    Distance:  203'   Assistance:  CGA  Device:  RW  Gait Quality:  reciprocal pattern     Gait:    Distance:  295'   Assistance:  CGA  Device:  rollator   Gait Quality:  reciprocal pattern   Pt was able to position rollator, lock rollator brakes, and position rollator against wall prior to sitting down on rollator seat; however, pt was unable to stand up from rollator seat d/t position of rollator handles. (Pt has painful shoulders and only able to push from lower

## 2024-04-04 NOTE — PROGRESS NOTES
Jazmine Hutchinson    : 1935  Acct #: [id number]  MRN: [id number]              PM&R Progress Note      Admitting diagnosis: ***    Comorbid diagnoses impacting rehabilitation: ***    Chief complaint: ***    Prior (baseline) level of function: Independent.    Current level of function:         Current  IRF-TAMARA and Goals:   Occupational Therapy:    Short Term Goals  Time Frame for Short Term Goals: STGs=LTGs :   Long Term Goals  Time Frame for Long Term Goals : 10 days or until d/c.  Long Term Goal 1: Pt will complete self-feeding with IND.  Long Term Goal 2: Pt will complete oral hygiene and grooming tasks with IND.  Long Term Goal 3: Pt will complete total body bathing with AE PRN and Mod I.  Long Term Goal 4: Pt will complete UB dressing with IND.  Long Term Goal 5: Pt will complete LB dressing with AE PRN and Mod I.  Additional Goals?: Yes  Long Term Goal 6: Pt will doff/don footwear with AE PRN and Mod I.  Long Term Goal 7: Pt will complete toileting with Mod I.  Long Term Goal 8: Pt will complete functional transfers (bed, chair, shower, toilet) with DME PRN and Mod I.  Long Term Goal 9: Pt will perform therex/therax to facilitate an increase in strength/endurance with emphasis on dynamic standing balance/tolerance > 8 mins with Mod I.  Long Term Goal 10: Pt will perform an IADL with Mod I. :                                       Eating: Eating  Assistance Needed: Setup or clean-up assistance  Comment: Pt required assist opening brown sugar packet for cream of wheat. Pt able to eat IND.  CARE Score: 5  Discharge Goal: Independent       Oral Hygiene: Oral Hygiene  Assistance Needed: Supervision or touching assistance  Comment: Supervision to brush teeth seated 2* fatigue.  CARE Score: 4  Discharge Goal: Independent    UB/LB Bathing: Shower/Bathe Self  Assistance Needed: Supervision or touching assistance  Comment: Pt completed bathing at the sink d/t time constraints. Pt was able to wash UB and LB while seated  Goal 5: Pt will ascend/descend curb step using walker (2WW versus rollator) and 4-5 steps using railings Mod Ind.  Additional Goals?: Yes  Short Term Goal 6: Pt will ascend/descend a flight of stairs using railings with SBA.  Short Term Goal 7: Pt will complete object retrieval from the floor with reacher Mod Ind.            Bed Mobility:   Sit to Lying  Assistance Needed: Supervision or touching assistance  Comment: SBA in regular bed  CARE Score: 4  Discharge Goal: Independent  Roll Left and Right  Assistance Needed: Supervision or touching assistance  Comment: SBA in regular bed (pt with aggravation of L hip pain when rolling to R)  CARE Score: 4  Discharge Goal: Independent  Lying to Sitting on Side of Bed  Assistance Needed: Supervision or touching assistance  Comment: SBA in regular bed  CARE Score: 4  Discharge Goal: Independent    Transfers:    Sit to Stand  Assistance Needed: Supervision or touching assistance  Comment: CGA without AD/with SPC; required more effort/attempts as her fatigue level and weakness increased as PT eval progressed  CARE Score: 4  Discharge Goal: Independent  Chair/Bed-to-Chair Transfer  Assistance Needed: Supervision or touching assistance  Comment: CGA without AD/with SPC  CARE Score: 4  Discharge Goal: Independent  Toilet Transfer  Assistance needed: Supervision or touching assistance  Comment: CGA to sit, SBA to stand with L grab bar  CARE Score: 4  Car Transfer  Assistance Needed: Supervision or touching assistance  Comment: CGA with SPC  CARE Score: 4  Discharge Goal: Independent    Ambulation:    Walking Ability  Does the Patient Walk?: Yes     Walk 10 Feet  Assistance Needed: Supervision or touching assistance  Physical Assistance Level: No physical assistance  Comment: CGA without AD/with SPC; usual performance today was using SPC  CARE Score: 4  Discharge Goal: Independent     Walk 50 Feet with Two Turns  Comment: max tolerenace was 28 ft with a turn using SPC with CGA

## 2024-04-04 NOTE — PLAN OF CARE
Problem: Discharge Planning  Goal: Discharge to home or other facility with appropriate resources  Outcome: Progressing  Flowsheets (Taken 4/3/2024 7830 by Mona Gay LPN)  Discharge to home or other facility with appropriate resources: Identify barriers to discharge with patient and caregiver     Problem: Safety - Adult  Goal: Free from fall injury  Outcome: Progressing     Problem: ABCDS Injury Assessment  Goal: Absence of physical injury  Outcome: Progressing

## 2024-04-04 NOTE — PROGRESS NOTES
830  Physical Rehabilitation: OCCUPATIONAL THERAPY     [x] daily progress note       [] discharge       Patient Name:  Jazmine Hutchinson   :  1935 MRN: [id number]  Room:  [address] [place] [address] Date of Admission: 2024  Rehabilitation Diagnosis:   Diverticulitis of intestine, part unspecified, without perforation or abscess without bleeding [K57.92]  Diverticulitis [K57.92]       Date 2024       Day of ARU Week:  3   Time IN//830; 930/1020   Individual Tx Minutes 70 + 50   Group Tx Minutes    Co-Treat Minutes    Concurrent Tx Minutes    TOTAL Tx Time Mins 120   Variance Time    Variance Reason []   Refusal due to:     []   Medical hold/reason:    []   Illness   []   Off Unit for test/procedure  []   Extra time needed to complete task  []   Therapeutic need  []   Make up mins were attempted but pt unable to complete due to (specify)  []   Other (specify):   Restrictions Restrictions/Precautions: Contact Precautions, Fall Risk         Communication with other providers: [x]   OK to see per nursing:     []   Spoke with team member regarding:      Subjective observations and cognitive status: Patient lying in semi fowlers upon approach, pleasant and agreeable to therapy  Patient sitting up in recliner with nursing requires bolus however patient IV leaking nursing calls for PICC team and patient is agreeable to therapy while waiting for new IV line to be placed       Pain level/location:    /10       Location: per patient no pain noted    Discharge recommendations  Anticipated discharge date:  TBD  Destination: []home alone   [x]home alone w assist prn   [] home w/ family    [] Continuous supervision       []SNF    [] Assisted living     [] Other:   Continued therapy: [x]HHC OT  []OUTPATIENT  OT   [] No Further OT  Equipment needs: None        ADLs:    Eating: Eating  Assistance Needed: Setup or clean-up assistance  Comment: Set up assist to manage milk carton, opens all other containers and feeds self

## 2024-04-04 NOTE — PROGRESS NOTES
04/04/24 1042   Encounter Summary   Encounter Overview/Reason  Initial Encounter   Service Provided For: Patient   Referral/Consult From: [facility]   Support System Family members   Last Encounter  04/04/24  (Patient used to attend [facility]; now her back bothers her.  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  Jazmine is sitting in WC in [place], taking a break from therapy.  Blessings given.)   Complexity of Encounter Low   Begin Time 1035   End Time  1045   Total Time Calculated 10 min   Spiritual/Emotional needs   Type Spiritual Support   Assessment/Intervention/Outcome   Assessment Calm;Hopeful   Intervention Active listening;Nurtured Hope;Prayer (assurance of)/[place];Sustaining Presence/Ministry of presence   Outcome Comfort;Coping;Engaged in conversation;Expressed feelings, needs, and concerns;Expressed Gratitude   Plan and Referrals   Plan/Referrals Continue Support (comment)  (Patient informed of availability of .)

## 2024-04-05 PROCEDURE — 6370000000 HC RX 637 (ALT 250 FOR IP): Performed by: STUDENT IN AN ORGANIZED HEALTH CARE EDUCATION/TRAINING PROGRAM

## 2024-04-05 PROCEDURE — 97110 THERAPEUTIC EXERCISES: CPT

## 2024-04-05 PROCEDURE — 97530 THERAPEUTIC ACTIVITIES: CPT

## 2024-04-05 PROCEDURE — 94761 N-INVAS EAR/PLS OXIMETRY MLT: CPT

## 2024-04-05 PROCEDURE — 97535 SELF CARE MNGMENT TRAINING: CPT

## 2024-04-05 PROCEDURE — 6360000002 HC RX W HCPCS: Performed by: STUDENT IN AN ORGANIZED HEALTH CARE EDUCATION/TRAINING PROGRAM

## 2024-04-05 PROCEDURE — 1280000000 HC REHAB R&B

## 2024-04-05 PROCEDURE — 97116 GAIT TRAINING THERAPY: CPT

## 2024-04-05 PROCEDURE — 6370000000 HC RX 637 (ALT 250 FOR IP): Performed by: PHYSICAL MEDICINE & REHABILITATION

## 2024-04-05 PROCEDURE — 94150 VITAL CAPACITY TEST: CPT

## 2024-04-05 RX ADMIN — ACETAMINOPHEN 650 MG: 325 TABLET ORAL at 21:14

## 2024-04-05 RX ADMIN — ENOXAPARIN SODIUM 40 MG: 100 INJECTION SUBCUTANEOUS at 10:02

## 2024-04-05 RX ADMIN — METRONIDAZOLE 500 MG: 250 TABLET ORAL at 21:05

## 2024-04-05 RX ADMIN — PYRIDOSTIGMINE BROMIDE 30 MG: 60 TABLET ORAL at 10:01

## 2024-04-05 RX ADMIN — DIVALPROEX SODIUM 250 MG: 250 TABLET, FILM COATED, EXTENDED RELEASE ORAL at 21:06

## 2024-04-05 RX ADMIN — VERAPAMIL HYDROCHLORIDE 120 MG: 120 TABLET, FILM COATED, EXTENDED RELEASE ORAL at 21:06

## 2024-04-05 RX ADMIN — METRONIDAZOLE 500 MG: 250 TABLET ORAL at 05:53

## 2024-04-05 RX ADMIN — ROSUVASTATIN CALCIUM 10 MG: 5 TABLET, COATED ORAL at 21:05

## 2024-04-05 RX ADMIN — GABAPENTIN 300 MG: 300 CAPSULE ORAL at 21:05

## 2024-04-05 RX ADMIN — GABAPENTIN 300 MG: 300 CAPSULE ORAL at 10:01

## 2024-04-05 RX ADMIN — GABAPENTIN 300 MG: 300 CAPSULE ORAL at 14:35

## 2024-04-05 RX ADMIN — PANTOPRAZOLE SODIUM 40 MG: 40 TABLET, DELAYED RELEASE ORAL at 05:53

## 2024-04-05 RX ADMIN — CALCIUM 500 MG: 500 TABLET ORAL at 21:05

## 2024-04-05 RX ADMIN — ONDANSETRON 4 MG: 4 TABLET, ORALLY DISINTEGRATING ORAL at 23:20

## 2024-04-05 RX ADMIN — PYRIDOSTIGMINE BROMIDE 30 MG: 60 TABLET ORAL at 21:06

## 2024-04-05 RX ADMIN — SERTRALINE HYDROCHLORIDE 50 MG: 50 TABLET ORAL at 10:02

## 2024-04-05 RX ADMIN — METRONIDAZOLE 500 MG: 250 TABLET ORAL at 14:35

## 2024-04-05 ASSESSMENT — PAIN DESCRIPTION - DESCRIPTORS: DESCRIPTORS: ACHING;SORE

## 2024-04-05 ASSESSMENT — PAIN DESCRIPTION - ORIENTATION: ORIENTATION: OTHER (COMMENT)

## 2024-04-05 ASSESSMENT — PAIN SCALES - GENERAL
PAINLEVEL_OUTOF10: 0
PAINLEVEL_OUTOF10: 5
PAINLEVEL_OUTOF10: 0

## 2024-04-05 ASSESSMENT — PAIN - FUNCTIONAL ASSESSMENT: PAIN_FUNCTIONAL_ASSESSMENT: PREVENTS OR INTERFERES SOME ACTIVE ACTIVITIES AND ADLS

## 2024-04-05 ASSESSMENT — PAIN DESCRIPTION - LOCATION: LOCATION: OTHER (COMMENT)

## 2024-04-05 NOTE — PLAN OF CARE
Problem: Discharge Planning  Goal: Discharge to home or other facility with appropriate resources  4/5/2024 0118 by Brigette Mg, RN  Outcome: Progressing  4/4/2024 1326 by Mona Gay LPN  Outcome: Progressing     Problem: Safety - Adult  Goal: Free from fall injury  4/5/2024 0118 by Brigette Mg, RN  Outcome: Progressing  4/4/2024 1326 by Mona Gay LPN  Outcome: Progressing     Problem: ABCDS Injury Assessment  Goal: Absence of physical injury  4/4/2024 1326 by Mona Gay LPN  Outcome: Progressing     Problem: Pain  Goal: Verbalizes/displays adequate comfort level or baseline comfort level  Outcome: Progressing

## 2024-04-05 NOTE — CARE COORDINATION
Case alicia met with patient in room.  She's agreeable to dc date 4/11 and plan.  She's uncertain if her grandson can provide dc transport.  Case t offered transport assistance if needed.  She requested [facility], her previous provider, for pt/ot.  Confirmed she already has a RW at home.  Whiteboard updated.

## 2024-04-05 NOTE — PROGRESS NOTES
Physical Rehabilitation: OCCUPATIONAL THERAPY     [x] daily progress note       [] discharge       Patient Name:  Jazmine Hutchinson   :  1935 MRN: [id number]  Room:  [address] [place], [place] [address] Date of Admission: 2024  Rehabilitation Diagnosis:   Diverticulitis of intestine, part unspecified, without perforation or abscess without bleeding [K57.92]  Diverticulitis [K57.92]       Date 2024       Day of ARU Week:  4   Time IN//1000; 1100/1200   Individual Tx Minutes 60 + 60   Group Tx Minutes    Co-Treat Minutes    Concurrent Tx Minutes    TOTAL Tx Time Mins 120   Variance Time    Variance Reason []   Refusal due to:     []   Medical hold/reason:    []   Illness   []   Off Unit for test/procedure  []   Extra time needed to complete task  []   Therapeutic need  []   Make up mins were attempted but pt unable to complete due to (specify)  []   Other (specify):   Restrictions Restrictions/Precautions: Contact Precautions, Fall Risk         Communication with other providers: [x]   OK to see per nursing:     []   Spoke with team member regarding:      Subjective observations and cognitive status: Patient up at EOB requests bathroom, pleasant and agreeable to a shower   Patient up in recliner upon approach patient is pleasant and agreeable to therapy      Pain level/location:    /10       Location: chronic body aches does not quantify   Second session: patient states she has no pain at this time    Discharge recommendations  Anticipated discharge date:    Destination: []home alone   [x]home alone w assist prn   [] home w/ family    [] Continuous supervision       []SNF    [] Assisted living     [] Other:   Continued therapy: [x]HHC OT  []OUTPATIENT  OT   [] No Further OT  Equipment needs: None        ADLs:    Eating: Eating  Assistance Needed: Setup or clean-up assistance  Comment: Set up assist to manage milk carton, opens all other containers and feeds self independently  CARE Score: 5  Discharge Goal: Independent          Homemaking Tasks:   Patient retrieves clothing from closet and transports to bathroom at RW level       Additional Therapeutic activities/exercises completed this date:     [x]   ADL Training   [x]   Balance/Postural training Patient instructed in dynamic standing balance/tolerance task at card matching board with patient crossing midline and reaching outside AMA with patient standing for 5  minutes     [x]   Bed/Transfer Training   [x]   Endurance Training patient instructed in bilateral reciprocal patterned movement for 12 minutes while seated with min resistance with rest break at 8 minute melissa to increase strength and endurance to facilitate ADL and transfer/mobility tasks    []   Neuromuscular Re-ed   []   Nu-step:  Time:        Level:         #Steps:       [x]   Rebounder:    [x]  Seated     []  Standing patient completes with using ball without resistance unsupported off of back of wc to increase BUE and core strength         []   Supine Ther Ex (reps/sets):     []   Seated Ther Ex (reps/sets):     []   Standing Ther Ex (reps/sets):     []   Other:      Comments:      Patient/Caregiver Education and Training:   [x]   Adaptive Equipment Use  [x]   Bed Mobility/Transfer Technique/Safety  [x]   Energy Conservation Tips  []   Family training  [x]   Postural Awareness  [x]   Safety During Functional Activities  []   Reinforced Patient's Precautions       Treatment Plan for Next Session: POC to continue as tolerated       Treatment/Activity Tolerance:   [x] Tolerated treatment with no adverse effects    [] Patient limited by fatigue  [] Patient limited by pain   [] Patient limited by medical complications:    [] Adverse reaction to Tx:   [] Significant change in status    Safety:       []  bed alarm set    [x]  chair alarm set    []  Pt refused alarms                []  Telesitter activated      [x]  Gait belt used during tx session      []other:       Number of Minutes/Billable Intervention  Therapeutic

## 2024-04-05 NOTE — PROGRESS NOTES
Jazmine Hutchinson    : 1935  Acct #: [id number]  MRN: [id number]              PM&R Progress Note      Admitting diagnosis: ***    Comorbid diagnoses impacting rehabilitation: ***    Chief complaint: ***    Prior (baseline) level of function: Independent.    Current level of function:         Current  IRF-TAMARA and Goals:   Occupational Therapy:    Short Term Goals  Time Frame for Short Term Goals: STGs=LTGs :   Long Term Goals  Time Frame for Long Term Goals : 10 days or until d/c.  Long Term Goal 1: Pt will complete self-feeding with IND.  Long Term Goal 2: Pt will complete oral hygiene and grooming tasks with IND.  Long Term Goal 3: Pt will complete total body bathing with AE PRN and Mod I.  Long Term Goal 4: Pt will complete UB dressing with IND.  Long Term Goal 5: Pt will complete LB dressing with AE PRN and Mod I.  Additional Goals?: Yes  Long Term Goal 6: Pt will doff/don footwear with AE PRN and Mod I.  Long Term Goal 7: Pt will complete toileting with Mod I.  Long Term Goal 8: Pt will complete functional transfers (bed, chair, shower, toilet) with DME PRN and Mod I.  Long Term Goal 9: Pt will perform therex/therax to facilitate an increase in strength/endurance with emphasis on dynamic standing balance/tolerance > 8 mins with Mod I.  Long Term Goal 10: Pt will perform an IADL with Mod I. :                                       Eating: Eating  Assistance Needed: Setup or clean-up assistance  Comment: Set up assist to manage milk carton, opens all other containers and feeds self independently  CARE Score: 5  Discharge Goal: Independent       Oral Hygiene: Oral Hygiene  Assistance Needed: Supervision or touching assistance  Comment: Sup in stance  CARE Score: 4  Discharge Goal: Independent    UB/LB Bathing: Shower/Bathe Self  Assistance Needed: Supervision or touching assistance  Comment: SBA/CGA in stance, uses forard flexion and figure four method to wash distal BLE  CARE Score: 4  Discharge Goal:  CO2 23 04/03/2024     Lab Results   Component Value Date    ALT 15 03/28/2024    AST 27 03/28/2024    ALKPHOS 45 03/28/2024    BILITOT 0.7 03/28/2024       Expected length of stay  prior to a supervised level of function for discharge home with a walker and HHC OT/PT is ***    Recommendations:    ***

## 2024-04-05 NOTE — FLOWSHEET NOTE
[x] daily progress note       [] discharge       Patient Name:  Jazmine Hutchinson   :  1935 MRN: [id number]  Room:  [address] [place], [place] [address] Date of Admission: 2024  Rehabilitation Diagnosis:   Diverticulitis of intestine, part unspecified, without perforation or abscess without bleeding [K57.92]  Diverticulitis [K57.92]       Date 2024       Day of ARU Week:  4   Time IN/OUT 6385-5357   Individual Tx Minutes 60   TOTAL Tx Time Mins 60   Restrictions Restrictions/Precautions  Restrictions/Precautions: Contact Precautions, Fall Risk  Position Activity Restriction  Other position/activity restrictions: IV lock RUE, chronic L hip pain/weakness   Communication with other providers: [x]   OK to see per nursing:     []   Spoke with team member regarding:      Subjective observations and cognitive status: Pt seen sidelying in bed at beginning of treatment. Agreeable to therapy.    Pain level/location: Pt did not rate    Discharge recommendations  Anticipated discharge date:  TBD  Destination: []home alone   []home alone with assist PRN     [] home w/ family      [] Continuous supervision  []SNF    [] Assisted living     [] Other:   Continued therapy: []HHC PT  []OUTPATIENT  PT   [] No Further PT  Equipment needs: has SPC, 2WW, rollator, and reacher; transport chair?       Bed Mobility:         Lying --> Sit:  min A with use of bed rail   Sit --> lying:  mod I with bed rail     Transfers:    Sit--> Stand:  SBA-min A (varied with surface height and shoulder pain)   Stand --> Sit:   SBA  Chair-->Bed/Bed --> Chair:  SBA-min A (varied with surface height and shoulder pain)   Toilet Transfer: supervision with grab bars   Assistive device required for transfer:   RW    Gait:    Distance: 247'+175'    Assistance:  SBA  Device:  RW  Gait Quality: reciprocal pattern     Uneven Surfaces:       Assistance:    SBA  Device:    RW  Surfaces Completed:   [x]  Carpeted Surface with bean bags beneath      []  Throw rugs       []  Ramp       []

## 2024-04-05 NOTE — CARE COORDINATION
Case t met with patient in room.  She's requesting a transport chair.  Case mgt reviewed that medicare doesn't cover the cost of a transport chair.  Recommended her grandson purchase one in the community or online.  Patient voiced understanding.

## 2024-04-06 LAB — GLUCOSE BLD-MCNC: 106 MG/DL (ref 70–99)

## 2024-04-06 PROCEDURE — 97116 GAIT TRAINING THERAPY: CPT

## 2024-04-06 PROCEDURE — 97535 SELF CARE MNGMENT TRAINING: CPT

## 2024-04-06 PROCEDURE — 97110 THERAPEUTIC EXERCISES: CPT

## 2024-04-06 PROCEDURE — 94761 N-INVAS EAR/PLS OXIMETRY MLT: CPT

## 2024-04-06 PROCEDURE — 6370000000 HC RX 637 (ALT 250 FOR IP): Performed by: STUDENT IN AN ORGANIZED HEALTH CARE EDUCATION/TRAINING PROGRAM

## 2024-04-06 PROCEDURE — 6370000000 HC RX 637 (ALT 250 FOR IP): Performed by: PHYSICAL MEDICINE & REHABILITATION

## 2024-04-06 PROCEDURE — 82962 GLUCOSE BLOOD TEST: CPT

## 2024-04-06 PROCEDURE — 6360000002 HC RX W HCPCS: Performed by: STUDENT IN AN ORGANIZED HEALTH CARE EDUCATION/TRAINING PROGRAM

## 2024-04-06 PROCEDURE — 97530 THERAPEUTIC ACTIVITIES: CPT

## 2024-04-06 PROCEDURE — 1280000000 HC REHAB R&B

## 2024-04-06 RX ORDER — LACTOBACILLUS RHAMNOSUS GG 10B CELL
1 CAPSULE ORAL 2 TIMES DAILY WITH MEALS
Status: DISPENSED | OUTPATIENT
Start: 2024-04-06

## 2024-04-06 RX ADMIN — METRONIDAZOLE 500 MG: 250 TABLET ORAL at 15:12

## 2024-04-06 RX ADMIN — METRONIDAZOLE 500 MG: 250 TABLET ORAL at 05:46

## 2024-04-06 RX ADMIN — VERAPAMIL HYDROCHLORIDE 120 MG: 120 TABLET, FILM COATED, EXTENDED RELEASE ORAL at 20:43

## 2024-04-06 RX ADMIN — GABAPENTIN 300 MG: 300 CAPSULE ORAL at 15:12

## 2024-04-06 RX ADMIN — PANTOPRAZOLE SODIUM 40 MG: 40 TABLET, DELAYED RELEASE ORAL at 05:44

## 2024-04-06 RX ADMIN — Medication 1 CAPSULE: at 12:13

## 2024-04-06 RX ADMIN — ONDANSETRON 4 MG: 4 TABLET, ORALLY DISINTEGRATING ORAL at 17:06

## 2024-04-06 RX ADMIN — ROSUVASTATIN CALCIUM 10 MG: 5 TABLET, COATED ORAL at 20:42

## 2024-04-06 RX ADMIN — SERTRALINE HYDROCHLORIDE 50 MG: 50 TABLET ORAL at 10:34

## 2024-04-06 RX ADMIN — GABAPENTIN 300 MG: 300 CAPSULE ORAL at 20:42

## 2024-04-06 RX ADMIN — METRONIDAZOLE 500 MG: 250 TABLET ORAL at 20:42

## 2024-04-06 RX ADMIN — PYRIDOSTIGMINE BROMIDE 30 MG: 60 TABLET ORAL at 20:43

## 2024-04-06 RX ADMIN — GABAPENTIN 300 MG: 300 CAPSULE ORAL at 10:35

## 2024-04-06 RX ADMIN — DIVALPROEX SODIUM 250 MG: 250 TABLET, FILM COATED, EXTENDED RELEASE ORAL at 20:42

## 2024-04-06 RX ADMIN — PYRIDOSTIGMINE BROMIDE 30 MG: 60 TABLET ORAL at 10:35

## 2024-04-06 RX ADMIN — LOPERAMIDE HYDROCHLORIDE 2 MG: 2 CAPSULE ORAL at 10:35

## 2024-04-06 RX ADMIN — CALCIUM 500 MG: 500 TABLET ORAL at 20:42

## 2024-04-06 RX ADMIN — Medication 1 CAPSULE: at 17:07

## 2024-04-06 RX ADMIN — ENOXAPARIN SODIUM 40 MG: 100 INJECTION SUBCUTANEOUS at 10:35

## 2024-04-06 ASSESSMENT — PAIN SCALES - GENERAL
PAINLEVEL_OUTOF10: 0
PAINLEVEL_OUTOF10: 0

## 2024-04-06 NOTE — PROGRESS NOTES
Physical Rehabilitation: OCCUPATIONAL THERAPY     [x] daily progress note       [] discharge       Patient Name:  Jazmine Hutchinson   :  1935 MRN: [id number]  Room:  [address] [place] [address] Date of Admission: 2024  Rehabilitation Diagnosis:   Diverticulitis of intestine, part unspecified, without perforation or abscess without bleeding [K57.92]  Diverticulitis [K57.92]       Date 2024       Day of ARU Week:  5   Time IN/OUT 6966-1064,   1194-2054   Individual Tx Minutes 64+60   Group Tx Minutes    Co-Treat Minutes    Concurrent Tx Minutes    TOTAL Tx Time Mins 124   Variance Time    Variance Reason []   Refusal due to:     []   Medical hold/reason:    []   Illness   []   Off Unit for test/procedure  []   Extra time needed to complete task  []   Therapeutic need  []   Make up mins were attempted but pt unable to complete due to (specify)  []   Other (specify):   Restrictions Restrictions/Precautions  Restrictions/Precautions: Contact Precautions, Fall Risk  Position Activity Restriction  Other position/activity restrictions: IV lock RUE, chronic L hip pain/weakness   Communication with other providers: [x]   OK to see per nursing:     []   Spoke with team member regarding:      Subjective observations and cognitive status: Pt. Stated that she is not having any pain.  Pt. Sitting on toilet in restroom upon arrival with NA present.  \"Yes, I would like to get cleaned up.  I had a shower yesterday.\"     Pain level/location:  0  /10       Location:    Discharge recommendations  Anticipated discharge date:     Destination: []home alone   []home alone w assist prn [] home w/ family    [] Continuous supervision       []SNF            [] Assisted living     [] Other:   Continued therapy: []HHC OT  []OUTPATIENT  OT   [] No Further OT  Equipment needs:     (HIT F2 to transition between stars)    Eating/Feeding:         Extremity Used:        [x]    R UE []    L UE         Dentures: []   N/A    []    Partial []    Full  Adaptive  3  Entrance Stairs - Rails: Both  Bathroom Shower/Tub: Tub/Shower unit  Bathroom Toilet: Standard  Bathroom Equipment: Grab bars in shower, Grab bars around toilet, Tub transfer bench  Bathroom Accessibility: Walker accessible  Home Equipment: Cane, Rollator, Reacher, Walker, rolling  Has the patient had two or more falls in the past year or any fall with injury in the past year?: No (Pt reports one fall without injury in the last year.)  Receives Help From: Family  ADL Assistance: Independent  Homemaking Assistance: Independent  Homemaking Responsibilities: Yes  Meal Prep Responsibility: Primary  Laundry Responsibility: Primary  Cleaning Responsibility: No (Grandson and [employer] complete.)  Bill Paying/Finance Responsibility: No (Grandson completes.)  Shopping Responsibility: Secondary (Pt and grandson complete.)  Dependent Care Responsibility: No  Health Care Management: Secondary (Grandson sets up pill box and Pt takes from there.)  Ambulation Assistance: Independent  Transfer Assistance: Independent  Active : Yes  Mode of Transportation: Car  Additional Comments: Pt sleeps in regular flat bed.    Objective                                                                                    Goals:  (Update in navigator)  Short Term Goals  Time Frame for Short Term Goals: STGs=LTGs:  Long Term Goals  Time Frame for Long Term Goals : 10 days or until d/c.  Long Term Goal 1: Pt will complete self-feeding with IND.  Long Term Goal 2: Pt will complete oral hygiene and grooming tasks with IND.  Long Term Goal 3: Pt will complete total body bathing with AE PRN and Mod I.  Long Term Goal 4: Pt will complete UB dressing with IND.  Long Term Goal 5: Pt will complete LB dressing with AE PRN and Mod I.  Additional Goals?: Yes  Long Term Goal 6: Pt will doff/don footwear with AE PRN and Mod I.  Long Term Goal 7: Pt will complete toileting with Mod I.  Long Term Goal 8: Pt will complete functional transfers (bed,

## 2024-04-06 NOTE — FLOWSHEET NOTE
[x] daily progress note       [] discharge       Patient Name:  Jazmine Hutchinson   :  1935 MRN: [id number]  Room:  [address] [place], [place] [address] Date of Admission: 2024  Rehabilitation Diagnosis:   Diverticulitis of intestine, part unspecified, without perforation or abscess without bleeding [K57.92]  Diverticulitis [K57.92]       Date 2024       Day of ARU Week:  5   Time IN/OUT 1300/1400   Individual Tx Minutes 60   Group Tx Minutes    Co-Treat Minutes    Concurrent Tx Minutes    TOTAL Tx Time Mins 60   Variance Time    Variance Reason []   Refusal due to:     []   Medical hold/reason:    []   Illness   []   Off Unit for test/procedure  []   Extra time needed to complete task  []   Therapeutic need  []   Make up mins were attempted but pt unable to complete due to (specify)  []   Other (specify):   Restrictions Restrictions/Precautions  Restrictions/Precautions: Contact Precautions, Fall Risk  Position Activity Restriction  Other position/activity restrictions: IV lock RUE, chronic L hip pain/weakness   Communication with other providers: [x]   OK to see per nursing:     []   Spoke with team member regarding:      Subjective observations and cognitive status: Pt states she is very tired this date.      Pain level/location:  3  /10       Location: L rib/stomach   Discharge recommendations  Anticipated discharge date:  TBD  Destination: []home alone   []home alone with assist PRN     [] home w/ family      [] Continuous supervision  []SNF    [] Assisted living     [] Other:   Continued therapy: []HHC PT  []OUTPATIENT  PT   [] No Further PT  Equipment needs: has SPC, 2WW, rollator, and reacher; transport chair?          Bed Mobility:           []   Pt received out of bed   Rolling R/L:  Kyle  Scooting:  SBA with extra time  Lying --> Sit:  Kyle  Sit --> lying:  Kyle    Transfers:    Sit--> Stand:  CGA-kyle  Stand --> Sit:   CGA-kyle  Chair-->Bed/Bed --> Chair:   CGA-kyle  Toilet Transfer (if applicable): CGA-kyle  Other:   deficits are observed in the areas of functional mobility and continued focus on standing tolerance/balance, transfer, gait ans stair training is recommended.     Treatment/Activity Tolerance:   [] Tolerated treatment with no adverse effects    [x] Patient limited by fatigue  [] Patient limited by pain   [] Patient limited by medical complications:    [] Adverse reaction to Tx:   [] Significant change in status    Safety:       []  bed alarm set    [x]  chair alarm set    []  Pt refused alarms                []  Telesitter activated      [x]  Gait belt used during tx session      []other:         Number of Minutes/Billable Intervention  Gait Training 30   Therapeutic Exercise 15   Neuro Re-Ed    Therapeutic Activity 15   Wheelchair Propulsion    Group    Other:    TOTAL 60         Social History  Social/Functional History  Lives With: Alone (Pt has 2 grandsons, a neighbor and a friend who can assist prn.)  Type of Home: House  Home Layout: One level (Pt has a basement but does not go down there.)  Home Access: Stairs to enter with rails  Entrance Stairs - Number of Steps: 3  Entrance Stairs - Rails: Both  Bathroom Shower/Tub: Tub/Shower unit  Bathroom Toilet: Standard  Bathroom Equipment: Grab bars in shower, Grab bars around toilet, Tub transfer bench  Bathroom Accessibility: Walker accessible  Home Equipment: Cane, Rollator, Reacher, Walker, rolling  Has the patient had two or more falls in the past year or any fall with injury in the past year?: No (Pt reports one fall without injury in the last year.)  Receives Help From: Family  ADL Assistance: Independent  Homemaking Assistance: Independent  Homemaking Responsibilities: Yes  Meal Prep Responsibility: Primary  Laundry Responsibility: Primary  Cleaning Responsibility: No (Grandson and [employer] complete.)  Bill Paying/Finance Responsibility: No (Grandson completes.)  Shopping Responsibility: Secondary (Pt and grandson complete.)  Dependent Care

## 2024-04-06 NOTE — PLAN OF CARE
Problem: Discharge Planning  Goal: Discharge to home or other facility with appropriate resources  4/6/2024 1128 by Bharat Nash LPN  Outcome: Progressing  4/5/2024 2332 by Leticia Spicer, RN  Outcome: Progressing     Problem: Safety - Adult  Goal: Free from fall injury  4/6/2024 1128 by Bharat Nash LPN  Outcome: Progressing  4/5/2024 2332 by Leticia Spicer, RN  Outcome: Progressing     Problem: ABCDS Injury Assessment  Goal: Absence of physical injury  4/6/2024 1128 by Bharat Nash LPN  Outcome: Progressing  4/5/2024 2332 by Leticia Spicer, RN  Outcome: Progressing     Problem: Pain  Goal: Verbalizes/displays adequate comfort level or baseline comfort level  4/6/2024 1128 by Bharat Nash LPN  Outcome: Progressing  4/5/2024 2332 by Leticia Spicer, RN  Outcome: Progressing

## 2024-04-06 NOTE — PROGRESS NOTES
SBA in regular bed (pt with aggravation of L hip pain when rolling to R)  CARE Score: 4  Discharge Goal: Independent  Lying to Sitting on Side of Bed  Assistance Needed: Supervision or touching assistance  Comment: SBA in regular bed  CARE Score: 4  Discharge Goal: Independent    Transfers:    Sit to Stand  Assistance Needed: Supervision or touching assistance  Comment: SBA with 2WW  CARE Score: 4  Discharge Goal: Independent  Chair/Bed-to-Chair Transfer  Assistance Needed: Supervision or touching assistance  Comment: SBA with 2WW  CARE Score: 4  Discharge Goal: Independent  Toilet Transfer  Assistance needed: Supervision or touching assistance  Comment: CGA to sit, SBA to stand with L grab bar  CARE Score: 4  Car Transfer  Assistance Needed: Supervision or touching assistance  Comment: CGA with SPC  CARE Score: 4  Discharge Goal: Independent    Ambulation:    Walking Ability  Does the Patient Walk?: Yes     Walk 10 Feet  Assistance Needed: Supervision or touching assistance  Physical Assistance Level: No physical assistance  Comment: CGA using 2WW  CARE Score: 4  Discharge Goal: Independent     Walk 50 Feet with Two Turns  Assistance Needed: Supervision or touching assistance  Comment: CGA using 2WW  Reason if not Attempted: Not attempted due to medical condition or safety concerns  CARE Score: 4  Discharge Goal: Independent     Walk 150 Feet  Assistance Needed: Supervision or touching assistance  Comment: CGA using 2WW  Reason if not Attempted: Not attempted due to medical condition or safety concerns  CARE Score: 4  Discharge Goal: Supervision or touching assistance     Walking 10 Feet on Uneven Surfaces  Assistance Needed: Supervision or touching assistance  Comment: CGA using 2WW  Reason if not Attempted: Not attempted due to medical condition or safety concerns  CARE Score: 4  Discharge Goal: Independent     1 Step (Curb)  Assistance Needed: Supervision or touching assistance  Comment: CGA using 2WW  Reason if  04/03/2024    K 4.1 04/03/2024    CL 94 (L) 04/03/2024    CO2 23 04/03/2024     Lab Results   Component Value Date    ALT 15 03/28/2024    AST 27 03/28/2024    ALKPHOS 45 03/28/2024    BILITOT 0.7 03/28/2024       Expected length of stay  prior to a supervised level of function for discharge home with a walker and HHC OT/PT is ***    Recommendations:    ***

## 2024-04-07 VITALS
OXYGEN SATURATION: 93 % | BODY MASS INDEX: 25.11 KG/M2 | HEIGHT: 62 IN | RESPIRATION RATE: 17 BRPM | WEIGHT: 136.47 LBS | TEMPERATURE: 98.1 F | DIASTOLIC BLOOD PRESSURE: 49 MMHG | SYSTOLIC BLOOD PRESSURE: 115 MMHG | HEART RATE: 76 BPM

## 2024-04-07 PROCEDURE — 6370000000 HC RX 637 (ALT 250 FOR IP): Performed by: STUDENT IN AN ORGANIZED HEALTH CARE EDUCATION/TRAINING PROGRAM

## 2024-04-07 PROCEDURE — 6360000002 HC RX W HCPCS: Performed by: STUDENT IN AN ORGANIZED HEALTH CARE EDUCATION/TRAINING PROGRAM

## 2024-04-07 PROCEDURE — 6370000000 HC RX 637 (ALT 250 FOR IP): Performed by: PHYSICAL MEDICINE & REHABILITATION

## 2024-04-07 PROCEDURE — 94150 VITAL CAPACITY TEST: CPT

## 2024-04-07 PROCEDURE — 94761 N-INVAS EAR/PLS OXIMETRY MLT: CPT

## 2024-04-07 PROCEDURE — 1280000000 HC REHAB R&B

## 2024-04-07 RX ADMIN — GABAPENTIN 300 MG: 300 CAPSULE ORAL at 09:19

## 2024-04-07 RX ADMIN — METRONIDAZOLE 500 MG: 250 TABLET ORAL at 21:13

## 2024-04-07 RX ADMIN — DIVALPROEX SODIUM 250 MG: 250 TABLET, FILM COATED, EXTENDED RELEASE ORAL at 21:14

## 2024-04-07 RX ADMIN — GABAPENTIN 300 MG: 300 CAPSULE ORAL at 21:13

## 2024-04-07 RX ADMIN — Medication 1 CAPSULE: at 09:19

## 2024-04-07 RX ADMIN — SERTRALINE HYDROCHLORIDE 50 MG: 50 TABLET ORAL at 09:19

## 2024-04-07 RX ADMIN — PANTOPRAZOLE SODIUM 40 MG: 40 TABLET, DELAYED RELEASE ORAL at 05:45

## 2024-04-07 RX ADMIN — METRONIDAZOLE 500 MG: 250 TABLET ORAL at 05:45

## 2024-04-07 RX ADMIN — Medication 1 CAPSULE: at 17:05

## 2024-04-07 RX ADMIN — ROSUVASTATIN CALCIUM 10 MG: 5 TABLET, COATED ORAL at 21:13

## 2024-04-07 RX ADMIN — METRONIDAZOLE 500 MG: 250 TABLET ORAL at 15:27

## 2024-04-07 RX ADMIN — GABAPENTIN 300 MG: 300 CAPSULE ORAL at 15:27

## 2024-04-07 RX ADMIN — ENOXAPARIN SODIUM 40 MG: 100 INJECTION SUBCUTANEOUS at 09:19

## 2024-04-07 RX ADMIN — PYRIDOSTIGMINE BROMIDE 30 MG: 60 TABLET ORAL at 21:15

## 2024-04-07 RX ADMIN — PYRIDOSTIGMINE BROMIDE 30 MG: 60 TABLET ORAL at 09:25

## 2024-04-07 RX ADMIN — CALCIUM 500 MG: 500 TABLET ORAL at 21:13

## 2024-04-07 RX ADMIN — VERAPAMIL HYDROCHLORIDE 120 MG: 120 TABLET, FILM COATED, EXTENDED RELEASE ORAL at 21:13

## 2024-04-07 ASSESSMENT — PAIN SCALES - GENERAL
PAINLEVEL_OUTOF10: 0

## 2024-04-07 NOTE — PLAN OF CARE
Problem: Discharge Planning  Goal: Discharge to home or other facility with appropriate resources  4/7/2024 0031 by Leticia Spicer, RN  Outcome: Progressing  4/6/2024 1128 by Bharat Nash LPN  Outcome: Progressing     Problem: Safety - Adult  Goal: Free from fall injury  4/7/2024 0031 by Leticia Spicer, RN  Outcome: Progressing  4/6/2024 1128 by Bharat Nash LPN  Outcome: Progressing     Problem: ABCDS Injury Assessment  Goal: Absence of physical injury  4/7/2024 0031 by Leticia Spicer, RN  Outcome: Progressing  4/6/2024 1128 by Bharat Nash LPN  Outcome: Progressing     Problem: Pain  Goal: Verbalizes/displays adequate comfort level or baseline comfort level  4/7/2024 0031 by Leticia Spicer, RN  Outcome: Progressing  4/6/2024 1128 by Bharat Nash LPN  Outcome: Progressing

## 2024-04-07 NOTE — PLAN OF CARE
Problem: Discharge Planning  Goal: Discharge to home or other facility with appropriate resources  4/7/2024 1149 by Charlotte Zelaya LPN  Outcome: Progressing  4/7/2024 0031 by Leticia Spicer, RN  Outcome: Progressing     Problem: Safety - Adult  Goal: Free from fall injury  4/7/2024 1149 by Charlotte Zelaya LPN  Outcome: Progressing  4/7/2024 0031 by Leticia Spicer, RN  Outcome: Progressing     Problem: ABCDS Injury Assessment  Goal: Absence of physical injury  4/7/2024 1149 by Charlotte Zelaya LPN  Outcome: Progressing  4/7/2024 0031 by Leticia Spicer, RN  Outcome: Progressing     Problem: Pain  Goal: Verbalizes/displays adequate comfort level or baseline comfort level  4/7/2024 1149 by Charlotte Zelaya LPN  Outcome: Progressing  4/7/2024 0031 by Leticia Spicer, RN  Outcome: Progressing

## 2024-04-08 ENCOUNTER — TELEPHONE (OUTPATIENT)
Dept: INTERNAL MEDICINE CLINIC | Age: 89
End: 2024-04-08

## 2024-04-08 PROCEDURE — 97530 THERAPEUTIC ACTIVITIES: CPT

## 2024-04-08 PROCEDURE — 94150 VITAL CAPACITY TEST: CPT

## 2024-04-08 PROCEDURE — 97110 THERAPEUTIC EXERCISES: CPT

## 2024-04-08 PROCEDURE — 6370000000 HC RX 637 (ALT 250 FOR IP): Performed by: PHYSICAL MEDICINE & REHABILITATION

## 2024-04-08 PROCEDURE — 6370000000 HC RX 637 (ALT 250 FOR IP): Performed by: STUDENT IN AN ORGANIZED HEALTH CARE EDUCATION/TRAINING PROGRAM

## 2024-04-08 PROCEDURE — 97116 GAIT TRAINING THERAPY: CPT

## 2024-04-08 PROCEDURE — 97535 SELF CARE MNGMENT TRAINING: CPT

## 2024-04-08 PROCEDURE — 1280000000 HC REHAB R&B

## 2024-04-08 PROCEDURE — 6360000002 HC RX W HCPCS: Performed by: STUDENT IN AN ORGANIZED HEALTH CARE EDUCATION/TRAINING PROGRAM

## 2024-04-08 PROCEDURE — 94761 N-INVAS EAR/PLS OXIMETRY MLT: CPT

## 2024-04-08 PROCEDURE — 94664 DEMO&/EVAL PT USE INHALER: CPT

## 2024-04-08 RX ADMIN — METRONIDAZOLE 500 MG: 250 TABLET ORAL at 14:10

## 2024-04-08 RX ADMIN — ENOXAPARIN SODIUM 40 MG: 100 INJECTION SUBCUTANEOUS at 09:27

## 2024-04-08 RX ADMIN — CALCIUM 500 MG: 500 TABLET ORAL at 20:44

## 2024-04-08 RX ADMIN — Medication 1 CAPSULE: at 17:20

## 2024-04-08 RX ADMIN — METRONIDAZOLE 500 MG: 250 TABLET ORAL at 05:18

## 2024-04-08 RX ADMIN — PYRIDOSTIGMINE BROMIDE 30 MG: 60 TABLET ORAL at 09:27

## 2024-04-08 RX ADMIN — PYRIDOSTIGMINE BROMIDE 30 MG: 60 TABLET ORAL at 20:44

## 2024-04-08 RX ADMIN — Medication 1 CAPSULE: at 09:28

## 2024-04-08 RX ADMIN — DIVALPROEX SODIUM 250 MG: 250 TABLET, FILM COATED, EXTENDED RELEASE ORAL at 20:44

## 2024-04-08 RX ADMIN — SERTRALINE HYDROCHLORIDE 50 MG: 50 TABLET ORAL at 09:28

## 2024-04-08 RX ADMIN — METRONIDAZOLE 500 MG: 250 TABLET ORAL at 20:44

## 2024-04-08 RX ADMIN — GABAPENTIN 300 MG: 300 CAPSULE ORAL at 09:28

## 2024-04-08 RX ADMIN — GABAPENTIN 300 MG: 300 CAPSULE ORAL at 14:10

## 2024-04-08 RX ADMIN — GABAPENTIN 300 MG: 300 CAPSULE ORAL at 20:44

## 2024-04-08 RX ADMIN — VERAPAMIL HYDROCHLORIDE 120 MG: 120 TABLET, FILM COATED, EXTENDED RELEASE ORAL at 20:44

## 2024-04-08 RX ADMIN — ROSUVASTATIN CALCIUM 10 MG: 5 TABLET, COATED ORAL at 20:44

## 2024-04-08 RX ADMIN — PANTOPRAZOLE SODIUM 40 MG: 40 TABLET, DELAYED RELEASE ORAL at 05:18

## 2024-04-08 ASSESSMENT — PAIN SCALES - GENERAL
PAINLEVEL_OUTOF10: 0
PAINLEVEL_OUTOF10: 0

## 2024-04-08 NOTE — TELEPHONE ENCOUNTER
Got a call from Polina with [facility]. Stated the patient is being discharged from [facility] on 4/10. Wanted to know if pcp will follow with home care. Can be reached at [phone].

## 2024-04-08 NOTE — PROGRESS NOTES
Physical Rehabilitation: OCCUPATIONAL THERAPY     [x] daily progress note       [] discharge       Patient Name:  Jazmine Hutchinson   :  1935 MRN: [id number]  Room:  [address] [place], [place] [address] Date of Admission: 2024  Rehabilitation Diagnosis:   Diverticulitis of intestine, part unspecified, without perforation or abscess without bleeding [K57.92]  Diverticulitis [K57.92]       Date 2024       Day of ARU Week:  7   Time IN//950; 1250/1320   Individual Tx Minutes 90 + 30   Group Tx Minutes    Co-Treat Minutes    Concurrent Tx Minutes    TOTAL Tx Time Mins 120   Variance Time    Variance Reason []   Refusal due to:     []   Medical hold/reason:    []   Illness   []   Off Unit for test/procedure  []   Extra time needed to complete task  []   Therapeutic need  []   Make up mins were attempted but pt unable to complete due to (specify)  []   Other (specify):   Restrictions Restrictions/Precautions: Contact Precautions, Fall Risk         Communication with other providers: [x]   OK to see per nursing:     []   Spoke with team member regarding:      Subjective observations and cognitive status: Patient up at EOB having just finished breakfast ,meal, pleasant and agreeable to therapy      Pain level/location:    /10       Location: per patient no pain noted    Discharge recommendations  Anticipated discharge date:    Destination: []home alone   [x]home alone w assist prn   [] home w/ family    [] Continuous supervision       []SNF    [] Assisted living     [] Other:   Continued therapy: [x]HHC OT  []OUTPATIENT  OT   [] No Further OT  Equipment needs: None        ADLs:    Eating: Eating  Assistance Needed: Independent  Comment: X  CARE Score: 6  Discharge Goal: Independent       Oral Hygiene: Oral Hygiene  Assistance Needed: Independent  Comment: X  CARE Score: 6  Discharge Goal: Independent    UB/LB Bathing: Shower/Bathe Self  Assistance Needed: Supervision or touching assistance  Comment: SBA in stance seated

## 2024-04-08 NOTE — FLOWSHEET NOTE
end of treatment.         Number of Minutes/Billable Intervention  Gait Training 30   Therapeutic Exercise 15   Neuro Re-Ed    Therapeutic Activity 15   Wheelchair Propulsion    Group    Other:    TOTAL 60         Social History  Social/Functional History  Lives With: Alone (Pt has 2 grandsons, a neighbor and a friend who can assist prn.)  Type of Home: House  Home Layout: One level (Pt has a basement but does not go down there.)  Home Access: Stairs to enter with rails  Entrance Stairs - Number of Steps: 3  Entrance Stairs - Rails: Both  Bathroom Shower/Tub: Tub/Shower unit  Bathroom Toilet: Standard  Bathroom Equipment: Grab bars in shower, Grab bars around toilet, Tub transfer bench  Bathroom Accessibility: Walker accessible  Home Equipment: Cane, Rollator, Reacher, Walker, rolling  Has the patient had two or more falls in the past year or any fall with injury in the past year?: No (Pt reports one fall without injury in the last year.)  Receives Help From: Family  ADL Assistance: Independent  Homemaking Assistance: Independent  Homemaking Responsibilities: Yes  Meal Prep Responsibility: Primary  Laundry Responsibility: Primary  Cleaning Responsibility: No (Grandson and [employer] complete.)  Bill Paying/Finance Responsibility: No (Grandson completes.)  Shopping Responsibility: Secondary (Pt and grandson complete.)  Dependent Care Responsibility: No  Health Care Management: Secondary (Grandson sets up pill box and Pt takes from there.)  Ambulation Assistance: Independent  Transfer Assistance: Independent  Active : Yes  Mode of Transportation: Car  Additional Comments: Pt sleeps in regular flat bed.    Objective                                                                                    Goals:  (Update in navigator)  Short Term Goals  Time Frame for Short Term Goals: 10 tx days:  Short Term Goal 1: Pt will complete bed mobility (scooting, rolling R/L, and sup<->sit) Ind.  Short Term Goal 2: Pt will

## 2024-04-09 LAB
ANION GAP SERPL CALCULATED.3IONS-SCNC: 9 MMOL/L (ref 7–16)
BUN SERPL-MCNC: 12 MG/DL (ref 6–23)
CALCIUM SERPL-MCNC: 8.7 MG/DL (ref 8.3–10.6)
CHLORIDE BLD-SCNC: 99 MMOL/L (ref 99–110)
CO2: 28 MMOL/L (ref 21–32)
CREAT SERPL-MCNC: 0.8 MG/DL (ref 0.6–1.1)
GFR SERPL CREATININE-BSD FRML MDRD: 71 ML/MIN/1.73M2
GLUCOSE SERPL-MCNC: 101 MG/DL (ref 70–99)
HCT VFR BLD CALC: 31.2 % (ref 37–47)
HEMOGLOBIN: 10.2 GM/DL (ref 12.5–16)
MCH RBC QN AUTO: 33.3 PG (ref 27–31)
MCHC RBC AUTO-ENTMCNC: 32.7 % (ref 32–36)
MCV RBC AUTO: 102 FL (ref 78–100)
PDW BLD-RTO: 14 % (ref 11.7–14.9)
PLATELET # BLD: 134 K/CU MM (ref 140–440)
PMV BLD AUTO: 8.8 FL (ref 7.5–11.1)
POTASSIUM SERPL-SCNC: 5.1 MMOL/L (ref 3.5–5.1)
RBC # BLD: 3.06 M/CU MM (ref 4.2–5.4)
SODIUM BLD-SCNC: 136 MMOL/L (ref 135–145)
WBC # BLD: 6.2 K/CU MM (ref 4–10.5)

## 2024-04-09 PROCEDURE — 80048 BASIC METABOLIC PNL TOTAL CA: CPT

## 2024-04-09 PROCEDURE — 6360000002 HC RX W HCPCS: Performed by: STUDENT IN AN ORGANIZED HEALTH CARE EDUCATION/TRAINING PROGRAM

## 2024-04-09 PROCEDURE — 6370000000 HC RX 637 (ALT 250 FOR IP): Performed by: PHYSICAL MEDICINE & REHABILITATION

## 2024-04-09 PROCEDURE — 94664 DEMO&/EVAL PT USE INHALER: CPT

## 2024-04-09 PROCEDURE — 1280000000 HC REHAB R&B

## 2024-04-09 PROCEDURE — 94150 VITAL CAPACITY TEST: CPT

## 2024-04-09 PROCEDURE — 94761 N-INVAS EAR/PLS OXIMETRY MLT: CPT

## 2024-04-09 PROCEDURE — 97530 THERAPEUTIC ACTIVITIES: CPT

## 2024-04-09 PROCEDURE — 36415 COLL VENOUS BLD VENIPUNCTURE: CPT

## 2024-04-09 PROCEDURE — 97116 GAIT TRAINING THERAPY: CPT

## 2024-04-09 PROCEDURE — 97110 THERAPEUTIC EXERCISES: CPT

## 2024-04-09 PROCEDURE — 85027 COMPLETE CBC AUTOMATED: CPT

## 2024-04-09 PROCEDURE — 97535 SELF CARE MNGMENT TRAINING: CPT

## 2024-04-09 PROCEDURE — 6370000000 HC RX 637 (ALT 250 FOR IP): Performed by: STUDENT IN AN ORGANIZED HEALTH CARE EDUCATION/TRAINING PROGRAM

## 2024-04-09 RX ORDER — LANOLIN ALCOHOL/MO/W.PET/CERES
1000 CREAM (GRAM) TOPICAL DAILY
Status: DISCONTINUED | OUTPATIENT
Start: 2024-04-09 | End: 2024-04-11 | Stop reason: HOSPADM

## 2024-04-09 RX ORDER — FOLIC ACID 1 MG/1
1 TABLET ORAL DAILY
Status: DISCONTINUED | OUTPATIENT
Start: 2024-04-09 | End: 2024-04-11 | Stop reason: HOSPADM

## 2024-04-09 RX ADMIN — Medication 1 CAPSULE: at 18:19

## 2024-04-09 RX ADMIN — SERTRALINE HYDROCHLORIDE 50 MG: 50 TABLET ORAL at 09:08

## 2024-04-09 RX ADMIN — LOPERAMIDE HYDROCHLORIDE 2 MG: 2 CAPSULE ORAL at 12:35

## 2024-04-09 RX ADMIN — Medication 1000 MCG: at 12:34

## 2024-04-09 RX ADMIN — GABAPENTIN 300 MG: 300 CAPSULE ORAL at 12:34

## 2024-04-09 RX ADMIN — VERAPAMIL HYDROCHLORIDE 120 MG: 120 TABLET, FILM COATED, EXTENDED RELEASE ORAL at 20:16

## 2024-04-09 RX ADMIN — ROSUVASTATIN CALCIUM 10 MG: 5 TABLET, COATED ORAL at 20:15

## 2024-04-09 RX ADMIN — Medication 1 CAPSULE: at 10:40

## 2024-04-09 RX ADMIN — GABAPENTIN 300 MG: 300 CAPSULE ORAL at 20:16

## 2024-04-09 RX ADMIN — PYRIDOSTIGMINE BROMIDE 30 MG: 60 TABLET ORAL at 09:08

## 2024-04-09 RX ADMIN — PANTOPRAZOLE SODIUM 40 MG: 40 TABLET, DELAYED RELEASE ORAL at 06:06

## 2024-04-09 RX ADMIN — GABAPENTIN 300 MG: 300 CAPSULE ORAL at 09:08

## 2024-04-09 RX ADMIN — PYRIDOSTIGMINE BROMIDE 30 MG: 60 TABLET ORAL at 20:17

## 2024-04-09 RX ADMIN — CALCIUM 500 MG: 500 TABLET ORAL at 20:15

## 2024-04-09 RX ADMIN — FOLIC ACID 1 MG: 1 TABLET ORAL at 12:35

## 2024-04-09 RX ADMIN — METRONIDAZOLE 500 MG: 250 TABLET ORAL at 06:06

## 2024-04-09 RX ADMIN — METRONIDAZOLE 500 MG: 250 TABLET ORAL at 12:34

## 2024-04-09 RX ADMIN — DIVALPROEX SODIUM 250 MG: 250 TABLET, FILM COATED, EXTENDED RELEASE ORAL at 20:17

## 2024-04-09 RX ADMIN — ENOXAPARIN SODIUM 40 MG: 100 INJECTION SUBCUTANEOUS at 09:09

## 2024-04-09 RX ADMIN — METRONIDAZOLE 500 MG: 250 TABLET ORAL at 20:15

## 2024-04-09 NOTE — PROGRESS NOTES
Comprehensive Nutrition Assessment    Type and Reason for Visit:  Reassess    Nutrition Recommendations/Plan:   Continue low fiber diet as tolerates  Will continue to offer fortified geletin oral nutrition supplement when available in stock  Will continue to follow up during stay      Malnutrition Assessment:  Malnutrition Status:  At risk for malnutrition (Comment) (recent reduced intake) (04/03/24 1218)    Context:  Acute Illness       Nutrition Assessment:    Remains on low fiber diet with intake at most meals % during stay. Will consume fortified geletin supplement when available in stock. Patient not feeling well on visit, c/o diarrhea and cramping today. Reports nursing providing imodium. Will continue to follow at moderate nutrition risk at this time with concern for decrease in intake.    Nutrition Related Findings:    up in chair waiting on meds from nursing     meds noted: culturelle, imodium Wound Type: None       Current Nutrition Intake & Therapies:    Average Meal Intake: %  Average Supplements Intake: 0% (currently out of stock)  ADULT ORAL NUTRITION SUPPLEMENT; Breakfast, Lunch; Fortified Gelatin Oral Supplement  ADULT DIET; Regular; Low Fiber    Anthropometric Measures:  Height: 158 cm (5' 2.21\")  Ideal Body Weight (IBW): 111 lbs (50 kg)    Admission Body Weight: 59.8 kg (131 lb 13.4 oz) (March 2024)  Current Body Weight: 61.9 kg (136 lb 7.4 oz), 122.9 % IBW. Weight Source: Bed Scale  Current BMI (kg/m2): 24.8  Usual Body Weight: 61.2 kg (135 lb) (per hx)  % Weight Change (Calculated): 1.1  Weight Adjustment For: No Adjustment                 BMI Categories: Normal Weight (BMI 22.0 to 24.9) age over 65    Estimated Daily Nutrient Needs:  Energy Requirements Based On: Kcal/kg  Weight Used for Energy Requirements: Current  Energy (kcal/day): 6215-9916 (25-27 hans/kg)  Weight Used for Protein Requirements: Ideal  Protein (g/day): 50-60 (1-1.2 g/kg)  Method Used for Fluid Requirements: 1

## 2024-04-09 NOTE — PROGRESS NOTES
Physical Rehabilitation: OCCUPATIONAL THERAPY     [x] daily progress note       [] discharge       Patient Name:  Jazmine Hutchinson   :  1935 MRN: [id number]  Room:  [address] [place], [place] [address] Date of Admission: 2024  Rehabilitation Diagnosis:   Diverticulitis of intestine, part unspecified, without perforation or abscess without bleeding [K57.92]  Diverticulitis [K57.92]       Date 2024       Day of ARU Week:  1   Time IN//735; 1055/1140   Individual Tx Minutes 75 + 45   Group Tx Minutes    Co-Treat Minutes    Concurrent Tx Minutes    TOTAL Tx Time Mins 120   Variance Time    Variance Reason []   Refusal due to:     []   Medical hold/reason:    []   Illness   []   Off Unit for test/procedure  []   Extra time needed to complete task  []   Therapeutic need  []   Make up mins were attempted but pt unable to complete due to (specify)  []   Other (specify):   Restrictions Restrictions/Precautions: Contact Precautions, Fall Risk         Communication with other providers: [x]   OK to see per nursing:     []   Spoke with team member regarding:      Subjective observations and cognitive status: Patient in side lying, states she is in and out of sleep this morning, pleasant and agreeable to wash up at sink to get ready for her day   Second session: patient c/o tummy cramps, has episode of somewhat loose (Soft) stool much of second session spent in bathroom, nursing (Betty) notified and will give patient imodium      Pain level/location:    /10       Location: usual aches and pains does not quantify    Discharge recommendations  Anticipated discharge date:    Destination: []home alone   [x]home alone w assist prn   [] home w/ family    [] Continuous supervision       []SNF    [] Assisted living     [] Other:   Continued therapy: [x]HHC OT  []OUTPATIENT  OT   [] No Further OT  Equipment needs: None        ADLs:    Eating: Eating  Assistance Needed: Independent  Comment: X  CARE Score: 6  Discharge Goal: Independent

## 2024-04-09 NOTE — FLOWSHEET NOTE
[x] daily progress note       [] discharge       Patient Name:  Jazmine Hutchinson   :  1935 MRN: [id number]  Room:  [address] [place] [address] Date of Admission: 2024  Rehabilitation Diagnosis:   Diverticulitis of intestine, part unspecified, without perforation or abscess without bleeding [K57.92]  Diverticulitis [K57.92]       Date 2024       Day of ARU Week:  1   Time IN/-1000   Individual Tx Minutes 60   TOTAL Tx Time Mins 60   Restrictions Restrictions/Precautions  Restrictions/Precautions: Contact Precautions, Fall Risk  Position Activity Restriction  Other position/activity restrictions: IV lock RUE, chronic L hip pain/weakness   Communication with other providers: [x]   OK to see per nursing:     []   Spoke with team member regarding:      Subjective observations and cognitive status: Pt seen sitting up in recliner at beginning of treatment. Agreeable to therapy.    Pain level/location: 0/10        Discharge recommendations  Anticipated discharge date: 2024   Destination: []home alone   [x]home alone with assist PRN     [] home w/ family      [] Continuous supervision  []SNF    [] Assisted living     [] Other:   Continued therapy: [x]HHC PT  []OUTPATIENT  PT   [] No Further PT  Equipment needs: has SPC, 2WW, rollator, and reacher; transport chair?       [x]   Pt received out of bed     Transfers:    Sit--> Stand:  SBA (pt has difficulty d/t bilateral shoulder pain)   Stand --> Sit:   SBA  Chair-->Bed/Bed --> Chair:   SBA  Assistive device required for transfer:   RW  Min vcs to increase trunk flexion with sit->stand.     Gait:    Distance:  267'    Assistance:  mod I   Device:  RW  Gait Quality:  reciprocal pattern     Stairs   # Completed:  10   Assistance:  mod I   Supportive Device:  bilateral rails     Uneven Surfaces:  (2 trials)      Assistance:    mod I   Device:    RW  Surfaces Completed:   [x]  Carpeted Surface with bean bags beneath      []  Throw rugs       []  Ramp       []  Outdoor

## 2024-04-09 NOTE — PLAN OF CARE
Problem: Discharge Planning  Goal: Discharge to home or other facility with appropriate resources  4/9/2024 0902 by Betty Ledezma LPN  Outcome: Progressing     Problem: Safety - Adult  Goal: Free from fall injury  4/9/2024 0902 by Betty Ledezma LPN  Outcome: Progressing     Problem: ABCDS Injury Assessment  Goal: Absence of physical injury  4/9/2024 0902 by Betty Ledezma LPN  Outcome: Progressing     Problem: Pain  Goal: Verbalizes/displays adequate comfort level or baseline comfort level  4/9/2024 0902 by Betty Ledezma LPN  Outcome: Progressing

## 2024-04-09 NOTE — DISCHARGE INSTR - COC
Continuity of Care Form    Patient Name: Jazmine Hutchinson   :  1935  MRN:  [id number]    Admit date:  2024  Discharge date:  ***    Code Status Order: Full Code   Advance Directives:     Admitting Physician:  ANA Szymanski MD  PCP: Jayda íDaz MD    Discharging Nurse: ***  Discharging Hospital Unit/Room#: 1010/1010-A  Discharging Unit Phone Number: ***    Emergency Contact:   Extended Emergency Contact Information  Primary Emergency Contact: [place] [address] [facility]  Home Phone: [phone]  Mobile Phone: [phone]  Relation: Grandchild  Secondary Emergency Contact: JOON HUTCHINSON  Home Phone: [phone]  Mobile Phone: [phone]  Relation: Grandchild    Past Surgical History:  Past Surgical History:   Procedure Laterality Date    CATARACT REMOVAL Right     poor result ; blind right eye; [place]    CHOLECYSTECTOMY      CORONARY ARTERY BYPASS GRAFT  2009    3 vessel    SHOULDER ARTHROSCOPY Right     TUBAL LIGATION      URETER SURGERY      Right ureteral repair       Immunization History:   Immunization History   Administered Date(s) Administered    COVID-19, MODERNA BLUE border, Primary or Immunocompromised, (age 12y+), IM, 100 mcg/0.5mL 2021, 2021, 10/25/2021    Influenza Vaccine, unspecified formulation 10/22/2015, 10/18/2017    Influenza Virus Vaccine 10/18/2017, 2018, 09/10/2019    Influenza Whole 2006    Influenza, FLUAD, (age 65 y+), Adjuvanted, 0.5mL 2020, 10/13/2022    Influenza, FLUZONE (age 65 y+), High Dose, 0.7mL 10/03/2021    Influenza, High Dose (Fluzone 65 yrs and older) 10/22/2015, 10/18/2016, 10/03/2021    Influenza, Triv, inactivated, subunit, adjuvanted, IM (Fluad 65 yrs and older) 2017, 2018, 09/10/2019    Pneumococcal, PCV-13, PREVNAR 13, (age 6w+), IM, 0.5mL 2016    Pneumococcal, PPSV23, PNEUMOVAX 23, (age 2y+), SC/IM, 0.5mL 2009    Td, unspecified formulation

## 2024-04-09 NOTE — PROGRESS NOTES
Jazmine Hutchinson    : 1935  Acct #: [id number]  MRN: [id number]              PM&R Progress Note      Admitting diagnosis: ***    Comorbid diagnoses impacting rehabilitation: ***    Chief complaint: ***    Prior (baseline) level of function: Independent.    Current level of function:         Current  IRF-TAMARA and Goals:   Occupational Therapy:    Short Term Goals  Time Frame for Short Term Goals: STGs=LTGs :   Long Term Goals  Time Frame for Long Term Goals : 10 days or until d/c.  Long Term Goal 1: Pt will complete self-feeding with IND.  Long Term Goal 2: Pt will complete oral hygiene and grooming tasks with IND.  Long Term Goal 3: Pt will complete total body bathing with AE PRN and Mod I.  Long Term Goal 4: Pt will complete UB dressing with IND.  Long Term Goal 5: Pt will complete LB dressing with AE PRN and Mod I.  Additional Goals?: Yes  Long Term Goal 6: Pt will doff/don footwear with AE PRN and Mod I.  Long Term Goal 7: Pt will complete toileting with Mod I.  Long Term Goal 8: Pt will complete functional transfers (bed, chair, shower, toilet) with DME PRN and Mod I.  Long Term Goal 9: Pt will perform therex/therax to facilitate an increase in strength/endurance with emphasis on dynamic standing balance/tolerance > 8 mins with Mod I.  Long Term Goal 10: Pt will perform an IADL with Mod I. :                                       Eating: Eating  Assistance Needed: Independent  Comment: X  CARE Score: 6  Discharge Goal: Independent       Oral Hygiene: Oral Hygiene  Assistance Needed: Independent  Comment: X  CARE Score: 6  Discharge Goal: Independent    UB/LB Bathing: Shower/Bathe Self  Assistance Needed: Supervision or touching assistance  Comment: SBA in stance seated majority  CARE Score: 4  Discharge Goal: Independent    UB Dressing: Upper Body Dressing  Assistance Needed: Independent  Comment: X  CARE Score: 6  Discharge Goal: Independent         LB Dressing: Lower Body Dressing  Assistance Needed:

## 2024-04-10 PROCEDURE — 6370000000 HC RX 637 (ALT 250 FOR IP): Performed by: PHYSICAL MEDICINE & REHABILITATION

## 2024-04-10 PROCEDURE — 97530 THERAPEUTIC ACTIVITIES: CPT

## 2024-04-10 PROCEDURE — 6370000000 HC RX 637 (ALT 250 FOR IP): Performed by: STUDENT IN AN ORGANIZED HEALTH CARE EDUCATION/TRAINING PROGRAM

## 2024-04-10 PROCEDURE — 94150 VITAL CAPACITY TEST: CPT

## 2024-04-10 PROCEDURE — 6360000002 HC RX W HCPCS: Performed by: STUDENT IN AN ORGANIZED HEALTH CARE EDUCATION/TRAINING PROGRAM

## 2024-04-10 PROCEDURE — 97116 GAIT TRAINING THERAPY: CPT

## 2024-04-10 PROCEDURE — 94761 N-INVAS EAR/PLS OXIMETRY MLT: CPT

## 2024-04-10 PROCEDURE — 97535 SELF CARE MNGMENT TRAINING: CPT

## 2024-04-10 PROCEDURE — 1280000000 HC REHAB R&B

## 2024-04-10 RX ORDER — PYRIDOSTIGMINE BROMIDE 60 MG/1
30 TABLET ORAL EVERY 8 HOURS SCHEDULED
Status: DISCONTINUED | OUTPATIENT
Start: 2024-04-10 | End: 2024-04-11 | Stop reason: HOSPADM

## 2024-04-10 RX ADMIN — ROSUVASTATIN CALCIUM 10 MG: 5 TABLET, COATED ORAL at 22:36

## 2024-04-10 RX ADMIN — VERAPAMIL HYDROCHLORIDE 120 MG: 120 TABLET, FILM COATED, EXTENDED RELEASE ORAL at 22:35

## 2024-04-10 RX ADMIN — METRONIDAZOLE 500 MG: 250 TABLET ORAL at 15:24

## 2024-04-10 RX ADMIN — SERTRALINE HYDROCHLORIDE 50 MG: 50 TABLET ORAL at 08:28

## 2024-04-10 RX ADMIN — GABAPENTIN 300 MG: 300 CAPSULE ORAL at 22:36

## 2024-04-10 RX ADMIN — ENOXAPARIN SODIUM 40 MG: 100 INJECTION SUBCUTANEOUS at 08:28

## 2024-04-10 RX ADMIN — Medication 1000 MCG: at 08:27

## 2024-04-10 RX ADMIN — Medication 1 CAPSULE: at 08:28

## 2024-04-10 RX ADMIN — FOLIC ACID 1 MG: 1 TABLET ORAL at 08:28

## 2024-04-10 RX ADMIN — DIVALPROEX SODIUM 250 MG: 250 TABLET, FILM COATED, EXTENDED RELEASE ORAL at 22:35

## 2024-04-10 RX ADMIN — ONDANSETRON 4 MG: 4 TABLET, ORALLY DISINTEGRATING ORAL at 16:26

## 2024-04-10 RX ADMIN — PYRIDOSTIGMINE BROMIDE 30 MG: 60 TABLET ORAL at 22:36

## 2024-04-10 RX ADMIN — METRONIDAZOLE 500 MG: 250 TABLET ORAL at 06:30

## 2024-04-10 RX ADMIN — GABAPENTIN 300 MG: 300 CAPSULE ORAL at 15:23

## 2024-04-10 RX ADMIN — PYRIDOSTIGMINE BROMIDE 30 MG: 60 TABLET ORAL at 15:24

## 2024-04-10 RX ADMIN — METRONIDAZOLE 500 MG: 250 TABLET ORAL at 22:36

## 2024-04-10 RX ADMIN — Medication 1 CAPSULE: at 15:24

## 2024-04-10 RX ADMIN — PANTOPRAZOLE SODIUM 40 MG: 40 TABLET, DELAYED RELEASE ORAL at 06:30

## 2024-04-10 RX ADMIN — GABAPENTIN 300 MG: 300 CAPSULE ORAL at 08:27

## 2024-04-10 RX ADMIN — CALCIUM 500 MG: 500 TABLET ORAL at 22:35

## 2024-04-10 NOTE — PROGRESS NOTES
______________________________________________________________________     Treatment/Activity Tolerance:   [x] Tolerated treatment with no adverse effects    [] Patient limited by fatigue  [] Patient limited by pain   [] Patient limited by medical complications:    [] Adverse reaction to Tx:   [] Significant change in status    Safety:       []  bed alarm set    [x]  chair alarm set    []  Pt refused alarms                []  Telesitter activated      [x]  Gait belt used during tx session      []other:       Number of Minutes/Billable Intervention  Therapeutic Exercise    ADL Self-care 60   Neuro Re-Ed    Therapeutic Activity 20 + 40   Group    Other:    TOTAL 120       Social History  Social/Functional History  Lives With: Alone (Pt has 2 grandsons, a neighbor and a friend who can assist prn.)  Type of Home: House  Home Layout: One level (Pt has a basement but does not go down there.)  Home Access: Stairs to enter with rails  Entrance Stairs - Number of Steps: 3  Entrance Stairs - Rails: Both  Bathroom Shower/Tub: Tub/Shower unit  Bathroom Toilet: Standard  Bathroom Equipment: Grab bars in shower, Grab bars around toilet, Tub transfer bench  Bathroom Accessibility: Walker accessible  Home Equipment: Cane, Rollator, Reacher, Walker, rolling  Has the patient had two or more falls in the past year or any fall with injury in the past year?: No (Pt reports one fall without injury in the last year.)  Receives Help From: Family  ADL Assistance: Independent  Homemaking Assistance: Independent  Homemaking Responsibilities: Yes  Meal Prep Responsibility: Primary  Laundry Responsibility: Primary  Cleaning Responsibility: No (Grandson and [employer] complete.)  Bill Paying/Finance Responsibility: No (Grandson completes.)  Shopping Responsibility: Secondary (Pt and grandson complete.)  Dependent Care Responsibility: No  Health Care Management: Secondary (Grandson sets up pill box and Pt takes from there.)  Ambulation

## 2024-04-10 NOTE — PATIENT CARE CONFERENCE
Complexity  Equipment needed at discharge:None      COGNITIVE FUNCTION/SPEECH THERAPY (AS INDICATED)  LTG                                             Nursing Current Medical Status:   [x] Is continent of bowel and bladder     [] Is incontinent of bowel and bladder    [x] Has had an adequate number of bowel movements   [] Urinates with no urinary retention >300ml in bladder   [] Targeting bladder protocol with walker removal   [x] Maintaining O2 SATs at 92% or greater   [x] Has pain managed while on ARU         [] Has had no skin breakdown while on ARU   [] Has improved skin integrity via wound measurements   [] Has no signs/symptoms of infection at the wound site   [] Pressure wounds Stage/Location:    [] Arrived on unit with pressure wound  [x] Has been free from injury during hospitalization   [] Has experienced a fall during hospitalization  [] Ongoing education with patient/family with understanding demonstrated for:  [] Receives IV Fluids  [] Other:        NUTRITION  Weight - Scale: 61.7 kg (136 lb 0.4 oz) / Body mass index is 24.72 kg/m².  Current diet: ADULT ORAL NUTRITION SUPPLEMENT; Breakfast, Lunch; Fortified Gelatin Oral Supplement  ADULT DIET; Regular; Low Fiber  Intake:       Medical improvements/barriers: discharge to home        Team goals for next treatment period/Intervention for current barriers:   [] Pt will increase activity tolerance for daily tasks.  [] Pt will improve bed mobility with reduced assist.  [] Pt will improve safety in fx tasks with reduced cues/assist  [] Pt will improve transfers with reduced assist  [] Pt will improve toileting with reduced assist  [] Pt will improve ADL's with use of adaptive equipment with reduced assist  [] Pt will improve pain mgmt for maximum participation in tx program  [] Pt will improve communication to get basic needs met on unit  [] Pt will improve swallowing for safe diet advancement with use of strategies  [x]  Plan for discharge to home.  []  Overall

## 2024-04-10 NOTE — PLAN OF CARE
Problem: Discharge Planning  Goal: Discharge to home or other facility with appropriate resources  4/10/2024 1024 by Betty Ledezma LPN  Outcome: Progressing     Problem: Safety - Adult  Goal: Free from fall injury  4/10/2024 1024 by Betty Ledezma LPN  Outcome: Progressing     Problem: ABCDS Injury Assessment  Goal: Absence of physical injury  4/10/2024 1024 by Betty Ledezma LPN  Outcome: Progressing     Problem: Pain  Goal: Verbalizes/displays adequate comfort level or baseline comfort level  4/10/2024 1024 by Betty Ledezma LPN  Outcome: Progressing

## 2024-04-10 NOTE — FLOWSHEET NOTE
[x] daily progress note       [x] discharge       Patient Name:  Jazmine Hutchinson   :  1935 MRN: [id number]  Room:  [facility]0/1010-A Date of Admission: 2024  Rehabilitation Diagnosis:   Diverticulitis of intestine, part unspecified, without perforation or abscess without bleeding [K57.92]  Diverticulitis [K57.92]       Date 4/10/2024       Day of ARU Week:  2   Time IN/-1010   Individual Tx Minutes 60   TOTAL Tx Time Mins 60   Restrictions Restrictions/Precautions  Restrictions/Precautions: Contact Precautions, Fall Risk  Position Activity Restriction  Other position/activity restrictions: IV lock RUE, chronic L hip pain/weakness   Communication with other providers: [x]   OK to see per nursing:     []   Spoke with team member regarding:      Subjective observations and cognitive status: Pt seen sitting up in recliner at beginning of treatment. Agreeable to therapy.    Pain level/location: 0/10          Discharge recommendations  Anticipated discharge date: 2024   Destination: []home alone   [x]home alone with assist PRN     [] home w/ family      [] Continuous supervision  []SNF    [] Assisted living     [] Other:   Continued therapy: [x]HHC PT  []OUTPATIENT  PT   [] No Further PT  Equipment needs: has SPC, 2WW, rollator, and reacher; transport chair?      Bed Mobility:           [x]   Pt received out of bed   Rolling R/L:  Independent   Scooting:  Independent   Lying --> Sit:  Independent   Sit --> lying:  Independent  Pt practiced in regular, flat bed without rails     Transfers:    Sit--> Stand:  mod I   Stand --> Sit:   mod I   Chair-->Bed/Bed --> Chair:   mod I   Car Transfers:  mod I   Assistive device required for transfer:   RW    Gait:    Walk 10 feet:  mod I   Walk 50 feet with 2 turns:  mod I   Walk 150 feet:  mod I   Other distance:  200'  Device:  RW  Gait Quality:  reciprocal pattern     Stairs   Curb: mod I   Supportive Device:  RW  Height:   4\"    4 steps:  mod I   12 steps:  mod I

## 2024-04-10 NOTE — PROGRESS NOTES
Jazmine Hutchinson    : 1935  Acct #: [id number]  MRN: [id number]              PM&R Progress Note      Admitting diagnosis: ***    Comorbid diagnoses impacting rehabilitation: ***    Chief complaint: ***    Prior (baseline) level of function: Independent.    Current level of function:         Current  IRF-TAMARA and Goals:   Occupational Therapy:    Short Term Goals  Time Frame for Short Term Goals: STGs=LTGs :   Long Term Goals  Time Frame for Long Term Goals : 10 days or until d/c.  Long Term Goal 1: Pt will complete self-feeding with IND.  Long Term Goal 2: Pt will complete oral hygiene and grooming tasks with IND.  Long Term Goal 3: Pt will complete total body bathing with AE PRN and Mod I.  Long Term Goal 4: Pt will complete UB dressing with IND.  Long Term Goal 5: Pt will complete LB dressing with AE PRN and Mod I.  Additional Goals?: Yes  Long Term Goal 6: Pt will doff/don footwear with AE PRN and Mod I.  Long Term Goal 7: Pt will complete toileting with Mod I.  Long Term Goal 8: Pt will complete functional transfers (bed, chair, shower, toilet) with DME PRN and Mod I.  Long Term Goal 9: Pt will perform therex/therax to facilitate an increase in strength/endurance with emphasis on dynamic standing balance/tolerance > 8 mins with Mod I.  Long Term Goal 10: Pt will perform an IADL with Mod I. :                                       Eating: Eating  Assistance Needed: Independent  Comment: X  CARE Score: 6  Discharge Goal: Independent       Oral Hygiene: Oral Hygiene  Assistance Needed: Independent  Comment: X  CARE Score: 6  Discharge Goal: Independent    UB/LB Bathing: Shower/Bathe Self  Assistance Needed: Independent  Comment: Mod I seated majority  CARE Score: 6  Discharge Goal: Independent    UB Dressing: Upper Body Dressing  Assistance Needed: Independent  Comment: X  CARE Score: 6  Discharge Goal: Independent         LB Dressing: Lower Body Dressing  Assistance Needed: Independent  Comment: X  CARE Score:

## 2024-04-11 VITALS
RESPIRATION RATE: 18 BRPM | HEIGHT: 62 IN | BODY MASS INDEX: 25.03 KG/M2 | SYSTOLIC BLOOD PRESSURE: 126 MMHG | TEMPERATURE: 98 F | HEART RATE: 78 BPM | DIASTOLIC BLOOD PRESSURE: 77 MMHG | WEIGHT: 136.02 LBS | OXYGEN SATURATION: 94 %

## 2024-04-11 PROCEDURE — 6360000002 HC RX W HCPCS: Performed by: STUDENT IN AN ORGANIZED HEALTH CARE EDUCATION/TRAINING PROGRAM

## 2024-04-11 PROCEDURE — 6370000000 HC RX 637 (ALT 250 FOR IP): Performed by: STUDENT IN AN ORGANIZED HEALTH CARE EDUCATION/TRAINING PROGRAM

## 2024-04-11 PROCEDURE — 94761 N-INVAS EAR/PLS OXIMETRY MLT: CPT

## 2024-04-11 PROCEDURE — 94150 VITAL CAPACITY TEST: CPT

## 2024-04-11 PROCEDURE — 6370000000 HC RX 637 (ALT 250 FOR IP): Performed by: PHYSICAL MEDICINE & REHABILITATION

## 2024-04-11 RX ORDER — GABAPENTIN 300 MG/1
300 CAPSULE ORAL 3 TIMES DAILY
Qty: 90 CAPSULE | Refills: 0 | Status: SHIPPED | OUTPATIENT
Start: 2024-04-11 | End: 2024-05-11

## 2024-04-11 RX ORDER — FOLIC ACID 1 MG/1
1 TABLET ORAL DAILY
Qty: 30 TABLET | Refills: 0 | Status: SHIPPED | OUTPATIENT
Start: 2024-04-12

## 2024-04-11 RX ORDER — METRONIDAZOLE 500 MG/1
500 TABLET ORAL EVERY 8 HOURS SCHEDULED
Qty: 9 TABLET | Refills: 0 | Status: SHIPPED | OUTPATIENT
Start: 2024-04-11 | End: 2024-04-14

## 2024-04-11 RX ORDER — LACTOBACILLUS RHAMNOSUS GG 10B CELL
1 CAPSULE ORAL 2 TIMES DAILY WITH MEALS
COMMUNITY
Start: 2024-04-11

## 2024-04-11 RX ORDER — PYRIDOSTIGMINE BROMIDE 60 MG/1
30 TABLET ORAL EVERY 8 HOURS SCHEDULED
Qty: 45 TABLET | Refills: 0 | Status: SHIPPED | OUTPATIENT
Start: 2024-04-11

## 2024-04-11 RX ADMIN — FOLIC ACID 1 MG: 1 TABLET ORAL at 08:14

## 2024-04-11 RX ADMIN — PANTOPRAZOLE SODIUM 40 MG: 40 TABLET, DELAYED RELEASE ORAL at 05:49

## 2024-04-11 RX ADMIN — METRONIDAZOLE 500 MG: 250 TABLET ORAL at 13:39

## 2024-04-11 RX ADMIN — METRONIDAZOLE 500 MG: 250 TABLET ORAL at 05:50

## 2024-04-11 RX ADMIN — PYRIDOSTIGMINE BROMIDE 30 MG: 60 TABLET ORAL at 05:50

## 2024-04-11 RX ADMIN — Medication 1000 MCG: at 08:14

## 2024-04-11 RX ADMIN — PYRIDOSTIGMINE BROMIDE 30 MG: 60 TABLET ORAL at 13:43

## 2024-04-11 RX ADMIN — Medication 1 CAPSULE: at 08:14

## 2024-04-11 RX ADMIN — Medication 1 CAPSULE: at 16:46

## 2024-04-11 RX ADMIN — LOPERAMIDE HYDROCHLORIDE 2 MG: 2 CAPSULE ORAL at 08:20

## 2024-04-11 RX ADMIN — SERTRALINE HYDROCHLORIDE 50 MG: 50 TABLET ORAL at 08:14

## 2024-04-11 RX ADMIN — ENOXAPARIN SODIUM 40 MG: 100 INJECTION SUBCUTANEOUS at 08:14

## 2024-04-11 RX ADMIN — GABAPENTIN 300 MG: 300 CAPSULE ORAL at 08:14

## 2024-04-11 RX ADMIN — GABAPENTIN 300 MG: 300 CAPSULE ORAL at 13:39

## 2024-04-11 NOTE — PROGRESS NOTES
Jazmine Hutchinson    : 1935  Acct #: [id number]  MRN: [id number]              PM&R Progress Note      Admitting diagnosis: ***    Comorbid diagnoses impacting rehabilitation: ***    Chief complaint: ***    Prior (baseline) level of function: Independent.    Current level of function:         Current  IRF-TAMARA and Goals:   Occupational Therapy:    Short Term Goals  Time Frame for Short Term Goals: STGs=LTGs :   Long Term Goals  Time Frame for Long Term Goals : 10 days or until d/c.  Long Term Goal 1: Pt will complete self-feeding with IND.  Long Term Goal 2: Pt will complete oral hygiene and grooming tasks with IND.  Long Term Goal 3: Pt will complete total body bathing with AE PRN and Mod I.  Long Term Goal 4: Pt will complete UB dressing with IND.  Long Term Goal 5: Pt will complete LB dressing with AE PRN and Mod I.  Additional Goals?: Yes  Long Term Goal 6: Pt will doff/don footwear with AE PRN and Mod I.  Long Term Goal 7: Pt will complete toileting with Mod I.  Long Term Goal 8: Pt will complete functional transfers (bed, chair, shower, toilet) with DME PRN and Mod I.  Long Term Goal 9: Pt will perform therex/therax to facilitate an increase in strength/endurance with emphasis on dynamic standing balance/tolerance > 8 mins with Mod I.  Long Term Goal 10: Pt will perform an IADL with Mod I. :                                       Eating: Eating  Assistance Needed: Independent  Comment: X  CARE Score: 6  Discharge Goal: Independent       Oral Hygiene: Oral Hygiene  Assistance Needed: Independent  Comment: X  CARE Score: 6  Discharge Goal: Independent    UB/LB Bathing: Shower/Bathe Self  Assistance Needed: Independent  Comment: X  CARE Score: 6  Discharge Goal: Independent    UB Dressing: Upper Body Dressing  Assistance Needed: Independent  Comment: X  CARE Score: 6  Discharge Goal: Independent         LB Dressing: Lower Body Dressing  Assistance Needed: Independent  Comment: X  CARE Score: 6  Discharge Goal:

## 2024-04-11 NOTE — PROGRESS NOTES
ARU Discharge Assessment - [facility]    Transportation  \"In the past 6-12 months, has lack of transportation kept you from medical appointments, meetings, work, or from getting things needed for daily living?\"Check all that apply:  [] A.  Yes, it has kept me from medical appointments or from getting my medications  [] B.  Yes, it has kept me from non-medical meetings, appointments, work, or from getting things that I need  [x] C.  No  [] X.  Patient unable to respond    Provision of Current Reconciled Medication List to Subsequent Provider at Discharge     [] No, current reconciled medication list not provided to the subsequent provider.  NO if D/C home with Outpatient or no services   [x] Yes, current reconciled medication list provided to the subsequent provider.           YES if D/C to Acute hospital, SNF, home with home health  (**We MUST Select route of transmission below**)      [] Via Electronic Health Record   [] Via Health Information Exchange Organization  [] Verbal (e.g. in person, telephone, video conferencing)  [x] Paper-based (e.g. fax, copies, printouts)   [] Other Methods (e.g. texting, email, CDs)    Provision of Current Reconciled Medication List to Patient at Discharge  [x] No, current reconciled medication list not provided to the patient, family and/or caregiver. NO If D/C to Acute hospital, SNF, home with home health   [] Yes, current reconciled medication list provided to the patient, family and/or caregiver.  YES if D/C home with Outpatient or no services   (**WE MUST Select route of transmission below**)     [] Via Electronic Health Record (e.g., electronic access to patient portal)   [] Via Health Information Exchange Organization  [] Verbal (e.g. in person, telephone, video conferencing)  [] Paper-based (e.g. fax, copies, printouts)   [] Other Methods (e.g. texting, email, CDs)    Health Literacy  \"How often do you need to have someone help you when you read instructions,

## 2024-04-11 NOTE — DISCHARGE INSTRUCTIONS
Your information:  Name: Jazmine Hutchinson  : 1935    Your instructions:    Pt will discharge home with medical supplies from   [facility]  [address] [place] [address]   [phone]      What to do after you leave the hospital:    Recommended diet: {diet:07135}    Recommended activity: {discharge activity:88827}        The following personal items were collected during your admission and were returned to you:    Belongings  Dental Appliances: None  Vision - Corrective Lenses: Eyeglasses  Hearing Aid: None  Clothing: Footwear, Shirt, Pants, Socks, Undergarments, At bedside  Jewelry: Necklace, Ring, At bedside  Electronic Devices: Cell Phone, , At bedside  Weapons (Notify Protective Services/Security): None  Home Medications: None  Valuables Given To: Patient  Provide Name(s) of Who Valuable(s) Were Given To: self    Information obtained by:  By signing below, I understand that if any problems occur once I leave the hospital I am to contact ***.  I understand and acknowledge receipt of the instructions indicated above.

## 2024-04-11 NOTE — PROGRESS NOTES
level of function for discharge home with a walker and C OT/PT is ***    Recommendations:    ***

## 2024-04-11 NOTE — PLAN OF CARE
Problem: Discharge Planning  Goal: Discharge to home or other facility with appropriate resources  4/11/2024 1245 by Shanice Roberts, RN  Outcome: Progressing  4/11/2024 0335 by Erinn vAery RN  Outcome: Progressing     Problem: Safety - Adult  Goal: Free from fall injury  4/11/2024 0335 by Erinn Avery, RN  Outcome: Progressing     Problem: ABCDS Injury Assessment  Goal: Absence of physical injury  4/11/2024 0335 by Erinn Avery, RN  Outcome: Progressing     Problem: Pain  Goal: Verbalizes/displays adequate comfort level or baseline comfort level  4/11/2024 0335 by Erinn Avery, RN  Outcome: Progressing

## 2024-04-11 NOTE — CARE COORDINATION
ARU  Discharge Summary    D/C Date: 4/11/24    Patient discharged to: home alone    Transported by: chika    Referrals made to: [facility]    Additional information: no DME needs    Caregiver training: none requested    Discharge BIMS completed:  [x]      IMM:   [x]      Discharge Assessment:    At the time of discharge,  (check all that apply)     [x]   Patient fully participated in the program and made good progress towards established goals.  []   Patient's medical status limited participation and/or progress towards established goals.  []   Patient demonstrated self-limiting behaviors that limited progress/participation towards established goals.  []   Patient did not buy into the program or instruction provided by the rehab staff.  []   Patient demonstrated inappropriate behaviors during interactions with staff.  []   Patient left against medical advice (AMA).

## 2024-04-12 ENCOUNTER — COMMUNITY CARE MANAGEMENT (OUTPATIENT)
Facility: CLINIC | Age: 89
End: 2024-04-12

## 2024-04-12 RX ORDER — PANTOPRAZOLE SODIUM 40 MG/1
40 TABLET, DELAYED RELEASE ORAL DAILY
Qty: 90 TABLET | Refills: 1 | Status: SHIPPED | OUTPATIENT
Start: 2024-04-12

## 2024-04-24 PROBLEM — I25.10 CORONARY ARTERY DISEASE INVOLVING NATIVE CORONARY ARTERY OF NATIVE HEART WITHOUT ANGINA PECTORIS: Status: ACTIVE | Noted: 2017-01-20

## 2024-04-29 ENCOUNTER — HOSPITAL ENCOUNTER (OUTPATIENT)
Dept: INFUSION THERAPY | Age: 89
Setting detail: INFUSION SERIES
Discharge: HOME | End: 2024-04-29

## 2024-05-03 ENCOUNTER — TELEPHONE (OUTPATIENT)
Dept: INTERNAL MEDICINE CLINIC | Age: 89
End: 2024-05-03

## 2024-05-03 ENCOUNTER — NURSE ONLY (OUTPATIENT)
Dept: INTERNAL MEDICINE CLINIC | Age: 89
End: 2024-05-03
Payer: MEDICARE

## 2024-05-03 DIAGNOSIS — N39.0 RECURRENT UTI: ICD-10-CM

## 2024-05-03 DIAGNOSIS — R30.0 BURNING WITH URINATION: ICD-10-CM

## 2024-05-03 DIAGNOSIS — N30.01 ACUTE CYSTITIS WITH HEMATURIA: Primary | ICD-10-CM

## 2024-05-03 DIAGNOSIS — E53.8 VITAMIN B 12 DEFICIENCY: Primary | ICD-10-CM

## 2024-05-03 LAB
BILIRUBIN, POC: NORMAL
BLOOD URINE, POC: NORMAL
CLARITY, POC: CLEAR
COLOR, POC: NORMAL
GLUCOSE URINE, POC: NORMAL
KETONES, POC: NORMAL
LEUKOCYTE EST, POC: NORMAL
NITRITE, POC: NORMAL
PH, POC: 7
PROTEIN, POC: NORMAL
SPECIFIC GRAVITY, POC: 1.01
UROBILINOGEN, POC: 0.2

## 2024-05-03 PROCEDURE — 96372 THER/PROPH/DIAG INJ SC/IM: CPT | Performed by: FAMILY MEDICINE

## 2024-05-03 PROCEDURE — 81002 URINALYSIS NONAUTO W/O SCOPE: CPT | Performed by: FAMILY MEDICINE

## 2024-05-03 RX ORDER — NITROFURANTOIN 25; 75 MG/1; MG/1
100 CAPSULE ORAL 2 TIMES DAILY
Qty: 10 CAPSULE | Refills: 0 | Status: SHIPPED | OUTPATIENT
Start: 2024-05-03 | End: 2024-05-08

## 2024-05-03 RX ORDER — CYANOCOBALAMIN 1000 UG/ML
1000 INJECTION, SOLUTION INTRAMUSCULAR; SUBCUTANEOUS ONCE
Status: COMPLETED | OUTPATIENT
Start: 2024-05-03 | End: 2024-05-03

## 2024-05-03 RX ADMIN — CYANOCOBALAMIN 1000 MCG: 1000 INJECTION, SOLUTION INTRAMUSCULAR; SUBCUTANEOUS at 11:51

## 2024-05-03 NOTE — PROGRESS NOTES
Pt came into office today for a b12 injection. She is also having burning with urination so we preformed a POCT urinalysis

## 2024-05-03 NOTE — TELEPHONE ENCOUNTER
Pt came in today for a b12 injection and expressed she was having UTI symptoms with burning with urination so I performed a poct and Kary STAUFFER reviewed and wanted sent for culture.

## 2024-05-05 LAB
BACTERIA UR CULT: ABNORMAL
ORGANISM: ABNORMAL

## 2024-05-07 ENCOUNTER — TELEPHONE (OUTPATIENT)
Dept: INTERNAL MEDICINE CLINIC | Age: 89
End: 2024-05-07

## 2024-05-07 LAB
BACTERIA UR CULT: ABNORMAL
ORGANISM: ABNORMAL

## 2024-05-09 ENCOUNTER — TELEPHONE (OUTPATIENT)
Dept: INTERNAL MEDICINE CLINIC | Age: 89
End: 2024-05-09
Payer: MEDICARE

## 2024-05-09 DIAGNOSIS — N39.0 RECURRENT UTI: Primary | ICD-10-CM

## 2024-05-09 PROCEDURE — 81002 URINALYSIS NONAUTO W/O SCOPE: CPT | Performed by: FAMILY MEDICINE

## 2024-05-09 NOTE — TELEPHONE ENCOUNTER
What medication did she finish yesterday- antibiotics (if so, which one)? And who prescribed it? I don't see any recent UA results.    Thanks!

## 2024-05-09 NOTE — TELEPHONE ENCOUNTER
Pt stated it had improved & then her symptoms came back. Pt is bringing  urine sample tomorrow to get rechecked.     Also informed pt to reach out to her urologist as well. Pot voiced understanding

## 2024-05-09 NOTE — TELEPHONE ENCOUNTER
I'm not sure how it improved and has already come back when she was still on antibiotics yesterday.  Either way, we will check a UA and culture tomorrow and treat as indicated- please send me results.  Thanks!

## 2024-05-09 NOTE — TELEPHONE ENCOUNTER
Patient called stating that she is still having UTI symptoms odor, burning, and frequent urination. Just finished medication yesterday.  Wants another antibiotic.

## 2024-05-09 NOTE — TELEPHONE ENCOUNTER
Culture showed sensitivity to the macrobid so the infection should have been treated.  Did she have improvement and symptoms are back, or never had improvement at all?  If so, we may need another UA/culture as her last infection should have responded to the macrobid.  Her only other non IV antibiotic option (per her culture) we can't use due to her PCN allergy.

## 2024-05-12 LAB
BACTERIA UR CULT: ABNORMAL
BACTERIA UR CULT: ABNORMAL
ORGANISM: ABNORMAL
ORGANISM: ABNORMAL

## 2024-05-14 LAB
BACTERIA UR CULT: ABNORMAL
BACTERIA UR CULT: ABNORMAL
ORGANISM: ABNORMAL
ORGANISM: ABNORMAL

## 2024-05-16 ENCOUNTER — TELEPHONE (OUTPATIENT)
Dept: INTERNAL MEDICINE CLINIC | Age: 89
End: 2024-05-16

## 2024-05-16 RX ORDER — NITROFURANTOIN 25; 75 MG/1; MG/1
100 CAPSULE ORAL 2 TIMES DAILY
Qty: 14 CAPSULE | Refills: 0 | Status: SHIPPED | OUTPATIENT
Start: 2024-05-16 | End: 2024-05-23

## 2024-05-16 NOTE — TELEPHONE ENCOUNTER
----- Message from Yoli Perales MA sent at 5/14/2024  3:51 PM EDT -----  Still having symptoms-Pt sees urology on may 23rd. Her allergy is that she gets hives on her stomach

## 2024-05-16 NOTE — TELEPHONE ENCOUNTER
I've called in another course of macrobid for her until she can see urology (as it sounds like she did have initial improvement with it and is likely the antibiotic she will tolerate the best), but please make sure she keeps that appointment with urology to discuss next steps in management.  Thanks.

## 2024-05-17 NOTE — TELEPHONE ENCOUNTER
Called patient and informed of test results. Patient voiced understanding. No further action needed at this time.

## 2024-05-30 ENCOUNTER — TELEPHONE (OUTPATIENT)
Dept: INTERNAL MEDICINE CLINIC | Age: 89
End: 2024-05-30

## 2024-05-30 RX ORDER — DOCUSATE SODIUM 100 MG/1
100 CAPSULE, LIQUID FILLED ORAL 2 TIMES DAILY
Qty: 60 CAPSULE | Refills: 0 | Status: SHIPPED | OUTPATIENT
Start: 2024-05-30 | End: 2024-06-29

## 2024-05-30 NOTE — TELEPHONE ENCOUNTER
Got a call from the patient, needs refill for docusate sodium and mestinon sent to [facility] in [place].     Did not see docusate sodium on med list, she also stated that the mestinon was prescribed by Dr Szymanski when he saw her in the hospital and she is not completely out. She is scheduled to come into the office tomorrow.

## 2024-05-31 ENCOUNTER — TELEPHONE (OUTPATIENT)
Dept: INTERNAL MEDICINE CLINIC | Age: 89
End: 2024-05-31

## 2024-05-31 ENCOUNTER — OFFICE VISIT (OUTPATIENT)
Dept: INTERNAL MEDICINE CLINIC | Age: 89
End: 2024-05-31

## 2024-05-31 ENCOUNTER — CARE COORDINATION (OUTPATIENT)
Dept: CARE COORDINATION | Age: 89
End: 2024-05-31

## 2024-05-31 VITALS
OXYGEN SATURATION: 98 % | HEART RATE: 75 BPM | BODY MASS INDEX: 23.55 KG/M2 | DIASTOLIC BLOOD PRESSURE: 76 MMHG | SYSTOLIC BLOOD PRESSURE: 124 MMHG | WEIGHT: 129.6 LBS

## 2024-05-31 DIAGNOSIS — I50.22 CHRONIC SYSTOLIC (CONGESTIVE) HEART FAILURE (HCC): ICD-10-CM

## 2024-05-31 DIAGNOSIS — N18.31 CKD STAGE G3A/A1, GFR 45-59 AND ALBUMIN CREATININE RATIO <30 MG/G (HCC): ICD-10-CM

## 2024-05-31 DIAGNOSIS — Z09 HOSPITAL DISCHARGE FOLLOW-UP: Primary | ICD-10-CM

## 2024-05-31 DIAGNOSIS — K57.92 DIVERTICULITIS: ICD-10-CM

## 2024-05-31 DIAGNOSIS — I10 PRIMARY HYPERTENSION: ICD-10-CM

## 2024-05-31 DIAGNOSIS — R53.1 GENERALIZED WEAKNESS: ICD-10-CM

## 2024-05-31 DIAGNOSIS — E53.8 VITAMIN B 12 DEFICIENCY: ICD-10-CM

## 2024-05-31 DIAGNOSIS — G31.84 MILD COGNITIVE IMPAIRMENT: ICD-10-CM

## 2024-05-31 RX ORDER — CYANOCOBALAMIN 1000 UG/ML
1000 INJECTION, SOLUTION INTRAMUSCULAR; SUBCUTANEOUS ONCE
Status: COMPLETED | OUTPATIENT
Start: 2024-05-31 | End: 2024-05-31

## 2024-05-31 RX ADMIN — CYANOCOBALAMIN 1000 MCG: 1000 INJECTION, SOLUTION INTRAMUSCULAR; SUBCUTANEOUS at 11:52

## 2024-05-31 SDOH — ECONOMIC STABILITY: FOOD INSECURITY: WITHIN THE PAST 12 MONTHS, YOU WORRIED THAT YOUR FOOD WOULD RUN OUT BEFORE YOU GOT MONEY TO BUY MORE.: NEVER TRUE

## 2024-05-31 SDOH — ECONOMIC STABILITY: FOOD INSECURITY: WITHIN THE PAST 12 MONTHS, THE FOOD YOU BOUGHT JUST DIDN'T LAST AND YOU DIDN'T HAVE MONEY TO GET MORE.: NEVER TRUE

## 2024-05-31 SDOH — ECONOMIC STABILITY: INCOME INSECURITY: HOW HARD IS IT FOR YOU TO PAY FOR THE VERY BASICS LIKE FOOD, HOUSING, MEDICAL CARE, AND HEATING?: NOT HARD AT ALL

## 2024-05-31 NOTE — PROGRESS NOTES
Sitting, Cuff Size: Medium Adult)   Pulse 75   Wt 58.8 kg (129 lb 9.6 oz)   SpO2 98%   BMI 23.55 kg/m²   General Appearance: alert and oriented to person, place and time, well developed and well- nourished, in no acute distress  Skin: warm and dry, no rash or erythema  Head: normocephalic and atraumatic  Eyes: pupils equal, round, and reactive to light, extraocular eye movements intact, conjunctivae normal  ENT: tympanic membrane, external ear and ear canal normal bilaterally, nose without deformity, nasal mucosa and turbinates normal without polyps  Neck: supple and non-tender without mass, no thyromegaly or thyroid nodules, no cervical lymphadenopathy  Pulmonary/Chest: clear to auscultation bilaterally- no wheezes, rales or rhonchi, normal air movement, no respiratory distress  Cardiovascular: normal rate, regular rhythm, normal S1 and S2, no murmurs, rubs, clicks, or gallops, distal pulses intact, no carotid bruits  Abdomen: soft, non-tender, non-distended, normal bowel sounds, no masses or organomegaly  Extremities: no cyanosis, clubbing or edema  Musculoskeletal: normal range of motion, no joint swelling, deformity or tenderness; walking with cane in office due to generalized weakness and muscle loss  Neurologic: reflexes normal and symmetric, no cranial nerve deficit, gait, coordination and speech normal      An electronic signature was used to authenticate this note.  --ABELARDO Morgan - CNP

## 2024-05-31 NOTE — CARE COORDINATION
ABBEY received a PCP referral stating patient no longer felt comfortable caring for herself in her home. Referral is requesting assistance with locating a potential facility for the patient.     Upon chart review, ABBEY noticed patient is enrolled with [facility] and has been assigned a [facility] Care Manager. [facility] sent [facility] a staff message inquiring on [facility] ability to provide a  for patient referral. ABBEY will await reply from [facility].

## 2024-05-31 NOTE — TELEPHONE ENCOUNTER
Patient called in this afternoon and wanted me to inform provider to hold off on what was discussed at appointment.  She states that she has things that she has to clear up at home before she jumps into any changes.

## 2024-06-03 RX ORDER — PYRIDOSTIGMINE BROMIDE 60 MG/1
TABLET ORAL
Qty: 45 TABLET | Refills: 1 | Status: SHIPPED | OUTPATIENT
Start: 2024-06-03

## 2024-06-07 ENCOUNTER — CARE COORDINATION (OUTPATIENT)
Dept: CARE COORDINATION | Age: 89
End: 2024-06-07

## 2024-06-07 NOTE — CARE COORDINATION
ACM received a reply e-mail and staff message from [facility] Care Manager who states patient had declined [facility] services and new referral would be deferred to [facility].

## 2024-06-07 NOTE — CARE COORDINATION
another in [place] that patient cannot remember, and has given her the literature on them.     Patient states she can prepare her own breakfast and lunch, but cannot stand for long periods of time to cook big dinners like she used to. Patient knows about [facility], but has declined stating she can't eat all the food that comes in the meals.     Patient reports she spent 6 weeks in [facility] in April after an inpatient hospitalization. Patient was discharged home with [facility]: PT, OT & Nursing services that just ended this week. Patient denies need for ongoing services.     Patient is active with [employer]. Patient states she has a bath aid who comes every Thursday and gives her a tub shower. Patient also has lady from [facility] that comes every other Monday for housekeeping and also brings her depends for $8 every 2 weeks.    Patient states she drives herself when she can. She states she won't drive herself if she's concerned with her driving & being on the road with other people. Patient states she knows when she's in \"mental control\" and can drive. \"I know these things\". If patient feels like she cannot drive, she knows how to utilize [employer] transportation and also has a neighbor who can take her to appointments.     [facility] again inquired about patient's willingness to enroll in care management and patient politely declined. Patient states things are running smoothly and she was just scared and apprehensive at her appointment last week.  [facility] gave patient [facility]'s contact information and encouraged patient to call [facility] if she changes her mind or would like to discuss housing options in the future. Patient verbalized understanding and thanked [facility] for the phone call.

## 2024-06-10 ENCOUNTER — TELEPHONE (OUTPATIENT)
Dept: ORTHOPEDIC SURGERY | Age: 89
End: 2024-06-10

## 2024-06-10 ENCOUNTER — OFFICE VISIT (OUTPATIENT)
Dept: ORTHOPEDIC SURGERY | Age: 89
End: 2024-06-10
Payer: MEDICARE

## 2024-06-10 VITALS — TEMPERATURE: 97.2 F | HEART RATE: 78 BPM | OXYGEN SATURATION: 96 %

## 2024-06-10 DIAGNOSIS — M19.011 PRIMARY OSTEOARTHRITIS OF RIGHT SHOULDER: Primary | ICD-10-CM

## 2024-06-10 DIAGNOSIS — M19.012 PRIMARY OSTEOARTHRITIS OF LEFT SHOULDER: ICD-10-CM

## 2024-06-10 PROCEDURE — 20610 DRAIN/INJ JOINT/BURSA W/O US: CPT | Performed by: PHYSICIAN ASSISTANT

## 2024-06-10 RX ORDER — TRIAMCINOLONE ACETONIDE 40 MG/ML
40 INJECTION, SUSPENSION INTRA-ARTICULAR; INTRAMUSCULAR ONCE
Status: COMPLETED | OUTPATIENT
Start: 2024-06-10 | End: 2024-06-10

## 2024-06-10 RX ADMIN — TRIAMCINOLONE ACETONIDE 40 MG: 40 INJECTION, SUSPENSION INTRA-ARTICULAR; INTRAMUSCULAR at 15:41

## 2024-06-10 RX ADMIN — TRIAMCINOLONE ACETONIDE 40 MG: 40 INJECTION, SUSPENSION INTRA-ARTICULAR; INTRAMUSCULAR at 15:38

## 2024-06-10 ASSESSMENT — ENCOUNTER SYMPTOMS
RESPIRATORY NEGATIVE: 1
GASTROINTESTINAL NEGATIVE: 1
EYES NEGATIVE: 1

## 2024-06-10 NOTE — PATIENT INSTRUCTIONS
Continue weight-bearing as tolerated.  Continue range of motion exercises as instructed.  Ice and elevate as needed.  Tylenol or Motrin for pain.   Follow up 3 mos   Injections given today   After your visit today you will receive a survey. Please take a few minutes to let us know how your visit was today. Thank you!

## 2024-06-10 NOTE — PROGRESS NOTES
[facility]      Previous HPI:  Jazmine Hutchinson is a 89 y.o. female that presents to the office today with recurrent bilateral shoulder pain.  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  She gets injections every 3 or 4 months.  She states that the last injections worked for a couple of weeks and then the pain started coming back.  Her left shoulder is actually bothering her more than her right shoulder today.  She rates her left shoulder pain at an 8/10.    Current HPI: Jazmine Hutcihnson is an 89-year-old female that returns the office today with recurrent bilateral shoulder pain.  She usually comes in every 3 months however she is little bit late this time because she had a severe kidney infection.    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 Payton    DVT (deep venous thrombosis) ([facility]) 1970    left leg    Former smoker     History of echocardiogram 09/21/2020    EF 55-60%, mild left ventricular hypertrophy, normal diastolic filling pattern for age, no signficiant valvular disease, no pericardial effusion     History of stress test 03/25/2021    normal LVEF calculated by the computer is probably falsely low. LVEF is 40 %    Hx of Doppler ultrasound 03/01/2018    Carotid-mild 0-49% bilateral carotid,50% stenosis right subclavian    Hyperlipidemia     Hypertension     Macular degeneration     right    Mild cognitive impairment 02/2012    MMSE 26/30    MVP (mitral valve prolapse)     trace on echo 2014    Osteoporosis 05/2012    Pancytopenia 05/2011    mild with ; Dr Felix hawk 2010    Peptic ulcer disease     Peripheral vascular disease (HCC) 01/2016    angio; dis below ankles; pletal    Prediabetes 2006    Recurrent ventral incisional hernia 2013    medial apex of GB scar    S/P CABG x 3 2003    3-vessel; Dr Gallego    Spigelian hernia

## 2024-06-10 NOTE — PROGRESS NOTES
Pt  does have significant arthritis of both shoulders. She gets injections every 3 or 4 months. She states that the last injections worked for a couple of weeks and then the pain started coming back.   She is here for another injection and X ray done in office

## 2024-06-11 ENCOUNTER — TELEPHONE (OUTPATIENT)
Dept: CARDIOLOGY CLINIC | Age: 89
End: 2024-06-11

## 2024-06-11 DIAGNOSIS — E78.2 MIXED HYPERLIPIDEMIA: Primary | ICD-10-CM

## 2024-06-11 NOTE — TELEPHONE ENCOUNTER
Patient will discuss folic acid with PCP. Asa and vascepa discontinued at discharge. Will discuss with Dr Gallego

## 2024-06-11 NOTE — TELEPHONE ENCOUNTER
Patient called to see if she is to still be   Taking Folic Acid/Vascepa/Baby Asprin  She had some changes w/her medications  Back in April when admitted please advise

## 2024-06-12 RX ORDER — FOLIC ACID 1 MG/1
1000 TABLET ORAL DAILY
Qty: 90 TABLET | Refills: 0 | Status: SHIPPED | OUTPATIENT
Start: 2024-06-12

## 2024-06-12 RX ORDER — ASPIRIN 81 MG/1
81 TABLET ORAL DAILY
COMMUNITY

## 2024-06-12 RX ORDER — ICOSAPENT ETHYL 1 G/1
2 CAPSULE ORAL 2 TIMES DAILY
Qty: 360 CAPSULE | Refills: 1 | OUTPATIENT
Start: 2024-06-12

## 2024-06-12 NOTE — TELEPHONE ENCOUNTER
Discussed with Dr Gallego, folic acid per PCP, restart 81mg ASA. Continue to hold vascepa and recheck lipid panel in 6 months.  Patient advised and voices understanding.

## 2024-06-17 ENCOUNTER — HOSPITAL ENCOUNTER (OUTPATIENT)
Dept: INFUSION THERAPY | Age: 89
Setting detail: INFUSION SERIES
Discharge: HOME | End: 2024-06-17

## 2024-06-17 DIAGNOSIS — M81.0 AGE-RELATED OSTEOPOROSIS WITHOUT CURRENT PATHOLOGICAL FRACTURE: Primary | ICD-10-CM

## 2024-06-17 RX ORDER — ALBUTEROL SULFATE 90 UG/1
4 AEROSOL, METERED RESPIRATORY (INHALATION) PRN
OUTPATIENT
Start: 2024-06-17

## 2024-06-17 RX ORDER — EPINEPHRINE 1 MG/ML
0.3 INJECTION, SOLUTION INTRAMUSCULAR; SUBCUTANEOUS PRN
OUTPATIENT
Start: 2024-06-17

## 2024-06-17 RX ORDER — ACETAMINOPHEN 325 MG/1
650 TABLET ORAL
OUTPATIENT
Start: 2024-06-17

## 2024-06-17 RX ORDER — DIPHENHYDRAMINE HYDROCHLORIDE 50 MG/ML
50 INJECTION INTRAMUSCULAR; INTRAVENOUS
OUTPATIENT
Start: 2024-06-17

## 2024-06-17 RX ORDER — ONDANSETRON 2 MG/ML
8 INJECTION INTRAMUSCULAR; INTRAVENOUS
OUTPATIENT
Start: 2024-06-17

## 2024-06-17 RX ORDER — SODIUM CHLORIDE 9 MG/ML
INJECTION, SOLUTION INTRAVENOUS CONTINUOUS
OUTPATIENT
Start: 2024-06-17

## 2024-06-17 RX ORDER — FAMOTIDINE 10 MG/ML
20 INJECTION, SOLUTION INTRAVENOUS
OUTPATIENT
Start: 2024-06-17

## 2024-06-27 ENCOUNTER — OFFICE VISIT (OUTPATIENT)
Dept: INTERNAL MEDICINE CLINIC | Age: 89
End: 2024-06-27

## 2024-06-27 VITALS
BODY MASS INDEX: 22.53 KG/M2 | WEIGHT: 124 LBS | SYSTOLIC BLOOD PRESSURE: 122 MMHG | DIASTOLIC BLOOD PRESSURE: 68 MMHG | OXYGEN SATURATION: 98 % | HEART RATE: 65 BPM

## 2024-06-27 DIAGNOSIS — N39.0 RECURRENT UTI: ICD-10-CM

## 2024-06-27 DIAGNOSIS — E53.8 VITAMIN B 12 DEFICIENCY: Primary | ICD-10-CM

## 2024-06-27 DIAGNOSIS — E55.9 VITAMIN D DEFICIENCY: ICD-10-CM

## 2024-06-27 DIAGNOSIS — I10 PRIMARY HYPERTENSION: ICD-10-CM

## 2024-06-27 DIAGNOSIS — R73.03 PREDIABETES: ICD-10-CM

## 2024-06-27 DIAGNOSIS — E78.2 MIXED HYPERLIPIDEMIA: ICD-10-CM

## 2024-06-27 RX ORDER — CYANOCOBALAMIN 1000 UG/ML
1000 INJECTION, SOLUTION INTRAMUSCULAR; SUBCUTANEOUS ONCE
Status: COMPLETED | OUTPATIENT
Start: 2024-06-27 | End: 2024-06-27

## 2024-06-27 RX ADMIN — CYANOCOBALAMIN 1000 MCG: 1000 INJECTION, SOLUTION INTRAMUSCULAR; SUBCUTANEOUS at 11:05

## 2024-06-27 ASSESSMENT — ENCOUNTER SYMPTOMS
COLOR CHANGE: 0
SHORTNESS OF BREATH: 0
ABDOMINAL PAIN: 0
SORE THROAT: 0
VOMITING: 0
CHEST TIGHTNESS: 0
DIARRHEA: 0
CONSTIPATION: 0

## 2024-06-27 NOTE — PROGRESS NOTES
Subjective:      Chief Complaint   Patient presents with    Hypertension    Follow-up       HPI:  Jazmine Hutchinson is a 89 y.o. female who presents today for follow up of chronic conditions as listed below.    Patient states she has been doing well since being home from the hospital.     Has not had any further exacerbations of GI symptoms.  Has limited herself to certain foods that she knows she tolerates.      Has followed up with urology, who told her that they would prefer her not to have a PICC.  States she has mild symptoms but is manageable and has not required further antibiotics since last month.      States she has been utilizing [employer] which has helped significantly.    Needing B12 injection today.  No acute concerns.         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 Payton    DVT (deep venous thrombosis) ([facility]) 1970    left leg    Former smoker     History of echocardiogram 09/21/2020    EF 55-60%, mild left ventricular hypertrophy, normal diastolic filling pattern for age, no signficiant valvular disease, no pericardial effusion     History of stress test 03/25/2021    normal LVEF calculated by the computer is probably falsely low. LVEF is 40 %    Hx of Doppler ultrasound 03/01/2018    Carotid-mild 0-49% bilateral carotid,50% stenosis right subclavian    Hyperlipidemia     Hypertension     Macular degeneration     right    Mild cognitive impairment 02/2012    MMSE 26/30    MVP (mitral valve prolapse)     trace on echo 2014    Osteoporosis 05/2012    Pancytopenia 05/2011    mild with ; Dr Felix hawk 2010    Peptic ulcer disease     Peripheral vascular disease (HCC) 01/2016    angio; dis below ankles; pletal    Prediabetes 2006    Recurrent ventral incisional hernia 2013    medial apex of GB scar    S/P CABG x 3 2003    3-vessel; Dr Gallego

## 2024-07-09 ENCOUNTER — HOSPITAL ENCOUNTER (OUTPATIENT)
Age: 89
Discharge: HOME | End: 2024-07-09
Payer: MEDICARE

## 2024-07-09 DIAGNOSIS — E87.1 HYPONATREMIA: ICD-10-CM

## 2024-07-09 LAB
ALBUMIN SERPL-MCNC: 4 GM/DL (ref 3.4–5)
ALP BLD-CCNC: 85 IU/L (ref 40–129)
ALT SERPL-CCNC: 15 U/L (ref 10–40)
ANION GAP SERPL CALCULATED.3IONS-SCNC: 12 MMOL/L (ref 7–16)
AST SERPL-CCNC: 32 IU/L (ref 15–37)
BACTERIA: ABNORMAL /HPF
BILIRUB SERPL-MCNC: 0.4 MG/DL (ref 0–1)
BILIRUBIN, URINE: NEGATIVE MG/DL
BLOOD, URINE: NEGATIVE
BUN SERPL-MCNC: 16 MG/DL (ref 6–23)
CALCIUM OXALATE CRYSTALS: ABNORMAL /HPF
CALCIUM SERPL-MCNC: 9.6 MG/DL (ref 8.3–10.6)
CHLORIDE BLD-SCNC: 95 MMOL/L (ref 99–110)
CLARITY, UA: CLEAR
CO2: 27 MMOL/L (ref 21–32)
COLOR, UA: YELLOW
CREAT SERPL-MCNC: 1.1 MG/DL (ref 0.6–1.1)
GFR, ESTIMATED: 49 ML/MIN/1.73M2
GLUCOSE SERPL-MCNC: 86 MG/DL (ref 70–99)
GLUCOSE URINE: NEGATIVE MG/DL
HYALINE CASTS: 5 /LPF
KETONES, URINE: ABNORMAL MG/DL
LEUKOCYTE ESTERASE, URINE: ABNORMAL
MAGNESIUM: 1.8 MG/DL (ref 1.8–2.4)
MUCUS: ABNORMAL HPF
NITRITE URINE, QUANTITATIVE: POSITIVE
PH, URINE: 5.5 (ref 5–8)
POTASSIUM SERPL-SCNC: 4.7 MMOL/L (ref 3.5–5.1)
PROTEIN UA: ABNORMAL MG/DL
RBC URINE: 8 /HPF (ref 0–6)
SODIUM BLD-SCNC: 134 MMOL/L (ref 135–145)
SPECIFIC GRAVITY UA: 1.02 (ref 1–1.03)
SQUAMOUS EPITHELIAL: 1 /HPF
TOTAL PROTEIN: 6.8 GM/DL (ref 6.4–8.2)
TRICHOMONAS: ABNORMAL /HPF
UROBILINOGEN, URINE: 1 MG/DL (ref 0.2–1)
WBC UA: 57 /HPF (ref 0–5)
YEAST: ABNORMAL /HPF

## 2024-07-09 PROCEDURE — 80053 COMPREHEN METABOLIC PANEL: CPT

## 2024-07-09 PROCEDURE — 83735 ASSAY OF MAGNESIUM: CPT

## 2024-07-09 PROCEDURE — 36415 COLL VENOUS BLD VENIPUNCTURE: CPT

## 2024-07-09 PROCEDURE — 81001 URINALYSIS AUTO W/SCOPE: CPT

## 2024-07-29 RX ORDER — GABAPENTIN 300 MG/1
300 CAPSULE ORAL 3 TIMES DAILY
Qty: 90 CAPSULE | Refills: 5 | Status: SHIPPED | OUTPATIENT
Start: 2024-07-29 | End: 2025-01-25

## 2024-07-29 RX ORDER — PYRIDOSTIGMINE BROMIDE 60 MG/1
TABLET ORAL
Qty: 45 TABLET | Refills: 1 | Status: SHIPPED | OUTPATIENT
Start: 2024-07-29

## 2024-07-29 NOTE — TELEPHONE ENCOUNTER
Got a call from [place] with [facility] in [place], patient is also really needing refill for mestinon sent in. Stated it was denied before and patient is wondering why so she offered to call. Asked for call back.

## 2024-07-31 ENCOUNTER — LAB (OUTPATIENT)
Dept: INTERNAL MEDICINE CLINIC | Age: 89
End: 2024-07-31
Payer: MEDICARE

## 2024-07-31 DIAGNOSIS — E53.8 VITAMIN B 12 DEFICIENCY: Primary | ICD-10-CM

## 2024-07-31 PROCEDURE — 96372 THER/PROPH/DIAG INJ SC/IM: CPT | Performed by: FAMILY MEDICINE

## 2024-07-31 RX ORDER — CYANOCOBALAMIN 1000 UG/ML
1000 INJECTION, SOLUTION INTRAMUSCULAR; SUBCUTANEOUS ONCE
Status: COMPLETED | OUTPATIENT
Start: 2024-07-31 | End: 2024-07-31

## 2024-07-31 RX ADMIN — CYANOCOBALAMIN 1000 MCG: 1000 INJECTION, SOLUTION INTRAMUSCULAR; SUBCUTANEOUS at 15:50

## 2024-08-06 NOTE — TELEPHONE ENCOUNTER
Pt is requesting a RX for this as the pharmacy does not carry them anymore over the counter- ferrou-seq 65 mg elemental iron plus vitamin C. Please advise.
denies pain/discomfort (Rating = 0)

## 2024-08-12 ENCOUNTER — OFFICE VISIT (OUTPATIENT)
Dept: INTERNAL MEDICINE CLINIC | Age: 89
End: 2024-08-12

## 2024-08-12 VITALS
DIASTOLIC BLOOD PRESSURE: 68 MMHG | OXYGEN SATURATION: 95 % | WEIGHT: 127 LBS | SYSTOLIC BLOOD PRESSURE: 130 MMHG | BODY MASS INDEX: 23.23 KG/M2 | HEART RATE: 80 BPM

## 2024-08-12 DIAGNOSIS — R29.6 FALLS FREQUENTLY: ICD-10-CM
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
## 2024-08-12 ASSESSMENT — ENCOUNTER SYMPTOMS
EYE DISCHARGE: 0
EYE REDNESS: 0
STRIDOR: 0
CHEST TIGHTNESS: 0
WHEEZING: 0
CHOKING: 0
DIARRHEA: 0
TROUBLE SWALLOWING: 0
FACIAL SWELLING: 0
COUGH: 0
COLOR CHANGE: 0
RHINORRHEA: 0
VOMITING: 0
EYE PAIN: 0
CONSTIPATION: 0
SORE THROAT: 0
SHORTNESS OF BREATH: 0
ABDOMINAL PAIN: 0

## 2024-08-12 ASSESSMENT — VISUAL ACUITY: OU: 1

## 2024-08-12 NOTE — PROGRESS NOTES
Subjective:      Chief Complaint   Patient presents with    Fall     About 2 weeks ago. Hit her head. Did not go to ER.     HPI:  Jazmine Hutchnison is a 89 y.o. female who presents today for fall 2 weeks ago from standing due to losing balance. Denies LOC. Hit left posterior head on the hardwood floor. Reports swelling has almost entirely resolved since then. Has hx of frequent falls. Has been using cane to help with ambulation.     Past Medical History:   Diagnosis Date    Arthritis of shoulder region, right 09/2014    [place]    Blind right eye     following right cataract surg    Chronic depression 2009    Dr. Payton    DVT (deep venous thrombosis) ([facility]) 1970    left leg    Former smoker     History of echocardiogram 09/21/2020    EF 55-60%, mild left ventricular hypertrophy, normal diastolic filling pattern for age, no signficiant valvular disease, no pericardial effusion     History of stress test 03/25/2021    normal LVEF calculated by the computer is probably falsely low. LVEF is 40 %    Hx of Doppler ultrasound 03/01/2018    Carotid-mild 0-49% bilateral carotid,50% stenosis right subclavian    Hyperlipidemia     Hypertension     Macular degeneration     right    Mild cognitive impairment 02/2012    MMSE 26/30    MVP (mitral valve prolapse)     trace on echo 2014    Osteoporosis 05/2012    Pancytopenia 05/2011    mild with ; Dr Felix hawk 2010    Peptic ulcer disease     Peripheral vascular disease (HCC) 01/2016    angio; dis below ankles; pletal    Prediabetes 2006    Recurrent ventral incisional hernia 2013    medial apex of GB scar    S/P CABG x 3 2003    3-vessel; Dr Gallego    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 Mckeon; Right side      Past Surgical History:   Procedure Laterality Date    CATARACT REMOVAL Right 2010    poor result ; blind right eye; [place]    CHOLECYSTECTOMY

## 2024-08-19 ENCOUNTER — TELEMEDICINE (OUTPATIENT)
Dept: INTERNAL MEDICINE CLINIC | Age: 89
End: 2024-08-19

## 2024-08-19 DIAGNOSIS — Z00.00 MEDICARE ANNUAL WELLNESS VISIT, SUBSEQUENT: Primary | ICD-10-CM

## 2024-08-19 ASSESSMENT — PATIENT HEALTH QUESTIONNAIRE - PHQ9
10. IF YOU CHECKED OFF ANY PROBLEMS, HOW DIFFICULT HAVE THESE PROBLEMS MADE IT FOR YOU TO DO YOUR WORK, TAKE CARE OF THINGS AT HOME, OR GET ALONG WITH OTHER PEOPLE: NOT DIFFICULT AT ALL
SUM OF ALL RESPONSES TO PHQ9 QUESTIONS 1 & 2: 0
4. FEELING TIRED OR HAVING LITTLE ENERGY: NEARLY EVERY DAY
8. MOVING OR SPEAKING SO SLOWLY THAT OTHER PEOPLE COULD HAVE NOTICED. OR THE OPPOSITE, BEING SO FIGETY OR RESTLESS THAT YOU HAVE BEEN MOVING AROUND A LOT MORE THAN USUAL: NOT AT ALL
9. THOUGHTS THAT YOU WOULD BE BETTER OFF DEAD, OR OF HURTING YOURSELF: NOT AT ALL
SUM OF ALL RESPONSES TO PHQ9 QUESTIONS 1 & 2: 0
SUM OF ALL RESPONSES TO PHQ QUESTIONS 1-9: 3
6. FEELING BAD ABOUT YOURSELF - OR THAT YOU ARE A FAILURE OR HAVE LET YOURSELF OR YOUR FAMILY DOWN: NOT AT ALL
6. FEELING BAD ABOUT YOURSELF - OR THAT YOU ARE A FAILURE OR HAVE LET YOURSELF OR YOUR FAMILY DOWN: NOT AT ALL
2. FEELING DOWN, DEPRESSED OR HOPELESS: NOT AT ALL
9. THOUGHTS THAT YOU WOULD BE BETTER OFF DEAD, OR OF HURTING YOURSELF: NOT AT ALL
SUM OF ALL RESPONSES TO PHQ QUESTIONS 1-9: 3
10. IF YOU CHECKED OFF ANY PROBLEMS, HOW DIFFICULT HAVE THESE PROBLEMS MADE IT FOR YOU TO DO YOUR WORK, TAKE CARE OF THINGS AT HOME, OR GET ALONG WITH OTHER PEOPLE: NOT DIFFICULT AT ALL
2. FEELING DOWN, DEPRESSED OR HOPELESS: NOT AT ALL
5. POOR APPETITE OR OVEREATING: NOT AT ALL
SUM OF ALL RESPONSES TO PHQ QUESTIONS 1-9: 3
SUM OF ALL RESPONSES TO PHQ QUESTIONS 1-9: 3
8. MOVING OR SPEAKING SO SLOWLY THAT OTHER PEOPLE COULD HAVE NOTICED. OR THE OPPOSITE, BEING SO FIGETY OR RESTLESS THAT YOU HAVE BEEN MOVING AROUND A LOT MORE THAN USUAL: NOT AT ALL
SUM OF ALL RESPONSES TO PHQ QUESTIONS 1-9: 3
7. TROUBLE CONCENTRATING ON THINGS, SUCH AS READING THE NEWSPAPER OR WATCHING TELEVISION: NOT AT ALL
5. POOR APPETITE OR OVEREATING: NOT AT ALL
SUM OF ALL RESPONSES TO PHQ QUESTIONS 1-9: 3
7. TROUBLE CONCENTRATING ON THINGS, SUCH AS READING THE NEWSPAPER OR WATCHING TELEVISION: NOT AT ALL
4. FEELING TIRED OR HAVING LITTLE ENERGY: NEARLY EVERY DAY
SUM OF ALL RESPONSES TO PHQ QUESTIONS 1-9: 3
1. LITTLE INTEREST OR PLEASURE IN DOING THINGS: NOT AT ALL
3. TROUBLE FALLING OR STAYING ASLEEP: NOT AT ALL
SUM OF ALL RESPONSES TO PHQ QUESTIONS 1-9: 3
1. LITTLE INTEREST OR PLEASURE IN DOING THINGS: NOT AT ALL
3. TROUBLE FALLING OR STAYING ASLEEP: NOT AT ALL

## 2024-08-19 ASSESSMENT — LIFESTYLE VARIABLES
HOW OFTEN DO YOU HAVE A DRINK CONTAINING ALCOHOL: NEVER
HOW OFTEN DO YOU HAVE A DRINK CONTAINING ALCOHOL: NEVER
HOW MANY STANDARD DRINKS CONTAINING ALCOHOL DO YOU HAVE ON A TYPICAL DAY: PATIENT DOES NOT DRINK
HOW MANY STANDARD DRINKS CONTAINING ALCOHOL DO YOU HAVE ON A TYPICAL DAY: PATIENT DOES NOT DRINK

## 2024-08-19 NOTE — PATIENT INSTRUCTIONS
Personalized Preventive Plan for Ada Jasper - 8/19/2024  Medicare offers a range of preventive health benefits. Some of the tests and screenings are paid in full while other may be subject to a deductible, co-insurance, and/or copay.    Some of these benefits include a comprehensive review of your medical history including lifestyle, illnesses that may run in your family, and various assessments and screenings as appropriate.    After reviewing your medical record and screening and assessments performed today your provider may have ordered immunizations, labs, imaging, and/or referrals for you.  A list of these orders (if applicable) as well as your Preventive Care list are included within your After Visit Summary for your review.    Other Preventive Recommendations:    A preventive eye exam performed by an eye specialist is recommended every 1-2 years to screen for glaucoma; cataracts, macular degeneration, and other eye disorders.  A preventive dental visit is recommended every 6 months.  Try to get at least 150 minutes of exercise per week or 10,000 steps per day on a pedometer .  Order or download the FREE \"Exercise & Physical Activity: Your Everyday Guide\" from [employer] [place] [employer]. Call 1-412.131.4839 or search [employer] [place] [employer] online.  You need 1396-1137 mg of calcium and 0764-6972 IU of vitamin D per day. It is possible to meet your calcium requirement with diet alone, but a vitamin D supplement is usually necessary to meet this goal.  When exposed to the sun, use a sunscreen that protects against both UVA and UVB radiation with an SPF of 30 or greater. Reapply every 2 to 3 hours or after sweating, drying off with a towel, or swimming.  Always wear a seat belt when traveling in a car. Always wear a helmet when riding a bicycle or motorcycle.

## 2024-08-19 NOTE — PROGRESS NOTES
Colesevelam     Lisinopril Other (See Comments)     Severe hyperkalemia (6) with bun >56      Macrobid [Nitrofurantoin]     Neggram [Nalidixic Acid]     Pce [Erythromycin]     Penicillins     Risperidone And Related     Welchol [Colesevelam Hcl] Other (See Comments)     constipation    Carbamazepine Nausea And Vomiting    Lovaza [Omega-3-Acid Ethyl Esters] Nausea And Vomiting    Metoprolol Nausea And Vomiting    Pyridium [Phenazopyridine] Palpitations     Prior to Visit Medications    Medication Sig Taking? Authorizing Provider   pyRIDostigmine (MESTINON) 60 MG tablet take 1/2 tablet by mouth every 8 hours Yes Jayda Díaz MD   gabapentin (NEURONTIN) 300 MG capsule Take 1 capsule by mouth 3 times daily for 180 days. Yes Jayda Díaz MD   folic acid (FOLVITE) 1 MG tablet take 1 tablet by mouth once daily Yes Kary Rhoades APRN - CNP   aspirin 81 MG EC tablet Take 1 tablet by mouth daily Yes Katherine Richardson MD   Handicap Placard MISC by Does not apply route GOOD FOR 5 YEARS Yes Kary Rhoades APRN - CNP   pantoprazole (PROTONIX) 40 MG tablet Take 1 tablet by mouth daily Yes Kary Rhoades APRN - CNP   lactobacillus (CULTURELLE) capsule Take 1 capsule by mouth 2 times daily (with meals) Yes ANA Szymanski MD   verapamil (CALAN SR) 120 MG extended release tablet Take 1 tablet by mouth nightly Yes Nathaniel Gallego MD   rosuvastatin (CRESTOR) 10 MG tablet take 1 tablet by mouth once daily Yes Kary Rhoades APRN - CNP   ferrous sulfate (IRON 325) 325 (65 Fe) MG tablet Take 1 tablet by mouth daily (with breakfast) Yes Katherine Richardson MD   Magnesium 400 MG CAPS Take 400 mg by mouth daily Yes Katherine Richardson MD   loperamide (IMODIUM) 2 MG capsule Take 1 capsule by mouth 4 times daily as needed for Diarrhea Yes Katherine Richardson MD   Cholecalciferol (VITAMIN D3) 25 MCG (1000 UT) CAPS Take by mouth daily Yes Katherine Richardson MD   Calcium Carbonate (CALCIUM 600 PO) Take by mouth

## 2024-08-19 NOTE — PATIENT INSTRUCTIONS
Personalized Preventive Plan for Ada Jasper - 8/19/2024  Medicare offers a range of preventive health benefits. Some of the tests and screenings are paid in full while other may be subject to a deductible, co-insurance, and/or copay.    Some of these benefits include a comprehensive review of your medical history including lifestyle, illnesses that may run in your family, and various assessments and screenings as appropriate.    After reviewing your medical record and screening and assessments performed today your provider may have ordered immunizations, labs, imaging, and/or referrals for you.  A list of these orders (if applicable) as well as your Preventive Care list are included within your After Visit Summary for your review.    Other Preventive Recommendations:    A preventive eye exam performed by an eye specialist is recommended every 1-2 years to screen for glaucoma; cataracts, macular degeneration, and other eye disorders.  A preventive dental visit is recommended every 6 months.  Try to get at least 150 minutes of exercise per week or 10,000 steps per day on a pedometer .  Order or download the FREE \"Exercise & Physical Activity: Your Everyday Guide\" from [employer] [place] [employer]. Call 1-381.534.7200 or search [employer] [place] [employer] online.  You need 9035-5339 mg of calcium and 1036-8976 IU of vitamin D per day. It is possible to meet your calcium requirement with diet alone, but a vitamin D supplement is usually necessary to meet this goal.  When exposed to the sun, use a sunscreen that protects against both UVA and UVB radiation with an SPF of 30 or greater. Reapply every 2 to 3 hours or after sweating, drying off with a towel, or swimming.  Always wear a seat belt when traveling in a car. Always wear a helmet when riding a bicycle or motorcycle.

## 2024-08-19 NOTE — PROGRESS NOTES
MG CAPS Take 400 mg by mouth daily Yes Katherine Richardson MD   loperamide (IMODIUM) 2 MG capsule Take 1 capsule by mouth 4 times daily as needed for Diarrhea Yes Katherine Richardson MD   Cholecalciferol (VITAMIN D3) 25 MCG (1000 UT) CAPS Take by mouth daily Yes Katherine Richardson MD   Calcium Carbonate (CALCIUM 600 PO) Take by mouth daily Yes Katherine Richardson MD   Cyanocobalamin 1000 MCG/ML KIT Inject as directed Yes Katherine Richardson MD   acetaminophen (TYLENOL) 325 mg tablet Take 2 tablets by mouth as needed for Pain Yes Katherine Richardson MD   Multiple Vitamins-Minerals (CENTRUM SILVER ADULT 50+) TABS Take 1 tablet by mouth daily Yes Katherine Richardson MD   sertraline (ZOLOFT) 50 MG tablet Take 1 tablet by mouth daily Yes Katherine Richardson MD   divalproex (DEPAKOTE ER) 250 MG extended release tablet Take 1 tablet by mouth nightly Yes Katherine Richardson MD       CareTeam (Including outside providers/suppliers regularly involved in providing care):   Patient Care Team:  Jayda Díaz MD as PCP - General (Family Medicine)  Jayda Díaz MD as PCP - Empaneled Provider  Nathaniel Gallego MD as Consulting Physician (Cardiology)  Arleen Muñiz MD as Consulting Physician (Cardiology)      Reviewed and updated this visit:  Allergies  Meds  Med Hx  Surg Hx  Soc Hx  Fam Hx      I, Kaia Herrera LPN, 8/19/2024, performed the documented evaluation under the direct supervision of the attending physician.  Jazmine Hutchinson, was evaluated through a synchronous (real-time) audio encounter. The patient (or guardian if applicable) is aware that this is a billable service, which includes applicable co-pays. This Virtual Visit was conducted with patient's (and/or legal guardian's) consent. Patient identification was verified, and a caregiver was present when appropriate.   The patient was located at Home: [address]  [facility] [address]  Provider was located at Facility (Appt

## 2024-08-20 ENCOUNTER — TELEPHONE (OUTPATIENT)
Dept: INTERNAL MEDICINE CLINIC | Age: 89
End: 2024-08-20

## 2024-08-20 DIAGNOSIS — M53.3 COCCYGEAL PAIN: Primary | ICD-10-CM

## 2024-08-20 NOTE — TELEPHONE ENCOUNTER
I called and left a VM for sidney. I called and informed ada as well and let her know we ordered XR and that she can get it done anytime

## 2024-08-20 NOTE — TELEPHONE ENCOUNTER
Jamie the Physical Therapist from [facility] called stating that the patient has been in persistent pain in sacral area. In pain sitting or laying down. Jamie was wondering if he can get a referral to an orthopedic specialist or X-rays ordered for the patient? Jamie Anaya's # [phone]

## 2024-08-21 ENCOUNTER — HOSPITAL ENCOUNTER (OUTPATIENT)
Dept: GENERAL RADIOLOGY | Age: 89
Discharge: HOME | End: 2024-08-21
Payer: MEDICARE

## 2024-08-21 ENCOUNTER — HOSPITAL ENCOUNTER (OUTPATIENT)
Age: 89
Discharge: HOME | End: 2024-08-21
Payer: MEDICARE

## 2024-08-21 DIAGNOSIS — M53.3 COCCYGEAL PAIN: ICD-10-CM

## 2024-08-21 PROCEDURE — 72220 X-RAY EXAM SACRUM TAILBONE: CPT

## 2024-08-27 RX ORDER — GABAPENTIN 300 MG/1
300 CAPSULE ORAL 3 TIMES DAILY
Qty: 90 CAPSULE | Refills: 5 | OUTPATIENT
Start: 2024-08-27

## 2024-09-05 ENCOUNTER — TELEPHONE (OUTPATIENT)
Dept: INTERNAL MEDICINE CLINIC | Age: 89
End: 2024-09-05

## 2024-09-05 RX ORDER — CYANOCOBALAMIN 1000 UG/ML
1000 INJECTION, SOLUTION INTRAMUSCULAR; SUBCUTANEOUS ONCE
Status: CANCELLED | OUTPATIENT
Start: 2024-09-05 | End: 2024-09-05

## 2024-09-05 NOTE — TELEPHONE ENCOUNTER
I called and informed Pt about medication and she voiced understanding. I also made her an Appt for b12 as her last one was 7/31/24

## 2024-09-05 NOTE — TELEPHONE ENCOUNTER
She can take Tylenol Arthritis (up to 1000mg per dose) twice a day.    She can have another B12 injection if it has been 1 month since her last one.  Thanks!

## 2024-09-05 NOTE — TELEPHONE ENCOUNTER
Patient called stating that she fell and hurt her back. Patient wants to know if she can take Tylenol Arthritis  twice a day or what the dosage says on the bottle? Patient wants to know if she is due for B-12 injection? Patient would like a call back.

## 2024-09-06 ENCOUNTER — NURSE ONLY (OUTPATIENT)
Dept: INTERNAL MEDICINE CLINIC | Age: 89
End: 2024-09-06
Payer: MEDICARE

## 2024-09-06 ENCOUNTER — TELEPHONE (OUTPATIENT)
Dept: INTERNAL MEDICINE CLINIC | Age: 89
End: 2024-09-06

## 2024-09-06 DIAGNOSIS — E53.8 VITAMIN B 12 DEFICIENCY: Primary | ICD-10-CM

## 2024-09-06 PROCEDURE — 96372 THER/PROPH/DIAG INJ SC/IM: CPT | Performed by: FAMILY MEDICINE

## 2024-09-06 RX ORDER — CYANOCOBALAMIN 1000 UG/ML
1000 INJECTION, SOLUTION INTRAMUSCULAR; SUBCUTANEOUS ONCE
Status: COMPLETED | OUTPATIENT
Start: 2024-09-06 | End: 2024-09-06

## 2024-09-06 RX ADMIN — CYANOCOBALAMIN 1000 MCG: 1000 INJECTION, SOLUTION INTRAMUSCULAR; SUBCUTANEOUS at 12:31

## 2024-09-09 ENCOUNTER — OFFICE VISIT (OUTPATIENT)
Dept: ORTHOPEDIC SURGERY | Age: 89
End: 2024-09-09
Payer: MEDICARE

## 2024-09-09 VITALS
BODY MASS INDEX: 23.37 KG/M2 | WEIGHT: 127 LBS | HEIGHT: 62 IN | RESPIRATION RATE: 14 BRPM | OXYGEN SATURATION: 98 % | HEART RATE: 80 BPM

## 2024-09-09 DIAGNOSIS — M19.012 PRIMARY OSTEOARTHRITIS OF LEFT SHOULDER: ICD-10-CM

## 2024-09-09 DIAGNOSIS — M19.011 PRIMARY OSTEOARTHRITIS OF RIGHT SHOULDER: Primary | ICD-10-CM

## 2024-09-09 PROCEDURE — 1036F TOBACCO NON-USER: CPT | Performed by: PHYSICIAN ASSISTANT

## 2024-09-09 PROCEDURE — 1090F PRES/ABSN URINE INCON ASSESS: CPT | Performed by: PHYSICIAN ASSISTANT

## 2024-09-09 PROCEDURE — G8420 CALC BMI NORM PARAMETERS: HCPCS | Performed by: PHYSICIAN ASSISTANT

## 2024-09-09 PROCEDURE — 1123F ACP DISCUSS/DSCN MKR DOCD: CPT | Performed by: PHYSICIAN ASSISTANT

## 2024-09-09 PROCEDURE — 20610 DRAIN/INJ JOINT/BURSA W/O US: CPT | Performed by: PHYSICIAN ASSISTANT

## 2024-09-09 PROCEDURE — 99212 OFFICE O/P EST SF 10 MIN: CPT | Performed by: PHYSICIAN ASSISTANT

## 2024-09-09 PROCEDURE — G8427 DOCREV CUR MEDS BY ELIG CLIN: HCPCS | Performed by: PHYSICIAN ASSISTANT

## 2024-09-09 RX ORDER — TRIAMCINOLONE ACETONIDE 40 MG/ML
40 INJECTION, SUSPENSION INTRA-ARTICULAR; INTRAMUSCULAR ONCE
Status: COMPLETED | OUTPATIENT
Start: 2024-09-09 | End: 2024-09-09

## 2024-09-09 RX ADMIN — TRIAMCINOLONE ACETONIDE 40 MG: 40 INJECTION, SUSPENSION INTRA-ARTICULAR; INTRAMUSCULAR at 14:33

## 2024-09-09 RX ADMIN — TRIAMCINOLONE ACETONIDE 40 MG: 40 INJECTION, SUSPENSION INTRA-ARTICULAR; INTRAMUSCULAR at 14:32

## 2024-09-10 ASSESSMENT — ENCOUNTER SYMPTOMS
EYES NEGATIVE: 1
RESPIRATORY NEGATIVE: 1
GASTROINTESTINAL NEGATIVE: 1

## 2024-10-01 RX ORDER — PYRIDOSTIGMINE BROMIDE 60 MG/1
TABLET ORAL
Qty: 45 TABLET | Refills: 1 | Status: SHIPPED | OUTPATIENT
Start: 2024-10-01

## 2024-10-01 NOTE — TELEPHONE ENCOUNTER
Are these new symptoms? Is there any redness/pain with the swelling? I want to make sure she doesn't need an US to rule out DVT.  If she is having any of these symptoms (or if the swelling is new onset), I would advise her to be seen at walk in clinic since I won't be here the rest of the week.  Thanks.

## 2024-10-01 NOTE — TELEPHONE ENCOUNTER
Klaudia from [facility] called in stating the patient has L lower leg tightness and swelling patient has not been wearing her compression stockings. She was instructed to wear them and to elevated legs.  Klaudia number is [phone]

## 2024-10-03 RX ORDER — PANTOPRAZOLE SODIUM 40 MG/1
40 TABLET, DELAYED RELEASE ORAL DAILY
Qty: 90 TABLET | Refills: 1 | Status: SHIPPED | OUTPATIENT
Start: 2024-10-03

## 2024-10-03 NOTE — TELEPHONE ENCOUNTER
Called patient and she informed me that she has elevated her legs over night and the swelling has gone down and has stayed down today. I informed patient that if pain, redness or heat develop to go to the walk in, urgent care or ER.  Patient expressed understanding.

## 2024-10-04 ENCOUNTER — NURSE ONLY (OUTPATIENT)
Dept: INTERNAL MEDICINE CLINIC | Age: 89
End: 2024-10-04
Payer: MEDICARE

## 2024-10-04 DIAGNOSIS — E53.8 VITAMIN B 12 DEFICIENCY: Primary | ICD-10-CM

## 2024-10-04 DIAGNOSIS — Z23 FLU VACCINE NEED: ICD-10-CM

## 2024-10-04 PROCEDURE — 90653 IIV ADJUVANT VACCINE IM: CPT | Performed by: FAMILY MEDICINE

## 2024-10-04 PROCEDURE — 96372 THER/PROPH/DIAG INJ SC/IM: CPT | Performed by: FAMILY MEDICINE

## 2024-10-04 PROCEDURE — G0008 ADMIN INFLUENZA VIRUS VAC: HCPCS | Performed by: FAMILY MEDICINE

## 2024-10-04 RX ORDER — CYANOCOBALAMIN 1000 UG/ML
1000 INJECTION, SOLUTION INTRAMUSCULAR; SUBCUTANEOUS ONCE
Status: COMPLETED | OUTPATIENT
Start: 2024-10-04 | End: 2024-10-04

## 2024-10-04 RX ADMIN — CYANOCOBALAMIN 1000 MCG: 1000 INJECTION, SOLUTION INTRAMUSCULAR; SUBCUTANEOUS at 13:25

## 2024-10-04 NOTE — PROGRESS NOTES
Vaccine Information Sheet, \"Influenza - Inactivated\"  given to Jazmine Hutchinson, or parent/legal guardian of  Jazmine Hutchinson and verbalized understanding.    Patient responses:    Have you ever had a reaction to a flu vaccine? No  Do you have any current illness?  No  Have you ever had Guillian Anchor Syndrome?  No  Do you have a serious allergy to any of the follow: Neomycin, Polymyxin, Thimerosal, eggs or egg products? No    Flu vaccine given per order. Please see immunization tab.    Risks and benefits explained.  Current VIS given.      Immunizations Administered       Name Date Dose Route    Influenza, FLUAD, (age 65 y+), IM, Trivalent PF, 0.5mL 10/4/2024 0.5 mL Intramuscular    Site: Deltoid- Left    Lot: [id number]    NDC: 53839-705-03

## 2024-10-31 ENCOUNTER — TELEPHONE (OUTPATIENT)
Dept: INTERNAL MEDICINE CLINIC | Age: 89
End: 2024-10-31

## 2024-10-31 ENCOUNTER — HOSPITAL ENCOUNTER (OUTPATIENT)
Age: 89
Discharge: HOME | End: 2024-10-31
Payer: MEDICARE

## 2024-10-31 DIAGNOSIS — I10 PRIMARY HYPERTENSION: ICD-10-CM

## 2024-10-31 DIAGNOSIS — R73.03 PREDIABETES: ICD-10-CM

## 2024-10-31 DIAGNOSIS — E78.2 MIXED HYPERLIPIDEMIA: ICD-10-CM

## 2024-10-31 DIAGNOSIS — E55.9 VITAMIN D DEFICIENCY: ICD-10-CM

## 2024-10-31 LAB
25(OH)D3 SERPL-MCNC: 74.1 NG/ML (ref 30–150)
ALBUMIN SERPL-MCNC: 3.8 G/DL (ref 3.4–5)
ALBUMIN/GLOB SERPL: 1.4 {RATIO} (ref 1.1–2.2)
ALP SERPL-CCNC: 122 U/L (ref 40–129)
ALT SERPL-CCNC: 29 U/L (ref 10–40)
ANION GAP SERPL CALCULATED.3IONS-SCNC: 11 MMOL/L (ref 9–17)
AST SERPL-CCNC: 70 U/L (ref 15–37)
BASOPHILS # BLD: 0.04 K/UL
BASOPHILS NFR BLD: 1 % (ref 0–1)
BILIRUB SERPL-MCNC: 0.5 MG/DL (ref 0–1)
BUN SERPL-MCNC: 20 MG/DL (ref 7–20)
CALCIUM SERPL-MCNC: 10.1 MG/DL (ref 8.3–10.6)
CHLORIDE SERPL-SCNC: 100 MMOL/L (ref 99–110)
CHOLEST SERPL-MCNC: 97 MG/DL (ref 125–199)
CO2 SERPL-SCNC: 26 MMOL/L (ref 21–32)
CREAT SERPL-MCNC: 1 MG/DL (ref 0.6–1.2)
EOSINOPHIL # BLD: 0.09 K/UL
EOSINOPHILS RELATIVE PERCENT: 2 % (ref 0–3)
ERYTHROCYTE [DISTWIDTH] IN BLOOD BY AUTOMATED COUNT: 14.1 % (ref 11.7–14.9)
EST. AVERAGE GLUCOSE BLD GHB EST-MCNC: 114 MG/DL
GFR, ESTIMATED: 51 ML/MIN/1.73M2
GLUCOSE SERPL-MCNC: 99 MG/DL (ref 74–99)
HBA1C MFR BLD: 5.6 % (ref 4.2–6.3)
HCT VFR BLD AUTO: 35.5 % (ref 37–47)
HDLC SERPL-MCNC: 26 MG/DL
HGB BLD-MCNC: 11.4 G/DL (ref 12.5–16)
IMM GRANULOCYTES # BLD AUTO: 0.01 K/UL
IMM GRANULOCYTES NFR BLD: 0 %
LDLC SERPL CALC-MCNC: 31 MG/DL
LYMPHOCYTES NFR BLD: 2.62 K/UL
LYMPHOCYTES RELATIVE PERCENT: 47 % (ref 24–44)
MCH RBC QN AUTO: 34.3 PG (ref 27–31)
MCHC RBC AUTO-ENTMCNC: 32.1 G/DL (ref 32–36)
MCV RBC AUTO: 106.9 FL (ref 78–100)
MONOCYTES NFR BLD: 0.51 K/UL
MONOCYTES NFR BLD: 9 % (ref 0–4)
NEUTROPHILS NFR BLD: 41 % (ref 36–66)
NEUTS SEG NFR BLD: 2.27 K/UL
PLATELET # BLD AUTO: 135 K/UL (ref 140–440)
PMV BLD AUTO: 10.4 FL (ref 7.5–11.1)
POTASSIUM SERPL-SCNC: 4.9 MMOL/L (ref 3.5–5.1)
PROT SERPL-MCNC: 6.5 G/DL (ref 6.4–8.2)
RBC # BLD AUTO: 3.32 M/UL (ref 4.2–5.4)
SODIUM SERPL-SCNC: 137 MMOL/L (ref 136–145)
TRIGL SERPL-MCNC: 200 MG/DL
WBC OTHER # BLD: 5.5 K/UL (ref 4–10.5)

## 2024-10-31 PROCEDURE — 83036 HEMOGLOBIN GLYCOSYLATED A1C: CPT

## 2024-10-31 PROCEDURE — 80061 LIPID PANEL: CPT

## 2024-10-31 PROCEDURE — 80053 COMPREHEN METABOLIC PANEL: CPT

## 2024-10-31 PROCEDURE — 36415 COLL VENOUS BLD VENIPUNCTURE: CPT

## 2024-10-31 PROCEDURE — 85025 COMPLETE CBC W/AUTO DIFF WBC: CPT

## 2024-10-31 PROCEDURE — 82306 VITAMIN D 25 HYDROXY: CPT

## 2024-10-31 NOTE — TELEPHONE ENCOUNTER
The patient called to inform the doctor that she went to [facility] to get the blood work done.

## 2024-11-04 ENCOUNTER — OFFICE VISIT (OUTPATIENT)
Dept: INTERNAL MEDICINE CLINIC | Age: 89
End: 2024-11-04

## 2024-11-04 VITALS
DIASTOLIC BLOOD PRESSURE: 78 MMHG | WEIGHT: 123 LBS | OXYGEN SATURATION: 99 % | SYSTOLIC BLOOD PRESSURE: 138 MMHG | HEART RATE: 81 BPM | HEIGHT: 62 IN | BODY MASS INDEX: 22.63 KG/M2

## 2024-11-04 DIAGNOSIS — I10 PRIMARY HYPERTENSION: ICD-10-CM

## 2024-11-04 DIAGNOSIS — E53.8 VITAMIN B 12 DEFICIENCY: Primary | ICD-10-CM

## 2024-11-04 DIAGNOSIS — R74.01 ELEVATED AST (SGOT): ICD-10-CM

## 2024-11-04 DIAGNOSIS — E78.2 MIXED HYPERLIPIDEMIA: ICD-10-CM

## 2024-11-04 RX ORDER — CYANOCOBALAMIN 1000 UG/ML
1000 INJECTION, SOLUTION INTRAMUSCULAR; SUBCUTANEOUS ONCE
Status: COMPLETED | OUTPATIENT
Start: 2024-11-04 | End: 2024-11-04

## 2024-11-04 RX ORDER — ROSUVASTATIN CALCIUM 5 MG/1
5 TABLET, COATED ORAL NIGHTLY
Qty: 90 TABLET | Refills: 1 | Status: SHIPPED | OUTPATIENT
Start: 2024-11-04

## 2024-11-04 RX ADMIN — CYANOCOBALAMIN 1000 MCG: 1000 INJECTION, SOLUTION INTRAMUSCULAR; SUBCUTANEOUS at 16:03

## 2024-11-04 ASSESSMENT — ENCOUNTER SYMPTOMS
VOMITING: 0
CONSTIPATION: 0
CHEST TIGHTNESS: 0
DIARRHEA: 0
COLOR CHANGE: 0
SHORTNESS OF BREATH: 0
ABDOMINAL PAIN: 0
SORE THROAT: 0

## 2024-11-04 NOTE — PROGRESS NOTES
Subjective:      Chief Complaint   Patient presents with    Follow-up       HPI:  Jazmine Hutchinson is a 89 y.o. female who presents today for follow up of chronic conditions as listed below.    Patient states she has been doing well.     Tremor is still present, does not feel her most recent mestinon dose increase was helpful.  However, symptoms are manageable and would prefer not to take more medication.     States GI and urinary symptoms have both been well controlled since her last appointment.  Has not needed any further antibiotics.      No recent changes to medications.     Is needing B12 injection today.     Labs reviewed.       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 Payton    DVT (deep venous thrombosis) ([facility]) 1970    left leg    Former smoker     History of echocardiogram 09/21/2020    EF 55-60%, mild left ventricular hypertrophy, normal diastolic filling pattern for age, no signficiant valvular disease, no pericardial effusion     History of stress test 03/25/2021    normal LVEF calculated by the computer is probably falsely low. LVEF is 40 %    Hx of Doppler ultrasound 03/01/2018    Carotid-mild 0-49% bilateral carotid,50% stenosis right subclavian    Hyperlipidemia     Hypertension     Macular degeneration     right    Mild cognitive impairment 02/2012    MMSE 26/30    MVP (mitral valve prolapse)     trace on echo 2014    Osteoporosis 05/2012    Pancytopenia 05/2011    mild with ; Dr Felix hawk 2010    Peptic ulcer disease     Peripheral vascular disease (HCC) 01/2016    angio; dis below ankles; pletal    Prediabetes 2006    Recurrent ventral incisional hernia 2013    medial apex of GB scar    S/P CABG x 3 2003    3-vessel; Dr Gallego    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

## 2024-11-05 DIAGNOSIS — E78.2 MIXED HYPERLIPIDEMIA: ICD-10-CM

## 2024-11-05 RX ORDER — ROSUVASTATIN CALCIUM 5 MG/1
5 TABLET, COATED ORAL NIGHTLY
Qty: 90 TABLET | Refills: 1 | OUTPATIENT
Start: 2024-11-05

## 2024-11-05 NOTE — TELEPHONE ENCOUNTER
The patient had an appt on 11/4/2024 and she said Crestor was to be refilled however, it is not at the pharmacy when she called this morning.

## 2024-11-05 NOTE — TELEPHONE ENCOUNTER
It was sent to [facility].  She can continue taking her old dose (if she has any left) until she gets her new dose in the mail.  Otherwise, she can just wait until it arrives- it won't hurt her to miss a week or so of crestor.

## 2024-11-15 RX ORDER — PYRIDOSTIGMINE BROMIDE 60 MG/1
TABLET ORAL
Qty: 45 TABLET | Refills: 1 | Status: SHIPPED | OUTPATIENT
Start: 2024-11-15

## 2024-11-29 ENCOUNTER — APPOINTMENT (OUTPATIENT)
Dept: CT IMAGING | Age: 89
DRG: 153 | End: 2024-11-29
Payer: MEDICARE

## 2024-11-29 ENCOUNTER — HOSPITAL ENCOUNTER (INPATIENT)
Age: 89
LOS: 1 days | Discharge: HOME HEALTH | DRG: 153 | End: 2024-11-30
Attending: EMERGENCY MEDICINE | Admitting: STUDENT IN AN ORGANIZED HEALTH CARE EDUCATION/TRAINING PROGRAM
Payer: MEDICARE

## 2024-11-29 ENCOUNTER — APPOINTMENT (OUTPATIENT)
Dept: GENERAL RADIOLOGY | Age: 89
DRG: 153 | End: 2024-11-29
Payer: MEDICARE

## 2024-11-29 ENCOUNTER — APPOINTMENT (OUTPATIENT)
Dept: ULTRASOUND IMAGING | Age: 89
DRG: 153 | End: 2024-11-29
Payer: MEDICARE

## 2024-11-29 ENCOUNTER — OFFICE VISIT (OUTPATIENT)
Dept: FAMILY MEDICINE CLINIC | Age: 89
End: 2024-11-29

## 2024-11-29 VITALS
SYSTOLIC BLOOD PRESSURE: 160 MMHG | TEMPERATURE: 97 F | HEART RATE: 80 BPM | OXYGEN SATURATION: 81 % | WEIGHT: 122 LBS | DIASTOLIC BLOOD PRESSURE: 60 MMHG | BODY MASS INDEX: 22.31 KG/M2 | RESPIRATION RATE: 20 BRPM

## 2024-11-29 DIAGNOSIS — I47.29 NONSUSTAINED VENTRICULAR TACHYCARDIA (HCC): Primary | ICD-10-CM

## 2024-11-29 DIAGNOSIS — R07.89 CHEST PRESSURE: ICD-10-CM

## 2024-11-29 DIAGNOSIS — R07.9 CHEST PAIN, UNSPECIFIED TYPE: Primary | ICD-10-CM

## 2024-11-29 PROBLEM — I21.4 NSTEMI (NON-ST ELEVATED MYOCARDIAL INFARCTION) (HCC): Status: ACTIVE | Noted: 2024-11-29

## 2024-11-29 LAB
ALBUMIN SERPL-MCNC: 3.5 G/DL (ref 3.4–5)
ALBUMIN/GLOB SERPL: 1.1 {RATIO} (ref 1.1–2.2)
ALP SERPL-CCNC: 120 U/L (ref 40–129)
ALT SERPL-CCNC: 21 U/L (ref 10–40)
ANION GAP SERPL CALCULATED.3IONS-SCNC: 12 MMOL/L (ref 9–17)
AST SERPL-CCNC: 59 U/L (ref 15–37)
BILIRUB SERPL-MCNC: 0.5 MG/DL (ref 0–1)
BUN SERPL-MCNC: 23 MG/DL (ref 7–20)
CALCIUM SERPL-MCNC: 9.5 MG/DL (ref 8.3–10.6)
CHLORIDE SERPL-SCNC: 102 MMOL/L (ref 99–110)
CO2 SERPL-SCNC: 24 MMOL/L (ref 21–32)
CREAT SERPL-MCNC: 1 MG/DL (ref 0.6–1.2)
D DIMER PPP FEU-MCNC: 1.02 UG/ML FEU (ref 0–0.46)
DATE LAST DOSE: ABNORMAL
ERYTHROCYTE [DISTWIDTH] IN BLOOD BY AUTOMATED COUNT: 14 % (ref 11.7–14.9)
GFR, ESTIMATED: 54 ML/MIN/1.73M2
GLUCOSE SERPL-MCNC: 91 MG/DL (ref 74–99)
HCT VFR BLD AUTO: 32.8 % (ref 37–47)
HGB BLD-MCNC: 10.7 G/DL (ref 12.5–16)
LIPASE SERPL-CCNC: 15 U/L (ref 13–60)
MAGNESIUM SERPL-MCNC: 2 MG/DL (ref 1.8–2.4)
MCH RBC QN AUTO: 34.6 PG (ref 27–31)
MCHC RBC AUTO-ENTMCNC: 32.6 G/DL (ref 32–36)
MCV RBC AUTO: 106.1 FL (ref 78–100)
PLATELET # BLD AUTO: 119 K/UL (ref 140–440)
PMV BLD AUTO: 10.3 FL (ref 7.5–11.1)
POTASSIUM SERPL-SCNC: 4 MMOL/L (ref 3.5–5.1)
PROT SERPL-MCNC: 6.6 G/DL (ref 6.4–8.2)
RBC # BLD AUTO: 3.09 M/UL (ref 4.2–5.4)
SODIUM SERPL-SCNC: 139 MMOL/L (ref 136–145)
TME LAST DOSE: ABNORMAL H
TROPONIN I SERPL HS-MCNC: 17 NG/L (ref 0–14)
TROPONIN I SERPL HS-MCNC: 19 NG/L (ref 0–14)
TSH SERPL DL<=0.05 MIU/L-ACNC: 2.31 UIU/ML (ref 0.27–4.2)
VALPROATE SERPL-MCNC: 15 UG/ML (ref 50–100)
VANCOMYCIN DOSE: ABNORMAL MG
WBC OTHER # BLD: 7 K/UL (ref 4–10.5)

## 2024-11-29 PROCEDURE — 84484 ASSAY OF TROPONIN QUANT: CPT

## 2024-11-29 PROCEDURE — 96365 THER/PROPH/DIAG IV INF INIT: CPT

## 2024-11-29 PROCEDURE — 99285 EMERGENCY DEPT VISIT HI MDM: CPT

## 2024-11-29 PROCEDURE — 96366 THER/PROPH/DIAG IV INF ADDON: CPT

## 2024-11-29 PROCEDURE — 83735 ASSAY OF MAGNESIUM: CPT

## 2024-11-29 PROCEDURE — 85379 FIBRIN DEGRADATION QUANT: CPT

## 2024-11-29 PROCEDURE — 71275 CT ANGIOGRAPHY CHEST: CPT

## 2024-11-29 PROCEDURE — 85027 COMPLETE CBC AUTOMATED: CPT

## 2024-11-29 PROCEDURE — 80164 ASSAY DIPROPYLACETIC ACD TOT: CPT

## 2024-11-29 PROCEDURE — 6360000004 HC RX CONTRAST MEDICATION: Performed by: EMERGENCY MEDICINE

## 2024-11-29 PROCEDURE — 1200000000 HC SEMI PRIVATE

## 2024-11-29 PROCEDURE — 80053 COMPREHEN METABOLIC PANEL: CPT

## 2024-11-29 PROCEDURE — 6360000002 HC RX W HCPCS: Performed by: EMERGENCY MEDICINE

## 2024-11-29 PROCEDURE — 83690 ASSAY OF LIPASE: CPT

## 2024-11-29 PROCEDURE — 84443 ASSAY THYROID STIM HORMONE: CPT

## 2024-11-29 PROCEDURE — 93005 ELECTROCARDIOGRAM TRACING: CPT | Performed by: EMERGENCY MEDICINE

## 2024-11-29 PROCEDURE — 93971 EXTREMITY STUDY: CPT

## 2024-11-29 PROCEDURE — 71045 X-RAY EXAM CHEST 1 VIEW: CPT

## 2024-11-29 RX ORDER — SERTRALINE HYDROCHLORIDE 25 MG/1
TABLET, FILM COATED ORAL
COMMUNITY
Start: 2024-09-17

## 2024-11-29 RX ORDER — MAGNESIUM SULFATE IN WATER 40 MG/ML
2000 INJECTION, SOLUTION INTRAVENOUS ONCE
Status: COMPLETED | OUTPATIENT
Start: 2024-11-29 | End: 2024-11-29

## 2024-11-29 RX ORDER — ONDANSETRON 4 MG/1
TABLET, ORALLY DISINTEGRATING ORAL
COMMUNITY
Start: 2024-09-13

## 2024-11-29 RX ORDER — ACETAMINOPHEN 500 MG
1000 TABLET ORAL ONCE
Status: COMPLETED | OUTPATIENT
Start: 2024-11-29 | End: 2024-11-30

## 2024-11-29 RX ORDER — PROMETHAZINE HYDROCHLORIDE 6.25 MG/5ML
SYRUP ORAL
COMMUNITY
Start: 2024-09-18

## 2024-11-29 RX ORDER — IOPAMIDOL 755 MG/ML
75 INJECTION, SOLUTION INTRAVASCULAR
Status: COMPLETED | OUTPATIENT
Start: 2024-11-29 | End: 2024-11-29

## 2024-11-29 RX ADMIN — IOPAMIDOL 75 ML: 755 INJECTION, SOLUTION INTRAVENOUS at 21:04

## 2024-11-29 RX ADMIN — MAGNESIUM SULFATE HEPTAHYDRATE 2000 MG: 40 INJECTION, SOLUTION INTRAVENOUS at 19:17

## 2024-11-29 ASSESSMENT — ENCOUNTER SYMPTOMS
CONSTIPATION: 0
CHEST TIGHTNESS: 1
COUGH: 0
PHOTOPHOBIA: 0
EYE DISCHARGE: 0
DIARRHEA: 0
NAUSEA: 0
BLOOD IN STOOL: 0
RHINORRHEA: 0
VOMITING: 0
EYE PAIN: 0
BACK PAIN: 1
SHORTNESS OF BREATH: 0
EYE REDNESS: 0
SORE THROAT: 0
ABDOMINAL PAIN: 0
WHEEZING: 0
COLOR CHANGE: 0

## 2024-11-29 ASSESSMENT — PAIN - FUNCTIONAL ASSESSMENT: PAIN_FUNCTIONAL_ASSESSMENT: NONE - DENIES PAIN

## 2024-11-29 NOTE — ED PROVIDER NOTES
[facility]    Emergency Department Encounter      Patient: Jazmine Hutchinson  MRN: [id number]  : 1935  Date of Evaluation: 2024  ED Provider:  Belinda Pabon DO    Triage Chief Complaint:   Chest Pain (Since 0100, was seen at a clinic, and was asked to come by squad due to cardiac history. Patient also states she has back pain but relates this to a recent fall)    [place]:  Jazmine Hutchinson is a 89 y.o. female who  has a past medical history of Arthritis of shoulder region, right, Blind right eye, Chronic depression, DVT (deep venous thrombosis) (HCC), Former smoker, History of echocardiogram, History of stress test, Hx of Doppler ultrasound, Hyperlipidemia, Hypertension, Macular degeneration, Mild cognitive impairment, MVP (mitral valve prolapse), Osteoporosis, Pancytopenia, Peptic ulcer disease, Peripheral vascular disease (HCC), Prediabetes, Recurrent ventral incisional hernia, S/P CABG x 3, Spigelian hernia, Supraventricular tachycardia (HCC), and [place]. and presents with   Woke up at 1 am with central chest heaviness.  Had a glob of phlegm come up.  Takes tylenol for arthritis.  Also had pain in her upper back from shoulders to waist.  Took tylenol and was fine.  Had breakfast this morning, took home meds.  She was hoarse and felt tight in her chest.      Called her doctor, went to urgent care and was brought to ED.    No blood in phlegm.  Has hx of DVT a long time ago and is currently on baby aspirin.    EMS - 1am chest pain, heart house, appt at clinic, hx of cabg, recommended she come in for cardiac workup.  Recent fall 2 months ago.  Had PVCs on telemetry when she had chest pain.    Declines nitroglycerin.  Said \"I can't tell\" when asked if she is having chest pain or pressure right now.    Had a couple of times where she felt like she was having a hot flash.    Resting tremor.  Takes mestinon for tremor.      Taking verapamil at bedtime.    Lives at home by herself.    ROS - see  by mouth daily (with breakfast)      Magnesium 400 MG CAPS Take 400 mg by mouth daily      loperamide (IMODIUM) 2 MG capsule Take 1 capsule by mouth 4 times daily as needed for Diarrhea      Cholecalciferol (VITAMIN D3) 25 MCG (1000 UT) CAPS Take by mouth daily      Calcium Carbonate (CALCIUM 600 PO) Take by mouth daily      Cyanocobalamin 1000 MCG/ML KIT Inject as directed      acetaminophen (TYLENOL) 325 mg tablet Take 2 tablets by mouth as needed for Pain      Multiple Vitamins-Minerals (CENTRUM SILVER ADULT 50+) TABS Take 1 tablet by mouth daily      divalproex (DEPAKOTE ER) 250 MG extended release tablet Take 1 tablet by mouth nightly       Allergies   Allergen Reactions    Alendronate Sodium Other (See Comments)     GI upset    Bactrim [Sulfamethoxazole-Trimethoprim] Anaphylaxis    Boniva [Ibandronate Sodium] Other (See Comments)     Gi upset and declined egd; also to fosamax    Colesevelam     Lisinopril Other (See Comments)     Severe hyperkalemia (6) with bun >56      Macrobid [Nitrofurantoin]     Neggram [Nalidixic Acid]     Omega-3-Acid Ethyl Esters     Pce [Erythromycin]     Penicillins     Risperidone And Related     Welchol [Colesevelam Hcl] Other (See Comments)     constipation    Carbamazepine Nausea And Vomiting    Lovaza [Omega-3-Acid Ethyl Esters (Fish)] Nausea And Vomiting    Metoprolol Nausea And Vomiting    Pyridium [Phenazopyridine] Palpitations       Nursing Notes Reviewed    Physical Exam:  Triage VS:    ED Triage Vitals [11/29/24 1556]   Encounter Vitals Group      BP (!) 158/47      Systolic BP Percentile       Diastolic BP Percentile       Pulse 78      Respirations 16      Temp 97.8 °F (36.6 °C)      Temp Source Oral      SpO2 94 %      Weight       Height       Head Circumference       Peak Flow       Pain Score       Pain Loc       Pain Education       Exclude from Growth Chart        My pulse ox interpretation is - normal    General appearance:  Mild acute distress.   Skin:  Warm.  Dry.   Eye:  Extraocular movements intact.     Ears, nose, mouth and throat:  Oral mucosa moist   Neck:  Trachea midline.   Extremity:  No swelling.  Normal ROM     Heart:  Regular rate and rhythm, normal S1 & S2, no extra heart sounds.    Perfusion:  intact  Respiratory:  Lungs clear to auscultation bilaterally.  Respirations nonlabored.     Abdominal:  Normal bowel sounds.  Soft.  No tenderness.  Non distended.  Back:  No CVA tenderness to palpation     Neurological:  Alert and oriented times 3.  No focal neuro deficits.             Psychiatric:  Appropriate      MDM:  CC/HPI Summary, DDx, ED Course, and Reassessment:   89-year-old female who had chest pressure and had PVCs on the monitor for EMS and at the ER as well.  Labs and imaging performed.  She did have an elevated D-dimer so CT PE protocol was ordered.  She has a history of DVT.  Magnesium was also ordered.  Dr. Barros was consulted.  CT negative for PE.  Patient requested tylenol which she takes for chronic back pain.  Will hospitalize for further treatment and assessment.        I have reviewed and interpreted all of the currently available lab results from this visit (if applicable):  Results for orders placed or performed during the hospital encounter of 11/29/24   CBC   Result Value Ref Range    WBC 7.0 4.0 - 10.5 k/uL    RBC 3.09 (L) 4.20 - 5.40 m/uL    Hemoglobin 10.7 (L) 12.5 - 16.0 g/dL    Hematocrit 32.8 (L) 37.0 - 47.0 %    .1 (H) 78.0 - 100.0 fL    MCH 34.6 (H) 27.0 - 31.0 pg    MCHC 32.6 32.0 - 36.0 g/dL    RDW 14.0 11.7 - 14.9 %    Platelets 119 (L) 140 - 440 k/uL    MPV 10.3 7.5 - 11.1 fL   Comprehensive Metabolic Panel   Result Value Ref Range    Sodium 139 136 - 145 mmol/L    Potassium 4.0 3.5 - 5.1 mmol/L    Chloride 102 99 - 110 mmol/L    CO2 24 21 - 32 mmol/L    Anion Gap 12 9 - 17 mmol/L    Glucose 91 74 - 99 mg/dL    BUN 23 (H) 7 - 20 mg/dL    Creatinine 1.0 0.6 - 1.2 mg/dL    Est, Glom Filt Rate 54 (L) >60 mL/min/1.73m2

## 2024-11-29 NOTE — PROGRESS NOTES
Jazmine Hutchinson (:  1935) is a 89 y.o. female,Established patient, here for evaluation of the following chief complaint(s):    Chest Pain and Back Pain        ASSESSMENT/PLAN:  Assessment & Plan    1. Chest pain, unspecified type  Patient presents to clinic with chest pain and pressure radiating through to her back, oxygen saturation did not get higher than 81%, patient appears anxious and states she has a concern for heart attack.  Somewhat wide pulse pressure 160/60, concern for possible acute coronary syndrome versus aortic dissection etc.  Patient does not feel comfortable transporting herself to the emergency department, EMS was called and patient was transported to hospital via EMS staff.  Handoff report provided.    No follow-ups on file.      SUBJECTIVE/OBJECTIVE:    History of Present Illness  Jazmine Hutchinson is a pleasant 89 y.o. female presenting to clinic today for chest pain/back pain/chest tightness.  Patient reports she woke up in the middle of the night with chest pain and pressure, reports she took some Tylenol and was able to go back to bed however woke up this morning with persistent chest pain and pressure radiating through to her back; reports that she took some oral Tylenol and pain is manageable.  Reports that her primary care provider told her to come to the walk-in clinic.  Patient has history of CABG procedure in .  Patient denies cough, denies lightheadedness, palpitations or syncope.          Allergies   Allergen Reactions    Alendronate Sodium Other (See Comments)     GI upset    Bactrim [Sulfamethoxazole-Trimethoprim] Anaphylaxis    Boniva [Ibandronate Sodium] Other (See Comments)     Gi upset and declined egd; also to fosamax    Colesevelam     Lisinopril Other (See Comments)     Severe hyperkalemia (6) with bun >56      Macrobid [Nitrofurantoin]     Neggram [Nalidixic Acid]     Omega-3-Acid Ethyl Esters     Pce [Erythromycin]     Penicillins     Risperidone And Related     Welchol

## 2024-11-30 ENCOUNTER — APPOINTMENT (OUTPATIENT)
Dept: MRI IMAGING | Age: 89
DRG: 153 | End: 2024-11-30
Payer: MEDICARE

## 2024-11-30 VITALS
HEART RATE: 78 BPM | OXYGEN SATURATION: 100 % | DIASTOLIC BLOOD PRESSURE: 54 MMHG | RESPIRATION RATE: 16 BRPM | HEIGHT: 62 IN | BODY MASS INDEX: 22.16 KG/M2 | SYSTOLIC BLOOD PRESSURE: 136 MMHG | WEIGHT: 120.4 LBS | TEMPERATURE: 97.5 F

## 2024-11-30 PROBLEM — I47.29 NONSUSTAINED VENTRICULAR TACHYCARDIA (HCC): Status: ACTIVE | Noted: 2024-11-30

## 2024-11-30 PROBLEM — R07.9 CHEST PAIN: Status: ACTIVE | Noted: 2024-11-30

## 2024-11-30 LAB
ALBUMIN SERPL-MCNC: 3.2 G/DL (ref 3.4–5)
ALBUMIN/GLOB SERPL: 1.1 {RATIO} (ref 1.1–2.2)
ALP SERPL-CCNC: 114 U/L (ref 40–129)
ALT SERPL-CCNC: 18 U/L (ref 10–40)
ANION GAP SERPL CALCULATED.3IONS-SCNC: 11 MMOL/L (ref 9–17)
AST SERPL-CCNC: 54 U/L (ref 15–37)
B PARAP IS1001 DNA NPH QL NAA+NON-PROBE: NOT DETECTED
B PERT DNA SPEC QL NAA+PROBE: NOT DETECTED
BASOPHILS # BLD: 0.04 K/UL
BASOPHILS NFR BLD: 1 % (ref 0–1)
BILIRUB SERPL-MCNC: 0.5 MG/DL (ref 0–1)
BUN SERPL-MCNC: 20 MG/DL (ref 7–20)
C PNEUM DNA NPH QL NAA+NON-PROBE: NOT DETECTED
CALCIUM SERPL-MCNC: 9.3 MG/DL (ref 8.3–10.6)
CHLORIDE SERPL-SCNC: 102 MMOL/L (ref 99–110)
CO2 SERPL-SCNC: 25 MMOL/L (ref 21–32)
CREAT SERPL-MCNC: 1 MG/DL (ref 0.6–1.2)
EKG ATRIAL RATE: 82 BPM
EKG DIAGNOSIS: NORMAL
EKG P AXIS: 67 DEGREES
EKG P-R INTERVAL: 172 MS
EKG Q-T INTERVAL: 400 MS
EKG QRS DURATION: 88 MS
EKG QTC CALCULATION (BAZETT): 467 MS
EKG R AXIS: 9 DEGREES
EKG T AXIS: -6 DEGREES
EKG VENTRICULAR RATE: 82 BPM
EOSINOPHIL # BLD: 0.13 K/UL
EOSINOPHILS RELATIVE PERCENT: 2 % (ref 0–3)
ERYTHROCYTE [DISTWIDTH] IN BLOOD BY AUTOMATED COUNT: 14 % (ref 11.7–14.9)
FLUAV RNA NPH QL NAA+NON-PROBE: NOT DETECTED
FLUBV RNA NPH QL NAA+NON-PROBE: NOT DETECTED
GFR, ESTIMATED: 52 ML/MIN/1.73M2
GLUCOSE SERPL-MCNC: 126 MG/DL (ref 74–99)
HADV DNA NPH QL NAA+NON-PROBE: NOT DETECTED
HCOV 229E RNA NPH QL NAA+NON-PROBE: NOT DETECTED
HCOV HKU1 RNA NPH QL NAA+NON-PROBE: NOT DETECTED
HCOV NL63 RNA NPH QL NAA+NON-PROBE: NOT DETECTED
HCOV OC43 RNA NPH QL NAA+NON-PROBE: NOT DETECTED
HCT VFR BLD AUTO: 33.1 % (ref 37–47)
HGB BLD-MCNC: 10.9 G/DL (ref 12.5–16)
HMPV RNA NPH QL NAA+NON-PROBE: NOT DETECTED
HPIV1 RNA NPH QL NAA+NON-PROBE: NOT DETECTED
HPIV2 RNA NPH QL NAA+NON-PROBE: NOT DETECTED
HPIV3 RNA NPH QL NAA+NON-PROBE: NOT DETECTED
HPIV4 RNA NPH QL NAA+NON-PROBE: NOT DETECTED
IMM GRANULOCYTES # BLD AUTO: 0.01 K/UL
IMM GRANULOCYTES NFR BLD: 0 %
INR PPP: 1.1
LYMPHOCYTES NFR BLD: 2.02 K/UL
LYMPHOCYTES RELATIVE PERCENT: 33 % (ref 24–44)
M PNEUMO DNA NPH QL NAA+NON-PROBE: NOT DETECTED
MCH RBC QN AUTO: 34.8 PG (ref 27–31)
MCHC RBC AUTO-ENTMCNC: 32.9 G/DL (ref 32–36)
MCV RBC AUTO: 105.8 FL (ref 78–100)
MONOCYTES NFR BLD: 0.71 K/UL
MONOCYTES NFR BLD: 12 % (ref 0–4)
NEUTROPHILS NFR BLD: 52 % (ref 36–66)
NEUTS SEG NFR BLD: 3.15 K/UL
PLATELET # BLD AUTO: 120 K/UL (ref 140–440)
PMV BLD AUTO: 9.9 FL (ref 7.5–11.1)
POTASSIUM SERPL-SCNC: 4.2 MMOL/L (ref 3.5–5.1)
PROT SERPL-MCNC: 6.1 G/DL (ref 6.4–8.2)
PROTHROMBIN TIME: 14.9 SEC (ref 11.7–14.5)
RBC # BLD AUTO: 3.13 M/UL (ref 4.2–5.4)
RSV RNA NPH QL NAA+NON-PROBE: NOT DETECTED
RV+EV RNA NPH QL NAA+NON-PROBE: NOT DETECTED
SARS-COV-2 RNA NPH QL NAA+NON-PROBE: NOT DETECTED
SODIUM SERPL-SCNC: 138 MMOL/L (ref 136–145)
SPECIMEN DESCRIPTION: NORMAL
WBC OTHER # BLD: 6.1 K/UL (ref 4–10.5)

## 2024-11-30 PROCEDURE — APPNB30 APP NON BILLABLE TIME 0-30 MINS

## 2024-11-30 PROCEDURE — 96372 THER/PROPH/DIAG INJ SC/IM: CPT

## 2024-11-30 PROCEDURE — 94761 N-INVAS EAR/PLS OXIMETRY MLT: CPT

## 2024-11-30 PROCEDURE — 93010 ELECTROCARDIOGRAM REPORT: CPT | Performed by: INTERNAL MEDICINE

## 2024-11-30 PROCEDURE — 85025 COMPLETE CBC W/AUTO DIFF WBC: CPT

## 2024-11-30 PROCEDURE — 93005 ELECTROCARDIOGRAM TRACING: CPT

## 2024-11-30 PROCEDURE — 72148 MRI LUMBAR SPINE W/O DYE: CPT

## 2024-11-30 PROCEDURE — 80053 COMPREHEN METABOLIC PANEL: CPT

## 2024-11-30 PROCEDURE — 6360000002 HC RX W HCPCS: Performed by: STUDENT IN AN ORGANIZED HEALTH CARE EDUCATION/TRAINING PROGRAM

## 2024-11-30 PROCEDURE — 6370000000 HC RX 637 (ALT 250 FOR IP): Performed by: EMERGENCY MEDICINE

## 2024-11-30 PROCEDURE — 85610 PROTHROMBIN TIME: CPT

## 2024-11-30 PROCEDURE — 6370000000 HC RX 637 (ALT 250 FOR IP): Performed by: STUDENT IN AN ORGANIZED HEALTH CARE EDUCATION/TRAINING PROGRAM

## 2024-11-30 PROCEDURE — 99222 1ST HOSP IP/OBS MODERATE 55: CPT | Performed by: INTERNAL MEDICINE

## 2024-11-30 PROCEDURE — 0202U NFCT DS 22 TRGT SARS-COV-2: CPT

## 2024-11-30 PROCEDURE — 2580000003 HC RX 258: Performed by: STUDENT IN AN ORGANIZED HEALTH CARE EDUCATION/TRAINING PROGRAM

## 2024-11-30 PROCEDURE — 36415 COLL VENOUS BLD VENIPUNCTURE: CPT

## 2024-11-30 PROCEDURE — G0378 HOSPITAL OBSERVATION PER HR: HCPCS

## 2024-11-30 RX ORDER — ROSUVASTATIN CALCIUM 5 MG/1
5 TABLET, COATED ORAL NIGHTLY
Status: DISCONTINUED | OUTPATIENT
Start: 2024-11-30 | End: 2024-11-30

## 2024-11-30 RX ORDER — LOPERAMIDE HYDROCHLORIDE 2 MG/1
2 CAPSULE ORAL 4 TIMES DAILY PRN
Status: DISCONTINUED | OUTPATIENT
Start: 2024-11-30 | End: 2024-11-30 | Stop reason: HOSPADM

## 2024-11-30 RX ORDER — ONDANSETRON 4 MG/1
4 TABLET, ORALLY DISINTEGRATING ORAL EVERY 8 HOURS PRN
Status: DISCONTINUED | OUTPATIENT
Start: 2024-11-30 | End: 2024-11-30 | Stop reason: HOSPADM

## 2024-11-30 RX ORDER — ROSUVASTATIN CALCIUM 20 MG/1
40 TABLET, COATED ORAL NIGHTLY
Status: DISCONTINUED | OUTPATIENT
Start: 2024-11-30 | End: 2024-11-30

## 2024-11-30 RX ORDER — DIVALPROEX SODIUM 250 MG/1
250 TABLET, FILM COATED, EXTENDED RELEASE ORAL NIGHTLY
Status: DISCONTINUED | OUTPATIENT
Start: 2024-11-30 | End: 2024-11-30 | Stop reason: HOSPADM

## 2024-11-30 RX ORDER — POTASSIUM CHLORIDE 7.45 MG/ML
10 INJECTION INTRAVENOUS PRN
Status: DISCONTINUED | OUTPATIENT
Start: 2024-11-30 | End: 2024-11-30 | Stop reason: HOSPADM

## 2024-11-30 RX ORDER — NITROGLYCERIN 0.4 MG/1
0.4 TABLET SUBLINGUAL EVERY 5 MIN PRN
Status: DISCONTINUED | OUTPATIENT
Start: 2024-11-30 | End: 2024-11-30 | Stop reason: HOSPADM

## 2024-11-30 RX ORDER — LANOLIN ALCOHOL/MO/W.PET/CERES
400 CREAM (GRAM) TOPICAL DAILY
Status: DISCONTINUED | OUTPATIENT
Start: 2024-11-30 | End: 2024-11-30 | Stop reason: HOSPADM

## 2024-11-30 RX ORDER — POTASSIUM CHLORIDE 1500 MG/1
40 TABLET, EXTENDED RELEASE ORAL PRN
Status: DISCONTINUED | OUTPATIENT
Start: 2024-11-30 | End: 2024-11-30 | Stop reason: HOSPADM

## 2024-11-30 RX ORDER — SODIUM CHLORIDE 0.9 % (FLUSH) 0.9 %
5-40 SYRINGE (ML) INJECTION PRN
Status: DISCONTINUED | OUTPATIENT
Start: 2024-11-30 | End: 2024-11-30 | Stop reason: HOSPADM

## 2024-11-30 RX ORDER — ASPIRIN 81 MG/1
81 TABLET, CHEWABLE ORAL DAILY
Status: DISCONTINUED | OUTPATIENT
Start: 2024-11-30 | End: 2024-11-30 | Stop reason: HOSPADM

## 2024-11-30 RX ORDER — ONDANSETRON 2 MG/ML
4 INJECTION INTRAMUSCULAR; INTRAVENOUS EVERY 6 HOURS PRN
Status: DISCONTINUED | OUTPATIENT
Start: 2024-11-30 | End: 2024-11-30 | Stop reason: HOSPADM

## 2024-11-30 RX ORDER — ROSUVASTATIN CALCIUM 5 MG/1
5 TABLET, COATED ORAL NIGHTLY
Status: DISCONTINUED | OUTPATIENT
Start: 2024-11-30 | End: 2024-11-30 | Stop reason: HOSPADM

## 2024-11-30 RX ORDER — PYRIDOSTIGMINE BROMIDE 60 MG/1
30 TABLET ORAL EVERY 8 HOURS SCHEDULED
Status: DISCONTINUED | OUTPATIENT
Start: 2024-11-30 | End: 2024-11-30 | Stop reason: HOSPADM

## 2024-11-30 RX ORDER — ACETAMINOPHEN 650 MG/1
650 SUPPOSITORY RECTAL EVERY 6 HOURS PRN
Status: DISCONTINUED | OUTPATIENT
Start: 2024-11-30 | End: 2024-11-30 | Stop reason: HOSPADM

## 2024-11-30 RX ORDER — ENOXAPARIN SODIUM 100 MG/ML
1 INJECTION SUBCUTANEOUS 2 TIMES DAILY
Status: DISCONTINUED | OUTPATIENT
Start: 2024-11-30 | End: 2024-11-30

## 2024-11-30 RX ORDER — FOLIC ACID 1 MG/1
1000 TABLET ORAL DAILY
Status: DISCONTINUED | OUTPATIENT
Start: 2024-11-30 | End: 2024-11-30 | Stop reason: HOSPADM

## 2024-11-30 RX ORDER — ACETAMINOPHEN 325 MG/1
650 TABLET ORAL EVERY 6 HOURS PRN
Status: DISCONTINUED | OUTPATIENT
Start: 2024-11-30 | End: 2024-11-30 | Stop reason: HOSPADM

## 2024-11-30 RX ORDER — SODIUM CHLORIDE 0.9 % (FLUSH) 0.9 %
5-40 SYRINGE (ML) INJECTION EVERY 12 HOURS SCHEDULED
Status: DISCONTINUED | OUTPATIENT
Start: 2024-11-30 | End: 2024-11-30 | Stop reason: HOSPADM

## 2024-11-30 RX ORDER — SODIUM CHLORIDE 9 MG/ML
INJECTION, SOLUTION INTRAVENOUS PRN
Status: DISCONTINUED | OUTPATIENT
Start: 2024-11-30 | End: 2024-11-30 | Stop reason: HOSPADM

## 2024-11-30 RX ORDER — GABAPENTIN 300 MG/1
300 CAPSULE ORAL 3 TIMES DAILY
Status: DISCONTINUED | OUTPATIENT
Start: 2024-11-30 | End: 2024-11-30 | Stop reason: HOSPADM

## 2024-11-30 RX ORDER — PANTOPRAZOLE SODIUM 40 MG/1
40 TABLET, DELAYED RELEASE ORAL DAILY
Status: DISCONTINUED | OUTPATIENT
Start: 2024-11-30 | End: 2024-11-30 | Stop reason: HOSPADM

## 2024-11-30 RX ORDER — FERROUS SULFATE 325(65) MG
325 TABLET ORAL
Status: DISCONTINUED | OUTPATIENT
Start: 2024-11-30 | End: 2024-11-30 | Stop reason: HOSPADM

## 2024-11-30 RX ORDER — POLYETHYLENE GLYCOL 3350 17 G/17G
17 POWDER, FOR SOLUTION ORAL DAILY PRN
Status: DISCONTINUED | OUTPATIENT
Start: 2024-11-30 | End: 2024-11-30 | Stop reason: HOSPADM

## 2024-11-30 RX ORDER — VERAPAMIL HYDROCHLORIDE 120 MG/1
120 TABLET, FILM COATED, EXTENDED RELEASE ORAL NIGHTLY
Status: DISCONTINUED | OUTPATIENT
Start: 2024-11-30 | End: 2024-11-30 | Stop reason: HOSPADM

## 2024-11-30 RX ORDER — MAGNESIUM SULFATE IN WATER 40 MG/ML
2000 INJECTION, SOLUTION INTRAVENOUS PRN
Status: DISCONTINUED | OUTPATIENT
Start: 2024-11-30 | End: 2024-11-30 | Stop reason: HOSPADM

## 2024-11-30 RX ORDER — ENOXAPARIN SODIUM 100 MG/ML
40 INJECTION SUBCUTANEOUS DAILY
Status: DISCONTINUED | OUTPATIENT
Start: 2024-12-01 | End: 2024-11-30 | Stop reason: HOSPADM

## 2024-11-30 RX ADMIN — GABAPENTIN 300 MG: 300 CAPSULE ORAL at 09:49

## 2024-11-30 RX ADMIN — Medication 400 MG: at 09:49

## 2024-11-30 RX ADMIN — ENOXAPARIN SODIUM 60 MG: 100 INJECTION SUBCUTANEOUS at 02:21

## 2024-11-30 RX ADMIN — ONDANSETRON 4 MG: 4 TABLET, ORALLY DISINTEGRATING ORAL at 04:56

## 2024-11-30 RX ADMIN — GABAPENTIN 300 MG: 300 CAPSULE ORAL at 12:40

## 2024-11-30 RX ADMIN — ACETAMINOPHEN 1000 MG: 500 TABLET ORAL at 00:10

## 2024-11-30 RX ADMIN — ASPIRIN 81 MG: 81 TABLET, CHEWABLE ORAL at 09:49

## 2024-11-30 RX ADMIN — SODIUM CHLORIDE, PRESERVATIVE FREE 10 ML: 5 INJECTION INTRAVENOUS at 09:50

## 2024-11-30 RX ADMIN — ENOXAPARIN SODIUM 60 MG: 100 INJECTION SUBCUTANEOUS at 09:49

## 2024-11-30 RX ADMIN — FERROUS SULFATE TAB 325 MG (65 MG ELEMENTAL FE) 325 MG: 325 (65 FE) TAB at 09:50

## 2024-11-30 RX ADMIN — FOLIC ACID 1000 MCG: 1 TABLET ORAL at 09:50

## 2024-11-30 RX ADMIN — SERTRALINE HYDROCHLORIDE 25 MG: 50 TABLET ORAL at 09:49

## 2024-11-30 RX ADMIN — PYRIDOSTIGMINE BROMIDE 30 MG: 60 TABLET ORAL at 09:50

## 2024-11-30 RX ADMIN — PYRIDOSTIGMINE BROMIDE 30 MG: 60 TABLET ORAL at 12:40

## 2024-11-30 RX ADMIN — PANTOPRAZOLE SODIUM 40 MG: 40 TABLET, DELAYED RELEASE ORAL at 04:56

## 2024-11-30 ASSESSMENT — PAIN DESCRIPTION - DESCRIPTORS: DESCRIPTORS: ACHING

## 2024-11-30 ASSESSMENT — ENCOUNTER SYMPTOMS
ABDOMINAL PAIN: 0
COUGH: 1
NAUSEA: 0
VOMITING: 0
SHORTNESS OF BREATH: 0
CHEST TIGHTNESS: 0

## 2024-11-30 ASSESSMENT — PAIN DESCRIPTION - LOCATION: LOCATION: GENERALIZED

## 2024-11-30 ASSESSMENT — PAIN SCALES - GENERAL: PAINLEVEL_OUTOF10: 10

## 2024-11-30 NOTE — PLAN OF CARE
Problem: Chronic Conditions and Co-morbidities  Goal: Patient's chronic conditions and co-morbidity symptoms are monitored and maintained or improved  11/30/2024 1223 by Thuy Marie RN  Outcome: Progressing  11/30/2024 1001 by Emily Estrella RN  Outcome: Progressing  11/30/2024 0600 by Kayla Lopez LPN  Outcome: Progressing     Problem: Discharge Planning  Goal: Discharge to home or other facility with appropriate resources  11/30/2024 1223 by Thuy Marie RN  Outcome: Progressing  11/30/2024 1001 by Emily Estrella RN  Outcome: Progressing  11/30/2024 0600 by Kayla Lopez LPN  Outcome: Progressing     Problem: Safety - Adult  Goal: Free from fall injury  11/30/2024 1223 by Thuy Marie RN  Outcome: Progressing  11/30/2024 1001 by Emily Estrella RN  Outcome: Progressing  11/30/2024 0600 by Kayla Lopez LPN  Outcome: Progressing     Problem: ABCDS Injury Assessment  Goal: Absence of physical injury  11/30/2024 1223 by Thuy Marie RN  Outcome: Progressing  11/30/2024 1001 by Emily Estrella RN  Outcome: Progressing  11/30/2024 0600 by Kayla Lopez LPN  Outcome: Progressing      Principal Discharge DX:	Ectopic pregnancy

## 2024-11-30 NOTE — ED TRIAGE NOTES
Pt to the ED via ambulance with chest pain since 0100. Patient reports she woke up with the pain and went to the clinic this afternoon and was instructed to come to the ED. EMS brought the patient directly from the clinic and state that each time she started to have chest pain, she was having multiple PVCs

## 2024-11-30 NOTE — CONSULTS
[place] [address]  Phone: [phone]    Fax [phone]                  Romaine Holland MD, [facility]       Nathaniel Gallego MD, [facility]  Julián Hopper MD, [facility]    MD Arleen Lauren MD Tariq Rizvi, MD Bilal Alam, MD Melissa Kellis, APRN      Maddie Ferraro, APRN  Desiree Webb, APRN    Ricardo Diggs, APRN  Cheng Gallegos, DANIEL    CARDIOLOGY CONSULT NOTE     Reason for consultation: NSTEMI?    Referring physician:  Connie Estrada MD     Primary care physician: Jayda Díaz MD      Thank you for the consult.    Chief Complaints :  Chief Complaint   Patient presents with    Chest Pain     Since 0100, was seen at a clinic, and was asked to come by squad due to cardiac history. Patient also states she has back pain but relates this to a recent fall        History of present illness:Ada is a 89 y.o.year old  female with a past medical history of coronary artery disease s/p CABG, DVT, HTN, prediabetes.     Patient informed me that her primary complaint is that she has cold-like symptoms.  She states that she has fullness in her throat/upper chest has been coughing and has lost her voice.  Patient is very hoarse during my examination.    Patient denies experiencing any chest discomfort of any sort at this time.  States that she is unsure if she she has shortness of breath but she is currently laying flat without any signs of orthopnea and oxygen saturation is good.    Patient does have cardiac history but has been following with Dr. Gallego.  Patient had normal echocardiogram last year.    Patient agreeable to continue with conservative measures.      Past medical history:    has a past medical history of Arthritis of shoulder region, right, Blind right eye, Chronic depression, DVT (deep venous thrombosis) (HCC), Former smoker, History of echocardiogram, History of stress test, Hx of Doppler ultrasound, Hyperlipidemia,  history. Patient also states she has back pain but relates this to a recent fall     Please review addendum/changes made to note above   Interval history: 89-year-old female with prior history of coronary disease status CABG, DVT, hypertension, prediabetes.  She is now admitted with cold-like symptoms.  She is been coughing and has a very hoarse voice.  We are asked to see her for mildly elevated troponin.  Patient herself denies any chest pain.    Physical Exam:  General:  Awake, alert, NAD  Head:normal  Eye:normal  Neck:  No JVD   Chest:  Clear to auscultation, respiration easy  Cardiovascular: Regular pulse   Abdomen:   nontender  Extremities: No edema  Pulses; palpable  Neuro: grossly normal        I agree with the plan, which was planned by myself and discussed with advanced level provider.  My documented MDM is a substantive portion of the supervisory note.    I have seen ,spoken to  and examined this patient personally, independently of the advanced level provider.  I have spent substantiate  portion of this encounter independently myself in examining patient and developing the medical management plan . I have reviewed the hospital care given to date and reviewed all pertinent labs and imaging.The plan was developed mutually at the time of the visit with the patient,  NP /PA  and myself. I have spoken with patient, nursing staff and provided written and verbal instructions .The above note has been reviewed and I agree with the assessment, diagnosis, and treatment plan with changes made by me as follows .    Papo Liao MD

## 2024-11-30 NOTE — ED NOTES
normal limits   TROPONIN - Abnormal; Notable for the following components:    Troponin, High Sensitivity 19 (*)     All other components within normal limits   TROPONIN - Abnormal; Notable for the following components:    Troponin, High Sensitivity 17 (*)     All other components within normal limits   D-DIMER, QUANTITATIVE - Abnormal; Notable for the following components:    D-Dimer, Quant 1.02 (*)     All other components within normal limits   VALPROIC ACID LEVEL, TOTAL - Abnormal; Notable for the following components:    Valproic Acid Lvl 15 (*)     All other components within normal limits        Background  History:   Past Medical History:   Diagnosis Date    Arthritis of shoulder region, right 09/2014    [place]    Blind right eye     following right cataract surg    Chronic depression 2009    Dr. Payton    DVT (deep venous thrombosis) ([facility]) 1970    left leg    Former smoker     History of echocardiogram 09/21/2020    EF 55-60%, mild left ventricular hypertrophy, normal diastolic filling pattern for age, no signficiant valvular disease, no pericardial effusion     History of stress test 03/25/2021    normal LVEF calculated by the computer is probably falsely low. LVEF is 40 %    Hx of Doppler ultrasound 03/01/2018    Carotid-mild 0-49% bilateral carotid,50% stenosis right subclavian    Hyperlipidemia     Hypertension     Macular degeneration     right    Mild cognitive impairment 02/2012    MMSE 26/30    MVP (mitral valve prolapse)     trace on echo 2014    Osteoporosis 05/2012    Pancytopenia 05/2011    mild with ; Dr Felix hawk 2010    Peptic ulcer disease     Peripheral vascular disease ([facility]) 01/2016    angio; dis below ankles; pletal    Prediabetes 2006    Recurrent ventral incisional hernia 2013    medial apex of GB scar    S/P CABG x 3 2003    3-vessel; Dr Gallego    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 ventriular  ectopy    Tic [place] 2008    Dr. Mckeon; Right side       Assessment    Vitals: MEWS Score: 1  Level of Consciousness: Alert (0)   Vitals:    11/29/24 2245 11/29/24 2300 11/29/24 2330 11/29/24 2345   BP: (!) 146/60 (!) 143/48 (!) 175/149 (!) 156/59   Pulse: 79 78 80 82   Resp: 20 14 17 18   Temp:       TempSrc:       SpO2: 100% 98% 98% 100%       PO Status: Nothing by Mouth    O2 Flow Rate: O2 Device: None (Room air)      Cardiac Rhythm: NSR    Last documented pain medication administered: See MAR    NIH Score: NIH       Active LDA's:   Peripheral IV 11/29/24 Left Antecubital (Active)       Pertinent or High Risk Medications/Drips: no   If Yes, please provide details:       Blood Product Administration: no  If Yes, please provide details:     Recommendation    Incomplete orders Inpt  Additional Comments:    If any further questions, please call Sending RN at [phone]    Electronically signed by: Electronically signed by Paulette Steen RN on 11/29/2024 at 11:53 PM

## 2024-11-30 NOTE — CARE COORDINATION
Pt's nurse notified CM that pt has a d/c order and a HHC order.  Noted that pt has had [facility] before.  Pt is agreeable for referral to be made to [facility] per her nurse.  Referral faxed to [facility] at [phone] d/t no one answered the phone.  Informed them of d/c.  TE

## 2024-11-30 NOTE — H&P
History and Physical      Name:  Jazmine Hutchinson /Age/Sex: 1935  (89 y.o. female)   MRN & CSN:  [id number] & [id number] Encounter Date/Time: 2024 11:25 PM EST   Location:  ED25/ED-25 PCP: Jayda Díaz MD       Hospital Day: 1    Assessment and Plan:     Patient is a 89 y.o. female who presented with chest pain.        NSTEMI  Non-MI troponin elevation  Hx of CAD  -Patient with a pertinent history of CAD presents with substernal, non-radiating, nonexertional chest pain; relieved with nitro  -On admit patient hemodynamically stable, had 5 beat run of NSVT during chest pain however on eval appears likely to be PVCs.  Troponin elevated and plateaued and EKG with new 1mm ST depressions in inferior leads   -Will start ACS given typical chest pain with elevated trop and hx of CAD     -Start Full dose lovenox   -Cardiology consult for risk stratification  -Telemetry  -PRN nitro   -Continue aspirin, statin, not on metoprolol due to N/V per allergies     Lumbar back pain   - Resents with upper back pain for weeks. CTA of chest shows Bony erosion of the superior endplate of L2 vertebral body, concerning for discitis. MRI of the lumbar spine is recommended for further characterization    -DAVIDSON lumbar spine ordered     CKD 3a  - Cr at baseline     HLD  -Continue statin     GERD  -Continue PPI    Mood Disorder   -Continue home meds    Checklist:  Advanced directive: full  Diet: NPO  DVT ppx: Lovenox     Disposition: inpatient   Estimated discharge: 2 day(s).  Current living situation: home.  Expected disposition: home.    Spoke with ED provider who recommended admission for the patient and I agree with that plan.  Personally reviewed lab studies and imaging.  EKG interpreted personally and results as stated above.  Imaging that was interpreted personally and results as stated above.    History of Present Illness:     Chief Complaint:    Chief Complaint   Patient presents with    Chest Pain     Since 0100, was

## 2024-11-30 NOTE — PROGRESS NOTES
4 Eyes Skin Assessment     NAME:  Jazmine Hutchinson  YOB: 1935  MEDICAL RECORD NUMBER:  [id number]    The patient is being assessed for  Shift Handoff    I agree that at least one RN has performed a thorough Head to Toe Skin Assessment on the patient. ALL assessment sites listed below have been assessed.      Areas assessed by both nurses:    Head, Face, Ears, Shoulders, Back, Chest, Arms, Elbows, Hands, Sacrum. Buttock, Coccyx, Ischium, and Legs. Feet and Heels        Does the Patient have a Wound? No noted wound(s)       Mayito Prevention initiated by RN: No  Wound Care Orders initiated by RN: No    Pressure Injury (Stage 3,4, Unstageable, DTI, NWPT, and Complex wounds) if present, place Wound referral order by RN under : No    New Ostomies, if present place, Ostomy referral order under : No     Nurse 1 eSignature: Electronically signed by Kayla Lopez LPN on 11/30/24 at 3:15 AM EST    **SHARE this note so that the co-signing nurse can place an eSignature**    Nurse 2 eSignature: Electronically signed by MARISOL WHITAKER RN on 11/30/24 at 3:17 AM EST

## 2024-11-30 NOTE — DISCHARGE SUMMARY
V2.0  Discharge Summary    Name:  Jazmine Hutchinson /Age/Sex: 1935 (89 y.o. female)   Admit Date: 2024  Discharge Date: 24    MRN & CSN:  [id number] & [id number] Encounter Date and Time 24 1:23 PM EST    Attending:  Jose Allison,* Discharging Provider: Jose Craig MD       Hospital Course:     Brief HPI: Jazmine Hutchinson is a 89 y.o. female who presented with chest pain. Patient presents due to non-exertional substernal pressure that woke her up from sleep at 1am. She reports it has been intermittent since and has occurred during ride via EMS and in ED and reports increased phlegm production. She also reports a fall on her back two months ago and began having lumbar back pain for the last few weeks. Denied any F/C, HA, dizziness, presyncope, syncope, SOB, N/V, abdominal pain, bleeding (hemoptysis / hematemesis, hematuria, BRBPR), C/D, or changes in urinary habits.         Brief Problem Based Course:     NSTEMI  Non-MI troponin elevation  Hx of CAD  -Patient with a pertinent history of CAD presents with substernal, non-radiating, nonexertional chest pain; relieved with nitro  -On admit patient hemodynamically stable, had 5 beat run of NSVT during chest pain however on eval appears likely to be PVCs.  Troponin elevated and plateaued and EKG with new 1mm ST depressions in inferior leads   -Will start ACS given typical chest pain with elevated trop and hx of CAD    -Start Full dose lovenox   -Cardiology consult for risk stratification  -Telemetry  -PRN nitro   -Continue aspirin, statin, not on metoprolol due to N/V per allergies     Patient doing well. Chest pain has resolved. Her symptoms likely related to viral URI. Has raspy voice. NO SOB. Viral panel negative. CTA did not show any acute process. Evaluated by cards - plan for outpatient workup. No immediate intervention needed as symptoms not consistent with ACS     Lumbar back pain   Resents with upper back pain for weeks. CTA of

## 2024-11-30 NOTE — ED NOTES
This RN provided tele strips to Dr Pabon that shows the patient in non sustained VT. Patient symptomatic with chest paint that stops after the vtach is over

## 2024-12-01 LAB
EKG ATRIAL RATE: 77 BPM
EKG DIAGNOSIS: NORMAL
EKG P AXIS: 44 DEGREES
EKG P-R INTERVAL: 160 MS
EKG Q-T INTERVAL: 400 MS
EKG QRS DURATION: 102 MS
EKG QTC CALCULATION (BAZETT): 452 MS
EKG R AXIS: -6 DEGREES
EKG T AXIS: -11 DEGREES
EKG VENTRICULAR RATE: 77 BPM

## 2024-12-01 PROCEDURE — 93010 ELECTROCARDIOGRAM REPORT: CPT | Performed by: INTERNAL MEDICINE

## 2024-12-11 ENCOUNTER — OFFICE VISIT (OUTPATIENT)
Dept: ORTHOPEDIC SURGERY | Age: 89
End: 2024-12-11
Payer: MEDICARE

## 2024-12-11 ENCOUNTER — HOSPITAL ENCOUNTER (OUTPATIENT)
Dept: WOMENS IMAGING | Age: 89
Discharge: HOME | End: 2024-12-11
Payer: MEDICARE

## 2024-12-11 VITALS
HEIGHT: 62 IN | WEIGHT: 120 LBS | BODY MASS INDEX: 22.08 KG/M2 | OXYGEN SATURATION: 96 % | RESPIRATION RATE: 14 BRPM | HEART RATE: 75 BPM

## 2024-12-11 DIAGNOSIS — Z12.31 ENCOUNTER FOR SCREENING MAMMOGRAM FOR MALIGNANT NEOPLASM OF BREAST: ICD-10-CM

## 2024-12-11 DIAGNOSIS — M19.011 PRIMARY OSTEOARTHRITIS OF RIGHT SHOULDER: Primary | ICD-10-CM

## 2024-12-11 DIAGNOSIS — M19.012 PRIMARY OSTEOARTHRITIS OF LEFT SHOULDER: ICD-10-CM

## 2024-12-11 PROCEDURE — 20610 DRAIN/INJ JOINT/BURSA W/O US: CPT | Performed by: PHYSICIAN ASSISTANT

## 2024-12-11 PROCEDURE — 77063 BREAST TOMOSYNTHESIS BI: CPT

## 2024-12-11 RX ORDER — TRIAMCINOLONE ACETONIDE 40 MG/ML
40 INJECTION, SUSPENSION INTRA-ARTICULAR; INTRAMUSCULAR ONCE
Status: COMPLETED | OUTPATIENT
Start: 2024-12-11 | End: 2024-12-11

## 2024-12-11 RX ADMIN — TRIAMCINOLONE ACETONIDE 40 MG: 40 INJECTION, SUSPENSION INTRA-ARTICULAR; INTRAMUSCULAR at 14:41

## 2024-12-11 RX ADMIN — TRIAMCINOLONE ACETONIDE 40 MG: 40 INJECTION, SUSPENSION INTRA-ARTICULAR; INTRAMUSCULAR at 14:40

## 2024-12-11 ASSESSMENT — ENCOUNTER SYMPTOMS
EYES NEGATIVE: 1
GASTROINTESTINAL NEGATIVE: 1
RESPIRATORY NEGATIVE: 1

## 2024-12-11 NOTE — PATIENT INSTRUCTIONS
Continue weight-bearing as tolerated.  Continue range of motion exercises as instructed.  Ice and elevate as needed.  Tylenol or Motrin for pain.  Injection given into the b/l shoulder.  Follow up in 3 months.    We are committed to providing you the best care possible.  If you receive a survey after visiting one of our offices, please take time to share your experience concerning your physician office visit.  These surveys are confidential and no health information about you is shared.  We are eager to improve for you and we are counting on your feedback to help make that happen.

## 2024-12-11 NOTE — PROGRESS NOTES
[facility]    Current HPI 12/11/2024: Jazmine Hutchinson is well-known 89-year-old female with severe osteoarthritis of bilateral shoulders and rotator cuff tendinitis.  She gets steroid injections every 3 to 4 months and she would like to repeat those injections.      Previous HPI 9/9/2024: Jazmine Hutchinson is a well-known 89-year-old female that presents back to the office today for recurrent bilateral shoulder pain.  No recent falls or injuries to the shoulders.  Pain is a 7 or an 8/10 worse with motion of the shoulders.        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 Payton    DVT (deep venous thrombosis) ([facility]) 1970    left leg    Former smoker     History of echocardiogram 09/21/2020    EF 55-60%, mild left ventricular hypertrophy, normal diastolic filling pattern for age, no signficiant valvular disease, no pericardial effusion     History of stress test 03/25/2021    normal LVEF calculated by the computer is probably falsely low. LVEF is 40 %    Hx of Doppler ultrasound 03/01/2018    Carotid-mild 0-49% bilateral carotid,50% stenosis right subclavian    Hyperlipidemia     Hypertension     Macular degeneration     right    Mild cognitive impairment 02/2012    MMSE 26/30    MVP (mitral valve prolapse)     trace on echo 2014    Osteoporosis 05/2012    Pancytopenia 05/2011    mild with ; Dr Felix hawk 2010    Peptic ulcer disease     Peripheral vascular disease (HCC) 01/2016    angio; dis below ankles; pletal    Prediabetes 2006    Recurrent ventral incisional hernia 2013    medial apex of GB scar    S/P CABG x 3 2003    3-vessel; Dr Gallego    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 Mckeon; Right side       Past Surgical

## 2024-12-19 ENCOUNTER — LAB (OUTPATIENT)
Dept: INTERNAL MEDICINE CLINIC | Age: 89
End: 2024-12-19
Payer: MEDICARE

## 2024-12-19 DIAGNOSIS — E53.8 VITAMIN B 12 DEFICIENCY: Primary | ICD-10-CM

## 2024-12-19 PROCEDURE — 96372 THER/PROPH/DIAG INJ SC/IM: CPT | Performed by: FAMILY MEDICINE

## 2024-12-19 RX ORDER — CYANOCOBALAMIN 1000 UG/ML
1000 INJECTION, SOLUTION INTRAMUSCULAR; SUBCUTANEOUS ONCE
Status: COMPLETED | OUTPATIENT
Start: 2024-12-19 | End: 2024-12-19

## 2024-12-19 RX ADMIN — CYANOCOBALAMIN 1000 MCG: 1000 INJECTION, SOLUTION INTRAMUSCULAR; SUBCUTANEOUS at 15:52

## 2025-01-14 NOTE — PLAN OF CARE
Problem: Falls - Risk of:  Goal: Will remain free from falls  Description: Will remain free from falls  Outcome: Ongoing  Goal: Absence of physical injury  Description: Absence of physical injury  Outcome: Ongoing Subjective   Patient ID: Ilsa Alex is a 46 y.o. female who presents for Weight Loss (Wants to discuss weight loss, saw cardiologist last week and recommended).  HPI patient presents because she is losing weight.  She is trying to lose weight for a long time and has not been successful.  She is active in her daily life, gets about 10,000 steps a day.  Does not track her calories.  Tries to eat healthy overall.  Cardiology saw her and also stress recently.  She is curious about using GLP-1's.  Last blood work showed a slightly elevated glucose.    Patient Active Problem List   Diagnosis    Depressive disorder    IBS (irritable bowel syndrome)    Multiple sclerosis (Multi)    Arteriosclerosis of coronary artery       Social Connections: Not on file       Current Outpatient Medications on File Prior to Visit   Medication Sig Dispense Refill    atorvastatin (Lipitor) 40 mg tablet Take 1 tablet (40 mg) by mouth once daily.      busPIRone (Buspar) 7.5 mg tablet Take 1 tablet (7.5 mg) by mouth 2 times a day.      carvedilol (Coreg) 3.125 mg tablet       cetirizine (ZyrTEC) 10 mg tablet Take 1 tablet (10 mg) by mouth once daily.      cholecalciferol (Vitamin D-3) 10 MCG (400 UNIT) tablet Take 1 tablet (10 mcg) by mouth.      clindamycin (Cleocin T) 1 % lotion Apply once daily as directed to the affected area after shaving      clotrimazole-betamethasone (Lotrisone) cream Apply 1 Application topically 2 times a day. Apply to toes on feet. 30 g 1    fluocinonide (Lidex) 0.05 % ointment Apply to affected area twice daily as needed. Cycle 2 weeks on, 1 week off. Not for face, armpits, or groin      FLUoxetine (PROzac) 40 mg capsule Take 1 capsule (40 mg) by mouth once daily.      levonorgestrel (Mirena) 21 mcg/24 hr (8 yrs) 52 mg IUD 52 mg by intrauterine route 1 time.      metoprolol tartrate (Lopressor) 25 mg tablet TAKE 1/2 TWICE DAILY 90 tablet 1    multivit,calc,mins/iron/folic (ONE-A-DAY WOMENS FORMULA ORAL) Take 1  tablet by mouth once daily.      NUTRITIONAL SUPPLEMENTS ORAL Protandum Supplement      Vumerity 231 mg capsule,delayed release(DR/EC) Take 1 capsule (231 mg) by mouth twice daily for 7 days, THEN 2 capsules (462 mg) twice daily for 23 days.      Brilinta 60 mg tablet Take 1 tablet (60 mg) by mouth 2 times a day. (Patient not taking: Reported on 1/14/2025)      Brilinta 90 mg tablet TAKE 1 TABLET TWICE A DAY (Patient not taking: Reported on 1/14/2025) 180 tablet 4     No current facility-administered medications on file prior to visit.        Vitals:    01/14/25 0930   BP: 106/78   Pulse: 85   Temp: 36.1 °C (96.9 °F)   SpO2: 98%     Vitals:    01/14/25 0930   Weight: 111 kg (244 lb 12.8 oz)       Review of Systems   All other systems reviewed and are negative.      Objective     Physical Exam  Constitutional:       Appearance: Normal appearance. She is well-developed.   HENT:      Head: Atraumatic.   Cardiovascular:      Rate and Rhythm: Normal rate and regular rhythm.      Heart sounds: Normal heart sounds. No murmur heard.  Pulmonary:      Effort: Pulmonary effort is normal.      Breath sounds: Normal breath sounds.   Abdominal:      General: Bowel sounds are normal.      Palpations: Abdomen is soft.   Skin:     General: Skin is warm.   Neurological:      General: No focal deficit present.      Mental Status: She is alert.   Psychiatric:         Mood and Affect: Mood normal.         No visits with results within 2 Month(s) from this visit.   Latest known visit with results is:   Office Visit on 03/15/2024   Component Date Value Ref Range Status    Coronavirus 2019, PCR 03/15/2024 Not Detected  Not Detected Final    Flu A Result 03/15/2024 Not Detected  Not Detected Final    Flu B Result 03/15/2024 Not Detected  Not Detected Final       Assessment/Plan   Problem List Items Addressed This Visit    None  Visit Diagnoses         Codes    Morbid obesity due to excess calories (Multi)    -  Primary E66.01           Trying Mounjaro titration.  Aim for 1400 jitendra diet.  30 mins cardio exercise daily.  Fu in 2 mo with log of activity and calories.  Weigh self daily.

## 2025-01-20 RX ORDER — VERAPAMIL HYDROCHLORIDE 120 MG/1
120 TABLET, FILM COATED, EXTENDED RELEASE ORAL NIGHTLY
Qty: 180 TABLET | Refills: 0 | Status: SHIPPED | OUTPATIENT
Start: 2025-01-20

## 2025-01-27 RX ORDER — PYRIDOSTIGMINE BROMIDE 60 MG/1
TABLET ORAL
Qty: 45 TABLET | Refills: 1 | Status: SHIPPED | OUTPATIENT
Start: 2025-01-27

## 2025-01-27 RX ORDER — GABAPENTIN 300 MG/1
300 CAPSULE ORAL 3 TIMES DAILY
Qty: 90 CAPSULE | Refills: 5 | Status: SHIPPED | OUTPATIENT
Start: 2025-01-27 | End: 2025-07-26

## 2025-02-11 ENCOUNTER — TELEPHONE (OUTPATIENT)
Dept: INTERNAL MEDICINE CLINIC | Age: 89
End: 2025-02-11

## 2025-02-11 NOTE — TELEPHONE ENCOUNTER
Patient wants to know if she is due for B-12 injection? Patient states that she is staying in bed longer than usual.

## 2025-02-24 ENCOUNTER — TELEPHONE (OUTPATIENT)
Dept: INTERNAL MEDICINE CLINIC | Age: 89
End: 2025-02-24

## 2025-02-24 NOTE — TELEPHONE ENCOUNTER
Called patient and informed her that she does have lab orders that she will need to complete in the next couple of days to review at her appointment 3/4/2025

## 2025-02-24 NOTE — TELEPHONE ENCOUNTER
----- Message from Devyn PABLO sent at 2/24/2025  3:24 PM EST -----  Regarding: ECC Message to Provider  ECC Message to Provider    Relationship to Patient: Self     Additional Information /Patient want to know if she will have a blood test or fasting prior to his appointment   on 3/4/2025  4 months follow up.--------------------------------------------------------------------------------------------------------------------------    Call Back Information: OK to leave message on voicemail  Preferred Call Back Number: Phone [phone] (home)

## 2025-02-25 ENCOUNTER — HOSPITAL ENCOUNTER (OUTPATIENT)
Age: 89
Discharge: HOME | End: 2025-02-25
Payer: MEDICARE

## 2025-02-25 DIAGNOSIS — E78.2 MIXED HYPERLIPIDEMIA: ICD-10-CM

## 2025-02-25 DIAGNOSIS — R74.01 ELEVATED AST (SGOT): ICD-10-CM

## 2025-02-25 DIAGNOSIS — I10 PRIMARY HYPERTENSION: ICD-10-CM

## 2025-02-25 LAB
ALBUMIN SERPL-MCNC: 3.6 G/DL (ref 3.4–5)
ALBUMIN/GLOB SERPL: 1.2 {RATIO} (ref 1.1–2.2)
ALP SERPL-CCNC: 98 U/L (ref 40–129)
ALT SERPL-CCNC: 13 U/L (ref 10–40)
ANION GAP SERPL CALCULATED.3IONS-SCNC: 9 MMOL/L (ref 9–17)
AST SERPL-CCNC: 38 U/L (ref 15–37)
BASOPHILS # BLD: 0.03 K/UL
BASOPHILS NFR BLD: 1 % (ref 0–1)
BILIRUB SERPL-MCNC: 0.5 MG/DL (ref 0–1)
BUN SERPL-MCNC: 13 MG/DL (ref 7–20)
CALCIUM SERPL-MCNC: 9.8 MG/DL (ref 8.3–10.6)
CHLORIDE SERPL-SCNC: 98 MMOL/L (ref 99–110)
CHOLEST SERPL-MCNC: 116 MG/DL (ref 125–199)
CO2 SERPL-SCNC: 27 MMOL/L (ref 21–32)
CREAT SERPL-MCNC: 0.9 MG/DL (ref 0.6–1.2)
EOSINOPHIL # BLD: 0.08 K/UL
EOSINOPHILS RELATIVE PERCENT: 2 % (ref 0–3)
ERYTHROCYTE [DISTWIDTH] IN BLOOD BY AUTOMATED COUNT: 13.3 % (ref 11.7–14.9)
GFR, ESTIMATED: 60 ML/MIN/1.73M2
GLUCOSE SERPL-MCNC: 98 MG/DL (ref 74–99)
HCT VFR BLD AUTO: 37 % (ref 37–47)
HDLC SERPL-MCNC: 34 MG/DL
HGB BLD-MCNC: 12 G/DL (ref 12.5–16)
IMM GRANULOCYTES # BLD AUTO: 0.02 K/UL
IMM GRANULOCYTES NFR BLD: 0 %
LDLC SERPL CALC-MCNC: 50 MG/DL
LYMPHOCYTES NFR BLD: 1.92 K/UL
LYMPHOCYTES RELATIVE PERCENT: 40 % (ref 24–44)
MCH RBC QN AUTO: 35 PG (ref 27–31)
MCHC RBC AUTO-ENTMCNC: 32.4 G/DL (ref 32–36)
MCV RBC AUTO: 107.9 FL (ref 78–100)
MONOCYTES NFR BLD: 0.42 K/UL
MONOCYTES NFR BLD: 9 % (ref 0–4)
NEUTROPHILS NFR BLD: 49 % (ref 36–66)
NEUTS SEG NFR BLD: 2.35 K/UL
PLATELET # BLD AUTO: 121 K/UL (ref 140–440)
PMV BLD AUTO: 10 FL (ref 7.5–11.1)
POTASSIUM SERPL-SCNC: 5.1 MMOL/L (ref 3.5–5.1)
PROT SERPL-MCNC: 6.7 G/DL (ref 6.4–8.2)
RBC # BLD AUTO: 3.43 M/UL (ref 4.2–5.4)
SODIUM SERPL-SCNC: 134 MMOL/L (ref 136–145)
TRIGL SERPL-MCNC: 163 MG/DL
WBC OTHER # BLD: 4.8 K/UL (ref 4–10.5)

## 2025-02-25 PROCEDURE — 80061 LIPID PANEL: CPT

## 2025-02-25 PROCEDURE — 85025 COMPLETE CBC W/AUTO DIFF WBC: CPT

## 2025-02-25 PROCEDURE — 80053 COMPREHEN METABOLIC PANEL: CPT

## 2025-03-04 ENCOUNTER — OFFICE VISIT (OUTPATIENT)
Dept: INTERNAL MEDICINE CLINIC | Age: 89
End: 2025-03-04

## 2025-03-04 VITALS
WEIGHT: 122.6 LBS | DIASTOLIC BLOOD PRESSURE: 64 MMHG | BODY MASS INDEX: 22.42 KG/M2 | SYSTOLIC BLOOD PRESSURE: 110 MMHG | HEART RATE: 82 BPM | OXYGEN SATURATION: 99 %

## 2025-03-04 DIAGNOSIS — N18.31 CKD STAGE G3A/A1, GFR 45-59 AND ALBUMIN CREATININE RATIO <30 MG/G (HCC): ICD-10-CM

## 2025-03-04 DIAGNOSIS — I50.22 CHRONIC SYSTOLIC (CONGESTIVE) HEART FAILURE (HCC): ICD-10-CM

## 2025-03-04 DIAGNOSIS — R25.1 TREMOR: ICD-10-CM

## 2025-03-04 DIAGNOSIS — I10 ESSENTIAL HYPERTENSION: Primary | ICD-10-CM

## 2025-03-04 DIAGNOSIS — N39.0 RECURRENT UTI: ICD-10-CM

## 2025-03-04 DIAGNOSIS — I21.4 NSTEMI (NON-ST ELEVATED MYOCARDIAL INFARCTION) (HCC): ICD-10-CM

## 2025-03-04 DIAGNOSIS — G50.0 TIC DOULOUREUX: ICD-10-CM

## 2025-03-04 DIAGNOSIS — D51.3 OTHER DIETARY VITAMIN B12 DEFICIENCY ANEMIA: ICD-10-CM

## 2025-03-04 DIAGNOSIS — I47.29 OTHER VENTRICULAR TACHYCARDIA (HCC): ICD-10-CM

## 2025-03-04 RX ORDER — GABAPENTIN 300 MG/1
300 CAPSULE ORAL 3 TIMES DAILY
Qty: 90 CAPSULE | Refills: 5 | Status: SHIPPED | OUTPATIENT
Start: 2025-03-04 | End: 2025-08-31

## 2025-03-04 RX ORDER — CYANOCOBALAMIN 1000 UG/ML
1000 INJECTION, SOLUTION INTRAMUSCULAR; SUBCUTANEOUS ONCE
Status: COMPLETED | OUTPATIENT
Start: 2025-03-04 | End: 2025-03-04

## 2025-03-04 RX ORDER — PYRIDOSTIGMINE BROMIDE 60 MG/1
60 TABLET ORAL 3 TIMES DAILY
Qty: 90 TABLET | Refills: 5 | Status: SHIPPED | OUTPATIENT
Start: 2025-03-04

## 2025-03-04 RX ADMIN — CYANOCOBALAMIN 1000 MCG: 1000 INJECTION, SOLUTION INTRAMUSCULAR; SUBCUTANEOUS at 15:01

## 2025-03-04 ASSESSMENT — ENCOUNTER SYMPTOMS
DIARRHEA: 0
COLOR CHANGE: 0
SORE THROAT: 0
CONSTIPATION: 0
CHEST TIGHTNESS: 0
SHORTNESS OF BREATH: 0
VOMITING: 0
ABDOMINAL PAIN: 0

## 2025-03-04 ASSESSMENT — PATIENT HEALTH QUESTIONNAIRE - PHQ9
SUM OF ALL RESPONSES TO PHQ QUESTIONS 1-9: 0
2. FEELING DOWN, DEPRESSED OR HOPELESS: NOT AT ALL
5. POOR APPETITE OR OVEREATING: NOT AT ALL
SUM OF ALL RESPONSES TO PHQ QUESTIONS 1-9: 0
10. IF YOU CHECKED OFF ANY PROBLEMS, HOW DIFFICULT HAVE THESE PROBLEMS MADE IT FOR YOU TO DO YOUR WORK, TAKE CARE OF THINGS AT HOME, OR GET ALONG WITH OTHER PEOPLE: NOT DIFFICULT AT ALL
3. TROUBLE FALLING OR STAYING ASLEEP: NOT AT ALL
8. MOVING OR SPEAKING SO SLOWLY THAT OTHER PEOPLE COULD HAVE NOTICED. OR THE OPPOSITE, BEING SO FIGETY OR RESTLESS THAT YOU HAVE BEEN MOVING AROUND A LOT MORE THAN USUAL: NOT AT ALL
SUM OF ALL RESPONSES TO PHQ QUESTIONS 1-9: 0
6. FEELING BAD ABOUT YOURSELF - OR THAT YOU ARE A FAILURE OR HAVE LET YOURSELF OR YOUR FAMILY DOWN: NOT AT ALL
1. LITTLE INTEREST OR PLEASURE IN DOING THINGS: NOT AT ALL
9. THOUGHTS THAT YOU WOULD BE BETTER OFF DEAD, OR OF HURTING YOURSELF: NOT AT ALL
SUM OF ALL RESPONSES TO PHQ QUESTIONS 1-9: 0
7. TROUBLE CONCENTRATING ON THINGS, SUCH AS READING THE NEWSPAPER OR WATCHING TELEVISION: NOT AT ALL
4. FEELING TIRED OR HAVING LITTLE ENERGY: NOT AT ALL

## 2025-03-04 NOTE — PROGRESS NOTES
1 tablet every EIGHT hours AS NEEDED  Katherine Richardson MD   promethazine (PHENERGAN) 6.25 MG/5ML syrup take 5 milliliter by oral route 3 times daily as needed  Katherine Richardson MD   rosuvastatin (CRESTOR) 5 MG tablet Take 1 tablet by mouth nightly  Jayda Díaz MD   pantoprazole (PROTONIX) 40 MG tablet Take 1 tablet by mouth daily  Jayda Díaz MD   folic acid (FOLVITE) 1 MG tablet take 1 tablet by mouth once daily  Kary Rhoades APRN - CNP   aspirin 81 MG EC tablet Take 1 tablet by mouth daily  Katherine Richardson MD   Handicap Placard MISC by Does not apply route GOOD FOR 5 YEARS  Kary Rhoades APRN - CNP   lactobacillus (CULTURELLE) capsule Take 1 capsule by mouth 2 times daily (with meals)  ANA Szymanski MD   ferrous sulfate (IRON 325) 325 (65 Fe) MG tablet Take 1 tablet by mouth daily (with breakfast)  Katherine Richardson MD   Magnesium 400 MG CAPS Take 400 mg by mouth daily  Katherine Richardson MD   loperamide (IMODIUM) 2 MG capsule Take 1 capsule by mouth 4 times daily as needed for Diarrhea  Katherine Richardson MD   Cholecalciferol (VITAMIN D3) 25 MCG (1000 UT) CAPS Take by mouth daily  Katherine Richardson MD   Calcium Carbonate (CALCIUM 600 PO) Take by mouth daily  Katherine Richardson MD   Cyanocobalamin 1000 MCG/ML KIT Inject as directed  Katherine Richardson MD   acetaminophen (TYLENOL) 325 mg tablet Take 2 tablets by mouth as needed for Pain  ProviderKatherine MD   Multiple Vitamins-Minerals (CENTRUM SILVER ADULT 50+) TABS Take 1 tablet by mouth daily  Katherine Richardson MD   divalproex (DEPAKOTE ER) 250 MG extended release tablet Take 1 tablet by mouth nightly  ProviderKatherine MD          Objective:      /64 (Site: Left Upper Arm, Position: Sitting, Cuff Size: Medium Adult)   Pulse 82   Wt 55.6 kg (122 lb 9.6 oz)   SpO2 99%   BMI 22.42 kg/m²      Physical Exam  Vitals and nursing note reviewed.   Constitutional:       General: She is

## 2025-03-12 ENCOUNTER — OFFICE VISIT (OUTPATIENT)
Dept: ORTHOPEDIC SURGERY | Age: 89
End: 2025-03-12

## 2025-03-12 VITALS
HEART RATE: 64 BPM | HEIGHT: 62 IN | RESPIRATION RATE: 14 BRPM | BODY MASS INDEX: 22.45 KG/M2 | OXYGEN SATURATION: 97 % | WEIGHT: 122 LBS

## 2025-03-12 DIAGNOSIS — M19.012 PRIMARY OSTEOARTHRITIS OF LEFT SHOULDER: Primary | ICD-10-CM

## 2025-03-12 DIAGNOSIS — M54.50 LUMBAR SPINE PAIN: ICD-10-CM

## 2025-03-12 DIAGNOSIS — M19.011 PRIMARY OSTEOARTHRITIS OF RIGHT SHOULDER: ICD-10-CM

## 2025-03-12 RX ORDER — TRIAMCINOLONE ACETONIDE 40 MG/ML
40 INJECTION, SUSPENSION INTRA-ARTICULAR; INTRAMUSCULAR ONCE
Status: COMPLETED | OUTPATIENT
Start: 2025-03-12 | End: 2025-03-12

## 2025-03-12 RX ADMIN — TRIAMCINOLONE ACETONIDE 40 MG: 40 INJECTION, SUSPENSION INTRA-ARTICULAR; INTRAMUSCULAR at 14:26

## 2025-03-12 RX ADMIN — TRIAMCINOLONE ACETONIDE 40 MG: 40 INJECTION, SUSPENSION INTRA-ARTICULAR; INTRAMUSCULAR at 14:27

## 2025-03-12 NOTE — PATIENT INSTRUCTIONS
Injection in both shoulders given today.  Follow up in 3 months.    You have been referred to Tete Fonseca PA-C  If you have not heard from Tete Fonseca PA-C within 2-3 business days from today's appointment, please call them at [phone] .    We have ordered the appropriate Xrays.   Please call the Imaging Center to set an appointment for Xrays before your appointment with Tete Contreras PA-C  [facility]  [facility] [place] [address]   [phone]

## 2025-03-18 ASSESSMENT — ENCOUNTER SYMPTOMS
GASTROINTESTINAL NEGATIVE: 1
RESPIRATORY NEGATIVE: 1
EYES NEGATIVE: 1

## 2025-03-18 NOTE — PROGRESS NOTES
[facility]    Current updated HPI 3/12/2025: Jazmine Hutchinson is an 89-year-old female that returns the office today with complaints of recurrent bilateral shoulder pain as well as lower back pain.  She would like to repeat steroid injections or bilateral shoulder and talk about getting a referral for her low back pain.      Previous HPI 12/11/2024: Jazmine Hutchinson is well-known 89-year-old female with severe osteoarthritis of bilateral shoulders and rotator cuff tendinitis.  She gets steroid injections every 3 to 4 months and she would like to repeat those injections.      Previous HPI 9/9/2024: Jazmine Hutchinson is a well-known 89-year-old female that presents back to the office today for recurrent bilateral shoulder pain.  No recent falls or injuries to the shoulders.  Pain is a 7 or an 8/10 worse with motion of the shoulders.        Past Medical History:   Diagnosis Date    Arthritis of shoulder region, right 09/2014    [place]    Blind right eye     following right cataract surg    Chronic depression 2009    Dr. Payton    DVT (deep venous thrombosis) ([facility]) 1970    left leg    Former smoker     History of echocardiogram 09/21/2020    EF 55-60%, mild left ventricular hypertrophy, normal diastolic filling pattern for age, no signficiant valvular disease, no pericardial effusion     History of stress test 03/25/2021    normal LVEF calculated by the computer is probably falsely low. LVEF is 40 %    Hx of Doppler ultrasound 03/01/2018    Carotid-mild 0-49% bilateral carotid,50% stenosis right subclavian    Hyperlipidemia     Hypertension     Macular degeneration     right    Mild cognitive impairment 02/2012    MMSE 26/30    MVP (mitral valve prolapse)     trace on echo 2014    Osteoporosis 05/2012    Pancytopenia 05/2011    mild with ; Dr Felix hawk 2010    Peptic ulcer disease     Peripheral vascular disease 01/2016    angio; dis below ankles; pletal    Prediabetes 2006    Recurrent ventral

## 2025-03-25 ENCOUNTER — HOSPITAL ENCOUNTER (OUTPATIENT)
Dept: GENERAL RADIOLOGY | Age: 89
Discharge: HOME | End: 2025-03-25
Payer: MEDICARE

## 2025-03-25 ENCOUNTER — HOSPITAL ENCOUNTER (OUTPATIENT)
Age: 89
Discharge: HOME | End: 2025-03-25
Payer: MEDICARE

## 2025-03-25 ENCOUNTER — TELEPHONE (OUTPATIENT)
Dept: NEUROSURGERY | Age: 89
End: 2025-03-25

## 2025-03-25 ENCOUNTER — OFFICE VISIT (OUTPATIENT)
Dept: NEUROSURGERY | Age: 89
End: 2025-03-25
Payer: MEDICARE

## 2025-03-25 VITALS
HEIGHT: 63 IN | BODY MASS INDEX: 21.44 KG/M2 | DIASTOLIC BLOOD PRESSURE: 70 MMHG | OXYGEN SATURATION: 96 % | HEART RATE: 73 BPM | WEIGHT: 121 LBS | SYSTOLIC BLOOD PRESSURE: 120 MMHG

## 2025-03-25 DIAGNOSIS — M54.50 LUMBAR SPINE PAIN: ICD-10-CM

## 2025-03-25 DIAGNOSIS — M25.551 BILATERAL HIP PAIN: ICD-10-CM

## 2025-03-25 DIAGNOSIS — M25.552 BILATERAL HIP PAIN: ICD-10-CM

## 2025-03-25 DIAGNOSIS — M48.061 LUMBAR STENOSIS WITHOUT NEUROGENIC CLAUDICATION: Primary | ICD-10-CM

## 2025-03-25 PROCEDURE — G8420 CALC BMI NORM PARAMETERS: HCPCS | Performed by: NEUROLOGICAL SURGERY

## 2025-03-25 PROCEDURE — G8427 DOCREV CUR MEDS BY ELIG CLIN: HCPCS | Performed by: NEUROLOGICAL SURGERY

## 2025-03-25 PROCEDURE — 99203 OFFICE O/P NEW LOW 30 MIN: CPT | Performed by: NEUROLOGICAL SURGERY

## 2025-03-25 PROCEDURE — 72120 X-RAY BEND ONLY L-S SPINE: CPT

## 2025-03-25 PROCEDURE — 1123F ACP DISCUSS/DSCN MKR DOCD: CPT | Performed by: NEUROLOGICAL SURGERY

## 2025-03-25 PROCEDURE — 1090F PRES/ABSN URINE INCON ASSESS: CPT | Performed by: NEUROLOGICAL SURGERY

## 2025-03-25 PROCEDURE — 1036F TOBACCO NON-USER: CPT | Performed by: NEUROLOGICAL SURGERY

## 2025-03-25 PROCEDURE — 1125F AMNT PAIN NOTED PAIN PRSNT: CPT | Performed by: NEUROLOGICAL SURGERY

## 2025-03-25 PROCEDURE — 1159F MED LIST DOCD IN RCRD: CPT | Performed by: NEUROLOGICAL SURGERY

## 2025-03-25 NOTE — PROGRESS NOTES
Patient Name: Jazmine Hutchinson  : 1935  MRN# [id number]    REASON FOR VISIT: 1 year follow up  Patient Active Problem List    Diagnosis Date Noted    Acute left-sided low back pain without sciatica 2022    Chronic systolic (congestive) heart failure 2022    Depression with anxiety     Bigeminy     Diverticulitis of intestine without perforation or abscess without bleeding 2022    Chronic renal disease, stage III (HCC) [009470] 2022    Other dietary vitamin B12 deficiency anemia 2025    Chest pain 2024    Nonsustained ventricular tachycardia (HCC) 2024    NSTEMI (non-ST elevated myocardial infarction) ([facility]) 2024    Uncontrolled pain 2024    PVC's (premature ventricular contractions) 2024    Essential tremor 2024    Recurrent UTI 10/09/2023    Hyponatremia 2023    Diverticulitis 2022    Gait disturbance     Macular degeneration of both eyes     History of DVT (deep vein thrombosis)     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, bilateral     Generalized weakness 11/10/2021    Palpitations 2021    Age-related osteoporosis without current pathological fracture 2020    Asthmatic bronchitis 10/21/2020    Overactive bladder     Normocytic anemia 2020    Primary osteoarthritis, right shoulder 10/02/2019    Ischemic cardiomyopathy 2019    Spigelian hernia 2017    Bilateral carotid artery stenosis 2017    Subclavian arterial stenosis 2017    Coronary artery disease involving native coronary artery of native heart without angina pectoris 2017    S/P CABG x 3 2017    Essential hypertension 2017    MVP (mitral valve prolapse) 2017    Sciatica of left side without back pain 2016    Elevated TSH 2016    PAD (peripheral artery disease) 2016    Recurrent ventral incisional hernia     Mild cognitive impairment 2012    Colon cancer screening

## 2025-03-25 NOTE — PATIENT INSTRUCTIONS
A referral has been placed for orthopedics. They will call you to schedule an appointment.  [facility]  [address], [address]  [place] [address]  P: [phone]     A referral has been placed to pain management. They will reach out to you to schedule an appointment.  [facility]  [address] [place] [address]  P: [phone]  F: [phone]

## 2025-03-25 NOTE — PROGRESS NOTES
Neurosurgery Office Consultation: 3/25/2025   Patient: Jazmine Hutchinson    : 1935     Chief Complaint:  Low back and hip pain    History of Present Illness:  Jazmine Hutchinson is a 89 y.o. female who presents several years of progressive lower back and hip pain.  Patient reports most of her pain is in her lower back and her left hip.  She is ambulating with a cane.  She reports 10 out of 10 pain and reports she has seen multiple chiropractors and therapist in the past without relief.  She denies any radiation further down her leg or numbness or tingling in her feet.  She has not seen orthopedics for her hips.    Patient provided Visual Analogue Scale (VAS) and reports a level of 10.    Past Medical History:   She  has a past medical history of Arthritis of shoulder region, right, Blind right eye, Chronic depression, DVT (deep venous thrombosis) (HCC), Former smoker, History of echocardiogram, History of stress test, Hx of Doppler ultrasound, Hyperlipidemia, Hypertension, Macular degeneration, Mild cognitive impairment, MVP (mitral valve prolapse), Osteoporosis, Pancytopenia, Peptic ulcer disease, Peripheral vascular disease, Prediabetes, Recurrent ventral incisional hernia, S/P CABG x 3, Spigelian hernia, Supraventricular tachycardia, and Tic douloureux.     Past Surgical History:   She  has a past surgical history that includes Cholecystectomy; Coronary artery bypass graft (2009); Ureter surgery; Shoulder arthroscopy (Right, ); Tubal ligation; and Cataract removal (Right, ).     Social History   She  reports that she quit smoking about 39 years ago. Her smoking use included cigarettes. She started smoking about 49 years ago. She has a 2.5 pack-year smoking history. She has never used smokeless tobacco. She reports that she does not drink alcohol and does not use drugs.    Current Medications:  Current Outpatient Rx   Medication Sig Dispense Refill    vibegron (GEMTESA) 75 MG TABS tablet Take by mouth daily

## 2025-03-25 NOTE — TELEPHONE ENCOUNTER
LVM I tried to call the patient to reschedule her appointment with  to 1pm instead of 11, she is at the imaging center getting her xrays

## 2025-03-31 RX ORDER — PANTOPRAZOLE SODIUM 40 MG/1
40 TABLET, DELAYED RELEASE ORAL DAILY
Qty: 90 TABLET | Refills: 1 | Status: SHIPPED | OUTPATIENT
Start: 2025-03-31

## 2025-04-01 ENCOUNTER — TELEPHONE (OUTPATIENT)
Dept: INTERNAL MEDICINE CLINIC | Age: 89
End: 2025-04-01

## 2025-04-01 RX ORDER — FLUTICASONE PROPIONATE 50 MCG
SPRAY, SUSPENSION (ML) NASAL
Qty: 1 EACH | Refills: 3 | Status: SHIPPED | OUTPATIENT
Start: 2025-04-01

## 2025-04-01 NOTE — TELEPHONE ENCOUNTER
Has she tried anything yet?  If not, I would advise either Tylenol (1000mg) or ibuprofen (600mg).

## 2025-04-01 NOTE — TELEPHONE ENCOUNTER
She was requesting the refill on flonase nose spray-she says when she uses it and the gabapentin she doesn't have these headaches-she will let us know if it don't clear up if she restarts the nose spray

## 2025-04-03 ENCOUNTER — APPOINTMENT (OUTPATIENT)
Dept: GENERAL RADIOLOGY | Age: 89
End: 2025-04-03
Payer: MEDICARE

## 2025-04-03 ENCOUNTER — HOSPITAL ENCOUNTER (EMERGENCY)
Age: 89
Discharge: HOME | End: 2025-04-03
Payer: MEDICARE

## 2025-04-03 VITALS
SYSTOLIC BLOOD PRESSURE: 163 MMHG | BODY MASS INDEX: 21.61 KG/M2 | WEIGHT: 122 LBS | RESPIRATION RATE: 15 BRPM | HEART RATE: 75 BPM | OXYGEN SATURATION: 100 % | TEMPERATURE: 97.7 F | DIASTOLIC BLOOD PRESSURE: 74 MMHG

## 2025-04-03 DIAGNOSIS — M25.552 LEFT HIP PAIN: ICD-10-CM

## 2025-04-03 DIAGNOSIS — N30.00 ACUTE CYSTITIS WITHOUT HEMATURIA: Primary | ICD-10-CM

## 2025-04-03 LAB
BACTERIA URNS QL MICRO: ABNORMAL
BILIRUB UR QL STRIP: NEGATIVE
CASTS #/AREA URNS LPF: ABNORMAL /LPF
CHARACTER UR: ABNORMAL
CLARITY UR: ABNORMAL
COLOR UR: YELLOW
GLUCOSE UR STRIP-MCNC: NEGATIVE MG/DL
HGB UR QL STRIP.AUTO: NEGATIVE
KETONES UR STRIP-MCNC: ABNORMAL MG/DL
LEUKOCYTE ESTERASE UR QL STRIP: ABNORMAL
MUCOUS THREADS URNS QL MICRO: ABNORMAL
NITRITE UR QL STRIP: POSITIVE
PH UR STRIP: 7 [PH] (ref 5–8)
PROT UR STRIP-MCNC: NEGATIVE MG/DL
RBC #/AREA URNS HPF: 1 /HPF (ref 0–2)
SP GR UR STRIP: 1.01 (ref 1–1.03)
UROBILINOGEN UR STRIP-ACNC: 0.2 EU/DL (ref 0–1)
WBC #/AREA URNS HPF: 141 /HPF (ref 0–5)

## 2025-04-03 PROCEDURE — 87186 SC STD MICRODIL/AGAR DIL: CPT

## 2025-04-03 PROCEDURE — 81001 URINALYSIS AUTO W/SCOPE: CPT

## 2025-04-03 PROCEDURE — 73502 X-RAY EXAM HIP UNI 2-3 VIEWS: CPT

## 2025-04-03 PROCEDURE — 6370000000 HC RX 637 (ALT 250 FOR IP): Performed by: NURSE PRACTITIONER

## 2025-04-03 PROCEDURE — 87077 CULTURE AEROBIC IDENTIFY: CPT

## 2025-04-03 PROCEDURE — 87086 URINE CULTURE/COLONY COUNT: CPT

## 2025-04-03 PROCEDURE — 99284 EMERGENCY DEPT VISIT MOD MDM: CPT

## 2025-04-03 RX ORDER — ONDANSETRON 4 MG/1
4 TABLET, ORALLY DISINTEGRATING ORAL 3 TIMES DAILY PRN
Qty: 21 TABLET | Refills: 0 | Status: SHIPPED | OUTPATIENT
Start: 2025-04-03

## 2025-04-03 RX ORDER — NITROFURANTOIN 25; 75 MG/1; MG/1
100 CAPSULE ORAL ONCE
Status: COMPLETED | OUTPATIENT
Start: 2025-04-03 | End: 2025-04-03

## 2025-04-03 RX ORDER — ONDANSETRON 4 MG/1
4 TABLET, ORALLY DISINTEGRATING ORAL ONCE
Status: COMPLETED | OUTPATIENT
Start: 2025-04-03 | End: 2025-04-03

## 2025-04-03 RX ORDER — NITROFURANTOIN 25; 75 MG/1; MG/1
100 CAPSULE ORAL 2 TIMES DAILY
Qty: 14 CAPSULE | Refills: 0 | Status: SHIPPED | OUTPATIENT
Start: 2025-04-03 | End: 2025-04-10

## 2025-04-03 RX ADMIN — NITROFURANTOIN MONOHYDRATE/MACROCRYSTALS 100 MG: 25; 75 CAPSULE ORAL at 19:03

## 2025-04-03 RX ADMIN — ONDANSETRON 4 MG: 4 TABLET, ORALLY DISINTEGRATING ORAL at 19:03

## 2025-04-03 ASSESSMENT — PAIN - FUNCTIONAL ASSESSMENT: PAIN_FUNCTIONAL_ASSESSMENT: 0-10

## 2025-04-03 ASSESSMENT — PAIN SCALES - GENERAL: PAINLEVEL_OUTOF10: 5

## 2025-04-03 ASSESSMENT — PAIN DESCRIPTION - LOCATION: LOCATION: ABDOMEN

## 2025-04-03 ASSESSMENT — PAIN DESCRIPTION - DESCRIPTORS: DESCRIPTORS: CRAMPING

## 2025-04-03 NOTE — ED PROVIDER NOTES
[facility] EMERGENCY DEPARTMENT  EMERGENCY DEPARTMENT ENCOUNTER      Pt Name: Jazmine Hutchinson  MRN: [id number]  Birthdate 1935  Date of evaluation: 4/3/2025  Provider: ABELARDO Edwards - CNP  PCP: Jayda Díaz MD  Note Started: 6:35 PM EDT 4/3/25    I am the Primary Clinician of Record.  ROSEMARY. I have evaluated this patient.      CHIEF COMPLAINT       Chief Complaint   Patient presents with    Abdominal Cramping     Cramping  x2 weeks    Dysuria     Burning with urination x2 weeks    Hip Pain     Left hip pain x5 days post fall        HISTORY OF PRESENT ILLNESS: 1 or more Elements   History from : Patient    Limitations to history : None    Jazmine Hutchinson is a 89 y.o. female who presents to the emergency department with persistent UTI.  She     Nursing Notes were all reviewed and agreed with or any disagreements were addressed in the HPI.    REVIEW OF SYSTEMS :    Review of Systems    Positives and Pertinent negatives as per HPI.     SURGICAL HISTORY     Past Surgical History:   Procedure Laterality Date    CATARACT REMOVAL Right 2010    poor result ; blind right eye; [place]    CHOLECYSTECTOMY      CORONARY ARTERY BYPASS GRAFT  8/2009    3 vessel    SHOULDER ARTHROSCOPY Right 2003    TUBAL LIGATION      URETER SURGERY      Right ureteral repair       CURRENTMEDICATIONS       Previous Medications    ACETAMINOPHEN (TYLENOL) 325 MG TABLET    Take 2 tablets by mouth as needed for Pain    ASPIRIN 81 MG EC TABLET    Take 1 tablet by mouth daily    CALCIUM CARBONATE (CALCIUM 600 PO)    Take by mouth daily    CHOLECALCIFEROL (VITAMIN D3) 25 MCG (1000 UT) CAPS    Take by mouth daily    CYANOCOBALAMIN 1000 MCG/ML KIT    Inject as directed    DIVALPROEX (DEPAKOTE ER) 250 MG EXTENDED RELEASE TABLET    Take 1 tablet by mouth nightly    FERROUS SULFATE (IRON 325) 325 (65 FE) MG TABLET    Take 1 tablet by mouth daily (with breakfast)    FLUTICASONE (FLONASE) 50 MCG/ACT NASAL SPRAY    instill 1

## 2025-04-06 LAB
MICROORGANISM SPEC CULT: ABNORMAL
SERVICE CMNT-IMP: ABNORMAL
SPECIMEN DESCRIPTION: ABNORMAL

## 2025-04-07 ASSESSMENT — ENCOUNTER SYMPTOMS: SHORTNESS OF BREATH: 0

## 2025-04-08 ENCOUNTER — OFFICE VISIT (OUTPATIENT)
Dept: INTERNAL MEDICINE CLINIC | Age: 89
End: 2025-04-08
Payer: MEDICARE

## 2025-04-08 VITALS — DIASTOLIC BLOOD PRESSURE: 66 MMHG | OXYGEN SATURATION: 97 % | SYSTOLIC BLOOD PRESSURE: 124 MMHG | HEART RATE: 76 BPM

## 2025-04-08 DIAGNOSIS — D51.3 OTHER DIETARY VITAMIN B12 DEFICIENCY ANEMIA: ICD-10-CM

## 2025-04-08 DIAGNOSIS — R53.82 CHRONIC FATIGUE: ICD-10-CM

## 2025-04-08 DIAGNOSIS — N39.0 URINARY TRACT INFECTION WITHOUT HEMATURIA, SITE UNSPECIFIED: Primary | ICD-10-CM

## 2025-04-08 PROCEDURE — 96372 THER/PROPH/DIAG INJ SC/IM: CPT | Performed by: FAMILY MEDICINE

## 2025-04-08 PROCEDURE — G8427 DOCREV CUR MEDS BY ELIG CLIN: HCPCS | Performed by: FAMILY MEDICINE

## 2025-04-08 PROCEDURE — G8420 CALC BMI NORM PARAMETERS: HCPCS | Performed by: FAMILY MEDICINE

## 2025-04-08 PROCEDURE — 1036F TOBACCO NON-USER: CPT | Performed by: FAMILY MEDICINE

## 2025-04-08 PROCEDURE — 99213 OFFICE O/P EST LOW 20 MIN: CPT | Performed by: FAMILY MEDICINE

## 2025-04-08 PROCEDURE — 1090F PRES/ABSN URINE INCON ASSESS: CPT | Performed by: FAMILY MEDICINE

## 2025-04-08 PROCEDURE — 1159F MED LIST DOCD IN RCRD: CPT | Performed by: FAMILY MEDICINE

## 2025-04-08 PROCEDURE — 1123F ACP DISCUSS/DSCN MKR DOCD: CPT | Performed by: FAMILY MEDICINE

## 2025-04-08 RX ORDER — CYANOCOBALAMIN 1000 UG/ML
1000 INJECTION, SOLUTION INTRAMUSCULAR; SUBCUTANEOUS ONCE
Status: COMPLETED | OUTPATIENT
Start: 2025-04-08 | End: 2025-04-08

## 2025-04-08 RX ADMIN — CYANOCOBALAMIN 1000 MCG: 1000 INJECTION, SOLUTION INTRAMUSCULAR; SUBCUTANEOUS at 11:32

## 2025-04-08 ASSESSMENT — ENCOUNTER SYMPTOMS
COLOR CHANGE: 0
VOMITING: 0
DIARRHEA: 0
CHEST TIGHTNESS: 0
ABDOMINAL PAIN: 0
SHORTNESS OF BREATH: 0
CONSTIPATION: 0
SORE THROAT: 0

## 2025-04-08 NOTE — PROGRESS NOTES
Subjective:      Chief Complaint   Patient presents with    Follow-Up from Hospital     Pt. States they have a bladder or kindey infection.        HPI:  Jazmine Hutchinson is a 89 y.o. female who presents today for ER follow up.    Was seen last week in ER for UTI, was prescribed macrobid and zofran and discharged home.    States symptoms have been improving on antibiotics- still has a few days remaining.      Last B12 injection was 3/4/25, requesting another injection.     No other concerns today.         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 Payton    DVT (deep venous thrombosis) ([facility]) 1970    left leg    Former smoker     History of echocardiogram 09/21/2020    EF 55-60%, mild left ventricular hypertrophy, normal diastolic filling pattern for age, no signficiant valvular disease, no pericardial effusion     History of stress test 03/25/2021    normal LVEF calculated by the computer is probably falsely low. LVEF is 40 %    Hx of Doppler ultrasound 03/01/2018    Carotid-mild 0-49% bilateral carotid,50% stenosis right subclavian    Hyperlipidemia     Hypertension     Macular degeneration     right    Mild cognitive impairment 02/2012    MMSE 26/30    MVP (mitral valve prolapse)     trace on echo 2014    Osteoporosis 05/2012    Pancytopenia 05/2011    mild with ; Dr Felix hawk 2010    Peptic ulcer disease     Peripheral vascular disease 01/2016    angio; dis below ankles; pletal    Prediabetes 2006    Recurrent ventral incisional hernia 2013    medial apex of GB scar    S/P CABG x 3 2003    3-vessel; Dr Gallego    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 Mckeon; Right side        Past Surgical History:   Procedure Laterality Date    CATARACT REMOVAL Right 2010

## 2025-04-11 NOTE — PROGRESS NOTES
Patient Name: Jazmine Hutchinson  : 1935  MRN# [id number]    REASON FOR VISIT: 1 year follow up  Patient Active Problem List    Diagnosis Date Noted    Acute left-sided low back pain without sciatica 2022    Chronic systolic (congestive) heart failure 2022    Depression with anxiety     Bigeminy     Diverticulitis of intestine without perforation or abscess without bleeding 2022    Chronic renal disease, stage III (HCC) [673373] 2022    Other dietary vitamin B12 deficiency anemia 2025    Chest pain 2024    Nonsustained ventricular tachycardia (HCC) 2024    NSTEMI (non-ST elevated myocardial infarction) (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, bilateral     Generalized weakness 11/10/2021    Palpitations 2021    Age-related osteoporosis without current pathological fracture 2020    Asthmatic bronchitis 10/21/2020    Overactive bladder     Normocytic anemia 2020    Primary osteoarthritis, right shoulder 10/02/2019    Ischemic cardiomyopathy 2019    Spigelian hernia 2017    Bilateral carotid artery stenosis 2017    Subclavian arterial stenosis 2017    Coronary artery disease involving native coronary artery of native heart without angina pectoris 2017    S/P CABG x 3 2017    Essential hypertension 2017    MVP (mitral valve prolapse) 2017    Sciatica of left side without back pain 2016    Elevated TSH 2016    PAD (peripheral artery disease) 2016    Recurrent ventral incisional hernia     Mild cognitive impairment 2012    Colon cancer screening

## 2025-04-12 DIAGNOSIS — E78.2 MIXED HYPERLIPIDEMIA: ICD-10-CM

## 2025-04-14 RX ORDER — ROSUVASTATIN CALCIUM 5 MG/1
5 TABLET, COATED ORAL NIGHTLY
Qty: 90 TABLET | Refills: 1 | Status: SHIPPED | OUTPATIENT
Start: 2025-04-14

## 2025-05-08 ENCOUNTER — OFFICE VISIT (OUTPATIENT)
Dept: INTERNAL MEDICINE CLINIC | Age: 89
End: 2025-05-08
Payer: MEDICARE

## 2025-05-08 VITALS
BODY MASS INDEX: 21.69 KG/M2 | SYSTOLIC BLOOD PRESSURE: 122 MMHG | WEIGHT: 122.4 LBS | RESPIRATION RATE: 16 BRPM | OXYGEN SATURATION: 99 % | DIASTOLIC BLOOD PRESSURE: 62 MMHG | HEIGHT: 63 IN | HEART RATE: 76 BPM

## 2025-05-08 DIAGNOSIS — E53.8 VITAMIN B 12 DEFICIENCY: ICD-10-CM

## 2025-05-08 DIAGNOSIS — R73.03 PREDIABETES: ICD-10-CM

## 2025-05-08 DIAGNOSIS — I10 ESSENTIAL HYPERTENSION: ICD-10-CM

## 2025-05-08 DIAGNOSIS — M25.561 ACUTE PAIN OF RIGHT KNEE: Primary | ICD-10-CM

## 2025-05-08 DIAGNOSIS — E78.2 MIXED HYPERLIPIDEMIA: ICD-10-CM

## 2025-05-08 DIAGNOSIS — M25.552 LEFT HIP PAIN: ICD-10-CM

## 2025-05-08 DIAGNOSIS — E55.9 VITAMIN D DEFICIENCY: ICD-10-CM

## 2025-05-08 DIAGNOSIS — N18.31 CKD STAGE G3A/A1, GFR 45-59 AND ALBUMIN CREATININE RATIO <30 MG/G (HCC): ICD-10-CM

## 2025-05-08 PROCEDURE — G8420 CALC BMI NORM PARAMETERS: HCPCS | Performed by: FAMILY MEDICINE

## 2025-05-08 PROCEDURE — 96372 THER/PROPH/DIAG INJ SC/IM: CPT | Performed by: FAMILY MEDICINE

## 2025-05-08 PROCEDURE — 1159F MED LIST DOCD IN RCRD: CPT | Performed by: FAMILY MEDICINE

## 2025-05-08 PROCEDURE — 1123F ACP DISCUSS/DSCN MKR DOCD: CPT | Performed by: FAMILY MEDICINE

## 2025-05-08 PROCEDURE — 1090F PRES/ABSN URINE INCON ASSESS: CPT | Performed by: FAMILY MEDICINE

## 2025-05-08 PROCEDURE — 99213 OFFICE O/P EST LOW 20 MIN: CPT | Performed by: FAMILY MEDICINE

## 2025-05-08 PROCEDURE — G8427 DOCREV CUR MEDS BY ELIG CLIN: HCPCS | Performed by: FAMILY MEDICINE

## 2025-05-08 PROCEDURE — 1036F TOBACCO NON-USER: CPT | Performed by: FAMILY MEDICINE

## 2025-05-08 RX ORDER — CYANOCOBALAMIN 1000 UG/ML
1000 INJECTION, SOLUTION INTRAMUSCULAR; SUBCUTANEOUS ONCE
Status: CANCELLED | OUTPATIENT
Start: 2025-05-08 | End: 2025-05-08

## 2025-05-08 RX ORDER — CYANOCOBALAMIN 1000 UG/ML
1000 INJECTION, SOLUTION INTRAMUSCULAR; SUBCUTANEOUS ONCE
Status: COMPLETED | OUTPATIENT
Start: 2025-05-08 | End: 2025-05-08

## 2025-05-08 RX ADMIN — CYANOCOBALAMIN 1000 MCG: 1000 INJECTION, SOLUTION INTRAMUSCULAR; SUBCUTANEOUS at 15:05

## 2025-05-08 ASSESSMENT — ENCOUNTER SYMPTOMS
SHORTNESS OF BREATH: 0
CONSTIPATION: 0
DIARRHEA: 0
SORE THROAT: 0
COLOR CHANGE: 0
CHEST TIGHTNESS: 0
VOMITING: 0

## 2025-05-08 NOTE — PROGRESS NOTES
Subjective:      Chief Complaint   Patient presents with    3 Month Follow-Up     Patient presents for a 3 month follow up    Knee Injury     Patient states that she twisted her right knee last week attempting to get out of her chair and felt a pop within the extremity.       HPI:  Jazmine Hutchinson is a 89 y.o. female who presents today for follow up of chronic conditions as listed below.    States she hurt her R knee while she was trying to get out of her recliner- states she did not fall but twisted it.  Is also causing worsening of her L hip pain.  States symptoms are improving but would still like to try PT.  Is currently wearing a brace and using a walker.  Would not be able to go to outpatient PT as she cannot drive herself, is using [employer] for transportation.     Is requesting B12 injection today.     Has not had any further UTI's since last appointment.     No other concerns today.  Labs reviewed.       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 Payton    DVT (deep venous thrombosis) (HCC) 1970    left leg    Former smoker     History of echocardiogram 09/21/2020    EF 55-60%, mild left ventricular hypertrophy, normal diastolic filling pattern for age, no signficiant valvular disease, no pericardial effusion     History of stress test 03/25/2021    normal LVEF calculated by the computer is probably falsely low. LVEF is 40 %    Hx of Doppler ultrasound 03/01/2018    Carotid-mild 0-49% bilateral carotid,50% stenosis right subclavian    Hyperlipidemia     Hypertension     Macular degeneration     right    Mild cognitive impairment 02/2012    MMSE 26/30    MVP (mitral valve prolapse)     trace on echo 2014    Osteoporosis 05/2012    Pancytopenia 05/2011    mild with ; Dr Felix hawk 2010    Peptic ulcer disease     Peripheral vascular disease 01/2016    angio; dis

## 2025-05-22 ENCOUNTER — APPOINTMENT (OUTPATIENT)
Dept: CT IMAGING | Age: 89
DRG: 690 | End: 2025-05-22
Payer: MEDICARE

## 2025-05-22 ENCOUNTER — HOSPITAL ENCOUNTER (INPATIENT)
Age: 89
LOS: 3 days | Discharge: HOME | DRG: 690 | End: 2025-05-28
Attending: EMERGENCY MEDICINE | Admitting: INTERNAL MEDICINE
Payer: MEDICARE

## 2025-05-22 DIAGNOSIS — N39.0 URINARY TRACT INFECTION WITHOUT HEMATURIA, SITE UNSPECIFIED: Primary | ICD-10-CM

## 2025-05-22 DIAGNOSIS — K57.90 DIVERTICULOSIS: ICD-10-CM

## 2025-05-22 DIAGNOSIS — R10.9 ABDOMINAL PAIN, UNSPECIFIED ABDOMINAL LOCATION: ICD-10-CM

## 2025-05-22 DIAGNOSIS — R11.0 NAUSEA: ICD-10-CM

## 2025-05-22 DIAGNOSIS — R19.7 DIARRHEA, UNSPECIFIED TYPE: ICD-10-CM

## 2025-05-22 PROBLEM — R53.81 DEBILITY: Status: ACTIVE | Noted: 2025-05-22

## 2025-05-22 LAB
ALBUMIN SERPL-MCNC: 3.8 G/DL (ref 3.4–5)
ALBUMIN/GLOB SERPL: 1.4 {RATIO} (ref 1.1–2.2)
ALP SERPL-CCNC: 97 U/L (ref 40–129)
ALT SERPL-CCNC: 12 U/L (ref 10–40)
ANION GAP SERPL CALCULATED.3IONS-SCNC: 12 MMOL/L (ref 9–17)
AST SERPL-CCNC: 31 U/L (ref 15–37)
BACTERIA URNS QL MICRO: ABNORMAL
BASOPHILS # BLD: 0.02 K/UL
BASOPHILS NFR BLD: 0 % (ref 0–1)
BILIRUB SERPL-MCNC: 0.4 MG/DL (ref 0–1)
BILIRUB UR QL STRIP: NEGATIVE
BUN SERPL-MCNC: 18 MG/DL (ref 7–20)
CALCIUM SERPL-MCNC: 9.7 MG/DL (ref 8.3–10.6)
CHLORIDE SERPL-SCNC: 99 MMOL/L (ref 99–110)
CLARITY UR: CLEAR
CO2 SERPL-SCNC: 26 MMOL/L (ref 21–32)
COLOR UR: YELLOW
CREAT SERPL-MCNC: 1.3 MG/DL (ref 0.6–1.2)
EOSINOPHIL # BLD: 0.12 K/UL
EOSINOPHILS RELATIVE PERCENT: 2 % (ref 0–3)
ERYTHROCYTE [DISTWIDTH] IN BLOOD BY AUTOMATED COUNT: 13.2 % (ref 11.7–14.9)
GFR, ESTIMATED: 39 ML/MIN/1.73M2
GLUCOSE SERPL-MCNC: 163 MG/DL (ref 74–99)
GLUCOSE UR STRIP-MCNC: NEGATIVE MG/DL
HCT VFR BLD AUTO: 34.6 % (ref 37–47)
HGB BLD-MCNC: 11.3 G/DL (ref 12.5–16)
HGB UR QL STRIP.AUTO: NEGATIVE
IMM GRANULOCYTES # BLD AUTO: 0.02 K/UL
IMM GRANULOCYTES NFR BLD: 0 %
KETONES UR STRIP-MCNC: NEGATIVE MG/DL
LACTATE BLDV-SCNC: 1.4 MMOL/L (ref 0.4–2)
LACTATE BLDV-SCNC: 2.2 MMOL/L (ref 0.4–2)
LEUKOCYTE ESTERASE UR QL STRIP: ABNORMAL
LIPASE SERPL-CCNC: 16 U/L (ref 13–60)
LYMPHOCYTES NFR BLD: 1.9 K/UL
LYMPHOCYTES RELATIVE PERCENT: 38 % (ref 24–44)
MCH RBC QN AUTO: 34.9 PG (ref 27–31)
MCHC RBC AUTO-ENTMCNC: 32.7 G/DL (ref 32–36)
MCV RBC AUTO: 106.8 FL (ref 78–100)
MONOCYTES NFR BLD: 0.52 K/UL
MONOCYTES NFR BLD: 11 % (ref 0–5)
MUCOUS THREADS URNS QL MICRO: ABNORMAL
NEUTROPHILS NFR BLD: 48 % (ref 36–66)
NEUTS SEG NFR BLD: 2.37 K/UL
NITRITE UR QL STRIP: POSITIVE
PH UR STRIP: 7 [PH] (ref 5–8)
PLATELET # BLD AUTO: 103 K/UL (ref 140–440)
PMV BLD AUTO: 9.6 FL (ref 7.5–11.1)
POTASSIUM SERPL-SCNC: 4.3 MMOL/L (ref 3.5–5.1)
PROT SERPL-MCNC: 6.5 G/DL (ref 6.4–8.2)
PROT UR STRIP-MCNC: NEGATIVE MG/DL
RBC # BLD AUTO: 3.24 M/UL (ref 4.2–5.4)
RBC #/AREA URNS HPF: 1 /HPF (ref 0–2)
SODIUM SERPL-SCNC: 136 MMOL/L (ref 136–145)
SP GR UR STRIP: 1.01 (ref 1–1.03)
UROBILINOGEN UR STRIP-ACNC: 0.2 EU/DL (ref 0–1)
WBC #/AREA URNS HPF: 20 /HPF (ref 0–5)
WBC OTHER # BLD: 5 K/UL (ref 4–10.5)

## 2025-05-22 PROCEDURE — 74176 CT ABD & PELVIS W/O CONTRAST: CPT

## 2025-05-22 PROCEDURE — 83690 ASSAY OF LIPASE: CPT

## 2025-05-22 PROCEDURE — 83605 ASSAY OF LACTIC ACID: CPT

## 2025-05-22 PROCEDURE — 2580000003 HC RX 258: Performed by: EMERGENCY MEDICINE

## 2025-05-22 PROCEDURE — 87186 SC STD MICRODIL/AGAR DIL: CPT

## 2025-05-22 PROCEDURE — 99285 EMERGENCY DEPT VISIT HI MDM: CPT

## 2025-05-22 PROCEDURE — 6360000002 HC RX W HCPCS: Performed by: EMERGENCY MEDICINE

## 2025-05-22 PROCEDURE — 85025 COMPLETE CBC W/AUTO DIFF WBC: CPT

## 2025-05-22 PROCEDURE — 96375 TX/PRO/DX INJ NEW DRUG ADDON: CPT

## 2025-05-22 PROCEDURE — 93005 ELECTROCARDIOGRAM TRACING: CPT | Performed by: EMERGENCY MEDICINE

## 2025-05-22 PROCEDURE — 81001 URINALYSIS AUTO W/SCOPE: CPT

## 2025-05-22 PROCEDURE — G0378 HOSPITAL OBSERVATION PER HR: HCPCS

## 2025-05-22 PROCEDURE — 87086 URINE CULTURE/COLONY COUNT: CPT

## 2025-05-22 PROCEDURE — 2500000003 HC RX 250 WO HCPCS: Performed by: EMERGENCY MEDICINE

## 2025-05-22 PROCEDURE — 80053 COMPREHEN METABOLIC PANEL: CPT

## 2025-05-22 PROCEDURE — 96372 THER/PROPH/DIAG INJ SC/IM: CPT

## 2025-05-22 PROCEDURE — 87077 CULTURE AEROBIC IDENTIFY: CPT

## 2025-05-22 PROCEDURE — 96374 THER/PROPH/DIAG INJ IV PUSH: CPT

## 2025-05-22 PROCEDURE — 36415 COLL VENOUS BLD VENIPUNCTURE: CPT

## 2025-05-22 RX ORDER — FAMOTIDINE 20 MG/1
20 TABLET, FILM COATED ORAL 2 TIMES DAILY
Qty: 14 TABLET | Refills: 0 | Status: SHIPPED | OUTPATIENT
Start: 2025-05-22 | End: 2025-05-22

## 2025-05-22 RX ORDER — ONDANSETRON 2 MG/ML
4 INJECTION INTRAMUSCULAR; INTRAVENOUS EVERY 6 HOURS PRN
Status: DISCONTINUED | OUTPATIENT
Start: 2025-05-22 | End: 2025-05-28 | Stop reason: HOSPADM

## 2025-05-22 RX ORDER — CALCIUM CARBONATE 500 MG/1
1 TABLET, CHEWABLE ORAL DAILY
Qty: 30 TABLET | Refills: 0 | Status: SHIPPED | OUTPATIENT
Start: 2025-05-22 | End: 2025-05-22

## 2025-05-22 RX ORDER — FERROUS SULFATE 325(65) MG
325 TABLET ORAL
Status: DISCONTINUED | OUTPATIENT
Start: 2025-05-23 | End: 2025-05-28 | Stop reason: HOSPADM

## 2025-05-22 RX ORDER — 0.9 % SODIUM CHLORIDE 0.9 %
1000 INTRAVENOUS SOLUTION INTRAVENOUS ONCE
Status: COMPLETED | OUTPATIENT
Start: 2025-05-22 | End: 2025-05-22

## 2025-05-22 RX ORDER — PYRIDOSTIGMINE BROMIDE 60 MG/1
60 TABLET ORAL 3 TIMES DAILY
Status: DISCONTINUED | OUTPATIENT
Start: 2025-05-23 | End: 2025-05-23

## 2025-05-22 RX ORDER — POLYETHYLENE GLYCOL 3350 17 G/17G
17 POWDER, FOR SOLUTION ORAL DAILY PRN
Status: DISCONTINUED | OUTPATIENT
Start: 2025-05-22 | End: 2025-05-28 | Stop reason: HOSPADM

## 2025-05-22 RX ORDER — FLUTICASONE PROPIONATE 50 MCG
1 SPRAY, SUSPENSION (ML) NASAL DAILY PRN
Status: DISCONTINUED | OUTPATIENT
Start: 2025-05-23 | End: 2025-05-28 | Stop reason: HOSPADM

## 2025-05-22 RX ORDER — DIVALPROEX SODIUM 250 MG/1
250 TABLET, FILM COATED, EXTENDED RELEASE ORAL NIGHTLY
Status: DISCONTINUED | OUTPATIENT
Start: 2025-05-23 | End: 2025-05-23

## 2025-05-22 RX ORDER — SODIUM CHLORIDE 9 MG/ML
INJECTION, SOLUTION INTRAVENOUS PRN
Status: DISCONTINUED | OUTPATIENT
Start: 2025-05-22 | End: 2025-05-28 | Stop reason: HOSPADM

## 2025-05-22 RX ORDER — VERAPAMIL HYDROCHLORIDE 120 MG/1
120 TABLET, FILM COATED, EXTENDED RELEASE ORAL NIGHTLY
Status: DISCONTINUED | OUTPATIENT
Start: 2025-05-23 | End: 2025-05-28 | Stop reason: HOSPADM

## 2025-05-22 RX ORDER — SODIUM CHLORIDE 0.9 % (FLUSH) 0.9 %
5-40 SYRINGE (ML) INJECTION EVERY 12 HOURS SCHEDULED
Status: DISCONTINUED | OUTPATIENT
Start: 2025-05-23 | End: 2025-05-28 | Stop reason: HOSPADM

## 2025-05-22 RX ORDER — PANTOPRAZOLE SODIUM 40 MG/1
40 TABLET, DELAYED RELEASE ORAL DAILY
Status: DISCONTINUED | OUTPATIENT
Start: 2025-05-23 | End: 2025-05-28 | Stop reason: HOSPADM

## 2025-05-22 RX ORDER — HYDROCODONE BITARTRATE AND ACETAMINOPHEN 5; 325 MG/1; MG/1
1 TABLET ORAL EVERY 4 HOURS PRN
Qty: 18 TABLET | Refills: 0 | Status: SHIPPED | OUTPATIENT
Start: 2025-05-22 | End: 2025-05-22

## 2025-05-22 RX ORDER — TROSPIUM CHLORIDE 20 MG/1
20 TABLET, FILM COATED ORAL
Status: DISCONTINUED | OUTPATIENT
Start: 2025-05-23 | End: 2025-05-23

## 2025-05-22 RX ORDER — LANOLIN ALCOHOL/MO/W.PET/CERES
400 CREAM (GRAM) TOPICAL DAILY
Status: DISCONTINUED | OUTPATIENT
Start: 2025-05-23 | End: 2025-05-28 | Stop reason: HOSPADM

## 2025-05-22 RX ORDER — ONDANSETRON 2 MG/ML
4 INJECTION INTRAMUSCULAR; INTRAVENOUS EVERY 30 MIN PRN
Status: DISCONTINUED | OUTPATIENT
Start: 2025-05-22 | End: 2025-05-23 | Stop reason: SDUPTHER

## 2025-05-22 RX ORDER — MORPHINE SULFATE 2 MG/ML
2 INJECTION, SOLUTION INTRAMUSCULAR; INTRAVENOUS EVERY 30 MIN PRN
Status: DISCONTINUED | OUTPATIENT
Start: 2025-05-22 | End: 2025-05-23 | Stop reason: ALTCHOICE

## 2025-05-22 RX ORDER — GABAPENTIN 300 MG/1
300 CAPSULE ORAL 3 TIMES DAILY
Status: DISCONTINUED | OUTPATIENT
Start: 2025-05-23 | End: 2025-05-23

## 2025-05-22 RX ORDER — DICYCLOMINE HYDROCHLORIDE 10 MG/ML
20 INJECTION INTRAMUSCULAR ONCE
Status: COMPLETED | OUTPATIENT
Start: 2025-05-22 | End: 2025-05-22

## 2025-05-22 RX ORDER — ACETAMINOPHEN 325 MG/1
650 TABLET ORAL EVERY 6 HOURS PRN
Status: DISCONTINUED | OUTPATIENT
Start: 2025-05-22 | End: 2025-05-28 | Stop reason: HOSPADM

## 2025-05-22 RX ORDER — SODIUM CHLORIDE 0.9 % (FLUSH) 0.9 %
5-40 SYRINGE (ML) INJECTION PRN
Status: DISCONTINUED | OUTPATIENT
Start: 2025-05-22 | End: 2025-05-28 | Stop reason: HOSPADM

## 2025-05-22 RX ORDER — ONDANSETRON 4 MG/1
4 TABLET, ORALLY DISINTEGRATING ORAL EVERY 8 HOURS PRN
Status: DISCONTINUED | OUTPATIENT
Start: 2025-05-22 | End: 2025-05-28 | Stop reason: HOSPADM

## 2025-05-22 RX ORDER — DICYCLOMINE HCL 20 MG
20 TABLET ORAL 4 TIMES DAILY
Qty: 40 TABLET | Refills: 0 | Status: SHIPPED | OUTPATIENT
Start: 2025-05-22 | End: 2025-05-22

## 2025-05-22 RX ORDER — ACETAMINOPHEN 650 MG/1
650 SUPPOSITORY RECTAL EVERY 6 HOURS PRN
Status: DISCONTINUED | OUTPATIENT
Start: 2025-05-22 | End: 2025-05-28 | Stop reason: HOSPADM

## 2025-05-22 RX ORDER — CEPHALEXIN 500 MG/1
500 CAPSULE ORAL 2 TIMES DAILY
Qty: 20 CAPSULE | Refills: 0 | Status: SHIPPED | OUTPATIENT
Start: 2025-05-22 | End: 2025-05-22

## 2025-05-22 RX ORDER — ENOXAPARIN SODIUM 100 MG/ML
30 INJECTION SUBCUTANEOUS DAILY
Status: DISCONTINUED | OUTPATIENT
Start: 2025-05-23 | End: 2025-05-28 | Stop reason: HOSPADM

## 2025-05-22 RX ORDER — FOLIC ACID 1 MG/1
1000 TABLET ORAL DAILY
Status: DISCONTINUED | OUTPATIENT
Start: 2025-05-23 | End: 2025-05-28 | Stop reason: HOSPADM

## 2025-05-22 RX ORDER — ROSUVASTATIN CALCIUM 5 MG/1
5 TABLET, COATED ORAL NIGHTLY
Status: DISCONTINUED | OUTPATIENT
Start: 2025-05-23 | End: 2025-05-23

## 2025-05-22 RX ORDER — ONDANSETRON 4 MG/1
4 TABLET, ORALLY DISINTEGRATING ORAL 3 TIMES DAILY PRN
Qty: 21 TABLET | Refills: 0 | Status: SHIPPED | OUTPATIENT
Start: 2025-05-22 | End: 2025-05-22

## 2025-05-22 RX ORDER — ASPIRIN 81 MG/1
81 TABLET, CHEWABLE ORAL DAILY
Status: DISCONTINUED | OUTPATIENT
Start: 2025-05-23 | End: 2025-05-28 | Stop reason: HOSPADM

## 2025-05-22 RX ADMIN — ONDANSETRON 4 MG: 2 INJECTION, SOLUTION INTRAMUSCULAR; INTRAVENOUS at 17:38

## 2025-05-22 RX ADMIN — WATER 1000 MG: 1 INJECTION INTRAMUSCULAR; INTRAVENOUS; SUBCUTANEOUS at 19:27

## 2025-05-22 RX ADMIN — SODIUM CHLORIDE 1000 ML: 0.9 INJECTION, SOLUTION INTRAVENOUS at 17:39

## 2025-05-22 RX ADMIN — DICYCLOMINE HYDROCHLORIDE 20 MG: 10 INJECTION, SOLUTION INTRAMUSCULAR at 17:38

## 2025-05-22 RX ADMIN — MORPHINE SULFATE 2 MG: 2 INJECTION, SOLUTION INTRAMUSCULAR; INTRAVENOUS at 17:38

## 2025-05-22 RX ADMIN — SODIUM CHLORIDE 1000 ML: 0.9 INJECTION, SOLUTION INTRAVENOUS at 19:46

## 2025-05-22 ASSESSMENT — PAIN SCALES - GENERAL
PAINLEVEL_OUTOF10: 5
PAINLEVEL_OUTOF10: 5
PAINLEVEL_OUTOF10: 3

## 2025-05-22 ASSESSMENT — LIFESTYLE VARIABLES
HOW OFTEN DO YOU HAVE A DRINK CONTAINING ALCOHOL: NEVER
HOW MANY STANDARD DRINKS CONTAINING ALCOHOL DO YOU HAVE ON A TYPICAL DAY: PATIENT DOES NOT DRINK

## 2025-05-22 ASSESSMENT — PAIN DESCRIPTION - LOCATION: LOCATION: ABDOMEN

## 2025-05-22 NOTE — ED PROVIDER NOTES
[facility]      TRIAGE CHIEF COMPLAINT:   Abdominal Pain      [place]:  Jazmine Hutchinson is a 89 y.o. female that presents with complaint of abdominal pain, nausea, diarrhea.  Patient states she is been sick for the past week.  Has not seen her doctor.  She has a history of diverticulitis this feels similar.  She has left lower quadrant pain nausea diarrhea.  Nonbloody.  No fevers cough chest pain shortness of breath or complaints no other questions or concerns.  Feels bloated    REVIEW OF SYSTEMS:  At least 10 systems reviewed and otherwise acutely negative except as in the [place].    Review of Systems   Constitutional: Negative.    HENT: Negative.     Eyes: Negative.    Respiratory: Negative.     Cardiovascular: Negative.    Gastrointestinal:  Positive for abdominal pain, diarrhea and nausea.   Endocrine: Negative.    Genitourinary: Negative.    Musculoskeletal: Negative.    Skin: Negative.    Allergic/Immunologic: Negative.    Neurological: Negative.    Hematological: Negative.    Psychiatric/Behavioral: Negative.     All other systems reviewed and are negative.      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 Payton    DVT (deep venous thrombosis) ([facility]) 1970    left leg    Former smoker     History of echocardiogram 09/21/2020    EF 55-60%, mild left ventricular hypertrophy, normal diastolic filling pattern for age, no signficiant valvular disease, no pericardial effusion     History of stress test 03/25/2021    normal LVEF calculated by the computer is probably falsely low. LVEF is 40 %    Hx of Doppler ultrasound 03/01/2018    Carotid-mild 0-49% bilateral carotid,50% stenosis right subclavian    Hyperlipidemia     Hypertension     Macular degeneration     right    Mild cognitive impairment 02/2012    MMSE 26/30    MVP (mitral valve prolapse)     trace on echo      Financial Resource Strain: Low Risk  (5/31/2024)    Overall Financial Resource Strain (CARDIA)     Difficulty of Paying Living Expenses: Not hard at all   Food Insecurity: No Food Insecurity (11/30/2024)    Hunger Vital Sign     Worried About Running Out of Food in the Last Year: Never true     Ran Out of Food in the Last Year: Never true   Transportation Needs: No Transportation Needs (11/30/2024)    PRAPARE - Transportation     Lack of Transportation (Medical): No     Lack of Transportation (Non-Medical): No   Physical Activity: Insufficiently Active (8/19/2024)    Exercise Vital Sign     Days of Exercise per Week: 7 days     Minutes of Exercise per Session: 20 min   Stress: No Stress Concern Present (11/10/2021)    [place] [employer] - Occupational Stress Questionnaire     Feeling of Stress : Not at all   Social Connections: Moderately Integrated (11/10/2021)    Social Connection and Isolation Panel [NHANES]     Frequency of Communication with Friends and Family: Twice a week     Frequency of Social Gatherings with Friends and Family: Once a week     Attends [employer] Services: More than 4 times per year     Active Member of Clubs or Organizations: Yes     Attends Club or Organization Meetings: More than 4 times per year     Marital Status:    Intimate Partner Violence: Not At Risk (11/10/2021)    Humiliation, Afraid, Rape, and Kick questionnaire     Fear of Current or Ex-Partner: No     Emotionally Abused: No     Physically Abused: No     Sexually Abused: No   Housing Stability: Low Risk  (11/30/2024)    Housing Stability Vital Sign     Unable to Pay for Housing in the Last Year: No     Number of Times Moved in the Last Year: 0     Homeless in the Last Year: No     Current Facility-Administered Medications   Medication Dose Route Frequency Provider Last Rate Last Admin    morphine (PF) injection 2 mg  2 mg IntraVENous Q30 Min PRN Emile Hernandez DO   2 mg at 05/22/25 9228     may have been completed with a voice recognition program. Efforts were made to edit the dictations but occasionally words aremis-transcribed.)    DISPOSITION REFERRAL (if applicable):  Jayda Díaz MD  [address] [place] [address]  [phone]    Schedule an appointment as soon as possible for a visit in 1 day      [facility] Emergency Department  [address] [place] [address]  [phone]    If symptoms worsen      DISPOSITION MEDICATIONS (if applicable):  Current Discharge Medication List             DO Mary Stockton James, DO  05/22/25 2100       Emile Hernandez DO  05/22/25 2223

## 2025-05-22 NOTE — ED NOTES
Patient arrives via [facility] from home with c/o abdominal pain and diarrhea. Patient has a history of diverticulitis. Patient has had abdominal surgeries as well years ago. Patient is AOX4, respirations equal and unlabored, skin PWD.

## 2025-05-22 NOTE — ED NOTES
The following labs were labeled with appropriate pt sticker and tubed to lab:     [x] Blue     [x] Lavender   [] on ice  [x] Green/yellow  [] Green/black [] on ice  [x] Grey  [x] on ice  [] Yellow  [x] Red  [] Pink  [] Type/ Screen  [] ABG  [] VBG    [] COVID-19 swab    [] Rapid  [] PCR  [] Flu swab  [] Peds Viral Panel     [] Urine Sample  [] Fecal Sample  [] Pelvic Cultures  [] Blood Cultures  [] X 2  [] STREP Cultures  [] Wound Cultures

## 2025-05-23 LAB
ALBUMIN SERPL-MCNC: 3.4 G/DL (ref 3.4–5)
ALBUMIN/GLOB SERPL: 1.3 {RATIO} (ref 1.1–2.2)
ALP SERPL-CCNC: 86 U/L (ref 40–129)
ALT SERPL-CCNC: 12 U/L (ref 10–40)
ANION GAP SERPL CALCULATED.3IONS-SCNC: 11 MMOL/L (ref 9–17)
ANION GAP SERPL CALCULATED.3IONS-SCNC: 11 MMOL/L (ref 9–17)
AST SERPL-CCNC: 30 U/L (ref 15–37)
BASOPHILS # BLD: 0.02 K/UL
BASOPHILS NFR BLD: 0 % (ref 0–1)
BILIRUB SERPL-MCNC: 0.3 MG/DL (ref 0–1)
BUN SERPL-MCNC: 12 MG/DL (ref 7–20)
BUN SERPL-MCNC: 16 MG/DL (ref 7–20)
CALCIUM SERPL-MCNC: 9.1 MG/DL (ref 8.3–10.6)
CALCIUM SERPL-MCNC: 9.5 MG/DL (ref 8.3–10.6)
CHLORIDE SERPL-SCNC: 101 MMOL/L (ref 99–110)
CHLORIDE SERPL-SCNC: 104 MMOL/L (ref 99–110)
CO2 SERPL-SCNC: 23 MMOL/L (ref 21–32)
CO2 SERPL-SCNC: 25 MMOL/L (ref 21–32)
CREAT SERPL-MCNC: 1.1 MG/DL (ref 0.6–1.2)
CREAT SERPL-MCNC: 1.1 MG/DL (ref 0.6–1.2)
EKG ATRIAL RATE: 73 BPM
EKG DIAGNOSIS: NORMAL
EKG P AXIS: 58 DEGREES
EKG P-R INTERVAL: 176 MS
EKG Q-T INTERVAL: 408 MS
EKG QRS DURATION: 104 MS
EKG QTC CALCULATION (BAZETT): 449 MS
EKG R AXIS: 17 DEGREES
EKG T AXIS: 4 DEGREES
EKG VENTRICULAR RATE: 73 BPM
EOSINOPHIL # BLD: 0.16 K/UL
EOSINOPHILS RELATIVE PERCENT: 3 % (ref 0–3)
ERYTHROCYTE [DISTWIDTH] IN BLOOD BY AUTOMATED COUNT: 13.4 % (ref 11.7–14.9)
GFR, ESTIMATED: 45 ML/MIN/1.73M2
GFR, ESTIMATED: 47 ML/MIN/1.73M2
GLUCOSE SERPL-MCNC: 111 MG/DL (ref 74–99)
GLUCOSE SERPL-MCNC: 115 MG/DL (ref 74–99)
HCT VFR BLD AUTO: 32.9 % (ref 37–47)
HGB BLD-MCNC: 10.5 G/DL (ref 12.5–16)
IMM GRANULOCYTES # BLD AUTO: 0.01 K/UL
IMM GRANULOCYTES NFR BLD: 0 %
INR PPP: 1.1
LYMPHOCYTES NFR BLD: 1.78 K/UL
LYMPHOCYTES RELATIVE PERCENT: 33 % (ref 24–44)
MAGNESIUM SERPL-MCNC: 2.1 MG/DL (ref 1.8–2.4)
MCH RBC QN AUTO: 34.7 PG (ref 27–31)
MCHC RBC AUTO-ENTMCNC: 31.9 G/DL (ref 32–36)
MCV RBC AUTO: 108.6 FL (ref 78–100)
MONOCYTES NFR BLD: 0.61 K/UL
MONOCYTES NFR BLD: 11 % (ref 0–5)
NEUTROPHILS NFR BLD: 53 % (ref 36–66)
NEUTS SEG NFR BLD: 2.86 K/UL
PLATELET # BLD AUTO: 100 K/UL (ref 140–440)
PMV BLD AUTO: 9.4 FL (ref 7.5–11.1)
POTASSIUM SERPL-SCNC: 4.3 MMOL/L (ref 3.5–5.1)
POTASSIUM SERPL-SCNC: 4.8 MMOL/L (ref 3.5–5.1)
PROT SERPL-MCNC: 5.9 G/DL (ref 6.4–8.2)
PROTHROMBIN TIME: 14.2 SEC (ref 11.7–14.5)
RBC # BLD AUTO: 3.03 M/UL (ref 4.2–5.4)
SODIUM SERPL-SCNC: 137 MMOL/L (ref 136–145)
SODIUM SERPL-SCNC: 138 MMOL/L (ref 136–145)
WBC OTHER # BLD: 5.4 K/UL (ref 4–10.5)

## 2025-05-23 PROCEDURE — 2500000003 HC RX 250 WO HCPCS: Performed by: STUDENT IN AN ORGANIZED HEALTH CARE EDUCATION/TRAINING PROGRAM

## 2025-05-23 PROCEDURE — 97166 OT EVAL MOD COMPLEX 45 MIN: CPT

## 2025-05-23 PROCEDURE — 97116 GAIT TRAINING THERAPY: CPT

## 2025-05-23 PROCEDURE — 83735 ASSAY OF MAGNESIUM: CPT

## 2025-05-23 PROCEDURE — 6370000000 HC RX 637 (ALT 250 FOR IP): Performed by: STUDENT IN AN ORGANIZED HEALTH CARE EDUCATION/TRAINING PROGRAM

## 2025-05-23 PROCEDURE — 36415 COLL VENOUS BLD VENIPUNCTURE: CPT

## 2025-05-23 PROCEDURE — 80053 COMPREHEN METABOLIC PANEL: CPT

## 2025-05-23 PROCEDURE — 97162 PT EVAL MOD COMPLEX 30 MIN: CPT

## 2025-05-23 PROCEDURE — 97535 SELF CARE MNGMENT TRAINING: CPT

## 2025-05-23 PROCEDURE — 96372 THER/PROPH/DIAG INJ SC/IM: CPT

## 2025-05-23 PROCEDURE — G0378 HOSPITAL OBSERVATION PER HR: HCPCS

## 2025-05-23 PROCEDURE — 80048 BASIC METABOLIC PNL TOTAL CA: CPT

## 2025-05-23 PROCEDURE — 6360000002 HC RX W HCPCS: Performed by: STUDENT IN AN ORGANIZED HEALTH CARE EDUCATION/TRAINING PROGRAM

## 2025-05-23 PROCEDURE — 93010 ELECTROCARDIOGRAM REPORT: CPT | Performed by: INTERNAL MEDICINE

## 2025-05-23 PROCEDURE — 85610 PROTHROMBIN TIME: CPT

## 2025-05-23 PROCEDURE — 85025 COMPLETE CBC W/AUTO DIFF WBC: CPT

## 2025-05-23 PROCEDURE — 96376 TX/PRO/DX INJ SAME DRUG ADON: CPT

## 2025-05-23 RX ORDER — GABAPENTIN 100 MG/1
200 CAPSULE ORAL 3 TIMES DAILY
Status: DISCONTINUED | OUTPATIENT
Start: 2025-05-23 | End: 2025-05-28 | Stop reason: HOSPADM

## 2025-05-23 RX ORDER — ROSUVASTATIN CALCIUM 5 MG/1
5 TABLET, COATED ORAL NIGHTLY
Status: DISCONTINUED | OUTPATIENT
Start: 2025-05-23 | End: 2025-05-28 | Stop reason: HOSPADM

## 2025-05-23 RX ORDER — GABAPENTIN 100 MG/1
200 CAPSULE ORAL 3 TIMES DAILY
Status: DISCONTINUED | OUTPATIENT
Start: 2025-05-23 | End: 2025-05-23

## 2025-05-23 RX ORDER — PYRIDOSTIGMINE BROMIDE 60 MG/1
60 TABLET ORAL 3 TIMES DAILY
Status: DISCONTINUED | OUTPATIENT
Start: 2025-05-23 | End: 2025-05-28 | Stop reason: HOSPADM

## 2025-05-23 RX ORDER — VERAPAMIL HYDROCHLORIDE 120 MG/1
120 TABLET, FILM COATED, EXTENDED RELEASE ORAL ONCE
Status: COMPLETED | OUTPATIENT
Start: 2025-05-23 | End: 2025-05-23

## 2025-05-23 RX ORDER — DIVALPROEX SODIUM 250 MG/1
250 TABLET, FILM COATED, EXTENDED RELEASE ORAL NIGHTLY
Status: DISCONTINUED | OUTPATIENT
Start: 2025-05-23 | End: 2025-05-28 | Stop reason: HOSPADM

## 2025-05-23 RX ADMIN — ENOXAPARIN SODIUM 30 MG: 100 INJECTION SUBCUTANEOUS at 16:17

## 2025-05-23 RX ADMIN — VERAPAMIL HYDROCHLORIDE 120 MG: 120 TABLET, FILM COATED, EXTENDED RELEASE ORAL at 20:13

## 2025-05-23 RX ADMIN — GABAPENTIN 200 MG: 100 CAPSULE ORAL at 08:27

## 2025-05-23 RX ADMIN — ASPIRIN 81 MG: 81 TABLET, CHEWABLE ORAL at 08:29

## 2025-05-23 RX ADMIN — PANTOPRAZOLE SODIUM 40 MG: 40 TABLET, DELAYED RELEASE ORAL at 05:44

## 2025-05-23 RX ADMIN — GABAPENTIN 200 MG: 100 CAPSULE ORAL at 14:11

## 2025-05-23 RX ADMIN — SODIUM CHLORIDE, PRESERVATIVE FREE 10 ML: 5 INJECTION INTRAVENOUS at 08:36

## 2025-05-23 RX ADMIN — ACETAMINOPHEN 650 MG: 325 TABLET ORAL at 16:18

## 2025-05-23 RX ADMIN — PYRIDOSTIGMINE BROMIDE 60 MG: 60 TABLET ORAL at 14:11

## 2025-05-23 RX ADMIN — WATER 1000 MG: 1 INJECTION INTRAMUSCULAR; INTRAVENOUS; SUBCUTANEOUS at 08:27

## 2025-05-23 RX ADMIN — PYRIDOSTIGMINE BROMIDE 60 MG: 60 TABLET ORAL at 08:32

## 2025-05-23 RX ADMIN — ROSUVASTATIN CALCIUM 5 MG: 5 TABLET, FILM COATED ORAL at 02:28

## 2025-05-23 RX ADMIN — ACETAMINOPHEN 650 MG: 325 TABLET ORAL at 05:44

## 2025-05-23 RX ADMIN — GABAPENTIN 200 MG: 100 CAPSULE ORAL at 02:28

## 2025-05-23 RX ADMIN — Medication 400 MG: at 08:29

## 2025-05-23 RX ADMIN — FOLIC ACID 1000 MCG: 1 TABLET ORAL at 08:29

## 2025-05-23 RX ADMIN — DIVALPROEX SODIUM 250 MG: 250 TABLET, EXTENDED RELEASE ORAL at 02:28

## 2025-05-23 RX ADMIN — SERTRALINE HYDROCHLORIDE 25 MG: 50 TABLET ORAL at 08:29

## 2025-05-23 RX ADMIN — PYRIDOSTIGMINE BROMIDE 60 MG: 60 TABLET ORAL at 02:28

## 2025-05-23 RX ADMIN — ONDANSETRON 4 MG: 4 TABLET, ORALLY DISINTEGRATING ORAL at 19:31

## 2025-05-23 RX ADMIN — PYRIDOSTIGMINE BROMIDE 60 MG: 60 TABLET ORAL at 20:13

## 2025-05-23 RX ADMIN — GABAPENTIN 200 MG: 100 CAPSULE ORAL at 20:13

## 2025-05-23 RX ADMIN — VERAPAMIL HYDROCHLORIDE 120 MG: 120 TABLET, FILM COATED, EXTENDED RELEASE ORAL at 03:12

## 2025-05-23 RX ADMIN — SODIUM CHLORIDE, PRESERVATIVE FREE 10 ML: 5 INJECTION INTRAVENOUS at 20:17

## 2025-05-23 ASSESSMENT — PAIN DESCRIPTION - ORIENTATION
ORIENTATION: LEFT

## 2025-05-23 ASSESSMENT — PAIN DESCRIPTION - PAIN TYPE: TYPE: ACUTE PAIN

## 2025-05-23 ASSESSMENT — PAIN DESCRIPTION - FREQUENCY: FREQUENCY: INTERMITTENT

## 2025-05-23 ASSESSMENT — PAIN DESCRIPTION - DESCRIPTORS
DESCRIPTORS: ACHING
DESCRIPTORS: CRAMPING
DESCRIPTORS: CRAMPING

## 2025-05-23 ASSESSMENT — PAIN DESCRIPTION - LOCATION
LOCATION: ABDOMEN
LOCATION: ABDOMEN
LOCATION: HIP

## 2025-05-23 ASSESSMENT — PAIN SCALES - GENERAL
PAINLEVEL_OUTOF10: 0
PAINLEVEL_OUTOF10: 7
PAINLEVEL_OUTOF10: 0
PAINLEVEL_OUTOF10: 7
PAINLEVEL_OUTOF10: 0
PAINLEVEL_OUTOF10: 5
PAINLEVEL_OUTOF10: 5
PAINLEVEL_OUTOF10: 0

## 2025-05-23 ASSESSMENT — PAIN DESCRIPTION - ONSET: ONSET: GRADUAL

## 2025-05-23 ASSESSMENT — PAIN - FUNCTIONAL ASSESSMENT: PAIN_FUNCTIONAL_ASSESSMENT: ACTIVITIES ARE NOT PREVENTED

## 2025-05-23 NOTE — ED NOTES
Contacted Lurdes in lab about previously collected urine sample to use for recent urine culture order. She verified that it was able to be used.

## 2025-05-23 NOTE — CONSULTS
[facility] ACUTE CARE PHYSICAL THERAPY EVALUATION  Ada Jasper, 1935, OBS [id number], 5/23/2025    History  [place]:  The primary encounter diagnosis was Urinary tract infection without hematuria, site unspecified. Diagnoses of Abdominal pain, unspecified abdominal location, Nausea, Diarrhea, unspecified type, and Diverticulosis were also pertinent to this visit.  Patient  has a past medical history of Arthritis of shoulder region, right, Blind right eye, Chronic depression, DVT (deep venous thrombosis) ([facility]), Former smoker, History of echocardiogram, History of stress test, Hx of Doppler ultrasound, Hyperlipidemia, Hypertension, Macular degeneration, Mild cognitive impairment, MVP (mitral valve prolapse), Osteoporosis, Pancytopenia, Peptic ulcer disease, Peripheral vascular disease, Prediabetes, Recurrent ventral incisional hernia, S/P CABG x 3, Spigelian hernia, Supraventricular tachycardia, and Tic douloureux.  Patient  has a past surgical history that includes Cholecystectomy; Coronary artery bypass graft (8/2009); Ureter surgery; Shoulder arthroscopy (Right, 2003); Tubal ligation; and Cataract removal (Right, 2010).    Recommendation: home with assist PRN for higher level IADLs, outdoor mobility/stairs, and HH PT     Subjective:  Patient states:  \"I'm banned from doing stairs by myself\"   Pain:  denies   Communication with other providers:  RN, co-eval with Alessandra SOTELO   Restrictions: General precautions, falls, contact precautions.     Home Setup/Prior level of function  Social/Functional History  Lives With: Alone  Type of Home: House  Home Layout: One level  Home Access: Stairs to enter with rails  Entrance Stairs - Number of Steps: 3  Entrance Stairs - Rails: Both (Will use 1 railing and cane)  Bathroom Shower/Tub: Tub/Shower unit  Bathroom Toilet: Standard  Bathroom Equipment: Shower chair, Grab bars in shower, Grab bars around toilet  Home Equipment: Cane, Rollator, Walker -  Mobility:  AM-PAC Inpatient Mobility Raw Score : 18    Safety: patient left in chair with alarm, call light within reach, RN notified, gait belt used.    Assessment:  Pt is an 89 year old female admitted with debility and abdominal pain. Recommend home with assist PRN and HH PT once medically stable. At baseline she is [place] with gross mobility and needs some assist with ADLs. She performed well this date though slightly below baseline with impaired activity tolerance, strength, and balance. She would benefit from continued therapy to address her current deficits, dec potential fall risk, dec burden of care, and restore PLOF.    Complexity: Moderate  Prognosis: Good, no significant barriers to participation at this time.   General Plan:  3+x/week  Equipment: no needs     Goals:  Short Term Goals  Time Frame for Short Term Goals: 2 weeks  Short Term Goal 1: Pt will perform sit><supine indep  Short Term Goal 2: Pt will transfer to all surfaces Alek  Short Term Goal 3: Pt will ambulate 150ft with LRAD [place]  Short Term Goal 4: Pt will ascend/descend 3 steps, bilat UE support on rail/cane and SBA  Short Term Goal 5: Pt will perform standing light dynamic activity x 3 minutes, single UE support if needed Alek       Treatment plan:  Bed mobility, transfers, balance, gait, TA, TX, stairs     Recommendations for NURSING mobility: ambulate with SPC     Time:   Time in: 1109  Time out: 1135  Timed treatment minutes: 14  Total time: 26 minutes     Electronically signed by:    Maya Lara, [id number]  5/23/2025, 12:46 PM

## 2025-05-23 NOTE — PLAN OF CARE
Problem: Chronic Conditions and Co-morbidities  Goal: Patient's chronic conditions and co-morbidity symptoms are monitored and maintained or improved  5/23/2025 1502 by Jayleen Don RN  Outcome: Progressing  5/23/2025 0404 by Maude Watson RN  Outcome: Progressing     Problem: Discharge Planning  Goal: Discharge to home or other facility with appropriate resources  Outcome: Progressing     Problem: Pain  Goal: Verbalizes/displays adequate comfort level or baseline comfort level  5/23/2025 1502 by Jayleen Don RN  Outcome: Progressing  5/23/2025 0404 by Maude Watson RN  Outcome: Progressing     Problem: Safety - Adult  Goal: Free from fall injury  5/23/2025 1502 by Jayleen Don RN  Outcome: Progressing  5/23/2025 0404 by Maude Watson RN  Outcome: Progressing     Problem: ABCDS Injury Assessment  Goal: Absence of physical injury  5/23/2025 1502 by Jayleen Don RN  Outcome: Progressing  5/23/2025 0404 by Maude Watson RN  Outcome: Progressing

## 2025-05-23 NOTE — DISCHARGE INSTRUCTIONS
You are admitted with some weakness and were found to have a urinary tract infection.    You are being sent home on oral antibiotics, please start them tonight and continue them as prescribed until all the pills are done.    I have also prescribed a muscle relaxer for your back pain.  Please take that as needed.    Please follow-up with your primary care provider in 1 week.    If your symptoms come back or worsen, please return to the emergency room.

## 2025-05-23 NOTE — CARE COORDINATION
CM met with patient to follow up regarding discharge planning. CM introduced self and CM role. Patient states she lives alone in a home in [place]. [facility] family checks on her almost daily. States chika lives 2 doors down and provides her with groceries. States she completes light meal prep independently and also receives meals on wheels. Patient typically drives, but occasionally utilizes [employer] for transportation. Patient has a PCP and insurance which assists with medication affordability. Patient states she requires some assistance with ADLs. [facility] she has a bath aide twice a week on Tuesday and Friday and has a  every other week. Patient is active with [facility] with PT services twice a week. Patient has the following DME: cane, walker, grab bars, shower seat, life alert button, BSC, wheelchair and walker with seat. Patient states at home she typically utilizes a standard walker from bedroom to hallway outside of bathroom, then utilizes a cane into the bathroom. Patient states she plans to return home alone and continue services already in place.

## 2025-05-23 NOTE — PROGRESS NOTES
Occupational Therapy  [facility] ACUTE CARE OCCUPATIONAL THERAPY EVALUATION  Ada Jasper, 1935, OBS [id number], 5/23/2025    Discharge Recommendation: Encourage home with assist/supervision as needed, with home health occupational therapy services to address minimal/mild post-acute rehabilitation needs.     History  [place]:  The primary encounter diagnosis was Urinary tract infection without hematuria, site unspecified. Diagnoses of Abdominal pain, unspecified abdominal location, Nausea, Diarrhea, unspecified type, and Diverticulosis were also pertinent to this visit.  Patient  has a past medical history of Arthritis of shoulder region, right, Blind right eye, Chronic depression, DVT (deep venous thrombosis) ([facility]), Former smoker, History of echocardiogram, History of stress test, Hx of Doppler ultrasound, Hyperlipidemia, Hypertension, Macular degeneration, Mild cognitive impairment, MVP (mitral valve prolapse), Osteoporosis, Pancytopenia, Peptic ulcer disease, Peripheral vascular disease, Prediabetes, Recurrent ventral incisional hernia, S/P CABG x 3, Spigelian hernia, Supraventricular tachycardia, and Tic douloureux.  Patient  has a past surgical history that includes Cholecystectomy; Coronary artery bypass graft (8/2009); Ureter surgery; Shoulder arthroscopy (Right, 2003); Tubal ligation; and Cataract removal (Right, 2010).    Subjective:  Patient comments: \"My mother wanted to get me something else but I told her I wanted an ID bracelet, because it what all the kids had back then\".    Pain:  Denied.    Communication with other providers: Nurse petty session, co-eval with PT Maya.  Restrictions: General Precautions, Fall Risk, Contact (EBL)    Home Setup/Prior level of function  Social/Functional History  Lives With: Alone  Type of Home: House  Home Layout: One level  Home Access: Stairs to enter with rails  Entrance Stairs - Number of Steps: 3  Entrance Stairs - Rails: Both (Will use 1 railing  functional mobility. Pt would benefit from continued acute care OT services and upon discharge would benefit from home with assist/supervision as needed, with home health occupational therapy services to address minimal/mild post-acute rehabilitation needs.     Complexity: Moderate   Prognosis: Good, no significant barriers to participation at this time.   Occupational Therapy Plan  Times Per Week: 2x+  Times Per Day: Once a day  Current Treatment Recommendations: Strengthening, Cognitive reorientation, ROM, Balance training, Pain management, Self-Care / ADL, Home management training, Safety education & training, Functional mobility training, Endurance training, Patient/Caregiver education & training, Equipment evaluation, education, & procurement, Neuromuscular re-education, Positioning, Cognitive/Perceptual training, Co-Treatment, Coordination training       Goals: To be achieved prior to discharge  Goal 1: Pt will perform UE ADLs independently   Goal 2: Pt will perform LE ADLs independently   Goal 3: Pt will perform toileting independently   Goal 4: Pt will perform HH distance functional mobility Alek in preparation for return to home   Goal 5: Pt will perform functional transfers to/from bed, chair, and toilet Alek  Goal 6: Pt will perform therex/theract in order to increase functional activity tolerance  Goal 7: Pt will perform all aspects of session w/ G safety awareness to demonstrate capability to safely discharge to a lower level of supervision/assistance     Treatment plan:  Pt will perform therex/theract in order to increase functional activity tolerance in preparation for ADL participation.     Recommendations for NURSING activity: Up to chair for all 3 meals and up to bathroom for all toileting needs    Time:   Time in: 1110  Time out: 1135  Timed treatment minutes: 10  Total time: 25 minutes     Electronically signed by:    BRITTANY Eastman/FLORECITA [id number]  5/23/2025, 12:37 PM

## 2025-05-23 NOTE — PROGRESS NOTES
V2.0  [facility] Hospitalist Progress Note      Name:  Jazmine Hutchinson /Age/Sex: 1935  (89 y.o. female)   MRN & CSN:  [id number] & [id number] Encounter Date/Time: 2025 3:25 PM EDT    Location:  OBS  PCP: Jayda Díaz MD       Hospital Day: 2    Assessment and Plan:   Jazmine Hutchinson is a 89 y.o. female with pmh as noted below who presents with Debility      Plan:  Debility  - Diffuse abdominal muscle soreness and paraspinal pain after exercises with home PT/OT. Supportive care for pain/soreness. PT/OT eval for possible SNF placement   ---Patient seen by therapy, recommend Home with [facility]        UTI  - Non-septic,   --continue Rocephin IV, Pending cultres    Hx of CAD  -Continue aspirin, statin, not on metoprolol due to N/V per allergies      CKD 3a  - Cr at baseline      HLD  -Continue statin      GERD  -Continue PPI     Mood Disorder   -Continue home meds       Diet ADULT DIET; Regular   DVT Prophylaxis [] Lovenox, []  Heparin, [] SCDs, [] Ambulation,  [] Eliquis, [] Xarelto  [] Coumadin   Code Status Full Code   Disposition From: Home  Expected Disposition: Home with [facility]  Estimated Date of Discharge: 1-2 days  Patient requires continued admission due to UTI requiring  IVAB   Surrogate Decision Maker/ DELVIN farr     Subjective:     Chief Complaint: Debility     Jazmine Hutchinson is a 89 y.o. female who presents with Debility  Patient seen and examined today.  No acute events noted overnight. Denies CP, SOB, N/V/D/Abd pain. Denies further needs at this time. Plan of care discussed.           Review of Systems:    Pertinent neg/pos noted above        Objective:     Intake/Output Summary (Last 24 hours) at 2025 1525  Last data filed at 2025 0312  Gross per 24 hour   Intake 1240 ml   Output --   Net 1240 ml        Vitals:   Vitals:    25 0830   BP: (!) 141/66   Pulse: 68   Resp: 15   Temp: 97.7 °F (36.5 °C)   SpO2: 98%       Physical Exam:     Physical Exam  Constitutional:        2.0 mmol/L   Urinalysis    Collection Time: 05/22/25  5:45 PM   Result Value Ref Range    Color, UA Yellow Yellow    Turbidity UA Clear Clear    Glucose, Ur NEGATIVE NEGATIVE mg/dL    Bilirubin, Urine NEGATIVE NEGATIVE    Ketones, Urine NEGATIVE NEGATIVE mg/dL    Specific Gravity, UA 1.015 1.005 - 1.030    Urine Hgb NEGATIVE NEGATIVE    pH, Urine 7.0 5.0 - 8.0    Protein, UA NEGATIVE NEGATIVE mg/dL    Urobilinogen, Urine 0.2 0.0 - 1.0 EU/dL    Nitrite, Urine POSITIVE (A) NEGATIVE    Leukocyte Esterase, Urine TRACE (A) NEGATIVE   Microscopic Urinalysis    Collection Time: 05/22/25  5:45 PM   Result Value Ref Range    WBC, UA 20 (H) 0 - 5 /HPF    RBC, UA 1 0 - 2 /HPF    Bacteria, UA OCCASIONAL (A) None    Mucus, UA RARE (A) None   Lactic Acid    Collection Time: 05/22/25  7:38 PM   Result Value Ref Range    Lactic Acid 1.4 0.4 - 2.0 mmol/L   Comprehensive Metabolic Panel w/ Reflex to MG    Collection Time: 05/23/25  4:19 AM   Result Value Ref Range    Sodium 138 136 - 145 mmol/L    Potassium 4.3 3.5 - 5.1 mmol/L    Chloride 104 99 - 110 mmol/L    CO2 23 21 - 32 mmol/L    Anion Gap 11 9 - 17 mmol/L    Glucose 115 (H) 74 - 99 mg/dL    BUN 12 7 - 20 mg/dL    Creatinine 1.1 0.6 - 1.2 mg/dL    Est, Glom Filt Rate 47 (L) >60 mL/min/1.73m2    Calcium 9.1 8.3 - 10.6 mg/dL    Total Protein 5.9 (L) 6.4 - 8.2 g/dL    Albumin 3.4 3.4 - 5.0 g/dL    Albumin/Globulin Ratio 1.3 1.1 - 2.2    Total Bilirubin 0.3 0.0 - 1.0 mg/dL    Alkaline Phosphatase 86 40 - 129 U/L    ALT 12 10 - 40 U/L    AST 30 15 - 37 U/L   CBC with Auto Differential    Collection Time: 05/23/25  4:19 AM   Result Value Ref Range    WBC 5.4 4.0 - 10.5 k/uL    RBC 3.03 (L) 4.20 - 5.40 m/uL    Hemoglobin 10.5 (L) 12.5 - 16.0 g/dL    Hematocrit 32.9 (L) 37.0 - 47.0 %    .6 (H) 78.0 - 100.0 fL    MCH 34.7 (H) 27.0 - 31.0 pg    MCHC 31.9 (L) 32.0 - 36.0 g/dL    RDW 13.4 11.7 - 14.9 %    Platelets 100 (L) 140 - 440 k/uL    MPV 9.4 7.5 - 11.1 fL    Neutrophils % 53  limitations  of noncontrast imaging. Gallbladder: Surgically absent. Spleen: Unremarkable. Pancreas: There is fat infiltration of the pancreas. The pancreas is otherwise unremarkable. Adrenals: Unremarkable. Kidneys: There is mild right pelviectasis, similar to prior. Mild dilation of the mid right ureter, also similar to prior. There is no evidence of left hydronephrosis or left hydroureter. Urinary Bladder: Unremarkable. Reproductive: Uterus is unremarkable. No adnexal lesions are evident. Bowel: The small and large bowel are normal in caliber without evidence of obstruction. Scattered colonic diverticula are present without adjacent fat stranding. Duodenal diverticulum is evident. Vasculature: There is atherosclerosis. Lymphatic system: No suspicious lymphadenopathy is demonstrated. Abdominal wall: There is a wide neck right anterolateral abdominal wall hernia containing fat as well as a few nondilated segments of small bowel (series 5 image 50). Tiny fat-containing umbilical hernia. Bones: No suspicious osteolytic or osteoblastic lesions. Degenerative changes are identified in spine as well as bilateral sacroiliac joints, but hips, and pubic symphysis. IMPRESSION: 1.  No acute abdominal pelvic abnormality. 2.  Colonic diverticulosis. Additional findings as above.  Dictated and Electronically Signed By: Damian Rosas MD [facility] 5/22/2025 20:39          Electronically signed by ABELARDO Sharpe CNP on 5/23/2025 at 3:25 PM

## 2025-05-23 NOTE — H&P
History and Physical      Name:  Jazmine Hutchinson /Age/Sex: 1935  (89 y.o. female)   MRN & CSN:  [id number] & [id number] Encounter Date/Time: 2025 11:25 PM EST   Location:  ED18/ED-18 PCP: Jayda Díaz MD       Hospital Day: 1    Assessment and Plan:     Patient is a 89 y.o. female who presented with chest pain.      Debility  - Diffuse abdominal muscle soreness and paraspinal pain after exercises with home PT/OT. Supportive care for pain/soreness. PT/OT eval for possible SNF placement       UTI  - Non-septic, continue Rocephin    Hx of CAD  -Continue aspirin, statin, not on metoprolol due to N/V per allergies     CKD 3a  - Cr at baseline     HLD  -Continue statin     GERD  -Continue PPI    Mood Disorder   -Continue home meds    Checklist:  Advanced directive: full  Diet: NPO  DVT ppx: Lovenox     Disposition: inpatient   Estimated discharge: 2 day(s).  Current living situation: home.  Expected disposition: home.    Spoke with ED provider who recommended admission for the patient and I agree with that plan.  Personally reviewed lab studies and imaging.  EKG interpreted personally and results as stated above.  Imaging that was interpreted personally and results as stated above.    History of Present Illness:     Chief Complaint:    Chief Complaint   Patient presents with    Abdominal Pain       Patient is a 89 y.o. female with a PMHx of CAD s/p CABG, HTN, HLD, GERD, MDD, who presented to the ED with abdominal pain. Patient presents for abdominal pain that has been ongoing for the last 2 days. Reports she lives at home alone, ambulates with a walker and has a cane and wheelchair. She states she has been working with PT/OT at home, and beginning on Monday began doing bed exercise in bed involving knee flexions. She states her soreness began yesterday and per nursing staff had difficulty ambulating to bedside commode. Denied any F/C, HA, dizziness, presyncope, syncope, SOB, N/V, abdominal pain,  status:      Spouse name: Emile GARCIA    Number of children: 2    Years of education: 13   Occupational History    Occupation: retired   Tobacco Use    Smoking status: Former     Current packs/day: 0.00     Average packs/day: 0.3 packs/day for 9.9 years (2.5 ttl pk-yrs)     Types: Cigarettes     Start date:      Quit date: 1985     Years since quittin.5    Smokeless tobacco: Never   Vaping Use    Vaping status: Never Used   Substance and Sexual Activity    Alcohol use: No     Comment: Caffiene - None    Drug use: No    Sexual activity: Never     Partners: Male     Social Drivers of Health     Financial Resource Strain: Low Risk  (2024)    Overall Financial Resource Strain (CARDIA)     Difficulty of Paying Living Expenses: Not hard at all   Food Insecurity: No Food Insecurity (2024)    Hunger Vital Sign     Worried About Running Out of Food in the Last Year: Never true     Ran Out of Food in the Last Year: Never true   Transportation Needs: No Transportation Needs (2024)    PRAPARE - Transportation     Lack of Transportation (Medical): No     Lack of Transportation (Non-Medical): No   Physical Activity: Insufficiently Active (2024)    Exercise Vital Sign     Days of Exercise per Week: 7 days     Minutes of Exercise per Session: 20 min   Stress: No Stress Concern Present (11/10/2021)    [employer] [place] [employer] - Occupational Stress Questionnaire     Feeling of Stress : Not at all   Social Connections: Moderately Integrated (11/10/2021)    Social Connection and Isolation Panel [NHANES]     Frequency of Communication with Friends and Family: Twice a week     Frequency of Social Gatherings with Friends and Family: Once a week     Attends Zoroastrianism Services: More than 4 times per year     Active Member of Clubs or Organizations: Yes     Attends Club or Organization Meetings: More than 4 times per year     Marital Status:    Intimate Partner Violence: Not At  MD Katherine       Medications:     Medications:          Infusions:       PRN Meds:   morphine, 2 mg, Q30 Min PRN  ondansetron, 4 mg, Q30 Min PRN        Data:     CBC:   Recent Labs     05/22/25  1730   WBC 5.0   HGB 11.3*   *   .8*   RDW 13.2   LYMPHOPCT 38   MONOPCT 11*   EOSPCT 2   BASOPCT 0   MONOSABS 0.52   LYMPHSABS 1.90   EOSABS 0.12   BASOSABS 0.02     CMP:    Recent Labs     05/22/25  1730      K 4.3   CL 99   CO2 26   BUN 18   CREATININE 1.3*   GLUCOSE 163*   CALCIUM 9.7   BILITOT 0.4   ALKPHOS 97   AST 31   ALT 12     Lipids:   Lab Results   Component Value Date/Time    CHOL 116 02/25/2025 09:52 AM    HDL 34 02/25/2025 09:52 AM    HDL 20 01/03/2012 12:00 AM    TRIG 163 02/25/2025 09:52 AM     Hemoglobin A1C:   Lab Results   Component Value Date/Time    LABA1C 5.6 10/31/2024 12:27 PM     TSH:   Lab Results   Component Value Date/Time    TSH 2.31 11/29/2024 05:01 PM     Troponin:   Lab Results   Component Value Date/Time    TROPONINT <0.010 09/24/2023 12:50 AM    TROPONINT <0.010 04/26/2022 07:50 PM    TROPONINT <0.010 01/31/2022 05:51 PM     BNP: No results for input(s): \"PROBNP\" in the last 72 hours.  Lactic Acid:   Recent Labs     05/22/25  1730 05/22/25  1936   LACTA 2.2* 1.4     UA:  Lab Results   Component Value Date/Time    NITRU POSITIVE 05/22/2025 05:45 PM    COLORU Yellow 05/22/2025 05:45 PM    PHUR 7.0 05/22/2025 05:45 PM    PHUR 7.0 05/10/2024 11:44 AM    PHUR 5.5 02/17/2020 04:31 PM    LABCAST >40 Hyaline 10/13/2012 02:09 PM    WBCUA 20 05/22/2025 05:45 PM    RBCUA 1 05/22/2025 05:45 PM    RBCUA 8 07/09/2024 03:18 PM    MUCUS RARE 05/22/2025 05:45 PM    TRICHOMONAS NONE SEEN 07/09/2024 03:18 PM    YEAST FEW 07/09/2024 03:18 PM    BACTERIA OCCASIONAL 05/22/2025 05:45 PM    CLARITYU CLEAR 07/09/2024 03:18 PM    SPECGRAV 1.015 05/10/2024 11:44 AM    LEUKOCYTESUR TRACE 05/22/2025 05:45 PM    UROBILINOGEN 0.2 05/22/2025 05:45 PM    BILIRUBINUR NEGATIVE 05/22/2025 05:45 PM

## 2025-05-23 NOTE — CARE COORDINATION
ED-18--CM for Dr Hernandez. Spoke with Ms Hutchinson at bedside , pleasant , alert / oriented x 3 ,  she is  , lives at home alone some neighbors assist, has regular  PT visits (from a previous knee injury) has also lost a son and daughter. She has meals on wheels but she does not have transportation.  She was concerned about a few other things which the nurse addressed .     Pt also was worried about a ride when she is discharged (lives in [place] and has nobody that can pick her up ). Reassured pt we will remedy this when she is discharged.

## 2025-05-23 NOTE — PROGRESS NOTES
4 Eyes Skin Assessment     NAME:  Jazmine Hutchinson  YOB: 1935  MEDICAL RECORD NUMBER:  [id number]    The patient is being assessed for  Admission    I agree that at least one RN has performed a thorough Head to Toe Skin Assessment on the patient. ALL assessment sites listed below have been assessed.      Areas assessed by both nurses:    Head, Face, Ears, Shoulders, Back, Chest, Arms, Elbows, Hands, Sacrum. Buttock, Coccyx, Ischium, Legs. Feet and Heels, and Under Medical Devices         Does the Patient have a Wound? No noted wound(s) Moisture related Redness to right groin       Mayito Prevention initiated by RN: No  Wound Care Orders initiated by RN: No    Pressure Injury (Stage 3,4, Unstageable, DTI, NWPT, and Complex wounds) if present, place Wound referral order by RN under : No    New Ostomies, if present place, Ostomy referral order under : No     Nurse 1 eSignature: Electronically signed by Maude Watson RN on 5/23/25 at 1:13 AM EDT    **SHARE this note so that the co-signing nurse can place an eSignature**    Nurse 2 eSignature: Electronically signed by Lynn Ibarra RN on 5/23/25 at 1:16 AM EDT

## 2025-05-23 NOTE — ED NOTES
ED TO INPATIENT SBAR HANDOFF    Patient Name: Jazmine Hutchinson   :  1935  89 y.o.   Preferred Name  Jazmine  Family/Caregiver Present no   Restraints no   C-SSRS: Risk of Suicide: No Risk  Sitter no   Sepsis Risk Score Sepsis V2 Risk Score: 13.1      Situation  Chief Complaint   Patient presents with    Abdominal Pain     Brief Description of Patient's Condition: abdominal pain  Mental Status: oriented and alert  Arrived from: home    Imaging:   CT ABDOMEN PELVIS WO CONTRAST Additional Contrast? None   Final Result        Abnormal labs:   Abnormal Labs Reviewed   CBC WITH AUTO DIFFERENTIAL - Abnormal; Notable for the following components:       Result Value    RBC 3.24 (*)     Hemoglobin 11.3 (*)     Hematocrit 34.6 (*)     .8 (*)     MCH 34.9 (*)     Platelets 103 (*)     Monocytes % 11 (*)     All other components within normal limits   COMPREHENSIVE METABOLIC PANEL - Abnormal; Notable for the following components:    Glucose 163 (*)     Creatinine 1.3 (*)     Est, Glom Filt Rate 39 (*)     All other components within normal limits   LACTIC ACID - Abnormal; Notable for the following components:    Lactic Acid 2.2 (*)     All other components within normal limits   URINALYSIS - Abnormal; Notable for the following components:    Nitrite, Urine POSITIVE (*)     Leukocyte Esterase, Urine TRACE (*)     All other components within normal limits   MICROSCOPIC URINALYSIS - Abnormal; Notable for the following components:    WBC, UA 20 (*)     Bacteria, UA OCCASIONAL (*)     Mucus, UA RARE (*)     All other components within normal limits        Background  History:   Past Medical History:   Diagnosis Date    Arthritis of shoulder region, right 2014    Dee    Blind right eye     following right cataract surg    Chronic depression     Dr. Payton    DVT (deep venous thrombosis) ([facility]) 1970    left leg    Former smoker     History of echocardiogram 2020    EF 55-60%, mild left ventricular hypertrophy, normal  diastolic filling pattern for age, no signficiant valvular disease, no pericardial effusion     History of stress test 03/25/2021    normal LVEF calculated by the computer is probably falsely low. LVEF is 40 %    Hx of Doppler ultrasound 03/01/2018    Carotid-mild 0-49% bilateral carotid,50% stenosis right subclavian    Hyperlipidemia     Hypertension     Macular degeneration     right    Mild cognitive impairment 02/2012    MMSE 26/30    MVP (mitral valve prolapse)     trace on echo 2014    Osteoporosis 05/2012    Pancytopenia 05/2011    mild with ; Dr Felix hawk 2010    Peptic ulcer disease     Peripheral vascular disease 01/2016    angio; dis below ankles; pletal    Prediabetes 2006    Recurrent ventral incisional hernia 2013    medial apex of GB scar    S/P CABG x 3 2003    3-vessel; Dr Gallego    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 Mckeon; Right side       Assessment    Vitals:    Level of Consciousness: Alert (0)   Vitals:    05/22/25 2026 05/22/25 2030 05/22/25 2051 05/22/25 2103   BP:   (!) 181/60 (!) 123/97   Pulse: 74 77 72 77   Resp: 20 14 13 19   Temp:       TempSrc:       SpO2: 96%  93% 96%   Weight:       Height:           PO Status:     O2 Flow Rate:        Cardiac Rhythm: sr    Last documented pain medication administered: morphine @ 1738    NIH Score: NIH       Active LDA's:   Peripheral IV 05/22/25 Proximal;Right;Anterior Antecubital (Active)       Pertinent or High Risk Medications/Drips: no   If Yes, please provide details:       Blood Product Administration: no  If Yes, please provide details:     Recommendation    Incomplete orders inpatient orders  Additional Comments:    If any further questions, please call Sending RN at [phone]    Electronically signed by: Electronically signed by Paulette Michael RN on 5/22/2025 at 10:23 PM

## 2025-05-24 LAB
ALBUMIN SERPL-MCNC: 3.4 G/DL (ref 3.4–5)
ALBUMIN/GLOB SERPL: 1.4 {RATIO} (ref 1.1–2.2)
ALP SERPL-CCNC: 84 U/L (ref 40–129)
ALT SERPL-CCNC: 10 U/L (ref 10–40)
ANION GAP SERPL CALCULATED.3IONS-SCNC: 9 MMOL/L (ref 9–17)
AST SERPL-CCNC: 28 U/L (ref 15–37)
BASOPHILS # BLD: 0.02 K/UL
BASOPHILS NFR BLD: 0 % (ref 0–1)
BILIRUB SERPL-MCNC: 0.3 MG/DL (ref 0–1)
BUN SERPL-MCNC: 16 MG/DL (ref 7–20)
CALCIUM SERPL-MCNC: 9.1 MG/DL (ref 8.3–10.6)
CHLORIDE SERPL-SCNC: 102 MMOL/L (ref 99–110)
CO2 SERPL-SCNC: 25 MMOL/L (ref 21–32)
CREAT SERPL-MCNC: 1.1 MG/DL (ref 0.6–1.2)
EOSINOPHIL # BLD: 0.29 K/UL
EOSINOPHILS RELATIVE PERCENT: 6 % (ref 0–3)
ERYTHROCYTE [DISTWIDTH] IN BLOOD BY AUTOMATED COUNT: 13.5 % (ref 11.7–14.9)
GFR, ESTIMATED: 49 ML/MIN/1.73M2
GLUCOSE SERPL-MCNC: 96 MG/DL (ref 74–99)
HCT VFR BLD AUTO: 31.8 % (ref 37–47)
HGB BLD-MCNC: 10.2 G/DL (ref 12.5–16)
IMM GRANULOCYTES # BLD AUTO: 0.01 K/UL
IMM GRANULOCYTES NFR BLD: 0 %
LYMPHOCYTES NFR BLD: 2.07 K/UL
LYMPHOCYTES RELATIVE PERCENT: 42 % (ref 24–44)
MCH RBC QN AUTO: 34.9 PG (ref 27–31)
MCHC RBC AUTO-ENTMCNC: 32.1 G/DL (ref 32–36)
MCV RBC AUTO: 108.9 FL (ref 78–100)
MICROORGANISM SPEC CULT: ABNORMAL
MONOCYTES NFR BLD: 0.62 K/UL
MONOCYTES NFR BLD: 13 % (ref 0–5)
NEUTROPHILS NFR BLD: 39 % (ref 36–66)
NEUTS SEG NFR BLD: 1.92 K/UL
PLATELET, FLUORESCENCE: 101 K/UL (ref 140–440)
PMV BLD AUTO: 9.8 FL (ref 7.5–11.1)
POTASSIUM SERPL-SCNC: 4.7 MMOL/L (ref 3.5–5.1)
PROT SERPL-MCNC: 5.9 G/DL (ref 6.4–8.2)
RBC # BLD AUTO: 2.92 M/UL (ref 4.2–5.4)
SERVICE CMNT-IMP: ABNORMAL
SODIUM SERPL-SCNC: 136 MMOL/L (ref 136–145)
SPECIMEN DESCRIPTION: ABNORMAL
WBC OTHER # BLD: 4.9 K/UL (ref 4–10.5)

## 2025-05-24 PROCEDURE — 6360000002 HC RX W HCPCS

## 2025-05-24 PROCEDURE — 2580000003 HC RX 258: Performed by: INTERNAL MEDICINE

## 2025-05-24 PROCEDURE — 80053 COMPREHEN METABOLIC PANEL: CPT

## 2025-05-24 PROCEDURE — 2500000003 HC RX 250 WO HCPCS: Performed by: STUDENT IN AN ORGANIZED HEALTH CARE EDUCATION/TRAINING PROGRAM

## 2025-05-24 PROCEDURE — 6370000000 HC RX 637 (ALT 250 FOR IP): Performed by: STUDENT IN AN ORGANIZED HEALTH CARE EDUCATION/TRAINING PROGRAM

## 2025-05-24 PROCEDURE — 6360000002 HC RX W HCPCS: Performed by: STUDENT IN AN ORGANIZED HEALTH CARE EDUCATION/TRAINING PROGRAM

## 2025-05-24 PROCEDURE — 2500000003 HC RX 250 WO HCPCS

## 2025-05-24 PROCEDURE — 6360000002 HC RX W HCPCS: Performed by: INTERNAL MEDICINE

## 2025-05-24 PROCEDURE — 96372 THER/PROPH/DIAG INJ SC/IM: CPT

## 2025-05-24 PROCEDURE — 6370000000 HC RX 637 (ALT 250 FOR IP): Performed by: INTERNAL MEDICINE

## 2025-05-24 PROCEDURE — 96365 THER/PROPH/DIAG IV INF INIT: CPT

## 2025-05-24 PROCEDURE — 94761 N-INVAS EAR/PLS OXIMETRY MLT: CPT

## 2025-05-24 PROCEDURE — 96376 TX/PRO/DX INJ SAME DRUG ADON: CPT

## 2025-05-24 PROCEDURE — G0378 HOSPITAL OBSERVATION PER HR: HCPCS

## 2025-05-24 PROCEDURE — 36415 COLL VENOUS BLD VENIPUNCTURE: CPT

## 2025-05-24 PROCEDURE — 85025 COMPLETE CBC W/AUTO DIFF WBC: CPT

## 2025-05-24 RX ORDER — METHOCARBAMOL 500 MG/1
500 TABLET, FILM COATED ORAL 4 TIMES DAILY
Status: DISCONTINUED | OUTPATIENT
Start: 2025-05-24 | End: 2025-05-28 | Stop reason: HOSPADM

## 2025-05-24 RX ORDER — CARBOXYMETHYLCELLULOSE SODIUM 10 MG/ML
2 GEL OPHTHALMIC PRN
Status: DISCONTINUED | OUTPATIENT
Start: 2025-05-24 | End: 2025-05-28 | Stop reason: HOSPADM

## 2025-05-24 RX ADMIN — PANTOPRAZOLE SODIUM 40 MG: 40 TABLET, DELAYED RELEASE ORAL at 05:11

## 2025-05-24 RX ADMIN — SODIUM CHLORIDE, PRESERVATIVE FREE 10 ML: 5 INJECTION INTRAVENOUS at 20:53

## 2025-05-24 RX ADMIN — ASPIRIN 81 MG: 81 TABLET, CHEWABLE ORAL at 09:20

## 2025-05-24 RX ADMIN — METHOCARBAMOL 500 MG: 500 TABLET ORAL at 20:48

## 2025-05-24 RX ADMIN — Medication 400 MG: at 09:20

## 2025-05-24 RX ADMIN — PYRIDOSTIGMINE BROMIDE 60 MG: 60 TABLET ORAL at 09:20

## 2025-05-24 RX ADMIN — MEROPENEM 1000 MG: 1 INJECTION, POWDER, FOR SOLUTION INTRAVENOUS at 13:30

## 2025-05-24 RX ADMIN — FOLIC ACID 1000 MCG: 1 TABLET ORAL at 09:20

## 2025-05-24 RX ADMIN — WATER 1000 MG: 1 INJECTION INTRAMUSCULAR; INTRAVENOUS; SUBCUTANEOUS at 09:21

## 2025-05-24 RX ADMIN — FERROUS SULFATE TAB 325 MG (65 MG ELEMENTAL FE) 325 MG: 325 (65 FE) TAB at 09:20

## 2025-05-24 RX ADMIN — DIVALPROEX SODIUM 250 MG: 250 TABLET, EXTENDED RELEASE ORAL at 20:49

## 2025-05-24 RX ADMIN — ROSUVASTATIN CALCIUM 5 MG: 5 TABLET, FILM COATED ORAL at 20:48

## 2025-05-24 RX ADMIN — VERAPAMIL HYDROCHLORIDE 120 MG: 120 TABLET, FILM COATED, EXTENDED RELEASE ORAL at 20:49

## 2025-05-24 RX ADMIN — PYRIDOSTIGMINE BROMIDE 60 MG: 60 TABLET ORAL at 20:49

## 2025-05-24 RX ADMIN — ENOXAPARIN SODIUM 30 MG: 100 INJECTION SUBCUTANEOUS at 09:21

## 2025-05-24 RX ADMIN — ACETAMINOPHEN 650 MG: 325 TABLET ORAL at 20:49

## 2025-05-24 RX ADMIN — GABAPENTIN 200 MG: 100 CAPSULE ORAL at 09:20

## 2025-05-24 RX ADMIN — GABAPENTIN 200 MG: 100 CAPSULE ORAL at 20:50

## 2025-05-24 RX ADMIN — METHOCARBAMOL 500 MG: 500 TABLET ORAL at 17:27

## 2025-05-24 RX ADMIN — ACETAMINOPHEN 650 MG: 325 TABLET ORAL at 11:26

## 2025-05-24 RX ADMIN — SERTRALINE HYDROCHLORIDE 25 MG: 50 TABLET ORAL at 09:20

## 2025-05-24 RX ADMIN — ONDANSETRON 4 MG: 4 TABLET, ORALLY DISINTEGRATING ORAL at 09:36

## 2025-05-24 RX ADMIN — SODIUM CHLORIDE, PRESERVATIVE FREE 10 ML: 5 INJECTION INTRAVENOUS at 09:21

## 2025-05-24 ASSESSMENT — PAIN SCALES - GENERAL
PAINLEVEL_OUTOF10: 6
PAINLEVEL_OUTOF10: 0
PAINLEVEL_OUTOF10: 10
PAINLEVEL_OUTOF10: 8
PAINLEVEL_OUTOF10: 0

## 2025-05-24 ASSESSMENT — PAIN DESCRIPTION - ORIENTATION
ORIENTATION: LEFT

## 2025-05-24 ASSESSMENT — PAIN DESCRIPTION - LOCATION
LOCATION: HIP;BACK
LOCATION: HIP
LOCATION: HIP

## 2025-05-24 ASSESSMENT — PAIN DESCRIPTION - DESCRIPTORS
DESCRIPTORS: SORE;SPASM
DESCRIPTORS: ACHING;SPASM
DESCRIPTORS: ACHING;DISCOMFORT

## 2025-05-24 ASSESSMENT — PAIN DESCRIPTION - PAIN TYPE: TYPE: ACUTE PAIN

## 2025-05-24 ASSESSMENT — PAIN DESCRIPTION - FREQUENCY: FREQUENCY: INTERMITTENT

## 2025-05-24 ASSESSMENT — PAIN - FUNCTIONAL ASSESSMENT: PAIN_FUNCTIONAL_ASSESSMENT: ACTIVITIES ARE NOT PREVENTED

## 2025-05-24 ASSESSMENT — PAIN DESCRIPTION - ONSET: ONSET: GRADUAL

## 2025-05-24 NOTE — PROGRESS NOTES
V2.0  [facility] [place] Hospitalist Progress Note      Name:  Jazmine Hutchinson /Age/Sex: 1935  (89 y.o. female)   MRN & CSN:  [id number] & [id number] Encounter Date/Time: 2025 8:27 AM EDT    Location:  OBS  PCP: Jayda Díaz MD       Hospital Day: 3    Assessment and Plan:   Jazmine Hutchinson is a 89 y.o. female  who presents with Debility      #Debility likely in the setting of UTI  - Diffuse abdominal muscle soreness and paraspinal pain after exercises with home PT/OT. Supportive care for pain/soreness. PT/OT eval for possible SNF placement   Patient seen by therapy, recommend Home with [facility]      #UTI  - Urine culture growing E. coli  -Continue ceftriaxone  Follow-up on sensitivities    # Left sided back pain  -Exam significant for muscle spasm  Started on Robaxin    #CAD  -Continue aspirin, statin, not on metoprolol due to N/V per allergies      #CKD 3a  - Cr at baseline      #HLD  -Continue statin      #GERD  -Continue PPI     #Mood Disorder   -Continue home meds    Diet ADULT DIET; Regular   DVT Prophylaxis [] Lovenox, []  Heparin, [] SCDs, [] Ambulation,  [] Eliquis, [] Xarelto  [] Coumadin   Code Status Full Code   Disposition From: home  Expected Disposition: home  Estimated Date of Discharge: 1-2 days  Patient requires continued admission due to UTI, awaiting cx   Surrogate Decision Maker/ DELVIN Rivera (grandson)     Subjective:     Patient reports severe back pain.  No new complaints.  Denies any fevers or chills.      Review of Systems:      As above     Objective:     Intake/Output Summary (Last 24 hours) at 2025 1854  Last data filed at 2025 1722  Gross per 24 hour   Intake 590 ml   Output --   Net 590 ml        Vitals:   Vitals:    25 1410   BP: (!) 143/87   Pulse: 73   Resp: 16   Temp: 98.7 °F (37.1 °C)   SpO2: 98%       Physical Exam:     General: NAD, AAO x3-4  Eyes: EOMI  HENT: Atraumatic  CVS: Normal S1 and S2, no murmurs, RRR  Respiratory: Normal breath sounds, clear to  mmol/L    Potassium 4.7 3.5 - 5.1 mmol/L    Chloride 102 99 - 110 mmol/L    CO2 25 21 - 32 mmol/L    Anion Gap 9 9 - 17 mmol/L    Glucose 96 74 - 99 mg/dL    BUN 16 7 - 20 mg/dL    Creatinine 1.1 0.6 - 1.2 mg/dL    Est, Glom Filt Rate 49 (L) >60 mL/min/1.73m2    Calcium 9.1 8.3 - 10.6 mg/dL    Total Protein 5.9 (L) 6.4 - 8.2 g/dL    Albumin 3.4 3.4 - 5.0 g/dL    Albumin/Globulin Ratio 1.4 1.1 - 2.2    Total Bilirubin 0.3 0.0 - 1.0 mg/dL    Alkaline Phosphatase 84 40 - 129 U/L    ALT 10 10 - 40 U/L    AST 28 15 - 37 U/L   CBC with Auto Differential    Collection Time: 05/24/25  1:56 AM   Result Value Ref Range    WBC 4.9 4.0 - 10.5 k/uL    RBC 2.92 (L) 4.20 - 5.40 m/uL    Hemoglobin 10.2 (L) 12.5 - 16.0 g/dL    Hematocrit 31.8 (L) 37.0 - 47.0 %    .9 (H) 78.0 - 100.0 fL    MCH 34.9 (H) 27.0 - 31.0 pg    MCHC 32.1 32.0 - 36.0 g/dL    RDW 13.5 11.7 - 14.9 %    MPV 9.8 7.5 - 11.1 fL    Platelet, Fluorescence 101 (L) 140 - 440 k/uL    Neutrophils % 39 36 - 66 %    Lymphocytes % 42 24 - 44 %    Monocytes % 13 (H) 0 - 5 %    Eosinophils % 6 (H) 0 - 3 %    Basophils % 0 0 - 1 %    Immature Granulocytes % 0 0 %    Neutrophils Absolute 1.92 k/uL    Lymphocytes Absolute 2.07 k/uL    Monocytes Absolute 0.62 k/uL    Eosinophils Absolute 0.29 k/uL    Basophils Absolute 0.02 k/uL    Immature Granulocytes Absolute 0.01 k/uL        Imaging/Diagnostics Last 24 Hours   CT ABDOMEN PELVIS WO CONTRAST Additional Contrast? None  Result Date: 5/22/2025  PROCEDURE: CT ABDOMEN PELVIS WO CONTRAST DATE OF EXAM: 5/22/2025 20:13 DEMOGRAPHICS: 89 years old Female INDICATION: LLQ Abd/pain COMPARISON: Multiple prior CT abdomen and pelvis, most recent dated 3/28/2024 TECHNIQUE: Contiguous axial slices of the abdomen and pelvis were submitted without IV administration of contrast. No oral contrast was utilized. Additional  coronal reformatted images were submitted. DOSE OPTIMIZATION: CT radiation dose optimization techniques (automated  exposure  control, and use of iterative reconstruction techniques, or adjustment of the mA and/or kV according to patient size) were used to limit patient radiation dose. FINDINGS: Evaluation of the abdominal viscera is limited due to lack of intravenous and oral contrast. Inferior chest: Minimal subsegmental atelectasis within the bilateral lung bases. Median sternotomy wires are evident. Hyperdensity within the coronary arteries is consistent with atherosclerosis and/or stents aortic valvular calcification. Postsurgical change along the anterior aspect of the proximal ascending thoracic aorta. There is atherosclerosis of the visualized distal descending thoracic aorta. Tiny hiatal hernia. Liver: The liver demonstrates a subtly lobular contour, suggestive of underlying  hepatocellular disease. No focal hepatic mass is evident within the limitations  of noncontrast imaging. Gallbladder: Surgically absent. Spleen: Unremarkable. Pancreas: There is fat infiltration of the pancreas. The pancreas is otherwise unremarkable. Adrenals: Unremarkable. Kidneys: There is mild right pelviectasis, similar to prior. Mild dilation of the mid right ureter, also similar to prior. There is no evidence of left hydronephrosis or left hydroureter. Urinary Bladder: Unremarkable. Reproductive: Uterus is unremarkable. No adnexal lesions are evident. Bowel: The small and large bowel are normal in caliber without evidence of obstruction. Scattered colonic diverticula are present without adjacent fat stranding. Duodenal diverticulum is evident. Vasculature: There is atherosclerosis. Lymphatic system: No suspicious lymphadenopathy is demonstrated. Abdominal wall: There is a wide neck right anterolateral abdominal wall hernia containing fat as well as a few nondilated segments of small bowel (series 5 image 50). Tiny fat-containing umbilical hernia. Bones: No suspicious osteolytic or osteoblastic lesions. Degenerative changes are identified in

## 2025-05-24 NOTE — PLAN OF CARE
Problem: Chronic Conditions and Co-morbidities  Goal: Patient's chronic conditions and co-morbidity symptoms are monitored and maintained or improved  5/23/2025 2127 by Ariel Maher RN  Outcome: Progressing  5/23/2025 1502 by Jayleen Don RN  Outcome: Progressing     Problem: Discharge Planning  Goal: Discharge to home or other facility with appropriate resources  5/23/2025 2127 by Ariel Maher RN  Outcome: Progressing  5/23/2025 1502 by Jayleen Don RN  Outcome: Progressing     Problem: Pain  Goal: Verbalizes/displays adequate comfort level or baseline comfort level  5/23/2025 2127 by Ariel Maher RN  Outcome: Progressing  5/23/2025 1502 by Jayleen Don RN  Outcome: Progressing     Problem: Safety - Adult  Goal: Free from fall injury  5/23/2025 2127 by Ariel Maher RN  Outcome: Progressing  5/23/2025 1502 by Jayleen Don RN  Outcome: Progressing     Problem: ABCDS Injury Assessment  Goal: Absence of physical injury  5/23/2025 2127 by Ariel Maher RN  Outcome: Progressing  5/23/2025 1502 by Jayleen Don RN  Outcome: Progressing

## 2025-05-25 PROBLEM — N39.0 UTI (URINARY TRACT INFECTION): Status: ACTIVE | Noted: 2025-05-25

## 2025-05-25 LAB
ALBUMIN SERPL-MCNC: 3.4 G/DL (ref 3.4–5)
ALBUMIN/GLOB SERPL: 1.3 {RATIO} (ref 1.1–2.2)
ALP SERPL-CCNC: 85 U/L (ref 40–129)
ALT SERPL-CCNC: 11 U/L (ref 10–40)
ANION GAP SERPL CALCULATED.3IONS-SCNC: 11 MMOL/L (ref 9–17)
AST SERPL-CCNC: 32 U/L (ref 15–37)
BASOPHILS # BLD: 0.02 K/UL
BASOPHILS NFR BLD: 0 % (ref 0–1)
BILIRUB SERPL-MCNC: 0.2 MG/DL (ref 0–1)
BUN SERPL-MCNC: 19 MG/DL (ref 7–20)
CALCIUM SERPL-MCNC: 9.4 MG/DL (ref 8.3–10.6)
CHLORIDE SERPL-SCNC: 102 MMOL/L (ref 99–110)
CO2 SERPL-SCNC: 23 MMOL/L (ref 21–32)
CREAT SERPL-MCNC: 1.1 MG/DL (ref 0.6–1.2)
EOSINOPHIL # BLD: 0.26 K/UL
EOSINOPHILS RELATIVE PERCENT: 5 % (ref 0–3)
ERYTHROCYTE [DISTWIDTH] IN BLOOD BY AUTOMATED COUNT: 13.4 % (ref 11.7–14.9)
GFR, ESTIMATED: 46 ML/MIN/1.73M2
GLUCOSE SERPL-MCNC: 98 MG/DL (ref 74–99)
HCT VFR BLD AUTO: 32.8 % (ref 37–47)
HGB BLD-MCNC: 10.7 G/DL (ref 12.5–16)
IMM GRANULOCYTES # BLD AUTO: 0.01 K/UL
IMM GRANULOCYTES NFR BLD: 0 %
LYMPHOCYTES NFR BLD: 2.21 K/UL
LYMPHOCYTES RELATIVE PERCENT: 42 % (ref 24–44)
MCH RBC QN AUTO: 34.9 PG (ref 27–31)
MCHC RBC AUTO-ENTMCNC: 32.6 G/DL (ref 32–36)
MCV RBC AUTO: 106.8 FL (ref 78–100)
MONOCYTES NFR BLD: 0.66 K/UL
MONOCYTES NFR BLD: 13 % (ref 0–5)
NEUTROPHILS NFR BLD: 40 % (ref 36–66)
NEUTS SEG NFR BLD: 2.07 K/UL
PLATELET # BLD AUTO: 107 K/UL (ref 140–440)
PMV BLD AUTO: 10 FL (ref 7.5–11.1)
POTASSIUM SERPL-SCNC: 4.3 MMOL/L (ref 3.5–5.1)
PROT SERPL-MCNC: 6.1 G/DL (ref 6.4–8.2)
RBC # BLD AUTO: 3.07 M/UL (ref 4.2–5.4)
SODIUM SERPL-SCNC: 136 MMOL/L (ref 136–145)
WBC OTHER # BLD: 5.2 K/UL (ref 4–10.5)

## 2025-05-25 PROCEDURE — 97116 GAIT TRAINING THERAPY: CPT

## 2025-05-25 PROCEDURE — 1200000000 HC SEMI PRIVATE

## 2025-05-25 PROCEDURE — 6370000000 HC RX 637 (ALT 250 FOR IP): Performed by: INTERNAL MEDICINE

## 2025-05-25 PROCEDURE — 97535 SELF CARE MNGMENT TRAINING: CPT

## 2025-05-25 PROCEDURE — 97168 OT RE-EVAL EST PLAN CARE: CPT

## 2025-05-25 PROCEDURE — 36415 COLL VENOUS BLD VENIPUNCTURE: CPT

## 2025-05-25 PROCEDURE — 6360000002 HC RX W HCPCS: Performed by: INTERNAL MEDICINE

## 2025-05-25 PROCEDURE — 80053 COMPREHEN METABOLIC PANEL: CPT

## 2025-05-25 PROCEDURE — 97164 PT RE-EVAL EST PLAN CARE: CPT

## 2025-05-25 PROCEDURE — 2500000003 HC RX 250 WO HCPCS: Performed by: INTERNAL MEDICINE

## 2025-05-25 PROCEDURE — G0378 HOSPITAL OBSERVATION PER HR: HCPCS

## 2025-05-25 PROCEDURE — 6360000002 HC RX W HCPCS: Performed by: STUDENT IN AN ORGANIZED HEALTH CARE EDUCATION/TRAINING PROGRAM

## 2025-05-25 PROCEDURE — 85025 COMPLETE CBC W/AUTO DIFF WBC: CPT

## 2025-05-25 PROCEDURE — 6370000000 HC RX 637 (ALT 250 FOR IP)

## 2025-05-25 PROCEDURE — 94761 N-INVAS EAR/PLS OXIMETRY MLT: CPT

## 2025-05-25 PROCEDURE — 6370000000 HC RX 637 (ALT 250 FOR IP): Performed by: STUDENT IN AN ORGANIZED HEALTH CARE EDUCATION/TRAINING PROGRAM

## 2025-05-25 PROCEDURE — 2500000003 HC RX 250 WO HCPCS: Performed by: STUDENT IN AN ORGANIZED HEALTH CARE EDUCATION/TRAINING PROGRAM

## 2025-05-25 RX ORDER — CARBOXYMETHYLCELLULOSE SODIUM 10 MG/ML
2 GEL OPHTHALMIC PRN
Qty: 15 ML | Refills: 0 | Status: SHIPPED | OUTPATIENT
Start: 2025-05-25

## 2025-05-25 RX ORDER — METHOCARBAMOL 500 MG/1
500 TABLET, FILM COATED ORAL 3 TIMES DAILY PRN
Qty: 30 TABLET | Refills: 0 | Status: SHIPPED | OUTPATIENT
Start: 2025-05-25 | End: 2025-06-04

## 2025-05-25 RX ORDER — NITROFURANTOIN 25; 75 MG/1; MG/1
100 CAPSULE ORAL EVERY 12 HOURS SCHEDULED
Status: DISCONTINUED | OUTPATIENT
Start: 2025-05-26 | End: 2025-05-28 | Stop reason: HOSPADM

## 2025-05-25 RX ADMIN — CARBOXYMETHYLCELLULOSE SODIUM 2 DROP: 10 GEL OPHTHALMIC at 03:07

## 2025-05-25 RX ADMIN — CARBOXYMETHYLCELLULOSE SODIUM 2 DROP: 10 GEL OPHTHALMIC at 21:11

## 2025-05-25 RX ADMIN — ASPIRIN 81 MG: 81 TABLET, CHEWABLE ORAL at 09:40

## 2025-05-25 RX ADMIN — PYRIDOSTIGMINE BROMIDE 60 MG: 60 TABLET ORAL at 20:56

## 2025-05-25 RX ADMIN — PYRIDOSTIGMINE BROMIDE 60 MG: 60 TABLET ORAL at 09:45

## 2025-05-25 RX ADMIN — Medication 400 MG: at 09:40

## 2025-05-25 RX ADMIN — SERTRALINE HYDROCHLORIDE 25 MG: 50 TABLET ORAL at 09:40

## 2025-05-25 RX ADMIN — GABAPENTIN 200 MG: 100 CAPSULE ORAL at 21:07

## 2025-05-25 RX ADMIN — ENOXAPARIN SODIUM 30 MG: 100 INJECTION SUBCUTANEOUS at 09:40

## 2025-05-25 RX ADMIN — FOLIC ACID 1000 MCG: 1 TABLET ORAL at 09:40

## 2025-05-25 RX ADMIN — METHOCARBAMOL 500 MG: 500 TABLET ORAL at 20:56

## 2025-05-25 RX ADMIN — VERAPAMIL HYDROCHLORIDE 120 MG: 120 TABLET, FILM COATED, EXTENDED RELEASE ORAL at 20:56

## 2025-05-25 RX ADMIN — SODIUM CHLORIDE, PRESERVATIVE FREE 10 ML: 5 INJECTION INTRAVENOUS at 09:41

## 2025-05-25 RX ADMIN — SODIUM CHLORIDE, PRESERVATIVE FREE 10 ML: 5 INJECTION INTRAVENOUS at 20:57

## 2025-05-25 RX ADMIN — WATER 1000 MG: 1 INJECTION INTRAMUSCULAR; INTRAVENOUS; SUBCUTANEOUS at 09:40

## 2025-05-25 RX ADMIN — GABAPENTIN 200 MG: 100 CAPSULE ORAL at 09:39

## 2025-05-25 RX ADMIN — PANTOPRAZOLE SODIUM 40 MG: 40 TABLET, DELAYED RELEASE ORAL at 06:44

## 2025-05-25 RX ADMIN — DIVALPROEX SODIUM 250 MG: 250 TABLET, EXTENDED RELEASE ORAL at 20:56

## 2025-05-25 RX ADMIN — ROSUVASTATIN CALCIUM 5 MG: 5 TABLET, FILM COATED ORAL at 21:07

## 2025-05-25 ASSESSMENT — PAIN - FUNCTIONAL ASSESSMENT: PAIN_FUNCTIONAL_ASSESSMENT: ACTIVITIES ARE NOT PREVENTED

## 2025-05-25 ASSESSMENT — PAIN DESCRIPTION - FREQUENCY: FREQUENCY: INTERMITTENT

## 2025-05-25 ASSESSMENT — PAIN DESCRIPTION - PAIN TYPE: TYPE: CHRONIC PAIN

## 2025-05-25 ASSESSMENT — PAIN DESCRIPTION - DESCRIPTORS: DESCRIPTORS: ACHING

## 2025-05-25 ASSESSMENT — PAIN DESCRIPTION - ONSET: ONSET: ON-GOING

## 2025-05-25 ASSESSMENT — PAIN DESCRIPTION - LOCATION: LOCATION: BACK

## 2025-05-25 ASSESSMENT — PAIN SCALES - GENERAL
PAINLEVEL_OUTOF10: 0
PAINLEVEL_OUTOF10: 0

## 2025-05-25 ASSESSMENT — PAIN DESCRIPTION - ORIENTATION: ORIENTATION: LEFT;LOWER

## 2025-05-25 NOTE — PROGRESS NOTES
V2.0  [facility] [place] Note      Name:  Jazmine Hutchinson /Age/Sex: 1935  (89 y.o. female)   MRN & CSN:  [id number] & [id number] Encounter Date/Time: 2025 8:27 AM EDT    Location:  OBS  PCP: Jayda Díaz MD       Hospital Day: 4    Assessment and Plan:   Jazmine Hutchinson is a 89 y.o. female  who presents with Debility      #Debility  - Diffuse abdominal muscle soreness and paraspinal pain after exercises with home PT/OT. Supportive care for pain/soreness. PT/OT eval for possible SNF placement   Patient seen by therapy, recommend Home with [facility]   Today patient was unable to get out of the bed, would like PT/OT to reevaluate her for SNF     #UTI  - Urine culture growing E. coli  -received ceftriaxone, now switched to nitrofurantoin per sensitivities for 3 more days    #CAD  -Continue aspirin, statin, not on metoprolol due to N/V per allergies      #CKD 3a  - Cr at baseline      #HLD  -Continue statin      #GERD  -Continue PPI     #Mood Disorder   -Continue home meds    Diet ADULT DIET; Regular   DVT Prophylaxis [x] Lovenox, []  Heparin, [] SCDs, [] Ambulation,  [] Eliquis, [] Xarelto  [] Coumadin   Code Status Full Code   Disposition From: home  Expected Disposition: home  Estimated Date of Discharge: 1-2 days  Patient requires continued admission due to PT/OT reevaluation   Surrogate Decision Maker/ DELVIN Rivera (grandson)     Subjective:     Patient feeling well today however she stated that she could not get out of bed today which is not her baseline.       Review of Systems:      As above     Objective:     Intake/Output Summary (Last 24 hours) at 2025 1243  Last data filed at 2025 2030  Gross per 24 hour   Intake 400 ml   Output --   Net 400 ml        Vitals:   Vitals:    25 0935   BP: (!) 101/58   Pulse: 68   Resp: 13   Temp:    SpO2: 98%       Physical Exam:     General: NAD, AAO x3-4  Eyes: EOMI  HENT: Atraumatic  Respiratory: no respiratory distress  GI: soft,  Collection Time: 05/25/25  5:25 AM   Result Value Ref Range    WBC 5.2 4.0 - 10.5 k/uL    RBC 3.07 (L) 4.20 - 5.40 m/uL    Hemoglobin 10.7 (L) 12.5 - 16.0 g/dL    Hematocrit 32.8 (L) 37.0 - 47.0 %    .8 (H) 78.0 - 100.0 fL    MCH 34.9 (H) 27.0 - 31.0 pg    MCHC 32.6 32.0 - 36.0 g/dL    RDW 13.4 11.7 - 14.9 %    Platelets 107 (L) 140 - 440 k/uL    MPV 10.0 7.5 - 11.1 fL    Neutrophils % 40 36 - 66 %    Lymphocytes % 42 24 - 44 %    Monocytes % 13 (H) 0 - 5 %    Eosinophils % 5 (H) 0 - 3 %    Basophils % 0 0 - 1 %    Immature Granulocytes % 0 0 %    Neutrophils Absolute 2.07 k/uL    Lymphocytes Absolute 2.21 k/uL    Monocytes Absolute 0.66 k/uL    Eosinophils Absolute 0.26 k/uL    Basophils Absolute 0.02 k/uL    Immature Granulocytes Absolute 0.01 k/uL        Imaging/Diagnostics Last 24 Hours   CT ABDOMEN PELVIS WO CONTRAST Additional Contrast? None  Result Date: 5/22/2025  PROCEDURE: CT ABDOMEN PELVIS WO CONTRAST DATE OF EXAM: 5/22/2025 20:13 DEMOGRAPHICS: 89 years old Female INDICATION: LLQ Abd/pain COMPARISON: Multiple prior CT abdomen and pelvis, most recent dated 3/28/2024 TECHNIQUE: Contiguous axial slices of the abdomen and pelvis were submitted without IV administration of contrast. No oral contrast was utilized. Additional  coronal reformatted images were submitted. DOSE OPTIMIZATION: CT radiation dose optimization techniques (automated exposure  control, and use of iterative reconstruction techniques, or adjustment of the mA and/or kV according to patient size) were used to limit patient radiation dose. FINDINGS: Evaluation of the abdominal viscera is limited due to lack of intravenous and oral contrast. Inferior chest: Minimal subsegmental atelectasis within the bilateral lung bases. Median sternotomy wires are evident. Hyperdensity within the coronary arteries is consistent with atherosclerosis and/or stents aortic valvular calcification. Postsurgical change along the anterior aspect of the proximal  ascending thoracic aorta. There is atherosclerosis of the visualized distal descending thoracic aorta. Tiny hiatal hernia. Liver: The liver demonstrates a subtly lobular contour, suggestive of underlying  hepatocellular disease. No focal hepatic mass is evident within the limitations  of noncontrast imaging. Gallbladder: Surgically absent. Spleen: Unremarkable. Pancreas: There is fat infiltration of the pancreas. The pancreas is otherwise unremarkable. Adrenals: Unremarkable. Kidneys: There is mild right pelviectasis, similar to prior. Mild dilation of the mid right ureter, also similar to prior. There is no evidence of left hydronephrosis or left hydroureter. Urinary Bladder: Unremarkable. Reproductive: Uterus is unremarkable. No adnexal lesions are evident. Bowel: The small and large bowel are normal in caliber without evidence of obstruction. Scattered colonic diverticula are present without adjacent fat stranding. Duodenal diverticulum is evident. Vasculature: There is atherosclerosis. Lymphatic system: No suspicious lymphadenopathy is demonstrated. Abdominal wall: There is a wide neck right anterolateral abdominal wall hernia containing fat as well as a few nondilated segments of small bowel (series 5 image 50). Tiny fat-containing umbilical hernia. Bones: No suspicious osteolytic or osteoblastic lesions. Degenerative changes are identified in spine as well as bilateral sacroiliac joints, but hips, and pubic symphysis. IMPRESSION: 1.  No acute abdominal pelvic abnormality. 2.  Colonic diverticulosis. Additional findings as above.  Dictated and Electronically Signed By: Damian Rosas MD [facility] 5/22/2025 20:39          Electronically signed by Ernestine Kam MD on 5/25/2025 at 12:43 PM

## 2025-05-25 NOTE — PROGRESS NOTES
Occupational Therapy    Occupational Therapy Re-Evaluation    Name: Jazmine Hutchinson MRN: [id number] :   1935   Date:  2025   Admission Date: 2025 Room:  [place] [address]     Restrictions/Precautions:          fall risk, general precautions, contact isolation      Communication with other providers:  RN and CM request for PT/OT re-eval, PT for safety      Subjective:  Patient states:  \"I can't go home like this. Do you see how weak my right leg is?\"  Pain: reports intermittent R hip pain, did not rate       Examination:  Observation: Seated EOB, agreeable to tx  Vision: WFL  Hearing: WFL  Vitals: Stable vitals throughout session on room air      Body Systems and functions:  ROM: WFL   Strength: B UE grossly 4/5 across all major joints   Sensation: WFL  Tone: Normal  Coordination: WFL  Perception: WNL      Cognitive and Psychosocial Functioning:  Overall cognitive status: alert, oriented x3. Decreased safety awareness, decreased insight into deficits.   Affect: Normal       Activities of Daily Living (ADLs):  Feeding: set up A  Grooming: SBA hand hygiene standing at the sink  UB bathing: min A  LB bathing: min A  UB dressing: SBA  LB dressing: SBA to don socks and shoes while seated EOB  Toileting: SBA      Functional Mobility:  Bed mobility:  NT  Sitting balance:  SBA   Transfers: STS to/from EOB and to recliner with CGA, Vcs for hand placement  Standing balance:   CGA with RW  Functional Mobility: ambulated functional household distance using RW with CGA, Vcs for RW mgmt and pathway. Intermittent standing Rbs d/t R hip pain  Toilet/Shower Transfers: NT       Assessment / Impression:    Pt is an 89 y o F  admitted d/t debility, re-evaluation warranted d/t RN/pt report of increased weakness and physician concern with safety at home. Pt at baseline is IND for ADLs, IND  for high level IADLs, and mod I for functional transfers/mobility. Pt continues to present w/ deficits in ADL and high level IADL  independence, functional ADL transfers, strength, and functional activity tolerance. Continued OT services recommended to increase safety and independence with ADL routine and to address remaining functional deficits. Pt would benefit from further acute care OT services w/ continued recommendation for  discharge to Facility for intensive rehabilitation, anticipate 3 hours per day and 5 days per week.       Goals:  Pt will complete all aspects of bed mobility for EOB/OOB ADLs w/ mod I.  Pt will complete UB ADLs w/ mod I.  Pt will complete LB ADLs w/ mod I.  Pt will complete all functional transfers to and from bed, chair, toilet, shower chair w/ mod I.  Pt will ambulate functional household distance w/ mod I.  Pt will complete all aspects of toileting task w/ mod I.  Pt will perform therex/theract in order to increase strength and functional activity tolerance necessary for increased independence w/ ADL routine.      Plan for Next Session:    Continue w/ OT POC and functional goals, address safety/independence w/ ADLs and transfers/mobility.       Time in:  1355  Time out:  1419  Timed treatment minutes:  24  Total treatment time:  14    Previously filed items:  Social/Functional History  Lives With: Alone  Type of Home: House  Home Layout: One level  Home Access: Stairs to enter with rails  Entrance Stairs - Number of Steps: 3  Entrance Stairs - Rails: Both (Will use 1 railing and cane)  Bathroom Shower/Tub: Tub/Shower unit  Bathroom Toilet: Standard  Bathroom Equipment: Shower chair, Grab bars in shower, Grab bars around toilet  Home Equipment: Cane, Rollator, Walker - Rolling  Receives Help From: Family, Home health (Family checks in almost daily. Therapy comes 2x per week and bath aide comes 2x per week)  Prior Level of Assist for ADLs: Needs assistance (Pt manages her own dressing and toileting. She has help with bathing)  Prior Level of Assist for Homemaking: Needs assistance (Pt manages her own laundry. She

## 2025-05-25 NOTE — CARE COORDINATION
Addendum: Spoke with PT/OT pt would benefit from rehab, 1st choice ARU as pt is strong enough for possible ARU, if pt doesn't qualify for ARU, next choice is [facility].    MAXIMILIAN called ARU CM, spoke with Darcy, to review pt chart for possible acceptance for rehab. TRUMANRN/CM

## 2025-05-25 NOTE — PLAN OF CARE
Problem: Discharge Planning  Goal: Discharge to home or other facility with appropriate resources  Outcome: Progressing     Problem: Pain  Goal: Verbalizes/displays adequate comfort level or baseline comfort level  Outcome: Progressing     Problem: Safety - Adult  Goal: Free from fall injury  5/25/2025 0233 by Maude Watson, RN  Outcome: Progressing  5/25/2025 0233 by Maude Watson, RN  Outcome: Progressing

## 2025-05-25 NOTE — PROGRESS NOTES
Physical Therapy  Facility/Department: [facility] OBSERVATION  Physical Therapy Re-Evaluation    Name: Jazmine Hutchinson  : 1935  MRN: [id number]  Date of Service: 2025    Discharge Recommendations:    Facility for intensive rehabilitation, anticipate 3 hours per day and 5 days per week.           Patient Diagnosis(es): The primary encounter diagnosis was Urinary tract infection without hematuria, site unspecified. Diagnoses of Abdominal pain, unspecified abdominal location, Nausea, Diarrhea, unspecified type, and Diverticulosis were also pertinent to this visit.  Patient now with decrease in independence with functional mobility/requiring increased assistance from nursing staff     Past Medical History:  has a past medical history of Arthritis of shoulder region, right, Blind right eye, Chronic depression, DVT (deep venous thrombosis) (HCC), Former smoker, History of echocardiogram, History of stress test, Hx of Doppler ultrasound, Hyperlipidemia, Hypertension, Macular degeneration, Mild cognitive impairment, MVP (mitral valve prolapse), Osteoporosis, Pancytopenia, Peptic ulcer disease, Peripheral vascular disease, Prediabetes, Recurrent ventral incisional hernia, S/P CABG x 3, Spigelian hernia, Supraventricular tachycardia, and Tic douloureux.  Past Surgical History:  has a past surgical history that includes Cholecystectomy; Coronary artery bypass graft (2009); Ureter surgery; Shoulder arthroscopy (Right, ); Tubal ligation; and Cataract removal (Right, ).    Assessment  Body Structures, Functions, Activity Limitations Requiring Skilled Therapeutic Intervention: Decreased functional mobility ;Decreased ADL status;Decreased endurance;Decreased high-level IADLs;Decreased strength;Decreased safe awareness;Increased pain;Decreased balance;Decreased cognition  Therapy Prognosis: Good  Decision Making: High Complexity  Clinical Presentation: unpredictable characteristics  Requires PT Follow-Up: Yes  Activity  service every other week and has meals on wheels. She manages light meal prep)  Prior Level of Assist for Ambulation: Independent household ambulator, with or without device (uses the rollator in the home and to push her laundry around. Will use the cane if she goes out)  Prior Level of Assist for Transfers: Independent  Active : Yes  Vision/Hearing  Vision  Vision: Within Functional Limits  Hearing  Hearing: Within functional limits    Cognition   Orientation  Overall Orientation Status: Within Functional Limits  Cognition  Overall Cognitive Status: Exceptions  Arousal/Alertness: Appears intact  Following Commands: Appears intact  Attention Span: Appears intact  Safety Judgement: Decreased awareness of need for safety;Decreased awareness of need for assistance  Insights: Decreased awareness of deficits  Initiation: Requires cues for some  Sequencing: Requires cues for some    Objective  Temp: 98.2 °F (36.8 °C)  Pulse: 77  Heart Rate Source: Monitor  Respirations: 21  SpO2: 98 %  O2 Device: None (Room air)  BP: 127/61  MAP (Calculated): 83  BP Location: Right upper arm  BP Method: Automatic  Patient Position: Supine        Gross Assessment  Sensation: Intact (BLEs)          Strength RLE  Comment: knee extension: at least 3+/5 observed functionally  Strength LLE  Comment: knee extension: at least 3+/5 observed functionally           Bed mobility  Supine to Sit: Minimal assistance  Transfers  Sit to Stand: Contact guard assistance  Stand to Sit: Contact guard assistance (with verbal cues to feel bed/chair/toilet against back of legs, reach back, and sit slowly)  Ambulation  Surface: Level tile  Device: Single point cane  Assistance: Minimal assistance;Contact guard assistance  Quality of Gait: decreased gait speed, decreased step length bilaterally, unsteady gait  Distance: 10 feet  Comments: therapist recommended front wheeled walker at this time  More Ambulation?: Yes  Ambulation 2  Surface - 2: level  tile  Device 2: Rolling Walker  Assistance 2: Contact guard assistance  Quality of Gait 2: decreased gait speed, decreased step length bilaterally, decreased stance time on LLE  Distance: 50 feet + 50 feet + 60 feet + 60 feet  Comments: with verbal and tactile cues for BLE placement, walker placement, and sequence with verbal and tactile cues to maintain upright posture in order to avoid COM shifting outside of AMA; with verbal cues for directions in order to successfully navigate hallway and return to correct room     Balance  Posture: Fair  Sitting - Static: Good  Sitting - Dynamic: Good  Standing - Static: Fair;-  Standing - Dynamic: Fair;-          Gait Training:  Cues were given for safety, sequence, device management, balance, posture, awareness, path.        [facility] - Mobility    AM-PAC Basic Mobility - Inpatient   How much help is needed turning from your back to your side while in a flat bed without using bedrails?: A Little  How much help is needed moving from lying on your back to sitting on the side of a flat bed without using bedrails?: A Little  How much help is needed moving to and from a bed to a chair?: A Little  How much help is needed standing up from a chair using your arms?: A Little  How much help is needed walking in hospital room?: A Little  How much help is needed climbing 3-5 steps with a railing?: Total  AM-PAC Inpatient Mobility Raw Score : 16  AM-PAC Inpatient T-Scale Score : 40.78  Mobility Inpatient CMS 0-100% Score: 54.16  Mobility Inpatient CMS G-Code Modifier : CK           Goals    Long Term Goals  Time Frame for Long Term Goals : In one week:  Long Term Goal 1: Pt will complete all bed mobility with supervision  Long Term Goal 2: Pt will complete sit <> stand transfers with SBAx1  Long Term Goal 3: Pt will ambulate 500 feet with SBAx1 with LRAD  Long Term Goal 4: Pt will ascend/descend 6 steps with a handrail with minAx1  Long Term Goal 5: Pt will independently complete 3 sets of

## 2025-05-25 NOTE — CARE COORDINATION
MAXIMILIAN visited pt who is alert and oriented, CM introduced self and reason for visit for follow up for discharge planning. Pt states she feels to weak to be able to manage at home alone, pt states she has been to rehab at [facility] in the past and is willing to go again for rehab only because she feels she can't manage at home alone. Pt states she does not have anyone to stay with her at home.    MAXIMILIAN spoke with Jean LFOWERS, who is caring for pt today and she states pt has needed hands on assistance to get in and out of bed.    PT,OT is here to see pt, to assess pt abilities today.  Pt has been admitted since 5/22/25, today is 5/25.    Pt picks [facility] for SNF. KRISTIE LAUGHLIN/MAXIMILIAN

## 2025-05-26 PROCEDURE — 2500000003 HC RX 250 WO HCPCS: Performed by: STUDENT IN AN ORGANIZED HEALTH CARE EDUCATION/TRAINING PROGRAM

## 2025-05-26 PROCEDURE — 6370000000 HC RX 637 (ALT 250 FOR IP): Performed by: INTERNAL MEDICINE

## 2025-05-26 PROCEDURE — 6370000000 HC RX 637 (ALT 250 FOR IP): Performed by: STUDENT IN AN ORGANIZED HEALTH CARE EDUCATION/TRAINING PROGRAM

## 2025-05-26 PROCEDURE — 1200000000 HC SEMI PRIVATE

## 2025-05-26 PROCEDURE — 6360000002 HC RX W HCPCS: Performed by: STUDENT IN AN ORGANIZED HEALTH CARE EDUCATION/TRAINING PROGRAM

## 2025-05-26 PROCEDURE — 94761 N-INVAS EAR/PLS OXIMETRY MLT: CPT

## 2025-05-26 RX ADMIN — NITROFURANTOIN MONOHYDRATE/MACROCRYSTALS 100 MG: 75; 25 CAPSULE ORAL at 07:42

## 2025-05-26 RX ADMIN — ENOXAPARIN SODIUM 30 MG: 100 INJECTION SUBCUTANEOUS at 07:43

## 2025-05-26 RX ADMIN — GABAPENTIN 200 MG: 100 CAPSULE ORAL at 14:28

## 2025-05-26 RX ADMIN — DICLOFENAC SODIUM 2 G: 10 GEL TOPICAL at 20:19

## 2025-05-26 RX ADMIN — SODIUM CHLORIDE, PRESERVATIVE FREE 10 ML: 5 INJECTION INTRAVENOUS at 20:20

## 2025-05-26 RX ADMIN — SODIUM CHLORIDE, PRESERVATIVE FREE 10 ML: 5 INJECTION INTRAVENOUS at 07:43

## 2025-05-26 RX ADMIN — PYRIDOSTIGMINE BROMIDE 60 MG: 60 TABLET ORAL at 07:42

## 2025-05-26 RX ADMIN — PANTOPRAZOLE SODIUM 40 MG: 40 TABLET, DELAYED RELEASE ORAL at 07:42

## 2025-05-26 RX ADMIN — CARBOXYMETHYLCELLULOSE SODIUM 2 DROP: 10 GEL OPHTHALMIC at 20:19

## 2025-05-26 RX ADMIN — METHOCARBAMOL 500 MG: 500 TABLET ORAL at 07:42

## 2025-05-26 RX ADMIN — METHOCARBAMOL 500 MG: 500 TABLET ORAL at 20:19

## 2025-05-26 RX ADMIN — METHOCARBAMOL 500 MG: 500 TABLET ORAL at 14:28

## 2025-05-26 RX ADMIN — ROSUVASTATIN CALCIUM 5 MG: 5 TABLET, FILM COATED ORAL at 20:19

## 2025-05-26 RX ADMIN — Medication 400 MG: at 07:43

## 2025-05-26 RX ADMIN — ASPIRIN 81 MG: 81 TABLET, CHEWABLE ORAL at 07:42

## 2025-05-26 RX ADMIN — VERAPAMIL HYDROCHLORIDE 120 MG: 120 TABLET, FILM COATED, EXTENDED RELEASE ORAL at 20:20

## 2025-05-26 RX ADMIN — ACETAMINOPHEN 650 MG: 325 TABLET ORAL at 01:53

## 2025-05-26 RX ADMIN — DIVALPROEX SODIUM 250 MG: 250 TABLET, EXTENDED RELEASE ORAL at 20:19

## 2025-05-26 RX ADMIN — GABAPENTIN 200 MG: 100 CAPSULE ORAL at 20:20

## 2025-05-26 RX ADMIN — PYRIDOSTIGMINE BROMIDE 60 MG: 60 TABLET ORAL at 20:20

## 2025-05-26 RX ADMIN — FERROUS SULFATE TAB 325 MG (65 MG ELEMENTAL FE) 325 MG: 325 (65 FE) TAB at 07:42

## 2025-05-26 RX ADMIN — FOLIC ACID 1000 MCG: 1 TABLET ORAL at 07:42

## 2025-05-26 RX ADMIN — SERTRALINE HYDROCHLORIDE 25 MG: 50 TABLET ORAL at 07:43

## 2025-05-26 RX ADMIN — PYRIDOSTIGMINE BROMIDE 60 MG: 60 TABLET ORAL at 14:29

## 2025-05-26 RX ADMIN — NITROFURANTOIN MONOHYDRATE/MACROCRYSTALS 100 MG: 75; 25 CAPSULE ORAL at 20:19

## 2025-05-26 RX ADMIN — GABAPENTIN 200 MG: 100 CAPSULE ORAL at 07:42

## 2025-05-26 ASSESSMENT — PAIN DESCRIPTION - LOCATION: LOCATION: HIP

## 2025-05-26 ASSESSMENT — PAIN SCALES - GENERAL
PAINLEVEL_OUTOF10: 0
PAINLEVEL_OUTOF10: 0
PAINLEVEL_OUTOF10: 6

## 2025-05-26 NOTE — PROGRESS NOTES
V2.0  [facility] Hospitalist Progress Note      Name:  Jazmine Hutchinson /Age/Sex: 1935  (89 y.o. female)   MRN & CSN:  [id number] & [id number] Encounter Date/Time: 2025 8:27 AM EDT    Location:  [facility]  PCP: Jayda Díaz MD       Hospital Day: 5    Assessment and Plan:   Jazmine Hutchinson is a 89 y.o. female  who presents with Debility      #Debility  - Diffuse abdominal muscle soreness and paraspinal pain after exercises with home PT/OT. Supportive care for pain/soreness. PT/OT eval for possible SNF placement   Patient seen by therapy, recommend Home with [facility]   Today patient was unable to get out of the bed, would like PT/OT to reevaluate her for SNF     #UTI  - Urine culture growing E. coli  -received ceftriaxone, now switched to nitrofurantoin per sensitivities for 3 more days    #CAD  -Continue aspirin, statin, not on metoprolol due to N/V per allergies      #CKD 3a  - Cr at baseline      #HLD  -Continue statin      #GERD  -Continue PPI     #Mood Disorder   -Continue home meds    Diet ADULT DIET; Regular   DVT Prophylaxis [x] Lovenox, []  Heparin, [] SCDs, [] Ambulation,  [] Eliquis, [] Xarelto  [] Coumadin   Code Status Full Code   Disposition From: home  Expected Disposition: ARU  Estimated Date of Discharge: 1-2 days  Patient requires continued admission due to pending placement   Surrogate Decision Maker/ SILVANOJOAO Rivera (grandson)     Subjective:     Patient without any complaints today.  States that she was able to get up and walk with the nurse today with a walker.  She still does not feel like she is safe to go home.    Review of Systems:      As above     Objective:     Intake/Output Summary (Last 24 hours) at 2025 1313  Last data filed at 2025 1516  Gross per 24 hour   Intake 100 ml   Output --   Net 100 ml        Vitals:   Vitals:    25 0740   BP: 138/76   Pulse: 64   Resp: 16   Temp: 98 °F (36.7 °C)   SpO2: 98%       Physical Exam:     General: NAD, AAO x3-4  Eyes: EOMI  HENT:

## 2025-05-27 PROCEDURE — 6370000000 HC RX 637 (ALT 250 FOR IP): Performed by: STUDENT IN AN ORGANIZED HEALTH CARE EDUCATION/TRAINING PROGRAM

## 2025-05-27 PROCEDURE — 6360000002 HC RX W HCPCS: Performed by: STUDENT IN AN ORGANIZED HEALTH CARE EDUCATION/TRAINING PROGRAM

## 2025-05-27 PROCEDURE — 6370000000 HC RX 637 (ALT 250 FOR IP): Performed by: INTERNAL MEDICINE

## 2025-05-27 PROCEDURE — 97116 GAIT TRAINING THERAPY: CPT

## 2025-05-27 PROCEDURE — 97535 SELF CARE MNGMENT TRAINING: CPT

## 2025-05-27 PROCEDURE — 1200000000 HC SEMI PRIVATE

## 2025-05-27 PROCEDURE — 2500000003 HC RX 250 WO HCPCS: Performed by: STUDENT IN AN ORGANIZED HEALTH CARE EDUCATION/TRAINING PROGRAM

## 2025-05-27 PROCEDURE — 97530 THERAPEUTIC ACTIVITIES: CPT

## 2025-05-27 RX ADMIN — PANTOPRAZOLE SODIUM 40 MG: 40 TABLET, DELAYED RELEASE ORAL at 05:17

## 2025-05-27 RX ADMIN — ENOXAPARIN SODIUM 30 MG: 100 INJECTION SUBCUTANEOUS at 08:12

## 2025-05-27 RX ADMIN — METHOCARBAMOL 500 MG: 500 TABLET ORAL at 19:36

## 2025-05-27 RX ADMIN — NITROFURANTOIN MONOHYDRATE/MACROCRYSTALS 100 MG: 75; 25 CAPSULE ORAL at 19:35

## 2025-05-27 RX ADMIN — FOLIC ACID 1000 MCG: 1 TABLET ORAL at 08:11

## 2025-05-27 RX ADMIN — DICLOFENAC SODIUM 2 G: 10 GEL TOPICAL at 08:13

## 2025-05-27 RX ADMIN — VERAPAMIL HYDROCHLORIDE 120 MG: 120 TABLET, FILM COATED, EXTENDED RELEASE ORAL at 19:36

## 2025-05-27 RX ADMIN — METHOCARBAMOL 500 MG: 500 TABLET ORAL at 17:40

## 2025-05-27 RX ADMIN — SODIUM CHLORIDE, PRESERVATIVE FREE 10 ML: 5 INJECTION INTRAVENOUS at 19:37

## 2025-05-27 RX ADMIN — PYRIDOSTIGMINE BROMIDE 60 MG: 60 TABLET ORAL at 19:37

## 2025-05-27 RX ADMIN — ASPIRIN 81 MG: 81 TABLET, CHEWABLE ORAL at 08:11

## 2025-05-27 RX ADMIN — Medication 400 MG: at 08:11

## 2025-05-27 RX ADMIN — GABAPENTIN 200 MG: 100 CAPSULE ORAL at 08:11

## 2025-05-27 RX ADMIN — SODIUM CHLORIDE, PRESERVATIVE FREE 10 ML: 5 INJECTION INTRAVENOUS at 08:10

## 2025-05-27 RX ADMIN — ROSUVASTATIN CALCIUM 5 MG: 5 TABLET, FILM COATED ORAL at 19:36

## 2025-05-27 RX ADMIN — SERTRALINE HYDROCHLORIDE 25 MG: 50 TABLET ORAL at 08:11

## 2025-05-27 RX ADMIN — NITROFURANTOIN MONOHYDRATE/MACROCRYSTALS 100 MG: 75; 25 CAPSULE ORAL at 08:12

## 2025-05-27 RX ADMIN — GABAPENTIN 200 MG: 100 CAPSULE ORAL at 19:35

## 2025-05-27 RX ADMIN — DIVALPROEX SODIUM 250 MG: 250 TABLET, EXTENDED RELEASE ORAL at 19:36

## 2025-05-27 RX ADMIN — DICLOFENAC SODIUM 2 G: 10 GEL TOPICAL at 19:34

## 2025-05-27 RX ADMIN — METHOCARBAMOL 500 MG: 500 TABLET ORAL at 08:11

## 2025-05-27 RX ADMIN — PYRIDOSTIGMINE BROMIDE 60 MG: 60 TABLET ORAL at 08:11

## 2025-05-27 RX ADMIN — CARBOXYMETHYLCELLULOSE SODIUM 2 DROP: 10 GEL OPHTHALMIC at 21:05

## 2025-05-27 ASSESSMENT — PAIN DESCRIPTION - PAIN TYPE: TYPE: ACUTE PAIN

## 2025-05-27 ASSESSMENT — PAIN DESCRIPTION - FREQUENCY: FREQUENCY: INTERMITTENT

## 2025-05-27 ASSESSMENT — PAIN SCALES - GENERAL
PAINLEVEL_OUTOF10: 6
PAINLEVEL_OUTOF10: 0

## 2025-05-27 ASSESSMENT — PAIN - FUNCTIONAL ASSESSMENT: PAIN_FUNCTIONAL_ASSESSMENT: ACTIVITIES ARE NOT PREVENTED

## 2025-05-27 ASSESSMENT — PAIN DESCRIPTION - ONSET: ONSET: PROGRESSIVE

## 2025-05-27 ASSESSMENT — PAIN DESCRIPTION - DESCRIPTORS: DESCRIPTORS: SPASM;DISCOMFORT

## 2025-05-27 ASSESSMENT — PAIN DESCRIPTION - LOCATION: LOCATION: BACK;FLANK

## 2025-05-27 ASSESSMENT — PAIN DESCRIPTION - ORIENTATION: ORIENTATION: LEFT

## 2025-05-27 NOTE — CARE COORDINATION
Whiteboard placed for updated therapy note per request of Dr. Szymanski.     ARU will follow for updated therapy.

## 2025-05-27 NOTE — PROGRESS NOTES
V2.0  [facility] Hospitalist Progress Note      Name:  Jazmine Hutchinson /Age/Sex: 1935  (89 y.o. female)   MRN & CSN:  [id number] & [id number] Encounter Date/Time: 2025 8:27 AM EDT    Location:  [facility]  PCP: Jayda Díaz MD       Hospital Day: 6    Assessment and Plan:   Jazmine Hutchinson is a 89 y.o. female  who presents with Debility      #Debility  - Diffuse abdominal muscle soreness and paraspinal pain after exercises with home PT/OT. Supportive care for pain/soreness.  Patient seen by PT/OT, recommend Acute Rehab, placement pending   -Today patient was seen today, she is a little bit stronger than on admission, but still needs assist and she feels unsteady on her feet     #UTI  - Urine culture growing E. coli  -received ceftriaxone, now switched to nitrofurantoin per sensitivities for 3 more days    #CAD  -Continue aspirin, statin, not on metoprolol due to N/V per allergies      #CKD 3a  - Cr at baseline      #HLD  -Continue statin      #GERD  -Continue PPI     #Mood Disorder   -Continue home meds    Diet ADULT DIET; Regular   DVT Prophylaxis [x] Lovenox, []  Heparin, [] SCDs, [] Ambulation,  [] Eliquis, [] Xarelto  [] Coumadin   Code Status Full Code   Disposition From: home  Expected Disposition: ARU vs SNF  Estimated Date of Discharge: 1-2 days  Patient requires continued admission due to pending placement   Surrogate Decision Maker/ DELVIN Miguel (grandson)     Subjective:     Patient without any complaints today.  States that she was able to get up and walk with the nurse today with a walker.  She still does not feel like she is safe to go home by herself. Feels slightly stronger today.  ARU is waiting for updated PT and OT notes today to decide about acceptance. If not, will look at SNF placement.     Review of Systems:      As above     Objective:     Intake/Output Summary (Last 24 hours) at 2025 0743  Last data filed at 2025 1420  Gross per 24 hour   Intake 240 ml   Output --   Net

## 2025-05-27 NOTE — PROGRESS NOTES
Physical Therapy    Physical Therapy Treatment Note  Name: Jazmine Hutchinson MRN: [id number] :   1935   Date:  2025   Admission Date: 2025 Room:  [place] [address]   Restrictions/Precautions: contact precautions, falls, general precautions.   Communication with other providers:  RN   Subjective:  Patient states:  \"I feel stronger\"   Pain:   Location, Type, Intensity (0/10 to 10/10):  denies    Objective:    Observation:    Sitting in recliner. Cooperative with therapy.     Objective Measures:    Stable vitals on room air     Treatment, including education/measures:  Sit to stand: SBA for safety from recliner    Stand to sit: SBA for safety to recliner    Step pivot: SBA for safety with RW     Standing balance: SBA static at RW as well as with light dynamic and single to no UE support managing ronald care and pulling up depends.     Ambulation: ~300ft with RW SBA for safety. Pt demonstrated a fair and consistent pace with reciprocal gait pattern and fwd flexed posture. No major LOB noted.     Educated pt on POC, role of PT, DME use, discharge.     Assessment / Impression:    Patient's tolerance of treatment:  good   Adverse Reaction: no  Significant change in status and impact:  no  Barriers to improvement:  activity tolerance, strength, balance    Plan for Next Session:    Activity tolerance, strength, balance, gait, transfers, bed mobility, stairs     Time in:  1510  Time out:  1528  Timed treatment minutes:  18  Total treatment time:  18 minutes    Previously filed items:  Social/Functional History  Lives With: Alone  Type of Home: House  Home Layout: One level  Home Access: Stairs to enter with rails  Entrance Stairs - Number of Steps: 3  Entrance Stairs - Rails: Both (Will use 1 railing and cane)  Bathroom Shower/Tub: Tub/Shower unit  Bathroom Toilet: Standard  Bathroom Equipment: Shower chair, Grab bars in shower, Grab bars around toilet  Home Equipment: Cane, Rollator, Walker - Rolling  Receives Help  From: Family, Home health (Family checks in almost daily. Therapy comes 2x per week and bath aide comes 2x per week)  Prior Level of Assist for ADLs: Needs assistance (Pt manages her own dressing and toileting. She has help with bathing)  Prior Level of Assist for Homemaking: Needs assistance (Pt manages her own laundry. She has a cleaning service every other week and has meals on wheels. She manages light meal prep)  Prior Level of Assist for Ambulation: Independent household ambulator, with or without device (uses the rollator in the home and to push her laundry around. Will use the cane if she goes out)  Prior Level of Assist for Transfers: Independent  Active : Yes     Long Term Goals  Time Frame for Long Term Goals : In one week:  Long Term Goal 1: Pt will complete all bed mobility with supervision  Long Term Goal 2: Pt will complete sit <> stand transfers with SBAx1  Long Term Goal 3: Pt will ambulate 500 feet with SBAx1 with LRAD  Long Term Goal 4: Pt will ascend/descend 6 steps with a handrail with minAx1  Long Term Goal 5: Pt will independently complete 3 sets of 10 reps of BLE AROM exercises  Short Term Goals  Time Frame for Short Term Goals: 2 weeks  Short Term Goal 1: Pt will perform sit><supine indep  Short Term Goal 2: Pt will transfer to all surfaces Alek  Short Term Goal 3: Pt will ambulate 150ft with LRAD Alek  Short Term Goal 4: Pt will ascend/descend 3 steps, bilat UE support on rail/cane and SBA  Short Term Goal 5: Pt will perform standing light dynamic activity x 3 minutes, single UE support if needed Alek    Electronically signed by:    Maya Lara, [id number]  5/27/2025, 3:38 PM

## 2025-05-27 NOTE — CARE COORDINATION
Awaiting updated PT note.  PT signed up to work with patient today.  ARU will continue to follow.

## 2025-05-27 NOTE — PROGRESS NOTES
Occupational Therapy Treatment Note    Name: Jazmine Hutchinson MRN: [id number] :   1935   Date:  2025   Admission Date: 2025 Room:  [facility] [address]       Restrictions/Precautions:  Restrictions/Precautions  Restrictions/Precautions: General Precautions, Fall Risk           Communication with other providers: Per chart review and Nurse patient is appropriate for therapeutic intervention.     Subjective:  Patient states:  Agreeable to OT. \"I'm not in pain; I'm just stiff. I haven't been moving around too much.\"  Pain:   deny    Objective:    Observation: In semi kaur's position upon arrival   Objective Measures:  Alert and oriented     Treatment, including education:  Therapeutic Activity Training:   Therapeutic activity training was instructed today.  Cues were given for safety, sequence, UE/LE placement, awareness, and balance.    Bed mobility:SBA with bed features with increase time  Sitting balance:F+ for dyn  Sit/stand transfers:CGA for safety with verbal cues for safety awareness carryover  Functional Mobility: CGA around the room    Activities performed today included bed mobility training, transfers, functional mobility  to increase strength, activity tolerance to facilitate IND c ADL tasks, func transfers / mobility with G safety awareness carryover      Self Care Training:   Cues were given for safety, sequence, UE/LE placement, visual cues, and balance. Activities performed today included dressing, toileting, personal hygiene, and grooming task. Demo CGA for donning socks and undergarments from sit to stand position. LOB noted posterior with CGA to self correct to midline position. SUP for oral and personal hygiene in seated position due to increase tremors to R UE causing F FMC when applying toothpaste.         Assessment / Impression:  Attempted to perform UB exercises with yellow theraband but unable to perform due to CATHLEEN shoulder weakness. Pt requested not to perform this date.   Patient's

## 2025-05-28 VITALS
HEART RATE: 83 BPM | BODY MASS INDEX: 21.27 KG/M2 | OXYGEN SATURATION: 98 % | HEIGHT: 63 IN | SYSTOLIC BLOOD PRESSURE: 149 MMHG | TEMPERATURE: 98 F | WEIGHT: 120.06 LBS | DIASTOLIC BLOOD PRESSURE: 82 MMHG | RESPIRATION RATE: 17 BRPM

## 2025-05-28 PROCEDURE — 6370000000 HC RX 637 (ALT 250 FOR IP): Performed by: STUDENT IN AN ORGANIZED HEALTH CARE EDUCATION/TRAINING PROGRAM

## 2025-05-28 PROCEDURE — 6360000002 HC RX W HCPCS: Performed by: STUDENT IN AN ORGANIZED HEALTH CARE EDUCATION/TRAINING PROGRAM

## 2025-05-28 PROCEDURE — 2500000003 HC RX 250 WO HCPCS: Performed by: STUDENT IN AN ORGANIZED HEALTH CARE EDUCATION/TRAINING PROGRAM

## 2025-05-28 PROCEDURE — 6370000000 HC RX 637 (ALT 250 FOR IP): Performed by: INTERNAL MEDICINE

## 2025-05-28 RX ORDER — NITROFURANTOIN 25; 75 MG/1; MG/1
100 CAPSULE ORAL EVERY 12 HOURS SCHEDULED
Qty: 1 CAPSULE | Refills: 0 | Status: SHIPPED | OUTPATIENT
Start: 2025-05-28 | End: 2025-05-29

## 2025-05-28 RX ADMIN — PYRIDOSTIGMINE BROMIDE 60 MG: 60 TABLET ORAL at 07:43

## 2025-05-28 RX ADMIN — Medication 400 MG: at 07:43

## 2025-05-28 RX ADMIN — NITROFURANTOIN MONOHYDRATE/MACROCRYSTALS 100 MG: 75; 25 CAPSULE ORAL at 07:42

## 2025-05-28 RX ADMIN — SERTRALINE HYDROCHLORIDE 25 MG: 50 TABLET ORAL at 07:42

## 2025-05-28 RX ADMIN — ASPIRIN 81 MG: 81 TABLET, CHEWABLE ORAL at 07:42

## 2025-05-28 RX ADMIN — GABAPENTIN 200 MG: 100 CAPSULE ORAL at 07:42

## 2025-05-28 RX ADMIN — ENOXAPARIN SODIUM 30 MG: 100 INJECTION SUBCUTANEOUS at 07:43

## 2025-05-28 RX ADMIN — PANTOPRAZOLE SODIUM 40 MG: 40 TABLET, DELAYED RELEASE ORAL at 06:44

## 2025-05-28 RX ADMIN — SODIUM CHLORIDE, PRESERVATIVE FREE 10 ML: 5 INJECTION INTRAVENOUS at 07:43

## 2025-05-28 RX ADMIN — FOLIC ACID 1000 MCG: 1 TABLET ORAL at 07:43

## 2025-05-28 RX ADMIN — METHOCARBAMOL 500 MG: 500 TABLET ORAL at 07:43

## 2025-05-28 NOTE — PROGRESS NOTES
I did NOT see the patient. The patient was discussed with the ROSEMARY. I was available for questions and consultation as needed.       Discharged to home. Treating for UTI.

## 2025-05-28 NOTE — DISCHARGE SUMMARY
V2.0  Discharge Summary    Name:  Jazmine Hutchinson /Age/Sex: 1935 (89 y.o. female)   Admit Date: 2025  Discharge Date: 25    MRN & CSN:  [id number] & [id number] Encounter Date and Time 25 11:55 AM EDT    Attending:  Susanna Arroyo MD Discharging Provider: ABELARDO ALCANTAR [facility] Course:     Brief HPI: Jazmine Hutchinson is a 89 y.o. female who presented with Physical Debility related to low back pain and leg weakness. Her symptoms have improved since working with PT and OT and taking Robaxin 500 mg PO TID. She is finishing treatment for a UTI with Macrobid today. She is medically stable for discharge home today with Home Healthcare.     Brief Problem Based Course:     #Debility - improved   - Diffuse abdominal muscle soreness and paraspinal pain after exercises with home PT/OT. Supportive care for pain/soreness.  Patient seen by PT/OT, recommends home with home healthcare, was able to ambulate 300 feet with rolling walker  -Today patient was seen today, she is a little bit stronger than on admission, but still needs assist and she feels unsteady on her feet  -states her back pain and mobility have improved on Robaxin 500 mg TID since here, will Rx for outpatient use     #UTI   - Urine culture growing E. coli  -received ceftriaxone, now switched to nitrofurantoin per sensitivities, finishes tonight     #CAD  -Continue aspirin, statin, not on metoprolol due to N/V per allergies      #CKD 3a  - Cr at baseline      #HLD  -Continue statin      #GERD  -Continue PPI     #Mood Disorder   -Continue home meds    This patient was seen and examined and/or discussed in conjunction with Dr. Susanna Arroyo. He/She was agreeable with the patient's plan at discharge as dictated above.     The patient expressed appropriate understanding of, and agreement with the discharge recommendations, medications, and plan.     Consults this admission:  IP CONSULT TO CASE MANAGEMENT  IP CONSULT TO VASCULAR ACCESS

## 2025-05-28 NOTE — CARE COORDINATION
ED- Room # OBS-4--Pt has stated she has current services from [facility]--called [facility] office -message with Chanda for notification of pt release , to restart PT services , and to let them know of a new home health care order--if need.     --14:15-- Faxed [facility] order to Chanda at [phone].

## 2025-05-28 NOTE — CARE COORDINATION
Reviewed PT note from 5/27.  Patient presenting functionally too high level fro ARU admission.  Per PT note patient SBA for all mobility, including ambulating 300ft SBA.  Defer to discharge plan B.

## 2025-05-29 ENCOUNTER — CARE COORDINATION (OUTPATIENT)
Dept: CASE MANAGEMENT | Age: 89
End: 2025-05-29

## 2025-05-29 DIAGNOSIS — R53.81 DEBILITY: Primary | ICD-10-CM

## 2025-05-29 PROCEDURE — 1111F DSCHRG MED/CURRENT MED MERGE: CPT | Performed by: FAMILY MEDICINE

## 2025-05-29 NOTE — CARE COORDINATION
Alone  Do you feel like you have everything you need to keep you well at home?: Yes  Are you an active caregiver in your home?: No  Care Transitions Interventions     Transportation Support: Declined    Medication Assistance Program: Declined       Social Work: Declined              Follow Up Appointment:   Discussed follow up appointments. Patient has hospital follow up appointment scheduled  message routed to PCP    Future Appointments         Provider Specialty Dept Phone    6/11/2025 2:00 PM Usama Allen PA-C Orthopedic Surgery [phone]    7/15/2025 10:00 AM Julián David MD Nephrology [phone]    9/22/2025 1:00 PM Lpn, [facility] Internal Medicine [phone]    1/8/2026 1:20 PM Jayda Díaz MD Internal Medicine [phone]            Care Transition Nurse provided contact information.  Plan for follow-up on next business day.  based on severity of symptoms and risk factors.  Plan for next call:  PCP appt HFU      Crystal Huerta RN

## 2025-06-02 ENCOUNTER — TELEPHONE (OUTPATIENT)
Dept: INTERNAL MEDICINE CLINIC | Age: 89
End: 2025-06-02

## 2025-06-02 ENCOUNTER — CARE COORDINATION (OUTPATIENT)
Dept: CASE MANAGEMENT | Age: 89
End: 2025-06-02

## 2025-06-02 NOTE — CARE COORDINATION
Care Transitions Note    Follow Up Call     Reason for Admission: DX:Debilty     Patient Current Location:  Home: [address] [place] [address]    [facility] Care Coordinator contacted the patient by telephone. Verified name and  as identifiers.    Additional needs identified to be addressed with provider   No needs identified                 Method of communication with provider: none.    Care Summary Note: Spoke with Jazmine Martinez who reported that she was in a lot of pain right now because she have not taken her pain medication yet. Patient stated that she had company and kind of put it off. Patient stated that pain level about 6/10 but she is going to take medication now. Patient reported that her HFU appointment is *:45 am on . Patient report that appetite and fluid intake is good and denied any problems with bowel or bladder. Patient reported that he is taking all medications as ordered. Patient denied any other needs at this time. Patient instructed to continue to monitor s/s, reporting any that may present to MD immediately for early intervention. Patient is agreeable to f/u calls.     Plan of care updates since last contact:          Advance Care Planning   The patient has the following advanced directives on file:  Advance Directives       Power of  Living Will ACP-Advance Directive ACP-Power of     Not on File Filed on 20 Filed Not on File            The patient has appointed the following active healthcare agents:    Primary Decision Maker: Miguel Mejia - [phone]    Secondary Decision Maker: JOON MARTINEZ - [phone]    Supplemental (Other) Decision Maker: Monae Vogel - [phone]    Medication Review:  No changes since last call.     Remote Patient Monitoring:  Offered patient enrollment in the Remote Patient Monitoring (RPM) program for in-home monitoring: Yes, but did not enroll at this time: limited patient ability to navigate

## 2025-06-02 NOTE — TELEPHONE ENCOUNTER
Patient needs a hospital F/ U offered an opening in the morning on tomorrow but she has another appt scheduled at that time . She also need refills of muscle relaxers and cream that was prescribed by the hospital. Patient would like a call back.

## 2025-06-02 NOTE — TELEPHONE ENCOUNTER
Called patient-[facility] on [facility] has 1 opening on 6/9/25 @ 8:45-if patient does not take tht appt she will need to set up with the walk-in for the hospital f/u

## 2025-06-03 ENCOUNTER — TELEPHONE (OUTPATIENT)
Dept: INTERNAL MEDICINE CLINIC | Age: 89
End: 2025-06-03

## 2025-06-03 NOTE — TELEPHONE ENCOUNTER
I'm not quite sure what cream she is talking about as I don't see a topical medication on her discharge summary- is it voltaren? If she can tell me what the name of it is, I can fill it for her.

## 2025-06-03 NOTE — TELEPHONE ENCOUNTER
Patient called asking for cream that she received in the hospital for back pain. It is not in her chart to select for a refill. She stated that she will call the ER to see if she can locate the name of the cream.

## 2025-06-04 ENCOUNTER — TRANSCRIBE ORDERS (OUTPATIENT)
Dept: ADMINISTRATIVE | Age: 89
End: 2025-06-04

## 2025-06-04 DIAGNOSIS — N28.89 RENAL MASS: Primary | ICD-10-CM

## 2025-06-05 ENCOUNTER — TELEPHONE (OUTPATIENT)
Dept: INTERNAL MEDICINE CLINIC | Age: 89
End: 2025-06-05

## 2025-06-05 DIAGNOSIS — M54.50 CHRONIC BILATERAL LOW BACK PAIN WITHOUT SCIATICA: Primary | ICD-10-CM

## 2025-06-05 DIAGNOSIS — G89.29 CHRONIC BILATERAL LOW BACK PAIN WITHOUT SCIATICA: Primary | ICD-10-CM

## 2025-06-05 RX ORDER — METHOCARBAMOL 500 MG/1
500 TABLET, FILM COATED ORAL DAILY PRN
Qty: 30 TABLET | Refills: 0 | Status: SHIPPED | OUTPATIENT
Start: 2025-06-05 | End: 2025-07-05

## 2025-06-05 NOTE — TELEPHONE ENCOUNTER
Called patient-she says she can't take it once a day she has to have it 3x's a day because 1x aday does not help her

## 2025-06-05 NOTE — TELEPHONE ENCOUNTER
Patient called asking for a refill on methocarbamol, the medication they prescribed in the hospital sent to [facility] [place]

## 2025-06-05 NOTE — TELEPHONE ENCOUNTER
I've refilled medication but have filled it for once a day as I would like patient to try to wean down on dose as able.  If she requires it TID, she can continue for now.

## 2025-06-05 NOTE — TELEPHONE ENCOUNTER
LVM requesting a call back from [facility], patient also called in requesting a refill on robaxin, wanted to verify if this was the \"cream\" medication she was referring too.

## 2025-06-09 ENCOUNTER — CARE COORDINATION (OUTPATIENT)
Dept: CASE MANAGEMENT | Age: 89
End: 2025-06-09

## 2025-06-09 ENCOUNTER — OFFICE VISIT (OUTPATIENT)
Dept: INTERNAL MEDICINE CLINIC | Age: 89
End: 2025-06-09

## 2025-06-09 VITALS
HEIGHT: 63 IN | DIASTOLIC BLOOD PRESSURE: 64 MMHG | HEART RATE: 65 BPM | BODY MASS INDEX: 21.33 KG/M2 | SYSTOLIC BLOOD PRESSURE: 128 MMHG | OXYGEN SATURATION: 90 % | WEIGHT: 120.4 LBS

## 2025-06-09 DIAGNOSIS — D51.3 OTHER DIETARY VITAMIN B12 DEFICIENCY ANEMIA: ICD-10-CM

## 2025-06-09 DIAGNOSIS — Z09 HOSPITAL DISCHARGE FOLLOW-UP: ICD-10-CM

## 2025-06-09 DIAGNOSIS — M54.32 SCIATICA OF LEFT SIDE WITHOUT BACK PAIN: Primary | ICD-10-CM

## 2025-06-09 RX ORDER — CYANOCOBALAMIN 1000 UG/ML
1000 INJECTION, SOLUTION INTRAMUSCULAR; SUBCUTANEOUS ONCE
Status: COMPLETED | OUTPATIENT
Start: 2025-06-09 | End: 2025-06-09

## 2025-06-09 RX ADMIN — CYANOCOBALAMIN 1000 MCG: 1000 INJECTION, SOLUTION INTRAMUSCULAR; SUBCUTANEOUS at 14:40

## 2025-06-09 ASSESSMENT — ENCOUNTER SYMPTOMS
COLOR CHANGE: 0
CONSTIPATION: 0
DIARRHEA: 0
VOMITING: 0
BACK PAIN: 1
SORE THROAT: 0
ABDOMINAL PAIN: 0
SHORTNESS OF BREATH: 0
CHEST TIGHTNESS: 0

## 2025-06-09 NOTE — PROGRESS NOTES
Post-Discharge Transitional Care Follow Up      Jazmine Hutchinson   YOB: 1935    Date of Office Visit:  6/9/2025  Date of Hospital Admission: 5/22/25  Date of Hospital Discharge: 5/28/25  Readmission Risk Score (high >=14%. Medium >=10%):Readmission Risk Score: 13.7      Care management risk score Rising risk (score 2-5) and Complex Care (Scores >=6): No Risk Score On File     Non face to face  following discharge, date last encounter closed (first attempt may have been earlier): 05/29/2025     Call initiated 2 business days of discharge: Yes     Sciatica of left side without back pain  Can revert back to TID dosing of robaxin, continue home PT.    Hospital discharge follow-up  -     OK DISCHARGE MEDS RECONCILED W/ CURRENT OUTPATIENT MED LIST  Other dietary vitamin B12 deficiency anemia  IM B12 administered today.  -     cyanocobalamin injection 1,000 mcg; 1,000 mcg, IntraMUSCular, ONCE, 1 dose, On Mon 6/9/25 at 1500    Medical Decision Making: moderate complexity  Return if symptoms worsen or fail to improve.    On this date 6/9/2025 I have spent 20 minutes reviewing previous notes, test results and face to face with the patient discussing the diagnosis and importance of compliance with the treatment plan as well as documenting on the day of the visit.       Subjective:   HPI    Inpatient course: Discharge summary reviewed- see chart.    Interval history/Current status:     Patient recently hospitalized for low back pain/weakness, also found to have UTI.  States symptoms started while she was working with physical therapist who was massaging her L hip/buttock.      Was discharged with robaxin.  States it seemed to be working well when she taking TID but now has been taking once daily and does not seem to be helping.  Denies any side effects with medication.  Is still getting home PT.    Has completed antibiotic course for UTI.      Requesting B12 injection today.         Patient Active Problem List

## 2025-06-09 NOTE — CARE COORDINATION
Transportation Support: Declined    Meals on Wheels: Declined  DME Assistance: Declined     Senior Services: Declined     Medication Assistance Program: Declined       Social Work: Declined    Other Interventions:              Follow Up Appointment:   Reviewed upcoming appointment(s).  Future Appointments         Provider Specialty Dept Phone    6/11/2025 2:00 PM Usama Allen PA-C Orthopedic Surgery [phone]    7/15/2025 10:00 AM Julián David MD Nephrology [phone]    9/22/2025 1:00 PM Lpn, [facility] Internal Medicine [phone]    1/8/2026 1:20 PM Jayda Díaz MD Internal Medicine [phone]            Care Transition Nurse provided contact information.  Plan for follow-up call in 2-5 days based on severity of symptoms and risk factors.  Plan for next call:  pain, PT decision.uti symptoms      Crystal Huerta RN

## 2025-06-11 ENCOUNTER — OFFICE VISIT (OUTPATIENT)
Dept: ORTHOPEDIC SURGERY | Age: 89
End: 2025-06-11
Payer: MEDICARE

## 2025-06-11 VITALS — RESPIRATION RATE: 14 BRPM | BODY MASS INDEX: 21.26 KG/M2 | WEIGHT: 120 LBS | HEART RATE: 92 BPM | HEIGHT: 63 IN

## 2025-06-11 DIAGNOSIS — M19.011 PRIMARY OSTEOARTHRITIS OF RIGHT SHOULDER: ICD-10-CM

## 2025-06-11 DIAGNOSIS — M19.012 PRIMARY OSTEOARTHRITIS OF LEFT SHOULDER: Primary | ICD-10-CM

## 2025-06-11 PROCEDURE — 20610 DRAIN/INJ JOINT/BURSA W/O US: CPT | Performed by: PHYSICIAN ASSISTANT

## 2025-06-11 RX ORDER — TRIAMCINOLONE ACETONIDE 40 MG/ML
40 INJECTION, SUSPENSION INTRA-ARTICULAR; INTRAMUSCULAR ONCE
Status: COMPLETED | OUTPATIENT
Start: 2025-06-11 | End: 2025-06-11

## 2025-06-11 RX ADMIN — TRIAMCINOLONE ACETONIDE 40 MG: 40 INJECTION, SUSPENSION INTRA-ARTICULAR; INTRAMUSCULAR at 14:25

## 2025-06-11 RX ADMIN — TRIAMCINOLONE ACETONIDE 40 MG: 40 INJECTION, SUSPENSION INTRA-ARTICULAR; INTRAMUSCULAR at 14:24

## 2025-06-11 ASSESSMENT — ENCOUNTER SYMPTOMS
GASTROINTESTINAL NEGATIVE: 1
RESPIRATORY NEGATIVE: 1
EYES NEGATIVE: 1

## 2025-06-11 NOTE — PATIENT INSTRUCTIONS
Continue weight-bearing as tolerated.  Continue range of motion exercises as instructed.  Ice and elevate as needed.  Tylenol or Motrin for pain.  Injection given into the bilateral shoulders.  Follow up in 3 months.    We are committed to providing you the best care possible.  If you receive a survey after visiting one of our offices, please take time to share your experience concerning your physician office visit.  These surveys are confidential and no health information about you is shared.  We are eager to improve for you and we are counting on your feedback to help make that happen.

## 2025-06-11 NOTE — PROGRESS NOTES
[facility] Orthoped[facility] and Sports Medicine      Current updated HPI 6/11/2025: Jazmine Hutchinson is an 89-year-old female who returns to the office today with recurrent bilateral shoulder pain due to severe arthritis of both shoulders.  She regularly gets injections in both shoulders and today would like to repeat those.    Previous HPI 3/12/2025: Jazmine Hutchinson is an 89-year-old female that returns the office today with complaints of recurrent bilateral shoulder pain as well as lower back pain.  She would like to repeat steroid injections or bilateral shoulder and talk about getting a referral for her low back pain.      Previous HPI 12/11/2024: Jazmine Hutchinson is well-known 89-year-old female with severe osteoarthritis of bilateral shoulders and rotator cuff tendinitis.  She gets steroid injections every 3 to 4 months and she would like to repeat those injections.      Previous HPI 9/9/2024: Jazmine Hutchinson is a well-known 89-year-old female that presents back to the office today for recurrent bilateral shoulder pain.  No recent falls or injuries to the shoulders.  Pain is a 7 or an 8/10 worse with motion of the shoulders.        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 Payton    DVT (deep venous thrombosis) ([facility]) 1970    left leg    Former smoker     History of echocardiogram 09/21/2020    EF 55-60%, mild left ventricular hypertrophy, normal diastolic filling pattern for age, no signficiant valvular disease, no pericardial effusion     History of stress test 03/25/2021    normal LVEF calculated by the computer is probably falsely low. LVEF is 40 %    Hx of Doppler ultrasound 03/01/2018    Carotid-mild 0-49% bilateral carotid,50% stenosis right subclavian    Hyperlipidemia     Hypertension     Macular degeneration     right    Mild cognitive impairment 02/2012    MMSE 26/30    MVP (mitral valve

## 2025-06-12 NOTE — PROGRESS NOTES
Physician Progress Note      PATIENT:               MANUEL MARTINEZ  CSN #:                  [id number]  :                       1935  ADMIT DATE:       2025 4:55 PM  DISCH DATE:        2025 1:37 PM  RESPONDING  PROVIDER #:        LADARIUS Hancock CNP          QUERY TEXT:    Dear TK Byers,  Please clarify in documentation the relationship, if any, between  Patient's debility and UTI. Are the conditions:    The clinical indicators include:  89 yo f presents with abd pain, nausea, diarrhea  -CAD CKD 3a HLD GERD  -Per Med documentation of, 'UTI-continue IV Rocephin pending cx' dated   -Per Med documentation of, 'debility likely in the setting of UTI, urine cx   growing E-coli continue Ceftriaxone', dated   -IV Rocephin then switched to nitrofurantoin, urine C/S, PT/OT, CBC, BMP    Thank you, in advance,  Sandra Nowak RN BSN CRCR  [email or web address]   [email or web address]  Options provided:  -- Debility due to related to UTI  -- Debility Unrelated to UTI  -- Other - I will add my own diagnosis  -- Disagree - Not applicable / Not valid  -- Disagree - Clinically unable to determine / Unknown  -- Refer to Clinical Documentation Reviewer    PROVIDER RESPONSE TEXT:    The patient debility related to or associated with UTI    Query created by: Sandra Nowak on 6/3/2025 11:08 AM      Electronically signed by:  LADARIUS Hancock CNP 2025 7:09 AM

## 2025-06-16 ENCOUNTER — CARE COORDINATION (OUTPATIENT)
Dept: CARE COORDINATION | Age: 89
End: 2025-06-16

## 2025-06-16 NOTE — CARE COORDINATION
Care Transitions Note    Follow Up Call     Patient Current Location:  Home: [address] [place] [id number]    Care Transition Nurse contacted the patient by telephone. Verified name and  as identifiers.    Additional needs identified to be addressed with provider   No needs identified                 Method of communication with provider: none.    Care Summary Note: States she's doing well. Denies any pain this morning. Reports good appetite and energy level. Denies any urinary symptoms, fevers, flu-like symptoms, or other symptoms or concerns. Reports taking medications as prescribed. Completed PCP f/u appt. Denies questions or needs.     Plan of care updates since last contact:  Education:    Review of patient management of conditions/medications:         Advance Care Planning:   Does patient have an Advance Directive: reviewed during previous call, see note. .    Medication Review:  No changes since last call.     Remote Patient Monitoring:  Offered patient enrollment in the Remote Patient Monitoring (RPM) program for in-home monitoring: NA.    Assessments:  Care Transitions Subsequent and Final Call    Subsequent and Final Calls  Care Transitions Interventions  Other Interventions:              Follow Up Appointment:   Reviewed upcoming appointment(s).  Future Appointments         Provider Specialty Dept Phone    7/15/2025 10:00 AM Julián David MD Nephrology [phone]    9/10/2025 1:00 PM Usama Allen PA-C Orthopedic Surgery [phone]    2025 1:00 PM Lpn, [facility] [phone]    2026 1:20 PM Jayda Díaz MD Internal Medicine [phone]            Care Transition Nurse provided contact information.  Plan for follow-up call in 2-5 days based on severity of symptoms and risk factors.  Plan for next call: symptom management-   self management-      Shantal Adams

## 2025-06-20 ENCOUNTER — CARE COORDINATION (OUTPATIENT)
Dept: CASE MANAGEMENT | Age: 89
End: 2025-06-20

## 2025-06-20 NOTE — CARE COORDINATION
Care Transitions Note    Follow Up Call     Patient: Jazmine Hutchinson                             Patient : 1935   MRN: [id number]                             Reason for Admission: DX:Debilty  [facility]:-25  Discharge Date: 25       RURS: Readmission Risk Score: 13.7       Attempted to reach patient for transitions of care follow up.  Unable to reach patient.      Outreach Attempts:   Multiple attempts to contact patient at phone numbers on file.   Unable to leave message.     Care Summary Note:     Follow Up Appointment:   Future Appointments         Provider Specialty Dept Phone    7/15/2025 10:00 AM Julián David MD Nephrology [phone]    9/10/2025 1:00 PM Union PierUsama PA-C Orthopedic Surgery [phone]    2025 1:00 PM Lpn, [facility] [phone]    2026 1:20 PM Jayda Díaz MD Internal Medicine [phone]            Plan for follow-up call in 2-5 days based on severity of symptoms and risk factors. Plan for next call: symptom management-   self management-     Kristen Jennings LPN

## 2025-06-24 PROBLEM — N39.0 UTI (URINARY TRACT INFECTION): Status: RESOLVED | Noted: 2025-05-25 | Resolved: 2025-06-24

## 2025-06-25 ENCOUNTER — CARE COORDINATION (OUTPATIENT)
Dept: CASE MANAGEMENT | Age: 89
End: 2025-06-25

## 2025-06-25 NOTE — CARE COORDINATION
Care Transitions Note    Final Call     Attempted to reach patient for transitions of care follow up.  Unable to reach patient.      Outreach Attempts:   HIPAA compliant voicemail left for patient.     Care Summary Note: UTR    Follow Up Appointment:   Future Appointments         Provider Specialty Dept Phone    7/15/2025 10:00 AM Julián David MD Nephrology [phone]    9/10/2025 1:00 PM [place], Usama JACKSON PA-C Orthopedic Surgery [phone]    9/22/2025 1:00 PM [facility] Internal Medicine [phone]    1/8/2026 1:20 PM Jayda Díaz MD Internal Medicine [phone]            CTN program closed , Pt graduated based on severity of symptoms and risk factors. Plan for next call: ALTHEA Huerta RN

## 2025-07-07 ENCOUNTER — HOSPITAL ENCOUNTER (OUTPATIENT)
Dept: CT IMAGING | Age: 89
Discharge: HOME | End: 2025-07-07
Attending: SPECIALIST
Payer: MEDICARE

## 2025-07-07 DIAGNOSIS — N28.89 RENAL MASS: ICD-10-CM

## 2025-07-07 LAB
EGFR, POC: 48 ML/MIN/1.73M2
POC CREATININE: 1.1 MG/DL (ref 0.5–1.2)

## 2025-07-07 PROCEDURE — 6360000004 HC RX CONTRAST MEDICATION: Performed by: SPECIALIST

## 2025-07-07 PROCEDURE — 74170 CT ABD WO CNTRST FLWD CNTRST: CPT

## 2025-07-07 PROCEDURE — 82565 ASSAY OF CREATININE: CPT

## 2025-07-07 RX ORDER — IOPAMIDOL 755 MG/ML
100 INJECTION, SOLUTION INTRAVASCULAR
Status: COMPLETED | OUTPATIENT
Start: 2025-07-07 | End: 2025-07-07

## 2025-07-07 RX ADMIN — IOPAMIDOL 100 ML: 755 INJECTION, SOLUTION INTRAVENOUS at 11:35

## 2025-07-14 ENCOUNTER — HOSPITAL ENCOUNTER (OUTPATIENT)
Age: 89
Discharge: HOME | End: 2025-07-14
Payer: MEDICARE

## 2025-07-14 DIAGNOSIS — I10 HYPERTENSION, UNSPECIFIED TYPE: ICD-10-CM

## 2025-07-14 LAB
ALBUMIN SERPL-MCNC: 4.1 G/DL (ref 3.4–5)
ALBUMIN/GLOB SERPL: 1.4 {RATIO} (ref 1.1–2.2)
ALP SERPL-CCNC: 87 U/L (ref 40–129)
ALT SERPL-CCNC: 16 U/L (ref 10–40)
ANION GAP SERPL CALCULATED.3IONS-SCNC: 10 MMOL/L (ref 9–17)
AST SERPL-CCNC: 33 U/L (ref 15–37)
BILIRUB SERPL-MCNC: 0.3 MG/DL (ref 0–1)
BUN SERPL-MCNC: 17 MG/DL (ref 7–20)
CALCIUM SERPL-MCNC: 9.6 MG/DL (ref 8.3–10.6)
CHLORIDE SERPL-SCNC: 98 MMOL/L (ref 99–110)
CO2 SERPL-SCNC: 27 MMOL/L (ref 21–32)
CREAT SERPL-MCNC: 1.2 MG/DL (ref 0.6–1.2)
GFR, ESTIMATED: 43 ML/MIN/1.73M2
GLUCOSE SERPL-MCNC: 114 MG/DL (ref 74–99)
MAGNESIUM SERPL-MCNC: 2.5 MG/DL (ref 1.8–2.4)
PHOSPHATE SERPL-MCNC: 2.6 MG/DL (ref 2.5–4.9)
POTASSIUM SERPL-SCNC: 4.5 MMOL/L (ref 3.5–5.1)
PROT SERPL-MCNC: 7 G/DL (ref 6.4–8.2)
SODIUM SERPL-SCNC: 135 MMOL/L (ref 136–145)

## 2025-07-14 PROCEDURE — 36415 COLL VENOUS BLD VENIPUNCTURE: CPT

## 2025-07-14 PROCEDURE — 84100 ASSAY OF PHOSPHORUS: CPT

## 2025-07-14 PROCEDURE — 80053 COMPREHEN METABOLIC PANEL: CPT

## 2025-07-14 PROCEDURE — 83735 ASSAY OF MAGNESIUM: CPT

## 2025-07-14 RX ORDER — VERAPAMIL HYDROCHLORIDE 120 MG/1
120 TABLET, FILM COATED, EXTENDED RELEASE ORAL NIGHTLY
Qty: 90 TABLET | Refills: 1 | Status: SHIPPED | OUTPATIENT
Start: 2025-07-14

## 2025-07-24 ENCOUNTER — CARE COORDINATION (OUTPATIENT)
Dept: CARE COORDINATION | Age: 89
End: 2025-07-24

## 2025-07-24 NOTE — CARE COORDINATION
Ambulatory Care Coordination Note     7/24/2025 3:01 PM     Patient outreach attempt by this ACM today to offer care management services. ACM was unable to reach the patient by telephone today;   No answer & voicemail box has not been set up on home or mobile numbers.     ACM: Tete Peralta RN     Care Summary Note: Patient was identified for Care Management by [facility] on 7/15/25. ACM attempted to contact patient today to offer care management services. Both numbers on file ring without the option to leave a message for patient. ACM will outreach patient at a later date.     PCP/Specialist follow up:   Future Appointments         Provider Specialty Dept Phone    8/7/2025 2:15 PM Nathaniel Gallego MD Cardiology [phone]    9/4/2025 2:30 PM Julián David MD Nephrology [phone]    9/10/2025 1:00 PM Usama Allen PA-C Orthopedic Surgery [phone]    9/22/2025 1:00 PM Lpn, [facility] [phone]    1/8/2026 1:20 PM Jayda Díaz MD Internal Medicine [phone]            Follow Up:   Plan for next ACM outreach in approximately 1 week to complete:  - outreach attempt to offer care management services.

## 2025-07-25 ENCOUNTER — CARE COORDINATION (OUTPATIENT)
Dept: CARE COORDINATION | Age: 89
End: 2025-07-25

## 2025-07-25 NOTE — CARE COORDINATION
Ambulatory Care Coordination Note     2025 1:37 PM     Patient Current Location:  Home: [address] [place] [id number]     This patient was received as a referral from [facility] health report .    ACM contacted the patient by telephone. Verified name and  with patient as identifiers. Provided introduction to self, and explanation of the ACM role.   Patient declined care management services at this time.          ACM: Tete Peralta RN     Challenges to be reviewed by the provider   Additional needs identified to be addressed with provider No  None         Method of communication with provider: none.    Utilization: Initial Call - N/A    Care Summary Note: ACM received return incoming call and voicemail from patient yesterday. ACM placed call to patient today to offer care management services.     Patient reports she is doing fine today. She does report she has diverticulitis and follows with Dr. Khan for GI. Yesterday she reports she had a burning sensation in her stomach that turned to nausea that she felt all day long. She denied vomiting, diarrhea or fever. She has two medications prescribed by Dr. Khan that she has been instructed to take when this happens. She takes the first medication (doesn't recall names) and if after 1 day it doesn't improve she take the second medication (liquid). If her symptoms still do not improve she is to call Dr. Khan's office. Patient reports she woke up this morning and felt better. She denies having any abdominal pain, N/V or diarrhea today. Patient was able to eat and tolerate her breakfast today. Patient reports she has a list of foods she should avoid for diverticulitis and she follows it as she has been instructed.     ACM explained care management program and ACM role. Patient politely declines at this time. She states she has all of her medications and does not need anything additional. She has a great support system in her grandson who gets her

## 2025-08-04 DIAGNOSIS — M54.32 SCIATICA OF LEFT SIDE WITHOUT BACK PAIN: Primary | ICD-10-CM

## 2025-08-04 RX ORDER — METHOCARBAMOL 500 MG/1
500 TABLET, FILM COATED ORAL 3 TIMES DAILY
Qty: 90 TABLET | Refills: 2 | Status: SHIPPED | OUTPATIENT
Start: 2025-08-04

## 2025-08-18 DIAGNOSIS — E53.8 VITAMIN B 12 DEFICIENCY: Primary | ICD-10-CM

## 2025-08-19 DIAGNOSIS — E53.8 VITAMIN B 12 DEFICIENCY: ICD-10-CM

## 2025-08-20 ENCOUNTER — HOSPITAL ENCOUNTER (EMERGENCY)
Age: 89
Discharge: HOME | End: 2025-08-21
Attending: EMERGENCY MEDICINE
Payer: MEDICARE

## 2025-08-20 DIAGNOSIS — R10.84 GENERALIZED ABDOMINAL PAIN: Primary | ICD-10-CM

## 2025-08-20 DIAGNOSIS — E87.1 HYPONATREMIA: ICD-10-CM

## 2025-08-20 LAB
ALBUMIN SERPL-MCNC: 3.9 G/DL (ref 3.4–5)
ALBUMIN/GLOB SERPL: 1.4 {RATIO} (ref 1.1–2.2)
ALP SERPL-CCNC: 79 U/L (ref 40–129)
ALT SERPL-CCNC: 12 U/L (ref 10–40)
ANION GAP SERPL CALCULATED.3IONS-SCNC: 9 MMOL/L (ref 9–17)
AST SERPL-CCNC: 42 U/L (ref 15–37)
BASOPHILS # BLD: 0.02 K/UL
BASOPHILS NFR BLD: 0 % (ref 0–1)
BILIRUB SERPL-MCNC: 0.3 MG/DL (ref 0–1)
BUN SERPL-MCNC: 15 MG/DL (ref 7–20)
CALCIUM SERPL-MCNC: 9.6 MG/DL (ref 8.3–10.6)
CHLORIDE SERPL-SCNC: 93 MMOL/L (ref 99–110)
CO2 SERPL-SCNC: 26 MMOL/L (ref 21–32)
CREAT SERPL-MCNC: 1 MG/DL (ref 0.6–1.2)
EOSINOPHIL # BLD: 0.11 K/UL
EOSINOPHILS RELATIVE PERCENT: 2 % (ref 0–3)
ERYTHROCYTE [DISTWIDTH] IN BLOOD BY AUTOMATED COUNT: 12.8 % (ref 11.7–14.9)
GFR, ESTIMATED: 50 ML/MIN/1.73M2
GLUCOSE SERPL-MCNC: 129 MG/DL (ref 74–99)
HCT VFR BLD AUTO: 33.3 % (ref 37–47)
HGB BLD-MCNC: 11 G/DL (ref 12.5–16)
IMM GRANULOCYTES # BLD AUTO: 0.01 K/UL
IMM GRANULOCYTES NFR BLD: 0 %
LIPASE SERPL-CCNC: 27 U/L (ref 13–60)
LYMPHOCYTES NFR BLD: 2.18 K/UL
LYMPHOCYTES RELATIVE PERCENT: 43 % (ref 24–44)
MCH RBC QN AUTO: 34.6 PG (ref 27–31)
MCHC RBC AUTO-ENTMCNC: 33 G/DL (ref 32–36)
MCV RBC AUTO: 104.7 FL (ref 78–100)
MONOCYTES NFR BLD: 0.6 K/UL
MONOCYTES NFR BLD: 12 % (ref 0–5)
NEUTROPHILS NFR BLD: 43 % (ref 36–66)
NEUTS SEG NFR BLD: 2.21 K/UL
PLATELET # BLD AUTO: 114 K/UL (ref 140–440)
PMV BLD AUTO: 9.7 FL (ref 7.5–11.1)
POTASSIUM SERPL-SCNC: 5.2 MMOL/L (ref 3.5–5.1)
PROT SERPL-MCNC: 6.7 G/DL (ref 6.4–8.2)
RBC # BLD AUTO: 3.18 M/UL (ref 4.2–5.4)
SODIUM SERPL-SCNC: 128 MMOL/L (ref 136–145)
TROPONIN I SERPL HS-MCNC: 19 NG/L (ref 0–14)
WBC OTHER # BLD: 5.1 K/UL (ref 4–10.5)

## 2025-08-20 PROCEDURE — 83605 ASSAY OF LACTIC ACID: CPT

## 2025-08-20 PROCEDURE — 85025 COMPLETE CBC W/AUTO DIFF WBC: CPT

## 2025-08-20 PROCEDURE — 83690 ASSAY OF LIPASE: CPT

## 2025-08-20 PROCEDURE — 84484 ASSAY OF TROPONIN QUANT: CPT

## 2025-08-20 PROCEDURE — 80053 COMPREHEN METABOLIC PANEL: CPT

## 2025-08-20 PROCEDURE — 99285 EMERGENCY DEPT VISIT HI MDM: CPT

## 2025-08-20 PROCEDURE — 93005 ELECTROCARDIOGRAM TRACING: CPT | Performed by: EMERGENCY MEDICINE

## 2025-08-20 ASSESSMENT — PAIN - FUNCTIONAL ASSESSMENT: PAIN_FUNCTIONAL_ASSESSMENT: 0-10

## 2025-08-20 ASSESSMENT — PAIN SCALES - GENERAL: PAINLEVEL_OUTOF10: 10

## 2025-08-21 ENCOUNTER — APPOINTMENT (OUTPATIENT)
Dept: CT IMAGING | Age: 89
End: 2025-08-21
Payer: MEDICARE

## 2025-08-21 VITALS
WEIGHT: 117 LBS | RESPIRATION RATE: 15 BRPM | SYSTOLIC BLOOD PRESSURE: 158 MMHG | BODY MASS INDEX: 20.73 KG/M2 | OXYGEN SATURATION: 97 % | TEMPERATURE: 97.8 F | HEART RATE: 71 BPM | DIASTOLIC BLOOD PRESSURE: 55 MMHG

## 2025-08-21 LAB
LACTATE BLDV-SCNC: 2.6 MMOL/L (ref 0.4–2)
LACTATE BLDV-SCNC: 2.7 MMOL/L (ref 0.4–2)

## 2025-08-21 PROCEDURE — 74177 CT ABD & PELVIS W/CONTRAST: CPT

## 2025-08-21 PROCEDURE — 83605 ASSAY OF LACTIC ACID: CPT

## 2025-08-21 PROCEDURE — 6360000004 HC RX CONTRAST MEDICATION: Performed by: EMERGENCY MEDICINE

## 2025-08-21 RX ORDER — IOPAMIDOL 755 MG/ML
75 INJECTION, SOLUTION INTRAVASCULAR
Status: COMPLETED | OUTPATIENT
Start: 2025-08-21 | End: 2025-08-21

## 2025-08-21 RX ADMIN — IOPAMIDOL 75 ML: 755 INJECTION, SOLUTION INTRAVENOUS at 00:50

## 2025-08-22 LAB
EKG ATRIAL RATE: 71 BPM
EKG DIAGNOSIS: NORMAL
EKG P AXIS: -16 DEGREES
EKG P-R INTERVAL: 160 MS
EKG Q-T INTERVAL: 396 MS
EKG QRS DURATION: 104 MS
EKG QTC CALCULATION (BAZETT): 430 MS
EKG R AXIS: -12 DEGREES
EKG T AXIS: -4 DEGREES
EKG VENTRICULAR RATE: 71 BPM

## 2025-08-22 PROCEDURE — 93010 ELECTROCARDIOGRAM REPORT: CPT | Performed by: INTERNAL MEDICINE

## 2025-08-27 ENCOUNTER — TELEPHONE (OUTPATIENT)
Dept: INTERNAL MEDICINE CLINIC | Age: 89
End: 2025-08-27

## 2025-08-27 ENCOUNTER — OFFICE VISIT (OUTPATIENT)
Dept: CARDIOLOGY CLINIC | Age: 89
End: 2025-08-27
Payer: MEDICARE

## 2025-08-27 VITALS
WEIGHT: 117 LBS | BODY MASS INDEX: 20.73 KG/M2 | SYSTOLIC BLOOD PRESSURE: 118 MMHG | HEART RATE: 79 BPM | HEIGHT: 63 IN | DIASTOLIC BLOOD PRESSURE: 48 MMHG

## 2025-08-27 DIAGNOSIS — E78.2 MIXED HYPERLIPIDEMIA: ICD-10-CM

## 2025-08-27 DIAGNOSIS — I10 ESSENTIAL HYPERTENSION: ICD-10-CM

## 2025-08-27 DIAGNOSIS — Z86.718 HISTORY OF DVT (DEEP VEIN THROMBOSIS): ICD-10-CM

## 2025-08-27 DIAGNOSIS — I73.9 PAD (PERIPHERAL ARTERY DISEASE): ICD-10-CM

## 2025-08-27 DIAGNOSIS — I25.10 CORONARY ARTERY DISEASE INVOLVING NATIVE CORONARY ARTERY OF NATIVE HEART WITHOUT ANGINA PECTORIS: Primary | ICD-10-CM

## 2025-08-27 DIAGNOSIS — I47.29 NONSUSTAINED VENTRICULAR TACHYCARDIA (HCC): ICD-10-CM

## 2025-08-27 DIAGNOSIS — I65.23 BILATERAL CAROTID ARTERY STENOSIS: ICD-10-CM

## 2025-08-27 DIAGNOSIS — I77.1 SUBCLAVIAN ARTERIAL STENOSIS: ICD-10-CM

## 2025-08-27 DIAGNOSIS — Z95.1 S/P CABG X 3: ICD-10-CM

## 2025-08-27 DIAGNOSIS — I34.1 MVP (MITRAL VALVE PROLAPSE): ICD-10-CM

## 2025-08-27 DIAGNOSIS — I25.5 ISCHEMIC CARDIOMYOPATHY: ICD-10-CM

## 2025-08-27 PROCEDURE — 1090F PRES/ABSN URINE INCON ASSESS: CPT | Performed by: INTERNAL MEDICINE

## 2025-08-27 PROCEDURE — G8420 CALC BMI NORM PARAMETERS: HCPCS | Performed by: INTERNAL MEDICINE

## 2025-08-27 PROCEDURE — 99214 OFFICE O/P EST MOD 30 MIN: CPT | Performed by: INTERNAL MEDICINE

## 2025-08-27 PROCEDURE — 1036F TOBACCO NON-USER: CPT | Performed by: INTERNAL MEDICINE

## 2025-08-27 PROCEDURE — G8427 DOCREV CUR MEDS BY ELIG CLIN: HCPCS | Performed by: INTERNAL MEDICINE

## 2025-08-27 PROCEDURE — 1159F MED LIST DOCD IN RCRD: CPT | Performed by: INTERNAL MEDICINE

## 2025-08-27 PROCEDURE — 1123F ACP DISCUSS/DSCN MKR DOCD: CPT | Performed by: INTERNAL MEDICINE

## 2025-08-27 PROCEDURE — 1160F RVW MEDS BY RX/DR IN RCRD: CPT | Performed by: INTERNAL MEDICINE

## 2025-09-02 ENCOUNTER — HOSPITAL ENCOUNTER (OUTPATIENT)
Dept: LAB | Age: 89
Discharge: HOME | End: 2025-09-02
Payer: MEDICARE

## 2025-09-02 ENCOUNTER — CLINICAL SUPPORT (OUTPATIENT)
Dept: INTERNAL MEDICINE CLINIC | Age: 89
End: 2025-09-02
Payer: MEDICARE

## 2025-09-02 DIAGNOSIS — I10 HYPERTENSION, UNSPECIFIED TYPE: ICD-10-CM

## 2025-09-02 DIAGNOSIS — E53.8 VITAMIN B 12 DEFICIENCY: Primary | ICD-10-CM

## 2025-09-02 DIAGNOSIS — N18.31 CKD STAGE G3A/A1, GFR 45-59 AND ALBUMIN CREATININE RATIO <30 MG/G (HCC): ICD-10-CM

## 2025-09-02 LAB
FERRITIN SERPL-MCNC: 444 NG/ML (ref 15–150)
FOLATE SERPL-MCNC: 40 NG/ML (ref 4.8–24.2)
HAPTOGLOB SERPL-MCNC: 48 MG/DL (ref 30–200)
IRON SATN MFR SERPL: 31 % (ref 15–50)
IRON SERPL-MCNC: 79 UG/DL (ref 37–145)
LDH SERPL-CCNC: 166 U/L (ref 100–190)
RETICS # AUTO: 0.12 M/UL
RETICS/RBC NFR AUTO: 3.7 % (ref 0.2–2)
TIBC SERPL-MCNC: 257 UG/DL (ref 260–445)
UNSATURATED IRON BINDING CAPACITY: 178 UG/DL (ref 110–370)
VIT B12 SERPL-MCNC: >2000 PG/ML (ref 211–911)

## 2025-09-02 PROCEDURE — 82746 ASSAY OF FOLIC ACID SERUM: CPT

## 2025-09-02 PROCEDURE — 85045 AUTOMATED RETICULOCYTE COUNT: CPT

## 2025-09-02 PROCEDURE — 83550 IRON BINDING TEST: CPT

## 2025-09-02 PROCEDURE — 83010 ASSAY OF HAPTOGLOBIN QUANT: CPT

## 2025-09-02 PROCEDURE — 83615 LACTATE (LD) (LDH) ENZYME: CPT

## 2025-09-02 PROCEDURE — 82607 VITAMIN B-12: CPT

## 2025-09-02 PROCEDURE — 82728 ASSAY OF FERRITIN: CPT

## 2025-09-02 PROCEDURE — 96372 THER/PROPH/DIAG INJ SC/IM: CPT | Performed by: FAMILY MEDICINE

## 2025-09-02 PROCEDURE — 83540 ASSAY OF IRON: CPT

## 2025-09-02 RX ORDER — CYANOCOBALAMIN 1000 UG/ML
1000 INJECTION, SOLUTION INTRAMUSCULAR; SUBCUTANEOUS ONCE
Status: COMPLETED | OUTPATIENT
Start: 2025-09-02 | End: 2025-09-02

## 2025-09-02 RX ADMIN — CYANOCOBALAMIN 1000 MCG: 1000 INJECTION, SOLUTION INTRAMUSCULAR; SUBCUTANEOUS at 14:04
